# Patient Record
Sex: FEMALE | Race: WHITE | NOT HISPANIC OR LATINO | Employment: OTHER | URBAN - METROPOLITAN AREA
[De-identification: names, ages, dates, MRNs, and addresses within clinical notes are randomized per-mention and may not be internally consistent; named-entity substitution may affect disease eponyms.]

---

## 2017-01-05 ENCOUNTER — TELEPHONE (OUTPATIENT)
Dept: FAMILY MEDICINE | Facility: CLINIC | Age: 57
End: 2017-01-05

## 2017-01-05 NOTE — TELEPHONE ENCOUNTER
Panel Management Review           Composite cancer screening  Chart review shows that this patient is due/due soon for the following Colonoscopy and Fecal Colorectal (FIT)  Summary:    Patient is due/failing the following:   COLONOSCOPY and FIT    Action needed:   Patient needs to schedule colonoscopy or complete FIT card.     Type of outreach:    Will contact patient    Questions for provider review:    None                                                                                                                                    Jacquie Ayala,  Care Management Coordinator            Chart routed to Care Team .

## 2017-01-09 ENCOUNTER — OFFICE VISIT (OUTPATIENT)
Dept: BEHAVIORAL HEALTH | Facility: CLINIC | Age: 57
End: 2017-01-09
Payer: COMMERCIAL

## 2017-01-09 DIAGNOSIS — F43.10 POSTTRAUMATIC STRESS DISORDER: Primary | ICD-10-CM

## 2017-01-09 PROCEDURE — 90834 PSYTX W PT 45 MINUTES: CPT | Performed by: SOCIAL WORKER

## 2017-01-10 NOTE — PROGRESS NOTES
[facility]  January 9, 2017      Behavioral Health Clinician Progress Note    Patient Name: Aspen Mccullough           Service Type: Individual      Service Location:  in clinic      Session Start Time: 10:09am  Session End Time: 11:50am      Session Length: 38 - 52      Attendees: Patient    Visit Activities (Refresh list every visit): [facility] Only    Diagnostic Assessment Date: June 22, 2015  Treatment Plan Review Date: 4-18-16  See Flowsheets for today's PHQ-9 and BENI-7 results  Previous PHQ-9:   PHQ-9 SCORE 11/2/2015 11/10/2015 5/3/2016   Total Score - - -   Total Score - - -   Total Score 6 5 12     Previous BENI-7:   BENI-7 SCORE 10/19/2015 11/2/2015 11/10/2015   Total Score - - -   Total Score 3 3 3   Total Score - - -       FERCHO LEVEL:  FERCHO Score (Last Two) 7/24/2014   FERCHO Raw Score 51   Activation Score 91.6   FERCHO Level 4       DATA  Extended Session (60+ minutes): No  Interactive Complexity: No  Crisis: No    Treatment Objective(s) Addressed in This Session:  Target Behavior(s): disease management/lifestyle changes mental health    Mood Instability: will develop better understanding of triggers and coping strategies to stabilize mood  PTSD Symptom Management  Psychological distress related to Pain    Current Stressors / Issues:    The patient reported that she has difficulty sleeping and that this has been a chronic problem-she reported having hard time falling asleep and that she has been awake for the past 3 days-she is working with a sleep specialist about this concern and plans to follow up with the specialist regarding her sleeping.  She was able to give her  a complement during the visit as she stated that he is a nice man-he paid for her to have a genetics test to identify her genealogy wants to know her genealogy for the self and for her daughters.  The patient was vehement about her not having bipolar disorder as she has been diagnosed this in the past-she  stated that she had the symptoms that she has now since she was a child-she was tearful/emotional during the visit as she stated that she wrote a letter recently to there daughter expressing her feelings.  The patient talked about how her  wants her to let things go-she reported reasons for letting things go and need to let things go and that it does not help her-she has difficulty letting things go.       Progress on Treatment Objective(s) / Homework:  Minimal progress - ACTION (Actively working towards change); Intervened by reinforcing change plan / affirming steps taken    Motivational Interviewing    MI Intervention: Expressed Empathy/Understanding, Supported Autonomy, Collaboration, Evocation, Permission to raise concern or advise, Open-ended questions, Reflections: simple and complex and Change talk (evoked)     Change Talk Expressed by the Patient: Desire to change Ability to change Reasons to change Need to change Committment to change Activation Taking steps    Provider Response to Change Talk: E - Evoked more info from patient about behavior change, A - Affirmed patient's thoughts, decisions, or attempts at behavior change, R - Reflected patient's change talk and S - Summarized patient's change talk statements      Care Plan review completed: No    Medication Review:  No changes to current psychiatric medication(s)    Medication Compliance:  NA    Changes in Health Issues:   Yes: Pain, Associated Psychological Distress    Chemical Use Review:   Substance Use: Chemical use reviewed, no active concerns identified      Tobacco Use: No current tobacco use.      Assessment: Current Emotional / Mental Status (status of significant symptoms):  Risk status (Self / Other harm or suicidal ideation)  Patient denies a history of suicidal ideation, suicide attempts, self-injurious behavior, homicidal ideation, homicidal behavior and and other safety concerns  Patient denies current fears or concerns for  personal safety.  Patient denies current or recent suicidal ideation or behaviors.  Patient denies current or recent homicidal ideation or behaviors.  Patient denies current or recent self injurious behavior or ideation.  Patient denies other safety concerns.  A safety and risk management plan has not been developed at this time, however patient was encouraged to call [place] [phone] should there be a change in any of these risk factors.    Appearance:   Appropriate   Eye Contact:   Good   Psychomotor Behavior: Agitated   Attitude:   Cooperative   Orientation:   All  Speech   Rate / Production: Hyperverbal    Volume:  Normal   Mood:    Normal  Affect:    Appropriate   Thought Content:  Clear   Thought Form:  Coherent  Goal Directed  Logical   Insight:    Good     Diagnoses:  1. Posttraumatic stress disorder        Collateral Reports Completed:  Not Applicable    Plan: (Homework, other):  Patient was given information about behavioral services and encouraged to schedule a follow up appointment with the clinic [facility] as needed.  She was also given information about mental health symptoms and treatment options .  CD Recommendations: No indications of CD issues.  Antwan Boston, [facility], [facility]      ______________________________________________________________________    Integrated Primary Care Behavioral Health Treatment Plan    Patient's Name: Aspen Mccullough  YOB: 1960    Date: 4-18-16    DSM-V Diagnoses: PTSD  Psychosocial / Contextual Factors: medical condition, history of traumatic experiences  WHODAS:  Will complete at next visit    Referral / Collaboration:  Referral to another professional/service is not indicated at this time..    Anticipated number of session or this episode of care: 10      MeasurableTreatment Goal(s) related to diagnosis / functional impairment(s)  Goal 1: Patient will be able to remember the past with 50% less emotional reaction of anger and hurt.     I will know I've met my goal  when I can let go of the past     Objective #A (Patient Action)    Patient will talk to at least two others about losses and coping.  Status: New - Date: 4-18-16     Intervention(s)  [facility] will provide avenue for the past to process her losses and disappointments.    Objective #B  Patient will reduce her triggers from past trauma.  Status: New - Date: 4-18-16     Intervention(s)  [facility] will teach distraction skills. mindfulness.      Patient has reviewed and agreed to the above plan.      Antwan Boston, [facility]  April 18, 2016

## 2017-01-21 ENCOUNTER — OFFICE VISIT (OUTPATIENT)
Dept: URGENT CARE | Facility: URGENT CARE | Age: 57
End: 2017-01-21
Payer: COMMERCIAL

## 2017-01-21 VITALS
HEIGHT: 69 IN | BODY MASS INDEX: 19.26 KG/M2 | SYSTOLIC BLOOD PRESSURE: 134 MMHG | WEIGHT: 130 LBS | TEMPERATURE: 98.4 F | OXYGEN SATURATION: 97 % | DIASTOLIC BLOOD PRESSURE: 80 MMHG | HEART RATE: 112 BPM

## 2017-01-21 DIAGNOSIS — J04.0 LARYNGITIS: ICD-10-CM

## 2017-01-21 DIAGNOSIS — R07.0 THROAT PAIN: Primary | ICD-10-CM

## 2017-01-21 DIAGNOSIS — J40 BRONCHITIS: ICD-10-CM

## 2017-01-21 DIAGNOSIS — R05.9 COUGH: ICD-10-CM

## 2017-01-21 PROCEDURE — 99214 OFFICE O/P EST MOD 30 MIN: CPT | Performed by: FAMILY MEDICINE

## 2017-01-21 RX ORDER — ALBUTEROL SULFATE 0.83 MG/ML
1 SOLUTION RESPIRATORY (INHALATION) EVERY 4 HOURS PRN
Qty: 1 BOX | Refills: 1 | Status: SHIPPED | OUTPATIENT
Start: 2017-01-21 | End: 2017-10-03

## 2017-01-21 RX ORDER — CEPHALEXIN 500 MG/1
500 CAPSULE ORAL 3 TIMES DAILY
Qty: 30 CAPSULE | Refills: 0 | Status: SHIPPED | OUTPATIENT
Start: 2017-01-21 | End: 2017-10-03

## 2017-01-21 RX ORDER — IPRATROPIUM BROMIDE AND ALBUTEROL SULFATE 2.5; .5 MG/3ML; MG/3ML
1 SOLUTION RESPIRATORY (INHALATION) ONCE
Qty: 3 ML | Refills: 0
Start: 2017-01-21 | End: 2018-02-12

## 2017-01-21 NOTE — MR AVS SNAPSHOT
After Visit Summary   1/21/2017    Aspen Mccullough    MRN: [id number]           Patient Information     Date Of Birth          1960        Visit Information        Provider Department      1/21/2017 3:15 PM Allyson Waller MD [facility]        Today's Diagnoses     Throat pain    -  1     Cough         Bronchitis            Follow-ups after your visit        Your next 10 appointments already scheduled     Jan 23, 2017 11:00 AM   Return Visit with Antwan Boston [facility] ([facility])    [address] [place] [address]  [address]  [facility] [address] [phone]              Who to contact     If you have questions or need follow up information about today's clinic visit or your schedule please contact [facility] directly at [phone].  Normal or non-critical lab and imaging results will be communicated to you by MyChart, letter or phone within 4 business days after the clinic has received the results. If you do not hear from us within 7 days, please contact the clinic through MyChart or phone. If you have a critical or abnormal lab result, we will notify you by phone as soon as possible.  Submit refill requests through [employer] or call your pharmacy and they will forward the refill request to us. Please allow 3 business days for your refill to be completed.          Additional Information About Your Visit        MyChart Information     [employer] gives you secure access to your electronic health record. If you see a primary care provider, you can also send messages to your care team and make appointments. If you have questions, please call your primary care clinic.  If you do not have a primary care provider, please call [phone] and they will assist you.        Care EveryWhere ID     This is your Care EveryWhere ID. This could be used by other organizations to access your  [address], [address] [facility], [facility] [address]  [address], [address] [place] [address]   [phone]    [address] [facility], [address] [place] [address], [address] [place] [address]   [phone]       June 24, 2019    [address] [place] [address] "[facility]  [id number]        Your Vitals Were     Pulse Temperature Height BMI (Body Mass Index) Pulse Oximetry       112 98.4  F (36.9  C) (Oral) 5' 9\" (1.753 m) 19.19 kg/m2 97%        Blood Pressure from Last 3 Encounters:   01/21/17 134/80   10/31/16 122/76   07/12/16 130/88    Weight from Last 3 Encounters:   01/21/17 130 lb (58.968 kg)   10/31/16 182 lb (82.555 kg)   04/24/16 182 lb (82.555 kg)              Today, you had the following     No orders found for display         Today's Medication Changes          These changes are accurate as of: 1/21/17  4:42 PM.  If you have any questions, ask your nurse or doctor.               Start taking these medicines.        Dose/Directions    albuterol (2.5 MG/3ML) 0.083% neb solution   Used for:  Bronchitis   Started by:  Allyson Waller MD        Dose:  1 vial   Take 1 vial (2.5 mg) by nebulization every 4 hours as needed for shortness of breath / dyspnea or wheezing   Quantity:  1 Box   Refills:  1       ipratropium - albuterol 0.5 mg/2.5 mg/3 mL 0.5-2.5 (3) MG/3ML neb solution   Commonly known as:  DUONEB   Used for:  Bronchitis   Started by:  Allyson Waller MD        Dose:  1 vial   Take 1 vial (3 mLs) by nebulization once for 1 dose   Quantity:  3 mL   Refills:  0       order for DME   Used for:  Bronchitis   Started by:  Allyson Waller MD        Equipment being ordered: Nebulizer   Quantity:  1 Device   Refills:  0         These medicines have changed or have updated prescriptions.        Dose/Directions    * cephALEXin 500 MG capsule   Commonly known as:  KEFLEX   This may have changed:  Another medication with the same name was added. Make sure you understand how and when to take each.   Changed by:  Lucie Linn NP        4 caps 1 hour before dental appt   Refills:  0       * cephALEXin 500 MG capsule   Commonly known as:  KEFLEX   This may have changed:  You were already taking a medication with the same name, and this " prescription was added. Make sure you understand how and when to take each.   Used for:  Bronchitis   Changed by:  Allyson Waller MD        Dose:  500 mg   Take 1 capsule (500 mg) by mouth 3 times daily   Quantity:  30 capsule   Refills:  0       * Notice:  This list has 2 medication(s) that are the same as other medications prescribed for you. Read the directions carefully, and ask your doctor or other care provider to review them with you.         Where to get your medicines      These medications were sent to [facility] #[id number] - [place], [place] - [address]  [address] [facility] [address]     Phone:  [phone]    - albuterol (2.5 MG/3ML) 0.083% neb solution  - cephALEXin 500 MG capsule      Some of these will need a paper prescription and others can be bought over the counter.  Ask your nurse if you have questions.     You don't need a prescription for these medications    - ipratropium - albuterol 0.5 mg/2.5 mg/3 mL 0.5-2.5 (3) MG/3ML neb solution  - order for DME             Primary Care Provider Office Phone # Fax #    Sarahi Vivar [phone]       [facility] [address] [facility] [address]        Thank you!     Thank you for choosing [facility]  for your care. Our goal is always to provide you with excellent care. Hearing back from our patients is one way we can continue to improve our services. Please take a few minutes to complete the written survey that you may receive in the mail after your visit with us. Thank you!             Your Updated Medication List - Protect others around you: Learn how to safely use, store and throw away your medicines at www.disposemymeds.org.          This list is accurate as of: 1/21/17  4:42 PM.  Always use your most recent med list.                   Brand Name Dispense Instructions for use    albuterol (2.5 MG/3ML) 0.083% neb solution     1 Box    Take 1 vial (2.5 mg) by nebulization every 4  hours as needed for shortness of breath / dyspnea or wheezing       ALLEGRA 180 MG tablet   Generic drug:  fexofenadine      Take 180 mg by mouth daily.       * cephALEXin 500 MG capsule    KEFLEX     4 caps 1 hour before dental appt       * cephALEXin 500 MG capsule    KEFLEX    30 capsule    Take 1 capsule (500 mg) by mouth 3 times daily       cyanocobalamin 1000 MCG/ML injection    VITAMIN B12    4 mL    Inject 1 mL (1,000 mcg) Subcutaneous every 7 days       cyclobenzaprine 10 MG tablet    FLEXERIL    90 tablet    Take 1 tablet (10 mg) by mouth 3 times daily       CYTOMEL 50 MCG Tabs   Generic drug:  Liothyronine Sodium      Take 50 mcg by mouth 3 times daily       diazepam 10 MG tablet    VALIUM    90 tablet    Take 1 tablet (10 mg) by mouth 3 times daily       ergocalciferol 95102 UNITS capsule    ERGOCALCIFEROL    8 capsule    Take 1 capsule (50,000 Units) by mouth once a week       estradiol 2 MG vaginal ring    ESTRING    1 each    Place 1 each vaginally every 3 months one ring.       FISH OIL      daily. w/DHEA.       FLONASE 50 MCG/ACT spray   Generic drug:  fluticasone      2 sprays by Both Nostrils route daily as needed.       guaiFENesin-codeine 100-10 MG/5ML Soln solution    ROBITUSSIN AC    120 mL    Take 10 mLs by mouth every 4 hours as needed for cough       HYDROmorphone 8 MG tablet    DILAUDID    100 tablet    Take 1 tablet (8 mg) by mouth every 3 hours as needed       ipratropium - albuterol 0.5 mg/2.5 mg/3 mL 0.5-2.5 (3) MG/3ML neb solution    DUONEB    3 mL    Take 1 vial (3 mLs) by nebulization once for 1 dose       * levothyroxine 200 MCG Solr injection    SYNTHROID/LEVOTHROID         * levothyroxine 300 MCG tablet    SYNTHROID/LEVOTHROID         morphine 30 MG 12 hr tablet    MS CONTIN    270 tablet    Take 3 tablets (90 mg) by mouth every 8 hours       NASONEX 50 MCG/ACT spray   Generic drug:  mometasone          norethindrone-ethinyl estradiol 1-5 MG-MCG per tablet    FEMHRT 1/5    90  tablet    Take 1 tablet by mouth daily       order for DME     1 Device    Equipment being ordered: Nebulizer       prednisoLONE acetate 1 % ophthalmic susp    PRED FORTE         * predniSONE 1 MG tablet    DELTASONE     alternate 8 mg and 10 mg every other day       * predniSONE 5 MG tablet    DELTASONE     alternate 8 mg and 10 mg every other day       probiotic Caps      Take 1 capsule by mouth daily.       ramelteon 8 MG tablet    ROZEREM    30 tablet    Take 1 tablet (8 mg) by mouth At Bedtime       RESTASIS 0.05 % ophthalmic emulsion   Generic drug:  cycloSPORINE      Place 1 drop into both eyes 2 times daily       zolpidem 12.5 MG CR tablet    AMBIEN CR    30 tablet    Take 1 tablet (12.5 mg) by mouth nightly as needed       * Notice:  This list has 6 medication(s) that are the same as other medications prescribed for you. Read the directions carefully, and ask your doctor or other care provider to review them with you.

## 2017-01-21 NOTE — NURSING NOTE
The following nebulizer treatment was given:     MEDICATION: Duoneb  : eTelemetry  LOT #: [id number]  EXPIRATION DATE:  7/2018  NDC # 4007-0626-88    Josie Vann RN

## 2017-01-21 NOTE — PROGRESS NOTES
SUBJECTIVE:   Aspen Mccullough is a 56 year old female who complains of nasal congestion, post nasal drip, yellow and green nasal discharge, sore throat, productive cough, hoarse voice, chest congestion, wheezing and chills for more than a week, had diarrhea yesterday.  She denies a history of nausea, vomiting, chest pain and fever and denies a history of asthma. Patient denies smoke cigarettes.  Pt has history of SLE and hypothyroidism.       Past Medical History   Diagnosis Date     Fibromyalgia      Lupus (H)      Cushing's syndrome (H)      Hyperlipidemia      ADHD (attention deficit hyperactivity disorder)      Allergic rhinitis      Insomnia      DDD (degenerative disc disease)      Hypothyroid      hx hashimoto's thyroiditis     Pernicious anemia      Endometriosis      Malabsorption      Chronic low back pain      Diarrhea      Sinusitis      Renal disease      chronic renal insufficiency     Renal disease      hx renal failure     Deviated nasal septum      Hashimoto's disease      DDD (degenerative disc disease)         Past Surgical History   Procedure Laterality Date     Cholecystectomy       Arthrodesis ankle       right     Foot surgery       left X 4     Appendectomy       Breast surgery       right- tissue remove nipple area     Laparoscopic ablation endometriosis       Salpingo-oophorectomy bilateral       Tonsillectomy       Arthroplasty knee bilateral       Amputation       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)     Fusion lumbar anterior one level       Laminectomy lumbar one level       L4-5     Arthroplasty revision knee  4/19/2011     Procedure:ARTHROPLASTY REVISION KNEE; With Antibiotic Cement ; Surgeon:CHAO OLIVARES; Location:[facility]     Bunionectomy  12/14/2011     Procedure:BUNIONECTOMY; Right Bunion Correction; Surgeon:GRACE ZARATE; Location:[facility]     Remove hardware foot  12/14/2011     Procedure:REMOVE HARDWARE FOOT; Hardware Removal Right Foot (Mini-C-Arm) ;  Surgeon:GRACE ZARATE; Location:[facility]     Excise mass upper extremity  12/14/2011     Procedure:EXCISE MASS UPPER EXTREMITY; Excision of Left Arm Mass; Surgeon:GRACE ZARATE; Location:[facility]     Rhinoplasty          Current Outpatient Prescriptions   Medication Sig Dispense Refill     norethindrone-ethinyl estradiol (FEMHRT 1/5) 1-5 MG-MCG per tablet Take 1 tablet by mouth daily 90 tablet 3     levothyroxine (SYNTHROID, LEVOTHROID) 300 MCG tablet   10     prednisoLONE acetate (PRED FORTE) 1 % ophthalmic suspension   1     HYDROmorphone (DILAUDID) 8 MG tablet Take 1 tablet (8 mg) by mouth every 3 hours as needed 100 tablet 0     morphine (MS CONTIN) 30 MG 12 hr tablet Take 3 tablets (90 mg) by mouth every 8 hours 270 tablet 0     diazepam (VALIUM) 10 MG tablet Take 1 tablet (10 mg) by mouth 3 times daily 90 tablet 0     Liothyronine Sodium 50 MCG TABS Take 50 mcg by mouth 3 times daily       ergocalciferol (ERGOCALCIFEROL) 65537 UNITS capsule Take 1 capsule (50,000 Units) by mouth once a week 8 capsule 0     cyclobenzaprine (FLEXERIL) 10 MG tablet Take 1 tablet (10 mg) by mouth 3 times daily 90 tablet 3     estradiol (ESTRING) 2 MG vaginal ring Place 1 each vaginally every 3 months one ring. 1 each 4     cycloSPORINE (RESTASIS) 0.05 % ophthalmic emulsion Place 1 drop into both eyes 2 times daily       levothyroxine (SYNTHROID,LEVOTHROID) 200 MCG SOLR   7     cyanocobalamin 1000 MCG/ML injection Inject 1 mL (1,000 mcg) Subcutaneous every 7 days 4 mL 5     FISH OIL daily. w/DHEA.        PREDNISONE 5 MG OR TABS alternate 8 mg and 10 mg every other day       zolpidem (AMBIEN CR) 12.5 MG CR tablet Take 1 tablet (12.5 mg) by mouth nightly as needed 30 tablet 1     guaiFENesin-codeine (ROBITUSSIN AC) 100-10 MG/5ML SOLN Take 10 mLs by mouth every 4 hours as needed for cough 120 mL 0     NASONEX 50 MCG/ACT nasal spray   11     ramelteon (ROZEREM) 8 MG tablet Take 1 tablet (8 mg) by mouth At Bedtime 30  "tablet 6     probiotic CAPS Take 1 capsule by mouth daily.       fexofenadine (ALLEGRA) 180 MG tablet Take 180 mg by mouth daily.       fluticasone (FLONASE) 50 MCG/ACT nasal spray 2 sprays by Both Nostrils route daily as needed.       PREDNISONE 1 MG OR TABS alternate 8 mg and 10 mg every other day                      OBJECTIVE:  /80 mmHg  Pulse 112  Temp(Src) 98.4  F (36.9  C) (Oral)  Ht 5' 9\" (1.753 m)  Wt 130 lb (58.968 kg)  BMI 19.19 kg/m2  SpO2 97%   She appears in NAD, voice is hoarse, no evidence of lethargy or dyspnea.  Ears normal.  Throat and pharynx : erythematous without exudates.  Neck supple. No adenopathy in the neck. Nose is congested. Sinuses non tender. The chest: scattered rhonchi bilaterally without wheezes or rales. The abdomen is soft without tenderness, guarding, mass, rebound or organomegaly. Bowel sounds are normal.       Assessment/Plan:   (J40) Bronchitis  Comment:   Plan: ipratropium - albuterol 0.5 mg/2.5 mg/3 mL         (DUONEB) 0.5-2.5 (3) MG/3ML neb solution,         cephALEXin (KEFLEX) 500 MG capsule, order for         DME, albuterol (2.5 MG/3ML) 0.083% neb solution            (J04.0) Laryngitis  Comment:   Plan: ipratropium - albuterol 0.5 mg/2.5 mg/3 mL         (DUONEB) 0.5-2.5 (3) MG/3ML neb solution, order        for DME, albuterol (2.5 MG/3ML) 0.083% neb         solution          Pt received one neb treatment at clinic, her lung sounds improved. She is advised to increase Prednisone to 20 mg twice a day for 5 days and RTC tomorrow if symptoms are worse.   Symptomatic therapy suggested: push fluids, rest, gargle warm salt water and use vaporizer or mist needed .   "

## 2017-01-21 NOTE — NURSING NOTE
"Chief Complaint   Patient presents with     Urgent Care     URI     Last Saturday started to have copious nasal congestion/discharge and productive cough.  Hx double pneumonia last winter.  Feels short of breath.  Notes has mold exposure in her home.  Hx of lupus and chronic renal failure.  States has negative strep test; throat irritated from coughing.     Initial /80 mmHg  Pulse 112  Temp(Src) 98.4  F (36.9  C) (Oral)  Ht 5' 9\" (1.753 m)  Wt 130 lb (58.968 kg)  BMI 19.19 kg/m2  SpO2 97% Estimated body mass index is 19.19 kg/(m^2) as calculated from the following:    Height as of this encounter: 5' 9\" (1.753 m).    Weight as of this encounter: 130 lb (58.968 kg)..  BP completed using cuff size: regular  CHELLE Espinal  "

## 2017-01-21 NOTE — Clinical Note
January 21, 2017    Aspen Mccullough  [address]  [facility] [address]            To whom it may concern:     This is to inform you that this patient needs to be off work for three days starting tomorrow due to her illness.   Please let me know if you have any questions.       Sincerely,                 Allyson Waller MD

## 2017-01-21 NOTE — NURSING NOTE
"Chief Complaint   Patient presents with     Urgent Care     URI     Last Saturday started to have copious nasal congestion/discharge and productive cough.  Hx double pneumonia last winter.  Feels short of breath.  Notes has mold exposure in her home.  Hx of lupus and chronic renal failure.     Initial /80 mmHg  Pulse 112  Temp(Src) 98.4  F (36.9  C) (Oral)  Ht 5' 9\" (1.753 m)  Wt 130 lb (58.968 kg)  BMI 19.19 kg/m2  SpO2 97% Estimated body mass index is 19.19 kg/(m^2) as calculated from the following:    Height as of this encounter: 5' 9\" (1.753 m).    Weight as of this encounter: 130 lb (58.968 kg)..  BP completed using cuff size: regular  CHELLE Espinal  "

## 2017-01-23 ENCOUNTER — OFFICE VISIT (OUTPATIENT)
Dept: BEHAVIORAL HEALTH | Facility: CLINIC | Age: 57
End: 2017-01-23
Payer: COMMERCIAL

## 2017-01-23 DIAGNOSIS — F43.10 POSTTRAUMATIC STRESS DISORDER: Primary | ICD-10-CM

## 2017-01-23 PROCEDURE — 90834 PSYTX W PT 45 MINUTES: CPT | Performed by: SOCIAL WORKER

## 2017-01-24 NOTE — PROGRESS NOTES
"[facility]  January 23, 2017      Behavioral Health Clinician Progress Note    Patient Name: Aspen Mccullough           Service Type: Individual      Service Location:  in clinic      Session Start Time: 11:05am  Session End Time: 11:50am      Session Length: 38 - 52      Attendees: Patient    Visit Activities (Refresh list every visit): [facility] Only    Diagnostic Assessment Date: June 22, 2015  Treatment Plan Review Date: 4-18-16  See Flowsheets for today's PHQ-9 and BENI-7 results  Previous PHQ-9:   PHQ-9 SCORE 11/2/2015 11/10/2015 5/3/2016   Total Score - - -   Total Score - - -   Total Score 6 5 12     Previous BENI-7:   BENI-7 SCORE 10/19/2015 11/2/2015 11/10/2015   Total Score - - -   Total Score 3 3 3   Total Score - - -       FERCHO LEVEL:  FERCHO Score (Last Two) 7/24/2014   FERCHO Raw Score 51   Activation Score 91.6   FERCHO Level 4       DATA  Extended Session (60+ minutes): No  Interactive Complexity: No  Crisis: No    Treatment Objective(s) Addressed in This Session:  Target Behavior(s): disease management/lifestyle changes mental health    Mood Instability: will develop better understanding of triggers and coping strategies to stabilize mood  PTSD Symptom Management  Psychological distress related to Pain    Current Stressors / Issues:    The patient reported that she has difficulty sleeping and that this has been a chronic problem-she is working with a sleep specialist about this concern and plans to follow up with the specialist regarding her sleeping.  She reported \"I got to pat myself on the back\" and she was referring to management of her anger reactions during interaction with neighbor.  The patient talked about her anger towards her brother and talked about how he has negativity impacted her family.  Regarding her mood the patient reported being down because she has been feeling health issues and due to not being able to exercise regularly.  The patient talked about letting go " and how she did a good job of letting go with the interaction with the neighbor and how she tried her best with raising her youngest daughter even though she had serious medical issues.           Progress on Treatment Objective(s) / Homework:  Minimal progress - ACTION (Actively working towards change); Intervened by reinforcing change plan / affirming steps taken    Motivational Interviewing    MI Intervention: Expressed Empathy/Understanding, Supported Autonomy, Collaboration, Evocation, Permission to raise concern or advise, Open-ended questions, Reflections: simple and complex and Change talk (evoked)     Change Talk Expressed by the Patient: Desire to change Ability to change Reasons to change Need to change Committment to change Activation Taking steps    Provider Response to Change Talk: E - Evoked more info from patient about behavior change, A - Affirmed patient's thoughts, decisions, or attempts at behavior change, R - Reflected patient's change talk and S - Summarized patient's change talk statements      Care Plan review completed: No    Medication Review:  No changes to current psychiatric medication(s)    Medication Compliance:  NA    Changes in Health Issues:   Yes: Pain, Associated Psychological Distress    Chemical Use Review:   Substance Use: Chemical use reviewed, no active concerns identified      Tobacco Use: No current tobacco use.      Assessment: Current Emotional / Mental Status (status of significant symptoms):  Risk status (Self / Other harm or suicidal ideation)  Patient denies a history of suicidal ideation, suicide attempts, self-injurious behavior, homicidal ideation, homicidal behavior and and other safety concerns  Patient denies current fears or concerns for personal safety.  Patient denies current or recent suicidal ideation or behaviors.  Patient denies current or recent homicidal ideation or behaviors.  Patient denies current or recent self injurious behavior or  ideation.  Patient denies other safety concerns.  A safety and risk management plan has not been developed at this time, however patient was encouraged to call [place] [phone] should there be a change in any of these risk factors.    Appearance:   Appropriate   Eye Contact:   Good   Psychomotor Behavior: Agitated   Attitude:   Cooperative   Orientation:   All  Speech   Rate / Production: Hyperverbal    Volume:  Normal   Mood:    Normal  Affect:    Appropriate   Thought Content:  Clear   Thought Form:  Coherent  Goal Directed  Logical   Insight:    Good     Diagnoses:  1. Posttraumatic stress disorder        Collateral Reports Completed:  Not Applicable    Plan: (Homework, other):  Patient was given information about behavioral services and encouraged to schedule a follow up appointment with the clinic [facility] as needed.  She was also given information about mental health symptoms and treatment options .  CD Recommendations: No indications of CD issues.  TAQUERIA Blackwell, [facility]      ______________________________________________________________________    Integrated Primary Care Behavioral Health Treatment Plan    Patient's Name: Aspen Mccullough  YOB: 1960    Date: 4-18-16    DSM-V Diagnoses: PTSD  Psychosocial / Contextual Factors: medical condition, history of traumatic experiences  WHODAS:  Will complete at next visit    Referral / Collaboration:  Referral to another professional/service is not indicated at this time..    Anticipated number of session or this episode of care: 10      MeasurableTreatment Goal(s) related to diagnosis / functional impairment(s)  Goal 1: Patient will be able to remember the past with 50% less emotional reaction of anger and hurt.     I will know I've met my goal when I can let go of the past     Objective #A (Patient Action)    Patient will talk to at least two others about losses and coping.  Status: New - Date: 4-18-16     Intervention(s)  [facility] will provide avenue for  the past to process her losses and disappointments.    Objective #B  Patient will reduce her triggers from past trauma.  Status: New - Date: 4-18-16     Intervention(s)  [facility] will teach distraction skills. mindfulness.      Patient has reviewed and agreed to the above plan.      Antwan Boston, [facility]  April 18, 2016

## 2017-02-02 ENCOUNTER — TELEPHONE (OUTPATIENT)
Dept: NURSING | Facility: CLINIC | Age: 57
End: 2017-02-02

## 2017-02-02 DIAGNOSIS — N95.2 VAGINAL ATROPHY: Primary | ICD-10-CM

## 2017-02-02 NOTE — TELEPHONE ENCOUNTER
Received refill request via for:    Note from Dr. Patricio at annual on 10/31/16: She does use an Estring for replacement therapy. There is no sign of any erosion from the Estring.  Pt also takes norethindrone-ethinyl estradiol (FEMHRT 1/5) 1-5 MG-MCG per tablet daily.    Estring Vaginal Ring 2 MG      Last Written Prescription Date:  1/5/16  Last Fill Quantity: 1 ring,   # refills: 4  Last Office Visit with [facility] primary care provider:  10/31/16-Annual  Future Office visit: None    Routing refill request to provider for review/approval because:  Rx was not filled at pt's annual on 10/31/16. Rx & pharmacy pended. Routing to Dr. Patricio.

## 2017-02-06 ENCOUNTER — OFFICE VISIT (OUTPATIENT)
Dept: BEHAVIORAL HEALTH | Facility: CLINIC | Age: 57
End: 2017-02-06
Payer: COMMERCIAL

## 2017-02-06 DIAGNOSIS — F43.10 POSTTRAUMATIC STRESS DISORDER: Primary | ICD-10-CM

## 2017-02-06 PROCEDURE — 90832 PSYTX W PT 30 MINUTES: CPT | Performed by: SOCIAL WORKER
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
## 2017-02-20 ENCOUNTER — OFFICE VISIT (OUTPATIENT)
Dept: BEHAVIORAL HEALTH | Facility: CLINIC | Age: 57
End: 2017-02-20
Payer: COMMERCIAL

## 2017-02-20 DIAGNOSIS — F43.10 POSTTRAUMATIC STRESS DISORDER: Primary | ICD-10-CM

## 2017-02-20 PROCEDURE — 90834 PSYTX W PT 45 MINUTES: CPT | Performed by: SOCIAL WORKER
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
## 2017-02-20 NOTE — MR AVS SNAPSHOT
After Visit Summary   2/20/2017    Aspen Mccullough    MRN: [id number]           Patient Information     Date Of Birth          1960        Visit Information        Provider Department      2/20/2017 10:30 AM Antwan Boston LICSW [facility]        Today's Diagnoses     Posttraumatic stress disorder    -  1       Follow-ups after your visit        Your next 10 appointments already scheduled     Mar 06, 2017 10:30 AM CST   Return Visit with BARNEY Yan   [facility] ([facility])    [address] [place] [address]  [address]  [facility] [address] [phone]              Who to contact     If you have questions or need follow up information about today's clinic visit or your schedule please contact [facility] directly at [phone].  Normal or non-critical lab and imaging results will be communicated to you by MyChart, letter or phone within 4 business days after the clinic has received the results. If you do not hear from us within 7 days, please contact the clinic through MyChart or phone. If you have a critical or abnormal lab result, we will notify you by phone as soon as possible.  Submit refill requests through [employer] or call your pharmacy and they will forward the refill request to us. Please allow 3 business days for your refill to be completed.          Additional Information About Your Visit        MyChart Information     [employer] gives you secure access to your electronic health record. If you see a primary care provider, you can also send messages to your care team and make appointments. If you have questions, please call your primary care clinic.  If you do not have a primary care provider, please call [phone] and they will assist you.        Care EveryWhere ID     This is your Care EveryWhere ID. This could be used by other organizations to access  your [place] medical records  [id number]         Blood Pressure from Last 3 Encounters:   01/21/17 134/80   10/31/16 122/76   07/12/16 130/88    Weight from Last 3 Encounters:   01/21/17 130 lb (59 kg)   10/31/16 182 lb (82.6 kg)   04/24/16 182 lb (82.6 kg)              Today, you had the following     No orders found for display       Primary Care Provider Office Phone # Fax #    Sarahi Vivar [phone]       [facility] [address] [facility] [address]        Thank you!     Thank you for choosing [facility]  for your care. Our goal is always to provide you with excellent care. Hearing back from our patients is one way we can continue to improve our services. Please take a few minutes to complete the written survey that you may receive in the mail after your visit with us. Thank you!             Your Updated Medication List - Protect others around you: Learn how to safely use, store and throw away your medicines at www.disposemymeds.org.          This list is accurate as of: 2/20/17  1:45 PM.  Always use your most recent med list.                   Brand Name Dispense Instructions for use    albuterol (2.5 MG/3ML) 0.083% neb solution     1 Box    Take 1 vial (2.5 mg) by nebulization every 4 hours as needed for shortness of breath / dyspnea or wheezing       ALLEGRA 180 MG tablet   Generic drug:  fexofenadine      Take 180 mg by mouth daily.       * cephALEXin 500 MG capsule    KEFLEX     4 caps 1 hour before dental appt       * cephALEXin 500 MG capsule    KEFLEX    30 capsule    Take 1 capsule (500 mg) by mouth 3 times daily       cyanocobalamin 1000 MCG/ML injection    VITAMIN B12    4 mL    Inject 1 mL (1,000 mcg) Subcutaneous every 7 days       cyclobenzaprine 10 MG tablet    FLEXERIL    90 tablet    Take 1 tablet (10 mg) by mouth 3 times daily       CYTOMEL 50 MCG Tabs   Generic drug:  Liothyronine Sodium      Take 50 mcg by mouth 3 times daily        diazepam 10 MG tablet    VALIUM    90 tablet    Take 1 tablet (10 mg) by mouth 3 times daily       ergocalciferol 78054 UNITS capsule    ERGOCALCIFEROL    8 capsule    Take 1 capsule (50,000 Units) by mouth once a week       estradiol 2 MG vaginal ring    ESTRING    1 each    Place 1 each vaginally every 3 months one ring.       FISH OIL      daily. w/DHEA.       FLONASE 50 MCG/ACT spray   Generic drug:  fluticasone      2 sprays by Both Nostrils route daily as needed.       guaiFENesin-codeine 100-10 MG/5ML Soln solution    ROBITUSSIN AC    120 mL    Take 10 mLs by mouth every 4 hours as needed for cough       HYDROmorphone 8 MG tablet    DILAUDID    100 tablet    Take 1 tablet (8 mg) by mouth every 3 hours as needed       ipratropium - albuterol 0.5 mg/2.5 mg/3 mL 0.5-2.5 (3) MG/3ML neb solution    DUONEB    3 mL    Take 1 vial (3 mLs) by nebulization once for 1 dose       * levothyroxine 200 MCG Solr injection    SYNTHROID/LEVOTHROID         * levothyroxine 300 MCG tablet    SYNTHROID/LEVOTHROID         morphine 30 MG 12 hr tablet    MS CONTIN    270 tablet    Take 3 tablets (90 mg) by mouth every 8 hours       NASONEX 50 MCG/ACT spray   Generic drug:  mometasone          norethindrone-ethinyl estradiol 1-5 MG-MCG per tablet    FEMHRT 1/5    90 tablet    Take 1 tablet by mouth daily       order for DME     1 Device    Equipment being ordered: Nebulizer       prednisoLONE acetate 1 % ophthalmic susp    PRED FORTE         * predniSONE 1 MG tablet    DELTASONE     alternate 8 mg and 10 mg every other day       * predniSONE 5 MG tablet    DELTASONE     alternate 8 mg and 10 mg every other day       probiotic Caps      Take 1 capsule by mouth daily.       ramelteon 8 MG tablet    ROZEREM    30 tablet    Take 1 tablet (8 mg) by mouth At Bedtime       RESTASIS 0.05 % ophthalmic emulsion   Generic drug:  cycloSPORINE      Place 1 drop into both eyes 2 times daily       zolpidem 12.5 MG CR tablet    AMBIEN CR    30  tablet    Take 1 tablet (12.5 mg) by mouth nightly as needed       * Notice:  This list has 6 medication(s) that are the same as other medications prescribed for you. Read the directions carefully, and ask your doctor or other care provider to review them with you.

## 2017-03-13 ENCOUNTER — OFFICE VISIT (OUTPATIENT)
Dept: BEHAVIORAL HEALTH | Facility: CLINIC | Age: 57
End: 2017-03-13
Payer: COMMERCIAL

## 2017-03-13 DIAGNOSIS — F43.10 POSTTRAUMATIC STRESS DISORDER: Primary | ICD-10-CM

## 2017-03-13 PROCEDURE — 90834 PSYTX W PT 45 MINUTES: CPT | Performed by: SOCIAL WORKER

## 2017-03-13 NOTE — MR AVS SNAPSHOT
After Visit Summary   3/13/2017    Aspen Mccullough    MRN: [id number]           Patient Information     Date Of Birth          1960        Visit Information        Provider Department      3/13/2017 2:30 PM Antwan [place], [facility]        Today's Diagnoses     Posttraumatic stress disorder    -  1       Follow-ups after your visit        Your next 10 appointments already scheduled     Mar 27, 2017 11:30 AM CDT   Return Visit with BARNEY Yan   [facility] ([facility])    [address] [place] [address]  [address]  [facility] [id number] [phone]              Who to contact     If you have questions or need follow up information about today's clinic visit or your schedule please contact [facility] directly at [phone].  Normal or non-critical lab and imaging results will be communicated to you by MyChart, letter or phone within 4 business days after the clinic has received the results. If you do not hear from us within 7 days, please contact the clinic through MyChart or phone. If you have a critical or abnormal lab result, we will notify you by phone as soon as possible.  Submit refill requests through [employer] or call your pharmacy and they will forward the refill request to us. Please allow 3 business days for your refill to be completed.          Additional Information About Your Visit        MyChart Information     [employer] gives you secure access to your electronic health record. If you see a primary care provider, you can also send messages to your care team and make appointments. If you have questions, please call your primary care clinic.  If you do not have a primary care provider, please call [phone] and they will assist you.        Care EveryWhere ID     This is your Care EveryWhere ID. This could be used by other organizations to access your  [place] medical records  [id number]         Blood Pressure from Last 3 Encounters:   01/21/17 134/80   10/31/16 122/76   07/12/16 130/88    Weight from Last 3 Encounters:   01/21/17 130 lb (59 kg)   10/31/16 182 lb (82.6 kg)   04/24/16 182 lb (82.6 kg)              Today, you had the following     No orders found for display       Primary Care Provider Office Phone # Fax #    Sarahi Vivar [phone]       [place] [facility] [address] [facility] [address]        Thank you!     Thank you for choosing [facility]  for your care. Our goal is always to provide you with excellent care. Hearing back from our patients is one way we can continue to improve our services. Please take a few minutes to complete the written survey that you may receive in the mail after your visit with us. Thank you!             Your Updated Medication List - Protect others around you: Learn how to safely use, store and throw away your medicines at www.disposemymeds.org.          This list is accurate as of: 3/13/17 11:59 PM.  Always use your most recent med list.                   Brand Name Dispense Instructions for use    albuterol (2.5 MG/3ML) 0.083% neb solution     1 Box    Take 1 vial (2.5 mg) by nebulization every 4 hours as needed for shortness of breath / dyspnea or wheezing       ALLEGRA 180 MG tablet   Generic drug:  fexofenadine      Take 180 mg by mouth daily.       * cephALEXin 500 MG capsule    KEFLEX     4 caps 1 hour before dental appt       * cephALEXin 500 MG capsule    KEFLEX    30 capsule    Take 1 capsule (500 mg) by mouth 3 times daily       cyanocobalamin 1000 MCG/ML injection    VITAMIN B12    4 mL    Inject 1 mL (1,000 mcg) Subcutaneous every 7 days       cyclobenzaprine 10 MG tablet    FLEXERIL    90 tablet    Take 1 tablet (10 mg) by mouth 3 times daily       CYTOMEL 50 MCG Tabs   Generic drug:  Liothyronine Sodium      Take 50 mcg by mouth 3 times daily        diazepam 10 MG tablet    VALIUM    90 tablet    Take 1 tablet (10 mg) by mouth 3 times daily       ergocalciferol 65909 UNITS capsule    ERGOCALCIFEROL    8 capsule    Take 1 capsule (50,000 Units) by mouth once a week       estradiol 2 MG vaginal ring    ESTRING    1 each    Place 1 each vaginally every 3 months one ring.       FISH OIL      daily. w/DHEA.       FLONASE 50 MCG/ACT spray   Generic drug:  fluticasone      2 sprays by Both Nostrils route daily as needed.       guaiFENesin-codeine 100-10 MG/5ML Soln solution    ROBITUSSIN AC    120 mL    Take 10 mLs by mouth every 4 hours as needed for cough       HYDROmorphone 8 MG tablet    DILAUDID    100 tablet    Take 1 tablet (8 mg) by mouth every 3 hours as needed       * levothyroxine 200 MCG Solr injection    SYNTHROID/LEVOTHROID         * levothyroxine 300 MCG tablet    SYNTHROID/LEVOTHROID         morphine 30 MG 12 hr tablet    MS CONTIN    270 tablet    Take 3 tablets (90 mg) by mouth every 8 hours       NASONEX 50 MCG/ACT spray   Generic drug:  mometasone          norethindrone-ethinyl estradiol 1-5 MG-MCG per tablet    FEMHRT 1/5    90 tablet    Take 1 tablet by mouth daily       order for DME     1 Device    Equipment being ordered: Nebulizer       prednisoLONE acetate 1 % ophthalmic susp    PRED FORTE         * predniSONE 1 MG tablet    DELTASONE     alternate 8 mg and 10 mg every other day       * predniSONE 5 MG tablet    DELTASONE     alternate 8 mg and 10 mg every other day       probiotic Caps      Take 1 capsule by mouth daily.       ramelteon 8 MG tablet    ROZEREM    30 tablet    Take 1 tablet (8 mg) by mouth At Bedtime       RESTASIS 0.05 % ophthalmic emulsion   Generic drug:  cycloSPORINE      Place 1 drop into both eyes 2 times daily       zolpidem 12.5 MG CR tablet    AMBIEN CR    30 tablet    Take 1 tablet (12.5 mg) by mouth nightly as needed       * Notice:  This list has 6 medication(s) that are the same as other medications prescribed  for you. Read the directions carefully, and ask your doctor or other care provider to review them with you.

## 2017-03-14 NOTE — PROGRESS NOTES
"[facility]  March 13, 2017      Behavioral Health Clinician Progress Note    Patient Name: Aspen Mccullough           Service Type: Individual      Service Location:  in clinic      Session Start Time: 2:36pm  Session End Time: 3:18pm      Session Length: 38 - 52      Attendees: Patient    Visit Activities (Refresh list every visit): [facility] Only    Diagnostic Assessment Date: June 22, 2015  Treatment Plan Review Date: 4-18-16  See Flowsheets for today's PHQ-9 and BENI-7 results  Previous PHQ-9:   PHQ-9 SCORE 11/2/2015 11/10/2015 5/3/2016   Total Score - - -   Total Score - - -   Total Score 6 5 12     Previous BENI-7:   BENI-7 SCORE 10/19/2015 11/2/2015 11/10/2015   Total Score - - -   Total Score 3 3 3   Total Score - - -       FERCHO LEVEL:  FERCHO Score (Last Two) 7/24/2014   FERCHO Raw Score 51   Activation Score 91.6   FERCHO Level 4       DATA  Extended Session (60+ minutes): No  Interactive Complexity: No  Crisis: No    Treatment Objective(s) Addressed in This Session:  Target Behavior(s): disease management/lifestyle changes mental health    Mood Instability: will develop better understanding of triggers and coping strategies to stabilize mood  PTSD Symptom Management  Psychological distress related to Pain    Current Stressors / Issues:    The patient reported that she brought a new car.  She talked about her frustration with her history of medical care-she recently had some disagreements with her PCP and as a result she was seen by another PCP and she is planing to follow up with the new PCP to address her pain and other care needs.  The patient made the statement \"I'm not a drug seeker\" numerous times regarding her needs for pain medications and her history of dealing with providers.  She reported to continue to have issues with sleeping-during the visit the patient would drift her focus to alternative topics loosing focus on the original topic she would state \"I have ADHD\" she reported " that this loss of focus bothers her-this writer proposed that she find a way to anchor her thoughts when in interactions with others where she wants to stay focused-she agreed to practice this skill.               Progress on Treatment Objective(s) / Homework:  Minimal progress - ACTION (Actively working towards change); Intervened by reinforcing change plan / affirming steps taken    Motivational Interviewing    MI Intervention: Expressed Empathy/Understanding, Supported Autonomy, Collaboration, Evocation, Permission to raise concern or advise, Open-ended questions, Reflections: simple and complex and Change talk (evoked)     Change Talk Expressed by the Patient: Desire to change Ability to change Reasons to change Need to change Committment to change Activation Taking steps    Provider Response to Change Talk: E - Evoked more info from patient about behavior change, A - Affirmed patient's thoughts, decisions, or attempts at behavior change, R - Reflected patient's change talk and S - Summarized patient's change talk statements      Care Plan review completed: No    Medication Review:  No changes to current psychiatric medication(s)    Medication Compliance:  NA    Changes in Health Issues:   Yes: Pain, Associated Psychological Distress    Chemical Use Review:   Substance Use: Chemical use reviewed, no active concerns identified      Tobacco Use: No current tobacco use.      Assessment: Current Emotional / Mental Status (status of significant symptoms):  Risk status (Self / Other harm or suicidal ideation)  Patient denies a history of suicidal ideation, suicide attempts, self-injurious behavior, homicidal ideation, homicidal behavior and and other safety concerns  Patient denies current fears or concerns for personal safety.  Patient denies current or recent suicidal ideation or behaviors.  Patient denies current or recent homicidal ideation or behaviors.  Patient denies current or recent self injurious behavior or  ideation.  Patient denies other safety concerns.  A safety and risk management plan has not been developed at this time, however patient was encouraged to call [place] [phone] should there be a change in any of these risk factors.    Appearance:   Appropriate   Eye Contact:   Good   Psychomotor Behavior: Agitated   Attitude:   Cooperative   Orientation:   All  Speech   Rate / Production: Hyperverbal    Volume:  Normal   Mood:    Normal  Affect:    Appropriate   Thought Content:  Clear   Thought Form:  Coherent  Goal Directed  Logical   Insight:    Good     Diagnoses:  1. Posttraumatic stress disorder        Collateral Reports Completed:  Not Applicable    Plan: (Homework, other):  Patient was given information about behavioral services and encouraged to schedule a follow up appointment with the clinic [facility] as needed.  She was also given information about mental health symptoms and treatment options .  CD Recommendations: No indications of CD issues.  TAQUERIA Blackwell, [facility]      ______________________________________________________________________    Integrated Primary Care Behavioral Health Treatment Plan    Patient's Name: Aspen Mccullough  YOB: 1960    Date: 4-18-16    DSM-V Diagnoses: PTSD  Psychosocial / Contextual Factors: medical condition, history of traumatic experiences  WHODAS:  Will complete at next visit    Referral / Collaboration:  Referral to another professional/service is not indicated at this time..    Anticipated number of session or this episode of care: 10      MeasurableTreatment Goal(s) related to diagnosis / functional impairment(s)  Goal 1: Patient will be able to remember the past with 50% less emotional reaction of anger and hurt.     I will know I've met my goal when I can let go of the past     Objective #A (Patient Action)    Patient will talk to at least two others about losses and coping.  Status: New - Date: 4-18-16     Intervention(s)  [facility] will provide avenue for  the past to process her losses and disappointments.    Objective #B  Patient will reduce her triggers from past trauma.  Status: New - Date: 4-18-16     Intervention(s)  [facility] will teach distraction skills. mindfulness.      Patient has reviewed and agreed to the above plan.      Antwan Boston, [facility]  April 18, 2016

## 2017-03-27 ENCOUNTER — OFFICE VISIT (OUTPATIENT)
Dept: BEHAVIORAL HEALTH | Facility: CLINIC | Age: 57
End: 2017-03-27
Payer: COMMERCIAL

## 2017-03-27 DIAGNOSIS — F43.10 POSTTRAUMATIC STRESS DISORDER: Primary | ICD-10-CM

## 2017-03-27 PROCEDURE — 90834 PSYTX W PT 45 MINUTES: CPT | Performed by: SOCIAL WORKER

## 2017-03-27 NOTE — MR AVS SNAPSHOT
After Visit Summary   3/27/2017    Aspen Mccullough    MRN: [id number]           Patient Information     Date Of Birth          1960        Visit Information        Provider Department      3/27/2017 11:30 AM Antwan Boston LICSW [facility]        Today's Diagnoses     Posttraumatic stress disorder    -  1       Follow-ups after your visit        Your next 10 appointments already scheduled     Apr 10, 2017 10:00 AM CDT   Return Visit with BARNEY Yan   [facility] ([facility])    [address] [place] [address]  [address]  [facility] [id number] [phone]              Who to contact     If you have questions or need follow up information about today's clinic visit or your schedule please contact [facility] directly at [phone].  Normal or non-critical lab and imaging results will be communicated to you by MyChart, letter or phone within 4 business days after the clinic has received the results. If you do not hear from us within 7 days, please contact the clinic through MyChart or phone. If you have a critical or abnormal lab result, we will notify you by phone as soon as possible.  Submit refill requests through [employer] or call your pharmacy and they will forward the refill request to us. Please allow 3 business days for your refill to be completed.          Additional Information About Your Visit        MyChart Information     [employer] gives you secure access to your electronic health record. If you see a primary care provider, you can also send messages to your care team and make appointments. If you have questions, please call your primary care clinic.  If you do not have a primary care provider, please call [phone] and they will assist you.        Care EveryWhere ID     This is your Care EveryWhere ID. This could be used by other organizations to access  your [place] medical records  [id number]         Blood Pressure from Last 3 Encounters:   01/21/17 134/80   10/31/16 122/76   07/12/16 130/88    Weight from Last 3 Encounters:   01/21/17 130 lb (59 kg)   10/31/16 182 lb (82.6 kg)   04/24/16 182 lb (82.6 kg)              Today, you had the following     No orders found for display       Primary Care Provider Office Phone # Fax #    Sarahi Vivar [phone]       [facility] [address] [facility] [address]        Thank you!     Thank you for choosing [facility]  for your care. Our goal is always to provide you with excellent care. Hearing back from our patients is one way we can continue to improve our services. Please take a few minutes to complete the written survey that you may receive in the mail after your visit with us. Thank you!             Your Updated Medication List - Protect others around you: Learn how to safely use, store and throw away your medicines at www.disposemymeds.org.          This list is accurate as of: 3/27/17 11:59 PM.  Always use your most recent med list.                   Brand Name Dispense Instructions for use    albuterol (2.5 MG/3ML) 0.083% neb solution     1 Box    Take 1 vial (2.5 mg) by nebulization every 4 hours as needed for shortness of breath / dyspnea or wheezing       ALLEGRA 180 MG tablet   Generic drug:  fexofenadine      Take 180 mg by mouth daily.       * cephALEXin 500 MG capsule    KEFLEX     4 caps 1 hour before dental appt       * cephALEXin 500 MG capsule    KEFLEX    30 capsule    Take 1 capsule (500 mg) by mouth 3 times daily       cyanocobalamin 1000 MCG/ML injection    VITAMIN B12    4 mL    Inject 1 mL (1,000 mcg) Subcutaneous every 7 days       cyclobenzaprine 10 MG tablet    FLEXERIL    90 tablet    Take 1 tablet (10 mg) by mouth 3 times daily       CYTOMEL 50 MCG Tabs   Generic drug:  Liothyronine Sodium      Take 50 mcg by mouth 3 times daily        diazepam 10 MG tablet    VALIUM    90 tablet    Take 1 tablet (10 mg) by mouth 3 times daily       ergocalciferol 02655 UNITS capsule    ERGOCALCIFEROL    8 capsule    Take 1 capsule (50,000 Units) by mouth once a week       estradiol 2 MG vaginal ring    ESTRING    1 each    Place 1 each vaginally every 3 months one ring.       FISH OIL      daily. w/DHEA.       FLONASE 50 MCG/ACT spray   Generic drug:  fluticasone      2 sprays by Both Nostrils route daily as needed.       guaiFENesin-codeine 100-10 MG/5ML Soln solution    ROBITUSSIN AC    120 mL    Take 10 mLs by mouth every 4 hours as needed for cough       HYDROmorphone 8 MG tablet    DILAUDID    100 tablet    Take 1 tablet (8 mg) by mouth every 3 hours as needed       * levothyroxine 200 MCG Solr injection    SYNTHROID/LEVOTHROID         * levothyroxine 300 MCG tablet    SYNTHROID/LEVOTHROID         morphine 30 MG 12 hr tablet    MS CONTIN    270 tablet    Take 3 tablets (90 mg) by mouth every 8 hours       NASONEX 50 MCG/ACT spray   Generic drug:  mometasone          norethindrone-ethinyl estradiol 1-5 MG-MCG per tablet    FEMHRT 1/5    90 tablet    Take 1 tablet by mouth daily       order for DME     1 Device    Equipment being ordered: Nebulizer       prednisoLONE acetate 1 % ophthalmic susp    PRED FORTE         * predniSONE 1 MG tablet    DELTASONE     alternate 8 mg and 10 mg every other day       * predniSONE 5 MG tablet    DELTASONE     alternate 8 mg and 10 mg every other day       probiotic Caps      Take 1 capsule by mouth daily.       ramelteon 8 MG tablet    ROZEREM    30 tablet    Take 1 tablet (8 mg) by mouth At Bedtime       RESTASIS 0.05 % ophthalmic emulsion   Generic drug:  cycloSPORINE      Place 1 drop into both eyes 2 times daily       zolpidem 12.5 MG CR tablet    AMBIEN CR    30 tablet    Take 1 tablet (12.5 mg) by mouth nightly as needed       * Notice:  This list has 6 medication(s) that are the same as other medications prescribed  for you. Read the directions carefully, and ask your doctor or other care provider to review them with you.

## 2017-03-28 NOTE — PROGRESS NOTES
"[facility]  March 27, 2017      Behavioral Health Clinician Progress Note    Patient Name: Aspen Mccullough           Service Type: Individual      Service Location:  in clinic      Session Start Time: 11:33am  Session End Time: 12:13pm      Session Length: 38 - 52      Attendees: Patient    Visit Activities (Refresh list every visit): [facility] Only    Diagnostic Assessment Date: June 22, 2015  Treatment Plan Review Date: 4-18-16  See Flowsheets for today's PHQ-9 and BENI-7 results  Previous PHQ-9:   PHQ-9 SCORE 11/2/2015 11/10/2015 5/3/2016   Total Score - - -   Total Score - - -   Total Score 6 5 12     Previous BENI-7:   BENI-7 SCORE 10/19/2015 11/2/2015 11/10/2015   Total Score - - -   Total Score 3 3 3   Total Score - - -       FERCHO LEVEL:  FERCHO Score (Last Two) 7/24/2014   FERCHO Raw Score 51   Activation Score 91.6   FERCHO Level 4       DATA  Extended Session (60+ minutes): No  Interactive Complexity: No  Crisis: No    Treatment Objective(s) Addressed in This Session:  Target Behavior(s): disease management/lifestyle changes mental health    Mood Instability: will develop better understanding of triggers and coping strategies to stabilize mood  PTSD Symptom Management  Psychological distress related to Pain    Current Stressors / Issues:    The patient reported that she has been working on \"finding a way to anchor her focus\" in conversations so that she can be more focused and on task during interactions-she reported that she received feedback from her daughter that she has been less focused during interactions and present.  She started the visit stating that \"everything was good until I got a call from a \" she talked about how she got upset and was verbal towards this worker and had increase in anger and irritability-she participated in a conference at her [place] ([facility]) that talked about being a peace and displaying assertive peaceful behaviors (which she did not in " the situations with )-she talked of her frustration with the process go getting a medical procedure-she has frustration with her current PCP-she talked of her frustration/anger towards her brother-we talked about the patient learning to let go of anger towards her brother so she can be an example to her daughter around letting go of he delusion that she will be able to keep uncle sober-the patient stated that she is pleased by her new pain specialist.  She stated that she sets certain goals to see if she can make it (challenge.)                 Progress on Treatment Objective(s) / Homework:  Minimal progress - ACTION (Actively working towards change); Intervened by reinforcing change plan / affirming steps taken    Motivational Interviewing    MI Intervention: Expressed Empathy/Understanding, Supported Autonomy, Collaboration, Evocation, Permission to raise concern or advise, Open-ended questions, Reflections: simple and complex and Change talk (evoked)     Change Talk Expressed by the Patient: Desire to change Ability to change Reasons to change Need to change Committment to change Activation Taking steps    Provider Response to Change Talk: E - Evoked more info from patient about behavior change, A - Affirmed patient's thoughts, decisions, or attempts at behavior change, R - Reflected patient's change talk and S - Summarized patient's change talk statements      Care Plan review completed: No    Medication Review:  No changes to current psychiatric medication(s)    Medication Compliance:  NA    Changes in Health Issues:   Yes: Pain, Associated Psychological Distress    Chemical Use Review:   Substance Use: Chemical use reviewed, no active concerns identified      Tobacco Use: No current tobacco use.      Assessment: Current Emotional / Mental Status (status of significant symptoms):  Risk status (Self / Other harm or suicidal ideation)  Patient denies a history of suicidal ideation, suicide attempts,  self-injurious behavior, homicidal ideation, homicidal behavior and and other safety concerns  Patient denies current fears or concerns for personal safety.  Patient denies current or recent suicidal ideation or behaviors.  Patient denies current or recent homicidal ideation or behaviors.  Patient denies current or recent self injurious behavior or ideation.  Patient denies other safety concerns.  A safety and risk management plan has not been developed at this time, however patient was encouraged to call [place] [id number] should there be a change in any of these risk factors.    Appearance:   Appropriate   Eye Contact:   Good   Psychomotor Behavior: Agitated   Attitude:   Cooperative   Orientation:   All  Speech   Rate / Production: Hyperverbal    Volume:  Normal   Mood:    Normal  Affect:    Appropriate   Thought Content:  Clear   Thought Form:  Coherent  Goal Directed  Logical   Insight:    Good     Diagnoses:  1. Posttraumatic stress disorder        Collateral Reports Completed:  Not Applicable    Plan: (Homework, other):  Patient was given information about behavioral services and encouraged to schedule a follow up appointment with the clinic [facility] as needed.  She was also given information about mental health symptoms and treatment options .  CD Recommendations: No indications of CD issues.  Antwan Boston, BARNEY, [facility]      ______________________________________________________________________    Integrated Primary Care Behavioral Health Treatment Plan    Patient's Name: Aspen Mccullough  YOB: 1960    Date: 4-18-16    DSM-V Diagnoses: PTSD  Psychosocial / Contextual Factors: medical condition, history of traumatic experiences  WHODAS:  Will complete at next visit    Referral / Collaboration:  Referral to another professional/service is not indicated at this time..    Anticipated number of session or this episode of care: 10      MeasurableTreatment Goal(s) related to diagnosis / functional  impairment(s)  Goal 1: Patient will be able to remember the past with 50% less emotional reaction of anger and hurt.     I will know I've met my goal when I can let go of the past     Objective #A (Patient Action)    Patient will talk to at least two others about losses and coping.  Status: New - Date: 4-18-16     Intervention(s)  [facility] will provide avenue for the past to process her losses and disappointments.    Objective #B  Patient will reduce her triggers from past trauma.  Status: New - Date: 4-18-16     Intervention(s)  [facility] will teach distraction skills. mindfulness.      Patient has reviewed and agreed to the above plan.      Antwan Boston, [facility]  April 18, 2016

## 2017-04-10 ENCOUNTER — OFFICE VISIT (OUTPATIENT)
Dept: BEHAVIORAL HEALTH | Facility: CLINIC | Age: 57
End: 2017-04-10

## 2017-04-10 DIAGNOSIS — F43.10 POSTTRAUMATIC STRESS DISORDER: Primary | ICD-10-CM

## 2017-04-10 PROCEDURE — 90834 PSYTX W PT 45 MINUTES: CPT | Performed by: SOCIAL WORKER

## 2017-04-10 NOTE — MR AVS SNAPSHOT
After Visit Summary   4/10/2017    Aspen Mccullough    MRN: [id number]           Patient Information     Date Of Birth          1960        Visit Information        Provider Department      4/10/2017 10:00 AM Antwan Boston LICSW [facility]        Today's Diagnoses     Posttraumatic stress disorder    -  1       Follow-ups after your visit        Your next 10 appointments already scheduled     Apr 24, 2017 11:30 AM CDT   Return Visit with BARNEY Yan   [facility] ([facility])    [address] [place] [address]  [address]  [facility] [id number] [phone]              Who to contact     If you have questions or need follow up information about today's clinic visit or your schedule please contact [facility] directly at [phone].  Normal or non-critical lab and imaging results will be communicated to you by MyChart, letter or phone within 4 business days after the clinic has received the results. If you do not hear from us within 7 days, please contact the clinic through MyChart or phone. If you have a critical or abnormal lab result, we will notify you by phone as soon as possible.  Submit refill requests through [employer] or call your pharmacy and they will forward the refill request to us. Please allow 3 business days for your refill to be completed.          Additional Information About Your Visit        MyChart Information     [employer] gives you secure access to your electronic health record. If you see a primary care provider, you can also send messages to your care team and make appointments. If you have questions, please call your primary care clinic.  If you do not have a primary care provider, please call [phone] and they will assist you.        Care EveryWhere ID     This is your Care EveryWhere ID. This could be used by other organizations to access  your [place] medical records  [id number]         Blood Pressure from Last 3 Encounters:   01/21/17 134/80   10/31/16 122/76   07/12/16 130/88    Weight from Last 3 Encounters:   01/21/17 130 lb (59 kg)   10/31/16 182 lb (82.6 kg)   04/24/16 182 lb (82.6 kg)              Today, you had the following     No orders found for display       Primary Care Provider Office Phone # Fax #    Sarahi Vivar [phone]       [facility] [address] [facility] [address]        Thank you!     Thank you for choosing [facility]  for your care. Our goal is always to provide you with excellent care. Hearing back from our patients is one way we can continue to improve our services. Please take a few minutes to complete the written survey that you may receive in the mail after your visit with us. Thank you!             Your Updated Medication List - Protect others around you: Learn how to safely use, store and throw away your medicines at www.disposemymeds.org.          This list is accurate as of: 4/10/17 11:55 AM.  Always use your most recent med list.                   Brand Name Dispense Instructions for use    albuterol (2.5 MG/3ML) 0.083% neb solution     1 Box    Take 1 vial (2.5 mg) by nebulization every 4 hours as needed for shortness of breath / dyspnea or wheezing       ALLEGRA 180 MG tablet   Generic drug:  fexofenadine      Take 180 mg by mouth daily.       * cephALEXin 500 MG capsule    KEFLEX     4 caps 1 hour before dental appt       * cephALEXin 500 MG capsule    KEFLEX    30 capsule    Take 1 capsule (500 mg) by mouth 3 times daily       cyanocobalamin 1000 MCG/ML injection    VITAMIN B12    4 mL    Inject 1 mL (1,000 mcg) Subcutaneous every 7 days       cyclobenzaprine 10 MG tablet    FLEXERIL    90 tablet    Take 1 tablet (10 mg) by mouth 3 times daily       CYTOMEL 50 MCG Tabs   Generic drug:  Liothyronine Sodium      Take 50 mcg by mouth 3 times daily        diazepam 10 MG tablet    VALIUM    90 tablet    Take 1 tablet (10 mg) by mouth 3 times daily       ergocalciferol 40553 UNITS capsule    ERGOCALCIFEROL    8 capsule    Take 1 capsule (50,000 Units) by mouth once a week       estradiol 2 MG vaginal ring    ESTRING    1 each    Place 1 each vaginally every 3 months one ring.       FISH OIL      daily. w/DHEA.       FLONASE 50 MCG/ACT spray   Generic drug:  fluticasone      2 sprays by Both Nostrils route daily as needed.       guaiFENesin-codeine 100-10 MG/5ML Soln solution    ROBITUSSIN AC    120 mL    Take 10 mLs by mouth every 4 hours as needed for cough       HYDROmorphone 8 MG tablet    DILAUDID    100 tablet    Take 1 tablet (8 mg) by mouth every 3 hours as needed       ipratropium - albuterol 0.5 mg/2.5 mg/3 mL 0.5-2.5 (3) MG/3ML neb solution    DUONEB    3 mL    Take 1 vial (3 mLs) by nebulization once for 1 dose       * levothyroxine 200 MCG Solr injection    SYNTHROID/LEVOTHROID         * levothyroxine 300 MCG tablet    SYNTHROID/LEVOTHROID         morphine 30 MG 12 hr tablet    MS CONTIN    270 tablet    Take 3 tablets (90 mg) by mouth every 8 hours       NASONEX 50 MCG/ACT spray   Generic drug:  mometasone          norethindrone-ethinyl estradiol 1-5 MG-MCG per tablet    FEMHRT 1/5    90 tablet    Take 1 tablet by mouth daily       order for DME     1 Device    Equipment being ordered: Nebulizer       prednisoLONE acetate 1 % ophthalmic susp    PRED FORTE         * predniSONE 1 MG tablet    DELTASONE     alternate 8 mg and 10 mg every other day       * predniSONE 5 MG tablet    DELTASONE     alternate 8 mg and 10 mg every other day       probiotic Caps      Take 1 capsule by mouth daily.       ramelteon 8 MG tablet    ROZEREM    30 tablet    Take 1 tablet (8 mg) by mouth At Bedtime       RESTASIS 0.05 % ophthalmic emulsion   Generic drug:  cycloSPORINE      Place 1 drop into both eyes 2 times daily       zolpidem 12.5 MG CR tablet    AMBIEN CR    30  tablet    Take 1 tablet (12.5 mg) by mouth nightly as needed       * Notice:  This list has 6 medication(s) that are the same as other medications prescribed for you. Read the directions carefully, and ask your doctor or other care provider to review them with you.
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
## 2017-04-24 ENCOUNTER — OFFICE VISIT (OUTPATIENT)
Dept: BEHAVIORAL HEALTH | Facility: CLINIC | Age: 57
End: 2017-04-24

## 2017-04-24 DIAGNOSIS — F43.10 POSTTRAUMATIC STRESS DISORDER: Primary | ICD-10-CM

## 2017-04-24 PROCEDURE — 90837 PSYTX W PT 60 MINUTES: CPT | Performed by: SOCIAL WORKER

## 2017-04-24 NOTE — MR AVS SNAPSHOT
After Visit Summary   4/24/2017    Aspen Mccullough    MRN: [id number]           Patient Information     Date Of Birth          1960        Visit Information        Provider Department      4/24/2017 11:30 AM Antwan Boston LICSW [facility]        Today's Diagnoses     Posttraumatic stress disorder    -  1       Follow-ups after your visit        Your next 10 appointments already scheduled     May 08, 2017 10:30 AM CDT   Return Visit with BARNEY Yan   [facility] ([facility])    [address] [place] [address]  [address]  [facility] [id number] [phone]              Who to contact     If you have questions or need follow up information about today's clinic visit or your schedule please contact [facility] directly at [phone].  Normal or non-critical lab and imaging results will be communicated to you by MyChart, letter or phone within 4 business days after the clinic has received the results. If you do not hear from us within 7 days, please contact the clinic through MyChart or phone. If you have a critical or abnormal lab result, we will notify you by phone as soon as possible.  Submit refill requests through [employer] or call your pharmacy and they will forward the refill request to us. Please allow 3 business days for your refill to be completed.          Additional Information About Your Visit        MyChart Information     [employer] gives you secure access to your electronic health record. If you see a primary care provider, you can also send messages to your care team and make appointments. If you have questions, please call your primary care clinic.  If you do not have a primary care provider, please call [phone] and they will assist you.        Care EveryWhere ID     This is your Care EveryWhere ID. This could be used by other organizations to access  your [place] medical records  [id number]         Blood Pressure from Last 3 Encounters:   01/21/17 134/80   10/31/16 122/76   07/12/16 130/88    Weight from Last 3 Encounters:   01/21/17 130 lb (59 kg)   10/31/16 182 lb (82.6 kg)   04/24/16 182 lb (82.6 kg)              Today, you had the following     No orders found for display       Primary Care Provider Office Phone # Fax #    Sarahi Vivar [phone]       [facility] [address] [facility] [address]        Thank you!     Thank you for choosing [facility]  for your care. Our goal is always to provide you with excellent care. Hearing back from our patients is one way we can continue to improve our services. Please take a few minutes to complete the written survey that you may receive in the mail after your visit with us. Thank you!             Your Updated Medication List - Protect others around you: Learn how to safely use, store and throw away your medicines at www.disposemymeds.org.          This list is accurate as of: 4/24/17  2:23 PM.  Always use your most recent med list.                   Brand Name Dispense Instructions for use    albuterol (2.5 MG/3ML) 0.083% neb solution     1 Box    Take 1 vial (2.5 mg) by nebulization every 4 hours as needed for shortness of breath / dyspnea or wheezing       ALLEGRA 180 MG tablet   Generic drug:  fexofenadine      Take 180 mg by mouth daily.       * cephALEXin 500 MG capsule    KEFLEX     4 caps 1 hour before dental appt       * cephALEXin 500 MG capsule    KEFLEX    30 capsule    Take 1 capsule (500 mg) by mouth 3 times daily       cyanocobalamin 1000 MCG/ML injection    VITAMIN B12    4 mL    Inject 1 mL (1,000 mcg) Subcutaneous every 7 days       cyclobenzaprine 10 MG tablet    FLEXERIL    90 tablet    Take 1 tablet (10 mg) by mouth 3 times daily       CYTOMEL 50 MCG Tabs   Generic drug:  Liothyronine Sodium      Take 50 mcg by mouth 3 times daily        diazepam 10 MG tablet    VALIUM    90 tablet    Take 1 tablet (10 mg) by mouth 3 times daily       ergocalciferol 10241 UNITS capsule    ERGOCALCIFEROL    8 capsule    Take 1 capsule (50,000 Units) by mouth once a week       estradiol 2 MG vaginal ring    ESTRING    1 each    Place 1 each vaginally every 3 months one ring.       FISH OIL      daily. w/DHEA.       FLONASE 50 MCG/ACT spray   Generic drug:  fluticasone      2 sprays by Both Nostrils route daily as needed.       guaiFENesin-codeine 100-10 MG/5ML Soln solution    ROBITUSSIN AC    120 mL    Take 10 mLs by mouth every 4 hours as needed for cough       HYDROmorphone 8 MG tablet    DILAUDID    100 tablet    Take 1 tablet (8 mg) by mouth every 3 hours as needed       ipratropium - albuterol 0.5 mg/2.5 mg/3 mL 0.5-2.5 (3) MG/3ML neb solution    DUONEB    3 mL    Take 1 vial (3 mLs) by nebulization once for 1 dose       * levothyroxine 200 MCG Solr injection    SYNTHROID/LEVOTHROID         * levothyroxine 300 MCG tablet    SYNTHROID/LEVOTHROID         morphine 30 MG 12 hr tablet    MS CONTIN    270 tablet    Take 3 tablets (90 mg) by mouth every 8 hours       NASONEX 50 MCG/ACT spray   Generic drug:  mometasone          norethindrone-ethinyl estradiol 1-5 MG-MCG per tablet    FEMHRT 1/5    90 tablet    Take 1 tablet by mouth daily       order for DME     1 Device    Equipment being ordered: Nebulizer       prednisoLONE acetate 1 % ophthalmic susp    PRED FORTE         * predniSONE 1 MG tablet    DELTASONE     alternate 8 mg and 10 mg every other day       * predniSONE 5 MG tablet    DELTASONE     alternate 8 mg and 10 mg every other day       probiotic Caps      Take 1 capsule by mouth daily.       ramelteon 8 MG tablet    ROZEREM    30 tablet    Take 1 tablet (8 mg) by mouth At Bedtime       RESTASIS 0.05 % ophthalmic emulsion   Generic drug:  cycloSPORINE      Place 1 drop into both eyes 2 times daily       zolpidem 12.5 MG CR tablet    AMBIEN CR    30  tablet    Take 1 tablet (12.5 mg) by mouth nightly as needed       * Notice:  This list has 6 medication(s) that are the same as other medications prescribed for you. Read the directions carefully, and ask your doctor or other care provider to review them with you.
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
## 2017-05-08 ENCOUNTER — OFFICE VISIT (OUTPATIENT)
Dept: BEHAVIORAL HEALTH | Facility: CLINIC | Age: 57
End: 2017-05-08
Payer: COMMERCIAL

## 2017-05-08 DIAGNOSIS — F43.10 POSTTRAUMATIC STRESS DISORDER: Primary | ICD-10-CM

## 2017-05-08 PROCEDURE — 90834 PSYTX W PT 45 MINUTES: CPT | Performed by: SOCIAL WORKER

## 2017-05-08 NOTE — PROGRESS NOTES
[facility]  May 8, 2017      Behavioral Health Clinician Progress Note    Patient Name: Aspen Mccullough           Service Type: Individual      Service Location:  in clinic      Session Start Time: 10:41am  Session End Time: 11:22am      Session Length: 38 - 52      Attendees: Patient    Visit Activities (Refresh list every visit): [facility] Only    Diagnostic Assessment Date: June 22, 2015  Treatment Plan Review Date: 4-18-16  See Flowsheets for today's PHQ-9 and BENI-7 results  Previous PHQ-9:   PHQ-9 SCORE 11/2/2015 11/10/2015 5/3/2016   Total Score - - -   Total Score - - -   Total Score 6 5 12     Previous BENI-7:   BENI-7 SCORE 10/19/2015 11/2/2015 11/10/2015   Total Score - - -   Total Score 3 3 3   Total Score - - -       FERCHO LEVEL:  FERHCO Score (Last Two) 7/24/2014   FERCHO Raw Score 51   Activation Score 91.6   FERCHO Level 4       DATA  Extended Session (60+ minutes): PROLONGED SERVICE IN THE OUTPATIENT SETTING REQUIRING DIRECT (FACE-TO-FACE) PATIENT CONTACT BEYOND THE USUAL SERVICE:    - Patient's presenting concerns require more intensive intervention than could be completed within the usual service  Interactive Complexity: No  Crisis: No    Treatment Objective(s) Addressed in This Session:  Target Behavior(s): disease management/lifestyle changes mental health    Mood Instability: will develop better understanding of triggers and coping strategies to stabilize mood  PTSD Symptom Management  Psychological distress related to Pain    Current Stressors / Issues:    The patient reported that she continues to have some stress regarding interactions with her -he has been home for the past 3 weeks from work and she reported that she is ready for him to go back on the road to work.  Regarding sleep the patient reported that she has not slept in the last 2 days and she believes this is due to her not taking sleeping medications-she stated that she promised her PCP that she would not  "take sleeping medications so that she would be able to be prescribed opiate pain pills.  The patient stated that she feels safe at [facility] with this writer and that she does not let most people she her vulnerable but she can be vulnerable with this writer.  She got the results from her biological test and now has a clear understanding of her cultural background-she stated that her daughter is thrilled about learning of her cultural roots-she made the statement \"The will to live is stronger for than the will of dying\" for the patient-this lead into a conversation about her sylvester and optimism.             Progress on Treatment Objective(s) / Homework:  Minimal progress - ACTION (Actively working towards change); Intervened by reinforcing change plan / affirming steps taken    Motivational Interviewing    MI Intervention: Expressed Empathy/Understanding, Supported Autonomy, Collaboration, Evocation, Permission to raise concern or advise, Open-ended questions, Reflections: simple and complex and Change talk (evoked)     Change Talk Expressed by the Patient: Desire to change Ability to change Reasons to change Need to change Committment to change Activation Taking steps    Provider Response to Change Talk: E - Evoked more info from patient about behavior change, A - Affirmed patient's thoughts, decisions, or attempts at behavior change, R - Reflected patient's change talk and S - Summarized patient's change talk statements      Care Plan review completed: No    Medication Review:  No changes to current psychiatric medication(s)    Medication Compliance:  NA    Changes in Health Issues:   Yes: Pain, Associated Psychological Distress  Sleep disturbance, Associated Psychological Distress    Chemical Use Review:   Substance Use: Chemical use reviewed, no active concerns identified      Tobacco Use: No current tobacco use.      Assessment: Current Emotional / Mental Status (status of significant symptoms):  Risk status (Self / " Other harm or suicidal ideation)  Patient denies a history of suicidal ideation, suicide attempts, self-injurious behavior, homicidal ideation, homicidal behavior and and other safety concerns  Patient denies current fears or concerns for personal safety.  Patient denies current or recent suicidal ideation or behaviors.  Patient denies current or recent homicidal ideation or behaviors.  Patient denies current or recent self injurious behavior or ideation.  Patient denies other safety concerns.  A safety and risk management plan has not been developed at this time, however patient was encouraged to call [place] [phone] should there be a change in any of these risk factors.    Appearance:   Appropriate   Eye Contact:   Good   Psychomotor Behavior: Agitated  Normal   Attitude:   Cooperative   Orientation:   All  Speech   Rate / Production: Hyperverbal    Volume:  Normal   Mood:    Normal  Affect:    Appropriate   Thought Content:  Clear   Thought Form:  Coherent  Goal Directed  Logical   Insight:    Good     Diagnoses:  1. Posttraumatic stress disorder        Collateral Reports Completed:  Not Applicable    Plan: (Homework, other):  Patient was given information about behavioral services and encouraged to schedule a follow up appointment with the clinic [facility] as needed.  She was also given information about mental health symptoms and treatment options .  CD Recommendations: No indications of CD issues.  Antwan Boston, BARNEY, [facility]      ______________________________________________________________________    Integrated Primary Care Behavioral Health Treatment Plan    Patient's Name: Aspen Mccullough  YOB: 1960    Date: 4-18-16    DSM-V Diagnoses: PTSD  Psychosocial / Contextual Factors: medical condition, history of traumatic experiences  WHODAS:  Will complete at next visit    Referral / Collaboration:  Referral to another professional/service is not indicated at this time..    Anticipated number of session  or this episode of care: 10      MeasurableTreatment Goal(s) related to diagnosis / functional impairment(s)  Goal 1: Patient will be able to remember the past with 50% less emotional reaction of anger and hurt.     I will know I've met my goal when I can let go of the past     Objective #A (Patient Action)    Patient will talk to at least two others about losses and coping.  Status: New - Date: 4-18-16     Intervention(s)  [facility] will provide avenue for the past to process her losses and disappointments.    Objective #B  Patient will reduce her triggers from past trauma.  Status: New - Date: 4-18-16     Intervention(s)  [facility] will teach distraction skills. mindfulness.      Patient has reviewed and agreed to the above plan.      Antwan Boston, [facility]  April 18, 2016

## 2017-05-08 NOTE — MR AVS SNAPSHOT
After Visit Summary   5/8/2017    Aspen Mccullough    MRN: [id number]           Patient Information     Date Of Birth          1960        Visit Information        Provider Department      5/8/2017 10:30 AM Antwan Boston LICSW [facility] [place] [place]        Today's Diagnoses     Posttraumatic stress disorder    -  1       Follow-ups after your visit        Your next 10 appointments already scheduled     May 22, 2017 11:00 AM CDT   Return Visit with BARNEY Yan   [facility] [place] [place] ([facility] [place] [place])    [address] [place] [address]  [address]  [facility] [id number] [phone]              Who to contact     If you have questions or need follow up information about today's clinic visit or your schedule please contact [facility] directly at [phone].  Normal or non-critical lab and imaging results will be communicated to you by MyChart, letter or phone within 4 business days after the clinic has received the results. If you do not hear from us within 7 days, please contact the clinic through MyChart or phone. If you have a critical or abnormal lab result, we will notify you by phone as soon as possible.  Submit refill requests through [employer] or call your pharmacy and they will forward the refill request to us. Please allow 3 business days for your refill to be completed.          Additional Information About Your Visit        MyChart Information     [employer] gives you secure access to your electronic health record. If you see a primary care provider, you can also send messages to your care team and make appointments. If you have questions, please call your primary care clinic.  If you do not have a primary care provider, please call [phone] and they will assist you.        Care EveryWhere ID     This is your Care EveryWhere ID. This could be used by other organizations to access your  [place] medical records  [id number]         Blood Pressure from Last 3 Encounters:   01/21/17 134/80   10/31/16 122/76   07/12/16 130/88    Weight from Last 3 Encounters:   01/21/17 130 lb (59 kg)   10/31/16 182 lb (82.6 kg)   04/24/16 182 lb (82.6 kg)              Today, you had the following     No orders found for display       Primary Care Provider Office Phone # Fax #    Sarahi Vivar [phone]       [place] [facility] [address] [facility] [address]        Thank you!     Thank you for choosing [facility]  for your care. Our goal is always to provide you with excellent care. Hearing back from our patients is one way we can continue to improve our services. Please take a few minutes to complete the written survey that you may receive in the mail after your visit with us. Thank you!             Your Updated Medication List - Protect others around you: Learn how to safely use, store and throw away your medicines at www.disposemymeds.org.          This list is accurate as of: 5/8/17  4:12 PM.  Always use your most recent med list.                   Brand Name Dispense Instructions for use    albuterol (2.5 MG/3ML) 0.083% neb solution     1 Box    Take 1 vial (2.5 mg) by nebulization every 4 hours as needed for shortness of breath / dyspnea or wheezing       ALLEGRA 180 MG tablet   Generic drug:  fexofenadine      Take 180 mg by mouth daily.       * cephALEXin 500 MG capsule    KEFLEX     4 caps 1 hour before dental appt       * cephALEXin 500 MG capsule    KEFLEX    30 capsule    Take 1 capsule (500 mg) by mouth 3 times daily       cyanocobalamin 1000 MCG/ML injection    VITAMIN B12    4 mL    Inject 1 mL (1,000 mcg) Subcutaneous every 7 days       cyclobenzaprine 10 MG tablet    FLEXERIL    90 tablet    Take 1 tablet (10 mg) by mouth 3 times daily       CYTOMEL 50 MCG Tabs   Generic drug:  Liothyronine Sodium      Take 50 mcg by mouth 3 times daily        diazepam 10 MG tablet    VALIUM    90 tablet    Take 1 tablet (10 mg) by mouth 3 times daily       ergocalciferol 99731 UNITS capsule    ERGOCALCIFEROL    8 capsule    Take 1 capsule (50,000 Units) by mouth once a week       estradiol 2 MG vaginal ring    ESTRING    1 each    Place 1 each vaginally every 3 months one ring.       FISH OIL      daily. w/DHEA.       FLONASE 50 MCG/ACT spray   Generic drug:  fluticasone      2 sprays by Both Nostrils route daily as needed.       guaiFENesin-codeine 100-10 MG/5ML Soln solution    ROBITUSSIN AC    120 mL    Take 10 mLs by mouth every 4 hours as needed for cough       HYDROmorphone 8 MG tablet    DILAUDID    100 tablet    Take 1 tablet (8 mg) by mouth every 3 hours as needed       ipratropium - albuterol 0.5 mg/2.5 mg/3 mL 0.5-2.5 (3) MG/3ML neb solution    DUONEB    3 mL    Take 1 vial (3 mLs) by nebulization once for 1 dose       * levothyroxine 200 MCG Solr injection    SYNTHROID/LEVOTHROID         * levothyroxine 300 MCG tablet    SYNTHROID/LEVOTHROID         morphine 30 MG 12 hr tablet    MS CONTIN    270 tablet    Take 3 tablets (90 mg) by mouth every 8 hours       NASONEX 50 MCG/ACT spray   Generic drug:  mometasone          norethindrone-ethinyl estradiol 1-5 MG-MCG per tablet    FEMHRT 1/5    90 tablet    Take 1 tablet by mouth daily       order for DME     1 Device    Equipment being ordered: Nebulizer       prednisoLONE acetate 1 % ophthalmic susp    PRED FORTE         * predniSONE 1 MG tablet    DELTASONE     alternate 8 mg and 10 mg every other day       * predniSONE 5 MG tablet    DELTASONE     alternate 8 mg and 10 mg every other day       probiotic Caps      Take 1 capsule by mouth daily.       ramelteon 8 MG tablet    ROZEREM    30 tablet    Take 1 tablet (8 mg) by mouth At Bedtime       RESTASIS 0.05 % ophthalmic emulsion   Generic drug:  cycloSPORINE      Place 1 drop into both eyes 2 times daily       zolpidem 12.5 MG CR tablet    AMBIEN CR    30 tablet     Take 1 tablet (12.5 mg) by mouth nightly as needed       * Notice:  This list has 6 medication(s) that are the same as other medications prescribed for you. Read the directions carefully, and ask your doctor or other care provider to review them with you.

## 2017-05-30 ENCOUNTER — OFFICE VISIT (OUTPATIENT)
Dept: BEHAVIORAL HEALTH | Facility: CLINIC | Age: 57
End: 2017-05-30
Payer: COMMERCIAL

## 2017-05-30 DIAGNOSIS — F43.10 POSTTRAUMATIC STRESS DISORDER: Primary | ICD-10-CM

## 2017-05-30 PROCEDURE — 90834 PSYTX W PT 45 MINUTES: CPT | Performed by: SOCIAL WORKER

## 2017-05-30 NOTE — MR AVS SNAPSHOT
After Visit Summary   5/30/2017    Aspen Mccullough    MRN: [id number]           Patient Information     Date Of Birth          1960        Visit Information        Provider Department      5/30/2017 10:30 AM Antwan Boston LICSW [facility] [place]        Today's Diagnoses     Posttraumatic stress disorder    -  1       Follow-ups after your visit        Your next 10 appointments already scheduled     Jun 12, 2017 11:00 AM CDT   Return Visit with BARNEY Yan   [facility] ([facility]    [address] [place] [address]  [address]  [facility] [id number] [phone]              Who to contact     If you have questions or need follow up information about today's clinic visit or your schedule please contact [facility] [place] directly at [phone].  Normal or non-critical lab and imaging results will be communicated to you by MyChart, letter or phone within 4 business days after the clinic has received the results. If you do not hear from us within 7 days, please contact the clinic through MyChart or phone. If you have a critical or abnormal lab result, we will notify you by phone as soon as possible.  Submit refill requests through [employer] or call your pharmacy and they will forward the refill request to us. Please allow 3 business days for your refill to be completed.          Additional Information About Your Visit        MyChart Information     [employer] gives you secure access to your electronic health record. If you see a primary care provider, you can also send messages to your care team and make appointments. If you have questions, please call your primary care clinic.  If you do not have a primary care provider, please call [phone] and they will assist you.        Care EveryWhere ID     This is your Care EveryWhere ID. This could be used by other organizations to access  your [place] medical records  [id number]         Blood Pressure from Last 3 Encounters:   01/21/17 134/80   10/31/16 122/76   07/12/16 130/88    Weight from Last 3 Encounters:   01/21/17 130 lb (59 kg)   10/31/16 182 lb (82.6 kg)   04/24/16 182 lb (82.6 kg)              Today, you had the following     No orders found for display       Primary Care Provider Office Phone # Fax #    Sarahi Vivar [phone]       [place] [facility] [address] [facility] [address]        Thank you!     Thank you for choosing [facility]  for your care. Our goal is always to provide you with excellent care. Hearing back from our patients is one way we can continue to improve our services. Please take a few minutes to complete the written survey that you may receive in the mail after your visit with us. Thank you!             Your Updated Medication List - Protect others around you: Learn how to safely use, store and throw away your medicines at www.disposemymeds.org.          This list is accurate as of: 5/30/17 11:59 PM.  Always use your most recent med list.                   Brand Name Dispense Instructions for use    albuterol (2.5 MG/3ML) 0.083% neb solution     1 Box    Take 1 vial (2.5 mg) by nebulization every 4 hours as needed for shortness of breath / dyspnea or wheezing       ALLEGRA 180 MG tablet   Generic drug:  fexofenadine      Take 180 mg by mouth daily.       * cephALEXin 500 MG capsule    KEFLEX     4 caps 1 hour before dental appt       * cephALEXin 500 MG capsule    KEFLEX    30 capsule    Take 1 capsule (500 mg) by mouth 3 times daily       cyanocobalamin 1000 MCG/ML injection    VITAMIN B12    4 mL    Inject 1 mL (1,000 mcg) Subcutaneous every 7 days       cyclobenzaprine 10 MG tablet    FLEXERIL    90 tablet    Take 1 tablet (10 mg) by mouth 3 times daily       CYTOMEL 50 MCG Tabs   Generic drug:  Liothyronine Sodium      Take 50 mcg by mouth 3 times daily        diazepam 10 MG tablet    VALIUM    90 tablet    Take 1 tablet (10 mg) by mouth 3 times daily       ergocalciferol 97052 UNITS capsule    ERGOCALCIFEROL    8 capsule    Take 1 capsule (50,000 Units) by mouth once a week       estradiol 2 MG vaginal ring    ESTRING    1 each    Place 1 each vaginally every 3 months one ring.       FISH OIL      daily. w/DHEA.       FLONASE 50 MCG/ACT spray   Generic drug:  fluticasone      2 sprays by Both Nostrils route daily as needed.       guaiFENesin-codeine 100-10 MG/5ML Soln solution    ROBITUSSIN AC    120 mL    Take 10 mLs by mouth every 4 hours as needed for cough       HYDROmorphone 8 MG tablet    DILAUDID    100 tablet    Take 1 tablet (8 mg) by mouth every 3 hours as needed       * levothyroxine 200 MCG Solr injection    SYNTHROID/LEVOTHROID         * levothyroxine 300 MCG tablet    SYNTHROID/LEVOTHROID         morphine 30 MG 12 hr tablet    MS CONTIN    270 tablet    Take 3 tablets (90 mg) by mouth every 8 hours       NASONEX 50 MCG/ACT spray   Generic drug:  mometasone          norethindrone-ethinyl estradiol 1-5 MG-MCG per tablet    FEMHRT 1/5    90 tablet    Take 1 tablet by mouth daily       order for DME     1 Device    Equipment being ordered: Nebulizer       prednisoLONE acetate 1 % ophthalmic susp    PRED FORTE         * predniSONE 1 MG tablet    DELTASONE     alternate 8 mg and 10 mg every other day       * predniSONE 5 MG tablet    DELTASONE     alternate 8 mg and 10 mg every other day       probiotic Caps      Take 1 capsule by mouth daily.       ramelteon 8 MG tablet    ROZEREM    30 tablet    Take 1 tablet (8 mg) by mouth At Bedtime       RESTASIS 0.05 % ophthalmic emulsion   Generic drug:  cycloSPORINE      Place 1 drop into both eyes 2 times daily       zolpidem 12.5 MG CR tablet    AMBIEN CR    30 tablet    Take 1 tablet (12.5 mg) by mouth nightly as needed       * Notice:  This list has 6 medication(s) that are the same as other medications prescribed  for you. Read the directions carefully, and ask your doctor or other care provider to review them with you.
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
## 2017-06-12 ENCOUNTER — OFFICE VISIT (OUTPATIENT)
Dept: BEHAVIORAL HEALTH | Facility: CLINIC | Age: 57
End: 2017-06-12
Payer: COMMERCIAL

## 2017-06-12 DIAGNOSIS — F43.10 POSTTRAUMATIC STRESS DISORDER: Primary | ICD-10-CM

## 2017-06-12 PROCEDURE — 90832 PSYTX W PT 30 MINUTES: CPT | Performed by: SOCIAL WORKER

## 2017-06-12 NOTE — MR AVS SNAPSHOT
After Visit Summary   6/12/2017    Aspen Mccullough    MRN: [id number]           Patient Information     Date Of Birth          1960        Visit Information        Provider Department      6/12/2017 11:00 AM Antwan Boston, [facility]        Today's Diagnoses     Posttraumatic stress disorder    -  1       Follow-ups after your visit        Your next 10 appointments already scheduled     Jun 27, 2017 11:30 AM CDT   Return Visit with BARNEY Yan   [facility] ([facility]    [address] [place] [address]  [address]  [facility] [id number] [phone]              Who to contact     If you have questions or need follow up information about today's clinic visit or your schedule please contact [facility] directly at [phone].  Normal or non-critical lab and imaging results will be communicated to you by MyChart, letter or phone within 4 business days after the clinic has received the results. If you do not hear from us within 7 days, please contact the clinic through MyChart or phone. If you have a critical or abnormal lab result, we will notify you by phone as soon as possible.  Submit refill requests through [employer] or call your pharmacy and they will forward the refill request to us. Please allow 3 business days for your refill to be completed.          Additional Information About Your Visit        MyChart Information     [employer] gives you secure access to your electronic health record. If you see a primary care provider, you can also send messages to your care team and make appointments. If you have questions, please call your primary care clinic.  If you do not have a primary care provider, please call [phone] and they will assist you.        Care EveryWhere ID     This is your Care EveryWhere ID. This could be used by other organizations to access  your [facility] medical records  [id number]         Blood Pressure from Last 3 Encounters:   01/21/17 134/80   10/31/16 122/76   07/12/16 130/88    Weight from Last 3 Encounters:   01/21/17 130 lb (59 kg)   10/31/16 182 lb (82.6 kg)   04/24/16 182 lb (82.6 kg)              Today, you had the following     No orders found for display       Primary Care Provider Office Phone # Fax #    Sarahi Vivar [phone]       [facility] [address] [facility] [address]        Thank you!     Thank you for choosing [facility]  for your care. Our goal is always to provide you with excellent care. Hearing back from our patients is one way we can continue to improve our services. Please take a few minutes to complete the written survey that you may receive in the mail after your visit with us. Thank you!             Your Updated Medication List - Protect others around you: Learn how to safely use, store and throw away your medicines at www.disposemymeds.org.          This list is accurate as of: 6/12/17 11:59 PM.  Always use your most recent med list.                   Brand Name Dispense Instructions for use    albuterol (2.5 MG/3ML) 0.083% neb solution     1 Box    Take 1 vial (2.5 mg) by nebulization every 4 hours as needed for shortness of breath / dyspnea or wheezing       ALLEGRA 180 MG tablet   Generic drug:  fexofenadine      Take 180 mg by mouth daily.       * cephALEXin 500 MG capsule    KEFLEX     4 caps 1 hour before dental appt       * cephALEXin 500 MG capsule    KEFLEX    30 capsule    Take 1 capsule (500 mg) by mouth 3 times daily       cyanocobalamin 1000 MCG/ML injection    VITAMIN B12    4 mL    Inject 1 mL (1,000 mcg) Subcutaneous every 7 days       cyclobenzaprine 10 MG tablet    FLEXERIL    90 tablet    Take 1 tablet (10 mg) by mouth 3 times daily       CYTOMEL 50 MCG Tabs   Generic drug:  Liothyronine Sodium      Take 50 mcg by mouth 3 times daily        diazepam 10 MG tablet    VALIUM    90 tablet    Take 1 tablet (10 mg) by mouth 3 times daily       ergocalciferol 57755 UNITS capsule    ERGOCALCIFEROL    8 capsule    Take 1 capsule (50,000 Units) by mouth once a week       estradiol 2 MG vaginal ring    ESTRING    1 each    Place 1 each vaginally every 3 months one ring.       FISH OIL      daily. w/DHEA.       FLONASE 50 MCG/ACT spray   Generic drug:  fluticasone      2 sprays by Both Nostrils route daily as needed.       guaiFENesin-codeine 100-10 MG/5ML Soln solution    ROBITUSSIN AC    120 mL    Take 10 mLs by mouth every 4 hours as needed for cough       HYDROmorphone 8 MG tablet    DILAUDID    100 tablet    Take 1 tablet (8 mg) by mouth every 3 hours as needed       * levothyroxine 200 MCG Solr injection    SYNTHROID/LEVOTHROID         * levothyroxine 300 MCG tablet    SYNTHROID/LEVOTHROID         morphine 30 MG 12 hr tablet    MS CONTIN    270 tablet    Take 3 tablets (90 mg) by mouth every 8 hours       NASONEX 50 MCG/ACT spray   Generic drug:  mometasone          norethindrone-ethinyl estradiol 1-5 MG-MCG per tablet    FEMHRT 1/5    90 tablet    Take 1 tablet by mouth daily       order for DME     1 Device    Equipment being ordered: Nebulizer       prednisoLONE acetate 1 % ophthalmic susp    PRED FORTE         * predniSONE 1 MG tablet    DELTASONE     alternate 8 mg and 10 mg every other day       * predniSONE 5 MG tablet    DELTASONE     alternate 8 mg and 10 mg every other day       probiotic Caps      Take 1 capsule by mouth daily.       ramelteon 8 MG tablet    ROZEREM    30 tablet    Take 1 tablet (8 mg) by mouth At Bedtime       RESTASIS 0.05 % ophthalmic emulsion   Generic drug:  cycloSPORINE      Place 1 drop into both eyes 2 times daily       zolpidem 12.5 MG CR tablet    AMBIEN CR    30 tablet    Take 1 tablet (12.5 mg) by mouth nightly as needed       * Notice:  This list has 6 medication(s) that are the same as other medications prescribed  for you. Read the directions carefully, and ask your doctor or other care provider to review them with you.

## 2017-06-13 NOTE — PROGRESS NOTES
[facility]  June 12, 2017      Behavioral Health Clinician Progress Note    Patient Name: Aspen Mccullough           Service Type: Individual      Service Location:  in clinic      Session Start Time: 10:45am  Session End Time: 11:14am      Session Length: 16 - 37      Attendees: Patient    Visit Activities (Refresh list every visit): [facility] Only    Diagnostic Assessment Date: June 22, 2015  Treatment Plan Review Date: 4-18-16  See Flowsheets for today's PHQ-9 and BENI-7 results  Previous PHQ-9:   PHQ-9 SCORE 11/2/2015 11/10/2015 5/3/2016   Total Score - - -   Total Score - - -   Total Score 6 5 12     Previous BENI-7:   BENI-7 SCORE 10/19/2015 11/2/2015 11/10/2015   Total Score - - -   Total Score 3 3 3   Total Score - - -       FERCHO LEVEL:  FERCHO Score (Last Two) 7/24/2014   FERCHO Raw Score 51   Activation Score 91.6   FERCHO Level 4       DATA  Extended Session (60+ minutes): PROLONGED SERVICE IN THE OUTPATIENT SETTING REQUIRING DIRECT (FACE-TO-FACE) PATIENT CONTACT BEYOND THE USUAL SERVICE:    - Patient's presenting concerns require more intensive intervention than could be completed within the usual service  Interactive Complexity: No  Crisis: No    Treatment Objective(s) Addressed in This Session:  Target Behavior(s): disease management/lifestyle changes mental health    Mood Instability: will develop better understanding of triggers and coping strategies to stabilize mood  PTSD Symptom Management  Psychological distress related to Pain    Current Stressors / Issues:    The patient reported that she got pulled over by the police recently-she expressed her frustration in being pulled over and accused of being under the influence-after the police made assessment they let her go without charge-she continues to struggle with sleep as this writer made assessment of the patient plan to address sleep the following was endorsed: that the patient has a fear of being honest and assertive with pain  specialist due to fear of getting pain medications decreased or taken away-we talked about the pain vs sleep argument and this writer encouraged the patient to talk with her specialist about sleep issue and pain management-she struggles from worries regarding her pain management-regarding mood no depression-she talked about feeling  for her pain management-she stated to have more difficulty with pain but that it is better than no medications at all.              Progress on Treatment Objective(s) / Homework:  Minimal progress - ACTION (Actively working towards change); Intervened by reinforcing change plan / affirming steps taken    Motivational Interviewing    MI Intervention: Expressed Empathy/Understanding, Supported Autonomy, Collaboration, Evocation, Permission to raise concern or advise, Open-ended questions, Reflections: simple and complex and Change talk (evoked)     Change Talk Expressed by the Patient: Desire to change Ability to change Reasons to change Need to change Committment to change Activation Taking steps    Provider Response to Change Talk: E - Evoked more info from patient about behavior change, A - Affirmed patient's thoughts, decisions, or attempts at behavior change, R - Reflected patient's change talk and S - Summarized patient's change talk statements      Care Plan review completed: No    Medication Review:  No changes to current psychiatric medication(s)    Medication Compliance:  NA    Changes in Health Issues:   Yes: Pain, Associated Psychological Distress  Sleep disturbance, Associated Psychological Distress    Chemical Use Review:   Substance Use: Chemical use reviewed, no active concerns identified      Tobacco Use: No current tobacco use.      Assessment: Current Emotional / Mental Status (status of significant symptoms):  Risk status (Self / Other harm or suicidal ideation)  Patient denies a history of suicidal ideation, suicide attempts, self-injurious behavior, homicidal  ideation, homicidal behavior and and other safety concerns  Patient denies current fears or concerns for personal safety.  Patient denies current or recent suicidal ideation or behaviors.  Patient denies current or recent homicidal ideation or behaviors.  Patient denies current or recent self injurious behavior or ideation.  Patient denies other safety concerns.  A safety and risk management plan has not been developed at this time, however patient was encouraged to call [place] [phone] should there be a change in any of these risk factors.    Appearance:   Appropriate   Eye Contact:   Good   Psychomotor Behavior: Agitated  Normal   Attitude:   Cooperative   Orientation:   All  Speech   Rate / Production: Hyperverbal    Volume:  Normal   Mood:    Normal  Affect:    Appropriate   Thought Content:  Clear   Thought Form:  Coherent  Goal Directed  Logical   Insight:    Good     Diagnoses:  1. Posttraumatic stress disorder        Collateral Reports Completed:  Not Applicable    Plan: (Homework, other):  Patient was given information about behavioral services and encouraged to schedule a follow up appointment with the clinic [facility] as needed.  She was also given information about mental health symptoms and treatment options .  CD Recommendations: No indications of CD issues.  Antwan Boston, [facility], [facility]      ______________________________________________________________________    Integrated Primary Care Behavioral Health Treatment Plan    Patient's Name: Aspen Mccullough  YOB: 1960    Date: 4-18-16    DSM-V Diagnoses: PTSD  Psychosocial / Contextual Factors: medical condition, history of traumatic experiences  WHODAS:  Will complete at next visit    Referral / Collaboration:  Referral to another professional/service is not indicated at this time..    Anticipated number of session or this episode of care: 10      MeasurableTreatment Goal(s) related to diagnosis / functional impairment(s)  Goal 1: Patient will be  able to remember the past with 50% less emotional reaction of anger and hurt.     I will know I've met my goal when I can let go of the past     Objective #A (Patient Action)    Patient will talk to at least two others about losses and coping.  Status: New - Date: 4-18-16     Intervention(s)  [facility] will provide avenue for the past to process her losses and disappointments.    Objective #B  Patient will reduce her triggers from past trauma.  Status: New - Date: 4-18-16     Intervention(s)  [facility] will teach distraction skills. mindfulness.      Patient has reviewed and agreed to the above plan.      Antwan Boston, [facility]  April 18, 2016

## 2017-06-27 ENCOUNTER — OFFICE VISIT (OUTPATIENT)
Dept: BEHAVIORAL HEALTH | Facility: CLINIC | Age: 57
End: 2017-06-27
Payer: COMMERCIAL

## 2017-06-27 DIAGNOSIS — F43.10 POSTTRAUMATIC STRESS DISORDER: Primary | ICD-10-CM

## 2017-06-27 PROCEDURE — 90834 PSYTX W PT 45 MINUTES: CPT | Performed by: SOCIAL WORKER

## 2017-06-27 NOTE — PROGRESS NOTES
[facility] Primary Care Clinic  June 27, 2017      Behavioral Health Clinician Progress Note    Patient Name: Aspen Mccullough           Service Type: Individual      Service Location:  in clinic      Session Start Time: 11:39am  Session End Time: 12:24pm      Session Length: 38 - 52      Attendees: Patient    Visit Activities (Refresh list every visit): [facility] Only    Diagnostic Assessment Date: June 22, 2015  Treatment Plan Review Date: 4-18-16  See Flowsheets for today's PHQ-9 and BENI-7 results  Previous PHQ-9:   PHQ-9 SCORE 11/2/2015 11/10/2015 5/3/2016   Total Score - - -   Total Score - - -   Total Score 6 5 12     Previous BENI-7:   BENI-7 SCORE 10/19/2015 11/2/2015 11/10/2015   Total Score - - -   Total Score 3 3 3   Total Score - - -       FERCHO LEVEL:  FERCHO Score (Last Two) 7/24/2014   FERCHO Raw Score 51   Activation Score 91.6   FERCHO Level 4       DATA  Extended Session (60+ minutes): PROLONGED SERVICE IN THE OUTPATIENT SETTING REQUIRING DIRECT (FACE-TO-FACE) PATIENT CONTACT BEYOND THE USUAL SERVICE:    - Patient's presenting concerns require more intensive intervention than could be completed within the usual service  Interactive Complexity: No  Crisis: No    Treatment Objective(s) Addressed in This Session:  Target Behavior(s): disease management/lifestyle changes mental health    Mood Instability: will develop better understanding of triggers and coping strategies to stabilize mood  PTSD Symptom Management  Psychological distress related to Pain    Current Stressors / Issues:    The patient reported that she took the self off her anti depressant medications recently as she believed they were not helping much with sleep and were motivating more depression.  She talked about how when her  comes back in town for visits the event is very stressful-she gets reminded of this stress when he comes home-she talked about how she watched a movie with her  that triggered some thoughts about  traumatic events of the past-she was remembered of how she perceives her  as not supportive in a way that she wants (current and in the past)-she was tearful/emotional as she talked about the events of a medical procedure and the lack of support from her -she talked about thoughts if her  really cares for her or if he just cares for what she represents for the family-regarding her marriage issues she agreed that she does not know what to do-this writer gave the patient 3 part practice to help with marriage: 1) give thought to what you want, 2) communicate what you wants and solicit recommendations and 3) I will wait but not forever-BE EXPECTANTLY ACTIVE!              Progress on Treatment Objective(s) / Homework:  Minimal progress - ACTION (Actively working towards change); Intervened by reinforcing change plan / affirming steps taken    Motivational Interviewing    MI Intervention: Expressed Empathy/Understanding, Supported Autonomy, Collaboration, Evocation, Permission to raise concern or advise, Open-ended questions, Reflections: simple and complex and Change talk (evoked)     Change Talk Expressed by the Patient: Desire to change Ability to change Reasons to change Need to change Committment to change Activation Taking steps    Provider Response to Change Talk: E - Evoked more info from patient about behavior change, A - Affirmed patient's thoughts, decisions, or attempts at behavior change, R - Reflected patient's change talk and S - Summarized patient's change talk statements      Care Plan review completed: No    Medication Review:  No changes to current psychiatric medication(s)    Medication Compliance:  NA    Changes in Health Issues:   Yes: Pain, Associated Psychological Distress  Sleep disturbance, Associated Psychological Distress    Chemical Use Review:   Substance Use: Chemical use reviewed, no active concerns identified      Tobacco Use: No current tobacco use.       Assessment: Current Emotional / Mental Status (status of significant symptoms):  Risk status (Self / Other harm or suicidal ideation)  Patient denies a history of suicidal ideation, suicide attempts, self-injurious behavior, homicidal ideation, homicidal behavior and and other safety concerns  Patient denies current fears or concerns for personal safety.  Patient denies current or recent suicidal ideation or behaviors.  Patient denies current or recent homicidal ideation or behaviors.  Patient denies current or recent self injurious behavior or ideation.  Patient denies other safety concerns.  A safety and risk management plan has not been developed at this time, however patient was encouraged to call [place] [phone] should there be a change in any of these risk factors.    Appearance:   Appropriate   Eye Contact:   Good   Psychomotor Behavior: Agitated  Normal   Attitude:   Cooperative   Orientation:   All  Speech   Rate / Production: Hyperverbal    Volume:  Normal   Mood:    Normal Sad   Affect:    Appropriate   Thought Content:  Clear   Thought Form:  Coherent  Goal Directed  Logical   Insight:    Good     Diagnoses:  1. Posttraumatic stress disorder        Collateral Reports Completed:  Not Applicable    Plan: (Homework, other):  Patient was given information about behavioral services and encouraged to schedule a follow up appointment with the clinic [facility] as needed.  She was also given information about mental health symptoms and treatment options .  CD Recommendations: No indications of CD issues.  Antwan Boston, [facility], [facility]      ______________________________________________________________________    Integrated Primary Care Behavioral Health Treatment Plan    Patient's Name: Aspen Mccullough  YOB: 1960    Date: 4-18-16    DSM-V Diagnoses: PTSD  Psychosocial / Contextual Factors: medical condition, history of traumatic experiences  WHODAS:  Will complete at next visit    Referral /  Collaboration:  Referral to another professional/service is not indicated at this time..    Anticipated number of session or this episode of care: 10      MeasurableTreatment Goal(s) related to diagnosis / functional impairment(s)  Goal 1: Patient will be able to remember the past with 50% less emotional reaction of anger and hurt.     I will know I've met my goal when I can let go of the past     Objective #A (Patient Action)    Patient will talk to at least two others about losses and coping.  Status: New - Date: 4-18-16     Intervention(s)  [facility] will provide avenue for the past to process her losses and disappointments.    Objective #B  Patient will reduce her triggers from past trauma.  Status: New - Date: 4-18-16     Intervention(s)  [facility] will teach distraction skills. mindfulness.      Patient has reviewed and agreed to the above plan.      Antwan Boston, [facility]  April 18, 2016

## 2017-06-27 NOTE — MR AVS SNAPSHOT
After Visit Summary   6/27/2017    Aspen Mccullough    MRN: [id number]           Patient Information     Date Of Birth          1960        Visit Information        Provider Department      6/27/2017 11:30 AM Antwan Boston LICSW [facility]        Today's Diagnoses     Posttraumatic stress disorder    -  1       Follow-ups after your visit        Your next 10 appointments already scheduled     Jul 10, 2017 10:30 AM CDT   Return Visit with BARNEY Yan   [facility]    [address] [place] [address]  [address]  [facility] [id number] [phone]              Who to contact     If you have questions or need follow up information about today's clinic visit or your schedule please contact [facility] directly at [phone].  Normal or non-critical lab and imaging results will be communicated to you by MyChart, letter or phone within 4 business days after the clinic has received the results. If you do not hear from us within 7 days, please contact the clinic through MyChart or phone. If you have a critical or abnormal lab result, we will notify you by phone as soon as possible.  Submit refill requests through [employer] or call your pharmacy and they will forward the refill request to us. Please allow 3 business days for your refill to be completed.          Additional Information About Your Visit        MyChart Information     [employer] gives you secure access to your electronic health record. If you see a primary care provider, you can also send messages to your care team and make appointments. If you have questions, please call your primary care clinic.  If you do not have a primary care provider, please call [phone] and they will assist you.        Care EveryWhere ID     This is your Care EveryWhere ID. This could be used by other organizations to access  your [place] medical records  [id number]         Blood Pressure from Last 3 Encounters:   01/21/17 134/80   10/31/16 122/76   07/12/16 130/88    Weight from Last 3 Encounters:   01/21/17 130 lb (59 kg)   10/31/16 182 lb (82.6 kg)   04/24/16 182 lb (82.6 kg)              Today, you had the following     No orders found for display       Primary Care Provider Office Phone # Fax #    Sarahi Vivar [phone]       [facility] [address] [facility] [address]        Equal Access to Services     [facility]: Hadii aad ku hadasho Sobossmanali, waaxda luqadaha, qaybta kaalmada adeegyada, yaneth guillermo . So [facility] [phone].    ATENCIÓN: Si habla español, tiene a lynn disposición servicios gratuitos de asistencia lingüística. Llame al [phone].    We comply with applicable federal civil rights laws and [place] laws. We do not discriminate on the basis of race, color, national origin, age, disability sex, sexual orientation or gender identity.            Thank you!     Thank you for choosing [facility] PRIMARY CARE  for your care. Our goal is always to provide you with excellent care. Hearing back from our patients is one way we can continue to improve our services. Please take a few minutes to complete the written survey that you may receive in the mail after your visit with us. Thank you!             Your Updated Medication List - Protect others around you: Learn how to safely use, store and throw away your medicines at www.disposemymeds.org.          This list is accurate as of: 6/27/17  3:34 PM.  Always use your most recent med list.                   Brand Name Dispense Instructions for use Diagnosis    albuterol (2.5 MG/3ML) 0.083% neb solution     1 Box    Take 1 vial (2.5 mg) by nebulization every 4 hours as needed for shortness of breath / dyspnea or wheezing    Bronchitis, Laryngitis       ALLEGRA 180 MG tablet   Generic drug:   fexofenadine      Take 180 mg by mouth daily.        * cephALEXin 500 MG capsule    KEFLEX     4 caps 1 hour before dental appt        * cephALEXin 500 MG capsule    KEFLEX    30 capsule    Take 1 capsule (500 mg) by mouth 3 times daily    Bronchitis       cyanocobalamin 1000 MCG/ML injection    VITAMIN B12    4 mL    Inject 1 mL (1,000 mcg) Subcutaneous every 7 days    Other vitamin B12 deficiency anemia       cyclobenzaprine 10 MG tablet    FLEXERIL    90 tablet    Take 1 tablet (10 mg) by mouth 3 times daily    Generalized osteoarthrosis, involving multiple sites       CYTOMEL 50 MCG Tabs   Generic drug:  Liothyronine Sodium      Take 50 mcg by mouth 3 times daily    Hypothyroidism, unspecified type       diazepam 10 MG tablet    VALIUM    90 tablet    Take 1 tablet (10 mg) by mouth 3 times daily    Chronic pain syndrome, Encounter for long-term use of opiate analgesic       ergocalciferol 59393 UNITS capsule    ERGOCALCIFEROL    8 capsule    Take 1 capsule (50,000 Units) by mouth once a week    Vitamin D deficiency       estradiol 2 MG vaginal ring    ESTRING    1 each    Place 1 each vaginally every 3 months one ring.    Vaginal atrophy       FISH OIL      daily. w/DHEA.        FLONASE 50 MCG/ACT spray   Generic drug:  fluticasone      2 sprays by Both Nostrils route daily as needed.        guaiFENesin-codeine 100-10 MG/5ML Soln solution    ROBITUSSIN AC    120 mL    Take 10 mLs by mouth every 4 hours as needed for cough    Community acquired pneumonia       HYDROmorphone 8 MG tablet    DILAUDID    100 tablet    Take 1 tablet (8 mg) by mouth every 3 hours as needed    Chronic pain syndrome       ipratropium - albuterol 0.5 mg/2.5 mg/3 mL 0.5-2.5 (3) MG/3ML neb solution    DUONEB    3 mL    Take 1 vial (3 mLs) by nebulization once for 1 dose    Bronchitis, Laryngitis       * levothyroxine 200 MCG Solr injection    SYNTHROID/LEVOTHROID          * levothyroxine 300 MCG tablet    SYNTHROID/LEVOTHROID           morphine 30 MG 12 hr tablet    MS CONTIN    270 tablet    Take 3 tablets (90 mg) by mouth every 8 hours    Chronic pain syndrome       NASONEX 50 MCG/ACT spray   Generic drug:  mometasone           norethindrone-ethinyl estradiol 1-5 MG-MCG per tablet    FEMHRT 1/5    90 tablet    Take 1 tablet by mouth daily    Absence of menstruation       order for DME     1 Device    Equipment being ordered: Nebulizer    Bronchitis, Laryngitis       prednisoLONE acetate 1 % ophthalmic susp    PRED FORTE          * predniSONE 1 MG tablet    DELTASONE     alternate 8 mg and 10 mg every other day        * predniSONE 5 MG tablet    DELTASONE     alternate 8 mg and 10 mg every other day        probiotic Caps      Take 1 capsule by mouth daily.        ramelteon 8 MG tablet    ROZEREM    30 tablet    Take 1 tablet (8 mg) by mouth At Bedtime    Insomnia, unspecified insomnia       RESTASIS 0.05 % ophthalmic emulsion   Generic drug:  cycloSPORINE      Place 1 drop into both eyes 2 times daily        zolpidem 12.5 MG CR tablet    AMBIEN CR    30 tablet    Take 1 tablet (12.5 mg) by mouth nightly as needed    Chronic pain syndrome       * Notice:  This list has 6 medication(s) that are the same as other medications prescribed for you. Read the directions carefully, and ask your doctor or other care provider to review them with you.

## 2017-07-10 ENCOUNTER — OFFICE VISIT (OUTPATIENT)
Dept: BEHAVIORAL HEALTH | Facility: CLINIC | Age: 57
End: 2017-07-10
Payer: COMMERCIAL

## 2017-07-10 DIAGNOSIS — F43.10 POSTTRAUMATIC STRESS DISORDER: Primary | ICD-10-CM

## 2017-07-10 PROCEDURE — 90832 PSYTX W PT 30 MINUTES: CPT | Performed by: SOCIAL WORKER

## 2017-07-10 NOTE — MR AVS SNAPSHOT
After Visit Summary   7/10/2017    Aspen Mccullough    MRN: [id number]           Patient Information     Date Of Birth          1960        Visit Information        Provider Department      7/10/2017 10:30 AM Antwan Boston LICSW [facility]        Today's Diagnoses     Posttraumatic stress disorder    -  1       Follow-ups after your visit        Your next 10 appointments already scheduled     Jul 25, 2017 11:00 AM CDT   Return Visit with BARNEY Yan   [facility] ([facility])    [address] [place] [address]  [address]  [facility] [id number] [phone]              Who to contact     If you have questions or need follow up information about today's clinic visit or your schedule please contact [facility] directly at [phone].  Normal or non-critical lab and imaging results will be communicated to you by MyChart, letter or phone within 4 business days after the clinic has received the results. If you do not hear from us within 7 days, please contact the clinic through MyChart or phone. If you have a critical or abnormal lab result, we will notify you by phone as soon as possible.  Submit refill requests through [employer] or call your pharmacy and they will forward the refill request to us. Please allow 3 business days for your refill to be completed.          Additional Information About Your Visit        MyChart Information     [employer] gives you secure access to your electronic health record. If you see a primary care provider, you can also send messages to your care team and make appointments. If you have questions, please call your primary care clinic.  If you do not have a primary care provider, please call [phone] and they will assist you.        Care EveryWhere ID     This is your Care EveryWhere ID. This could be used by other organizations to access  your [place] medical records  [id number]         Blood Pressure from Last 3 Encounters:   01/21/17 134/80   10/31/16 122/76   07/12/16 130/88    Weight from Last 3 Encounters:   01/21/17 130 lb (59 kg)   10/31/16 182 lb (82.6 kg)   04/24/16 182 lb (82.6 kg)              Today, you had the following     No orders found for display       Primary Care Provider Office Phone # Fax #    Sarahi Vivar [phone]       [facility] [address] [place] [address]        Equal Access to Services     [facility]: Hadii aad ku hadasho Sobossmanali, waaxda luqadaha, qaybta kaalmada adeegyada, yaneth guillermo . So [facility] [phone].    ATENCIÓN: Si habla español, tiene a lynn disposición servicios gratuitos de asistencia lingüística. Llame al [phone].    We comply with applicable federal civil rights laws and [place] laws. We do not discriminate on the basis of race, color, national origin, age, disability sex, sexual orientation or gender identity.            Thank you!     Thank you for choosing [facility] PRIMARY CARE  for your care. Our goal is always to provide you with excellent care. Hearing back from our patients is one way we can continue to improve our services. Please take a few minutes to complete the written survey that you may receive in the mail after your visit with us. Thank you!             Your Updated Medication List - Protect others around you: Learn how to safely use, store and throw away your medicines at www.disposemymeds.org.          This list is accurate as of: 7/10/17  3:04 PM.  Always use your most recent med list.                   Brand Name Dispense Instructions for use Diagnosis    albuterol (2.5 MG/3ML) 0.083% neb solution     1 Box    Take 1 vial (2.5 mg) by nebulization every 4 hours as needed for shortness of breath / dyspnea or wheezing    Bronchitis, Laryngitis       ALLEGRA 180 MG tablet   Generic drug:   fexofenadine      Take 180 mg by mouth daily.        * cephALEXin 500 MG capsule    KEFLEX     4 caps 1 hour before dental appt        * cephALEXin 500 MG capsule    KEFLEX    30 capsule    Take 1 capsule (500 mg) by mouth 3 times daily    Bronchitis       cyanocobalamin 1000 MCG/ML injection    VITAMIN B12    4 mL    Inject 1 mL (1,000 mcg) Subcutaneous every 7 days    Other vitamin B12 deficiency anemia       cyclobenzaprine 10 MG tablet    FLEXERIL    90 tablet    Take 1 tablet (10 mg) by mouth 3 times daily    Generalized osteoarthrosis, involving multiple sites       CYTOMEL 50 MCG Tabs   Generic drug:  Liothyronine Sodium      Take 50 mcg by mouth 3 times daily    Hypothyroidism, unspecified type       diazepam 10 MG tablet    VALIUM    90 tablet    Take 1 tablet (10 mg) by mouth 3 times daily    Chronic pain syndrome, Encounter for long-term use of opiate analgesic       ergocalciferol 39920 UNITS capsule    ERGOCALCIFEROL    8 capsule    Take 1 capsule (50,000 Units) by mouth once a week    Vitamin D deficiency       estradiol 2 MG vaginal ring    ESTRING    1 each    Place 1 each vaginally every 3 months one ring.    Vaginal atrophy       FISH OIL      daily. w/DHEA.        FLONASE 50 MCG/ACT spray   Generic drug:  fluticasone      2 sprays by Both Nostrils route daily as needed.        guaiFENesin-codeine 100-10 MG/5ML Soln solution    ROBITUSSIN AC    120 mL    Take 10 mLs by mouth every 4 hours as needed for cough    Community acquired pneumonia       HYDROmorphone 8 MG tablet    DILAUDID    100 tablet    Take 1 tablet (8 mg) by mouth every 3 hours as needed    Chronic pain syndrome       ipratropium - albuterol 0.5 mg/2.5 mg/3 mL 0.5-2.5 (3) MG/3ML neb solution    DUONEB    3 mL    Take 1 vial (3 mLs) by nebulization once for 1 dose    Bronchitis, Laryngitis       * levothyroxine 200 MCG Solr injection    SYNTHROID/LEVOTHROID          * levothyroxine 300 MCG tablet    SYNTHROID/LEVOTHROID           morphine 30 MG 12 hr tablet    MS CONTIN    270 tablet    Take 3 tablets (90 mg) by mouth every 8 hours    Chronic pain syndrome       NASONEX 50 MCG/ACT spray   Generic drug:  mometasone           norethindrone-ethinyl estradiol 1-5 MG-MCG per tablet    FEMHRT 1/5    90 tablet    Take 1 tablet by mouth daily    Absence of menstruation       order for DME     1 Device    Equipment being ordered: Nebulizer    Bronchitis, Laryngitis       prednisoLONE acetate 1 % ophthalmic susp    PRED FORTE          * predniSONE 1 MG tablet    DELTASONE     alternate 8 mg and 10 mg every other day        * predniSONE 5 MG tablet    DELTASONE     alternate 8 mg and 10 mg every other day        probiotic Caps      Take 1 capsule by mouth daily.        ramelteon 8 MG tablet    ROZEREM    30 tablet    Take 1 tablet (8 mg) by mouth At Bedtime    Insomnia, unspecified insomnia       RESTASIS 0.05 % ophthalmic emulsion   Generic drug:  cycloSPORINE      Place 1 drop into both eyes 2 times daily        zolpidem 12.5 MG CR tablet    AMBIEN CR    30 tablet    Take 1 tablet (12.5 mg) by mouth nightly as needed    Chronic pain syndrome       * Notice:  This list has 6 medication(s) that are the same as other medications prescribed for you. Read the directions carefully, and ask your doctor or other care provider to review them with you.

## 2017-07-25 ENCOUNTER — OFFICE VISIT (OUTPATIENT)
Dept: BEHAVIORAL HEALTH | Facility: CLINIC | Age: 57
End: 2017-07-25
Payer: COMMERCIAL

## 2017-07-25 DIAGNOSIS — F43.10 POSTTRAUMATIC STRESS DISORDER: Primary | ICD-10-CM

## 2017-07-25 PROCEDURE — 90834 PSYTX W PT 45 MINUTES: CPT | Performed by: SOCIAL WORKER
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
## 2017-07-25 NOTE — MR AVS SNAPSHOT
After Visit Summary   7/25/2017    Aspen Mccullough    MRN: [id number]           Patient Information     Date Of Birth          1960        Visit Information        Provider Department      7/25/2017 11:00 AM Antwan Boston LICSW [facility]        Today's Diagnoses     Posttraumatic stress disorder    -  1       Follow-ups after your visit        Your next 10 appointments already scheduled     Aug 08, 2017 10:00 AM CDT   Return Visit with BARNEY Yan   [facility] ([facility])    [address] [place] [address]  [address]  [facility] [id number] [phone]              Who to contact     If you have questions or need follow up information about today's clinic visit or your schedule please contact [facility] directly at [phone].  Normal or non-critical lab and imaging results will be communicated to you by MyChart, letter or phone within 4 business days after the clinic has received the results. If you do not hear from us within 7 days, please contact the clinic through MyChart or phone. If you have a critical or abnormal lab result, we will notify you by phone as soon as possible.  Submit refill requests through [employer] or call your pharmacy and they will forward the refill request to us. Please allow 3 business days for your refill to be completed.          Additional Information About Your Visit        MyChart Information     [employer] gives you secure access to your electronic health record. If you see a primary care provider, you can also send messages to your care team and make appointments. If you have questions, please call your primary care clinic.  If you do not have a primary care provider, please call [phone] and they will assist you.        Care EveryWhere ID     This is your Care EveryWhere ID. This could be used by other organizations to access  your [facility] medical records  [id number]         Blood Pressure from Last 3 Encounters:   01/21/17 134/80   10/31/16 122/76   07/12/16 130/88    Weight from Last 3 Encounters:   01/21/17 130 lb (59 kg)   10/31/16 182 lb (82.6 kg)   04/24/16 182 lb (82.6 kg)              Today, you had the following     No orders found for display       Primary Care Provider Office Phone # Fax #    Sarahi Vivar [phone]       [facility] [address] [facility] [facility] [address]        Equal Access to Services     [facility]: Hadii aad ku hadasho Sobossmanali, waaxda luqadaha, qaybta kaalmada adeegyada, yaneth guillermo . So [facility] [phone].    ATENCIÓN: Si habla español, tiene a lynn disposición servicios gratuitos de asistencia lingüística. Llame al [phone].    We comply with applicable federal civil rights laws and [place] laws. We do not discriminate on the basis of race, color, national origin, age, disability sex, sexual orientation or gender identity.            Thank you!     Thank you for choosing [facility]  for your care. Our goal is always to provide you with excellent care. Hearing back from our patients is one way we can continue to improve our services. Please take a few minutes to complete the written survey that you may receive in the mail after your visit with us. Thank you!             Your Updated Medication List - Protect others around you: Learn how to safely use, store and throw away your medicines at www.disposemymeds.org.          This list is accurate as of: 7/25/17  4:05 PM.  Always use your most recent med list.                   Brand Name Dispense Instructions for use Diagnosis    albuterol (2.5 MG/3ML) 0.083% neb solution     1 Box    Take 1 vial (2.5 mg) by nebulization every 4 hours as needed for shortness of breath / dyspnea or wheezing    Bronchitis, Laryngitis       ALLEGRA 180 MG tablet   Generic drug:   fexofenadine      Take 180 mg by mouth daily.        * cephALEXin 500 MG capsule    KEFLEX     4 caps 1 hour before dental appt        * cephALEXin 500 MG capsule    KEFLEX    30 capsule    Take 1 capsule (500 mg) by mouth 3 times daily    Bronchitis       cyanocobalamin 1000 MCG/ML injection    VITAMIN B12    4 mL    Inject 1 mL (1,000 mcg) Subcutaneous every 7 days    Other vitamin B12 deficiency anemia       cyclobenzaprine 10 MG tablet    FLEXERIL    90 tablet    Take 1 tablet (10 mg) by mouth 3 times daily    Generalized osteoarthrosis, involving multiple sites       CYTOMEL 50 MCG Tabs   Generic drug:  Liothyronine Sodium      Take 50 mcg by mouth 3 times daily    Hypothyroidism, unspecified type       diazepam 10 MG tablet    VALIUM    90 tablet    Take 1 tablet (10 mg) by mouth 3 times daily    Chronic pain syndrome, Encounter for long-term use of opiate analgesic       ergocalciferol 19748 UNITS capsule    ERGOCALCIFEROL    8 capsule    Take 1 capsule (50,000 Units) by mouth once a week    Vitamin D deficiency       estradiol 2 MG vaginal ring    ESTRING    1 each    Place 1 each vaginally every 3 months one ring.    Vaginal atrophy       FISH OIL      daily. w/DHEA.        FLONASE 50 MCG/ACT spray   Generic drug:  fluticasone      2 sprays by Both Nostrils route daily as needed.        guaiFENesin-codeine 100-10 MG/5ML Soln solution    ROBITUSSIN AC    120 mL    Take 10 mLs by mouth every 4 hours as needed for cough    Community acquired pneumonia       HYDROmorphone 8 MG tablet    DILAUDID    100 tablet    Take 1 tablet (8 mg) by mouth every 3 hours as needed    Chronic pain syndrome       ipratropium - albuterol 0.5 mg/2.5 mg/3 mL 0.5-2.5 (3) MG/3ML neb solution    DUONEB    3 mL    Take 1 vial (3 mLs) by nebulization once for 1 dose    Bronchitis, Laryngitis       * levothyroxine 200 MCG Solr injection    SYNTHROID/LEVOTHROID          * levothyroxine 300 MCG tablet    SYNTHROID/LEVOTHROID           morphine 30 MG 12 hr tablet    MS CONTIN    270 tablet    Take 3 tablets (90 mg) by mouth every 8 hours    Chronic pain syndrome       NASONEX 50 MCG/ACT spray   Generic drug:  mometasone           norethindrone-ethinyl estradiol 1-5 MG-MCG per tablet    FEMHRT 1/5    90 tablet    Take 1 tablet by mouth daily    Absence of menstruation       order for DME     1 Device    Equipment being ordered: Nebulizer    Bronchitis, Laryngitis       prednisoLONE acetate 1 % ophthalmic susp    PRED FORTE          * predniSONE 1 MG tablet    DELTASONE     alternate 8 mg and 10 mg every other day        * predniSONE 5 MG tablet    DELTASONE     alternate 8 mg and 10 mg every other day        probiotic Caps      Take 1 capsule by mouth daily.        ramelteon 8 MG tablet    ROZEREM    30 tablet    Take 1 tablet (8 mg) by mouth At Bedtime    Insomnia, unspecified insomnia       RESTASIS 0.05 % ophthalmic emulsion   Generic drug:  cycloSPORINE      Place 1 drop into both eyes 2 times daily        zolpidem 12.5 MG CR tablet    AMBIEN CR    30 tablet    Take 1 tablet (12.5 mg) by mouth nightly as needed    Chronic pain syndrome       * Notice:  This list has 6 medication(s) that are the same as other medications prescribed for you. Read the directions carefully, and ask your doctor or other care provider to review them with you.

## 2017-08-08 ENCOUNTER — OFFICE VISIT (OUTPATIENT)
Dept: BEHAVIORAL HEALTH | Facility: CLINIC | Age: 57
End: 2017-08-08
Payer: COMMERCIAL

## 2017-08-08 DIAGNOSIS — F43.10 POSTTRAUMATIC STRESS DISORDER: Primary | ICD-10-CM

## 2017-08-08 PROCEDURE — 90834 PSYTX W PT 45 MINUTES: CPT | Performed by: SOCIAL WORKER

## 2017-08-08 NOTE — MR AVS SNAPSHOT
After Visit Summary   8/8/2017    Aspen Mccullough    MRN: [id number]           Patient Information     Date Of Birth          1960        Visit Information        Provider Department      8/8/2017 10:00 AM Antwan Boston LICSW [facility]        Today's Diagnoses     Posttraumatic stress disorder    -  1       Follow-ups after your visit        Your next 10 appointments already scheduled     Aug 22, 2017 11:30 AM CDT   Return Visit with BARNEY Yan   [facility] ([facility])    [address] [place] [address]  [address]  [facility] [id number] [phone]              Who to contact     If you have questions or need follow up information about today's clinic visit or your schedule please contact [facility] directly at [phone].  Normal or non-critical lab and imaging results will be communicated to you by MyChart, letter or phone within 4 business days after the clinic has received the results. If you do not hear from us within 7 days, please contact the clinic through MyChart or phone. If you have a critical or abnormal lab result, we will notify you by phone as soon as possible.  Submit refill requests through [employer] or call your pharmacy and they will forward the refill request to us. Please allow 3 business days for your refill to be completed.          Additional Information About Your Visit        MyChart Information     [employer] gives you secure access to your electronic health record. If you see a primary care provider, you can also send messages to your care team and make appointments. If you have questions, please call your primary care clinic.  If you do not have a primary care provider, please call [phone] and they will assist you.        Care EveryWhere ID     This is your Care EveryWhere ID. This could be used by other organizations to access your  [facility] medical records  [id number]         Blood Pressure from Last 3 Encounters:   01/21/17 134/80   10/31/16 122/76   07/12/16 130/88    Weight from Last 3 Encounters:   01/21/17 130 lb (59 kg)   10/31/16 182 lb (82.6 kg)   04/24/16 182 lb (82.6 kg)              Today, you had the following     No orders found for display       Primary Care Provider Office Phone # Fax #    Sarahi Vivar [phone]       [facility] [address]  [facility] [address]        Equal Access to Services     [facility]: Hadii aad ku hadasho Soomaali, waaxda luqadaha, qaybta kaalmada adeegyada, yaneth palumbo hayclementen angela guillermo . So [facility] [phone].    ATENCIÓN: Si habla español, tiene a lynn disposición servicios gratuitos de asistencia lingüística. Llame al [phone].    We comply with applicable federal civil rights laws and [place] laws. We do not discriminate on the basis of race, color, national origin, age, disability sex, sexual orientation or gender identity.            Thank you!     Thank you for choosing [facility]  for your care. Our goal is always to provide you with excellent care. Hearing back from our patients is one way we can continue to improve our services. Please take a few minutes to complete the written survey that you may receive in the mail after your visit with us. Thank you!             Your Updated Medication List - Protect others around you: Learn how to safely use, store and throw away your medicines at www.disposemymeds.org.          This list is accurate as of: 8/8/17  1:41 PM.  Always use your most recent med list.                   Brand Name Dispense Instructions for use Diagnosis    albuterol (2.5 MG/3ML) 0.083% neb solution     1 Box    Take 1 vial (2.5 mg) by nebulization every 4 hours as needed for shortness of breath / dyspnea or wheezing    Bronchitis, Laryngitis       ALLEGRA 180 MG tablet   Generic drug:  fexofenadine       Take 180 mg by mouth daily.        * cephALEXin 500 MG capsule    KEFLEX     4 caps 1 hour before dental appt        * cephALEXin 500 MG capsule    KEFLEX    30 capsule    Take 1 capsule (500 mg) by mouth 3 times daily    Bronchitis       cyanocobalamin 1000 MCG/ML injection    VITAMIN B12    4 mL    Inject 1 mL (1,000 mcg) Subcutaneous every 7 days    Other vitamin B12 deficiency anemia       cyclobenzaprine 10 MG tablet    FLEXERIL    90 tablet    Take 1 tablet (10 mg) by mouth 3 times daily    Generalized osteoarthrosis, involving multiple sites       CYTOMEL 50 MCG Tabs   Generic drug:  Liothyronine Sodium      Take 50 mcg by mouth 3 times daily    Hypothyroidism, unspecified type       diazepam 10 MG tablet    VALIUM    90 tablet    Take 1 tablet (10 mg) by mouth 3 times daily    Chronic pain syndrome, Encounter for long-term use of opiate analgesic       ergocalciferol 98347 UNITS capsule    ERGOCALCIFEROL    8 capsule    Take 1 capsule (50,000 Units) by mouth once a week    Vitamin D deficiency       estradiol 2 MG vaginal ring    ESTRING    1 each    Place 1 each vaginally every 3 months one ring.    Vaginal atrophy       FISH OIL      daily. w/DHEA.        FLONASE 50 MCG/ACT spray   Generic drug:  fluticasone      2 sprays by Both Nostrils route daily as needed.        guaiFENesin-codeine 100-10 MG/5ML Soln solution    ROBITUSSIN AC    120 mL    Take 10 mLs by mouth every 4 hours as needed for cough    Community acquired pneumonia       HYDROmorphone 8 MG tablet    DILAUDID    100 tablet    Take 1 tablet (8 mg) by mouth every 3 hours as needed    Chronic pain syndrome       ipratropium - albuterol 0.5 mg/2.5 mg/3 mL 0.5-2.5 (3) MG/3ML neb solution    DUONEB    3 mL    Take 1 vial (3 mLs) by nebulization once for 1 dose    Bronchitis, Laryngitis       * levothyroxine 200 MCG Solr injection    SYNTHROID/LEVOTHROID          * levothyroxine 300 MCG tablet    SYNTHROID/LEVOTHROID          morphine 30 MG 12  hr tablet    MS CONTIN    270 tablet    Take 3 tablets (90 mg) by mouth every 8 hours    Chronic pain syndrome       NASONEX 50 MCG/ACT spray   Generic drug:  mometasone           norethindrone-ethinyl estradiol 1-5 MG-MCG per tablet    FEMHRT 1/5    90 tablet    Take 1 tablet by mouth daily    Absence of menstruation       order for DME     1 Device    Equipment being ordered: Nebulizer    Bronchitis, Laryngitis       prednisoLONE acetate 1 % ophthalmic susp    PRED FORTE          * predniSONE 1 MG tablet    DELTASONE     alternate 8 mg and 10 mg every other day        * predniSONE 5 MG tablet    DELTASONE     alternate 8 mg and 10 mg every other day        probiotic Caps      Take 1 capsule by mouth daily.        ramelteon 8 MG tablet    ROZEREM    30 tablet    Take 1 tablet (8 mg) by mouth At Bedtime    Insomnia, unspecified insomnia       RESTASIS 0.05 % ophthalmic emulsion   Generic drug:  cycloSPORINE      Place 1 drop into both eyes 2 times daily        zolpidem 12.5 MG CR tablet    AMBIEN CR    30 tablet    Take 1 tablet (12.5 mg) by mouth nightly as needed    Chronic pain syndrome       * Notice:  This list has 6 medication(s) that are the same as other medications prescribed for you. Read the directions carefully, and ask your doctor or other care provider to review them with you.

## 2017-08-08 NOTE — PROGRESS NOTES
[facility]  August 8, 2017      Behavioral Health Clinician Progress Note    Patient Name: Aspen Mccullough           Service Type: Individual      Service Location:  Face to Face in Clinic      Session Start Time: 10:08am  Session End Time: 10:46pm      Session Length: 38 - 52      Attendees: Patient    Visit Activities (Refresh list every visit): [facility] Only    Diagnostic Assessment Date: June 22, 2015  Treatment Plan Review Date: 4-18-16  See Flowsheets for today's PHQ-9 and BENI-7 results  Previous PHQ-9:   PHQ-9 SCORE 11/2/2015 11/10/2015 5/3/2016   Total Score - - -   Total Score - - -   Total Score 6 5 12   Some recent data might be hidden     Previous BENI-7:   BENI-7 SCORE 10/19/2015 11/2/2015 11/10/2015   Total Score - - -   Total Score 3 3 3   Total Score - - -   Some recent data might be hidden       FERCHO LEVEL:  FERCHO Score (Last Two) 7/24/2014   FERCHO Raw Score 51   Activation Score 91.6   FERCHO Level 4   Some recent data might be hidden       DATA  Extended Session (60+ minutes): No  Interactive Complexity: No  Crisis: No    Treatment Objective(s) Addressed in This Session:  Target Behavior(s): disease management/lifestyle changes mental health    Mood Instability: will develop better understanding of triggers and coping strategies to stabilize mood  PTSD Symptom Management  Psychological distress related to Pain    Current Stressors / Issues:    The patient reported that she had some recent stressors-she talked about a male  who she had negative interactions with during driving and the man followed her to her job and they had negative exchange-she talked about the stress of having her  come home from working out of state and he stated that he will be moving back home in October-she talked about the stress of her neighbor-she talked about the stress of interaction with her Mayor-this writer made attempts to focus the patient on positive things going on in he  "life-as she stated that other than these negative experiences things are alright-when she was asked to describe what is alright she would return to taking about these negative experiences-she stated that she has to \"stick up for myself\" we talked about how obsessively thinking about negative things motivate the sustaining of stress and barriers of peace-the patient was encouraged to let go, forgive-How long are you going to hold onto the negative experience-her biological mother hide the patient from others before and after the adoption took place and the patient stated that she had to give her consequences for reaching out to find her when she was an adult-this writer shared communication roles: truth, gossip and necessary.               Progress on Treatment Objective(s) / Homework:  Minimal progress - ACTION (Actively working towards change); Intervened by reinforcing change plan / affirming steps taken    Motivational Interviewing    MI Intervention: Expressed Empathy/Understanding, Supported Autonomy, Collaboration, Evocation, Permission to raise concern or advise, Open-ended questions, Reflections: simple and complex and Change talk (evoked)     Change Talk Expressed by the Patient: Desire to change Ability to change Reasons to change Need to change Committment to change Activation Taking steps    Provider Response to Change Talk: E - Evoked more info from patient about behavior change, A - Affirmed patient's thoughts, decisions, or attempts at behavior change, R - Reflected patient's change talk and S - Summarized patient's change talk statements      Care Plan review completed: No    Medication Review:  No changes to current psychiatric medication(s)    Medication Compliance:  NA    Changes in Health Issues:   Yes: Pain, Associated Psychological Distress  Sleep disturbance, Associated Psychological Distress    Chemical Use Review:   Substance Use: Chemical use reviewed, no active concerns identified "      Tobacco Use: No current tobacco use.      Assessment: Current Emotional / Mental Status (status of significant symptoms):  Risk status (Self / Other harm or suicidal ideation)  Patient denies a history of suicidal ideation, suicide attempts, self-injurious behavior, homicidal ideation, homicidal behavior and and other safety concerns  Patient denies current fears or concerns for personal safety.  Patient denies current or recent suicidal ideation or behaviors.  Patient denies current or recent homicidal ideation or behaviors.  Patient denies current or recent self injurious behavior or ideation.  Patient denies other safety concerns.  A safety and risk management plan has not been developed at this time, however patient was encouraged to call [place] [phone] should there be a change in any of these risk factors.    Appearance:   Appropriate   Eye Contact:   Good   Psychomotor Behavior: Normal   Attitude:   Cooperative   Orientation:   All  Speech   Rate / Production: Hyperverbal    Volume:  Normal   Mood:    Normal  Affect:    Appropriate   Thought Content:  Clear   Thought Form:  Coherent  Goal Directed  Logical   Insight:    Good     Diagnoses:  1. Posttraumatic stress disorder        Collateral Reports Completed:  Not Applicable    Plan: (Homework, other):  Patient was given information about behavioral services and encouraged to schedule a follow up appointment with the clinic [facility] as needed.  She was also given information about mental health symptoms and treatment options .  CD Recommendations: No indications of CD issues.  BARNEY Blackwell, [facility]      ______________________________________________________________________    Integrated Primary Care Behavioral Health Treatment Plan    Patient's Name: Aspen Mccullough  YOB: 1960    Date: 4-18-16    DSM-V Diagnoses: PTSD  Psychosocial / Contextual Factors: medical condition, history of traumatic experiences  WHODAS:  Will complete at next  visit    Referral / Collaboration:  Referral to another professional/service is not indicated at this time..    Anticipated number of session or this episode of care: 10      MeasurableTreatment Goal(s) related to diagnosis / functional impairment(s)  Goal 1: Patient will be able to remember the past with 50% less emotional reaction of anger and hurt.     I will know I've met my goal when I can let go of the past     Objective #A (Patient Action)    Patient will talk to at least two others about losses and coping.  Status: New - Date: 4-18-16     Intervention(s)  [facility] will provide avenue for the past to process her losses and disappointments.    Objective #B  Patient will reduce her triggers from past trauma.  Status: New - Date: 4-18-16     Intervention(s)  [facility] will teach distraction skills. mindfulness.      Patient has reviewed and agreed to the above plan.      Antwan Boston, [facility]  April 18, 2016

## 2017-08-22 ENCOUNTER — OFFICE VISIT (OUTPATIENT)
Dept: BEHAVIORAL HEALTH | Facility: CLINIC | Age: 57
End: 2017-08-22
Payer: COMMERCIAL

## 2017-08-22 DIAGNOSIS — F43.10 POSTTRAUMATIC STRESS DISORDER: Primary | ICD-10-CM

## 2017-08-22 PROCEDURE — 90834 PSYTX W PT 45 MINUTES: CPT | Performed by: SOCIAL WORKER
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
## 2017-08-22 NOTE — MR AVS SNAPSHOT
After Visit Summary   8/22/2017    Aspen Mccullough    MRN: [id number]           Patient Information     Date Of Birth          1960        Visit Information        Provider Department      8/22/2017 11:30 AM Antwan Boston LIC[facility]        Today's Diagnoses     Posttraumatic stress disorder    -  1       Follow-ups after your visit        Your next 10 appointments already scheduled     Sep 05, 2017 11:30 AM CDT   Return Visit with BARNEY Yan   [facility] ([facility])    [address] [place] [address]  [address]  [facility] [id number] [phone]              Who to contact     If you have questions or need follow up information about today's clinic visit or your schedule please contact [facility] directly at [phone].  Normal or non-critical lab and imaging results will be communicated to you by MyChart, letter or phone within 4 business days after the clinic has received the results. If you do not hear from us within 7 days, please contact the clinic through MyChart or phone. If you have a critical or abnormal lab result, we will notify you by phone as soon as possible.  Submit refill requests through [email or web address] or call your pharmacy and they will forward the refill request to us. Please allow 3 business days for your refill to be completed.          Additional Information About Your Visit        MyChart Information     [email or web address] gives you secure access to your electronic health record. If you see a primary care provider, you can also send messages to your care team and make appointments. If you have questions, please call your primary care clinic.  If you do not have a primary care provider, please call [phone] and they will assist you.        Care EveryWhere ID     This is your Care EveryWhere ID. This could be used by other organizations to access  your [facility] medical records  [id number]         Blood Pressure from Last 3 Encounters:   01/21/17 134/80   10/31/16 122/76   07/12/16 130/88    Weight from Last 3 Encounters:   01/21/17 130 lb (59 kg)   10/31/16 182 lb (82.6 kg)   04/24/16 182 lb (82.6 kg)              Today, you had the following     No orders found for display       Primary Care Provider Office Phone # Fax #    Sarahi Vivar [phone]       [place] [facility] [address] [address]        Equal Access to Services     [facility]: Hadii aad ku hadasho Sobossmanali, waaxda luqadaha, qaybta kaalmada adeegyada, yaneth guillermo . So [facility] [phone].    ATENCIÓN: Si habla español, tiene a lynn disposición servicios gratuitos de asistencia lingüística. Llame al [phone].    We comply with applicable federal civil rights laws and [place] laws. We do not discriminate on the basis of race, color, national origin, age, disability sex, sexual orientation or gender identity.            Thank you!     Thank you for choosing [facility]  for your care. Our goal is always to provide you with excellent care. Hearing back from our patients is one way we can continue to improve our services. Please take a few minutes to complete the written survey that you may receive in the mail after your visit with us. Thank you!             Your Updated Medication List - Protect others around you: Learn how to safely use, store and throw away your medicines at www.disposemymeds.org.          This list is accurate as of: 8/22/17  3:05 PM.  Always use your most recent med list.                   Brand Name Dispense Instructions for use Diagnosis    albuterol (2.5 MG/3ML) 0.083% neb solution     1 Box    Take 1 vial (2.5 mg) by nebulization every 4 hours as needed for shortness of breath / dyspnea or wheezing    Bronchitis, Laryngitis       ALLEGRA 180 MG tablet   Generic drug:   fexofenadine      Take 180 mg by mouth daily.        * cephALEXin 500 MG capsule    KEFLEX     4 caps 1 hour before dental appt        * cephALEXin 500 MG capsule    KEFLEX    30 capsule    Take 1 capsule (500 mg) by mouth 3 times daily    Bronchitis       cyanocobalamin 1000 MCG/ML injection    VITAMIN B12    4 mL    Inject 1 mL (1,000 mcg) Subcutaneous every 7 days    Other vitamin B12 deficiency anemia       cyclobenzaprine 10 MG tablet    FLEXERIL    90 tablet    Take 1 tablet (10 mg) by mouth 3 times daily    Generalized osteoarthrosis, involving multiple sites       CYTOMEL 50 MCG Tabs   Generic drug:  Liothyronine Sodium      Take 50 mcg by mouth 3 times daily    Hypothyroidism, unspecified type       diazepam 10 MG tablet    VALIUM    90 tablet    Take 1 tablet (10 mg) by mouth 3 times daily    Chronic pain syndrome, Encounter for long-term use of opiate analgesic       ergocalciferol 29706 UNITS capsule    ERGOCALCIFEROL    8 capsule    Take 1 capsule (50,000 Units) by mouth once a week    Vitamin D deficiency       estradiol 2 MG vaginal ring    ESTRING    1 each    Place 1 each vaginally every 3 months one ring.    Vaginal atrophy       FISH OIL      daily. w/DHEA.        FLONASE 50 MCG/ACT spray   Generic drug:  fluticasone      2 sprays by Both Nostrils route daily as needed.        guaiFENesin-codeine 100-10 MG/5ML Soln solution    ROBITUSSIN AC    120 mL    Take 10 mLs by mouth every 4 hours as needed for cough    Community acquired pneumonia       HYDROmorphone 8 MG tablet    DILAUDID    100 tablet    Take 1 tablet (8 mg) by mouth every 3 hours as needed    Chronic pain syndrome       ipratropium - albuterol 0.5 mg/2.5 mg/3 mL 0.5-2.5 (3) MG/3ML neb solution    DUONEB    3 mL    Take 1 vial (3 mLs) by nebulization once for 1 dose    Bronchitis, Laryngitis       * levothyroxine 200 MCG Solr injection    SYNTHROID/LEVOTHROID          * levothyroxine 300 MCG tablet    SYNTHROID/LEVOTHROID           morphine 30 MG 12 hr tablet    MS CONTIN    270 tablet    Take 3 tablets (90 mg) by mouth every 8 hours    Chronic pain syndrome       NASONEX 50 MCG/ACT spray   Generic drug:  mometasone           norethindrone-ethinyl estradiol 1-5 MG-MCG per tablet    FEMHRT 1/5    90 tablet    Take 1 tablet by mouth daily    Absence of menstruation       order for DME     1 Device    Equipment being ordered: Nebulizer    Bronchitis, Laryngitis       prednisoLONE acetate 1 % ophthalmic susp    PRED FORTE          * predniSONE 1 MG tablet    DELTASONE     alternate 8 mg and 10 mg every other day        * predniSONE 5 MG tablet    DELTASONE     alternate 8 mg and 10 mg every other day        probiotic Caps      Take 1 capsule by mouth daily.        ramelteon 8 MG tablet    ROZEREM    30 tablet    Take 1 tablet (8 mg) by mouth At Bedtime    Insomnia, unspecified insomnia       RESTASIS 0.05 % ophthalmic emulsion   Generic drug:  cycloSPORINE      Place 1 drop into both eyes 2 times daily        zolpidem 12.5 MG CR tablet    AMBIEN CR    30 tablet    Take 1 tablet (12.5 mg) by mouth nightly as needed    Chronic pain syndrome       * Notice:  This list has 6 medication(s) that are the same as other medications prescribed for you. Read the directions carefully, and ask your doctor or other care provider to review them with you.

## 2017-09-05 ENCOUNTER — OFFICE VISIT (OUTPATIENT)
Dept: BEHAVIORAL HEALTH | Facility: CLINIC | Age: 57
End: 2017-09-05

## 2017-09-05 DIAGNOSIS — F43.10 POSTTRAUMATIC STRESS DISORDER: Primary | ICD-10-CM

## 2017-09-05 PROCEDURE — 90834 PSYTX W PT 45 MINUTES: CPT | Performed by: SOCIAL WORKER
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
## 2017-09-05 NOTE — MR AVS SNAPSHOT
After Visit Summary   9/5/2017    Aspen Mccullough    MRN: [id number]           Patient Information     Date Of Birth          1960        Visit Information        Provider Department      9/5/2017 11:30 AM Antwan Boston LICSW [facility]        Today's Diagnoses     Posttraumatic stress disorder    -  1       Follow-ups after your visit        Your next 10 appointments already scheduled     Sep 19, 2017 10:30 AM CDT   Return Visit with BARNEY Yan   [facility]    [address] [place] [address]  [address]  [facility] [id number] [phone]              Who to contact     If you have questions or need follow up information about today's clinic visit or your schedule please contact [facility] directly at [phone].  Normal or non-critical lab and imaging results will be communicated to you by MyChart, letter or phone within 4 business days after the clinic has received the results. If you do not hear from us within 7 days, please contact the clinic through MyChart or phone. If you have a critical or abnormal lab result, we will notify you by phone as soon as possible.  Submit refill requests through CareXtend or call your pharmacy and they will forward the refill request to us. Please allow 3 business days for your refill to be completed.          Additional Information About Your Visit        MyChart Information     CareXtend gives you secure access to your electronic health record. If you see a primary care provider, you can also send messages to your care team and make appointments. If you have questions, please call your primary care clinic.  If you do not have a primary care provider, please call [phone] and they will assist you.        Care EveryWhere ID     This is your Care EveryWhere ID. This could be used by other organizations to access your  [facility] medical records  [id number]         Blood Pressure from Last 3 Encounters:   01/21/17 134/80   10/31/16 122/76   07/12/16 130/88    Weight from Last 3 Encounters:   01/21/17 130 lb (59 kg)   10/31/16 182 lb (82.6 kg)   04/24/16 182 lb (82.6 kg)              Today, you had the following     No orders found for display       Primary Care Provider Office Phone # Fax #    Sarahi Vivar [phone]       [facility] [address]  [facility] [address]        Equal Access to Services     [facility]: Hadii aad ku hadasho Soomaali, waaxda luqadaha, qaybta kaalmada adeegyada, yaneth palumbo hayclementen angela guillermo . So [facility] [phone].    ATENCIÓN: Si habla español, tiene a lynn disposición servicios gratuitos de asistencia lingüística. Llame al [phone].    We comply with applicable federal civil rights laws and [place] laws. We do not discriminate on the basis of race, color, national origin, age, disability sex, sexual orientation or gender identity.            Thank you!     Thank you for choosing [facility]  for your care. Our goal is always to provide you with excellent care. Hearing back from our patients is one way we can continue to improve our services. Please take a few minutes to complete the written survey that you may receive in the mail after your visit with us. Thank you!             Your Updated Medication List - Protect others around you: Learn how to safely use, store and throw away your medicines at www.disposemymeds.org.          This list is accurate as of: 9/5/17  4:09 PM.  Always use your most recent med list.                   Brand Name Dispense Instructions for use Diagnosis    albuterol (2.5 MG/3ML) 0.083% neb solution     1 Box    Take 1 vial (2.5 mg) by nebulization every 4 hours as needed for shortness of breath / dyspnea or wheezing    Bronchitis, Laryngitis       ALLEGRA 180 MG tablet   Generic drug:  fexofenadine       Take 180 mg by mouth daily.        * cephALEXin 500 MG capsule    KEFLEX     4 caps 1 hour before dental appt        * cephALEXin 500 MG capsule    KEFLEX    30 capsule    Take 1 capsule (500 mg) by mouth 3 times daily    Bronchitis       cyanocobalamin 1000 MCG/ML injection    VITAMIN B12    4 mL    Inject 1 mL (1,000 mcg) Subcutaneous every 7 days    Other vitamin B12 deficiency anemia       cyclobenzaprine 10 MG tablet    FLEXERIL    90 tablet    Take 1 tablet (10 mg) by mouth 3 times daily    Generalized osteoarthrosis, involving multiple sites       CYTOMEL 50 MCG Tabs   Generic drug:  Liothyronine Sodium      Take 50 mcg by mouth 3 times daily    Hypothyroidism, unspecified type       diazepam 10 MG tablet    VALIUM    90 tablet    Take 1 tablet (10 mg) by mouth 3 times daily    Chronic pain syndrome, Encounter for long-term use of opiate analgesic       ergocalciferol 25796 UNITS capsule    ERGOCALCIFEROL    8 capsule    Take 1 capsule (50,000 Units) by mouth once a week    Vitamin D deficiency       estradiol 2 MG vaginal ring    ESTRING    1 each    Place 1 each vaginally every 3 months one ring.    Vaginal atrophy       FISH OIL      daily. w/DHEA.        FLONASE 50 MCG/ACT spray   Generic drug:  fluticasone      2 sprays by Both Nostrils route daily as needed.        guaiFENesin-codeine 100-10 MG/5ML Soln solution    ROBITUSSIN AC    120 mL    Take 10 mLs by mouth every 4 hours as needed for cough    Community acquired pneumonia       HYDROmorphone 8 MG tablet    DILAUDID    100 tablet    Take 1 tablet (8 mg) by mouth every 3 hours as needed    Chronic pain syndrome       ipratropium - albuterol 0.5 mg/2.5 mg/3 mL 0.5-2.5 (3) MG/3ML neb solution    DUONEB    3 mL    Take 1 vial (3 mLs) by nebulization once for 1 dose    Bronchitis, Laryngitis       * levothyroxine 200 MCG Solr injection    SYNTHROID/LEVOTHROID          * levothyroxine 300 MCG tablet    SYNTHROID/LEVOTHROID          morphine 30 MG 12  hr tablet    MS CONTIN    270 tablet    Take 3 tablets (90 mg) by mouth every 8 hours    Chronic pain syndrome       NASONEX 50 MCG/ACT spray   Generic drug:  mometasone           norethindrone-ethinyl estradiol 1-5 MG-MCG per tablet    FEMHRT 1/5    90 tablet    Take 1 tablet by mouth daily    Absence of menstruation       order for DME     1 Device    Equipment being ordered: Nebulizer    Bronchitis, Laryngitis       prednisoLONE acetate 1 % ophthalmic susp    PRED FORTE          * predniSONE 1 MG tablet    DELTASONE     alternate 8 mg and 10 mg every other day        * predniSONE 5 MG tablet    DELTASONE     alternate 8 mg and 10 mg every other day        probiotic Caps      Take 1 capsule by mouth daily.        ramelteon 8 MG tablet    ROZEREM    30 tablet    Take 1 tablet (8 mg) by mouth At Bedtime    Insomnia, unspecified insomnia       RESTASIS 0.05 % ophthalmic emulsion   Generic drug:  cycloSPORINE      Place 1 drop into both eyes 2 times daily        zolpidem 12.5 MG CR tablet    AMBIEN CR    30 tablet    Take 1 tablet (12.5 mg) by mouth nightly as needed    Chronic pain syndrome       * Notice:  This list has 6 medication(s) that are the same as other medications prescribed for you. Read the directions carefully, and ask your doctor or other care provider to review them with you.

## 2017-09-19 ENCOUNTER — OFFICE VISIT (OUTPATIENT)
Dept: BEHAVIORAL HEALTH | Facility: CLINIC | Age: 57
End: 2017-09-19
Payer: COMMERCIAL

## 2017-09-19 DIAGNOSIS — F43.10 POSTTRAUMATIC STRESS DISORDER: Primary | ICD-10-CM

## 2017-09-19 PROCEDURE — 90832 PSYTX W PT 30 MINUTES: CPT | Performed by: SOCIAL WORKER

## 2017-09-19 NOTE — MR AVS SNAPSHOT
After Visit Summary   9/19/2017    Aspen Mccullough    MRN: [id number]           Patient Information     Date Of Birth          1960        Visit Information        Provider Department      9/19/2017 10:30 AM Antwan Boston LICSW [facility]        Today's Diagnoses     Posttraumatic stress disorder    -  1       Follow-ups after your visit        Your next 10 appointments already scheduled     Oct 03, 2017 10:30 AM CDT   Return Visit with BARNEY Yan   [facility] ([facility]    [address] [place] [address]  [address]  [facility] [id number] [phone]              Who to contact     If you have questions or need follow up information about today's clinic visit or your schedule please contact [facility] directly at [phone].  Normal or non-critical lab and imaging results will be communicated to you by MyChart, letter or phone within 4 business days after the clinic has received the results. If you do not hear from us within 7 days, please contact the clinic through MyChart or phone. If you have a critical or abnormal lab result, we will notify you by phone as soon as possible.  Submit refill requests through Surfly or call your pharmacy and they will forward the refill request to us. Please allow 3 business days for your refill to be completed.          Additional Information About Your Visit        MyChart Information     Surfly gives you secure access to your electronic health record. If you see a primary care provider, you can also send messages to your care team and make appointments. If you have questions, please call your primary care clinic.  If you do not have a primary care provider, please call [phone] and they will assist you.        Care EveryWhere ID     This is your Care EveryWhere ID. This could be used by other organizations to access  your [facility] medical records  [id number]         Blood Pressure from Last 3 Encounters:   01/21/17 134/80   10/31/16 122/76   07/12/16 130/88    Weight from Last 3 Encounters:   01/21/17 130 lb (59 kg)   10/31/16 182 lb (82.6 kg)   04/24/16 182 lb (82.6 kg)              Today, you had the following     No orders found for display       Primary Care Provider Office Phone # Fax #    Sarahi Vivar [phone]       [facility] [address] [facility] [address]        Equal Access to Services     [facility]: Hadii aad ku hadasho Sobossmanali, waaxda luqadaha, qaybta kaalmada adeegyada, yaneth guillermo . So [facility] [phone].    ATENCIÓN: Si habla español, tiene a lynn disposición servicios gratuitos de asistencia lingüística. Llame al [phone].    We comply with applicable federal civil rights laws and [place] laws. We do not discriminate on the basis of race, color, national origin, age, disability sex, sexual orientation or gender identity.            Thank you!     Thank you for choosing [facility]  for your care. Our goal is always to provide you with excellent care. Hearing back from our patients is one way we can continue to improve our services. Please take a few minutes to complete the written survey that you may receive in the mail after your visit with us. Thank you!             Your Updated Medication List - Protect others around you: Learn how to safely use, store and throw away your medicines at www.disposemymeds.org.          This list is accurate as of: 9/19/17 11:59 PM.  Always use your most recent med list.                   Brand Name Dispense Instructions for use Diagnosis    albuterol (2.5 MG/3ML) 0.083% neb solution     1 Box    Take 1 vial (2.5 mg) by nebulization every 4 hours as needed for shortness of breath / dyspnea or wheezing    Bronchitis, Laryngitis       ALLEGRA 180 MG tablet   Generic drug:   fexofenadine      Take 180 mg by mouth daily.        * cephALEXin 500 MG capsule    KEFLEX     4 caps 1 hour before dental appt        * cephALEXin 500 MG capsule    KEFLEX    30 capsule    Take 1 capsule (500 mg) by mouth 3 times daily    Bronchitis       cyanocobalamin 1000 MCG/ML injection    VITAMIN B12    4 mL    Inject 1 mL (1,000 mcg) Subcutaneous every 7 days    Other vitamin B12 deficiency anemia       cyclobenzaprine 10 MG tablet    FLEXERIL    90 tablet    Take 1 tablet (10 mg) by mouth 3 times daily    Generalized osteoarthrosis, involving multiple sites       CYTOMEL 50 MCG Tabs   Generic drug:  Liothyronine Sodium      Take 50 mcg by mouth 3 times daily    Hypothyroidism, unspecified type       diazepam 10 MG tablet    VALIUM    90 tablet    Take 1 tablet (10 mg) by mouth 3 times daily    Chronic pain syndrome, Encounter for long-term use of opiate analgesic       ergocalciferol 85963 UNITS capsule    ERGOCALCIFEROL    8 capsule    Take 1 capsule (50,000 Units) by mouth once a week    Vitamin D deficiency       estradiol 2 MG vaginal ring    ESTRING    1 each    Place 1 each vaginally every 3 months one ring.    Vaginal atrophy       FISH OIL      daily. w/DHEA.        FLONASE 50 MCG/ACT spray   Generic drug:  fluticasone      2 sprays by Both Nostrils route daily as needed.        guaiFENesin-codeine 100-10 MG/5ML Soln solution    ROBITUSSIN AC    120 mL    Take 10 mLs by mouth every 4 hours as needed for cough    Community acquired pneumonia       HYDROmorphone 8 MG tablet    DILAUDID    100 tablet    Take 1 tablet (8 mg) by mouth every 3 hours as needed    Chronic pain syndrome       * levothyroxine 200 MCG Solr injection    SYNTHROID/LEVOTHROID          * levothyroxine 300 MCG tablet    SYNTHROID/LEVOTHROID          morphine 30 MG 12 hr tablet    MS CONTIN    270 tablet    Take 3 tablets (90 mg) by mouth every 8 hours    Chronic pain syndrome       NASONEX 50 MCG/ACT spray   Generic drug:   mometasone           norethindrone-ethinyl estradiol 1-5 MG-MCG per tablet    FEMHRT 1/5    90 tablet    Take 1 tablet by mouth daily    Absence of menstruation       order for DME     1 Device    Equipment being ordered: Nebulizer    Bronchitis, Laryngitis       prednisoLONE acetate 1 % ophthalmic susp    PRED FORTE          * predniSONE 1 MG tablet    DELTASONE     alternate 8 mg and 10 mg every other day        * predniSONE 5 MG tablet    DELTASONE     alternate 8 mg and 10 mg every other day        probiotic Caps      Take 1 capsule by mouth daily.        ramelteon 8 MG tablet    ROZEREM    30 tablet    Take 1 tablet (8 mg) by mouth At Bedtime    Insomnia, unspecified insomnia       RESTASIS 0.05 % ophthalmic emulsion   Generic drug:  cycloSPORINE      Place 1 drop into both eyes 2 times daily        zolpidem 12.5 MG CR tablet    AMBIEN CR    30 tablet    Take 1 tablet (12.5 mg) by mouth nightly as needed    Chronic pain syndrome       * Notice:  This list has 6 medication(s) that are the same as other medications prescribed for you. Read the directions carefully, and ask your doctor or other care provider to review them with you.

## 2017-09-20 NOTE — PROGRESS NOTES
[facility]  September 19, 2017      Behavioral Health Clinician Progress Note    Patient Name: Aspen Mccullough           Service Type: Individual      Service Location:  Face to Face in Clinic      Session Start Time: 10:46am  Session End Time: 11:12pm      Session Length: 16 - 37      Attendees: Patient    Visit Activities (Refresh list every visit): [facility] Only    Diagnostic Assessment Date: June 22, 2015  Treatment Plan Review Date: 4-18-16  See Flowsheets for today's PHQ-9 and BENI-7 results  Previous PHQ-9:   PHQ-9 SCORE 11/2/2015 11/10/2015 5/3/2016   Total Score - - -   Total Score - - -   Total Score 6 5 12   Some recent data might be hidden     Previous BENI-7:   BENI-7 SCORE 10/19/2015 11/2/2015 11/10/2015   Total Score - - -   Total Score 3 3 3   Total Score - - -   Some recent data might be hidden       FERCHO LEVEL:  FERCHO Score (Last Two) 7/24/2014   FERCHO Raw Score 51   Activation Score 91.6   FERCHO Level 4   Some recent data might be hidden       DATA  Extended Session (60+ minutes): No  Interactive Complexity: No  Crisis: No    Treatment Objective(s) Addressed in This Session:  Target Behavior(s): disease management/lifestyle changes mental health    Mood Instability: will develop better understanding of triggers and coping strategies to stabilize mood  PTSD Symptom Management  Psychological distress related to Pain    Current Stressors / Issues:    The patient reported that she had some recent stressors/frustrations-and she talked about her usual stressors her interactions with her neighbors-this writer challenged the patient to focus on more positive things in her life and the patient talked about her anticipating attending her [employer] reunion-that in the past she avoided going to the reunions because she was not healthy and did not want to have to explain it-this writer reflected that the patient believes the person and health she is now is presentable and she likes the  self enough to go to the reunion-she reported that she feels more comfortable with the self-that she likes the self-this writer reflected the patients resolve and stubbornness in proving others wrong in her benefit (health benefit-overcoming the odds.)                Progress on Treatment Objective(s) / Homework:  Minimal progress - ACTION (Actively working towards change); Intervened by reinforcing change plan / affirming steps taken    Motivational Interviewing    MI Intervention: Expressed Empathy/Understanding, Supported Autonomy, Collaboration, Evocation, Permission to raise concern or advise, Open-ended questions, Reflections: simple and complex and Change talk (evoked)     Change Talk Expressed by the Patient: Desire to change Ability to change Reasons to change Need to change Committment to change Activation Taking steps    Provider Response to Change Talk: E - Evoked more info from patient about behavior change, A - Affirmed patient's thoughts, decisions, or attempts at behavior change, R - Reflected patient's change talk and S - Summarized patient's change talk statements      Care Plan review completed: No    Medication Review:  No changes to current psychiatric medication(s)    Medication Compliance:  NA    Changes in Health Issues:   Yes: Pain, Associated Psychological Distress  Sleep disturbance, Associated Psychological Distress    Chemical Use Review:   Substance Use: Chemical use reviewed, no active concerns identified      Tobacco Use: No current tobacco use.      Assessment: Current Emotional / Mental Status (status of significant symptoms):  Risk status (Self / Other harm or suicidal ideation)  Patient denies a history of suicidal ideation, suicide attempts, self-injurious behavior, homicidal ideation, homicidal behavior and and other safety concerns  Patient denies current fears or concerns for personal safety.  Patient denies current or recent suicidal ideation or behaviors.  Patient denies  current or recent homicidal ideation or behaviors.  Patient denies current or recent self injurious behavior or ideation.  Patient denies other safety concerns.  A safety and risk management plan has not been developed at this time, however patient was encouraged to call [place] [phone] should there be a change in any of these risk factors.    Appearance:   Appropriate   Eye Contact:   Good   Psychomotor Behavior: Normal   Attitude:   Cooperative   Orientation:   All  Speech   Rate / Production: Hyperverbal    Volume:  Normal   Mood:    Normal  Affect:    Appropriate   Thought Content:  Clear   Thought Form:  Coherent  Goal Directed  Logical   Insight:    Good     Diagnoses:  1. Posttraumatic stress disorder        Collateral Reports Completed:  Not Applicable    Plan: (Homework, other):  Patient was given information about behavioral services and encouraged to schedule a follow up appointment with the clinic [facility] as needed.  She was also given information about mental health symptoms and treatment options .  CD Recommendations: No indications of CD issues.  Antwan Boston, [place], [facility]      ______________________________________________________________________    Integrated Primary Care Behavioral Health Treatment Plan    Patient's Name: Aspen Mccullough  YOB: 1960    Date: 4-18-16    DSM-V Diagnoses: PTSD  Psychosocial / Contextual Factors: medical condition, history of traumatic experiences  WHODAS:  Will complete at next visit    Referral / Collaboration:  Referral to another professional/service is not indicated at this time..    Anticipated number of session or this episode of care: 10      MeasurableTreatment Goal(s) related to diagnosis / functional impairment(s)  Goal 1: Patient will be able to remember the past with 50% less emotional reaction of anger and hurt.     I will know I've met my goal when I can let go of the past     Objective #A (Patient Action)    Patient will talk to at least two  others about losses and coping.  Status: New - Date: 4-18-16     Intervention(s)  [facility] will provide avenue for the past to process her losses and disappointments.    Objective #B  Patient will reduce her triggers from past trauma.  Status: New - Date: 4-18-16     Intervention(s)  [facility] will teach distraction skills. mindfulness.      Patient has reviewed and agreed to the above plan.      Antwan Boston, [facility]  April 18, 2016

## 2017-09-22 ENCOUNTER — TELEPHONE (OUTPATIENT)
Dept: FAMILY MEDICINE | Facility: CLINIC | Age: 57
End: 2017-09-22

## 2017-09-22 NOTE — TELEPHONE ENCOUNTER
Patient has not had an re-est care with a PCP since her provider left over one year ago, has been making regular [facility] appts, please schedule her with one of our new providers on 10/3 to re-establish care as this is when she has a [facility] appt    Ann Silverman [facility]  MEDICAL LEAD

## 2017-09-25 NOTE — TELEPHONE ENCOUNTER
Pt is schedule with [facility] on 10/3 @ 10:30 am. (60 mins not avail)        Danilo Sears

## 2017-10-03 ENCOUNTER — OFFICE VISIT (OUTPATIENT)
Dept: FAMILY MEDICINE | Facility: CLINIC | Age: 57
End: 2017-10-03
Payer: COMMERCIAL

## 2017-10-03 ENCOUNTER — OFFICE VISIT (OUTPATIENT)
Dept: BEHAVIORAL HEALTH | Facility: CLINIC | Age: 57
End: 2017-10-03

## 2017-10-03 VITALS
DIASTOLIC BLOOD PRESSURE: 110 MMHG | SYSTOLIC BLOOD PRESSURE: 150 MMHG | HEIGHT: 69 IN | HEART RATE: 87 BPM | OXYGEN SATURATION: 97 % | TEMPERATURE: 98.4 F | RESPIRATION RATE: 16 BRPM

## 2017-10-03 DIAGNOSIS — F45.9 SOMATOFORM DISORDER: ICD-10-CM

## 2017-10-03 DIAGNOSIS — F60.4: ICD-10-CM

## 2017-10-03 DIAGNOSIS — G89.4 CHRONIC PAIN SYNDROME: ICD-10-CM

## 2017-10-03 DIAGNOSIS — M32.9 SYSTEMIC LUPUS ERYTHEMATOSUS, UNSPECIFIED SLE TYPE, UNSPECIFIED ORGAN INVOLVEMENT STATUS (H): ICD-10-CM

## 2017-10-03 DIAGNOSIS — F43.10 POSTTRAUMATIC STRESS DISORDER: ICD-10-CM

## 2017-10-03 DIAGNOSIS — G47.00 PERSISTENT DISORDER OF INITIATING OR MAINTAINING SLEEP: Primary | ICD-10-CM

## 2017-10-03 DIAGNOSIS — E27.49 GLUCOCORTICOID DEFICIENCY (H): ICD-10-CM

## 2017-10-03 DIAGNOSIS — F43.10 POSTTRAUMATIC STRESS DISORDER: Primary | ICD-10-CM

## 2017-10-03 DIAGNOSIS — F98.8 ATTENTION DEFICIT DISORDER, UNSPECIFIED HYPERACTIVITY PRESENCE: ICD-10-CM

## 2017-10-03 PROBLEM — Z98.1 S/P CERVICAL SPINAL FUSION: Status: ACTIVE | Noted: 2017-10-03

## 2017-10-03 PROCEDURE — 99215 OFFICE O/P EST HI 40 MIN: CPT | Performed by: NURSE PRACTITIONER

## 2017-10-03 PROCEDURE — 90832 PSYTX W PT 30 MINUTES: CPT | Performed by: SOCIAL WORKER

## 2017-10-03 PROCEDURE — 90785 PSYTX COMPLEX INTERACTIVE: CPT | Performed by: SOCIAL WORKER

## 2017-10-03 ASSESSMENT — PAIN SCALES - GENERAL: PAINLEVEL: EXTREME PAIN (8)

## 2017-10-03 NOTE — MR AVS SNAPSHOT
After Visit Summary   10/3/2017    Aspen Mccullough    MRN: [id number]           Patient Information     Date Of Birth          1960        Visit Information        Provider Department      10/3/2017 10:30 AM Antwan Boston LICSW [facility]        Today's Diagnoses     Posttraumatic stress disorder    -  1       Follow-ups after your visit        Your next 10 appointments already scheduled     Oct 19, 2017  9:30 AM CDT   Return Visit with BARNEY Yan   [facility] ([facility])    [address] [place] [address]  [address]  [facility] [address]   [phone]              Future tests that were ordered for you today     Open Future Orders        Priority Expected Expires Ordered    SLEEP EVALUATION & MANAGEMENT REFERRAL - ADULT Routine  10/3/2018 10/3/2017            Who to contact     If you have questions or need follow up information about today's clinic visit or your schedule please contact [facility] directly at [phone].  Normal or non-critical lab and imaging results will be communicated to you by MyHeritage[employer], letter or phone within 4 business days after the clinic has received the results. If you do not hear from us within 7 days, please contact the clinic through [employer] or phone. If you have a critical or abnormal lab result, we will notify you by phone as soon as possible.  Submit refill requests through "[employer] or call your pharmacy and they will forward the refill request to us. Please allow 3 business days for your refill to be completed.          Additional Information About Your Visit        MyChart Information     "[employer] gives you secure access to your electronic health record. If you see a primary care provider, you can also send messages to your care team and make appointments. If you have questions, please call your primary care clinic.  If you do not  have a primary care provider, please call [phone] and they will assist you.        Care EveryWhere ID     This is your Care EveryWhere ID. This could be used by other organizations to access your [facility] medical records  [id number]         Blood Pressure from Last 3 Encounters:   10/03/17 (!) 150/110   01/21/17 134/80   10/31/16 122/76    Weight from Last 3 Encounters:   01/21/17 130 lb (59 kg)   10/31/16 182 lb (82.6 kg)   04/24/16 182 lb (82.6 kg)              We Performed the Following     C PSYCHOTHERAPY COMPLEX INTERACTIVE          Today's Medication Changes          These changes are accurate as of: 10/3/17 11:59 PM.  If you have any questions, ask your nurse or doctor.               Stop taking these medicines if you haven't already. Please contact your care team if you have questions.     cephALEXin 500 MG capsule   Commonly known as:  KEFLEX   Stopped by:  Hailey Dubose APRN CNP                    Primary Care Provider Office Phone # Fax #    ANITRA Melvin [phone]       [address] [place] [address]        Equal Access to Services     [facility]: Hadii grisel bourne hadasho Sobossmanali, waaxda luqadaha, qaybta kaalmada adeegyada, yaneth guillermo . So [facility] [phone].    ATENCIÓN: Si habla español, tiene a lynn disposición servicios gratuitos de asistencia lingüística. Llame al [phone].    We comply with applicable federal civil rights laws and [place] laws. We do not discriminate on the basis of race, color, national origin, age, disability, sex, sexual orientation, or gender identity.            Thank you!     Thank you for choosing [facility]  for your care. Our goal is always to provide you with excellent care. Hearing back from our patients is one way we can continue to improve our services. Please take a few minutes to complete the written survey that you may receive in the mail after your  visit with us. Thank you!             Your Updated Medication List - Protect others around you: Learn how to safely use, store and throw away your medicines at www.disposemymeds.org.          This list is accurate as of: 10/3/17 11:59 PM.  Always use your most recent med list.                   Brand Name Dispense Instructions for use Diagnosis    ALLEGRA 180 MG tablet   Generic drug:  fexofenadine      Take 180 mg by mouth daily.        cyanocobalamin 1000 MCG/ML injection    VITAMIN B12    4 mL    Inject 1 mL (1,000 mcg) Subcutaneous every 7 days    Other vitamin B12 deficiency anemia       cyclobenzaprine 10 MG tablet    FLEXERIL    90 tablet    Take 1 tablet (10 mg) by mouth 3 times daily    Generalized osteoarthrosis, involving multiple sites       CYTOMEL 50 MCG Tabs   Generic drug:  Liothyronine Sodium      Take 50 mcg by mouth 3 times daily    Hypothyroidism, unspecified type       diazepam 10 MG tablet    VALIUM    90 tablet    Take 1 tablet (10 mg) by mouth 3 times daily    Chronic pain syndrome, Encounter for long-term use of opiate analgesic       estradiol 2 MG vaginal ring    ESTRING    1 each    Place 1 each vaginally every 3 months one ring.    Vaginal atrophy       FISH OIL      daily. w/DHEA.        FLONASE 50 MCG/ACT spray   Generic drug:  fluticasone      2 sprays by Both Nostrils route daily as needed.        HYDROmorphone 8 MG tablet    DILAUDID    100 tablet    Take 1 tablet (8 mg) by mouth every 3 hours as needed    Chronic pain syndrome       ipratropium - albuterol 0.5 mg/2.5 mg/3 mL 0.5-2.5 (3) MG/3ML neb solution    DUONEB    3 mL    Take 1 vial (3 mLs) by nebulization once for 1 dose    Bronchitis, Laryngitis       levothyroxine 200 MCG Solr injection    SYNTHROID/LEVOTHROID          morphine 30 MG 12 hr tablet    MS CONTIN    270 tablet    Take 3 tablets (90 mg) by mouth every 8 hours    Chronic pain syndrome       NASONEX 50 MCG/ACT spray   Generic drug:  mometasone            norethindrone-ethinyl estradiol 1-5 MG-MCG per tablet    FEMHRT 1/5    90 tablet    Take 1 tablet by mouth daily    Absence of menstruation       order for DME     1 Device    Equipment being ordered: Nebulizer    Bronchitis, Laryngitis       prednisoLONE acetate 1 % ophthalmic susp    PRED FORTE          * predniSONE 1 MG tablet    DELTASONE     alternate 8 mg and 10 mg every other day        * predniSONE 5 MG tablet    DELTASONE     alternate 8 mg and 10 mg every other day        probiotic Caps      Take 1 capsule by mouth daily.        RESTASIS 0.05 % ophthalmic emulsion   Generic drug:  cycloSPORINE      Place 1 drop into both eyes 2 times daily        * Notice:  This list has 2 medication(s) that are the same as other medications prescribed for you. Read the directions carefully, and ask your doctor or other care provider to review them with you.

## 2017-10-03 NOTE — MR AVS SNAPSHOT
After Visit Summary   10/3/2017    Aspen Mccullough    MRN: [id number]           Patient Information     Date Of Birth          1960        Visit Information        Provider Department      10/3/2017 10:30 AM Hailey Dubose APRN CNP [facility]        Today's Diagnoses     Persistent disorder of initiating or maintaining sleep    -  1       Follow-ups after your visit        Additional Services     SLEEP EVALUATION & MANAGEMENT REFERRAL - ADULT       Please be aware that coverage of these services is subject to the terms and limitations of your health insurance plan.  Call member services at your health plan with any benefit or coverage questions.      Please bring the following to your appointment:    >>   List of current medications   >>   This referral request   >>   Any documents/labs given to you for this referral    Other:  [facility]      [address], [address] [address]    View Map      M-F 8AM-4:30PM      Appointments    [phone]                  Future tests that were ordered for you today     Open Future Orders        Priority Expected Expires Ordered    SLEEP EVALUATION & MANAGEMENT REFERRAL - ADULT Routine  10/3/2018 10/3/2017            Who to contact     If you have questions or need follow up information about today's clinic visit or your schedule please contact [facility] directly at [phone].  Normal or non-critical lab and imaging results will be communicated to you by MyChart, letter or phone within 4 business days after the clinic has received the results. If you do not hear from us within 7 days, please contact the clinic through MyChart or phone. If you have a critical or abnormal lab result, we will notify you by phone as soon as possible.  Submit refill requests through [employer] or call your pharmacy and they will forward the  "refill request to us. Please allow 3 business days for your refill to be completed.          Additional Information About Your Visit        MyChart Information     [employer] gives you secure access to your electronic health record. If you see a primary care provider, you can also send messages to your care team and make appointments. If you have questions, please call your primary care clinic.  If you do not have a primary care provider, please call [phone] and they will assist you.        Care EveryWhere ID     This is your Care EveryWhere ID. This could be used by other organizations to access your [place] medical records  [id number]        Your Vitals Were     Pulse Temperature Respirations Height Pulse Oximetry       87 98.4  F (36.9  C) (Oral) 16 5' 9\" (1.753 m) 97%        Blood Pressure from Last 3 Encounters:   10/03/17 (!) 150/110   01/21/17 134/80   10/31/16 122/76    Weight from Last 3 Encounters:   01/21/17 130 lb (59 kg)   10/31/16 182 lb (82.6 kg)   04/24/16 182 lb (82.6 kg)               Primary Care Provider Office Phone # Fax #    Hailey ANITRA Alvarez [facility] [phone]       [address] [place] [address]        Equal Access to Services     [facility] : Hadii grisel ku hadasho Soomaali, waaxda luqadaha, qaybta kaalmada adeegyada, waxay idiin hayclementen angela guillermo . So [facility] [phone].    ATENCIÓN: Si habla español, tiene a lynn disposición servicios gratuitos de asistencia lingüística. Llame al [phone].    We comply with applicable federal civil rights laws and [place] laws. We do not discriminate on the basis of race, color, national origin, age, disability, sex, sexual orientation, or gender identity.            Thank you!     Thank you for choosing [facility]  for your care. Our goal is always to provide you with excellent care. Hearing back from our patients is one way we can continue to improve our services. Please take a " few minutes to complete the written survey that you may receive in the mail after your visit with us. Thank you!             Your Updated Medication List - Protect others around you: Learn how to safely use, store and throw away your medicines at www.disposemymeds.org.          This list is accurate as of: 10/3/17 11:22 AM.  Always use your most recent med list.                   Brand Name Dispense Instructions for use Diagnosis    ALLEGRA 180 MG tablet   Generic drug:  fexofenadine      Take 180 mg by mouth daily.        * cephALEXin 500 MG capsule    KEFLEX     4 caps 1 hour before dental appt        * cephALEXin 500 MG capsule    KEFLEX    30 capsule    Take 1 capsule (500 mg) by mouth 3 times daily    Bronchitis       cyanocobalamin 1000 MCG/ML injection    VITAMIN B12    4 mL    Inject 1 mL (1,000 mcg) Subcutaneous every 7 days    Other vitamin B12 deficiency anemia       cyclobenzaprine 10 MG tablet    FLEXERIL    90 tablet    Take 1 tablet (10 mg) by mouth 3 times daily    Generalized osteoarthrosis, involving multiple sites       CYTOMEL 50 MCG Tabs   Generic drug:  Liothyronine Sodium      Take 50 mcg by mouth 3 times daily    Hypothyroidism, unspecified type       diazepam 10 MG tablet    VALIUM    90 tablet    Take 1 tablet (10 mg) by mouth 3 times daily    Chronic pain syndrome, Encounter for long-term use of opiate analgesic       ergocalciferol 60865 UNITS capsule    ERGOCALCIFEROL    8 capsule    Take 1 capsule (50,000 Units) by mouth once a week    Vitamin D deficiency       estradiol 2 MG vaginal ring    ESTRING    1 each    Place 1 each vaginally every 3 months one ring.    Vaginal atrophy       FISH OIL      daily. w/DHEA.        FLONASE 50 MCG/ACT spray   Generic drug:  fluticasone      2 sprays by Both Nostrils route daily as needed.        guaiFENesin-codeine 100-10 MG/5ML Soln solution    ROBITUSSIN AC    120 mL    Take 10 mLs by mouth every 4 hours as needed for cough    Community acquired  pneumonia       HYDROmorphone 8 MG tablet    DILAUDID    100 tablet    Take 1 tablet (8 mg) by mouth every 3 hours as needed    Chronic pain syndrome       ipratropium - albuterol 0.5 mg/2.5 mg/3 mL 0.5-2.5 (3) MG/3ML neb solution    DUONEB    3 mL    Take 1 vial (3 mLs) by nebulization once for 1 dose    Bronchitis, Laryngitis       * levothyroxine 200 MCG Solr injection    SYNTHROID/LEVOTHROID          * levothyroxine 300 MCG tablet    SYNTHROID/LEVOTHROID          morphine 30 MG 12 hr tablet    MS CONTIN    270 tablet    Take 3 tablets (90 mg) by mouth every 8 hours    Chronic pain syndrome       NASONEX 50 MCG/ACT spray   Generic drug:  mometasone           norethindrone-ethinyl estradiol 1-5 MG-MCG per tablet    FEMHRT 1/5    90 tablet    Take 1 tablet by mouth daily    Absence of menstruation       order for DME     1 Device    Equipment being ordered: Nebulizer    Bronchitis, Laryngitis       prednisoLONE acetate 1 % ophthalmic susp    PRED FORTE          * predniSONE 1 MG tablet    DELTASONE     alternate 8 mg and 10 mg every other day        * predniSONE 5 MG tablet    DELTASONE     alternate 8 mg and 10 mg every other day        probiotic Caps      Take 1 capsule by mouth daily.        ramelteon 8 MG tablet    ROZEREM    30 tablet    Take 1 tablet (8 mg) by mouth At Bedtime    Insomnia, unspecified insomnia       RESTASIS 0.05 % ophthalmic emulsion   Generic drug:  cycloSPORINE      Place 1 drop into both eyes 2 times daily        * Notice:  This list has 6 medication(s) that are the same as other medications prescribed for you. Read the directions carefully, and ask your doctor or other care provider to review them with you.

## 2017-10-03 NOTE — NURSING NOTE
"Chief Complaint   Patient presents with     Establish Care       Initial BP (!) 150/110  Pulse 87  Temp 98.4  F (36.9  C) (Oral)  Resp 16  Ht 5' 9\" (1.753 m)  SpO2 97% Estimated body mass index is 19.2 kg/(m^2) as calculated from the following:    Height as of 1/21/17: 5' 9\" (1.753 m).    Weight as of 1/21/17: 130 lb (59 kg).  Medication Reconciliation: complete   Dereje Parham MA      "
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
## 2017-10-04 ASSESSMENT — ANXIETY QUESTIONNAIRES
6. BECOMING EASILY ANNOYED OR IRRITABLE: NOT AT ALL
IF YOU CHECKED OFF ANY PROBLEMS ON THIS QUESTIONNAIRE, HOW DIFFICULT HAVE THESE PROBLEMS MADE IT FOR YOU TO DO YOUR WORK, TAKE CARE OF THINGS AT HOME, OR GET ALONG WITH OTHER PEOPLE: NOT DIFFICULT AT ALL
GAD7 TOTAL SCORE: 0
7. FEELING AFRAID AS IF SOMETHING AWFUL MIGHT HAPPEN: NOT AT ALL
3. WORRYING TOO MUCH ABOUT DIFFERENT THINGS: NOT AT ALL
2. NOT BEING ABLE TO STOP OR CONTROL WORRYING: NOT AT ALL
1. FEELING NERVOUS, ANXIOUS, OR ON EDGE: NOT AT ALL
5. BEING SO RESTLESS THAT IT IS HARD TO SIT STILL: NOT AT ALL

## 2017-10-04 ASSESSMENT — PATIENT HEALTH QUESTIONNAIRE - PHQ9
SUM OF ALL RESPONSES TO PHQ QUESTIONS 1-9: 3
5. POOR APPETITE OR OVEREATING: NOT AT ALL

## 2017-10-04 NOTE — PROGRESS NOTES
[facility]  October 3, 2017      Behavioral Health Clinician Progress Note    Patient Name: Aspen Mccullough           Service Type: Individual      Service Location:  Face to Face in Clinic      Session Start Time: 10:53am  Session End Time: 11:18pm      Session Length: 16 - 37      Attendees: Patient    Visit Activities (Refresh list every visit): [facility] [employer]    Diagnostic Assessment Date: June 22, 2015  Treatment Plan Review Date: 4-18-16  See Flowsheets for today's PHQ-9 and BENI-7 results  Previous PHQ-9:   PHQ-9 SCORE 11/10/2015 5/3/2016 10/4/2017   Total Score - - -   Total Score - - -   Total Score 5 12 3   Some recent data might be hidden     Previous BENI-7:   BENI-7 SCORE 11/2/2015 11/10/2015 10/4/2017   Total Score - - -   Total Score 3 3 0   Total Score - - -   Some recent data might be hidden       FERCHO LEVEL:  FERCHO Score (Last Two) 7/24/2014   FERCHO Raw Score 51   Activation Score 91.6   FERCHO Level 4   Some recent data might be hidden       DATA  Extended Session (60+ minutes): No  Interactive Complexity: Yes, visit entailed Interactive Complexity evidenced by:  -The need to manage maladaptive communication (related to, e.g., high anxiety, high reactivity, repeated questions, or disagreement) among participants that complicates delivery of care  Crisis: No    Treatment Objective(s) Addressed in This Session:  Target Behavior(s): disease management/lifestyle changes mental health    Mood Instability: will develop better understanding of triggers and coping strategies to stabilize mood  PTSD Symptom Management  Psychological distress related to Pain    Current Stressors / Issues:    The patient was seen by [facility] per the request of the PCP.  She stated the visit very defensive and talked about her frustration with her experiences with medical providers and she was responding during the visit in a way that mirrored her frustration and defensiveness-this writer identified these  feelings and started dialogue about how present moment is not the same as her former experiences this is new.  The patient talked about her numerous medical needs and that she has a number of specialist managing her care-we identified how she and her new PCP can begin working together and the patient appeared to calm/relax defenses.  Regarding sleeping the patient reported having hard time falling asleep-she also talked about having episodes where she falls asleep during the day even during conversations-she had a recent sleep study and she is not satisfied with the results-she wakes during the night-she has long history of issues with her sleeping-regarding appetite the patient reported having to force the self to eat-she participates in some regular exercise.  Regarding mood the patient reported feeling increasing irritability with no symptoms of depression.                 Progress on Treatment Objective(s) / Homework:  Minimal progress - ACTION (Actively working towards change); Intervened by reinforcing change plan / affirming steps taken    Motivational Interviewing    MI Intervention: Expressed Empathy/Understanding, Supported Autonomy, Collaboration, Evocation, Permission to raise concern or advise, Open-ended questions, Reflections: simple and complex and Change talk (evoked)     Change Talk Expressed by the Patient: Desire to change Ability to change Reasons to change Need to change Committment to change Activation Taking steps    Provider Response to Change Talk: E - Evoked more info from patient about behavior change, A - Affirmed patient's thoughts, decisions, or attempts at behavior change, R - Reflected patient's change talk and S - Summarized patient's change talk statements      Care Plan review completed: No    Medication Review:  No changes to current psychiatric medication(s)    Medication Compliance:  NA    Changes in Health Issues:   Yes: Pain, Associated Psychological Distress  Sleep  disturbance, Associated Psychological Distress    Chemical Use Review:   Substance Use: Chemical use reviewed, no active concerns identified      Tobacco Use: No current tobacco use.      Assessment: Current Emotional / Mental Status (status of significant symptoms):  Risk status (Self / Other harm or suicidal ideation)  Patient denies a history of suicidal ideation, suicide attempts, self-injurious behavior, homicidal ideation, homicidal behavior and and other safety concerns  Patient denies current fears or concerns for personal safety.  Patient denies current or recent suicidal ideation or behaviors.  Patient denies current or recent homicidal ideation or behaviors.  Patient denies current or recent self injurious behavior or ideation.  Patient denies other safety concerns.  A safety and risk management plan has not been developed at this time, however patient was encouraged to call [place] [phone] should there be a change in any of these risk factors.    Appearance:   Appropriate   Eye Contact:   Good   Psychomotor Behavior: Normal   Attitude:   Cooperative   Orientation:   All  Speech   Rate / Production: Hyperverbal    Volume:  Normal   Mood:    Normal  Affect:    Appropriate   Thought Content:  Clear   Thought Form:  Coherent  Goal Directed  Logical   Insight:    Good     Diagnoses:  1. Posttraumatic stress disorder        Collateral Reports Completed:  Not Applicable    Plan: (Homework, other):  Patient was given information about behavioral services and encouraged to schedule a follow up appointment with the clinic [facility] as needed.  She was also given information about mental health symptoms and treatment options .  CD Recommendations: No indications of CD issues.  Antwan Boston, BARNEY, [facility]      ______________________________________________________________________    Integrated Primary Care Behavioral Health Treatment Plan    Patient's Name: Aspen Mccullough  YOB: 1960    Date:  4-18-16    DSM-V Diagnoses: PTSD  Psychosocial / Contextual Factors: medical condition, history of traumatic experiences  WHODAS:  Will complete at next visit    Referral / Collaboration:  Referral to another professional/service is not indicated at this time..    Anticipated number of session or this episode of care: 10      MeasurableTreatment Goal(s) related to diagnosis / functional impairment(s)  Goal 1: Patient will be able to remember the past with 50% less emotional reaction of anger and hurt.     I will know I've met my goal when I can let go of the past     Objective #A (Patient Action)    Patient will talk to at least two others about losses and coping.  Status: New - Date: 4-18-16     Intervention(s)  [facility] will provide avenue for the past to process her losses and disappointments.    Objective #B  Patient will reduce her triggers from past trauma.  Status: New - Date: 4-18-16     Intervention(s)  [facility] will teach distraction skills. mindfulness.      Patient has reviewed and agreed to the above plan.      Antwan Boston, [facility]  April 18, 2016

## 2017-10-05 ASSESSMENT — ANXIETY QUESTIONNAIRES: GAD7 TOTAL SCORE: 0

## 2017-10-17 DIAGNOSIS — N91.2 ABSENCE OF MENSTRUATION: ICD-10-CM

## 2017-10-17 RX ORDER — NORETHINDRONE ACETATE AND ETHINYL ESTRADIOL 1; 5 MG/1; UG/1
TABLET ORAL
Qty: 30 TABLET | Refills: 0 | Status: SHIPPED | OUTPATIENT
Start: 2017-10-17 | End: 2018-04-05

## 2017-10-17 NOTE — TELEPHONE ENCOUNTER
norethindrone-ethinyl estradiol (FEMHRT 1/5) 1-5 MG-MCG per tablet     Last Written Prescription Date:  11/8/16  Last Fill Quantity: 90,   # refills: 3  Last Office Visit with [facility] primary care provider:  10/31/16  Future Office visit: none    Routing refill request to provider for review/approval because:  Pt due for annual. One month supply sent.

## 2017-10-19 ENCOUNTER — OFFICE VISIT (OUTPATIENT)
Dept: BEHAVIORAL HEALTH | Facility: CLINIC | Age: 57
End: 2017-10-19
Payer: COMMERCIAL

## 2017-10-19 DIAGNOSIS — F43.10 POSTTRAUMATIC STRESS DISORDER: Primary | ICD-10-CM

## 2017-10-19 PROCEDURE — 90832 PSYTX W PT 30 MINUTES: CPT | Performed by: SOCIAL WORKER

## 2017-10-19 NOTE — MR AVS SNAPSHOT
After Visit Summary   10/19/2017    Aspen Mccullough    MRN: [id number]           Patient Information     Date Of Birth          1960        Visit Information        Provider Department      10/19/2017 9:30 AM Antwan Boston LICSW [facility]        Today's Diagnoses     Posttraumatic stress disorder    -  1       Follow-ups after your visit        Your next 10 appointments already scheduled     Nov 02, 2017  9:30 AM CDT   Return Visit with BARNEY Yan   [facility] ([facility])    [address] [place] [address]  [address]  [facility] [address] [phone]              Who to contact     If you have questions or need follow up information about today's clinic visit or your schedule please contact [facility] directly at [phone].  Normal or non-critical lab and imaging results will be communicated to you by MyChart, letter or phone within 4 business days after the clinic has received the results. If you do not hear from us within 7 days, please contact the clinic through MyChart or phone. If you have a critical or abnormal lab result, we will notify you by phone as soon as possible.  Submit refill requests through Legend3D or call your pharmacy and they will forward the refill request to us. Please allow 3 business days for your refill to be completed.          Additional Information About Your Visit        MyChart Information     Legend3D gives you secure access to your electronic health record. If you see a primary care provider, you can also send messages to your care team and make appointments. If you have questions, please call your primary care clinic.  If you do not have a primary care provider, please call [phone] and they will assist you.        Care EveryWhere ID     This is your Care EveryWhere ID. This could be used by other organizations to access  your [place] medical records  [id number]         Blood Pressure from Last 3 Encounters:   10/03/17 (!) 150/110   01/21/17 134/80   10/31/16 122/76    Weight from Last 3 Encounters:   01/21/17 130 lb (59 kg)   10/31/16 182 lb (82.6 kg)   04/24/16 182 lb (82.6 kg)              Today, you had the following     No orders found for display       Primary Care Provider Office Phone # Fax #    Hailey SIDDIQUI ANITRA Dubose [phone]       [address] [place] [id number]        Equal Access to Services     [facility]: Hadii grisel Dan, waanilada xenia, qaybta kaalmada jas, yaneth guillermo . So [facility] [phone].    ATENCIÓN: Si habla español, tiene a lynn disposición servicios gratuitos de asistencia lingüística. [facility] [phone].    We comply with applicable federal civil rights laws and [place] laws. We do not discriminate on the basis of race, color, national origin, age, disability, sex, sexual orientation, or gender identity.            Thank you!     Thank you for choosing [facility] PRIMARY CARE  for your care. Our goal is always to provide you with excellent care. Hearing back from our patients is one way we can continue to improve our services. Please take a few minutes to complete the written survey that you may receive in the mail after your visit with us. Thank you!             Your Updated Medication List - Protect others around you: Learn how to safely use, store and throw away your medicines at www.disposemymeds.org.          This list is accurate as of: 10/19/17 11:59 PM.  Always use your most recent med list.                   Brand Name Dispense Instructions for use Diagnosis    ALLEGRA 180 MG tablet   Generic drug:  fexofenadine      Take 180 mg by mouth daily.        cyanocobalamin 1000 MCG/ML injection    VITAMIN B12    4 mL    Inject 1 mL (1,000 mcg) Subcutaneous every 7 days    Other vitamin B12 deficiency  anemia       cyclobenzaprine 10 MG tablet    FLEXERIL    90 tablet    Take 1 tablet (10 mg) by mouth 3 times daily    Generalized osteoarthrosis, involving multiple sites       CYTOMEL 50 MCG Tabs   Generic drug:  Liothyronine Sodium      Take 50 mcg by mouth 3 times daily    Hypothyroidism, unspecified type       diazepam 10 MG tablet    VALIUM    90 tablet    Take 1 tablet (10 mg) by mouth 3 times daily    Chronic pain syndrome, Encounter for long-term use of opiate analgesic       estradiol 2 MG vaginal ring    ESTRING    1 each    Place 1 each vaginally every 3 months one ring.    Vaginal atrophy       FISH OIL      daily. w/DHEA.        FLONASE 50 MCG/ACT spray   Generic drug:  fluticasone      2 sprays by Both Nostrils route daily as needed.        HYDROmorphone 8 MG tablet    DILAUDID    100 tablet    Take 1 tablet (8 mg) by mouth every 3 hours as needed    Chronic pain syndrome       JINTELI 1-5 MG-MCG per tablet   Generic drug:  norethindrone-ethinyl estradiol     30 tablet    TAKE ONE TABLET BY MOUTH ONE TIME DAILY    Absence of menstruation       levothyroxine 200 MCG Solr injection    SYNTHROID/LEVOTHROID          morphine 30 MG 12 hr tablet    MS CONTIN    270 tablet    Take 3 tablets (90 mg) by mouth every 8 hours    Chronic pain syndrome       NASONEX 50 MCG/ACT spray   Generic drug:  mometasone           order for DME     1 Device    Equipment being ordered: Nebulizer    Bronchitis, Laryngitis       prednisoLONE acetate 1 % ophthalmic susp    PRED FORTE          * predniSONE 1 MG tablet    DELTASONE     alternate 8 mg and 10 mg every other day        * predniSONE 5 MG tablet    DELTASONE     alternate 8 mg and 10 mg every other day        probiotic Caps      Take 1 capsule by mouth daily.        RESTASIS 0.05 % ophthalmic emulsion   Generic drug:  cycloSPORINE      Place 1 drop into both eyes 2 times daily        * Notice:  This list has 2 medication(s) that are the same as other medications  prescribed for you. Read the directions carefully, and ask your doctor or other care provider to review them with you.

## 2017-10-20 NOTE — PROGRESS NOTES
[facility]  October 19, 2017      Behavioral Health Clinician Progress Note    Patient Name: Aspen Mccullough           Service Type: Individual      Service Location:  Face to Face in Clinic      Session Start Time: 9:34am  Session End Time: 10:12am      Session Length: 38 - 52      Attendees: Patient    Visit Activities (Refresh list every visit): [facility] Only    Diagnostic Assessment Date: June 22, 2015  Treatment Plan Review Date: 4-18-16  See Flowsheets for today's PHQ-9 and BENI-7 results  Previous PHQ-9:   PHQ-9 SCORE 11/10/2015 5/3/2016 10/4/2017   Total Score - - -   Total Score - - -   Total Score 5 12 3   Some recent data might be hidden     Previous BENI-7:   BENI-7 SCORE 11/2/2015 11/10/2015 10/4/2017   Total Score - - -   Total Score 3 3 0   Total Score - - -   Some recent data might be hidden       FERCHO LEVEL:  FERCHO Score (Last Two) 7/24/2014   FERCHO Raw Score 51   Activation Score 91.6   FERCHO Level 4   Some recent data might be hidden       DATA  Extended Session (60+ minutes): No  Interactive Complexity: Yes, visit entailed Interactive Complexity evidenced by:  -The need to manage maladaptive communication (related to, e.g., high anxiety, high reactivity, repeated questions, or disagreement) among participants that complicates delivery of care  Crisis: No    Treatment Objective(s) Addressed in This Session:  Target Behavior(s): disease management/lifestyle changes mental health    Mood Instability: will develop better understanding of triggers and coping strategies to stabilize mood  PTSD Symptom Management  Psychological distress related to Pain    Current Stressors / Issues:    The patient reported that the last time she was at the clinic she was in a lot of pain-she reported her pain specialist decreased her anxiety medications (she was not happy about this)-but she is planing to access pool therapy to help with pain management and to prevent having to do surgery.  She is  "having low appetite and reported having to force the self to eat-she is thinking that she may have health issue with her thyroid and she is scheduled to have this assessed medically.  Regarding her mood the patient reported that she has been feeling really good as she spent time at a recent reunion and reported that she had a good time-in addition she has been worried some about her daughter's father coming to town as she anticipates that he will upset the daughter and she will not be able to stop her hurt-the patient stated this will be hard because \"I can't fix it.\"  She continues to struggle with sleeping but she was able to sleep recently better.                Progress on Treatment Objective(s) / Homework:  Minimal progress - ACTION (Actively working towards change); Intervened by reinforcing change plan / affirming steps taken    Motivational Interviewing    MI Intervention: Expressed Empathy/Understanding, Supported Autonomy, Collaboration, Evocation, Permission to raise concern or advise, Open-ended questions, Reflections: simple and complex and Change talk (evoked)     Change Talk Expressed by the Patient: Desire to change Ability to change Reasons to change Need to change Committment to change Activation Taking steps    Provider Response to Change Talk: E - Evoked more info from patient about behavior change, A - Affirmed patient's thoughts, decisions, or attempts at behavior change, R - Reflected patient's change talk and S - Summarized patient's change talk statements      Care Plan review completed: No    Medication Review:  No changes to current psychiatric medication(s)    Medication Compliance:  NA    Changes in Health Issues:   Yes: Pain, Associated Psychological Distress  Sleep disturbance, Associated Psychological Distress    Chemical Use Review:   Substance Use: Chemical use reviewed, no active concerns identified      Tobacco Use: No current tobacco use.      Assessment: Current Emotional / " Mental Status (status of significant symptoms):  Risk status (Self / Other harm or suicidal ideation)  Patient denies a history of suicidal ideation, suicide attempts, self-injurious behavior, homicidal ideation, homicidal behavior and and other safety concerns  Patient denies current fears or concerns for personal safety.  Patient denies current or recent suicidal ideation or behaviors.  Patient denies current or recent homicidal ideation or behaviors.  Patient denies current or recent self injurious behavior or ideation.  Patient denies other safety concerns.  A safety and risk management plan has not been developed at this time, however patient was encouraged to call [place] [phone] should there be a change in any of these risk factors.    Appearance:   Appropriate   Eye Contact:   Good   Psychomotor Behavior: Normal   Attitude:   Cooperative   Orientation:   All  Speech   Rate / Production: Hyperverbal    Volume:  Normal   Mood:    Normal  Affect:    Appropriate   Thought Content:  Clear   Thought Form:  Coherent  Goal Directed  Logical   Insight:    Good     Diagnoses:  1. Posttraumatic stress disorder        Collateral Reports Completed:  Not Applicable    Plan: (Homework, other):  Patient was given information about behavioral services and encouraged to schedule a follow up appointment with the clinic [facility] as needed.  She was also given information about mental health symptoms and treatment options .  CD Recommendations: No indications of CD issues.  Antwan Boston, [facility], [facility]      ______________________________________________________________________    Integrated Primary Care Behavioral Health Treatment Plan    Patient's Name: Aspen Mccullough  YOB: 1960    Date: 4-18-16    DSM-V Diagnoses: PTSD  Psychosocial / Contextual Factors: medical condition, history of traumatic experiences  WHODAS:  Will complete at next visit    Referral / Collaboration:  Referral to another professional/service is  not indicated at this time..    Anticipated number of session or this episode of care: 10      MeasurableTreatment Goal(s) related to diagnosis / functional impairment(s)  Goal 1: Patient will be able to remember the past with 50% less emotional reaction of anger and hurt.     I will know I've met my goal when I can let go of the past     Objective #A (Patient Action)    Patient will talk to at least two others about losses and coping.  Status: New - Date: 4-18-16     Intervention(s)  [facility] will provide avenue for the past to process her losses and disappointments.    Objective #B  Patient will reduce her triggers from past trauma.  Status: New - Date: 4-18-16     Intervention(s)  [facility] will teach distraction skills. mindfulness.      Patient has reviewed and agreed to the above plan.      Antwan Boston, [facility]  April 18, 2016

## 2017-11-09 ENCOUNTER — OFFICE VISIT (OUTPATIENT)
Dept: BEHAVIORAL HEALTH | Facility: CLINIC | Age: 57
End: 2017-11-09
Payer: COMMERCIAL

## 2017-11-09 DIAGNOSIS — F43.10 POSTTRAUMATIC STRESS DISORDER: Primary | ICD-10-CM

## 2017-11-09 PROCEDURE — 90834 PSYTX W PT 45 MINUTES: CPT | Performed by: SOCIAL WORKER

## 2017-11-09 NOTE — MR AVS SNAPSHOT
After Visit Summary   11/9/2017    Aspen Mccullough    MRN: [id number]           Patient Information     Date Of Birth          1960        Visit Information        Provider Department      11/9/2017 11:00 AM Antwan Boston LICSW [facility]        Today's Diagnoses     Posttraumatic stress disorder    -  1       Follow-ups after your visit        Your next 10 appointments already scheduled     Nov 16, 2017 11:30 AM CST   Return Visit with ANITRA Melvin CNP   [facility] ([facility])    [address]  [address]  [facility] [address] [phone]            Nov 29, 2017  8:30 AM CST   Return Visit with BARNEY Yan   [facility] ([facility])    [address]  [address]  [facility] [id number] [phone]              Who to contact     If you have questions or need follow up information about today's clinic visit or your schedule please contact [facility] directly at [phone].  Normal or non-critical lab and imaging results will be communicated to you by Shangbyhart, letter or phone within 4 business days after the clinic has received the results. If you do not hear from us within 7 days, please contact the clinic through Shangbyhart or phone. If you have a critical or abnormal lab result, we will notify you by phone as soon as possible.  Submit refill requests through CareToSave or call your pharmacy and they will forward the refill request to us. Please allow 3 business days for your refill to be completed.          Additional Information About Your Visit        MyChart Information     CareToSave gives you secure access to your electronic health record. If you see a primary care provider, you can also send messages to your care team and make appointments. If you have questions,  please call your primary care clinic.  If you do not have a primary care provider, please call [phone] and they will assist you.        Care EveryWhere ID     This is your Care EveryWhere ID. This could be used by other organizations to access your [place] medical records  [id number]         Blood Pressure from Last 3 Encounters:   10/03/17 (!) 150/110   01/21/17 134/80   10/31/16 122/76    Weight from Last 3 Encounters:   01/21/17 130 lb (59 kg)   10/31/16 182 lb (82.6 kg)   04/24/16 182 lb (82.6 kg)              Today, you had the following     No orders found for display       Primary Care Provider Office Phone # Fax #    Hailey ANITRA Alvarez [facility] [phone]       [address] [place] [address]        Equal Access to Services     [facility] : Hadii aad ku hadasho Soomaali, waaxda luqadaha, qaybta kaalmada adeegyada, yaneth sotoin hayskyler guillermo . So [facility] [phone].    ATENCIÓN: Si habla español, tiene a lynn disposición servicios gratuitos de asistencia lingüística. Llame al [phone].    We comply with applicable federal civil rights laws and [place] laws. We do not discriminate on the basis of race, color, national origin, age, disability, sex, sexual orientation, or gender identity.            Thank you!     Thank you for choosing [facility] PRIMARY CARE  for your care. Our goal is always to provide you with excellent care. Hearing back from our patients is one way we can continue to improve our services. Please take a few minutes to complete the written survey that you may receive in the mail after your visit with us. Thank you!             Your Updated Medication List - Protect others around you: Learn how to safely use, store and throw away your medicines at www.disposemymeds.org.          This list is accurate as of: 11/9/17 11:59 PM.  Always use your most recent med list.                   Brand Name Dispense Instructions for use  Diagnosis    ALLEGRA 180 MG tablet   Generic drug:  fexofenadine      Take 180 mg by mouth daily.        cyanocobalamin 1000 MCG/ML injection    VITAMIN B12    4 mL    Inject 1 mL (1,000 mcg) Subcutaneous every 7 days    Other vitamin B12 deficiency anemia       cyclobenzaprine 10 MG tablet    FLEXERIL    90 tablet    Take 1 tablet (10 mg) by mouth 3 times daily    Generalized osteoarthrosis, involving multiple sites       CYTOMEL 50 MCG Tabs   Generic drug:  Liothyronine Sodium      Take 50 mcg by mouth 3 times daily    Hypothyroidism, unspecified type       diazepam 10 MG tablet    VALIUM    90 tablet    Take 1 tablet (10 mg) by mouth 3 times daily    Chronic pain syndrome, Encounter for long-term use of opiate analgesic       estradiol 2 MG vaginal ring    ESTRING    1 each    Place 1 each vaginally every 3 months one ring.    Vaginal atrophy       FISH OIL      daily. w/DHEA.        FLONASE 50 MCG/ACT spray   Generic drug:  fluticasone      2 sprays by Both Nostrils route daily as needed.        HYDROmorphone 8 MG tablet    DILAUDID    100 tablet    Take 1 tablet (8 mg) by mouth every 3 hours as needed    Chronic pain syndrome       JINTELI 1-5 MG-MCG per tablet   Generic drug:  norethindrone-ethinyl estradiol     30 tablet    TAKE ONE TABLET BY MOUTH ONE TIME DAILY    Absence of menstruation       levothyroxine 200 MCG Solr injection    SYNTHROID/LEVOTHROID          morphine 30 MG 12 hr tablet    MS CONTIN    270 tablet    Take 3 tablets (90 mg) by mouth every 8 hours    Chronic pain syndrome       NASONEX 50 MCG/ACT spray   Generic drug:  mometasone           order for DME     1 Device    Equipment being ordered: Nebulizer    Bronchitis, Laryngitis       prednisoLONE acetate 1 % ophthalmic susp    PRED FORTE          * predniSONE 1 MG tablet    DELTASONE     alternate 8 mg and 10 mg every other day        * predniSONE 5 MG tablet    DELTASONE     alternate 8 mg and 10 mg every other day        probiotic Caps       Take 1 capsule by mouth daily.        RESTASIS 0.05 % ophthalmic emulsion   Generic drug:  cycloSPORINE      Place 1 drop into both eyes 2 times daily        * Notice:  This list has 2 medication(s) that are the same as other medications prescribed for you. Read the directions carefully, and ask your doctor or other care provider to review them with you.

## 2017-11-10 NOTE — PROGRESS NOTES
[facility]  November 9, 2017      Behavioral Health Clinician Progress Note    Patient Name: Aspen Mccullough           Service Type: Individual      Service Location:  Face to Face in Clinic      Session Start Time: 11:05am  Session End Time: 11:44am      Session Length: 38 - 52      Attendees: Patient    Visit Activities (Refresh list every visit): [facility] Only    Diagnostic Assessment Date: June 22, 2015  Treatment Plan Review Date: 4-18-16  See Flowsheets for today's PHQ-9 and BENI-7 results  Previous PHQ-9:   PHQ-9 SCORE 11/10/2015 5/3/2016 10/4/2017   Total Score - - -   Total Score - - -   Total Score 5 12 3   Some recent data might be hidden     Previous BENI-7:   BENI-7 SCORE 11/2/2015 11/10/2015 10/4/2017   Total Score - - -   Total Score 3 3 0   Total Score - - -   Some recent data might be hidden       FERCHO LEVEL:  FERCHO Score (Last Two) 7/24/2014   FERCHO Raw Score 51   Activation Score 91.6   FERCHO Level 4   Some recent data might be hidden       DATA  Extended Session (60+ minutes): No  Interactive Complexity: No  Crisis: No    Treatment Objective(s) Addressed in This Session:  Target Behavior(s): disease management/lifestyle changes mental health    Mood Instability: will develop better understanding of triggers and coping strategies to stabilize mood  PTSD Symptom Management  Psychological distress related to Pain    Current Stressors / Issues:    The patient reported that she is planing to meet with her brother for lunch-she has not seen him for about one year and she is hoping that their meeting goes well (good time.)  Since her last visit to the clinic she injured her leg and shoulder (which was an agitation of a former injury)-she is working with her specialist to address the treatment for this injury-her specialist recommended she participate with PT at [facility]-she reported that the specialist did not send the referral so she has to wait until she is seen again by  this specialist to get the referral sent so she can start the PT.  Regarding sleeping the patient reported having significant trouble with falling asleep and she stated that she may have slept about 1 hour in the past 24 hours.  The patient reported that she is trying to get the negative people out of her life-she talked about being proud of her daughter and was emotional/tearful as she expressed good feelings for an interaction she had with her daughter as her daughter reflected on how she witnessed the patient working hard as a mother for the family.               Progress on Treatment Objective(s) / Homework:  Minimal progress - ACTION (Actively working towards change); Intervened by reinforcing change plan / affirming steps taken    Motivational Interviewing    MI Intervention: Expressed Empathy/Understanding, Supported Autonomy, Collaboration, Evocation, Permission to raise concern or advise, Open-ended questions, Reflections: simple and complex and Change talk (evoked)     Change Talk Expressed by the Patient: Desire to change Ability to change Reasons to change Need to change Committment to change Activation Taking steps    Provider Response to Change Talk: E - Evoked more info from patient about behavior change, A - Affirmed patient's thoughts, decisions, or attempts at behavior change, R - Reflected patient's change talk and S - Summarized patient's change talk statements      Care Plan review completed: No    Medication Review:  No changes to current psychiatric medication(s)    Medication Compliance:  NA    Changes in Health Issues:   Yes: Pain, Associated Psychological Distress  Sleep disturbance, Associated Psychological Distress    Chemical Use Review:   Substance Use: Chemical use reviewed, no active concerns identified      Tobacco Use: No current tobacco use.      Assessment: Current Emotional / Mental Status (status of significant symptoms):  Risk status (Self / Other harm or suicidal  ideation)  Patient denies a history of suicidal ideation, suicide attempts, self-injurious behavior, homicidal ideation, homicidal behavior and and other safety concerns  Patient denies current fears or concerns for personal safety.  Patient denies current or recent suicidal ideation or behaviors.  Patient denies current or recent homicidal ideation or behaviors.  Patient denies current or recent self injurious behavior or ideation.  Patient denies other safety concerns.  A safety and risk management plan has not been developed at this time, however patient was encouraged to call [place] [id number] should there be a change in any of these risk factors.    Appearance:   Appropriate   Eye Contact:   Good   Psychomotor Behavior: Normal   Attitude:   Cooperative   Orientation:   All  Speech   Rate / Production: Hyperverbal    Volume:  Normal   Mood:    Normal  Affect:    Appropriate   Thought Content:  Clear   Thought Form:  Coherent  Goal Directed  Logical   Insight:    Good     Diagnoses:  1. Posttraumatic stress disorder        Collateral Reports Completed:  Not Applicable    Plan: (Homework, other):  Patient was given information about behavioral services and encouraged to schedule a follow up appointment with the clinic [facility] as needed.  She was also given information about mental health symptoms and treatment options .  CD Recommendations: No indications of CD issues.  Antwan Boston, BARNEY, [facility]      ______________________________________________________________________    Integrated Primary Care Behavioral Health Treatment Plan    Patient's Name: Aspen Mccullough  YOB: 1960    Date: 4-18-16    DSM-V Diagnoses: PTSD  Psychosocial / Contextual Factors: medical condition, history of traumatic experiences  WHODAS:  Will complete at next visit    Referral / Collaboration:  Referral to another professional/service is not indicated at this time..    Anticipated number of session or this episode of care:  10      MeasurableTreatment Goal(s) related to diagnosis / functional impairment(s)  Goal 1: Patient will be able to remember the past with 50% less emotional reaction of anger and hurt.     I will know I've met my goal when I can let go of the past     Objective #A (Patient Action)    Patient will talk to at least two others about losses and coping.  Status: New - Date: 4-18-16     Intervention(s)  [facility] will provide avenue for the past to process her losses and disappointments.    Objective #B  Patient will reduce her triggers from past trauma.  Status: New - Date: 4-18-16     Intervention(s)  [facility] will teach distraction skills. mindfulness.      Patient has reviewed and agreed to the above plan.      Antwan Boston, [facility]  April 18, 2016

## 2017-11-15 DIAGNOSIS — N91.2 ABSENCE OF MENSTRUATION: ICD-10-CM

## 2017-11-16 RX ORDER — NORETHINDRONE ACETATE AND ETHINYL ESTRADIOL 1; 5 MG/1; UG/1
TABLET ORAL
Qty: 28 TABLET | Refills: 0 | OUTPATIENT
Start: 2017-11-16

## 2017-11-16 NOTE — TELEPHONE ENCOUNTER
Angel 1-5      Last Written Prescription Date:  10/17/17  Last Fill Quantity: 30,   # refills: 0  Last Office Visit with [facility] primary care provider:  10/31/16  Future Office visit: none    Pt due for annual, no appt scheduled. Pt already received one month extension. Rx denied.

## 2018-01-03 ENCOUNTER — OFFICE VISIT (OUTPATIENT)
Dept: BEHAVIORAL HEALTH | Facility: CLINIC | Age: 58
End: 2018-01-03
Payer: COMMERCIAL

## 2018-01-03 DIAGNOSIS — F98.8 ATTENTION DEFICIT DISORDER: ICD-10-CM

## 2018-01-03 DIAGNOSIS — F43.10 POSTTRAUMATIC STRESS DISORDER: Primary | ICD-10-CM

## 2018-01-03 PROCEDURE — 90834 PSYTX W PT 45 MINUTES: CPT | Performed by: SOCIAL WORKER

## 2018-01-03 NOTE — PROGRESS NOTES
[facility]  January 3, 2018      Behavioral Health Clinician Progress Note    Patient Name: Aspen Mccullough           Service Type: Individual      Service Location:  Face to Face in Clinic      Session Start Time: 8:30am  Session End Time: 9:12am      Session Length: 38 - 52      Attendees: Patient    Visit Activities (Refresh list every visit): [facility] Only    Diagnostic Assessment Date: June 22, 2015  Treatment Plan Review Date: 4-18-16  See Flowsheets for today's PHQ-9 and BENI-7 results  Previous PHQ-9:   PHQ-9 SCORE 11/10/2015 5/3/2016 10/4/2017   Total Score - - -   Total Score - - -   Total Score 5 12 3   Some recent data might be hidden     Previous BENI-7:   BENI-7 SCORE 11/2/2015 11/10/2015 10/4/2017   Total Score - - -   Total Score 3 3 0   Total Score - - -   Some recent data might be hidden       FERCHO LEVEL:  FERCHO Score (Last Two) 7/24/2014   FERCHO Raw Score 51   Activation Score 91.6   FERCHO Level 4   Some recent data might be hidden       DATA  Extended Session (60+ minutes): No  Interactive Complexity: No  Crisis: No    Treatment Objective(s) Addressed in This Session:  Target Behavior(s): disease management/lifestyle changes mental health    Mood Instability: will develop better understanding of triggers and coping strategies to stabilize mood  PTSD Symptom Management  Psychological distress related to Pain    Current Stressors / Issues:    The patient she reported that she was admitted to the hospital in December for heart issues and blood clots-she reported that she was admitted to [facility] (this is a hospital she does not trust)-she was in ICU for 6 days-after her discharge she has not been seen by PCP and stated that she has follow up care at [facility]-with Motivational interviewing the patient reported that she wants to receive her follow up care at [place]-this writer coordinated with the patient PCP and the PCP came in the visit to talk about  follow up care and to encourage the patient to schedule PCP visit to begin the follow up care-the patient agreed to use this experience as a way to be shown how to use her PCP in the future for medical needs.  Regarding sleeping the patient reported having hard time falling asleep and that her sleep has been off and on-she continues to wake during the night and she has episodes of falling sleep at inappropriate times during the day and waking after minutes of being asleep (daughter has filmed these episodes)-she talked about it being scary her last acute health issues and that she was thinking about the things that she has not completed.  Regarding her mood the patient reported no symptoms of depression but that she has been more irritable-she reported having manageable anxiety arousal.             Progress on Treatment Objective(s) / Homework:  No improvement - PREPARATION (Decided to change - considering how); Intervened by negotiating a change plan and determining options / strategies for behavior change, identifying triggers, exploring social supports, and working towards setting a date to begin behavior change    Motivational Interviewing    MI Intervention: Expressed Empathy/Understanding, Supported Autonomy, Collaboration, Evocation, Permission to raise concern or advise, Open-ended questions, Reflections: simple and complex and Change talk (evoked)     Change Talk Expressed by the Patient: Desire to change Ability to change Reasons to change Need to change Committment to change Activation Taking steps    Provider Response to Change Talk: E - Evoked more info from patient about behavior change, A - Affirmed patient's thoughts, decisions, or attempts at behavior change, R - Reflected patient's change talk and S - Summarized patient's change talk statements      Care Plan review completed: No    Medication Review:  No changes to current psychiatric medication(s)    Medication Compliance:  NA    Changes in  Health Issues:   Yes: Sleep disturbance, Associated Psychological Distress    Chemical Use Review:   Substance Use: Chemical use reviewed, no active concerns identified      Tobacco Use: No current tobacco use.      Assessment: Current Emotional / Mental Status (status of significant symptoms):  Risk status (Self / Other harm or suicidal ideation)  Patient denies a history of suicidal ideation, suicide attempts, self-injurious behavior, homicidal ideation, homicidal behavior and and other safety concerns  Patient denies current fears or concerns for personal safety.  Patient denies current or recent suicidal ideation or behaviors.  Patient denies current or recent homicidal ideation or behaviors.  Patient denies current or recent self injurious behavior or ideation.  Patient denies other safety concerns.  A safety and risk management plan has not been developed at this time, however patient was encouraged to call [place] [phone] should there be a change in any of these risk factors.    Appearance:   Appropriate   Eye Contact:   Good   Psychomotor Behavior: Normal   Attitude:   Cooperative   Orientation:   All  Speech   Rate / Production: Hyperverbal    Volume:  Normal   Mood:    Normal  Affect:    Appropriate   Thought Content:  Clear   Thought Form:  Coherent  Goal Directed  Logical   Insight:    Good     Diagnoses:  1. Posttraumatic stress disorder    2. Attention deficit disorder        Collateral Reports Completed:  Not Applicable    Plan: (Homework, other):  Patient was given information about behavioral services and encouraged to schedule a follow up appointment with the clinic [facility] as needed.  She was also given information about mental health symptoms and treatment options .  CD Recommendations: No indications of CD issues.  BARNEY Blackwell, [facility]      ______________________________________________________________________    Integrated Primary Care Behavioral Health Treatment Plan    Patient's Name: Aspen  Jamel  YOB: 1960    Date: 4-18-16    DSM-V Diagnoses: PTSD  Psychosocial / Contextual Factors: medical condition, history of traumatic experiences  WHODAS:  Will complete at next visit    Referral / Collaboration:  Referral to another professional/service is not indicated at this time..    Anticipated number of session or this episode of care: 10      MeasurableTreatment Goal(s) related to diagnosis / functional impairment(s)  Goal 1: Patient will be able to remember the past with 50% less emotional reaction of anger and hurt.     I will know I've met my goal when I can let go of the past     Objective #A (Patient Action)    Patient will talk to at least two others about losses and coping.  Status: New - Date: 4-18-16     Intervention(s)  [facility] will provide avenue for the past to process her losses and disappointments.    Objective #B  Patient will reduce her triggers from past trauma.  Status: New - Date: 4-18-16     Intervention(s)  [facility] will teach distraction skills. mindfulness.      Patient has reviewed and agreed to the above plan.      Antwan Boston, [facility]  April 18, 2016

## 2018-01-03 NOTE — MR AVS SNAPSHOT
After Visit Summary   1/3/2018    Aspen Mccullough    MRN: [id number]           Patient Information     Date Of Birth          1960        Visit Information        Provider Department      1/3/2018 8:30 AM Antwan Boston LICSW [facility]        Today's Diagnoses     Posttraumatic stress disorder    -  1    Attention deficit disorder           Follow-ups after your visit        Your next 10 appointments already scheduled     Jan 05, 2018  9:30 AM CST   Return Visit with ANITRA Melvin CNP   [facility] ([facility])    [address]  [address]  [facility] [address] [phone]            Jan 16, 2018  9:00 AM CST   Return Visit with BARNEY Yan   [facility] ([facility])    [address]  [address]  [facility] [address] [phone]              Who to contact     If you have questions or need follow up information about today's clinic visit or your schedule please contact [facility] directly at [phone].  Normal or non-critical lab and imaging results will be communicated to you by Correlorhart, letter or phone within 4 business days after the clinic has received the results. If you do not hear from us within 7 days, please contact the clinic through Correlorhart or phone. If you have a critical or abnormal lab result, we will notify you by phone as soon as possible.  Submit refill requests through Bravoavia or call your pharmacy and they will forward the refill request to us. Please allow 3 business days for your refill to be completed.          Additional Information About Your Visit        Correlorhart Information     Bravoavia gives you secure access to your electronic health record. If you see a primary care provider, you can also send messages to your care team and make  appointments. If you have questions, please call your primary care clinic.  If you do not have a primary care provider, please call [phone] and they will assist you.        Care EveryWhere ID     This is your Care EveryWhere ID. This could be used by other organizations to access your [place] medical records  [id number]         Blood Pressure from Last 3 Encounters:   10/03/17 (!) 150/110   01/21/17 134/80   10/31/16 122/76    Weight from Last 3 Encounters:   01/21/17 130 lb (59 kg)   10/31/16 182 lb (82.6 kg)   04/24/16 182 lb (82.6 kg)              Today, you had the following     No orders found for display       Primary Care Provider Office Phone # Fax #    Hailey ANITRA Alvarez [phone]       [address]  [facility] [address]        Equal Access to Services     [facility]: Hadii aad ku hadasho Sobossmanali, waaxda luqadaha, qaybta kaalmada adeegyada, waxay brittanyin hayclementen angela guillermo . So [facility] [phone].    ATENCIÓN: Si habla español, tiene a lynn disposición servicios gratuitos de asistencia lingüística. Kevinluis manuel al [phone].    We comply with applicable federal civil rights laws and [place] laws. We do not discriminate on the basis of race, color, national origin, age, disability, sex, sexual orientation, or gender identity.            Thank you!     Thank you for choosing [facility]  for your care. Our goal is always to provide you with excellent care. Hearing back from our patients is one way we can continue to improve our services. Please take a few minutes to complete the written survey that you may receive in the mail after your visit with us. Thank you!             Your Updated Medication List - Protect others around you: Learn how to safely use, store and throw away your medicines at www.disposemymeds.org.          This list is accurate as of: 1/3/18 12:45 PM.  Always use your most recent med list.                   Brand  Name Dispense Instructions for use Diagnosis    ALLEGRA 180 MG tablet   Generic drug:  fexofenadine      Take 180 mg by mouth daily.        cyanocobalamin 1000 MCG/ML injection    VITAMIN B12    4 mL    Inject 1 mL (1,000 mcg) Subcutaneous every 7 days    Other vitamin B12 deficiency anemia       cyclobenzaprine 10 MG tablet    FLEXERIL    90 tablet    Take 1 tablet (10 mg) by mouth 3 times daily    Generalized osteoarthrosis, involving multiple sites       CYTOMEL 50 MCG Tabs   Generic drug:  Liothyronine Sodium      Take 50 mcg by mouth 3 times daily    Hypothyroidism, unspecified type       diazepam 10 MG tablet    VALIUM    90 tablet    Take 1 tablet (10 mg) by mouth 3 times daily    Chronic pain syndrome, Encounter for long-term use of opiate analgesic       estradiol 2 MG vaginal ring    ESTRING    1 each    Place 1 each vaginally every 3 months one ring.    Vaginal atrophy       FISH OIL      daily. w/DHEA.        FLONASE 50 MCG/ACT spray   Generic drug:  fluticasone      2 sprays by Both Nostrils route daily as needed.        HYDROmorphone 8 MG tablet    DILAUDID    100 tablet    Take 1 tablet (8 mg) by mouth every 3 hours as needed    Chronic pain syndrome       ipratropium - albuterol 0.5 mg/2.5 mg/3 mL 0.5-2.5 (3) MG/3ML neb solution    DUONEB    3 mL    Take 1 vial (3 mLs) by nebulization once for 1 dose    Bronchitis, Laryngitis       JINTELI 1-5 MG-MCG per tablet   Generic drug:  norethindrone-ethinyl estradiol     30 tablet    TAKE ONE TABLET BY MOUTH ONE TIME DAILY    Absence of menstruation       levothyroxine 200 MCG Solr injection    SYNTHROID/LEVOTHROID          morphine 30 MG 12 hr tablet    MS CONTIN    270 tablet    Take 3 tablets (90 mg) by mouth every 8 hours    Chronic pain syndrome       NASONEX 50 MCG/ACT spray   Generic drug:  mometasone           order for DME     1 Device    Equipment being ordered: Nebulizer    Bronchitis, Laryngitis       prednisoLONE acetate 1 % ophthalmic susp     PRED FORTE          * predniSONE 1 MG tablet    DELTASONE     alternate 8 mg and 10 mg every other day        * predniSONE 5 MG tablet    DELTASONE     alternate 8 mg and 10 mg every other day        probiotic Caps      Take 1 capsule by mouth daily.        RESTASIS 0.05 % ophthalmic emulsion   Generic drug:  cycloSPORINE      Place 1 drop into both eyes 2 times daily        * Notice:  This list has 2 medication(s) that are the same as other medications prescribed for you. Read the directions carefully, and ask your doctor or other care provider to review them with you.

## 2018-01-05 ENCOUNTER — OFFICE VISIT (OUTPATIENT)
Dept: BEHAVIORAL HEALTH | Facility: CLINIC | Age: 58
End: 2018-01-05
Payer: COMMERCIAL

## 2018-01-05 ENCOUNTER — OFFICE VISIT (OUTPATIENT)
Dept: FAMILY MEDICINE | Facility: CLINIC | Age: 58
End: 2018-01-05
Payer: COMMERCIAL

## 2018-01-05 VITALS
WEIGHT: 224.2 LBS | OXYGEN SATURATION: 99 % | TEMPERATURE: 98.3 F | SYSTOLIC BLOOD PRESSURE: 110 MMHG | BODY MASS INDEX: 33.11 KG/M2 | DIASTOLIC BLOOD PRESSURE: 68 MMHG | HEART RATE: 58 BPM

## 2018-01-05 DIAGNOSIS — I26.99 OTHER ACUTE PULMONARY EMBOLISM WITHOUT ACUTE COR PULMONALE (H): Primary | ICD-10-CM

## 2018-01-05 DIAGNOSIS — J01.01 ACUTE RECURRENT MAXILLARY SINUSITIS: ICD-10-CM

## 2018-01-05 DIAGNOSIS — M15.0 PRIMARY OSTEOARTHRITIS INVOLVING MULTIPLE JOINTS: ICD-10-CM

## 2018-01-05 DIAGNOSIS — I10 ESSENTIAL HYPERTENSION: ICD-10-CM

## 2018-01-05 DIAGNOSIS — F43.10 POSTTRAUMATIC STRESS DISORDER: Primary | ICD-10-CM

## 2018-01-05 DIAGNOSIS — E24.9 CUSHING'S SYNDROME (H): ICD-10-CM

## 2018-01-05 DIAGNOSIS — E03.9 HYPOTHYROIDISM, UNSPECIFIED TYPE: ICD-10-CM

## 2018-01-05 DIAGNOSIS — M32.9 SYSTEMIC LUPUS ERYTHEMATOSUS, UNSPECIFIED SLE TYPE, UNSPECIFIED ORGAN INVOLVEMENT STATUS (H): ICD-10-CM

## 2018-01-05 DIAGNOSIS — I48.0 PAROXYSMAL ATRIAL FIBRILLATION (H): ICD-10-CM

## 2018-01-05 DIAGNOSIS — F98.8 ATTENTION DEFICIT DISORDER, UNSPECIFIED HYPERACTIVITY PRESENCE: ICD-10-CM

## 2018-01-05 DIAGNOSIS — E27.49 GLUCOCORTICOID DEFICIENCY (H): ICD-10-CM

## 2018-01-05 DIAGNOSIS — F60.4: ICD-10-CM

## 2018-01-05 PROBLEM — I51.81 STRESS-INDUCED CARDIOMYOPATHY: Status: ACTIVE | Noted: 2018-01-05

## 2018-01-05 PROCEDURE — 99215 OFFICE O/P EST HI 40 MIN: CPT | Performed by: NURSE PRACTITIONER

## 2018-01-05 PROCEDURE — 90832 PSYTX W PT 30 MINUTES: CPT | Performed by: SOCIAL WORKER

## 2018-01-05 RX ORDER — LEVOFLOXACIN 750 MG/1
750 TABLET, FILM COATED ORAL DAILY
Qty: 5 TABLET | Refills: 0 | Status: SHIPPED | OUTPATIENT
Start: 2018-01-05 | End: 2018-04-05

## 2018-01-05 NOTE — MR AVS SNAPSHOT
After Visit Summary   1/5/2018    Aspen Mccullough    MRN: [id number]           Patient Information     Date Of Birth          1960        Visit Information        Provider Department      1/5/2018 9:30 AM Hailey Dubose APRN CNP [facility]        Today's Diagnoses     Other acute pulmonary embolism without acute cor pulmonale (H)    -  1    Acute recurrent maxillary sinusitis        Hypothyroidism, unspecified type        Glucocorticoid deficiency (H)        Cushing's syndrome (H)        Primary osteoarthritis involving multiple joints        Systemic lupus erythematosus, unspecified SLE type, unspecified organ involvement status (H)        Histrionic personality        Attention deficit disorder, unspecified hyperactivity presence        Paroxysmal atrial fibrillation (H)        Essential hypertension           Follow-ups after your visit        Additional Services     PULMONARY MEDICINE REFERRAL       Your provider has referred you to: [facility] [phone]   [email or web address]    Please be aware that coverage of these services is subject to the terms and limitations of your health insurance plan.  Call member services at your health plan with any benefit or coverage questions.      Please bring the following with you to your appointment:    (1) Any X-Rays, CTs or MRIs which have been performed.  Contact the facility where they were done to arrange for  prior to your scheduled appointment.    (2) List of current medications   (3) This referral request   (4) Any documents/labs given to you for this referral                  Your next 10 appointments already scheduled     Jan 16, 2018  9:00 AM CST   Return Visit with Antwan Boston, [facility] ([facility])    [address] [place], [place] [address]   [address]  [facility] [address] [phone]              Who to contact     If you have questions or need follow up information about today's clinic visit or your schedule please contact [facility] directly at [phone].  Normal or non-critical lab and imaging results will be communicated to you by MyChart, letter or phone within 4 business days after the clinic has received the results. If you do not hear from us within 7 days, please contact the clinic through [employer] or phone. If you have a critical or abnormal lab result, we will notify you by phone as soon as possible.  Submit refill requests through [employer] or call your pharmacy and they will forward the refill request to us. Please allow 3 business days for your refill to be completed.          Additional Information About Your Visit        T[employer] Information     [employer] gives you secure access to your electronic health record. If you see a primary care provider, you can also send messages to your care team and make appointments. If you have questions, please call your primary care clinic.  If you do not have a primary care provider, please call [phone] and they will assist you.        Care EveryWhere ID     This is your Care EveryWhere ID. This could be used by other organizations to access your [facility] medical records  [id number]        Your Vitals Were     Pulse Temperature Pulse Oximetry BMI (Body Mass Index)          58 98.3  F (36.8  C) (Oral) 99% 33.11 kg/m2         Blood Pressure from Last 3 Encounters:   01/05/18 110/68   10/03/17 (!) 150/110   01/21/17 134/80    Weight from Last 3 Encounters:   01/05/18 224 lb 3.2 oz (101.7 kg)   01/21/17 130 lb (59 kg)   10/31/16 182 lb (82.6 kg)              We Performed the Following     PULMONARY MEDICINE REFERRAL          Today's Medication Changes          These changes are accurate as of: 1/5/18 10:05 AM.  If you have any questions, ask your nurse or doctor.                Start taking these medicines.        Dose/Directions    levofloxacin 750 MG tablet   Commonly known as:  LEVAQUIN   Used for:  Acute recurrent maxillary sinusitis   Started by:  Hailey Dubose APRN CNP        Dose:  750 mg   Take 1 tablet (750 mg) by mouth daily   Quantity:  5 tablet   Refills:  0            Where to get your medicines      These medications were sent to [id number] - [place], [place] - [address]  [address], [facility] [address]     Phone:  [phone]     levofloxacin 750 MG tablet                Primary Care Provider Office Phone # Fax #    ANITRA Melvin [phone] [address]  [facility] [address]        Equal Access to Services     ADRYAN BHAT : Hadii aad ku hadasho Sobossmanali, waaxda luqadaha, qaybta kaalmada adeegyada, waxay deepali sherman. So [facility] [phone].    ATENCIÓN: Si habla español, tiene a lynn disposición servicios gratuitos de asistencia lingüística. [facility] [phone].    We comply with applicable federal civil rights laws and [place] laws. We do not discriminate on the basis of race, color, national origin, age, disability, sex, sexual orientation, or gender identity.            Thank you!     Thank you for choosing [facility]ARE  for your care. Our goal is always to provide you with excellent care. Hearing back from our patients is one way we can continue to improve our services. Please take a few minutes to complete the written survey that you may receive in the mail after your visit with us. Thank you!             Your Updated Medication List - Protect others around you: Learn how to safely use, store and throw away your medicines at www.disposemymeds.org.          This list is accurate as of: 1/5/18 10:05 AM.  Always use your most recent med list.                   Brand Name Dispense Instructions for use Diagnosis    ALLEGRA 180 MG tablet    Generic drug:  fexofenadine      Take 180 mg by mouth daily.        cyanocobalamin 1000 MCG/ML injection    VITAMIN B12    4 mL    Inject 1 mL (1,000 mcg) Subcutaneous every 7 days    Other vitamin B12 deficiency anemia       cyclobenzaprine 10 MG tablet    FLEXERIL    90 tablet    Take 1 tablet (10 mg) by mouth 3 times daily    Generalized osteoarthrosis, involving multiple sites       CYTOMEL 50 MCG Tabs   Generic drug:  Liothyronine Sodium      Take 50 mcg by mouth 3 times daily    Hypothyroidism, unspecified type       diazepam 10 MG tablet    VALIUM    90 tablet    Take 1 tablet (10 mg) by mouth 3 times daily    Chronic pain syndrome, Encounter for long-term use of opiate analgesic       estradiol 2 MG vaginal ring    ESTRING    1 each    Place 1 each vaginally every 3 months one ring.    Vaginal atrophy       FISH OIL      daily. w/DHEA.        FLONASE 50 MCG/ACT spray   Generic drug:  fluticasone      2 sprays by Both Nostrils route daily as needed.        HYDROmorphone 8 MG tablet    DILAUDID    100 tablet    Take 1 tablet (8 mg) by mouth every 3 hours as needed    Chronic pain syndrome       ipratropium - albuterol 0.5 mg/2.5 mg/3 mL 0.5-2.5 (3) MG/3ML neb solution    DUONEB    3 mL    Take 1 vial (3 mLs) by nebulization once for 1 dose    Bronchitis, Laryngitis       JINTELI 1-5 MG-MCG per tablet   Generic drug:  norethindrone-ethinyl estradiol     30 tablet    TAKE ONE TABLET BY MOUTH ONE TIME DAILY    Absence of menstruation       levofloxacin 750 MG tablet    LEVAQUIN    5 tablet    Take 1 tablet (750 mg) by mouth daily    Acute recurrent maxillary sinusitis       levothyroxine 200 MCG Solr injection    SYNTHROID/LEVOTHROID          morphine 30 MG 12 hr tablet    MS CONTIN    270 tablet    Take 3 tablets (90 mg) by mouth every 8 hours    Chronic pain syndrome       NASONEX 50 MCG/ACT spray   Generic drug:  mometasone           order for DME     1 Device    Equipment being ordered: Nebulizer     Bronchitis, Laryngitis       prednisoLONE acetate 1 % ophthalmic susp    PRED FORTE          * predniSONE 1 MG tablet    DELTASONE     alternate 8 mg and 10 mg every other day        * predniSONE 5 MG tablet    DELTASONE     alternate 8 mg and 10 mg every other day        probiotic Caps      Take 1 capsule by mouth daily.        RESTASIS 0.05 % ophthalmic emulsion   Generic drug:  cycloSPORINE      Place 1 drop into both eyes 2 times daily        * Notice:  This list has 2 medication(s) that are the same as other medications prescribed for you. Read the directions carefully, and ask your doctor or other care provider to review them with you.

## 2018-01-05 NOTE — MR AVS SNAPSHOT
"              After Visit Summary   1/5/2018    Aspen Mccullough    MRN: [id number]           Patient Information     Date Of Birth          1960        Visit Information        Provider Department      1/5/2018 9:30 AM Antwan Boston LICSW [facility]        Today's Diagnoses     Posttraumatic stress disorder    -  1       Follow-ups after your visit        Your next 10 appointments already scheduled     Jan 16, 2018  9:00 AM CST   Return Visit with BARNEY Yan   [facility] ([facility])    [address] [place], [place] [address]   [address]  [facility] [id number] [phone]            Feb 12, 2018 10:45 AM CST   (Arrive by 10:30 AM)   MA SCREENING DIGITAL BILATERAL with [email or web address]   [facility]    [address] [place] [address], [address]  [facility] [id number] [phone]           Do not use any powder, lotion or deodorant under your arms or on your breast. If you do, we will ask you to remove it before your exam.  Wear comfortable, two-piece clothing.  If you have any allergies, tell your care team.  Bring any previous mammograms from other facilities or have them mailed to the breast center. Three-dimensional (3D) mammograms are available at [facility] locations in [facility], [facility], [place], [place], and [place]. [facility] locations include [place] and [facility] in [place]. Benefits of 3D mammograms include: - Improved rate of cancer detection - Decreases your chance of having to go back for more tests, which means fewer: - \"False-positive\" results (This means that there is an abnormal area but it isn't cancer.) - Invasive testing procedures, such as a biopsy or surgery - Can provide clearer images of the breast if you have dense breast tissue. 3D mammography is an optional exam that anyone can have with a 2D " mammogram. It doesn't replace or take the place of a 2D mammogram. 2D mammograms remain an effective screening test for all women.  Not all insurance companies cover the cost of a 3D mammogram. Check with your insurance.            Feb 12, 2018 11:00 AM CST   DX HIP/PELVIS/SPINE with [email or web address]   [facility] ([facility])    [address] [facility] [facility] [address]  [facility] [address]   [phone]           Please do not take any of the following 24 hours prior to the day of your exam: vitamins, calcium tablets, antacids.  If possible, please wear clothes without metal (snaps, zippers). A sweatsuit works well.            Feb 12, 2018 11:30 AM CST   PHYSICAL with Naun Patricio MD   [facility] ([facility])    [address]  [facility] [address]   [phone]            Feb 16, 2018 10:00 AM CST   Return Visit with ANITRA Melvin CNP   [facility] ([facility])    [address]  [address]  [facility] [address] [phone]            Feb 16, 2018 10:00 AM CST   Return Visit with Antwan Boston [facility] ([facility])    [address]  [address]  [facility] [address] [phone]              Who to contact     If you have questions or need follow up information about today's clinic visit or your schedule please contact [facility] PR[facility]ARE directly at [phone].  Normal or non-critical lab and imaging results will be communicated to you by MyChart, letter or phone within 4 business days after the clinic has received the results. If you do not hear from us within 7 days, please contact the clinic through MyChart or phone. If you have a critical or abnormal lab result, we will notify you by phone as soon as possible.  Submit refill  requests through [employer] or call your pharmacy and they will forward the refill request to us. Please allow 3 business days for your refill to be completed.          Additional Information About Your Visit        Well[employer] Information     [employer] gives you secure access to your electronic health record. If you see a primary care provider, you can also send messages to your care team and make appointments. If you have questions, please call your primary care clinic.  If you do not have a primary care provider, please call [phone] and they will assist you.        Care EveryWhere ID     This is your Care EveryWhere ID. This could be used by other organizations to access your [place] [facility] medical records  [id number]         Blood Pressure from Last 3 Encounters:   01/05/18 110/68   10/03/17 (!) 150/110   01/21/17 134/80    Weight from Last 3 Encounters:   01/05/18 224 lb 3.2 oz (101.7 kg)   01/21/17 130 lb (59 kg)   10/31/16 182 lb (82.6 kg)              Today, you had the following     No orders found for display         Today's Medication Changes          These changes are accurate as of: 1/5/18 11:59 PM.  If you have any questions, ask your nurse or doctor.               Start taking these medicines.        Dose/Directions    levofloxacin 750 MG tablet   Commonly known as:  LEVAQUIN   Used for:  Acute recurrent maxillary sinusitis   Started by:  Hailey Dubose APRN CNP        Dose:  750 mg   Take 1 tablet (750 mg) by mouth daily   Quantity:  5 tablet   Refills:  0            Where to get your medicines      [id number] - [place], [place] - [address]  [address] [facility] [address]     Phone:  [phone]     levofloxacin 750 MG tablet                Primary Care Provider Office Phone # Fax #    ANITRA Melvin [phone] [address] [facility] [address]        Equal Access to Services     ADRYAN BHAT AH: Laisha recinos  tayla Dan, waanilada luqadaha, qaybta kamally mark, yaneth brittanyin hayaakarley murillojoyce edukallie larosakarley lane. So [facility] [phone].    ATENCIÓN: Si jihanla krystina, tiene a lynn disposición servicios gratuitos de asistencia lingüística. Brad al [phone].    We comply with applicable federal civil rights laws and [place] laws. We do not discriminate on the basis of race, color, national origin, age, disability, sex, sexual orientation, or gender identity.            Thank you!     Thank you for choosing [facility] PRIMARY CARE  for your care. Our goal is always to provide you with excellent care. Hearing back from our patients is one way we can continue to improve our services. Please take a few minutes to complete the written survey that you may receive in the mail after your visit with us. Thank you!             Your Updated Medication List - Protect others around you: Learn how to safely use, store and throw away your medicines at www.disposemymeds.org.          This list is accurate as of: 1/5/18 11:59 PM.  Always use your most recent med list.                   Brand Name Dispense Instructions for use Diagnosis    ALLEGRA 180 MG tablet   Generic drug:  fexofenadine      Take 180 mg by mouth daily.        cyanocobalamin 1000 MCG/ML injection    VITAMIN B12    4 mL    Inject 1 mL (1,000 mcg) Subcutaneous every 7 days    Other vitamin B12 deficiency anemia       cyclobenzaprine 10 MG tablet    FLEXERIL    90 tablet    Take 1 tablet (10 mg) by mouth 3 times daily    Generalized osteoarthrosis, involving multiple sites       CYTOMEL 50 MCG Tabs   Generic drug:  Liothyronine Sodium      Take 50 mcg by mouth 3 times daily    Hypothyroidism, unspecified type       diazepam 10 MG tablet    VALIUM    90 tablet    Take 1 tablet (10 mg) by mouth 3 times daily    Chronic pain syndrome, Encounter for long-term use of opiate analgesic       estradiol 2 MG vaginal ring    ESTRING    1 each    Place 1 each  vaginally every 3 months one ring.    Vaginal atrophy       FISH OIL      daily. w/DHEA.        FLONASE 50 MCG/ACT spray   Generic drug:  fluticasone      2 sprays by Both Nostrils route daily as needed.        HYDROmorphone 8 MG tablet    DILAUDID    100 tablet    Take 1 tablet (8 mg) by mouth every 3 hours as needed    Chronic pain syndrome       ipratropium - albuterol 0.5 mg/2.5 mg/3 mL 0.5-2.5 (3) MG/3ML neb solution    DUONEB    3 mL    Take 1 vial (3 mLs) by nebulization once for 1 dose    Bronchitis, Laryngitis       JINTELI 1-5 MG-MCG per tablet   Generic drug:  norethindrone-ethinyl estradiol     30 tablet    TAKE ONE TABLET BY MOUTH ONE TIME DAILY    Absence of menstruation       levofloxacin 750 MG tablet    LEVAQUIN    5 tablet    Take 1 tablet (750 mg) by mouth daily    Acute recurrent maxillary sinusitis       levothyroxine 200 MCG Solr injection    SYNTHROID/LEVOTHROID          morphine 30 MG 12 hr tablet    MS CONTIN    270 tablet    Take 3 tablets (90 mg) by mouth every 8 hours    Chronic pain syndrome       NASONEX 50 MCG/ACT spray   Generic drug:  mometasone           order for DME     1 Device    Equipment being ordered: Nebulizer    Bronchitis, Laryngitis       prednisoLONE acetate 1 % ophthalmic susp    PRED FORTE          * predniSONE 1 MG tablet    DELTASONE     alternate 8 mg and 10 mg every other day        * predniSONE 5 MG tablet    DELTASONE     alternate 8 mg and 10 mg every other day        probiotic Caps      Take 1 capsule by mouth daily.        RESTASIS 0.05 % ophthalmic emulsion   Generic drug:  cycloSPORINE      Place 1 drop into both eyes 2 times daily        * Notice:  This list has 2 medication(s) that are the same as other medications prescribed for you. Read the directions carefully, and ask your doctor or other care provider to review them with you.

## 2018-01-05 NOTE — PROGRESS NOTES
SUBJECTIVE:                                                    Aspen Mccullough is a 57 year old female who presents to clinic today for the following health issues:  [facility]:           Hospital Follow-up Visit:    Hospital/Nursing Home/IP Rehab Facility: [facility]   Date of Admission: 11/26/2017  Date of Discharge: 12/06/2017  Reason(s) for Admission: Record in care everywhere.  DIAGNOSIS:   1. Right leg pain and swelling with known DVT  2. Subtherapeutic INR  3. Somnolence, intermittent and chronic     Second ED visit 12/18/2017    DIAGNOSIS:   1. Recent submassive PE  2. Anticoagulated state  3. Right leg swelling, likely secondary to recent DVT.  4. Recent A fib  5. Sinus bradycardia    Discharge 12/18/2017                Problems taking medications regularly:  None       Medication changes since discharge: None       Problems adhering to non-medication therapy:  None    Summary of hospitalization:  CareEverywhere information obtained and reviewed  Diagnostic Tests/Treatments reviewed.  Follow up needed: none  Other Healthcare Providers Involved in Patient s Care:         None  Update since discharge: improved.     Post Discharge Medication Reconciliation: discharge medications reconciled, continue medications without change.  Plan of care communicated with patient     Coding guidelines for this visit:  Type of Medical   Decision Making Face-to-Face Visit       within 7 Days of discharge Face-to-Face Visit        within 14 days of discharge   Moderate Complexity [id number]   High Complexity [id number]                  Problems taking medications regularly: No    Medication side effects: none    Diet: regular (no restrictions)    Social History   Substance Use Topics     Smoking status: Former Smoker     Packs/day: 3.00     Years: 25.00     Types: Cigarettes     Quit date: 3/21/1990     Smokeless tobacco: Former User     Alcohol use No        Problem list and histories reviewed & adjusted, as  indicated.  Additional history: as documented    Patient Active Problem List   Diagnosis     Generalized osteoarthrosis, involving multiple sites     CRPS (complex regional pain syndrome), lower limb     Chronic pain syndrome     Somatoform disorder     Histrionic personality     Encounter for long-term use of opiate analgesic     Status post revision of total knee replacement     Hypothyroidism     Insomnia     Hyperlipidemia LDL goal <130     ACP (advance care planning)     Chronic lymphocytic thyroiditis     Glucocorticoid deficiency (H)     Posttraumatic stress disorder     Impingement syndrome of left shoulder     Abdominal cramping, generalized     Diarrhea     Primary osteoarthritis involving multiple joints     Attention deficit disorder     Atopic rhinitis     Cushing's syndrome (H)     Endometriosis     Essential hypertension     Systemic lupus erythematosus (H)     S/P cervical spinal fusion     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)     APPENDECTOMY       ARTHRODESIS ANKLE      right     ARTHROPLASTY KNEE BILATERAL       ARTHROPLASTY REVISION KNEE  4/19/2011    Procedure:ARTHROPLASTY REVISION KNEE; With Antibiotic Cement ; Surgeon:CHAO OLIVARES; Location:[facility]     BREAST SURGERY      right- tissue remove nipple area     BUNIONECTOMY  12/14/2011    Procedure:BUNIONECTOMY; Right Bunion Correction; Surgeon:GRACE ZARATE; Location:[facility]     CHOLECYSTECTOMY       EXCISE MASS UPPER EXTREMITY  12/14/2011    Procedure:EXCISE MASS UPPER EXTREMITY; Excision of Left Arm Mass; Surgeon:GRACE ZARATE; Location:[facility]     FOOT SURGERY      left X 4     FUSION LUMBAR ANTERIOR ONE LEVEL       LAMINECTOMY LUMBAR ONE LEVEL      L4-5     LAPAROSCOPIC ABLATION ENDOMETRIOSIS       REMOVE HARDWARE FOOT  12/14/2011    Procedure:REMOVE HARDWARE FOOT; Hardware Removal Right Foot (Mini-C-Arm) ; Surgeon:GRACE ZARATE;  Location: [facility]     RHINOPLASTY       SALPINGO-OOPHORECTOMY BILATERAL       TONSILLECTOMY         Social History   Substance Use Topics     Smoking status: Former Smoker     Packs/day: 3.00     Years: 25.00     Types: Cigarettes     Quit date: 3/21/1990     Smokeless tobacco: Former User     Alcohol use No     Family History   Problem Relation Age of Onset     Hypertension Mother          Current Outpatient Prescriptions   Medication Sig Dispense Refill     JINTELI 1-5 MG-MCG per tablet TAKE ONE TABLET BY MOUTH ONE TIME DAILY  30 tablet 0     estradiol (ESTRING) 2 MG vaginal ring Place 1 each vaginally every 3 months one ring. 1 each 3     ipratropium - albuterol 0.5 mg/2.5 mg/3 mL (DUONEB) 0.5-2.5 (3) MG/3ML neb solution Take 1 vial (3 mLs) by nebulization once for 1 dose 3 mL 0     order for DME Equipment being ordered: Nebulizer 1 Device 0     prednisoLONE acetate (PRED FORTE) 1 % ophthalmic suspension   1     HYDROmorphone (DILAUDID) 8 MG tablet Take 1 tablet (8 mg) by mouth every 3 hours as needed 100 tablet 0     morphine (MS CONTIN) 30 MG 12 hr tablet Take 3 tablets (90 mg) by mouth every 8 hours 270 tablet 0     diazepam (VALIUM) 10 MG tablet Take 1 tablet (10 mg) by mouth 3 times daily 90 tablet 0     Liothyronine Sodium 50 MCG TABS Take 50 mcg by mouth 3 times daily       NASONEX 50 MCG/ACT nasal spray   11     cyclobenzaprine (FLEXERIL) 10 MG tablet Take 1 tablet (10 mg) by mouth 3 times daily 90 tablet 3     cycloSPORINE (RESTASIS) 0.05 % ophthalmic emulsion Place 1 drop into both eyes 2 times daily       levothyroxine (SYNTHROID,LEVOTHROID) 200 MCG SOLR   7     cyanocobalamin 1000 MCG/ML injection Inject 1 mL (1,000 mcg) Subcutaneous every 7 days 4 mL 5     probiotic CAPS Take 1 capsule by mouth daily.       fexofenadine (ALLEGRA) 180 MG tablet Take 180 mg by mouth daily.       fluticasone (FLONASE) 50 MCG/ACT nasal spray 2 sprays by Both Nostrils route daily as needed.       FISH OIL daily. w/DHEA.         PREDNISONE 1 MG OR TABS alternate 8 mg and 10 mg every other day        PREDNISONE 5 MG OR TABS alternate 8 mg and 10 mg every other day       Allergies   Allergen Reactions     Clindamycin Diarrhea     Codeine Hives     Ibuprofen [Nsaids] Hives     Penicillins Hives     Thor Hives     Pt states she had rxn to skin prep-thor prep     Sulfa Drugs Hives     Lactose Intolerance [Beta-Galactosidase] Diarrhea     Doxycycline GI Disturbance     Emesis & diarrhea  Patient denies allergy to this med     Mold      Mushroom      Red Wine Complex [Calcium Pyruvate-Dihydroxyacetone]      Recent Labs   Lab Test  11/10/15   0926 10/22/15  09/28/15   1114  01/22/15   1638  01/06/14   A1C   --   5.6   --    --    --    --    LDL   --    --    --    --    --   101   HDL   --    --    --    --    --   67   TRIG   --    --    --    --    --   216   ALT  34   --    --   62*   --    --    CR  0.46*  0.96   --   0.59   < >  0.74   GFRESTIMATED  >90  Non  GFR Calc    66   --   >90  Non  GFR Calc     < >   --    GFRESTBLACK  >90   GFR Calc    77   --   >90   GFR Calc     < >   --    POTASSIUM  4.0  4.9   --   3.3*   < >  4.5   TSH   --   40.90*  12.87*   --    < >  <0.00    < > = values in this interval not displayed.      BP Readings from Last 3 Encounters:   10/03/17 (!) 150/110   01/21/17 134/80   10/31/16 122/76    Wt Readings from Last 3 Encounters:   01/21/17 130 lb (59 kg)   10/31/16 182 lb (82.6 kg)   04/24/16 182 lb (82.6 kg)        Labs reviewed in EPIC  Problem list, Medication list, Allergies, and Medical/Social/Surgical histories reviewed in [facility] and updated as appropriate.     ROS: Constitutional, neuro, ENT, endocrine, pulmonary, cardiac, gastrointestinal, genitourinary, musculoskeletal, integument and psychiatric systems are negative, except as otherwise noted above in the HPI.       OBJECTIVE:                                                    /68   Pulse 58  Temp 98.3  F (36.8  C) (Oral)  Wt 224 lb 3.2 oz (101.7 kg)  SpO2 99%  BMI 33.11 kg/m2  There is no height or weight on file to calculate BMI.  GENERAL: healthy, alert, well nourished, well hydrated, no distress  EYES: Eyes grossly normal to inspection, extraocular movements - intact, and PERRL, sinus pressure bilaterally. Nares with erythema and rhinorrea  RESP: lungs clear to auscultation - no rales, no rhonchi, no wheezes  CV: regular rates and rhythm, normal S1 S2, no S3 or S4 and no murmur  Mental Status Assessment:  Appearance:   Appropriate   Eye Contact:   Good   Psychomotor Behavior: Normal   Attitude:   Cooperative   Orientation:   All  Speech   Rate / Production: Normal    Volume:  Normal   Mood:    Normal  Affect:    Appropriate   Thought Content:  Clear   Thought Form:  Coherent  Logical   Insight:    Good   Attention Span and Concentration:  limited  Recent and Remote Memory:  intact  Fund of Knowledge: appropriate  Muscle Strength and Tone: normal   Suicidal Ideation: reports no thoughts, no intention  Hallucination: No  Paranoid-No  Manic-No  Panic-No  Self harm-No      See [facility] notes     Diagnostic Test Results:  Results for orders placed or performed in visit on 11/07/16    Pelvic Complete with Transvaginal    Narrative     Pelvic Complete with Transvaginal    Order #: [id number] Accession #: [id number]         Study Notes        Kathi Wiley on 11/7/2016  3:27 PM     Gynecological Ultrasound Report  Pelvic U/S     Referring Provider: [facility]  Sonographer:  KATHI WILEY  Indication: PMB  LMP: None  History: Bilat ooporectomies  Gynecological Ultrasonography:    Uterus: anteverted  Size: 3.8 x 3.0 x 3.8cm  Findings: Normal    Endometrium: Thickness total 1.6mm  Findings: Normal  Right Ovary: ooporectomy    Left Ovary: oophorectomy  Cul de Sac/Pouch of Roberto: clear     Impression: Normal. No pelvic cyst, mass or free fluid.    SONOGRAPHER NOTES TO  READING PROVIDER:   None                Discussed here        ASSESSMENT/PLAN:                                                    (I26.99) Other acute pulmonary embolism without acute cor pulmonale (H)  (primary encounter diagnosis)  Comment: new, pt treated and now on eliquis. She is in need of a pulmonoligist still.   Plan: referral to pulmonology placed    (J01.01) Acute recurrent maxillary sinusitis  Comment: consistent with exam, febrile, LAD  Plan: levaquin as pt had recent use and multiple antibiotic allergies    (E03.9) Hypothyroidism, unspecified type  Comment: follows with endocrinology  Plan: continue current regimen    (E27.49) Glucocorticoid deficiency (H)  Comment: on replacement, follows with endo  Plan: continue current plan    (E24.9) Cushing's syndrome (H)  Comment: resolved  Plan: as above    (M15.0) Primary osteoarthritis involving multiple joints  Comment: follows with ortho, 2/2 SLE, follows with pain mgt  Plan: continue current regimen    (M32.9) Systemic lupus erythematosus, unspecified SLE type, unspecified organ involvement status (H)  Comment: follows with rheumatology, on steroids chronically, pain management for pain  Plan: continue to follow with rheumatology    (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  Comment: discussed each problem in detail, no changes, pt continues to do therapy here in clinic  Plan: continue therapy    (F98.8) Attention deficit disorder, unspecified hyperactivity presence  Comment: difficult time staying focused, requiring more time for visits  Plan: continue current regimen, continue therapy    (I48.0) Paroxysmal atrial fibrillation (H)  Comment: new, on xarelto and BB  Plan: continue current regimen, defer to cardiology for further recommendations    (I10) Essential hypertension  Comment: controlled, sees cardiology  Plan: continue as above    I spent Greater than 40 minutes was spent face to face with Aspen Mccullough, with greater than 50 % in counselling and consultation  about above diagnoses          Patient Instructions:  Follow up in 2 weeks   Referral to Cardiology  [facility] for sinus infection        ANITRA Gallegos [facility] PRIMARY [facility]ARE

## 2018-01-08 NOTE — PROGRESS NOTES
[facility] Integrated Primary Care Clinic  January 5, 2018      Behavioral Health Clinician Progress Note    Patient Name: Aspen Mccullough           Service Type: Individual      Service Location:  in clinic      Session Start Time: 9:44am  Session End Time: 10:am      Session Length: 16 - 37      Attendees: Patient    Visit Activities (Refresh list every visit): [facility] [employer]    Diagnostic Assessment Date: June 22, 2015  Treatment Plan Review Date: 4-18-16  See Flowsheets for today's PHQ-9 and BENI-7 results  Previous PHQ-9:   PHQ-9 SCORE 11/10/2015 5/3/2016 10/4/2017   Total Score - - -   Total Score - - -   Total Score 5 12 3   Some recent data might be hidden     Previous BENI-7:   BENI-7 SCORE 11/2/2015 11/10/2015 10/4/2017   Total Score - - -   Total Score 3 3 0   Total Score - - -   Some recent data might be hidden       FERCHO LEVEL:  FERCHO Score (Last Two) 7/24/2014   FERCHO Raw Score 51   Activation Score 91.6   FERCHO Level 4   Some recent data might be hidden       DATA  Extended Session (60+ minutes): No  Interactive Complexity: No  Crisis: No    Treatment Objective(s) Addressed in This Session:  Target Behavior(s): disease management/lifestyle changes mental health    Mood Instability: will develop better understanding of triggers and coping strategies to stabilize mood  PTSD Symptom Management  Psychological distress related to Pain    Current Stressors / Issues:    The patient was seen by [facility] per the request of the PCP.  She attended the visit with her  present-regarding her mood the patient reported feeling irritable with no depressive symptoms and no suicidal thoughts-regarding anxiety the patient reported having manageable anxiety arousal-she has been sleeping better with the help of some cold medications-she reported to continue to wake during the night but is able to fall asleep better-she reported that her appetite has been low.  The patient had conversation with PCP about cold symptoms and her  treatment post hospital admission.            Progress on Treatment Objective(s) / Homework:  Minimal progress - ACTION (Actively working towards change); Intervened by reinforcing change plan / affirming steps taken    Motivational Interviewing    MI Intervention: Expressed Empathy/Understanding, Supported Autonomy, Collaboration, Evocation, Permission to raise concern or advise, Open-ended questions, Reflections: simple and complex and Change talk (evoked)     Change Talk Expressed by the Patient: Desire to change Ability to change Reasons to change Need to change Committment to change Activation Taking steps    Provider Response to Change Talk: E - Evoked more info from patient about behavior change, A - Affirmed patient's thoughts, decisions, or attempts at behavior change, R - Reflected patient's change talk and S - Summarized patient's change talk statements      Care Plan review completed: No    Medication Review:  No changes to current psychiatric medication(s)    Medication Compliance:  NA    Changes in Health Issues:   Yes: Pain, Associated Psychological Distress  Sleep disturbance, Associated Psychological Distress    Chemical Use Review:   Substance Use: Chemical use reviewed, no active concerns identified      Tobacco Use: No current tobacco use.      Assessment: Current Emotional / Mental Status (status of significant symptoms):  Risk status (Self / Other harm or suicidal ideation)  Patient denies a history of suicidal ideation, suicide attempts, self-injurious behavior, homicidal ideation, homicidal behavior and and other safety concerns  Patient denies current fears or concerns for personal safety.  Patient denies current or recent suicidal ideation or behaviors.  Patient denies current or recent homicidal ideation or behaviors.  Patient denies current or recent self injurious behavior or ideation.  Patient denies other safety concerns.  A safety and risk management plan has not been developed at  this time, however patient was encouraged to call [place] [phone] should there be a change in any of these risk factors.    Appearance:   Appropriate   Eye Contact:   Good   Psychomotor Behavior: Agitated  Normal   Attitude:   Cooperative   Orientation:   All  Speech   Rate / Production: Hyperverbal    Volume:  Normal   Mood:    Normal  Affect:    Appropriate   Thought Content:  Clear   Thought Form:  Coherent  Goal Directed  Logical   Insight:    Good     Diagnoses:  1. Posttraumatic stress disorder        Collateral Reports Completed:  Not Applicable    Plan: (Homework, other):  Patient was given information about behavioral services and encouraged to schedule a follow up appointment with the clinic [facility] as needed.  She was also given information about mental health symptoms and treatment options .  CD Recommendations: No indications of CD issues.  Antwan Boston, [facility], [facility]      ______________________________________________________________________    Integrated Primary Care Behavioral Health Treatment Plan    Patient's Name: Aspen Mccullough  YOB: 1960    Date: 4-18-16    DSM-V Diagnoses: PTSD  Psychosocial / Contextual Factors: medical condition, history of traumatic experiences  WHODAS:  Will complete at next visit    Referral / Collaboration:  Referral to another professional/service is not indicated at this time..    Anticipated number of session or this episode of care: 10      MeasurableTreatment Goal(s) related to diagnosis / functional impairment(s)  Goal 1: Patient will be able to remember the past with 50% less emotional reaction of anger and hurt.     I will know I've met my goal when I can let go of the past     Objective #A (Patient Action)    Patient will talk to at least two others about losses and coping.  Status: New - Date: 4-18-16     Intervention(s)  [facility] will provide avenue for the past to process her losses and disappointments.    Objective #B  Patient will reduce her triggers  from past trauma.  Status: New - Date: 4-18-16     Intervention(s)  [facility] will teach distraction skills. mindfulness.      Patient has reviewed and agreed to the above plan.      Antwan Boston, [facility]  April 18, 2016

## 2018-01-16 ENCOUNTER — OFFICE VISIT (OUTPATIENT)
Dept: BEHAVIORAL HEALTH | Facility: CLINIC | Age: 58
End: 2018-01-16
Payer: COMMERCIAL

## 2018-01-16 DIAGNOSIS — F43.10 POSTTRAUMATIC STRESS DISORDER: Primary | ICD-10-CM

## 2018-01-16 DIAGNOSIS — F98.8 ATTENTION DEFICIT DISORDER: ICD-10-CM

## 2018-01-16 PROCEDURE — 90834 PSYTX W PT 45 MINUTES: CPT | Performed by: SOCIAL WORKER
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
## 2018-01-16 NOTE — MR AVS SNAPSHOT
"              After Visit Summary   1/16/2018    Aspen cMcullough    MRN: [id number]           Patient Information     Date Of Birth          1960        Visit Information        Provider Department      1/16/2018 9:00 AM Antwan Boston LICSW [facility]        Today's Diagnoses     Posttraumatic stress disorder    -  1    Attention deficit disorder           Follow-ups after your visit        Your next 10 appointments already scheduled     Jan 30, 2018  1:00 PM CST   Return Visit with BARNEY Yan   [facility] [place] [facility] ([facility]    [address]  [address]  [facility] [address]   [phone]            Jan 30, 2018  1:30 PM CST   Return Visit with RJPC FLOAT NURSE   [facility] ([facility]    [address]  [address]  [facility] [address]   [phone]            Feb 12, 2018 10:45 AM CST   (Arrive by 10:30 AM)   MA SCREENING DIGITAL BILATERAL with [email or web address]   [facility] ([facility])    [address] [place] [address], [address]  [facility] [address]   [phone]           Do not use any powder, lotion or deodorant under your arms or on your breast. If you do, we will ask you to remove it before your exam.  Wear comfortable, two-piece clothing.  If you have any allergies, tell your care team.  Bring any previous mammograms from other facilities or have them mailed to the breast center. Three-dimensional (3D) mammograms are available at [place] locations in [facility], [facility], [place], [place], and [place]. [facility] locations include [place] [facility] in [place]. Benefits of 3D mammograms include: - Improved rate of cancer detection - Decreases your chance of having to go back for more tests, which means fewer: - \"False-positive\" results " (This means that there is an abnormal area but it isn't cancer.) - Invasive testing procedures, such as a biopsy or surgery - Can provide clearer images of the breast if you have dense breast tissue. 3D mammography is an optional exam that anyone can have with a 2D mammogram. It doesn't replace or take the place of a 2D mammogram. 2D mammograms remain an effective screening test for all women.  Not all insurance companies cover the cost of a 3D mammogram. Check with your insurance.            Feb 12, 2018 11:00 AM CST   DX HIP/PELVIS/SPINE with [email or web address]   [facility] ([facility]    [address] [place] [address] [address]  [facility] [address]   [phone]           Please do not take any of the following 24 hours prior to the day of your exam: vitamins, calcium tablets, antacids.  If possible, please wear clothes without metal (snaps, zippers). A sweatsuit works well.            Feb 12, 2018 11:30 AM CST   PHYSICAL with Naun Patricio MD   [facility]    [address] [facility] [address]  [facility] [address]   [phone]            Feb 16, 2018 10:00 AM CST   Return Visit with ANITRA Melvin CNP   [facility] ([facility])    [address]  [address]  [facility] [id number] [phone]            Feb 16, 2018 10:00 AM CST   Return Visit with Antwan Boston [facility] ([facility])    [address]  [address]  [facility] [address] [phone]              Who to contact     If you have questions or need follow up information about today's clinic visit or your schedule please contact [facility] directly at [phone].  Normal or non-critical lab and imaging results will be communicated to you by MyChart, letter or phone within 4  business days after the clinic has received the results. If you do not hear from us within 7 days, please contact the clinic through [employer] or phone. If you have a critical or abnormal lab result, we will notify you by phone as soon as possible.  Submit refill requests through [employer] or call your pharmacy and they will forward the refill request to us. Please allow 3 business days for your refill to be completed.          Additional Information About Your Visit        [employer] Information     [employer] gives you secure access to your electronic health record. If you see a primary care provider, you can also send messages to your care team and make appointments. If you have questions, please call your primary care clinic.  If you do not have a primary care provider, please call [phone] and they will assist you.        Care EveryWhere ID     This is your Care EveryWhere ID. This could be used by other organizations to access your [facility] medical records  [id number]         Blood Pressure from Last 3 Encounters:   01/05/18 110/68   10/03/17 (!) 150/110   01/21/17 134/80    Weight from Last 3 Encounters:   01/05/18 224 lb 3.2 oz (101.7 kg)   01/21/17 130 lb (59 kg)   10/31/16 182 lb (82.6 kg)              Today, you had the following     No orders found for display       Primary Care Provider Office Phone # Fax #    Hailey SIDDIQUI Sedrick ANITRA [phone]       [address]  [facility] [address]        Equal Access to Services     ADRYAN BHAT : Hadii grisel ku hadasho Soomaali, waaxda luqadaha, qaybta kaalmada adeegyada, yaneth guillermo . So [facility] [phone].    ATENCIÓN: Si habla español, tiene a lynn disposición servicios gratuitos de asistencia lingüística. Brad al [phone].    We comply with applicable federal civil rights laws and [place] laws. We do not discriminate on the basis of race, color, national origin, age, disability, sex, sexual orientation, or  gender identity.            Thank you!     Thank you for choosing [facility]  for your care. Our goal is always to provide you with excellent care. Hearing back from our patients is one way we can continue to improve our services. Please take a few minutes to complete the written survey that you may receive in the mail after your visit with us. Thank you!             Your Updated Medication List - Protect others around you: Learn how to safely use, store and throw away your medicines at www.disposemymeds.org.          This list is accurate as of: 1/16/18 10:00 AM.  Always use your most recent med list.                   Brand Name Dispense Instructions for use Diagnosis    ALLEGRA 180 MG tablet   Generic drug:  fexofenadine      Take 180 mg by mouth daily.        cyanocobalamin 1000 MCG/ML injection    VITAMIN B12    4 mL    Inject 1 mL (1,000 mcg) Subcutaneous every 7 days    Other vitamin B12 deficiency anemia       cyclobenzaprine 10 MG tablet    FLEXERIL    90 tablet    Take 1 tablet (10 mg) by mouth 3 times daily    Generalized osteoarthrosis, involving multiple sites       CYTOMEL 50 MCG Tabs   Generic drug:  Liothyronine Sodium      Take 50 mcg by mouth 3 times daily    Hypothyroidism, unspecified type       diazepam 10 MG tablet    VALIUM    90 tablet    Take 1 tablet (10 mg) by mouth 3 times daily    Chronic pain syndrome, Encounter for long-term use of opiate analgesic       estradiol 2 MG vaginal ring    ESTRING    1 each    Place 1 each vaginally every 3 months one ring.    Vaginal atrophy       FISH OIL      daily. w/DHEA.        FLONASE 50 MCG/ACT spray   Generic drug:  fluticasone      2 sprays by Both Nostrils route daily as needed.        HYDROmorphone 8 MG tablet    DILAUDID    100 tablet    Take 1 tablet (8 mg) by mouth every 3 hours as needed    Chronic pain syndrome       ipratropium - albuterol 0.5 mg/2.5 mg/3 mL 0.5-2.5 (3) MG/3ML neb solution    DUONEB    3 mL     Take 1 vial (3 mLs) by nebulization once for 1 dose    Bronchitis, Laryngitis       JINTELI 1-5 MG-MCG per tablet   Generic drug:  norethindrone-ethinyl estradiol     30 tablet    TAKE ONE TABLET BY MOUTH ONE TIME DAILY    Absence of menstruation       levofloxacin 750 MG tablet    LEVAQUIN    5 tablet    Take 1 tablet (750 mg) by mouth daily    Acute recurrent maxillary sinusitis       levothyroxine 200 MCG Solr injection    SYNTHROID/LEVOTHROID          morphine 30 MG 12 hr tablet    MS CONTIN    270 tablet    Take 3 tablets (90 mg) by mouth every 8 hours    Chronic pain syndrome       NASONEX 50 MCG/ACT spray   Generic drug:  mometasone           order for DME     1 Device    Equipment being ordered: Nebulizer    Bronchitis, Laryngitis       prednisoLONE acetate 1 % ophthalmic susp    PRED FORTE          * predniSONE 1 MG tablet    DELTASONE     alternate 8 mg and 10 mg every other day        * predniSONE 5 MG tablet    DELTASONE     alternate 8 mg and 10 mg every other day        probiotic Caps      Take 1 capsule by mouth daily.        RESTASIS 0.05 % ophthalmic emulsion   Generic drug:  cycloSPORINE      Place 1 drop into both eyes 2 times daily        * Notice:  This list has 2 medication(s) that are the same as other medications prescribed for you. Read the directions carefully, and ask your doctor or other care provider to review them with you.

## 2018-01-30 ENCOUNTER — APPOINTMENT (OUTPATIENT)
Dept: FAMILY MEDICINE | Facility: CLINIC | Age: 58
End: 2018-01-30
Payer: COMMERCIAL

## 2018-01-30 ENCOUNTER — OFFICE VISIT (OUTPATIENT)
Dept: BEHAVIORAL HEALTH | Facility: CLINIC | Age: 58
End: 2018-01-30
Payer: COMMERCIAL

## 2018-01-30 DIAGNOSIS — F43.10 POSTTRAUMATIC STRESS DISORDER: Primary | ICD-10-CM

## 2018-01-30 PROCEDURE — 90832 PSYTX W PT 30 MINUTES: CPT | Performed by: SOCIAL WORKER

## 2018-01-30 NOTE — MR AVS SNAPSHOT
"              After Visit Summary   1/30/2018    Aspen Mccullough    MRN: [id number]           Patient Information     Date Of Birth          1960        Visit Information        Provider Department      1/30/2018 1:00 PM Antwan Boston, [facility] Integrated Primary Care        Today's Diagnoses     Posttraumatic stress disorder    -  1       Follow-ups after your visit        Your next 10 appointments already scheduled     Feb 12, 2018 10:45 AM CST   (Arrive by 10:30 AM)   MA SCREENING DIGITAL BILATERAL with [email or web address]   [facility] ([facility])    [address] [place] [address], [address]  [place] [address] [phone]           Do not use any powder, lotion or deodorant under your arms or on your breast. If you do, we will ask you to remove it before your exam.  Wear comfortable, two-piece clothing.  If you have any allergies, tell your care team.  Bring any previous mammograms from other facilities or have them mailed to the breast center. Three-dimensional (3D) mammograms are available at [place] locations in [facility], [facility], [facility], and [place]. [facility] locations include [place] [facility] in [place]. Benefits of 3D mammograms include: - Improved rate of cancer detection - Decreases your chance of having to go back for more tests, which means fewer: - \"False-positive\" results (This means that there is an abnormal area but it isn't cancer.) - Invasive testing procedures, such as a biopsy or surgery - Can provide clearer images of the breast if you have dense breast tissue. 3D mammography is an optional exam that anyone can have with a 2D mammogram. It doesn't replace or take the place of a 2D mammogram. 2D mammograms remain an effective screening test for all women.  Not all insurance companies cover the cost of a 3D mammogram. Check with your insurance.            Feb 12, 2018 " 11:00 AM CST   DX HIP/PELVIS/SPINE with [email or web address]   [facility] ([facility] [place])    [address]  [facility] [address]   [phone]           Please do not take any of the following 24 hours prior to the day of your exam: vitamins, calcium tablets, antacids.  If possible, please wear clothes without metal (snaps, zippers). A sweatsuit works well.            Feb 12, 2018 11:30 AM CST   PHYSICAL with Naun Patricio MD   [facility] ([facility]    [address]  [facility] [address]   [phone]            Feb 13, 2018  1:00 PM CST   Return Visit with Antwan Boston, [facility] ([facility])    [address]  [address]  [facility] [address]   [phone]            Feb 16, 2018 10:00 AM CST   Return Visit with ANITRA Melvin [facility] ([facility])    [address]  [address]  [facility] [address]   [phone]            Feb 16, 2018 10:00 AM CST   Return Visit with Antwan Boston [facility] ([facility])    [address]  [address]  [facility] [address]   [phone]              Who to contact     If you have questions or need follow up information about today's clinic visit or your schedule please contact [facility] directly at [phone].  Normal or non-critical lab and imaging results will be communicated to you by MyChart, letter or phone within 4 business days after the clinic has received the results. If you do not hear from us within 7 days, please contact the clinic through MyChart or phone. If you have a critical or abnormal lab result, we will notify you by phone as soon as possible.  Submit refill requests  through [employer] or call your pharmacy and they will forward the refill request to us. Please allow 3 business days for your refill to be completed.          Additional Information About Your Visit        SocialDialhart Information     C[employer] gives you secure access to your electronic health record. If you see a primary care provider, you can also send messages to your care team and make appointments. If you have questions, please call your primary care clinic.  If you do not have a primary care provider, please call [phone] and they will assist you.        Care EveryWhere ID     This is your Care EveryWhere ID. This could be used by other organizations to access your [place] medical records  [id number]         Blood Pressure from Last 3 Encounters:   01/05/18 110/68   10/03/17 (!) 150/110   01/21/17 134/80    Weight from Last 3 Encounters:   01/05/18 224 lb 3.2 oz (101.7 kg)   01/21/17 130 lb (59 kg)   10/31/16 182 lb (82.6 kg)              Today, you had the following     No orders found for display       Primary Care Provider Office Phone # Fax #    Hailey ARTUR Dubose, ANITRA [phone]       [address] [place] [address]        Equal Access to Services     ADRYAN BHAT : Hadii aad ku hadasho Soomaali, waaxda luqadaha, qaybta kaalmada adeegyada, yaneth sotoin hayclementen angela guillermo . So [facility] [phone].    ATENCIÓN: Si habla español, tiene a lynn disposición servicios gratuitos de asistencia lingüística. Llame al [phone].    We comply with applicable federal civil rights laws and [place] laws. We do not discriminate on the basis of race, color, national origin, age, disability, sex, sexual orientation, or gender identity.            Thank you!     Thank you for choosing [facility]  for your care. Our goal is always to provide you with excellent care. Hearing back from our patients is one way we can continue to improve our services. Please take a  few minutes to complete the written survey that you may receive in the mail after your visit with us. Thank you!             Your Updated Medication List - Protect others around you: Learn how to safely use, store and throw away your medicines at www.disposemymeds.org.          This list is accurate as of 1/30/18 11:59 PM.  Always use your most recent med list.                   Brand Name Dispense Instructions for use Diagnosis    ALLEGRA 180 MG tablet   Generic drug:  fexofenadine      Take 180 mg by mouth daily.        cyanocobalamin 1000 MCG/ML injection    VITAMIN B12    4 mL    Inject 1 mL (1,000 mcg) Subcutaneous every 7 days    Other vitamin B12 deficiency anemia       cyclobenzaprine 10 MG tablet    FLEXERIL    90 tablet    Take 1 tablet (10 mg) by mouth 3 times daily    Generalized osteoarthrosis, involving multiple sites       CYTOMEL 50 MCG Tabs   Generic drug:  Liothyronine Sodium      Take 50 mcg by mouth 3 times daily    Hypothyroidism, unspecified type       diazepam 10 MG tablet    VALIUM    90 tablet    Take 1 tablet (10 mg) by mouth 3 times daily    Chronic pain syndrome, Encounter for long-term use of opiate analgesic       estradiol 2 MG vaginal ring    ESTRING    1 each    Place 1 each vaginally every 3 months one ring.    Vaginal atrophy       FISH OIL      daily. w/DHEA.        FLONASE 50 MCG/ACT spray   Generic drug:  fluticasone      2 sprays by Both Nostrils route daily as needed.        HYDROmorphone 8 MG tablet    DILAUDID    100 tablet    Take 1 tablet (8 mg) by mouth every 3 hours as needed    Chronic pain syndrome       JINTELI 1-5 MG-MCG per tablet   Generic drug:  norethindrone-ethinyl estradiol     30 tablet    TAKE ONE TABLET BY MOUTH ONE TIME DAILY    Absence of menstruation       levofloxacin 750 MG tablet    LEVAQUIN    5 tablet    Take 1 tablet (750 mg) by mouth daily    Acute recurrent maxillary sinusitis       levothyroxine 200 MCG Solr injection    SYNTHROID/LEVOTHROID           morphine 30 MG 12 hr tablet    MS CONTIN    270 tablet    Take 3 tablets (90 mg) by mouth every 8 hours    Chronic pain syndrome       NASONEX 50 MCG/ACT spray   Generic drug:  mometasone           order for DME     1 Device    Equipment being ordered: Nebulizer    Bronchitis, Laryngitis       prednisoLONE acetate 1 % ophthalmic susp    PRED FORTE          * predniSONE 1 MG tablet    DELTASONE     alternate 8 mg and 10 mg every other day        * predniSONE 5 MG tablet    DELTASONE     alternate 8 mg and 10 mg every other day        probiotic Caps      Take 1 capsule by mouth daily.        RESTASIS 0.05 % ophthalmic emulsion   Generic drug:  cycloSPORINE      Place 1 drop into both eyes 2 times daily        * Notice:  This list has 2 medication(s) that are the same as other medications prescribed for you. Read the directions carefully, and ask your doctor or other care provider to review them with you.

## 2018-01-30 NOTE — PROGRESS NOTES
[facility]  January 30, 2018      Behavioral Health Clinician Progress Note    Patient Name: Aspen Mccullough           Service Type: Individual      Service Location:  in clinic      Session Start Time: 1:10pm  Session End Time: 1:40pm        Session Length: 16 - 37      Attendees: Patient    Visit Activities (Refresh list every visit): [facility] Only    Diagnostic Assessment Date: June 22, 2015  Treatment Plan Review Date: 4-18-16  See Flowsheets for today's PHQ-9 and BENI-7 results  Previous PHQ-9:   PHQ-9 SCORE 11/10/2015 5/3/2016 10/4/2017   Total Score - - -   Total Score - - -   Total Score 5 12 3     Previous BENI-7:   BENI-7 SCORE 11/2/2015 11/10/2015 10/4/2017   Total Score - - -   Total Score 3 3 0   Total Score - - -       FERCHO LEVEL:  FERCHO Score (Last Two) 7/24/2014   FERCHO Raw Score 51   Activation Score 91.6   FERCHO Level 4       DATA  Extended Session (60+ minutes): No  Interactive Complexity: No  Crisis: No    Treatment Objective(s) Addressed in This Session:  Target Behavior(s): disease management/lifestyle changes mental health    Mood Instability: will develop better understanding of triggers and coping strategies to stabilize mood  PTSD Symptom Management  Psychological distress related to Pain    Current Stressors / Issues:    The patient was seen by [facility] per the request of the PCP. She went to sleep appointment and she was given a device to track her sleep-the patient is not willing to do the overnight sleep study because she would have to be off her pain medications for over night-they want her to be seen by pain psychologist and the patient is in agreement with being seen by pain psychologist-these results of the device tracking sleep will inform there treatment for her sleep-she has an appointment with sleep specialist to meet with psychiatrist and to review the results of the sleep device-regarding mood the patient has been feeling that her  is trying to control  her-he is more present now that he is home full time-has not had a conversation without anger to her  concerning their living situations and marriage issues-When  was working most of the year away from the home and city the patient had the comfort of feeling like it was just he and her daughter-having her  home has been stressful for the patient-feeling irritable no depression-the patient talked about the event when her daughter was promoted and had a celebration and how proud she is with her daughter-this was a good time-she talked about her last hospital experience and how we talked about her judging the self when she was triggered to be that 9 year old child and about forgiveness for the self for not being able to fight.            Progress on Treatment Objective(s) / Homework:  Minimal progress - ACTION (Actively working towards change); Intervened by reinforcing change plan / affirming steps taken    Motivational Interviewing    MI Intervention: Expressed Empathy/Understanding, Supported Autonomy, Collaboration, Evocation, Permission to raise concern or advise, Open-ended questions, Reflections: simple and complex and Change talk (evoked)     Change Talk Expressed by the Patient: Desire to change Ability to change Reasons to change Need to change Committment to change Activation Taking steps    Provider Response to Change Talk: E - Evoked more info from patient about behavior change, A - Affirmed patient's thoughts, decisions, or attempts at behavior change, R - Reflected patient's change talk and S - Summarized patient's change talk statements      Care Plan review completed: No    Medication Review:  No changes to current psychiatric medication(s)    Medication Compliance:  NA    Changes in Health Issues:   Yes: Pain, Associated Psychological Distress  Sleep disturbance, Associated Psychological Distress    Chemical Use Review:   Substance Use: Chemical use reviewed, no active concerns  identified      Tobacco Use: No current tobacco use.      Assessment: Current Emotional / Mental Status (status of significant symptoms):  Risk status (Self / Other harm or suicidal ideation)  Patient denies a history of suicidal ideation, suicide attempts, self-injurious behavior, homicidal ideation, homicidal behavior and and other safety concerns  Patient denies current fears or concerns for personal safety.  Patient denies current or recent suicidal ideation or behaviors.  Patient denies current or recent homicidal ideation or behaviors.  Patient denies current or recent self injurious behavior or ideation.  Patient denies other safety concerns.  A safety and risk management plan has not been developed at this time, however patient was encouraged to call [place] [phone] should there be a change in any of these risk factors.    Appearance:   Appropriate   Eye Contact:   Good   Psychomotor Behavior: Normal   Attitude:   Cooperative   Orientation:   All  Speech   Rate / Production: Hyperverbal    Volume:  Normal   Mood:    Normal  Affect:    Appropriate   Thought Content:  Clear   Thought Form:  Coherent  Goal Directed  Logical   Insight:    Good     Diagnoses:  1. Posttraumatic stress disorder        Collateral Reports Completed:  Not Applicable    Plan: (Homework, other):  Patient was given information about behavioral services and encouraged to schedule a follow up appointment with the clinic [facility] as needed.  She was also given information about mental health symptoms and treatment options .  CD Recommendations: No indications of CD issues.  BARNEY Blackwell, [facility]      ______________________________________________________________________    Integrated Primary Care Behavioral Health Treatment Plan    Patient's Name: Aspen Mccullough  YOB: 1960    Date: 4-18-16    DSM-V Diagnoses: PTSD  Psychosocial / Contextual Factors: medical condition, history of traumatic experiences  WHODAS:  Will complete  at next visit    Referral / Collaboration:  Referral to another professional/service is not indicated at this time..    Anticipated number of session or this episode of care: 10      MeasurableTreatment Goal(s) related to diagnosis / functional impairment(s)  Goal 1: Patient will be able to remember the past with 50% less emotional reaction of anger and hurt.     I will know I've met my goal when I can let go of the past     Objective #A (Patient Action)    Patient will talk to at least two others about losses and coping.  Status: New - Date: 4-18-16     Intervention(s)  [facility] will provide avenue for the past to process her losses and disappointments.    Objective #B  Patient will reduce her triggers from past trauma.  Status: New - Date: 4-18-16     Intervention(s)  [facility] will teach distraction skills. mindfulness.      Patient has reviewed and agreed to the above plan.      Antwan Boston, [facility]  April 18, 2016

## 2018-02-01 RX ORDER — METOPROLOL SUCCINATE 25 MG/1
12.5 TABLET, EXTENDED RELEASE ORAL
COMMUNITY
Start: 2017-12-15 | End: 2018-04-02

## 2018-02-01 RX ORDER — LIOTHYRONINE SODIUM 50 UG/1
50 TABLET ORAL
COMMUNITY
End: 2018-02-12

## 2018-02-12 ENCOUNTER — OFFICE VISIT (OUTPATIENT)
Dept: OBGYN | Facility: CLINIC | Age: 58
End: 2018-02-12
Payer: COMMERCIAL

## 2018-02-12 ENCOUNTER — RADIANT APPOINTMENT (OUTPATIENT)
Dept: MAMMOGRAPHY | Facility: CLINIC | Age: 58
End: 2018-02-12
Payer: COMMERCIAL

## 2018-02-12 ENCOUNTER — RADIANT APPOINTMENT (OUTPATIENT)
Dept: BONE DENSITY | Facility: CLINIC | Age: 58
End: 2018-02-12
Payer: COMMERCIAL

## 2018-02-12 VITALS
SYSTOLIC BLOOD PRESSURE: 122 MMHG | DIASTOLIC BLOOD PRESSURE: 68 MMHG | WEIGHT: 229 LBS | HEART RATE: 68 BPM | HEIGHT: 70 IN | BODY MASS INDEX: 32.78 KG/M2

## 2018-02-12 DIAGNOSIS — Z12.31 VISIT FOR SCREENING MAMMOGRAM: ICD-10-CM

## 2018-02-12 DIAGNOSIS — Z78.0 ASYMPTOMATIC POSTMENOPAUSAL STATE: ICD-10-CM

## 2018-02-12 DIAGNOSIS — Z01.419 ENCOUNTER FOR GYNECOLOGICAL EXAMINATION WITHOUT ABNORMAL FINDING: Primary | ICD-10-CM

## 2018-02-12 PROCEDURE — 87624 HPV HI-RISK TYP POOLED RSLT: CPT | Performed by: OBSTETRICS & GYNECOLOGY

## 2018-02-12 PROCEDURE — 77067 SCR MAMMO BI INCL CAD: CPT | Mod: TC

## 2018-02-12 PROCEDURE — 77080 DXA BONE DENSITY AXIAL: CPT | Performed by: OBSTETRICS & GYNECOLOGY

## 2018-02-12 PROCEDURE — 77081 DXA BONE DENSITY APPENDICULR: CPT | Performed by: OBSTETRICS & GYNECOLOGY

## 2018-02-12 PROCEDURE — G0145 SCR C/V CYTO,THINLAYER,RESCR: HCPCS | Performed by: OBSTETRICS & GYNECOLOGY

## 2018-02-12 PROCEDURE — 99396 PREV VISIT EST AGE 40-64: CPT | Performed by: OBSTETRICS & GYNECOLOGY

## 2018-02-12 RX ORDER — HYDROMORPHONE HYDROCHLORIDE 8 MG/1
8 TABLET ORAL
COMMUNITY
End: 2018-02-12

## 2018-02-12 RX ORDER — MORPHINE SULFATE 30 MG/1
30 TABLET, FILM COATED, EXTENDED RELEASE ORAL
COMMUNITY
End: 2018-02-12

## 2018-02-12 RX ORDER — LEVOTHYROXINE SODIUM 300 UG/1
300 TABLET ORAL 2 TIMES DAILY
COMMUNITY
End: 2018-04-05

## 2018-02-12 RX ORDER — DIAZEPAM 10 MG
10 TABLET ORAL
COMMUNITY
Start: 2016-07-12 | End: 2018-02-12

## 2018-02-12 RX ORDER — PREDNISONE 5 MG/1
5 TABLET ORAL
COMMUNITY
End: 2018-04-05

## 2018-02-12 RX ORDER — CYCLOBENZAPRINE HCL 10 MG
10 TABLET ORAL
COMMUNITY
End: 2018-02-12

## 2018-02-12 RX ORDER — PREDNISONE 1 MG/1
1 TABLET ORAL
COMMUNITY
End: 2018-04-05

## 2018-02-12 RX ORDER — SOTALOL HYDROCHLORIDE 80 MG/1
80 TABLET ORAL
COMMUNITY
Start: 2017-12-05 | End: 2018-04-05

## 2018-02-12 ASSESSMENT — PATIENT HEALTH QUESTIONNAIRE - PHQ9: 5. POOR APPETITE OR OVEREATING: NOT AT ALL

## 2018-02-12 ASSESSMENT — ANXIETY QUESTIONNAIRES
3. WORRYING TOO MUCH ABOUT DIFFERENT THINGS: NOT AT ALL
IF YOU CHECKED OFF ANY PROBLEMS ON THIS QUESTIONNAIRE, HOW DIFFICULT HAVE THESE PROBLEMS MADE IT FOR YOU TO DO YOUR WORK, TAKE CARE OF THINGS AT HOME, OR GET ALONG WITH OTHER PEOPLE: NOT DIFFICULT AT ALL
6. BECOMING EASILY ANNOYED OR IRRITABLE: NOT AT ALL
GAD7 TOTAL SCORE: 0
1. FEELING NERVOUS, ANXIOUS, OR ON EDGE: NOT AT ALL
5. BEING SO RESTLESS THAT IT IS HARD TO SIT STILL: NOT AT ALL
7. FEELING AFRAID AS IF SOMETHING AWFUL MIGHT HAPPEN: NOT AT ALL
2. NOT BEING ABLE TO STOP OR CONTROL WORRYING: NOT AT ALL

## 2018-02-12 NOTE — MR AVS SNAPSHOT
After Visit Summary   2/12/2018    Aspen Mccullough    MRN: [id number]           Patient Information     Date Of Birth          1960        Visit Information        Provider Department      2/12/2018 11:30 AM Naun Patricio MD [facility]        Today's Diagnoses     Encounter for gynecological examination without abnormal finding    -  1       Follow-ups after your visit        Your next 10 appointments already scheduled     Feb 13, 2018  1:00 PM CST   Return Visit with Antwan Boston, [facility] ([facility]    [address]  [address]  [facility] [address]   [phone]            Feb 16, 2018 10:00 AM CST   Return Visit with ANITRA Melvin [facility] ([facility])    [address]  [address]  [facility] [address]   [phone]            Feb 16, 2018 10:00 AM CST   Return Visit with Antwan Boston, [facility] ([facility])    [address]  [address]  [facility] [address]   [phone]              Who to contact     If you have questions or need follow up information about today's clinic visit or your schedule please contact [facility] directly at [phone].  Normal or non-critical lab and imaging results will be communicated to you by MyChart, letter or phone within 4 business days after the clinic has received the results. If you do not hear from us within 7 days, please contact the clinic through MyChart or phone. If you have a critical or abnormal lab result, we will notify you by phone as soon as possible.  Submit refill requests through [facility] [employer] or call your pharmacy and they will forward the refill request to us. Please allow 3 business days for your refill to be completed.          Additional  "Information About Your Visit        [employer] Information     [employer] gives you secure access to your electronic health record. If you see a primary care provider, you can also send messages to your care team and make appointments. If you have questions, please call your primary care clinic.  If you do not have a primary care provider, please call [phone] and they will assist you.        Care EveryWhere ID     This is your Care EveryWhere ID. This could be used by other organizations to access your [facility] medical records  [id number]        Your Vitals Were     Pulse Height BMI (Body Mass Index)             68 5' 9.5\" (1.765 m) 33.33 kg/m2          Blood Pressure from Last 3 Encounters:   02/12/18 122/68   01/05/18 110/68   10/03/17 (!) 150/110    Weight from Last 3 Encounters:   02/12/18 229 lb (103.9 kg)   01/05/18 224 lb 3.2 oz (101.7 kg)   01/21/17 130 lb (59 kg)              We Performed the Following     HPV High Risk Types DNA Cervical     Pap imaged thin layer screen with HPV - recommended age 30 - 65          Today's Medication Changes          These changes are accurate as of 2/12/18 12:25 PM.  If you have any questions, ask your nurse or doctor.               These medicines have changed or have updated prescriptions.        Dose/Directions    levothyroxine 300 MCG tablet   Commonly known as:  SYNTHROID/LEVOTHROID   This may have changed:  Another medication with the same name was removed. Continue taking this medication, and follow the directions you see here.   Changed by:  Naun Patricio MD        Dose:  300 mcg   Take 300 mcg by mouth   Refills:  0       * predniSONE 5 MG tablet   Commonly known as:  DELTASONE   This may have changed:  Another medication with the same name was removed. Continue taking this medication, and follow the directions you see here.   Changed by:  Naun Patricio MD        alternate 8 mg and 10 mg every other day   Refills:  0       * predniSONE 1 MG tablet "   Commonly known as:  DELTASONE   This may have changed:  Another medication with the same name was removed. Continue taking this medication, and follow the directions you see here.   Changed by:  Naun Patricio MD        Dose:  1 mg   Take 1 mg by mouth   Refills:  0       * predniSONE 5 MG tablet   Commonly known as:  DELTASONE   This may have changed:  Another medication with the same name was removed. Continue taking this medication, and follow the directions you see here.   Changed by:  Naun Patricio MD        Dose:  5 mg   Take 5 mg by mouth   Refills:  0       * Notice:  This list has 3 medication(s) that are the same as other medications prescribed for you. Read the directions carefully, and ask your doctor or other care provider to review them with you.             Primary Care Provider Office Phone # Fax #    Sarahi Vivar [phone]       [facility] [address] [place] [id number]        Equal Access to Services     ADRYAN BHAT : Hadii grisel bourne hadasho Soomaali, waaxda luqadaha, qaybta kaalmada adeegyada, waxay brittanyin hayclementen angela guillermo . So [facility] [phone].    ATENCIÓN: Si habla español, tiene a lynn disposición servicios gratuitos de asistencia lingüística. [facility] [phone].    We comply with applicable federal civil rights laws and [place] laws. We do not discriminate on the basis of race, color, national origin, age, disability, sex, sexual orientation, or gender identity.            Thank you!     Thank you for choosing [facility]  for your care. Our goal is always to provide you with excellent care. Hearing back from our patients is one way we can continue to improve our services. Please take a few minutes to complete the written survey that you may receive in the mail after your visit with us. Thank you!             Your Updated Medication List - Protect others around you: Learn how to safely use, store and throw away your  medicines at www.disposemymeds.org.          This list is accurate as of 2/12/18 12:25 PM.  Always use your most recent med list.                   Brand Name Dispense Instructions for use Diagnosis    ALLEGRA 180 MG tablet   Generic drug:  fexofenadine      Take 180 mg by mouth daily.        cyanocobalamin 1000 MCG/ML injection    VITAMIN B12    4 mL    Inject 1 mL (1,000 mcg) Subcutaneous every 7 days    Other vitamin B12 deficiency anemia       cyclobenzaprine 10 MG tablet    FLEXERIL    90 tablet    Take 1 tablet (10 mg) by mouth 3 times daily    Generalized osteoarthrosis, involving multiple sites       CYTOMEL 50 MCG Tabs   Generic drug:  Liothyronine Sodium      Take 50 mcg by mouth 3 times daily    Hypothyroidism, unspecified type       diazepam 10 MG tablet    VALIUM    90 tablet    Take 1 tablet (10 mg) by mouth 3 times daily    Chronic pain syndrome, Encounter for long-term use of opiate analgesic       estradiol 2 MG vaginal ring    ESTRING    1 each    Place 1 each vaginally every 3 months one ring.    Vaginal atrophy       FISH OIL      daily. w/DHEA.        FLONASE 50 MCG/ACT spray   Generic drug:  fluticasone      2 sprays by Both Nostrils route daily as needed.        HYDROmorphone 8 MG tablet    DILAUDID    100 tablet    Take 1 tablet (8 mg) by mouth every 3 hours as needed    Chronic pain syndrome       JINTELI 1-5 MG-MCG per tablet   Generic drug:  norethindrone-ethinyl estradiol     30 tablet    TAKE ONE TABLET BY MOUTH ONE TIME DAILY    Absence of menstruation       levofloxacin 750 MG tablet    LEVAQUIN    5 tablet    Take 1 tablet (750 mg) by mouth daily    Acute recurrent maxillary sinusitis       levothyroxine 300 MCG tablet    SYNTHROID/LEVOTHROID     Take 300 mcg by mouth        LOVENOX 100 MG/ML injection   Generic drug:  enoxaparin      1 ML,100 mg, under skin twice a day. Start 1/26 pm. hold am 1/31. Resume pm 1/31 & hold am 2/5 DC after 1st dose of Xarelto post colonoscopy         metoprolol succinate 25 MG 24 hr tablet    TOPROL-XL     Take 12.5 mg by mouth        morphine 30 MG 12 hr tablet    MS CONTIN    270 tablet    Take 3 tablets (90 mg) by mouth every 8 hours    Chronic pain syndrome       NASONEX 50 MCG/ACT spray   Generic drug:  mometasone           prednisoLONE acetate 1 % ophthalmic susp    PRED FORTE          * predniSONE 5 MG tablet    DELTASONE     alternate 8 mg and 10 mg every other day        * predniSONE 1 MG tablet    DELTASONE     Take 1 mg by mouth        * predniSONE 5 MG tablet    DELTASONE     Take 5 mg by mouth        probiotic Caps      Take 1 capsule by mouth daily.        RESTASIS 0.05 % ophthalmic emulsion   Generic drug:  cycloSPORINE      Place 1 drop into both eyes 2 times daily        rivaroxaban ANTICOAGULANT 20 MG Tabs tablet    XARELTO          sotalol 80 MG tablet    BETAPACE     Take 80 mg by mouth        * Notice:  This list has 3 medication(s) that are the same as other medications prescribed for you. Read the directions carefully, and ask your doctor or other care provider to review them with you.

## 2018-02-12 NOTE — PROGRESS NOTES
Aspen is a 58 year old No obstetric history on file. female who presents for annual exam.     Besides routine health maintenance, she has no other health concerns today .    HPI: The patient is seen at this time for annual exam.  She is postmenopausal and has not used any estrogen for over 8 months.  She did have a blood clot in her right lower leg and was treated with anticoagulants.  The patient's PCP is Sarahi Vivar.        GYNECOLOGIC HISTORY:    No LMP recorded. Patient is postmenopausal.  Her current contraception method is: not sexually active.  She  reports that she quit smoking about 27 years ago. Her smoking use included Cigarettes. She has a 75.00 pack-year smoking history. She has quit using smokeless tobacco.    Patient is not sexually active.  STD testing offered?  Declined  Last PHQ-9 score on record =   PHQ-9 SCORE 2/12/2018   Total Score -   Total Score -   Total Score 5     Last GAD7 score on record =   BENI-7 SCORE 2/12/2018   Total Score -   Total Score 0   Total Score -     Alcohol Score = 0    HEALTH MAINTENANCE:  Cholesterol: 01/06/14   Total= 211, Triglycerides=216, HDL=67, WFP=102, NJX=165 (11/10/15), TSH=40.90 (10/22/15)  Last Mammo: one year ago, Result: normal, Next Mammo: today   Pap:   Lab Results   Component Value Date    PAP NIL 10/31/2016    PAP NIL 07/13/2015      Colonoscopy:  12/30/14, Result: diverticulosis, Next Colonoscopy: 6 years.  Dexa:  10/31/16 normal, rp today    Health maintenance updated:  yes    HISTORY:  Obstetric History     No data available          Patient Active Problem List   Diagnosis     Generalized osteoarthrosis, involving multiple sites     CRPS (complex regional pain syndrome), lower limb     Chronic pain syndrome     Somatoform disorder     Histrionic personality     Encounter for long-term use of opiate analgesic     Status post revision of total knee replacement     Hypothyroidism     Insomnia     Hyperlipidemia LDL goal <130     ACP (advance care  planning)     Chronic lymphocytic thyroiditis     Glucocorticoid deficiency (H)     Posttraumatic stress disorder     Impingement syndrome of left shoulder     Abdominal cramping, generalized     Diarrhea     Primary osteoarthritis involving multiple joints     Attention deficit disorder     Atopic rhinitis     Cushing's syndrome (H)     Endometriosis     Essential hypertension     Systemic lupus erythematosus (H)     S/P cervical spinal fusion     Paroxysmal atrial fibrillation (H)     Stress-induced cardiomyopathy     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)     APPENDECTOMY       ARTHRODESIS ANKLE      right     ARTHROPLASTY KNEE BILATERAL       ARTHROPLASTY REVISION KNEE  4/19/2011    Procedure:ARTHROPLASTY REVISION KNEE; With Antibiotic Cement ; Surgeon:CHAO OLIVARES; Location:[facility]     BREAST SURGERY      right- tissue remove nipple area     BUNIONECTOMY  12/14/2011    Procedure:BUNIONECTOMY; Right Bunion Correction; Surgeon:GRACE ZARATE; Location:[facility]     CHOLECYSTECTOMY       EXCISE MASS UPPER EXTREMITY  12/14/2011    Procedure:EXCISE MASS UPPER EXTREMITY; Excision of Left Arm Mass; Surgeon:GRACE ZARATE; Location:[facility]     FOOT SURGERY      left X 4     FUSION LUMBAR ANTERIOR ONE LEVEL       LAMINECTOMY LUMBAR ONE LEVEL      L4-5     LAPAROSCOPIC ABLATION ENDOMETRIOSIS       REMOVE HARDWARE FOOT  12/14/2011    Procedure:REMOVE HARDWARE FOOT; Hardware Removal Right Foot (Mini-C-Arm) ; Surgeon:GRACE ZARATE; Location:[facility]     RHINOPLASTY       SALPINGO-OOPHORECTOMY BILATERAL       TONSILLECTOMY        Social History   Substance Use Topics     Smoking status: Former Smoker     Packs/day: 3.00     Years: 25.00     Types: Cigarettes     Quit date: 3/21/1990     Smokeless tobacco: Former User     Alcohol use No      Problem (# of Occurrences) Relation (Name,Age of Onset)    Hypertension (1) Mother             Current Outpatient Prescriptions   Medication Sig     levothyroxine (SYNTHROID/LEVOTHROID) 300 MCG tablet Take 300 mcg by mouth     predniSONE (DELTASONE) 1 MG tablet Take 1 mg by mouth     predniSONE (DELTASONE) 5 MG tablet Take 5 mg by mouth     HYDROmorphone (DILAUDID) 8 MG tablet Take 8 mg by mouth     morphine (MS CONTIN) 30 MG 12 hr tablet Take 30 mg by mouth     diazepam (VALIUM) 10 MG tablet Take 10 mg by mouth     cyclobenzaprine (FLEXERIL) 10 MG tablet Take 10 mg by mouth     enoxaparin (LOVENOX) 100 MG/ML injection 1 ML,100 mg, under skin twice a day. Start 1/26 pm. hold am 1/31. Resume pm 1/31 & hold am 2/5 DC after 1st dose of Xarelto post colonoscopy     sotalol (BETAPACE) 80 MG tablet Take 80 mg by mouth     metoprolol succinate (TOPROL-XL) 25 MG 24 hr tablet Take 12.5 mg by mouth     Liothyronine Sodium 50 MCG TABS Take 50 mg by mouth     rivaroxaban ANTICOAGULANT (XARELTO) 20 MG TABS tablet      levofloxacin (LEVAQUIN) 750 MG tablet Take 1 tablet (750 mg) by mouth daily     JINTELI 1-5 MG-MCG per tablet TAKE ONE TABLET BY MOUTH ONE TIME DAILY      estradiol (ESTRING) 2 MG vaginal ring Place 1 each vaginally every 3 months one ring.     order for DME Equipment being ordered: Nebulizer     prednisoLONE acetate (PRED FORTE) 1 % ophthalmic suspension      HYDROmorphone (DILAUDID) 8 MG tablet Take 1 tablet (8 mg) by mouth every 3 hours as needed     morphine (MS CONTIN) 30 MG 12 hr tablet Take 3 tablets (90 mg) by mouth every 8 hours     diazepam (VALIUM) 10 MG tablet Take 1 tablet (10 mg) by mouth 3 times daily     Liothyronine Sodium 50 MCG TABS Take 50 mcg by mouth 3 times daily     NASONEX 50 MCG/ACT nasal spray      cyclobenzaprine (FLEXERIL) 10 MG tablet Take 1 tablet (10 mg) by mouth 3 times daily     cycloSPORINE (RESTASIS) 0.05 % ophthalmic emulsion Place 1 drop into both eyes 2 times daily     levothyroxine (SYNTHROID,LEVOTHROID) 200 MCG SOLR      cyanocobalamin 1000 MCG/ML injection  "Inject 1 mL (1,000 mcg) Subcutaneous every 7 days     probiotic CAPS Take 1 capsule by mouth daily.     fexofenadine (ALLEGRA) 180 MG tablet Take 180 mg by mouth daily.     fluticasone (FLONASE) 50 MCG/ACT nasal spray 2 sprays by Both Nostrils route daily as needed.     FISH OIL daily. w/DHEA.      PREDNISONE 1 MG OR TABS alternate 8 mg and 10 mg every other day      PREDNISONE 5 MG OR TABS alternate 8 mg and 10 mg every other day     No current facility-administered medications for this visit.      Allergies   Allergen Reactions     Clindamycin Diarrhea     Codeine Hives     Ibuprofen [Nsaids] Hives     Penicillins Hives     Other reaction(s): Unknown     Thor Hives     Pt states she had rxn to skin prep-thor prep     Sulfa Drugs Hives     Other reaction(s): Unknown     Lactose Intolerance [Beta-Galactosidase] Diarrhea     Codeine      Other reaction(s): Unknown     Doxycycline GI Disturbance     Emesis & diarrhea  Patient denies allergy to this med     Ibuprofen      Other reaction(s): Unknown     Lactose      Other reaction(s): Unknown     Mold      Mushroom      Red Wine Complex [Calcium Pyruvate-Dihydroxyacetone]        Past medical, surgical, social and family histories were reviewed and updated in EPIC.    ROS:   12 point review of systems negative other than symptoms noted below.  Constitutional: Fatigue  Musculoskeletal: Joint Pain    EXAM:  /68  Pulse 68  Ht 5' 9.5\" (1.765 m)  Wt 229 lb (103.9 kg)  BMI 33.33 kg/m2   BMI: Body mass index is 33.33 kg/(m^2).    PHYSICAL EXAM:  Constitutional:  Appearance: Well nourished, well developed, alert, in no acute distress  Neck:  Lymph Nodes:  No lymphadenopathy present    Thyroid:  Gland size normal, nontender, no nodules or masses present  on palpation  Chest:  Respiratory Effort:  Breathing unlabored  Cardiovascular:    Heart: Auscultation:  Regular rate, normal rhythm, no murmurs present  Breasts: Inspection of Breasts:  No lymphadenopathy present., " Palpation of Breasts and Axillae:  No masses present on palpation, no breast tenderness., Axillary Lymph Nodes:  No lymphadenopathy present. and No nodularity, asymmetry or nipple discharge bilaterally.  Gastrointestinal:   Abdominal Examination:  Abdomen nontender to palpation, tone normal without rigidity or guarding, no masses present, umbilicus without lesions   Liver and Spleen:  No hepatomegaly present, liver nontender to palpation    Hernias:  No hernias present  Lymphatic: Lymph Nodes:  No other lymphadenopathy present  Skin:  General Inspection:  No rashes present, no lesions present, no areas of  discoloration    Genitalia and Groin:  No rashes present, no lesions present, no areas of  discoloration, no masses present  Neurologic/Psychiatric:    Mental Status:  Oriented X3     Pelvic Exam:  External Genitalia:     Normal appearance for age, no discharge present, no tenderness present, no inflammatory lesions present, color normal  Vagina:     Normal vaginal vault without central or paravaginal defects, no discharge present, no inflammatory lesions present, no masses present  Bladder:     Nontender to palpation  Urethra:   Urethral Body:  Urethra palpation normal, urethra structural support normal   Urethral Meatus:  No erythema or lesions present  Cervix:     Appearance healthy, no lesions present, nontender to palpation, no bleeding present  Uterus:     Uterus: firm, normal sized and nontender, midplane in position.   Adnexa:     No adnexal tenderness present, no adnexal masses present  Perineum:     Perineum within normal limits, no evidence of trauma, no rashes or skin lesions present  Anus:     Anus within normal limits, no hemorrhoids present  Inguinal Lymph Nodes:     No lymphadenopathy present  Pubic Hair:     Normal pubic hair distribution for age  Genitalia and Groin:     No rashes present, no lesions present, no areas of discoloration, no masses present      COUNSELING:   Reviewed preventive  health counseling, as reflected in patient instructions       Regular exercise       Healthy diet/nutrition    BMI: Body mass index is 33.33 kg/(m^2).      ASSESSMENT:  58 year old female with satisfactory annual exam.    ICD-10-CM    1. Encounter for gynecological examination without abnormal finding Z01.419 Pap imaged thin layer screen with HPV - recommended age 30 - 65     HPV High Risk Types DNA Cervical       PLAN: We will contact the patient with her mammogram and Pap results.  She has multiple medical issues and is not to be on any estrogen.    Naun Patricio MD

## 2018-02-12 NOTE — LETTER
February 19, 2018    Aspen Mccullough  [address]  [facility] [address]    Dear Aspen,  We are happy to inform you that your PAP smear result from 2/12/18 is normal.  We are now able to do a follow up test on PAP smears. The DNA test is for HPV (Human Papilloma Virus). Cervical cancer is closely linked with certain types of HPV. Your result showed no evidence of high risk HPV.  Therefore we recommend you return in 3 years for your next pap smear.  You will still need to return to the clinic every year for an annual exam and other preventive tests.  Please contact the clinic at [phone] with any questions.  Sincerely,    Naun Patricio MD/larisa

## 2018-02-13 ASSESSMENT — ANXIETY QUESTIONNAIRES: GAD7 TOTAL SCORE: 0

## 2018-02-13 ASSESSMENT — PATIENT HEALTH QUESTIONNAIRE - PHQ9: SUM OF ALL RESPONSES TO PHQ QUESTIONS 1-9: 5

## 2018-02-14 LAB
COPATH REPORT: NORMAL
PAP: NORMAL

## 2018-02-15 ENCOUNTER — OFFICE VISIT (OUTPATIENT)
Dept: BEHAVIORAL HEALTH | Facility: CLINIC | Age: 58
End: 2018-02-15
Payer: COMMERCIAL

## 2018-02-15 ENCOUNTER — OFFICE VISIT (OUTPATIENT)
Dept: FAMILY MEDICINE | Facility: CLINIC | Age: 58
End: 2018-02-15
Payer: COMMERCIAL

## 2018-02-15 VITALS
DIASTOLIC BLOOD PRESSURE: 78 MMHG | HEART RATE: 72 BPM | TEMPERATURE: 98.1 F | SYSTOLIC BLOOD PRESSURE: 132 MMHG | HEIGHT: 70 IN | RESPIRATION RATE: 16 BRPM | OXYGEN SATURATION: 97 %

## 2018-02-15 DIAGNOSIS — M32.9 SYSTEMIC LUPUS ERYTHEMATOSUS, UNSPECIFIED SLE TYPE, UNSPECIFIED ORGAN INVOLVEMENT STATUS (H): Primary | ICD-10-CM

## 2018-02-15 DIAGNOSIS — I10 ESSENTIAL HYPERTENSION: ICD-10-CM

## 2018-02-15 DIAGNOSIS — Z86.711 HISTORY OF PULMONARY EMBOLISM: ICD-10-CM

## 2018-02-15 DIAGNOSIS — F98.8 ATTENTION DEFICIT DISORDER, UNSPECIFIED HYPERACTIVITY PRESENCE: ICD-10-CM

## 2018-02-15 DIAGNOSIS — Z86.718 PERSONAL HISTORY OF DVT (DEEP VEIN THROMBOSIS): ICD-10-CM

## 2018-02-15 DIAGNOSIS — F43.10 POSTTRAUMATIC STRESS DISORDER: Primary | ICD-10-CM

## 2018-02-15 DIAGNOSIS — I48.0 PAROXYSMAL ATRIAL FIBRILLATION (H): ICD-10-CM

## 2018-02-15 DIAGNOSIS — E27.49 GLUCOCORTICOID DEFICIENCY (H): ICD-10-CM

## 2018-02-15 PROCEDURE — 90832 PSYTX W PT 30 MINUTES: CPT | Performed by: SOCIAL WORKER

## 2018-02-15 PROCEDURE — 99215 OFFICE O/P EST HI 40 MIN: CPT | Performed by: NURSE PRACTITIONER

## 2018-02-15 RX ORDER — LEVOTHYROXINE SODIUM 300 UG/1
300 TABLET ORAL 2 TIMES DAILY
Status: ON HOLD | COMMUNITY
End: 2020-11-02

## 2018-02-15 NOTE — PROGRESS NOTES
"SUBJECTIVE:                                                    Aspen Mccullough is a 58 year old female who presents to clinic today for the following health issues:  [facility] [place]: Don    DVT  Reports this is resolved and following with cardiology on treatment plan    A Fib  Reports she stopped her meds, doesn't want to discuss it as she follows with cardiology    Sleep Study  States it went \"awesome\"  Wore a \"sleep watch\"  On sleep medications  Reports that she will be seeing a sleep psychiatrist and declines that she will get medications  She states she is going to talk   [facility] is recommending sleep psychology and potentially psychiatry, however pt doesn't want medications    SLE  Doesn't have a rheumatologist currently  Requesting referral  Follows with ortho currently, among other specialists        Mental Health Follow up : Depression    Status since last visit: following with Antwan [facility] [place], plans to see sleep psychology    See PHQ-9 for current symptoms.  Other associated symptoms: None    Complicating factors: chronic disease  Significant life event:  No   Current substance abuse:  None  Anxiety or Panic symptoms:  No    PHQ-9  English PHQ-9   Any Language               Problems taking medications regularly: No    Medication side effects: none    Diet: regular (no restrictions)    Social History   Substance Use Topics     Smoking status: Former Smoker     Packs/day: 3.00     Years: 25.00     Types: Cigarettes     Quit date: 3/21/1990     Smokeless tobacco: Former User     Alcohol use No        Problem list and histories reviewed & adjusted, as indicated.  Additional history: as documented    Patient Active Problem List   Diagnosis     Generalized osteoarthrosis, involving multiple sites     CRPS (complex regional pain syndrome), lower limb     Chronic pain syndrome     Somatoform disorder     Histrionic personality     Encounter for long-term use of opiate analgesic     Status post revision of total knee " replacement     Hypothyroidism     Insomnia     Hyperlipidemia LDL goal <130     ACP (advance care planning)     Chronic lymphocytic thyroiditis     Glucocorticoid deficiency (H)     Posttraumatic stress disorder     Impingement syndrome of left shoulder     Abdominal cramping, generalized     Diarrhea     Primary osteoarthritis involving multiple joints     Attention deficit disorder     Atopic rhinitis     Cushing's syndrome (H)     Endometriosis     Essential hypertension     Systemic lupus erythematosus (H)     S/P cervical spinal fusion     Paroxysmal atrial fibrillation (H)     Stress-induced cardiomyopathy     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)     APPENDECTOMY       ARTHRODESIS ANKLE      right     ARTHROPLASTY KNEE BILATERAL       ARTHROPLASTY REVISION KNEE  4/19/2011    Procedure:ARTHROPLASTY REVISION KNEE; With Antibiotic Cement ; Surgeon:CHAO OLIVARES; Location:[facility]     BREAST SURGERY      right- tissue remove nipple area     BUNIONECTOMY  12/14/2011    Procedure:BUNIONECTOMY; Right Bunion Correction; Surgeon:GRACE ZARATE; Location:[facility]     CHOLECYSTECTOMY       EXCISE MASS UPPER EXTREMITY  12/14/2011    Procedure:EXCISE MASS UPPER EXTREMITY; Excision of Left Arm Mass; Surgeon:GRACE ZARATE; Location:[facility]     FOOT SURGERY      left X 4     FUSION LUMBAR ANTERIOR ONE LEVEL       LAMINECTOMY LUMBAR ONE LEVEL      L4-5     LAPAROSCOPIC ABLATION ENDOMETRIOSIS       REMOVE HARDWARE FOOT  12/14/2011    Procedure:REMOVE HARDWARE FOOT; Hardware Removal Right Foot (Mini-C-Arm) ; Surgeon:GRACE ZARATE; Location:[facility]     RHINOPLASTY       SALPINGO-OOPHORECTOMY BILATERAL       TONSILLECTOMY         Social History   Substance Use Topics     Smoking status: Former Smoker     Packs/day: 3.00     Years: 25.00     Types: Cigarettes     Quit date: 3/21/1990     Smokeless tobacco: Former User      Alcohol use No     Family History   Problem Relation Age of Onset     Hypertension Mother          Current Outpatient Prescriptions   Medication Sig Dispense Refill     levothyroxine (SYNTHROID/LEVOTHROID) 300 MCG tablet Take 300 mcg by mouth       predniSONE (DELTASONE) 1 MG tablet Take 1 mg by mouth       predniSONE (DELTASONE) 5 MG tablet Take 5 mg by mouth       enoxaparin (LOVENOX) 100 MG/ML injection 1 ML,100 mg, under skin twice a day. Start 1/26 pm. hold am 1/31. Resume pm 1/31 & hold am 2/5 DC after 1st dose of Xarelto post colonoscopy       sotalol (BETAPACE) 80 MG tablet Take 80 mg by mouth       metoprolol succinate (TOPROL-XL) 25 MG 24 hr tablet Take 12.5 mg by mouth       rivaroxaban ANTICOAGULANT (XARELTO) 20 MG TABS tablet        levofloxacin (LEVAQUIN) 750 MG tablet Take 1 tablet (750 mg) by mouth daily 5 tablet 0     JINTELI 1-5 MG-MCG per tablet TAKE ONE TABLET BY MOUTH ONE TIME DAILY  30 tablet 0     estradiol (ESTRING) 2 MG vaginal ring Place 1 each vaginally every 3 months one ring. (Patient not taking: Reported on 2/12/2018) 1 each 3     prednisoLONE acetate (PRED FORTE) 1 % ophthalmic suspension   1     HYDROmorphone (DILAUDID) 8 MG tablet Take 1 tablet (8 mg) by mouth every 3 hours as needed 100 tablet 0     morphine (MS CONTIN) 30 MG 12 hr tablet Take 3 tablets (90 mg) by mouth every 8 hours 270 tablet 0     diazepam (VALIUM) 10 MG tablet Take 1 tablet (10 mg) by mouth 3 times daily 90 tablet 0     Liothyronine Sodium 50 MCG TABS Take 50 mcg by mouth 3 times daily       NASONEX 50 MCG/ACT nasal spray   11     cyclobenzaprine (FLEXERIL) 10 MG tablet Take 1 tablet (10 mg) by mouth 3 times daily 90 tablet 3     cycloSPORINE (RESTASIS) 0.05 % ophthalmic emulsion Place 1 drop into both eyes 2 times daily       cyanocobalamin 1000 MCG/ML injection Inject 1 mL (1,000 mcg) Subcutaneous every 7 days 4 mL 5     probiotic CAPS Take 1 capsule by mouth daily.       fexofenadine (ALLEGRA) 180 MG tablet  Take 180 mg by mouth daily.       fluticasone (FLONASE) 50 MCG/ACT nasal spray 2 sprays by Both Nostrils route daily as needed.       FISH OIL daily. w/DHEA.        PREDNISONE 5 MG OR TABS alternate 8 mg and 10 mg every other day       Allergies   Allergen Reactions     Clindamycin Diarrhea     Codeine Hives     Ibuprofen [Nsaids] Hives     Penicillins Hives     Other reaction(s): Unknown     Thor Hives     Pt states she had rxn to skin prep-thor prep     Sulfa Drugs Hives     Other reaction(s): Unknown     Lactose Intolerance [Beta-Galactosidase] Diarrhea     Codeine      Other reaction(s): Unknown     Doxycycline GI Disturbance     Emesis & diarrhea  Patient denies allergy to this med     Ibuprofen      Other reaction(s): Unknown     Lactose      Other reaction(s): Unknown     Mold      Mushroom      Red Wine Complex [Calcium Pyruvate-Dihydroxyacetone]      Recent Labs   Lab Test  11/10/15   0926 10/22/15  09/28/15   1114  01/22/15   1638  01/06/14   A1C   --   5.6   --    --    --    --    LDL   --    --    --    --    --   101   HDL   --    --    --    --    --   67   TRIG   --    --    --    --    --   216   ALT  34   --    --   62*   --    --    CR  0.46*  0.96   --   0.59   < >  0.74   GFRESTIMATED  >90  Non  GFR Calc    66   --   >90  Non  GFR Calc     < >   --    GFRESTBLACK  >90   GFR Calc    77   --   >90   GFR Calc     < >   --    POTASSIUM  4.0  4.9   --   3.3*   < >  4.5   TSH   --   40.90*  12.87*   --    < >  <0.00    < > = values in this interval not displayed.      BP Readings from Last 3 Encounters:   02/12/18 122/68   01/05/18 110/68   10/03/17 (!) 150/110    Wt Readings from Last 3 Encounters:   02/12/18 229 lb (103.9 kg)   01/05/18 224 lb 3.2 oz (101.7 kg)   01/21/17 130 lb (59 kg)        Labs reviewed in EPIC  Problem list, Medication list, Allergies, and Medical/Social/Surgical histories reviewed in EPIC and updated as  "appropriate.     ROS: Constitutional, neuro, ENT, endocrine, pulmonary, cardiac, gastrointestinal, genitourinary, musculoskeletal, integument and psychiatric systems are negative, except as otherwise noted above in the HPI.       OBJECTIVE:                                                    /78  Pulse 72  Temp 98.1  F (36.7  C) (Oral)  Resp 16  Ht 5' 9.5\" (1.765 m)  SpO2 97%  There is no height or weight on file to calculate BMI.  GENERAL: healthy, alert, well nourished, well hydrated, no distress  RESP: lungs clear to auscultation - no rales, no rhonchi, no wheezes  CV: regular rates and rhythm, normal S1 S2, no S3 or S4 and no murmur  Mental Status Assessment:  Appearance:   Appropriate   Eye Contact:   Good   Psychomotor Behavior: Normal   Attitude:   Cooperative   Orientation:   All  Speech   Rate / Production: Normal    Volume:  Normal   Mood:    Normal  Affect:    Appropriate   Thought Content:  Clear   Thought Form:  Coherent  Logical   Insight:    Good   Attention Span and Concentration:  limited  Recent and Remote Memory:  intact  Fund of Knowledge: appropriate  Muscle Strength and Tone: normal   Suicidal Ideation: reports no thoughts, no intention  Hallucination: No  Paranoid-No  Manic-No  Panic-No  Self harm-No      See [facility] notes     Diagnostic Test Results:  Results for orders placed or performed in visit on 02/12/18   DX Wrist Heel Radius    Narrative    DX Wrist Heel Radius    Order #: [id number] Accession #: [id number]         Study Notes        Marie Chung on 2/12/2018  2:15 PM     Numbers for Forearm Dexa are noted in DX HIP/Pelvis/Spine            Discussed here        ASSESSMENT/PLAN:                                                    (M32.9) Systemic lupus erythematosus, unspecified SLE type, unspecified organ involvement status (H)  (primary encounter diagnosis)  Comment: pt doesn't have a current rheumatology provider, would like to re-establish  Plan: RHEUMATOLOGY REFERRAL       "      (E27.49) Glucocorticoid deficiency (H)  Comment: follows closely with Endo  Plan: no changes, continue with endocrinology     (I48.0) Paroxysmal atrial fibrillation (H)  Comment: reports she doesn't feel taking BB is necessary, following with cardiology. Detailed discussion hover regarding what A Fib is  And why important to take xarelto  Plan: continue with cardiology    (I10) Essential hypertension  Comment: well controlled  Plan: continue current regimen    (F98.8) Attention deficit disorder, unspecified hyperactivity presence  Comment: stable, continue to support her with structured visits  Plan: continue current regimen, therapy    (Z86.838) Personal history of DVT (deep vein thrombosis)  Comment: pt stopped her xarelto stated she didn't think she needed it. Detailed discussion regarding complications of DVT, recurrent DVT and importance of xarelto  Plan: pt agreeable to restart xarelto    (Z86.281) History of pulmonary embolism  Comment: as above  Plan: as above      I spent Greater than 40 minutes was spent face to face with Aspen Mccullough, with greater than 50 % in counselling and consultation about above diagnoses      ANITRA Gallegos [facility] PRIMARY [facility]

## 2018-02-15 NOTE — PROGRESS NOTES
[facility]  February 15, 2018      Behavioral Health Clinician Progress Note    Patient Name: Aspen Mccullough           Service Type: Individual      Service Location:  in clinic      Session Start Time: 10:20pm  Session End Time: 10:50am      Session Length: 16 - 37      Attendees: Patient    Visit Activities (Refresh list every visit): [facility] [employer]    Diagnostic Assessment Date: June 22, 2015  Treatment Plan Review Date: 4-18-16  See Flowsheets for today's PHQ-9 and BENI-7 results  Previous PHQ-9:   PHQ-9 SCORE 5/3/2016 10/4/2017 2/12/2018   Total Score - - -   Total Score - - -   Total Score 12 3 5     Previous BENI-7:   BENI-7 SCORE 11/10/2015 10/4/2017 2/12/2018   Total Score - - -   Total Score 3 0 0   Total Score - - -       FERCHO LEVEL:  FERCHO Score (Last Two) 7/24/2014   FERCHO Raw Score 51   Activation Score 91.6   FERCHO Level 4       DATA  Extended Session (60+ minutes): No  Interactive Complexity: No  Crisis: No    Treatment Objective(s) Addressed in This Session:  Target Behavior(s): disease management/lifestyle changes mental health    Mood Instability: will develop better understanding of triggers and coping strategies to stabilize mood  PTSD Symptom Management  Psychological distress related to Pain    Current Stressors / Issues:    The patient was seen by [facility] per the request of the PCP. She was seen by the sleep specialist-then she talked about a lot of other things-then this writer guided her back to the topic-they recommended that she be seen by sleep psychiatrist for mood issues that may be contributing to her sleep issues-also they are recommending CBT therapy-the patient does not want to take medications-the patient had some confusion between psychiatrist and psychologist-talked about increased pain on upper right leg-she has not been taking her medications as prescribed (If I die I die-I don't care)-with PCP education the patient agreed to start taking one medication  that will help her stay out of the risk of health acute needs-she plans to follow up with her specialist-has not been going to the gym-she plans to start going to the gym with her -she talked about how she wants her  to get healthy-she used the PCP during this visit to help understand and manage her medical care with all her specialist.             Progress on Treatment Objective(s) / Homework:  No improvement - PREPARATION (Decided to change - considering how); Intervened by negotiating a change plan and determining options / strategies for behavior change, identifying triggers, exploring social supports, and working towards setting a date to begin behavior change    Motivational Interviewing    MI Intervention: Expressed Empathy/Understanding, Supported Autonomy, Collaboration, Evocation, Permission to raise concern or advise, Open-ended questions, Reflections: simple and complex and Change talk (evoked)     Change Talk Expressed by the Patient: Desire to change Ability to change Reasons to change Need to change Committment to change Activation Taking steps    Provider Response to Change Talk: E - Evoked more info from patient about behavior change, A - Affirmed patient's thoughts, decisions, or attempts at behavior change, R - Reflected patient's change talk and S - Summarized patient's change talk statements      Care Plan review completed: No    Medication Review:  No changes to current psychiatric medication(s)    Medication Compliance:  NA    Changes in Health Issues:   Yes: Pain, Associated Psychological Distress  Sleep disturbance, Associated Psychological Distress    Chemical Use Review:   Substance Use: Chemical use reviewed, no active concerns identified      Tobacco Use: No current tobacco use.      Assessment: Current Emotional / Mental Status (status of significant symptoms):  Risk status (Self / Other harm or suicidal ideation)  Patient denies a history of suicidal ideation, suicide  attempts, self-injurious behavior, homicidal ideation, homicidal behavior and and other safety concerns  Patient denies current fears or concerns for personal safety.  Patient denies current or recent suicidal ideation or behaviors.  Patient denies current or recent homicidal ideation or behaviors.  Patient denies current or recent self injurious behavior or ideation.  Patient denies other safety concerns.  A safety and risk management plan has not been developed at this time, however patient was encouraged to call [place] [phone] should there be a change in any of these risk factors.    Appearance:   Appropriate   Eye Contact:   Good   Psychomotor Behavior: Normal   Attitude:   Cooperative   Orientation:   All  Speech   Rate / Production: Hyperverbal    Volume:  Normal   Mood:    Normal  Affect:    Appropriate   Thought Content:  Clear   Thought Form:  Coherent  Goal Directed  Logical   Insight:    Good     Diagnoses:  1. Posttraumatic stress disorder        Collateral Reports Completed:  Not Applicable    Plan: (Homework, other):  Patient was given information about behavioral services and encouraged to schedule a follow up appointment with the clinic [facility] [place] as needed.  She was also given information about mental health symptoms and treatment options .  CD Recommendations: No indications of CD issues.  Antwan Boston, BARNEY, [facility] [place]      ______________________________________________________________________    Integrated Primary Care Behavioral Health Treatment Plan    Patient's Name: Aspen Mccullough  YOB: 1960    Date: 4-18-16    DSM-V Diagnoses: PTSD  Psychosocial / Contextual Factors: medical condition, history of traumatic experiences  WHODAS:  Will complete at next visit    Referral / Collaboration:  Referral to another professional/service is not indicated at this time..    Anticipated number of session or this episode of care: 10      MeasurableTreatment Goal(s) related to diagnosis / functional  impairment(s)  Goal 1: Patient will be able to remember the past with 50% less emotional reaction of anger and hurt.     I will know I've met my goal when I can let go of the past     Objective #A (Patient Action)    Patient will talk to at least two others about losses and coping.  Status: New - Date: 4-18-16     Intervention(s)  [facility] will provide avenue for the past to process her losses and disappointments.    Objective #B  Patient will reduce her triggers from past trauma.  Status: New - Date: 4-18-16     Intervention(s)  [facility] will teach distraction skills. mindfulness.      Patient has reviewed and agreed to the above plan.      Antwan [facility], [facility]  April 18, 2016

## 2018-02-15 NOTE — MR AVS SNAPSHOT
After Visit Summary   2/15/2018    Aspen Mccullough    MRN: [id number]           Patient Information     Date Of Birth          1960        Visit Information        Provider Department      2/15/2018 10:00 AM [facility]        Today's Diagnoses     Posttraumatic stress disorder    -  1       Follow-ups after your visit        Your next 10 appointments already scheduled     Feb 26, 2018  1:00 PM CST   Return Visit with BARNEY Yan   [facility] ([facility])    [address]  [address]  [facility] [address]   [phone]            Apr 13, 2018 10:30 AM CDT   Return Visit with BARNEY Yan   [facility] ([facility])    [address]  [address]  [facility] [address]   [phone]            Apr 13, 2018 10:30 AM CDT   Return Visit with ANITRA Melvin [facility] ([facility])    [address]  [address]  [facility] [address]   [phone]              Who to contact     If you have questions or need follow up information about today's clinic visit or your schedule please contact [facility] directly at [phone].  Normal or non-critical lab and imaging results will be communicated to you by MyChart, letter or phone within 4 business days after the clinic has received the results. If you do not hear from us within 7 days, please contact the clinic through MyChart or phone. If you have a critical or abnormal lab result, we will notify you by phone as soon as possible.  Submit refill requests through Gumiyo or call your pharmacy and they will forward the refill request to us. Please allow 3 business days for your refill to be completed.          Additional Information  About Your Visit        Gevohart Information     to-BBB gives you secure access to your electronic health record. If you see a primary care provider, you can also send messages to your care team and make appointments. If you have questions, please call your primary care clinic.  If you do not have a primary care provider, please call [phone] and they will assist you.        Care EveryWhere ID     This is your Care EveryWhere ID. This could be used by other organizations to access your [facility] medical records  [id number]         Blood Pressure from Last 3 Encounters:   02/15/18 132/78   02/12/18 122/68   01/05/18 110/68    Weight from Last 3 Encounters:   02/12/18 229 lb (103.9 kg)   01/05/18 224 lb 3.2 oz (101.7 kg)   01/21/17 130 lb (59 kg)              Today, you had the following     No orders found for display       Primary Care Provider Office Phone # Fax #    Sarahi Vivar [phone]       [facility] [address] [place] [address]        Equal Access to Services     [facility]: Hadii aad ku hadasho Soomaali, waaxda luqadaha, qaybta kaalmada adeegyabarbara, yaneth guillermo . So [facility] [phone].    ATENCIÓN: Si habla español, tiene a lynn disposición servicios gratuitos de asistencia lingüística. [facility] [phone].    We comply with applicable federal civil rights laws and [place] laws. We do not discriminate on the basis of race, color, national origin, age, disability, sex, sexual orientation, or gender identity.            Thank you!     Thank you for choosing [facility]  for your care. Our goal is always to provide you with excellent care. Hearing back from our patients is one way we can continue to improve our services. Please take a few minutes to complete the written survey that you may receive in the mail after your visit with us. Thank you!             Your Updated Medication List - Protect others around  you: Learn how to safely use, store and throw away your medicines at www.disposemymeds.org.          This list is accurate as of 2/15/18  1:49 PM.  Always use your most recent med list.                   Brand Name Dispense Instructions for use Diagnosis    ALLEGRA 180 MG tablet   Generic drug:  fexofenadine      Take 180 mg by mouth daily.        cyanocobalamin 1000 MCG/ML injection    VITAMIN B12    4 mL    Inject 1 mL (1,000 mcg) Subcutaneous every 7 days    Other vitamin B12 deficiency anemia       cyclobenzaprine 10 MG tablet    FLEXERIL    90 tablet    Take 1 tablet (10 mg) by mouth 3 times daily    Generalized osteoarthrosis, involving multiple sites       CYTOMEL 50 MCG Tabs   Generic drug:  Liothyronine Sodium      Take 50 mcg by mouth 3 times daily    Hypothyroidism, unspecified type       diazepam 10 MG tablet    VALIUM    90 tablet    Take 1 tablet (10 mg) by mouth 3 times daily    Chronic pain syndrome, Encounter for long-term use of opiate analgesic       estradiol 2 MG vaginal ring    ESTRING    1 each    Place 1 each vaginally every 3 months one ring.    Vaginal atrophy       FISH OIL      daily. w/DHEA.        FLONASE 50 MCG/ACT spray   Generic drug:  fluticasone      2 sprays by Both Nostrils route daily as needed.        HYDROmorphone 8 MG tablet    DILAUDID    100 tablet    Take 1 tablet (8 mg) by mouth every 3 hours as needed    Chronic pain syndrome       JINTELI 1-5 MG-MCG per tablet   Generic drug:  norethindrone-ethinyl estradiol     30 tablet    TAKE ONE TABLET BY MOUTH ONE TIME DAILY    Absence of menstruation       levofloxacin 750 MG tablet    LEVAQUIN    5 tablet    Take 1 tablet (750 mg) by mouth daily    Acute recurrent maxillary sinusitis       * levothyroxine 300 MCG tablet    SYNTHROID/LEVOTHROID     Take 300 mcg by mouth        * levothyroxine 300 MCG tablet    SYNTHROID/LEVOTHROID     Take 300 mcg by mouth        LOVENOX 100 MG/ML injection   Generic drug:  enoxaparin      1  ML,100 mg, under skin twice a day. Start 1/26 pm. hold am 1/31. Resume pm 1/31 & hold am 2/5 DC after 1st dose of Xarelto post colonoscopy        metoprolol succinate 25 MG 24 hr tablet    TOPROL-XL     Take 12.5 mg by mouth        morphine 30 MG 12 hr tablet    MS CONTIN    270 tablet    Take 3 tablets (90 mg) by mouth every 8 hours    Chronic pain syndrome       NASONEX 50 MCG/ACT spray   Generic drug:  mometasone           prednisoLONE acetate 1 % ophthalmic susp    PRED FORTE          * predniSONE 5 MG tablet    DELTASONE     alternate 8 mg and 10 mg every other day        * predniSONE 1 MG tablet    DELTASONE     Take 1 mg by mouth        * predniSONE 5 MG tablet    DELTASONE     Take 5 mg by mouth        probiotic Caps      Take 1 capsule by mouth daily.        RESTASIS 0.05 % ophthalmic emulsion   Generic drug:  cycloSPORINE      Place 1 drop into both eyes 2 times daily        rivaroxaban ANTICOAGULANT 20 MG Tabs tablet    XARELTO          sotalol 80 MG tablet    BETAPACE     Take 80 mg by mouth        * Notice:  This list has 5 medication(s) that are the same as other medications prescribed for you. Read the directions carefully, and ask your doctor or other care provider to review them with you.

## 2018-02-15 NOTE — MR AVS SNAPSHOT
After Visit Summary   2/15/2018    Aspen Mccullough    MRN: [id number]           Patient Information     Date Of Birth          1960        Visit Information        Provider Department      2/15/2018 10:00 AM Hailey Dubose APRN CNP [facility]        Today's Diagnoses     Systemic lupus erythematosus, unspecified SLE type, unspecified organ involvement status (H)    -  1       Follow-ups after your visit        Additional Services     RHEUMATOLOGY REFERRAL       Your provider has referred you to: DUC:  [facility].  [phone] [email or web address]      Please be aware that coverage of these services is subject to the terms and limitations of your health insurance plan.  Call member services at your health plan with any benefit or coverage questions.      Please bring the following with you to your appointment:    (1) Any X-Rays, CTs or MRIs which have been performed.  Contact the facility where they were done to arrange for  prior to your scheduled appointment.    (2) List of current medications   (3) This referral request   (4) Any documents/labs given to you for this referral                  Who to contact     If you have questions or need follow up information about today's clinic visit or your schedule please contact [facility] directly at [phone].  Normal or non-critical lab and imaging results will be communicated to you by MyChart, letter or phone within 4 business days after the clinic has received the results. If you do not hear from us within 7 days, please contact the clinic through MyChart or phone. If you have a critical or abnormal lab result, we will notify you by phone as soon as possible.  Submit refill requests through Fligoo or call your pharmacy and they will forward the refill request to us. Please allow 3 business days for your refill to be  "completed.          Additional Information About Your Visit        uberallhart Information     Command Information gives you secure access to your electronic health record. If you see a primary care provider, you can also send messages to your care team and make appointments. If you have questions, please call your primary care clinic.  If you do not have a primary care provider, please call [phone] and they will assist you.        Care EveryWhere ID     This is your Care EveryWhere ID. This could be used by other organizations to access your [facility] medical records  [id number]        Your Vitals Were     Pulse Temperature Respirations Height Pulse Oximetry       72 98.1  F (36.7  C) (Oral) 16 5' 9.5\" (1.765 m) 97%        Blood Pressure from Last 3 Encounters:   02/15/18 132/78   02/12/18 122/68   01/05/18 110/68    Weight from Last 3 Encounters:   02/12/18 229 lb (103.9 kg)   01/05/18 224 lb 3.2 oz (101.7 kg)   01/21/17 130 lb (59 kg)              We Performed the Following     RHEUMATOLOGY REFERRAL        Primary Care Provider Office Phone # Fax #    Sarahi Vivar [phone]       [facility] [address] [facility] [address]        Equal Access to Services     ADRYAN BHAT : Hadii aad ku hadasho Soomaali, waaxda luqadaha, qaybta kaalmada adeegyada, waxay idiin hayclementen angela hwang laalessandra sherman. So [facility] [phone].    ATENCIÓN: Si habla español, tiene a lynn disposición servicios gratuitos de asistencia lingüística. Llame al [phone].    We comply with applicable federal civil rights laws and [place] laws. We do not discriminate on the basis of race, color, national origin, age, disability, sex, sexual orientation, or gender identity.            Thank you!     Thank you for choosing [facility]  for your care. Our goal is always to provide you with excellent care. Hearing back from our patients is one way we can continue to improve our services. Please take a few " minutes to complete the written survey that you may receive in the mail after your visit with us. Thank you!             Your Updated Medication List - Protect others around you: Learn how to safely use, store and throw away your medicines at www.disposemymeds.org.          This list is accurate as of 2/15/18 10:39 AM.  Always use your most recent med list.                   Brand Name Dispense Instructions for use Diagnosis    ALLEGRA 180 MG tablet   Generic drug:  fexofenadine      Take 180 mg by mouth daily.        cyanocobalamin 1000 MCG/ML injection    VITAMIN B12    4 mL    Inject 1 mL (1,000 mcg) Subcutaneous every 7 days    Other vitamin B12 deficiency anemia       cyclobenzaprine 10 MG tablet    FLEXERIL    90 tablet    Take 1 tablet (10 mg) by mouth 3 times daily    Generalized osteoarthrosis, involving multiple sites       CYTOMEL 50 MCG Tabs   Generic drug:  Liothyronine Sodium      Take 50 mcg by mouth 3 times daily    Hypothyroidism, unspecified type       diazepam 10 MG tablet    VALIUM    90 tablet    Take 1 tablet (10 mg) by mouth 3 times daily    Chronic pain syndrome, Encounter for long-term use of opiate analgesic       estradiol 2 MG vaginal ring    ESTRING    1 each    Place 1 each vaginally every 3 months one ring.    Vaginal atrophy       FISH OIL      daily. w/DHEA.        FLONASE 50 MCG/ACT spray   Generic drug:  fluticasone      2 sprays by Both Nostrils route daily as needed.        HYDROmorphone 8 MG tablet    DILAUDID    100 tablet    Take 1 tablet (8 mg) by mouth every 3 hours as needed    Chronic pain syndrome       JINTELI 1-5 MG-MCG per tablet   Generic drug:  norethindrone-ethinyl estradiol     30 tablet    TAKE ONE TABLET BY MOUTH ONE TIME DAILY    Absence of menstruation       levofloxacin 750 MG tablet    LEVAQUIN    5 tablet    Take 1 tablet (750 mg) by mouth daily    Acute recurrent maxillary sinusitis       * levothyroxine 300 MCG tablet    SYNTHROID/LEVOTHROID     Take 300  mcg by mouth        * levothyroxine 300 MCG tablet    SYNTHROID/LEVOTHROID     Take 300 mcg by mouth        LOVENOX 100 MG/ML injection   Generic drug:  enoxaparin      1 ML,100 mg, under skin twice a day. Start 1/26 pm. hold am 1/31. Resume pm 1/31 & hold am 2/5 DC after 1st dose of Xarelto post colonoscopy        metoprolol succinate 25 MG 24 hr tablet    TOPROL-XL     Take 12.5 mg by mouth        morphine 30 MG 12 hr tablet    MS CONTIN    270 tablet    Take 3 tablets (90 mg) by mouth every 8 hours    Chronic pain syndrome       NASONEX 50 MCG/ACT spray   Generic drug:  mometasone           prednisoLONE acetate 1 % ophthalmic susp    PRED FORTE          * predniSONE 5 MG tablet    DELTASONE     alternate 8 mg and 10 mg every other day        * predniSONE 1 MG tablet    DELTASONE     Take 1 mg by mouth        * predniSONE 5 MG tablet    DELTASONE     Take 5 mg by mouth        probiotic Caps      Take 1 capsule by mouth daily.        RESTASIS 0.05 % ophthalmic emulsion   Generic drug:  cycloSPORINE      Place 1 drop into both eyes 2 times daily        rivaroxaban ANTICOAGULANT 20 MG Tabs tablet    XARELTO          sotalol 80 MG tablet    BETAPACE     Take 80 mg by mouth        * Notice:  This list has 5 medication(s) that are the same as other medications prescribed for you. Read the directions carefully, and ask your doctor or other care provider to review them with you.

## 2018-02-16 LAB
FINAL DIAGNOSIS: NORMAL
HPV HR 12 DNA CVX QL NAA+PROBE: NEGATIVE
HPV16 DNA SPEC QL NAA+PROBE: NEGATIVE
HPV18 DNA SPEC QL NAA+PROBE: NEGATIVE
SPECIMEN DESCRIPTION: NORMAL
SPECIMEN SOURCE CVX/VAG CYTO: NORMAL

## 2018-02-26 ENCOUNTER — OFFICE VISIT (OUTPATIENT)
Dept: BEHAVIORAL HEALTH | Facility: CLINIC | Age: 58
End: 2018-02-26
Payer: COMMERCIAL

## 2018-02-26 DIAGNOSIS — F43.10 POSTTRAUMATIC STRESS DISORDER: Primary | ICD-10-CM

## 2018-02-26 PROCEDURE — 90834 PSYTX W PT 45 MINUTES: CPT | Performed by: SOCIAL WORKER

## 2018-02-26 NOTE — PROGRESS NOTES
[facility]  February 26, 2018      Behavioral Health Clinician Progress Note    Patient Name: Aspen Mccullough           Service Type: Individual      Service Location:  in clinic      Session Start Time: 1:pm  Session End Time: 1:50pm      Session Length: 38 - 52      Attendees: Patient    Visit Activities (Refresh list every visit): [facility] [employer]    Diagnostic Assessment Date: June 22, 2015  Treatment Plan Review Date: 4-18-16  See Flowsheets for today's PHQ-9 and BENI-7 results  Previous PHQ-9:   PHQ-9 SCORE 5/3/2016 10/4/2017 2/12/2018   Total Score - - -   Total Score - - -   Total Score 12 3 5     Previous BENI-7:   BENI-7 SCORE 11/10/2015 10/4/2017 2/12/2018   Total Score - - -   Total Score 3 0 0   Total Score - - -       FERCHO LEVEL:  FERCHO Score (Last Two) 7/24/2014   FERCHO Raw Score 51   Activation Score 91.6   FERCHO Level 4       DATA  Extended Session (60+ minutes): No  Interactive Complexity: No  Crisis: No    Treatment Objective(s) Addressed in This Session:  Target Behavior(s): disease management/lifestyle changes mental health    Mood Instability: will develop better understanding of triggers and coping strategies to stabilize mood  PTSD Symptom Management  Psychological distress related to Pain    Current Stressors / Issues:    The patient was seen by [facility] per the request of the PCP. She was seen by the sleep psychologist and they made a plan to address only sleep psychology and that she will deal with other emotional issues with this writer-she started going back to the gym to exercise as she has the goal to do this on a regular basis-she also will be working out with her -the patient talked about he need to be conscious of her movements as to no injure her self-she has been starting low and controled with gym exercise-she reported feeling good about starting at the gym-she talked about how she feels her  is trying to control her now that she is gone-regarding  sleeping the patient reported-in order to do sleep study the patient reported she was told that they would have to take her off her medications-so this is not an option-she reported that her brain is still going when she is trying to fall asleep-she and sleep psychologist are going to work on this issue-she is focused on getting back in the habit of eating more healthy six smaller meals a day-waking during the night-has dreams that someone is chasing her-and dreams that she is paralyzed-has PTSD dreams about a time a person was breaking into her home through the window and dreams about grand father and her father-regarding mood she reported that music helps her calm down-she talked about being proud of her grandson-she talked about being able to forgive her uncle and her parents.      How do you become a better person by going through a challenge/trauma.            Progress on Treatment Objective(s) / Homework:  Minimal progress - ACTION (Actively working towards change); Intervened by reinforcing change plan / affirming steps taken    Motivational Interviewing    MI Intervention: Expressed Empathy/Understanding, Supported Autonomy, Collaboration, Evocation, Permission to raise concern or advise, Open-ended questions, Reflections: simple and complex and Change talk (evoked)     Change Talk Expressed by the Patient: Desire to change Ability to change Reasons to change Need to change Committment to change Activation Taking steps    Provider Response to Change Talk: E - Evoked more info from patient about behavior change, A - Affirmed patient's thoughts, decisions, or attempts at behavior change, R - Reflected patient's change talk and S - Summarized patient's change talk statements      Care Plan review completed: No    Medication Review:  No changes to current psychiatric medication(s)    Medication Compliance:  NA    Changes in Health Issues:   Yes: Pain, Associated Psychological Distress  Sleep disturbance,  Associated Psychological Distress    Chemical Use Review:   Substance Use: Chemical use reviewed, no active concerns identified      Tobacco Use: No current tobacco use.      Assessment: Current Emotional / Mental Status (status of significant symptoms):  Risk status (Self / Other harm or suicidal ideation)  Patient denies a history of suicidal ideation, suicide attempts, self-injurious behavior, homicidal ideation, homicidal behavior and and other safety concerns  Patient denies current fears or concerns for personal safety.  Patient denies current or recent suicidal ideation or behaviors.  Patient denies current or recent homicidal ideation or behaviors.  Patient denies current or recent self injurious behavior or ideation.  Patient denies other safety concerns.  A safety and risk management plan has not been developed at this time, however patient was encouraged to call [place] [phone] should there be a change in any of these risk factors.    Appearance:   Appropriate   Eye Contact:   Good   Psychomotor Behavior: Normal   Attitude:   Cooperative   Orientation:   All  Speech   Rate / Production: Hyperverbal    Volume:  Normal   Mood:    Normal  Affect:    Appropriate   Thought Content:  Clear   Thought Form:  Coherent  Goal Directed  Logical   Insight:    Good     Diagnoses:  1. Posttraumatic stress disorder        Collateral Reports Completed:  Not Applicable    Plan: (Homework, other):  Patient was given information about behavioral services and encouraged to schedule a follow up appointment with the clinic [facility] as needed.  She was also given information about mental health symptoms and treatment options .  CD Recommendations: No indications of CD issues.  BARNEY Blackwell, [facility]      ______________________________________________________________________    Integrated Primary Care Behavioral Health Treatment Plan    Patient's Name: Aspen Mccullough  YOB: 1960    Date: 4-18-16    DSM-V Diagnoses:  PTSD  Psychosocial / Contextual Factors: medical condition, history of traumatic experiences  WHODAS:  Will complete at next visit    Referral / Collaboration:  Referral to another professional/service is not indicated at this time..    Anticipated number of session or this episode of care: 10      MeasurableTreatment Goal(s) related to diagnosis / functional impairment(s)  Goal 1: Patient will be able to remember the past with 50% less emotional reaction of anger and hurt.     I will know I've met my goal when I can let go of the past     Objective #A (Patient Action)    Patient will talk to at least two others about losses and coping.  Status: New - Date: 4-18-16     Intervention(s)  [facility] will provide avenue for the past to process her losses and disappointments.    Objective #B  Patient will reduce her triggers from past trauma.  Status: New - Date: 4-18-16     Intervention(s)  [facility] will teach distraction skills. mindfulness.      Patient has reviewed and agreed to the above plan.      Antwan Boston, [facility]  April 18, 2016

## 2018-02-26 NOTE — MR AVS SNAPSHOT
After Visit Summary   2/26/2018    Aspen Mccullough    MRN: [id number]           Patient Information     Date Of Birth          1960        Visit Information        Provider Department      2/26/2018 1:00 PM Antwan Boston LICSW [facility]        Today's Diagnoses     Posttraumatic stress disorder    -  1       Follow-ups after your visit        Your next 10 appointments already scheduled     Mar 12, 2018 10:00 AM CDT   Return Visit with BARNEY Yan   [facility] Primary Care ([facility])    [address]  [address]  [facility] [address]   [phone]            Mar 28, 2018  1:00 PM CDT   [facility]    [address] [address]   [phone]            Apr 13, 2018 10:30 AM CDT   Return Visit with BARNEY Yan   [facility] ([facility])    [address]  [address]  [facility] [address]   [phone]            Apr 13, 2018 10:30 AM CDT   Return Visit with ANITRA Melvin CNP   [facility] ([facility])    [address]  [address]  [facility] [address]   [phone]              Who to contact     If you have questions or need follow up information about today's clinic visit or your schedule please contact [facility] PRIMARY [facility] directly at [phone].  Normal or non-critical lab and imaging results will be communicated to you by MyChart, letter or phone within 4 business days after the clinic has received the results. If you do not hear from us within 7 days, please contact the clinic through MyChart or phone. If you have a critical or abnormal lab result, we will notify you by phone as  soon as possible.  Submit refill requests through [employer] or call your pharmacy and they will forward the refill request to us. Please allow 3 business days for your refill to be completed.          Additional Information About Your Visit        Code Kingdomshart Information     Ge[employer]tt gives you secure access to your electronic health record. If you see a primary care provider, you can also send messages to your care team and make appointments. If you have questions, please call your primary care clinic.  If you do not have a primary care provider, please call [phone] and they will assist you.        Care EveryWhere ID     This is your Care EveryWhere ID. This could be used by other organizations to access your [facility] medical records  [id number]         Blood Pressure from Last 3 Encounters:   02/15/18 132/78   02/12/18 122/68   01/05/18 110/68    Weight from Last 3 Encounters:   02/12/18 229 lb (103.9 kg)   01/05/18 224 lb 3.2 oz (101.7 kg)   01/21/17 130 lb (59 kg)              Today, you had the following     No orders found for display       Primary Care Provider Office Phone # Fax #    Sarahi Vivar [phone]       [facility] [address] [facility] [address]        Equal Access to Services     ADRYAN BHAT : Hadii aad ku hadasho Soomaali, waaxda luqadaha, qaybta kaalmada adeegyada, waxay idiin hayskyler hwang laalessandra sherman. So [facility] [phone].    ATENCIÓN: Si habla español, tiene a lynn disposición servicios gratuitos de asistencia lingüística. [facility] [phone].    We comply with applicable federal civil rights laws and [place] laws. We do not discriminate on the basis of race, color, national origin, age, disability, sex, sexual orientation, or gender identity.            Thank you!     Thank you for choosing [facility] PRIMARY CARE  for your care. Our goal is always to provide you with excellent care. Hearing back from our patients is one way we can  continue to improve our services. Please take a few minutes to complete the written survey that you may receive in the mail after your visit with us. Thank you!             Your Updated Medication List - Protect others around you: Learn how to safely use, store and throw away your medicines at www.disposemymeds.org.          This list is accurate as of 2/26/18  3:14 PM.  Always use your most recent med list.                   Brand Name Dispense Instructions for use Diagnosis    ALLEGRA 180 MG tablet   Generic drug:  fexofenadine      Take 180 mg by mouth daily.        cyanocobalamin 1000 MCG/ML injection    VITAMIN B12    4 mL    Inject 1 mL (1,000 mcg) Subcutaneous every 7 days    Other vitamin B12 deficiency anemia       cyclobenzaprine 10 MG tablet    FLEXERIL    90 tablet    Take 1 tablet (10 mg) by mouth 3 times daily    Generalized osteoarthrosis, involving multiple sites       CYTOMEL 50 MCG Tabs   Generic drug:  Liothyronine Sodium      Take 50 mcg by mouth 3 times daily    Hypothyroidism, unspecified type       diazepam 10 MG tablet    VALIUM    90 tablet    Take 1 tablet (10 mg) by mouth 3 times daily    Chronic pain syndrome, Encounter for long-term use of opiate analgesic       estradiol 2 MG vaginal ring    ESTRING    1 each    Place 1 each vaginally every 3 months one ring.    Vaginal atrophy       FISH OIL      daily. w/DHEA.        FLONASE 50 MCG/ACT spray   Generic drug:  fluticasone      2 sprays by Both Nostrils route daily as needed.        HYDROmorphone 8 MG tablet    DILAUDID    100 tablet    Take 1 tablet (8 mg) by mouth every 3 hours as needed    Chronic pain syndrome       JINTELI 1-5 MG-MCG per tablet   Generic drug:  norethindrone-ethinyl estradiol     30 tablet    TAKE ONE TABLET BY MOUTH ONE TIME DAILY    Absence of menstruation       levofloxacin 750 MG tablet    LEVAQUIN    5 tablet    Take 1 tablet (750 mg) by mouth daily    Acute recurrent maxillary sinusitis       * levothyroxine  300 MCG tablet    SYNTHROID/LEVOTHROID     Take 300 mcg by mouth        * levothyroxine 300 MCG tablet    SYNTHROID/LEVOTHROID     Take 300 mcg by mouth        LOVENOX 100 MG/ML injection   Generic drug:  enoxaparin      1 ML,100 mg, under skin twice a day. Start 1/26 pm. hold am 1/31. Resume pm 1/31 & hold am 2/5 DC after 1st dose of Xarelto post colonoscopy        metoprolol succinate 25 MG 24 hr tablet    TOPROL-XL     Take 12.5 mg by mouth        morphine 30 MG 12 hr tablet    MS CONTIN    270 tablet    Take 3 tablets (90 mg) by mouth every 8 hours    Chronic pain syndrome       NASONEX 50 MCG/ACT spray   Generic drug:  mometasone           prednisoLONE acetate 1 % ophthalmic susp    PRED FORTE          * predniSONE 5 MG tablet    DELTASONE     alternate 8 mg and 10 mg every other day        * predniSONE 1 MG tablet    DELTASONE     Take 1 mg by mouth        * predniSONE 5 MG tablet    DELTASONE     Take 5 mg by mouth        probiotic Caps      Take 1 capsule by mouth daily.        RESTASIS 0.05 % ophthalmic emulsion   Generic drug:  cycloSPORINE      Place 1 drop into both eyes 2 times daily        rivaroxaban ANTICOAGULANT 20 MG Tabs tablet    XARELTO          sotalol 80 MG tablet    BETAPACE     Take 80 mg by mouth        * Notice:  This list has 5 medication(s) that are the same as other medications prescribed for you. Read the directions carefully, and ask your doctor or other care provider to review them with you.

## 2018-03-12 ENCOUNTER — HOSPITAL ENCOUNTER (EMERGENCY)
Facility: CLINIC | Age: 58
Discharge: HOME | End: 2018-03-13
Attending: EMERGENCY MEDICINE | Admitting: EMERGENCY MEDICINE
Payer: MEDICARE

## 2018-03-12 DIAGNOSIS — R41.82 ALTERED MENTAL STATUS, UNSPECIFIED ALTERED MENTAL STATUS TYPE: ICD-10-CM

## 2018-03-12 DIAGNOSIS — T50.905A ADVERSE EFFECT OF DRUG, INITIAL ENCOUNTER: ICD-10-CM

## 2018-03-12 DIAGNOSIS — E06.3 HYPOTHYROIDISM DUE TO HASHIMOTO'S THYROIDITIS: ICD-10-CM

## 2018-03-12 NOTE — ED AVS SNAPSHOT
[facility] Emergency Department    [address] [place] [address]    Phone:  [phone]    Fax:  [phone]                                       Aspen Mccullough   MRN: [id number]    Department:  [facility] Emergency Department   Date of Visit:  3/12/2018           Patient Information     Date Of Birth          1960        Your diagnoses for this visit were:     Altered mental status, unspecified altered mental status type     Hypothyroidism due to Hashimoto's thyroiditis     Adverse effect of drug, initial encounter        You were seen by Gautam Larose MD.      Follow-up Information     Follow up with Sarahi Vivar in 1 day.    Specialty:  Family Practice    Contact information:    [facility]  [address]  [facility] [address]  [phone]          Follow up with [facility] Emergency Department.    Specialty:  EMERGENCY MEDICINE    Why:  If symptoms worsen    Contact information:    [address] [place] [address] [phone]    Additional information:    The [facility] is located in the [facility]. lt is easily accessible from virtually any point in the [facility] area, via [address]        Discharge Instructions         Please make an appointment to follow up with Your Primary Care Provider as soon as possible.    Altered Level of Consciousness  Level of consciousness (LOC) is a measure of a person s ability to interact with other people and to react to the surroundings. A person with an altered level of consciousness may not respond to touch or voices. He or she may look vacant or blank and may not make eye contact with others. He or she may be limp and may not move for a long time or show little interest in moving. He or she may also be confused.  There are many causes of altered LOC. They include low blood sugar, infection, medicines, injuries, or other medical problems.  Altered LOC is a medical  emergency. The healthcare provider will do tests to help find the cause. These may include blood tests and imaging tests. The person is treated so breathing and heart rate are stable. An intravenous (IV) line may be put into a vein in the arm or hand to give medicines. Once the cause of altered LOC is found, the goal is to treat the cause.  In almost all cases, the person will be admitted to the hospital for observation.  Home care  When your loved one is released from the hospital, you will be given guidelines for caring for him or her. In general:    Follow the healthcare provider's instructions for giving any prescribed medicines to your child.    Stay with your loved one or have another responsible adult look after him or her. Watch carefully for any return of symptoms or changes in behavior.    If the person has diabetes, make sure that any approved medicines are given on time and as prescribed.  Follow-up care  Follow up with the healthcare provider or our staff.  When to seek medical advice  Call the healthcare provider right away for any of the following:    Symptoms of altered LOC return    New symptoms appear  Date Last Reviewed: 8/23/2015 2000-2017 [employer]. [address] [place] [address]. All rights reserved. This information is not intended as a substitute for professional medical care. Always follow your healthcare professional's instructions.          24 Hour Appointment Hotline       To make an appointment at any [facility] clinic, call [phone] ([phone]). If you don't have a family doctor or clinic, we will help you find one. [facility] clinics are conveniently located to serve the needs of you and your family.             Review of your medicines      Notice     You have not been prescribed any medications.            Procedures and tests performed during your visit     Ammonia    CBC with platelets differential    Comprehensive metabolic panel    EKG  "12-lead, tracing only    Lactic acid whole blood    T4 free    TSH with free T4 reflex      Orders Needing Specimen Collection     Ordered          03/13/18 0105  UA reflex to Microscopic and Culture - STAT, [facility]o: STAT, Needs to be Collected     Scheduled Task Status   03/13/18 0106 Collect UA reflex to Microscopic and Culture Open   Order Class:  PCU Collect                03/13/18 0108  Drug abuse screen 6 urine (chem dep) - STAT, Prio: STAT, Needs to be Collected     Scheduled Task Status   03/13/18 0109 Collect Drug abuse screen 6 urine (chem dep) Open   Order Class:  PCU Collect                  Pending Results     No orders found for last 3 day(s).            Pending Culture Results     No orders found for last 3 day(s).            Pending Results Instructions     If you had any lab results that were not finalized at the time of your Discharge, you can call the ED Lab Result RN at [phone]. You will be contacted by this team for any positive Lab results or changes in treatment. The nurses are available 7 days a week from 10A to 6:30P.  You can leave a message 24 hours per day and they will return your call.        Thank you for choosing [facility]       Thank you for choosing [facility] for your care. Our goal is always to provide you with excellent care. Hearing back from our patients is one way we can continue to improve our services. Please take a few minutes to complete the written survey that you may receive in the mail after you visit with us. Thank you!        [employer] Information     [employer] lets you send messages to your doctor, view your test results, renew your prescriptions, schedule appointments and more. To sign up, go to [email or web address] . Click on \"Log in\" on the left side of the screen, which will take you to the Welcome page. Then click on \"Sign up Now\" on the right side of the page.     You will be asked to enter the access code listed below, as well as some personal information. " Please follow the directions to create your username and password.     Your access code is: [email or web address]  Expires: 2018  3:15 AM     Your access code will  in 90 days. If you need help or a new code, please call your [facility] clinic or [phone].        Care EveryWhere ID     This is your Care EveryWhere ID. This could be used by other organizations to access your [facility] medical records  [id number]        Equal Access to Services     KADE [facility] HospitalJEET : Hadii aad ku hadasho Soomaali, waaxda luqadaha, qaybta kaalmada adeegyabarbara, yaneth guillermo . So [facility] [phone].    ATENCIÓN: Si habla español, tiene a lynn disposición servicios gratuitos de asistencia lingüística. Llame al [phone].    We comply with applicable federal civil rights laws and [place] laws. We do not discriminate on the basis of race, color, national origin, age, disability, sex, sexual orientation, or gender identity.            After Visit Summary       This is your record. Keep this with you and show to your community pharmacist(s) and doctor(s) at your next visit.

## 2018-03-12 NOTE — ED AVS SNAPSHOT
[facility], [place], Emergency Department    [address]    [facility] [address]    Phone:  [phone]    Fax:  [phone]                                       Aspen Mccullough   MRN: [id number]    Department:  [facility], Emergency Department   Date of Visit:  3/12/2018           After Visit Summary Signature Page     I have received my discharge instructions, and my questions have been answered. I have discussed any challenges I see with this plan with the nurse or doctor.    ..........................................................................................................................................  Patient/Patient Representative Signature      ..........................................................................................................................................  Patient Representative Print Name and Relationship to Patient    ..................................................               ................................................  Date                                            Time    ..........................................................................................................................................  Reviewed by Signature/Title    ...................................................              ..............................................  Date                                                            Time

## 2018-03-13 VITALS
RESPIRATION RATE: 18 BRPM | TEMPERATURE: 96.5 F | SYSTOLIC BLOOD PRESSURE: 113 MMHG | OXYGEN SATURATION: 96 % | DIASTOLIC BLOOD PRESSURE: 70 MMHG | HEART RATE: 78 BPM

## 2018-03-13 LAB
ALBUMIN SERPL-MCNC: 3.6 G/DL (ref 3.4–5)
ALP SERPL-CCNC: 117 U/L (ref 40–150)
ALT SERPL W P-5'-P-CCNC: 99 U/L (ref 0–50)
AMMONIA PLAS-SCNC: 49 UMOL/L (ref 10–50)
ANION GAP SERPL CALCULATED.3IONS-SCNC: 6 MMOL/L (ref 3–14)
AST SERPL W P-5'-P-CCNC: 505 U/L (ref 0–45)
BASOPHILS # BLD AUTO: 0.1 10E9/L (ref 0–0.2)
BASOPHILS NFR BLD AUTO: 0.5 %
BILIRUB SERPL-MCNC: 0.4 MG/DL (ref 0.2–1.3)
BUN SERPL-MCNC: 24 MG/DL (ref 7–30)
CALCIUM SERPL-MCNC: 8.8 MG/DL (ref 8.5–10.1)
CHLORIDE SERPL-SCNC: 104 MMOL/L (ref 94–109)
CO2 SERPL-SCNC: 28 MMOL/L (ref 20–32)
CREAT SERPL-MCNC: 1.01 MG/DL (ref 0.52–1.04)
DIFFERENTIAL METHOD BLD: ABNORMAL
EOSINOPHIL # BLD AUTO: 0.2 10E9/L (ref 0–0.7)
EOSINOPHIL NFR BLD AUTO: 1.3 %
ERYTHROCYTE [DISTWIDTH] IN BLOOD BY AUTOMATED COUNT: 16.8 % (ref 10–15)
GFR SERPL CREATININE-BSD FRML MDRD: 56 ML/MIN/1.7M2
GLUCOSE SERPL-MCNC: 117 MG/DL (ref 70–99)
HCT VFR BLD AUTO: 46.8 % (ref 35–47)
HGB BLD-MCNC: 15.2 G/DL (ref 11.7–15.7)
IMM GRANULOCYTES # BLD: 0.1 10E9/L (ref 0–0.4)
IMM GRANULOCYTES NFR BLD: 0.6 %
INTERPRETATION ECG - MUSE: NORMAL
LACTATE BLD-SCNC: 1.5 MMOL/L (ref 0.7–2)
LYMPHOCYTES # BLD AUTO: 2.7 10E9/L (ref 0.8–5.3)
LYMPHOCYTES NFR BLD AUTO: 20.4 %
MCH RBC QN AUTO: 28.5 PG (ref 26.5–33)
MCHC RBC AUTO-ENTMCNC: 32.5 G/DL (ref 31.5–36.5)
MCV RBC AUTO: 88 FL (ref 78–100)
MONOCYTES # BLD AUTO: 0.9 10E9/L (ref 0–1.3)
MONOCYTES NFR BLD AUTO: 7 %
NEUTROPHILS # BLD AUTO: 9.1 10E9/L (ref 1.6–8.3)
NEUTROPHILS NFR BLD AUTO: 70.2 %
NRBC # BLD AUTO: 0 10*3/UL
NRBC BLD AUTO-RTO: 0 /100
PLATELET # BLD AUTO: 313 10E9/L (ref 150–450)
POTASSIUM SERPL-SCNC: 4 MMOL/L (ref 3.4–5.3)
PROT SERPL-MCNC: 7.9 G/DL (ref 6.8–8.8)
RBC # BLD AUTO: 5.34 10E12/L (ref 3.8–5.2)
SODIUM SERPL-SCNC: 138 MMOL/L (ref 133–144)
T4 FREE SERPL-MCNC: 0.72 NG/DL (ref 0.76–1.46)
TSH SERPL DL<=0.005 MIU/L-ACNC: 44.08 MU/L (ref 0.4–4)
WBC # BLD AUTO: 13 10E9/L (ref 4–11)

## 2018-03-13 PROCEDURE — 85025 COMPLETE CBC W/AUTO DIFF WBC: CPT | Performed by: EMERGENCY MEDICINE

## 2018-03-13 PROCEDURE — 84443 ASSAY THYROID STIM HORMONE: CPT | Performed by: EMERGENCY MEDICINE

## 2018-03-13 PROCEDURE — 84439 ASSAY OF FREE THYROXINE: CPT | Performed by: EMERGENCY MEDICINE

## 2018-03-13 PROCEDURE — 82140 ASSAY OF AMMONIA: CPT | Performed by: EMERGENCY MEDICINE

## 2018-03-13 PROCEDURE — 80053 COMPREHEN METABOLIC PANEL: CPT | Performed by: EMERGENCY MEDICINE

## 2018-03-13 PROCEDURE — 90791 PSYCH DIAGNOSTIC EVALUATION: CPT

## 2018-03-13 PROCEDURE — 25000128 H RX IP 250 OP 636: Performed by: EMERGENCY MEDICINE

## 2018-03-13 PROCEDURE — 83605 ASSAY OF LACTIC ACID: CPT | Performed by: EMERGENCY MEDICINE

## 2018-03-13 RX ADMIN — SODIUM CHLORIDE 1000 ML: 9 INJECTION, SOLUTION INTRAVENOUS at 01:32

## 2018-03-13 ASSESSMENT — ENCOUNTER SYMPTOMS
NERVOUS/ANXIOUS: 0
HALLUCINATIONS: 1
DECREASED CONCENTRATION: 0

## 2018-03-13 NOTE — ED NOTES
Bed: ED14  Expected date: 3/12/18  Expected time: 11:26 PM  Means of arrival: Ambulance  Comments:  [address]  58F  Agitation  Hallucinations  Restrained, sedated
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
## 2018-03-13 NOTE — DISCHARGE INSTRUCTIONS
Please make an appointment to follow up with Your Primary Care Provider as soon as possible.    Altered Level of Consciousness  Level of consciousness (LOC) is a measure of a person s ability to interact with other people and to react to the surroundings. A person with an altered level of consciousness may not respond to touch or voices. He or she may look vacant or blank and may not make eye contact with others. He or she may be limp and may not move for a long time or show little interest in moving. He or she may also be confused.  There are many causes of altered LOC. They include low blood sugar, infection, medicines, injuries, or other medical problems.  Altered LOC is a medical emergency. The healthcare provider will do tests to help find the cause. These may include blood tests and imaging tests. The person is treated so breathing and heart rate are stable. An intravenous (IV) line may be put into a vein in the arm or hand to give medicines. Once the cause of altered LOC is found, the goal is to treat the cause.  In almost all cases, the person will be admitted to the hospital for observation.  Home care  When your loved one is released from the hospital, you will be given guidelines for caring for him or her. In general:    Follow the healthcare provider's instructions for giving any prescribed medicines to your child.    Stay with your loved one or have another responsible adult look after him or her. Watch carefully for any return of symptoms or changes in behavior.    If the person has diabetes, make sure that any approved medicines are given on time and as prescribed.  Follow-up care  Follow up with the healthcare provider or our staff.  When to seek medical advice  Call the healthcare provider right away for any of the following:    Symptoms of altered LOC return    New symptoms appear  Date Last Reviewed: 8/23/2015 2000-2017 [employer] [address] [place] [address], [place] [address]. All  rights reserved. This information is not intended as a substitute for professional medical care. Always follow your healthcare professional's instructions.

## 2018-03-19 ENCOUNTER — OFFICE VISIT (OUTPATIENT)
Dept: BEHAVIORAL HEALTH | Facility: CLINIC | Age: 58
End: 2018-03-19
Payer: COMMERCIAL

## 2018-03-19 DIAGNOSIS — F43.10 POSTTRAUMATIC STRESS DISORDER: Primary | ICD-10-CM

## 2018-03-19 DIAGNOSIS — F98.8 ATTENTION DEFICIT DISORDER: ICD-10-CM

## 2018-03-19 PROCEDURE — 90834 PSYTX W PT 45 MINUTES: CPT | Performed by: SOCIAL WORKER

## 2018-03-19 NOTE — PROGRESS NOTES
"[facility] - Integrated Primary Care Clinic  March 19, 2018      Behavioral Health Clinician Progress Note    Patient Name: Aspen Mccullough           Service Type: Individual      Service Location:  in clinic      Session Start Time: 10:05am  Session End Time: 10:45am      Session Length: 38 - 52      Attendees: Patient    Visit Activities (Refresh list every visit): [facility] Only    Diagnostic Assessment Date: June 22, 2015  Treatment Plan Review Date: 4-18-16  See Flowsheets for today's PHQ-9 and BENI-7 results  Previous PHQ-9:   PHQ-9 SCORE 5/3/2016 10/4/2017 2/12/2018   Total Score - - -   Total Score - - -   Total Score 12 3 5     Previous BENI-7:   BENI-7 SCORE 11/10/2015 10/4/2017 2/12/2018   Total Score - - -   Total Score 3 0 0   Total Score - - -       FERCHO LEVEL:  FERCHO Score (Last Two) 7/24/2014   FERCHO Raw Score 51   Activation Score 91.6   FERCHO Level 4       DATA  Extended Session (60+ minutes): No  Interactive Complexity: No  Crisis: No    Treatment Objective(s) Addressed in This Session:  Target Behavior(s): disease management/lifestyle changes mental health    Mood Instability: will develop better understanding of triggers and coping strategies to stabilize mood  PTSD Symptom Management  Psychological distress related to Pain    Current Stressors / Issues:    The patient was tapered off Valium and she does not remember days in a row-daughter called 911 because the patient was acting strange-the police came and the patient was having delusions and reportedly refused to cooperate with the police-she was hand cuffed-like I was watching a movie-and she was admitted to acute psychiatric care and only stayed for \"several hours\" she believes this is a medication reaction-she did not get admitted she was possibly in the OROPEZA-she plans to follow up with pain specialist to address the spasms with different medication-she talked about her spasms (she got tearful and emotional)-she talked about not having control of her " "pain and spasms that \"it is controlling me\" she is scared-my daughter had to call in-she is frustrated that  did not step up and that her daughter needed to step up-the patient does not like that her daughter has to step in in a place that she believes her  should-we had discussion of frustrated of fitting a square in a [place] shape pocket-the patient is trying to help her  be baltazar to stay in the life of his grandchildren-wants family to get along and stay together.  Daughter should not have to change anything.          Progress on Treatment Objective(s) / Homework:  Minimal progress - ACTION (Actively working towards change); Intervened by reinforcing change plan / affirming steps taken    Motivational Interviewing    MI Intervention: Expressed Empathy/Understanding, Supported Autonomy, Collaboration, Evocation, Permission to raise concern or advise, Open-ended questions, Reflections: simple and complex and Change talk (evoked)     Change Talk Expressed by the Patient: Desire to change Ability to change Reasons to change Need to change Committment to change Activation Taking steps    Provider Response to Change Talk: E - Evoked more info from patient about behavior change, A - Affirmed patient's thoughts, decisions, or attempts at behavior change, R - Reflected patient's change talk and S - Summarized patient's change talk statements      Care Plan review completed: No    Medication Review:  No changes to current psychiatric medication(s)    Medication Compliance:  NA    Changes in Health Issues:   Yes: Pain, Associated Psychological Distress  Sleep disturbance, Associated Psychological Distress    Chemical Use Review:   Substance Use: Chemical use reviewed, no active concerns identified      Tobacco Use: No current tobacco use.      Assessment: Current Emotional / Mental Status (status of significant symptoms):  Risk status (Self / Other harm or suicidal ideation)  Patient denies a history of " suicidal ideation, suicide attempts, self-injurious behavior, homicidal ideation, homicidal behavior and and other safety concerns  Patient denies current fears or concerns for personal safety.  Patient denies current or recent suicidal ideation or behaviors.  Patient denies current or recent homicidal ideation or behaviors.  Patient denies current or recent self injurious behavior or ideation.  Patient denies other safety concerns.  A safety and risk management plan has not been developed at this time, however patient was encouraged to call [place] [phone] should there be a change in any of these risk factors.    Appearance:   Appropriate   Eye Contact:   Good   Psychomotor Behavior: Normal   Attitude:   Cooperative   Orientation:   All  Speech   Rate / Production: Hyperverbal    Volume:  Normal   Mood:    Normal  Affect:    Appropriate   Thought Content:  Clear   Thought Form:  Coherent  Goal Directed  Logical   Insight:    Good     Diagnoses:  1. Posttraumatic stress disorder    2. Attention deficit disorder        Collateral Reports Completed:  Not Applicable    Plan: (Homework, other):  Patient was given information about behavioral services and encouraged to schedule a follow up appointment with the clinic [facility] as needed.  She was also given information about mental health symptoms and treatment options .  CD Recommendations: No indications of CD issues.  Antwan Boston, BARNEY, [facility]      ______________________________________________________________________    Integrated Primary Care Behavioral Health Treatment Plan    Patient's Name: Aspen Mccullough  YOB: 1960    Date: 4-18-16    DSM-V Diagnoses: PTSD  Psychosocial / Contextual Factors: medical condition, history of traumatic experiences  WHODAS:  Will complete at next visit    Referral / Collaboration:  Referral to another professional/service is not indicated at this time..    Anticipated number of session or this episode of care:  10      MeasurableTreatment Goal(s) related to diagnosis / functional impairment(s)  Goal 1: Patient will be able to remember the past with 50% less emotional reaction of anger and hurt.     I will know I've met my goal when I can let go of the past     Objective #A (Patient Action)    Patient will talk to at least two others about losses and coping.  Status: New - Date: 4-18-16     Intervention(s)  [facility] will provide avenue for the past to process her losses and disappointments.    Objective #B  Patient will reduce her triggers from past trauma.  Status: New - Date: 4-18-16     Intervention(s)  [facility] will teach distraction skills. mindfulness.      Patient has reviewed and agreed to the above plan.      Antwan Boston, [facility]  April 18, 2016

## 2018-03-19 NOTE — MR AVS SNAPSHOT
After Visit Summary   3/19/2018    Aspen Mccullough    MRN: [id number]           Patient Information     Date Of Birth          1960        Visit Information        Provider Department      3/19/2018 10:00 AM [facility]        Today's Diagnoses     Posttraumatic stress disorder    -  1    Attention deficit disorder           Follow-ups after your visit        Your next 10 appointments already scheduled     Mar 28, 2018  1:00 PM CDT   New Visit with Rudi Awad MD   [facility] ([facility])    [address] [place] [address]   [phone]            Apr 02, 2018  8:45 AM CDT   (Arrive by 8:30 AM)   NEW KNEE with Naun Mejia MD   [facility] ([facility])    [address] [place] [address] [phone]            Apr 03, 2018 11:00 AM CDT   [facility] Visit with BARNEY Yan   [facility]    [address]  [address]  [facility] [address] [phone]            Apr 13, 2018 10:30 AM CDT   Return Visit with BARNEY Yan   [facility]    [address]  [address]  [facility] [place] [facility] [address] [phone]            Apr 13, 2018 10:30 AM CDT   Return Visit with ANITRA Melvin [facility] ([facility])    [address]  [address]  [place] [place] [facility] [address]   [phone]              Who to contact     If you have questions or need follow up information about today's clinic visit or your schedule please contact [facility] directly at [phone].  Normal or non-critical lab and imaging results will be communicated  to you by Information Assurancehart, letter or phone within 4 business days after the clinic has received the results. If you do not hear from us within 7 days, please contact the clinic through [employer] or phone. If you have a critical or abnormal lab result, we will notify you by phone as soon as possible.  Submit refill requests through [employer] or call your pharmacy and they will forward the refill request to us. Please allow 3 business days for your refill to be completed.          Additional Information About Your Visit        Information Ass[employer] Information     [employer] gives you secure access to your electronic health record. If you see a primary care provider, you can also send messages to your care team and make appointments. If you have questions, please call your primary care clinic.  If you do not have a primary care provider, please call [phone] and they will assist you.        Care EveryWhere ID     This is your Care EveryWhere ID. This could be used by other organizations to access your [facility] medical records  [id number]         Blood Pressure from Last 3 Encounters:   03/13/18 113/70   02/15/18 132/78   02/12/18 122/68    Weight from Last 3 Encounters:   02/12/18 229 lb (103.9 kg)   01/05/18 224 lb 3.2 oz (101.7 kg)   01/21/17 130 lb (59 kg)              Today, you had the following     No orders found for display       Primary Care Provider Office Phone # Fax #    Sarahi Vivar [phone]       [facility] [address] [facility] [address]        Equal Access to Services     ADRYAN BHAT : Hadii aad ku hadasho Soomaali, waaxda luqadaha, qaybta kaalmada adeegyada, waxay idiin hayclementen angela guillermo . So [place] [phone].    ATENCIÓN: Si habla español, tiene a lynn disposición servicios gratuitos de asistencia lingüística. Llame al [phone].    We comply with applicable federal civil rights laws and [place] laws. We do not discriminate on the basis of race, color, national origin,  age, disability, sex, sexual orientation, or gender identity.            Thank you!     Thank you for choosing [facility]  for your care. Our goal is always to provide you with excellent care. Hearing back from our patients is one way we can continue to improve our services. Please take a few minutes to complete the written survey that you may receive in the mail after your visit with us. Thank you!             Your Updated Medication List - Protect others around you: Learn how to safely use, store and throw away your medicines at www.disposemymeds.org.          This list is accurate as of 3/19/18 11:59 PM.  Always use your most recent med list.                   Brand Name Dispense Instructions for use Diagnosis    ALLEGRA 180 MG tablet   Generic drug:  fexofenadine      Take 180 mg by mouth daily.        cyanocobalamin 1000 MCG/ML injection    VITAMIN B12    4 mL    Inject 1 mL (1,000 mcg) Subcutaneous every 7 days    Other vitamin B12 deficiency anemia       cyclobenzaprine 10 MG tablet    FLEXERIL    90 tablet    Take 1 tablet (10 mg) by mouth 3 times daily    Generalized osteoarthrosis, involving multiple sites       CYTOMEL 50 MCG Tabs   Generic drug:  Liothyronine Sodium      Take 50 mcg by mouth 3 times daily    Hypothyroidism, unspecified type       diazepam 10 MG tablet    VALIUM    90 tablet    Take 1 tablet (10 mg) by mouth 3 times daily    Chronic pain syndrome, Encounter for long-term use of opiate analgesic       estradiol 2 MG vaginal ring    ESTRING    1 each    Place 1 each vaginally every 3 months one ring.    Vaginal atrophy       FISH OIL      daily. w/DHEA.        FLONASE 50 MCG/ACT spray   Generic drug:  fluticasone      2 sprays by Both Nostrils route daily as needed.        HYDROmorphone 8 MG tablet    DILAUDID    100 tablet    Take 1 tablet (8 mg) by mouth every 3 hours as needed    Chronic pain syndrome       JINTELI 1-5 MG-MCG per tablet   Generic drug:   norethindrone-ethinyl estradiol     30 tablet    TAKE ONE TABLET BY MOUTH ONE TIME DAILY    Absence of menstruation       levofloxacin 750 MG tablet    LEVAQUIN    5 tablet    Take 1 tablet (750 mg) by mouth daily    Acute recurrent maxillary sinusitis       * levothyroxine 300 MCG tablet    SYNTHROID/LEVOTHROID     Take 300 mcg by mouth        * levothyroxine 300 MCG tablet    SYNTHROID/LEVOTHROID     Take 300 mcg by mouth        LOVENOX 100 MG/ML injection   Generic drug:  enoxaparin      1 ML,100 mg, under skin twice a day. Start 1/26 pm. hold am 1/31. Resume pm 1/31 & hold am 2/5 DC after 1st dose of Xarelto post colonoscopy        metoprolol succinate 25 MG 24 hr tablet    TOPROL-XL     Take 12.5 mg by mouth        morphine 30 MG 12 hr tablet    MS CONTIN    270 tablet    Take 3 tablets (90 mg) by mouth every 8 hours    Chronic pain syndrome       NASONEX 50 MCG/ACT spray   Generic drug:  mometasone           prednisoLONE acetate 1 % ophthalmic susp    PRED FORTE          * predniSONE 5 MG tablet    DELTASONE     alternate 8 mg and 10 mg every other day        * predniSONE 1 MG tablet    DELTASONE     Take 1 mg by mouth        * predniSONE 5 MG tablet    DELTASONE     Take 5 mg by mouth        probiotic Caps      Take 1 capsule by mouth daily.        RESTASIS 0.05 % ophthalmic emulsion   Generic drug:  cycloSPORINE      Place 1 drop into both eyes 2 times daily        rivaroxaban ANTICOAGULANT 20 MG Tabs tablet    XARELTO          sotalol 80 MG tablet    BETAPACE     Take 80 mg by mouth        * Notice:  This list has 5 medication(s) that are the same as other medications prescribed for you. Read the directions carefully, and ask your doctor or other care provider to review them with you.

## 2018-03-26 ASSESSMENT — ENCOUNTER SYMPTOMS
MUSCLE WEAKNESS: 1
INSOMNIA: 1
NIGHT SWEATS: 1
DECREASED CONCENTRATION: 0
VOMITING: 0
FEVER: 0
BACK PAIN: 1
TROUBLE SWALLOWING: 0
WEIGHT GAIN: 1
NECK MASS: 0
CHILLS: 1
BLOOD IN STOOL: 0
SWOLLEN GLANDS: 0
SINUS PAIN: 1
HEARTBURN: 0
NAUSEA: 1
MYALGIAS: 1
SMELL DISTURBANCE: 0
BLOATING: 1
FATIGUE: 1
ALTERED TEMPERATURE REGULATION: 1
SKIN CHANGES: 0
POLYDIPSIA: 1
ABDOMINAL PAIN: 1
MUSCLE CRAMPS: 1
RECTAL PAIN: 0
JAUNDICE: 0
DIARRHEA: 1
BOWEL INCONTINENCE: 0
EYE REDNESS: 1
WEIGHT LOSS: 0
EYE IRRITATION: 0
NERVOUS/ANXIOUS: 0
JOINT SWELLING: 1
NAIL CHANGES: 1
EYE WATERING: 1
HOARSE VOICE: 0
NECK PAIN: 1
TASTE DISTURBANCE: 1
EYE PAIN: 0
ARTHRALGIAS: 1
DEPRESSION: 0
STIFFNESS: 1
PANIC: 0
DECREASED APPETITE: 0
SORE THROAT: 0
HALLUCINATIONS: 0
SINUS CONGESTION: 0
BRUISES/BLEEDS EASILY: 1
POLYPHAGIA: 0
CONSTIPATION: 1
INCREASED ENERGY: 1
POOR WOUND HEALING: 0
DOUBLE VISION: 0

## 2018-03-28 ENCOUNTER — OFFICE VISIT (OUTPATIENT)
Dept: RHEUMATOLOGY | Facility: CLINIC | Age: 58
End: 2018-03-28
Payer: COMMERCIAL

## 2018-03-28 VITALS
HEIGHT: 67 IN | BODY MASS INDEX: 40.76 KG/M2 | WEIGHT: 259.7 LBS | TEMPERATURE: 98.7 F | HEART RATE: 89 BPM | DIASTOLIC BLOOD PRESSURE: 82 MMHG | SYSTOLIC BLOOD PRESSURE: 136 MMHG | OXYGEN SATURATION: 91 %

## 2018-03-28 DIAGNOSIS — Z86.711 HISTORY OF PULMONARY EMBOLISM: Primary | ICD-10-CM

## 2018-03-28 LAB
ALT SERPL W P-5'-P-CCNC: 27 U/L (ref 0–50)
AST SERPL W P-5'-P-CCNC: 31 U/L (ref 0–45)
CRP SERPL-MCNC: 10 MG/L (ref 0–8)
ERYTHROCYTE [SEDIMENTATION RATE] IN BLOOD BY WESTERGREN METHOD: 13 MM/H (ref 0–30)

## 2018-03-28 PROCEDURE — 86038 ANTINUCLEAR ANTIBODIES: CPT | Performed by: INTERNAL MEDICINE

## 2018-03-28 PROCEDURE — 86160 COMPLEMENT ANTIGEN: CPT | Performed by: INTERNAL MEDICINE

## 2018-03-28 PROCEDURE — 83516 IMMUNOASSAY NONANTIBODY: CPT | Mod: 59 | Performed by: INTERNAL MEDICINE

## 2018-03-28 PROCEDURE — 86200 CCP ANTIBODY: CPT | Performed by: INTERNAL MEDICINE

## 2018-03-28 PROCEDURE — 86147 CARDIOLIPIN ANTIBODY EA IG: CPT | Performed by: INTERNAL MEDICINE

## 2018-03-28 PROCEDURE — 84450 TRANSFERASE (AST) (SGOT): CPT | Performed by: INTERNAL MEDICINE

## 2018-03-28 PROCEDURE — 99204 OFFICE O/P NEW MOD 45 MIN: CPT | Performed by: INTERNAL MEDICINE

## 2018-03-28 PROCEDURE — 86140 C-REACTIVE PROTEIN: CPT | Performed by: INTERNAL MEDICINE

## 2018-03-28 PROCEDURE — 00000401 ZZHCL STATISTIC THROMBIN TIME NC: Performed by: INTERNAL MEDICINE

## 2018-03-28 PROCEDURE — 83516 IMMUNOASSAY NONANTIBODY: CPT | Mod: 90 | Performed by: INTERNAL MEDICINE

## 2018-03-28 PROCEDURE — 86146 BETA-2 GLYCOPROTEIN ANTIBODY: CPT | Performed by: INTERNAL MEDICINE

## 2018-03-28 PROCEDURE — 86235 NUCLEAR ANTIGEN ANTIBODY: CPT | Performed by: INTERNAL MEDICINE

## 2018-03-28 PROCEDURE — 85652 RBC SED RATE AUTOMATED: CPT | Performed by: INTERNAL MEDICINE

## 2018-03-28 PROCEDURE — 36415 COLL VENOUS BLD VENIPUNCTURE: CPT | Performed by: INTERNAL MEDICINE

## 2018-03-28 PROCEDURE — 86431 RHEUMATOID FACTOR QUANT: CPT | Performed by: INTERNAL MEDICINE

## 2018-03-28 PROCEDURE — 85730 THROMBOPLASTIN TIME PARTIAL: CPT | Performed by: INTERNAL MEDICINE

## 2018-03-28 PROCEDURE — 84460 ALANINE AMINO (ALT) (SGPT): CPT | Performed by: INTERNAL MEDICINE

## 2018-03-28 PROCEDURE — 00000167 ZZHCL STATISTIC INR NC: Performed by: INTERNAL MEDICINE

## 2018-03-28 PROCEDURE — 99000 SPECIMEN HANDLING OFFICE-LAB: CPT | Performed by: INTERNAL MEDICINE

## 2018-03-28 PROCEDURE — 85732 THROMBOPLASTIN TIME PARTIAL: CPT | Performed by: INTERNAL MEDICINE

## 2018-03-28 PROCEDURE — 85597 PHOSPHOLIPID PLTLT NEUTRALIZ: CPT | Performed by: INTERNAL MEDICINE

## 2018-03-28 PROCEDURE — 85613 RUSSELL VIPER VENOM DILUTED: CPT | Performed by: INTERNAL MEDICINE

## 2018-03-28 PROCEDURE — 86225 DNA ANTIBODY NATIVE: CPT | Performed by: INTERNAL MEDICINE

## 2018-03-28 ASSESSMENT — ROUTINE ASSESSMENT OF PATIENT INDEX DATA (RAPID3)
TOTAL RAPID3 SCORE: 16.5
RAPID3 INTERPRETATION: HIGH > 12.0

## 2018-03-28 NOTE — NURSING NOTE
"Chief Complaint   Patient presents with     Consult     new pt, lupus and RA       Initial /82  Pulse 89  Temp 98.7  F (37.1  C) (Oral)  Ht 1.702 m (5' 7\")  Wt 117.8 kg (259 lb 11.2 oz)  SpO2 91%  BMI 40.67 kg/m2 Estimated body mass index is 40.67 kg/(m^2) as calculated from the following:    Height as of this encounter: 1.702 m (5' 7\").    Weight as of this encounter: 117.8 kg (259 lb 11.2 oz).  Medication Reconciliation: complete    Have you ever seen a rheumatologist yes Who Yehuda When 1993  Joint pain history  Onset: dx  With lupus 1993,   Involved joints: both knees, R ankle, L shoulder, L elbow, both hands and fingers  Pain scale:  9/10     Wakes the patient from sleep : Yes  Morning stiffness:unknown  Meds used: prednisone    Interim history  Since last visit:  1. Infections - No  2. New symptoms/medical problem - No but pain and swelling in hands, numbness and tingling in R foot and toes.   3. Any side effects from Rheum medications -none  3. ER visits/Hospitalizations/surgeries - No  4. Last PCP visit: 11/2017 blood clots in leg, heart and knees   Wt Readings from Last 4 Encounters:   03/28/18 117.8 kg (259 lb 11.2 oz)   02/12/18 103.9 kg (229 lb)   01/05/18 101.7 kg (224 lb 3.2 oz)   01/21/17 59 kg (130 lb)     BP Readings from Last 3 Encounters:   03/28/18 136/82   03/13/18 113/70   02/15/18 132/78     Liseth Forrest CMA    "

## 2018-03-28 NOTE — MR AVS SNAPSHOT
After Visit Summary   3/28/2018    Aspen Mccullough    MRN: [id number]           Patient Information     Date Of Birth          1960        Visit Information        Provider Department      3/28/2018 1:00 PM Rudi Awad MD [facility]        Today's Diagnoses     History of pulmonary embolism    -  1    History of lupus           Follow-ups after your visit        Your next 10 appointments already scheduled     Apr 02, 2018  8:45 AM CDT   (Arrive by 8:30 AM)   NEW KNEE with Naun Mejia MD   [facility] ([facility] [place])    [address]  4th Floor  [place] [facility] [address]   [phone]            Apr 03, 2018 11:00 AM CDT   Return Visit with Antwan Boston [facility] ([facility])    [address]  [address]  [facility] [place] [facility] [address]   [phone]            Apr 13, 2018 10:30 AM CDT   Return Visit with Antwan Boston [facility] ([facility])    [address]  [address]  [facility] [address]   [phone]            Apr 13, 2018 10:30 AM CDT   Return Visit with ANITRA Melvin [facility] ([facility])    [address]  [address]  [facility] [address]   [phone]              Who to contact     If you have questions or need follow up information about today's clinic visit or your schedule please contact [facility] directly at [phone].  Normal or non-critical lab and imaging results will be communicated to you by MyChart, letter or phone within 4 business days after the clinic has received the results. If you do not hear from us within 7 days, please contact the clinic through MyChart or phone. If you have a critical or abnormal lab result, we  "will notify you by phone as soon as possible.  Submit refill requests through [employer] or call your pharmacy and they will forward the refill request to us. Please allow 3 business days for your refill to be completed.          Additional Information About Your Visit        [employer] Information     [employer] gives you secure access to your electronic health record. If you see a primary care provider, you can also send messages to your care team and make appointments. If you have questions, please call your primary care clinic.  If you do not have a primary care provider, please call [phone] and they will assist you.        Care EveryWhere ID     This is your Care EveryWhere ID. This could be used by other organizations to access your [facility] medical records  [id number]        Your Vitals Were     Pulse Temperature Height Pulse Oximetry BMI (Body Mass Index)       89 98.7  F (37.1  C) (Oral) 1.702 m (5' 7\") 91% 40.67 kg/m2        Blood Pressure from Last 3 Encounters:   03/28/18 136/82   03/13/18 113/70   02/15/18 132/78    Weight from Last 3 Encounters:   03/28/18 117.8 kg (259 lb 11.2 oz)   02/12/18 103.9 kg (229 lb)   01/05/18 101.7 kg (224 lb 3.2 oz)              We Performed the Following     Anti Nuclear Rose IgG by IFA with Reflex     Beta 2 Glycoprotein Antibodies IGG IGM     Cardiolipin Rose IgG and IgM     Complement C3     Complement C4     CRP inflammation     Cyclic Citrullinated Peptide Antibody IgG     DNA double stranded antibodies     ELLIOTT antibody panel     Erythrocyte sedimentation rate auto     F Actin EIA with reflex     Lupus Anticoagulant Panel     Mitochondrial M2 Antibody IgG     Rheumatoid factor        Primary Care Provider Office Phone # Fax #    Sarahi Vivar [phone]       [facility] [address] [facility] [address]        Equal Access to Services     ADRYAN BHAT AH: Laisha Dan, olimpia michaels, danny mark, yaneth sotoin " noelskyler lidiajoyce edukallie larosakarley ah. So [facility] [phone].    ATENCIÓN: Si jihanla krystina, tiene a lynn disposición servicios gratuitos de asistencia lingüística. [place] [phone].    We comply with applicable federal civil rights laws and [place] laws. We do not discriminate on the basis of race, color, national origin, age, disability, sex, sexual orientation, or gender identity.            Thank you!     Thank you for choosing [facility]  for your care. Our goal is always to provide you with excellent care. Hearing back from our patients is one way we can continue to improve our services. Please take a few minutes to complete the written survey that you may receive in the mail after your visit with us. Thank you!             Your Updated Medication List - Protect others around you: Learn how to safely use, store and throw away your medicines at www.disposemymeds.org.          This list is accurate as of 3/28/18  1:52 PM.  Always use your most recent med list.                   Brand Name Dispense Instructions for use Diagnosis    ALLEGRA 180 MG tablet   Generic drug:  fexofenadine      Take 180 mg by mouth daily.        cyanocobalamin 1000 MCG/ML injection    VITAMIN B12    4 mL    Inject 1 mL (1,000 mcg) Subcutaneous every 7 days    Other vitamin B12 deficiency anemia       cyclobenzaprine 10 MG tablet    FLEXERIL    90 tablet    Take 1 tablet (10 mg) by mouth 3 times daily    Generalized osteoarthrosis, involving multiple sites       CYTOMEL 50 MCG Tabs   Generic drug:  Liothyronine Sodium      Take 50 mcg by mouth 3 times daily    Hypothyroidism, unspecified type       diazepam 10 MG tablet    VALIUM    90 tablet    Take 1 tablet (10 mg) by mouth 3 times daily    Chronic pain syndrome, Encounter for long-term use of opiate analgesic       estradiol 2 MG vaginal ring    ESTRING    1 each    Place 1 each vaginally every 3 months one ring.    Vaginal atrophy       FISH OIL      daily.  w/DHEA.        FLONASE 50 MCG/ACT spray   Generic drug:  fluticasone      2 sprays by Both Nostrils route daily as needed.        HYDROmorphone 8 MG tablet    DILAUDID    100 tablet    Take 1 tablet (8 mg) by mouth every 3 hours as needed    Chronic pain syndrome       JINTELI 1-5 MG-MCG per tablet   Generic drug:  norethindrone-ethinyl estradiol     30 tablet    TAKE ONE TABLET BY MOUTH ONE TIME DAILY    Absence of menstruation       levofloxacin 750 MG tablet    LEVAQUIN    5 tablet    Take 1 tablet (750 mg) by mouth daily    Acute recurrent maxillary sinusitis       * levothyroxine 300 MCG tablet    SYNTHROID/LEVOTHROID     Take 300 mcg by mouth        * levothyroxine 300 MCG tablet    SYNTHROID/LEVOTHROID     Take 300 mcg by mouth        LOVENOX 100 MG/ML injection   Generic drug:  enoxaparin      1 ML,100 mg, under skin twice a day. Start 1/26 pm. hold am 1/31. Resume pm 1/31 & hold am 2/5 DC after 1st dose of Xarelto post colonoscopy        metoprolol succinate 25 MG 24 hr tablet    TOPROL-XL     Take 12.5 mg by mouth        morphine 30 MG 12 hr tablet    MS CONTIN    270 tablet    Take 3 tablets (90 mg) by mouth every 8 hours    Chronic pain syndrome       NASONEX 50 MCG/ACT spray   Generic drug:  mometasone           prednisoLONE acetate 1 % ophthalmic susp    PRED FORTE          * predniSONE 5 MG tablet    DELTASONE     alternate 8 mg and 10 mg every other day        * predniSONE 1 MG tablet    DELTASONE     Take 1 mg by mouth        * predniSONE 5 MG tablet    DELTASONE     Take 5 mg by mouth        probiotic Caps      Take 1 capsule by mouth daily.        RESTASIS 0.05 % ophthalmic emulsion   Generic drug:  cycloSPORINE      Place 1 drop into both eyes 2 times daily        rivaroxaban ANTICOAGULANT 20 MG Tabs tablet    XARELTO          sotalol 80 MG tablet    BETAPACE     Take 80 mg by mouth        * Notice:  This list has 5 medication(s) that are the same as other medications prescribed for you. Read the  directions carefully, and ask your doctor or other care provider to review them with you.

## 2018-03-29 DIAGNOSIS — Z96.659: Primary | ICD-10-CM

## 2018-03-29 LAB
ANA SER QL IF: NEGATIVE
B2 GLYCOPROT1 IGG SERPL IA-ACNC: 1.4 U/ML
B2 GLYCOPROT1 IGM SERPL IA-ACNC: <0.9 U/ML
C3 SERPL-MCNC: 128 MG/DL (ref 76–169)
C4 SERPL-MCNC: 24 MG/DL (ref 15–50)
CARDIOLIPIN ANTIBODY IGG: <1.6 GPL-U/ML (ref 0–19.9)
CARDIOLIPIN ANTIBODY IGM: <0.2 MPL-U/ML (ref 0–19.9)
CCP AB SER IA-ACNC: 3 U/ML
DSDNA AB SER-ACNC: 3 IU/ML
ENA RNP IGG SER IA-ACNC: <0.2 AI (ref 0–0.9)
ENA SCL70 IGG SER IA-ACNC: <0.2 AI (ref 0–0.9)
ENA SM IGG SER-ACNC: 0.2 AI (ref 0–0.9)
ENA SS-A IGG SER IA-ACNC: 0.2 AI (ref 0–0.9)
ENA SS-B IGG SER IA-ACNC: <0.2 AI (ref 0–0.9)
MITOCHONDRIA M2 IGG SER-ACNC: 2 U/ML
RHEUMATOID FACT SER NEPH-ACNC: <20 IU/ML (ref 0–20)
SMA IGG SER-ACNC: 14 UNITS (ref 0–19)

## 2018-03-30 LAB — LA PPP-IMP: NEGATIVE

## 2018-03-30 NOTE — PROGRESS NOTES
Results released to [facility]:  Dear Ms. Mccullough,  Your liver enzymes are normalized now.   Liver antibodies are normal too.   Inflammatory markers are normal except borderline high CRP.   Three types of antibodies associated with blood clots are all negative. (good).   Your complements are normal.   Rheumatoid antibodies are normal.   Some of the specific antibodies for lupus (including dsDNA antibody), mixed connective tissue disease, scleroderma, sjogrens syndrome are normal (negative).  SEAN antibody for lupus is also negative. Lupus complements are normal.     With all of the above, there is NO evidence of systemic inflammatory rheumatic condition at present. There is NO evidence of systemic lupus. I am not sure on what basis the lupus diagnosis was made in the past. Considering all the side effect risks of chronic steroid use, I recommend that the prednisone is tapered off completely. Please discuss this with your doctor who is prescribing the prednisone. With prednisone taper, if you are noticing increased joint pain or swelling, please follow up with us.     Sincerely    Rudi Awad MD  [facility]

## 2018-04-02 ENCOUNTER — RADIANT APPOINTMENT (OUTPATIENT)
Dept: GENERAL RADIOLOGY | Facility: CLINIC | Age: 58
End: 2018-04-02
Payer: COMMERCIAL

## 2018-04-02 ENCOUNTER — RADIANT APPOINTMENT (OUTPATIENT)
Dept: GENERAL RADIOLOGY | Facility: CLINIC | Age: 58
End: 2018-04-02
Attending: ORTHOPAEDIC SURGERY
Payer: COMMERCIAL

## 2018-04-02 ENCOUNTER — OFFICE VISIT (OUTPATIENT)
Dept: ORTHOPEDICS | Facility: CLINIC | Age: 58
End: 2018-04-02
Payer: COMMERCIAL

## 2018-04-02 VITALS — WEIGHT: 246.5 LBS | BODY MASS INDEX: 36.51 KG/M2 | HEIGHT: 69 IN

## 2018-04-02 DIAGNOSIS — M25.562 CHRONIC PAIN OF LEFT KNEE: ICD-10-CM

## 2018-04-02 DIAGNOSIS — T84.84XA PAIN DUE TO TOTAL KNEE REPLACEMENT, INITIAL ENCOUNTER (H): Primary | ICD-10-CM

## 2018-04-02 DIAGNOSIS — Z96.659 PAIN DUE TO TOTAL KNEE REPLACEMENT, INITIAL ENCOUNTER (H): Primary | ICD-10-CM

## 2018-04-02 DIAGNOSIS — M16.10 ARTHRITIS OF HIP: ICD-10-CM

## 2018-04-02 DIAGNOSIS — Z96.659: ICD-10-CM

## 2018-04-02 DIAGNOSIS — G89.29 CHRONIC PAIN OF LEFT KNEE: ICD-10-CM

## 2018-04-02 ASSESSMENT — KOOS JR
HOW SEVERE IS YOUR KNEE STIFFNESS AFTER FIRST WAKING IN MORNING: EXTREME
HOW SEVERE IS YOUR KNEE STIFFNESS AFTER FIRST WAKING IN MORNING: 1

## 2018-04-02 ASSESSMENT — ACTIVITIES OF DAILY LIVING (ADL): FUNCTION,_DAILY_LIVING_SCORE: 39.7

## 2018-04-02 NOTE — NURSING NOTE
"Chief Complaint   Patient presents with     Consult     Pt. states that she is here today to Discuss having her Left TKA Revised.      RECHECK     ARTHROPLASTY REVISION RIGHT KNEE DOS: 4/19/2011 (Dr. Mejia)     RECHECK     Pt. states that she has had 4 Blood Clots since Nov. 2017.        58 year old  1960    Ht 1.753 m (5' 9\")  Wt 111.8 kg (246 lb 8 oz)  BMI 36.4 kg/m2      [id number] - [place] [address]    Allergies   Allergen Reactions     Codeine Hives     Ibuprofen [Nsaids] Hives     Penicillins Hives     Other reaction(s): Unknown     Thor Hives     Pt states she had rxn to skin prep-thor prep     Sulfa Drugs Hives     Other reaction(s): Unknown     Codeine      Other reaction(s): Unknown     Doxycycline GI Disturbance     Emesis & diarrhea  Patient denies allergy to this med     Ibuprofen      Other reaction(s): Unknown     Lactose      Other reaction(s): Unknown     Mold      Mushroom      Penicillins      Red Wine Complex [Calcium Pyruvate-Dihydroxyacetone]      No Clinical Screening - See Comments Rash     Thor prep     Current Outpatient Prescriptions   Medication     cyanocobalamin 1000 MCG/ML injection     levothyroxine (SYNTHROID/LEVOTHROID) 300 MCG tablet     levothyroxine (SYNTHROID/LEVOTHROID) 300 MCG tablet     predniSONE (DELTASONE) 1 MG tablet     predniSONE (DELTASONE) 5 MG tablet     enoxaparin (LOVENOX) 100 MG/ML injection     sotalol (BETAPACE) 80 MG tablet     metoprolol succinate (TOPROL-XL) 25 MG 24 hr tablet     rivaroxaban ANTICOAGULANT (XARELTO) 20 MG TABS tablet     levofloxacin (LEVAQUIN) 750 MG tablet     JINTELI 1-5 MG-MCG per tablet     estradiol (ESTRING) 2 MG vaginal ring     prednisoLONE acetate (PRED FORTE) 1 % ophthalmic suspension     HYDROmorphone (DILAUDID) 8 MG tablet     morphine (MS CONTIN) 30 MG 12 hr tablet     diazepam (VALIUM) 10 MG tablet     Liothyronine Sodium 50 MCG TABS     NASONEX 50 MCG/ACT nasal spray     cyclobenzaprine " (FLEXERIL) 10 MG tablet     cycloSPORINE (RESTASIS) 0.05 % ophthalmic emulsion     probiotic CAPS     fexofenadine (ALLEGRA) 180 MG tablet     fluticasone (FLONASE) 50 MCG/ACT nasal spray     FISH OIL     PREDNISONE 5 MG OR TABS     No current facility-administered medications for this visit.          Questionnaires:      KOOS Knee Survey Assessment    Knee Outcome Survey ADL Scale (Radha, LÓPEZ; YASSINE Wan; TONY Perez; Shmuel, KATE; Shen, JACQUI; 1998) 4/2/2018   Pain (ADLS1) -   Stiffness (ADLS2) -   Swelling (ADLS3) -   Giving Way, Buckling or Shifting of Knee (ADLS4) -   Weakness (ADLS5) -   Limping (ADLS6) -   Walk? (ADLS7) -   Go up stairs? (ADLS8) -   Go down stairs? (ADLS9) -   Stand? (ADLS10) -   Kneel on the front of your knee? (ADLS11) -   Squat? (ADLS12) -   Sit with your knee bent? (ADLS13) -   Sum -   Count -   Knee Activity of Daily Living Score -   Do you have swelling in your knee? Often   Do you feel grinding, hear clicking or any other type of noise when your knee moves? Always   Does your knee catch or hang up when moving? Sometimes   Can you straighten your knee fully? Always   Can you bend your knee fully? Always   How severe is your knee joint stiffness after first wakening in the morning? Extreme   How severe is your knee stiffness after sitting, lying or resting LATER IN THE DAY? Severe   How often do you experience knee pain? Always   Twisting/pivoting on your knee Severe   Straightening knee fully Severe   Bending knee fully Severe   Walking on flat surface Severe   Going up or down stairs Extreme   At night while in bed Severe   Sitting or lying Severe   Standing upright Severe   Descending stairs Moderate   Ascending stairs Moderate   Rising from sitting Severe   Standing Moderate   Bending to floor/ an object Severe   Walking on flat surface Moderate   Getting in/out of car Moderate   Going shopping Moderate   Putting on socks/stockings Severe   Rising from bed Severe   Taking off  socks/stockings Moderate   Lying in bed (turning over, maintaining knee position) Moderate   Getting in/out of bath Severe   Sitting Moderate   Getting on/off toilet Moderate   Heavy domestic duties (moving heavy boxes, scrubbing floors, etc) Extreme   Light domestic duties (cooking, dusting, etc) Moderate   Squatting Severe   Running Extreme   Jumping Extreme   Twisting/pivoting on your injured knee Severe   Kneeling Extreme   How often are you aware of your knee problem? Constantly   Have you modified your life style to avoid potentially damaging activities to your knee? Severely   How much are you troubled with lack of confidence in your knee? Moderately   In general, how much difficulty do you have with your knee? Severe   Pain Score 19.44   Symptoms Score 14.28   Function, Daily Living Score 39.7   Sports and Rec Score 10   Quality of Life Score 25            Promis 10 Assessment    PROMIS 10 4/2/2018   In general, would you say your health is: Fair   In general, would you say your quality of life is: Fair   In general, how would you rate your physical health? Poor   In general, how would you rate your mental health, including your mood and your ability to think? Very good   In general, how would you rate your satisfaction with your social activities and relationships? Very good   In general, please rate how well you carry out your usual social activities and roles Good   To what extent are you able to carry out your everyday physical activities such as walking, climbing stairs, carrying groceries, or moving a chair? A little   How often have you been bothered by emotional problems such as feeling anxious, depressed or irritable? Rarely   How would you rate your fatigue on average? Severe   How would you rate your pain on average?   0 = No Pain  to  10 = Worst Imaginable Pain 8   In general, would you say your health is: 2   In general, would you say your quality of life is: 2   In general, how would you rate  your physical health? 1   In general, how would you rate your mental health, including your mood and your ability to think? 4   In general, how would you rate your satisfaction with your social activities and relationships? 4   In general, please rate how well you carry out your usual social activities and roles. (This includes activities at home, at work and in your community, and responsibilities as a parent, child, spouse, employee, friend, etc.) 3   To what extent are you able to carry out your everyday physical activities such as walking, climbing stairs, carrying groceries, or moving a chair? 2   In the past 7 days, how often have you been bothered by emotional problems such as feeling anxious, depressed, or irritable? 2   In the past 7 days, how would you rate your fatigue on average? 4   In the past 7 days, how would you rate your pain on average, where 0 means no pain, and 10 means worst imaginable pain? 8   Global Mental Health Score 14   Global Physical Health Score 7   PROMIS TOTAL - SUBSCORES 21   Some recent data might be hidden

## 2018-04-02 NOTE — MR AVS SNAPSHOT
After Visit Summary   4/2/2018    Aspen Mccullough    MRN: [id number]           Patient Information     Date Of Birth          1960        Visit Information        Provider Department      4/2/2018 8:45 AM Naun Mejia MD [facility]        Today's Diagnoses     Pain due to total knee replacement, initial encounter (H)    -  1    Arthritis of hip           Follow-ups after your visit        Your next 10 appointments already scheduled     Apr 11, 2018  9:15 AM CDT   (Arrive by 9:00 AM)   NEW SHOULDER with Charmaine Mendoza MD   [facility] ([facility])    [address]  [address] [facility] [address] [phone]            Apr 12, 2018 10:30 AM CDT   Return Visit with Antwan [facility] ([facility])    [address]  [address]  [facility] [address] [phone]            Apr 13, 2018 10:30 AM CDT   Return Visit with Antwan [facility] ([facility])    [address]  [address]  [facility] [id number] [phone]            Apr 13, 2018 10:30 AM CDT   Return Visit with ANITRA [facility] ([facility])    [address]  [address]  [facility] [id number] [phone]              Who to contact     Please call your clinic at [phone] to:    Ask questions about your health    Make or cancel appointments    Discuss your medicines    Learn about your test results    Speak to your doctor            Additional Information About Your Visit        MyChart Information     AIS is an electronic gateway that provides easy, online access to your medical records. With AIS, you can request a clinic appointment, read your test results, renew a prescription or  "communicate with your care team.     To sign up for Noblivityt visit the website at [email or web address]   You will be asked to enter the access code listed below, as well as some personal information. Please follow the directions to create your username and password.     Your access code is: [email or web address]  Expires: 2018  6:31 AM     Your access code will  in 90 days. If you need help or a new code, please contact your [facility] or call [phone] for assistance.        Care EveryWhere ID     This is your Care EveryWhere ID. This could be used by other organizations to access your [facility] medical records  [id number]        Your Vitals Were     Height BMI (Body Mass Index)                1.753 m (5' 9\") 36.4 kg/m2           Blood Pressure from Last 3 Encounters:   18 136/82   18 113/70   02/15/18 132/78    Weight from Last 3 Encounters:   18 111.8 kg (246 lb 8 oz)   18 117.8 kg (259 lb 11.2 oz)   18 103.9 kg (229 lb)                 Today's Medication Changes          These changes are accurate as of 18 11:59 PM.  If you have any questions, ask your nurse or doctor.               These medicines have changed or have updated prescriptions.        Dose/Directions    levofloxacin 750 MG tablet   Commonly known as:  LEVAQUIN   This may have changed:  how much to take   Used for:  Acute recurrent maxillary sinusitis        Dose:  750 mg   Take 1 tablet (750 mg) by mouth daily   Quantity:  5 tablet   Refills:  0         Stop taking these medicines if you haven't already. Please contact your care team if you have questions.     metoprolol succinate 25 MG 24 hr tablet   Commonly known as:  TOPROL-XL   Stopped by:  Naun Mejia MD                    Primary Care Provider Office Phone # Fax #    Sarahi Vivar [phone]       [facility] [address] [address]  [facility] [address]        Equal Access to Services     [facility] " GAAR : Hadii aad tayla jerry Dan, waaxda luqadaha, qaybta kamally mark, yaneth deepali walterkarley miller augustocheri guillermo . So [facility] [phone].    ATENCIÓN: Si habla español, tiene a lynn disposición servicios gratuitos de asistencia lingüística. Llame al [phone].    We comply with applicable federal civil rights laws and [place] laws. We do not discriminate on the basis of race, color, national origin, age, disability, sex, sexual orientation, or gender identity.            Thank you!     Thank you for choosing [facility]  for your care. Our goal is always to provide you with excellent care. Hearing back from our patients is one way we can continue to improve our services. Please take a few minutes to complete the written survey that you may receive in the mail after your visit with us. Thank you!             Your Updated Medication List - Protect others around you: Learn how to safely use, store and throw away your medicines at www.disposemymeds.org.          This list is accurate as of 4/2/18 11:59 PM.  Always use your most recent med list.                   Brand Name Dispense Instructions for use Diagnosis    ALLEGRA 180 MG tablet   Generic drug:  fexofenadine      Take 180 mg by mouth daily.        cyanocobalamin 1000 MCG/ML injection    VITAMIN B12    4 mL    Inject 1 mL (1,000 mcg) Subcutaneous every 7 days    Other vitamin B12 deficiency anemia       cyclobenzaprine 10 MG tablet    FLEXERIL    90 tablet    Take 1 tablet (10 mg) by mouth 3 times daily    Generalized osteoarthrosis, involving multiple sites       CYTOMEL 50 MCG Tabs   Generic drug:  Liothyronine Sodium      Take 50 mcg by mouth 3 times daily    Hypothyroidism, unspecified type       diazepam 10 MG tablet    VALIUM    90 tablet    Take 1 tablet (10 mg) by mouth 3 times daily    Chronic pain syndrome, Encounter for long-term use of opiate analgesic       estradiol 2 MG vaginal ring    ESTRING    1 each    Place 1 each  vaginally every 3 months one ring.    Vaginal atrophy       FISH OIL      daily. w/DHEA.        FLONASE 50 MCG/ACT spray   Generic drug:  fluticasone      2 sprays by Both Nostrils route daily as needed.        HYDROmorphone 8 MG tablet    DILAUDID    100 tablet    Take 1 tablet (8 mg) by mouth every 3 hours as needed    Chronic pain syndrome       JINTELI 1-5 MG-MCG per tablet   Generic drug:  norethindrone-ethinyl estradiol     30 tablet    TAKE ONE TABLET BY MOUTH ONE TIME DAILY    Absence of menstruation       levofloxacin 750 MG tablet    LEVAQUIN    5 tablet    Take 1 tablet (750 mg) by mouth daily    Acute recurrent maxillary sinusitis       * levothyroxine 300 MCG tablet    SYNTHROID/LEVOTHROID     Take 300 mcg by mouth 2 times daily        * levothyroxine 300 MCG tablet    SYNTHROID/LEVOTHROID     Take 300 mcg by mouth 2 times daily        LOVENOX 100 MG/ML injection   Generic drug:  enoxaparin      1 ML,100 mg, under skin twice a day. Start 1/26 pm. hold am 1/31. Resume pm 1/31 & hold am 2/5 DC after 1st dose of Xarelto post colonoscopy        morphine 30 MG 12 hr tablet    MS CONTIN    270 tablet    Take 3 tablets (90 mg) by mouth every 8 hours    Chronic pain syndrome       NASONEX 50 MCG/ACT spray   Generic drug:  mometasone           prednisoLONE acetate 1 % ophthalmic susp    PRED FORTE          * predniSONE 5 MG tablet    DELTASONE     alternate 8 mg and 10 mg every other day        * predniSONE 1 MG tablet    DELTASONE     Take 1 mg by mouth        * predniSONE 5 MG tablet    DELTASONE     Take 5 mg by mouth        probiotic Caps      Take 1 capsule by mouth daily.        RESTASIS 0.05 % ophthalmic emulsion   Generic drug:  cycloSPORINE      Place 1 drop into both eyes 2 times daily        rivaroxaban ANTICOAGULANT 20 MG Tabs tablet    XARELTO          sodium chloride (PF) 0.9% PF flush           sotalol 80 MG tablet    BETAPACE     Take 80 mg by mouth        * Notice:  This list has 5 medication(s)  that are the same as other medications prescribed for you. Read the directions carefully, and ask your doctor or other care provider to review them with you.

## 2018-04-02 NOTE — LETTER
4/2/2018       RE: Aspen Mccullough  [address]  [facility] [address]     Dear Colleague,    Thank you for referring your patient, Aspen Mccullough, to the [facility] at [facility]. Please see a copy of my visit note below.          [facility], Orthopaedic Surgery Consultation  by Naun Mejia M.D.    Aspen Mccullough MRN# [id number]   Age: 58 year old YOB: 1960     Requesting physician: Referred Self            Assessment and Plan:   Assessment:  58, female multiple psychiatric diagnoses, chronic pain on opiates, history of PE on Riveroxiban, lupus on prednisone with long standing painful left knee, thigh. No evidence of infection, loosening. Pain may be related to hip arthritis which is mild. In addition has left shoulder and elbow pain      Plan:  Discussed left thigh, knee pain may be from hip and could consider left hip injection in future if she desires.   Referral to she shoulder surgeon.            History of Present Illness:   58 year old female  chief complaint: left knee pain    HPI:  58, female multiple psychiatric diagnoses, chronic pain, history of PE on Vicky, lupus on prednisone with painful left total knee arthroplasty.  Pain started insidiously.  Pain is on the outside of the knee.  Is worse with any activity.  She does have start up pain.  She also has pain at rest.  States that the knee swells up from time to time.  She denies any redness or warmth.  She is not taking any medications.  She does state that she has had injections in this total knee with Dr. Simon in the past.  This initially helped her symptoms but has not helped recently.     DIAGNOSES:   1. R knee OA  2. Right total knee infection  3. Aseptic loosening of right TKA  4. CRPS since prior to revision in 2011 receiving sympathetic blocks from Dawson at pain clinic  5. Left knee OA.   Treatment:  1. Right total knee arthroplasty in 2007 by Dr. Smith  "Green.   2. Explantation, right knee, with antibiotic placement in 08/2008 followed by reimplantation in 12/2008, Dr. Reyes.   3. One-stage right revision total knee arthroplasty on 04/19/2011 with quadricepsplasty for aseptic loosening, Dr. Mejia.   4. Left total knee arthroplasty. Dr. Reyes 2001.         Current symptoms:  Problem: left knee painful total knee  Onset and duration: 2 years  Awakens from sleep due to sx's:  Yes  Precipitating Injury:  Yes    Other joints or sites painful:  Yes  Fever: No  Appetite change or weight loss: No           Physical Exam:     EXAMINATION pertinent findings:   VITAL SIGNS: Height 1.753 m (5' 9\"), weight 111.8 kg (246 lb 8 oz), not currently breastfeeding.  Body mass index is 36.4 kg/(m^2).  RESP: non labored breathing   ABD: benign   SKIN: grossly normal   LYMPHATIC: grossly normal   NEURO: grossly normal   VASCULAR: satisfactory perfusion of all extremities   MUSCULOSKELETAL:   Examination of her bilateral knees and hips.  She has had tenderness along the lateral aspect of the knee not specifically over the joint line.  No medial sided tenderness.  No appreciable effusion.  No appreciable warmth.  Range of motion on the left knee is 0-100.  Range of motion on the right is 10-90.  Her left knee is stable to AP forces as well as varus and valgus.  Range of motion of the left hip internal rotation 20 external rotation 35.  This is painful as it hurts her knee.  No hip pain.  She has palpable DP pulses on the left.  Her right ankle fused and has limited motion.  On the left  she fires dorsi and plantar flexors foot.  Her sensation is intact DP, SP, tib nerves.              Data:   All laboratory data reviewed  All imaging studies reviewed by me  X-rays of bilateral knees including AP pelvis and lateral of the left hip were reviewed.  There is mild medial osteophytic spurring of the left hip joint otherwise joint space well-preserved.  No signs of osteonecrosis or femoral head " collapse.  Her left knee is in stable position.  No ostial lysis.  Preserved polyethylene space.    Patient seen and discussed with Dr. Roberto Hannon  PGY4         Naun Mejia MD  [facility] Family Professor  Oncology and Adult Reconstructive Surgery  Dept Orthopaedic Surgery, [facility]  [phone] office, [phone] pager  [email or web address]            DATA for DOCUMENTATION:         Past Medical History:     Patient Active Problem List   Diagnosis     Generalized osteoarthrosis, involving multiple sites     CRPS (complex regional pain syndrome), lower limb     Chronic pain syndrome     Somatoform disorder     Histrionic personality     Encounter for long-term use of opiate analgesic     Status post revision of total knee replacement     Hypothyroidism     Insomnia     Hyperlipidemia LDL goal <130     ACP (advance care planning)     Chronic lymphocytic thyroiditis     Glucocorticoid deficiency (H)     Posttraumatic stress disorder     Impingement syndrome of left shoulder     Abdominal cramping, generalized     Diarrhea     Primary osteoarthritis involving multiple joints     Attention deficit disorder     Atopic rhinitis     Cushing's syndrome (H)     Endometriosis     Essential hypertension     Systemic lupus erythematosus (H)     S/P cervical spinal fusion     Paroxysmal atrial fibrillation (H)     Stress-induced cardiomyopathy     Personal history of DVT (deep vein thrombosis)     History of pulmonary embolism     Past Medical History:   Diagnosis Date     ADHD (attention deficit hyperactivity disorder)      Allergic rhinitis      Chronic low back pain      Cushing's syndrome (H)      DDD (degenerative disc disease)      DDD (degenerative disc disease)      Deviated nasal septum      Diarrhea      Endometriosis      Fibromyalgia      Hashimoto's disease      Hyperlipidemia      Hypothyroid     hx hashimoto's thyroiditis     Insomnia      Lupus      Malabsorption      Pernicious anemia       Renal disease     chronic renal insufficiency     Renal disease     hx renal failure     Sinusitis        Also see scanned health assessment forms.       Past Surgical History:     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)     APPENDECTOMY       ARTHRODESIS ANKLE      right     ARTHROPLASTY KNEE BILATERAL       ARTHROPLASTY REVISION KNEE  4/19/2011    Procedure:ARTHROPLASTY REVISION KNEE; With Antibiotic Cement ; Surgeon:CHAO OLIVARES; Location:[facility]     BREAST SURGERY      right- tissue remove nipple area     BUNIONECTOMY  12/14/2011    Procedure:BUNIONECTOMY; Right Bunion Correction; Surgeon:GRACE ZARATE; Location:[facility]     C STOMACH SURGERY PROCEDURE UNLISTED      see list which we will bring     CHOLECYSTECTOMY       EXCISE MASS UPPER EXTREMITY  12/14/2011    Procedure:EXCISE MASS UPPER EXTREMITY; Excision of Left Arm Mass; Surgeon:GRACE ZARATE; Location:[facility]     FOOT SURGERY      left X 4     FUSION LUMBAR ANTERIOR ONE LEVEL       HC SACROPLASTY      see list which we will bring     KNEE SURGERY      see list which we will bring     LAMINECTOMY LUMBAR ONE LEVEL      L4-5     LAPAROSCOPIC ABLATION ENDOMETRIOSIS       REMOVE HARDWARE FOOT  12/14/2011    Procedure:REMOVE HARDWARE FOOT; Hardware Removal Right Foot (Mini-C-Arm) ; Surgeon:GRACE ZARATE; Location:[facility]     RHINOPLASTY       SALPINGO-OOPHORECTOMY BILATERAL       TONSILLECTOMY              Social History:     Social History     Social History     Marital status:      Spouse name: N/A     Number of children: N/A     Years of education: N/A     Occupational History     Not on file.     Social History Main Topics     Smoking status: Former Smoker     Packs/day: 1.50     Years: 20.00     Types: Cigarettes     Start date: 1/1/1973     Quit date: 1/1/1993     Smokeless tobacco: Never Used     Alcohol use No     Drug use: No     Sexual  activity: Not Currently     Partners: Male     Birth control/ protection: Post-menopausal     Other Topics Concern     Not on file     Social History Narrative    ** Merged History Encounter **                 Family History:       Family History   Problem Relation Age of Onset     Hypertension Mother             Medications:     Current Outpatient Prescriptions   Medication Sig     levothyroxine (SYNTHROID/LEVOTHROID) 300 MCG tablet Take 300 mcg by mouth 2 times daily      levothyroxine (SYNTHROID/LEVOTHROID) 300 MCG tablet Take 300 mcg by mouth 2 times daily      cyanocobalamin 1000 MCG/ML injection Inject 1 mL (1,000 mcg) Subcutaneous every 7 days     predniSONE (DELTASONE) 1 MG tablet Take 1 mg by mouth     predniSONE (DELTASONE) 5 MG tablet Take 5 mg by mouth     enoxaparin (LOVENOX) 100 MG/ML injection 1 ML,100 mg, under skin twice a day. Start 1/26 pm. hold am 1/31. Resume pm 1/31 & hold am 2/5 DC after 1st dose of Xarelto post colonoscopy     sotalol (BETAPACE) 80 MG tablet Take 80 mg by mouth     rivaroxaban ANTICOAGULANT (XARELTO) 20 MG TABS tablet      levofloxacin (LEVAQUIN) 750 MG tablet Take 1 tablet (750 mg) by mouth daily     JINTELI 1-5 MG-MCG per tablet TAKE ONE TABLET BY MOUTH ONE TIME DAILY  (Patient not taking: Reported on 3/28/2018)     estradiol (ESTRING) 2 MG vaginal ring Place 1 each vaginally every 3 months one ring. (Patient not taking: Reported on 3/28/2018)     prednisoLONE acetate (PRED FORTE) 1 % ophthalmic suspension      HYDROmorphone (DILAUDID) 8 MG tablet Take 1 tablet (8 mg) by mouth every 3 hours as needed     morphine (MS CONTIN) 30 MG 12 hr tablet Take 3 tablets (90 mg) by mouth every 8 hours     diazepam (VALIUM) 10 MG tablet Take 1 tablet (10 mg) by mouth 3 times daily     Liothyronine Sodium 50 MCG TABS Take 50 mcg by mouth 3 times daily     NASONEX 50 MCG/ACT nasal spray      cyclobenzaprine (FLEXERIL) 10 MG tablet Take 1 tablet (10 mg) by mouth 3 times daily      cycloSPORINE (RESTASIS) 0.05 % ophthalmic emulsion Place 1 drop into both eyes 2 times daily     probiotic CAPS Take 1 capsule by mouth daily.     fexofenadine (ALLEGRA) 180 MG tablet Take 180 mg by mouth daily.     fluticasone (FLONASE) 50 MCG/ACT nasal spray 2 sprays by Both Nostrils route daily as needed.     FISH OIL daily. w/DHEA.      PREDNISONE 5 MG OR TABS alternate 8 mg and 10 mg every other day     No current facility-administered medications for this visit.               Review of Systems:   A comprehensive 10 point review of systems (constitutional, ENT, cardiac, peripheral vascular, lymphatic, respiratory, GI, , Musculoskeletal, skin, Neurological) was performed and found to be negative except as described in this note.     See intake form completed by patient      Again, thank you for allowing me to participate in the care of your patient.      Sincerely,    Naun Mejia MD

## 2018-04-02 NOTE — PROGRESS NOTES
[facility], Orthopaedic Surgery Consultation  by Naun Mejia M.D.    Aspen Mccullough MRN# [id number]   Age: 58 year old YOB: 1960     Requesting physician: Referred Self            Assessment and Plan:   Assessment:  58, female multiple psychiatric diagnoses, chronic pain on opiates, history of PE on Riveroxiban, lupus on prednisone with long standing painful left knee, thigh. No evidence of infection, loosening. Pain may be related to hip arthritis which is mild. In addition has left shoulder and elbow pain      Plan:  Discussed left thigh, knee pain may be from hip and could consider left hip injection in future if she desires.   Referral to she shoulder surgeon.            History of Present Illness:   58 year old female  chief complaint: left knee pain    HPI:  58, female multiple psychiatric diagnoses, chronic pain, history of PE on Vicky, lupus on prednisone with painful left total knee arthroplasty.  Pain started insidiously.  Pain is on the outside of the knee.  Is worse with any activity.  She does have start up pain.  She also has pain at rest.  States that the knee swells up from time to time.  She denies any redness or warmth.  She is not taking any medications.  She does state that she has had injections in this total knee with Dr. Simon in the past.  This initially helped her symptoms but has not helped recently.     DIAGNOSES:   1. R knee OA  2. Right total knee infection  3. Aseptic loosening of right TKA  4. CRPS since prior to revision in 2011 receiving sympathetic blocks from [facility] at pain clinic  5. Left knee OA.   Treatment:  1. Right total knee arthroplasty in 2007 by Dr. Luis Reyes.   2. Explantation, right knee, with antibiotic placement in 08/2008 followed by reimplantation in 12/2008, Dr. Reyes.   3. One-stage right revision total knee arthroplasty on 04/19/2011 with quadricepsplasty for aseptic loosening, Dr. Mejia.   4. Left total knee arthroplasty.  "Dr. Reyes 2001.         Current symptoms:  Problem: left knee painful total knee  Onset and duration: 2 years  Awakens from sleep due to sx's:  Yes  Precipitating Injury:  Yes    Other joints or sites painful:  Yes  Fever: No  Appetite change or weight loss: No           Physical Exam:     EXAMINATION pertinent findings:   VITAL SIGNS: Height 1.753 m (5' 9\"), weight 111.8 kg (246 lb 8 oz), not currently breastfeeding.  Body mass index is 36.4 kg/(m^2).  RESP: non labored breathing   ABD: benign   SKIN: grossly normal   LYMPHATIC: grossly normal   NEURO: grossly normal   VASCULAR: satisfactory perfusion of all extremities   MUSCULOSKELETAL:   Examination of her bilateral knees and hips.  She has had tenderness along the lateral aspect of the knee not specifically over the joint line.  No medial sided tenderness.  No appreciable effusion.  No appreciable warmth.  Range of motion on the left knee is 0-100.  Range of motion on the right is 10-90.  Her left knee is stable to AP forces as well as varus and valgus.  Range of motion of the left hip internal rotation 20 external rotation 35.  This is painful as it hurts her knee.  No hip pain.  She has palpable DP pulses on the left.  Her right ankle fused and has limited motion.  On the left  she fires dorsi and plantar flexors foot.  Her sensation is intact DP, SP, tib nerves.              Data:   All laboratory data reviewed  All imaging studies reviewed by me  X-rays of bilateral knees including AP pelvis and lateral of the left hip were reviewed.  There is mild medial osteophytic spurring of the left hip joint otherwise joint space well-preserved.  No signs of osteonecrosis or femoral head collapse.  Her left knee is in stable position.  No ostial lysis.  Preserved polyethylene space.    Patient seen and discussed with Dr. Roberto Hannon  PGY4         Naun Mejia MD  [facility] Professor  Oncology and Adult Reconstructive Surgery  Dept Orthopaedic " Surgery, [facility]  [phone] office, [phone] pager  [email or web address]            DATA for DOCUMENTATION:         Past Medical History:     Patient Active Problem List   Diagnosis     Generalized osteoarthrosis, involving multiple sites     CRPS (complex regional pain syndrome), lower limb     Chronic pain syndrome     Somatoform disorder     Histrionic personality     Encounter for long-term use of opiate analgesic     Status post revision of total knee replacement     Hypothyroidism     Insomnia     Hyperlipidemia LDL goal <130     ACP (advance care planning)     Chronic lymphocytic thyroiditis     Glucocorticoid deficiency (H)     Posttraumatic stress disorder     Impingement syndrome of left shoulder     Abdominal cramping, generalized     Diarrhea     Primary osteoarthritis involving multiple joints     Attention deficit disorder     Atopic rhinitis     Cushing's syndrome (H)     Endometriosis     Essential hypertension     Systemic lupus erythematosus (H)     S/P cervical spinal fusion     Paroxysmal atrial fibrillation (H)     Stress-induced cardiomyopathy     Personal history of DVT (deep vein thrombosis)     History of pulmonary embolism     Past Medical History:   Diagnosis Date     ADHD (attention deficit hyperactivity disorder)      Allergic rhinitis      Chronic low back pain      Cushing's syndrome (H)      DDD (degenerative disc disease)      DDD (degenerative disc disease)      Deviated nasal septum      Diarrhea      Endometriosis      Fibromyalgia      Hashimoto's disease      Hyperlipidemia      Hypothyroid     hx hashimoto's thyroiditis     Insomnia      Lupus      Malabsorption      Pernicious anemia      Renal disease     chronic renal insufficiency     Renal disease     hx renal failure     Sinusitis        Also see scanned health assessment forms.       Past Surgical History:     Past Surgical History:   Procedure Laterality Date     AMPUTATION      left foot- fifth toe and  side of foot (gangrene)     APPENDECTOMY       ARTHRODESIS ANKLE      right     ARTHROPLASTY KNEE BILATERAL       ARTHROPLASTY REVISION KNEE  4/19/2011    Procedure:ARTHROPLASTY REVISION KNEE; With Antibiotic Cement ; Surgeon:CHAO OLIVARES; Location:[facility]     BREAST SURGERY      right- tissue remove nipple area     BUNIONECTOMY  12/14/2011    Procedure:BUNIONECTOMY; Right Bunion Correction; Surgeon:GRACE ZARATE; Location:[facility] STOMACH SURGERY PROCEDURE UNLISTED      see list which we will bring     CHOLECYSTECTOMY       EXCISE MASS UPPER EXTREMITY  12/14/2011    Procedure:EXCISE MASS UPPER EXTREMITY; Excision of Left Arm Mass; Surgeon:GRACE ZARATE; Location:[facility]     FOOT SURGERY      left X 4     FUSION LUMBAR ANTERIOR ONE LEVEL       HC SACROPLASTY      see list which we will bring     KNEE SURGERY      see list which we will bring     LAMINECTOMY LUMBAR ONE LEVEL      L4-5     LAPAROSCOPIC ABLATION ENDOMETRIOSIS       REMOVE HARDWARE FOOT  12/14/2011    Procedure:REMOVE HARDWARE FOOT; Hardware Removal Right Foot (Mini-C-Arm) ; Surgeon:GRACE ZARATE; Location:[facility]     RHINOPLASTY       SALPINGO-OOPHORECTOMY BILATERAL       TONSILLECTOMY              Social History:     Social History     Social History     Marital status:      Spouse name: N/A     Number of children: N/A     Years of education: N/A     Occupational History     Not on file.     Social History Main Topics     Smoking status: Former Smoker     Packs/day: 1.50     Years: 20.00     Types: Cigarettes     Start date: 1/1/1973     Quit date: 1/1/1993     Smokeless tobacco: Never Used     Alcohol use No     Drug use: No     Sexual activity: Not Currently     Partners: Male     Birth control/ protection: Post-menopausal     Other Topics Concern     Not on file     Social History Narrative    ** Merged History Encounter **                 Family History:       Family History   Problem Relation Age of Onset      Hypertension Mother             Medications:     Current Outpatient Prescriptions   Medication Sig     levothyroxine (SYNTHROID/LEVOTHROID) 300 MCG tablet Take 300 mcg by mouth 2 times daily      levothyroxine (SYNTHROID/LEVOTHROID) 300 MCG tablet Take 300 mcg by mouth 2 times daily      cyanocobalamin 1000 MCG/ML injection Inject 1 mL (1,000 mcg) Subcutaneous every 7 days     predniSONE (DELTASONE) 1 MG tablet Take 1 mg by mouth     predniSONE (DELTASONE) 5 MG tablet Take 5 mg by mouth     enoxaparin (LOVENOX) 100 MG/ML injection 1 ML,100 mg, under skin twice a day. Start 1/26 pm. hold am 1/31. Resume pm 1/31 & hold am 2/5 DC after 1st dose of Xarelto post colonoscopy     sotalol (BETAPACE) 80 MG tablet Take 80 mg by mouth     rivaroxaban ANTICOAGULANT (XARELTO) 20 MG TABS tablet      levofloxacin (LEVAQUIN) 750 MG tablet Take 1 tablet (750 mg) by mouth daily     JINTELI 1-5 MG-MCG per tablet TAKE ONE TABLET BY MOUTH ONE TIME DAILY  (Patient not taking: Reported on 3/28/2018)     estradiol (ESTRING) 2 MG vaginal ring Place 1 each vaginally every 3 months one ring. (Patient not taking: Reported on 3/28/2018)     prednisoLONE acetate (PRED FORTE) 1 % ophthalmic suspension      HYDROmorphone (DILAUDID) 8 MG tablet Take 1 tablet (8 mg) by mouth every 3 hours as needed     morphine (MS CONTIN) 30 MG 12 hr tablet Take 3 tablets (90 mg) by mouth every 8 hours     diazepam (VALIUM) 10 MG tablet Take 1 tablet (10 mg) by mouth 3 times daily     Liothyronine Sodium 50 MCG TABS Take 50 mcg by mouth 3 times daily     NASONEX 50 MCG/ACT nasal spray      cyclobenzaprine (FLEXERIL) 10 MG tablet Take 1 tablet (10 mg) by mouth 3 times daily     cycloSPORINE (RESTASIS) 0.05 % ophthalmic emulsion Place 1 drop into both eyes 2 times daily     probiotic CAPS Take 1 capsule by mouth daily.     fexofenadine (ALLEGRA) 180 MG tablet Take 180 mg by mouth daily.     fluticasone (FLONASE) 50 MCG/ACT nasal spray 2 sprays by Both Nostrils  route daily as needed.     FISH OIL daily. w/DHEA.      PREDNISONE 5 MG OR TABS alternate 8 mg and 10 mg every other day     No current facility-administered medications for this visit.               Review of Systems:   A comprehensive 10 point review of systems (constitutional, ENT, cardiac, peripheral vascular, lymphatic, respiratory, GI, , Musculoskeletal, skin, Neurological) was performed and found to be negative except as described in this note.     See intake form completed by patient      Answers for HPI/ROS submitted by the patient on 3/26/2018   General Symptoms: Yes  Skin Symptoms: Yes  HENT Symptoms: Yes  EYE SYMPTOMS: Yes  HEART SYMPTOMS: No  LUNG SYMPTOMS: No  INTESTINAL SYMPTOMS: Yes  URINARY SYMPTOMS: No  GYNECOLOGIC SYMPTOMS: No  BREAST SYMPTOMS: No  SKELETAL SYMPTOMS: Yes  BLOOD SYMPTOMS: Yes  NERVOUS SYSTEM SYMPTOMS: No  MENTAL HEALTH SYMPTOMS: Yes  Fever: No  Loss of appetite: No  Weight loss: No  Weight gain: Yes  Fatigue: Yes  Night sweats: Yes  Chills: Yes  Increased stress: No  Excessive hunger: No  Excessive thirst: Yes  Feeling hot or cold when others believe the temperature is normal: Yes  Loss of height: No  Post-operative complications: No  Surgical site pain: No  Hallucinations: No  Change in or Loss of Energy: Yes  Hyperactivity: No  Confusion: No  Changes in hair: Yes  Changes in moles/birth marks: No  Itching: Yes  Rashes: No  Changes in nails: Yes  Acne: No  Hair in places you don't want it: No  Change in facial hair: No  Warts: No  Non-healing sores: No  Scarring: No  Flaking of skin: No  Color changes of hands/feet in cold : Yes  Sun sensitivity: Yes  Skin thickening: Yes  Ear pain: No  Ear discharge: Yes  Hearing loss: No  Tinnitus: No  Nosebleeds: Yes  Congestion: No  Sinus pain: Yes  Trouble swallowing: No   Voice hoarseness: No  Mouth sores: No  Sore throat: No  Tooth pain: Yes  Gum tenderness: Yes  Bleeding gums: Yes  Change in taste: Yes  Change in sense of smell: No  Dry  mouth: Yes  Hearing aid used: No  Neck lump: No  Eye pain: No  Vision loss: No  Dry eyes: Yes  Watery eyes: Yes  Eye bulging: No  Double vision: No  Flashing of lights: No  Spots: No  Floaters: No  Redness: Yes  Crossed eyes: No  Tunnel Vision: No  Yellowing of eyes: No  Eye irritation: No  Heart burn or indigestion: No  Nausea: Yes  Vomiting: No  Abdominal pain: Yes  Bloating: Yes  Constipation: Yes  Diarrhea: Yes  Blood in stool: No  Black stools: No  Rectal or Anal pain: No  Fecal incontinence: No  Yellowing of skin or eyes: No  Vomit with blood: No  Change in stools: No  Back pain: Yes  Muscle aches: Yes  Neck pain: Yes  Swollen joints: Yes  Joint pain: Yes  Bone pain: Yes  Muscle cramps: Yes  Muscle weakness: Yes  Joint stiffness: Yes  Bone fracture: No  Anemia: Yes  Swollen glands: No  Easy bleeding or bruising: Yes  Edema or swelling: Yes  Nervous or Anxious: No  Depression: No  Trouble sleeping: Yes  Trouble thinking or concentrating: No  Mood changes: Yes  Panic attacks: No    Attending MD (Dr. Naun Mejia) Attestation :  This patient was seen and evaluated by me including a history, exam, and interpretation of all imaging and/or lab data.  Either a training physician (resident/fellow), who also saw the patient, or scribe has documented the clinic visit in the attached note.    Naun Mejia MD  [facility] Professor  Oncology and Adult Reconstructive Surgery  Dept Orthopaedic Surgery, [facility]  [phone] office, [phone] pager  [email or web address]

## 2018-04-05 PROBLEM — I51.3 THROMBUS OF RIGHT ATRIAL APPENDAGE WITHOUT ANTECEDENT MYOCARDIAL INFARCTION: Status: ACTIVE | Noted: 2017-11-26

## 2018-04-05 PROBLEM — I26.99 ACUTE PULMONARY EMBOLISM (H): Status: ACTIVE | Noted: 2017-11-27

## 2018-04-05 PROBLEM — I42.8 NONISCHEMIC CARDIOMYOPATHY (H): Status: ACTIVE | Noted: 2018-01-05

## 2018-04-05 PROBLEM — I82.431 ACUTE DEEP VEIN THROMBOSIS (DVT) OF POPLITEAL VEIN OF RIGHT LOWER EXTREMITY (H): Status: ACTIVE | Noted: 2017-11-27

## 2018-04-05 PROBLEM — K90.9 MALABSORPTION: Status: ACTIVE | Noted: 2017-12-07

## 2018-04-05 PROBLEM — E05.80 IATROGENIC HYPERTHYROIDISM: Status: ACTIVE | Noted: 2017-11-26

## 2018-04-05 RX ORDER — LEVOTHYROXINE SODIUM ANHYDROUS 100 UG/5ML
200 INJECTION, POWDER, LYOPHILIZED, FOR SOLUTION INTRAVENOUS
COMMUNITY
End: 2018-10-24

## 2018-04-05 RX ORDER — CEPHALEXIN 500 MG/1
500 CAPSULE ORAL PRN
COMMUNITY
End: 2021-08-03

## 2018-04-10 ENCOUNTER — PRE VISIT (OUTPATIENT)
Dept: ORTHOPEDICS | Facility: CLINIC | Age: 58
End: 2018-04-10

## 2018-04-10 DIAGNOSIS — G89.29 CHRONIC LEFT SHOULDER PAIN: ICD-10-CM

## 2018-04-10 DIAGNOSIS — M25.522 LEFT ELBOW PAIN: Primary | ICD-10-CM

## 2018-04-10 DIAGNOSIS — M25.512 CHRONIC LEFT SHOULDER PAIN: ICD-10-CM

## 2018-04-10 ASSESSMENT — ENCOUNTER SYMPTOMS
ALTERED TEMPERATURE REGULATION: 1
EYE REDNESS: 0
STIFFNESS: 1
RECTAL PAIN: 0
CHILLS: 1
HALLUCINATIONS: 0
POLYPHAGIA: 0
FATIGUE: 1
FEVER: 0
POLYDIPSIA: 0
BACK PAIN: 1
JOINT SWELLING: 1
BOWEL INCONTINENCE: 0
MYALGIAS: 1
HEARTBURN: 0
DECREASED APPETITE: 0
EYE IRRITATION: 0
WEIGHT GAIN: 1
NIGHT SWEATS: 1
MUSCLE WEAKNESS: 1
ARTHRALGIAS: 1
MUSCLE CRAMPS: 1
BLOOD IN STOOL: 0
VOMITING: 0
DIARRHEA: 1
EYE WATERING: 0
NECK PAIN: 1
JAUNDICE: 0
BLOATING: 0
EYE PAIN: 0
INCREASED ENERGY: 1
WEIGHT LOSS: 0
ABDOMINAL PAIN: 0
CONSTIPATION: 1
DOUBLE VISION: 0
NAUSEA: 0

## 2018-04-10 NOTE — TELEPHONE ENCOUNTER
Patient is being referred by Dr. Mejia for left shoulder and elbow pain.    Patient is new to this provider.  Patient has outside records are available in [facility]    Patient is coming to clinic for initial consultation.      Per standing orders, left shoulder Aurora views and left elbow AP, lateral, and oblique radiographs have been ordered and scheduled.     Patient visit type and questionnaires have been reviewed and are correct for this appointment? Yes    Beverly May ATC

## 2018-04-11 ENCOUNTER — RADIANT APPOINTMENT (OUTPATIENT)
Dept: GENERAL RADIOLOGY | Facility: CLINIC | Age: 58
End: 2018-04-11
Attending: ORTHOPAEDIC SURGERY
Payer: COMMERCIAL

## 2018-04-11 ENCOUNTER — OFFICE VISIT (OUTPATIENT)
Dept: ORTHOPEDICS | Facility: CLINIC | Age: 58
End: 2018-04-11
Payer: COMMERCIAL

## 2018-04-11 VITALS — WEIGHT: 246.47 LBS | BODY MASS INDEX: 36.51 KG/M2 | HEIGHT: 69 IN

## 2018-04-11 DIAGNOSIS — M19.029 ELBOW ARTHRITIS: Primary | ICD-10-CM

## 2018-04-11 DIAGNOSIS — G89.29 CHRONIC LEFT SHOULDER PAIN: ICD-10-CM

## 2018-04-11 DIAGNOSIS — M71.9 DISORDER OF BURSAE AND TENDONS IN SHOULDER REGION: ICD-10-CM

## 2018-04-11 DIAGNOSIS — M67.919 DISORDER OF BURSAE AND TENDONS IN SHOULDER REGION: ICD-10-CM

## 2018-04-11 DIAGNOSIS — M25.522 LEFT ELBOW PAIN: ICD-10-CM

## 2018-04-11 DIAGNOSIS — M25.512 CHRONIC LEFT SHOULDER PAIN: ICD-10-CM

## 2018-04-11 NOTE — NURSING NOTE
"Reason For Visit:   Chief Complaint   Patient presents with     Consult     Left shoulder and elbow pain.  From Dr. Mejia       PCP: Sarahi Vivar  Ref: Dr. Mejia    ?  No  Occupation .  Currently working? Yes.  Work status?  Part-time.  Date of injury: ongoing possible from lupus    Date of surgery: NA  Type of surgery: NA.  Smoker: No  Request smoking cessation information: No    right hand dominant    SANE score  Affected shoulder: Left  Right shoulder SANE: 100  Left shoulder SANE: 25    Ht 1.753 m (5' 9.02\")  Wt 111.8 kg (246 lb 7.6 oz)  BMI 36.38 kg/m2      Pain Assessment  Patient Currently in Pain: Yes  0-10 Pain Scale: 7  Primary Pain Location: Shoulder (and elbow)  Pain Orientation: Left  Pain Descriptors: Burning, Dull, Sharp  Alleviating Factors: Pain medication  Aggravating Factors: Lying, Other (comment), Reaching (reaching behind)    Beverly May ATC        "

## 2018-04-11 NOTE — MR AVS SNAPSHOT
After Visit Summary   4/11/2018    Aspen Mccullough    MRN: [id number]           Patient Information     Date Of Birth          1960        Visit Information        Provider Department      4/11/2018 9:15 AM Charmaine Mendoza MD [facility]        Today's Diagnoses     Shoulder pain    -  1       Follow-ups after your visit        Additional Services     [facility]       Please call [phone] to make an appointment. Clinic is located: [facility] [address] [place] [address] [id number] 4th [place] [address]      Please complete the following questions:    Procedure/Referral: Procedure Only -  Procedure or injection only - please call the [facility] at [phone] to schedule:       What is your diagnosis for the patient's pain? Left shoulder pain    What are your specific questions for the pain specialist? Procedure only  - Left Suprascapular Nerve Block    Are there any red flags that may impact the assessment or management of the patient? No                  Your next 10 appointments already scheduled     Apr 12, 2018 10:30 AM CDT   [facility]    [address]  [address]  [facility] [address] [phone]            Apr 13, 2018 10:30 AM CDT   Return Visit with BARNEY Yan   [facility] ([facility])    [address]  [address]  [facility] [id number] [phone]            Apr 13, 2018 10:30 AM CDT   Return Visit with ANITRA Melvin [facility] ([facility])    [address]  [address]  [facility] [address] [phone]              Who to contact     Please call your clinic at [phone] to:    Ask questions about your  "health    Make or cancel appointments    Discuss your medicines    Learn about your test results    Speak to your doctor            Additional Information About Your Visit        [facility] Information     [facility] gives you secure access to your electronic health record. If you see a primary care provider, you can also send messages to your care team and make appointments. If you have questions, please call your primary care clinic.  If you do not have a primary care provider, please call [phone] and they will assist you.      [facility] is an electronic gateway that provides easy, online access to your medical records. With [facility], you can request a clinic appointment, read your test results, renew a prescription or communicate with your care team.     To access your existing account, please contact your [facility] or call [phone] for assistance.        Care EveryWhere ID     This is your Care EveryWhere ID. This could be used by other organizations to access your [place] medical records  [id number]        Your Vitals Were     Height BMI (Body Mass Index)                1.753 m (5' 9.02\") 36.38 kg/m2           Blood Pressure from Last 3 Encounters:   03/28/18 136/82   03/13/18 113/70   02/15/18 132/78    Weight from Last 3 Encounters:   04/11/18 111.8 kg (246 lb 7.6 oz)   04/02/18 111.8 kg (246 lb 8 oz)   03/28/18 117.8 kg (259 lb 11.2 oz)              We Performed the Following     [facility]          Today's Medication Changes          These changes are accurate as of 4/11/18 10:41 AM.  If you have any questions, ask your nurse or doctor.               These medicines have changed or have updated prescriptions.        Dose/Directions    diazepam 10 MG tablet   Commonly known as:  VALIUM   This may have changed:    - how much to take  - when to take this  - reasons to take this   Used for:  Chronic pain syndrome, Encounter for long-term " use of opiate analgesic        Dose:  10 mg   Take 1 tablet (10 mg) by mouth 3 times daily   Quantity:  90 tablet   Refills:  0       HYDROmorphone 8 MG tablet   Commonly known as:  DILAUDID   This may have changed:  when to take this   Used for:  Chronic pain syndrome        Dose:  8 mg   Take 1 tablet (8 mg) by mouth every 3 hours as needed   Quantity:  100 tablet   Refills:  0       morphine 30 MG 12 hr tablet   Commonly known as:  MS CONTIN   This may have changed:  when to take this   Used for:  Chronic pain syndrome        Dose:  90 mg   Take 3 tablets (90 mg) by mouth every 8 hours   Quantity:  270 tablet   Refills:  0                Primary Care Provider Office Phone # Fax #    Sarahi Vivar [phone]       [facility] [address] [facility] [address]        Equal Access to Services     ADRYAN BHAT : Laisha edwards Soclaudia, waaxda luqadaha, qaybta kaalmada adeegyada, yaneth guillermo . So [facility] [phone].    ATENCIÓN: Si habla español, tiene a lynn disposición servicios gratuitos de asistencia lingüística. [facility] [phone].    We comply with applicable federal civil rights laws and [place] laws. We do not discriminate on the basis of race, color, national origin, age, disability, sex, sexual orientation, or gender identity.            Thank you!     Thank you for choosing [facility]  for your care. Our goal is always to provide you with excellent care. Hearing back from our patients is one way we can continue to improve our services. Please take a few minutes to complete the written survey that you may receive in the mail after your visit with us. Thank you!             Your Updated Medication List - Protect others around you: Learn how to safely use, store and throw away your medicines at www.disposemymeds.org.          This list is accurate as of 4/11/18 10:41 AM.  Always use your most recent med list.                    Brand Name Dispense Instructions for use Diagnosis    ALLEGRA 180 MG tablet   Generic drug:  fexofenadine      Take 180 mg by mouth daily.        CEPHALEXIN PO      Take 500 mg by mouth as needed (Dental Procedure) Take 4 caps 1 hour prior to Dental Appointment        cyanocobalamin 1000 MCG/ML injection    VITAMIN B12    4 mL    Inject 1 mL (1,000 mcg) Subcutaneous every 7 days    Other vitamin B12 deficiency anemia       cyclobenzaprine 10 MG tablet    FLEXERIL    90 tablet    Take 1 tablet (10 mg) by mouth 3 times daily    Generalized osteoarthrosis, involving multiple sites       CYTOMEL 50 MCG Tabs   Generic drug:  Liothyronine Sodium      Take 50 mcg by mouth daily    Hypothyroidism, unspecified type       diazepam 10 MG tablet    VALIUM    90 tablet    Take 1 tablet (10 mg) by mouth 3 times daily    Chronic pain syndrome, Encounter for long-term use of opiate analgesic       FISH OIL      daily. w/DHEA.        FLONASE 50 MCG/ACT spray   Generic drug:  fluticasone      2 sprays by Both Nostrils route daily as needed.        HYDROmorphone 8 MG tablet    DILAUDID    100 tablet    Take 1 tablet (8 mg) by mouth every 3 hours as needed    Chronic pain syndrome       * levothyroxine 300 MCG tablet    SYNTHROID/LEVOTHROID     Take 300 mcg by mouth 2 times daily        * levothyroxine 100 MCG Solr injection    SYNTHROID/LEVOTHROID     Inject 200 mcg into the vein twice a week        morphine 30 MG 12 hr tablet    MS CONTIN    270 tablet    Take 3 tablets (90 mg) by mouth every 8 hours    Chronic pain syndrome       NASONEX 50 MCG/ACT spray   Generic drug:  mometasone           predniSONE 5 MG tablet    DELTASONE     alternate 8 mg and 10 mg every other day        probiotic Caps      Take 1 capsule by mouth daily.        RESTASIS 0.05 % ophthalmic emulsion   Generic drug:  cycloSPORINE      Place 1 drop into both eyes 2 times daily        rivaroxaban ANTICOAGULANT 20 MG Tabs tablet    XARELTO     Take 20 mg by mouth  daily (with dinner)        sodium chloride (PF) 0.9% PF flush           VITAMIN D (CHOLECALCIFEROL) PO      Take 50,000 Units by mouth once a week        * Notice:  This list has 2 medication(s) that are the same as other medications prescribed for you. Read the directions carefully, and ask your doctor or other care provider to review them with you.

## 2018-04-11 NOTE — LETTER
4/11/2018       RE: Aspen Mccullough  [address]  [facility] [id number]     Dear Colleague,    Thank you for referring your patient, Aspen Mccullough, to the [facility] ORTHOP[facility] at [facility]. Please see a copy of my visit note below.    [facility] (formerly [facility]) Physicians, Orthopaedic Surgery Consultation    Aspen Mccullough MRN# [id number]   Age: 58 year old YOB: 1960     Reason for consult: Left shoulder pain       Requesting physician: Dr. Naun Mejia         Assessment and Plan:   Assessment:   1. Chronic left shoulder pain  2. Left elbow arthrosis  3. Left rotator cuff disease  4. 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 Mejia in 2011    Plan:  We reviewed the xray findings as well as physical exam findings with the patient and described them using lay language. We discussed the finding of left elbow osteoarthritis and likely left shoulder rotator cuff tear. We discussed the non-operative (physical therapy exercises, cortisone injections) and operative treatment options (arthroscopic debridement vs total shoulder arthroplasty) including the risks and potential benefits of each. We discussed the expectations for pain relief and/or motion improvement with each option. Aspen Mccullough has a complex past medical history, including CPRS, and would like to avoid surgical interventions if possible.  She recently had corticosteroid injections into the left glenohumeral joint (4 weeks ago) and left elbow (6 weeks ago).  We discussed that injections and therapy are going to be the best options currently for symptomatic treatment of her left elbow DJD and left shoulder likely rotator cuff tear.  We did discuss the possibility of a suprascapular nerve block with anesthesia/pain management.  She is very interested in pursuing this  treatment modality and we will help with the referral. Additionally, we encouraged stretching exercises to prevent left shoulder stiffness.  She will monitor her response to nerve block and stretching and follow up with our team as needed.    John Camacho MD  Orthopedic Surgery, PGY-1  9:34 AM  April 11, 2018    Patient was seen and discussed with Dr. 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 Mejia in 2011, now with 5 years of left shoulder and elbow pain.    She describes gradual onset left shoulder pain 5 years ago.  She denies history of trauma.  She has been followed by Dr. Simon and has undergone numerous corticosteroid injections.  She reports these do not particularly help her symptoms.  Pain and ROM issues are her primary issues with left shoulder.  Her most recent injection was done by her pain management doctors 4 weeks ago under US guidance.  She reports no relief from this.    Left elbow pain is roughly 5 year onset as well without trauma.  She reports pain to be primarily posterior.  She received a corticosteroid injection 6 weeks ago with her pain management doctors.  She reports no relief from this.    Of note she is taking significant amounts of opioid pain medications daily (8 mg dilaudid TID and 90 mg MS contin TID) and has history of CPRS following her TKA procedures.     Patient was seen and examined by me. History, PMH, Meds, SH, complete ROS (10 organ systems) and PE reviewed with patient and prior medical records.         Past Medical History:     Past Medical History:   Diagnosis Date     ADHD (attention deficit hyperactivity disorder)      Allergic rhinitis       Chronic low back pain      Cushing's syndrome (H)      DDD (degenerative disc disease)      DDD (degenerative disc disease)      Deviated nasal septum      Diarrhea      Endometriosis      Fibromyalgia      Hashimoto's disease      Hyperlipidemia      Hypothyroid     hx hashimoto's thyroiditis     Insomnia      Lupus      Malabsorption      Pernicious anemia      Renal disease     chronic renal insufficiency     Renal disease     hx renal failure     Sinusitis              Past Surgical History:     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)     APPENDECTOMY       ARTHRODESIS ANKLE      right     ARTHROPLASTY KNEE BILATERAL       ARTHROPLASTY REVISION KNEE  4/19/2011    Procedure:ARTHROPLASTY REVISION KNEE; With Antibiotic Cement ; Surgeon:CHAO OLIVARES; Location:[facility]     BREAST SURGERY      right- tissue remove nipple area     BUNIONECTOMY  12/14/2011    Procedure:BUNIONECTOMY; Right Bunion Correction; Surgeon:GRACE ZARATE; Location:[facility]     C STOMACH SURGERY PROCEDURE UNLISTED      see list which we will bring     CHOLECYSTECTOMY       EXCISE MASS UPPER EXTREMITY  12/14/2011    Procedure:EXCISE MASS UPPER EXTREMITY; Excision of Left Arm Mass; Surgeon:GRACE ZARATE; Location:[facility]     FOOT SURGERY      left X 4     FUSION LUMBAR ANTERIOR ONE LEVEL       HC SACROPLASTY      see list which we will bring     KNEE SURGERY      see list which we will bring     LAMINECTOMY LUMBAR ONE LEVEL      L4-5     LAPAROSCOPIC ABLATION ENDOMETRIOSIS       REMOVE HARDWARE FOOT  12/14/2011    Procedure:REMOVE HARDWARE FOOT; Hardware Removal Right Foot (Mini-C-Arm) ; Surgeon:GRACE ZARATE; Location:[facility]     RHINOPLASTY       SALPINGO-OOPHORECTOMY BILATERAL       TONSILLECTOMY               Social History:     Social History     Social History     Marital status:      Spouse name: N/A     Number of children: N/A      Years of education: N/A     Social History Main Topics     Smoking status: Former Smoker     Packs/day: 1.50     Years: 20.00     Types: Cigarettes     Start date: 1/1/1973     Quit date: 1/1/1993     Smokeless tobacco: Never Used     Alcohol use No     Drug use: No     Sexual activity: Not Currently     Partners: Male     Birth control/ protection: Post-menopausal     Other Topics Concern     None     Social History Narrative    ** Merged History Encounter **                  Family History:     Family History   Problem Relation Age of Onset     Hypertension Mother               Medications:     Current Outpatient Prescriptions   Medication Sig     levothyroxine (SYNTHROID/LEVOTHROID) 100 MCG SOLR injection Inject 200 mcg into the vein twice a week     VITAMIN D, CHOLECALCIFEROL, PO Take 50,000 Units by mouth once a week     CEPHALEXIN PO Take 500 mg by mouth as needed (Dental Procedure) Take 4 caps 1 hour prior to Dental Appointment     sodium chloride, PF, (SODIUM CHLORIDE) 0.9% PF flush      levothyroxine (SYNTHROID/LEVOTHROID) 300 MCG tablet Take 300 mcg by mouth 2 times daily      rivaroxaban ANTICOAGULANT (XARELTO) 20 MG TABS tablet Take 20 mg by mouth daily (with dinner)      HYDROmorphone (DILAUDID) 8 MG tablet Take 1 tablet (8 mg) by mouth every 3 hours as needed (Patient taking differently: Take 8 mg by mouth 3 times daily )     morphine (MS CONTIN) 30 MG 12 hr tablet Take 3 tablets (90 mg) by mouth every 8 hours (Patient taking differently: Take 90 mg by mouth 3 times daily )     diazepam (VALIUM) 10 MG tablet Take 1 tablet (10 mg) by mouth 3 times daily (Patient taking differently: Take 5 mg by mouth 3 times daily as needed )     Liothyronine Sodium 50 MCG TABS Take 50 mcg by mouth daily      NASONEX 50 MCG/ACT nasal spray      cyclobenzaprine (FLEXERIL) 10 MG tablet Take 1 tablet (10 mg) by mouth 3 times daily     cycloSPORINE (RESTASIS) 0.05 % ophthalmic emulsion Place 1 drop into both eyes 2  "times daily     cyanocobalamin 1000 MCG/ML injection Inject 1 mL (1,000 mcg) Subcutaneous every 7 days     probiotic CAPS Take 1 capsule by mouth daily.     fexofenadine (ALLEGRA) 180 MG tablet Take 180 mg by mouth daily.     fluticasone (FLONASE) 50 MCG/ACT nasal spray 2 sprays by Both Nostrils route daily as needed.     FISH OIL daily. w/DHEA.      PREDNISONE 5 MG OR TABS alternate 8 mg and 10 mg every other day     No current facility-administered medications for this visit.              Allergies:      Allergies   Allergen Reactions     Codeine Hives     Ibuprofen [Nsaids] Hives     Penicillins Hives     Other reaction(s): Unknown     Thor Hives     Pt states she had rxn to skin prep-thor prep     Sulfa Drugs Hives     Other reaction(s): Unknown     Codeine      Other reaction(s): Unknown     Doxycycline GI Disturbance     Emesis & diarrhea  Patient denies allergy to this med     Ibuprofen      Other reaction(s): Unknown     Lactose      Other reaction(s): Unknown     Mold      Mushroom      Penicillins      Red Wine Complex [Calcium Pyruvate-Dihydroxyacetone]      No Clinical Screening - See Comments Rash     Thor prep            Review of Systems:   A comprehensive 10 point review of systems (constitutional, ENT, cardiac, peripheral vascular, respiratory, GI, , Musculoskeletal, skin, Neurological) was performed and found to be negative except as described in this note.           Physical Exam:   COMPLETE EXAMINATION:   VITAL SIGNS: Ht 1.753 m (5' 9.02\")  Wt 111.8 kg (246 lb 7.6 oz)  BMI 36.38 kg/m2  RESP: Non labored breathing  MUSCULOSKELETAL:      Neck: Active ROM full with mild pain turning head to left.  No radiating pains.    LUE:  Active elbow motion is 3-140 degrees.  Elbow tender to palpation over medial epicondyle and posteriorly over triceps and olecranon.    Skin intact. No erythema. No shoulder asymmetry compared with contralateral shoulder. No muscle atrophy. No pain on palpation over SC " joint. Tender to palpation over AC joint, posterior glenohumeral space and long head of biceps. SILT in axillary, median, ulnar musculocutaneous, and radial nerve distributions. 5/5 , EPL, FPL, intrinsics, wrist flexion/extension, bicep, tricep, deltoid.     Active shoulder motion is FE to 90 with discomfort, Abduction to 80 with discomfort, ER at side to 60 with discomfort, IR to back pocket with discomfort at end range.      Passive motion is similar.    Rotator Cuff:  -Belly Press: 4/5 with pain  -Bear Hu/5 with pain  -ER at side: 4/5 with pain  -Jovanny's: 4/5 with pain    Special Tests:  -Tejeda's: negative    Biceps:  -Speeds: positive            Data:     X-Rays of the Left Shoulder were independently reviewed and reveal: ac joint arthrosis, osteophytes on greater tuberosity. No fractures or dislocations.    X-rays of the left elbow were independently reviewed and reveal: degenerative changes of the left elbow, but no fractures or dislocations.      Again, thank you for allowing me to participate in the care of your patient.      Sincerely,    Charmaine Mendoza MD

## 2018-04-11 NOTE — PROGRESS NOTES
[facility] Physicians, Orthopaedic Surgery Consultation    Aspen Mccullough MRN# [id number]   Age: 58 year old YOB: 1960     Reason for consult: Left shoulder pain       Requesting physician: Dr. Naun Mejia         Assessment and Plan:   Assessment:   1. Chronic left shoulder pain  2. Left elbow arthrosis  3. Left rotator cuff disease  4. 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 Mejia in 2011    Plan:  We reviewed the xray findings as well as physical exam findings with the patient and described them using lay language. We discussed the finding of left elbow osteoarthritis and likely left shoulder rotator cuff tear. We discussed the non-operative (physical therapy exercises, cortisone injections) and operative treatment options (arthroscopic debridement vs total shoulder arthroplasty) including the risks and potential benefits of each. We discussed the expectations for pain relief and/or motion improvement with each option. Aspen Mccullough has a complex past medical history, including CPRS, and would like to avoid surgical interventions if possible.  She recently had corticosteroid injections into the left glenohumeral joint (4 weeks ago) and left elbow (6 weeks ago).  We discussed that injections and therapy are going to be the best options currently for symptomatic treatment of her left elbow DJD and left shoulder likely rotator cuff tear.  We did discuss the possibility of a suprascapular nerve block with anesthesia/pain management.  She is very interested in pursuing this treatment modality and we will help with the referral. Additionally, we encouraged stretching exercises to prevent left shoulder stiffness.  She will monitor her response to nerve block and stretching and follow up with our team as needed.    John Camacho MD  Orthopedic Surgery,  PGY-1  9:34 AM  April 11, 2018    Patient was seen and discussed with Dr. Mendoza    I have personally examined this patient and have reviewed the clinical presentation and progress note with the resident.  I agree with the treatment plan as outlined.  The plan was formulated with the resident on the day of the resident's note.               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 Mejia in 2011, now with 5 years of left shoulder and elbow pain.    She describes gradual onset left shoulder pain 5 years ago.  She denies history of trauma.  She has been followed by Dr. Simon and has undergone numerous corticosteroid injections.  She reports these do not particularly help her symptoms.  Pain and ROM issues are her primary issues with left shoulder.  Her most recent injection was done by her pain management doctors 4 weeks ago under US guidance.  She reports no relief from this.    Left elbow pain is roughly 5 year onset as well without trauma.  She reports pain to be primarily posterior.  She received a corticosteroid injection 6 weeks ago with her pain management doctors.  She reports no relief from this.    Of note she is taking significant amounts of opioid pain medications daily (8 mg dilaudid TID and 90 mg MS contin TID) and has history of CPRS following her TKA procedures.     Patient was seen and examined by me. History, PMH, Meds, SH, complete ROS (10 organ systems) and PE reviewed with patient and prior medical records.         Past Medical History:     Past Medical History:   Diagnosis Date     ADHD (attention deficit hyperactivity disorder)      Allergic rhinitis      Chronic low back pain       Cushing's syndrome (H)      DDD (degenerative disc disease)      DDD (degenerative disc disease)      Deviated nasal septum      Diarrhea      Endometriosis      Fibromyalgia      Hashimoto's disease      Hyperlipidemia      Hypothyroid     hx hashimoto's thyroiditis     Insomnia      Lupus      Malabsorption      Pernicious anemia      Renal disease     chronic renal insufficiency     Renal disease     hx renal failure     Sinusitis              Past Surgical History:     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)     APPENDECTOMY       ARTHRODESIS ANKLE      right     ARTHROPLASTY KNEE BILATERAL       ARTHROPLASTY REVISION KNEE  4/19/2011    Procedure:ARTHROPLASTY REVISION KNEE; With Antibiotic Cement ; Surgeon:CHAO OLIVARES; Location:[facility]     BREAST SURGERY      right- tissue remove nipple area     BUNIONECTOMY  12/14/2011    Procedure:BUNIONECTOMY; Right Bunion Correction; Surgeon:GRACE ZARATE; Location:[facility]     C STOMACH SURGERY PROCEDURE UNLISTED      see list which we will bring     CHOLECYSTECTOMY       EXCISE MASS UPPER EXTREMITY  12/14/2011    Procedure:EXCISE MASS UPPER EXTREMITY; Excision of Left Arm Mass; Surgeon:GRACE ZARATE; Location:[facility]     FOOT SURGERY      left X 4     FUSION LUMBAR ANTERIOR ONE LEVEL       HC SACROPLASTY      see list which we will bring     KNEE SURGERY      see list which we will bring     LAMINECTOMY LUMBAR ONE LEVEL      L4-5     LAPAROSCOPIC ABLATION ENDOMETRIOSIS       REMOVE HARDWARE FOOT  12/14/2011    Procedure:REMOVE HARDWARE FOOT; Hardware Removal Right Foot (Mini-C-Arm) ; Surgeon:GRACE ZARATE; Location:[facility]     RHINOPLASTY       SALPINGO-OOPHORECTOMY BILATERAL       TONSILLECTOMY               Social History:     Social History     Social History     Marital status:      Spouse name: N/A     Number of children: N/A     Years of education: N/A      Social History Main Topics     Smoking status: Former Smoker     Packs/day: 1.50     Years: 20.00     Types: Cigarettes     Start date: 1/1/1973     Quit date: 1/1/1993     Smokeless tobacco: Never Used     Alcohol use No     Drug use: No     Sexual activity: Not Currently     Partners: Male     Birth control/ protection: Post-menopausal     Other Topics Concern     None     Social History Narrative    ** Merged History Encounter **                  Family History:     Family History   Problem Relation Age of Onset     Hypertension Mother               Medications:     Current Outpatient Prescriptions   Medication Sig     levothyroxine (SYNTHROID/LEVOTHROID) 100 MCG SOLR injection Inject 200 mcg into the vein twice a week     VITAMIN D, CHOLECALCIFEROL, PO Take 50,000 Units by mouth once a week     CEPHALEXIN PO Take 500 mg by mouth as needed (Dental Procedure) Take 4 caps 1 hour prior to Dental Appointment     sodium chloride, PF, (SODIUM CHLORIDE) 0.9% PF flush      levothyroxine (SYNTHROID/LEVOTHROID) 300 MCG tablet Take 300 mcg by mouth 2 times daily      rivaroxaban ANTICOAGULANT (XARELTO) 20 MG TABS tablet Take 20 mg by mouth daily (with dinner)      HYDROmorphone (DILAUDID) 8 MG tablet Take 1 tablet (8 mg) by mouth every 3 hours as needed (Patient taking differently: Take 8 mg by mouth 3 times daily )     morphine (MS CONTIN) 30 MG 12 hr tablet Take 3 tablets (90 mg) by mouth every 8 hours (Patient taking differently: Take 90 mg by mouth 3 times daily )     diazepam (VALIUM) 10 MG tablet Take 1 tablet (10 mg) by mouth 3 times daily (Patient taking differently: Take 5 mg by mouth 3 times daily as needed )     Liothyronine Sodium 50 MCG TABS Take 50 mcg by mouth daily      NASONEX 50 MCG/ACT nasal spray      cyclobenzaprine (FLEXERIL) 10 MG tablet Take 1 tablet (10 mg) by mouth 3 times daily     cycloSPORINE (RESTASIS) 0.05 % ophthalmic emulsion Place 1 drop into both eyes 2 times daily     cyanocobalamin  "1000 MCG/ML injection Inject 1 mL (1,000 mcg) Subcutaneous every 7 days     probiotic CAPS Take 1 capsule by mouth daily.     fexofenadine (ALLEGRA) 180 MG tablet Take 180 mg by mouth daily.     fluticasone (FLONASE) 50 MCG/ACT nasal spray 2 sprays by Both Nostrils route daily as needed.     FISH OIL daily. w/DHEA.      PREDNISONE 5 MG OR TABS alternate 8 mg and 10 mg every other day     No current facility-administered medications for this visit.              Allergies:      Allergies   Allergen Reactions     Codeine Hives     Ibuprofen [Nsaids] Hives     Penicillins Hives     Other reaction(s): Unknown     Thor Hives     Pt states she had rxn to skin prep-thor prep     Sulfa Drugs Hives     Other reaction(s): Unknown     Codeine      Other reaction(s): Unknown     Doxycycline GI Disturbance     Emesis & diarrhea  Patient denies allergy to this med     Ibuprofen      Other reaction(s): Unknown     Lactose      Other reaction(s): Unknown     Mold      Mushroom      Penicillins      Red Wine Complex [Calcium Pyruvate-Dihydroxyacetone]      No Clinical Screening - See Comments Rash     Thor prep            Review of Systems:   A comprehensive 10 point review of systems (constitutional, ENT, cardiac, peripheral vascular, respiratory, GI, , Musculoskeletal, skin, Neurological) was performed and found to be negative except as described in this note.           Physical Exam:   COMPLETE EXAMINATION:   VITAL SIGNS: Ht 1.753 m (5' 9.02\")  Wt 111.8 kg (246 lb 7.6 oz)  BMI 36.38 kg/m2  RESP: Non labored breathing  MUSCULOSKELETAL:      Neck: Active ROM full with mild pain turning head to left.  No radiating pains.    LUE:  Active elbow motion is 3-140 degrees.  Elbow tender to palpation over medial epicondyle and posteriorly over triceps and olecranon.    Skin intact. No erythema. No shoulder asymmetry compared with contralateral shoulder. No muscle atrophy. No pain on palpation over SC joint. Tender to palpation over AC " joint, posterior glenohumeral space and long head of biceps. SILT in axillary, median, ulnar musculocutaneous, and radial nerve distributions. 5/5 , EPL, FPL, intrinsics, wrist flexion/extension, bicep, tricep, deltoid.     Active shoulder motion is FE to 90 with discomfort, Abduction to 80 with discomfort, ER at side to 60 with discomfort, IR to back pocket with discomfort at end range.      Passive motion is similar.    Rotator Cuff:  -Belly Press: 4/5 with pain  -Bear Hu/5 with pain  -ER at side: 4/5 with pain  -Jovanny's: 4/5 with pain    Special Tests:  -Tejeda's: negative    Biceps:  -Speeds: positive            Data:     X-Rays of the Left Shoulder were independently reviewed and reveal: ac joint arthrosis, osteophytes on greater tuberosity. No fractures or dislocations.    X-rays of the left elbow were independently reviewed and reveal: degenerative changes of the left elbow, but no fractures or dislocations.    Answers for HPI/ROS submitted by the patient on 4/10/2018   General Symptoms: Yes  Skin Symptoms: No  HENT Symptoms: No  EYE SYMPTOMS: Yes  HEART SYMPTOMS: No  LUNG SYMPTOMS: No  INTESTINAL SYMPTOMS: Yes  URINARY SYMPTOMS: No  GYNECOLOGIC SYMPTOMS: No  BREAST SYMPTOMS: No  SKELETAL SYMPTOMS: Yes  BLOOD SYMPTOMS: No  NERVOUS SYSTEM SYMPTOMS: No  MENTAL HEALTH SYMPTOMS: No  Fever: No  Loss of appetite: No  Weight loss: No  Weight gain: Yes  Fatigue: Yes  Night sweats: Yes  Chills: Yes  Increased stress: No  Excessive hunger: No  Excessive thirst: No  Feeling hot or cold when others believe the temperature is normal: Yes  Loss of height: No  Post-operative complications: No  Surgical site pain: No  Hallucinations: No  Change in or Loss of Energy: Yes  Hyperactivity: No  Confusion: No  Eye pain: No  Vision loss: No  Dry eyes: Yes  Watery eyes: No  Eye bulging: No  Double vision: No  Flashing of lights: No  Spots: No  Floaters: No  Redness: No  Crossed eyes: No  Tunnel Vision: No  Yellowing of eyes:  No  Eye irritation: No  Heart burn or indigestion: No  Nausea: No  Vomiting: No  Abdominal pain: No  Bloating: No  Constipation: Yes  Diarrhea: Yes  Blood in stool: No  Black stools: No  Rectal or Anal pain: No  Fecal incontinence: No  Yellowing of skin or eyes: No  Vomit with blood: No  Change in stools: No  Back pain: Yes  Muscle aches: Yes  Neck pain: Yes  Swollen joints: Yes  Joint pain: Yes  Bone pain: Yes  Muscle cramps: Yes  Muscle weakness: Yes  Joint stiffness: Yes  Bone fracture: No

## 2018-04-12 ENCOUNTER — OFFICE VISIT (OUTPATIENT)
Dept: BEHAVIORAL HEALTH | Facility: CLINIC | Age: 58
End: 2018-04-12
Payer: COMMERCIAL

## 2018-04-12 DIAGNOSIS — F98.8 ATTENTION DEFICIT DISORDER: ICD-10-CM

## 2018-04-12 DIAGNOSIS — F43.10 POSTTRAUMATIC STRESS DISORDER: Primary | ICD-10-CM

## 2018-04-12 DIAGNOSIS — M25.519 SHOULDER PAIN: Primary | ICD-10-CM

## 2018-04-12 PROCEDURE — 90834 PSYTX W PT 45 MINUTES: CPT | Performed by: SOCIAL WORKER

## 2018-04-12 NOTE — PROGRESS NOTES
[facility] - Integrated Primary [facility] Clinic  April 12, 2018      Behavioral Health Clinician Progress Note    Patient Name: Aspen Mccullough           Service Type: Individual      Service Location:  in clinic      Session Start Time: 10:30am  Session End Time: 11:08am      Session Length: 38 - 52      Attendees: Patient    Visit Activities (Refresh list every visit): [facility] Only    Diagnostic Assessment Date: June 22, 2015  Treatment Plan Review Date: 4-18-16  See Flowsheets for today's PHQ-9 and BENI-7 results  Previous PHQ-9:   PHQ-9 SCORE 5/3/2016 10/4/2017 2/12/2018   Total Score - - -   Total Score - - -   Total Score 12 3 5     Previous BENI-7:   BENI-7 SCORE 11/10/2015 10/4/2017 2/12/2018   Total Score - - -   Total Score 3 0 0   Total Score - - -       FERCHO LEVEL:  FERCHO Score (Last Two) 7/24/2014   FERCHO Raw Score 51   Activation Score 91.6   FERCHO Level 4       DATA  Extended Session (60+ minutes): No  Interactive Complexity: No  Crisis: No    Treatment Objective(s) Addressed in This Session:  Target Behavior(s): disease management/lifestyle changes mental health    Mood Instability: will develop better understanding of triggers and coping strategies to stabilize mood  PTSD Symptom Management  Psychological distress related to Pain    Current Stressors / Issues:    The patient reported that she has been sleeping better-she is working with sleep clinic and meets with sleep psychologist-she was advised to continue seeing this [facility] on regular basis and that she would not need to continue being seen by the pain psychologist-able to fall asleep easy-she has been doing sleep hygiene techniques that has helped with sleep-she is waking at about 1am and she is not able to go back to sleep-she is getting about 4 hours of sleep a night-regarding appetite she reported having low appetite and that she has to force the self to eat-she is exercising mildly due to injury-regarding mood the patient reported having stable mood-no  suicidal thoughts-regarding anxiety the patient reported no panic attacks-worries about the health and safety of her aunt-we had discussion of the patient doing what ever it takes to continue to receive a necessary medication.           Progress on Treatment Objective(s) / Homework:  Minimal progress - ACTION (Actively working towards change); Intervened by reinforcing change plan / affirming steps taken    Motivational Interviewing    MI Intervention: Expressed Empathy/Understanding, Supported Autonomy, Collaboration, Evocation, Permission to raise concern or advise, Open-ended questions, Reflections: simple and complex and Change talk (evoked)     Change Talk Expressed by the Patient: Desire to change Ability to change Reasons to change Need to change Committment to change Activation Taking steps    Provider Response to Change Talk: E - Evoked more info from patient about behavior change, A - Affirmed patient's thoughts, decisions, or attempts at behavior change, R - Reflected patient's change talk and S - Summarized patient's change talk statements      Care Plan review completed: No    Medication Review:  No changes to current psychiatric medication(s)    Medication Compliance:  NA    Changes in Health Issues:   Yes: Pain, Associated Psychological Distress    Chemical Use Review:   Substance Use: Chemical use reviewed, no active concerns identified      Tobacco Use: No current tobacco use.      Assessment: Current Emotional / Mental Status (status of significant symptoms):  Risk status (Self / Other harm or suicidal ideation)  Patient denies a history of suicidal ideation, suicide attempts, self-injurious behavior, homicidal ideation, homicidal behavior and and other safety concerns  Patient denies current fears or concerns for personal safety.  Patient denies current or recent suicidal ideation or behaviors.  Patient denies current or recent homicidal ideation or behaviors.  Patient denies current or recent  self injurious behavior or ideation.  Patient denies other safety concerns.  A safety and risk management plan has not been developed at this time, however patient was encouraged to call [place] [phone] should there be a change in any of these risk factors.    Appearance:   Appropriate   Eye Contact:   Good   Psychomotor Behavior: Normal   Attitude:   Cooperative   Orientation:   All  Speech   Rate / Production: Hyperverbal    Volume:  Normal   Mood:    Normal  Affect:    Appropriate   Thought Content:  Clear   Thought Form:  Coherent  Goal Directed  Logical   Insight:    Good     Diagnoses:  1. Posttraumatic stress disorder    2. Attention deficit disorder        Collateral Reports Completed:  Not Applicable    Plan: (Homework, other):  Patient was given information about behavioral services and encouraged to schedule a follow up appointment with the clinic [facility] as needed.  She was also given information about mental health symptoms and treatment options .  CD Recommendations: No indications of CD issues.  BARNEY Blackwell, [facility]      ______________________________________________________________________    Integrated Primary Care Behavioral Health Treatment Plan    Patient's Name: Aspen Mccullough  YOB: 1960    Date: 4-18-16    DSM-V Diagnoses: PTSD  Psychosocial / Contextual Factors: medical condition, history of traumatic experiences  WHODAS:  Will complete at next visit    Referral / Collaboration:  Referral to another professional/service is not indicated at this time..    Anticipated number of session or this episode of care: 10      MeasurableTreatment Goal(s) related to diagnosis / functional impairment(s)  Goal 1: Patient will be able to remember the past with 50% less emotional reaction of anger and hurt.     I will know I've met my goal when I can let go of the past     Objective #A (Patient Action)    Patient will talk to at least two others about losses and coping.  Status: New - Date:  4-18-16     Intervention(s)  [facility] will provide avenue for the past to process her losses and disappointments.    Objective #B  Patient will reduce her triggers from past trauma.  Status: New - Date: 4-18-16     Intervention(s)  [facility] will teach distraction skills. mindfulness.      Patient has reviewed and agreed to the above plan.      Antwan Boston, [facility]  April 18, 2016

## 2018-04-12 NOTE — MR AVS SNAPSHOT
After Visit Summary   4/12/2018    Aspen Mccullough    MRN: [id number]           Patient Information     Date Of Birth          1960        Visit Information        Provider Department      4/12/2018 10:30 AM Antwan Boston LIC[facility]        Today's Diagnoses     Posttraumatic stress disorder    -  1    Attention deficit disorder           Follow-ups after your visit        Your next 10 appointments already scheduled     Apr 13, 2018 10:30 AM CDT   Return Visit with BARNEY Yan   [facility] ([facility]    [address]  [address]  [facility] [address]   [phone]            Apr 13, 2018 10:30 AM CDT   Return Visit with ANITRA Melvin [facility] ([facility])    [address]  [address]  [facility] [address]   [phone]            Apr 26, 2018 10:30 AM CDT   Return Visit with BARNEY Yan   [facility] ([facility])    [address]  [address]  [facility] [address]   [phone]              Who to contact     If you have questions or need follow up information about today's clinic visit or your schedule please contact [facility] directly at [phone].  Normal or non-critical lab and imaging results will be communicated to you by MyChart, letter or phone within 4 business days after the clinic has received the results. If you do not hear from us within 7 days, please contact the clinic through MyChart or phone. If you have a critical or abnormal lab result, we will notify you by phone as soon as possible.  Submit refill requests through [employer] or call your pharmacy and they will forward the refill request to us. Please allow 3 business days for your refill to be completed.           Additional Information About Your Visit        [employer] Information     [employer] gives you secure access to your electronic health record. If you see a primary care provider, you can also send messages to your care team and make appointments. If you have questions, please call your primary care clinic.  If you do not have a primary care provider, please call [phone] and they will assist you.        Care EveryWhere ID     This is your Care EveryWhere ID. This could be used by other organizations to access your [facility] medical records  [id number]         Blood Pressure from Last 3 Encounters:   03/28/18 136/82   03/13/18 113/70   02/15/18 132/78    Weight from Last 3 Encounters:   04/11/18 246 lb 7.6 oz (111.8 kg)   04/02/18 246 lb 8 oz (111.8 kg)   03/28/18 259 lb 11.2 oz (117.8 kg)              Today, you had the following     No orders found for display         Today's Medication Changes          These changes are accurate as of 4/12/18  2:01 PM.  If you have any questions, ask your nurse or doctor.               These medicines have changed or have updated prescriptions.        Dose/Directions    diazepam 10 MG tablet   Commonly known as:  VALIUM   This may have changed:    - how much to take  - when to take this  - reasons to take this   Used for:  Chronic pain syndrome, Encounter for long-term use of opiate analgesic        Dose:  10 mg   Take 1 tablet (10 mg) by mouth 3 times daily   Quantity:  90 tablet   Refills:  0       HYDROmorphone 8 MG tablet   Commonly known as:  DILAUDID   This may have changed:  when to take this   Used for:  Chronic pain syndrome        Dose:  8 mg   Take 1 tablet (8 mg) by mouth every 3 hours as needed   Quantity:  100 tablet   Refills:  0       morphine 30 MG 12 hr tablet   Commonly known as:  MS CONTIN   This may have changed:  when to take this   Used for:  Chronic pain syndrome        Dose:  90 mg   Take 3 tablets (90 mg) by mouth every 8 hours   Quantity:  270  tablet   Refills:  0                Primary Care Provider Office Phone # Fax #    Sarahi Vivar [phone]       [facility] [address] [facility] [address]        Equal Access to Services     ADRYAN BHAT : Hadjohn grisel ku albaniao Soomaali, waaxda luqadaha, qaybta kaalmada adeegyada, yaneth watsonn angela hwang laJimenaskyler sherman. So [facility] [phone].    ATENCIÓN: Si habla español, tiene a lynn disposición servicios gratuitos de asistencia lingüística. Llame al [phone].    We comply with applicable federal civil rights laws and [place] laws. We do not discriminate on the basis of race, color, national origin, age, disability, sex, sexual orientation, or gender identity.            Thank you!     Thank you for choosing [facility]  for your care. Our goal is always to provide you with excellent care. Hearing back from our patients is one way we can continue to improve our services. Please take a few minutes to complete the written survey that you may receive in the mail after your visit with us. Thank you!             Your Updated Medication List - Protect others around you: Learn how to safely use, store and throw away your medicines at www.disposemymeds.org.          This list is accurate as of 4/12/18  2:01 PM.  Always use your most recent med list.                   Brand Name Dispense Instructions for use Diagnosis    ALLEGRA 180 MG tablet   Generic drug:  fexofenadine      Take 180 mg by mouth daily.        CEPHALEXIN PO      Take 500 mg by mouth as needed (Dental Procedure) Take 4 caps 1 hour prior to Dental Appointment        cyanocobalamin 1000 MCG/ML injection    VITAMIN B12    4 mL    Inject 1 mL (1,000 mcg) Subcutaneous every 7 days    Other vitamin B12 deficiency anemia       cyclobenzaprine 10 MG tablet    FLEXERIL    90 tablet    Take 1 tablet (10 mg) by mouth 3 times daily    Generalized osteoarthrosis, involving multiple sites       CYTOMEL  50 MCG Tabs   Generic drug:  Liothyronine Sodium      Take 50 mcg by mouth daily    Hypothyroidism, unspecified type       diazepam 10 MG tablet    VALIUM    90 tablet    Take 1 tablet (10 mg) by mouth 3 times daily    Chronic pain syndrome, Encounter for long-term use of opiate analgesic       FISH OIL      daily. w/DHEA.        FLONASE 50 MCG/ACT spray   Generic drug:  fluticasone      2 sprays by Both Nostrils route daily as needed.        HYDROmorphone 8 MG tablet    DILAUDID    100 tablet    Take 1 tablet (8 mg) by mouth every 3 hours as needed    Chronic pain syndrome       * levothyroxine 300 MCG tablet    SYNTHROID/LEVOTHROID     Take 300 mcg by mouth 2 times daily        * levothyroxine 100 MCG Solr injection    SYNTHROID/LEVOTHROID     Inject 200 mcg into the vein twice a week        morphine 30 MG 12 hr tablet    MS CONTIN    270 tablet    Take 3 tablets (90 mg) by mouth every 8 hours    Chronic pain syndrome       NASONEX 50 MCG/ACT spray   Generic drug:  mometasone           predniSONE 5 MG tablet    DELTASONE     alternate 8 mg and 10 mg every other day        probiotic Caps      Take 1 capsule by mouth daily.        RESTASIS 0.05 % ophthalmic emulsion   Generic drug:  cycloSPORINE      Place 1 drop into both eyes 2 times daily        rivaroxaban ANTICOAGULANT 20 MG Tabs tablet    XARELTO     Take 20 mg by mouth daily (with dinner)        sodium chloride (PF) 0.9% PF flush           VITAMIN D (CHOLECALCIFEROL) PO      Take 50,000 Units by mouth once a week        * Notice:  This list has 2 medication(s) that are the same as other medications prescribed for you. Read the directions carefully, and ask your doctor or other care provider to review them with you.

## 2018-04-13 ENCOUNTER — OFFICE VISIT (OUTPATIENT)
Dept: FAMILY MEDICINE | Facility: CLINIC | Age: 58
End: 2018-04-13
Payer: COMMERCIAL

## 2018-04-13 ENCOUNTER — TRANSFERRED RECORDS (OUTPATIENT)
Dept: HEALTH INFORMATION MANAGEMENT | Facility: CLINIC | Age: 58
End: 2018-04-13

## 2018-04-13 ENCOUNTER — OFFICE VISIT (OUTPATIENT)
Dept: BEHAVIORAL HEALTH | Facility: CLINIC | Age: 58
End: 2018-04-13
Payer: COMMERCIAL

## 2018-04-13 VITALS
HEIGHT: 69 IN | OXYGEN SATURATION: 97 % | SYSTOLIC BLOOD PRESSURE: 150 MMHG | RESPIRATION RATE: 16 BRPM | TEMPERATURE: 98.5 F | DIASTOLIC BLOOD PRESSURE: 100 MMHG | HEART RATE: 73 BPM

## 2018-04-13 DIAGNOSIS — F43.10 POSTTRAUMATIC STRESS DISORDER: Primary | ICD-10-CM

## 2018-04-13 DIAGNOSIS — G89.4 CHRONIC PAIN SYNDROME: ICD-10-CM

## 2018-04-13 DIAGNOSIS — I10 ESSENTIAL HYPERTENSION: ICD-10-CM

## 2018-04-13 DIAGNOSIS — E03.9 HYPOTHYROIDISM, UNSPECIFIED TYPE: Primary | ICD-10-CM

## 2018-04-13 DIAGNOSIS — F98.8 ATTENTION DEFICIT DISORDER: ICD-10-CM

## 2018-04-13 PROCEDURE — 36415 COLL VENOUS BLD VENIPUNCTURE: CPT | Performed by: NURSE PRACTITIONER

## 2018-04-13 PROCEDURE — 84439 ASSAY OF FREE THYROXINE: CPT | Performed by: NURSE PRACTITIONER

## 2018-04-13 PROCEDURE — 99214 OFFICE O/P EST MOD 30 MIN: CPT | Performed by: NURSE PRACTITIONER

## 2018-04-13 PROCEDURE — 84443 ASSAY THYROID STIM HORMONE: CPT | Performed by: NURSE PRACTITIONER

## 2018-04-13 PROCEDURE — 90832 PSYTX W PT 30 MINUTES: CPT | Performed by: SOCIAL WORKER

## 2018-04-13 RX ORDER — MORPHINE SULFATE 60 MG/1
TABLET, FILM COATED, EXTENDED RELEASE ORAL
Refills: 0 | COMMUNITY
Start: 2018-04-04 | End: 2018-09-19

## 2018-04-13 RX ORDER — LEVOTHYROXINE SODIUM ANHYDROUS 200 UG/5ML
INJECTION, POWDER, LYOPHILIZED, FOR SOLUTION INTRAVENOUS
Refills: 3 | COMMUNITY
Start: 2018-03-27 | End: 2018-10-24

## 2018-04-13 RX ORDER — LIOTHYRONINE SODIUM 25 UG/1
TABLET ORAL
Refills: 5 | COMMUNITY
Start: 2018-04-03 | End: 2018-04-25

## 2018-04-13 RX ORDER — PREDNISONE 1 MG/1
TABLET ORAL
Refills: 2 | COMMUNITY
Start: 2018-04-03 | End: 2018-10-24

## 2018-04-13 NOTE — PROGRESS NOTES
[facility] - Integrated Primary Care Clinic  April 13, 2018      Behavioral Health Clinician Progress Note    Patient Name: Aspen Mccullough           Service Type: Individual      Service Location:  in clinic      Session Start Time: 10:50am  Session End Time: 11:20am      Session Length: 16 - 37      Attendees: Patient    Visit Activities (Refresh list every visit): [facility] [employer]    Diagnostic Assessment Date: June 22, 2015  Treatment Plan Review Date: 4-18-16  See Flowsheets for today's PHQ-9 and BENI-7 results  Previous PHQ-9:   PHQ-9 SCORE 5/3/2016 10/4/2017 2/12/2018   Total Score - - -   Total Score - - -   Total Score 12 3 5     Previous BENI-7:   BENI-7 SCORE 11/10/2015 10/4/2017 2/12/2018   Total Score - - -   Total Score 3 0 0   Total Score - - -       FERCHO LEVEL:  FERCHO Score (Last Two) 7/24/2014   FERCHO Raw Score 51   Activation Score 91.6   FERCHO Level 4       DATA  Extended Session (60+ minutes): No  Interactive Complexity: No  Crisis: No    Treatment Objective(s) Addressed in This Session:  Target Behavior(s): disease management/lifestyle changes mental health    Mood Instability: will develop better understanding of triggers and coping strategies to stabilize mood  PTSD Symptom Management  Psychological distress related to Pain    Current Stressors / Issues:    The patient was seen by [facility] per the request of the PCP-she talked about feeling fear/worry about other specialist beginning to taper her methadone pain medication-they don't understand that she is different and has had success with her current pain pill prescriptions-she talked with PCP about shoulder pain and treatment options and to fill in the PCP with this health issue-this writer helped the patient stay focused and on task during the visit-she has the core thinking that other do not understand her needs and that she is so different and needs to be trusted for what she needs-she has some trust issues with medical providers-she is beginning to use  her PCP in a more medically beneficial way increasing trust in the PCP.         Progress on Treatment Objective(s) / Homework:  Minimal progress - ACTION (Actively working towards change); Intervened by reinforcing change plan / affirming steps taken    Motivational Interviewing    MI Intervention: Expressed Empathy/Understanding, Supported Autonomy, Collaboration, Evocation, Permission to raise concern or advise, Open-ended questions, Reflections: simple and complex and Change talk (evoked)     Change Talk Expressed by the Patient: Desire to change Ability to change Reasons to change Need to change Committment to change Activation Taking steps    Provider Response to Change Talk: E - Evoked more info from patient about behavior change, A - Affirmed patient's thoughts, decisions, or attempts at behavior change, R - Reflected patient's change talk and S - Summarized patient's change talk statements      Care Plan review completed: No    Medication Review:  No changes to current psychiatric medication(s)    Medication Compliance:  NA    Changes in Health Issues:   Yes: Pain, Associated Psychological Distress    Chemical Use Review:   Substance Use: Chemical use reviewed, no active concerns identified      Tobacco Use: No current tobacco use.      Assessment: Current Emotional / Mental Status (status of significant symptoms):  Risk status (Self / Other harm or suicidal ideation)  Patient denies a history of suicidal ideation, suicide attempts, self-injurious behavior, homicidal ideation, homicidal behavior and and other safety concerns  Patient denies current fears or concerns for personal safety.  Patient denies current or recent suicidal ideation or behaviors.  Patient denies current or recent homicidal ideation or behaviors.  Patient denies current or recent self injurious behavior or ideation.  Patient denies other safety concerns.  A safety and risk management plan has not been developed at this time, however  patient was encouraged to call [place] [phone] should there be a change in any of these risk factors.    Appearance:   Appropriate   Eye Contact:   Good   Psychomotor Behavior: Normal   Attitude:   Cooperative   Orientation:   All  Speech   Rate / Production: Hyperverbal    Volume:  Normal   Mood:    Irritable  Normal  Affect:    Appropriate   Thought Content:  Clear   Thought Form:  Coherent  Goal Directed  Logical   Insight:    Good     Diagnoses:  No diagnosis found.    Collateral Reports Completed:  Not Applicable    Plan: (Homework, other):  Patient was given information about behavioral services and encouraged to schedule a follow up appointment with the clinic [facility] as needed.  She was also given information about mental health symptoms and treatment options .  CD Recommendations: No indications of CD issues.  Antwan Boston, [facility], [facility]      ______________________________________________________________________    Integrated Primary Care Behavioral Health Treatment Plan    Patient's Name: Aspen Mccullough  YOB: 1960    Date: 4-18-16    DSM-V Diagnoses: PTSD  Psychosocial / Contextual Factors: medical condition, history of traumatic experiences  WHODAS:  Will complete at next visit    Referral / Collaboration:  Referral to another professional/service is not indicated at this time..    Anticipated number of session or this episode of care: 10      MeasurableTreatment Goal(s) related to diagnosis / functional impairment(s)  Goal 1: Patient will be able to remember the past with 50% less emotional reaction of anger and hurt.     I will know I've met my goal when I can let go of the past     Objective #A (Patient Action)    Patient will talk to at least two others about losses and coping.  Status: New - Date: 4-18-16     Intervention(s)  [facility] will provide avenue for the past to process her losses and disappointments.    Objective #B  Patient will reduce her triggers from past trauma.  Status: New -  Date: 4-18-16     Intervention(s)  [facility] will teach distraction skills. mindfulness.      Patient has reviewed and agreed to the above plan.      Antwan Boston, [facility]  April 18, 2016

## 2018-04-13 NOTE — MR AVS SNAPSHOT
After Visit Summary   4/13/2018    Aspen Mccullough    MRN: [id number]           Patient Information     Date Of Birth          1960        Visit Information        Provider Department      4/13/2018 10:30 AM Antwan Boston LICSW [facility]        Today's Diagnoses     Posttraumatic stress disorder    -  1    Attention deficit disorder           Follow-ups after your visit        Your next 10 appointments already scheduled     Apr 26, 2018 10:30 AM CDT   Return Visit with BARNEY Yan   [facility] ([facility])    [address]  [address]  [facility] [address]   [phone]            May 18, 2018   Procedure with Basim Velazquez MD   [facility] ([facility])    [address] [place] [address]  5th Floor  [facility] [address]   [phone]           Located in the Clinics and Surgery Center at [address] [place] [address].   parking is very convenient and highly recommended.  is a $6 flat rate fee.  Both  and self parkers should enter the main arrival plaza from [facility]; parking attendants will direct you based on your parking preference.            Jul 17, 2018  9:30 AM CDT   Return Visit with ANITRA Melvin CNP   [facility] ([facility])    [address]  [address]  [facility] [address] [phone]            Jul 17, 2018  9:30 AM CDT   Return Visit with BARNEY Yan   [facility] ([facility])    [address]  [address]  [facility] [address] [phone]              Who to contact     If you have questions or need follow up information about today's clinic visit or your schedule please contact [facility]  directly at [phone].  Normal or non-critical lab and imaging results will be communicated to you by MyChart, letter or phone within 4 business days after the clinic has received the results. If you do not hear from us within 7 days, please contact the clinic through [employer] or phone. If you have a critical or abnormal lab result, we will notify you by phone as soon as possible.  Submit refill requests through [employer] or call your pharmacy and they will forward the refill request to us. Please allow 3 business days for your refill to be completed.          Additional Information About Your Visit        Leetchihart Information     [employer] gives you secure access to your electronic health record. If you see a primary care provider, you can also send messages to your care team and make appointments. If you have questions, please call your primary care clinic.  If you do not have a primary care provider, please call [phone] and they will assist you.        Care EveryWhere ID     This is your Care EveryWhere ID. This could be used by other organizations to access your [place] medical records  [id number]         Blood Pressure from Last 3 Encounters:   04/13/18 (!) 150/100   03/28/18 136/82   03/13/18 113/70    Weight from Last 3 Encounters:   04/11/18 246 lb 7.6 oz (111.8 kg)   04/02/18 246 lb 8 oz (111.8 kg)   03/28/18 259 lb 11.2 oz (117.8 kg)              Today, you had the following     No orders found for display         Today's Medication Changes          These changes are accurate as of 4/13/18 11:59 PM.  If you have any questions, ask your nurse or doctor.               These medicines have changed or have updated prescriptions.        Dose/Directions    diazepam 10 MG tablet   Commonly known as:  VALIUM   This may have changed:    - how much to take  - when to take this  - reasons to take this   Used for:  Chronic pain syndrome, Encounter for long-term use of opiate analgesic        Dose:  10 mg   Take 1  tablet (10 mg) by mouth 3 times daily   Quantity:  90 tablet   Refills:  0       HYDROmorphone 8 MG tablet   Commonly known as:  DILAUDID   This may have changed:  when to take this   Used for:  Chronic pain syndrome        Dose:  8 mg   Take 1 tablet (8 mg) by mouth every 3 hours as needed   Quantity:  100 tablet   Refills:  0       * morphine 30 MG 12 hr tablet   Commonly known as:  MS CONTIN   This may have changed:  when to take this   Used for:  Chronic pain syndrome        Dose:  90 mg   Take 3 tablets (90 mg) by mouth every 8 hours   Quantity:  270 tablet   Refills:  0       * morphine 60 MG 12 hr tablet   Commonly known as:  MS CONTIN   This may have changed:  Another medication with the same name was changed. Make sure you understand how and when to take each.        Refills:  0       * Notice:  This list has 2 medication(s) that are the same as other medications prescribed for you. Read the directions carefully, and ask your doctor or other care provider to review them with you.             Primary Care Provider Office Phone # Fax #    Sarahi Vivar [phone]       [facility] [address] [place] [address]        Equal Access to Services     ADRYAN BHAT : Hadii grisel edwards Soclaudia, waanilada xenia, qaybta kaalkyle mark, yaneth guillermo . So [facility] [phone].    ATENCIÓN: Si habla español, tiene a lynn disposición servicios gratuitos de asistencia lingüística. [facility] [phone].    We comply with applicable federal civil rights laws and [place] laws. We do not discriminate on the basis of race, color, national origin, age, disability, sex, sexual orientation, or gender identity.            Thank you!     Thank you for choosing [facility] PRIMARY [facility]ARE  for your care. Our goal is always to provide you with excellent care. Hearing back from our patients is one way we can continue to improve our services. Please take a  few minutes to complete the written survey that you may receive in the mail after your visit with us. Thank you!             Your Updated Medication List - Protect others around you: Learn how to safely use, store and throw away your medicines at www.disposemymeds.org.          This list is accurate as of 4/13/18 11:59 PM.  Always use your most recent med list.                   Brand Name Dispense Instructions for use Diagnosis    ALLEGRA 180 MG tablet   Generic drug:  fexofenadine      Take 180 mg by mouth daily.        CEPHALEXIN PO      Take 500 mg by mouth as needed (Dental Procedure) Take 4 caps 1 hour prior to Dental Appointment        cyanocobalamin 1000 MCG/ML injection    VITAMIN B12    4 mL    Inject 1 mL (1,000 mcg) Subcutaneous every 7 days    Other vitamin B12 deficiency anemia       cyclobenzaprine 10 MG tablet    FLEXERIL    90 tablet    Take 1 tablet (10 mg) by mouth 3 times daily    Generalized osteoarthrosis, involving multiple sites       * CYTOMEL 50 MCG Tabs   Generic drug:  Liothyronine Sodium      Take 50 mcg by mouth daily    Hypothyroidism, unspecified type       * liothyronine 25 MCG tablet    CYTOMEL          diazepam 10 MG tablet    VALIUM    90 tablet    Take 1 tablet (10 mg) by mouth 3 times daily    Chronic pain syndrome, Encounter for long-term use of opiate analgesic       FISH OIL      daily. w/DHEA.        FLONASE 50 MCG/ACT spray   Generic drug:  fluticasone      2 sprays by Both Nostrils route daily as needed.        HYDROmorphone 8 MG tablet    DILAUDID    100 tablet    Take 1 tablet (8 mg) by mouth every 3 hours as needed    Chronic pain syndrome       * levothyroxine 300 MCG tablet    SYNTHROID/LEVOTHROID     Take 300 mcg by mouth 2 times daily        * levothyroxine 100 MCG Solr injection    SYNTHROID/LEVOTHROID     Inject 200 mcg into the vein twice a week        * levothyroxine 200 MCG Solr injection    SYNTHROID/LEVOTHROID          * morphine 30 MG 12 hr tablet    MS  CONTIN    270 tablet    Take 3 tablets (90 mg) by mouth every 8 hours    Chronic pain syndrome       * morphine 60 MG 12 hr tablet    MS CONTIN          NASONEX 50 MCG/ACT spray   Generic drug:  mometasone           * predniSONE 5 MG tablet    DELTASONE     alternate 8 mg and 10 mg every other day        * predniSONE 1 MG tablet    DELTASONE          probiotic Caps      Take 1 capsule by mouth daily.        RESTASIS 0.05 % ophthalmic emulsion   Generic drug:  cycloSPORINE      Place 1 drop into both eyes 2 times daily        rivaroxaban ANTICOAGULANT 20 MG Tabs tablet    XARELTO     Take 20 mg by mouth daily (with dinner)        sodium chloride (PF) 0.9% PF flush           VITAMIN D (CHOLECALCIFEROL) PO      Take 50,000 Units by mouth once a week        * Notice:  This list has 9 medication(s) that are the same as other medications prescribed for you. Read the directions carefully, and ask your doctor or other care provider to review them with you.

## 2018-04-13 NOTE — MR AVS SNAPSHOT
After Visit Summary   4/13/2018    Aspen Mccullough    MRN: [id number]           Patient Information     Date Of Birth          1960        Visit Information        Provider Department      4/13/2018 10:30 AM Hailey Dubose APRN CNP [facility]        Today's Diagnoses     Hypothyroidism, unspecified type    -  1    Chronic pain syndrome           Follow-ups after your visit        Your next 10 appointments already scheduled     Apr 26, 2018 10:30 AM CDT   Return Visit with TAQUERIA YanSW   [facility] ([facility])    [address]  [address]  [facility] [address] [phone]            May 18, 2018   Procedure with Basim Velazquez MD   [facility] ([facility])    [address] [place] [address]  5th Floor  [facility] [address] [phone]           Located in the Clinics and Surgery Center at [address] [place] [address].   parking is very convenient and highly recommended.  is a $6 flat rate fee.  Both  and self parkers should enter the main arrival plaza from [facility]; parking attendants will direct you based on your parking preference.              Who to contact     If you have questions or need follow up information about today's clinic visit or your schedule please contact [facility] directly at [phone].  Normal or non-critical lab and imaging results will be communicated to you by MyChart, letter or phone within 4 business days after the clinic has received the results. If you do not hear from us within 7 days, please contact the clinic through MyChart or phone. If you have a critical or abnormal lab result, we will notify you by phone as soon as possible.  Submit refill requests through [facility] (PRN) or call your pharmacy and they will forward the refill request to  "us. Please allow 3 business days for your refill to be completed.          Additional Information About Your Visit        [employer] Information     [employer] gives you secure access to your electronic health record. If you see a primary care provider, you can also send messages to your care team and make appointments. If you have questions, please call your primary care clinic.  If you do not have a primary care provider, please call [phone] and they will assist you.        Care EveryWhere ID     This is your Care EveryWhere ID. This could be used by other organizations to access your [place] medical records  [id number]        Your Vitals Were     Pulse Temperature Respirations Height Pulse Oximetry       73 98.5  F (36.9  C) (Oral) 16 5' 9\" (1.753 m) 97%        Blood Pressure from Last 3 Encounters:   04/13/18 (!) 150/100   03/28/18 136/82   03/13/18 113/70    Weight from Last 3 Encounters:   04/11/18 246 lb 7.6 oz (111.8 kg)   04/02/18 246 lb 8 oz (111.8 kg)   03/28/18 259 lb 11.2 oz (117.8 kg)              We Performed the Following     TSH with free T4 reflex          Today's Medication Changes          These changes are accurate as of 4/13/18 11:14 AM.  If you have any questions, ask your nurse or doctor.               These medicines have changed or have updated prescriptions.        Dose/Directions    diazepam 10 MG tablet   Commonly known as:  VALIUM   This may have changed:    - how much to take  - when to take this  - reasons to take this   Used for:  Chronic pain syndrome, Encounter for long-term use of opiate analgesic        Dose:  10 mg   Take 1 tablet (10 mg) by mouth 3 times daily   Quantity:  90 tablet   Refills:  0       HYDROmorphone 8 MG tablet   Commonly known as:  DILAUDID   This may have changed:  when to take this   Used for:  Chronic pain syndrome        Dose:  8 mg   Take 1 tablet (8 mg) by mouth every 3 hours as needed   Quantity:  100 tablet   Refills:  0       * morphine 30 MG 12 hr " tablet   Commonly known as:  MS CONTIN   This may have changed:  when to take this   Used for:  Chronic pain syndrome        Dose:  90 mg   Take 3 tablets (90 mg) by mouth every 8 hours   Quantity:  270 tablet   Refills:  0       * morphine 60 MG 12 hr tablet   Commonly known as:  MS CONTIN   This may have changed:  Another medication with the same name was changed. Make sure you understand how and when to take each.        Refills:  0       * Notice:  This list has 2 medication(s) that are the same as other medications prescribed for you. Read the directions carefully, and ask your doctor or other care provider to review them with you.             Primary Care Provider Office Phone # Fax #    Sarahi Vivar [phone]       [facility] [address] [place] [address]        Equal Access to Services     ADRYAN BHAT : Laisha edwards Soclaudia, waaxbarbara luqadaha, qaybta kaalmada adejoyceyabarbara, yaneth guillermo . So [facility] [phone].    ATENCIÓN: Si habla español, tiene a lynn disposición servicios gratuitos de asistencia lingüística. [facility] [phone].    We comply with applicable federal civil rights laws and [place] laws. We do not discriminate on the basis of race, color, national origin, age, disability, sex, sexual orientation, or gender identity.            Thank you!     Thank you for choosing [facility] PRIMARY CARE  for your care. Our goal is always to provide you with excellent care. Hearing back from our patients is one way we can continue to improve our services. Please take a few minutes to complete the written survey that you may receive in the mail after your visit with us. Thank you!             Your Updated Medication List - Protect others around you: Learn how to safely use, store and throw away your medicines at www.disposemymeds.org.          This list is accurate as of 4/13/18 11:14 AM.  Always use your most recent med  list.                   Brand Name Dispense Instructions for use Diagnosis    ALLEGRA 180 MG tablet   Generic drug:  fexofenadine      Take 180 mg by mouth daily.        CEPHALEXIN PO      Take 500 mg by mouth as needed (Dental Procedure) Take 4 caps 1 hour prior to Dental Appointment        cyanocobalamin 1000 MCG/ML injection    VITAMIN B12    4 mL    Inject 1 mL (1,000 mcg) Subcutaneous every 7 days    Other vitamin B12 deficiency anemia       cyclobenzaprine 10 MG tablet    FLEXERIL    90 tablet    Take 1 tablet (10 mg) by mouth 3 times daily    Generalized osteoarthrosis, involving multiple sites       * CYTOMEL 50 MCG Tabs   Generic drug:  Liothyronine Sodium      Take 50 mcg by mouth daily    Hypothyroidism, unspecified type       * liothyronine 25 MCG tablet    CYTOMEL          diazepam 10 MG tablet    VALIUM    90 tablet    Take 1 tablet (10 mg) by mouth 3 times daily    Chronic pain syndrome, Encounter for long-term use of opiate analgesic       FISH OIL      daily. w/DHEA.        FLONASE 50 MCG/ACT spray   Generic drug:  fluticasone      2 sprays by Both Nostrils route daily as needed.        HYDROmorphone 8 MG tablet    DILAUDID    100 tablet    Take 1 tablet (8 mg) by mouth every 3 hours as needed    Chronic pain syndrome       * levothyroxine 300 MCG tablet    SYNTHROID/LEVOTHROID     Take 300 mcg by mouth 2 times daily        * levothyroxine 100 MCG Solr injection    SYNTHROID/LEVOTHROID     Inject 200 mcg into the vein twice a week        * levothyroxine 200 MCG Solr injection    SYNTHROID/LEVOTHROID          * morphine 30 MG 12 hr tablet    MS CONTIN    270 tablet    Take 3 tablets (90 mg) by mouth every 8 hours    Chronic pain syndrome       * morphine 60 MG 12 hr tablet    MS CONTIN          NASONEX 50 MCG/ACT spray   Generic drug:  mometasone           * predniSONE 5 MG tablet    DELTASONE     alternate 8 mg and 10 mg every other day        * predniSONE 1 MG tablet    DELTASONE          probiotic  Caps      Take 1 capsule by mouth daily.        RESTASIS 0.05 % ophthalmic emulsion   Generic drug:  cycloSPORINE      Place 1 drop into both eyes 2 times daily        rivaroxaban ANTICOAGULANT 20 MG Tabs tablet    XARELTO     Take 20 mg by mouth daily (with dinner)        sodium chloride (PF) 0.9% PF flush           VITAMIN D (CHOLECALCIFEROL) PO      Take 50,000 Units by mouth once a week        * Notice:  This list has 9 medication(s) that are the same as other medications prescribed for you. Read the directions carefully, and ask your doctor or other care provider to review them with you.

## 2018-04-13 NOTE — PROGRESS NOTES
SUBJECTIVE:                                                    Aspen Mccullough is a 58 year old female who presents to clinic today for the following health issues:  [facility], [place]: Don    Mental Health Follow up    Status since last visit: states she has depression, doesn't want to be on medications, described friends that have  due to medications for depression. Is upset due to her pain medication being titrated. Is very upset about this, had several things to say about unfair, her tolerance level. She discussed many different hospitalizations. Very difficult to stay on topic today.     See PHQ-9 for current symptoms.  Other associated symptoms: None    Complicating factors:   Significant life event:  No   Current substance abuse:  None  Anxiety or Panic symptoms:  No      PHQ-9  English PHQ-9   Any Language           Labs  Reviewed labs from ED  Pt states she goes to endocrinology and he knows about her TSH  Discussed need to repeat        Problems taking medications regularly: No    Medication side effects: none    Diet: regular (no restrictions)    Social History   Substance Use Topics     Smoking status: Former Smoker     Packs/day: 1.50     Years: 20.00     Types: Cigarettes     Start date: 1973     Quit date: 1993     Smokeless tobacco: Never Used     Alcohol use No        Problem list and histories reviewed & adjusted, as indicated.  Additional history: as documented    Patient Active Problem List   Diagnosis     Generalized osteoarthrosis, involving multiple sites     CRPS (complex regional pain syndrome), lower limb     Fibromyalgia     Somatoform disorder     Histrionic personality     Encounter for long-term use of opiate analgesic     Knee joint replacement by other means     Hypothyroidism     Insomnia, unspecified type     Hyperlipidemia     ACP (advance care planning)     Hashimoto's thyroiditis     Glucocorticoid deficiency (H)     Posttraumatic stress disorder     Impingement syndrome of left  shoulder     Abdominal cramping, generalized     Intermittent diarrhea     Primary osteoarthritis involving multiple joints     Attention deficit disorder     Allergic rhinitis     Cushing's syndrome (H)     Endometriosis     Essential hypertension     Systemic lupus erythematosus (H)     S/P cervical spinal fusion     Paroxysmal atrial fibrillation (H)     Nonischemic cardiomyopathy (H)     Personal history of DVT (deep vein thrombosis)     History of pulmonary embolism     Acute deep vein thrombosis (DVT) of popliteal vein of right lower extremity (H)     Acute pulmonary embolism (H)     Adrenal cortical steroids causing adverse effect in therapeutic use     Alopecia areata     Anemia, blood loss     Ankle pain, left     ARF (acute renal failure) (H)     Arthritis, septic (H)     Chronic ethmoidal sinusitis     Chronic maxillary sinusitis     Deviated nasal septum     Complications due to internal joint prosthesis (H)     Generalized pain     Iatrogenic hyperthyroidism     S/P TKR (total knee replacement)     Left shoulder pain     Lipoma of skin and subcutaneous tissue     Malabsorption     Nasal fracture     Nasal turbinate hypertrophy     Opioid type dependence (H)     Other specified abnormal findings of blood chemistry     Other acute postoperative pain     Pain in joint, lower leg     Postoperative hypotension     S/P total knee replacement     Thrombus of right atrial appendage without antecedent myocardial infarction     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)     APPENDECTOMY       ARTHRODESIS ANKLE      right     ARTHROPLASTY KNEE BILATERAL       ARTHROPLASTY REVISION KNEE  4/19/2011    Procedure:ARTHROPLASTY REVISION KNEE; With Antibiotic Cement ; Surgeon:CHAO OLIVARES; Location:[facility]     BREAST SURGERY      right- tissue remove nipple area     BUNIONECTOMY  12/14/2011    Procedure:BUNIONECTOMY; Right Bunion Correction;  Surgeon:GRACE ZARATE; Location:[facility]     C STOMACH SURGERY PROCEDURE UNLISTED      see list which we will bring     CHOLECYSTECTOMY       EXCISE MASS UPPER EXTREMITY  12/14/2011    Procedure:EXCISE MASS UPPER EXTREMITY; Excision of Left Arm Mass; Surgeon:GRACE ZARATE; Location:[facility]     FOOT SURGERY      left X 4     FUSION LUMBAR ANTERIOR ONE LEVEL       HC SACROPLASTY      see list which we will bring     KNEE SURGERY      see list which we will bring     LAMINECTOMY LUMBAR ONE LEVEL      L4-5     LAPAROSCOPIC ABLATION ENDOMETRIOSIS       REMOVE HARDWARE FOOT  12/14/2011    Procedure:REMOVE HARDWARE FOOT; Hardware Removal Right Foot (Mini-C-Arm) ; Surgeon:GRACE ZARATE; Location:[facility]     RHINOPLASTY       SALPINGO-OOPHORECTOMY BILATERAL       TONSILLECTOMY         Social History   Substance Use Topics     Smoking status: Former Smoker     Packs/day: 1.50     Years: 20.00     Types: Cigarettes     Start date: 1/1/1973     Quit date: 1/1/1993     Smokeless tobacco: Never Used     Alcohol use No     Family History   Problem Relation Age of Onset     Hypertension Mother            Current Outpatient Prescriptions   Medication Sig Dispense Refill     levothyroxine (SYNTHROID/LEVOTHROID) 100 MCG SOLR injection Inject 200 mcg into the vein twice a week       VITAMIN D, CHOLECALCIFEROL, PO Take 50,000 Units by mouth once a week       CEPHALEXIN PO Take 500 mg by mouth as needed (Dental Procedure) Take 4 caps 1 hour prior to Dental Appointment       sodium chloride, PF, (SODIUM CHLORIDE) 0.9% PF flush        levothyroxine (SYNTHROID/LEVOTHROID) 300 MCG tablet Take 300 mcg by mouth 2 times daily        rivaroxaban ANTICOAGULANT (XARELTO) 20 MG TABS tablet Take 20 mg by mouth daily (with dinner)        HYDROmorphone (DILAUDID) 8 MG tablet Take 1 tablet (8 mg) by mouth every 3 hours as needed (Patient taking differently: Take 8 mg by mouth 3 times daily ) 100 tablet 0     morphine (MS  CONTIN) 30 MG 12 hr tablet Take 3 tablets (90 mg) by mouth every 8 hours (Patient taking differently: Take 90 mg by mouth 3 times daily ) 270 tablet 0     diazepam (VALIUM) 10 MG tablet Take 1 tablet (10 mg) by mouth 3 times daily (Patient taking differently: Take 5 mg by mouth 3 times daily as needed ) 90 tablet 0     Liothyronine Sodium 50 MCG TABS Take 50 mcg by mouth daily        NASONEX 50 MCG/ACT nasal spray   11     cyclobenzaprine (FLEXERIL) 10 MG tablet Take 1 tablet (10 mg) by mouth 3 times daily 90 tablet 3     cycloSPORINE (RESTASIS) 0.05 % ophthalmic emulsion Place 1 drop into both eyes 2 times daily       cyanocobalamin 1000 MCG/ML injection Inject 1 mL (1,000 mcg) Subcutaneous every 7 days 4 mL 5     probiotic CAPS Take 1 capsule by mouth daily.       fexofenadine (ALLEGRA) 180 MG tablet Take 180 mg by mouth daily.       fluticasone (FLONASE) 50 MCG/ACT nasal spray 2 sprays by Both Nostrils route daily as needed.       FISH OIL daily. w/DHEA.        PREDNISONE 5 MG OR TABS alternate 8 mg and 10 mg every other day       Allergies   Allergen Reactions     Codeine Hives     Ibuprofen [Nsaids] Hives     Penicillins Hives     Other reaction(s): Unknown     Thor Hives     Pt states she had rxn to skin prep-thor prep     Sulfa Drugs Hives     Other reaction(s): Unknown     Codeine      Other reaction(s): Unknown     Doxycycline GI Disturbance     Emesis & diarrhea  Patient denies allergy to this med     Ibuprofen      Other reaction(s): Unknown     Lactose      Other reaction(s): Unknown     Mold      Mushroom      Penicillins      Red Wine Complex [Calcium Pyruvate-Dihydroxyacetone]      No Clinical Screening - See Comments Apple denis     Recent Labs   Lab Test  03/28/18   1358  03/13/18   0126  11/10/15   0926 10/22/15  01/06/14   A1C   --    --    --   5.6   --    --    LDL   --    --    --    --    --   101   HDL   --    --    --    --    --   67   TRIG   --    --    --    --    --   216   ALT   "27  99*  34   --    < >   --    CR   --   1.01  0.46*  0.96   < >  0.74   GFRESTIMATED   --   56*  >90  Non  GFR Calc    66   < >   --    GFRESTBLACK   --   68  >90   GFR Calc    77   < >   --    POTASSIUM   --   4.0  4.0  4.9   < >  4.5   TSH   --   44.08*   --   40.90*   < >  <0.00    < > = values in this interval not displayed.      BP Readings from Last 3 Encounters:   03/28/18 136/82   03/13/18 113/70   02/15/18 132/78    Wt Readings from Last 3 Encounters:   04/11/18 246 lb 7.6 oz (111.8 kg)   04/02/18 246 lb 8 oz (111.8 kg)   03/28/18 259 lb 11.2 oz (117.8 kg)        Labs reviewed in EPIC  Problem list, Medication list, Allergies, and Medical/Social/Surgical histories reviewed in [facility] and updated as appropriate.     ROS: Constitutional, neuro, ENT, endocrine, pulmonary, cardiac, gastrointestinal, genitourinary, musculoskeletal, integument and psychiatric systems are negative, except as otherwise noted above in the HPI.       OBJECTIVE:                                                    BP (!) 150/100  Pulse 73  Temp 98.5  F (36.9  C) (Oral)  Resp 16  Ht 5' 9\" (1.753 m)  SpO2 97%  There is no height or weight on file to calculate BMI.  GENERAL: healthy, alert, well nourished, well hydrated, no distress  EYES: Eyes grossly normal to inspection, extraocular movements - intact, and PERRL    Mental Status Assessment:  Appearance:   Appropriate   Eye Contact:   Good   Psychomotor Behavior: Normal   Attitude:   Cooperative   Orientation:   All  Speech   Rate / Production: Normal    Volume:  Normal   Mood:    Normal  Affect:    Appropriate   Thought Content:  Clear   Thought Form:  Coherent  Logical   Insight:    Good   Attention Span and Concentration:  limited  Recent and Remote Memory:  intact  Fund of Knowledge: appropriate  Muscle Strength and Tone: normal   Suicidal Ideation: reports no thoughts, no intention  Hallucination: No  Paranoid-No  Manic-No  Panic-No  Self " harm-No      See [facility] notes     Diagnostic Test Results:  Results for orders placed or performed in visit on 04/11/18   XR Elbow LT G/E 3 vw    Narrative    3 views left elbow radiographs 4/11/2018 11:01 AM    History: ; Left elbow pain.     Additional History from EMR: multiple psychiatric diagnoses, chronic  pain on opiates, history of PE on Riveroxiban, lupus on prednisone  with long standing?painful left knee, thigh. She should also complains  of left shoulder and elbow pain    Findings:    AP and lateral views of the left elbow were obtained. Osteophyte  spurring of the lateral epicondyles. There is cortical irregularity  and subcortical lucency of the capitellum. Similar cortical  irregularity is also noted in the trochlea. There is significant joint  space narrowing between the trochlea and olecranon. Multiple  osteophyte formation of the proximal radial head with presence of  joint effusion. No significant soft tissue swelling.       Impression    Impression:  1. Left elbow joint effusion with degenerative disease.   2. Cortical irregularity and subcortical lucency of the capitellum and  trochlea can be seen in degenerative changes versus avascular  necrosis.         ASSESSMENT/PLAN:                                                    (E03.9) Hypothyroidism, unspecified type  (primary encounter diagnosis)  Comment:   TSH   Date Value Ref Range Status   03/13/2018 44.08 (H) 0.40 - 4.00 mU/L Final   ]    Plan: TSH with free T4 reflex  Repeat today then defer to pt endocrinologist    (G89.4) Chronic pain syndrome  Comment: pt being titrated, she is upset as she is worried she will be bed bound.   Plan: defer to pain mangement    (I10) Essential hypertension  Comment: elevated today, follows with cardiology  Plan: continue to monitor        Patient Instructions:  Follow up in 3-4 months        ANITRA Gallegos [facility]

## 2018-04-14 LAB
T4 FREE SERPL-MCNC: 0.57 NG/DL (ref 0.76–1.46)
TSH SERPL DL<=0.005 MIU/L-ACNC: 23.71 MU/L (ref 0.4–4)

## 2018-04-25 ENCOUNTER — OFFICE VISIT (OUTPATIENT)
Dept: OBGYN | Facility: CLINIC | Age: 58
End: 2018-04-25
Payer: COMMERCIAL

## 2018-04-25 VITALS
DIASTOLIC BLOOD PRESSURE: 70 MMHG | HEART RATE: 74 BPM | WEIGHT: 231 LBS | BODY MASS INDEX: 34.21 KG/M2 | SYSTOLIC BLOOD PRESSURE: 138 MMHG | HEIGHT: 69 IN

## 2018-04-25 DIAGNOSIS — B37.2 CANDIDAL SKIN INFECTION: Primary | ICD-10-CM

## 2018-04-25 PROCEDURE — 99213 OFFICE O/P EST LOW 20 MIN: CPT | Performed by: NURSE PRACTITIONER

## 2018-04-25 RX ORDER — ENOXAPARIN SODIUM 100 MG/ML
INJECTION SUBCUTANEOUS EVERY 12 HOURS
COMMUNITY
End: 2018-09-19

## 2018-04-25 RX ORDER — NORETHINDRONE ACETATE AND ETHINYL ESTRADIOL 1; 5 MG/1; UG/1
1 TABLET ORAL DAILY
COMMUNITY
End: 2018-09-19

## 2018-04-25 RX ORDER — PREDNISOLONE ACETATE 10 MG/ML
1 SUSPENSION/ DROPS OPHTHALMIC 2 TIMES DAILY
Status: ON HOLD | COMMUNITY
End: 2023-01-01

## 2018-04-25 RX ORDER — NYSTATIN 100000 [USP'U]/G
POWDER TOPICAL 3 TIMES DAILY PRN
Qty: 60 G | Refills: 1 | Status: SHIPPED | OUTPATIENT
Start: 2018-04-25 | End: 2020-09-28

## 2018-04-25 NOTE — PROGRESS NOTES
SUBJECTIVE:                                                   Aspen Mccullough is a 58 year old female who presents to clinic today for the following health issue(s):  Patient presents with:  Yeast under skin below stomach: c/o yeast under skin that has odor      HPI:  Pt here today with c/o skin redness/itching along her groin/pannus.     She had a blood clot in her right lower leg this past winter. Cardio reassured her it was from her lupus not her HRT per the patient    She is down 15lbs in 2-3 weeks or so.       No LMP recorded. Patient is postmenopausal..   Patient is not sexually active, No obstetric history on file..  Using not sexually active for contraception.    reports that she quit smoking about 25 years ago. Her smoking use included Cigarettes. She started smoking about 45 years ago. She has a 30.00 pack-year smoking history. She has never used smokeless tobacco.    STD testing offered?  Declined    Health maintenance updated:  yes    Today's PHQ-2 Score:   PHQ-2 ( 1999 [employer]) 11/10/2015   Q1: Little interest or pleasure in doing things 0   Q2: Feeling down, depressed or hopeless 0   PHQ-2 Score 0     Today's PHQ-9 Score:   PHQ-9 SCORE 2/12/2018   Total Score -   Total Score -   Total Score 5     Today's BENI-7 Score:   BENI-7 SCORE 2/12/2018   Total Score -   Total Score 0   Total Score -       Problem list and histories reviewed & adjusted, as indicated.  Additional history: as documented.    Patient Active Problem List   Diagnosis     Generalized osteoarthrosis, involving multiple sites     CRPS (complex regional pain syndrome), lower limb     Fibromyalgia     Somatoform disorder     Histrionic personality     Encounter for long-term use of opiate analgesic     Knee joint replacement by other means     Hypothyroidism     Insomnia, unspecified type     Hyperlipidemia     ACP (advance care planning)     Hashimoto's thyroiditis     Glucocorticoid deficiency (H)     Posttraumatic stress disorder      Impingement syndrome of left shoulder     Abdominal cramping, generalized     Intermittent diarrhea     Primary osteoarthritis involving multiple joints     Attention deficit disorder     Allergic rhinitis     Cushing's syndrome (H)     Endometriosis     Essential hypertension     Systemic lupus erythematosus (H)     S/P cervical spinal fusion     Paroxysmal atrial fibrillation (H)     Nonischemic cardiomyopathy (H)     Personal history of DVT (deep vein thrombosis)     History of pulmonary embolism     Acute deep vein thrombosis (DVT) of popliteal vein of right lower extremity (H)     Acute pulmonary embolism (H)     Adrenal cortical steroids causing adverse effect in therapeutic use     Alopecia areata     Anemia, blood loss     Ankle pain, left     ARF (acute renal failure) (H)     Arthritis, septic (H)     Chronic ethmoidal sinusitis     Chronic maxillary sinusitis     Deviated nasal septum     Complications due to internal joint prosthesis (H)     Generalized pain     Iatrogenic hyperthyroidism     S/P TKR (total knee replacement)     Left shoulder pain     Lipoma of skin and subcutaneous tissue     Malabsorption     Nasal fracture     Nasal turbinate hypertrophy     Opioid type dependence (H)     Other specified abnormal findings of blood chemistry     Other acute postoperative pain     Pain in joint, lower leg     Postoperative hypotension     S/P total knee replacement     Thrombus of right atrial appendage without antecedent myocardial infarction     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)     APPENDECTOMY       ARTHRODESIS ANKLE      right     ARTHROPLASTY KNEE BILATERAL       ARTHROPLASTY REVISION KNEE  4/19/2011    Procedure:ARTHROPLASTY REVISION KNEE; With Antibiotic Cement ; Surgeon:CHAO OLIVARES; Location[facility]     BREAST SURGERY      right- tissue remove nipple area     BUNIONECTOMY  12/14/2011     Procedure:BUNIONECTOMY; Right Bunion Correction; Surgeon:GRACE ZARATE; Location:[facility]     C STOMACH SURGERY PROCEDURE UNLISTED      see list which we will bring     CHOLECYSTECTOMY       EXCISE MASS UPPER EXTREMITY  12/14/2011    Procedure:EXCISE MASS UPPER EXTREMITY; Excision of Left Arm Mass; Surgeon:GRACE ZARATE; Location:[facility]     FOOT SURGERY      left X 4     FUSION LUMBAR ANTERIOR ONE LEVEL       HC SACROPLASTY      see list which we will bring     KNEE SURGERY      see list which we will bring     LAMINECTOMY LUMBAR ONE LEVEL      L4-5     LAPAROSCOPIC ABLATION ENDOMETRIOSIS       REMOVE HARDWARE FOOT  12/14/2011    Procedure:REMOVE HARDWARE FOOT; Hardware Removal Right Foot (Mini-C-Arm) ; Surgeon:GRACE ZARATE; Location:[facility]     RHINOPLASTY       SALPINGO-OOPHORECTOMY BILATERAL       TONSILLECTOMY        Social History   Substance Use Topics     Smoking status: Former Smoker     Packs/day: 1.50     Years: 20.00     Types: Cigarettes     Start date: 1/1/1973     Quit date: 1/1/1993     Smokeless tobacco: Never Used     Alcohol use No      Problem (# of Occurrences) Relation (Name,Age of Onset)    Hypertension (1) Mother            Current Outpatient Prescriptions   Medication Sig     cyanocobalamin 1000 MCG/ML injection Inject 1 mL (1,000 mcg) Subcutaneous every 7 days     cyclobenzaprine (FLEXERIL) 10 MG tablet Take 1 tablet (10 mg) by mouth 3 times daily     cycloSPORINE (RESTASIS) 0.05 % ophthalmic emulsion Place 1 drop into both eyes 2 times daily     diazepam (VALIUM) 10 MG tablet Take 1 tablet (10 mg) by mouth 3 times daily (Patient taking differently: Take 5 mg by mouth 3 times daily as needed )     enoxaparin (LOVENOX) 100 MG/ML SOLN Inject Subcutaneous every 12 hours     estradiol (ESTRING) 2 MG vaginal ring Place vaginally every 3 months     fexofenadine (ALLEGRA) 180 MG tablet Take 180 mg by mouth daily.     FISH OIL daily. w/DHEA.      fluticasone (FLONASE) 50  MCG/ACT nasal spray 2 sprays by Both Nostrils route daily as needed.     HYDROmorphone (DILAUDID) 8 MG tablet Take 1 tablet (8 mg) by mouth every 3 hours as needed (Patient taking differently: Take 8 mg by mouth 3 times daily )     LEVOFLOXACIN PO Take 750 mg by mouth     levothyroxine (SYNTHROID/LEVOTHROID) 100 MCG SOLR injection Inject 200 mcg into the vein twice a week     levothyroxine (SYNTHROID/LEVOTHROID) 200 MCG SOLR injection      levothyroxine (SYNTHROID/LEVOTHROID) 300 MCG tablet Take 300 mcg by mouth 2 times daily      Liothyronine Sodium 50 MCG TABS Take 50 mcg by mouth daily      morphine (MS CONTIN) 30 MG 12 hr tablet Take 3 tablets (90 mg) by mouth every 8 hours (Patient taking differently: Take 90 mg by mouth 3 times daily )     morphine (MS CONTIN) 60 MG 12 hr tablet      NASONEX 50 MCG/ACT nasal spray      norethindrone-ethinyl estradiol (FEMHRT 1/5) 1-5 MG-MCG per tablet Take 1 tablet by mouth daily     nystatin (MYCOSTATIN) 285768 UNIT/GM POWD Apply topically 3 times daily as needed     prednisoLONE acetate (PRED FORTE) 1 % ophthalmic susp 1 drop 4 times daily     predniSONE (DELTASONE) 1 MG tablet      PREDNISONE 5 MG OR TABS alternate 8 mg and 10 mg every other day     probiotic CAPS Take 1 capsule by mouth daily.     rivaroxaban ANTICOAGULANT (XARELTO) 20 MG TABS tablet Take 20 mg by mouth daily (with dinner)      sodium chloride, PF, (SODIUM CHLORIDE) 0.9% PF flush      SOTALOL HCL PO Take 80 mg by mouth     VITAMIN D, CHOLECALCIFEROL, PO Take 50,000 Units by mouth once a week     CEPHALEXIN PO Take 500 mg by mouth as needed (Dental Procedure) Take 4 caps 1 hour prior to Dental Appointment     [DISCONTINUED] liothyronine (CYTOMEL) 25 MCG tablet      No current facility-administered medications for this visit.      Allergies   Allergen Reactions     Codeine Hives     Ibuprofen [Nsaids] Hives     Penicillins Hives     Other reaction(s): Unknown     Thor Hives     Pt states she had rxn to skin  "prep-thor prep     Sulfa Drugs Hives     Other reaction(s): Unknown     Codeine      Other reaction(s): Unknown     Doxycycline GI Disturbance     Emesis & diarrhea  Patient denies allergy to this med     Ibuprofen      Other reaction(s): Unknown     Lactose      Other reaction(s): Unknown     Mold      Mushroom      Penicillins      Red Wine Complex [Calcium Pyruvate-Dihydroxyacetone]      No Clinical Screening - See Comments Rash     Thor prep       ROS:  12 point review of systems negative other than symptoms noted below.  Genitourinary: Hot Flashes and Night Sweats  Psychiatric: Difficulty Sleeping    OBJECTIVE:     /70  Pulse 74  Ht 5' 9\" (1.753 m)  Wt 231 lb (104.8 kg)  BMI 34.11 kg/m2  Body mass index is 34.11 kg/(m^2).    Exam:  Constitutional:  Appearance: Well nourished, well developed alert, in no acute distress  Skin:General Inspection:  No rashes present, no lesions present, no areas of discoloration; Genitalia and Groin:  No rashes present, no lesions present, no areas of discoloration, no masses present.-right groin red, inflammed, dermatitis. Some open skin areas that are healing slowly. Large panus, at least 4-5 inches of skin slack  Neurologic/Psychiatric:  Mental Status:  Oriented X3   No Pelvic Exam performed     In-Clinic Test Results:  No results found for this or any previous visit (from the past 24 hour(s)).    ASSESSMENT/PLAN:                                                        ICD-10-CM    1. Candidal skin infection B37.2 nystatin (MYCOSTATIN) 103103 UNIT/GM POWD       There are no Patient Instructions on file for this visit.    Discussed trying to keep area clean and most importantly dry especially with the temps warming up. Will use nystatin powder. Dissuaded her from using any sort of cream/ointment.    ANITRA Armijo [facility]  "

## 2018-04-25 NOTE — MR AVS SNAPSHOT
After Visit Summary   4/25/2018    Aspen Mccullough    MRN: [id number]           Patient Information     Date Of Birth          1960        Visit Information        Provider Department      4/25/2018 9:30 AM Rocio Hernandez APRN CNP [facility]        Today's Diagnoses     Candidal skin infection    -  1       Follow-ups after your visit        Your next 10 appointments already scheduled     Apr 26, 2018 10:30 AM CDT   Return Visit with BARNEY Yan   [facility] ([facility])    [address]  [address]  [facility] [address]   [phone]            May 18, 2018   Procedure with Basim Velazquez MD   [facility] ([facility])    [address] [place] [address]  5th Floor  [facility] [address] [phone]           Located in the Clinics and Surgery Center at [address] [place] [address].   parking is very convenient and highly recommended.  is a $6 flat rate fee.  Both  and self parkers should enter the main arrival plaza from [facility]; parking attendants will direct you based on your parking preference.            Jul 17, 2018  9:30 AM CDT   Return Visit with ANITRA Melvin CNP   [facility] ([facility])    [address]  [address]  [facility] [address] [phone]            Jul 17, 2018  9:30 AM CDT   Return Visit with ABRNEY Yan   [facility] ([facility])    [address]  [address]  [facility] [address] [phone]              Who to contact     If you have questions or need follow up information about today's clinic visit or your schedule please contact [facility] directly at [phone].  Normal or non-critical lab and  "imaging results will be communicated to you by MyChart, letter or phone within 4 business days after the clinic has received the results. If you do not hear from us within 7 days, please contact the clinic through Cloud[employer][email or web address] or phone. If you have a critical or abnormal lab result, we will notify you by phone as soon as possible.  Submit refill requests through App[employer]ially or call your pharmacy and they will forward the refill request to us. Please allow 3 business days for your refill to be completed.          Additional Information About Your Visit        [employer] Information     [employer] gives you secure access to your electronic health record. If you see a primary care provider, you can also send messages to your care team and make appointments. If you have questions, please call your primary care clinic.  If you do not have a primary care provider, please call [phone] and they will assist you.        Care EveryWhere ID     This is your Care EveryWhere ID. This could be used by other organizations to access your [facility] medical records  [id number]        Your Vitals Were     Pulse Height BMI (Body Mass Index)             74 5' 9\" (1.753 m) 34.11 kg/m2          Blood Pressure from Last 3 Encounters:   04/25/18 138/70   04/13/18 (!) 150/100   03/28/18 136/82    Weight from Last 3 Encounters:   04/25/18 231 lb (104.8 kg)   04/11/18 246 lb 7.6 oz (111.8 kg)   04/02/18 246 lb 8 oz (111.8 kg)              Today, you had the following     No orders found for display         Today's Medication Changes          These changes are accurate as of 4/25/18 10:22 AM.  If you have any questions, ask your nurse or doctor.               Start taking these medicines.        Dose/Directions    nystatin [id number] UNIT/GM Powd   Commonly known as:  MYCOSTATIN   Used for:  Candidal skin infection   Started by:  Rocio Hernandez APRN CNP        Apply topically 3 times daily as needed   Quantity:  60 g   Refills:  1         These " medicines have changed or have updated prescriptions.        Dose/Directions    diazepam 10 MG tablet   Commonly known as:  VALIUM   This may have changed:    - how much to take  - when to take this  - reasons to take this   Used for:  Chronic pain syndrome, Encounter for long-term use of opiate analgesic        Dose:  10 mg   Take 1 tablet (10 mg) by mouth 3 times daily   Quantity:  90 tablet   Refills:  0       HYDROmorphone 8 MG tablet   Commonly known as:  DILAUDID   This may have changed:  when to take this   Used for:  Chronic pain syndrome        Dose:  8 mg   Take 1 tablet (8 mg) by mouth every 3 hours as needed   Quantity:  100 tablet   Refills:  0       * morphine 30 MG 12 hr tablet   Commonly known as:  MS CONTIN   This may have changed:  when to take this   Used for:  Chronic pain syndrome        Dose:  90 mg   Take 3 tablets (90 mg) by mouth every 8 hours   Quantity:  270 tablet   Refills:  0       * morphine 60 MG 12 hr tablet   Commonly known as:  MS CONTIN   This may have changed:  Another medication with the same name was changed. Make sure you understand how and when to take each.        Refills:  0       * Notice:  This list has 2 medication(s) that are the same as other medications prescribed for you. Read the directions carefully, and ask your doctor or other care provider to review them with you.         Where to get your medicines      These medications were sent to [id number] [address]  [address] [facility] [address]     Phone:  [phone]     nystatin [id number] UNIT/GM Powd                Primary Care Provider Office Phone # Fax #    Sarahi HASKINS Ghazala [phone]       [facility] [address] [place] [address]        Equal Access to Services     ADRYAN BHAT AH: Laisha Dan, olimpia michaels, yaneth pryor. So [facility] [phone].    ATENCIÓN: Summer kirkland  español, tiene a lynn disposición servicios gratuitos de asistencia lingüística. Brad south [phone].    We comply with applicable federal civil rights laws and [place] laws. We do not discriminate on the basis of race, color, national origin, age, disability, sex, sexual orientation, or gender identity.            Thank you!     Thank you for choosing [facility]  for your care. Our goal is always to provide you with excellent care. Hearing back from our patients is one way we can continue to improve our services. Please take a few minutes to complete the written survey that you may receive in the mail after your visit with us. Thank you!             Your Updated Medication List - Protect others around you: Learn how to safely use, store and throw away your medicines at www.disposemymeds.org.          This list is accurate as of 4/25/18 10:22 AM.  Always use your most recent med list.                   Brand Name Dispense Instructions for use Diagnosis    ALLEGRA 180 MG tablet   Generic drug:  fexofenadine      Take 180 mg by mouth daily.        CEPHALEXIN PO      Take 500 mg by mouth as needed (Dental Procedure) Take 4 caps 1 hour prior to Dental Appointment        cyanocobalamin 1000 MCG/ML injection    VITAMIN B12    4 mL    Inject 1 mL (1,000 mcg) Subcutaneous every 7 days    Other vitamin B12 deficiency anemia       cyclobenzaprine 10 MG tablet    FLEXERIL    90 tablet    Take 1 tablet (10 mg) by mouth 3 times daily    Generalized osteoarthrosis, involving multiple sites       CYTOMEL 50 MCG Tabs   Generic drug:  Liothyronine Sodium      Take 50 mcg by mouth daily    Hypothyroidism, unspecified type       diazepam 10 MG tablet    VALIUM    90 tablet    Take 1 tablet (10 mg) by mouth 3 times daily    Chronic pain syndrome, Encounter for long-term use of opiate analgesic       enoxaparin 100 MG/ML Soln    LOVENOX     Inject Subcutaneous every 12 hours        ESTRING 2 MG vaginal ring    Generic drug:  estradiol      Place vaginally every 3 months        FISH OIL      daily. w/DHEA.        FLONASE 50 MCG/ACT spray   Generic drug:  fluticasone      2 sprays by Both Nostrils route daily as needed.        HYDROmorphone 8 MG tablet    DILAUDID    100 tablet    Take 1 tablet (8 mg) by mouth every 3 hours as needed    Chronic pain syndrome       LEVOFLOXACIN PO      Take 750 mg by mouth        * levothyroxine 300 MCG tablet    SYNTHROID/LEVOTHROID     Take 300 mcg by mouth 2 times daily        * levothyroxine 100 MCG Solr injection    SYNTHROID/LEVOTHROID     Inject 200 mcg into the vein twice a week        * levothyroxine 200 MCG Solr injection    SYNTHROID/LEVOTHROID          * morphine 30 MG 12 hr tablet    MS CONTIN    270 tablet    Take 3 tablets (90 mg) by mouth every 8 hours    Chronic pain syndrome       * morphine 60 MG 12 hr tablet    MS CONTIN          NASONEX 50 MCG/ACT spray   Generic drug:  mometasone           norethindrone-ethinyl estradiol 1-5 MG-MCG per tablet    FEMHRT 1/5     Take 1 tablet by mouth daily        nystatin [id number] UNIT/GM Powd    MYCOSTATIN    60 g    Apply topically 3 times daily as needed    Candidal skin infection       prednisoLONE acetate 1 % ophthalmic susp    PRED FORTE     1 drop 4 times daily        * predniSONE 5 MG tablet    DELTASONE     alternate 8 mg and 10 mg every other day        * predniSONE 1 MG tablet    DELTASONE          probiotic Caps      Take 1 capsule by mouth daily.        RESTASIS 0.05 % ophthalmic emulsion   Generic drug:  cycloSPORINE      Place 1 drop into both eyes 2 times daily        rivaroxaban ANTICOAGULANT 20 MG Tabs tablet    XARELTO     Take 20 mg by mouth daily (with dinner)        sodium chloride (PF) 0.9% PF flush           SOTALOL HCL PO      Take 80 mg by mouth        VITAMIN D (CHOLECALCIFEROL) PO      Take 50,000 Units by mouth once a week        * Notice:  This list has 7 medication(s) that are the same as other medications  prescribed for you. Read the directions carefully, and ask your doctor or other care provider to review them with you.

## 2018-04-26 ENCOUNTER — OFFICE VISIT (OUTPATIENT)
Dept: BEHAVIORAL HEALTH | Facility: CLINIC | Age: 58
End: 2018-04-26
Payer: COMMERCIAL

## 2018-04-26 DIAGNOSIS — F43.10 POSTTRAUMATIC STRESS DISORDER: Primary | ICD-10-CM

## 2018-04-26 PROCEDURE — 90832 PSYTX W PT 30 MINUTES: CPT | Performed by: SOCIAL WORKER

## 2018-04-26 NOTE — PROGRESS NOTES
"[facility]  April 26, 2018      Behavioral Health Clinician Progress Note    Patient Name: Aspen Mccullough           Service Type: Individual      Service Location:  in clinic      Session Start Time: 10:35am  Session End Time: 11:10am      Session Length: 16 - 37      Attendees: Patient    Visit Activities (Refresh list every visit): [facility] [employer]    Diagnostic Assessment Date: June 22, 2015  Treatment Plan Review Date: 4-18-16  See Flowsheets for today's PHQ-9 and BENI-7 results  Previous PHQ-9:   PHQ-9 SCORE 5/3/2016 10/4/2017 2/12/2018   Total Score - - -   Total Score - - -   Total Score 12 3 5     Previous BENI-7:   BENI-7 SCORE 11/10/2015 10/4/2017 2/12/2018   Total Score - - -   Total Score 3 0 0   Total Score - - -       FERCHO LEVEL:  FERCHO Score (Last Two) 7/24/2014   FERCHO Raw Score 51   Activation Score 91.6   FERCHO Level 4       DATA  Extended Session (60+ minutes): No  Interactive Complexity: No  Crisis: No    Treatment Objective(s) Addressed in This Session:  Target Behavior(s): disease management/lifestyle changes mental health    Mood Instability: will develop better understanding of triggers and coping strategies to stabilize mood  PTSD Symptom Management  Psychological distress related to Pain    Current Stressors / Issues:    The patient talked about how strong she is especially against authoritative people and then she talked about her strong feeling regarding parents that can give their children to adoption-she talked about how her mother gave her up for adoption and how this patient could never give her children up for adoption-we looked at how her  equalizes other in interactions just like the patient and they do it differently-she brought some paperwork to this visit about some medical information-the patient continues to have negative feels towards the doctor she stated \"that did this to me\" and this was a procedure in the distant past-she is determined to " "prove the limitations she believes others put on her-ALL BY MYSELF I DID THIS: IT IS A WONDER WHAT YOU CAN DO WHEN YOU ARE PUT IN THE POSITION TO DO THINGS-SHE HAS TO DEAL WITH A SYSTEM THAT SHE DOES NOT TRUST-SHOW ME WHY I SHOULD TRUST YOU-SHE STATED THAT SHE TRUST THIS WRITER-\"I had the learn to live\" she talked about things that she.           Progress on Treatment Objective(s) / Homework:  Minimal progress - ACTION (Actively working towards change); Intervened by reinforcing change plan / affirming steps taken    Motivational Interviewing    MI Intervention: Expressed Empathy/Understanding, Supported Autonomy, Collaboration, Evocation, Permission to raise concern or advise, Open-ended questions, Reflections: simple and complex and Change talk (evoked)     Change Talk Expressed by the Patient: Desire to change Ability to change Reasons to change Need to change Committment to change Activation Taking steps    Provider Response to Change Talk: E - Evoked more info from patient about behavior change, A - Affirmed patient's thoughts, decisions, or attempts at behavior change, R - Reflected patient's change talk and S - Summarized patient's change talk statements      Care Plan review completed: No    Medication Review:  No changes to current psychiatric medication(s)    Medication Compliance:  NA    Changes in Health Issues:   Yes: Pain, Associated Psychological Distress    Chemical Use Review:   Substance Use: Chemical use reviewed, no active concerns identified      Tobacco Use: No current tobacco use.      Assessment: Current Emotional / Mental Status (status of significant symptoms):  Risk status (Self / Other harm or suicidal ideation)  Patient denies a history of suicidal ideation, suicide attempts, self-injurious behavior, homicidal ideation, homicidal behavior and and other safety concerns  Patient denies current fears or concerns for personal safety.  Patient denies current or recent suicidal ideation or " behaviors.  Patient denies current or recent homicidal ideation or behaviors.  Patient denies current or recent self injurious behavior or ideation.  Patient denies other safety concerns.  A safety and risk management plan has not been developed at this time, however patient was encouraged to call [place] [phone] should there be a change in any of these risk factors.    Appearance:   Appropriate   Eye Contact:   Good   Psychomotor Behavior: Normal   Attitude:   Cooperative   Orientation:   All  Speech   Rate / Production: Hyperverbal    Volume:  Normal   Mood:    Normal  Affect:    Appropriate   Thought Content:  Clear   Thought Form:  Coherent  Goal Directed  Logical   Insight:    Good     Diagnoses:  1. Posttraumatic stress disorder        Collateral Reports Completed:  Not Applicable    Plan: (Homework, other):  Patient was given information about behavioral services and encouraged to schedule a follow up appointment with the clinic [facility] as needed.  She was also given information about mental health symptoms and treatment options .  CD Recommendations: No indications of CD issues.  Antwan Boston, [facility], [facility]      ______________________________________________________________________    Integrated Primary Care Behavioral Health Treatment Plan    Patient's Name: Aspen Mccullough  YOB: 1960    Date: 4-18-16    DSM-V Diagnoses: PTSD  Psychosocial / Contextual Factors: medical condition, history of traumatic experiences  WHODAS:  Will complete at next visit    Referral / Collaboration:  Referral to another professional/service is not indicated at this time..    Anticipated number of session or this episode of care: 10      MeasurableTreatment Goal(s) related to diagnosis / functional impairment(s)  Goal 1: Patient will be able to remember the past with 50% less emotional reaction of anger and hurt.     I will know I've met my goal when I can let go of the past     Objective #A (Patient Action)    Patient  will talk to at least two others about losses and coping.  Status: New - Date: 4-18-16     Intervention(s)  [facility] will provide avenue for the past to process her losses and disappointments.    Objective #B  Patient will reduce her triggers from past trauma.  Status: New - Date: 4-18-16     Intervention(s)  [facility] will teach distraction skills. mindfulness.      Patient has reviewed and agreed to the above plan.      Antwan Boston, [facility]  April 18, 2016

## 2018-04-26 NOTE — MR AVS SNAPSHOT
After Visit Summary   4/26/2018    Aspen Mccullough    MRN: [id number]           Patient Information     Date Of Birth          1960        Visit Information        Provider Department      4/26/2018 10:30 AM Antwan Boston LICSW [facility]        Today's Diagnoses     Posttraumatic stress disorder    -  1       Follow-ups after your visit        Your next 10 appointments already scheduled     May 10, 2018 10:00 AM CDT   Return Visit with BARNEY Yan   [facility] ([facility])    [address]  [address]  [facility] [address] [phone]            May 18, 2018   Procedure with Basim Velazquez MD   [facility] ([facility] [place])    [address] [place] [address]  5th Floor  [facility] [address] [phone]           Located in the Clinics and Surgery Center at [address] [place] [address].   parking is very convenient and highly recommended.  is a $6 flat rate fee.  Both  and self parkers should enter the main arrival plaza from [facility]; parking attendants will direct you based on your parking preference.            Jul 17, 2018  9:30 AM CDT   Return Visit with ANITRA Melvin CNP   [facility] ([facility])    [address]  [address]  [facility] [id number] [phone]            Jul 17, 2018  9:30 AM CDT   Return Visit with BARNEY Yan   [facility] ([facility])    [address]  [address]  [facility] [id number] [phone]              Who to contact     If you have questions or need follow up information about today's clinic visit or your schedule please contact [facility] directly at [phone].  Normal or  non-critical lab and imaging results will be communicated to you by Coho Datahart, letter or phone within 4 business days after the clinic has received the results. If you do not hear from us within 7 days, please contact the clinic through ePatientFinder or phone. If you have a critical or abnormal lab result, we will notify you by phone as soon as possible.  Submit refill requests through ePatientFinder or call your pharmacy and they will forward the refill request to us. Please allow 3 business days for your refill to be completed.          Additional Information About Your Visit        ePatientFinder Information     ePatientFinder gives you secure access to your electronic health record. If you see a primary care provider, you can also send messages to your care team and make appointments. If you have questions, please call your primary care clinic.  If you do not have a primary care provider, please call [phone] and they will assist you.        Care EveryWhere ID     This is your Care EveryWhere ID. This could be used by other organizations to access your [facility] medical records  [id number]         Blood Pressure from Last 3 Encounters:   04/25/18 138/70   04/13/18 (!) 150/100   03/28/18 136/82    Weight from Last 3 Encounters:   04/25/18 231 lb (104.8 kg)   04/11/18 246 lb 7.6 oz (111.8 kg)   04/02/18 246 lb 8 oz (111.8 kg)              Today, you had the following     No orders found for display         Today's Medication Changes          These changes are accurate as of 4/26/18  2:11 PM.  If you have any questions, ask your nurse or doctor.               These medicines have changed or have updated prescriptions.        Dose/Directions    diazepam 10 MG tablet   Commonly known as:  VALIUM   This may have changed:    - how much to take  - when to take this  - reasons to take this   Used for:  Chronic pain syndrome, Encounter for long-term use of opiate analgesic        Dose:  10 mg   Take 1 tablet (10 mg) by mouth 3 times daily    Quantity:  90 tablet   Refills:  0       HYDROmorphone 8 MG tablet   Commonly known as:  DILAUDID   This may have changed:  when to take this   Used for:  Chronic pain syndrome        Dose:  8 mg   Take 1 tablet (8 mg) by mouth every 3 hours as needed   Quantity:  100 tablet   Refills:  0       * morphine 30 MG 12 hr tablet   Commonly known as:  MS CONTIN   This may have changed:  when to take this   Used for:  Chronic pain syndrome        Dose:  90 mg   Take 3 tablets (90 mg) by mouth every 8 hours   Quantity:  270 tablet   Refills:  0       * morphine 60 MG 12 hr tablet   Commonly known as:  MS CONTIN   This may have changed:  Another medication with the same name was changed. Make sure you understand how and when to take each.        Refills:  0       * Notice:  This list has 2 medication(s) that are the same as other medications prescribed for you. Read the directions carefully, and ask your doctor or other care provider to review them with you.             Primary Care Provider Office Phone # Fax #    Sarahi Vivar [phone]       [facility] [address] [place] [address]        Equal Access to Services     [facility] Francisco[facility] : Hadii grisel lovelaceo Soclaudia, waaxda luqadaha, qaybta kaalmada adeyara, yaneth guillermo . So [facility] [phone].    ATENCIÓN: Si habla español, tiene a lynn disposición servicios gratuitos de asistencia lingüística. LlKindred Hospital Dayton [phone].    We comply with applicable federal civil rights laws and [place] laws. We do not discriminate on the basis of race, color, national origin, age, disability, sex, sexual orientation, or gender identity.            Thank you!     Thank you for choosing [facility]  for your care. Our goal is always to provide you with excellent care. Hearing back from our patients is one way we can continue to improve our services. Please take a few minutes to complete the written  survey that you may receive in the mail after your visit with us. Thank you!             Your Updated Medication List - Protect others around you: Learn how to safely use, store and throw away your medicines at www.disposemymeds.org.          This list is accurate as of 4/26/18  2:11 PM.  Always use your most recent med list.                   Brand Name Dispense Instructions for use Diagnosis    ALLEGRA 180 MG tablet   Generic drug:  fexofenadine      Take 180 mg by mouth daily.        CEPHALEXIN PO      Take 500 mg by mouth as needed (Dental Procedure) Take 4 caps 1 hour prior to Dental Appointment        cyanocobalamin 1000 MCG/ML injection    VITAMIN B12    4 mL    Inject 1 mL (1,000 mcg) Subcutaneous every 7 days    Other vitamin B12 deficiency anemia       cyclobenzaprine 10 MG tablet    FLEXERIL    90 tablet    Take 1 tablet (10 mg) by mouth 3 times daily    Generalized osteoarthrosis, involving multiple sites       CYTOMEL 50 MCG Tabs   Generic drug:  Liothyronine Sodium      Take 50 mcg by mouth daily    Hypothyroidism, unspecified type       diazepam 10 MG tablet    VALIUM    90 tablet    Take 1 tablet (10 mg) by mouth 3 times daily    Chronic pain syndrome, Encounter for long-term use of opiate analgesic       enoxaparin 100 MG/ML Soln    LOVENOX     Inject Subcutaneous every 12 hours        ESTRING 2 MG vaginal ring   Generic drug:  estradiol      Place vaginally every 3 months        FISH OIL      daily. w/DHEA.        FLONASE 50 MCG/ACT spray   Generic drug:  fluticasone      2 sprays by Both Nostrils route daily as needed.        HYDROmorphone 8 MG tablet    DILAUDID    100 tablet    Take 1 tablet (8 mg) by mouth every 3 hours as needed    Chronic pain syndrome       LEVOFLOXACIN PO      Take 750 mg by mouth        * levothyroxine 300 MCG tablet    SYNTHROID/LEVOTHROID     Take 300 mcg by mouth 2 times daily        * levothyroxine 100 MCG Solr injection    SYNTHROID/LEVOTHROID     Inject 200 mcg into  the vein twice a week        * levothyroxine 200 MCG Solr injection    SYNTHROID/LEVOTHROID          * morphine 30 MG 12 hr tablet    MS CONTIN    270 tablet    Take 3 tablets (90 mg) by mouth every 8 hours    Chronic pain syndrome       * morphine 60 MG 12 hr tablet    MS CONTIN          NASONEX 50 MCG/ACT spray   Generic drug:  mometasone           norethindrone-ethinyl estradiol 1-5 MG-MCG per tablet    FEMHRT 1/5     Take 1 tablet by mouth daily        nystatin 018283 UNIT/GM Powd    MYCOSTATIN    60 g    Apply topically 3 times daily as needed    Candidal skin infection       prednisoLONE acetate 1 % ophthalmic susp    PRED FORTE     1 drop 4 times daily        * predniSONE 5 MG tablet    DELTASONE     alternate 8 mg and 10 mg every other day        * predniSONE 1 MG tablet    DELTASONE          probiotic Caps      Take 1 capsule by mouth daily.        RESTASIS 0.05 % ophthalmic emulsion   Generic drug:  cycloSPORINE      Place 1 drop into both eyes 2 times daily        rivaroxaban ANTICOAGULANT 20 MG Tabs tablet    XARELTO     Take 20 mg by mouth daily (with dinner)        sodium chloride (PF) 0.9% PF flush           SOTALOL HCL PO      Take 80 mg by mouth        VITAMIN D (CHOLECALCIFEROL) PO      Take 50,000 Units by mouth once a week        * Notice:  This list has 7 medication(s) that are the same as other medications prescribed for you. Read the directions carefully, and ask your doctor or other care provider to review them with you.

## 2018-05-01 ENCOUNTER — MEDICAL CORRESPONDENCE (OUTPATIENT)
Dept: HEALTH INFORMATION MANAGEMENT | Facility: CLINIC | Age: 58
End: 2018-05-01

## 2018-05-10 ENCOUNTER — APPOINTMENT (OUTPATIENT)
Dept: FAMILY MEDICINE | Facility: CLINIC | Age: 58
End: 2018-05-10
Payer: COMMERCIAL

## 2018-05-10 ENCOUNTER — OFFICE VISIT (OUTPATIENT)
Dept: BEHAVIORAL HEALTH | Facility: CLINIC | Age: 58
End: 2018-05-10
Payer: COMMERCIAL

## 2018-05-10 DIAGNOSIS — F43.10 POSTTRAUMATIC STRESS DISORDER: Primary | ICD-10-CM

## 2018-05-10 PROCEDURE — 90834 PSYTX W PT 45 MINUTES: CPT | Performed by: SOCIAL WORKER

## 2018-05-10 NOTE — MR AVS SNAPSHOT
After Visit Summary   5/10/2018    Aspen Mccullough    MRN: [id number]           Patient Information     Date Of Birth          1960        Visit Information        Provider Department      5/10/2018 11:30 AM Antwan Boston LICSW [facility]        Today's Diagnoses     Posttraumatic stress disorder    -  1       Follow-ups after your visit        Your next 10 appointments already scheduled     May 18, 2018   Procedure with Basim Velazquez MD   [facility] ([facility])    [address] [place] [address]  5th Floor  [facility] [address] [phone]           Located in the Clinics and Surgery Center at [address] [place] [address].   parking is very convenient and highly recommended.  is a $6 flat rate fee.  Both  and self parkers should enter the main arrival plaza from [facility]; parking attendants will direct you based on your parking preference.              Who to contact     If you have questions or need follow up information about today's clinic visit or your schedule please contact [facility] directly at [phone].  Normal or non-critical lab and imaging results will be communicated to you by TransactionTreehart, letter or phone within 4 business days after the clinic has received the results. If you do not hear from us within 7 days, please contact the clinic through Advebst or phone. If you have a critical or abnormal lab result, we will notify you by phone as soon as possible.  Submit refill requests through RollCall ([email or web address]) or call your pharmacy and they will forward the refill request to us. Please allow 3 business days for your refill to be completed.          Additional Information About Your Visit        TransactionTreeharInflection Information     RollCall ([email or web address]) gives you secure access to your electronic health record. If you see a primary care provider, you can also send messages to  your care team and make appointments. If you have questions, please call your primary care clinic.  If you do not have a primary care provider, please call [phone] and they will assist you.        Care EveryWhere ID     This is your Care EveryWhere ID. This could be used by other organizations to access your [facility] medical records  [id number]         Blood Pressure from Last 3 Encounters:   04/25/18 138/70   04/13/18 (!) 150/100   03/28/18 136/82    Weight from Last 3 Encounters:   04/25/18 231 lb (104.8 kg)   04/11/18 246 lb 7.6 oz (111.8 kg)   04/02/18 246 lb 8 oz (111.8 kg)              Today, you had the following     No orders found for display         Today's Medication Changes          These changes are accurate as of 5/10/18 11:59 PM.  If you have any questions, ask your nurse or doctor.               These medicines have changed or have updated prescriptions.        Dose/Directions    diazepam 10 MG tablet   Commonly known as:  VALIUM   This may have changed:    - how much to take  - when to take this  - reasons to take this   Used for:  Chronic pain syndrome, Encounter for long-term use of opiate analgesic        Dose:  10 mg   Take 1 tablet (10 mg) by mouth 3 times daily   Quantity:  90 tablet   Refills:  0       HYDROmorphone 8 MG tablet   Commonly known as:  DILAUDID   This may have changed:  when to take this   Used for:  Chronic pain syndrome        Dose:  8 mg   Take 1 tablet (8 mg) by mouth every 3 hours as needed   Quantity:  100 tablet   Refills:  0       * morphine 30 MG 12 hr tablet   Commonly known as:  MS CONTIN   This may have changed:  when to take this   Used for:  Chronic pain syndrome        Dose:  90 mg   Take 3 tablets (90 mg) by mouth every 8 hours   Quantity:  270 tablet   Refills:  0       * morphine 60 MG 12 hr tablet   Commonly known as:  MS CONTIN   This may have changed:  Another medication with the same name was changed. Make sure you understand how and when to take  each.        Refills:  0       * Notice:  This list has 2 medication(s) that are the same as other medications prescribed for you. Read the directions carefully, and ask your doctor or other care provider to review them with you.             Primary Care Provider Office Phone # Fax #    Sarahi Vivar [phone]       [facility] [address]  [facility] [address]        Equal Access to Services     ADRYAN BHAT : Hadii aad ku hadasho Soomaali, waaxda luqadaha, qaybta kaalmada adeegyada, waxay idiin hayaan adeeg khmiguelinash laalessandra . So [facility] [phone].    ATENCIÓN: Si isael flaherty, tiene a lynn disposición servicios gratuitos de asistencia lingüística. Llame al [phone].    We comply with applicable federal civil rights laws and [place] laws. We do not discriminate on the basis of race, color, national origin, age, disability, sex, sexual orientation, or gender identity.            Thank you!     Thank you for choosing [facility]  for your care. Our goal is always to provide you with excellent care. Hearing back from our patients is one way we can continue to improve our services. Please take a few minutes to complete the written survey that you may receive in the mail after your visit with us. Thank you!             Your Updated Medication List - Protect others around you: Learn how to safely use, store and throw away your medicines at www.disposemymeds.org.          This list is accurate as of 5/10/18 11:59 PM.  Always use your most recent med list.                   Brand Name Dispense Instructions for use Diagnosis    ALLEGRA 180 MG tablet   Generic drug:  fexofenadine      Take 180 mg by mouth daily.        CEPHALEXIN PO      Take 500 mg by mouth as needed (Dental Procedure) Take 4 caps 1 hour prior to Dental Appointment        cyanocobalamin 1000 MCG/ML injection    VITAMIN B12    4 mL    Inject 1 mL (1,000 mcg) Subcutaneous every 7 days    Other  vitamin B12 deficiency anemia       cyclobenzaprine 10 MG tablet    FLEXERIL    90 tablet    Take 1 tablet (10 mg) by mouth 3 times daily    Generalized osteoarthrosis, involving multiple sites       CYTOMEL 50 MCG Tabs   Generic drug:  Liothyronine Sodium      Take 50 mcg by mouth daily    Hypothyroidism, unspecified type       diazepam 10 MG tablet    VALIUM    90 tablet    Take 1 tablet (10 mg) by mouth 3 times daily    Chronic pain syndrome, Encounter for long-term use of opiate analgesic       enoxaparin 100 MG/ML Soln    LOVENOX     Inject Subcutaneous every 12 hours        ESTRING 2 MG vaginal ring   Generic drug:  estradiol      Place vaginally every 3 months        FISH OIL      daily. w/DHEA.        FLONASE 50 MCG/ACT spray   Generic drug:  fluticasone      2 sprays by Both Nostrils route daily as needed.        HYDROmorphone 8 MG tablet    DILAUDID    100 tablet    Take 1 tablet (8 mg) by mouth every 3 hours as needed    Chronic pain syndrome       LEVOFLOXACIN PO      Take 750 mg by mouth        * levothyroxine 300 MCG tablet    SYNTHROID/LEVOTHROID     Take 300 mcg by mouth 2 times daily        * levothyroxine 100 MCG Solr injection    SYNTHROID/LEVOTHROID     Inject 200 mcg into the vein twice a week        * levothyroxine 200 MCG Solr injection    SYNTHROID/LEVOTHROID          * morphine 30 MG 12 hr tablet    MS CONTIN    270 tablet    Take 3 tablets (90 mg) by mouth every 8 hours    Chronic pain syndrome       * morphine 60 MG 12 hr tablet    MS CONTIN          NASONEX 50 MCG/ACT spray   Generic drug:  mometasone           norethindrone-ethinyl estradiol 1-5 MG-MCG per tablet    FEMHRT 1/5     Take 1 tablet by mouth daily        nystatin 109961 UNIT/GM Powd    MYCOSTATIN    60 g    Apply topically 3 times daily as needed    Candidal skin infection       prednisoLONE acetate 1 % ophthalmic susp    PRED FORTE     1 drop 4 times daily        * predniSONE 5 MG tablet    DELTASONE     alternate 8 mg and  10 mg every other day        * predniSONE 1 MG tablet    DELTASONE          probiotic Caps      Take 1 capsule by mouth daily.        RESTASIS 0.05 % ophthalmic emulsion   Generic drug:  cycloSPORINE      Place 1 drop into both eyes 2 times daily        rivaroxaban ANTICOAGULANT 20 MG Tabs tablet    XARELTO     Take 20 mg by mouth daily (with dinner)        sodium chloride (PF) 0.9% PF flush           SOTALOL HCL PO      Take 80 mg by mouth        VITAMIN D (CHOLECALCIFEROL) PO      Take 50,000 Units by mouth once a week        * Notice:  This list has 7 medication(s) that are the same as other medications prescribed for you. Read the directions carefully, and ask your doctor or other care provider to review them with you.

## 2018-05-10 NOTE — PROGRESS NOTES
[facility]  May 10, 2018      Behavioral Health Clinician Progress Note    Patient Name: Aspen Mccullough           Service Type: Individual      Service Location:  in clinic      Session Start Time: 11:34am  Session End Time: 12:15pm      Session Length: 38 - 52      Attendees: Patient    Visit Activities (Refresh list every visit): [facility] Only    Diagnostic Assessment Date: June 22, 2015  Treatment Plan Review Date: 4-18-16  See Flowsheets for today's PHQ-9 and BENI-7 results  Previous PHQ-9:   PHQ-9 SCORE 5/3/2016 10/4/2017 2/12/2018   Total Score - - -   Total Score - - -   Total Score 12 3 5     Previous BENI-7:   BENI-7 SCORE 11/10/2015 10/4/2017 2/12/2018   Total Score - - -   Total Score 3 0 0   Total Score - - -       FERCHO LEVEL:  FERCHO Score (Last Two) 7/24/2014   FERCHO Raw Score 51   Activation Score 91.6   FERCHO Level 4       DATA  Extended Session (60+ minutes): No  Interactive Complexity: No  Crisis: No    Treatment Objective(s) Addressed in This Session:  Target Behavior(s): disease management/lifestyle changes mental health    Mood Instability: will develop better understanding of triggers and coping strategies to stabilize mood  PTSD Symptom Management  Psychological distress related to Pain    Current Stressors / Issues:    The patient she is curious what this writer wanted to talk to her about during the last visit-I don't care what other people think-trust issues-she stated that she trust this writer with her life-SELF FULFILLING PROPHECY: She has history of things happening that traumatically damaged her ability to trust other people especially people in authority-we had discussion of how her negative/non trusting attitude towards others set up self fulfilling prophecy-She helped at a [employer] and was a help to a lot of parents that were dealing with the stress of having kids with health issues-she believes that this helped her with being able to exercise  her ability to trust others-the patient was a support to another person who had different Evangelical views than she has-she is planing to have a surgery on her right foot and she believes that she will need time to recover.      For the next visit: When are you going to trust your God enough and show up with a sling shot and not need to bring the whole arsenal of weapons.                   Progress on Treatment Objective(s) / Homework:  Minimal progress - ACTION (Actively working towards change); Intervened by reinforcing change plan / affirming steps taken    Motivational Interviewing    MI Intervention: Expressed Empathy/Understanding, Supported Autonomy, Collaboration, Evocation, Permission to raise concern or advise, Open-ended questions, Reflections: simple and complex and Change talk (evoked)     Change Talk Expressed by the Patient: Desire to change Ability to change Reasons to change Need to change Committment to change Activation Taking steps    Provider Response to Change Talk: E - Evoked more info from patient about behavior change, A - Affirmed patient's thoughts, decisions, or attempts at behavior change, R - Reflected patient's change talk and S - Summarized patient's change talk statements      Care Plan review completed: No    Medication Review:  No changes to current psychiatric medication(s)    Medication Compliance:  NA    Changes in Health Issues:   Yes: Pain, Associated Psychological Distress    Chemical Use Review:   Substance Use: Chemical use reviewed, no active concerns identified      Tobacco Use: No current tobacco use.      Assessment: Current Emotional / Mental Status (status of significant symptoms):  Risk status (Self / Other harm or suicidal ideation)  Patient denies a history of suicidal ideation, suicide attempts, self-injurious behavior, homicidal ideation, homicidal behavior and and other safety concerns  Patient denies current fears or concerns for personal safety.  Patient denies  current or recent suicidal ideation or behaviors.  Patient denies current or recent homicidal ideation or behaviors.  Patient denies current or recent self injurious behavior or ideation.  Patient denies other safety concerns.  A safety and risk management plan has not been developed at this time, however patient was encouraged to call [place] [phone] should there be a change in any of these risk factors.    Appearance:   Appropriate   Eye Contact:   Good   Psychomotor Behavior: Normal   Attitude:   Cooperative   Orientation:   All  Speech   Rate / Production: Hyperverbal    Volume:  Normal   Mood:    Normal  Affect:    Appropriate   Thought Content:  Clear   Thought Form:  Coherent  Goal Directed  Logical   Insight:    Good     Diagnoses:  1. Posttraumatic stress disorder        Collateral Reports Completed:  Not Applicable    Plan: (Homework, other):  Patient was given information about behavioral services and encouraged to schedule a follow up appointment with the clinic [facility] as needed.  She was also given information about mental health symptoms and treatment options .  CD Recommendations: No indications of CD issues.  Antwan Boston, [facility], [facility]      ______________________________________________________________________    Integrated Primary Care Behavioral Health Treatment Plan    Patient's Name: Aspen Mccullough  YOB: 1960    Date: 4-18-16    DSM-V Diagnoses: PTSD  Psychosocial / Contextual Factors: medical condition, history of traumatic experiences  WHODAS:  Will complete at next visit    Referral / Collaboration:  Referral to another professional/service is not indicated at this time..    Anticipated number of session or this episode of care: 10      MeasurableTreatment Goal(s) related to diagnosis / functional impairment(s)  Goal 1: Patient will be able to remember the past with 50% less emotional reaction of anger and hurt.     I will know I've met my goal when I can let go of the past      Objective #A (Patient Action)    Patient will talk to at least two others about losses and coping.  Status: New - Date: 4-18-16     Intervention(s)  [facility] [employer] will provide avenue for the past to process her losses and disappointments.    Objective #B  Patient will reduce her triggers from past trauma.  Status: New - Date: 4-18-16     Intervention(s)  [employer] will teach distraction skills. mindfulness.      Patient has reviewed and agreed to the above plan.      Antwan Boston, [facility] Center[facility]  April 18, 2016

## 2018-05-11 ENCOUNTER — TELEPHONE (OUTPATIENT)
Dept: ANESTHESIOLOGY | Facility: CLINIC | Age: 58
End: 2018-05-11

## 2018-05-11 NOTE — TELEPHONE ENCOUNTER
LPN called and spoke to Aspen and Ash regarding Aspen's upcoming procedure with Dr. Velazquez.     Pt was sent Pre-procedure instructions in the mail, and LPN wanted to verify if they had questions on any of the information.   Ash stated that he had a Doctors note that he was planning on bringing with them to the procedure, stating that Aspen was allowed to Hold her XARELTO  For 3 days before the procedure.     Ash and Aspen both verbalized that they had a good understanding for the instructions and denied having any other concerns.     Verna Webb LPN

## 2018-05-17 ENCOUNTER — TRANSFERRED RECORDS (OUTPATIENT)
Dept: HEALTH INFORMATION MANAGEMENT | Facility: CLINIC | Age: 58
End: 2018-05-17

## 2018-05-18 ENCOUNTER — HOSPITAL ENCOUNTER (OUTPATIENT)
Facility: AMBULATORY SURGERY CENTER | Age: 58
End: 2018-05-18
Payer: COMMERCIAL

## 2018-05-18 ENCOUNTER — SURGERY (OUTPATIENT)
Age: 58
End: 2018-05-18

## 2018-05-18 VITALS
HEIGHT: 69 IN | OXYGEN SATURATION: 93 % | BODY MASS INDEX: 34.07 KG/M2 | RESPIRATION RATE: 16 BRPM | WEIGHT: 230 LBS | DIASTOLIC BLOOD PRESSURE: 62 MMHG | SYSTOLIC BLOOD PRESSURE: 128 MMHG | TEMPERATURE: 97.4 F

## 2018-05-18 RX ORDER — LIDOCAINE HYDROCHLORIDE 10 MG/ML
INJECTION, SOLUTION EPIDURAL; INFILTRATION; INTRACAUDAL; PERINEURAL PRN
Status: DISCONTINUED | OUTPATIENT
Start: 2018-05-18 | End: 2018-05-18 | Stop reason: HOSPADM

## 2018-05-18 RX ORDER — BUPIVACAINE HYDROCHLORIDE 2.5 MG/ML
INJECTION, SOLUTION EPIDURAL; INFILTRATION; INTRACAUDAL PRN
Status: DISCONTINUED | OUTPATIENT
Start: 2018-05-18 | End: 2018-05-18 | Stop reason: HOSPADM

## 2018-05-18 RX ADMIN — LIDOCAINE HYDROCHLORIDE 2 ML: 10 INJECTION, SOLUTION EPIDURAL; INFILTRATION; INTRACAUDAL; PERINEURAL at 08:01

## 2018-05-18 RX ADMIN — BUPIVACAINE HYDROCHLORIDE 3 ML: 2.5 INJECTION, SOLUTION EPIDURAL; INFILTRATION; INTRACAUDAL at 08:01

## 2018-05-18 NOTE — OR NURSING
Patient sitting up to side of bed preparing for block, noted low blood pressure and oxygen levels, patient prompted to deep breathe frequetly. Dr Velazquez made aware and he questioned patient who stated that she took dilaudid and morphine this am

## 2018-05-18 NOTE — DISCHARGE INSTRUCTIONS
Home Care Instructions after a Selective Nerve Root Block    A selective nerve root block is an injection of a local anesthetic along a specific nerve root.   In a selective nerve root block, a small needle is placed  alongside the nerve root and the medication is injected.     Activity  -You may resume most normal activity levels with the exception of strenuous activity. It is important for us to know if your pain with normal activity is relieved after this injection.  -DO NOT shower for 24 hours  -DO NOT remove bandaid for 24 hours    Pain  -You may experience soreness at the injection site for one or two days  -You may use an ice pack for 20 minutes every 2 hours for the first 24 hours  -You may use a heating pad after the first 24 hours  -You may use Tylenol (acetaminophen) every 4 hours or other pain medicines as     directed by your physician    You may experience numbness radiating into your legs. This numbness may last several hours. Until sensation returns to normal; please use caution in walking, climbing stairs, and stepping out of your vehicle, etc.    DID YOU RECEIVE SEDATION TODAY?  No    If you received sedation please follow these additional safety measures.  Sedation medicine, if given, may remain active for many hours. It is important for the next 24 hours that you do not:  -Drive a car  -Operate machines or power tools  -Consume alcohol, including beer  -Sign any important papers or legal documents    DID YOU RECEIVE STEROIDS TODAY?  Yes    Common side effects of steroids:  Not everyone will experience corticosteroid side effects. If side effects are experienced, they will gradually subside in the 7-10 day period following an injection. Most common side effects include:  -Flushed face and/or chest  -Feeling of warmth, particularly in the face but could be an overall feeling of warmth  -Increased blood sugar in diabetic patients  -Menstrual irregularities my occur. If taking hormone-based birth  control an alternate method of birth control is recommended  -Sleep disturbances and/or mood swings are possible  -Leg cramps      PLEASE KEEP TRACK OF YOUR SYMPTOMS AND NOTE YOUR IMPROVEMENT FOR YOUR DOCTOR.     Please contact us if you have:  -Severe pain  -Fever more than 101.5 degrees Fahrenheit  -Signs of infection at the injection site (redness, swelling, or drainage)    If you have questions, please contact our office at [phone] between the hours of 7:00 am and 3:00 pm Monday through Friday. After office hours you can contact the on call provider by dialing [phone]. If you need immediate attention, we recommend that you go to a hospital emergency room or dial [phone].

## 2018-05-18 NOTE — IP AVS SNAPSHOT
[facility]    [address] [place] [id number]    Phone:  [phone]    Fax:  [phone]                                       After Visit Summary   5/18/2018    Aspen Mccullough    MRN: [id number]           After Visit Summary Signature Page     I have received my discharge instructions, and my questions have been answered. I have discussed any challenges I see with this plan with the nurse or doctor.    ..........................................................................................................................................  Patient/Patient Representative Signature      ..........................................................................................................................................  Patient Representative Print Name and Relationship to Patient    ..................................................               ................................................  Date                                            Time    ..........................................................................................................................................  Reviewed by Signature/Title    ...................................................              ..............................................  Date                                                            Time

## 2018-05-18 NOTE — PROCEDURES
Patient: Aspen Mccullough Age: 58 year old   MRN: [id number] Attending: Dr. Velazquez     Date of Visit: May 18, 2018    PAIN MEDICINE CLINIC PROCEDURE NOTE    ATTENDING CLINICIAN:    Basim Velazquez MD    ASSISTANT CLINICIAN:  Radha Buitrago MD     PREPROCEDURE DIAGNOSES:  1.  Left shoulder pain    POSTPROCEDURE DIAGNOSES:  1.  Left shoulder pain    PROCEDURE(S) PERFORMED:  1.  Left suprascapular nerve injections  2.  Ultrasound guidance for the above-named procedure(s)    ANESTHESIA:  Local.    BLOOD LOSS:  Minimal.    DRAINS AND SPECIMENS:  None.    COMPLICATIONS:  None.    INDICATIONS:  Aspen Mccullough is a 58 year old female with a history of  chronic shoulder pain. The patient stated that the patient was in their usual state of health and denied recent anticoagulant use or recent infections.  Therefore, the plan is to perform above mentioned procedure.     Procedure Details:    The patient was met in the procedure room, where the patient was identified by name, medical record number and date of birth.  All of the patient s last minute questions were answered. Written informed consent was obtained and saved in the electronic medical record, after the risks, benefits, and alternatives were discussed with the patient.      A formal time-out procedure was performed, as per protocol, including patient name, title of procedure, and site of procedure, and all in the room concurred.  Routine monitors were applied.      The patient was placed in the sitting position on the procedure room table.Routine monitors were placed.  Vital signs were stable.    A chlorhexidine prep was completed followed by sterile draping per standard procedure.  Patient's consent was obtained.  The left side Suprascapular area was prepped using Chloraprep and the area was sterilely draped.  The ultrasound probe was draped and the U/S was used to identify and confirm the depth.  Fascial planes were easily visible as well as subcutaneous tissue and  the supraspinatus fossae with the scapula as the deep border.  A 21G 80 mm  pajunk needle was used in an in-plane fashion and positioned deep to the hyperechoic suprascapular ligament taking care to avoid the visibly pulsatile suprascapular artery.   A combination of Depo-medrom 40 mg with 0.25% Bupivacaine 3 ml was injected into the area, with several aspirations being negative for blood.  A screen shot,was taken of nicely extravasating fluid into the the expected location of the supraspinatus nerve.  The needle was removed, and a band-aid was applied.     Light pressure was held at the puncture site(s) to prevent ecchymosis and oozing.  The patient's skin was cleansed, and hemostasis was confirmed.  Band-aids were applied to the needle injection site(s).      Condition:    The patient remained awake and alert throughout the procedure.  The patient tolerated the procedure well and was monitored for approximately 15 minutes afterward in the post procedure area.  There were no immediate post procedure complications noted.  The patient was then discharged to home as per protocol.      Pre-procedure pain score:8/10  Post-procedure pain score: 8/10        Procedure  I saw and examined the patient with the resident and agree with the findings and the plan of care as documented in the resident's note. I was present for the entire procedure.  Basim Velazquez IV, MD

## 2018-05-18 NOTE — IP AVS SNAPSHOT
MRN:[id number]                      After Visit Summary   5/18/2018    Aspen Mccullough    MRN: [id number]           Thank you!     Thank you for choosing [facility] for your care. Our goal is always to provide you with excellent care. Hearing back from our patients is one way we can continue to improve our services. Please take a few minutes to complete the written survey that you may receive in the mail after you visit with us. Thank you!        Patient Information     Date Of Birth          1960        About your hospital stay     You were admitted on:  May 18, 2018 [facility]    You were discharged on:  May 18, 2018       Who to Call     For medical emergencies, please call 911.  For non-urgent questions about your medical care, please call your primary care provider or clinic, [phone]  For questions related to your surgery, please call your surgery clinic        Attending Provider     Provider Specialty    Basim Velazquez MD Anesthesiology       Primary Care Provider Office Phone # Fax #    Sarahi Vivar [phone]      Further instructions from your care team       Home Care Instructions after a Selective Nerve Root Block    A selective nerve root block is an injection of a local anesthetic along a specific nerve root.   In a selective nerve root block, a small needle is placed  alongside the nerve root and the medication is injected.     Activity  -You may resume most normal activity levels with the exception of strenuous activity. It is important for us to know if your pain with normal activity is relieved after this injection.  -DO NOT shower for 24 hours  -DO NOT remove bandaid for 24 hours    Pain  -You may experience soreness at the injection site for one or two days  -You may use an ice pack for 20 minutes every 2 hours for the first 24 hours  -You may use a heating pad after the first 24 hours  -You  may use Tylenol (acetaminophen) every 4 hours or other pain medicines as     directed by your physician    You may experience numbness radiating into your legs. This numbness may last several hours. Until sensation returns to normal; please use caution in walking, climbing stairs, and stepping out of your vehicle, etc.    DID YOU RECEIVE SEDATION TODAY?  No    If you received sedation please follow these additional safety measures.  Sedation medicine, if given, may remain active for many hours. It is important for the next 24 hours that you do not:  -Drive a car  -Operate machines or power tools  -Consume alcohol, including beer  -Sign any important papers or legal documents    DID YOU RECEIVE STEROIDS TODAY?  Yes    Common side effects of steroids:  Not everyone will experience corticosteroid side effects. If side effects are experienced, they will gradually subside in the 7-10 day period following an injection. Most common side effects include:  -Flushed face and/or chest  -Feeling of warmth, particularly in the face but could be an overall feeling of warmth  -Increased blood sugar in diabetic patients  -Menstrual irregularities my occur. If taking hormone-based birth control an alternate method of birth control is recommended  -Sleep disturbances and/or mood swings are possible  -Leg cramps      PLEASE KEEP TRACK OF YOUR SYMPTOMS AND NOTE YOUR IMPROVEMENT FOR YOUR DOCTOR.     Please contact us if you have:  -Severe pain  -Fever more than 101.5 degrees Fahrenheit  -Signs of infection at the injection site (redness, swelling, or drainage)    If you have questions, please contact our office at [phone] between the hours of 7:00 am and 3:00 pm Monday through Friday. After office hours you can contact the on call provider by dialing [phone]. If you need immediate attention, we recommend that you go to a hospital emergency room or dial [phone].      Pending Results     No orders found from 5/16/2018 to 5/19/2018.  "           Admission Information     Date & Time Provider Department Dept. Phone    5/18/2018 Basim Velazquez MD [facility] [phone]      Your Vitals Were     Blood Pressure Temperature Respirations Height Weight Pulse Oximetry    80/56 97.3  F (36.3  C) (Temporal) 16 1.753 m (5' 9\") 104.3 kg (230 lb) 95%    BMI (Body Mass Index)                   33.97 kg/m2           [facility] [employer] Information     The [employer] gives you secure access to your electronic health record. If you see a primary care provider, you can also send messages to your care team and make appointments. If you have questions, please call your primary care clinic.  If you do not have a primary care provider, please call [phone] and they will assist you.      [employer]street is an electronic gateway that provides easy, online access to your medical records. With [employer], you can request a clinic appointment, read your test results, renew a prescription or communicate with your care team.     To access your existing account, please contact your [facility] or call [phone] for assistance.        Care EveryWhere ID     This is your Care EveryWhere ID. This could be used by other organizations to access your [place] medical records  [id number]        Equal Access to Services     ADRYAN BHAT : Laisha lovelaceo Soclaudia, waaxda luqadaha, qaybta kaalmada adeegyada, yaneth sherman. So [facility] [phone].    ATENCIÓN: Si habla español, tiene a lynn disposición servicios gratuitos de asistencia lingüística. Llame al [phone].    We comply with applicable federal civil rights laws and [place] laws. We do not discriminate on the basis of race, color, national origin, age, disability, sex, sexual orientation, or gender identity.               Review of your medicines      UNREVIEWED medicines. Ask your doctor about these medicines        Dose / Directions    ALLEGRA 180 MG " tablet   Generic drug:  fexofenadine        Dose:  180 mg   Take 180 mg by mouth daily.   Refills:  0       CEPHALEXIN PO        Dose:  500 mg   Take 500 mg by mouth as needed (Dental Procedure) Take 4 caps 1 hour prior to Dental Appointment   Refills:  0       cyanocobalamin 1000 MCG/ML injection   Commonly known as:  VITAMIN B12   Used for:  Other vitamin B12 deficiency anemia        Dose:  1 mL   Inject 1 mL (1,000 mcg) Subcutaneous every 7 days   Quantity:  4 mL   Refills:  5       cyclobenzaprine 10 MG tablet   Commonly known as:  FLEXERIL   Used for:  Generalized osteoarthrosis, involving multiple sites        Dose:  10 mg   Take 1 tablet (10 mg) by mouth 3 times daily   Quantity:  90 tablet   Refills:  3       CYTOMEL 50 MCG Tabs   Used for:  Hypothyroidism, unspecified type   Generic drug:  Liothyronine Sodium        Dose:  50 mcg   Take 50 mcg by mouth daily   Refills:  0       diazepam 10 MG tablet   Commonly known as:  VALIUM   Used for:  Chronic pain syndrome, Encounter for long-term use of opiate analgesic        Dose:  10 mg   Take 1 tablet (10 mg) by mouth 3 times daily   Quantity:  90 tablet   Refills:  0       enoxaparin 100 MG/ML Soln   Commonly known as:  LOVENOX        Inject Subcutaneous every 12 hours   Refills:  0       ESTRING 2 MG vaginal ring   Generic drug:  estradiol        Dose:  1    Place vaginally every 3 months   Refills:  0       FISH OIL        daily. w/DHEA.   Refills:  0       FLONASE 50 MCG/ACT spray   Generic drug:  fluticasone        Dose:  2 spray   2 sprays by Both Nostrils route daily as needed.   Refills:  0       HYDROmorphone 8 MG tablet   Commonly known as:  DILAUDID   Used for:  Chronic pain syndrome        Dose:  8 mg   Take 1 tablet (8 mg) by mouth every 3 hours as needed   Quantity:  100 tablet   Refills:  0       LEVOFLOXACIN PO        Dose:  750 mg   Take 750 mg by mouth   Refills:  0       * levothyroxine 300 MCG tablet   Commonly known as:  SYNTHROID/LEVOTHROID         Dose:  300 mcg   Take 300 mcg by mouth 2 times daily   Refills:  0       * levothyroxine 100 MCG Solr injection   Commonly known as:  SYNTHROID/LEVOTHROID        Dose:  200 mcg   Inject 200 mcg into the vein twice a week   Refills:  0       * levothyroxine 200 MCG Solr injection   Commonly known as:  SYNTHROID/LEVOTHROID        Refills:  3       * morphine 30 MG 12 hr tablet   Commonly known as:  MS CONTIN   Used for:  Chronic pain syndrome        Dose:  90 mg   Take 3 tablets (90 mg) by mouth every 8 hours   Quantity:  270 tablet   Refills:  0       * morphine 60 MG 12 hr tablet   Commonly known as:  MS CONTIN        Refills:  0       NASONEX 50 MCG/ACT spray   Generic drug:  mometasone        Refills:  11       norethindrone-ethinyl estradiol 1-5 MG-MCG per tablet   Commonly known as:  FEMHRT 1/5        Dose:  1 tablet   Take 1 tablet by mouth daily   Refills:  0       nystatin 916240 UNIT/GM Powd   Commonly known as:  MYCOSTATIN   Used for:  Candidal skin infection        Apply topically 3 times daily as needed   Quantity:  60 g   Refills:  1       prednisoLONE acetate 1 % ophthalmic susp   Commonly known as:  PRED FORTE        Dose:  1 drop   1 drop 4 times daily   Refills:  0       * predniSONE 5 MG tablet   Commonly known as:  DELTASONE        alternate 8 mg and 10 mg every other day   Refills:  0       * predniSONE 1 MG tablet   Commonly known as:  DELTASONE        Refills:  2       probiotic Caps        Dose:  1 capsule   Take 1 capsule by mouth daily.   Refills:  0       RESTASIS 0.05 % ophthalmic emulsion   Generic drug:  cycloSPORINE        Dose:  1 drop   Place 1 drop into both eyes 2 times daily   Refills:  0       rivaroxaban ANTICOAGULANT 20 MG Tabs tablet   Commonly known as:  XARELTO        Dose:  20 mg   Take 20 mg by mouth daily (with dinner)   Refills:  0       sodium chloride (PF) 0.9% PF flush        Refills:  0       SOTALOL HCL PO        Dose:  80 mg   Take 80 mg by mouth   Refills:  0        VITAMIN D (CHOLECALCIFEROL) PO        Dose:  05226 Units   Take 50,000 Units by mouth once a week   Refills:  0       * Notice:  This list has 7 medication(s) that are the same as other medications prescribed for you. Read the directions carefully, and ask your doctor or other care provider to review them with you.             Protect others around you: Learn how to safely use, store and throw away your medicines at www.disposemymeds.org.             Medication List: This is a list of all your medications and when to take them. Check marks below indicate your daily home schedule. Keep this list as a reference.      Medications           Morning Afternoon Evening Bedtime As Needed    ALLEGRA 180 MG tablet   Take 180 mg by mouth daily.   Generic drug:  fexofenadine                                CEPHALEXIN PO   Take 500 mg by mouth as needed (Dental Procedure) Take 4 caps 1 hour prior to Dental Appointment                                cyanocobalamin 1000 MCG/ML injection   Commonly known as:  VITAMIN B12   Inject 1 mL (1,000 mcg) Subcutaneous every 7 days                                cyclobenzaprine 10 MG tablet   Commonly known as:  FLEXERIL   Take 1 tablet (10 mg) by mouth 3 times daily                                CYTOMEL 50 MCG Tabs   Take 50 mcg by mouth daily   Generic drug:  Liothyronine Sodium                                diazepam 10 MG tablet   Commonly known as:  VALIUM   Take 1 tablet (10 mg) by mouth 3 times daily                                enoxaparin 100 MG/ML Soln   Commonly known as:  LOVENOX   Inject Subcutaneous every 12 hours                                ESTRING 2 MG vaginal ring   Place vaginally every 3 months   Generic drug:  estradiol                                FISH OIL   daily. w/DHEA.                                FLONASE 50 MCG/ACT spray   2 sprays by Both Nostrils route daily as needed.   Generic drug:  fluticasone                                HYDROmorphone 8 MG  tablet   Commonly known as:  DILAUDID   Take 1 tablet (8 mg) by mouth every 3 hours as needed                                LEVOFLOXACIN PO   Take 750 mg by mouth                                * levothyroxine 300 MCG tablet   Commonly known as:  SYNTHROID/LEVOTHROID   Take 300 mcg by mouth 2 times daily                                * levothyroxine 100 MCG Solr injection   Commonly known as:  SYNTHROID/LEVOTHROID   Inject 200 mcg into the vein twice a week                                * levothyroxine 200 MCG Solr injection   Commonly known as:  SYNTHROID/LEVOTHROID                                * morphine 30 MG 12 hr tablet   Commonly known as:  MS CONTIN   Take 3 tablets (90 mg) by mouth every 8 hours                                * morphine 60 MG 12 hr tablet   Commonly known as:  MS CONTIN                                NASONEX 50 MCG/ACT spray   Generic drug:  mometasone                                norethindrone-ethinyl estradiol 1-5 MG-MCG per tablet   Commonly known as:  FEMHRT 1/5   Take 1 tablet by mouth daily                                nystatin 264334 UNIT/GM Powd   Commonly known as:  MYCOSTATIN   Apply topically 3 times daily as needed                                prednisoLONE acetate 1 % ophthalmic susp   Commonly known as:  PRED FORTE   1 drop 4 times daily                                * predniSONE 5 MG tablet   Commonly known as:  DELTASONE   alternate 8 mg and 10 mg every other day                                * predniSONE 1 MG tablet   Commonly known as:  DELTASONE                                probiotic Caps   Take 1 capsule by mouth daily.                                RESTASIS 0.05 % ophthalmic emulsion   Place 1 drop into both eyes 2 times daily   Generic drug:  cycloSPORINE                                rivaroxaban ANTICOAGULANT 20 MG Tabs tablet   Commonly known as:  XARELTO   Take 20 mg by mouth daily (with dinner)                                sodium chloride (PF) 0.9%  PF flush                                SOTALOL HCL PO   Take 80 mg by mouth                                VITAMIN D (CHOLECALCIFEROL) PO   Take 50,000 Units by mouth once a week                                * Notice:  This list has 7 medication(s) that are the same as other medications prescribed for you. Read the directions carefully, and ask your doctor or other care provider to review them with you.

## 2018-07-10 ENCOUNTER — TELEPHONE (OUTPATIENT)
Dept: ANESTHESIOLOGY | Facility: CLINIC | Age: 58
End: 2018-07-10

## 2018-07-10 NOTE — TELEPHONE ENCOUNTER
FUTURE VISIT INFORMATION      FUTURE VISIT INFORMATION:    Date: 7/13/18    Time:     [place] [facility] [place]FORMATION:    Referring provider:  Charmaine Mendoza    Referring providers clinic:      Reason for visit/diagnosis  Eval for shoulder injection per patient's     RECORDS REQUESTED FROM:       Clinic name Comments Records Status Imaging Status     internal internal                                   RECORDS STATUS

## 2018-07-10 NOTE — TELEPHONE ENCOUNTER
Received call from patient's  requesting to schedule Aspen for her Right shoulder injection. Patient has had one on the Left side already. He also mentioned something about Aspen getting a CT scan. There was a lot of background noise during the call and was difficult to hear/understand what he was saying. Phone call dropped before conversation ended.

## 2018-07-13 ENCOUNTER — PRE VISIT (OUTPATIENT)
Dept: ANESTHESIOLOGY | Facility: CLINIC | Age: 58
End: 2018-07-13

## 2018-07-13 ENCOUNTER — OFFICE VISIT (OUTPATIENT)
Dept: ANESTHESIOLOGY | Facility: CLINIC | Age: 58
End: 2018-07-13
Payer: COMMERCIAL

## 2018-07-13 VITALS — DIASTOLIC BLOOD PRESSURE: 93 MMHG | SYSTOLIC BLOOD PRESSURE: 136 MMHG | HEART RATE: 74 BPM

## 2018-07-13 DIAGNOSIS — G56.81 SUPRASCAPULAR NEUROPATHY, RIGHT: Primary | ICD-10-CM

## 2018-07-13 ASSESSMENT — ENCOUNTER SYMPTOMS
ARTHRALGIAS: 1
DIZZINESS: 0
STIFFNESS: 1
BLOATING: 1
VOMITING: 0
EYE REDNESS: 0
NAIL CHANGES: 1
DOUBLE VISION: 0
BLOOD IN STOOL: 0
NUMBNESS: 1
EYE PAIN: 0
ABDOMINAL PAIN: 0
BACK PAIN: 1
POLYDIPSIA: 1
MUSCLE WEAKNESS: 1
DECREASED APPETITE: 1
MUSCLE CRAMPS: 1
JAUNDICE: 0
SKIN CHANGES: 0
TINGLING: 1
HEADACHES: 0
WEIGHT GAIN: 1
BOWEL INCONTINENCE: 0
JOINT SWELLING: 1
SEIZURES: 0
CHILLS: 1
FEVER: 0
POOR WOUND HEALING: 0
SPEECH CHANGE: 0
EYE WATERING: 1
DIARRHEA: 1
CONSTIPATION: 0
MYALGIAS: 1
PARALYSIS: 0
HALLUCINATIONS: 0
POLYPHAGIA: 0
EYE IRRITATION: 1
WEAKNESS: 1
ALTERED TEMPERATURE REGULATION: 1
RECTAL PAIN: 0
NAUSEA: 0
TREMORS: 0
NIGHT SWEATS: 1
LOSS OF CONSCIOUSNESS: 0
FATIGUE: 1
HEARTBURN: 0
DISTURBANCES IN COORDINATION: 0
WEIGHT LOSS: 0
NECK PAIN: 1
INCREASED ENERGY: 1
MEMORY LOSS: 0

## 2018-07-13 ASSESSMENT — ANXIETY QUESTIONNAIRES
1. FEELING NERVOUS, ANXIOUS, OR ON EDGE: NOT AT ALL
6. BECOMING EASILY ANNOYED OR IRRITABLE: SEVERAL DAYS
GAD7 TOTAL SCORE: 1
4. TROUBLE RELAXING: NOT AT ALL
2. NOT BEING ABLE TO STOP OR CONTROL WORRYING: NOT AT ALL
GAD7 TOTAL SCORE: 1
7. FEELING AFRAID AS IF SOMETHING AWFUL MIGHT HAPPEN: NOT AT ALL
3. WORRYING TOO MUCH ABOUT DIFFERENT THINGS: NOT AT ALL
5. BEING SO RESTLESS THAT IT IS HARD TO SIT STILL: NOT AT ALL
7. FEELING AFRAID AS IF SOMETHING AWFUL MIGHT HAPPEN: NOT AT ALL
GAD7 TOTAL SCORE: 1

## 2018-07-13 ASSESSMENT — PAIN SCALES - GENERAL: PAINLEVEL: EXTREME PAIN (9)

## 2018-07-13 NOTE — MR AVS SNAPSHOT
After Visit Summary   7/13/2018    Aspen Mccullough    MRN: [id number]           Patient Information     Date Of Birth          1960        Visit Information        Provider Department      7/13/2018 10:20 AM Sarahi Chung APRN [facility]        Care Instructions    1. Follow up with provider who manages your xarelto to obtain written authorization for a 3 day hold pre-procedure.  Please have documentation faxed to [phone].    2. Schedule procedure     Follow up: 4 weeks after your procedure       When calling to schedule your procedure appointment, also make your clinic appointment to follow up 4 weeks after the procedure.    Please call [phone] to schedule, reschedule, or cancel your procedure appointment.   Phones are answered Monday - Friday from 7:30 - 4:00pm.  Leave a voicemail with your name, birth date, and phone number if no one is available to take your call.     Your procedure: Right suprascapular nerve block     On the day of the procedure  1. Arrive 1 hour earlier than your scheduled time, to the [facility]  Address: [address] [place] [address]  2. Check in on the 5th floor for your procedure    Our phone number is [phone].  Our fax number is [phone].    If you must reschedule your procedure more than two times, you must follow up in clinic before rescheduling again.      Patient Pre-Procedure Instructions    CAUTION - FAILURE TO FOLLOW THESE PRE-PROCEDURE INSTRUCTIONS WILL RESULT IN YOUR PROCEDURE BEING RESCHEDULED.            You MUST have a  TO TAKE YOU HOME after your procedure. Transportation by Taxi or Para-transit must have a responsible adult accompany you home (other than the ). Travel by bus or light rail is not acceptable transportation. You must provide your 's full name and contact number at time of check in.   Fasting Protocol You may have  NOTHING SOLID TO EAT FOR 8 HOURS prior to arrival at the procedure area.   Broth and candy are considered solid food and require an eight hour fast.   You may have CLEAR LIQUIDS UP TO 2 HOURS prior to arrival at the clinic.   Clear liquids include water, clear fruit juice (no pulp) carbonated beverages, ice, black coffee, black tea (no milk or cream), chewing gum (un-swallowed), and/or clear jello (no fruit or milk). No alcohol containing beverages.   Medications If you take any medications,  DO NOT STOP. Take your medications as usual the morning of your procedure with a sip of water AT LEAST 2 HOURS PRIOR TO ARRIVAL.    Antibiotics If you are currently taking antibiotics, you must complete the entire dose 7 days prior to your scheduled procedure. You must be clear of any signs or symptoms of infection. If you begin antibiotics, please contact our clinic for instructions.   Fever, Chills, or Rash If you experience a fever of higher than 100 degrees, chills, rash, or open wounds during the one week prior to your procedure, please call the clinic.         Medication Hold List  **Patients under Cardiology/Neurology care should consult their provider prior to the pain procedure to verify pre-procedure medication instructions. The information below contains general guidelines.**    Blood Thinners If you are taking daily ASPIRIN, PLAVIX, OR OTHER BLOOD THINNERS SUCH AS COUMADIN/WARFARIN, we will need your prescribing doctor to sign a release permitting you to stop these medications. Once approved by your prescribing doctor - STOP ALL BLOOD THINNERS BASED ON THE TIME TABLE BELOW PRIOR TO YOUR PROCEDURE. If you have been instructed to stop WARFARIN(COUMADIN), you must have an INR lab drawn the day before your procedure. . Your INR must be within normal limits before we can perform your injection. MEDICATIONS CAN BE RESTARTED AFTER YOUR PROCEDURE.    14 DAY HOLD  Ticlid (ticlopidine)    10 DAY HOLD  Effient (Prasugel)    3  DAY HOLD  Xarelto (rivaroxaban) 7 DAY HOLD  Anacin, Bufferin, Ecotrin, Excedrin, Aggrenox (Aspirin)  Brilinta (ticagrelor)  Coumadin (Warfarin)  Pradexa (Dabigatran)  Elmiron (Pentosan)  Plavix (Clopidogrel Bisulfate)  Pletal (Cilostazol)    24 HOUR HOLD  Lovenox (enoxaparin)  Agrylin (Anagrelide)        Non-steroidal Anti-inflammatories (NSAIDs) DO NOT TAKE any non-steroidal anti-inflammatory medications (NSAIDs) listed on the table below. MEDICATIONS CAN BE RESTARTED AFTER YOUR PROCEDURE. Celebrex is OK to take and does not need to be discontinued.     Medications to stop:  3 DAY HOLD  Advil, Motrin (Ibuprofen)  Arthrotec (diciofenac sodium/misoprostol)  Clinoril (Sulindac)  Indocin (Indomethacin)  Lodine (Etodolac)  Toradol (Ketorolac)  Vicoprofen (Hydrocodone and Ibuprofen)  Voltaren (Diclotenac)    14 DAY HOLD  Daypro (Oxaprozin)  Feldene (Piroxicam)   7 DAY HOLD  Aleve (Naproxen sodium)  Darvon compound (contains aspirin)  Naprosyn (Naproxen)  Norgesic Forte (contains aspirin)  Mobic (Meloxicam)  Oruvall (Ketoprofen)  Percodan (contains aspirin)  Relafen (Nabumetone)  Salsalate  Trilisate  Vitamin E (more than 400 mg per day)  Any medication containing aspirin                To speak with a nurse, schedule/reschedule/cancel a clinic appointment, or request a medication refill call: [phone]     You can also reach us by [employer]: [email or web address]                      Follow-ups after your visit        Your next 10 appointments already scheduled     Jul 16, 2018  2:30 PM CDT   Return Visit with Antwan Boston, [facility] ([facility])    [address]   [address]  [facility] [address] [phone]              Who to contact     Please call your clinic at [phone] to:    Ask questions about your health    Make or cancel appointments    Discuss your medicines    Learn about your test results    Speak to  your doctor            Additional Information About Your Visit        [employer] Information     [employer] gives you secure access to your electronic health record. If you see a primary care provider, you can also send messages to your care team and make appointments. If you have questions, please call your primary care clinic.  If you do not have a primary care provider, please call [phone] and they will assist you.      [employer] is an electronic gateway that provides easy, online access to your medical records. With [employer], you can request a clinic appointment, read your test results, renew a prescription or communicate with your care team.     To access your existing account, please contact your [facility] or call [phone] for assistance.        Care EveryWhere ID     This is your Care EveryWhere ID. This could be used by other organizations to access your [facility] medical records  [id number]        Your Vitals Were     Pulse                   74            Blood Pressure from Last 3 Encounters:   07/13/18 (!) 136/93   05/18/18 128/62   04/25/18 138/70    Weight from Last 3 Encounters:   05/18/18 104.3 kg (230 lb)   04/25/18 104.8 kg (231 lb)   04/11/18 111.8 kg (246 lb 7.6 oz)              Today, you had the following     No orders found for display         Today's Medication Changes          These changes are accurate as of 7/13/18 12:33 PM.  If you have any questions, ask your nurse or doctor.               These medicines have changed or have updated prescriptions.        Dose/Directions    diazepam 10 MG tablet   Commonly known as:  VALIUM   This may have changed:  when to take this   Used for:  Chronic pain syndrome, Encounter for long-term use of opiate analgesic        Dose:  10 mg   Take 1 tablet (10 mg) by mouth 3 times daily   Quantity:  90 tablet   Refills:  0       HYDROmorphone 8 MG tablet   Commonly known as:  DILAUDID   This may have changed:  when to take this    Used for:  Chronic pain syndrome        Dose:  8 mg   Take 1 tablet (8 mg) by mouth every 3 hours as needed   Quantity:  100 tablet   Refills:  0       * morphine 30 MG 12 hr tablet   Commonly known as:  MS CONTIN   This may have changed:  when to take this   Used for:  Chronic pain syndrome        Dose:  90 mg   Take 3 tablets (90 mg) by mouth every 8 hours   Quantity:  270 tablet   Refills:  0       * morphine 60 MG 12 hr tablet   Commonly known as:  MS CONTIN   This may have changed:  Another medication with the same name was changed. Make sure you understand how and when to take each.        Refills:  0       * Notice:  This list has 2 medication(s) that are the same as other medications prescribed for you. Read the directions carefully, and ask your doctor or other care provider to review them with you.             Primary Care Provider Office Phone # Fax #    Sarahi Vivar [phone]       [facility] [address] [place] [address]        Equal Access to Services     ADRYAN BHAT : Hadii grisel lovelaceo Soclaudia, waaxda luqadaha, qaybta kaalmada adeegyada, yaneth guillermo . So [facility] [phone].    ATENCIÓN: Si habla español, tiene a lynn disposición servicios gratuitos de asistencia lingüística. Llame al [phone].    We comply with applicable federal civil rights laws and [place] laws. We do not discriminate on the basis of race, color, national origin, age, disability, sex, sexual orientation, or gender identity.            Thank you!     Thank you for choosing [facility]  for your care. Our goal is always to provide you with excellent care. Hearing back from our patients is one way we can continue to improve our services. Please take a few minutes to complete the written survey that you may receive in the mail after your visit with us. Thank you!             Your Updated Medication List - Protect others  around you: Learn how to safely use, store and throw away your medicines at www.disposemymeds.org.          This list is accurate as of 7/13/18 12:33 PM.  Always use your most recent med list.                   Brand Name Dispense Instructions for use Diagnosis    ALLEGRA 180 MG tablet   Generic drug:  fexofenadine      Take 180 mg by mouth daily.        BACLOFEN PO      Take 10 mg by mouth 3 times daily        CEPHALEXIN PO      Take 500 mg by mouth as needed (Dental Procedure) Take 4 caps 1 hour prior to Dental Appointment        cyanocobalamin 1000 MCG/ML injection    VITAMIN B12    4 mL    Inject 1 mL (1,000 mcg) Subcutaneous every 7 days    Other vitamin B12 deficiency anemia       cyclobenzaprine 10 MG tablet    FLEXERIL    90 tablet    Take 1 tablet (10 mg) by mouth 3 times daily    Generalized osteoarthrosis, involving multiple sites       CYTOMEL 50 MCG Tabs   Generic drug:  Liothyronine Sodium      Take 50 mcg by mouth daily    Hypothyroidism, unspecified type       diazepam 10 MG tablet    VALIUM    90 tablet    Take 1 tablet (10 mg) by mouth 3 times daily    Chronic pain syndrome, Encounter for long-term use of opiate analgesic       enoxaparin 100 MG/ML Soln    LOVENOX     Inject Subcutaneous every 12 hours        ESTRING 2 MG vaginal ring   Generic drug:  estradiol      Place vaginally every 3 months        FISH OIL      daily. w/DHEA.        FLONASE 50 MCG/ACT spray   Generic drug:  fluticasone      2 sprays by Both Nostrils route daily as needed.        HYDROmorphone 8 MG tablet    DILAUDID    100 tablet    Take 1 tablet (8 mg) by mouth every 3 hours as needed    Chronic pain syndrome       LEVOFLOXACIN PO      Take 750 mg by mouth        * levothyroxine 300 MCG tablet    SYNTHROID/LEVOTHROID     Take 300 mcg by mouth 2 times daily        * levothyroxine 100 MCG Solr injection    SYNTHROID/LEVOTHROID     Inject 200 mcg into the vein twice a week        * levothyroxine 200 MCG Solr injection     SYNTHROID/LEVOTHROID          * morphine 30 MG 12 hr tablet    MS CONTIN    270 tablet    Take 3 tablets (90 mg) by mouth every 8 hours    Chronic pain syndrome       * morphine 60 MG 12 hr tablet    MS CONTIN          NASONEX 50 MCG/ACT spray   Generic drug:  mometasone           norethindrone-ethinyl estradiol 1-5 MG-MCG per tablet    FEMHRT 1/5     Take 1 tablet by mouth daily        nystatin 834734 UNIT/GM Powd    MYCOSTATIN    60 g    Apply topically 3 times daily as needed    Candidal skin infection       prednisoLONE acetate 1 % ophthalmic susp    PRED FORTE     1 drop 4 times daily        * predniSONE 5 MG tablet    DELTASONE     alternate 8 mg and 10 mg every other day        * predniSONE 1 MG tablet    DELTASONE          probiotic Caps      Take 1 capsule by mouth daily.        RESTASIS 0.05 % ophthalmic emulsion   Generic drug:  cycloSPORINE      Place 1 drop into both eyes 2 times daily        rivaroxaban ANTICOAGULANT 20 MG Tabs tablet    XARELTO     Take 20 mg by mouth daily (with dinner)        sodium chloride (PF) 0.9% PF flush           SOTALOL HCL PO      Take 80 mg by mouth        VITAMIN D (CHOLECALCIFEROL) PO      Take 50,000 Units by mouth once a week        * Notice:  This list has 7 medication(s) that are the same as other medications prescribed for you. Read the directions carefully, and ask your doctor or other care provider to review them with you.

## 2018-07-13 NOTE — PATIENT INSTRUCTIONS
1. Follow up with provider who manages your xarelto to obtain written authorization for a 3 day hold pre-procedure.  Please have documentation faxed to [phone].    2. Schedule procedure     Follow up: 4 weeks after your procedure       When calling to schedule your procedure appointment, also make your clinic appointment to follow up 4 weeks after the procedure.    Please call [phone] to schedule, reschedule, or cancel your procedure appointment.   Phones are answered Monday - Friday from 7:30 - 4:00pm.  Leave a voicemail with your name, birth date, and phone number if no one is available to take your call.     Your procedure: Right suprascapular nerve block     On the day of the procedure  1. Arrive 1 hour earlier than your scheduled time, to the [facility]  Address: [address] [place] [address]  2. Check in on the 5th floor for your procedure    Our phone number is [phone].  Our fax number is [phone].    If you must reschedule your procedure more than two times, you must follow up in clinic before rescheduling again.      Patient Pre-Procedure Instructions    CAUTION - FAILURE TO FOLLOW THESE PRE-PROCEDURE INSTRUCTIONS WILL RESULT IN YOUR PROCEDURE BEING RESCHEDULED.            You MUST have a  TO TAKE YOU HOME after your procedure. Transportation by Taxi or Para-transit must have a responsible adult accompany you home (other than the ). Travel by bus or light rail is not acceptable transportation. You must provide your 's full name and contact number at time of check in.   Fasting Protocol You may have NOTHING SOLID TO EAT FOR 8 HOURS prior to arrival at the procedure area.   Broth and candy are considered solid food and require an eight hour fast.   You may have CLEAR LIQUIDS UP TO 2 HOURS prior to arrival at the clinic.   Clear liquids include water, clear fruit juice (no pulp) carbonated beverages, ice, black coffee, black tea (no  milk or cream), chewing gum (un-swallowed), and/or clear jello (no fruit or milk). No alcohol containing beverages.   Medications If you take any medications,  DO NOT STOP. Take your medications as usual the morning of your procedure with a sip of water AT LEAST 2 HOURS PRIOR TO ARRIVAL.    Antibiotics If you are currently taking antibiotics, you must complete the entire dose 7 days prior to your scheduled procedure. You must be clear of any signs or symptoms of infection. If you begin antibiotics, please contact our clinic for instructions.   Fever, Chills, or Rash If you experience a fever of higher than 100 degrees, chills, rash, or open wounds during the one week prior to your procedure, please call the clinic.         Medication Hold List  **Patients under Cardiology/Neurology care should consult their provider prior to the pain procedure to verify pre-procedure medication instructions. The information below contains general guidelines.**    Blood Thinners If you are taking daily ASPIRIN, PLAVIX, OR OTHER BLOOD THINNERS SUCH AS COUMADIN/WARFARIN, we will need your prescribing doctor to sign a release permitting you to stop these medications. Once approved by your prescribing doctor - STOP ALL BLOOD THINNERS BASED ON THE TIME TABLE BELOW PRIOR TO YOUR PROCEDURE. If you have been instructed to stop WARFARIN(COUMADIN), you must have an INR lab drawn the day before your procedure. . Your INR must be within normal limits before we can perform your injection. MEDICATIONS CAN BE RESTARTED AFTER YOUR PROCEDURE.    14 DAY HOLD  Ticlid (ticlopidine)    10 DAY HOLD  Effient (Prasugel)    3 DAY HOLD  Xarelto (rivaroxaban) 7 DAY HOLD  Anacin, Bufferin, Ecotrin, Excedrin, Aggrenox (Aspirin)  Brilinta (ticagrelor)  Coumadin (Warfarin)  Pradexa (Dabigatran)  Elmiron (Pentosan)  Plavix (Clopidogrel Bisulfate)  Pletal (Cilostazol)    24 HOUR HOLD  Lovenox (enoxaparin)  Agrylin (Anagrelide)        Non-steroidal  Anti-inflammatories (NSAIDs) DO NOT TAKE any non-steroidal anti-inflammatory medications (NSAIDs) listed on the table below. MEDICATIONS CAN BE RESTARTED AFTER YOUR PROCEDURE. Celebrex is OK to take and does not need to be discontinued.     Medications to stop:  3 DAY HOLD  Advil, Motrin (Ibuprofen)  Arthrotec (diciofenac sodium/misoprostol)  Clinoril (Sulindac)  Indocin (Indomethacin)  Lodine (Etodolac)  Toradol (Ketorolac)  Vicoprofen (Hydrocodone and Ibuprofen)  Voltaren (Diclotenac)    14 DAY HOLD  Daypro (Oxaprozin)  Feldene (Piroxicam)   7 DAY HOLD  Aleve (Naproxen sodium)  Darvon compound (contains aspirin)  Naprosyn (Naproxen)  Norgesic Forte (contains aspirin)  Mobic (Meloxicam)  Oruvall (Ketoprofen)  Percodan (contains aspirin)  Relafen (Nabumetone)  Salsalate  Trilisate  Vitamin E (more than 400 mg per day)  Any medication containing aspirin                To speak with a nurse, schedule/reschedule/cancel a clinic appointment, or request a medication refill call: [phone]     You can also reach us by [employer]: [email or web address]

## 2018-07-13 NOTE — LETTER
7/13/2018       RE: Aspen Mccullough  [address]  [facility] [address]     Dear Colleague,    Thank you for referring your patient, Aspen Mccullough, to the [facility] at [facility]. Please see a copy of my visit note below.    Date of visit: 7/13/2018    Chief complaint:   Chief Complaint   Patient presents with     Pain Management       Interval history:  Aspen Mccullough is a 58 year old female last seen by my colleague Dr. Basim Velazquez 5/18/18 for a left suprascapular nerve block. She had 100% relief and restoration of range of motion. She now has similar symptoms on right side, locating pain that extends from right infraspinatus location to lateral aspect of upper arm. Pain reduces her ability to abduct her upper extremity. Denies cervical pain or radicular symptoms that extend to forearm or digits. She would like to repeat the nerve block on right side.   She is on Xarelto for recent blood clots and is aware she will need to hold for three days. Managed by primary care at [facility].     Medications:  Current Outpatient Prescriptions   Medication Sig Dispense Refill     BACLOFEN PO Take 10 mg by mouth 3 times daily       CEPHALEXIN PO Take 500 mg by mouth as needed (Dental Procedure) Take 4 caps 1 hour prior to Dental Appointment       cyanocobalamin 1000 MCG/ML injection Inject 1 mL (1,000 mcg) Subcutaneous every 7 days 4 mL 5     cyclobenzaprine (FLEXERIL) 10 MG tablet Take 1 tablet (10 mg) by mouth 3 times daily 90 tablet 3     cycloSPORINE (RESTASIS) 0.05 % ophthalmic emulsion Place 1 drop into both eyes 2 times daily       diazepam (VALIUM) 10 MG tablet Take 1 tablet (10 mg) by mouth 3 times daily (Patient taking differently: Take 10 mg by mouth daily ) 90 tablet 0     enoxaparin (LOVENOX) 100 MG/ML SOLN Inject Subcutaneous every 12 hours       estradiol (ESTRING) 2 MG vaginal ring Place vaginally every 3 months        fexofenadine (ALLEGRA) 180 MG tablet Take 180 mg by mouth daily.       FISH OIL daily. w/DHEA.        fluticasone (FLONASE) 50 MCG/ACT nasal spray 2 sprays by Both Nostrils route daily as needed.       HYDROmorphone (DILAUDID) 8 MG tablet Take 1 tablet (8 mg) by mouth every 3 hours as needed (Patient taking differently: Take 8 mg by mouth 3 times daily ) 100 tablet 0     LEVOFLOXACIN PO Take 750 mg by mouth       levothyroxine (SYNTHROID/LEVOTHROID) 100 MCG SOLR injection Inject 200 mcg into the vein twice a week       levothyroxine (SYNTHROID/LEVOTHROID) 200 MCG SOLR injection   3     levothyroxine (SYNTHROID/LEVOTHROID) 300 MCG tablet Take 300 mcg by mouth 2 times daily        Liothyronine Sodium 50 MCG TABS Take 50 mcg by mouth daily        morphine (MS CONTIN) 30 MG 12 hr tablet Take 3 tablets (90 mg) by mouth every 8 hours (Patient taking differently: Take 90 mg by mouth 3 times daily ) 270 tablet 0     morphine (MS CONTIN) 60 MG 12 hr tablet   0     NASONEX 50 MCG/ACT nasal spray   11     norethindrone-ethinyl estradiol (FEMHRT 1/5) 1-5 MG-MCG per tablet Take 1 tablet by mouth daily       nystatin (MYCOSTATIN) 002026 UNIT/GM POWD Apply topically 3 times daily as needed 60 g 1     prednisoLONE acetate (PRED FORTE) 1 % ophthalmic susp 1 drop 4 times daily       predniSONE (DELTASONE) 1 MG tablet   2     PREDNISONE 5 MG OR TABS alternate 8 mg and 10 mg every other day       probiotic CAPS Take 1 capsule by mouth daily.       rivaroxaban ANTICOAGULANT (XARELTO) 20 MG TABS tablet Take 20 mg by mouth daily (with dinner)        sodium chloride, PF, (SODIUM CHLORIDE) 0.9% PF flush        SOTALOL HCL PO Take 80 mg by mouth       VITAMIN D, CHOLECALCIFEROL, PO Take 50,000 Units by mouth once a week         Medical History: any changes in medical history since they were last seen? No    Review of Systems:  The 14 system ROS was reviewed from the intake questionnaire; results listed at end of note.     Physical  Exam:  Blood pressure (!) 136/93, pulse 74, not currently breastfeeding.  General: NAD  Psych: Mood and affect appropriate  MSK: Tenderness extends along infraspinatus toward deltoid. Range of motion with abduction is reduced to 100 degrees. No focal tenderness along cervical spinous processes; range of motion full. Strength 5/5 with grasp, biceps, triceps, deltoid.     Assessment:   Aspen Mccullough is a 58 year old female who is seen at the pain clinic for right suprascapular neuropathy.    Plan:  1. Interventions:   -Ordered a right suprascapular nerve block; patient will need to hold Xarelto for three days prior to procedure. I've asked her to provide us with written documentation from her provider with this approval.   2. Follow up:   -4 weeks following injection.     Total time spent was 15 minutes, and more than 50% of face to face time was spent in counseling and/or coordination of care regarding plan of care.    ANITRA Tavarez, NGUYEN-BC  Pain Management  Department of Interventional Pain Management and Anesthesiology   [facility]

## 2018-07-13 NOTE — PROGRESS NOTES
Date of visit: 7/13/2018    Chief complaint:   Chief Complaint   Patient presents with     Pain Management       Interval history:  Aspen Mccullough is a 58 year old female last seen by my colleague Dr. Basim Velazquez 5/18/18 for a left suprascapular nerve block. She had 100% relief and restoration of range of motion. She now has similar symptoms on right side, locating pain that extends from right infraspinatus location to lateral aspect of upper arm. Pain reduces her ability to abduct her upper extremity. Denies cervical pain or radicular symptoms that extend to forearm or digits. She would like to repeat the nerve block on right side.   She is on Xarelto for recent blood clots and is aware she will need to hold for three days. Managed by primary care at [facility].     Medications:  Current Outpatient Prescriptions   Medication Sig Dispense Refill     BACLOFEN PO Take 10 mg by mouth 3 times daily       CEPHALEXIN PO Take 500 mg by mouth as needed (Dental Procedure) Take 4 caps 1 hour prior to Dental Appointment       cyanocobalamin 1000 MCG/ML injection Inject 1 mL (1,000 mcg) Subcutaneous every 7 days 4 mL 5     cyclobenzaprine (FLEXERIL) 10 MG tablet Take 1 tablet (10 mg) by mouth 3 times daily 90 tablet 3     cycloSPORINE (RESTASIS) 0.05 % ophthalmic emulsion Place 1 drop into both eyes 2 times daily       diazepam (VALIUM) 10 MG tablet Take 1 tablet (10 mg) by mouth 3 times daily (Patient taking differently: Take 10 mg by mouth daily ) 90 tablet 0     enoxaparin (LOVENOX) 100 MG/ML SOLN Inject Subcutaneous every 12 hours       estradiol (ESTRING) 2 MG vaginal ring Place vaginally every 3 months       fexofenadine (ALLEGRA) 180 MG tablet Take 180 mg by mouth daily.       FISH OIL daily. w/DHEA.        fluticasone (FLONASE) 50 MCG/ACT nasal spray 2 sprays by Both Nostrils route daily as needed.       HYDROmorphone (DILAUDID) 8 MG tablet Take 1 tablet (8 mg) by mouth every 3 hours as needed (Patient taking  differently: Take 8 mg by mouth 3 times daily ) 100 tablet 0     LEVOFLOXACIN PO Take 750 mg by mouth       levothyroxine (SYNTHROID/LEVOTHROID) 100 MCG SOLR injection Inject 200 mcg into the vein twice a week       levothyroxine (SYNTHROID/LEVOTHROID) 200 MCG SOLR injection   3     levothyroxine (SYNTHROID/LEVOTHROID) 300 MCG tablet Take 300 mcg by mouth 2 times daily        Liothyronine Sodium 50 MCG TABS Take 50 mcg by mouth daily        morphine (MS CONTIN) 30 MG 12 hr tablet Take 3 tablets (90 mg) by mouth every 8 hours (Patient taking differently: Take 90 mg by mouth 3 times daily ) 270 tablet 0     morphine (MS CONTIN) 60 MG 12 hr tablet   0     NASONEX 50 MCG/ACT nasal spray   11     norethindrone-ethinyl estradiol (FEMHRT 1/5) 1-5 MG-MCG per tablet Take 1 tablet by mouth daily       nystatin (MYCOSTATIN) 259726 UNIT/GM POWD Apply topically 3 times daily as needed 60 g 1     prednisoLONE acetate (PRED FORTE) 1 % ophthalmic susp 1 drop 4 times daily       predniSONE (DELTASONE) 1 MG tablet   2     PREDNISONE 5 MG OR TABS alternate 8 mg and 10 mg every other day       probiotic CAPS Take 1 capsule by mouth daily.       rivaroxaban ANTICOAGULANT (XARELTO) 20 MG TABS tablet Take 20 mg by mouth daily (with dinner)        sodium chloride, PF, (SODIUM CHLORIDE) 0.9% PF flush        SOTALOL HCL PO Take 80 mg by mouth       VITAMIN D, CHOLECALCIFEROL, PO Take 50,000 Units by mouth once a week         Medical History: any changes in medical history since they were last seen? No    Review of Systems:  The 14 system ROS was reviewed from the intake questionnaire; results listed at end of note.     Physical Exam:  Blood pressure (!) 136/93, pulse 74, not currently breastfeeding.  General: NAD  Psych: Mood and affect appropriate  MSK: Tenderness extends along infraspinatus toward deltoid. Range of motion with abduction is reduced to 100 degrees. No focal tenderness along cervical spinous processes; range of motion full.  Strength 5/5 with grasp, biceps, triceps, deltoid.     Assessment:   Aspen Mccullough is a 58 year old female who is seen at the pain clinic for right suprascapular neuropathy.    Plan:  1. Interventions:   -Ordered a right suprascapular nerve block; patient will need to hold Xarelto for three days prior to procedure. I've asked her to provide us with written documentation from her provider with this approval.   2. Follow up:   -4 weeks following injection.     Total time spent was 15 minutes, and more than 50% of face to face time was spent in counseling and/or coordination of care regarding plan of care.    ANITRA Tavarez, NGUYEN-BC  Pain Management  Department of Interventional Pain Management and Anesthesiology   [facility]        Answers for HPI/ROS submitted by the patient on 7/13/2018   General Symptoms: Yes  Skin Symptoms: Yes  HENT Symptoms: No  EYE SYMPTOMS: Yes  HEART SYMPTOMS: No  LUNG SYMPTOMS: No  INTESTINAL SYMPTOMS: Yes  URINARY SYMPTOMS: No  GYNECOLOGIC SYMPTOMS: No  BREAST SYMPTOMS: No  SKELETAL SYMPTOMS: Yes  BLOOD SYMPTOMS: No  NERVOUS SYSTEM SYMPTOMS: Yes  MENTAL HEALTH SYMPTOMS: No  Fever: No  Loss of appetite: Yes  Weight loss: No  Weight gain: Yes  Fatigue: Yes  Night sweats: Yes  Chills: Yes  Increased stress: No  Excessive hunger: No  Excessive thirst: Yes  Feeling hot or cold when others believe the temperature is normal: Yes  Loss of height: No  Post-operative complications: Yes  Surgical site pain: Yes  Hallucinations: No  Change in or Loss of Energy: Yes  Hyperactivity: No  Confusion: No  Changes in hair: No  Changes in moles/birth marks: No  Itching: Yes  Rashes: Yes  Changes in nails: Yes  Acne: No  Hair in places you don't want it: No  Change in facial hair: Yes  Warts: No  Non-healing sores: No  Scarring: Yes  Flaking of skin: Yes  Color changes of hands/feet in cold : Yes  Sun sensitivity: Yes  Skin thickening: No  Eye pain: No  Vision loss: Yes  Dry eyes: Yes  Watery eyes:  Yes  Eye bulging: No  Double vision: No  Flashing of lights: No  Spots: No  Floaters: No  Redness: No  Crossed eyes: No  Tunnel Vision: No  Yellowing of eyes: No  Eye irritation: Yes  Heart burn or indigestion: No  Nausea: No  Vomiting: No  Abdominal pain: No  Bloating: Yes  Constipation: No  Diarrhea: Yes  Blood in stool: No  Black stools: No  Rectal or Anal pain: No  Fecal incontinence: No  Yellowing of skin or eyes: No  Vomit with blood: No  Change in stools: No  Back pain: Yes  Muscle aches: Yes  Neck pain: Yes  Swollen joints: Yes  Joint pain: Yes  Bone pain: Yes  Muscle cramps: Yes  Muscle weakness: Yes  Joint stiffness: Yes  Bone fracture: No  Trouble with coordination: No  Dizziness or trouble with balance: No  Fainting or black-out spells: No  Memory loss: No  Headache: No  Seizures: No  Speech problems: No  Tingling: Yes  Tremor: No  Weakness: Yes  Difficulty walking: Yes  Paralysis: No  Numbness: Yes  BENI 7 TOTAL SCORE: 1  PHQ-2 Score: 0

## 2018-07-14 ASSESSMENT — ANXIETY QUESTIONNAIRES: GAD7 TOTAL SCORE: 1

## 2018-07-16 ENCOUNTER — TELEPHONE (OUTPATIENT)
Dept: ANESTHESIOLOGY | Facility: CLINIC | Age: 58
End: 2018-07-16

## 2018-07-16 ENCOUNTER — OFFICE VISIT (OUTPATIENT)
Dept: BEHAVIORAL HEALTH | Facility: CLINIC | Age: 58
End: 2018-07-16
Payer: COMMERCIAL

## 2018-07-16 DIAGNOSIS — F43.10 POSTTRAUMATIC STRESS DISORDER: Primary | ICD-10-CM

## 2018-07-16 PROCEDURE — 90832 PSYTX W PT 30 MINUTES: CPT | Performed by: SOCIAL WORKER

## 2018-07-16 NOTE — TELEPHONE ENCOUNTER
Patient  Ash is calling to schedule his wife Gails procedure, was just seen by Sarahi Chung. Can be reached at [phone].

## 2018-07-16 NOTE — PROGRESS NOTES
[facility]  July 16, 2018      Behavioral Health Clinician Progress Note    Patient Name: Aspen Mccullough           Service Type: Individual      Service Location:  in clinic      Session Start Time: 2:40pm  Session End Time: 3:05pm      Session Length: 16 - 37      Attendees: Patient    Visit Activities (Refresh list every visit): [facility] Only    Diagnostic Assessment Date: June 22, 2015  Treatment Plan Review Date: 4-18-16  See Flowsheets for today's PHQ-9 and BENI-7 results  Previous PHQ-9:   PHQ-9 SCORE 5/3/2016 10/4/2017 2/12/2018   Total Score - - -   Total Score - - -   Total Score 12 3 5     Previous BENI-7:   BENI-7 SCORE 10/4/2017 2/12/2018 7/13/2018   Total Score - - -   Total Score - - 1 (minimal anxiety)   Total Score 0 0 1   Total Score - - -       FERCHO LEVEL:  FERCHO Score (Last Two) 7/24/2014   FERCHO Raw Score 51   Activation Score 91.6   FERCHO Level 4       DATA  Extended Session (60+ minutes): No  Interactive Complexity: No  Crisis: No    Treatment Objective(s) Addressed in This Session:  Target Behavior(s): disease management/lifestyle changes mental health    Mood Instability: will develop better understanding of triggers and coping strategies to stabilize mood  PTSD Symptom Management  Psychological distress related to Pain    Current Stressors / Issues:    The patient reported that she had some medical procedures since her last visit to [facility]-she had contact with her biological mother and this was frustrating to the patient-she does not feel supported by her  with her current medical issues-she talked about different frustrating things-she feels that she and her  have communication issues-she feels judged by her -she is feeling depressed with her current medical issues-             Progress on Treatment Objective(s) / Homework:  Minimal progress - ACTION (Actively working towards change); Intervened by reinforcing change plan / affirming steps  taken    Motivational Interviewing    MI Intervention: Expressed Empathy/Understanding, Supported Autonomy, Collaboration, Evocation, Permission to raise concern or advise, Open-ended questions, Reflections: simple and complex and Change talk (evoked)     Change Talk Expressed by the Patient: Desire to change Ability to change Reasons to change Need to change Committment to change Activation Taking steps    Provider Response to Change Talk: E - Evoked more info from patient about behavior change, A - Affirmed patient's thoughts, decisions, or attempts at behavior change, R - Reflected patient's change talk and S - Summarized patient's change talk statements      Care Plan review completed: No    Medication Review:  No changes to current psychiatric medication(s)    Medication Compliance:  NA    Changes in Health Issues:   Yes: Pain, Associated Psychological Distress    Chemical Use Review:   Substance Use: Chemical use reviewed, no active concerns identified      Tobacco Use: No current tobacco use.      Assessment: Current Emotional / Mental Status (status of significant symptoms):  Risk status (Self / Other harm or suicidal ideation)  Patient denies a history of suicidal ideation, suicide attempts, self-injurious behavior, homicidal ideation, homicidal behavior and and other safety concerns  Patient denies current fears or concerns for personal safety.  Patient denies current or recent suicidal ideation or behaviors.  Patient denies current or recent homicidal ideation or behaviors.  Patient denies current or recent self injurious behavior or ideation.  Patient denies other safety concerns.  A safety and risk management plan has not been developed at this time, however patient was encouraged to call [facility] should there be a change in any of these risk factors.    Appearance:   Appropriate   Eye Contact:   Good   Psychomotor Behavior: Normal   Attitude:   Cooperative   Orientation:   All  Speech   Rate  / Production: Hyperverbal    Volume:  Normal   Mood:    Normal  Affect:    Appropriate   Thought Content:  Clear   Thought Form:  Coherent  Goal Directed  Logical   Insight:    Good     Diagnoses:  1. Posttraumatic stress disorder        Collateral Reports Completed:  Not Applicable    Plan: (Homework, other):  Patient was given information about behavioral services and encouraged to schedule a follow up appointment with the clinic [facility] as needed.  She was also given information about mental health symptoms and treatment options .  CD Recommendations: No indications of CD issues.  BARNEY Blackwell, [facility]      ______________________________________________________________________    Integrated Primary Care Behavioral Health Treatment Plan    Patient's Name: Aspen Mccullough  YOB: 1960    Date: 4-18-16    DSM-V Diagnoses: PTSD  Psychosocial / Contextual Factors: medical condition, history of traumatic experiences  WHODAS:  Will complete at next visit    Referral / Collaboration:  Referral to another professional/service is not indicated at this time..    Anticipated number of session or this episode of care: 10      MeasurableTreatment Goal(s) related to diagnosis / functional impairment(s)  Goal 1: Patient will be able to remember the past with 50% less emotional reaction of anger and hurt.     I will know I've met my goal when I can let go of the past     Objective #A (Patient Action)    Patient will talk to at least two others about losses and coping.  Status: New - Date: 4-18-16     Intervention(s)  [facility] will provide avenue for the past to process her losses and disappointments.    Objective #B  Patient will reduce her triggers from past trauma.  Status: New - Date: 4-18-16     Intervention(s)  [facility] will teach distraction skills. mindfulness.      Patient has reviewed and agreed to the above plan.      BARNEY Yan  April 18, 2016

## 2018-07-16 NOTE — TELEPHONE ENCOUNTER
I spoke with Ash, after Aspen's permission, and have her scheduled for 7/23/2018 with Dr. Velazquez.     He will drive her. Ash will call her PCP to get instructions regarding her Xarelto.

## 2018-07-16 NOTE — MR AVS SNAPSHOT
After Visit Summary   7/16/2018    Aspen Mccullough    MRN: [id number]           Patient Information     Date Of Birth          1960        Visit Information        Provider Department      7/16/2018 2:30 PM Antwan Boston, BARNEY [facility]        Today's Diagnoses     Posttraumatic stress disorder    -  1       Follow-ups after your visit        Your next 10 appointments already scheduled     Jul 23, 2018   Procedure with Basim Velazquez MD   [facility] ([facility])    [address] [place] [address]  5th Floor  [facility] [id number] [phone]           Located in the Clinics and Surgery Center at [address] [place] [address].   parking is very convenient and highly recommended.  is a $6 flat rate fee.  Both  and self parkers should enter the main arrival plaza from [facility]; parking attendants will direct you based on your parking preference.            Jul 31, 2018 10:30 AM CDT   Return Visit with BARNEY Yan   [facility] ([facility])    [address] [place] [address]  [address]  [facility] [id number] [phone]              Who to contact     If you have questions or need follow up information about today's clinic visit or your schedule please contact [facility] directly at [phone].  Normal or non-critical lab and imaging results will be communicated to you by MyChart, letter or phone within 4 business days after the clinic has received the results. If you do not hear from us within 7 days, please contact the clinic through MyChart or phone. If you have a critical or abnormal lab result, we will notify you by phone as soon as possible.  Submit refill requests through [facility] or call your pharmacy and they will forward the refill request to us. Please allow 3 business days for  your refill to be completed.          Additional Information About Your Visit        RE[facility]hart Information     [facility] gives you secure access to your electronic health record. If you see a primary care provider, you can also send messages to your care team and make appointments. If you have questions, please call your primary care clinic.  If you do not have a primary care provider, please call [phone] and they will assist you.        Care EveryWhere ID     This is your Care EveryWhere ID. This could be used by other organizations to access your [facility] medical records  [id number]         Blood Pressure from Last 3 Encounters:   07/13/18 (!) 136/93   05/18/18 128/62   04/25/18 138/70    Weight from Last 3 Encounters:   05/18/18 230 lb (104.3 kg)   04/25/18 231 lb (104.8 kg)   04/11/18 246 lb 7.6 oz (111.8 kg)              Today, you had the following     No orders found for display         Today's Medication Changes          These changes are accurate as of 7/16/18 11:59 PM.  If you have any questions, ask your nurse or doctor.               These medicines have changed or have updated prescriptions.        Dose/Directions    diazepam 10 MG tablet   Commonly known as:  VALIUM   This may have changed:  when to take this   Used for:  Chronic pain syndrome, Encounter for long-term use of opiate analgesic        Dose:  10 mg   Take 1 tablet (10 mg) by mouth 3 times daily   Quantity:  90 tablet   Refills:  0       HYDROmorphone 8 MG tablet   Commonly known as:  DILAUDID   This may have changed:  when to take this   Used for:  Chronic pain syndrome        Dose:  8 mg   Take 1 tablet (8 mg) by mouth every 3 hours as needed   Quantity:  100 tablet   Refills:  0       * morphine 30 MG 12 hr tablet   Commonly known as:  MS CONTIN   This may have changed:  when to take this   Used for:  Chronic pain syndrome        Dose:  90 mg   Take 3 tablets (90 mg) by mouth every 8 hours   Quantity:  270 tablet   Refills:  0       *  morphine 60 MG 12 hr tablet   Commonly known as:  MS CONTIN   This may have changed:  Another medication with the same name was changed. Make sure you understand how and when to take each.        Refills:  0       * Notice:  This list has 2 medication(s) that are the same as other medications prescribed for you. Read the directions carefully, and ask your doctor or other care provider to review them with you.             Primary Care Provider Office Phone # Fax Lisa Vivar [phone]       [facility] [address] [facility] [address]        Equal Access to Services     [facility]: Hadii aad ku hadasho Soomaali, waaxda luqadaha, qaybta kaalmada adeegyada, yaneth guillermo . So [facility] [phone].    ATENCIÓN: Si habla español, tiene a lynn disposición servicios gratuitos de asistencia lingüística. [facility] [phone].    We comply with applicable federal civil rights laws and [place] laws. We do not discriminate on the basis of race, color, national origin, age, disability, sex, sexual orientation, or gender identity.            Thank you!     Thank you for choosing [facility]  for your care. Our goal is always to provide you with excellent care. Hearing back from our patients is one way we can continue to improve our services. Please take a few minutes to complete the written survey that you may receive in the mail after your visit with us. Thank you!             Your Updated Medication List - Protect others around you: Learn how to safely use, store and throw away your medicines at www.disposemymeds.org.          This list is accurate as of 7/16/18 11:59 PM.  Always use your most recent med list.                   Brand Name Dispense Instructions for use Diagnosis    ALLEGRA 180 MG tablet   Generic drug:  fexofenadine      Take 180 mg by mouth daily.        BACLOFEN PO      Take 10 mg by mouth 3 times daily         CEPHALEXIN PO      Take 500 mg by mouth as needed (Dental Procedure) Take 4 caps 1 hour prior to Dental Appointment        cyanocobalamin 1000 MCG/ML injection    VITAMIN B12    4 mL    Inject 1 mL (1,000 mcg) Subcutaneous every 7 days    Other vitamin B12 deficiency anemia       cyclobenzaprine 10 MG tablet    FLEXERIL    90 tablet    Take 1 tablet (10 mg) by mouth 3 times daily    Generalized osteoarthrosis, involving multiple sites       CYTOMEL 50 MCG Tabs   Generic drug:  Liothyronine Sodium      Take 50 mcg by mouth daily    Hypothyroidism, unspecified type       diazepam 10 MG tablet    VALIUM    90 tablet    Take 1 tablet (10 mg) by mouth 3 times daily    Chronic pain syndrome, Encounter for long-term use of opiate analgesic       enoxaparin 100 MG/ML Soln    LOVENOX     Inject Subcutaneous every 12 hours        ESTRING 2 MG vaginal ring   Generic drug:  estradiol      Place vaginally every 3 months        FISH OIL      daily. w/DHEA.        FLONASE 50 MCG/ACT spray   Generic drug:  fluticasone      2 sprays by Both Nostrils route daily as needed.        HYDROmorphone 8 MG tablet    DILAUDID    100 tablet    Take 1 tablet (8 mg) by mouth every 3 hours as needed    Chronic pain syndrome       LEVOFLOXACIN PO      Take 750 mg by mouth        * levothyroxine 300 MCG tablet    SYNTHROID/LEVOTHROID     Take 300 mcg by mouth 2 times daily        * levothyroxine 100 MCG Solr injection    SYNTHROID/LEVOTHROID     Inject 200 mcg into the vein twice a week        * levothyroxine 200 MCG Solr injection    SYNTHROID/LEVOTHROID          * morphine 30 MG 12 hr tablet    MS CONTIN    270 tablet    Take 3 tablets (90 mg) by mouth every 8 hours    Chronic pain syndrome       * morphine 60 MG 12 hr tablet    MS CONTIN          NASONEX 50 MCG/ACT spray   Generic drug:  mometasone           norethindrone-ethinyl estradiol 1-5 MG-MCG per tablet    FEMHRT 1/5     Take 1 tablet by mouth daily        nystatin 797240 UNIT/GM Powd     MYCOSTATIN    60 g    Apply topically 3 times daily as needed    Candidal skin infection       prednisoLONE acetate 1 % ophthalmic susp    PRED FORTE     1 drop 4 times daily        * predniSONE 5 MG tablet    DELTASONE     alternate 8 mg and 10 mg every other day        * predniSONE 1 MG tablet    DELTASONE          probiotic Caps      Take 1 capsule by mouth daily.        RESTASIS 0.05 % ophthalmic emulsion   Generic drug:  cycloSPORINE      Place 1 drop into both eyes 2 times daily        rivaroxaban ANTICOAGULANT 20 MG Tabs tablet    XARELTO     Take 20 mg by mouth daily (with dinner)        sodium chloride (PF) 0.9% PF flush           SOTALOL HCL PO      Take 80 mg by mouth        VITAMIN D (CHOLECALCIFEROL) PO      Take 50,000 Units by mouth once a week        * Notice:  This list has 7 medication(s) that are the same as other medications prescribed for you. Read the directions carefully, and ask your doctor or other care provider to review them with you.

## 2018-07-23 ENCOUNTER — HOSPITAL ENCOUNTER (OUTPATIENT)
Facility: AMBULATORY SURGERY CENTER | Age: 58
End: 2018-07-23
Payer: COMMERCIAL

## 2018-07-23 ENCOUNTER — SURGERY (OUTPATIENT)
Age: 58
End: 2018-07-23

## 2018-07-23 VITALS
SYSTOLIC BLOOD PRESSURE: 172 MMHG | DIASTOLIC BLOOD PRESSURE: 101 MMHG | TEMPERATURE: 100 F | RESPIRATION RATE: 16 BRPM | OXYGEN SATURATION: 96 %

## 2018-07-23 RX ORDER — BUPIVACAINE HYDROCHLORIDE 2.5 MG/ML
INJECTION, SOLUTION EPIDURAL; INFILTRATION; INTRACAUDAL PRN
Status: DISCONTINUED | OUTPATIENT
Start: 2018-07-23 | End: 2018-07-23 | Stop reason: HOSPADM

## 2018-07-23 RX ORDER — LIDOCAINE HYDROCHLORIDE 10 MG/ML
INJECTION, SOLUTION EPIDURAL; INFILTRATION; INTRACAUDAL; PERINEURAL PRN
Status: DISCONTINUED | OUTPATIENT
Start: 2018-07-23 | End: 2018-07-23 | Stop reason: HOSPADM

## 2018-07-23 RX ADMIN — LIDOCAINE HYDROCHLORIDE 3 ML: 10 INJECTION, SOLUTION EPIDURAL; INFILTRATION; INTRACAUDAL; PERINEURAL at 13:04

## 2018-07-23 RX ADMIN — BUPIVACAINE HYDROCHLORIDE 3 ML: 2.5 INJECTION, SOLUTION EPIDURAL; INFILTRATION; INTRACAUDAL at 13:05

## 2018-07-23 NOTE — IP AVS SNAPSHOT
MRN:[id number]                      After Visit Summary   7/23/2018    Aspen Mccullough    MRN: [id number]           Thank you!     Thank you for choosing [facility] for your care. Our goal is always to provide you with excellent care. Hearing back from our patients is one way we can continue to improve our services. Please take a few minutes to complete the written survey that you may receive in the mail after you visit with us. Thank you!        Patient Information     Date Of Birth          1960        About your hospital stay     You were admitted on:  July 23, 2018 You last received care in the:  [facility]    You were discharged on:  July 23, 2018       Who to Call     For medical emergencies, please call 911.  For non-urgent questions about your medical care, please call your primary care provider or clinic, [phone]  For questions related to your surgery, please call your surgery clinic        Attending Provider     Provider Specialty    Basim Velazquez MD Anesthesiology       Primary Care Provider Office Phone # Fax #    Sarahi Vivar [phone]      Your next 10 appointments already scheduled     Jul 23, 2018   Procedure with Basim Velazquez MD   [facility] ([facility])    [address] [place] [address]  [address] [place] [id number] [phone]           Located in the Clinics and Surgery Center at [address] [place] [address].   parking is very convenient and highly recommended.  is a $6 flat rate fee.  Both  and self parkers should enter the main arrival plaza from [facility]; parking attendants will direct you based on your parking preference.            Jul 31, 2018 10:30 AM CDT   Return Visit with [facility] ([facility])    [address]  [address]  [facility] [id number] [phone]              Further instructions from your care team       Home Care Instructions after a Major Joint Injection      In a Major Joint Injection a local anesthetic (numbing medicine) is injected in or near the joint space.  Steroids are often used to help with the anti-inflammatory process.  A joint lubricant is sometimes injected to help with mobility.       Activity  -You may resume most normal activity levels with the exception of strenuous activity. It is important for us to know if your pain with normal activity is relieved after this injection.  -DO NOT shower for 24 hours  -DO NOT remove bandaid for 24 hours    Pain  -You may experience soreness at the injection site for one or two days  -You may use an ice pack for 20 minutes every 2 hours for the first 24 hours  -You may use a heating pad after the first 24 hours  -You may use Tylenol (acetaminophen) every 4 hours or other pain medicines as     directed by your physician    You may experience numbness radiating into your legs or arms (depending on the procedure location). This numbness may last several hours. Until sensation returns to normal; please use caution in walking, climbing stairs, and stepping out of your vehicle, etc.    DID YOU RECEIVE SEDATION TODAY?  No    DID YOU RECEIVE STEROIDS TODAY?  Yes    Common side effects of steroids:  Not everyone will experience corticosteroid side effects. If side effects are experienced, they will gradually subside in the 7-10 day period following an injection. Most common side effects include:  -Flushed face and/or chest  -Feeling of warmth, particularly in the face but could be an overall feeling of warmth  -Increased blood sugar in diabetic patients  -Menstrual irregularities my occur. If taking hormone-based birth control an alternate method of birth control is recommended  -Sleep disturbances and/or mood swings are possible  -Leg cramps      PLEASE KEEP TRACK OF  YOUR SYMPTOMS AND NOTE YOUR IMPROVEMENT FOR YOUR DOCTOR.     Please contact us if you have:  -Severe pain  -Fever more than 101.5 degrees Fahrenheit  -Signs of infection at the injection site (redness, swelling, or drainage)    If you have questions, please contact our office at [phone] between the hours of 7:00 am and 3:00 pm Monday through Friday. After office hours you can contact the on call provider by dialing [phone]. If you need immediate attention, we recommend that you go to a hospital emergency room or dial 911.      Pending Results     No orders found from 7/21/2018 to 7/24/2018.            Admission Information     Date & Time Provider Department Dept. Phone    7/23/2018 Basim Velazquez MD [facility] [phone]      Your Vitals Were     Blood Pressure Temperature Respirations Pulse Oximetry          179/114 100  F (37.8  C) (Temporal) 16 96%        [employer] Information     [employer] gives you secure access to your electronic health record. If you see a primary care provider, you can also send messages to your care team and make appointments. If you have questions, please call your primary care clinic.  If you do not have a primary care provider, please call [phone] and they will assist you.      [employer] is an electronic gateway that provides easy, online access to your medical records. With [employer], you can request a clinic appointment, read your test results, renew a prescription or communicate with your care team.     To access your existing account, please contact your [facility] or call [phone] for assistance.        Care EveryWhere ID     This is your Care EveryWhere ID. This could be used by other organizations to access your [facility] medical records  [id number]        Equal Access to Services     ADRYAN BHAT AH: olimpia Overton qaybta kaalmada adeegyada, waxay idiin hayaan adeeg kharash  laalessandra ahKim So [facility] [phone].    ATENCIÓN: Si jihanla krystina, tiene a lynn disposición servicios gratuitos de asistencia lingüística. Brad al [phone].    We comply with applicable federal civil rights laws and [place] laws. We do not discriminate on the basis of race, color, national origin, age, disability, sex, sexual orientation, or gender identity.               Review of your medicines      UNREVIEWED medicines. Ask your doctor about these medicines        Dose / Directions    ALLEGRA 180 MG tablet   Generic drug:  fexofenadine        Dose:  180 mg   Take 180 mg by mouth daily.   Refills:  0       BACLOFEN PO        Dose:  10 mg   Take 10 mg by mouth 3 times daily   Refills:  0       CEPHALEXIN PO        Dose:  500 mg   Take 500 mg by mouth as needed (Dental Procedure) Take 4 caps 1 hour prior to Dental Appointment   Refills:  0       cyanocobalamin 1000 MCG/ML injection   Commonly known as:  VITAMIN B12   Used for:  Other vitamin B12 deficiency anemia        Dose:  1 mL   Inject 1 mL (1,000 mcg) Subcutaneous every 7 days   Quantity:  4 mL   Refills:  5       cyclobenzaprine 10 MG tablet   Commonly known as:  FLEXERIL   Used for:  Generalized osteoarthrosis, involving multiple sites        Dose:  10 mg   Take 1 tablet (10 mg) by mouth 3 times daily   Quantity:  90 tablet   Refills:  3       CYTOMEL 50 MCG Tabs   Used for:  Hypothyroidism, unspecified type   Generic drug:  Liothyronine Sodium        Dose:  50 mcg   Take 50 mcg by mouth daily   Refills:  0       diazepam 10 MG tablet   Commonly known as:  VALIUM   Used for:  Chronic pain syndrome, Encounter for long-term use of opiate analgesic        Dose:  10 mg   Take 1 tablet (10 mg) by mouth 3 times daily   Quantity:  90 tablet   Refills:  0       enoxaparin 100 MG/ML Soln   Commonly known as:  LOVENOX        Inject Subcutaneous every 12 hours   Refills:  0       ESTRING 2 MG vaginal ring   Generic drug:  estradiol        Dose:  1    Place vaginally  every 3 months   Refills:  0       FISH OIL        daily. w/DHEA.   Refills:  0       FLONASE 50 MCG/ACT spray   Generic drug:  fluticasone        Dose:  2 spray   2 sprays by Both Nostrils route daily as needed.   Refills:  0       HYDROmorphone 8 MG tablet   Commonly known as:  DILAUDID   Used for:  Chronic pain syndrome        Dose:  8 mg   Take 1 tablet (8 mg) by mouth every 3 hours as needed   Quantity:  100 tablet   Refills:  0       LEVOFLOXACIN PO        Dose:  750 mg   Take 750 mg by mouth   Refills:  0       * levothyroxine 300 MCG tablet   Commonly known as:  SYNTHROID/LEVOTHROID        Dose:  300 mcg   Take 300 mcg by mouth 2 times daily   Refills:  0       * levothyroxine 100 MCG Solr injection   Commonly known as:  SYNTHROID/LEVOTHROID        Dose:  200 mcg   Inject 200 mcg into the vein twice a week   Refills:  0       * levothyroxine 200 MCG Solr injection   Commonly known as:  SYNTHROID/LEVOTHROID        Refills:  3       * morphine 30 MG 12 hr tablet   Commonly known as:  MS CONTIN   Used for:  Chronic pain syndrome        Dose:  90 mg   Take 3 tablets (90 mg) by mouth every 8 hours   Quantity:  270 tablet   Refills:  0       * morphine 60 MG 12 hr tablet   Commonly known as:  MS CONTIN        Refills:  0       NASONEX 50 MCG/ACT spray   Generic drug:  mometasone        Refills:  11       norethindrone-ethinyl estradiol 1-5 MG-MCG per tablet   Commonly known as:  FEMHRT 1/5        Dose:  1 tablet   Take 1 tablet by mouth daily   Refills:  0       nystatin 985759 UNIT/GM Powd   Commonly known as:  MYCOSTATIN   Used for:  Candidal skin infection        Apply topically 3 times daily as needed   Quantity:  60 g   Refills:  1       prednisoLONE acetate 1 % ophthalmic susp   Commonly known as:  PRED FORTE        Dose:  1 drop   1 drop 4 times daily   Refills:  0       * predniSONE 5 MG tablet   Commonly known as:  DELTASONE        alternate 8 mg and 10 mg every other day   Refills:  0       * predniSONE  1 MG tablet   Commonly known as:  DELTASONE        Refills:  2       probiotic Caps        Dose:  1 capsule   Take 1 capsule by mouth daily.   Refills:  0       RESTASIS 0.05 % ophthalmic emulsion   Generic drug:  cycloSPORINE        Dose:  1 drop   Place 1 drop into both eyes 2 times daily   Refills:  0       rivaroxaban ANTICOAGULANT 20 MG Tabs tablet   Commonly known as:  XARELTO        Dose:  20 mg   Take 20 mg by mouth daily (with dinner)   Refills:  0       sodium chloride (PF) 0.9% PF flush        Refills:  0       SOTALOL HCL PO        Dose:  80 mg   Take 80 mg by mouth   Refills:  0       VITAMIN D (CHOLECALCIFEROL) PO        Dose:  04092 Units   Take 50,000 Units by mouth once a week   Refills:  0       * Notice:  This list has 7 medication(s) that are the same as other medications prescribed for you. Read the directions carefully, and ask your doctor or other care provider to review them with you.             Protect others around you: Learn how to safely use, store and throw away your medicines at www.disposemymeds.org.             Medication List: This is a list of all your medications and when to take them. Check marks below indicate your daily home schedule. Keep this list as a reference.      Medications           Morning Afternoon Evening Bedtime As Needed    ALLEGRA 180 MG tablet   Take 180 mg by mouth daily.   Generic drug:  fexofenadine                                BACLOFEN PO   Take 10 mg by mouth 3 times daily                                CEPHALEXIN PO   Take 500 mg by mouth as needed (Dental Procedure) Take 4 caps 1 hour prior to Dental Appointment                                cyanocobalamin 1000 MCG/ML injection   Commonly known as:  VITAMIN B12   Inject 1 mL (1,000 mcg) Subcutaneous every 7 days                                cyclobenzaprine 10 MG tablet   Commonly known as:  FLEXERIL   Take 1 tablet (10 mg) by mouth 3 times daily                                CYTOMEL 50 MCG Tabs    Take 50 mcg by mouth daily   Generic drug:  Liothyronine Sodium                                diazepam 10 MG tablet   Commonly known as:  VALIUM   Take 1 tablet (10 mg) by mouth 3 times daily                                enoxaparin 100 MG/ML Soln   Commonly known as:  LOVENOX   Inject Subcutaneous every 12 hours                                ESTRING 2 MG vaginal ring   Place vaginally every 3 months   Generic drug:  estradiol                                FISH OIL   daily. w/DHEA.                                FLONASE 50 MCG/ACT spray   2 sprays by Both Nostrils route daily as needed.   Generic drug:  fluticasone                                HYDROmorphone 8 MG tablet   Commonly known as:  DILAUDID   Take 1 tablet (8 mg) by mouth every 3 hours as needed                                LEVOFLOXACIN PO   Take 750 mg by mouth                                * levothyroxine 300 MCG tablet   Commonly known as:  SYNTHROID/LEVOTHROID   Take 300 mcg by mouth 2 times daily                                * levothyroxine 100 MCG Solr injection   Commonly known as:  SYNTHROID/LEVOTHROID   Inject 200 mcg into the vein twice a week                                * levothyroxine 200 MCG Solr injection   Commonly known as:  SYNTHROID/LEVOTHROID                                * morphine 30 MG 12 hr tablet   Commonly known as:  MS CONTIN   Take 3 tablets (90 mg) by mouth every 8 hours                                * morphine 60 MG 12 hr tablet   Commonly known as:  MS CONTIN                                NASONEX 50 MCG/ACT spray   Generic drug:  mometasone                                norethindrone-ethinyl estradiol 1-5 MG-MCG per tablet   Commonly known as:  FEMHRT 1/5   Take 1 tablet by mouth daily                                nystatin 474633 UNIT/GM Powd   Commonly known as:  MYCOSTATIN   Apply topically 3 times daily as needed                                prednisoLONE acetate 1 % ophthalmic susp   Commonly known  as:  PRED FORTE   1 drop 4 times daily                                * predniSONE 5 MG tablet   Commonly known as:  DELTASONE   alternate 8 mg and 10 mg every other day                                * predniSONE 1 MG tablet   Commonly known as:  DELTASONE                                probiotic Caps   Take 1 capsule by mouth daily.                                RESTASIS 0.05 % ophthalmic emulsion   Place 1 drop into both eyes 2 times daily   Generic drug:  cycloSPORINE                                rivaroxaban ANTICOAGULANT 20 MG Tabs tablet   Commonly known as:  XARELTO   Take 20 mg by mouth daily (with dinner)                                sodium chloride (PF) 0.9% PF flush                                SOTALOL HCL PO   Take 80 mg by mouth                                VITAMIN D (CHOLECALCIFEROL) PO   Take 50,000 Units by mouth once a week                                * Notice:  This list has 7 medication(s) that are the same as other medications prescribed for you. Read the directions carefully, and ask your doctor or other care provider to review them with you.

## 2018-07-23 NOTE — IP AVS SNAPSHOT
[facility]    [address] [place] [address]    Phone:  [phone]    Fax:  [phone]                                       After Visit Summary   7/23/2018    Aspen Mccullough    MRN: [id number]           After Visit Summary Signature Page     I have received my discharge instructions, and my questions have been answered. I have discussed any challenges I see with this plan with the nurse or doctor.    ..........................................................................................................................................  Patient/Patient Representative Signature      ..........................................................................................................................................  Patient Representative Print Name and Relationship to Patient    ..................................................               ................................................  Date                                            Time    ..........................................................................................................................................  Reviewed by Signature/Title    ...................................................              ..............................................  Date                                                            Time

## 2018-07-23 NOTE — DISCHARGE INSTRUCTIONS
Home Care Instructions after a Major Joint Injection      In a Major Joint Injection a local anesthetic (numbing medicine) is injected in or near the joint space.  Steroids are often used to help with the anti-inflammatory process.  A joint lubricant is sometimes injected to help with mobility.       Activity  -You may resume most normal activity levels with the exception of strenuous activity. It is important for us to know if your pain with normal activity is relieved after this injection.  -DO NOT shower for 24 hours  -DO NOT remove bandaid for 24 hours    Pain  -You may experience soreness at the injection site for one or two days  -You may use an ice pack for 20 minutes every 2 hours for the first 24 hours  -You may use a heating pad after the first 24 hours  -You may use Tylenol (acetaminophen) every 4 hours or other pain medicines as     directed by your physician    You may experience numbness radiating into your legs or arms (depending on the procedure location). This numbness may last several hours. Until sensation returns to normal; please use caution in walking, climbing stairs, and stepping out of your vehicle, etc.    DID YOU RECEIVE SEDATION TODAY?  No    DID YOU RECEIVE STEROIDS TODAY?  Yes    Common side effects of steroids:  Not everyone will experience corticosteroid side effects. If side effects are experienced, they will gradually subside in the 7-10 day period following an injection. Most common side effects include:  -Flushed face and/or chest  -Feeling of warmth, particularly in the face but could be an overall feeling of warmth  -Increased blood sugar in diabetic patients  -Menstrual irregularities my occur. If taking hormone-based birth control an alternate method of birth control is recommended  -Sleep disturbances and/or mood swings are possible  -Leg cramps      PLEASE KEEP TRACK OF YOUR SYMPTOMS AND NOTE YOUR IMPROVEMENT FOR YOUR DOCTOR.     Please contact us if you have:  -Severe  pain  -Fever more than 101.5 degrees Fahrenheit  -Signs of infection at the injection site (redness, swelling, or drainage)    If you have questions, please contact our office at [phone] between the hours of 7:00 am and 3:00 pm Monday through Friday. After office hours you can contact the on call provider by dialing [phone]. If you need immediate attention, we recommend that you go to a hospital emergency room or dial [phone].

## 2018-07-31 ENCOUNTER — OFFICE VISIT (OUTPATIENT)
Dept: BEHAVIORAL HEALTH | Facility: CLINIC | Age: 58
End: 2018-07-31
Payer: COMMERCIAL

## 2018-07-31 DIAGNOSIS — F43.10 POSTTRAUMATIC STRESS DISORDER: Primary | ICD-10-CM

## 2018-07-31 PROCEDURE — 90834 PSYTX W PT 45 MINUTES: CPT | Performed by: SOCIAL WORKER

## 2018-07-31 NOTE — PROCEDURES
PAIN MEDICINE CLINIC PROCEDURE NOTE: Suprascapular Nerve Block     ATTENDING CLINICIAN:    Basim Velazquez MD       PREPROCEDURE DIAGNOSES:  1. Right shoulder pain     POSTPROCEDURE DIAGNOSES:  1.  Right shoulder pain     PROCEDURE(S) PERFORMED:  1.  Right suprascapular nerve injections  2.  Ultrasound guidance for the above-named procedure(s)     ANESTHESIA:  Local.     BLOOD LOSS:  Minimal.     DRAINS AND SPECIMENS:  None.     COMPLICATIONS:  None.     INDICATIONS:  Aspen Mccullough is a 58 year old female with a history of  chronic shoulder pain. The patient stated that the patient was in their usual state of health and denied recent anticoagulant use or recent infections.  Therefore, the plan is to perform above mentioned procedure.      Procedure Details:     The patient was met in the procedure room, where the patient was identified by name, medical record number and date of birth.  All of the patient s last minute questions were answered. Written informed consent was obtained and saved in the electronic medical record, after the risks, benefits, and alternatives were discussed with the patient.       A formal time-out procedure was performed, as per protocol, including patient name, title of procedure, and site of procedure, and all in the room concurred.  Routine monitors were applied.       The patient was placed in the sitting position on the procedure room table.Routine monitors were placed.  Vital signs were stable.     A chlorhexidine prep was completed followed by sterile draping per standard procedure.  Patient's consent was obtained.  The RIGHT side Suprascapular area was prepped using Chloraprep and the area was sterilely draped.  The ultrasound probe was draped and the U/S was used to identify and confirm the depth.  Fascial planes were easily visible as well as subcutaneous tissue and the supraspinatus fossae with the scapula as the deep border.  A 21G 80 mm  pajunk needle was used in an in-plane  fashion and positioned deep to the hyperechoic suprascapular ligament taking care to avoid the visibly pulsatile suprascapular artery.   A combination of Depo-medrom 40 mg with 0.25% Bupivacaine 3 ml was injected into the area, with several aspirations being negative for blood.  A screen shot,was taken of nicely extravasating fluid into the the expected location of the supraspinatus nerve.  The needle was removed, and a band-aid was applied.      Light pressure was held at the puncture site(s) to prevent ecchymosis and oozing.  The patient's skin was cleansed, and hemostasis was confirmed.  Band-aids were applied to the needle injection site(s).       Condition:     The patient remained awake and alert throughout the procedure.  The patient tolerated the procedure well and was monitored for approximately 15 minutes afterward in the post procedure area.  There were no immediate post procedure complications noted.  The patient was then discharged to home as per protocol.       Pre-procedure pain score:9/10  Post-procedure pain score: 9/10

## 2018-07-31 NOTE — MR AVS SNAPSHOT
After Visit Summary   7/31/2018    Aspen Mccullough    MRN: [id number]           Patient Information     Date Of Birth          1960        Visit Information        Provider Department      7/31/2018 1:00 PM Antwan Boston [facility]        Today's Diagnoses     Posttraumatic stress disorder    -  1       Follow-ups after your visit        Your next 10 appointments already scheduled     Aug 14, 2018 10:30 AM CDT   [place] Visit with BARNEY Yan   [facility] [place]    [address]  [address]  [facility] [address]   [phone]            Aug 30, 2018  1:00 PM CDT   Return Visit with BARNEY Yan   [facility] [place]    [address]  [address]  [facility] [address]   [phone]            Aug 30, 2018  1:00 PM CDT   New Visit with ANITRA Biggs CNP   [facility] [place]    [address]  [address]  [facility] [address]   [phone]              Who to contact     If you have questions or need follow up information about today's clinic visit or your schedule please contact [facility] directly at [phone].  Normal or non-critical lab and imaging results will be communicated to you by MyChart, letter or phone within 4 business days after the clinic has received the results. If you do not hear from us within 7 days, please contact the clinic through MyChart or phone. If you have a critical or abnormal lab result, we will notify you by phone as soon as possible.  Submit refill requests through Ancanco or call your pharmacy and they will forward the refill request to us. Please allow 3 business days for your refill to be completed.          Additional Information About  Your Visit        [employer] Information     Vitals [email or web address]) gives you secure access to your electronic health record. If you see a primary care provider, you can also send messages to your care team and make appointments. If you have questions, please call your primary care clinic.  If you do not have a primary care provider, please call [phone] and they will assist you.        Care EveryWhere ID     This is your Care EveryWhere ID. This could be used by other organizations to access your [facility] medical records  [id number]         Blood Pressure from Last 3 Encounters:   07/23/18 (!) 172/101   07/13/18 (!) 136/93   05/18/18 128/62    Weight from Last 3 Encounters:   05/18/18 230 lb (104.3 kg)   04/25/18 231 lb (104.8 kg)   04/11/18 246 lb 7.6 oz (111.8 kg)              Today, you had the following     No orders found for display         Today's Medication Changes          These changes are accurate as of 7/31/18 11:59 PM.  If you have any questions, ask your nurse or doctor.               These medicines have changed or have updated prescriptions.        Dose/Directions    diazepam 10 MG tablet   Commonly known as:  VALIUM   This may have changed:  when to take this   Used for:  Chronic pain syndrome, Encounter for long-term use of opiate analgesic        Dose:  10 mg   Take 1 tablet (10 mg) by mouth 3 times daily   Quantity:  90 tablet   Refills:  0       HYDROmorphone 8 MG tablet   Commonly known as:  DILAUDID   This may have changed:  when to take this   Used for:  Chronic pain syndrome        Dose:  8 mg   Take 1 tablet (8 mg) by mouth every 3 hours as needed   Quantity:  100 tablet   Refills:  0       * morphine 30 MG 12 hr tablet   Commonly known as:  MS CONTIN   This may have changed:  when to take this   Used for:  Chronic pain syndrome        Dose:  90 mg   Take 3 tablets (90 mg) by mouth every 8 hours   Quantity:  270 tablet   Refills:  0       * morphine 60 MG 12 hr tablet   Commonly known as:  MS CONTIN    This may have changed:  Another medication with the same name was changed. Make sure you understand how and when to take each.        Refills:  0       * Notice:  This list has 2 medication(s) that are the same as other medications prescribed for you. Read the directions carefully, and ask your doctor or other care provider to review them with you.             Primary Care Provider Office Phone # Fax #    Sarahi Vivar [phone]       [facility] [address] [facility] [address]        Equal Access to Services     ADRYAN BHAT : Hadii aad ku hadasho Soomaali, waaxda luqadaha, qaybta kaalmada adeegyada, waxay idiin hayaan adeeg kharacheri guillermo . So [facility] [phone].    ATENCIÓN: Si habla español, tiene a lynn disposición servicios gratuitos de asistencia lingüística. [facility] [phone].    We comply with applicable federal civil rights laws and [place] laws. We do not discriminate on the basis of race, color, national origin, age, disability, sex, sexual orientation, or gender identity.            Thank you!     Thank you for choosing [facility]  for your care. Our goal is always to provide you with excellent care. Hearing back from our patients is one way we can continue to improve our services. Please take a few minutes to complete the written survey that you may receive in the mail after your visit with us. Thank you!             Your Updated Medication List - Protect others around you: Learn how to safely use, store and throw away your medicines at www.disposemymeds.org.          This list is accurate as of 7/31/18 11:59 PM.  Always use your most recent med list.                   Brand Name Dispense Instructions for use Diagnosis    ALLEGRA 180 MG tablet   Generic drug:  fexofenadine      Take 180 mg by mouth daily.        BACLOFEN PO      Take 10 mg by mouth 3 times daily        CEPHALEXIN PO      Take 500 mg by mouth as needed (Dental  Procedure) Take 4 caps 1 hour prior to Dental Appointment        cyanocobalamin 1000 MCG/ML injection    VITAMIN B12    4 mL    Inject 1 mL (1,000 mcg) Subcutaneous every 7 days    Other vitamin B12 deficiency anemia       cyclobenzaprine 10 MG tablet    FLEXERIL    90 tablet    Take 1 tablet (10 mg) by mouth 3 times daily    Generalized osteoarthrosis, involving multiple sites       CYTOMEL 50 MCG Tabs   Generic drug:  Liothyronine Sodium      Take 50 mcg by mouth daily    Hypothyroidism, unspecified type       diazepam 10 MG tablet    VALIUM    90 tablet    Take 1 tablet (10 mg) by mouth 3 times daily    Chronic pain syndrome, Encounter for long-term use of opiate analgesic       enoxaparin 100 MG/ML Soln    LOVENOX     Inject Subcutaneous every 12 hours        ESTRING 2 MG vaginal ring   Generic drug:  estradiol      Place vaginally every 3 months        FISH OIL      daily. w/DHEA.        FLONASE 50 MCG/ACT spray   Generic drug:  fluticasone      2 sprays by Both Nostrils route daily as needed.        HYDROmorphone 8 MG tablet    DILAUDID    100 tablet    Take 1 tablet (8 mg) by mouth every 3 hours as needed    Chronic pain syndrome       LEVOFLOXACIN PO      Take 750 mg by mouth        * levothyroxine 300 MCG tablet    SYNTHROID/LEVOTHROID     Take 300 mcg by mouth 2 times daily        * levothyroxine 100 MCG Solr injection    SYNTHROID/LEVOTHROID     Inject 200 mcg into the vein twice a week        * levothyroxine 200 MCG Solr injection    SYNTHROID/LEVOTHROID          * morphine 30 MG 12 hr tablet    MS CONTIN    270 tablet    Take 3 tablets (90 mg) by mouth every 8 hours    Chronic pain syndrome       * morphine 60 MG 12 hr tablet    MS CONTIN          NASONEX 50 MCG/ACT spray   Generic drug:  mometasone           norethindrone-ethinyl estradiol 1-5 MG-MCG per tablet    FEMHRT 1/5     Take 1 tablet by mouth daily        nystatin 027287 UNIT/GM Powd    MYCOSTATIN    60 g    Apply topically 3 times daily as  needed    Candidal skin infection       prednisoLONE acetate 1 % ophthalmic susp    PRED FORTE     1 drop 4 times daily        * predniSONE 5 MG tablet    DELTASONE     alternate 8 mg and 10 mg every other day        * predniSONE 1 MG tablet    DELTASONE          probiotic Caps      Take 1 capsule by mouth daily.        RESTASIS 0.05 % ophthalmic emulsion   Generic drug:  cycloSPORINE      Place 1 drop into both eyes 2 times daily        rivaroxaban ANTICOAGULANT 20 MG Tabs tablet    XARELTO     Take 20 mg by mouth daily (with dinner)        sodium chloride (PF) 0.9% PF flush           SOTALOL HCL PO      Take 80 mg by mouth        VITAMIN D (CHOLECALCIFEROL) PO      Take 50,000 Units by mouth once a week        * Notice:  This list has 7 medication(s) that are the same as other medications prescribed for you. Read the directions carefully, and ask your doctor or other care provider to review them with you.

## 2018-07-31 NOTE — PROGRESS NOTES
[facility]  July 31, 2018      Behavioral Health Clinician Progress Note    Patient Name: Aspen Mccullough           Service Type: Individual      Service Location:  in clinic      Session Start Time: 1:02pm  Session End Time: 1:40pm       Session Length: 38 - 52      Attendees: Patient    Visit Activities (Refresh list every visit): [facility] [employer]    Diagnostic Assessment Date: June 22, 2015  Treatment Plan Review Date: 4-18-16  See Flowsheets for today's PHQ-9 and BENI-7 results  Previous PHQ-9:   PHQ-9 SCORE 5/3/2016 10/4/2017 2/12/2018   Total Score - - -   Total Score - - -   Total Score 12 3 5     Previous BENI-7:   BENI-7 SCORE 10/4/2017 2/12/2018 7/13/2018   Total Score - - -   Total Score - - 1 (minimal anxiety)   Total Score 0 0 1   Total Score - - -       FERCHO LEVEL:  FERCHO Score (Last Two) 7/24/2014   FERCHO Raw Score 51   Activation Score 91.6   FERCHO Level 4       DATA  Extended Session (60+ minutes): No  Interactive Complexity: No  Crisis: No    Treatment Objective(s) Addressed in This Session:  Target Behavior(s): disease management/lifestyle changes mental health    Mood Instability: will develop better understanding of triggers and coping strategies to stabilize mood  PTSD Symptom Management  Psychological distress related to Pain    Current Stressors / Issues:    The patient reported that she is upset because her  does not understand regarding her meeting with her biological mother and feeling rejected again once she met her as an adult-this was too much for the patient to experience being rejected again by her mother so she faked her death to her biological mother-she returned to talking about stories she has told this writer in the past-then she talked about how one of her daughter understands her experience because she experienced similar situations-she has a lot of anger towards her biological mother-her bother and sister biological stayed with the patient's  mother-the patient was triggered to have her historic feelings of rejection and we talked about how she is doing the best that she can do-the patient was processing the experience of being triggered to have historic emotions and feelings and thoughts and attempting to return to baseline stabilization.             Progress on Treatment Objective(s) / Homework:  Minimal progress - ACTION (Actively working towards change); Intervened by reinforcing change plan / affirming steps taken    Motivational Interviewing    MI Intervention: Expressed Empathy/Understanding, Supported Autonomy, Collaboration, Evocation, Permission to raise concern or advise, Open-ended questions, Reflections: simple and complex and Change talk (evoked)     Change Talk Expressed by the Patient: Desire to change Ability to change Reasons to change Need to change Committment to change Activation Taking steps    Provider Response to Change Talk: E - Evoked more info from patient about behavior change, A - Affirmed patient's thoughts, decisions, or attempts at behavior change, R - Reflected patient's change talk and S - Summarized patient's change talk statements      Care Plan review completed: No    Medication Review:  No changes to current psychiatric medication(s)    Medication Compliance:  NA    Changes in Health Issues:   Yes: Pain, Associated Psychological Distress    Chemical Use Review:   Substance Use: Chemical use reviewed, no active concerns identified      Tobacco Use: No current tobacco use.      Assessment: Current Emotional / Mental Status (status of significant symptoms):  Risk status (Self / Other harm or suicidal ideation)  Patient denies a history of suicidal ideation, suicide attempts, self-injurious behavior, homicidal ideation, homicidal behavior and and other safety concerns  Patient denies current fears or concerns for personal safety.  Patient denies current or recent suicidal ideation or behaviors.  Patient denies current or  recent homicidal ideation or behaviors.  Patient denies current or recent self injurious behavior or ideation.  Patient denies other safety concerns.  A safety and risk management plan has not been developed at this time, however patient was encouraged to call [place] [phone] should there be a change in any of these risk factors.    Appearance:   Appropriate   Eye Contact:   Good   Psychomotor Behavior: Normal   Attitude:   Cooperative   Orientation:   All  Speech   Rate / Production: Hyperverbal    Volume:  Normal   Mood:    Normal  Affect:    Appropriate   Thought Content:  Clear   Thought Form:  Coherent  Goal Directed  Logical   Insight:    Good     Diagnoses:  1. Posttraumatic stress disorder        Collateral Reports Completed:  Not Applicable    Plan: (Homework, other):  Patient was given information about behavioral services and encouraged to schedule a follow up appointment with the clinic [facility] as needed.  She was also given information about mental health symptoms and treatment options .  CD Recommendations: No indications of CD issues.  Antwan Boston, BARNEY, [facility]      ______________________________________________________________________    Integrated Primary Care Behavioral Health Treatment Plan    Patient's Name: Aspen Mccullough  YOB: 1960    Date: 4-18-16    DSM-V Diagnoses: PTSD  Psychosocial / Contextual Factors: medical condition, history of traumatic experiences  WHODAS:  Will complete at next visit    Referral / Collaboration:  Referral to another professional/service is not indicated at this time..    Anticipated number of session or this episode of care: 10      MeasurableTreatment Goal(s) related to diagnosis / functional impairment(s)  Goal 1: Patient will be able to remember the past with 50% less emotional reaction of anger and hurt.     I will know I've met my goal when I can let go of the past     Objective #A (Patient Action)    Patient will talk to at least two others about  losses and coping.  Status: New - Date: 4-18-16     Intervention(s)  [facility] will provide avenue for the past to process her losses and disappointments.    Objective #B  Patient will reduce her triggers from past trauma.  Status: New - Date: 4-18-16     Intervention(s)  [facility] will teach distraction skills. mindfulness.      Patient has reviewed and agreed to the above plan.      Antwan Boston, [facility]  April 18, 2016

## 2018-08-13 ENCOUNTER — OFFICE VISIT (OUTPATIENT)
Dept: BEHAVIORAL HEALTH | Facility: CLINIC | Age: 58
End: 2018-08-13
Payer: COMMERCIAL

## 2018-08-13 DIAGNOSIS — F43.10 POSTTRAUMATIC STRESS DISORDER: Primary | ICD-10-CM

## 2018-08-13 PROCEDURE — 90832 PSYTX W PT 30 MINUTES: CPT | Performed by: SOCIAL WORKER

## 2018-08-13 NOTE — MR AVS SNAPSHOT
After Visit Summary   8/13/2018    Aspen Mccullough    MRN: [id number]           Patient Information     Date Of Birth          1960        Visit Information        Provider Department      8/13/2018 1:30 PM Antwan Boston LIC[facility]        Today's Diagnoses     Posttraumatic stress disorder    -  1       Follow-ups after your visit        Your next 10 appointments already scheduled     Aug 30, 2018  1:00 PM CDT   Return Visit with BARNEY Yan   [facility] ([facility])    [address]  [address]  [facility] [address] [phone]            Aug 30, 2018  1:00 PM CDT   New Visit with ANITRA Biggs CNP   [facility] ([facility])    [address]  [address]  [facility] [address] [phone]              Who to contact     If you have questions or need follow up information about today's clinic visit or your schedule please contact [facility] directly at [phone].  Normal or non-critical lab and imaging results will be communicated to you by RFMarqhart, letter or phone within 4 business days after the clinic has received the results. If you do not hear from us within 7 days, please contact the clinic through RFMarqhart or phone. If you have a critical or abnormal lab result, we will notify you by phone as soon as possible.  Submit refill requests through Sagge or call your pharmacy and they will forward the refill request to us. Please allow 3 business days for your refill to be completed.          Additional Information About Your Visit        [employer] Information     [employer] gives you secure access to your electronic health record. If you see a primary care provider, you can also send messages to your care team and make appointments. If you have questions, please  call your primary care clinic.  If you do not have a primary care provider, please call [phone] and they will assist you.        Care EveryWhere ID     This is your Care EveryWhere ID. This could be used by other organizations to access your [place] medical records  [id number]         Blood Pressure from Last 3 Encounters:   07/23/18 (!) 172/101   07/13/18 (!) 136/93   05/18/18 128/62    Weight from Last 3 Encounters:   05/18/18 230 lb (104.3 kg)   04/25/18 231 lb (104.8 kg)   04/11/18 246 lb 7.6 oz (111.8 kg)              Today, you had the following     No orders found for display         Today's Medication Changes          These changes are accurate as of 8/13/18  3:02 PM.  If you have any questions, ask your nurse or doctor.               These medicines have changed or have updated prescriptions.        Dose/Directions    diazepam 10 MG tablet   Commonly known as:  VALIUM   This may have changed:  when to take this   Used for:  Chronic pain syndrome, Encounter for long-term use of opiate analgesic        Dose:  10 mg   Take 1 tablet (10 mg) by mouth 3 times daily   Quantity:  90 tablet   Refills:  0       HYDROmorphone 8 MG tablet   Commonly known as:  DILAUDID   This may have changed:  when to take this   Used for:  Chronic pain syndrome        Dose:  8 mg   Take 1 tablet (8 mg) by mouth every 3 hours as needed   Quantity:  100 tablet   Refills:  0       * morphine 30 MG 12 hr tablet   Commonly known as:  MS CONTIN   This may have changed:  when to take this   Used for:  Chronic pain syndrome        Dose:  90 mg   Take 3 tablets (90 mg) by mouth every 8 hours   Quantity:  270 tablet   Refills:  0       * morphine 60 MG 12 hr tablet   Commonly known as:  MS CONTIN   This may have changed:  Another medication with the same name was changed. Make sure you understand how and when to take each.        Refills:  0       * Notice:  This list has 2 medication(s) that are the same as other medications  prescribed for you. Read the directions carefully, and ask your doctor or other care provider to review them with you.             Primary Care Provider Office Phone # Fax #    Sarahi Vivar [phone]       [facility] [address]  [facility] [address]        Equal Access to Services     SHEILAKADE GONZALEZJEET : Hadii aad ku hadasho Soomaali, waaxda luqadaha, qaybta kaalmada adeegyada, waxay idiin hayaan adeeg eri laalessandra sherman. So [facility] [phone].    ATENCIÓN: Si habla español, tiene a lynn disposición servicios gratuitos de asistencia lingüística. [facility] [phone].    We comply with applicable federal civil rights laws and [place] laws. We do not discriminate on the basis of race, color, national origin, age, disability, sex, sexual orientation, or gender identity.            Thank you!     Thank you for choosing [facility]  for your care. Our goal is always to provide you with excellent care. Hearing back from our patients is one way we can continue to improve our services. Please take a few minutes to complete the written survey that you may receive in the mail after your visit with us. Thank you!             Your Updated Medication List - Protect others around you: Learn how to safely use, store and throw away your medicines at www.disposemymeds.org.          This list is accurate as of 8/13/18  3:02 PM.  Always use your most recent med list.                   Brand Name Dispense Instructions for use Diagnosis    ALLEGRA 180 MG tablet   Generic drug:  fexofenadine      Take 180 mg by mouth daily.        BACLOFEN PO      Take 10 mg by mouth 3 times daily        CEPHALEXIN PO      Take 500 mg by mouth as needed (Dental Procedure) Take 4 caps 1 hour prior to Dental Appointment        cyanocobalamin 1000 MCG/ML injection    VITAMIN B12    4 mL    Inject 1 mL (1,000 mcg) Subcutaneous every 7 days    Other vitamin B12 deficiency anemia       cyclobenzaprine 10  MG tablet    FLEXERIL    90 tablet    Take 1 tablet (10 mg) by mouth 3 times daily    Generalized osteoarthrosis, involving multiple sites       CYTOMEL 50 MCG Tabs   Generic drug:  Liothyronine Sodium      Take 50 mcg by mouth daily    Hypothyroidism, unspecified type       diazepam 10 MG tablet    VALIUM    90 tablet    Take 1 tablet (10 mg) by mouth 3 times daily    Chronic pain syndrome, Encounter for long-term use of opiate analgesic       enoxaparin 100 MG/ML Soln    LOVENOX     Inject Subcutaneous every 12 hours        ESTRING 2 MG vaginal ring   Generic drug:  estradiol      Place vaginally every 3 months        FISH OIL      daily. w/DHEA.        FLONASE 50 MCG/ACT spray   Generic drug:  fluticasone      2 sprays by Both Nostrils route daily as needed.        HYDROmorphone 8 MG tablet    DILAUDID    100 tablet    Take 1 tablet (8 mg) by mouth every 3 hours as needed    Chronic pain syndrome       LEVOFLOXACIN PO      Take 750 mg by mouth        * levothyroxine 300 MCG tablet    SYNTHROID/LEVOTHROID     Take 300 mcg by mouth 2 times daily        * levothyroxine 100 MCG Solr injection    SYNTHROID/LEVOTHROID     Inject 200 mcg into the vein twice a week        * levothyroxine 200 MCG Solr injection    SYNTHROID/LEVOTHROID          * morphine 30 MG 12 hr tablet    MS CONTIN    270 tablet    Take 3 tablets (90 mg) by mouth every 8 hours    Chronic pain syndrome       * morphine 60 MG 12 hr tablet    MS CONTIN          NASONEX 50 MCG/ACT spray   Generic drug:  mometasone           norethindrone-ethinyl estradiol 1-5 MG-MCG per tablet    FEMHRT 1/5     Take 1 tablet by mouth daily        nystatin [id number] UNIT/GM Powd    MYCOSTATIN    60 g    Apply topically 3 times daily as needed    Candidal skin infection       prednisoLONE acetate 1 % ophthalmic susp    PRED FORTE     1 drop 4 times daily        * predniSONE 5 MG tablet    DELTASONE     alternate 8 mg and 10 mg every other day        * predniSONE 1 MG tablet     DELTASONE          probiotic Caps      Take 1 capsule by mouth daily.        RESTASIS 0.05 % ophthalmic emulsion   Generic drug:  cycloSPORINE      Place 1 drop into both eyes 2 times daily        rivaroxaban ANTICOAGULANT 20 MG Tabs tablet    XARELTO     Take 20 mg by mouth daily (with dinner)        sodium chloride (PF) 0.9% PF flush           SOTALOL HCL PO      Take 80 mg by mouth        VITAMIN D (CHOLECALCIFEROL) PO      Take 50,000 Units by mouth once a week        * Notice:  This list has 7 medication(s) that are the same as other medications prescribed for you. Read the directions carefully, and ask your doctor or other care provider to review them with you.

## 2018-08-13 NOTE — PROGRESS NOTES
[facility]  August 13, 2018      Behavioral Health Clinician Progress Note    Patient Name: Aspen Mccullough           Service Type: Individual      Service Location:  in clinic      Session Start Time: 1:45pm  Session End Time: 2:07pm    Session Length: 16 - 37      Attendees: Patient    Visit Activities (Refresh list every visit): [facility] [employer]    Diagnostic Assessment Date: June 22, 2015  Treatment Plan Review Date: 4-18-16  See Flowsheets for today's PHQ-9 and BENI-7 results  Previous PHQ-9:   PHQ-9 SCORE 5/3/2016 10/4/2017 2/12/2018   Total Score - - -   Total Score - - -   Total Score 12 3 5     Previous BENI-7:   BENI-7 SCORE 10/4/2017 2/12/2018 7/13/2018   Total Score - - -   Total Score - - 1 (minimal anxiety)   Total Score 0 0 1   Total Score - - -       FERCHO LEVEL:  FERCHO Score (Last Two) 7/24/2014   FERCHO Raw Score 51   Activation Score 91.6   FERCHO Level 4       DATA  Extended Session (60+ minutes): No  Interactive Complexity: No  Crisis: No    Treatment Objective(s) Addressed in This Session:  Target Behavior(s): disease management/lifestyle changes mental health    Mood Instability: will develop better understanding of triggers and coping strategies to stabilize mood  PTSD Symptom Management  Psychological distress related to Pain    Current Stressors / Issues:    The patient reported that she has coming on some feeling of pain regarding her biological mother and interactions they have had in the past-she reported that she wants to let go of obsessive thoughts that are bringing her down regarding her biological mother writing to get her thoughts out-use my experiences to be able to help others that may be struggling with mom issues and adoption-SHE WAS NOT ABLE TO GIVE YOU WHAT YOU NEEDED AFTER ALL THAT TIME-the patient was emotional-giving to others was healing-when you think about getting revenge make sure that you don't let it get I the way of feeling.          Progress on  Treatment Objective(s) / Homework:  Minimal progress - ACTION (Actively working towards change); Intervened by reinforcing change plan / affirming steps taken    Motivational Interviewing    MI Intervention: Expressed Empathy/Understanding, Supported Autonomy, Collaboration, Evocation, Permission to raise concern or advise, Open-ended questions, Reflections: simple and complex and Change talk (evoked)     Change Talk Expressed by the Patient: Desire to change Ability to change Reasons to change Need to change Committment to change Activation Taking steps    Provider Response to Change Talk: E - Evoked more info from patient about behavior change, A - Affirmed patient's thoughts, decisions, or attempts at behavior change, R - Reflected patient's change talk and S - Summarized patient's change talk statements      Care Plan review completed: No    Medication Review:  No changes to current psychiatric medication(s)    Medication Compliance:  NA    Changes in Health Issues:   Yes: Pain, Associated Psychological Distress    Chemical Use Review:   Substance Use: Chemical use reviewed, no active concerns identified      Tobacco Use: No current tobacco use.      Assessment: Current Emotional / Mental Status (status of significant symptoms):  Risk status (Self / Other harm or suicidal ideation)  Patient denies a history of suicidal ideation, suicide attempts, self-injurious behavior, homicidal ideation, homicidal behavior and and other safety concerns  Patient denies current fears or concerns for personal safety.  Patient denies current or recent suicidal ideation or behaviors.  Patient denies current or recent homicidal ideation or behaviors.  Patient denies current or recent self injurious behavior or ideation.  Patient denies other safety concerns.  A safety and risk management plan has not been developed at this time, however patient was encouraged to call [place] [phone] should there be a change in any of these risk  factors.    Appearance:   Appropriate   Eye Contact:   Good   Psychomotor Behavior: Normal   Attitude:   Cooperative   Orientation:   All  Speech   Rate / Production: Hyperverbal    Volume:  Normal   Mood:    Normal  Affect:    Appropriate   Thought Content:  Clear   Thought Form:  Coherent  Goal Directed  Logical   Insight:    Good     Diagnoses:  1. Posttraumatic stress disorder        Collateral Reports Completed:  Not Applicable    Plan: (Homework, other):  Patient was given information about behavioral services and encouraged to schedule a follow up appointment with the clinic [facility] as needed.  She was also given information about mental health symptoms and treatment options .  CD Recommendations: No indications of CD issues.  Antwan Boston, [facility], [facility]      ______________________________________________________________________    Integrated Primary Care Behavioral Health Treatment Plan    Patient's Name: Aspen Mccullough  YOB: 1960    Date: 4-18-16    DSM-V Diagnoses: PTSD  Psychosocial / Contextual Factors: medical condition, history of traumatic experiences  WHODAS:  Will complete at next visit    Referral / Collaboration:  Referral to another professional/service is not indicated at this time..    Anticipated number of session or this episode of care: 10      MeasurableTreatment Goal(s) related to diagnosis / functional impairment(s)  Goal 1: Patient will be able to remember the past with 50% less emotional reaction of anger and hurt.     I will know I've met my goal when I can let go of the past     Objective #A (Patient Action)    Patient will talk to at least two others about losses and coping.  Status: New - Date: 4-18-16     Intervention(s)  [facility] will provide avenue for the past to process her losses and disappointments.    Objective #B  Patient will reduce her triggers from past trauma.  Status: New - Date: 4-18-16     Intervention(s)  [facility] will teach distraction skills.  mindfulness.      Patient has reviewed and agreed to the above plan.      Antwan Boston, [facility]  April 18, 2016

## 2018-08-30 ENCOUNTER — OFFICE VISIT (OUTPATIENT)
Dept: BEHAVIORAL HEALTH | Facility: CLINIC | Age: 58
End: 2018-08-30
Payer: COMMERCIAL

## 2018-08-30 DIAGNOSIS — F43.10 POSTTRAUMATIC STRESS DISORDER: Primary | ICD-10-CM

## 2018-08-30 PROCEDURE — 90834 PSYTX W PT 45 MINUTES: CPT | Performed by: SOCIAL WORKER

## 2018-08-30 NOTE — MR AVS SNAPSHOT
After Visit Summary   8/30/2018    Aspen Mccullough    MRN: [id number]           Patient Information     Date Of Birth          1960        Visit Information        Provider Department      8/30/2018 1:00 PM Antwan Boston LIC[facility]        Today's Diagnoses     Posttraumatic stress disorder    -  1       Follow-ups after your visit        Your next 10 appointments already scheduled     Sep 14, 2018  9:30 AM CDT   Return Visit with BARNEY Yan   [facility] ([facility])    [address]  [address]  [facility] [address]   [phone]            Sep 19, 2018 10:00 AM CDT   New Visit with ANITRA Biggs CNP   [facility] ([facility])    [address]  [address]  [facility] [address]   [phone]            Sep 19, 2018 10:00 AM CDT   Return Visit with BARNEY Yan   [facility] ([facility])    [address]  [address]  [facility] [address]   [phone]              Who to contact     If you have questions or need follow up information about today's clinic visit or your schedule please contact [facility] directly at [phone].  Normal or non-critical lab and imaging results will be communicated to you by MyChart, letter or phone within 4 business days after the clinic has received the results. If you do not hear from us within 7 days, please contact the clinic through MyChart or phone. If you have a critical or abnormal lab result, we will notify you by phone as soon as possible.  Submit refill requests through [employer] or call your pharmacy and they will forward the refill request to us. Please allow 3 business days for your refill to be completed.          Additional Information About  Your Visit        [employer] Information     [employer] gives you secure access to your electronic health record. If you see a primary care provider, you can also send messages to your care team and make appointments. If you have questions, please call your primary care clinic.  If you do not have a primary care provider, please call [phone] and they will assist you.        Care EveryWhere ID     This is your Care EveryWhere ID. This could be used by other organizations to access your [place] medical records  [id number]         Blood Pressure from Last 3 Encounters:   07/23/18 (!) 172/101   07/13/18 (!) 136/93   05/18/18 128/62    Weight from Last 3 Encounters:   05/18/18 230 lb (104.3 kg)   04/25/18 231 lb (104.8 kg)   04/11/18 246 lb 7.6 oz (111.8 kg)              Today, you had the following     No orders found for display         Today's Medication Changes          These changes are accurate as of 8/30/18  4:25 PM.  If you have any questions, ask your nurse or doctor.               These medicines have changed or have updated prescriptions.        Dose/Directions    diazepam 10 MG tablet   Commonly known as:  VALIUM   This may have changed:  when to take this   Used for:  Chronic pain syndrome, Encounter for long-term use of opiate analgesic        Dose:  10 mg   Take 1 tablet (10 mg) by mouth 3 times daily   Quantity:  90 tablet   Refills:  0       HYDROmorphone 8 MG tablet   Commonly known as:  DILAUDID   This may have changed:  when to take this   Used for:  Chronic pain syndrome        Dose:  8 mg   Take 1 tablet (8 mg) by mouth every 3 hours as needed   Quantity:  100 tablet   Refills:  0       * morphine 30 MG 12 hr tablet   Commonly known as:  MS CONTIN   This may have changed:  when to take this   Used for:  Chronic pain syndrome        Dose:  90 mg   Take 3 tablets (90 mg) by mouth every 8 hours   Quantity:  270 tablet   Refills:  0       * morphine 60 MG 12 hr tablet   Commonly known as:  MS CONTIN    This may have changed:  Another medication with the same name was changed. Make sure you understand how and when to take each.        Refills:  0       * Notice:  This list has 2 medication(s) that are the same as other medications prescribed for you. Read the directions carefully, and ask your doctor or other care provider to review them with you.             Primary Care Provider Office Phone # Fax #    Sarahi Vivar [phone]       [place] [facility] [address] [facility] [address]        Equal Access to Services     ADRYAN BHAT : Hadii aad ku hadasho Soomaali, waaxda luqadaha, qaybta kaalmada adeegyada, waxay idiin hayaan adeeg kharacheri guillermo . So [facility] [phone].    ATENCIÓN: Si habla español, tiene a lynn disposición servicios gratuitos de asistencia lingüística. [facility] [phone].    We comply with applicable federal civil rights laws and [place] laws. We do not discriminate on the basis of race, color, national origin, age, disability, sex, sexual orientation, or gender identity.            Thank you!     Thank you for choosing [facility]  for your care. Our goal is always to provide you with excellent care. Hearing back from our patients is one way we can continue to improve our services. Please take a few minutes to complete the written survey that you may receive in the mail after your visit with us. Thank you!             Your Updated Medication List - Protect others around you: Learn how to safely use, store and throw away your medicines at www.disposemymeds.org.          This list is accurate as of 8/30/18  4:25 PM.  Always use your most recent med list.                   Brand Name Dispense Instructions for use Diagnosis    ALLEGRA 180 MG tablet   Generic drug:  fexofenadine      Take 180 mg by mouth daily.        BACLOFEN PO      Take 10 mg by mouth 3 times daily        CEPHALEXIN PO      Take 500 mg by mouth as needed (Dental  Procedure) Take 4 caps 1 hour prior to Dental Appointment        cyanocobalamin 1000 MCG/ML injection    VITAMIN B12    4 mL    Inject 1 mL (1,000 mcg) Subcutaneous every 7 days    Other vitamin B12 deficiency anemia       cyclobenzaprine 10 MG tablet    FLEXERIL    90 tablet    Take 1 tablet (10 mg) by mouth 3 times daily    Generalized osteoarthrosis, involving multiple sites       CYTOMEL 50 MCG Tabs   Generic drug:  Liothyronine Sodium      Take 50 mcg by mouth daily    Hypothyroidism, unspecified type       diazepam 10 MG tablet    VALIUM    90 tablet    Take 1 tablet (10 mg) by mouth 3 times daily    Chronic pain syndrome, Encounter for long-term use of opiate analgesic       enoxaparin 100 MG/ML Soln    LOVENOX     Inject Subcutaneous every 12 hours        ESTRING 2 MG vaginal ring   Generic drug:  estradiol      Place vaginally every 3 months        FISH OIL      daily. w/DHEA.        FLONASE 50 MCG/ACT spray   Generic drug:  fluticasone      2 sprays by Both Nostrils route daily as needed.        HYDROmorphone 8 MG tablet    DILAUDID    100 tablet    Take 1 tablet (8 mg) by mouth every 3 hours as needed    Chronic pain syndrome       LEVOFLOXACIN PO      Take 750 mg by mouth        * levothyroxine 300 MCG tablet    SYNTHROID/LEVOTHROID     Take 300 mcg by mouth 2 times daily        * levothyroxine 100 MCG Solr injection    SYNTHROID/LEVOTHROID     Inject 200 mcg into the vein twice a week        * levothyroxine 200 MCG Solr injection    SYNTHROID/LEVOTHROID          * morphine 30 MG 12 hr tablet    MS CONTIN    270 tablet    Take 3 tablets (90 mg) by mouth every 8 hours    Chronic pain syndrome       * morphine 60 MG 12 hr tablet    MS CONTIN          NASONEX 50 MCG/ACT spray   Generic drug:  mometasone           norethindrone-ethinyl estradiol 1-5 MG-MCG per tablet    FEMHRT 1/5     Take 1 tablet by mouth daily        nystatin [id number] UNIT/GM Powd    MYCOSTATIN    60 g    Apply topically 3 times daily as  needed    Candidal skin infection       prednisoLONE acetate 1 % ophthalmic susp    PRED FORTE     1 drop 4 times daily        * predniSONE 5 MG tablet    DELTASONE     alternate 8 mg and 10 mg every other day        * predniSONE 1 MG tablet    DELTASONE          probiotic Caps      Take 1 capsule by mouth daily.        RESTASIS 0.05 % ophthalmic emulsion   Generic drug:  cycloSPORINE      Place 1 drop into both eyes 2 times daily        rivaroxaban ANTICOAGULANT 20 MG Tabs tablet    XARELTO     Take 20 mg by mouth daily (with dinner)        sodium chloride (PF) 0.9% PF flush           SOTALOL HCL PO      Take 80 mg by mouth        VITAMIN D (CHOLECALCIFEROL) PO      Take 50,000 Units by mouth once a week        * Notice:  This list has 7 medication(s) that are the same as other medications prescribed for you. Read the directions carefully, and ask your doctor or other care provider to review them with you.

## 2018-08-30 NOTE — PROGRESS NOTES
[facility]  August 30, 2018      Behavioral Health Clinician Progress Note    Patient Name: Aspen Mccullough           Service Type: Individual      Service Location:  in clinic      Session Start Time: 1:10pm  Session End Time: 2:pm   Session Length: 38 - 52      Attendees: Patient    Visit Activities (Refresh list every visit): [facility] Only    Diagnostic Assessment Date: June 22, 2015  Treatment Plan Review Date: 4-18-16  See Flowsheets for today's PHQ-9 and BENI-7 results  Previous PHQ-9:   PHQ-9 SCORE 5/3/2016 10/4/2017 2/12/2018   Total Score - - -   Total Score - - -   Total Score 12 3 5     Previous BENI-7:   BENI-7 SCORE 10/4/2017 2/12/2018 7/13/2018   Total Score - - -   Total Score - - 1 (minimal anxiety)   Total Score 0 0 1   Total Score - - -       FERCHO LEVEL:  FERCHO Score (Last Two) 7/24/2014   FERCHO Raw Score 51   Activation Score 91.6   FERCHO Level 4       DATA  Extended Session (60+ minutes): No  Interactive Complexity: No  Crisis: No    Treatment Objective(s) Addressed in This Session:  Target Behavior(s): disease management/lifestyle changes mental health    Mood Instability: will develop better understanding of triggers and coping strategies to stabilize mood  PTSD Symptom Management  Psychological distress related to Pain    Current Stressors / Issues:    The patient talked about some things going on in her neighborhood-she has a I can do it attitude as she puts forth effort to prove others wrong-she is worried/concerned about her weight gain-past focus and future worries she is having hard time being present with her thoughts regarding to to address her weight gain-she is having difficulty with sleeping-she is feeling irritated-with tears and emotions she stated that her  has been diagnosed with cancer-grateful for the surgeon for the procedure for her  to address his diagnosis-he takes out his feelings on me-worried that she will have the same experience  that she had last time her  was in hospital-this writer encouraged the patient to have an open conversation with her  regarding her feelings and experience the last time he was in the hospital            Progress on Treatment Objective(s) / Homework:  Minimal progress - ACTION (Actively working towards change); Intervened by reinforcing change plan / affirming steps taken    Motivational Interviewing    MI Intervention: Expressed Empathy/Understanding, Supported Autonomy, Collaboration, Evocation, Permission to raise concern or advise, Open-ended questions, Reflections: simple and complex and Change talk (evoked)     Change Talk Expressed by the Patient: Desire to change Ability to change Reasons to change Need to change Committment to change Activation Taking steps    Provider Response to Change Talk: E - Evoked more info from patient about behavior change, A - Affirmed patient's thoughts, decisions, or attempts at behavior change, R - Reflected patient's change talk and S - Summarized patient's change talk statements      Care Plan review completed: No    Medication Review:  No changes to current psychiatric medication(s)    Medication Compliance:  NA    Changes in Health Issues:   Yes: Pain, Associated Psychological Distress    Chemical Use Review:   Substance Use: Chemical use reviewed, no active concerns identified      Tobacco Use: No current tobacco use.      Assessment: Current Emotional / Mental Status (status of significant symptoms):  Risk status (Self / Other harm or suicidal ideation)  Patient denies a history of suicidal ideation, suicide attempts, self-injurious behavior, homicidal ideation, homicidal behavior and and other safety concerns  Patient denies current fears or concerns for personal safety.  Patient denies current or recent suicidal ideation or behaviors.  Patient denies current or recent homicidal ideation or behaviors.  Patient denies current or recent self injurious  behavior or ideation.  Patient denies other safety concerns.  A safety and risk management plan has not been developed at this time, however patient was encouraged to call [place] [phone] should there be a change in any of these risk factors.    Appearance:   Appropriate   Eye Contact:   Good   Psychomotor Behavior: Normal   Attitude:   Cooperative   Orientation:   All  Speech   Rate / Production: Hyperverbal    Volume:  Normal   Mood:    Normal  Affect:    Appropriate   Thought Content:  Clear   Thought Form:  Coherent  Goal Directed  Logical   Insight:    Good     Diagnoses:  1. Posttraumatic stress disorder        Collateral Reports Completed:  Not Applicable    Plan: (Homework, other):  Patient was given information about behavioral services and encouraged to schedule a follow up appointment with the clinic [facility] as needed.  She was also given information about mental health symptoms and treatment options .  CD Recommendations: No indications of CD issues.  Antwan Boston [facility], [facility]      ______________________________________________________________________    Integrated Primary Care Behavioral Health Treatment Plan    Patient's Name: Aspen Mccullough  YOB: 1960    Date: 4-18-16    DSM-V Diagnoses: PTSD  Psychosocial / Contextual Factors: medical condition, history of traumatic experiences  WHODAS:  Will complete at next visit    Referral / Collaboration:  Referral to another professional/service is not indicated at this time..    Anticipated number of session or this episode of care: 10      MeasurableTreatment Goal(s) related to diagnosis / functional impairment(s)  Goal 1: Patient will be able to remember the past with 50% less emotional reaction of anger and hurt.     I will know I've met my goal when I can let go of the past     Objective #A (Patient Action)    Patient will talk to at least two others about losses and coping.  Status: New - Date: 4-18-16     Intervention(s)  [facility] will provide  avenue for the past to process her losses and disappointments.    Objective #B  Patient will reduce her triggers from past trauma.  Status: New - Date: 4-18-16     Intervention(s)  [facility] will teach distraction skills. mindfulness.      Patient has reviewed and agreed to the above plan.      Antwan Boston, [facility]  April 18, 2016

## 2018-09-19 ENCOUNTER — OFFICE VISIT (OUTPATIENT)
Dept: BEHAVIORAL HEALTH | Facility: CLINIC | Age: 58
End: 2018-09-19
Payer: COMMERCIAL

## 2018-09-19 ENCOUNTER — OFFICE VISIT (OUTPATIENT)
Dept: FAMILY MEDICINE | Facility: CLINIC | Age: 58
End: 2018-09-19
Payer: COMMERCIAL

## 2018-09-19 VITALS
HEART RATE: 107 BPM | TEMPERATURE: 98.9 F | RESPIRATION RATE: 14 BRPM | SYSTOLIC BLOOD PRESSURE: 142 MMHG | DIASTOLIC BLOOD PRESSURE: 104 MMHG | OXYGEN SATURATION: 98 %

## 2018-09-19 DIAGNOSIS — M32.9 SYSTEMIC LUPUS ERYTHEMATOSUS, UNSPECIFIED SLE TYPE, UNSPECIFIED ORGAN INVOLVEMENT STATUS (H): ICD-10-CM

## 2018-09-19 DIAGNOSIS — F43.10 POSTTRAUMATIC STRESS DISORDER: Primary | ICD-10-CM

## 2018-09-19 DIAGNOSIS — Z11.59 NEED FOR HEPATITIS C SCREENING TEST: ICD-10-CM

## 2018-09-19 DIAGNOSIS — F60.4: ICD-10-CM

## 2018-09-19 DIAGNOSIS — G47.00 INSOMNIA, UNSPECIFIED TYPE: Primary | ICD-10-CM

## 2018-09-19 DIAGNOSIS — I48.0 PAROXYSMAL ATRIAL FIBRILLATION (H): ICD-10-CM

## 2018-09-19 DIAGNOSIS — M79.7 FIBROMYALGIA: ICD-10-CM

## 2018-09-19 DIAGNOSIS — F98.8 ATTENTION DEFICIT DISORDER, UNSPECIFIED HYPERACTIVITY PRESENCE: ICD-10-CM

## 2018-09-19 DIAGNOSIS — Z23 NEED FOR PROPHYLACTIC VACCINATION AND INOCULATION AGAINST INFLUENZA: ICD-10-CM

## 2018-09-19 DIAGNOSIS — Z96.659 STATUS POST TOTAL KNEE REPLACEMENT, UNSPECIFIED LATERALITY: ICD-10-CM

## 2018-09-19 DIAGNOSIS — Z86.711 HISTORY OF PULMONARY EMBOLISM: ICD-10-CM

## 2018-09-19 DIAGNOSIS — F45.9 SOMATOFORM DISORDER: ICD-10-CM

## 2018-09-19 DIAGNOSIS — E06.3 HASHIMOTO'S THYROIDITIS: ICD-10-CM

## 2018-09-19 DIAGNOSIS — G90.529 COMPLEX REGIONAL PAIN SYNDROME TYPE 1 OF LOWER EXTREMITY, UNSPECIFIED LATERALITY: ICD-10-CM

## 2018-09-19 DIAGNOSIS — L93.0 LUPUS ERYTHEMATOSUS, UNSPECIFIED FORM: ICD-10-CM

## 2018-09-19 DIAGNOSIS — G89.4 CHRONIC PAIN SYNDROME: ICD-10-CM

## 2018-09-19 DIAGNOSIS — I42.8 NONISCHEMIC CARDIOMYOPATHY (H): ICD-10-CM

## 2018-09-19 DIAGNOSIS — F43.10 POSTTRAUMATIC STRESS DISORDER: ICD-10-CM

## 2018-09-19 DIAGNOSIS — I82.431 ACUTE DEEP VEIN THROMBOSIS (DVT) OF POPLITEAL VEIN OF RIGHT LOWER EXTREMITY (H): ICD-10-CM

## 2018-09-19 DIAGNOSIS — M75.42 IMPINGEMENT SYNDROME OF LEFT SHOULDER: ICD-10-CM

## 2018-09-19 DIAGNOSIS — Z11.4 SCREENING FOR HIV (HUMAN IMMUNODEFICIENCY VIRUS): ICD-10-CM

## 2018-09-19 DIAGNOSIS — I10 ESSENTIAL HYPERTENSION: ICD-10-CM

## 2018-09-19 DIAGNOSIS — E03.9 HYPOTHYROIDISM, UNSPECIFIED TYPE: ICD-10-CM

## 2018-09-19 DIAGNOSIS — Z02.89 HEALTH EXAMINATION OF DEFINED SUBPOPULATION: ICD-10-CM

## 2018-09-19 DIAGNOSIS — Z86.718 PERSONAL HISTORY OF DVT (DEEP VEIN THROMBOSIS): ICD-10-CM

## 2018-09-19 DIAGNOSIS — M15.0 PRIMARY OSTEOARTHRITIS INVOLVING MULTIPLE JOINTS: ICD-10-CM

## 2018-09-19 PROCEDURE — 99215 OFFICE O/P EST HI 40 MIN: CPT | Performed by: NURSE PRACTITIONER

## 2018-09-19 PROCEDURE — 80061 LIPID PANEL: CPT | Performed by: NURSE PRACTITIONER

## 2018-09-19 PROCEDURE — 90834 PSYTX W PT 45 MINUTES: CPT | Performed by: SOCIAL WORKER

## 2018-09-19 PROCEDURE — 36415 COLL VENOUS BLD VENIPUNCTURE: CPT | Performed by: NURSE PRACTITIONER

## 2018-09-19 PROCEDURE — 80048 BASIC METABOLIC PNL TOTAL CA: CPT | Performed by: NURSE PRACTITIONER

## 2018-09-19 RX ORDER — MORPHINE SULFATE 15 MG/1
15 TABLET, FILM COATED, EXTENDED RELEASE ORAL DAILY
Qty: 60 TABLET | Refills: 0 | COMMUNITY
Start: 2018-09-19 | End: 2018-10-24

## 2018-09-19 RX ORDER — MORPHINE SULFATE 30 MG/1
90 TABLET, FILM COATED, EXTENDED RELEASE ORAL EVERY 8 HOURS
Qty: 270 TABLET | Refills: 0 | COMMUNITY
Start: 2018-09-19 | End: 2018-10-24

## 2018-09-19 RX ORDER — LIOTHYRONINE SODIUM 50 UG/1
50 TABLET ORAL 2 TIMES DAILY
Qty: 30 TABLET | Refills: 0 | COMMUNITY
Start: 2018-09-19 | End: 2018-10-24

## 2018-09-19 RX ORDER — MORPHINE SULFATE 60 MG/1
60 TABLET, FILM COATED, EXTENDED RELEASE ORAL 3 TIMES DAILY
Qty: 30 TABLET | Refills: 0 | COMMUNITY

## 2018-09-19 NOTE — MR AVS SNAPSHOT
After Visit Summary   9/19/2018    Aspen Mccullough    MRN: [id number]           Patient Information     Date Of Birth          1960        Visit Information        Provider Department      9/19/2018 10:00 AM Antwan Boston LICSW [facility]        Today's Diagnoses     Posttraumatic stress disorder    -  1       Follow-ups after your visit        Your next 10 appointments already scheduled     Oct 24, 2018 10:00 AM CDT   Return Visit with BARNEY Yan   [facility] ([facility])    [address] [place] [address]  [address] [place] [id number] [phone]            Oct 24, 2018 10:00 AM CDT   Return Visit with ANITRA Biggs CNP   [facility] ([facility])    [address] [place] [address]  [address] [place] [id number] [phone]            Oct 24, 2018 10:00 AM CDT   Office Visit with Cristina Pollack [facility] ([facility])    [address] [place] [address]  [address] [place] [id number] [phone]           Bring a current list of meds and any records pertaining to this visit. For Physicals, please bring immunization records and any forms needing to be filled out. Please arrive 10 minutes early to complete paperwork.              Future tests that were ordered for you today     Open Future Orders        Priority Expected Expires Ordered    SLEEP EVALUATION & MANAGEMENT REFERRAL - ADULT -[facility]  [phone] (Age 2 and up) Routine  9/19/2019 9/19/2018            Who to contact     If you have questions or need follow up information about today's clinic visit or your schedule please contact [facility] directly at [phone].  Normal or non-critical lab and imaging  results will be communicated to you by [employer], letter or phone within 4 business days after the clinic has received the results. If you do not hear from us within 7 days, please contact the clinic through [employer] or phone. If you have a critical or abnormal lab result, we will notify you by phone as soon as possible.  Submit refill requests through [employer] or call your pharmacy and they will forward the refill request to us. Please allow 3 business days for your refill to be completed.          Additional Information About Your Visit        [employer] Information     [employer] gives you secure access to your electronic health record. If you see a primary care provider, you can also send messages to your care team and make appointments. If you have questions, please call your primary care clinic.  If you do not have a primary care provider, please call [phone] and they will assist you.        Care EveryWhere ID     This is your Care EveryWhere ID. This could be used by other organizations to access your [facility] medical records  [id number]         Blood Pressure from Last 3 Encounters:   09/19/18 (!) 142/104   07/23/18 (!) 172/101   07/13/18 (!) 136/93    Weight from Last 3 Encounters:   05/18/18 230 lb (104.3 kg)   04/25/18 231 lb (104.8 kg)   04/11/18 246 lb 7.6 oz (111.8 kg)              Today, you had the following     No orders found for display         Today's Medication Changes          These changes are accurate as of 9/19/18  3:58 PM.  If you have any questions, ask your nurse or doctor.               These medicines have changed or have updated prescriptions.        Dose/Directions    diazepam 10 MG tablet   Commonly known as:  VALIUM   This may have changed:  when to take this   Used for:  Chronic pain syndrome, Encounter for long-term use of opiate analgesic        Dose:  10 mg   Take 1 tablet (10 mg) by mouth 3 times daily   Quantity:  90 tablet   Refills:  0       HYDROmorphone 8 MG tablet   Commonly  known as:  DILAUDID   This may have changed:  when to take this   Used for:  Chronic pain syndrome        Dose:  8 mg   Take 1 tablet (8 mg) by mouth every 3 hours as needed   Quantity:  100 tablet   Refills:  0       Liothyronine Sodium 50 MCG Tabs   This may have changed:  when to take this   Used for:  Hypothyroidism, unspecified type   Changed by:  Umm Ya APRN CNP        Dose:  50 mcg   Take 50 mcg by mouth 2 times daily   Quantity:  30 tablet   Refills:  0       * morphine 60 MG 12 hr tablet   Commonly known as:  MS CONTIN   This may have changed:    - how much to take  - how to take this  - when to take this   Used for:  Chronic pain syndrome   Changed by:  Umm aY APRN CNP        Dose:  60 mg   Take 1 tablet (60 mg) by mouth every 8 hours   Quantity:  30 tablet   Refills:  0       * morphine 30 MG 12 hr tablet   Commonly known as:  MS CONTIN   This may have changed:  when to take this   Used for:  Chronic pain syndrome   Changed by:  Umm Ya APRN CNP        Dose:  90 mg   Take 3 tablets (90 mg) by mouth every 8 hours   Quantity:  270 tablet   Refills:  0       * morphine 15 MG 12 hr tablet   Commonly known as:  MS CONTIN   This may have changed:  Another medication with the same name was changed. Make sure you understand how and when to take each.   Used for:  Chronic pain syndrome   Changed by:  Umm Ya APRN CNP        Dose:  15 mg   Take 1 tablet (15 mg) by mouth daily maximum 2 tablet(s) per day   Quantity:  60 tablet   Refills:  0       * Notice:  This list has 3 medication(s) that are the same as other medications prescribed for you. Read the directions carefully, and ask your doctor or other care provider to review them with you.      Stop taking these medicines if you haven't already. Please contact your care team if you have questions.     ESTRING 2 MG vaginal ring   Generic drug:  estradiol   Stopped by:  Umm Ya APRN  MALU           LEVOFLOXACIN PO   Stopped by:  Umm Ya APRN CNP           norethindrone-ethinyl estradiol 1-5 MG-MCG per tablet   Commonly known as:  FEMHRT 1/5   Stopped by:  Umm Ya APRN CNP                    Primary Care Provider Office Phone # Fax #    Sarahi Vivar [phone]       [facility] [address]  [facility] [address]        Equal Access to Services     ADRYAN BHAT : Hadii aad ku hadasho Soomaali, waaxda luqadaha, qaybta kaalmada adeegyada, waxay idiin hayaan adeeg kharash laalessandra . So [facility] [phone].    ATENCIÓN: Si habla español, tiene a lynn disposición servicios gratuitos de asistencia lingüística. [facility] [phone].    We comply with applicable federal civil rights laws and [place] laws. We do not discriminate on the basis of race, color, national origin, age, disability, sex, sexual orientation, or gender identity.            Thank you!     Thank you for choosing [facility]  for your care. Our goal is always to provide you with excellent care. Hearing back from our patients is one way we can continue to improve our services. Please take a few minutes to complete the written survey that you may receive in the mail after your visit with us. Thank you!             Your Updated Medication List - Protect others around you: Learn how to safely use, store and throw away your medicines at www.disposemymeds.org.          This list is accurate as of 9/19/18  3:58 PM.  Always use your most recent med list.                   Brand Name Dispense Instructions for use Diagnosis    ALLEGRA 180 MG tablet   Generic drug:  fexofenadine      Take 180 mg by mouth daily.        BACLOFEN PO      Take 10 mg by mouth 3 times daily        CEPHALEXIN PO      Take 500 mg by mouth as needed (Dental Procedure) Take 4 caps 1 hour prior to Dental Appointment        cyanocobalamin 1000 MCG/ML injection    VITAMIN B12    4 mL     Inject 1 mL (1,000 mcg) Subcutaneous every 7 days    Other vitamin B12 deficiency anemia       cyclobenzaprine 10 MG tablet    FLEXERIL    90 tablet    Take 1 tablet (10 mg) by mouth 3 times daily    Generalized osteoarthrosis, involving multiple sites       diazepam 10 MG tablet    VALIUM    90 tablet    Take 1 tablet (10 mg) by mouth 3 times daily    Chronic pain syndrome, Encounter for long-term use of opiate analgesic       FISH OIL      daily. w/DHEA.        FLONASE 50 MCG/ACT spray   Generic drug:  fluticasone      2 sprays by Both Nostrils route daily as needed.        HYDROmorphone 8 MG tablet    DILAUDID    100 tablet    Take 1 tablet (8 mg) by mouth every 3 hours as needed    Chronic pain syndrome       * levothyroxine 300 MCG tablet    SYNTHROID/LEVOTHROID     Take 300 mcg by mouth 2 times daily        * levothyroxine 100 MCG Solr injection    SYNTHROID/LEVOTHROID     Inject 200 mcg into the vein twice a week        * levothyroxine 200 MCG Solr injection    SYNTHROID/LEVOTHROID          Liothyronine Sodium 50 MCG Tabs     30 tablet    Take 50 mcg by mouth 2 times daily    Hypothyroidism, unspecified type       * morphine 60 MG 12 hr tablet    MS CONTIN    30 tablet    Take 1 tablet (60 mg) by mouth every 8 hours    Chronic pain syndrome       * morphine 30 MG 12 hr tablet    MS CONTIN    270 tablet    Take 3 tablets (90 mg) by mouth every 8 hours    Chronic pain syndrome       * morphine 15 MG 12 hr tablet    MS CONTIN    60 tablet    Take 1 tablet (15 mg) by mouth daily maximum 2 tablet(s) per day    Chronic pain syndrome       NASONEX 50 MCG/ACT spray   Generic drug:  mometasone           nystatin [id number] UNIT/GM Powd    MYCOSTATIN    60 g    Apply topically 3 times daily as needed    Candidal skin infection       prednisoLONE acetate 1 % ophthalmic susp    PRED FORTE     1 drop 4 times daily        * predniSONE 5 MG tablet    DELTASONE     alternate 8 mg and 10 mg every other day        * predniSONE 1  MG tablet    DELTASONE          probiotic Caps      Take 1 capsule by mouth daily.        RESTASIS 0.05 % ophthalmic emulsion   Generic drug:  cycloSPORINE      Place 1 drop into both eyes 2 times daily        sodium chloride (PF) 0.9% PF flush           VITAMIN D (CHOLECALCIFEROL) PO      Take 50,000 Units by mouth once a week        * Notice:  This list has 8 medication(s) that are the same as other medications prescribed for you. Read the directions carefully, and ask your doctor or other care provider to review them with you.

## 2018-09-19 NOTE — PATIENT INSTRUCTIONS
Labs today.  We can do a referral to the Sleep [facility] here in our building.  Schedule with Don in the next few weeks.  Please schedule with Cristina for medication review.   Please consider risks of stopping blood thinners.
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
## 2018-09-19 NOTE — MR AVS SNAPSHOT
After Visit Summary   9/19/2018    Aspen Mccullough    MRN: [id number]           Patient Information     Date Of Birth          1960        Visit Information        Provider Department      9/19/2018 10:00 AM Umm Ya APRN [facility]        Today's Diagnoses     Insomnia, unspecified type    -  1    Need for hepatitis C screening test        Screening for HIV (human immunodeficiency virus)        Need for prophylactic vaccination and inoculation against influenza        Hypothyroidism, unspecified type        Chronic pain syndrome          Care Instructions    Labs today.  We can do a referral to the Sleep Program here in our building.  Schedule with Don in the next few weeks.  Please schedule with Cristina for medication review.   Please consider risks of stopping blood thinners.              Follow-ups after your visit        Additional Services     SLEEP EVALUATION & MANAGEMENT REFERRAL - [facility]  [phone] (Age 2 and up)       Please be aware that coverage of these services is subject to the terms and limitations of your health insurance plan.  Call member services at your health plan with any benefit or coverage questions.      Please bring the following to your appointment:    >>   List of current medications   >>   This referral request   >>   Any documents/labs given to you for this referral                      Follow-up notes from your care team     Return in about 4 weeks (around 10/17/2018) for Physical Exam, Routine Visit, BP Recheck.      Future tests that were ordered for you today     Open Future Orders        Priority Expected Expires Ordered    SLEEP EVALUATION & MANAGEMENT REFERRAL - [facility]  [phone] (Age 2 and up) Routine  9/19/2019 9/19/2018            Who to contact     If you have questions or need follow  up information about today's clinic visit or your schedule please contact [facility] directly at [phone].  Normal or non-critical lab and imaging results will be communicated to you by [employer], letter or phone within 4 business days after the clinic has received the results. If you do not hear from us within 7 days, please contact the clinic through [employer] or phone. If you have a critical or abnormal lab result, we will notify you by phone as soon as possible.  Submit refill requests through [employer] or call your pharmacy and they will forward the refill request to us. Please allow 3 business days for your refill to be completed.          Additional Information About Your Visit        [employer] Information     [employer] gives you secure access to your electronic health record. If you see a primary care provider, you can also send messages to your care team and make appointments. If you have questions, please call your primary care clinic.  If you do not have a primary care provider, please call [phone] and they will assist you.        Care EveryWhere ID     This is your Care EveryWhere ID. This could be used by other organizations to access your [facility] medical records  [id number]        Your Vitals Were     Pulse Temperature Respirations Pulse Oximetry          107 98.9  F (37.2  C) (Oral) 14 98%         Blood Pressure from Last 3 Encounters:   09/19/18 (!) 142/104   07/23/18 (!) 172/101   07/13/18 (!) 136/93    Weight from Last 3 Encounters:   05/18/18 230 lb (104.3 kg)   04/25/18 231 lb (104.8 kg)   04/11/18 246 lb 7.6 oz (111.8 kg)              We Performed the Following     BASIC METABOLIC PANEL     Hepatitis C Screen Reflex to HCV RNA Quant and Genotype     HIV Screening     Lipid panel reflex to direct LDL Fasting          Today's Medication Changes          These changes are accurate as of 9/19/18 11:04 AM.  If you have any questions, ask your nurse or doctor.                These medicines have changed or have updated prescriptions.        Dose/Directions    diazepam 10 MG tablet   Commonly known as:  VALIUM   This may have changed:  when to take this   Used for:  Chronic pain syndrome, Encounter for long-term use of opiate analgesic        Dose:  10 mg   Take 1 tablet (10 mg) by mouth 3 times daily   Quantity:  90 tablet   Refills:  0       HYDROmorphone 8 MG tablet   Commonly known as:  DILAUDID   This may have changed:  when to take this   Used for:  Chronic pain syndrome        Dose:  8 mg   Take 1 tablet (8 mg) by mouth every 3 hours as needed   Quantity:  100 tablet   Refills:  0       Liothyronine Sodium 50 MCG Tabs   This may have changed:  when to take this   Used for:  Hypothyroidism, unspecified type   Changed by:  Umm Ya APRN CNP        Dose:  50 mcg   Take 50 mcg by mouth 2 times daily   Quantity:  30 tablet   Refills:  0       * morphine 60 MG 12 hr tablet   Commonly known as:  MS CONTIN   This may have changed:    - how much to take  - how to take this  - when to take this   Used for:  Chronic pain syndrome   Changed by:  Umm Ya APRN CNP        Dose:  60 mg   Take 1 tablet (60 mg) by mouth every 8 hours   Quantity:  30 tablet   Refills:  0       * morphine 30 MG 12 hr tablet   Commonly known as:  MS CONTIN   This may have changed:  when to take this   Used for:  Chronic pain syndrome   Changed by:  Umm Ya APRN CNP        Dose:  90 mg   Take 3 tablets (90 mg) by mouth every 8 hours   Quantity:  270 tablet   Refills:  0       * morphine 15 MG 12 hr tablet   Commonly known as:  MS CONTIN   This may have changed:  Another medication with the same name was changed. Make sure you understand how and when to take each.   Used for:  Chronic pain syndrome   Changed by:  Umm Ya APRN CNP        Dose:  15 mg   Take 1 tablet (15 mg) by mouth daily maximum 2 tablet(s) per day   Quantity:  60 tablet   Refills:  0        * Notice:  This list has 3 medication(s) that are the same as other medications prescribed for you. Read the directions carefully, and ask your doctor or other care provider to review them with you.      Stop taking these medicines if you haven't already. Please contact your care team if you have questions.     ESTRING 2 MG vaginal ring   Generic drug:  estradiol   Stopped by:  Umm Ya APRN CNP           LEVOFLOXACIN PO   Stopped by:  Umm Ya APRN CNP           norethindrone-ethinyl estradiol 1-5 MG-MCG per tablet   Commonly known as:  FEMHRT 1/5   Stopped by:  Umm Ya APRN CNP                   Information about OPIOIDS     PRESCRIPTION OPIOIDS: WHAT YOU NEED TO KNOW   We gave you an opioid (narcotic) pain medicine. It is important to manage your pain, but opioids are not always the best choice. You should first try all the other options your care team gave you. Take this medicine for as short a time (and as few doses) as possible.    Some activities can increase your pain, such as bandage changes or therapy sessions. It may help to take your pain medicine 30 to 60 minutes before these activities. Reduce your stress by getting enough sleep, working on hobbies you enjoy and practicing relaxation or meditation. Talk to your care team about ways to manage your pain beyond prescription opioids.    These medicines have risks:    DO NOT drive when on new or higher doses of pain medicine. These medicines can affect your alertness and reaction times, and you could be arrested for driving under the influence (DUI). If you need to use opioids long-term, talk to your care team about driving.    DO NOT operate heavy machinery    DO NOT do any other dangerous activities while taking these medicines.    DO NOT drink any alcohol while taking these medicines.     If the opioid prescribed includes acetaminophen, DO NOT take with any other medicines that contain acetaminophen.  Read all labels carefully. Look for the word  acetaminophen  or  Tylenol.  Ask your pharmacist if you have questions or are unsure.    You can get addicted to pain medicines, especially if you have a history of addiction (chemical, alcohol or substance dependence). Talk to your care team about ways to reduce this risk.    All opioids tend to cause constipation. Drink plenty of water and eat foods that have a lot of fiber, such as fruits, vegetables, prune juice, apple juice and high-fiber cereal. Take a laxative (Miralax, milk of magnesia, Colace, Senna) if you don t move your bowels at least every other day. Other side effects include upset stomach, sleepiness, dizziness, throwing up, tolerance (needing more of the medicine to have the same effect), physical dependence and slowed breathing.    Store your pills in a secure place, locked if possible. We will not replace any lost or stolen medicine. If you don t finish your medicine, please throw away (dispose) as directed by your pharmacist. The [place] Pollution Control Agency has more information about safe disposal: [email or web address]-medications         Primary Care Provider Office Phone # Fax #    Sarahi Vivar [phone]       [place] [facility] [address] [address]        Equal Access to Services     ADRYAN BHAT : Hadjohn bourne hadasho Soomaali, waaxda luqadaha, qaybta kaalmada adeegyada, yaneth sherman. So [facility] [phone].    ATENCIÓN: Si habla español, tiene a lynn disposición servicios gratuitos de asistencia lingüística. Keviname al [phone].    We comply with applicable federal civil rights laws and [place] laws. We do not discriminate on the basis of race, color, national origin, age, disability, sex, sexual orientation, or gender identity.            Thank you!     Thank you for choosing [facility]  for your care. Our  goal is always to provide you with excellent care. Hearing back from our patients is one way we can continue to improve our services. Please take a few minutes to complete the written survey that you may receive in the mail after your visit with us. Thank you!             Your Updated Medication List - Protect others around you: Learn how to safely use, store and throw away your medicines at www.disposemymeds.org.          This list is accurate as of 9/19/18 11:04 AM.  Always use your most recent med list.                   Brand Name Dispense Instructions for use Diagnosis    ALLEGRA 180 MG tablet   Generic drug:  fexofenadine      Take 180 mg by mouth daily.        BACLOFEN PO      Take 10 mg by mouth 3 times daily        CEPHALEXIN PO      Take 500 mg by mouth as needed (Dental Procedure) Take 4 caps 1 hour prior to Dental Appointment        cyanocobalamin 1000 MCG/ML injection    VITAMIN B12    4 mL    Inject 1 mL (1,000 mcg) Subcutaneous every 7 days    Other vitamin B12 deficiency anemia       cyclobenzaprine 10 MG tablet    FLEXERIL    90 tablet    Take 1 tablet (10 mg) by mouth 3 times daily    Generalized osteoarthrosis, involving multiple sites       diazepam 10 MG tablet    VALIUM    90 tablet    Take 1 tablet (10 mg) by mouth 3 times daily    Chronic pain syndrome, Encounter for long-term use of opiate analgesic       FISH OIL      daily. w/DHEA.        FLONASE 50 MCG/ACT spray   Generic drug:  fluticasone      2 sprays by Both Nostrils route daily as needed.        HYDROmorphone 8 MG tablet    DILAUDID    100 tablet    Take 1 tablet (8 mg) by mouth every 3 hours as needed    Chronic pain syndrome       * levothyroxine 300 MCG tablet    SYNTHROID/LEVOTHROID     Take 300 mcg by mouth 2 times daily        * levothyroxine 100 MCG Solr injection    SYNTHROID/LEVOTHROID     Inject 200 mcg into the vein twice a week        * levothyroxine 200 MCG Solr injection    SYNTHROID/LEVOTHROID          Liothyronine  Sodium 50 MCG Tabs     30 tablet    Take 50 mcg by mouth 2 times daily    Hypothyroidism, unspecified type       * morphine 60 MG 12 hr tablet    MS CONTIN    30 tablet    Take 1 tablet (60 mg) by mouth every 8 hours    Chronic pain syndrome       * morphine 30 MG 12 hr tablet    MS CONTIN    270 tablet    Take 3 tablets (90 mg) by mouth every 8 hours    Chronic pain syndrome       * morphine 15 MG 12 hr tablet    MS CONTIN    60 tablet    Take 1 tablet (15 mg) by mouth daily maximum 2 tablet(s) per day    Chronic pain syndrome       NASONEX 50 MCG/ACT spray   Generic drug:  mometasone           nystatin [id number] UNIT/GM Powd    MYCOSTATIN    60 g    Apply topically 3 times daily as needed    Candidal skin infection       prednisoLONE acetate 1 % ophthalmic susp    PRED FORTE     1 drop 4 times daily        * predniSONE 5 MG tablet    DELTASONE     alternate 8 mg and 10 mg every other day        * predniSONE 1 MG tablet    DELTASONE          probiotic Caps      Take 1 capsule by mouth daily.        RESTASIS 0.05 % ophthalmic emulsion   Generic drug:  cycloSPORINE      Place 1 drop into both eyes 2 times daily        sodium chloride (PF) 0.9% PF flush           VITAMIN D (CHOLECALCIFEROL) PO      Take 50,000 Units by mouth once a week        * Notice:  This list has 8 medication(s) that are the same as other medications prescribed for you. Read the directions carefully, and ask your doctor or other care provider to review them with you.

## 2018-09-19 NOTE — PROGRESS NOTES
[facility] - Integrated [facility]  September 19, 2018      Behavioral Health Clinician Progress Note    Patient Name: Aspen Mccullough           Service Type: Individual      Service Location:  in clinic      Session Start Time: 10:13am  Session End Time: 10:57am      Session Length: 38 - 52      Attendees: Patient and PCP    Visit Activities (Refresh list every visit): [facility] [employer]    Diagnostic Assessment Date: June 22, 2015  Treatment Plan Review Date: 4-18-16  See Flowsheets for today's PHQ-9 and BENI-7 results  Previous PHQ-9:   PHQ-9 SCORE 5/3/2016 10/4/2017 2/12/2018   Total Score - - -   Total Score - - -   Total Score 12 3 5     Previous BENI-7:   BENI-7 SCORE 10/4/2017 2/12/2018 7/13/2018   Total Score - - -   Total Score - - 1 (minimal anxiety)   Total Score 0 0 1   Total Score - - -       FERCHO LEVEL:  FERCHO Score (Last Two) 7/24/2014   FERCHO Raw Score 51   Activation Score 91.6   FERCHO Level 4       DATA  Extended Session (60+ minutes): No  Interactive Complexity: No  Crisis: No    Treatment Objective(s) Addressed in This Session:  Target Behavior(s): disease management/lifestyle changes mental health    Mood Instability: will develop better understanding of triggers and coping strategies to stabilize mood  PTSD Symptom Management  Psychological distress related to Pain    Current Stressors / Issues:    [facility] Intern met with patient at the request of the PCP. Aspen reports her mood has been low as she continues to care for her . Patient states her 's continued medical health needs have resulted in him being overly dependent on her and this has caused her to often feel overwhelmed and irritated. Patient reports she has been using journaling as a way to sort out her thoughts and feelings and reflects on how this has given her the opportunity to put some distance between her and her identified stressors. No suicidal thoughts or plans- No substance use- No reported anxiety- Regarding appetite  patient states she does not feel a need to eat but makes herself eat three meals a day. Patient has increased her work out routine to progress towards her goal of losing weight. Regarding her sleep patient states she is having difficulty with sleeping but states that this is nothing new and nothing we can do anything about but is open to a referral to [facility] sleep clinic.            Progress on Treatment Objective(s) / Homework:  Minimal progress - ACTION (Actively working towards change); Intervened by reinforcing change plan / affirming steps taken    Motivational Interviewing    MI Intervention: Expressed Empathy/Understanding, Supported Autonomy, Collaboration, Evocation, Permission to raise concern or advise, Open-ended questions, Reflections: simple and complex and Change talk (evoked)     Change Talk Expressed by the Patient: Desire to change Ability to change Reasons to change Need to change Committment to change Activation Taking steps    Provider Response to Change Talk: E - Evoked more info from patient about behavior change, A - Affirmed patient's thoughts, decisions, or attempts at behavior change, R - Reflected patient's change talk and S - Summarized patient's change talk statements      Care Plan review completed: No    Medication Review:  No changes to current psychiatric medication(s)    Medication Compliance:  NA    Changes in Health Issues:   Yes: Pain, Associated Psychological Distress  Sleep disturbance, Associated Psychological Distress    Chemical Use Review:   Substance Use: Chemical use reviewed, no active concerns identified      Tobacco Use: No current tobacco use.      Assessment: Current Emotional / Mental Status (status of significant symptoms):  Risk status (Self / Other harm or suicidal ideation)  Patient denies a history of suicidal ideation, suicide attempts, self-injurious behavior, homicidal ideation, homicidal behavior and and other safety concerns  Patient denies current  fears or concerns for personal safety.  Patient denies current or recent suicidal ideation or behaviors.  Patient denies current or recent homicidal ideation or behaviors.  Patient denies current or recent self injurious behavior or ideation.  Patient denies other safety concerns.  A safety and risk management plan has not been developed at this time, however patient was encouraged to call [place] [phone] should there be a change in any of these risk factors.    Appearance:   Appropriate   Eye Contact:   Good   Psychomotor Behavior: Agitated  Normal   Attitude:   Cooperative   Orientation:   All  Speech   Rate / Production: Hyperverbal    Volume:  Normal   Mood:    Normal  Affect:    Appropriate  Expansive   Thought Content:  Clear   Thought Form:  Coherent   Insight:    Good     Diagnoses:  1. Posttraumatic stress disorder        Collateral Reports Completed:  Not Applicable    Plan: (Homework, other):  Patient was given information about behavioral services and encouraged to schedule a follow up appointment with the clinic [facility] as needed.  She was also given information about mental health symptoms and treatment options .  CD Recommendations: No indications of CD issues. Visit and Note Completed by Ramona Contreras, [facility] Clinical Intern,  Antwan Boston [facility], [facility]      ______________________________________________________________________    Integrated Primary Care Behavioral Health Treatment Plan    Patient's Name: Aspen Mccullough  YOB: 1960    Date: 4-18-16    DSM-V Diagnoses: PTSD  Psychosocial / Contextual Factors: medical condition, history of traumatic experiences  WHODAS:  Will complete at next visit    Referral / Collaboration:  Referral to another professional/service is not indicated at this time..    Anticipated number of session or this episode of care: 10      MeasurableTreatment Goal(s) related to diagnosis / functional impairment(s)  Goal 1: Patient will be able to remember the past  with 50% less emotional reaction of anger and hurt.     I will know I've met my goal when I can let go of the past     Objective #A (Patient Action)    Patient will talk to at least two others about losses and coping.  Status: New - Date: 4-18-16     Intervention(s)  [facility] will provide avenue for the past to process her losses and disappointments.    Objective #B  Patient will reduce her triggers from past trauma.  Status: New - Date: 4-18-16     Intervention(s)  [facility] will teach distraction skills. mindfulness.      Patient has reviewed and agreed to the above plan.      Antwan Boston, [facility]  April 18, 2016

## 2018-09-20 LAB
ANION GAP SERPL CALCULATED.3IONS-SCNC: 10 MMOL/L (ref 3–14)
BUN SERPL-MCNC: 13 MG/DL (ref 7–30)
CALCIUM SERPL-MCNC: 9.2 MG/DL (ref 8.5–10.1)
CHLORIDE SERPL-SCNC: 105 MMOL/L (ref 94–109)
CHOLEST SERPL-MCNC: 250 MG/DL
CO2 SERPL-SCNC: 23 MMOL/L (ref 20–32)
CREAT SERPL-MCNC: 0.83 MG/DL (ref 0.52–1.04)
GFR SERPL CREATININE-BSD FRML MDRD: 70 ML/MIN/1.7M2
GLUCOSE SERPL-MCNC: 105 MG/DL (ref 70–99)
HDLC SERPL-MCNC: 93 MG/DL
LDLC SERPL CALC-MCNC: 142 MG/DL
NONHDLC SERPL-MCNC: 157 MG/DL
POTASSIUM SERPL-SCNC: 4.3 MMOL/L (ref 3.4–5.3)
SODIUM SERPL-SCNC: 138 MMOL/L (ref 133–144)
TRIGL SERPL-MCNC: 75 MG/DL

## 2018-09-21 ENCOUNTER — HOSPITAL ENCOUNTER (EMERGENCY)
Facility: CLINIC | Age: 58
Discharge: HOME | End: 2018-09-21
Attending: EMERGENCY MEDICINE | Admitting: EMERGENCY MEDICINE
Payer: MEDICARE

## 2018-09-21 ENCOUNTER — APPOINTMENT (OUTPATIENT)
Dept: GENERAL RADIOLOGY | Facility: CLINIC | Age: 58
End: 2018-09-21
Attending: EMERGENCY MEDICINE
Payer: MEDICARE

## 2018-09-21 ENCOUNTER — APPOINTMENT (OUTPATIENT)
Dept: CT IMAGING | Facility: CLINIC | Age: 58
End: 2018-09-21
Attending: EMERGENCY MEDICINE
Payer: MEDICARE

## 2018-09-21 VITALS
RESPIRATION RATE: 11 BRPM | OXYGEN SATURATION: 94 % | SYSTOLIC BLOOD PRESSURE: 110 MMHG | BODY MASS INDEX: 35.55 KG/M2 | WEIGHT: 240 LBS | HEIGHT: 69 IN | HEART RATE: 72 BPM | DIASTOLIC BLOOD PRESSURE: 66 MMHG

## 2018-09-21 DIAGNOSIS — R40.0 SOMNOLENCE: ICD-10-CM

## 2018-09-21 DIAGNOSIS — G47.00 INSOMNIA, UNSPECIFIED TYPE: ICD-10-CM

## 2018-09-21 DIAGNOSIS — R07.9 RIGHT-SIDED CHEST PAIN: ICD-10-CM

## 2018-09-21 LAB
ALBUMIN SERPL-MCNC: 4.2 G/DL (ref 3.4–5)
ALP SERPL-CCNC: 98 U/L (ref 40–150)
ALT SERPL W P-5'-P-CCNC: 24 U/L (ref 0–50)
AMMONIA PLAS-SCNC: <10 UMOL/L (ref 10–50)
ANION GAP SERPL CALCULATED.3IONS-SCNC: 6 MMOL/L (ref 3–14)
APAP SERPL-MCNC: <2 MG/L (ref 10–20)
AST SERPL W P-5'-P-CCNC: 20 U/L (ref 0–45)
BASOPHILS # BLD AUTO: 0 10E9/L (ref 0–0.2)
BASOPHILS NFR BLD AUTO: 0.4 %
BILIRUB SERPL-MCNC: 0.6 MG/DL (ref 0.2–1.3)
BUN SERPL-MCNC: 20 MG/DL (ref 7–30)
CALCIUM SERPL-MCNC: 9.3 MG/DL (ref 8.5–10.1)
CHLORIDE SERPL-SCNC: 103 MMOL/L (ref 94–109)
CO2 BLDCOV-SCNC: 33 MMOL/L (ref 21–28)
CO2 SERPL-SCNC: 31 MMOL/L (ref 20–32)
CREAT SERPL-MCNC: 0.88 MG/DL (ref 0.52–1.04)
DIFFERENTIAL METHOD BLD: ABNORMAL
EOSINOPHIL # BLD AUTO: 0.1 10E9/L (ref 0–0.7)
EOSINOPHIL NFR BLD AUTO: 0.8 %
ERYTHROCYTE [DISTWIDTH] IN BLOOD BY AUTOMATED COUNT: 15.5 % (ref 10–15)
ETHANOL SERPL-MCNC: <0.01 G/DL
GFR SERPL CREATININE-BSD FRML MDRD: 65 ML/MIN/1.7M2
GLUCOSE SERPL-MCNC: 120 MG/DL (ref 70–99)
HCT VFR BLD AUTO: 39.1 % (ref 35–47)
HGB BLD-MCNC: 12.4 G/DL (ref 11.7–15.7)
IMM GRANULOCYTES # BLD: 0 10E9/L (ref 0–0.4)
IMM GRANULOCYTES NFR BLD: 0.1 %
LACTATE BLD-SCNC: 1 MMOL/L (ref 0.7–2.1)
LYMPHOCYTES # BLD AUTO: 1.8 10E9/L (ref 0.8–5.3)
LYMPHOCYTES NFR BLD AUTO: 23.4 %
MCH RBC QN AUTO: 29.9 PG (ref 26.5–33)
MCHC RBC AUTO-ENTMCNC: 31.7 G/DL (ref 31.5–36.5)
MCV RBC AUTO: 94 FL (ref 78–100)
MONOCYTES # BLD AUTO: 0.3 10E9/L (ref 0–1.3)
MONOCYTES NFR BLD AUTO: 4 %
NEUTROPHILS # BLD AUTO: 5.4 10E9/L (ref 1.6–8.3)
NEUTROPHILS NFR BLD AUTO: 71.3 %
NRBC # BLD AUTO: 0 10*3/UL
NRBC BLD AUTO-RTO: 0 /100
PCO2 BLDV: 66 MM HG (ref 40–50)
PH BLDV: 7.3 PH (ref 7.32–7.43)
PLATELET # BLD AUTO: 233 10E9/L (ref 150–450)
PO2 BLDV: 24 MM HG (ref 25–47)
POTASSIUM SERPL-SCNC: 4.4 MMOL/L (ref 3.4–5.3)
PROT SERPL-MCNC: 8.4 G/DL (ref 6.8–8.8)
RBC # BLD AUTO: 4.15 10E12/L (ref 3.8–5.2)
SALICYLATES SERPL-MCNC: <2 MG/DL
SAO2 % BLDV FROM PO2: 35 %
SODIUM SERPL-SCNC: 140 MMOL/L (ref 133–144)
TROPONIN I SERPL-MCNC: <0.015 UG/L (ref 0–0.04)
TSH SERPL DL<=0.005 MIU/L-ACNC: 0.99 MU/L (ref 0.4–4)
WBC # BLD AUTO: 7.6 10E9/L (ref 4–11)

## 2018-09-21 PROCEDURE — 93005 ELECTROCARDIOGRAM TRACING: CPT | Performed by: EMERGENCY MEDICINE

## 2018-09-21 PROCEDURE — 83605 ASSAY OF LACTIC ACID: CPT

## 2018-09-21 PROCEDURE — 84484 ASSAY OF TROPONIN QUANT: CPT | Performed by: EMERGENCY MEDICINE

## 2018-09-21 PROCEDURE — 82803 BLOOD GASES ANY COMBINATION: CPT

## 2018-09-21 PROCEDURE — 82140 ASSAY OF AMMONIA: CPT | Performed by: EMERGENCY MEDICINE

## 2018-09-21 PROCEDURE — 99284 EMERGENCY DEPT VISIT MOD MDM: CPT | Mod: 25 | Performed by: EMERGENCY MEDICINE

## 2018-09-21 PROCEDURE — 85025 COMPLETE CBC W/AUTO DIFF WBC: CPT | Performed by: EMERGENCY MEDICINE

## 2018-09-21 PROCEDURE — 80329 ANALGESICS NON-OPIOID 1 OR 2: CPT | Performed by: EMERGENCY MEDICINE

## 2018-09-21 PROCEDURE — 99285 EMERGENCY DEPT VISIT HI MDM: CPT | Mod: 25 | Performed by: EMERGENCY MEDICINE

## 2018-09-21 PROCEDURE — 25000128 H RX IP 250 OP 636: Performed by: EMERGENCY MEDICINE

## 2018-09-21 PROCEDURE — 84443 ASSAY THYROID STIM HORMONE: CPT | Performed by: EMERGENCY MEDICINE

## 2018-09-21 PROCEDURE — 80320 DRUG SCREEN QUANTALCOHOLS: CPT | Performed by: EMERGENCY MEDICINE

## 2018-09-21 PROCEDURE — 71046 X-RAY EXAM CHEST 2 VIEWS: CPT

## 2018-09-21 PROCEDURE — 80053 COMPREHEN METABOLIC PANEL: CPT | Performed by: EMERGENCY MEDICINE

## 2018-09-21 PROCEDURE — 71260 CT THORAX DX C+: CPT

## 2018-09-21 PROCEDURE — 93010 ELECTROCARDIOGRAM REPORT: CPT | Mod: Z6 | Performed by: EMERGENCY MEDICINE

## 2018-09-21 RX ORDER — SODIUM CHLORIDE 9 MG/ML
1000 INJECTION, SOLUTION INTRAVENOUS CONTINUOUS
Status: DISCONTINUED | OUTPATIENT
Start: 2018-09-21 | End: 2018-09-21 | Stop reason: HOSPADM

## 2018-09-21 RX ORDER — IOPAMIDOL 755 MG/ML
73 INJECTION, SOLUTION INTRAVASCULAR ONCE
Status: COMPLETED | OUTPATIENT
Start: 2018-09-21 | End: 2018-09-21

## 2018-09-21 RX ORDER — OLANZAPINE 10 MG/2ML
10 INJECTION, POWDER, FOR SOLUTION INTRAMUSCULAR DAILY PRN
Status: DISCONTINUED | OUTPATIENT
Start: 2018-09-21 | End: 2018-09-21 | Stop reason: HOSPADM

## 2018-09-21 RX ADMIN — IOPAMIDOL 73 ML: 755 INJECTION, SOLUTION INTRAVENOUS at 20:07

## 2018-09-21 ASSESSMENT — ENCOUNTER SYMPTOMS
SHORTNESS OF BREATH: 0
DIZZINESS: 1
FATIGUE: 1
COUGH: 0
CONFUSION: 1
FEVER: 0
NECK PAIN: 1

## 2018-09-21 NOTE — ED PROVIDER NOTES
History     Chief Complaint   Patient presents with     Altered Mental Status     The history is provided by the patient, the spouse and a relative (daughter). The history is limited by the condition of the patient.     Aspen Mccullough is a 58 year old female with a history of DVT & PE (diagnosed 11/2017, anticoagulated on Xarelto since), narcolepsy, lupus, chronic pain, fibromyalgia, and renal disease who presents with her daughter and  for evaluation of confusion, fatigue, and right-sided chest pain. The patient is noticeably somnolent and confused, therefore, history is largely provided by her daughter and . 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 At that time, patient was complaining of neck pain. He states he checked back up on the patient at ~11:30 AM, when she was complaining of dizziness. She reportedly slept in a chair for the next 3 hours and when he returned at 2:30 PM, the patient woke  confused, unable to communicate, and complaining of left-sided chest pain. Per her daughter's report, the patient has had similar symptoms in the past when she was diagnosed with a DVT/PE, though notes the patient has been taking her Xarelto as prescribed. Notably, the patient is on a strict pain regiment: 8 mg Dilaudid TID, 60 mg MS Contin TID, 30 mg MS Contin BID, and 1x 15 mg MS Contin per day; a regiment her daughter states she has been on for the past 26 years. Patient denies shortness of breath, cough, leg pain/swelling from baseline, or fevers. She denies recent head traumas or drug/alcohol use. Patient states her neck and chest pain have now resolved.     Past Medical History:   Diagnosis Date     ADHD (attention deficit hyperactivity disorder)      Allergic rhinitis      Chronic low back pain      Cushing's syndrome (H)      DDD (degenerative disc disease)      DDD (degenerative disc disease)      Deviated nasal septum      Diarrhea       Endometriosis      Fibromyalgia      Hashimoto's disease      Hyperlipidemia      Hypothyroid     hx hashimoto's thyroiditis     Insomnia      Lupus      Malabsorption      Pernicious anemia      Renal disease     chronic renal insufficiency     Renal disease     hx renal failure     Sinusitis        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)     APPENDECTOMY       ARTHRODESIS ANKLE      right     ARTHROPLASTY KNEE BILATERAL       ARTHROPLASTY REVISION KNEE  4/19/2011    Procedure:ARTHROPLASTY REVISION KNEE; With Antibiotic Cement ; Surgeon:CHAO OLIVARES; Location:[facility] OR     BREAST SURGERY      right- tissue remove nipple area     BUNIONECTOMY  12/14/2011    Procedure:BUNIONECTOMY; Right Bunion Correction; Surgeon:GRACE ZARATE; Location:[facility] STOMACH SURGERY PROCEDURE UNLISTED      see list which we will bring     CHOLECYSTECTOMY       EXCISE MASS UPPER EXTREMITY  12/14/2011    Procedure:EXCISE MASS UPPER EXTREMITY; Excision of Left Arm Mass; Surgeon:GRACE ZARATE; Location:[facility]     FOOT SURGERY      left X 4     FUSION LUMBAR ANTERIOR ONE LEVEL       HC SACROPLASTY      see list which we will bring     INJECT NERVE BLOCK SUPRASCAPULAR Left 5/18/2018    Procedure: INJECT NERVE BLOCK SUPRASCAPULAR;  Left Suprascapular Nerve Block;  Surgeon: Basim Velazquez MD;  Location: [facility] OR     INJECT NERVE BLOCK SUPRASCAPULAR Right 7/23/2018    Procedure: INJECT NERVE BLOCK SUPRASCAPULAR;  Right suprascapular injection;  Surgeon: Basim Velazquez MD;  Location: [facility] OR     KNEE SURGERY      see list which we will bring     LAMINECTOMY LUMBAR ONE LEVEL      L4-5     LAPAROSCOPIC ABLATION ENDOMETRIOSIS       REMOVE HARDWARE FOOT  12/14/2011    Procedure:REMOVE HARDWARE FOOT; Hardware Removal Right Foot (Mini-C-Arm) ; Surgeon:GRACE ZARATE; Location:[facility]     RHINOPLASTY       SALPINGO-OOPHORECTOMY BILATERAL        TONSILLECTOMY         Family History   Problem Relation Age of Onset     Hypertension Mother        Social History   Substance Use Topics     Smoking status: Former Smoker     Packs/day: 1.50     Years: 20.00     Types: Cigarettes     Start date: 1/1/1973     Quit date: 1/1/1993     Smokeless tobacco: Never Used     Alcohol use No       No current facility-administered medications for this encounter.      Current Outpatient Prescriptions   Medication     BACLOFEN PO     cyclobenzaprine (FLEXERIL) 10 MG tablet     cycloSPORINE (RESTASIS) 0.05 % ophthalmic emulsion     diazepam (VALIUM) 10 MG tablet     fexofenadine (ALLEGRA) 180 MG tablet     FISH OIL     fluticasone (FLONASE) 50 MCG/ACT nasal spray     HYDROmorphone (DILAUDID) 8 MG tablet     morphine (MS CONTIN) 60 MG 12 hr tablet     nystatin (MYCOSTATIN) 948017 UNIT/GM POWD     PREDNISONE 5 MG OR TABS     probiotic CAPS     CEPHALEXIN PO     cyanocobalamin 1000 MCG/ML injection     levothyroxine (SYNTHROID/LEVOTHROID) 100 MCG SOLR injection     levothyroxine (SYNTHROID/LEVOTHROID) 200 MCG SOLR injection     levothyroxine (SYNTHROID/LEVOTHROID) 300 MCG tablet     Liothyronine Sodium 50 MCG TABS     morphine (MS CONTIN) 15 MG 12 hr tablet     morphine (MS CONTIN) 30 MG 12 hr tablet     NASONEX 50 MCG/ACT nasal spray     prednisoLONE acetate (PRED FORTE) 1 % ophthalmic susp     predniSONE (DELTASONE) 1 MG tablet     sodium chloride, PF, (SODIUM CHLORIDE) 0.9% PF flush     VITAMIN D, CHOLECALCIFEROL, PO        Allergies   Allergen Reactions     Codeine Hives     Ibuprofen [Nsaids] Hives     Penicillins Hives     Other reaction(s): Unknown     Thor Hives     Pt states she had rxn to skin prep-thor prep     Sulfa Drugs Hives     Other reaction(s): Unknown     Codeine      Other reaction(s): Unknown     Doxycycline GI Disturbance     Emesis & diarrhea  Patient denies allergy to this med     Ibuprofen      Other reaction(s): Unknown     Lactose      Other  "reaction(s): Unknown     Mold      Mushroom      Penicillins      Red Wine Complex [Red Wine Powder]      No Clinical Screening - See Comments Rash     Thor prep       I have reviewed the Medications, Allergies, Past Medical and Surgical History, and Social History in the Epic system.    Review of Systems   Constitutional: Positive for fatigue. Negative for fever.   Respiratory: Negative for cough and shortness of breath.    Cardiovascular: Positive for chest pain (right-sided, currently resolved). Negative for leg swelling.   Musculoskeletal: Positive for neck pain (resolved).   Neurological: Positive for dizziness.   Psychiatric/Behavioral: Positive for confusion.   All other systems reviewed and are negative.      Physical Exam   BP: 131/82  Pulse: 72  Heart Rate: 67  Temp:  (refused)  Resp: 16  Height: 175.3 cm (5' 9\")  Weight: 108.9 kg (240 lb)  SpO2: 96 %      Physical Exam   General: patient is drowsy and frequently falling asleep during the interview but arouses to verbal stimuli  Head: atraumatic and normocephalic   EENT: Dry mucus membranes without tonsillar erythema or exudates, pupils pinpoint, sclerae anicteric   neck: supple with full range of motion, no meningismus  Cardiovascular: regular rate and rhythm, extremities warm and well perfused, no lower extremity edema  Pulmonary: lungs clear to auscultation bilaterally   Abdomen: soft, non-tender   Musculoskeletal: normal range of motion   Neurological: drowsy, mild slurring of speech, moving all extremities symmetrically, no facial droop, strength 5/5 and symmetric in , elbow flexion/extension, hip flexion/extension, knee flexion/extension and ankle plantar/dorsiflexion, no clonus on ankle dorsiflexion, gait normal  Skin: warm, dry       ED Course     ED Course     Procedures       6:02 PM  The patient was seen and examined by Dr. Connors in Room 19.          EKG Interpretation:      Interpreted by Laurel Connors  Time reviewed: 2045  Symptoms " at time of EKG: AMS   Rhythm: normal sinus   Rate: normal  Axis: normal  Ectopy: none  Conduction: normal  ST Segments/ T Waves: No ST-T wave changes  Q Waves: none  Comparison to prior: Unchanged    Clinical Impression: normal EKG          Critical Care time:  none             Labs Ordered and Resulted from Time of ED Arrival Up to the Time of Departure from the ED - No data to display         Assessments & Plan (with Medical Decision Making)   The patient is a 58-year-old female with a history of DVT and PE on Xarelto as well as a history of insomnia, lupus, chronic pain, and fibromyalgia who presents to the Emergency Department with altered mental status and reports of an episode of right-sided chest pain.  Family brings the patient in due to concerns of possible recurrent PE as she has had similar episodes in the past.  She is intermittently hypoxic, however, is quite drowsy with slowed respiratory rate and pinpoint pupils.  She is on significant amounts of opioids as well as muscle relaxants and suspect that her somnolence is most likely due to medication side effect.  A broad differential is considered and includes, but is not limited to, PE, ACS, infectious etiology, metabolic derangement including electrolyte abnormalities, hepatic dysfunction, thyroid dysfunction.  She denies any traumatic injury or head injury.  Her exam is nonfocal and not consistent with CVA.  I do note that she has a significant psychiatric history with thoughts of self-harm in the past, however, she adamantly denies that today.  Labs are obtained, including CBC, CMP, troponin, ammonia, acetaminophen, salicylate level, and TSH.  Labs are within normal limits, as well as negative acetaminophen, salicylate, and alcohol levels.  Urine drug screen was ordered, however, patient was unable to provide a urine while in the Emergency Department.  She did go for a CT of the chest which is negative for signs of PE.  ECG does not show any ischemic  changes and troponin is negative.  While in the Emergency Department, the patient did become alert and oriented and was extremely verbally abusive towards staff and aggressive.  A code 21 was called due to patient's behavior.  She was able to calm, with verbal de-escalation and complete the remainder of her diagnostic workup.  Upon reevaluation, she is alert and oriented and both the patient and her  are requesting to discharged home.  Again, she denies any thoughts of self-harm at this time. While in the Emergency Department, she has refused a temperature to be taken but does not feel warm on exam or to appear to be febrile.  The patient will be discharged home with close outpatient follow-up and return instructions.    I have reviewed the nursing notes.    I have reviewed the findings, diagnosis, plan and need for follow up with the patient.    Discharge Medication List as of 9/21/2018  8:52 PM          Final diagnoses:   Right-sided chest pain   Insomnia, unspecified type   Somnolence   INeftali, am serving as a trained medical scribe to document services personally performed by Laurel Connors MD, based on the provider's statements to me.   ILaurel MD, was physically present and have reviewed and verified the accuracy of this note documented by Neftali Fournier.      9/21/2018   [facility], [place], EMERGENCY DEPARTMENT     Laurel Connors MD  09/22/18 0321

## 2018-09-21 NOTE — ED AVS SNAPSHOT
[facility], [place], Emergency Department    [address] [place] [address]    Phone:  [phone]                                       Aspen Mccullough   MRN: [id number]    Department:  [facility], Emergency Department   Date of Visit:  9/21/2018           After Visit Summary Signature Page     I have received my discharge instructions, and my questions have been answered. I have discussed any challenges I see with this plan with the nurse or doctor.    ..........................................................................................................................................  Patient/Patient Representative Signature      ..........................................................................................................................................  Patient Representative Print Name and Relationship to Patient    ..................................................               ................................................  Date                                   Time    ..........................................................................................................................................  Reviewed by Signature/Title    ...................................................              ..............................................  Date                                               Time          22EPIC Rev 08/18

## 2018-09-21 NOTE — ED NOTES
Patient reports that she does not have any complaints and that her  had her brought to the hospital against her wishes. , Ash, reported he had called because she was complaining of chest pain. Denies CP (or any other complaints) at this time and would like to leave. Disoriented to time and place initially during triage but able to answer questions appropriately a few minutes later.

## 2018-09-21 NOTE — ED TRIAGE NOTES
BIBA for lightheadedness, dizziness since this AM. Chest pain now. EMS called by . Patient lethargic for EMS, unable to walk d/t weakness. Disoriented.

## 2018-09-21 NOTE — ED NOTES
Bed: ED19  Expected date: 9/21/18  Expected time: 4:40 PM  Means of arrival: Ambulance  Comments:  [place]---lethargy, female, 58 yrs old. Triaged as yellow.

## 2018-09-21 NOTE — ED AVS SNAPSHOT
[facility], Emergency Department    [address] [facility] [address]    Phone:  [phone]                                       Aspen Mccullough   MRN: [id number]    Department:  [facility], Emergency Department   Date of Visit:  9/21/2018           Patient Information     Date Of Birth          1960        Your diagnoses for this visit were:     Right-sided chest pain     Insomnia, unspecified type     Somnolence        You were seen by Laurel Connors MD.        Discharge Instructions       Please make an appointment to follow up with Your Primary Care Provider and pain clinic as soon as possible to review medications.    Continue xarelto as prescribed.               Your next 10 appointments already scheduled     Oct 04, 2018 10:00 AM CDT   New Sleep Patient with ANITRA Courtney CNP   [facility])    [address] [place] [id number] [phone]            Oct 24, 2018 10:00 AM CDT   Return Visit with Antwan [facility] ([facility])    [address] [place] [address]  [address] [place] [id number] [phone]            Oct 24, 2018 10:00 AM CDT   Return Visit with ANITRA Biggs CNP   [facility] ([facility])    [address] [place] [address]  [address] [place] [id number] [phone]            Oct 24, 2018 10:00 AM CDT   Office Visit with Cristina Pollack [facility] [place] ([facility])    [address] [place] [id number] [phone]           Bring a current list of meds and any records pertaining to this visit. For Physicals, please bring immunization records and any forms needing to be filled out. Please arrive 10 minutes early to complete  paperwork.              24 Hour Appointment Hotline       To make an appointment at any [facility], call [phone] ([phone]). If you don't have a family doctor or clinic, we will help you find one. [facility] are conveniently located to serve the needs of you and your family.             Review of your medicines      Our records show that you are taking the medicines listed below. If these are incorrect, please call your family doctor or clinic.        Dose / Directions Last dose taken    ALLEGRA 180 MG tablet   Dose:  180 mg   Generic drug:  fexofenadine        Take 180 mg by mouth daily.   Refills:  0        BACLOFEN PO   Dose:  10 mg        Take 10 mg by mouth 3 times daily   Refills:  0        CEPHALEXIN PO   Dose:  500 mg        Take 500 mg by mouth as needed (Dental Procedure) Take 4 caps 1 hour prior to Dental Appointment   Refills:  0        cyanocobalamin 1000 MCG/ML injection   Commonly known as:  VITAMIN B12   Dose:  1 mL   Quantity:  4 mL        Inject 1 mL (1,000 mcg) Subcutaneous every 7 days   Refills:  5        cyclobenzaprine 10 MG tablet   Commonly known as:  FLEXERIL   Dose:  10 mg   Quantity:  90 tablet        Take 1 tablet (10 mg) by mouth 3 times daily   Refills:  3        diazepam 10 MG tablet   Commonly known as:  VALIUM   Dose:  10 mg   Quantity:  90 tablet        Take 1 tablet (10 mg) by mouth 3 times daily   Refills:  0        FISH OIL        daily. w/DHEA.   Refills:  0        FLONASE 50 MCG/ACT spray   Dose:  2 spray   Generic drug:  fluticasone        2 sprays by Both Nostrils route daily as needed.   Refills:  0        HYDROmorphone 8 MG tablet   Commonly known as:  DILAUDID   Dose:  8 mg   Quantity:  100 tablet        Take 1 tablet (8 mg) by mouth every 3 hours as needed   Refills:  0        * levothyroxine 300 MCG tablet   Commonly known as:  SYNTHROID/LEVOTHROID   Dose:  300 mcg        Take 300 mcg by mouth 2 times daily   Refills:  0        * levothyroxine  100 MCG Solr injection   Commonly known as:  SYNTHROID/LEVOTHROID   Dose:  200 mcg        Inject 200 mcg into the vein twice a week   Refills:  0        * levothyroxine 200 MCG Solr injection   Commonly known as:  SYNTHROID/LEVOTHROID        Refills:  3        Liothyronine Sodium 50 MCG Tabs   Dose:  50 mcg   Quantity:  30 tablet        Take 50 mcg by mouth 2 times daily   Refills:  0        * morphine 60 MG 12 hr tablet   Commonly known as:  MS CONTIN   Dose:  60 mg   Quantity:  30 tablet        Take 1 tablet (60 mg) by mouth every 8 hours   Refills:  0        * morphine 30 MG 12 hr tablet   Commonly known as:  MS CONTIN   Dose:  90 mg   Quantity:  270 tablet        Take 3 tablets (90 mg) by mouth every 8 hours   Refills:  0        * morphine 15 MG 12 hr tablet   Commonly known as:  MS CONTIN   Dose:  15 mg   Quantity:  60 tablet        Take 1 tablet (15 mg) by mouth daily maximum 2 tablet(s) per day   Refills:  0        NASONEX 50 MCG/ACT spray   Generic drug:  mometasone        Refills:  11        nystatin 516892 UNIT/GM Powd   Commonly known as:  MYCOSTATIN   Quantity:  60 g        Apply topically 3 times daily as needed   Refills:  1        prednisoLONE acetate 1 % ophthalmic susp   Commonly known as:  PRED FORTE   Dose:  1 drop        1 drop 4 times daily   Refills:  0        * predniSONE 5 MG tablet   Commonly known as:  DELTASONE        alternate 8 mg and 10 mg every other day   Refills:  0        * predniSONE 1 MG tablet   Commonly known as:  DELTASONE        Refills:  2        probiotic Caps   Dose:  1 capsule        Take 1 capsule by mouth daily.   Refills:  0        RESTASIS 0.05 % ophthalmic emulsion   Dose:  1 drop   Generic drug:  cycloSPORINE        Place 1 drop into both eyes 2 times daily   Refills:  0        sodium chloride (PF) 0.9% PF flush        Refills:  0        VITAMIN D (CHOLECALCIFEROL) PO   Dose:  23955 Units        Take 50,000 Units by mouth once a week   Refills:  0        * Notice:   This list has 8 medication(s) that are the same as other medications prescribed for you. Read the directions carefully, and ask your doctor or other care provider to review them with you.            Procedures and tests performed during your visit     Acetaminophen level    Alcohol ethyl    Ammonia    CBC with platelets differential    CT Chest Pulmonary Embolism w Contrast    Comprehensive metabolic panel    EKG 12-lead, tracing only    ISTAT CG4 gases lactate jennifer nursing POCT    ISTAT gases lactate jennifer POCT    Salicylate level    TSH with free T4 reflex    Troponin I    XR Chest 2 Views      Orders Needing Specimen Collection     Ordered          09/21/18 1818  UA with Microscopic - STAT, Prio: STAT, Needs to be Collected     Scheduled Task Status   09/21/18 1818 Collect UA with Microscopic Open   Order Class:  PCU Collect                09/21/18 1818  Drug abuse screen 6 urine (chem dep) - STAT, Prio: STAT, Needs to be Collected     Scheduled Task Status   09/21/18 1818 Collect Drug abuse screen 6 urine (chem dep) Open   Order Class:  PCU Collect                  Pending Results     Date and Time Order Name Status Description    9/21/2018 1929 CT Chest Pulmonary Embolism w Contrast In process     9/21/2018 1818 XR Chest 2 Views In process     9/21/2018 1818 EKG 12-lead, tracing only Preliminary             Pending Culture Results     No orders found from 9/19/2018 to 9/22/2018.            Pending Results Instructions     If you had any lab results that were not finalized at the time of your Discharge, you can call the ED Lab Result RN at [phone]. You will be contacted by this team for any positive Lab results or changes in treatment. The nurses are available 7 days a week from 10A to 6:30P.  You can leave a message 24 hours per day and they will return your call.        Thank you for choosing [facility]       Thank you for choosing [facility] for your care. Our goal is always to provide you with excellent care.  Hearing back from our patients is one way we can continue to improve our services. Please take a few minutes to complete the written survey that you may receive in the mail after you visit with us. Thank you!        [employer] Information     [employer] gives you secure access to your electronic health record. If you see a primary care provider, you can also send messages to your care team and make appointments. If you have questions, please call your primary care clinic.  If you do not have a primary care provider, please call [phone] and they will assist you.        Care EveryWhere ID     This is your Care EveryWhere ID. This could be used by other organizations to access your [facility] medical records  [id number]        Equal Access to Services     ADRYAN BHAT : Laisha Dan, olimpia michaels, danny mark, yaneth sherman. So [facility] [phone].    ATENCIÓN: Si habla español, tiene a lynn disposición servicios gratuitos de asistencia lingüística. Llame al [phone].    We comply with applicable federal civil rights laws and [place] laws. We do not discriminate on the basis of race, color, national origin, age, disability, sex, sexual orientation, or gender identity.            After Visit Summary       This is your record. Keep this with you and show to your community pharmacist(s) and doctor(s) at your next visit.

## 2018-09-22 NOTE — DISCHARGE INSTRUCTIONS
Please make an appointment to follow up with Your Primary Care Provider and pain clinic as soon as possible to review medications.    Continue xarelto as prescribed.

## 2018-09-22 NOTE — ED NOTES
Patient refusing assessment when new RN came on at shift change. MD at bedside. Code 21 called. ECG completed at a later time. Patient allowing vitals right at discharge. Imaging obtained. Discharged at 2100.

## 2018-09-22 NOTE — ED NOTES
"Patient lethargic. Falling asleep quickly after asking questions or arousing only slightly then falling back asleep.  Daughter and  at bedside and agreeable for IV start and lab draw. Labs drawn with IV start. IV inserted without difficulty with ultrasound on 1 attempt. Family shouting, \"Don't you think we should get a professional?\" during insertion.  "

## 2018-09-23 LAB — INTERPRETATION ECG - MUSE: NORMAL

## 2018-10-01 ENCOUNTER — TELEPHONE (OUTPATIENT)
Dept: PALLIATIVE MEDICINE | Facility: CLINIC | Age: 58
End: 2018-10-01

## 2018-10-01 ENCOUNTER — CARE COORDINATION (OUTPATIENT)
Dept: ANESTHESIOLOGY | Facility: CLINIC | Age: 58
End: 2018-10-01

## 2018-10-01 DIAGNOSIS — M79.2: Primary | ICD-10-CM

## 2018-10-01 NOTE — TELEPHONE ENCOUNTER
[facility] Call Center    Phone Message    May a detailed message be left on voicemail: yes    Reason for Call: PT is interested in having INJECT NERVE BLOCK SUPRASCAPULAR re done. It was last done by Dr. Velazquez on 7/23 on the right shoulder and 5/18 on the left. Please call pt back at [phone].    Action Taken: Message routed to:  [facility] ([facility] PAIN

## 2018-10-01 NOTE — TELEPHONE ENCOUNTER
[facility] Call [facility]    Phone Message    May a detailed message be left on voicemail: yes    Reason for Call: Other: Pt's  Ash calling, states he and pt were speaking to someone from Dr. Velazquez's office on the phone, but the call was disconnected. Ash states he does not remember the name, but that it was regarding the nerve block injections. Please f/u when available.    Action Taken: Message routed to:  [facility] ([facility] Pain

## 2018-10-02 ENCOUNTER — TELEPHONE (OUTPATIENT)
Dept: ANESTHESIOLOGY | Facility: CLINIC | Age: 58
End: 2018-10-02

## 2018-10-02 NOTE — TELEPHONE ENCOUNTER
Spoke with: Aide     Date Scheduled: 10/29/18     Provider: Dr. Velazquez     : Yes, Ash     F/U Appointment: 11/26/18- Sarahi Chung     Patient was educated on pre-op nurse call: Yes, Patient would like Mobile number called for Pre-Op Nurse Call.

## 2018-10-03 ENCOUNTER — PRE VISIT (OUTPATIENT)
Dept: SLEEP MEDICINE | Facility: CLINIC | Age: 58
End: 2018-10-03

## 2018-10-03 NOTE — TELEPHONE ENCOUNTER
"  1.  Reason for the visit:  Consult to discuss insomnia  2.  Referring provider and clinic name:  Umm Ya NP [facility]  3.  Previous Sleep Doctor or Pulmonlogist (clinic name)?  PT states she only has previously been seen by a Sleep Psyc doctor but not sleep study done  4.  Records, Procedures, Imaging, and Labs (see below)  No records to obtain        All NOTES from previous office visits that pertain to why they are being seen in the Sleep Center    Previous Sleep Studies, Chest CT, Echos and reports that pertain to why they are seeing Sleep Center    All Sleep records that have been done in the last 2 years that pertain to why they are seeing Sleep Center            Are they being seen for continuation of care for Cpap/Bipap/Avap/Trilogy/Dental Device? No    If yes to above Who and Where was Device issued/currently getting supplies from? no    Are you currently on \"Supplemental Oxygen\" during the day or night?   no                                                                                                                                                      Please remind pt to bring Cpap machine and ask to arrive 15 minutes early to appointment due traffic and congestion                                                 5. Pt Sleep Center Packet received Pt has completed and will bring to appointment    Yes: \"please make sure that you bring this to your appointment completed, either the doctor will not see you until this completed or you may be asked to reschedule your appointment.\"     No: mail or email to the pt and explain, \"please make sure that you bring this to your appointment completed, either the doctor will not see you until this completed or you may be asked to reschedule your appointment.\"     ~If pt coming early to fill packet out, ask that they come 30 minutes prior to their appointment~     6. Has the pt's medication list been updated and preferred pharmacy added? " "    7. Has the allergy list been reviewed?    \"Thank you for choosing [facility] and we look forward to seeing you at your upcoming appointment\"     "

## 2018-10-04 ENCOUNTER — OFFICE VISIT (OUTPATIENT)
Dept: SLEEP MEDICINE | Facility: CLINIC | Age: 58
End: 2018-10-04
Payer: COMMERCIAL

## 2018-10-04 ENCOUNTER — TRANSFERRED RECORDS (OUTPATIENT)
Dept: HEALTH INFORMATION MANAGEMENT | Facility: CLINIC | Age: 58
End: 2018-10-04

## 2018-10-04 VITALS
DIASTOLIC BLOOD PRESSURE: 86 MMHG | RESPIRATION RATE: 18 BRPM | WEIGHT: 254 LBS | SYSTOLIC BLOOD PRESSURE: 142 MMHG | HEIGHT: 69 IN | HEART RATE: 120 BPM | OXYGEN SATURATION: 97 % | BODY MASS INDEX: 37.62 KG/M2

## 2018-10-04 DIAGNOSIS — G47.00 INSOMNIA, UNSPECIFIED TYPE: ICD-10-CM

## 2018-10-04 PROCEDURE — 99205 OFFICE O/P NEW HI 60 MIN: CPT | Performed by: NURSE PRACTITIONER

## 2018-10-04 NOTE — NURSING NOTE
[facility] declined by patient.    Dasia Jackson [facility] ~[place]

## 2018-10-04 NOTE — MR AVS SNAPSHOT
After Visit Summary   10/4/2018    Aspen Mccullough    MRN: [id number]           Patient Information     Date Of Birth          1960        Visit Information        Provider Department      10/4/2018 10:00 AM Kathleen Shaw APRN CNP [facility]        Today's Diagnoses     Insomnia, unspecified type          Care Instructions    Insomia- contact me if you wish to have a consult placed for behavioral management for  Insomnia.    You have symptoms of insomnia and would likely benefit from non-medication approaches to treatment.  Cognitive behavioral treatment (CBT) for insomnia is a very effective technique that involves changing your behaviors and thoughts associated with sleep.  People that are motivated to make changes in their sleep pattern are likely to benefit from internet based cognitive behavioral treatment for insomnia.  Internet CBT programs include but are not limited to www[employer] BITEX or www.Retrevo.  There is a fee for using these programs that is similar to actually seeing an insomnia expert.  By entering the code  [facility]  it will allow us to know if our patients find these services useful.      Online Programs     [employer] BITEX (pronounced shut eye). There is a fee for this program. Enter the code  [facility]  if you decide to enroll in this program.      www.sleepIO.com (pronounced sleep ee oh). There is a fee for this program. Enter the code  [facility]  if you decide to enroll in this program.         Your BMI is Body mass index is 37.51 kg/(m^2).  Weight management is a personal decision.  If you are interested in exploring weight loss strategies, the following discussion covers the approaches that may be successful. Body mass index (BMI) is one way to tell whether you are at a healthy weight, overweight, or obese. It measures your weight in relation to your height.  A BMI of 18.5 to 24.9 is in the healthy range. A person with a BMI of 25 to 29.9 is  considered overweight, and someone with a BMI of 30 or greater is considered obese. More than two-thirds of American adults are considered overweight or obese.  Being overweight or obese increases the risk for further weight gain. Excess weight may lead to heart disease and diabetes.  Creating and following plans for healthy eating and physical activity may help you improve your health.  Weight control is part of healthy lifestyle and includes exercise, emotional health, and healthy eating habits. Careful eating habits lifelong are the mainstay of weight control. Though there are significant health benefits from weight loss, long-term weight loss with diet alone may be very difficult to achieve- studies show long-term success with dietary management in less than 10% of people. Attaining a healthy weight may be especially difficult to achieve in those with severe obesity. In some cases, medications, devices and surgical management might be considered.  What can you do?  If you are overweight or obese and are interested in methods for weight loss, you should discuss this with your provider.     Consider reducing daily calorie intake by 500 calories.     Keep a food journal.     Avoiding skipping meals, consider cutting portions instead.    Diet combined with exercise helps maintain muscle while optimizing fat loss. Strength training is particularly important for building and maintaining muscle mass. Exercise helps reduce stress, increase energy, and improves fitness. Increasing exercise without diet control, however, may not burn enough calories to loose weight.       Start walking three days a week 10-20 minutes at a time    Work towards walking thirty minutes five days a week     Eventually, increase the speed of your walking for 1-2 minutes at time    In addition, we recommend that you review healthy lifestyles and methods for weight loss available through the National Institutes of Health patient information  sites:  http://win.niddk.nih.gov/publications/index.htm    And look into health and wellness programs that may be available through your health insurance provider, employer, local community center, or darcy club.    Weight management plan: Patient was referred to their PCP to discuss a diet and exercise plan.     Your blood pressure was checked while you were in clinic today.  Please read the guidelines below about what these numbers mean and what you should do about them.  Your systolic blood pressure is the top number.  This is the pressure when the heart is pumping.  Your diastolic blood pressure is the bottom number.  This is the pressure in between beats.  If your systolic blood pressure is less than 120 and your diastolic blood pressure is less than 80, then your blood pressure is normal. There is nothing more that you need to do about it  If your systolic blood pressure is 120-139 or your diastolic blood pressure is 80-89, your blood pressure may be higher than it should be.  You should have your blood pressure re-checked within a year by a primary care provider.  If your systolic blood pressure is 140 or greater or your diastolic blood pressure is 90 or greater, you may have high blood pressure.  High blood pressure is treatable, but if left untreated over time it can put you at risk for heart attack, stroke, or kidney failure.  You should have your blood pressure re-checked by a primary care provider within the next four weeks.                Follow-ups after your visit        Additional Services     SLEEP PSYCHOLOGY REFERRAL       Referral Urgency: Next available- ordering actigraphy, only lefty Ann is at the following clinics on the following days:  [facility] - [address], [place], [place]        Thursday and Friday (PLEASE CALL [phone] to schedule an appointment).  [facility]) - [address] [place]       Wednesdays   (PLEASE CALL [phone]  to schedule an appointment).     Please be aware that coverage of these services is subject to the terms and limitations of your health insurance plan. Call member services at your health plan with any benefit or coverage questions.     Please bring the following to your appointment:  >> List of current medications   >> This referral request   >> Any documents/labs given to you for this referral                  Follow-up notes from your care team     Return for , , BP Recheck, avs, contact us if you, actigraphy and consult with Seth.      Your next 10 appointments already scheduled     Oct 08, 2018 10:00 AM CDT   Return Visit with Antwan Boston, [facility] ([facility])    [address]  [address]  [facility] [address] [phone]            Oct 09, 2018  8:30 AM CDT   New Sleep Patient with Adolfo Ann PsyD   [facility] ([facility])    [address]  [facility] [address]   [phone]            Oct 24, 2018 10:00 AM CDT   Return Visit with Antwan Boston, [facility] ([facility])    [address]  [address]  [facility] [address] [phone]            Oct 24, 2018 10:00 AM CDT   Return Visit with ANITRA Biggs [facility] ([facility])    [address]  [address]  [facility] [address] [phone]            Oct 24, 2018 10:00 AM CDT   Office Visit with Cristina Pollack [facility] ([facility])    [address] [place] [address]  [address]  [facility] [address] [phone]           Bring a current list of meds and any records pertaining to this visit. For Physicals, please bring immunization records and any forms needing to  be filled out. Please arrive 10 minutes early to complete paperwork.            Oct 29, 2018   Procedure with Basim Velazquez MD   [facility] ([facility])    [address] [place] [address]  [address] [facility] [address] [phone]           Located in the [facility] at [address] [place] [address].   parking is very convenient and highly recommended.  is a $6 flat rate fee.  Both  and self parkers should enter the main arrival plaza from [facility]; parking attendants will direct you based on your parking preference.            Nov 26, 2018  9:40 AM CST   (Arrive by 9:25 AM)   Return Visit with ANITRA Tavarez CNP   [facility] ([facility])    [address] [place] [address]  [address] [facility] [id number] [phone]              Future tests that were ordered for you today     Open Future Orders        Priority Expected Expires Ordered    Comprehensive Sleep Study Routine  4/2/2019 10/4/2018            Who to contact     If you have questions or need follow up information about today's clinic visit or your schedule please contact [facility] directly at [phone].  Normal or non-critical lab and imaging results will be communicated to you by MyChart, letter or phone within 4 business days after the clinic has received the results. If you do not hear from us within 7 days, please contact the clinic through [employer] or phone. If you have a critical or abnormal lab result, we will notify you by phone as soon as possible.  Submit refill requests through [employer] or call your pharmacy and they will forward the refill request to us. Please allow 3 business days for your refill to be completed.          Additional Information About Your Visit        [employer]ent[employer] Information     [employer] gives you secure access to your electronic health  "record. If you see a primary care provider, you can also send messages to your care team and make appointments. If you have questions, please call your primary care clinic.  If you do not have a primary care provider, please call [phone] and they will assist you.        Care EveryWhere ID     This is your Care EveryWhere ID. This could be used by other organizations to access your [place] medical records  [id number]        Your Vitals Were     Pulse Respirations Height Pulse Oximetry BMI (Body Mass Index)       120 18 1.753 m (5' 9\") 97% 37.51 kg/m2        Blood Pressure from Last 3 Encounters:   10/04/18 (!) 145/104   09/21/18 110/66   09/19/18 (!) 142/104    Weight from Last 3 Encounters:   10/04/18 115.2 kg (254 lb)   09/21/18 108.9 kg (240 lb)   05/18/18 104.3 kg (230 lb)              We Performed the Following     SLEEP EVALUATION & MANAGEMENT REFERRAL - ADULT -[facility]  [phone] (Age 2 and up)     SLEEP PSYCHOLOGY REFERRAL          Today's Medication Changes          These changes are accurate as of 10/4/18 11:27 AM.  If you have any questions, ask your nurse or doctor.               These medicines have changed or have updated prescriptions.        Dose/Directions    diazepam 10 MG tablet   Commonly known as:  VALIUM   This may have changed:  when to take this   Used for:  Chronic pain syndrome, Encounter for long-term use of opiate analgesic        Dose:  10 mg   Take 1 tablet (10 mg) by mouth 3 times daily   Quantity:  90 tablet   Refills:  0       HYDROmorphone 8 MG tablet   Commonly known as:  DILAUDID   This may have changed:  when to take this   Used for:  Chronic pain syndrome        Dose:  8 mg   Take 1 tablet (8 mg) by mouth every 3 hours as needed   Quantity:  100 tablet   Refills:  0                Primary Care Provider Office Phone # Fax #    Sarahi HASKINS Ghazala [phone]       [place] [address]  [facility] [address]        Equal Access to Services     ADRYAN BHAT : Hadii aad ku hadjorge luisdiallo Soclaudia, waaxda luqadaha, qaybta kaalmabarbara mark, yaneth sherman. So [facility] [phone].    ATENCIÓN: Si habla español, tiene a lynn disposición servicios gratuitos de asistencia lingüística. Keviname al [phone].    We comply with applicable federal civil rights laws and [place] laws. We do not discriminate on the basis of race, color, national origin, age, disability, sex, sexual orientation, or gender identity.            Thank you!     Thank you for choosing [facility]  for your care. Our goal is always to provide you with excellent care. Hearing back from our patients is one way we can continue to improve our services. Please take a few minutes to complete the written survey that you may receive in the mail after your visit with us. Thank you!             Your Updated Medication List - Protect others around you: Learn how to safely use, store and throw away your medicines at www.disposemymeds.org.          This list is accurate as of 10/4/18 11:27 AM.  Always use your most recent med list.                   Brand Name Dispense Instructions for use Diagnosis    ALLEGRA 180 MG tablet   Generic drug:  fexofenadine      Take 180 mg by mouth daily.        BACLOFEN PO      Take 10 mg by mouth 3 times daily        CEPHALEXIN PO      Take 500 mg by mouth as needed (Dental Procedure) Take 4 caps 1 hour prior to Dental Appointment        cyanocobalamin 1000 MCG/ML injection    VITAMIN B12    4 mL    Inject 1 mL (1,000 mcg) Subcutaneous every 7 days    Other vitamin B12 deficiency anemia       cyclobenzaprine 10 MG tablet    FLEXERIL    90 tablet    Take 1 tablet (10 mg) by mouth 3 times daily    Generalized osteoarthrosis, involving multiple sites       diazepam 10 MG tablet    VALIUM    90 tablet    Take 1 tablet (10 mg) by mouth 3 times daily    Chronic pain syndrome,  Encounter for long-term use of opiate analgesic       FISH OIL      daily. w/DHEA.        FLONASE 50 MCG/ACT spray   Generic drug:  fluticasone      2 sprays by Both Nostrils route daily as needed.        HYDROmorphone 8 MG tablet    DILAUDID    100 tablet    Take 1 tablet (8 mg) by mouth every 3 hours as needed    Chronic pain syndrome       * levothyroxine 300 MCG tablet    SYNTHROID/LEVOTHROID     Take 300 mcg by mouth 2 times daily        * levothyroxine 100 MCG Solr injection    SYNTHROID/LEVOTHROID     Inject 200 mcg into the vein twice a week        * levothyroxine 200 MCG Solr injection    SYNTHROID/LEVOTHROID          Liothyronine Sodium 50 MCG Tabs     30 tablet    Take 50 mcg by mouth 2 times daily    Hypothyroidism, unspecified type       * morphine 60 MG 12 hr tablet    MS CONTIN    30 tablet    Take 1 tablet (60 mg) by mouth every 8 hours    Chronic pain syndrome       * morphine 30 MG 12 hr tablet    MS CONTIN    270 tablet    Take 3 tablets (90 mg) by mouth every 8 hours    Chronic pain syndrome       * morphine 15 MG 12 hr tablet    MS CONTIN    60 tablet    Take 1 tablet (15 mg) by mouth daily maximum 2 tablet(s) per day    Chronic pain syndrome       NASONEX 50 MCG/ACT spray   Generic drug:  mometasone           nystatin 420310 UNIT/GM Powd    MYCOSTATIN    60 g    Apply topically 3 times daily as needed    Candidal skin infection       prednisoLONE acetate 1 % ophthalmic susp    PRED FORTE     1 drop 4 times daily        * predniSONE 5 MG tablet    DELTASONE     alternate 8 mg and 10 mg every other day        * predniSONE 1 MG tablet    DELTASONE          probiotic Caps      Take 1 capsule by mouth daily.        RESTASIS 0.05 % ophthalmic emulsion   Generic drug:  cycloSPORINE      Place 1 drop into both eyes 2 times daily        sodium chloride (PF) 0.9% PF flush           VITAMIN D (CHOLECALCIFEROL) PO      Take 50,000 Units by mouth once a week        * Notice:  This list has 8 medication(s)  that are the same as other medications prescribed for you. Read the directions carefully, and ask your doctor or other care provider to review them with you.

## 2018-10-05 NOTE — PROGRESS NOTES
Visit Date:   10/04/2018      DATE OF VISIT: 10/04/2018        LOCATION: [facility]      CHIEF COMPLAINT:  I have been asked by nurse practitioner Felton to see Ms. Mccullough, who prefers to be called Aspen, in consultation for problems with sleeping issues.      HISTORY OF PRESENT ILLNESS:  The patient reports a long history of sleep problems that date back to her childhood.  In her childhood, she was a sleepwalker.  She developed significant sleep problems while she was placed on prednisone to reduce inflammation likely for her lupus or for her multiple musculoskeletal concerns with frequent surgeries and significant front to back spinal fusion that did occur in 1991.  She has had what she calls 2 sleep studies; at 1 location did have a 30-day monitor where she kept a sleep diary, another was seen briefly at [facility] and did not have significant followup.  She reports that her sleep issues got a lot better after she lost about 110 pounds.  She was also helped at a point in time where she took some brief naps during the daytime, with a impact on improvement of her pain. She has gained about 70 of those pounds back with her limitations due to neuromuscular discomfort.  She is on multiple meds and with those meds, the concern was night time oxygenation.  I am uncertain if she ever had an overnight oximetry.  Currently on Flexeril, Valium, Dilaudid, MS Contin, and does take 6 mg of prednisone to this day. Her concerns for this visit are (1) to be evaluated and (2) to come up with something different to improve her sleep.      SLEEP SCHEDULE:  Enters bed somewhere around 9 p.m. throughout the week and exits bed anywhere between 3 a.m. and 8 a.m., most commonly 6 in the morning.  She does take about 2 hours to fall asleep, wakes up 3-4 times a night.  Does feel that she is missing about 2-3 hours per night of sleep.  She does check her clock and does have some set wakeups.   She feels she wakes up due to pain and spasms, and has a very strong opinion on the lowering of her narcotics and how this is making her struggle.  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  No naps.  Does watch TV and leaves it on all night and claims that is her white noise.  Her bed partner who is here today, her , denies that she snores.  She does sleep in the dining room where she has her bed and her commode, the room is cool, quiet and dark; with the exception of course of the TV being on.  She states that no one comes through there when they know that she is in there and she is sleeping. On occasion she might have a morning dry throat, otherwise no common symptoms of sleep-disordered breathing.  Sleeps on her sides.  No cataplexy or hypnagogic hallucinations.  She has had sleep paralysis a couple of times.      SOCIAL, PERSONAL, FAMILY HISTORY AND SLEEP SCALES:  She is , lives with her , Ash.  Her former employment was being a  and a . Sleep environment: As stated, she feels it is good for her. Family history: She is adopted and unaware of influence. Denies alcohol and other recreational drugs.  Drinks caffeine but not every day.  Does exercise.  [place] Sleepiness Score today is somewhere between 6 and 8.  She reports that she might not sleep for 3-5 days, then might have several hours of sleep and then not sleep again for 3-5 days.  No problems with sleepiness and driving.  25/30 days, however, she is tired and fatigued; 10/30 days, states that her mental health is not good, although she does see Antwan Boston.  This is not something that she wishes to talk about with this visit.      REVIEW OF SYSTEMS:  A 14-point comprehensive review of systems completed and negative with the exception of the following:     GENERAL: A weight gain recently after significant weight loss, sometimes some night sweats, has  drug allergies (please see list).   EYES:  Some change in vision, some blurry vision.    NERVOUS SYSTEM:  Headaches at times, weakness in arms and legs, numbness in arms and legs.   SKIN:  Her right foot seems to be having problems with its color and feeling numb all day long.     GASTROINTESTINAL: She can have some digestive problems with fat or grease in stools.     GENITOURINARY: Urinates more than normal.   MUSCLES AND BONES:  Muscle, joint pain and swollen joints.   ENDOCRINE:  Increased thirst.   INSOMNIA SCALE: Severity 19/24.   Allergies:    Allergies   Allergen Reactions     Codeine Hives     Ibuprofen [Nsaids] Hives     Penicillins Hives     Other reaction(s): Unknown     Thor Hives     Pt states she had rxn to skin prep-thor prep     Sulfa Drugs Hives     Other reaction(s): Unknown     Codeine      Other reaction(s): Unknown     Doxycycline GI Disturbance     Emesis & diarrhea  Patient denies allergy to this med     Ibuprofen      Other reaction(s): Unknown     Lactose      Other reaction(s): Unknown     Mold      Mushroom      Penicillins      Red Wine Complex [Red Wine Powder]      No Clinical Screening - See Comments Rash     Thor prep       Medications:    Current Outpatient Prescriptions   Medication Sig Dispense Refill     BACLOFEN PO Take 10 mg by mouth 3 times daily       CEPHALEXIN PO Take 500 mg by mouth as needed (Dental Procedure) Take 4 caps 1 hour prior to Dental Appointment       cyanocobalamin 1000 MCG/ML injection Inject 1 mL (1,000 mcg) Subcutaneous every 7 days 4 mL 5     cyclobenzaprine (FLEXERIL) 10 MG tablet Take 1 tablet (10 mg) by mouth 3 times daily 90 tablet 3     cycloSPORINE (RESTASIS) 0.05 % ophthalmic emulsion Place 1 drop into both eyes 2 times daily       diazepam (VALIUM) 10 MG tablet Take 1 tablet (10 mg) by mouth 3 times daily (Patient taking differently: Take 10 mg by mouth daily ) 90 tablet 0     fexofenadine (ALLEGRA) 180 MG tablet Take 180 mg by mouth daily.        FISH OIL daily. w/DHEA.        fluticasone (FLONASE) 50 MCG/ACT nasal spray 2 sprays by Both Nostrils route daily as needed.       HYDROmorphone (DILAUDID) 8 MG tablet Take 1 tablet (8 mg) by mouth every 3 hours as needed (Patient taking differently: Take 8 mg by mouth 3 times daily ) 100 tablet 0     levothyroxine (SYNTHROID/LEVOTHROID) 100 MCG SOLR injection Inject 200 mcg into the vein twice a week       levothyroxine (SYNTHROID/LEVOTHROID) 200 MCG SOLR injection   3     levothyroxine (SYNTHROID/LEVOTHROID) 300 MCG tablet Take 300 mcg by mouth 2 times daily        Liothyronine Sodium 50 MCG TABS Take 50 mcg by mouth 2 times daily 30 tablet 0     morphine (MS CONTIN) 15 MG 12 hr tablet Take 1 tablet (15 mg) by mouth daily maximum 2 tablet(s) per day 60 tablet 0     morphine (MS CONTIN) 30 MG 12 hr tablet Take 3 tablets (90 mg) by mouth every 8 hours 270 tablet 0     morphine (MS CONTIN) 60 MG 12 hr tablet Take 1 tablet (60 mg) by mouth every 8 hours 30 tablet 0     NASONEX 50 MCG/ACT nasal spray   11     nystatin (MYCOSTATIN) 507574 UNIT/GM POWD Apply topically 3 times daily as needed 60 g 1     prednisoLONE acetate (PRED FORTE) 1 % ophthalmic susp 1 drop 4 times daily       predniSONE (DELTASONE) 1 MG tablet   2     PREDNISONE 5 MG OR TABS alternate 8 mg and 10 mg every other day       probiotic CAPS Take 1 capsule by mouth daily.       sodium chloride, PF, (SODIUM CHLORIDE) 0.9% PF flush        VITAMIN D, CHOLECALCIFEROL, PO Take 50,000 Units by mouth once a week         Problem List:  Patient Active Problem List    Diagnosis Date Noted     Personal history of DVT (deep vein thrombosis) 02/15/2018     Priority: Medium     History of pulmonary embolism 02/15/2018     Priority: Medium     Paroxysmal atrial fibrillation (H) 01/05/2018     Priority: Medium     Overview:   Likely multifactorial related to iatrogenic hyperthyroidism and extensive bilateral PE and DVT.  PAF with RVR. On warfarin, Sotalol 80 mg BID for  antiarrhythmic drug therapy. CHADsVASc 4 (LV dysfunction, DVT/PE, gender). On Sotalol 80 mg BID.   Pt would prefer NOAC. Consider transition to Eliquis        Nonischemic cardiomyopathy (H) 01/05/2018     Priority: Medium     Overview:   EF 40%. Likely tachy-mediated in setting of iatrogenic hyperthyroidism and multiple submassive PE.   Recommended repeat echocardiography in 2-3 months. If unchanged the, needs ischemic work up.        Malabsorption 12/07/2017     Priority: Medium     Overview:   Reportedly has Chron's disease though is not on any current treatments.        Acute deep vein thrombosis (DVT) of popliteal vein of right lower extremity (H) 11/27/2017     Priority: Medium     Acute pulmonary embolism (H) 11/27/2017     Priority: Medium     Iatrogenic hyperthyroidism 11/26/2017     Priority: Medium     Overview:   11/26/2017: On admission, reported home thyroid replacement regimen was 300 mcg BID PO + 200 mcg IM twice weekly, and liothyronine 50 mcg BID. Inital TSH was 0.02. All thyroid replacement was held on discharge with instructions to follow up with her endocrinologist.        Thrombus of right atrial appendage without antecedent myocardial infarction 11/26/2017     Priority: Medium     Overview:   Echocardiogram 11/26/17:  Right Atrium: The right atrium is mildly dilated. There is a large serpiginous and lobulated appearing mass visualized in the right atrium, prolapsing across the tricuspid valve; possible etiologies include thrombus, infectious, vs. Malignancy.  Likely related to newly discovered a fib, estrogen replacement therapy and lupus. Apparently, she also has a h/o DVT and was not on anticoagulation.  Started on llife long warfarin with goal INR 2.5-3.5 per cardiology. She could possibly be switched to a DOAC once the thrombus has resolved.        S/P cervical spinal fusion 10/03/2017     Priority: Medium     Impingement syndrome of left shoulder 01/25/2016     Priority: Medium      Abdominal cramping, generalized 01/25/2016     Priority: Medium     Intermittent diarrhea 01/25/2016     Priority: Medium     Overview:   Suspect related to iatrogenic hyperthyroidism.        Primary osteoarthritis involving multiple joints 01/25/2016     Priority: Medium     Posttraumatic stress disorder 06/23/2015     Priority: Medium     Hashimoto's thyroiditis 08/25/2014     Priority: Medium     Glucocorticoid deficiency (H) 08/25/2014     Priority: Medium     ACP (advance care planning) 08/22/2014     Priority: Medium     Advance Care Planning: Receipt of ACP document:  Received: Health Care Directive which was witnessed or notarized on 10-10-05.  Document previously scanned on 12-20-11.  Validation form completed and scanned.  Code Status reflects choices in most recent ACP document NOTE: Pt is Bahai-please review HCD for details on blood products.  Confirmed/documented designated decision maker(s). See permanent comments section of demographics in clinical tab. View document(s) and details by clicking on code status.   Added by Ora Shaw RN, System ACP Coordinator [facility] on 8/22/2014.             Hyperlipidemia 07/31/2014     Priority: Medium     Hypothyroidism 07/24/2014     Priority: Medium     Problem list name updated by automated process. Provider to review       Insomnia, unspecified type 07/24/2014     Priority: Medium     Knee joint replacement by other means 09/16/2013     Priority: Medium     Encounter for long-term use of opiate analgesic 04/01/2013     Priority: Medium     Histrionic personality (H) 10/04/2012     Priority: Medium     Ankle pain, left 12/09/2011     Priority: Medium     S/P total knee replacement 08/30/2011     Priority: Medium     Alopecia areata 07/29/2011     Priority: Medium     Lipoma of skin and subcutaneous tissue 07/29/2011     Priority: Medium     Somatoform disorder 03/02/2011     Priority: Medium     Chronic ethmoidal sinusitis 10/27/2010      Priority: Medium     Overview:   ANTERIOR & POSTERIOR       Chronic maxillary sinusitis 10/27/2010     Priority: Medium     Nasal fracture 10/27/2010     Priority: Medium     Overview:   WITH DISLOCATION, CHRONIC       Nasal turbinate hypertrophy 10/27/2010     Priority: Medium     Deviated nasal septum 10/25/2010     Priority: Medium     Attention deficit disorder 10/11/2010     Priority: Medium     Fibromyalgia 05/03/2010     Priority: Medium     Overview:   Multifactorial, on enormous doses of medications, pain contract signed Aug 2016.  Has been stable on same doses for years. Not elligible for increases.   --MS contin 90mg TID  --diazepam 10mg TID  --dilaudid 8mg TID  --flexeril  --TOX ASSURE Q3 months for now.        Left shoulder pain 01/18/2010     Priority: Medium     Complications due to internal joint prosthesis (H) 08/24/2009     Priority: Medium     CRPS (complex regional pain syndrome), lower limb 07/06/2009     Priority: Medium     S/P TKR (total knee replacement) 12/04/2008     Priority: Medium     Allergic rhinitis 10/09/2008     Priority: Medium     Adrenal cortical steroids causing adverse effect in therapeutic use 08/20/2008     Priority: Medium     Anemia, blood loss 08/20/2008     Priority: Medium     ARF (acute renal failure) (H) 08/20/2008     Priority: Medium     Arthritis, septic (H) 08/20/2008     Priority: Medium     Other specified abnormal findings of blood chemistry 08/20/2008     Priority: Medium     Postoperative hypotension 08/20/2008     Priority: Medium     Generalized osteoarthrosis, involving multiple sites 01/03/2008     Priority: Medium     Generalized pain 12/17/2007     Priority: Medium     Opioid type dependence (H) 12/17/2007     Priority: Medium     Overview:   Followed by Dr. Lester Osman at [facility] center for opiate maintenance  [phone]       Other acute postoperative pain 12/17/2007     Priority: Medium     Pain in joint, lower leg 12/17/2007     Priority:  Medium     Endometriosis 03/25/2002     Priority: Medium     Cushing's syndrome (H) 01/01/1996     Priority: Medium     Essential hypertension 01/01/1996     Priority: Medium     Systemic lupus erythematosus (H) 01/01/1996     Priority: Medium        Past Medical/Surgical History:  Past Medical History:   Diagnosis Date     ADHD (attention deficit hyperactivity disorder)      Allergic rhinitis      Chronic low back pain      Cushing's syndrome (H)      DDD (degenerative disc disease)      DDD (degenerative disc disease)      Deviated nasal septum      Diarrhea      Endometriosis      Fibromyalgia      Hashimoto's disease      Hyperlipidemia      Hypothyroid     hx hashimoto's thyroiditis     Insomnia      Lupus      Malabsorption      Pernicious anemia      Renal disease     chronic renal insufficiency     Renal disease     hx renal failure     Sinusitis      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)     APPENDECTOMY       ARTHRODESIS ANKLE      right     ARTHROPLASTY KNEE BILATERAL       ARTHROPLASTY REVISION KNEE  4/19/2011    Procedure:ARTHROPLASTY REVISION KNEE; With Antibiotic Cement ; Surgeon:CHAO OLIVARES; Location:[facility]     BREAST SURGERY      right- tissue remove nipple area     BUNIONECTOMY  12/14/2011    Procedure:BUNIONECTOMY; Right Bunion Correction; Surgeon:GRACE ZARATE; Location:[facility]     C STOMACH SURGERY PROCEDURE UNLISTED      see list which we will bring     CHOLECYSTECTOMY       EXCISE MASS UPPER EXTREMITY  12/14/2011    Procedure:EXCISE MASS UPPER EXTREMITY; Excision of Left Arm Mass; Surgeon:GRACE ZARATE; Location:[facility]     FOOT SURGERY      left X 4     FUSION LUMBAR ANTERIOR ONE LEVEL       HC SACROPLASTY      see list which we will bring     INJECT NERVE BLOCK SUPRASCAPULAR Left 5/18/2018    Procedure: INJECT NERVE BLOCK SUPRASCAPULAR;  Left Suprascapular Nerve Block;  Surgeon: Caroline  "MD Basim;  Location: [facility] OR     INJECT NERVE BLOCK SUPRASCAPULAR Right 7/23/2018    Procedure: INJECT NERVE BLOCK SUPRASCAPULAR;  Right suprascapular injection;  Surgeon: Basim Velazquez MD;  Location:  OR     KNEE SURGERY      see list which we will bring     LAMINECTOMY LUMBAR ONE LEVEL      L4-5     LAPAROSCOPIC ABLATION ENDOMETRIOSIS       REMOVE HARDWARE FOOT  12/14/2011    Procedure:REMOVE HARDWARE FOOT; Hardware Removal Right Foot (Mini-C-Arm) ; Surgeon:GRACE ZARATE; Location:[facility]     RHINOPLASTY       SALPINGO-OOPHORECTOMY BILATERAL       TONSILLECTOMY             Physical Examination:  Vitals: /86  Pulse 120  Resp 18  Ht 1.753 m (5' 9\")  Wt 115.2 kg (254 lb)  SpO2 97%  BMI 37.51 kg/m2  BMI= Body mass index is 37.51 kg/(m^2).    Neck Cir (cm): 38 cm    [place] Total Score 10/4/2018   Total score - [place] 8       GENERAL APPEARANCE: healthy, alert, moderate distress and cooperative     ASSESSMENT AND PLAN:  It is my impression that Aspen is not entirely interested in behavioral measures.  With every recommendation, she indicates \"I've done that before,\" but with further questioning, may have done things for maybe a day or two, but not for a prolonged period of time or as a program.  She also had a positive response with a brief timed nap and its effect on her pain.  She has seen a colleague of mine from [facility] for problems with insomnia and reports a 30 day monitor was worn, without any recommendations from that intervention.  She indicates she does not understand the concern with regard to sleep medications and cognitive issues and/or falls or automated behaviors.  She claims that Halcion was the only thing that ever worked for her, but when I described CBTI, she indicated that she would be willing to give it a try. The following plan of care has been developed:   1.  Difficulties both with falling asleep and sleep maintenance with clock watching, anxiety, TV on, and " a focus on pain as being the primary fragmenter of her sleep.  Did talk about the fact that perhaps tolerance to her pain may be improved with brief naps, which she claims she has tried in the past and did seem to help.   I did describe stimulus control, sleep consolidation and sleep restriction and having a window of sleep. Since she has had a previous round at [facility], I have offered a referral for her to see our sleep psychologist, Dr. Ann.  She was fine with seeing him in [place] and a referral has been placed.      Thank you for allowing me to participate in Ms. Alvares's care. I have given her sleep diaries and she has made a follow up appointment with Dr. Ann.      TIME SPENT WITH PATIENT: 60 minutes, of which greater than 50% was spent in counseling, education and coordination of care.         ANITRA COELLO, CNP             D: 10/04/2018   T: 10/05/2018   MT:       Name:     ROXY ALVARES   MRN:      [id number]        Account:      [id number]   :      1960           Visit Date:   10/04/2018      Document: [id number]

## 2018-10-16 ENCOUNTER — TELEPHONE (OUTPATIENT)
Dept: ANESTHESIOLOGY | Facility: CLINIC | Age: 58
End: 2018-10-16

## 2018-10-16 NOTE — TELEPHONE ENCOUNTER
[facility] Call Center    Phone Message    May a detailed message be left on voicemail: no    Reason for Call: Other: Pt's  Ash called to request a copy of the letter they were supposed to receive about the Pt's procedure on 10/29. A copy of the letter is available in the Pt's chart dated 10/2. They have had issues with their mail. Please resend a copy by mail.     Action Taken: Message routed to:  [facility] ([facility]

## 2018-10-17 NOTE — TELEPHONE ENCOUNTER
I called and left a voice mail for the patient stating that I mailed out Aspen's pre-procedure instructions again and they should get those in a few days hopefully. Call [phone] with any questions or concerns.    Genoveva Corley, [facility]

## 2018-10-24 ENCOUNTER — OFFICE VISIT (OUTPATIENT)
Dept: FAMILY MEDICINE | Facility: CLINIC | Age: 58
End: 2018-10-24
Payer: COMMERCIAL

## 2018-10-24 ENCOUNTER — OFFICE VISIT (OUTPATIENT)
Dept: PHARMACY | Facility: CLINIC | Age: 58
End: 2018-10-24
Payer: COMMERCIAL

## 2018-10-24 ENCOUNTER — OFFICE VISIT (OUTPATIENT)
Dept: BEHAVIORAL HEALTH | Facility: CLINIC | Age: 58
End: 2018-10-24
Payer: COMMERCIAL

## 2018-10-24 VITALS
OXYGEN SATURATION: 99 % | DIASTOLIC BLOOD PRESSURE: 102 MMHG | RESPIRATION RATE: 20 BRPM | TEMPERATURE: 98.3 F | SYSTOLIC BLOOD PRESSURE: 144 MMHG | HEART RATE: 90 BPM

## 2018-10-24 DIAGNOSIS — Z79.891 ENCOUNTER FOR LONG-TERM USE OF OPIATE ANALGESIC: ICD-10-CM

## 2018-10-24 DIAGNOSIS — Z86.711 HISTORY OF PULMONARY EMBOLISM: ICD-10-CM

## 2018-10-24 DIAGNOSIS — E03.9 HYPOTHYROIDISM, UNSPECIFIED TYPE: ICD-10-CM

## 2018-10-24 DIAGNOSIS — I48.0 PAROXYSMAL ATRIAL FIBRILLATION (H): ICD-10-CM

## 2018-10-24 DIAGNOSIS — G47.00 INSOMNIA, UNSPECIFIED TYPE: ICD-10-CM

## 2018-10-24 DIAGNOSIS — G90.529 COMPLEX REGIONAL PAIN SYNDROME TYPE 1 OF LOWER EXTREMITY, UNSPECIFIED LATERALITY: Primary | ICD-10-CM

## 2018-10-24 DIAGNOSIS — I10 ESSENTIAL HYPERTENSION: ICD-10-CM

## 2018-10-24 DIAGNOSIS — I82.431 ACUTE DEEP VEIN THROMBOSIS (DVT) OF POPLITEAL VEIN OF RIGHT LOWER EXTREMITY (H): ICD-10-CM

## 2018-10-24 DIAGNOSIS — Z91.199 NON COMPLIANCE WITH MEDICAL TREATMENT: ICD-10-CM

## 2018-10-24 DIAGNOSIS — R19.7 INTERMITTENT DIARRHEA: ICD-10-CM

## 2018-10-24 DIAGNOSIS — F43.10 POSTTRAUMATIC STRESS DISORDER: Primary | ICD-10-CM

## 2018-10-24 DIAGNOSIS — M79.7 FIBROMYALGIA: ICD-10-CM

## 2018-10-24 DIAGNOSIS — G89.4 CHRONIC PAIN SYNDROME: Primary | ICD-10-CM

## 2018-10-24 DIAGNOSIS — M15.9 GENERALIZED OSTEOARTHROSIS, INVOLVING MULTIPLE SITES: ICD-10-CM

## 2018-10-24 DIAGNOSIS — M75.42 IMPINGEMENT SYNDROME OF LEFT SHOULDER: ICD-10-CM

## 2018-10-24 PROCEDURE — 90834 PSYTX W PT 45 MINUTES: CPT

## 2018-10-24 PROCEDURE — 99607 MTMS BY PHARM ADDL 15 MIN: CPT | Performed by: PHARMACIST

## 2018-10-24 PROCEDURE — 99215 OFFICE O/P EST HI 40 MIN: CPT | Performed by: NURSE PRACTITIONER

## 2018-10-24 PROCEDURE — 90785 PSYTX COMPLEX INTERACTIVE: CPT

## 2018-10-24 PROCEDURE — 99605 MTMS BY PHARM NP 15 MIN: CPT | Performed by: PHARMACIST

## 2018-10-24 RX ORDER — CHLORAL HYDRATE 500 MG
1 CAPSULE ORAL 2 TIMES DAILY
Qty: 100 CAPSULE | Refills: 0 | COMMUNITY
Start: 2018-10-24 | End: 2019-12-09

## 2018-10-24 RX ORDER — HYDROMORPHONE HYDROCHLORIDE 8 MG/1
8 TABLET ORAL 3 TIMES DAILY
COMMUNITY
Start: 2018-10-24

## 2018-10-24 RX ORDER — LEVOTHYROXINE SODIUM ANHYDROUS 200 UG/5ML
200 INJECTION, POWDER, LYOPHILIZED, FOR SOLUTION INTRAVENOUS WEEKLY
Status: ON HOLD | COMMUNITY
Start: 2018-10-24 | End: 2021-10-05

## 2018-10-24 RX ORDER — PREDNISONE 1 MG/1
3 TABLET ORAL EVERY OTHER DAY
Refills: 2 | Status: ON HOLD | COMMUNITY
End: 2022-02-17

## 2018-10-24 RX ORDER — MULTIPLE VITAMINS W/ MINERALS TAB 9MG-400MCG
1 TAB ORAL DAILY
Qty: 100 TABLET | Refills: 3 | Status: ON HOLD | COMMUNITY
Start: 2018-10-24 | End: 2023-01-17

## 2018-10-24 RX ORDER — LIOTHYRONINE SODIUM 25 UG/1
25 TABLET ORAL 2 TIMES DAILY
Status: ON HOLD | COMMUNITY
End: 2022-02-17

## 2018-10-24 RX ORDER — BACLOFEN 10 MG/1
10 TABLET ORAL 3 TIMES DAILY
Qty: 90 TABLET | COMMUNITY
Start: 2018-10-24 | End: 2019-07-10

## 2018-10-24 RX ORDER — MORPHINE SULFATE 30 MG/1
30 TABLET, FILM COATED, EXTENDED RELEASE ORAL 2 TIMES DAILY
Qty: 270 TABLET | Refills: 0 | COMMUNITY

## 2018-10-24 RX ORDER — MULTIPLE VITAMINS W/ MINERALS TAB 9MG-400MCG
1 TAB ORAL DAILY
Qty: 100 TABLET | Refills: 3 | COMMUNITY
Start: 2018-10-24 | End: 2018-10-24

## 2018-10-24 RX ORDER — CARBOXYMETHYLCELLULOSE SODIUM 5 MG/ML
1 SOLUTION/ DROPS OPHTHALMIC 2 TIMES DAILY PRN
Qty: 1 BOTTLE | Status: ON HOLD | COMMUNITY
Start: 2018-10-24 | End: 2023-01-01

## 2018-10-24 RX ORDER — IBUPROFEN 600 MG/1
1 TABLET ORAL DAILY PRN
Refills: 11 | Status: ON HOLD | COMMUNITY
Start: 2018-10-24 | End: 2023-01-17

## 2018-10-24 RX ORDER — MORPHINE SULFATE 15 MG/1
15 TABLET, FILM COATED, EXTENDED RELEASE ORAL DAILY
Qty: 60 TABLET | Refills: 0 | COMMUNITY
Start: 2018-10-24 | End: 2018-10-24

## 2018-10-24 RX ORDER — PREDNISONE 5 MG/1
5 TABLET ORAL EVERY OTHER DAY
Status: ON HOLD | COMMUNITY
End: 2022-02-17

## 2018-10-24 RX ORDER — DIAZEPAM 10 MG
10 TABLET ORAL DAILY PRN
Qty: 90 TABLET | Refills: 0 | COMMUNITY
Start: 2018-10-24 | End: 2019-03-15

## 2018-10-24 RX ORDER — MORPHINE SULFATE 15 MG/1
15 TABLET, FILM COATED, EXTENDED RELEASE ORAL DAILY
Qty: 60 TABLET | Refills: 0 | COMMUNITY
Start: 2018-10-24 | End: 2019-03-15

## 2018-10-24 NOTE — MR AVS SNAPSHOT
After Visit Summary   10/24/2018    Aspen Mccullough    MRN: [id number]           Patient Information     Date Of Birth          1960        Visit Information        Provider Department      10/24/2018 10:00 AM Ramona Contreras [facility]        Today's Diagnoses     Posttraumatic stress disorder    -  1       Follow-ups after your visit        Your next 10 appointments already scheduled     Oct 29, 2018   Procedure with Basim Velazquez MD   [facility] ([facility])    [address] [place] [address]  5th Floor  [facility] [address]   [phone]           Located in the Clinics and Surgery Center at [address] [place] [address].   parking is very convenient and highly recommended.  is a $6 flat rate fee.  Both  and self parkers should enter the main arrival plaza from [facility]; parking attendants will direct you based on your parking preference.            Nov 06, 2018 10:30 AM CST   Return Visit with BARNEY Yan   [facility] ([facility])    [address]  [address]  [facility] [address]   [phone]            Nov 19, 2018 10:30 AM CST   Return Visit with BARNEY Yan   [facility] ([facility])    [address]  [address]  [facility] [address]   [phone]            Nov 26, 2018  9:40 AM CST   (Arrive by 9:25 AM)   Return Visit with ANITRA Tavarez CNP   [facility] ([facility])    [address] [place] [address]  4th Floor  [facility] [address]   [phone]            Sotero 15, 2019  9:00 AM CST   Return Visit with BARNEY Yan   [facility] ([facility])    [address]  [address]  [facility] [address]   [phone]            Sotero 15, 2019  9:00 AM CST   Return Visit with ANITRA Biggs CNP   [facility] ([facility])    [address] [place] [address]  [address]  [facility] [id number] [phone]            Sotero 15, 2019  9:00 AM CST   Office Visit with Cristina Pollack RPH   [facility] ([facility])    [address] [place] [address]  [address]  [facility] [id number] [phone]           Bring a current list of meds and any records pertaining to this visit. For Physicals, please bring immunization records and any forms needing to be filled out. Please arrive 10 minutes early to complete paperwork.              Who to contact     If you have questions or need follow up information about today's clinic visit or your schedule please contact [facility] directly at [phone].  Normal or non-critical lab and imaging results will be communicated to you by Chirplyhart, letter or phone within 4 business days after the clinic has received the results. If you do not hear from us within 7 days, please contact the clinic through Chargebackt or phone. If you have a critical or abnormal lab result, we will notify you by phone as soon as possible.  Submit refill requests through PlayerLync or call your pharmacy and they will forward the refill request to us. Please allow 3 business days for your refill to be completed.          Additional Information About Your Visit        PlayerLync Information     PlayerLync gives you secure access to your electronic health record. If you see a primary care provider, you can also send messages to your care team and make appointments. If you have questions, please call your primary care clinic.  If you do not have a primary care provider, please call [phone] and they will assist you.        Care EveryWhere ID      This is your Care EveryWhere ID. This could be used by other organizations to access your [place] medical records  [id number]         Blood Pressure from Last 3 Encounters:   10/24/18 (!) 144/102   10/04/18 142/86   09/21/18 110/66    Weight from Last 3 Encounters:   10/04/18 115.2 kg (254 lb)   09/21/18 108.9 kg (240 lb)   05/18/18 104.3 kg (230 lb)              We Performed the Following     C PSYCHOTHERAPY COMPLEX INTERACTIVE          Today's Medication Changes          These changes are accurate as of 10/24/18  3:39 PM.  If you have any questions, ask your nurse or doctor.               These medicines have changed or have updated prescriptions.        Dose/Directions    diazepam 10 MG tablet   Commonly known as:  VALIUM   This may have changed:    - how much to take  - when to take this  - reasons to take this   Used for:  Chronic pain syndrome, Encounter for long-term use of opiate analgesic   Changed by:  Cristina Pollack RPH        Dose:  5-10 mg   Take 0.5-1 tablets (5-10 mg) by mouth daily as needed for anxiety   Quantity:  90 tablet   Refills:  0       * levothyroxine 300 MCG tablet   Commonly known as:  SYNTHROID/LEVOTHROID   This may have changed:  Another medication with the same name was changed. Make sure you understand how and when to take each.   Changed by:  Cristina Pollack RPH        Dose:  300 mcg   Take 300 mcg by mouth 2 times daily   Refills:  0       * levothyroxine 100 MCG Solr injection   Commonly known as:  SYNTHROID/LEVOTHROID   This may have changed:  when to take this   Changed by:  Cristina Pollack RPH        Dose:  200 mcg   Inject 10 mLs (200 mcg) into the vein once a week   Refills:  0       * morphine 60 MG 12 hr tablet   Commonly known as:  MS CONTIN   This may have changed:    - Another medication with the same name was added. Make sure you understand how and when to take each.  - Another medication with the same name was changed. Make sure you  understand how and when to take each.   Used for:  Chronic pain syndrome   Changed by:  Cristina Pollack RPH        Dose:  60 mg   Take 1 tablet (60 mg) by mouth every 8 hours   Quantity:  30 tablet   Refills:  0       * morphine 30 MG 12 hr tablet   Commonly known as:  MS CONTIN   This may have changed:    - how much to take  - how to take this  - when to take this  - additional instructions   Used for:  Chronic pain syndrome   Changed by:  Cristina Pollack RPH        Take 30mg in the morning and 30mg at night with 60mg tablet for total of 90mg   Quantity:  270 tablet   Refills:  0       * morphine 15 MG 12 hr tablet   Commonly known as:  MS CONTIN   This may have changed:  You were already taking a medication with the same name, and this prescription was added. Make sure you understand how and when to take each.   Used for:  Chronic pain syndrome   Changed by:  Cristina Pollack RPH        Dose:  15 mg   Take 1 tablet (15 mg) by mouth daily At noon with 60mg tablet for total of 75mg   Quantity:  60 tablet   Refills:  0       Multi-vitamin Tabs tablet   This may have changed:  when to take this   Changed by:  Cristina Pollack RPH        Dose:  1 tablet   Take 1 tablet by mouth 2 times daily   Quantity:  100 tablet   Refills:  3       NASONEX 50 MCG/ACT spray   This may have changed:    - how much to take  - how to take this  - when to take this   Generic drug:  mometasone   Changed by:  Cristina Pollack RPH        Dose:  1 spray   Spray 1 spray into both nostrils daily   Refills:  11       * predniSONE 5 MG tablet   Commonly known as:  DELTASONE   This may have changed:  See the new instructions.   Changed by:  Cristina Pollack RPH        Alternate taking 8mg and 10mg every other day   Refills:  0       * predniSONE 1 MG tablet   Commonly known as:  DELTASONE   This may have changed:  additional instructions   Changed by:  Cristina Pollack RPH        Alternate  8mg and 10mg every other day   Refills:  2       probiotic Caps   This may have changed:  when to take this   Changed by:  Cristina Pollack, [facility]        Dose:  1 capsule   Take 1 capsule by mouth 2 times daily   Refills:  0       * Notice:  This list has 7 medication(s) that are the same as other medications prescribed for you. Read the directions carefully, and ask your doctor or other care provider to review them with you.             Primary Care Provider Office Phone # Fax #    Umm Ya, APRN [phone]       [address] [facility] [address]  [facility] [address]        Equal Access to Services     [facility]: Hadii aad ku hadasho Soomaali, waaxda luqadaha, qaybta kaalmada adeegyada, yaneth palumbo hayskyler guillermo . So [facility] [phone].    ATENCIÓN: Si habla español, tiene a lynn disposición servicios gratuitos de asistencia lingüística. Llame al [phone].    We comply with applicable federal civil rights laws and [place] laws. We do not discriminate on the basis of race, color, national origin, age, disability, sex, sexual orientation, or gender identity.            Thank you!     Thank you for choosing [facility]  for your care. Our goal is always to provide you with excellent care. Hearing back from our patients is one way we can continue to improve our services. Please take a few minutes to complete the written survey that you may receive in the mail after your visit with us. Thank you!             Your Updated Medication List - Protect others around you: Learn how to safely use, store and throw away your medicines at www.disposemymeds.org.          This list is accurate as of 10/24/18  3:39 PM.  Always use your most recent med list.                   Brand Name Dispense Instructions for use Diagnosis    ALLEGRA 180 MG tablet   Generic drug:  fexofenadine      Take 180 mg by mouth daily.        baclofen 10 MG tablet    LIORESAL    90  tablet    Take 1 tablet (10 mg) by mouth 3 times daily        BIOTIN FORTE PO      Take by mouth 2 times daily        carboxymethylcellulose sodium 0.5 % Soln ophthalmic solution   Generic drug:  carboxymethylcellulose     1 Bottle    Place 1 drop into both eyes 2 times daily        CEPHALEXIN PO      Take 500 mg by mouth as needed (Dental Procedure) Take 4 caps 1 hour prior to Dental Appointment        cyanocobalamin 1000 MCG/ML injection    VITAMIN B12    4 mL    Inject 1 mL (1,000 mcg) Subcutaneous every 7 days    Other vitamin B12 deficiency anemia       cyclobenzaprine 10 MG tablet    FLEXERIL    90 tablet    Take 1 tablet (10 mg) by mouth 3 times daily    Generalized osteoarthrosis, involving multiple sites       CYTOMEL 25 MCG tablet   Generic drug:  liothyronine      Take 1 tablet (25 mcg) by mouth 2 times daily        diazepam 10 MG tablet    VALIUM    90 tablet    Take 0.5-1 tablets (5-10 mg) by mouth daily as needed for anxiety    Chronic pain syndrome, Encounter for long-term use of opiate analgesic       fish oil-omega-3 fatty acids 1000 MG capsule     100 capsule    Take 1 capsule (1 g) by mouth 2 times daily        HYDROmorphone 8 MG tablet    DILAUDID     Take 1 tablet (8 mg) by mouth 3 times daily    Chronic pain syndrome       * levothyroxine 300 MCG tablet    SYNTHROID/LEVOTHROID     Take 300 mcg by mouth 2 times daily        * levothyroxine 100 MCG Solr injection    SYNTHROID/LEVOTHROID     Inject 10 mLs (200 mcg) into the vein once a week        lifitegrast 5 % opthalmic solution    XIIDRA     Place 1 drop into both eyes 2 times daily        MAGNESIUM-ZINC PO      Take by mouth daily        * morphine 60 MG 12 hr tablet    MS CONTIN    30 tablet    Take 1 tablet (60 mg) by mouth every 8 hours    Chronic pain syndrome       * morphine 30 MG 12 hr tablet    MS CONTIN    270 tablet    Take 30mg in the morning and 30mg at night with 60mg tablet for total of 90mg    Chronic pain syndrome       *  morphine 15 MG 12 hr tablet    MS CONTIN    60 tablet    Take 1 tablet (15 mg) by mouth daily At noon with 60mg tablet for total of 75mg    Chronic pain syndrome       Multi-vitamin Tabs tablet     100 tablet    Take 1 tablet by mouth 2 times daily        NASONEX 50 MCG/ACT spray   Generic drug:  mometasone      Spray 1 spray into both nostrils daily        nystatin 385700 UNIT/GM Powd    MYCOSTATIN    60 g    Apply topically 3 times daily as needed    Candidal skin infection       prednisoLONE acetate 1 % ophthalmic susp    PRED FORTE     1 drop 4 times daily        * predniSONE 5 MG tablet    DELTASONE     Alternate taking 8mg and 10mg every other day        * predniSONE 1 MG tablet    DELTASONE     Alternate 8mg and 10mg every other day        probiotic Caps      Take 1 capsule by mouth 2 times daily        RESTASIS 0.05 % ophthalmic emulsion   Generic drug:  cycloSPORINE      Place 1 drop into both eyes 2 times daily        VITAMIN D (CHOLECALCIFEROL) PO      Take 50,000 Units by mouth once a week        XARELTO 20 MG Tabs tablet   Generic drug:  rivaroxaban ANTICOAGULANT      Take 1 tablet (20 mg) by mouth daily (with dinner)        * Notice:  This list has 7 medication(s) that are the same as other medications prescribed for you. Read the directions carefully, and ask your doctor or other care provider to review them with you.

## 2018-10-24 NOTE — PATIENT INSTRUCTIONS
We have reviewed your medication list and updated to make sure everything is correct.  We are glad your sleep is better.  We want our clinic to be a positive experience for you.   We can see you again in 3 months or sooner if you would like.

## 2018-10-24 NOTE — PROGRESS NOTES
"SUBJECTIVE/OBJECTIVE:                           Aspen Mccullough is a 58 year old female coming in for an initial visit for Medication Therapy Management.  She was referred to me from Umm Ya.  Seen as a covisit with PCP and [facility].     Chief Complaint: no concerns.   Pt is sarcastic and abrasive throughout the visit today. States she doesn't want anything out of this visit, not interested in completing any screenings or preventative care. Not interested in treatment or monitoring of blood pressure, lipids, etc.     Allergies/ADRs: Reviewed in Epic  Tobacco: History of tobacco dependence - quit    Alcohol: none  Caffeine: did not review  Activity: going to gym  PMH: Reviewed in Epic. States she has \"malabsorption\" but unable to further describe. She is not interested in further assistance looking at absorption of her oral medications to help determine efficacy, such as Xarelto.  Followed by outside gastroenterologist.     Medication Adherence/Access:  no issues reported. Brought in med list today for updates.    Insomnia: cancelled follow-up appts for sleep psychologist, \"already did that, they said I didn't need to come back\".  Purchased a new bed and has been extremely helpful and no longer having issues with sleep.     Chronic back pain: currently taking morphine 90mg AM, 75mg noon, 90mg PM, Dilaudid 8mg TID scheduled, cyclobenzaprine 10mg TID, baclofen 10mg TID, diazepam 5-10mg daily, prednisone 8mg every other day rotating with 10mg.  She is followed by an outside pain specialist. Denies constipation or other side effects.  Upset she was cut down on her dose of diazepam.  \"I haven't stopped breathing yet\".  Multiple references to political environment around opioid prescribing.  \"I wish Laurel Davis would have a terrible accident and have chronic pain the rest of her life\".  States with her medication regimen that she is able to exercise and improved QOL.   Per Rheumatology eval 3/2018  Dear Ms. " Hindu,  Your liver enzymes are normalized now.   Liver antibodies are normal too.   Inflammatory markers are normal except borderline high CRP.   Three types of antibodies associated with blood clots are all negative. (good).   Your complements are normal.   Rheumatoid antibodies are normal.   Some of the specific antibodies for lupus (including dsDNA antibody), mixed connective tissue disease, scleroderma, sjogrens syndrome are normal (negative).  SEAN antibody for lupus is also negative. Lupus complements are normal.      With all of the above, there is NO evidence of systemic inflammatory rheumatic condition at present. There is NO evidence of systemic lupus. I am not sure on what basis the lupus diagnosis was made in the past. Considering all the side effect risks of chronic steroid use, I recommend that the prednisone is tapered off completely. Please discuss this with your doctor who is prescribing the prednisone. With prednisone taper, if you are noticing increased joint pain or swelling, please follow up with us.                                                                             hypothyroidism: currently taking levothyroxine 300mcg BID, liothyronine 25mcg BID, levothyroxine 200mcg IM weekly (reconstitutes and self administers).  States she was taken off her thyroid meds while inpatient one year ago and still feeling the effects.  Angry another provider who was not her endocrinologist spring thyroid labs.   Followed by outside endocrinologist.     A fib, hx DVT/PE: currently taking Xarelto 20mg daily and denies SE. States today she will be continuing taking Xarelto.   Per cardiology 2/2018:  ASSESSMENT/PLAN:   This patient is presenting forFollow-up of her DVT and PE    From the anticoagulation standpoint I did mention to her that she would need to consult with pulmonary medicine regards the length of time before her PE. Although it wasn't some symptoms provoked due to the acute illness she had  "prior to hospitalization with the diarrhea and being in bed for quite a time, she does have the additional risk factor of lupus. I did mention that it at least has to be 6-12 months if not longer. She is very hesitant to continue on anticoagulation however she is agreeing to wait until her appointment with pulmonary medicine to the side    We did discuss also the need of beta blockers for her A. fib. She's not had any recurrent symptoms of A. fib. It was mainly during her hospitalization and when she was acutely ill. I believe at this point it is reasonable to stop the metoprolol. She is ready on a very low dose and that can be stopped    Her last echo is very reassuring where her right atrial thrombus had resolved. Also the LV function had recovered quite well as well as the RV function. I believe with the fact that at this point she's not having any acute symptoms then we can hold off any further investigations    She did ask about increasing the level of activity and I believe at this point she can increase her exercise with monitoring of any symptoms of chest pain, palpitations, shortness of breath      Today's Vitals:  BP Readings from Last 1 Encounters:   10/24/18 (!) 144/102     Pulse Readings from Last 1 Encounters:   10/24/18 90     Wt Readings from Last 1 Encounters:   10/04/18 254 lb (115.2 kg)     Ht Readings from Last 1 Encounters:   10/04/18 5' 9\" (1.753 m)     Estimated body mass index is 37.51 kg/(m^2) as calculated from the following:    Height as of 10/4/18: 5' 9\" (1.753 m).    Weight as of 10/4/18: 254 lb (115.2 kg).    Temp Readings from Last 1 Encounters:   10/24/18 98.3  F (36.8  C) (Oral)         ASSESSMENT:                             Current medications were reviewed today.     Medication Adherence: excellent, no issues identified    Checked medications for drug interactions, and the following clinically significant interactions were found:  1. Multiple CNS depressants, including morphine, " Dilaudid, cyclobenzaprine, baclofen, diazepam.  Recommend reduction in polypharmacy to reduce risk of CNS and respiratory depression.     insomnia: improved    Chronic back pain: stable. Concerned about polypharmacy with CNS depressants as above and chronic use of prednisone, however pt is clearly resistant to any changes or recommendations. Pt should continue to be monitored with DEXA every 1-2 years if she remains on prednisone (last 2/2018). She will continue to work with pain specialist on her medication management.     Hypothyroidism: stable, last TSH wnl.  She will continue to follow with endocrinology.     A fib, hx DVT/PE: stable. May be able to discontinue Xarelto with negative lupus; pt will follow-up with pulmonology and cardiology for recommendations.       PLAN:                            Updated med list today    I spent 50 minutes with this patient today. All changes were made via collaborative practice agreement with Umm Ya. A copy of the visit note was provided to the patient's primary care provider.    Will follow up as needed.    The patient was given a summary of these recommendations as an after visit summary.     Cristina Pollack, PharmD, BCACP

## 2018-10-24 NOTE — MR AVS SNAPSHOT
After Visit Summary   10/24/2018    Aspen Mccullough    MRN: [id number]           Patient Information     Date Of Birth          1960        Visit Information        Provider Department      10/24/2018 10:00 AM Umm Ya APRN CNP [facility]        Today's Diagnoses     History of pulmonary embolism    -  1    Complex regional pain syndrome type 1 of lower extremity, unspecified laterality        Generalized osteoarthrosis, involving multiple sites        Fibromyalgia        Somatoform disorder        Impingement syndrome of left shoulder        Intermittent diarrhea        Paroxysmal atrial fibrillation (H)          Care Instructions    We have reviewed your medication list and updated to make sure everything is correct.  We are glad your sleep is better.  We want our clinic to be a positive experience for you.   We can see you again in 3 months or sooner if you would like.                  Follow-ups after your visit        Follow-up notes from your care team     Return in about 3 months (around 1/24/2019) for Physical Exam, Routine Visit.      Your next 10 appointments already scheduled     Oct 29, 2018   Procedure with Basim Velazquez MD   [facility] ([facility])    [address] [place] [id number]           Located in the Clinics and Surgery Center at [address] [place] [address].   parking is very convenient and highly recommended.  is a $6 flat rate fee.  Both  and self parkers should enter the main arrival plaza from [facility]; parking attendants will direct you based on your parking preference.            Nov 26, 2018  9:40 AM CST   (Arrive by 9:25 AM)   Return Visit with ANITRA Tavarez CNP   [facility] ([facility])    [address] [place] [address]  [facility] [address] [phone]              Who to contact     If you have questions or need follow up information about today's clinic visit or your schedule please contact [facility] directly at [phone].  Normal or non-critical lab and imaging results will be communicated to you by Sourcebazaarhart, letter or phone within 4 business days after the clinic has received the results. If you do not hear from us within 7 days, please contact the clinic through Sourcebazaarhart or phone. If you have a critical or abnormal lab result, we will notify you by phone as soon as possible.  Submit refill requests through XATA or call your pharmacy and they will forward the refill request to us. Please allow 3 business days for your refill to be completed.          Additional Information About Your Visit        SourcebazaarharTaggify Information     XATA gives you secure access to your electronic health record. If you see a primary care provider, you can also send messages to your care team and make appointments. If you have questions, please call your primary care clinic.  If you do not have a primary care provider, please call [phone] and they will assist you.        Care EveryWhere ID     This is your Care EveryWhere ID. This could be used by other organizations to access your [facility] medical records  [id number]        Your Vitals Were     Pulse Temperature Respirations Pulse Oximetry          90 98.3  F (36.8  C) (Oral) 20 99%         Blood Pressure from Last 3 Encounters:   10/24/18 (!) 144/102   10/04/18 142/86   09/21/18 110/66    Weight from Last 3 Encounters:   10/04/18 254 lb (115.2 kg)   09/21/18 240 lb (108.9 kg)   05/18/18 230 lb (104.3 kg)              Today, you had the following     No orders found for display         Today's Medication Changes          These changes are accurate as of 10/24/18 10:55 AM.  If you have any questions, ask your nurse or doctor.               These  medicines have changed or have updated prescriptions.        Dose/Directions    diazepam 10 MG tablet   Commonly known as:  VALIUM   This may have changed:    - how much to take  - when to take this  - reasons to take this   Used for:  Chronic pain syndrome, Encounter for long-term use of opiate analgesic   Changed by:  Cristina Pollack RPH        Dose:  5-10 mg   Take 0.5-1 tablets (5-10 mg) by mouth daily as needed for anxiety   Quantity:  90 tablet   Refills:  0       * levothyroxine 300 MCG tablet   Commonly known as:  SYNTHROID/LEVOTHROID   This may have changed:  Another medication with the same name was changed. Make sure you understand how and when to take each.   Changed by:  Cristina Pollack RPH        Dose:  300 mcg   Take 300 mcg by mouth 2 times daily   Refills:  0       * levothyroxine 100 MCG Solr injection   Commonly known as:  SYNTHROID/LEVOTHROID   This may have changed:  when to take this   Changed by:  Cristina Pollack RPH        Dose:  200 mcg   Inject 10 mLs (200 mcg) into the vein once a week   Refills:  0       * morphine 60 MG 12 hr tablet   Commonly known as:  MS CONTIN   This may have changed:    - Another medication with the same name was added. Make sure you understand how and when to take each.  - Another medication with the same name was changed. Make sure you understand how and when to take each.   Used for:  Chronic pain syndrome   Changed by:  Cristina Pollack RPH        Dose:  60 mg   Take 1 tablet (60 mg) by mouth every 8 hours   Quantity:  30 tablet   Refills:  0       * morphine 30 MG 12 hr tablet   Commonly known as:  MS CONTIN   This may have changed:    - how much to take  - how to take this  - when to take this  - additional instructions   Used for:  Chronic pain syndrome   Changed by:  Cristina Pollack RPH        Take 30mg in the morning and 30mg at night with 60mg tablet for total of 90mg   Quantity:  270 tablet   Refills:  0       *  morphine 15 MG 12 hr tablet   Commonly known as:  MS CONTIN   This may have changed:  You were already taking a medication with the same name, and this prescription was added. Make sure you understand how and when to take each.   Used for:  Chronic pain syndrome   Changed by:  Cristina Pollack RPH        Dose:  15 mg   Take 1 tablet (15 mg) by mouth daily At noon with 60mg tablet for total of 75mg   Quantity:  60 tablet   Refills:  0       Multi-vitamin Tabs tablet   This may have changed:  when to take this   Changed by:  Cristina Pollack RPH        Dose:  1 tablet   Take 1 tablet by mouth 2 times daily   Quantity:  100 tablet   Refills:  3       NASONEX 50 MCG/ACT spray   This may have changed:    - how much to take  - how to take this  - when to take this   Generic drug:  mometasone   Changed by:  Cristina Pollack RPH        Dose:  1 spray   Spray 1 spray into both nostrils daily   Refills:  11       * predniSONE 5 MG tablet   Commonly known as:  DELTASONE   This may have changed:  See the new instructions.   Changed by:  Cristina Pollack RPH        Alternate taking 8mg and 10mg every other day   Refills:  0       * predniSONE 1 MG tablet   Commonly known as:  DELTASONE   This may have changed:  additional instructions   Changed by:  Cristina Pollack RPH        Alternate 8mg and 10mg every other day   Refills:  2       probiotic Caps   This may have changed:  when to take this   Changed by:  Cristina Pollack RPH        Dose:  1 capsule   Take 1 capsule by mouth 2 times daily   Refills:  0       * Notice:  This list has 7 medication(s) that are the same as other medications prescribed for you. Read the directions carefully, and ask your doctor or other care provider to review them with you.             Primary Care Provider Office Phone # Fax #    Sarahibright Vivar [phone]       [facility] [address]        Equal  Access to Services     [facility]: Hadii grisel bourne jerry Dan, waanilada luqadaha, qajorge alberto kanicolasayaneth laramiguelinacheri sherman. So [facility] [phone].    ATENCIÓN: Si habla español, tiene a lynn disposición servicios gratuitos de asistencia lingüística. Llame al [phone].    We comply with applicable federal civil rights laws and [place] laws. We do not discriminate on the basis of race, color, national origin, age, disability, sex, sexual orientation, or gender identity.            Thank you!     Thank you for choosing [facility]  for your care. Our goal is always to provide you with excellent care. Hearing back from our patients is one way we can continue to improve our services. Please take a few minutes to complete the written survey that you may receive in the mail after your visit with us. Thank you!             Your Updated Medication List - Protect others around you: Learn how to safely use, store and throw away your medicines at www.disposemymeds.org.          This list is accurate as of 10/24/18 10:55 AM.  Always use your most recent med list.                   Brand Name Dispense Instructions for use Diagnosis    ALLEGRA 180 MG tablet   Generic drug:  fexofenadine      Take 180 mg by mouth daily.        baclofen 10 MG tablet    LIORESAL    90 tablet    Take 1 tablet (10 mg) by mouth 3 times daily        BIOTIN FORTE PO      Take by mouth 2 times daily        carboxymethylcellulose sodium 0.5 % Soln ophthalmic solution   Generic drug:  carboxymethylcellulose     1 Bottle    Place 1 drop into both eyes 2 times daily        CEPHALEXIN PO      Take 500 mg by mouth as needed (Dental Procedure) Take 4 caps 1 hour prior to Dental Appointment        cyanocobalamin 1000 MCG/ML injection    VITAMIN B12    4 mL    Inject 1 mL (1,000 mcg) Subcutaneous every 7 days    Other vitamin B12 deficiency anemia       cyclobenzaprine 10 MG tablet    FLEXERIL    90  tablet    Take 1 tablet (10 mg) by mouth 3 times daily    Generalized osteoarthrosis, involving multiple sites       CYTOMEL 25 MCG tablet   Generic drug:  liothyronine      Take 1 tablet (25 mcg) by mouth 2 times daily        diazepam 10 MG tablet    VALIUM    90 tablet    Take 0.5-1 tablets (5-10 mg) by mouth daily as needed for anxiety    Chronic pain syndrome, Encounter for long-term use of opiate analgesic       fish oil-omega-3 fatty acids 1000 MG capsule     100 capsule    Take 1 capsule (1 g) by mouth 2 times daily        HYDROmorphone 8 MG tablet    DILAUDID     Take 1 tablet (8 mg) by mouth 3 times daily    Chronic pain syndrome       * levothyroxine 300 MCG tablet    SYNTHROID/LEVOTHROID     Take 300 mcg by mouth 2 times daily        * levothyroxine 100 MCG Solr injection    SYNTHROID/LEVOTHROID     Inject 10 mLs (200 mcg) into the vein once a week        lifitegrast 5 % opthalmic solution    XIIDRA     Place 1 drop into both eyes 2 times daily        MAGNESIUM-ZINC PO      Take by mouth daily        * morphine 60 MG 12 hr tablet    MS CONTIN    30 tablet    Take 1 tablet (60 mg) by mouth every 8 hours    Chronic pain syndrome       * morphine 30 MG 12 hr tablet    MS CONTIN    270 tablet    Take 30mg in the morning and 30mg at night with 60mg tablet for total of 90mg    Chronic pain syndrome       * morphine 15 MG 12 hr tablet    MS CONTIN    60 tablet    Take 1 tablet (15 mg) by mouth daily At noon with 60mg tablet for total of 75mg    Chronic pain syndrome       Multi-vitamin Tabs tablet     100 tablet    Take 1 tablet by mouth 2 times daily        NASONEX 50 MCG/ACT spray   Generic drug:  mometasone      Spray 1 spray into both nostrils daily        nystatin 819663 UNIT/GM Powd    MYCOSTATIN    60 g    Apply topically 3 times daily as needed    Candidal skin infection       prednisoLONE acetate 1 % ophthalmic susp    PRED FORTE     1 drop 4 times daily        * predniSONE 5 MG tablet    DELTASONE      Alternate taking 8mg and 10mg every other day        * predniSONE 1 MG tablet    DELTASONE     Alternate 8mg and 10mg every other day        probiotic Caps      Take 1 capsule by mouth 2 times daily        RESTASIS 0.05 % ophthalmic emulsion   Generic drug:  cycloSPORINE      Place 1 drop into both eyes 2 times daily        VITAMIN D (CHOLECALCIFEROL) PO      Take 50,000 Units by mouth once a week        XARELTO 20 MG Tabs tablet   Generic drug:  rivaroxaban ANTICOAGULANT      Take 1 tablet (20 mg) by mouth daily (with dinner)        * Notice:  This list has 7 medication(s) that are the same as other medications prescribed for you. Read the directions carefully, and ask your doctor or other care provider to review them with you.

## 2018-10-24 NOTE — PROGRESS NOTES
[facility]  October 24, 2018      Behavioral Health Clinician Progress Note    Patient Name: Aspen Mccullough           Service Type: Individual      Service Location:  in clinic      Session Start Time: 10:10am  Session End Time: 10:58am      Session Length: 38 - 52      Attendees: Patient, PCP and MTM    Visit Activities (Refresh list every visit): [facility] [employer]    Diagnostic Assessment Date: June 22, 2015  Treatment Plan Review Date: 4-18-16  See Flowsheets for today's PHQ-9 and BENI-7 results  Previous PHQ-9:   PHQ-9 SCORE 5/3/2016 10/4/2017 2/12/2018   Total Score - - -   Total Score - - -   Total Score 12 3 5     Previous BENI-7:   BENI-7 SCORE 10/4/2017 2/12/2018 7/13/2018   Total Score - - -   Total Score - - 1 (minimal anxiety)   Total Score 0 0 1   Total Score - - -       FERCHO LEVEL:  FERCHO Score (Last Two) 7/24/2014   FERCHO Raw Score 51   Activation Score 91.6   FERCHO Level 4       DATA  Extended Session (60+ minutes): No  Interactive Complexity: Yes, visit entailed Interactive Complexity evidenced by:  -The need to manage maladaptive communication (related to, e.g., high anxiety, high reactivity, repeated questions, or disagreement) among participants that complicates delivery of care  Crisis: No    Treatment Objective(s) Addressed in This Session:  Target Behavior(s): disease management/lifestyle changes mental health    Mood Instability: will develop better understanding of triggers and coping strategies to stabilize mood  PTSD Symptom Management  Psychological distress related to Pain    Current Stressors / Issues:  [facility] MSW Intern met with patient at the request of the PCP to assess current behavioral health needs and provide information and support as necessary. Aspen came in today with an updated medication list, to share the good news of her recent increase in sleep and to notify her PCP regarding the surgery she is having next week on both of her shoulders. Patient brought  "in a list of her current medications and reviewed them with our MTM. Patient's mood was irritable and guarded during the session. Patient's anxiety around reviewing her medication and the subsequent questions regarding her medication use was evident. Regarding her sleep patient states she is sleeping really well ever since getting a new part for her sleep number bed a week ago. Patient reports she is able to fall asleep and return to sleep easily after going to the bathroom. Patient states she is hopeful for her upcoming surgery as her surgeon referred her to his surgeon and that has given her confidence in proceeding forward. Patient states her appetite is still low but she forces herself to eat daily. Patient has increased her work out routine and hopes to progress towards her goal of going to the gym 4 times a week. Regarding suicidal thoughts patient said \"don't ask me that, I'm here aren't I\" and \"If I die tomorrow, I am good; I've lived a lot longer than they told me I would.\" Patient continued to report no substance use and no SI/SIB. Patient desires to follow with [facility] in 2 weeks. Patient agreed to follow with PCP in 3 months or sooner if changes occur.    Progress on Treatment Objective(s) / Homework:  No improvement - ACTION (Actively working towards change); Intervened by reinforcing change plan / affirming steps taken    Motivational Interviewing    MI Intervention: Expressed Empathy/Understanding, Supported Autonomy, Collaboration, Evocation, Permission to raise concern or advise, Open-ended questions, Reflections: simple and complex and Change talk (evoked)     Change Talk Expressed by the Patient: Desire to change Ability to change Reasons to change Need to change Committment to change Taking steps    Provider Response to Change Talk: E - Evoked more info from patient about behavior change, A - Affirmed patient's thoughts, decisions, or attempts at behavior change, R - Reflected patient's " change talk and S - Summarized patient's change talk statements      Care Plan review completed: No    Medication Review:  No changes to current psychiatric medication(s)    Medication Compliance:  NA    Changes in Health Issues:   Yes: Chronic disease management, Associated Psychological Distress    Chemical Use Review:   Substance Use: Chemical use reviewed, no active concerns identified      Tobacco Use: No current tobacco use.      Assessment: Current Emotional / Mental Status (status of significant symptoms):  Risk status (Self / Other harm or suicidal ideation)  Patient denies a history of suicidal ideation, suicide attempts, self-injurious behavior, homicidal ideation, homicidal behavior and and other safety concerns  Patient denies current fears or concerns for personal safety.  Patient denies current or recent suicidal ideation or behaviors.  Patient denies current or recent homicidal ideation or behaviors.  Patient denies current or recent self injurious behavior or ideation.  Patient denies other safety concerns.  A safety and risk management plan has not been developed at this time, however patient was encouraged to call [place] [phone] should there be a change in any of these risk factors.    Appearance:   Appropriate   Eye Contact:   Good   Psychomotor Behavior: Agitated  Normal   Attitude:   Cooperative  Guarded   Orientation:   All  Speech   Rate / Production: Hyperverbal    Volume:  Normal   Mood:    Angry  Irritable   Affect:    Appropriate  Expansive   Thought Content:  Clear   Thought Form:  Coherent  Obsessive   Insight:    Fair     Diagnoses:  1. Posttraumatic stress disorder        Collateral Reports Completed:  Not Applicable    Plan: (Homework, other):  Patient was given information about behavioral services and encouraged to schedule a follow up appointment with the clinic [facility] in 2 weeks.  She was also given information about mental health symptoms and treatment options .  JACQUI  Recommendations: No indications of CD issues. Visit and Note Completed by Ramona Contreras [facility] MSW Clinical Intern,  BARNEY Blackwell, [facility]      ______________________________________________________________________    Integrated Primary Care Behavioral Health Treatment Plan    Patient's Name: Aspen Mccullough  YOB: 1960    Date: 4-18-16    DSM-V Diagnoses: PTSD  Psychosocial / Contextual Factors: medical condition, history of traumatic experiences  WHODAS:  Will complete at next visit    Referral / Collaboration:  Referral to another professional/service is not indicated at this time..    Anticipated number of session or this episode of care: 10      MeasurableTreatment Goal(s) related to diagnosis / functional impairment(s)  Goal 1: Patient will be able to remember the past with 50% less emotional reaction of anger and hurt.     I will know I've met my goal when I can let go of the past     Objective #A (Patient Action)    Patient will talk to at least two others about losses and coping.  Status: New - Date: 4-18-16     Intervention(s)  [facility] will provide avenue for the past to process her losses and disappointments.    Objective #B  Patient will reduce her triggers from past trauma.  Status: New - Date: 4-18-16     Intervention(s)  [facility] will teach distraction skills. mindfulness.      Patient has reviewed and agreed to the above plan.

## 2018-10-24 NOTE — MR AVS SNAPSHOT
After Visit Summary   10/24/2018    Aspen Mccullough    MRN: [id number]           Patient Information     Date Of Birth          1960        Visit Information        Provider Department      10/24/2018 10:00 AM Cristina Pollack [facility]        Today's Diagnoses     Chronic pain syndrome    -  1    Hypothyroidism, unspecified type        Encounter for long-term use of opiate analgesic        Insomnia, unspecified type        Paroxysmal atrial fibrillation (H)        Acute deep vein thrombosis (DVT) of popliteal vein of right lower extremity (H)          Care Instructions    See AVS from PCP encounter for details           Follow-ups after your visit        Your next 10 appointments already scheduled     Oct 29, 2018   Procedure with Basim Velazquez MD   [facility] ([facility])    [address] [place] [address]  5th [facility] [address] [phone]           Located in the Clinics and Surgery Center at [address] [place] [address].   parking is very convenient and highly recommended.  is a $6 flat rate fee.  Both  and self parkers should enter the main arrival plaza from [facility]; parking attendants will direct you based on your parking preference.            Nov 06, 2018 10:30 AM CST   Return Visit with Antwan [place] [facility] ([facility])    [address]  [address]  [facility] [place] [address]   [phone]            Nov 19, 2018 10:30 AM CST   Return Visit with Antwan [place] [facility] ([facility])    [address]  [address]  [facility] [place] [address]   [phone]            Nov 26, 2018  9:40 AM CST   (Arrive by 9:25 AM)   Return Visit with ANITRA Tavarez [facility] ([facility])    [address]  4th Floor  [facility] [place] [facility] [address]   [phone]            Sotero 15, 2019  9:00 AM CST   [facility]   [facility] ([facility])    [address]  [address]  [facility] [place] [facility] [address]   [phone]            Sotero 15, 2019  9:00 AM CST   Return Visit with ANITRA Biggs CNP   [facility] ([facility])    [address]  [address]  [facility] [place] [facility] [address]   [phone]            Sotero 15, 2019  9:00 AM CST   Office Visit with Cristina Pollack [facility] ([facility])    [address] [place] [address]  [address]  [facility] [place] [facility] [address] [phone]           Bring a current list of meds and any records pertaining to this visit. For Physicals, please bring immunization records and any forms needing to be filled out. Please arrive 10 minutes early to complete paperwork.              Who to contact     If you have questions or need follow up information about today's clinic visit or your schedule please contact [facility] directly at [phone].  Normal or non-critical lab and imaging results will be communicated to you by [employer], letter or phone within 4 business days after the clinic has received the results. If you do not hear from us within 7 days, please contact the clinic through [employer] or phone. If you have a critical or abnormal lab result, we will notify you by phone as soon as possible.  Submit refill requests through [employer] or call your pharmacy and they will forward the refill request to us. Please allow 3 business days for your refill to be completed.          Additional Information About Your Visit        [employer] Information     [employer] gives you secure  access to your electronic health record. If you see a primary care provider, you can also send messages to your care team and make appointments. If you have questions, please call your primary care clinic.  If you do not have a primary care provider, please call [phone] and they will assist you.        Care EveryWhere ID     This is your Care EveryWhere ID. This could be used by other organizations to access your [place] medical records  [id number]         Blood Pressure from Last 3 Encounters:   10/24/18 (!) 144/102   10/04/18 142/86   09/21/18 110/66    Weight from Last 3 Encounters:   10/04/18 254 lb (115.2 kg)   09/21/18 240 lb (108.9 kg)   05/18/18 230 lb (104.3 kg)              Today, you had the following     No orders found for display         Today's Medication Changes          These changes are accurate as of 10/24/18 11:59 PM.  If you have any questions, ask your nurse or doctor.               These medicines have changed or have updated prescriptions.        Dose/Directions    diazepam 10 MG tablet   Commonly known as:  VALIUM   This may have changed:    - how much to take  - when to take this  - reasons to take this   Used for:  Chronic pain syndrome, Encounter for long-term use of opiate analgesic   Changed by:  Cristina Pollack RPH        Dose:  5-10 mg   Take 0.5-1 tablets (5-10 mg) by mouth daily as needed for anxiety   Quantity:  90 tablet   Refills:  0       * levothyroxine 300 MCG tablet   Commonly known as:  SYNTHROID/LEVOTHROID   This may have changed:  Another medication with the same name was changed. Make sure you understand how and when to take each.   Changed by:  Cristina Pollack RPH        Dose:  300 mcg   Take 300 mcg by mouth 2 times daily   Refills:  0       * levothyroxine 100 MCG Solr injection   Commonly known as:  SYNTHROID/LEVOTHROID   This may have changed:  when to take this   Changed by:  Cristina Pollack RPH        Dose:  200 mcg   Inject 10  mLs (200 mcg) into the vein once a week   Refills:  0       * morphine 60 MG 12 hr tablet   Commonly known as:  MS CONTIN   This may have changed:    - Another medication with the same name was added. Make sure you understand how and when to take each.  - Another medication with the same name was changed. Make sure you understand how and when to take each.   Used for:  Chronic pain syndrome   Changed by:  Cristina Pollack RPH        Dose:  60 mg   Take 1 tablet (60 mg) by mouth every 8 hours   Quantity:  30 tablet   Refills:  0       * morphine 30 MG 12 hr tablet   Commonly known as:  MS CONTIN   This may have changed:    - how much to take  - how to take this  - when to take this  - additional instructions   Used for:  Chronic pain syndrome   Changed by:  Cristina Pollack RPH        Take 30mg in the morning and 30mg at night with 60mg tablet for total of 90mg   Quantity:  270 tablet   Refills:  0       * morphine 15 MG 12 hr tablet   Commonly known as:  MS CONTIN   This may have changed:  You were already taking a medication with the same name, and this prescription was added. Make sure you understand how and when to take each.   Used for:  Chronic pain syndrome   Changed by:  Cristina Pollack RPH        Dose:  15 mg   Take 1 tablet (15 mg) by mouth daily At noon with 60mg tablet for total of 75mg   Quantity:  60 tablet   Refills:  0       Multi-vitamin Tabs tablet   This may have changed:  when to take this   Changed by:  Cristina Pollack RPH        Dose:  1 tablet   Take 1 tablet by mouth 2 times daily   Quantity:  100 tablet   Refills:  3       NASONEX 50 MCG/ACT spray   This may have changed:    - how much to take  - how to take this  - when to take this   Generic drug:  mometasone   Changed by:  Cristina Pollack RPH        Dose:  1 spray   Spray 1 spray into both nostrils daily   Refills:  11       * predniSONE 5 MG tablet   Commonly known as:  DELTASONE   This may have  changed:  See the new instructions.   Changed by:  Cristina Pollack RPH        Alternate taking 8mg and 10mg every other day   Refills:  0       * predniSONE 1 MG tablet   Commonly known as:  DELTASONE   This may have changed:  additional instructions   Changed by:  Cristina Pollack RPH        Alternate 8mg and 10mg every other day   Refills:  2       probiotic Caps   This may have changed:  when to take this   Changed by:  Cristina Pollack RPH        Dose:  1 capsule   Take 1 capsule by mouth 2 times daily   Refills:  0       * Notice:  This list has 7 medication(s) that are the same as other medications prescribed for you. Read the directions carefully, and ask your doctor or other care provider to review them with you.            Information about OPIOIDS     PRESCRIPTION OPIOIDS: WHAT YOU NEED TO KNOW   We gave you an opioid (narcotic) pain medicine. It is important to manage your pain, but opioids are not always the best choice. You should first try all the other options your care team gave you. Take this medicine for as short a time (and as few doses) as possible.    Some activities can increase your pain, such as bandage changes or therapy sessions. It may help to take your pain medicine 30 to 60 minutes before these activities. Reduce your stress by getting enough sleep, working on hobbies you enjoy and practicing relaxation or meditation. Talk to your care team about ways to manage your pain beyond prescription opioids.    These medicines have risks:    DO NOT drive when on new or higher doses of pain medicine. These medicines can affect your alertness and reaction times, and you could be arrested for driving under the influence (DUI). If you need to use opioids long-term, talk to your care team about driving.    DO NOT operate heavy machinery    DO NOT do any other dangerous activities while taking these medicines.    DO NOT drink any alcohol while taking these medicines.     If the  opioid prescribed includes acetaminophen, DO NOT take with any other medicines that contain acetaminophen. Read all labels carefully. Look for the word  acetaminophen  or  Tylenol.  Ask your pharmacist if you have questions or are unsure.    You can get addicted to pain medicines, especially if you have a history of addiction (chemical, alcohol or substance dependence). Talk to your care team about ways to reduce this risk.    All opioids tend to cause constipation. Drink plenty of water and eat foods that have a lot of fiber, such as fruits, vegetables, prune juice, apple juice and high-fiber cereal. Take a laxative (Miralax, milk of magnesia, Colace, Senna) if you don t move your bowels at least every other day. Other side effects include upset stomach, sleepiness, dizziness, throwing up, tolerance (needing more of the medicine to have the same effect), physical dependence and slowed breathing.    Store your pills in a secure place, locked if possible. We will not replace any lost or stolen medicine. If you don t finish your medicine, please throw away (dispose) as directed by your pharmacist. The [place] [employer] has more information about safe disposal: [email or web address]-[email or web address]         Primary Care Provider Office Phone # Fax #    ANITRA Biggs [phone]       [address] [facility] [address]  [facility] [address]        Equal Access to Services     ADRYAN BHAT : Hadjohn lovelaceo Soclaudia, waaxda luqadaha, qaybta kaalmada jas, yaneth guillermo . So [facility] [phone].    ATENCIÓN: Si habla español, tiene a lynn disposición servicios gratuitos de asistencia lingüística. Brad [place] [phone].    We comply with applicable federal civil rights laws and [place] laws. We do not discriminate on the basis of race, color, national origin, age, disability, sex, sexual orientation, or gender  identity.            Thank you!     Thank you for choosing [facility] [place]  for your care. Our goal is always to provide you with excellent care. Hearing back from our patients is one way we can continue to improve our services. Please take a few minutes to complete the written survey that you may receive in the mail after your visit with us. Thank you!             Your Updated Medication List - Protect others around you: Learn how to safely use, store and throw away your medicines at www.disposemymeds.org.          This list is accurate as of 10/24/18 11:59 PM.  Always use your most recent med list.                   Brand Name Dispense Instructions for use Diagnosis    ALLEGRA 180 MG tablet   Generic drug:  fexofenadine      Take 180 mg by mouth daily.        baclofen 10 MG tablet    LIORESAL    90 tablet    Take 1 tablet (10 mg) by mouth 3 times daily        BIOTIN FORTE PO      Take by mouth 2 times daily        carboxymethylcellulose sodium 0.5 % Soln ophthalmic solution   Generic drug:  carboxymethylcellulose     1 Bottle    Place 1 drop into both eyes 2 times daily        CEPHALEXIN PO      Take 500 mg by mouth as needed (Dental Procedure) Take 4 caps 1 hour prior to Dental Appointment        cyanocobalamin 1000 MCG/ML injection    VITAMIN B12    4 mL    Inject 1 mL (1,000 mcg) Subcutaneous every 7 days    Other vitamin B12 deficiency anemia       cyclobenzaprine 10 MG tablet    FLEXERIL    90 tablet    Take 1 tablet (10 mg) by mouth 3 times daily    Generalized osteoarthrosis, involving multiple sites       CYTOMEL 25 MCG tablet   Generic drug:  liothyronine      Take 1 tablet (25 mcg) by mouth 2 times daily        diazepam 10 MG tablet    VALIUM    90 tablet    Take 0.5-1 tablets (5-10 mg) by mouth daily as needed for anxiety    Chronic pain syndrome, Encounter for long-term use of opiate analgesic       fish oil-omega-3 fatty acids 1000 MG capsule     100 capsule    Take 1  capsule (1 g) by mouth 2 times daily        HYDROmorphone 8 MG tablet    DILAUDID     Take 1 tablet (8 mg) by mouth 3 times daily    Chronic pain syndrome       * levothyroxine 300 MCG tablet    SYNTHROID/LEVOTHROID     Take 300 mcg by mouth 2 times daily        * levothyroxine 100 MCG Solr injection    SYNTHROID/LEVOTHROID     Inject 10 mLs (200 mcg) into the vein once a week        lifitegrast 5 % opthalmic solution    XIIDRA     Place 1 drop into both eyes 2 times daily        MAGNESIUM-ZINC PO      Take by mouth daily        * morphine 60 MG 12 hr tablet    MS CONTIN    30 tablet    Take 1 tablet (60 mg) by mouth every 8 hours    Chronic pain syndrome       * morphine 30 MG 12 hr tablet    MS CONTIN    270 tablet    Take 30mg in the morning and 30mg at night with 60mg tablet for total of 90mg    Chronic pain syndrome       * morphine 15 MG 12 hr tablet    MS CONTIN    60 tablet    Take 1 tablet (15 mg) by mouth daily At noon with 60mg tablet for total of 75mg    Chronic pain syndrome       Multi-vitamin Tabs tablet     100 tablet    Take 1 tablet by mouth 2 times daily        NASONEX 50 MCG/ACT spray   Generic drug:  mometasone      Spray 1 spray into both nostrils daily        nystatin 956007 UNIT/GM Powd    MYCOSTATIN    60 g    Apply topically 3 times daily as needed    Candidal skin infection       prednisoLONE acetate 1 % ophthalmic susp    PRED FORTE     1 drop 4 times daily        * predniSONE 5 MG tablet    DELTASONE     Alternate taking 8mg and 10mg every other day        * predniSONE 1 MG tablet    DELTASONE     Alternate 8mg and 10mg every other day        probiotic Caps      Take 1 capsule by mouth 2 times daily        RESTASIS 0.05 % ophthalmic emulsion   Generic drug:  cycloSPORINE      Place 1 drop into both eyes 2 times daily        VITAMIN D (CHOLECALCIFEROL) PO      Take 50,000 Units by mouth once a week        XARELTO 20 MG Tabs tablet   Generic drug:  rivaroxaban ANTICOAGULANT      Take 1  tablet (20 mg) by mouth daily (with dinner)        * Notice:  This list has 7 medication(s) that are the same as other medications prescribed for you. Read the directions carefully, and ask your doctor or other care provider to review them with you.

## 2018-10-24 NOTE — PROGRESS NOTES
"SUBJECTIVE:                                                    Aspen Mccullough is a 58 year old female with a complex medical history including chronic pain, SLE, hx Hashimoto's thyroiditis now hypothyroid, hx of AF, cardiomyopathy, DVT/PE and hypertension who presents to clinic today for the following health issues:  [facility]: [place]  PharmD: Cristina    Patient presents to follow up. She was very rude and sarcastic throughout the entire visit. States she is not interested in completing any screenings and is unable to states her goals for care with this clinic. States she is not interested in treating her elevated blood pressure and is aware of the risks.    Was seen by sleep medicine on 10/4. Was advised to undergo a sleep study. States she does not want to complete it, \"I have already done it in the past.\" States her sleep has improved since she purchased a sleep number bed. Patient also canceled recommended sleep psychology visits.    Noted patient was in the ER 9/21 due to AMS and a reported episode of right chest pain. She was noted to be hypoxic with pinpoint pupils. Blood work and chest CT were non-revealing. EKG and troponins were within normal limits. It was noted that she was verbally abusive towards staff and a code 21 had to be called. Today she reports no further chest pain.       Hypertension Follow-up      Outpatient blood pressures are not being checked.    Low Salt Diet: low salt    Chronic Pain Follow-Up       Type / Location of Pain: knee replacement, right leg, throughout entire body, neuropathy, back  currently taking morphine 90mg AM, 75mg noon, 90mg PM, Dilaudid 8mg TID scheduled, cyclobenzaprine 10mg TID, baclofen 10mg TID, diazepam 5-10mg daily, prednisone 8mg every other day rotating with 10mg.  She is followed by an outside pain specialist. Side effects of diarrhea.   -Going next Monday for suprascapular nerve blockultiple references to political climate regarding opioid epidemic. \"I wish Laurel " "Abhinav would get into a terrible accident and have chronic pain for the rest of her life.\"  -followed by an outside Endocrinologist.   Analgesia/pain control:       Recent changes:  worse      Overall control: Inadequate pain control  Activity level/function:      Daily activities:  Unable to perform most daily activities - chores, hobbies, social activities, driving    Work:  Unable to work  Adverse effects:  No  Adherance    Taking medication as directed?  Yes    Participating in other treatments: yes  Risk Factors:    Sleep:  Poor    Mood/anxiety:  much worse    Recent family or social stressors:  none noted    Patient has hx of DVT and PE in Nov. 2017. Has been on Xarelto since then, states she has resumed taking it. She underwent a chest CT in Jan. 2018 that should resolution of the PEs, mild cardiomegaly and ground glass opacities in the bilateral lower lobes.     Questionable history of lupus. Was seen by Rheumatology (Dr. Awad) on 3/28/18. States she was initially diagnosed with lupus decades ago. On chronic steroids, Prednisone 8 mg/10mg alternating days. Bloodwork 3/2018 showed normal rheumatoid antibodies, normal complements, normal inflammatory markers overall, normal lupus complements, and normal SEAN antibodies for lupus. Per assessment from Rheumatology, there is no evidence of a systemic inflammatory rheumatic condition or systemic lupus based on these findings. She was advised to taper off of systemic steroids for that reason, considering the associated risks. Patient denies willingness to follow those recommendations.     History of AF with RVR in Nov. 2017. Converted to SR while in the hospital and was started on Sotalol 80 mg bid. Was initially on Heparin and Coumadin in the setting of co-occurring DVT/PE, but then transitioned to Xarelto at that time. Also with hx of cardiomyopathy with EF 40%, repeat Echo 9/017 showing normal EF and resolution of right atrial clot. Patient no longer " "taking Sotalol, reports awareness of risks.       PHQ-9 SCORE 5/3/2016 10/4/2017 2/12/2018   Total Score - - -   Total Score - - -   Total Score 12 3 5     BENI-7 SCORE 10/4/2017 2/12/2018 7/13/2018   Total Score - - -   Total Score - - 1 (minimal anxiety)   Total Score 0 0 1   Total Score - - -     Encounter-Level CSA - 05/01/2015:          Controlled Substance Agreement - Scan on 5/5/2015  7:28 AM : Controlled Substance Agreement 05/01/15 (below)                    Mental Health Follow up Anxiety, Histrionic Personality    Status since last visit: variable    See PHQ-9 for current symptoms.  Other associated symptoms: None    Complicating factors:  has bladder cancer per patient  Significant life event:  unclear  Current substance abuse:  None  Anxiety or Panic symptoms:  No    Sleep - better   Appetite - has to force herself to eat  Exercise - up to 4x per week    Smoking - denies  Alcohol - denies  Street drugs - denies  Marijuana - denies  Caffeine - denies    PHQ-9  English PHQ-9   Any Language           Hypertension Follow-up      Outpatient blood pressures are not being checked.    Low Salt Diet: not monitoring salt    \"I do not want to treat my high blood pressure. I am aware of the risks.\"    Hypothyroidism Follow-up      Since last visit, patient describes the following symptoms: Weight stable, no hair loss, no skin changes, no constipation, no loose stools    -currently taking levothyroxine 300mcg BID, liothyronine 25mcg BID, levothyroxine 200mcg IM weekly (reconstitutes and self administers). states she has to inject due to malabsorption issues but is unable to elaborate on that.        Problems taking medications regularly: No    Medication side effects: none    Diet: regular (no restrictions)    Social History   Substance Use Topics     Smoking status: Former Smoker     Packs/day: 1.50     Years: 20.00     Types: Cigarettes     Start date: 1/1/1973     Quit date: 1/1/1993     Smokeless tobacco: " Never Used     Alcohol use No        Problem list and histories reviewed & adjusted, as indicated.  Additional history: as documented    Patient Active Problem List   Diagnosis     Generalized osteoarthrosis, involving multiple sites     CRPS (complex regional pain syndrome), lower limb     Fibromyalgia     Somatoform disorder     Histrionic personality (H)     Encounter for long-term use of opiate analgesic     Knee joint replacement by other means     Hypothyroidism     Insomnia, unspecified type     Hyperlipidemia     ACP (advance care planning)     Hashimoto's thyroiditis     Glucocorticoid deficiency (H)     Posttraumatic stress disorder     Impingement syndrome of left shoulder     Abdominal cramping, generalized     Intermittent diarrhea     Primary osteoarthritis involving multiple joints     Attention deficit disorder     Allergic rhinitis     Cushing's syndrome (H)     Endometriosis     Essential hypertension     Systemic lupus erythematosus (H)     S/P cervical spinal fusion     Paroxysmal atrial fibrillation (H)     Nonischemic cardiomyopathy (H)     Personal history of DVT (deep vein thrombosis)     History of pulmonary embolism     Acute deep vein thrombosis (DVT) of popliteal vein of right lower extremity (H)     Acute pulmonary embolism (H)     Adrenal cortical steroids causing adverse effect in therapeutic use     Alopecia areata     Anemia, blood loss     Ankle pain, left     ARF (acute renal failure) (H)     Arthritis, septic (H)     Chronic ethmoidal sinusitis     Chronic maxillary sinusitis     Deviated nasal septum     Complications due to internal joint prosthesis (H)     Generalized pain     Iatrogenic hyperthyroidism     S/P TKR (total knee replacement)     Left shoulder pain     Lipoma of skin and subcutaneous tissue     Malabsorption     Nasal fracture     Nasal turbinate hypertrophy     Opioid type dependence (H)     Other specified abnormal findings of blood chemistry     Other acute  postoperative pain     Pain in joint, lower leg     Postoperative hypotension     S/P total knee replacement     Thrombus of right atrial appendage without antecedent myocardial infarction     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)     APPENDECTOMY       ARTHRODESIS ANKLE      right     ARTHROPLASTY KNEE BILATERAL       ARTHROPLASTY REVISION KNEE  4/19/2011    Procedure:ARTHROPLASTY REVISION KNEE; With Antibiotic Cement ; Surgeon:CHAO OLIVARES; [facility]     BREAST SURGERY      right- tissue remove nipple area     BUNIONECTOMY  12/14/2011    Procedure:BUNIONECTOMY; Right Bunion Correction; Surgeon:GRACE ZARATE; [facility] STOMACH SURGERY PROCEDURE UNLISTED      see list which we will bring     CHOLECYSTECTOMY       EXCISE MASS UPPER EXTREMITY  12/14/2011    Procedure:EXCISE MASS UPPER EXTREMITY; Excision of Left Arm Mass; Surgeon:GRACE ZARATE; Location:[facility]     FOOT SURGERY      left X 4     FUSION LUMBAR ANTERIOR ONE LEVEL       HC SACROPLASTY      see list which we will bring     INJECT NERVE BLOCK SUPRASCAPULAR Left 5/18/2018    Procedure: INJECT NERVE BLOCK SUPRASCAPULAR;  Left Suprascapular Nerve Block;  Surgeon: Basim Velazquez MD;  Location:  OR     INJECT NERVE BLOCK SUPRASCAPULAR Right 7/23/2018    Procedure: INJECT NERVE BLOCK SUPRASCAPULAR;  Right suprascapular injection;  Surgeon: Basim Velazquez MD;  [facility]     INJECT NERVE BLOCK SUPRASCAPULAR Bilateral 10/29/2018    Procedure: Bilateral Suprascapular Nerve Blocks;  Surgeon: Basim Velazquez MD;  [facility]     KNEE SURGERY      see list which we will bring     LAMINECTOMY LUMBAR ONE LEVEL      L4-5     LAPAROSCOPIC ABLATION ENDOMETRIOSIS       REMOVE HARDWARE FOOT  12/14/2011    Procedure:REMOVE HARDWARE FOOT; Hardware Removal Right Foot (Mini-C-Arm) ; Surgeon:GRACE AZRATE; Location:[facility]      RHINOPLASTY       SALPINGO-OOPHORECTOMY BILATERAL       TONSILLECTOMY         Social History   Substance Use Topics     Smoking status: Former Smoker     Packs/day: 1.50     Years: 20.00     Types: Cigarettes     Start date: 1/1/1973     Quit date: 1/1/1993     Smokeless tobacco: Never Used     Alcohol use No     Family History   Problem Relation Age of Onset     Hypertension Mother            Current Outpatient Prescriptions   Medication Sig Dispense Refill     CEPHALEXIN PO Take 500 mg by mouth as needed (Dental Procedure) Take 4 caps 1 hour prior to Dental Appointment       cyanocobalamin 1000 MCG/ML injection Inject 1 mL (1,000 mcg) Subcutaneous every 7 days 4 mL 5     cyclobenzaprine (FLEXERIL) 10 MG tablet Take 1 tablet (10 mg) by mouth 3 times daily 90 tablet 3     cycloSPORINE (RESTASIS) 0.05 % ophthalmic emulsion Place 1 drop into both eyes 2 times daily       fexofenadine (ALLEGRA) 180 MG tablet Take 180 mg by mouth daily.       levothyroxine (SYNTHROID/LEVOTHROID) 300 MCG tablet Take 300 mcg by mouth 2 times daily        morphine (MS CONTIN) 60 MG 12 hr tablet Take 1 tablet (60 mg) by mouth every 8 hours 30 tablet 0     nystatin (MYCOSTATIN) 197949 UNIT/GM POWD Apply topically 3 times daily as needed 60 g 1     prednisoLONE acetate (PRED FORTE) 1 % ophthalmic susp 1 drop 4 times daily       VITAMIN D, CHOLECALCIFEROL, PO Take 50,000 Units by mouth once a week       baclofen (LIORESAL) 10 MG tablet Take 1 tablet (10 mg) by mouth 3 times daily 90 tablet      BIOTIN FORTE PO Take by mouth 2 times daily       carboxymethylcellulose (CARBOXYMETHYLCELLULOSE SODIUM) 0.5 % SOLN ophthalmic solution Place 1 drop into both eyes 2 times daily 1 Bottle      diazepam (VALIUM) 10 MG tablet Take 0.5-1 tablets (5-10 mg) by mouth daily as needed for anxiety 90 tablet 0     fish oil-omega-3 fatty acids 1000 MG capsule Take 1 capsule (1 g) by mouth 2 times daily 100 capsule 0     HYDROmorphone (DILAUDID) 8 MG tablet Take 1  tablet (8 mg) by mouth 3 times daily       levothyroxine (SYNTHROID/LEVOTHROID) 100 MCG SOLR injection Inject 10 mLs (200 mcg) into the vein once a week       lifitegrast (XIIDRA) 5 % opthalmic solution Place 1 drop into both eyes 2 times daily       liothyronine (CYTOMEL) 25 MCG tablet Take 1 tablet (25 mcg) by mouth 2 times daily       MAGNESIUM-ZINC PO Take by mouth daily       morphine (MS CONTIN) 15 MG 12 hr tablet Take 1 tablet (15 mg) by mouth daily At noon with 60mg tablet for total of 75mg 60 tablet 0     morphine (MS CONTIN) 30 MG 12 hr tablet Take 30mg in the morning and 30mg at night with 60mg tablet for total of 90mg 270 tablet 0     multivitamin, therapeutic with minerals (MULTI-VITAMIN) TABS tablet Take 1 tablet by mouth 2 times daily 100 tablet 3     NASONEX 50 MCG/ACT spray Spray 1 spray into both nostrils daily  11     predniSONE (DELTASONE) 1 MG tablet Alternate 8mg and 10mg every other day  2     predniSONE (DELTASONE) 5 MG tablet Alternate taking 8mg and 10mg every other day       probiotic CAPS Take 1 capsule by mouth 2 times daily       rivaroxaban ANTICOAGULANT (XARELTO) 20 MG TABS tablet Take 1 tablet (20 mg) by mouth daily (with dinner)       Allergies   Allergen Reactions     Codeine Hives     Ibuprofen [Nsaids] Hives     Penicillins Hives     Other reaction(s): Unknown     Thor Hives     Pt states she had rxn to skin prep-thor prep     Sulfa Drugs Hives     Other reaction(s): Unknown     Codeine      Other reaction(s): Unknown     Doxycycline GI Disturbance     Emesis & diarrhea  Patient denies allergy to this med     Ibuprofen      Other reaction(s): Unknown     Lactose      Other reaction(s): Unknown     Mold      Mushroom      Penicillins      Red Wine Complex [Red Wine Powder]      No Clinical Screening - See Comments Rash     Thor prep     Recent Labs   Lab Test  09/21/18   1849  09/19/18   1110  04/13/18   1118  03/28/18   1358  03/13/18   0126  10/22/15  01/06/14   A1C   --    --     --    --    --    --   5.6   --    --    LDL   --   142*   --    --    --    --    --    --   101   HDL   --   93   --    --    --    --    --    --   67   TRIG   --   75   --    --    --    --    --    --   216   ALT  24   --    --   27  99*   < >   --    < >   --    CR  0.88  0.83   --    --   1.01   < >  0.96   < >  0.74   GFRESTIMATED  65  70   --    --   56*   < >  66   < >   --    GFRESTBLACK  79  85   --    --   68   < >  77   < >   --    POTASSIUM  4.4  4.3   --    --   4.0   < >  4.9   < >  4.5   TSH  0.99   --   23.71*   --   44.08*   --   40.90*   < >  <0.00    < > = values in this interval not displayed.      BP Readings from Last 3 Encounters:   10/29/18 121/75   10/24/18 (!) 144/102   10/04/18 142/86    Wt Readings from Last 3 Encounters:   10/29/18 240 lb (108.9 kg)   10/04/18 254 lb (115.2 kg)   09/21/18 240 lb (108.9 kg)        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)     APPENDECTOMY       ARTHRODESIS ANKLE      right     ARTHROPLASTY KNEE BILATERAL       ARTHROPLASTY REVISION KNEE  4/19/2011    Procedure:ARTHROPLASTY REVISION KNEE; With Antibiotic Cement ; Surgeon:CHAO OLIVARES; [facility]     BREAST SURGERY      right- tissue remove nipple area     BUNIONECTOMY  12/14/2011    Procedure:BUNIONECTOMY; Right Bunion Correction; Surgeon:GRACE ZARATE; Location:[facility]     C STOMACH SURGERY PROCEDURE UNLISTED      see list which we will bring     CHOLECYSTECTOMY       EXCISE MASS UPPER EXTREMITY  12/14/2011    Procedure:EXCISE MASS UPPER EXTREMITY; Excision of Left Arm Mass; Surgeon:GRACE ZARATE; Location:[facility]     FOOT SURGERY      left X 4     FUSION LUMBAR ANTERIOR ONE LEVEL       HC SACROPLASTY      see list which we will bring     INJECT NERVE BLOCK SUPRASCAPULAR Left 5/18/2018    Procedure: INJECT NERVE BLOCK SUPRASCAPULAR;  Left Suprascapular Nerve Block;  Surgeon: Basim Velazquez MD;   [facility]     INJECT NERVE BLOCK SUPRASCAPULAR Right 7/23/2018    Procedure: INJECT NERVE BLOCK SUPRASCAPULAR;  Right suprascapular injection;  Surgeon: Basim Velazquez MD;  [facility]     INJECT NERVE BLOCK SUPRASCAPULAR Bilateral 10/29/2018    Procedure: Bilateral Suprascapular Nerve Blocks;  Surgeon: Basim Velazquez MD;  [facility]     KNEE SURGERY      see list which we will bring     LAMINECTOMY LUMBAR ONE LEVEL      L4-5     LAPAROSCOPIC ABLATION ENDOMETRIOSIS       REMOVE HARDWARE FOOT  12/14/2011    Procedure:REMOVE HARDWARE FOOT; Hardware Removal Right Foot (Mini-C-Arm) ; Surgeon:GRACE ZARATE; Location:[facility]     RHINOPLASTY       SALPINGO-OOPHORECTOMY BILATERAL       TONSILLECTOMY       Labs reviewed in EPIC  Problem list, Medication list, Allergies, and Medical/Social/Surgical histories reviewed in [facility] and updated as appropriate.     ROS: Constitutional, neuro, ENT, endocrine, pulmonary, cardiac, gastrointestinal, genitourinary, musculoskeletal, integument and psychiatric systems are negative, except as otherwise noted above in the HPI.       OBJECTIVE:                                                    BP (!) 144/102 (BP Location: Right arm)  Pulse 90  Temp 98.3  F (36.8  C) (Oral)  Resp 20  SpO2 99%  There is no height or weight on file to calculate BMI.  GENERAL: healthy, alert, well nourished, well hydrated, no distress    Mental Status Assessment:  Appearance:   Appropriate   Eye Contact:   Good   Psychomotor Behavior: Restless agitated  Attitude:   Uncooperative   Orientation:   All  Speech   Rate / Production: Hyperverbal  Pressured    Volume:  Normal   Mood:    Angry  Anxious  Irritable   Affect:    Labile   Thought Content:  Perservative   Thought Form:  Obsessive   Insight:    Poor   Attention Span and Concentration:  limited  Recent and Remote Memory:  intact  Fund of Knowledge: appropriate  Muscle Strength and Tone: normal   Suicidal Ideation: reports no  thoughts, no intention  Hallucination: No  Paranoid-No  Manic-No  Panic-No  Self harm-No      See [facility] notes     Diagnostic Test Results:  Results for orders placed or performed during the hospital encounter of 09/21/18   XR Chest 2 Views    Narrative    EXAM: XR CHEST 2 VW  9/21/2018 8:22 PM      HISTORY: right sided chest pain;     COMPARISON: 9/21/2018 CT    FINDINGS: PA and lateral images of the chest. The trachea is midline.  The cardiac silhouette is mildly enlarged. No pleural effusion or  pneumothorax. No acute airspace opacities. Upper abdomen is  unremarkable. No acute bony findings.       Impression    IMPRESSION: No acute airspace opacities.    I have personally reviewed the examination and initial interpretation  and I agree with the findings.    HORACE ARMSTRONG MD   CT Chest Pulmonary Embolism w Contrast    Narrative    Examination:  CT CHEST PULMONARY EMBOLISM W CONTRAST 9/21/2018 8:25 PM      Comparison: Chest x-ray 4/24/2016, CT chest    History: Dyspnea;     TECHNIQUE: Volumetric helical acquisition of CT images of the chest  from the lung apices to the kidneys were acquired in arterial phase  after the administration of IV contrast. Three-dimensional (3D)  post-processed angiographic images were reconstructed, archived to  PACS and used in the interpretation of this study. Contrast dose: 73cc  isovue 370    FINDINGS:  VASCULATURE: There is adequate opacification of the main and lobar  pulmonary arteries. No filling defect in the lobar and main segmental  pulmonary arteries to suggest pulmonary embolism. There is no evidence  of right heart strain. Cardiomegaly. Ectasia of the ascending aorta to  4.1 cm. Dilatation of the pulmonary arteries, for example the main  pulmonary artery to 4.5 cm, and the right and left pulmonary arteries  to 2.9 and 2.3 cm. No pericardial effusion.    LUNGS: Trace dependent atelectasis. No acute consolidation. No pleural  effusion or pneumothorax. The central tracheal  bronchial tree is  patent. Scattered sub-6 mm pulmonary nodules, for example 3 mm  pulmonary nodule along the right major fissure, possibly a fissural  lymph node (series 8, image 113).    MEDIASTINUM: No enlarged mediastinal or axillary lymph nodes.    UPPER ABDOMEN: Status post cholecystectomy.    BONES/SOFT TISSUES: Multilevel degenerative changes of the spine,  including large Schmorl's node in L1.      Impression    IMPRESSION:   1. No evidence of pulmonary embolism. No acute pulmonary process.  2. Scattered sub-6 mm pulmonary nodules. If the patient is considered  low risk for malignancy, no further follow-up is necessary. Otherwise,  LIKELY consider 12 month follow-up chest CT as per Fleischner Society  2017 guidelines.  3. Cardiomegaly. Ectasia of the ascending aorta 4.1 cm.  4. Dilatation of the pulmonary vasculature, including the main  pulmonary artery to 4.5 cm. This can be seen with pulmonary arterial  hypertension.    I have personally reviewed the examination and initial interpretation  and I agree with the findings.    HORACE ARMSTRONG MD   CBC with platelets differential   Result Value Ref Range    WBC 7.6 4.0 - 11.0 10e9/L    RBC Count 4.15 3.8 - 5.2 10e12/L    Hemoglobin 12.4 11.7 - 15.7 g/dL    Hematocrit 39.1 35.0 - 47.0 %    MCV 94 78 - 100 fl    MCH 29.9 26.5 - 33.0 pg    MCHC 31.7 31.5 - 36.5 g/dL    RDW 15.5 (H) 10.0 - 15.0 %    Platelet Count 233 150 - 450 10e9/L    Diff Method Automated Method     % Neutrophils 71.3 %    % Lymphocytes 23.4 %    % Monocytes 4.0 %    % Eosinophils 0.8 %    % Basophils 0.4 %    % Immature Granulocytes 0.1 %    Nucleated RBCs 0 0 /100    Absolute Neutrophil 5.4 1.6 - 8.3 10e9/L    Absolute Lymphocytes 1.8 0.8 - 5.3 10e9/L    Absolute Monocytes 0.3 0.0 - 1.3 10e9/L    Absolute Eosinophils 0.1 0.0 - 0.7 10e9/L    Absolute Basophils 0.0 0.0 - 0.2 10e9/L    Abs Immature Granulocytes 0.0 0 - 0.4 10e9/L    Absolute Nucleated RBC 0.0    Comprehensive metabolic panel    Result Value Ref Range    Sodium 140 133 - 144 mmol/L    Potassium 4.4 3.4 - 5.3 mmol/L    Chloride 103 94 - 109 mmol/L    Carbon Dioxide 31 20 - 32 mmol/L    Anion Gap 6 3 - 14 mmol/L    Glucose 120 (H) 70 - 99 mg/dL    Urea Nitrogen 20 7 - 30 mg/dL    Creatinine 0.88 0.52 - 1.04 mg/dL    GFR Estimate 65 >60 mL/min/1.7m2    GFR Estimate If Black 79 >60 mL/min/1.7m2    Calcium 9.3 8.5 - 10.1 mg/dL    Bilirubin Total 0.6 0.2 - 1.3 mg/dL    Albumin 4.2 3.4 - 5.0 g/dL    Protein Total 8.4 6.8 - 8.8 g/dL    Alkaline Phosphatase 98 40 - 150 U/L    ALT 24 0 - 50 U/L    AST 20 0 - 45 U/L   Troponin I   Result Value Ref Range    Troponin I ES <0.015 0.000 - 0.045 ug/L   Ammonia   Result Value Ref Range    Ammonia <10 (L) 10 - 50 umol/L   TSH with free T4 reflex   Result Value Ref Range    TSH 0.99 0.40 - 4.00 mU/L   Alcohol ethyl   Result Value Ref Range    Ethanol g/dL <0.01 <0.01 g/dL   Acetaminophen level   Result Value Ref Range    Acetaminophen Level <2 mg/L   Salicylate level   Result Value Ref Range    Salicylate Level <2 mg/dL   EKG 12-lead, tracing only   Result Value Ref Range    Interpretation ECG Click View Image link to view waveform and result    ISTAT gases lactate jennifer POCT   Result Value Ref Range    Ph Venous 7.30 (L) 7.32 - 7.43 pH    PCO2 Venous 66 (H) 40 - 50 mm Hg    PO2 Venous 24 (L) 25 - 47 mm Hg    Bicarbonate Venous 33 (H) 21 - 28 mmol/L    O2 Sat Venous 35 %    Lactic Acid 1.0 0.7 - 2.1 mmol/L        ASSESSMENT/PLAN:                                                    (G90.529) Complex regional pain syndrome type 1 of lower extremity, unspecified laterality  (primary encounter diagnosis)  (M15.9) Generalized osteoarthrosis, involving multiple sites  (M79.7) Fibromyalgia  (M75.42) Impingement syndrome of left shoulder  Comment: on multiple opioid medications as above through outside Pain Specialist.   -medications reviewed for accuracy with PharmD.  -patient unreceptive to risks of high doses of  opioids.  Plan: as above, continue to monitor.    (Z91.19) Non compliance with medical treatment  Comment: she is non-compliant with our recommendations regarding her health, hypertension and health screenings/maintenance.   Plan: discussed with medical lead.  Aggressive behavior is not acceptable, behavior contract may be warranted, patient to follow up with [facility] and he will address her behavioral issues.     (M32.9) Systemic lupus erythematosus, unspecified SLE type, unspecified organ involvement status (H)  Comment: Reviewed Rheumatology notes from 3/2018, lab work showed no evidence of SLE or systemic inflammatory rheumatic condition.  Plan: would recommend tapering off steroids, patient unwilling to do so, patient truly believes she has lupus despite evidence to the contrary.      (I10) Essential hypertension  Comment: poorly controlled  Plan: patient resistant to restarting medications. Cautioned on risks.      (I48.0) Paroxysmal atrial fibrillation (H)  Comment: hx AF with RVR Dec. 2017. Most recent EKG 9/2018 showing normal sinus rhythm  Plan: patient refusing beta blockers. 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   Very evident.  Plan: supportive listening, continue to monitor.        Patient Instructions:  Patient Instructions   We have reviewed your medication list and updated to make sure everything is correct.  We are glad your sleep is better.  We want our clinic to be a positive experience for you.   We can see you again in 3 months or sooner if you would like.                        ANITRA Talamantes [facility]  INTEGRATED PRIMARY CARE    I spent Greater than 40 minutes was spent face to face with Aspen Mccullough, with greater than 50 % in counselling and consultation about multiple medical conditions, experiences with healthcare system, medication management.

## 2018-10-29 ENCOUNTER — HOSPITAL ENCOUNTER (OUTPATIENT)
Facility: AMBULATORY SURGERY CENTER | Age: 58
End: 2018-10-29
Payer: COMMERCIAL

## 2018-10-29 ENCOUNTER — SURGERY (OUTPATIENT)
Age: 58
End: 2018-10-29

## 2018-10-29 VITALS
WEIGHT: 240 LBS | HEIGHT: 69 IN | DIASTOLIC BLOOD PRESSURE: 75 MMHG | SYSTOLIC BLOOD PRESSURE: 121 MMHG | OXYGEN SATURATION: 93 % | BODY MASS INDEX: 35.55 KG/M2 | RESPIRATION RATE: 18 BRPM | TEMPERATURE: 98.9 F | HEART RATE: 80 BPM

## 2018-10-29 RX ORDER — METHYLPREDNISOLONE ACETATE 80 MG/ML
INJECTION, SUSPENSION INTRA-ARTICULAR; INTRALESIONAL; INTRAMUSCULAR; SOFT TISSUE PRN
Status: DISCONTINUED | OUTPATIENT
Start: 2018-10-29 | End: 2018-10-29 | Stop reason: HOSPADM

## 2018-10-29 RX ORDER — LIDOCAINE HYDROCHLORIDE 10 MG/ML
INJECTION, SOLUTION EPIDURAL; INFILTRATION; INTRACAUDAL; PERINEURAL PRN
Status: DISCONTINUED | OUTPATIENT
Start: 2018-10-29 | End: 2018-10-29 | Stop reason: HOSPADM

## 2018-10-29 RX ORDER — BUPIVACAINE HYDROCHLORIDE 2.5 MG/ML
INJECTION, SOLUTION EPIDURAL; INFILTRATION; INTRACAUDAL PRN
Status: DISCONTINUED | OUTPATIENT
Start: 2018-10-29 | End: 2018-10-29 | Stop reason: HOSPADM

## 2018-10-29 RX ADMIN — LIDOCAINE HYDROCHLORIDE 3 ML: 10 INJECTION, SOLUTION EPIDURAL; INFILTRATION; INTRACAUDAL; PERINEURAL at 11:05

## 2018-10-29 RX ADMIN — METHYLPREDNISOLONE ACETATE 80 MG: 80 INJECTION, SUSPENSION INTRA-ARTICULAR; INTRALESIONAL; INTRAMUSCULAR; SOFT TISSUE at 11:07

## 2018-10-29 RX ADMIN — BUPIVACAINE HYDROCHLORIDE 6 ML: 2.5 INJECTION, SOLUTION EPIDURAL; INFILTRATION; INTRACAUDAL at 11:07

## 2018-10-29 NOTE — IP AVS SNAPSHOT
MRN:[id number]                      After Visit Summary   10/29/2018    Aspen Mccullough    MRN: [id number]           Thank you!     Thank you for choosing [facility] for your care. Our goal is always to provide you with excellent care. Hearing back from our patients is one way we can continue to improve our services. Please take a few minutes to complete the written survey that you may receive in the mail after you visit with us. Thank you!        Patient Information     Date Of Birth          1960        About your hospital stay     You were admitted on:  October 29, 2018 You last received care in the:  [facility]    You were discharged on:  October 29, 2018       Who to Call     For medical emergencies, please call 911.  For non-urgent questions about your medical care, please call your primary care provider or clinic, [phone]  For questions related to your surgery, please call your surgery clinic        Attending Provider     Provider Specialty    Basim Velazquez MD Anesthesiology       Primary Care Provider Office Phone # Fax #    Umm ANITRA Kee [phone]      Your next 10 appointments already scheduled     Nov 06, 2018 10:30 AM CST   Return Visit with Antwan Boston [facility] ([facility])    [address]  [address]  [facility] [id number] [phone]            Nov 19, 2018 10:30 AM CST   Return Visit with BARNEY Yan   [facility] ([facility])    [address]  [address]  [facility] [id number] [phone]            Nov 26, 2018  9:40 AM CST   (Arrive by 9:25 AM)   Return Visit with ANITRA Tavarez CNP   [facility] ([facility])    [address]  4th [address]  [facility] [address]    [phone]            Sotero 15, 2019  9:00 AM CST   Return Visit with Antwan Boston [facility] ([facility])    [address] [place], [place] [address]   [address]  [facility] [address] [phone]            Sotero 15, 2019  9:00 AM CST   Return Visit with ANITRA [facility] ([facility])    [address] [place] [address]  [address] [place] [id number] [phone]            Sotero 15, 2019  9:00 AM CST   Office Visit with Cristina Pollack, [facility] ([facility])    [address] [place] [address]  [address] [place] [id number] [phone]           Bring a current list of meds and any records pertaining to this visit. For Physicals, please bring immunization records and any forms needing to be filled out. Please arrive 10 minutes early to complete paperwork.              Further instructions from your care team       Home Care Instructions after an Upper Extremity nerve block      In an upper extremity nerve block, local anesthetic or  numbing  medication is injected around the nerves to anesthetize or  numb  the shoulder or arm. When used for chronic pain, it is hoped that this procedure will interrupt the nerve pathways and provide long term pain relief. If your arm is numb, you should be careful since it is possible to injure your arm without being aware of the injury. While your arm is numb, you should:    Avoid striking or bumping your arm    Avoid extreme hot or cold    Activity  -You may resume most normal activity levels with the exception of strenuous activity. It is important for us to know if your pain with normal activity is relieved after this injection.  -DO NOT shower for 24 hours  -DO NOT remove bandaid for 24 hours    Pain  -You may experience soreness at the injection site for one or two  days  -You may use an ice pack for 20 minutes every 2 hours for the first 24 hours  -You may use a heating pad after the first 24 hours  -You may use Tylenol (acetaminophen) every 4 hours or other pain medicines as     directed by your physician      DID YOU RECEIVE SEDATION TODAY?  Yes    If you received sedation please follow these additional safety measures.  Sedation medicine, if given, may remain active for many hours. It is important for the next 24 hours that you do not:  -Drive a car  -Operate machines or power tools  -Consume alcohol, including beer  -Sign any important papers or legal documents    DID YOU RECEIVE STEROIDS TODAY?  No    Common side effects of steroids:  Not everyone will experience corticosteroid side effects. If side effects are experienced, they will gradually subside in the 7-10 day period following an injection. Most common side effects include:  -Flushed face and/or chest  -Feeling of warmth, particularly in the face but could be an overall feeling of warmth  -Increased blood sugar in diabetic patients  -Menstrual irregularities my occur. If taking hormone-based birth control an alternate method of birth control is recommended  -Sleep disturbances and/or mood swings are possible  -Leg cramps      PLEASE KEEP TRACK OF YOUR SYMPTOMS AND NOTE YOUR IMPROVEMENT FOR YOUR DOCTOR.     Please contact us if you have:  -Severe pain  -Fever more than 101.5 degrees Fahrenheit  -Signs of infection at the injection site (redness, swelling, or drainage)    If you have questions, please contact our office at [phone] between the hours of 7:00 am and 3:00 pm Monday through Friday. After office hours you can contact the on call provider by dialing [phone]. If you need immediate attention, we recommend that you go to a hospital emergency room or dial [phone].        Pending Results     No orders found from 10/27/2018 to 10/30/2018.            Admission Information     Date & Time Provider Department  "Dept. Phone    10/29/2018 Basim Velazquez MD [facility] [phone]      Your Vitals Were     Blood Pressure Pulse Temperature Respirations Height Weight    132/92 78 98.1  F (36.7  C) (Temporal) 18 1.753 m (5' 9\") 108.9 kg (240 lb)    Pulse Oximetry BMI (Body Mass Index)                96% 35.44 kg/m2          [employer] Information     [employer] gives you secure access to your electronic health record. If you see a primary care provider, you can also send messages to your care team and make appointments. If you have questions, please call your primary care clinic.  If you do not have a primary care provider, please call [phone] and they will assist you.      [employer] is an electronic gateway that provides easy, online access to your medical records. With "[employer] you can request a clinic appointment, read your test results, renew a prescription or communicate with your care team.     To access your existing account, please contact your [facility] or call [phone] for assistance.        Care EveryWhere ID     This is your Care EveryWhere ID. This could be used by other organizations to access your [facility] medical records  [id number]        Equal Access to Services     ADRYAN BHAT : Laisha Dan, olimpia michaels, qaavnita kaalmada jas, yaneth sherman. So [facility] [phone].    ATENCIÓN: Si habla español, tiene a lynn disposición servicios gratuitos de asistencia lingüística. Llame al [phone].    We comply with applicable federal civil rights laws and [place] laws. We do not discriminate on the basis of race, color, national origin, age, disability, sex, sexual orientation, or gender identity.               Review of your medicines      UNREVIEWED medicines. Ask your doctor about these medicines        Dose / Directions    ALLEGRA 180 MG tablet   Generic drug:  fexofenadine        Dose:  180 mg "   Take 180 mg by mouth daily.   Refills:  0       baclofen 10 MG tablet   Commonly known as:  LIORESAL        Dose:  10 mg   Take 1 tablet (10 mg) by mouth 3 times daily   Quantity:  90 tablet   Refills:  0       BIOTIN FORTE PO        Take by mouth 2 times daily   Refills:  0       carboxymethylcellulose sodium 0.5 % Soln ophthalmic solution   Generic drug:  carboxymethylcellulose        Dose:  1 drop   Place 1 drop into both eyes 2 times daily   Quantity:  1 Bottle   Refills:  0       CEPHALEXIN PO        Dose:  500 mg   Take 500 mg by mouth as needed (Dental Procedure) Take 4 caps 1 hour prior to Dental Appointment   Refills:  0       cyanocobalamin 1000 MCG/ML injection   Commonly known as:  VITAMIN B12   Used for:  Other vitamin B12 deficiency anemia        Dose:  1 mL   Inject 1 mL (1,000 mcg) Subcutaneous every 7 days   Quantity:  4 mL   Refills:  5       cyclobenzaprine 10 MG tablet   Commonly known as:  FLEXERIL   Used for:  Generalized osteoarthrosis, involving multiple sites        Dose:  10 mg   Take 1 tablet (10 mg) by mouth 3 times daily   Quantity:  90 tablet   Refills:  3       CYTOMEL 25 MCG tablet   Generic drug:  liothyronine        Dose:  25 mcg   Take 1 tablet (25 mcg) by mouth 2 times daily   Refills:  0       diazepam 10 MG tablet   Commonly known as:  VALIUM   Used for:  Chronic pain syndrome, Encounter for long-term use of opiate analgesic        Dose:  5-10 mg   Take 0.5-1 tablets (5-10 mg) by mouth daily as needed for anxiety   Quantity:  90 tablet   Refills:  0       fish oil-omega-3 fatty acids 1000 MG capsule        Dose:  1 g   Take 1 capsule (1 g) by mouth 2 times daily   Quantity:  100 capsule   Refills:  0       HYDROmorphone 8 MG tablet   Commonly known as:  DILAUDID   Used for:  Chronic pain syndrome        Dose:  8 mg   Take 1 tablet (8 mg) by mouth 3 times daily   Refills:  0       * levothyroxine 300 MCG tablet   Commonly known as:  SYNTHROID/LEVOTHROID        Dose:  300 mcg    Take 300 mcg by mouth 2 times daily   Refills:  0       * levothyroxine 100 MCG Solr injection   Commonly known as:  SYNTHROID/LEVOTHROID        Dose:  200 mcg   Inject 10 mLs (200 mcg) into the vein once a week   Refills:  0       lifitegrast 5 % opthalmic solution   Commonly known as:  XIIDRA        Dose:  1 drop   Place 1 drop into both eyes 2 times daily   Refills:  0       MAGNESIUM-ZINC PO        Take by mouth daily   Refills:  0       * morphine 60 MG 12 hr tablet   Commonly known as:  MS CONTIN   Used for:  Chronic pain syndrome        Dose:  60 mg   Take 1 tablet (60 mg) by mouth every 8 hours   Quantity:  30 tablet   Refills:  0       * morphine 30 MG 12 hr tablet   Commonly known as:  MS CONTIN   Used for:  Chronic pain syndrome        Take 30mg in the morning and 30mg at night with 60mg tablet for total of 90mg   Quantity:  270 tablet   Refills:  0       * morphine 15 MG 12 hr tablet   Commonly known as:  MS CONTIN   Used for:  Chronic pain syndrome        Dose:  15 mg   Take 1 tablet (15 mg) by mouth daily At noon with 60mg tablet for total of 75mg   Quantity:  60 tablet   Refills:  0       Multi-vitamin Tabs tablet        Dose:  1 tablet   Take 1 tablet by mouth 2 times daily   Quantity:  100 tablet   Refills:  3       NASONEX 50 MCG/ACT spray   Generic drug:  mometasone        Dose:  1 spray   Spray 1 spray into both nostrils daily   Refills:  11       nystatin 150428 UNIT/GM Powd   Commonly known as:  MYCOSTATIN   Used for:  Candidal skin infection        Apply topically 3 times daily as needed   Quantity:  60 g   Refills:  1       prednisoLONE acetate 1 % ophthalmic susp   Commonly known as:  PRED FORTE        Dose:  1 drop   1 drop 4 times daily   Refills:  0       * predniSONE 5 MG tablet   Commonly known as:  DELTASONE        Alternate taking 8mg and 10mg every other day   Refills:  0       * predniSONE 1 MG tablet   Commonly known as:  DELTASONE        Alternate 8mg and 10mg every other day    Refills:  2       probiotic Caps        Dose:  1 capsule   Take 1 capsule by mouth 2 times daily   Refills:  0       RESTASIS 0.05 % ophthalmic emulsion   Generic drug:  cycloSPORINE        Dose:  1 drop   Place 1 drop into both eyes 2 times daily   Refills:  0       VITAMIN D (CHOLECALCIFEROL) PO        Dose:  59738 Units   Take 50,000 Units by mouth once a week   Refills:  0       XARELTO 20 MG Tabs tablet   Generic drug:  rivaroxaban ANTICOAGULANT        Dose:  20 mg   Take 1 tablet (20 mg) by mouth daily (with dinner)   Refills:  0       * Notice:  This list has 7 medication(s) that are the same as other medications prescribed for you. Read the directions carefully, and ask your doctor or other care provider to review them with you.             Protect others around you: Learn how to safely use, store and throw away your medicines at www.disposemymeds.org.             Medication List: This is a list of all your medications and when to take them. Check marks below indicate your daily home schedule. Keep this list as a reference.      Medications           Morning Afternoon Evening Bedtime As Needed    ALLEGRA 180 MG tablet   Take 180 mg by mouth daily.   Generic drug:  fexofenadine                                baclofen 10 MG tablet   Commonly known as:  LIORESAL   Take 1 tablet (10 mg) by mouth 3 times daily                                BIOTIN FORTE PO   Take by mouth 2 times daily                                carboxymethylcellulose sodium 0.5 % Soln ophthalmic solution   Place 1 drop into both eyes 2 times daily   Generic drug:  carboxymethylcellulose                                CEPHALEXIN PO   Take 500 mg by mouth as needed (Dental Procedure) Take 4 caps 1 hour prior to Dental Appointment                                cyanocobalamin 1000 MCG/ML injection   Commonly known as:  VITAMIN B12   Inject 1 mL (1,000 mcg) Subcutaneous every 7 days                                cyclobenzaprine 10 MG tablet    Commonly known as:  FLEXERIL   Take 1 tablet (10 mg) by mouth 3 times daily                                CYTOMEL 25 MCG tablet   Take 1 tablet (25 mcg) by mouth 2 times daily   Generic drug:  liothyronine                                diazepam 10 MG tablet   Commonly known as:  VALIUM   Take 0.5-1 tablets (5-10 mg) by mouth daily as needed for anxiety                                fish oil-omega-3 fatty acids 1000 MG capsule   Take 1 capsule (1 g) by mouth 2 times daily                                HYDROmorphone 8 MG tablet   Commonly known as:  DILAUDID   Take 1 tablet (8 mg) by mouth 3 times daily                                * levothyroxine 300 MCG tablet   Commonly known as:  SYNTHROID/LEVOTHROID   Take 300 mcg by mouth 2 times daily                                * levothyroxine 100 MCG Solr injection   Commonly known as:  SYNTHROID/LEVOTHROID   Inject 10 mLs (200 mcg) into the vein once a week                                lifitegrast 5 % opthalmic solution   Commonly known as:  XIIDRA   Place 1 drop into both eyes 2 times daily                                MAGNESIUM-ZINC PO   Take by mouth daily                                * morphine 60 MG 12 hr tablet   Commonly known as:  MS CONTIN   Take 1 tablet (60 mg) by mouth every 8 hours                                * morphine 30 MG 12 hr tablet   Commonly known as:  MS CONTIN   Take 30mg in the morning and 30mg at night with 60mg tablet for total of 90mg                                * morphine 15 MG 12 hr tablet   Commonly known as:  MS CONTIN   Take 1 tablet (15 mg) by mouth daily At noon with 60mg tablet for total of 75mg                                Multi-vitamin Tabs tablet   Take 1 tablet by mouth 2 times daily                                NASONEX 50 MCG/ACT spray   Spray 1 spray into both nostrils daily   Generic drug:  mometasone                                nystatin 212951 UNIT/GM Powd   Commonly known as:  MYCOSTATIN   Apply  topically 3 times daily as needed                                prednisoLONE acetate 1 % ophthalmic susp   Commonly known as:  PRED FORTE   1 drop 4 times daily                                * predniSONE 5 MG tablet   Commonly known as:  DELTASONE   Alternate taking 8mg and 10mg every other day                                * predniSONE 1 MG tablet   Commonly known as:  DELTASONE   Alternate 8mg and 10mg every other day                                probiotic Caps   Take 1 capsule by mouth 2 times daily                                RESTASIS 0.05 % ophthalmic emulsion   Place 1 drop into both eyes 2 times daily   Generic drug:  cycloSPORINE                                VITAMIN D (CHOLECALCIFEROL) PO   Take 50,000 Units by mouth once a week                                XARELTO 20 MG Tabs tablet   Take 1 tablet (20 mg) by mouth daily (with dinner)   Generic drug:  rivaroxaban ANTICOAGULANT                                * Notice:  This list has 7 medication(s) that are the same as other medications prescribed for you. Read the directions carefully, and ask your doctor or other care provider to review them with you.

## 2018-10-29 NOTE — DISCHARGE INSTRUCTIONS
Home Care Instructions after an Upper Extremity nerve block      In an upper extremity nerve block, local anesthetic or  numbing  medication is injected around the nerves to anesthetize or  numb  the shoulder or arm. When used for chronic pain, it is hoped that this procedure will interrupt the nerve pathways and provide long term pain relief. If your arm is numb, you should be careful since it is possible to injure your arm without being aware of the injury. While your arm is numb, you should:    Avoid striking or bumping your arm    Avoid extreme hot or cold    Activity  -You may resume most normal activity levels with the exception of strenuous activity. It is important for us to know if your pain with normal activity is relieved after this injection.  -DO NOT shower for 24 hours  -DO NOT remove bandaid for 24 hours    Pain  -You may experience soreness at the injection site for one or two days  -You may use an ice pack for 20 minutes every 2 hours for the first 24 hours  -You may use a heating pad after the first 24 hours  -You may use Tylenol (acetaminophen) every 4 hours or other pain medicines as     directed by your physician      DID YOU RECEIVE SEDATION TODAY?  Yes    If you received sedation please follow these additional safety measures.  Sedation medicine, if given, may remain active for many hours. It is important for the next 24 hours that you do not:  -Drive a car  -Operate machines or power tools  -Consume alcohol, including beer  -Sign any important papers or legal documents    DID YOU RECEIVE STEROIDS TODAY?  No    Common side effects of steroids:  Not everyone will experience corticosteroid side effects. If side effects are experienced, they will gradually subside in the 7-10 day period following an injection. Most common side effects include:  -Flushed face and/or chest  -Feeling of warmth, particularly in the face but could be an overall feeling of warmth  -Increased blood sugar in diabetic  patients  -Menstrual irregularities my occur. If taking hormone-based birth control an alternate method of birth control is recommended  -Sleep disturbances and/or mood swings are possible  -Leg cramps      PLEASE KEEP TRACK OF YOUR SYMPTOMS AND NOTE YOUR IMPROVEMENT FOR YOUR DOCTOR.     Please contact us if you have:  -Severe pain  -Fever more than 101.5 degrees Fahrenheit  -Signs of infection at the injection site (redness, swelling, or drainage)    If you have questions, please contact our office at [phone] between the hours of 7:00 am and 3:00 pm Monday through Friday. After office hours you can contact the on call provider by dialing [phone]. If you need immediate attention, we recommend that you go to a hospital emergency room or dial [phone].

## 2018-10-29 NOTE — IP AVS SNAPSHOT
[facility]    [address] [place] [address]    Phone:  [phone]    Fax:  [phone]                                       After Visit Summary   10/29/2018    Aspen Mccullough    MRN: [id number]           After Visit Summary Signature Page     I have received my discharge instructions, and my questions have been answered. I have discussed any challenges I see with this plan with the nurse or doctor.    ..........................................................................................................................................  Patient/Patient Representative Signature      ..........................................................................................................................................  Patient Representative Print Name and Relationship to Patient    ..................................................               ................................................  Date                                   Time    ..........................................................................................................................................  Reviewed by Signature/Title    ...................................................              ..............................................  Date                                               Time          22EPIC Rev 08/18

## 2018-10-30 ENCOUNTER — TRANSFERRED RECORDS (OUTPATIENT)
Dept: HEALTH INFORMATION MANAGEMENT | Facility: CLINIC | Age: 58
End: 2018-10-30

## 2018-10-30 LAB — TSH SERPL-ACNC: 17.13 UIU/ML (ref 0.3–5)

## 2018-10-30 NOTE — OP NOTE
Suprascapular nerve block: Bilateral using ultrasound guidance    Dx: Bilateral shoulder osteoarthritis, adhesive capsulitis  Referring provider: Basim Velazquez    Consent: discussed procedure in detail, benefit and risks explained and all questioned answered.      She was transferred to the procedure room, positioned in a chair and both shoulders and suprascapular fossa were prepped with chlorhexidine and draped.  Ultrasound was used to identify suprascapular notch and the suprascapular nerve lying in the suprascapular fossa.  The skin and soft tissue was anesthetized with 2 ml of 1 % Lidocaine.  A 4 inch stimulplex needle was then introduced and using ulttrasound guidance directed to the to a location just medial to the suprascapular nerve.   There was negative aspiration. 4 ml of a 0.25% bupivacaine (6ml) with 80mg (2ml) of methylprednisolone was injected to each side, for a total volume of 8ml. She tolerated the procedure well and there were no complications.     Pre procedure pain was 8/10. Immediately post procedure she reported 7/10  pain.    Patient will follow-up in clinic.    Louis Cantu MD,[facility],FASA  Fellow  Regional Anesthesia and Pain Medicine  [facility] Department of Anesthesia    I saw and examined the patient with the fellow and agree with the findings and the plan of care as documented in the fellow's note. I was present for the entire procedure.  Basim Velazquez IV, MD

## 2018-11-26 ENCOUNTER — OFFICE VISIT (OUTPATIENT)
Dept: ANESTHESIOLOGY | Facility: CLINIC | Age: 58
End: 2018-11-26
Payer: COMMERCIAL

## 2018-11-26 VITALS
DIASTOLIC BLOOD PRESSURE: 62 MMHG | SYSTOLIC BLOOD PRESSURE: 128 MMHG | HEART RATE: 95 BPM | HEIGHT: 69 IN | BODY MASS INDEX: 35.55 KG/M2 | WEIGHT: 240 LBS | RESPIRATION RATE: 16 BRPM

## 2018-11-26 DIAGNOSIS — M32.9 SYSTEMIC LUPUS ERYTHEMATOSUS, UNSPECIFIED SLE TYPE, UNSPECIFIED ORGAN INVOLVEMENT STATUS (H): Primary | ICD-10-CM

## 2018-11-26 DIAGNOSIS — F11.20 UNCOMPLICATED OPIOID DEPENDENCE (H): ICD-10-CM

## 2018-11-26 DIAGNOSIS — M79.7 FIBROMYALGIA: ICD-10-CM

## 2018-11-26 ASSESSMENT — ANXIETY QUESTIONNAIRES
4. TROUBLE RELAXING: NOT AT ALL
GAD7 TOTAL SCORE: 1
6. BECOMING EASILY ANNOYED OR IRRITABLE: SEVERAL DAYS
7. FEELING AFRAID AS IF SOMETHING AWFUL MIGHT HAPPEN: NOT AT ALL
2. NOT BEING ABLE TO STOP OR CONTROL WORRYING: NOT AT ALL
5. BEING SO RESTLESS THAT IT IS HARD TO SIT STILL: NOT AT ALL
GAD7 TOTAL SCORE: 1
7. FEELING AFRAID AS IF SOMETHING AWFUL MIGHT HAPPEN: NOT AT ALL
GAD7 TOTAL SCORE: 1
1. FEELING NERVOUS, ANXIOUS, OR ON EDGE: NOT AT ALL
3. WORRYING TOO MUCH ABOUT DIFFERENT THINGS: NOT AT ALL

## 2018-11-26 NOTE — PROGRESS NOTES
Date of visit: 11/26/2018     Chief complaint:       Chief Complaint   Patient presents with     Pain Management         Interval history:  Aspen Mccullough is a 58 year old female last seen by me 7/13/18 for upper shoulder and lateral upper extremity pain. She previously had undergone a left suprascapular nerve block with 100% relief and restoration of range of motion. Due to presentation of similar symptoms on right side, locating pain that extends from right infraspinatus location to lateral aspect of upper arm, we recommended a bilateral suprascapular nerve block that was done on 10/29/18. Patient reports excellent relief with restoration of range of motion, however, she fell this past weekend in her bathroom, scraping her right axilla and aggravating pain. She has not been seen for her abrasion but is covering with dry gauze for comfort.        Medications:   Current Outpatient Prescriptions           Current Outpatient Prescriptions   Medication Sig Dispense Refill     BACLOFEN PO Take 10 mg by mouth 3 times daily         CEPHALEXIN PO Take 500 mg by mouth as needed (Dental Procedure) Take 4 caps 1 hour prior to Dental Appointment         cyanocobalamin 1000 MCG/ML injection Inject 1 mL (1,000 mcg) Subcutaneous every 7 days 4 mL 5     cyclobenzaprine (FLEXERIL) 10 MG tablet Take 1 tablet (10 mg) by mouth 3 times daily 90 tablet 3     cycloSPORINE (RESTASIS) 0.05 % ophthalmic emulsion Place 1 drop into both eyes 2 times daily         diazepam (VALIUM) 10 MG tablet Take 1 tablet (10 mg) by mouth 3 times daily (Patient taking differently: Take 10 mg by mouth daily ) 90 tablet 0     enoxaparin (LOVENOX) 100 MG/ML SOLN Inject Subcutaneous every 12 hours         estradiol (ESTRING) 2 MG vaginal ring Place vaginally every 3 months         fexofenadine (ALLEGRA) 180 MG tablet Take 180 mg by mouth daily.         FISH OIL daily. w/DHEA.          fluticasone (FLONASE) 50 MCG/ACT nasal spray 2 sprays by Both Nostrils route  daily as needed.         HYDROmorphone (DILAUDID) 8 MG tablet Take 1 tablet (8 mg) by mouth every 3 hours as needed (Patient taking differently: Take 8 mg by mouth 3 times daily ) 100 tablet 0     LEVOFLOXACIN PO Take 750 mg by mouth         levothyroxine (SYNTHROID/LEVOTHROID) 100 MCG SOLR injection Inject 200 mcg into the vein twice a week         levothyroxine (SYNTHROID/LEVOTHROID) 200 MCG SOLR injection     3     levothyroxine (SYNTHROID/LEVOTHROID) 300 MCG tablet Take 300 mcg by mouth 2 times daily          Liothyronine Sodium 50 MCG TABS Take 50 mcg by mouth daily          morphine (MS CONTIN) 30 MG 12 hr tablet Take 3 tablets (90 mg) by mouth every 8 hours (Patient taking differently: Take 90 mg by mouth 3 times daily ) 270 tablet 0     morphine (MS CONTIN) 60 MG 12 hr tablet     0     NASONEX 50 MCG/ACT nasal spray     11     norethindrone-ethinyl estradiol (FEMHRT 1/5) 1-5 MG-MCG per tablet Take 1 tablet by mouth daily         nystatin (MYCOSTATIN) 115325 UNIT/GM POWD Apply topically 3 times daily as needed 60 g 1     prednisoLONE acetate (PRED FORTE) 1 % ophthalmic susp 1 drop 4 times daily         predniSONE (DELTASONE) 1 MG tablet     2     PREDNISONE 5 MG OR TABS alternate 8 mg and 10 mg every other day         probiotic CAPS Take 1 capsule by mouth daily.         rivaroxaban ANTICOAGULANT (XARELTO) 20 MG TABS tablet Take 20 mg by mouth daily (with dinner)          sodium chloride, PF, (SODIUM CHLORIDE) 0.9% PF flush           SOTALOL HCL PO Take 80 mg by mouth         VITAMIN D, CHOLECALCIFEROL, PO Take 50,000 Units by mouth once a week                Medical History: any changes in medical history since they were last seen? No     Review of Systems:  The 14 system ROS was reviewed from the intake questionnaire; results listed at end of note.      Physical Exam:  Blood pressure 128/62, pulse 74, not currently breastfeeding.  General: NAD  Psych: Mood and affect appropriate  Skin: well healed abrasion  noted at right axilla; dry gauze refilled.      Assessment:   Aspen Mccullough is a 58 year old female who is seen at the pain clinic for right suprascapular neuropathy.     Plan:  1. Interventions:   -bacitracin applied to abrasion; dry gauze reapplied. Discussed signs and symptoms of infection; patient to follow up with PCP with any additional concerns.   2. Follow up:   -as needed.      Total time spent was 15 minutes, and more than 50% of face to face time was spent in counseling and/or coordination of care regarding plan of care.     ANITRA Tavarez, NGUYEN-BC  Pain Management  Department of Interventional Pain Management and Anesthesiology   [facility]       Answers for HPI/ROS submitted by the patient on 11/26/2018   BENI 7 TOTAL SCORE: 1  PHQ-2 Score: 0

## 2018-11-26 NOTE — MR AVS SNAPSHOT
After Visit Summary   11/26/2018    Aspen Mccullough    MRN: [id number]           Patient Information     Date Of Birth          1960        Visit Information        Provider Department      11/26/2018 9:40 AM Sarahi Chung APRN CNP [facility]        Today's Diagnoses     Systemic lupus erythematosus, unspecified SLE type, unspecified organ involvement status (H)    -  1    Uncomplicated opioid dependence (H)        Fibromyalgia           Follow-ups after your visit        Your next 10 appointments already scheduled     Sotero 15, 2019  9:00 AM CST   Return Visit with Antwan Boston [facility] ([facility])    [address] [place] [address]  [address]  [facility] [id number] [phone]            Sotero 15, 2019  9:00 AM CST   Return Visit with ANITRA Biggs CNP   [facility] ([facility])    [address] [place] [address]  [address]  [facility] [id number] [phone]            Sotero 15, 2019  9:00 AM CST   Office Visit with Cristina Pollack [facility] [place] ([facility])    [address] [place] [address]  [address]  [facility] [id number] [phone]           Bring a current list of meds and any records pertaining to this visit. For Physicals, please bring immunization records and any forms needing to be filled out. Please arrive 10 minutes early to complete paperwork.              Who to contact     Please call your clinic at [phone] to:    Ask questions about your health    Make or cancel appointments    Discuss your medicines    Learn about your test results    Speak to your doctor            Additional Information About Your Visit        MyChart Information     [employer] gives you secure access to your electronic health record. If you see a primary care  "provider, you can also send messages to your care team and make appointments. If you have questions, please call your primary care clinic.  If you do not have a primary care provider, please call [phone] and they will assist you.      [employer] is an electronic gateway that provides easy, online access to your medical records. With [employer], you can request a clinic appointment, read your test results, renew a prescription or communicate with your care team.     To access your existing account, please contact your [facility] or call [phone] for assistance.        Care EveryWhere ID     This is your Care EveryWhere ID. This could be used by other organizations to access your [place] medical records  [id number]        Your Vitals Were     Pulse Respirations Height BMI (Body Mass Index)          95 16 1.753 m (5' 9\") 35.44 kg/m2         Blood Pressure from Last 3 Encounters:   11/26/18 128/62   10/29/18 121/75   10/24/18 (!) 144/102    Weight from Last 3 Encounters:   11/26/18 108.9 kg (240 lb)   10/29/18 108.9 kg (240 lb)   10/04/18 115.2 kg (254 lb)              Today, you had the following     No orders found for display       Primary Care Provider Office Phone # Fax #    Umm ANITRA Kee [phone]       [address] [facility] [address]  [facility] [address]        Equal Access to Services     ADRYAN BHAT : Hadii aad ku hadasho Soomaali, waaxda luqadaha, qaybta kaalmada adeegyada, yaneth guillermo . So [facility] [phone].    ATENCIÓN: Si habla español, tiene a lynn disposición servicios gratuitos de asistencia lingüística. Brad al [phone].    We comply with applicable federal civil rights laws and [place] laws. We do not discriminate on the basis of race, color, national origin, age, disability, sex, sexual orientation, or gender identity.            Thank you!     Thank you for choosing [facility]  for your care. Our goal is always to provide you with excellent care. Hearing back from our patients is one way we can continue to improve our services. Please take a few minutes to complete the written survey that you may receive in the mail after your visit with us. Thank you!             Your Updated Medication List - Protect others around you: Learn how to safely use, store and throw away your medicines at www.disposemymeds.org.          This list is accurate as of 11/26/18 11:02 AM.  Always use your most recent med list.                   Brand Name Dispense Instructions for use Diagnosis    ALLEGRA 180 MG tablet   Generic drug:  fexofenadine      Take 180 mg by mouth daily.        baclofen 10 MG tablet    LIORESAL    90 tablet    Take 1 tablet (10 mg) by mouth 3 times daily        BIOTIN FORTE PO      Take by mouth 2 times daily        carboxymethylcellulose sodium 0.5 % Soln ophthalmic solution   Generic drug:  carboxymethylcellulose     1 Bottle    Place 1 drop into both eyes 2 times daily        CEPHALEXIN PO      Take 500 mg by mouth as needed (Dental Procedure) Take 4 caps 1 hour prior to Dental Appointment        cyanocobalamin 1000 MCG/ML injection    VITAMIN B12    4 mL    Inject 1 mL (1,000 mcg) Subcutaneous every 7 days    Other vitamin B12 deficiency anemia       cyclobenzaprine 10 MG tablet    FLEXERIL    90 tablet    Take 1 tablet (10 mg) by mouth 3 times daily    Generalized osteoarthrosis, involving multiple sites       CYTOMEL 25 MCG tablet   Generic drug:  liothyronine      Take 1 tablet (25 mcg) by mouth 2 times daily        diazepam 10 MG tablet    VALIUM    90 tablet    Take 10 mg by mouth daily as needed for anxiety    Chronic pain syndrome, Encounter for long-term use of opiate analgesic       fish oil-omega-3 fatty acids 1000 MG capsule     100 capsule    Take 1 capsule (1 g) by mouth 2 times daily        HYDROmorphone 8 MG tablet    DILAUDID     Take 1 tablet (8 mg) by mouth  3 times daily    Chronic pain syndrome       * levothyroxine 300 MCG tablet    SYNTHROID/LEVOTHROID     Take 300 mcg by mouth 2 times daily        * levothyroxine 100 MCG Solr injection    SYNTHROID/LEVOTHROID     Inject 10 mLs (200 mcg) into the vein once a week        lifitegrast 5 % opthalmic solution    XIIDRA     Place 1 drop into both eyes 2 times daily        MAGNESIUM-ZINC PO      Take by mouth daily        * morphine 60 MG 12 hr tablet    MS CONTIN    30 tablet    Take 1 tablet (60 mg) by mouth every 8 hours    Chronic pain syndrome       * morphine 30 MG CR tablet    MS CONTIN    270 tablet    Take 30mg in the morning and 30mg at night with 60mg tablet for total of 90mg    Chronic pain syndrome       * morphine 15 MG CR tablet    MS CONTIN    60 tablet    Take 1 tablet (15 mg) by mouth daily At noon with 60mg tablet for total of 75mg    Chronic pain syndrome       Multi-vitamin Tabs tablet     100 tablet    Take 1 tablet by mouth 2 times daily        NASONEX 50 MCG/ACT spray   Generic drug:  mometasone      Spray 1 spray into both nostrils daily        nystatin 801719 UNIT/GM Powd    MYCOSTATIN    60 g    Apply topically 3 times daily as needed    Candidal skin infection       prednisoLONE acetate 1 % ophthalmic susp    PRED FORTE     1 drop 4 times daily        * predniSONE 5 MG tablet    DELTASONE     Alternate taking 8mg and 10mg every other day        * predniSONE 1 MG tablet    DELTASONE     Alternate 8mg and 10mg every other day        probiotic Caps      Take 1 capsule by mouth 2 times daily        RESTASIS 0.05 % ophthalmic emulsion   Generic drug:  cycloSPORINE      Place 1 drop into both eyes 2 times daily        VITAMIN D (CHOLECALCIFEROL) PO      Take 50,000 Units by mouth once a week        XARELTO 20 MG Tabs tablet   Generic drug:  rivaroxaban ANTICOAGULANT      Take 1 tablet (20 mg) by mouth daily (with dinner)        * Notice:  This list has 7 medication(s) that are the same as other  medications prescribed for you. Read the directions carefully, and ask your doctor or other care provider to review them with you.

## 2018-11-26 NOTE — LETTER
11/26/2018       RE: Aspen Mccullough  [address]  [facility] [address]     Dear Colleague,    Thank you for referring your patient, Aspen Mccullough, to the [facility] at [facility]. Please see a copy of my visit note below.    Date of visit: 11/26/2018     Chief complaint:       Chief Complaint   Patient presents with     Pain Management         Interval history:  Aspen Mccullough is a 58 year old female last seen by me 7/13/18 for upper shoulder and lateral upper extremity pain. She previously had undergone a left suprascapular nerve block with 100% relief and restoration of range of motion. Due to presentation of similar symptoms on right side, locating pain that extends from right infraspinatus location to lateral aspect of upper arm, we recommended a bilateral suprascapular nerve block that was done on 10/29/18. Patient reports excellent relief with restoration of range of motion, however, she fell this past weekend in her bathroom, scraping her right axilla and aggravating pain. She has not been seen for her abrasion but is covering with dry gauze for comfort.        Medications:   Current Outpatient Prescriptions           Current Outpatient Prescriptions   Medication Sig Dispense Refill     BACLOFEN PO Take 10 mg by mouth 3 times daily         CEPHALEXIN PO Take 500 mg by mouth as needed (Dental Procedure) Take 4 caps 1 hour prior to Dental Appointment         cyanocobalamin 1000 MCG/ML injection Inject 1 mL (1,000 mcg) Subcutaneous every 7 days 4 mL 5     cyclobenzaprine (FLEXERIL) 10 MG tablet Take 1 tablet (10 mg) by mouth 3 times daily 90 tablet 3     cycloSPORINE (RESTASIS) 0.05 % ophthalmic emulsion Place 1 drop into both eyes 2 times daily         diazepam (VALIUM) 10 MG tablet Take 1 tablet (10 mg) by mouth 3 times daily (Patient taking differently: Take 10 mg by mouth daily ) 90 tablet 0     enoxaparin (LOVENOX) 100  MG/ML SOLN Inject Subcutaneous every 12 hours         estradiol (ESTRING) 2 MG vaginal ring Place vaginally every 3 months         fexofenadine (ALLEGRA) 180 MG tablet Take 180 mg by mouth daily.         FISH OIL daily. w/DHEA.          fluticasone (FLONASE) 50 MCG/ACT nasal spray 2 sprays by Both Nostrils route daily as needed.         HYDROmorphone (DILAUDID) 8 MG tablet Take 1 tablet (8 mg) by mouth every 3 hours as needed (Patient taking differently: Take 8 mg by mouth 3 times daily ) 100 tablet 0     LEVOFLOXACIN PO Take 750 mg by mouth         levothyroxine (SYNTHROID/LEVOTHROID) 100 MCG SOLR injection Inject 200 mcg into the vein twice a week         levothyroxine (SYNTHROID/LEVOTHROID) 200 MCG SOLR injection     3     levothyroxine (SYNTHROID/LEVOTHROID) 300 MCG tablet Take 300 mcg by mouth 2 times daily          Liothyronine Sodium 50 MCG TABS Take 50 mcg by mouth daily          morphine (MS CONTIN) 30 MG 12 hr tablet Take 3 tablets (90 mg) by mouth every 8 hours (Patient taking differently: Take 90 mg by mouth 3 times daily ) 270 tablet 0     morphine (MS CONTIN) 60 MG 12 hr tablet     0     NASONEX 50 MCG/ACT nasal spray     11     norethindrone-ethinyl estradiol (FEMHRT 1/5) 1-5 MG-MCG per tablet Take 1 tablet by mouth daily         nystatin (MYCOSTATIN) 155139 UNIT/GM POWD Apply topically 3 times daily as needed 60 g 1     prednisoLONE acetate (PRED FORTE) 1 % ophthalmic susp 1 drop 4 times daily         predniSONE (DELTASONE) 1 MG tablet     2     PREDNISONE 5 MG OR TABS alternate 8 mg and 10 mg every other day         probiotic CAPS Take 1 capsule by mouth daily.         rivaroxaban ANTICOAGULANT (XARELTO) 20 MG TABS tablet Take 20 mg by mouth daily (with dinner)          sodium chloride, PF, (SODIUM CHLORIDE) 0.9% PF flush           SOTALOL HCL PO Take 80 mg by mouth         VITAMIN D, CHOLECALCIFEROL, PO Take 50,000 Units by mouth once a week                Medical History: any changes in medical  history since they were last seen? No     Review of Systems:  The 14 system ROS was reviewed from the intake questionnaire; results listed at end of note.      Physical Exam:  Blood pressure 128/62, pulse 74, not currently breastfeeding.  General: NAD  Psych: Mood and affect appropriate  Skin: well healed abrasion noted at right axilla; dry gauze refilled.      Assessment:   Aspen Mccullough is a 58 year old female who is seen at the pain clinic for right suprascapular neuropathy.     Plan:  1. Interventions:   -bacitracin applied to abrasion; dry gauze reapplied. Discussed signs and symptoms of infection; patient to follow up with PCP with any additional concerns.   2. Follow up:   -as needed.      Total time spent was 15 minutes, and more than 50% of face to face time was spent in counseling and/or coordination of care regarding plan of care.     ANITRA Tavarez, NGUYEN-BC  Pain Management  Department of Interventional Pain Management and Anesthesiology   [facility]

## 2018-11-27 ASSESSMENT — ANXIETY QUESTIONNAIRES: GAD7 TOTAL SCORE: 1

## 2018-12-11 DIAGNOSIS — B37.2 CANDIDAL SKIN INFECTION: ICD-10-CM

## 2018-12-11 RX ORDER — NYSTATIN 100000 [USP'U]/G
POWDER TOPICAL
Qty: 60 G | Refills: 0 | OUTPATIENT
Start: 2018-12-11

## 2018-12-11 NOTE — TELEPHONE ENCOUNTER
"Requested Prescriptions   Pending Prescriptions Disp Refills     NYSTOP 642329 UNIT/GM powder [Pharmacy Med Name: Nystop External Powder 286258 UNIT/GM] 60 g 0     Sig: Apply to affected area three times daily as needed    Antifungal Agents Passed - 12/11/2018 11:26 AM       Passed - Recent (12 mo) or future (30 days) visit within the authorizing provider's specialty    Patient had office visit in the last 12 months or has a visit in the next 30 days with authorizing provider or within the authorizing provider's specialty.  See \"Patient Info\" tab in inbasket, or \"Choose Columns\" in Meds & Orders section of the refill encounter.             Passed - Not Fluconazole or Terconazole     If oral Fluconazole or Terconazole, may refill if indicated in progress notes.           Next 5 appointments (look out 90 days)    Sotero 15, 2019  9:00 AM CST  Return Visit with Antwan Boston [facility] [place] [facility] ([facility]) [address] [place] [address]  [address] [place] [id number]  [phone]   Sotero 15, 2019  9:00 AM CST  Return Visit with ANITRA Biggs [facility] ([facility]) [address] [place] [address]  [address] [place] [id number] [phone]   Sotero 15, 2019  9:00 AM CST  Office Visit with Cristina Pollack [facility] ([facility]) [address] [place] [address]  [address] [place] [id number] [phone]        Pt needs an OV for evaluation before further refills.  Rina Ng RN on 12/11/2018 at 11:39 AM    "

## 2019-01-15 ENCOUNTER — OFFICE VISIT (OUTPATIENT)
Dept: BEHAVIORAL HEALTH | Facility: CLINIC | Age: 59
End: 2019-01-15

## 2019-01-15 ENCOUNTER — OFFICE VISIT (OUTPATIENT)
Dept: FAMILY MEDICINE | Facility: CLINIC | Age: 59
End: 2019-01-15
Payer: COMMERCIAL

## 2019-01-15 VITALS
OXYGEN SATURATION: 96 % | BODY MASS INDEX: 35.44 KG/M2 | HEART RATE: 107 BPM | SYSTOLIC BLOOD PRESSURE: 142 MMHG | TEMPERATURE: 98.1 F | HEIGHT: 69 IN | RESPIRATION RATE: 16 BRPM | DIASTOLIC BLOOD PRESSURE: 80 MMHG

## 2019-01-15 DIAGNOSIS — M32.9 SYSTEMIC LUPUS ERYTHEMATOSUS, UNSPECIFIED SLE TYPE, UNSPECIFIED ORGAN INVOLVEMENT STATUS (H): ICD-10-CM

## 2019-01-15 DIAGNOSIS — G89.4 CHRONIC PAIN SYNDROME: ICD-10-CM

## 2019-01-15 DIAGNOSIS — G90.529 COMPLEX REGIONAL PAIN SYNDROME TYPE 1 OF LOWER EXTREMITY, UNSPECIFIED LATERALITY: ICD-10-CM

## 2019-01-15 DIAGNOSIS — Z96.659 STATUS POST TOTAL KNEE REPLACEMENT, UNSPECIFIED LATERALITY: ICD-10-CM

## 2019-01-15 DIAGNOSIS — F43.10 POSTTRAUMATIC STRESS DISORDER: Primary | ICD-10-CM

## 2019-01-15 DIAGNOSIS — E03.9 HYPOTHYROIDISM, UNSPECIFIED TYPE: ICD-10-CM

## 2019-01-15 DIAGNOSIS — R46.89 AGGRESSIVE BEHAVIOR: Primary | ICD-10-CM

## 2019-01-15 PROCEDURE — 99207 ZZC NON-BILLABLE SERV PER CHARTING: CPT | Performed by: SOCIAL WORKER

## 2019-01-15 PROCEDURE — 99215 OFFICE O/P EST HI 40 MIN: CPT | Performed by: NURSE PRACTITIONER

## 2019-01-15 NOTE — PROGRESS NOTES
[facility]  January 15, 2019    Behavioral Health Clinician Progress Note    Patient Name: Aspen Mccullough           Service Type: Individual      Service Location:  in clinic      Session Start Time: 9:40am  Session End Time: 10:06am      Session Length: 16 - 37      Attendees: Patient and PCP    Visit Activities (Refresh list every visit): [facility] [place] [employer]    Diagnostic Assessment Date: June 22, 2015  Treatment Plan Review Date: 4-18-16  See Flowsheets for today's PHQ-9 and BENI-7 results  Previous PHQ-9:   PHQ-9 SCORE 5/3/2016 10/4/2017 2/12/2018   PHQ-9 Total Score - - -   PHQ-9 Total Score - - -   PHQ-9 Total Score 12 3 5     Previous BENI-7:   BENI-7 SCORE 2/12/2018 7/13/2018 11/26/2018   Total Score - - -   Total Score - 1 (minimal anxiety) 1 (minimal anxiety)   Total Score 0 1 1   Total Score - - -       FERCHO LEVEL:  FERCHO Score (Last Two) 7/24/2014   FERCHO Raw Score 51   Activation Score 91.6   FERCHO Level 4       DATA  Extended Session (60+ minutes): No  Interactive Complexity: No  Crisis: No    Treatment Objective(s) Addressed in This Session:  Target Behavior(s): disease management/lifestyle changes mental health    Mood Instability: will develop better understanding of triggers and coping strategies to stabilize mood  PTSD Symptom Management  Psychological distress related to Pain    Current Stressors / Issues:    [facility] [place] Intern met with patient at the request of the PCP. The patient talked about wanting some things taken out of her chart-she attended the visit with her daughter for support-we reviewed the problem listed of Somatic issues and this writer focused visit to solution and the result is this problem (if possible) will be taken off her list-the patient reported that her last visit was stressful as she stated that she felt ganged up on-regarding sleeping she has good sleep-appetite is good-regarding mood the patient reported being able to manage her mood and that she is  feeling good about how she has been dealing with her anger-she has been meditating          Progress on Treatment Objective(s) / Homework:  Minimal progress - ACTION (Actively working towards change); Intervened by reinforcing change plan / affirming steps taken    Motivational Interviewing    MI Intervention: Expressed Empathy/Understanding, Supported Autonomy, Collaboration, Evocation, Permission to raise concern or advise, Open-ended questions, Reflections: simple and complex and Change talk (evoked)     Change Talk Expressed by the Patient: Desire to change Ability to change Reasons to change Need to change Committment to change Activation Taking steps    Provider Response to Change Talk: E - Evoked more info from patient about behavior change, A - Affirmed patient's thoughts, decisions, or attempts at behavior change, R - Reflected patient's change talk and S - Summarized patient's change talk statements      Care Plan review completed: No    Medication Review:  No changes to current psychiatric medication(s)    Medication Compliance:  NA    Changes in Health Issues:   Yes: Pain, Associated Psychological Distress    Chemical Use Review:   Substance Use: Chemical use reviewed, no active concerns identified      Tobacco Use: No current tobacco use.      Assessment: Current Emotional / Mental Status (status of significant symptoms):  Risk status (Self / Other harm or suicidal ideation)  Patient denies a history of suicidal ideation, suicide attempts, self-injurious behavior, homicidal ideation, homicidal behavior and and other safety concerns  Patient denies current fears or concerns for personal safety.  Patient denies current or recent suicidal ideation or behaviors.  Patient denies current or recent homicidal ideation or behaviors.  Patient denies current or recent self injurious behavior or ideation.  Patient denies other safety concerns.  A safety and risk management plan has not been developed at this time,  however patient was encouraged to call [facility] should there be a change in any of these risk factors.    Appearance:   Appropriate   Eye Contact:   Good   Psychomotor Behavior: Normal   Attitude:   Cooperative   Orientation:   All  Speech   Rate / Production: Normal    Volume:  Normal   Mood:    Anxious  Irritable   Affect:    Blunted   Thought Content:  Clear   Thought Form:  Coherent  Goal Directed  Logical   Insight:    Good     Diagnoses:  1. Posttraumatic stress disorder        Collateral Reports Completed:  Not Applicable    Plan: (Homework, other):  Patient was given information about behavioral services and encouraged to schedule a follow up appointment with the clinic [facility] as needed.  She was also given information about mental health symptoms and treatment options .  CD Recommendations: No indications of CD issues. Visit and Note Completed by Ramona Contreras, [facility] Clinical Intern,  Antwan Boston [facility], [facility]      ______________________________________________________________________    Integrated Primary Care Behavioral Health Treatment Plan    Patient's Name: Aspen Mccullough  YOB: 1960    Date: 4-18-16    DSM-V Diagnoses: PTSD  Psychosocial / Contextual Factors: medical condition, history of traumatic experiences  WHODAS:  Will complete at next visit    Referral / Collaboration:  Referral to another professional/service is not indicated at this time..    Anticipated number of session or this episode of care: 10      MeasurableTreatment Goal(s) related to diagnosis / functional impairment(s)  Goal 1: Patient will be able to remember the past with 50% less emotional reaction of anger and hurt.     I will know I've met my goal when I can let go of the past     Objective #A (Patient Action)    Patient will talk to at least two others about losses and coping.  Status: New - Date: 4-18-16     Intervention(s)  [facility] will provide avenue for the past to process her losses and  disappointments.    Objective #B  Patient will reduce her triggers from past trauma.  Status: New - Date: 4-18-16     Intervention(s)  [facility] will teach distraction skills. mindfulness.      Patient has reviewed and agreed to the above plan.      Antwan Boston, [facility]  April 18, 2016

## 2019-01-15 NOTE — PROGRESS NOTES
SUBJECTIVE:                                                    Aspen Mccullough is a 58 year old female female with a complex medical history including chronic pain, SLE, hx Hashimoto's thyroiditis now hypothyroid, hx of AF, cardiomyopathy, DVT/PE and hypertension  who presents to clinic today for the following health issues:  [facility]: Sonya/Antwan    Patient presents with her daughter. Daughter and patient present very angry and irritable with multiple accusations towards this provider, which were later found to be false. Patient demanded that the behavioral health intern, Ramona, leave the room immediately. Patient and daughter then began verbally berating this provider.    Patient accused this provider of ordering a thyroid panel (which was actually ordered on 9/21/18 by a different provider in a different clinic). Patient was upset because she pointed out that primary care does not manage her thyroid and that she never authorized this test to be done.    Patient and daughter then argued against diagnosis of atrial fibrillation, which was found on the problem list from her last visit. This provider then tried to explain patient had an EKG showing AF with RVR in 2017 but patient and daughter kept interrupting and making verbally abusive comments. They also argued with previous diagnosis of somatoform disorder, which was entered by another provider over ten years ago, but still shows up on the problem list.  At that point, this provider stepped out and brought [facility] Don into the visit.     When this provider and Don returned, patient and daughter were slightly calmer. Provider and Don talked to them again about their concerns and they were minimally receptive. Patient reiterated at the end of the visit that she does not want us to prescribe medications through this clinic, nor does she want to participate in any recommended primary care screenings. She then ended the visit by stating that she would only like to be seen  "in clinic at maximum every six months.         Medical history from previous visit 10/24/18: (pt did not allow discussion on this today):  Chronic Pain Follow-Up       Type / Location of Pain: knee replacement, right leg, throughout entire body, neuropathy, back  currently taking morphine 90mg AM, 75mg noon, 90mg PM, Dilaudid 8mg TID scheduled, cyclobenzaprine 10mg TID, baclofen 10mg TID, diazepam 5-10mg daily, prednisone 8mg every other day rotating with 10mg.  She is followed by an outside pain specialist. Side effects of diarrhea.   -Going next Monday for suprascapular nerve blockultiple references to political climate regarding opioid epidemic. \"I wish Laurel La would get into a terrible accident and have chronic pain for the rest of her life.\"  -followed by an outside Endocrinologist.   Analgesia/pain control:       Recent changes:  worse      Overall control: Inadequate pain control  Activity level/function:      Daily activities:  Unable to perform most daily activities - chores, hobbies, social activities, driving    Work:  Unable to work  Adverse effects:  No  Adherance    Taking medication as directed?  Yes    Participating in other treatments: yes  Risk Factors:    Sleep:  Poor    Mood/anxiety:  much worse    Recent family or social stressors:  none noted     Patient has hx of DVT and PE in Nov. 2017. Has been on Xarelto since then, states she has resumed taking it. She underwent a chest CT in Jan. 2018 that should resolution of the PEs, mild cardiomegaly and ground glass opacities in the bilateral lower lobes.      Questionable history of lupus. Was seen by Rheumatology (Dr. Awad) on 3/28/18. States she was initially diagnosed with lupus decades ago. On chronic steroids, Prednisone 8 mg/10mg alternating days. Bloodwork 3/2018 showed normal rheumatoid antibodies, normal complements, normal inflammatory markers overall, normal lupus complements, and normal SEAN antibodies for lupus. Per assessment " from Rheumatology, there is no evidence of a systemic inflammatory rheumatic condition or systemic lupus based on these findings. She was advised to taper off of systemic steroids for that reason, considering the associated risks. Patient denies willingness to follow those recommendations.      History of AF with RVR in 2017. Converted to SR while in the hospital and was started on Sotalol 80 mg bid. Was initially on Heparin and Coumadin in the setting of co-occurring DVT/PE, but then transitioned to Xarelto at that time. Also with hx of cardiomyopathy with EF 40%, repeat Echo  showing normal EF and resolution of right atrial clot. Patient no longer taking Sotalol, reports awareness of risks.                  Social History     Tobacco Use     Smoking status: Former Smoker     Packs/day: 1.50     Years: 20.00     Pack years: 30.00     Types: Cigarettes     Start date: 1973     Last attempt to quit: 1993     Years since quittin.0     Smokeless tobacco: Never Used   Substance Use Topics     Alcohol use: No     Alcohol/week: 0.0 oz        Problem list and histories reviewed & adjusted, as indicated.  Additional history: as documented    Patient Active Problem List   Diagnosis     Generalized osteoarthrosis, involving multiple sites     CRPS (complex regional pain syndrome), lower limb     Fibromyalgia     Somatoform disorder     Histrionic personality (H)     Encounter for long-term use of opiate analgesic     Knee joint replacement by other means     Hypothyroidism     Insomnia, unspecified type     Hyperlipidemia     ACP (advance care planning)     Hashimoto's thyroiditis     Glucocorticoid deficiency (H)     Posttraumatic stress disorder     Impingement syndrome of left shoulder     Abdominal cramping, generalized     Intermittent diarrhea     Primary osteoarthritis involving multiple joints     Attention deficit disorder     Allergic rhinitis     Cushing's syndrome (H)     Endometriosis      Essential hypertension     Systemic lupus erythematosus (H)     S/P cervical spinal fusion     Paroxysmal atrial fibrillation (H)     Nonischemic cardiomyopathy (H)     Personal history of DVT (deep vein thrombosis)     History of pulmonary embolism     Acute deep vein thrombosis (DVT) of popliteal vein of right lower extremity (H)     Acute pulmonary embolism (H)     Adrenal cortical steroids causing adverse effect in therapeutic use     Alopecia areata     Anemia, blood loss     Ankle pain, left     ARF (acute renal failure) (H)     Arthritis, septic (H)     Chronic ethmoidal sinusitis     Chronic maxillary sinusitis     Deviated nasal septum     Complications due to internal joint prosthesis (H)     Generalized pain     Iatrogenic hyperthyroidism     S/P TKR (total knee replacement)     Left shoulder pain     Lipoma of skin and subcutaneous tissue     Malabsorption     Nasal fracture     Nasal turbinate hypertrophy     Opioid type dependence (H)     Other specified abnormal findings of blood chemistry     Other acute postoperative pain     Pain in joint, lower leg     Postoperative hypotension     S/P total knee replacement     Thrombus of right atrial appendage without antecedent myocardial infarction     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)     APPENDECTOMY       ARTHRODESIS ANKLE      right     ARTHROPLASTY KNEE BILATERAL       ARTHROPLASTY REVISION KNEE  4/19/2011    Procedure:ARTHROPLASTY REVISION KNEE; With Antibiotic Cement ; Surgeon:CHAO OLIVARES; Location:[facility]     BREAST SURGERY      right- tissue remove nipple area     BUNIONECTOMY  12/14/2011    Procedure:BUNIONECTOMY; Right Bunion Correction; Surgeon:GRACE ZARATE; Location:[facility] STOMACH SURGERY PROCEDURE UNLISTED      see list which we will bring     CHOLECYSTECTOMY       EXCISE MASS UPPER EXTREMITY  12/14/2011    Procedure:EXCISE MASS UPPER  EXTREMITY; Excision of Left Arm Mass; Surgeon:GRACE ZARATE; Location:[facility]     FOOT SURGERY      left X 4     FUSION LUMBAR ANTERIOR ONE LEVEL       HC SACROPLASTY      see list which we will bring     INJECT NERVE BLOCK SUPRASCAPULAR Left 2018    Procedure: INJECT NERVE BLOCK SUPRASCAPULAR;  Left Suprascapular Nerve Block;  Surgeon: Basim Velazquez MD;  Location: [facility] OR     INJECT NERVE BLOCK SUPRASCAPULAR Right 2018    Procedure: INJECT NERVE BLOCK SUPRASCAPULAR;  Right suprascapular injection;  Surgeon: Basim Velazquez MD;  Location: [facility] OR     INJECT NERVE BLOCK SUPRASCAPULAR Bilateral 10/29/2018    Procedure: Bilateral Suprascapular Nerve Blocks;  Surgeon: Basim Velazquez MD;  Location:  OR     KNEE SURGERY      see list which we will bring     LAMINECTOMY LUMBAR ONE LEVEL      L4-5     LAPAROSCOPIC ABLATION ENDOMETRIOSIS       REMOVE HARDWARE FOOT  2011    Procedure:REMOVE HARDWARE FOOT; Hardware Removal Right Foot (Mini-C-Arm) ; Surgeon:GRACE ZARATE; Location:[facility]     RHINOPLASTY       SALPINGO-OOPHORECTOMY BILATERAL       TONSILLECTOMY         Social History     Tobacco Use     Smoking status: Former Smoker     Packs/day: 1.50     Years: 20.00     Pack years: 30.00     Types: Cigarettes     Start date: 1973     Last attempt to quit: 1993     Years since quittin.0     Smokeless tobacco: Never Used   Substance Use Topics     Alcohol use: No     Alcohol/week: 0.0 oz     Family History   Problem Relation Age of Onset     Hypertension Mother            Current Outpatient Medications   Medication Sig Dispense Refill     baclofen (LIORESAL) 10 MG tablet Take 1 tablet (10 mg) by mouth 3 times daily 90 tablet      BIOTIN FORTE PO Take by mouth 2 times daily       carboxymethylcellulose (CARBOXYMETHYLCELLULOSE SODIUM) 0.5 % SOLN ophthalmic solution Place 1 drop into both eyes 2 times daily 1 Bottle      CEPHALEXIN PO Take 500 mg by mouth as needed (Dental  Procedure) Take 4 caps 1 hour prior to Dental Appointment       cyanocobalamin 1000 MCG/ML injection Inject 1 mL (1,000 mcg) Subcutaneous every 7 days 4 mL 5     cyclobenzaprine (FLEXERIL) 10 MG tablet Take 1 tablet (10 mg) by mouth 3 times daily 90 tablet 3     cycloSPORINE (RESTASIS) 0.05 % ophthalmic emulsion Place 1 drop into both eyes 2 times daily       diazepam (VALIUM) 10 MG tablet Take 10 mg by mouth daily as needed for anxiety  90 tablet 0     fexofenadine (ALLEGRA) 180 MG tablet Take 180 mg by mouth daily.       fish oil-omega-3 fatty acids 1000 MG capsule Take 1 capsule (1 g) by mouth 2 times daily 100 capsule 0     HYDROmorphone (DILAUDID) 8 MG tablet Take 1 tablet (8 mg) by mouth 3 times daily       levothyroxine (SYNTHROID/LEVOTHROID) 100 MCG SOLR injection Inject 10 mLs (200 mcg) into the vein once a week       levothyroxine (SYNTHROID/LEVOTHROID) 300 MCG tablet Take 300 mcg by mouth 2 times daily        lifitegrast (XIIDRA) 5 % opthalmic solution Place 1 drop into both eyes 2 times daily       liothyronine (CYTOMEL) 25 MCG tablet Take 1 tablet (25 mcg) by mouth 2 times daily       MAGNESIUM-ZINC PO Take by mouth daily       morphine (MS CONTIN) 15 MG 12 hr tablet Take 1 tablet (15 mg) by mouth daily At noon with 60mg tablet for total of 75mg 60 tablet 0     morphine (MS CONTIN) 30 MG 12 hr tablet Take 30mg in the morning and 30mg at night with 60mg tablet for total of 90mg 270 tablet 0     morphine (MS CONTIN) 60 MG 12 hr tablet Take 1 tablet (60 mg) by mouth every 8 hours 30 tablet 0     multivitamin, therapeutic with minerals (MULTI-VITAMIN) TABS tablet Take 1 tablet by mouth 2 times daily 100 tablet 3     NASONEX 50 MCG/ACT spray Spray 1 spray into both nostrils daily  11     nystatin (MYCOSTATIN) 976657 UNIT/GM POWD Apply topically 3 times daily as needed 60 g 1     prednisoLONE acetate (PRED FORTE) 1 % ophthalmic susp 1 drop 4 times daily       predniSONE (DELTASONE) 1 MG tablet Alternate 8mg  and 10mg every other day  2     predniSONE (DELTASONE) 5 MG tablet Alternate taking 8mg and 10mg every other day       probiotic CAPS Take 1 capsule by mouth 2 times daily       rivaroxaban ANTICOAGULANT (XARELTO) 20 MG TABS tablet Take 1 tablet (20 mg) by mouth daily (with dinner)       VITAMIN D, CHOLECALCIFEROL, PO Take 50,000 Units by mouth once a week       Allergies   Allergen Reactions     Codeine Hives     Ibuprofen [Nsaids] Hives     Penicillins Hives     Other reaction(s): Unknown     Thor Hives     Pt states she had rxn to skin prep-thor prep     Sulfa Drugs Hives     Other reaction(s): Unknown     Codeine      Other reaction(s): Unknown     Doxycycline GI Disturbance     Emesis & diarrhea  Patient denies allergy to this med     Ibuprofen      Other reaction(s): Unknown     Lactose      Other reaction(s): Unknown     Mold      Mushroom      Penicillins      Red Wine Complex [Red Wine Powder]      No Clinical Screening - See Comments Rash     Thor prep     Recent Labs   Lab Test 09/21/18  1849 09/19/18  1110 04/13/18  1118 03/28/18  1358 03/13/18  0126  10/22/15  01/06/14   A1C  --   --   --   --   --   --  5.6  --   --    LDL  --  142*  --   --   --   --   --   --  101   HDL  --  93  --   --   --   --   --   --  67   TRIG  --  75  --   --   --   --   --   --  216   ALT 24  --   --  27 99*   < >  --    < >  --    CR 0.88 0.83  --   --  1.01   < > 0.96   < > 0.74   GFRESTIMATED 65 70  --   --  56*   < > 66   < >  --    GFRESTBLACK 79 85  --   --  68   < > 77   < >  --    POTASSIUM 4.4 4.3  --   --  4.0   < > 4.9   < > 4.5   TSH 0.99  --  23.71*  --  44.08*  --  40.90*   < > <0.00    < > = values in this interval not displayed.      BP Readings from Last 3 Encounters:   01/15/19 142/80   11/26/18 128/62   10/29/18 121/75    Wt Readings from Last 3 Encounters:   11/26/18 108.9 kg (240 lb)   10/29/18 108.9 kg (240 lb)   10/04/18 115.2 kg (254 lb)        Labs reviewed in EPIC  Problem list, Medication list,  "Allergies, and Medical/Social/Surgical histories reviewed in [facility] and updated as appropriate.     ROS: Constitutional, neuro, ENT, endocrine, pulmonary, cardiac, gastrointestinal, genitourinary, musculoskeletal, integument and psychiatric systems are negative, except as otherwise noted above in the HPI.      OBJECTIVE:                                                    /80   Pulse 107   Temp 98.1  F (36.7  C) (Oral)   Resp 16   Ht 1.753 m (5' 9\")   SpO2 96%   BMI 35.44 kg/m    Body mass index is 35.44 kg/m .  GENERAL: healthy, alert, well nourished, well hydrated, no distress  .  Mental Status Assessment:  Appearance:   Appropriate   Eye Contact:   Fair   Psychomotor Behavior: Agitated   Attitude:   Belligerent   Orientation:   All  Speech   Rate / Production: Pressured    Volume:  Loud   Mood:    Angry  Anxious  Irritable   Affect:    Labile   Thought Content:  Clear   Thought Form:  Obsessive   Insight:    Poor   Attention Span and Concentration:  limited  Recent and Remote Memory:  intact  Fund of Knowledge: appropriate  Muscle Strength and Tone: normal   Suicidal Ideation: reports no thoughts, no intention  Hallucination: No  Paranoid-No  Manic-No  Panic-No  Self harm-No      See [facility] notes     Diagnostic Test Results:  Results for orders placed or performed during the hospital encounter of 09/21/18   XR Chest 2 Views    Narrative    EXAM: XR CHEST 2 VW  9/21/2018 8:22 PM      HISTORY: right sided chest pain;     COMPARISON: 9/21/2018 CT    FINDINGS: PA and lateral images of the chest. The trachea is midline.  The cardiac silhouette is mildly enlarged. No pleural effusion or  pneumothorax. No acute airspace opacities. Upper abdomen is  unremarkable. No acute bony findings.       Impression    IMPRESSION: No acute airspace opacities.    I have personally reviewed the examination and initial interpretation  and I agree with the findings.    HORACE ARMSTRONG MD   CT Chest Pulmonary Embolism w Contrast    " Narrative    Examination:  CT CHEST PULMONARY EMBOLISM W CONTRAST 9/21/2018 8:25 PM      Comparison: Chest x-ray 4/24/2016, CT chest    History: Dyspnea;     TECHNIQUE: Volumetric helical acquisition of CT images of the chest  from the lung apices to the kidneys were acquired in arterial phase  after the administration of IV contrast. Three-dimensional (3D)  post-processed angiographic images were reconstructed, archived to  PACS and used in the interpretation of this study. Contrast dose: 73cc  isovue 370    FINDINGS:  VASCULATURE: There is adequate opacification of the main and lobar  pulmonary arteries. No filling defect in the lobar and main segmental  pulmonary arteries to suggest pulmonary embolism. There is no evidence  of right heart strain. Cardiomegaly. Ectasia of the ascending aorta to  4.1 cm. Dilatation of the pulmonary arteries, for example the main  pulmonary artery to 4.5 cm, and the right and left pulmonary arteries  to 2.9 and 2.3 cm. No pericardial effusion.    LUNGS: Trace dependent atelectasis. No acute consolidation. No pleural  effusion or pneumothorax. The central tracheal bronchial tree is  patent. Scattered sub-6 mm pulmonary nodules, for example 3 mm  pulmonary nodule along the right major fissure, possibly a fissural  lymph node (series 8, image 113).    MEDIASTINUM: No enlarged mediastinal or axillary lymph nodes.    UPPER ABDOMEN: Status post cholecystectomy.    BONES/SOFT TISSUES: Multilevel degenerative changes of the spine,  including large Schmorl's node in L1.      Impression    IMPRESSION:   1. No evidence of pulmonary embolism. No acute pulmonary process.  2. Scattered sub-6 mm pulmonary nodules. If the patient is considered  low risk for malignancy, no further follow-up is necessary. Otherwise,  LIKELY consider 12 month follow-up chest CT as per Fleischner Society  2017 guidelines.  3. Cardiomegaly. Ectasia of the ascending aorta 4.1 cm.  4. Dilatation of the pulmonary  vasculature, including the main  pulmonary artery to 4.5 cm. This can be seen with pulmonary arterial  hypertension.    I have personally reviewed the examination and initial interpretation  and I agree with the findings.    HORACE ARMSTRONG MD   CBC with platelets differential   Result Value Ref Range    WBC 7.6 4.0 - 11.0 10e9/L    RBC Count 4.15 3.8 - 5.2 10e12/L    Hemoglobin 12.4 11.7 - 15.7 g/dL    Hematocrit 39.1 35.0 - 47.0 %    MCV 94 78 - 100 fl    MCH 29.9 26.5 - 33.0 pg    MCHC 31.7 31.5 - 36.5 g/dL    RDW 15.5 (H) 10.0 - 15.0 %    Platelet Count 233 150 - 450 10e9/L    Diff Method Automated Method     % Neutrophils 71.3 %    % Lymphocytes 23.4 %    % Monocytes 4.0 %    % Eosinophils 0.8 %    % Basophils 0.4 %    % Immature Granulocytes 0.1 %    Nucleated RBCs 0 0 /100    Absolute Neutrophil 5.4 1.6 - 8.3 10e9/L    Absolute Lymphocytes 1.8 0.8 - 5.3 10e9/L    Absolute Monocytes 0.3 0.0 - 1.3 10e9/L    Absolute Eosinophils 0.1 0.0 - 0.7 10e9/L    Absolute Basophils 0.0 0.0 - 0.2 10e9/L    Abs Immature Granulocytes 0.0 0 - 0.4 10e9/L    Absolute Nucleated RBC 0.0    Comprehensive metabolic panel   Result Value Ref Range    Sodium 140 133 - 144 mmol/L    Potassium 4.4 3.4 - 5.3 mmol/L    Chloride 103 94 - 109 mmol/L    Carbon Dioxide 31 20 - 32 mmol/L    Anion Gap 6 3 - 14 mmol/L    Glucose 120 (H) 70 - 99 mg/dL    Urea Nitrogen 20 7 - 30 mg/dL    Creatinine 0.88 0.52 - 1.04 mg/dL    GFR Estimate 65 >60 mL/min/1.7m2    GFR Estimate If Black 79 >60 mL/min/1.7m2    Calcium 9.3 8.5 - 10.1 mg/dL    Bilirubin Total 0.6 0.2 - 1.3 mg/dL    Albumin 4.2 3.4 - 5.0 g/dL    Protein Total 8.4 6.8 - 8.8 g/dL    Alkaline Phosphatase 98 40 - 150 U/L    ALT 24 0 - 50 U/L    AST 20 0 - 45 U/L   Troponin I   Result Value Ref Range    Troponin I ES <0.015 0.000 - 0.045 ug/L   Ammonia   Result Value Ref Range    Ammonia <10 (L) 10 - 50 umol/L   TSH with free T4 reflex   Result Value Ref Range    TSH 0.99 0.40 - 4.00 mU/L    Alcohol ethyl   Result Value Ref Range    Ethanol g/dL <0.01 <0.01 g/dL   Acetaminophen level   Result Value Ref Range    Acetaminophen Level <2 mg/L   Salicylate level   Result Value Ref Range    Salicylate Level <2 mg/dL   EKG 12-lead, tracing only   Result Value Ref Range    Interpretation ECG Click View Image link to view waveform and result    ISTAT gases lactate jennifer POCT   Result Value Ref Range    Ph Venous 7.30 (L) 7.32 - 7.43 pH    PCO2 Venous 66 (H) 40 - 50 mm Hg    PO2 Venous 24 (L) 25 - 47 mm Hg    Bicarbonate Venous 33 (H) 21 - 28 mmol/L    O2 Sat Venous 35 %    Lactic Acid 1.0 0.7 - 2.1 mmol/L        ASSESSMENT/PLAN:                                                    (R46.89) Aggressive behavior  (primary encounter diagnosis)  Comment: this is a patient with a very complex psychiatric and medical history, who is unfortunately refusing to participate in our integrated care program.  Rather, she is using the clinic as a place to berate and belittle a medical provider with false accusations and unfounded scapegoating. This is highly inappropriate and was brought up to the medical lead of the clinic.  Plan: A conference is planned with the medical lead and [facility] to determine the best course of action and next steps for this patient.           I spent Greater than 40 minutes was spent face to face with Aspen Mccullough, with greater than 50 % in counselling and consultation about de-escalating aggressive behavior and explaining her concerns.    Patient Instructions:  Patient Instructions   Please return in about six months to follow up.                ANITRA Talamantes [facility] PRIMARY CARE

## 2019-03-05 ENCOUNTER — TELEPHONE (OUTPATIENT)
Dept: ANESTHESIOLOGY | Facility: CLINIC | Age: 59
End: 2019-03-05

## 2019-03-05 NOTE — TELEPHONE ENCOUNTER
Attempted to reach out to patient to assist with scheduling a injection. Left detailed message with first available of 3/20/19 with Dr. Velazquez in the AM. Left best call back number of [phone]

## 2019-03-06 ENCOUNTER — HOSPITAL ENCOUNTER (OUTPATIENT)
Dept: MAMMOGRAPHY | Facility: CLINIC | Age: 59
Discharge: HOME | End: 2019-03-06
Attending: OBSTETRICS & GYNECOLOGY | Admitting: OBSTETRICS & GYNECOLOGY
Payer: COMMERCIAL

## 2019-03-06 DIAGNOSIS — Z12.31 SCREENING MAMMOGRAM, ENCOUNTER FOR: ICD-10-CM

## 2019-03-06 PROCEDURE — 77063 BREAST TOMOSYNTHESIS BI: CPT

## 2019-03-07 DIAGNOSIS — M25.511 RIGHT SHOULDER PAIN: Primary | ICD-10-CM

## 2019-03-07 NOTE — TELEPHONE ENCOUNTER
Spoke with: Aspen     Date Scheduled: 3/15/19     Provider: Dr. Velazquez     : Yes     F/U Appointment: n/a     Patient was educated on pre-op nurse call: yes      Direct procedure: no

## 2019-03-15 ENCOUNTER — HOSPITAL ENCOUNTER (OUTPATIENT)
Facility: AMBULATORY SURGERY CENTER | Age: 59
End: 2019-03-15
Payer: COMMERCIAL

## 2019-03-15 ENCOUNTER — ANESTHESIA (OUTPATIENT)
Dept: SURGERY | Facility: AMBULATORY SURGERY CENTER | Age: 59
End: 2019-03-15

## 2019-03-15 ENCOUNTER — ANESTHESIA EVENT (OUTPATIENT)
Dept: SURGERY | Facility: AMBULATORY SURGERY CENTER | Age: 59
End: 2019-03-15

## 2019-03-15 VITALS
DIASTOLIC BLOOD PRESSURE: 110 MMHG | TEMPERATURE: 99 F | SYSTOLIC BLOOD PRESSURE: 146 MMHG | OXYGEN SATURATION: 94 % | RESPIRATION RATE: 16 BRPM

## 2019-03-15 RX ORDER — METHYLPREDNISOLONE ACETATE 40 MG/ML
INJECTION, SUSPENSION INTRA-ARTICULAR; INTRALESIONAL; INTRAMUSCULAR; SOFT TISSUE PRN
Status: DISCONTINUED | OUTPATIENT
Start: 2019-03-15 | End: 2019-03-15 | Stop reason: HOSPADM

## 2019-03-15 RX ORDER — BUPIVACAINE HYDROCHLORIDE 2.5 MG/ML
INJECTION, SOLUTION EPIDURAL; INFILTRATION; INTRACAUDAL PRN
Status: DISCONTINUED | OUTPATIENT
Start: 2019-03-15 | End: 2019-03-15 | Stop reason: HOSPADM

## 2019-03-15 RX ORDER — LIDOCAINE HYDROCHLORIDE 10 MG/ML
INJECTION, SOLUTION EPIDURAL; INFILTRATION; INTRACAUDAL; PERINEURAL PRN
Status: DISCONTINUED | OUTPATIENT
Start: 2019-03-15 | End: 2019-03-15 | Stop reason: HOSPADM

## 2019-03-15 NOTE — H&P
Aspen Mccullough is a 58 year old female with a h/o bilateral shoulder pain. She previously had undergone a left suprascapular nerve block with 100% relief and restoration of range of motion. Due to presentation of similar symptoms on right side, locating pain that extends from right infraspinatus location to lateral aspect of upper arm, we recommended a bilateral suprascapular nerve block that was done on 10/29/18. Patient reports excellent relief with restoration of range of motion, however, she fell this past weekend in her bathroom, scraping her right axilla and aggravating pain. She has not been seen for her abrasion but is covering with dry gauze for comfort.        Medications:    Current Outpatient Prescriptions                Current Outpatient Prescriptions   Medication Sig Dispense Refill     BACLOFEN PO Take 10 mg by mouth 3 times daily         CEPHALEXIN PO Take 500 mg by mouth as needed (Dental Procedure) Take 4 caps 1 hour prior to Dental Appointment         cyanocobalamin 1000 MCG/ML injection Inject 1 mL (1,000 mcg) Subcutaneous every 7 days 4 mL 5     cyclobenzaprine (FLEXERIL) 10 MG tablet Take 1 tablet (10 mg) by mouth 3 times daily 90 tablet 3     cycloSPORINE (RESTASIS) 0.05 % ophthalmic emulsion Place 1 drop into both eyes 2 times daily         diazepam (VALIUM) 10 MG tablet Take 1 tablet (10 mg) by mouth 3 times daily (Patient taking differently: Take 10 mg by mouth daily ) 90 tablet 0     enoxaparin (LOVENOX) 100 MG/ML SOLN Inject Subcutaneous every 12 hours         estradiol (ESTRING) 2 MG vaginal ring Place vaginally every 3 months         fexofenadine (ALLEGRA) 180 MG tablet Take 180 mg by mouth daily.         FISH OIL daily. w/DHEA.          fluticasone (FLONASE) 50 MCG/ACT nasal spray 2 sprays by Both Nostrils route daily as needed.         HYDROmorphone (DILAUDID) 8 MG tablet Take 1 tablet (8 mg) by mouth every 3 hours as needed (Patient taking differently: Take 8 mg by mouth 3 times  daily ) 100 tablet 0     LEVOFLOXACIN PO Take 750 mg by mouth         levothyroxine (SYNTHROID/LEVOTHROID) 100 MCG SOLR injection Inject 200 mcg into the vein twice a week         levothyroxine (SYNTHROID/LEVOTHROID) 200 MCG SOLR injection     3     levothyroxine (SYNTHROID/LEVOTHROID) 300 MCG tablet Take 300 mcg by mouth 2 times daily          Liothyronine Sodium 50 MCG TABS Take 50 mcg by mouth daily          morphine (MS CONTIN) 30 MG 12 hr tablet Take 3 tablets (90 mg) by mouth every 8 hours (Patient taking differently: Take 90 mg by mouth 3 times daily ) 270 tablet 0     morphine (MS CONTIN) 60 MG 12 hr tablet     0     NASONEX 50 MCG/ACT nasal spray     11     norethindrone-ethinyl estradiol (FEMHRT 1/5) 1-5 MG-MCG per tablet Take 1 tablet by mouth daily         nystatin (MYCOSTATIN) 892877 UNIT/GM POWD Apply topically 3 times daily as needed 60 g 1     prednisoLONE acetate (PRED FORTE) 1 % ophthalmic susp 1 drop 4 times daily         predniSONE (DELTASONE) 1 MG tablet     2     PREDNISONE 5 MG OR TABS alternate 8 mg and 10 mg every other day         probiotic CAPS Take 1 capsule by mouth daily.         rivaroxaban ANTICOAGULANT (XARELTO) 20 MG TABS tablet Take 20 mg by mouth daily (with dinner)          sodium chloride, PF, (SODIUM CHLORIDE) 0.9% PF flush           SOTALOL HCL PO Take 80 mg by mouth         VITAMIN D, CHOLECALCIFEROL, PO Take 50,000 Units by mouth once a week                Medical History: any changes in medical history since they were last seen? No     Review of Systems:  The 14 system ROS was reviewed from the intake questionnaire; results listed at end of note.      Physical Exam:  Blood pressure 128/62, pulse 74, not currently breastfeeding.  General: NAD  Psych: Mood and affect appropriate  Skin: well healed abrasion noted at right axilla; dry gauze refilled.

## 2019-03-15 NOTE — PROCEDURES
Patient: Aspen Mccullough Age: 59 year old   MRN: [id number] Attending: Dr. Velazquez     Date of Visit: March 15, 2019      PAIN MEDICINE CLINIC PROCEDURE NOTE    ATTENDING CLINICIAN:    Basim Velazquez MD    ASSISTANT CLINICIAN:  Lalo Sanchez MD    PREPROCEDURE DIAGNOSES:  1.  Right shoulder pain     POSTPROCEDURE DIAGNOSES:  1.  Bilateral shoulder pain    PROCEDURE(S) PERFORMED:  1.  Bilateral suprascapular nerve injections  2.  Ultrasound guidance for the above-named procedures      ANESTHESIA:  Local.    BLOOD LOSS:  Minimal.    DRAINS AND SPECIMENS:  None.    COMPLICATIONS:  None.    INDICATIONS:  Aspen Mccullough is a 59 year old female with a history of  chronic shoulder pain secondary to degenerative arthritis .  The patient stated that the patient was in their usual state of health and denied recent anticoagulant use or recent infections.  Therefore, the plan is to perform above mentioned procedure.     Procedure Details:    The patient was met in the procedure room, where the patient was identified by name, medical record number and date of birth.  All of the patient s last minute questions were answered. Written informed consent was obtained and saved in the electronic medical record, after the risks, benefits, and alternatives were discussed with the patient.      A formal time-out procedure was performed, as per protocol, including patient name, title of procedure, and site of procedure, and all in the room concurred.  Routine monitors were applied.      The patient was placed in the sitting position on the procedure room table.Routine monitors were placed.  Vital signs were stable.    A chlorhexidine prep was completed followed by sterile draping per standard procedure.  Patient's consent was obtained.  The RIGHT side Suprascapular area was prepped using Chloraprep and the area was sterilely draped.  The ultrasound probe was draped and the U/S was used to identify and confirm the depth.  Fascial  planes were easily visible as well as subcutaneous tissue and the supraspinatus fossae with the scapula as the deep border.  A 21G 100 mm  pajunk needle was used in an in-plane fashion and positioned deep to the hyperechoic suprascapular ligament taking care to avoid the visibly pulsatile suprascapular artery.   ~1ml of sterile saline was injected to confirm correct placement of the needle tip prior to injection of 4 mls of an injectate consisting of  0.25% bupivacaine 4 ml and 40 mg( 1 ml) of depomedrol  into the area, with several aspirations being negative for blood.  A screen shot,was taken of nicely extravasating fluid into the the expected location of the supraspinatus nerve.  The needle was removed, and a band-aid was applied.     The procedure was then repeated with identical technique on the LEFT side.    Light pressure was held at the puncture sites to prevent ecchymosis and oozing.  The patient's skin was cleansed, and hemostasis was confirmed.  Band-aids were applied to the needle injection sites.      Condition:    The patient remained awake and alert throughout the procedure.  The patient tolerated the procedure well and was monitored for approximately 15 minutes afterward in the post procedure area.  There were no immediate post procedure complications noted.  The patient was then discharged to home as per protocol.      Pre-procedure pain score:9/10  Post-procedure pain score: 9/10    OF NOTE: The patient reports obtaining about 3 months of relief from this procedure previously, however, due to SLE often requires high dose or prolonged courses of systemic steroid medications.  As such, it would be reasonable to offer her pulsed RFA of bilateral suprascapular nerves as an alternative to steroid injection to reduce her total steroid exposure.  She was advised to make a follow up visit in clinic to confirm this and submit this request to her insurance provider for approval.    I saw and examined the  patient with the fellow and agree with the findings and the plan of care as documented in the fellow's note.   Basim Velazquez IV, MD

## 2019-03-15 NOTE — DISCHARGE INSTRUCTIONS
Home Care Instructions after an Upper Extremity nerve block      In an upper extremity nerve block, local anesthetic or  numbing  medication is injected around the nerves to anesthetize or  numb  the shoulder or arm. When used for chronic pain, it is hoped that this procedure will interrupt the nerve pathways and provide long term pain relief. If your arm is numb, you should be careful since it is possible to injure your arm without being aware of the injury. While your arm is numb, you should:    Avoid striking or bumping your arm    Avoid extreme hot or cold    Activity  -You may resume most normal activity levels with the exception of strenuous activity. It is important for us to know if your pain with normal activity is relieved after this injection.  -DO NOT shower for 24 hours  -DO NOT remove bandaid for 24 hours    Pain  -You may experience soreness at the injection site for one or two days  -You may use an ice pack for 20 minutes every 2 hours for the first 24 hours  -You may use a heating pad after the first 24 hours  -You may use Tylenol (acetaminophen) every 4 hours or other pain medicines as     directed by your physician      DID YOU RECEIVE SEDATION TODAY?  No    If you received sedation please follow these additional safety measures.  Sedation medicine, if given, may remain active for many hours. It is important for the next 24 hours that you do not:  -Drive a car  -Operate machines or power tools  -Consume alcohol, including beer  -Sign any important papers or legal documents    DID YOU RECEIVE STEROIDS TODAY?  Yes    Common side effects of steroids:  Not everyone will experience corticosteroid side effects. If side effects are experienced, they will gradually subside in the 7-10 day period following an injection. Most common side effects include:  -Flushed face and/or chest  -Feeling of warmth, particularly in the face but could be an overall feeling of warmth  -Increased blood sugar in diabetic  patients  -Menstrual irregularities my occur. If taking hormone-based birth control an alternate method of birth control is recommended  -Sleep disturbances and/or mood swings are possible  -Leg cramps      PLEASE KEEP TRACK OF YOUR SYMPTOMS AND NOTE YOUR IMPROVEMENT FOR YOUR DOCTOR.     Please contact us if you have:  -Severe pain  -Fever more than 101.5 degrees Fahrenheit  -Signs of infection at the injection site (redness, swelling, or drainage)    If you have questions, please contact our office at [phone] between the hours of 7:00 am and 3:00 pm Monday through Friday. After office hours you can contact the on call provider by dialing [phone]. If you need immediate attention, we recommend that you go to a hospital emergency room or dial [phone].

## 2019-03-27 ENCOUNTER — TELEPHONE (OUTPATIENT)
Dept: FAMILY MEDICINE | Facility: CLINIC | Age: 59
End: 2019-03-27

## 2019-03-27 NOTE — TELEPHONE ENCOUNTER
Consulted with PCP, patient not interested in participating in the [facility] model, letter sent to patient discharging her from [facility], 30 days of acute care and 30 days of meds offered while she establishes care in another clinic     Ann Silverman [facility]  MEDICAL LEAD

## 2019-04-03 ENCOUNTER — MEDICAL CORRESPONDENCE (OUTPATIENT)
Dept: HEALTH INFORMATION MANAGEMENT | Facility: CLINIC | Age: 59
End: 2019-04-03

## 2019-04-03 ENCOUNTER — TRANSFERRED RECORDS (OUTPATIENT)
Dept: HEALTH INFORMATION MANAGEMENT | Facility: CLINIC | Age: 59
End: 2019-04-03

## 2019-05-06 ENCOUNTER — TELEPHONE (OUTPATIENT)
Dept: DERMATOLOGY | Facility: CLINIC | Age: 59
End: 2019-05-06

## 2019-05-06 NOTE — TELEPHONE ENCOUNTER
[facility] Call Center    Phone Message    May a detailed message be left on voicemail: yes    Reason for Call: Other: Pt's  calling to schedule with Dr Tony, pt was referred by [facility], please call to discuss scheduling     Action Taken: Message routed to:  [facility] Derm

## 2019-05-06 NOTE — TELEPHONE ENCOUNTER
Records have been requested from [facility] 5/6/19 @3p.

## 2019-05-07 ENCOUNTER — TELEPHONE (OUTPATIENT)
Dept: ANESTHESIOLOGY | Facility: CLINIC | Age: 59
End: 2019-05-07

## 2019-05-07 ENCOUNTER — OFFICE VISIT (OUTPATIENT)
Dept: ANESTHESIOLOGY | Facility: CLINIC | Age: 59
End: 2019-05-07
Payer: COMMERCIAL

## 2019-05-07 ENCOUNTER — HOSPITAL ENCOUNTER (OUTPATIENT)
Facility: AMBULATORY SURGERY CENTER | Age: 59
End: 2019-05-07
Payer: COMMERCIAL

## 2019-05-07 VITALS — BODY MASS INDEX: 35.44 KG/M2 | HEIGHT: 69 IN | RESPIRATION RATE: 16 BRPM

## 2019-05-07 DIAGNOSIS — G56.81 NEUROPATHY OF RIGHT SUPRASCAPULAR NERVE: ICD-10-CM

## 2019-05-07 DIAGNOSIS — G56.82 NEUROPATHY OF LEFT SUPRASCAPULAR NERVE: Primary | ICD-10-CM

## 2019-05-07 ASSESSMENT — ANXIETY QUESTIONNAIRES
1. FEELING NERVOUS, ANXIOUS, OR ON EDGE: NOT AT ALL
7. FEELING AFRAID AS IF SOMETHING AWFUL MIGHT HAPPEN: NOT AT ALL
4. TROUBLE RELAXING: NOT AT ALL
7. FEELING AFRAID AS IF SOMETHING AWFUL MIGHT HAPPEN: NOT AT ALL
2. NOT BEING ABLE TO STOP OR CONTROL WORRYING: NOT AT ALL
3. WORRYING TOO MUCH ABOUT DIFFERENT THINGS: NOT AT ALL
GAD7 TOTAL SCORE: 1
GAD7 TOTAL SCORE: 1
6. BECOMING EASILY ANNOYED OR IRRITABLE: SEVERAL DAYS
5. BEING SO RESTLESS THAT IT IS HARD TO SIT STILL: NOT AT ALL

## 2019-05-07 ASSESSMENT — PAIN SCALES - GENERAL: PAINLEVEL: EXTREME PAIN (9)

## 2019-05-07 NOTE — TELEPHONE ENCOUNTER
Spoke with: Ash     Date Scheduled: 6/3/19     Provider: Dr. Velazquez     : Yes     F/U Appointment: 7/15/19     Patient was educated on pre-op nurse call: yes      Direct procedure: no

## 2019-05-07 NOTE — PROGRESS NOTES
Date of visit: 5/7/19     Chief complaint:         Chief Complaint   Patient presents with     Pain Management         Interval history:  Aspen Mccullough is a 59 year old female last seen by me 11/26/18 for upper shoulder and lateral upper extremity pain. She previously had undergone a left suprascapular nerve block with 100% relief and restoration of range of motion. Due to presentation of similar symptoms on right side, locating pain that extends from right infraspinatus location to lateral aspect of upper arm, we recommended a bilateral suprascapular nerve block that was done on 10/29/18. Patient had reported excellent relief with restoration of range of motion. Since last seen, patient did call in to clinic, requesting a repeat suprascapular nerve block. This was performed on 3/15/19 by Dr. Velazquez; patient reports she obtained no relief following this procedure. Dr. Velazquez suggested pursuing pulsed radiofrequency ablation of this nerve, in effort to achieve improved relief and mitigate steroid use.   Patient follows with [facility] for persistent neck and low back pain. Updated imaging was ordered, however, due to insurance issues, patient has only now recently been able to schedule this. MRI scheduled next week at [facility]. She states her podiatrist, that has repaired her hammertoe, has expressed concern regarding a possible lumbar radiculopathy. She apparently cannot return to [facility] due to insurance; she requests if I will review lumbar MRI once complete.      Medications:    Current Outpatient Prescriptions                Current Outpatient Prescriptions   Medication Sig Dispense Refill     BACLOFEN PO Take 10 mg by mouth 3 times daily         CEPHALEXIN PO Take 500 mg by mouth as needed (Dental Procedure) Take 4 caps 1 hour prior to Dental Appointment         cyanocobalamin 1000 MCG/ML injection Inject 1 mL (1,000 mcg) Subcutaneous every 7 days 4 mL 5     cyclobenzaprine (FLEXERIL) 10 MG tablet Take 1  tablet (10 mg) by mouth 3 times daily 90 tablet 3     cycloSPORINE (RESTASIS) 0.05 % ophthalmic emulsion Place 1 drop into both eyes 2 times daily         diazepam (VALIUM) 10 MG tablet Take 1 tablet (10 mg) by mouth 3 times daily (Patient taking differently: Take 10 mg by mouth daily ) 90 tablet 0     enoxaparin (LOVENOX) 100 MG/ML SOLN Inject Subcutaneous every 12 hours         estradiol (ESTRING) 2 MG vaginal ring Place vaginally every 3 months         fexofenadine (ALLEGRA) 180 MG tablet Take 180 mg by mouth daily.         FISH OIL daily. w/DHEA.          fluticasone (FLONASE) 50 MCG/ACT nasal spray 2 sprays by Both Nostrils route daily as needed.         HYDROmorphone (DILAUDID) 8 MG tablet Take 1 tablet (8 mg) by mouth every 3 hours as needed (Patient taking differently: Take 8 mg by mouth 3 times daily ) 100 tablet 0     LEVOFLOXACIN PO Take 750 mg by mouth         levothyroxine (SYNTHROID/LEVOTHROID) 100 MCG SOLR injection Inject 200 mcg into the vein twice a week         levothyroxine (SYNTHROID/LEVOTHROID) 200 MCG SOLR injection     3     levothyroxine (SYNTHROID/LEVOTHROID) 300 MCG tablet Take 300 mcg by mouth 2 times daily          Liothyronine Sodium 50 MCG TABS Take 50 mcg by mouth daily          morphine (MS CONTIN) 30 MG 12 hr tablet Take 3 tablets (90 mg) by mouth every 8 hours (Patient taking differently: Take 90 mg by mouth 3 times daily ) 270 tablet 0     morphine (MS CONTIN) 60 MG 12 hr tablet     0     NASONEX 50 MCG/ACT nasal spray     11     norethindrone-ethinyl estradiol (FEMHRT 1/5) 1-5 MG-MCG per tablet Take 1 tablet by mouth daily         nystatin (MYCOSTATIN) 178823 UNIT/GM POWD Apply topically 3 times daily as needed 60 g 1     prednisoLONE acetate (PRED FORTE) 1 % ophthalmic susp 1 drop 4 times daily         predniSONE (DELTASONE) 1 MG tablet     2     PREDNISONE 5 MG OR TABS alternate 8 mg and 10 mg every other day         probiotic CAPS Take 1 capsule by mouth daily.          rivaroxaban ANTICOAGULANT (XARELTO) 20 MG TABS tablet Take 20 mg by mouth daily (with dinner)          sodium chloride, PF, (SODIUM CHLORIDE) 0.9% PF flush           SOTALOL HCL PO Take 80 mg by mouth         VITAMIN D, CHOLECALCIFEROL, PO Take 50,000 Units by mouth once a week                Medical History: any changes in medical history since they were last seen? No     Review of Systems:  The 14 system ROS was reviewed from the intake questionnaire; results listed at end of note.      Physical Exam:  Blood pressure 128/62, pulse 74, not currently breastfeeding.  General: NAD  Neuro: Alert, oriented  Psych: Mood and affect appropriate  Respiratory: Non-labored breathing; no respiratory distress.   CV: normal S1, S2  Skin: No lesions or abrasions.   MSK: Tenderness extends along infraspinatus toward deltoid. Range of motion with abduction is reduced to 100 degrees. No focal tenderness along cervical spinous processes; range of motion full. Strength 5/5 with grasp, biceps, triceps, deltoid.       Assessment:   Aspen Mccullough is a 58 year old female who is seen at the pain clinic for bilateral suprascapular neuropathy.     Plan:  1. Interventions:   -will plan to proceed with pulsed radiofrequency ablation of bilateral suprascapular nerve. Procedure explained in depth to patient; potential risks reviewed. All questions were answered. Patient will need to hold Xarelto for 3 days prior to procedure.    2. Follow up:   -6 weeks following procedure. Will review lumbar imaging at this time.       Total time spent was 15 minutes, and more than 50% of face to face time was spent in counseling and/or coordination of care regarding plan of care.     ANITRA Tavarez, NGUYEN-BC  Pain Management  Department of Interventional Pain Management and Anesthesiology   [facility]                    Answers for HPI/ROS submitted by the patient on 5/7/2019   BENI 7 TOTAL SCORE: 1

## 2019-05-07 NOTE — PATIENT INSTRUCTIONS
1. Call to schedule your procedure. (We Recommend you schedule with Dr. Velazquez)      Please ask your Primary Care Doctor, about Holding Xarelto, 3 days before your procedure.     When calling to schedule your procedure appointment, also make your follow up clinic appointment 6 weeks after the procedure.    Please call [phone] to schedule, reschedule, or cancel your procedure appointment.   Phones are answered Monday - Friday from 7:30 - 4:00pm.  Leave a voicemail with your name, birth date, and phone number if no one is available to take your call.     Your procedure: Bilateral Suprascapular Pulsed Nerve Radiofrequency Ablation.     On the day of the procedure  1. Arrive 1 hour earlier than your scheduled time, to the [facility]  Address: [address] [place] [id number]  2. Check in on the 5th floor for your procedure    If you must reschedule your procedure more than two times, you must follow up in clinic before rescheduling again.    Preparing for your procedure    CAUTION - FAILURE TO FOLLOW THESE PRE-PROCEDURE INSTRUCTIONS WILL RESULT IN YOUR PROCEDURE BEING RESCHEDULED.            You must have a  take you home after your procedure. Transportation by taxi or para-transit is okay as long as you have a responsible adult accompany you. You must provide your 's full name and contact number at time of check in.     Fasting Protocol You may have NOTHING SOLID TO EAT 8 HOURS prior to arrival at the procedure area.     You may have CLEAR LIQUIDS UP TO 2 HOURS prior to arrival.    Broth and candy are considered solid food and require an eight hour fast.     Clear liquids include water, clear fruit juice (no pulp), carbonated beverages, ice, black coffee, black tea, clear jello. No alcohol containing beverages.   Medications If you take any medications, DO NOT STOP. Take your medications as usual the day of your procedure with a sip of water AT LEAST 2 HOURS PRIOR TO  ARRIVAL.    Antibiotics If you are currently taking antibiotics, you must complete the entire dose 7 days prior to your scheduled procedure. You must be clear of any signs or symptoms of infection. If you begin antibiotics, please contact our clinic for instructions.     Fever, Chills, or Rash If you experience a fever of higher than 100 degrees, chills, rash, or open wounds during the one week before your procedure, please call the clinic to see if you may proceed with your procedure.      Medication Hold List  **Patients under Cardiology/Neurology care should consult their provider prior to the pain procedure to verify pre-procedure medication instructions. The information below contains general guidelines.**    Blood Thinners If you are taking daily ASPIRIN, PLAVIX, OR OTHER BLOOD THINNERS SUCH AS COUMADIN/WARFARIN, we will need your prescribing doctor to sign a release permitting you to stop these medications. Once approved by your prescribing doctor - STOP ALL BLOOD THINNERS BASED ON THE TIME TABLE BELOW PRIOR TO YOUR PROCEDURE. If you have been instructed to stop WARFARIN(COUMADIN), you must have an INR lab drawn the day before your procedure. . Your INR must be within normal limits before we can perform your injection. MEDICATIONS CAN BE RESTARTED AFTER YOUR PROCEDURE.    14 DAY HOLD  Ticlid (ticlopidine)    10 DAY HOLD  Effient (Prasugel)    3 DAY HOLD  Xarelto (rivaroxaban) 7 DAY HOLD  Anacin, Bufferin, Ecotrin, Excedrin, Aggrenox (Aspirin)  Brilinta (ticagrelor)  Coumadin (Warfarin)  Pradexa (Dabigatran)  Elmiron (Pentosan)  Plavix (Clopidogrel Bisulfate)  Pletal (Cilostazol)    24 HOUR HOLD  Lovenox (enoxaparin)  Agrylin (Anagrelide)        Non-steroidal Anti-inflammatories (NSAIDs) DO NOT TAKE any non-steroidal anti-inflammatory medications (NSAIDs) listed on the table below. MEDICATIONS CAN BE RESTARTED AFTER YOUR PROCEDURE. Celebrex is OK to take and does not need to be discontinued.     Medications to  stop:  3 DAY HOLD  Advil, Motrin (Ibuprofen)  Arthrotec (diciofenac sodium/misoprostol)  Clinoril (Sulindac)  Indocin (Indomethacin)  Lodine (Etodolac)  Toradol (Ketorolac)  Vicoprofen (Hydrocodone and Ibuprofen)  Voltaren (Diclotenac)    14 DAY HOLD  Daypro (Oxaprozin)  Feldene (Piroxicam)   7 DAY HOLD  Aleve (Naproxen sodium)  Darvon compound (contains aspirin)  Naprosyn (Naproxen)  Norgesic Forte (contains aspirin)  Mobic (Meloxicam)  Oruvall (Ketoprofen)  Percodan (contains aspirin)  Relafen (Nabumetone)  Salsalate  Trilisate  Vitamin E (more than 400 mg per day)  Any medication containing aspirin                To speak with a nurse, schedule/reschedule/cancel a clinic appointment, or request a medication refill call: [phone]     You can also reach us by [employer]: [email or web address]

## 2019-05-07 NOTE — NURSING NOTE
"LPN read through the instructions with pt for the recommended procedure: Bilateral Suprascapular Pulsed Nerve Radiofrequency Ablation.   Pt verbalized understanding to holding appropriate medication per protocol, and was agreeable to NPO policy and needing a .    Pt was informed that they will need to hold their Xarelto for 3 days before the procedure. Pt confirmed it was Sarahi Vivar who was prescribing medication. LPN faxed \"Anti-coagulation therapy\" sheet to [phone], asking for permission. LPN gave permission form to RN[facility], to follow up on.     Pt's  stated that if clinic staff are unable to get a hold of provider, that he would attempt to follow up. RNCC informed of this.     LPN reviewed AVS with Pt.  Pt verbalized an understanding of information, and was asked to contact clinic with questions.    Verna Webb LPN    "

## 2019-05-07 NOTE — LETTER
5/7/2019       RE: Aspen Mccullough  [address]  [facility] [address]     Dear Colleague,    Thank you for referring your patient, Aspen Mccullough, to the [facility] FOR COMPREHENSIVE PAIN MANAGEMENT at [facility]. Please see a copy of my visit note below.    Date of visit: 5/7/19     Chief complaint:         Chief Complaint   Patient presents with     Pain Management         Interval history:  Aspen Mccullough is a 59 year old female last seen by me 11/26/18 for upper shoulder and lateral upper extremity pain. She previously had undergone a left suprascapular nerve block with 100% relief and restoration of range of motion. Due to presentation of similar symptoms on right side, locating pain that extends from right infraspinatus location to lateral aspect of upper arm, we recommended a bilateral suprascapular nerve block that was done on 10/29/18. Patient  had reported excellent relief with restoration of range of motion. Since last seen, patient did call in to clinic, requesting a repeat suprascapular nerve block. This was performed on 3/15/19 by Dr. Velazquez; patient reports she obtained no relief following this procedure. Dr. Velazquez suggested pursuing pulsed radiofrequency ablation of this nerve, in effort to achieve improved relief and mitigate steroid use.   Patient follows with [facility] for persistent neck and low back pain. Updated imaging was ordered, however, due to insurance issues, patient has only now recently been able to schedule this. MRI scheduled next week at [facility]. She states her podiatrist, that has repaired her hammertoe, has expressed concern regarding a possible lumbar radiculopathy. She apparently cannot return to [facility] due to insurance; she requests if I will review lumbar MRI once complete.      Medications:    Current Outpatient Prescriptions                Current Outpatient Prescriptions   Medication Sig Dispense Refill      BACLOFEN PO Take 10 mg by mouth 3 times daily         CEPHALEXIN PO Take 500 mg by mouth as needed (Dental Procedure) Take 4 caps 1 hour prior to Dental Appointment         cyanocobalamin 1000 MCG/ML injection Inject 1 mL (1,000 mcg) Subcutaneous every 7 days 4 mL 5     cyclobenzaprine (FLEXERIL) 10 MG tablet Take 1 tablet (10 mg) by mouth 3 times daily 90 tablet 3     cycloSPORINE (RESTASIS) 0.05 % ophthalmic emulsion Place 1 drop into both eyes 2 times daily         diazepam (VALIUM) 10 MG tablet Take 1 tablet (10 mg) by mouth 3 times daily (Patient taking differently: Take 10 mg by mouth daily ) 90 tablet 0     enoxaparin (LOVENOX) 100 MG/ML SOLN Inject Subcutaneous every 12 hours         estradiol (ESTRING) 2 MG vaginal ring Place vaginally every 3 months         fexofenadine (ALLEGRA) 180 MG tablet Take 180 mg by mouth daily.         FISH OIL daily. w/DHEA.          fluticasone (FLONASE) 50 MCG/ACT nasal spray 2 sprays by Both Nostrils route daily as needed.         HYDROmorphone (DILAUDID) 8 MG tablet Take 1 tablet (8 mg) by mouth every 3 hours as needed (Patient taking differently: Take 8 mg by mouth 3 times daily ) 100 tablet 0     LEVOFLOXACIN PO Take 750 mg by mouth         levothyroxine (SYNTHROID/LEVOTHROID) 100 MCG SOLR injection Inject 200 mcg into the vein twice a week         levothyroxine (SYNTHROID/LEVOTHROID) 200 MCG SOLR injection     3     levothyroxine (SYNTHROID/LEVOTHROID) 300 MCG tablet Take 300 mcg by mouth 2 times daily          Liothyronine Sodium 50 MCG TABS Take 50 mcg by mouth daily          morphine (MS CONTIN) 30 MG 12 hr tablet Take 3 tablets (90 mg) by mouth every 8 hours (Patient taking differently: Take 90 mg by mouth 3 times daily ) 270 tablet 0     morphine (MS CONTIN) 60 MG 12 hr tablet     0     NASONEX 50 MCG/ACT nasal spray     11     norethindrone-ethinyl estradiol (FEMHRT 1/5) 1-5 MG-MCG per tablet Take 1 tablet by mouth daily         nystatin (MYCOSTATIN) 303556 UNIT/GM  POWD Apply topically 3 times daily as needed 60 g 1     prednisoLONE acetate (PRED FORTE) 1 % ophthalmic susp 1 drop 4 times daily         predniSONE (DELTASONE) 1 MG tablet     2     PREDNISONE 5 MG OR TABS alternate 8 mg and 10 mg every other day         probiotic CAPS Take 1 capsule by mouth daily.         rivaroxaban ANTICOAGULANT (XARELTO) 20 MG TABS tablet Take 20 mg by mouth daily (with dinner)          sodium chloride, PF, (SODIUM CHLORIDE) 0.9% PF flush           SOTALOL HCL PO Take 80 mg by mouth         VITAMIN D, CHOLECALCIFEROL, PO Take 50,000 Units by mouth once a week                Medical History: any changes in medical history since they were last seen? No     Review of Systems:  The 14 system ROS was reviewed from the intake questionnaire; results listed at end of note.      Physical Exam:  Blood pressure 128/62, pulse 74, not currently breastfeeding.  General: NAD  Neuro: Alert, oriented  Psych: Mood and affect appropriate  Respiratory: Non-labored breathing; no respiratory distress.   CV: normal S1, S2  Skin: No lesions or abrasions.   MSK: Tenderness extends along infraspinatus toward deltoid. Range of motion with abduction is reduced to 100 degrees. No focal tenderness along cervical spinous processes; range of motion full. Strength 5/5 with grasp, biceps, triceps, deltoid.       Assessment:   Aspen Mccullough is a 58 year old female who is seen at the pain clinic for bilateral suprascapular neuropathy.     Plan:  1. Interventions:   -will plan to proceed with pulsed radiofrequency ablation of bilateral suprascapular nerve. Procedure explained in depth to patient; potential risks reviewed. All questions were answered. Patient will need to hold Xarelto for 3 days prior to procedure.    2. Follow up:   -6 weeks following procedure. Will review lumbar imaging at this time.       Total time spent was 15 minutes, and more than 50% of face to face time was spent in counseling and/or coordination of  care regarding plan of care.     ANITRA Tavarez, NGUYEN-BC  Pain Management  Department of Interventional Pain Management and Anesthesiology   [facility]

## 2019-05-08 ASSESSMENT — ANXIETY QUESTIONNAIRES: GAD7 TOTAL SCORE: 1

## 2019-05-13 ENCOUNTER — TRANSFERRED RECORDS (OUTPATIENT)
Dept: HEALTH INFORMATION MANAGEMENT | Facility: CLINIC | Age: 59
End: 2019-05-13

## 2019-05-21 ENCOUNTER — MEDICAL CORRESPONDENCE (OUTPATIENT)
Dept: HEALTH INFORMATION MANAGEMENT | Facility: CLINIC | Age: 59
End: 2019-05-21

## 2019-05-28 NOTE — TELEPHONE ENCOUNTER
[facility] Call Center    Phone Message    May a detailed message be left on voicemail: yes    Reason for Call: Other: Pt's  called to check on scheduling.  I do not see any PRE -VISIT or confirmation that we have recieved any documents back in reply to our request.  Pt is anxious and is calling provider to request document again.  Can we please FU with referral and update Pt regarding where she is at in the process.  thanks     Action Taken: Message routed to:  [facility] dermatology

## 2019-05-29 RX ORDER — GABAPENTIN 600 MG/1
600 TABLET ORAL 3 TIMES DAILY
COMMUNITY
End: 2019-05-31 | Stop reason: HOSPADM

## 2019-05-31 ENCOUNTER — TELEPHONE (OUTPATIENT)
Dept: DERMATOLOGY | Facility: CLINIC | Age: 59
End: 2019-05-31

## 2019-05-31 NOTE — TELEPHONE ENCOUNTER
Writer left VM indicating that the records from the patient's previous dermatologist have not been received, and the referral process has not been started.  Writer indicated what was needed to begin the process and left the call center number to have any more questions answered.

## 2019-06-10 ENCOUNTER — TELEPHONE (OUTPATIENT)
Dept: ANESTHESIOLOGY | Facility: CLINIC | Age: 59
End: 2019-06-10

## 2019-06-10 NOTE — TELEPHONE ENCOUNTER
Attempt to reach out to patient to discuss rescheduling surgery. Left detailed message informing that per my PA team Eld[employer] has approved the appeal and the patient is ok to schedule between now and 7/21. Left first available of 7/10/19 with Dr. Velazquez and left best call back number of [phone]

## 2019-06-10 NOTE — TELEPHONE ENCOUNTER
Spoke with: Ash     Date Scheduled: 7/10/19     Provider: Dr. Velazquez     : yes     F/U Appointment: 8/21/19     Patient was educated on pre-op nurse call: yes      Direct procedure:   no

## 2019-06-20 NOTE — TELEPHONE ENCOUNTER
According to referral dept this referral to Dr Fuentes has been forwarded by them to the clinic on 4/4 and again on 5/28 for review.  Another copy should be on the way today.

## 2019-06-20 NOTE — TELEPHONE ENCOUNTER
[facility] Call Center    Phone Message    May a detailed message be left on voicemail: yes    Reason for Call: Other: Pt  wanted to make certain everything goes smoothly for application into clinic.  Pt insurance requires a PA to be submitted to see Dr Tony.  Pt  called referring provider today to have them refax referral to our clinic.  Please keep alert for them and submit PA to [employer] when referral has arrived.  Pt  will call back next week to confirm reciept and check on PA with us.     Action Taken: Message routed to:  [facility] dermatology

## 2019-07-01 ENCOUNTER — PRE VISIT (OUTPATIENT)
Dept: DERMATOLOGY | Facility: CLINIC | Age: 59
End: 2019-07-01

## 2019-07-01 NOTE — TELEPHONE ENCOUNTER
FUTURE VISIT INFORMATION      FUTURE VISIT INFORMATION:    Date: N/A    Time:     Location:   REFERRAL INFORMATION:    Referring provider:  SRIDEVI KAHN     Referring providers clinic:  [facility]     Reason for visit/diagnosis  HYPERHIDROSIS SCALP     RECORDS REQUESTED FROM:         DATE RECEIVED: 07/01/19   NOTES STATUS DETAILS   OFFICE NOTE from referring provider N/A    OFFICE NOTE from other specialist N/A    LAB RESULTS N/A    MEDICATION LIST RECEIVED     SCALP BIOPSY RESULTS N/A    HAIR LOSS PHOTOS N/A      Action    Action Taken RECORDS RECEIVED AND WILL BE REVIEWED BY  IN 4-8 WEEKS. PATIENT WILL BE CONTACTED.

## 2019-07-09 NOTE — TELEPHONE ENCOUNTER
Action    Action Taken Pt has been accepted into 's [facility]. Pt may schedule [facility] appt with .

## 2019-07-10 ENCOUNTER — HOSPITAL ENCOUNTER (OUTPATIENT)
Facility: AMBULATORY SURGERY CENTER | Age: 59
End: 2019-07-10
Payer: COMMERCIAL

## 2019-07-10 VITALS
BODY MASS INDEX: 29.62 KG/M2 | HEART RATE: 96 BPM | TEMPERATURE: 99.4 F | OXYGEN SATURATION: 91 % | RESPIRATION RATE: 16 BRPM | SYSTOLIC BLOOD PRESSURE: 163 MMHG | HEIGHT: 69 IN | DIASTOLIC BLOOD PRESSURE: 84 MMHG | WEIGHT: 200 LBS

## 2019-07-10 RX ORDER — LIDOCAINE HYDROCHLORIDE 10 MG/ML
INJECTION, SOLUTION EPIDURAL; INFILTRATION; INTRACAUDAL; PERINEURAL PRN
Status: DISCONTINUED | OUTPATIENT
Start: 2019-07-10 | End: 2019-07-10 | Stop reason: HOSPADM

## 2019-07-10 RX ORDER — BUPIVACAINE HYDROCHLORIDE 2.5 MG/ML
INJECTION, SOLUTION INFILTRATION; PERINEURAL PRN
Status: DISCONTINUED | OUTPATIENT
Start: 2019-07-10 | End: 2019-07-10 | Stop reason: HOSPADM

## 2019-07-10 RX ORDER — METHYLPREDNISOLONE ACETATE 40 MG/ML
INJECTION, SUSPENSION INTRA-ARTICULAR; INTRALESIONAL; INTRAMUSCULAR; SOFT TISSUE PRN
Status: DISCONTINUED | OUTPATIENT
Start: 2019-07-10 | End: 2019-07-10 | Stop reason: HOSPADM

## 2019-07-10 RX ORDER — DIAZEPAM 5 MG
5 TABLET ORAL ONCE
Status: COMPLETED | OUTPATIENT
Start: 2019-07-10 | End: 2019-07-10

## 2019-07-10 RX ADMIN — DIAZEPAM 5 MG: 5 TABLET ORAL at 11:46

## 2019-07-10 ASSESSMENT — MIFFLIN-ST. JEOR: SCORE: 1541.57

## 2019-07-10 NOTE — DISCHARGE INSTRUCTIONS
Home Care Instructions after an Upper Extremity nerve block      In an upper extremity nerve block, local anesthetic or  numbing  medication is injected around the nerves to anesthetize or  numb  the shoulder or arm. When used for chronic pain, it is hoped that this procedure will interrupt the nerve pathways and provide long term pain relief. If your arm is numb, you should be careful since it is possible to injure your arm without being aware of the injury. While your arm is numb, you should:    Avoid striking or bumping your arm    Avoid extreme hot or cold    Activity  -You may resume most normal activity levels with the exception of strenuous activity. It is important for us to know if your pain with normal activity is relieved after this injection.  -DO NOT shower for 24 hours  -DO NOT remove bandaid for 24 hours    Pain  -You may experience soreness at the injection site for one or two days  -You may use an ice pack for 20 minutes every 2 hours for the first 24 hours  -You may use a heating pad after the first 24 hours  -You may use Tylenol (acetaminophen) every 4 hours or other pain medicines as directed by your physician      DID YOU RECEIVE SEDATION TODAY?  No    If you received sedation please follow these additional safety measures.  Sedation medicine, if given, may remain active for many hours. It is important for the next 24 hours that you do not:  -Drive a car  -Operate machines or power tools  -Consume alcohol, including beer  -Sign any important papers or legal documents    DID YOU RECEIVE STEROIDS TODAY?  Yes    Common side effects of steroids:  Not everyone will experience corticosteroid side effects. If side effects are experienced, they will gradually subside in the 7-10 day period following an injection. Most common side effects include:  -Flushed face and/or chest  -Feeling of warmth, particularly in the face but could be an overall feeling of warmth  -Increased blood sugar in diabetic  patients  -Menstrual irregularities my occur. If taking hormone-based birth control an alternate method of birth control is recommended  -Sleep disturbances and/or mood swings are possible  -Leg cramps      PLEASE KEEP TRACK OF YOUR SYMPTOMS AND NOTE YOUR IMPROVEMENT FOR YOUR DOCTOR.     Please contact us if you have:  -Severe pain  -Fever more than 101.5 degrees Fahrenheit  -Signs of infection at the injection site (redness, swelling, or drainage)    If you have questions, please contact our office at [phone] between the hours of 7:00 am and 3:00 pm Monday through Friday. After office hours you can contact the on call provider by dialing [phone]. If you need immediate attention, we recommend that you go to a hospital emergency room or dial [phone].

## 2019-07-10 NOTE — PROGRESS NOTES
Pt pulling off ID band and allergy band on writers arrival to Phase 2 room. Writer offered Pt a scissors to assist with removing bands, Pt denied. Pt had dressed herself at this time. Pt grabbed After Visit Summary paperwork from table and placed in purse. Writer asked Pt if Discharge Teaching could be completed, Pt agreed. Questions encouraged and offered to get Dr. Velazquez to answer any questions, Pt denied. Pt walked quickly out of room. Unable to assess heart and lungs. Writer walked behind Patient to guide Pt out of Procedure Area. Pt walked quickly into the bathroom in lobby area with no conversation to Writer.

## 2019-07-23 NOTE — H&P
"Abbreviated History and Physical    Patient Name: Aspen Mccullough  YOB: 1960    Reason for Procedure: Shoulder Pain    History:    Past Medical History:   Diagnosis Date     ADHD (attention deficit hyperactivity disorder)      Allergic rhinitis      Chronic low back pain      Cushing's syndrome (H)      DDD (degenerative disc disease)      DDD (degenerative disc disease)      Deviated nasal septum      Diarrhea      Endometriosis      Fibromyalgia      Hashimoto's disease      Hyperlipidemia      Hypothyroid     hx hashimoto's thyroiditis     Insomnia      Lupus (H)      Malabsorption      Pernicious anemia      Renal disease     chronic renal insufficiency     Renal disease     hx renal failure     Sinusitis        History of obstructive sleep apnea? no    History of problems with sedation? No    Physical exam:  /84   Pulse 96   Temp 99.4  F (37.4  C) (Temporal)   Resp 16   Ht 1.753 m (5' 9\")   Wt 90.7 kg (200 lb)   SpO2 91%   BMI 29.53 kg/m    General: in no apparent distress   Neuro: At least antigravity strength noted in all 4 extremities  Respiratory: Clear to ausculation bilaterally   Cardiac: Regular rate and rhythm  Skin: No rashes or lesions on exposed areas of skin    Medications:   Current Outpatient Medications   Medication     BIOTIN FORTE PO     carboxymethylcellulose (CARBOXYMETHYLCELLULOSE SODIUM) 0.5 % SOLN ophthalmic solution     cyanocobalamin 1000 MCG/ML injection     cyclobenzaprine (FLEXERIL) 10 MG tablet     cycloSPORINE (RESTASIS) 0.05 % ophthalmic emulsion     fexofenadine (ALLEGRA) 180 MG tablet     fish oil-omega-3 fatty acids 1000 MG capsule     HYDROmorphone (DILAUDID) 8 MG tablet     levothyroxine (SYNTHROID/LEVOTHROID) 100 MCG SOLR injection     levothyroxine (SYNTHROID/LEVOTHROID) 300 MCG tablet     lifitegrast (XIIDRA) 5 % opthalmic solution     liothyronine (CYTOMEL) 25 MCG tablet     MAGNESIUM-ZINC PO     morphine (MS CONTIN) 30 MG 12 hr tablet     " morphine (MS CONTIN) 60 MG 12 hr tablet     multivitamin, therapeutic with minerals (MULTI-VITAMIN) TABS tablet     prednisoLONE acetate (PRED FORTE) 1 % ophthalmic susp     predniSONE (DELTASONE) 1 MG tablet     predniSONE (DELTASONE) 5 MG tablet     probiotic CAPS     VITAMIN D, CHOLECALCIFEROL, PO     CEPHALEXIN PO     NASONEX 50 MCG/ACT spray     nystatin (MYCOSTATIN) 888762 UNIT/GM POWD     rivaroxaban ANTICOAGULANT (XARELTO) 20 MG TABS tablet     No current facility-administered medications for this encounter.        Allergies:     Allergies   Allergen Reactions     Chlorhexidine Hives     Thor surgical cleanser     Codeine Hives     Ibuprofen [Nsaids] Hives     Penicillins Hives     Other reaction(s): Unknown     Sulfa Drugs Hives     Other reaction(s): Unknown     Doxycycline GI Disturbance     Emesis & diarrhea  Patient denies allergy to this med     Lactose      Other reaction(s): Unknown     Mold      Mushroom      Red Wine Complex [Red Wine Powder]        ASA Classification: 2    OK for local anesthetic use.     Basim Velazquez IV, MD  Pain Medicine  [facility]   Department of Anesthesia  7/22/2019

## 2019-07-25 NOTE — PROCEDURES
PAIN MEDICINE CLINIC PROCEDURE NOTE     ATTENDING CLINICIAN:    Basim Velazquez MD       PREPROCEDURE DIAGNOSES:  1.  Right shoulder pain      POSTPROCEDURE DIAGNOSES:  1.  Bilateral shoulder pain     PROCEDURE(S) PERFORMED:  1.  Bilateral suprascapular nerve injections  2.  Ultrasound guidance for the above-named procedures        ANESTHESIA:  Local.     BLOOD LOSS:  Minimal.     DRAINS AND SPECIMENS:  None.     COMPLICATIONS:  None.     INDICATIONS:  Aspen Mccullough is a 59 year old female with a history of  chronic shoulder pain secondary to degenerative arthritis .  The patient stated that the patient was in their usual state of health and denied recent anticoagulant use or recent infections.  Therefore, the plan is to perform above mentioned procedure.      Procedure Details:     The patient was met in the procedure room, where the patient was identified by name, medical record number and date of birth.  All of the patient s last minute questions were answered. Written informed consent was obtained and saved in the electronic medical record, after the risks, benefits, and alternatives were discussed with the patient.       A formal time-out procedure was performed, as per protocol, including patient name, title of procedure, and site of procedure, and all in the room concurred.  Routine monitors were applied.       The patient was placed in the sitting position on the procedure room table.Routine monitors were placed.  Vital signs were stable.     A chlorhexidine prep was completed followed by sterile draping per standard procedure.  Patient's consent was obtained.  The RIGHT side Suprascapular area was prepped using Chloraprep and the area was sterilely draped.  The ultrasound probe was draped and the U/S was used to identify and confirm the depth.  Fascial planes were easily visible as well as subcutaneous tissue and the supraspinatus fossae with the scapula as the deep border.  A 21G 100 mm  pajunk  needle was used in an in-plane fashion and positioned deep to the hyperechoic suprascapular ligament taking care to avoid the visibly pulsatile suprascapular artery.   ~1ml of sterile saline was injected to confirm correct placement of the needle tip prior to injection of 4 mls of an injectate consisting of  0.25% bupivacaine 4 ml and 40 mg( 1 ml) of depomedrol  into the area, with several aspirations being negative for blood.  A screen shot,was taken of nicely extravasating fluid into the the expected location of the supraspinatus nerve.  The needle was removed, and a band-aid was applied.      The procedure was then repeated with identical technique on the LEFT side.     Light pressure was held at the puncture sites to prevent ecchymosis and oozing.  The patient's skin was cleansed, and hemostasis was confirmed.  Band-aids were applied to the needle injection sites.       Condition:     The patient remained awake and alert throughout the procedure.  The patient tolerated the procedure well and was monitored for approximately 15 minutes afterward in the post procedure area.  There were no immediate post procedure complications noted.  The patient was then discharged to home as per protocol.       Pre-procedure pain score:9/10  Post-procedure pain score: 9/10

## 2019-09-30 ENCOUNTER — HEALTH MAINTENANCE LETTER (OUTPATIENT)
Age: 59
End: 2019-09-30

## 2019-12-05 ENCOUNTER — TELEPHONE (OUTPATIENT)
Dept: ANESTHESIOLOGY | Facility: CLINIC | Age: 59
End: 2019-12-05

## 2019-12-05 NOTE — TELEPHONE ENCOUNTER
NATHAN called and spoke with Ash, Pt's .   He was informed that the pt is due to follow up in clinic, last seen in May by Sarahi Chung.     Pt will be evaluated for future procedure planning.   Verna Webb LPN

## 2019-12-09 ENCOUNTER — OFFICE VISIT (OUTPATIENT)
Dept: ANESTHESIOLOGY | Facility: CLINIC | Age: 59
End: 2019-12-09
Payer: COMMERCIAL

## 2019-12-09 ENCOUNTER — OFFICE VISIT (OUTPATIENT)
Dept: DERMATOLOGY | Facility: CLINIC | Age: 59
End: 2019-12-09
Payer: COMMERCIAL

## 2019-12-09 VITALS — DIASTOLIC BLOOD PRESSURE: 93 MMHG | HEART RATE: 93 BPM | SYSTOLIC BLOOD PRESSURE: 163 MMHG

## 2019-12-09 VITALS
BODY MASS INDEX: 29.53 KG/M2 | SYSTOLIC BLOOD PRESSURE: 162 MMHG | HEIGHT: 69 IN | DIASTOLIC BLOOD PRESSURE: 86 MMHG | RESPIRATION RATE: 16 BRPM | HEART RATE: 89 BPM

## 2019-12-09 DIAGNOSIS — R63.30 FEEDING DIFFICULTIES: ICD-10-CM

## 2019-12-09 DIAGNOSIS — Z13.1 SCREENING FOR DIABETES MELLITUS: ICD-10-CM

## 2019-12-09 DIAGNOSIS — L74.519 FOCAL HYPERHIDROSIS: ICD-10-CM

## 2019-12-09 DIAGNOSIS — G89.29 CHRONIC PAIN OF BOTH SHOULDERS: Primary | ICD-10-CM

## 2019-12-09 DIAGNOSIS — E06.3 HYPOTHYROIDISM DUE TO HASHIMOTO'S THYROIDITIS: ICD-10-CM

## 2019-12-09 DIAGNOSIS — L74.519 FOCAL HYPERHIDROSIS: Primary | ICD-10-CM

## 2019-12-09 DIAGNOSIS — E55.9 VITAMIN D DEFICIENCY, UNSPECIFIED: ICD-10-CM

## 2019-12-09 DIAGNOSIS — M25.511 CHRONIC PAIN OF BOTH SHOULDERS: Primary | ICD-10-CM

## 2019-12-09 DIAGNOSIS — R73.09 OTHER ABNORMAL GLUCOSE: ICD-10-CM

## 2019-12-09 DIAGNOSIS — M25.512 CHRONIC PAIN OF BOTH SHOULDERS: Primary | ICD-10-CM

## 2019-12-09 LAB
DEPRECATED CALCIDIOL+CALCIFEROL SERPL-MC: 27 UG/L (ref 20–75)
HBA1C MFR BLD: 6 % (ref 0–5.6)
MAGNESIUM SERPL-MCNC: 2.4 MG/DL (ref 1.6–2.3)
T3FREE SERPL-MCNC: 2.8 PG/ML (ref 2.3–4.2)
T4 FREE SERPL-MCNC: 1.44 NG/DL (ref 0.76–1.46)
TSH SERPL DL<=0.005 MIU/L-ACNC: 0.28 MU/L (ref 0.4–4)

## 2019-12-09 PROCEDURE — 84481 FREE ASSAY (FT-3): CPT | Performed by: DERMATOLOGY

## 2019-12-09 PROCEDURE — 82306 VITAMIN D 25 HYDROXY: CPT | Performed by: DERMATOLOGY

## 2019-12-09 PROCEDURE — 88305 TISSUE EXAM BY PATHOLOGIST: CPT | Mod: TC | Performed by: DERMATOLOGY

## 2019-12-09 RX ORDER — GABAPENTIN 300 MG/1
300 CAPSULE ORAL 2 TIMES DAILY
Status: ON HOLD | COMMUNITY
End: 2021-10-26

## 2019-12-09 RX ORDER — LIDOCAINE HYDROCHLORIDE AND EPINEPHRINE 10; 10 MG/ML; UG/ML
3 INJECTION, SOLUTION INFILTRATION; PERINEURAL ONCE
Status: DISCONTINUED | OUTPATIENT
Start: 2019-12-09 | End: 2022-02-17

## 2019-12-09 RX ORDER — GABAPENTIN 400 MG/1
800 CAPSULE ORAL 3 TIMES DAILY
COMMUNITY
End: 2020-01-22

## 2019-12-09 ASSESSMENT — ANXIETY QUESTIONNAIRES
7. FEELING AFRAID AS IF SOMETHING AWFUL MIGHT HAPPEN: NOT AT ALL
3. WORRYING TOO MUCH ABOUT DIFFERENT THINGS: NOT AT ALL
6. BECOMING EASILY ANNOYED OR IRRITABLE: NOT AT ALL
2. NOT BEING ABLE TO STOP OR CONTROL WORRYING: NOT AT ALL
4. TROUBLE RELAXING: NOT AT ALL
1. FEELING NERVOUS, ANXIOUS, OR ON EDGE: NOT AT ALL
GAD7 TOTAL SCORE: 0
7. FEELING AFRAID AS IF SOMETHING AWFUL MIGHT HAPPEN: NOT AT ALL
GAD7 TOTAL SCORE: 0
5. BEING SO RESTLESS THAT IT IS HARD TO SIT STILL: NOT AT ALL

## 2019-12-09 ASSESSMENT — PAIN SCALES - GENERAL
PAINLEVEL: EXTREME PAIN (8)
PAINLEVEL: NO PAIN (0)
PAINLEVEL: NO PAIN (0)

## 2019-12-09 NOTE — LETTER
12/9/2019       RE: Aspen Mccullough  [address]  [facility] [address]     Dear Colleague,    Thank you for referring your patient, Aspen Mccullough, to the [facility] FOR COMPREHENSIVE PAIN MANAGEMENT at [facility]. Please see a copy of my visit note below.    [facility]  Pain Clinic Evaluation    CC: This is a 59-year-old female who is known to the clinic, new to me, with bilateral shoulder pain now for approximately 10 years she tells me.  She was last seen in the clinic by Sarahi Chung, nurse practitioner on May 7 of 2019.  Since that time she underwent bilateral supra scapular nerve blocks with Dr. Velazquez on 7/10/2019.  She states that unfortunately these nerve blocks did not help her pain at this time.  She did have suprascapular nerve blocks bilateral prior to that which she stated were extremely helpful gave her 40 to 50% relief for at least 2 months or so.  She is interested in having these blocks again because she feels that she has exhausted all other options for relieving her pain.  She states she has tried topical medications, she is currently on high-dose opioid therapy, she has tried steroid injections, she has tried trigger point injections, physical therapy, pool therapy, she has seen a psychiatrist.  Her PMH also includes SLE on Prednisone and history of DVT/PE on Xarelto.    Of note patient goes to an outside pain clinic for chronic low back pain and she is currently taking Dilaudid 8 mg 3 times a day and MS Contin at high doses.  She is also taking gabapentin.  She was previously seen by Dr. Mendoza in orthopedics.  She tells me today that she wants to avoid surgery.    History of present illness:    Red Flags:  -Denies Falls or balance difficulty  -Denies Bowel/Bladder Changes  -Denies Saddle Anesthesia  -Denies Fever/IV Drug Use  -Denies Unexplained Weight Loss  -Denies previous cancer history  -Denies progressive weakness      Previous Treatments have included:  #Medications:  #Therapy: land and pool therapy. She states that she has a current referral for pool therapy and will start this at the first of the year.  #Acupuncture: tried, not helpful  #Chiropractic Manipulation  #Psychology: was seeing psychiatry and has a current referral to re-start  #Surgery: wants to avoid    Social History:  Marital status:  Occupation:  Tobacco: former   ETOH: denies    Past Medical History:   Diagnosis Date     ADHD (attention deficit hyperactivity disorder)      Allergic rhinitis      Chronic low back pain      Cushing's syndrome (H)      DDD (degenerative disc disease)      DDD (degenerative disc disease)      Deviated nasal septum      Diarrhea      Endometriosis      Fibromyalgia      Hashimoto's disease      Hyperlipidemia      Hypothyroid     hx hashimoto's thyroiditis     Insomnia      Lupus (H)      Malabsorption      Pernicious anemia      Renal disease     chronic renal insufficiency     Renal disease     hx renal failure     Sinusitis        Current Outpatient Medications   Medication     BIOTIN FORTE PO     carboxymethylcellulose (CARBOXYMETHYLCELLULOSE SODIUM) 0.5 % SOLN ophthalmic solution     CEPHALEXIN PO     cyanocobalamin 1000 MCG/ML injection     cyclobenzaprine (FLEXERIL) 10 MG tablet     cycloSPORINE (RESTASIS) 0.05 % ophthalmic emulsion     fexofenadine (ALLEGRA) 180 MG tablet     gabapentin (NEURONTIN) 300 MG capsule     gabapentin (NEURONTIN) 400 MG capsule     HYDROmorphone (DILAUDID) 8 MG tablet     levothyroxine (SYNTHROID/LEVOTHROID) 100 MCG SOLR injection     levothyroxine (SYNTHROID/LEVOTHROID) 300 MCG tablet     lifitegrast (XIIDRA) 5 % opthalmic solution     liothyronine (CYTOMEL) 25 MCG tablet     MAGNESIUM-ZINC PO     morphine (MS CONTIN) 30 MG 12 hr tablet     morphine (MS CONTIN) 60 MG 12 hr tablet     multivitamin, therapeutic with minerals (MULTI-VITAMIN) TABS tablet     NASONEX 50 MCG/ACT spray      "nystatin (MYCOSTATIN) 565367 UNIT/GM POWD     prednisoLONE acetate (PRED FORTE) 1 % ophthalmic susp     predniSONE (DELTASONE) 1 MG tablet     predniSONE (DELTASONE) 5 MG tablet     probiotic CAPS     rivaroxaban ANTICOAGULANT (XARELTO) 20 MG TABS tablet     VITAMIN D, CHOLECALCIFEROL, PO     Current Facility-Administered Medications   Medication     lidocaine 1% with EPINEPHrine 1:100,000 injection 3 mL          Allergies   Allergen Reactions     Chlorhexidine Hives     Thor surgical cleanser     Codeine Hives     Ibuprofen [Nsaids] Hives     Penicillins Hives     Other reaction(s): Unknown     Sulfa Drugs Hives     Other reaction(s): Unknown     Doxycycline GI Disturbance     Emesis & diarrhea  Patient denies allergy to this med     Lactose      Other reaction(s): Unknown     Mold      Mushroom      Red Wine Complex [Red Wine Powder]        Exam:  BP (!) 162/86   Pulse 89   Resp 16   Ht 1.753 m (5' 9\")   BMI 29.53 kg/m     General: NAD, obese  Neuro: Alert, oriented  Psych: Mood and affect appropriate  Respiratory: Non-labored breathing; no respiratory distress.   CV: RRR  Skin: No lesions or abrasions.    Grossly abdominal  MSK: Tenderness extends along infraspinatus toward deltoid. Range of motion with abduction is reduced to 100-130 degrees. No focal tenderness along cervical spinous processes; range of motion full. Strength 5/5 with grasp, biceps, triceps, deltoid.     EXAM: XR SHOULDER 2 VIEW LEFT  4/11/2018 10:00 AM      HISTORY:  Chronic left shoulder pain     COMPARISON:  Chest radiograph 4/24/2016     FINDINGS:  AP, Grashey, scapular Y, and axillary views of the left  shoulder are obtained.     Normal alignment of the glenohumeral joint. Subcortical cystic changes  of the greater tuberosity. Subacromial spurring. Mild  acromioclavicular joint degenerative changes. Mild subchondral  sclerosis of the glenohumeral joint.     No substantial joint effusion. The visualized chest is unremarkable.             "                                                   IMPRESSION:   1. Mild degenerative changes of the glenohumeral joint with  subcortical cystic changes of the greater tuberosity and subacromial  spurring.  2. Mild degenerative changes of the acromial clavicular joint.    Assessment: This is a 59-year-old female who is seen in the pain clinic today for chronic bilateral shoulder pain.  She has undergone previous suprascapular nerve blocks bilaterally.  She states that these blocks last done in July, 2019 were not helpful, however they were helpful prior to that in 3/2019.  She is interested in trying these nerve blocks again as she feels she has exhausted all other conservative measures for her shoulder pain.    Pt has history of DVT/PE for which she is on Xarelto.  She will need to hold Xarelto for 3 days prior to SSNB.     Plan:    Additional Work-up: none  Therapy/Exercise: none at this time.  Has current referral for pool therapy.  Plans to start after New Year  Medications:   Interventions: set up for bilateral suprascapular nerve blocks  Referrals: none    Follow-up: 6.8 weeks after injections or sooner if needed.    Rocio Fernandez PA-C

## 2019-12-09 NOTE — NURSING NOTE
AVS reviewed with pt and given copy.  Pre-procedure instructions reviewed, including NPO orders,  needed, and medications to hold.  Pt verbalized understanding and declined having questions at this time.     Pt instructed to hold xarelto for 3 days prior to procedure, then resume 24 hours after the injection. Pt reports PCP manages anticoagulants.  Clinic will contact PCP for authorization to hold medication.      Keri Young RN, BSN

## 2019-12-09 NOTE — PATIENT INSTRUCTIONS

## 2019-12-09 NOTE — PROGRESS NOTES
[facility]  Pain Clinic Evaluation    CC: This is a 59-year-old female who is known to the clinic, new to me, with bilateral shoulder pain now for approximately 10 years she tells me.  She was last seen in the clinic by Sarahi Chung, nurse practitioner on May 7 of 2019.  Since that time she underwent bilateral supra scapular nerve blocks with Dr. Velazquez on 7/10/2019.  She states that unfortunately these nerve blocks did not help her pain at this time.  She did have suprascapular nerve blocks bilateral prior to that which she stated were extremely helpful gave her 40 to 50% relief for at least 2 months or so.  She is interested in having these blocks again because she feels that she has exhausted all other options for relieving her pain.  She states she has tried topical medications, she is currently on high-dose opioid therapy, she has tried steroid injections, she has tried trigger point injections, physical therapy, pool therapy, she has seen a psychiatrist.  Her PMH also includes SLE on Prednisone and history of DVT/PE on Xarelto.    Of note patient goes to an outside pain clinic for chronic low back pain and she is currently taking Dilaudid 8 mg 3 times a day and MS Contin at high doses.  She is also taking gabapentin.  She was previously seen by Dr. Mendoza in orthopedics.  She tells me today that she wants to avoid surgery.    History of present illness:    Red Flags:  -Denies Falls or balance difficulty  -Denies Bowel/Bladder Changes  -Denies Saddle Anesthesia  -Denies Fever/IV Drug Use  -Denies Unexplained Weight Loss  -Denies previous cancer history  -Denies progressive weakness     Previous Treatments have included:  #Medications:  #Therapy: land and pool therapy. She states that she has a current referral for pool therapy and will start this at the first of the year.  #Acupuncture: tried, not helpful  #Chiropractic Manipulation  #Psychology: was seeing psychiatry and has a current  referral to re-start  #Surgery: wants to avoid    Social History:  Marital status:  Occupation:  Tobacco: former   ETOH: denies    Past Medical History:   Diagnosis Date     ADHD (attention deficit hyperactivity disorder)      Allergic rhinitis      Chronic low back pain      Cushing's syndrome (H)      DDD (degenerative disc disease)      DDD (degenerative disc disease)      Deviated nasal septum      Diarrhea      Endometriosis      Fibromyalgia      Hashimoto's disease      Hyperlipidemia      Hypothyroid     hx hashimoto's thyroiditis     Insomnia      Lupus (H)      Malabsorption      Pernicious anemia      Renal disease     chronic renal insufficiency     Renal disease     hx renal failure     Sinusitis        Current Outpatient Medications   Medication     BIOTIN FORTE PO     carboxymethylcellulose (CARBOXYMETHYLCELLULOSE SODIUM) 0.5 % SOLN ophthalmic solution     CEPHALEXIN PO     cyanocobalamin 1000 MCG/ML injection     cyclobenzaprine (FLEXERIL) 10 MG tablet     cycloSPORINE (RESTASIS) 0.05 % ophthalmic emulsion     fexofenadine (ALLEGRA) 180 MG tablet     gabapentin (NEURONTIN) 300 MG capsule     gabapentin (NEURONTIN) 400 MG capsule     HYDROmorphone (DILAUDID) 8 MG tablet     levothyroxine (SYNTHROID/LEVOTHROID) 100 MCG SOLR injection     levothyroxine (SYNTHROID/LEVOTHROID) 300 MCG tablet     lifitegrast (XIIDRA) 5 % opthalmic solution     liothyronine (CYTOMEL) 25 MCG tablet     MAGNESIUM-ZINC PO     morphine (MS CONTIN) 30 MG 12 hr tablet     morphine (MS CONTIN) 60 MG 12 hr tablet     multivitamin, therapeutic with minerals (MULTI-VITAMIN) TABS tablet     NASONEX 50 MCG/ACT spray     nystatin (MYCOSTATIN) 520265 UNIT/GM POWD     prednisoLONE acetate (PRED FORTE) 1 % ophthalmic susp     predniSONE (DELTASONE) 1 MG tablet     predniSONE (DELTASONE) 5 MG tablet     probiotic CAPS     rivaroxaban ANTICOAGULANT (XARELTO) 20 MG TABS tablet     VITAMIN D, CHOLECALCIFEROL, PO     Current  "Facility-Administered Medications   Medication     lidocaine 1% with EPINEPHrine 1:100,000 injection 3 mL          Allergies   Allergen Reactions     Chlorhexidine Hives     Thor surgical cleanser     Codeine Hives     Ibuprofen [Nsaids] Hives     Penicillins Hives     Other reaction(s): Unknown     Sulfa Drugs Hives     Other reaction(s): Unknown     Doxycycline GI Disturbance     Emesis & diarrhea  Patient denies allergy to this med     Lactose      Other reaction(s): Unknown     Mold      Mushroom      Red Wine Complex [Red Wine Powder]        Exam:  BP (!) 162/86   Pulse 89   Resp 16   Ht 1.753 m (5' 9\")   BMI 29.53 kg/m    General: NAD, obese  Neuro: Alert, oriented  Psych: Mood and affect appropriate  Respiratory: Non-labored breathing; no respiratory distress.   CV: RRR  Skin: No lesions or abrasions.   Grossly abdominal  MSK: Tenderness extends along infraspinatus toward deltoid. Range of motion with abduction is reduced to 100-130 degrees. No focal tenderness along cervical spinous processes; range of motion full. Strength 5/5 with grasp, biceps, triceps, deltoid.     EXAM: XR SHOULDER 2 VIEW LEFT  4/11/2018 10:00 AM      HISTORY:  Chronic left shoulder pain     COMPARISON:  Chest radiograph 4/24/2016     FINDINGS:  AP, Grashey, scapular Y, and axillary views of the left  shoulder are obtained.     Normal alignment of the glenohumeral joint. Subcortical cystic changes  of the greater tuberosity. Subacromial spurring. Mild  acromioclavicular joint degenerative changes. Mild subchondral  sclerosis of the glenohumeral joint.     No substantial joint effusion. The visualized chest is unremarkable.                                                                      IMPRESSION:   1. Mild degenerative changes of the glenohumeral joint with  subcortical cystic changes of the greater tuberosity and subacromial  spurring.  2. Mild degenerative changes of the acromial clavicular joint.          Assessment: This " is a 59-year-old female who is seen in the pain clinic today for chronic bilateral shoulder pain.  She has undergone previous suprascapular nerve blocks bilaterally.  She states that these blocks last done in July, 2019 were not helpful, however they were helpful prior to that in 3/2019.  She is interested in trying these nerve blocks again as she feels she has exhausted all other conservative measures for her shoulder pain.    Pt has history of DVT/PE for which she is on Xarelto.  She will need to hold Xarelto for 3 days prior to SSNB.     Plan:    Additional Work-up: none  Therapy/Exercise: none at this time.  Has current referral for pool therapy.  Plans to start after New Year  Medications:   Interventions: set up for bilateral suprascapular nerve blocks  Referrals: none    Follow-up: 6.8 weeks after injections or sooner if needed.    Rocio Fernandez PA-C      Answers for HPI/ROS submitted by the patient on 12/9/2019   BENI 7 TOTAL SCORE: 0

## 2019-12-09 NOTE — NURSING NOTE
Dermatology Rooming Note    Aspen Mccullough's goals for this visit include:   Chief Complaint   Patient presents with     Derm Problem     Aspen is here for hyperhidrosis on her head - states that she takes meds that do not help     Christianne Flood, EMT

## 2019-12-09 NOTE — PROGRESS NOTES
[facility] Dermatology Note      Dermatology Problem List:  1. Hyperidrosis, scalp  - previous tx: oral glycopyrolate  - s/p biopsy 12/09/19, pending  - Labs 12/09/19: HbA1C,T3 free, TSH with free T4, magnesium, zinc, vitamin D - pending    Encounter Date: Dec 9, 2019    CC:  Chief Complaint   Patient presents with     Derm Problem     Aspen is here for hyperhidrosis on her head - states that she takes meds that do not help       History of Present Illness:  Ms. Aspen Mccullough is a 59 year old female who presents as a referral from Rosalie LAUREANO  for hyperhidrosis of the scalp. She has not been seen at our clinic before. The patient has a medical history including renal disease, Hashimoto's thyroiditis, and Cushing's syndrome.    Today, the patient reports her scalp has been extremely sweaty for about 10 years now, and has been getting worse for the last several years. She is not having issues with sweating  anywhere else on her body. She also has dry eyes and dry mouth. Her 37 year old daughter also has these symptoms. She reports the sweating begins at the base of her scalp then travels forward. There does not seem to be any triggers for the increased sweating reactions, and it does not seem to occur more often any time of day. She will have these episodes several times a day. She reports it will get so bad that sweat will run down her face and drench her shirt. She denies scalp pain, itching, burning, or tingling. She had her last menstrual period about 25 years ago, well before these problems started.    She has tried glycopyrolate, but reports it did not help. Her prior authorization for botox injections to the scalp was denied at a previous dermatology visit. She is currently taking 800 mg TID of gabapentin, nystatin, fexofenadine, dilaudid, morphine, levothyroxine, liothyronine, morphine, and prednisone. She takes supplementation of vitamin D, zinc, magnesium, vitamin B12The patient  has degenerative disc disease and has a suprascapular nerve block for shoulder pain. She follows [facility] for her thyroid issues.    Patient presents with her  at today's visit. No other specific concerns addressed.    Past Medical History:   Patient Active Problem List   Diagnosis     Generalized osteoarthrosis, involving multiple sites     CRPS (complex regional pain syndrome), lower limb     Fibromyalgia     Somatoform disorder     Histrionic personality (H)     Encounter for long-term use of opiate analgesic     Knee joint replacement by other means     Hypothyroidism     Insomnia, unspecified type     Hyperlipidemia     ACP (advance care planning)     Hashimoto's thyroiditis     Glucocorticoid deficiency (H)     Posttraumatic stress disorder     Impingement syndrome of left shoulder     Abdominal cramping, generalized     Intermittent diarrhea     Primary osteoarthritis involving multiple joints     Attention deficit disorder     Allergic rhinitis     Cushing's syndrome (H)     Endometriosis     Essential hypertension     Systemic lupus erythematosus (H)     S/P cervical spinal fusion     Paroxysmal atrial fibrillation (H)     Nonischemic cardiomyopathy (H)     Personal history of DVT (deep vein thrombosis)     History of pulmonary embolism     Acute deep vein thrombosis (DVT) of popliteal vein of right lower extremity (H)     Acute pulmonary embolism (H)     Adrenal cortical steroids causing adverse effect in therapeutic use     Alopecia areata     Anemia, blood loss     Ankle pain, left     ARF (acute renal failure) (H)     Arthritis, septic (H)     Chronic ethmoidal sinusitis     Chronic maxillary sinusitis     Deviated nasal septum     Complications due to internal joint prosthesis (H)     Generalized pain     Iatrogenic hyperthyroidism     S/P TKR (total knee replacement)     Left shoulder pain     Lipoma of skin and subcutaneous tissue     Malabsorption     Nasal fracture      Nasal turbinate hypertrophy     Opioid type dependence (H)     Other specified abnormal findings of blood chemistry     Other acute postoperative pain     Pain in joint, lower leg     Postoperative hypotension     S/P total knee replacement     Thrombus of right atrial appendage without antecedent myocardial infarction     Past Medical History:   Diagnosis Date     ADHD (attention deficit hyperactivity disorder)      Allergic rhinitis      Chronic low back pain      Cushing's syndrome (H)      DDD (degenerative disc disease)      DDD (degenerative disc disease)      Deviated nasal septum      Diarrhea      Endometriosis      Fibromyalgia      Hashimoto's disease      Hyperlipidemia      Hypothyroid     hx hashimoto's thyroiditis     Insomnia      Lupus (H)      Malabsorption      Pernicious anemia      Renal disease     chronic renal insufficiency     Renal disease     hx renal failure     Sinusitis      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)     APPENDECTOMY       ARTHRODESIS ANKLE      right     ARTHROPLASTY KNEE BILATERAL       ARTHROPLASTY REVISION KNEE  4/19/2011    Procedure:ARTHROPLASTY REVISION KNEE; With Antibiotic Cement ; Surgeon:CHAO OLIVARES; Location:[facility]     BREAST SURGERY      right- tissue remove nipple area     BUNIONECTOMY  12/14/2011    Procedure:BUNIONECTOMY; Right Bunion Correction; Surgeon:GRACE ZARATE; Location:[facility]     C STOMACH SURGERY PROCEDURE UNLISTED      see list which we will bring     CHOLECYSTECTOMY       EXCISE MASS UPPER EXTREMITY  12/14/2011    Procedure:EXCISE MASS UPPER EXTREMITY; Excision of Left Arm Mass; Surgeon:GRACE ZARATE; Location:[facility]     FOOT SURGERY      left X 4     FUSION LUMBAR ANTERIOR ONE LEVEL       HC SACROPLASTY      see list which we will bring     INJECT NERVE BLOCK SUPRASCAPULAR Left 5/18/2018    Procedure: INJECT NERVE BLOCK SUPRASCAPULAR;   Left Suprascapular Nerve Block;  Surgeon: Basim Velazquez MD;  Location: [facility] OR     INJECT NERVE BLOCK SUPRASCAPULAR Right 7/23/2018    Procedure: INJECT NERVE BLOCK SUPRASCAPULAR;  Right suprascapular injection;  Surgeon: Basim Velazquez MD;  Location: [facility]     INJECT NERVE BLOCK SUPRASCAPULAR Bilateral 10/29/2018    Procedure: Bilateral Suprascapular Nerve Blocks;  Surgeon: Basim Velazquez MD;  Location: [facility] OR     INJECT NERVE BLOCK SUPRASCAPULAR Bilateral 3/15/2019    Procedure: Bilateral Suprascapular Nerve Block;  Surgeon: Basim Velazquez MD;  Location: [facility] OR     KNEE SURGERY      see list which we will bring     LAMINECTOMY LUMBAR ONE LEVEL      L4-5     LAPAROSCOPIC ABLATION ENDOMETRIOSIS       RADIO FREQUENCY ABLATION PULSED CERVICAL Bilateral 7/10/2019    Procedure: Bilateral Suprascapular Nerve Pulsed Radiofrequency Ablation;  Surgeon: Basim Velazquez MD;  Location:  OR     REMOVE HARDWARE FOOT  12/14/2011    Procedure:REMOVE HARDWARE FOOT; Hardware Removal Right Foot (Mini-C-Arm) ; Surgeon:GRACE ZARATE; Location:[facility]     RHINOPLASTY       SALPINGO-OOPHORECTOMY BILATERAL       TONSILLECTOMY         Social History:  Patient reports that she quit smoking about 26 years ago. Her smoking use included cigarettes. She started smoking about 46 years ago. She has a 30.00 pack-year smoking history. She has never used smokeless tobacco. She reports that she does not drink alcohol or use drugs. Patient has two children and two grandchildren.    Family History:  Family History   Problem Relation Age of Onset     Hypertension Mother        Medications:  Current Outpatient Medications   Medication Sig Dispense Refill     BIOTIN FORTE PO Take by mouth 2 times daily       carboxymethylcellulose (CARBOXYMETHYLCELLULOSE SODIUM) 0.5 % SOLN ophthalmic solution Place 1 drop into both eyes 2 times daily 1 Bottle      CEPHALEXIN PO Take 500 mg by mouth as needed (Dental Procedure) Take 4 caps 1 hour  prior to Dental Appointment       cyanocobalamin 1000 MCG/ML injection Inject 1 mL (1,000 mcg) Subcutaneous every 7 days 4 mL 5     cyclobenzaprine (FLEXERIL) 10 MG tablet Take 1 tablet (10 mg) by mouth 3 times daily 90 tablet 3     cycloSPORINE (RESTASIS) 0.05 % ophthalmic emulsion Place 1 drop into both eyes 2 times daily       fexofenadine (ALLEGRA) 180 MG tablet Take 180 mg by mouth daily.       fish oil-omega-3 fatty acids 1000 MG capsule Take 1 capsule (1 g) by mouth 2 times daily 100 capsule 0     HYDROmorphone (DILAUDID) 8 MG tablet Take 1 tablet (8 mg) by mouth 3 times daily       levothyroxine (SYNTHROID/LEVOTHROID) 100 MCG SOLR injection Inject 10 mLs (200 mcg) into the vein once a week       levothyroxine (SYNTHROID/LEVOTHROID) 300 MCG tablet Take 300 mcg by mouth 2 times daily        lifitegrast (XIIDRA) 5 % opthalmic solution Place 1 drop into both eyes 2 times daily       liothyronine (CYTOMEL) 25 MCG tablet Take 1 tablet (25 mcg) by mouth 2 times daily       MAGNESIUM-ZINC PO Take by mouth daily       morphine (MS CONTIN) 30 MG 12 hr tablet Take 30mg in the morning and 30mg at night with 60mg tablet for total of 90mg 270 tablet 0     morphine (MS CONTIN) 60 MG 12 hr tablet Take 1 tablet (60 mg) by mouth every 8 hours 30 tablet 0     multivitamin, therapeutic with minerals (MULTI-VITAMIN) TABS tablet Take 1 tablet by mouth 2 times daily 100 tablet 3     NASONEX 50 MCG/ACT spray Spray 1 spray into both nostrils daily  11     nystatin (MYCOSTATIN) 411495 UNIT/GM POWD Apply topically 3 times daily as needed 60 g 1     prednisoLONE acetate (PRED FORTE) 1 % ophthalmic susp 1 drop 4 times daily       predniSONE (DELTASONE) 1 MG tablet Alternate 8mg and 10mg every other day  2     predniSONE (DELTASONE) 5 MG tablet Alternate taking 8mg and 10mg every other day       probiotic CAPS Take 1 capsule by mouth 2 times daily       rivaroxaban ANTICOAGULANT (XARELTO) 20 MG TABS tablet Take 1 tablet (20 mg) by mouth  daily (with dinner)       VITAMIN D, CHOLECALCIFEROL, PO Take 50,000 Units by mouth once a week         Allergies   Allergen Reactions     Chlorhexidine Hives     Thor surgical cleanser     Codeine Hives     Ibuprofen [Nsaids] Hives     Penicillins Hives     Other reaction(s): Unknown     Sulfa Drugs Hives     Other reaction(s): Unknown     Doxycycline GI Disturbance     Emesis & diarrhea  Patient denies allergy to this med     Lactose      Other reaction(s): Unknown     Mold      Mushroom      Red Wine Complex [Red Wine Powder]        Review of Systems:  -Constitutional: The patient is feeling generally well.  -Skin: As per HPI.     Physical exam:  Vitals: BP (!) 163/93 (BP Location: Right arm, Patient Position: Sitting, Cuff Size: Adult Regular)   Pulse 93   GEN: This is a well developed, well-nourished female in no acute distress, in a pleasant mood.    SKIN: Focused examination of the scalp and face and hands was performed.  - Scalp appears healthy  - Warm to touch on occipital scalp  - no apparent sweat droplets  - No other lesions of concern on areas examined.     Impression/Plan:  1. Hyperhidrosis, scalp - biopsy today with sample to dermatopathology and sample to neurology for epidermal nerve fiber density studies  Punch biopsy:  After discussion of benefits and risks including but not limited to bleeding/bruising, pain/swelling, infection, scar, incomplete removal, nerve damage/numbness, recurrence, and non-diagnostic biopsy, written consent, verbal consent and photographs were obtained. Time-out was performed. The area was cleaned with isopropyl alcohol. 0.5mL of 1% lidocaine with epinephrine was injected to obtain adequate anesthesia of the lesion on the right posterior scalp scalp. Two 4 mm punch biopsy was performed.  4-0 prolene sutures were utilized to approximate the epidermal edges.  White petroleum jelly/VaselineTM and a bandage was applied to the wound.  Explicit verbal and written wound care  instructions were provided.  The patient left the Dermatology Clinic in good condition. The patient was counseled to follow up for suture removal in approximately 10-14 days.  Labs: Check HbA1C,T3 free, TSH with free T4, magnesium, zinc, vitamin D today.  Prior Rx: oral glycopyrrolate, prior authorization denied for Botox in the past (but may need to re-try in future)     2. Elevated BP reading in clinic (163/93)    Informed patient and advised follow up with PCP as necessary.      Follow-up in 4 months, or earlier for new or changing lesions.       Staff Involved:  Resident (Nella Carias)/Scribe/Staff    Scribe Disclosure  I, Elizabeth Pedrazamason, am serving as a scribe to document services personally performed by Dr. Laura Tony MD, based on data collection and the provider's statements to me.    Nella Carias MD/MPH  Internal Medicine/Dermatology  PGY-3  [phone]    Provider Disclosure:   I agree with above History, Review of Systems, Physical exam and Plan. I have reviewed the content of the documentation and have edited it as needed. I have personally performed the services documented here and the documentation accurately represents those services and the decisions I have made.  Ms. Mccullough was also seen with the med/derm resident Dr. Carias.I was present for the key portions of the biopsy procedures.    Laura Tony MD  Professor and Chair  Department of Dermatology  [facility]

## 2019-12-09 NOTE — NURSING NOTE
Lidocaine-epinephrine 1-1:169564 % injection   4mL once for one use, starting 12/9/2019 ending 12/9/2019,  2mL disp, R-0, injection  Injected by Dr. Carias

## 2019-12-09 NOTE — LETTER
12/9/2019       RE: Aspen Mccullough  [address]  [place] [address]     Dear Colleague,    Thank you for referring your patient, Aspen Mccullough, to the [facility] DERMATOLOGY at [facility]. Please see a copy of my visit note below.    [facility] Dermatology Note      Dermatology Problem List:  1. Hyperidrosis, scalp  - previous tx: oral glycopyrolate  - s/p biopsy 12/09/19, pending  - Labs 12/09/19: HbA1C,T3 free, TSH with free T4, magnesium, zinc, vitamin D - pending    Encounter Date: Dec 9, 2019    CC:  Chief Complaint   Patient presents with     Derm Problem     Aspen is here for hyperhidrosis on her head - states that she takes meds that do not help       History of Present Illness:  Ms. Aspen Mccullough is a 59 year old female who presents as a referral from Rosalie LAUREANO  for hyperhidrosis of the scalp. She has not been seen at our clinic before. The patient has a medical history including renal disease, Hashimoto's thyroiditis, and Cushing's syndrome.    Today, the patient reports her scalp has been extremely sweaty for about 10 years now, and has been getting worse for the last several years. She is not having issues with sweating  anywhere else on her body. She also has dry eyes and dry mouth. Her 37 year old daughter also has these symptoms. She reports the sweating begins at the base of her scalp then travels forward. There does not seem to be any triggers for the increased sweating reactions, and it does not seem to occur more often any time of day. She will have these episodes several times a day. She reports it will get so bad that sweat will run down her face and drench her shirt. She denies scalp pain, itching, burning, or tingling. She had her last menstrual period about 25 years ago, well before these problems started.    She has tried glycopyrolate, but reports it did not help. Her prior authorization for botox injections  to the scalp was denied at a previous dermatology visit. She is currently taking 800 mg TID of gabapentin, nystatin, fexofenadine, dilaudid, morphine, levothyroxine, liothyronine, morphine, and prednisone. She takes supplementation of vitamin D, zinc, magnesium, vitamin B12The patient has degenerative disc disease and has a suprascapular nerve block for shoulder pain. She follows [facility] for her thyroid issues.    Patient presents with her  at today's visit. No other specific concerns addressed.    Past Medical History:   Patient Active Problem List   Diagnosis     Generalized osteoarthrosis, involving multiple sites     CRPS (complex regional pain syndrome), lower limb     Fibromyalgia     Somatoform disorder     Histrionic personality (H)     Encounter for long-term use of opiate analgesic     Knee joint replacement by other means     Hypothyroidism     Insomnia, unspecified type     Hyperlipidemia     ACP (advance care planning)     Hashimoto's thyroiditis     Glucocorticoid deficiency (H)     Posttraumatic stress disorder     Impingement syndrome of left shoulder     Abdominal cramping, generalized     Intermittent diarrhea     Primary osteoarthritis involving multiple joints     Attention deficit disorder     Allergic rhinitis     Cushing's syndrome (H)     Endometriosis     Essential hypertension     Systemic lupus erythematosus (H)     S/P cervical spinal fusion     Paroxysmal atrial fibrillation (H)     Nonischemic cardiomyopathy (H)     Personal history of DVT (deep vein thrombosis)     History of pulmonary embolism     Acute deep vein thrombosis (DVT) of popliteal vein of right lower extremity (H)     Acute pulmonary embolism (H)     Adrenal cortical steroids causing adverse effect in therapeutic use     Alopecia areata     Anemia, blood loss     Ankle pain, left     ARF (acute renal failure) (H)     Arthritis, septic (H)     Chronic ethmoidal sinusitis     Chronic  maxillary sinusitis     Deviated nasal septum     Complications due to internal joint prosthesis (H)     Generalized pain     Iatrogenic hyperthyroidism     S/P TKR (total knee replacement)     Left shoulder pain     Lipoma of skin and subcutaneous tissue     Malabsorption     Nasal fracture     Nasal turbinate hypertrophy     Opioid type dependence (H)     Other specified abnormal findings of blood chemistry     Other acute postoperative pain     Pain in joint, lower leg     Postoperative hypotension     S/P total knee replacement     Thrombus of right atrial appendage without antecedent myocardial infarction     Past Medical History:   Diagnosis Date     ADHD (attention deficit hyperactivity disorder)      Allergic rhinitis      Chronic low back pain      Cushing's syndrome (H)      DDD (degenerative disc disease)      DDD (degenerative disc disease)      Deviated nasal septum      Diarrhea      Endometriosis      Fibromyalgia      Hashimoto's disease      Hyperlipidemia      Hypothyroid     hx hashimoto's thyroiditis     Insomnia      Lupus (H)      Malabsorption      Pernicious anemia      Renal disease     chronic renal insufficiency     Renal disease     hx renal failure     Sinusitis      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)     APPENDECTOMY       ARTHRODESIS ANKLE      right     ARTHROPLASTY KNEE BILATERAL       ARTHROPLASTY REVISION KNEE  4/19/2011    Procedure:ARTHROPLASTY REVISION KNEE; With Antibiotic Cement ; Surgeon:CHAO LOIVARES; Location:[facility] OR     BREAST SURGERY      right- tissue remove nipple area     BUNIONECTOMY  12/14/2011    Procedure:BUNIONECTOMY; Right Bunion Correction; Surgeon:GRACE ZARATE; Location:[facility] STOMACH SURGERY PROCEDURE UNLISTED      see list which we will bring     CHOLECYSTECTOMY       EXCISE MASS UPPER EXTREMITY  12/14/2011    Procedure:EXCISE MASS UPPER EXTREMITY; Excision  of Left Arm Mass; Surgeon:GRACE ZARATE; Location:[facility]     FOOT SURGERY      left X 4     FUSION LUMBAR ANTERIOR ONE LEVEL       HC SACROPLASTY      see list which we will bring     INJECT NERVE BLOCK SUPRASCAPULAR Left 5/18/2018    Procedure: INJECT NERVE BLOCK SUPRASCAPULAR;  Left Suprascapular Nerve Block;  Surgeon: Basim Velazquez MD;  Location: [facility] OR     INJECT NERVE BLOCK SUPRASCAPULAR Right 7/23/2018    Procedure: INJECT NERVE BLOCK SUPRASCAPULAR;  Right suprascapular injection;  Surgeon: Basim Velazquez MD;  Location: [facility] OR     INJECT NERVE BLOCK SUPRASCAPULAR Bilateral 10/29/2018    Procedure: Bilateral Suprascapular Nerve Blocks;  Surgeon: Basim Velazquez MD;  Location: [facility] OR     INJECT NERVE BLOCK SUPRASCAPULAR Bilateral 3/15/2019    Procedure: Bilateral Suprascapular Nerve Block;  Surgeon: Basim Velazquez MD;  Location:  OR     KNEE SURGERY      see list which we will bring     LAMINECTOMY LUMBAR ONE LEVEL      L4-5     LAPAROSCOPIC ABLATION ENDOMETRIOSIS       RADIO FREQUENCY ABLATION PULSED CERVICAL Bilateral 7/10/2019    Procedure: Bilateral Suprascapular Nerve Pulsed Radiofrequency Ablation;  Surgeon: Basim Velazquez MD;  Location:  OR     REMOVE HARDWARE FOOT  12/14/2011    Procedure:REMOVE HARDWARE FOOT; Hardware Removal Right Foot (Mini-C-Arm) ; Surgeon:GRACE ZARATE; Location:[facility]     RHINOPLASTY       SALPINGO-OOPHORECTOMY BILATERAL       TONSILLECTOMY         Social History:  Patient reports that she quit smoking about 26 years ago. Her smoking use included cigarettes. She started smoking about 46 years ago. She has a 30.00 pack-year smoking history. She has never used smokeless tobacco. She reports that she does not drink alcohol or use drugs. Patient has two children and two grandchildren.    Family History:  Family History   Problem Relation Age of Onset     Hypertension Mother        Medications:  Current Outpatient Medications   Medication Sig  Dispense Refill     BIOTIN FORTE PO Take by mouth 2 times daily       carboxymethylcellulose (CARBOXYMETHYLCELLULOSE SODIUM) 0.5 % SOLN ophthalmic solution Place 1 drop into both eyes 2 times daily 1 Bottle      CEPHALEXIN PO Take 500 mg by mouth as needed (Dental Procedure) Take 4 caps 1 hour prior to Dental Appointment       cyanocobalamin 1000 MCG/ML injection Inject 1 mL (1,000 mcg) Subcutaneous every 7 days 4 mL 5     cyclobenzaprine (FLEXERIL) 10 MG tablet Take 1 tablet (10 mg) by mouth 3 times daily 90 tablet 3     cycloSPORINE (RESTASIS) 0.05 % ophthalmic emulsion Place 1 drop into both eyes 2 times daily       fexofenadine (ALLEGRA) 180 MG tablet Take 180 mg by mouth daily.       fish oil-omega-3 fatty acids 1000 MG capsule Take 1 capsule (1 g) by mouth 2 times daily 100 capsule 0     HYDROmorphone (DILAUDID) 8 MG tablet Take 1 tablet (8 mg) by mouth 3 times daily       levothyroxine (SYNTHROID/LEVOTHROID) 100 MCG SOLR injection Inject 10 mLs (200 mcg) into the vein once a week       levothyroxine (SYNTHROID/LEVOTHROID) 300 MCG tablet Take 300 mcg by mouth 2 times daily        lifitegrast (XIIDRA) 5 % opthalmic solution Place 1 drop into both eyes 2 times daily       liothyronine (CYTOMEL) 25 MCG tablet Take 1 tablet (25 mcg) by mouth 2 times daily       MAGNESIUM-ZINC PO Take by mouth daily       morphine (MS CONTIN) 30 MG 12 hr tablet Take 30mg in the morning and 30mg at night with 60mg tablet for total of 90mg 270 tablet 0     morphine (MS CONTIN) 60 MG 12 hr tablet Take 1 tablet (60 mg) by mouth every 8 hours 30 tablet 0     multivitamin, therapeutic with minerals (MULTI-VITAMIN) TABS tablet Take 1 tablet by mouth 2 times daily 100 tablet 3     NASONEX 50 MCG/ACT spray Spray 1 spray into both nostrils daily  11     nystatin (MYCOSTATIN) 002103 UNIT/GM POWD Apply topically 3 times daily as needed 60 g 1     prednisoLONE acetate (PRED FORTE) 1 % ophthalmic susp 1 drop 4 times daily       predniSONE  (DELTASONE) 1 MG tablet Alternate 8mg and 10mg every other day  2     predniSONE (DELTASONE) 5 MG tablet Alternate taking 8mg and 10mg every other day       probiotic CAPS Take 1 capsule by mouth 2 times daily       rivaroxaban ANTICOAGULANT (XARELTO) 20 MG TABS tablet Take 1 tablet (20 mg) by mouth daily (with dinner)       VITAMIN D, CHOLECALCIFEROL, PO Take 50,000 Units by mouth once a week         Allergies   Allergen Reactions     Chlorhexidine Hives     Thor surgical cleanser     Codeine Hives     Ibuprofen [Nsaids] Hives     Penicillins Hives     Other reaction(s): Unknown     Sulfa Drugs Hives     Other reaction(s): Unknown     Doxycycline GI Disturbance     Emesis & diarrhea  Patient denies allergy to this med     Lactose      Other reaction(s): Unknown     Mold      Mushroom      Red Wine Complex [Red Wine Powder]        Review of Systems:  -Constitutional: The patient is feeling generally well.  -Skin: As per HPI.     Physical exam:  Vitals: BP (!) 163/93 (BP Location: Right arm, Patient Position: Sitting, Cuff Size: Adult Regular)   Pulse 93   GEN: This is a well developed, well-nourished female in no acute distress, in a pleasant mood.    SKIN: Focused examination of the scalp and face and hands was performed.  - Scalp appears healthy  - Warm to touch on occipital scalp  - no apparent sweat droplets  - No other lesions of concern on areas examined.     Impression/Plan:  1. Hyperhidrosis, scalp - biopsy today with sample to dermatopathology and sample to neurology for epidermal nerve fiber density studies  Punch biopsy:  After discussion of benefits and risks including but not limited to bleeding/bruising, pain/swelling, infection, scar, incomplete removal, nerve damage/numbness, recurrence, and non-diagnostic biopsy, written consent, verbal consent and photographs were obtained. Time-out was performed. The area was cleaned with isopropyl alcohol. 0.5mL of 1% lidocaine with epinephrine was injected to  obtain adequate anesthesia of the lesion on the right posterior scalp scalp. Two 4 mm punch biopsy was performed.  4-0 prolene sutures were utilized to approximate the epidermal edges.  White petroleum jelly/VaselineTM and a bandage was applied to the wound.  Explicit verbal and written wound care instructions were provided.  The patient left the Dermatology Clinic in good condition. The patient was counseled to follow up for suture removal in approximately 10-14 days.  Labs: Check HbA1C,T3 free, TSH with free T4, magnesium, zinc, vitamin D today.  Prior Rx: oral glycopyrrolate, prior authorization denied for Botox in the past (but may need to re-try in future)     2. Elevated BP reading in clinic (163/93)    Informed patient and advised follow up with PCP as necessary.      Follow-up in 4 months, or earlier for new or changing lesions.       Staff Involved:  Resident (Nella Carias)/Scribe/Staff    Scribe Disclosure  I, Elizabeth Allen, am serving as a scribe to document services personally performed by Dr. Laura Tony MD, based on data collection and the provider's statements to me.    Nella Carias MD/MPH  Internal Medicine/Dermatology  PGY-3  [phone]    Provider Disclosure:   I agree with above History, Review of Systems, Physical exam and Plan. I have reviewed the content of the documentation and have edited it as needed. I have personally performed the services documented here and the documentation accurately represents those services and the decisions I have made.  Ms. Mccullough was also seen with the med/derm resident Dr. Carias.I was present for the key portions of the biopsy procedures.    Laura Tony MD  Professor and Chair  Department of Dermatology  [facility]

## 2019-12-09 NOTE — PATIENT INSTRUCTIONS
1. Schedule procedure.        Follow up: 6 months or sooner if needed          To speak with a nurse, schedule/reschedule/cancel a clinic appointment, or request a medication refill call: [phone]     You can also reach us by [employer]: [email or web address]    For refills, please call on Monday, 1 week before your medication runs out. The doctors are not always in clinic, so this gives us time to get your prescriptions ready.  Please let us know the name of the medication you are requesting a refill of.                      Hold xarelto for 3 days before your procedure, then resume 24 hours after your procedure.          Please call [phone] to schedule, reschedule, or cancel your procedure appointment.   Phones are answered Monday - Friday from 7:30 - 4:00pm.  Leave a voicemail with your name, birth date, and phone number if no one is available to take your call.     Your procedure: Bilateral suprascapular nerve blocks     On the day of the procedure  1. Arrive 1 hour earlier than your scheduled time, to the [facility]  Address: [address] [place] [id number]  2. Check in on the 5th floor for your procedure      If you must reschedule your procedure more than two times, you must follow up in clinic before rescheduling again.        Preparing for your procedure    CAUTION - FAILURE TO FOLLOW THESE PRE-PROCEDURE INSTRUCTIONS WILL RESULT IN YOUR PROCEDURE BEING RESCHEDULED.            You must have a  take you home after your procedure. Transportation by taxi or para-transit is okay as long as you have a responsible adult accompany you. You must provide your 's full name and contact number at time of check in.     Fasting Protocol You may have NOTHING SOLID TO EAT 8 HOURS prior to arrival at the procedure area.     You may have CLEAR LIQUIDS UP TO 2 HOURS prior to arrival.    Broth and candy are considered solid food and require an eight hour fast.      Clear liquids include water, clear fruit juice (no pulp), carbonated beverages, ice, black coffee, black tea, clear jello. No alcohol containing beverages.   Medications If you take any medications, DO NOT STOP. Take your medications as usual the day of your procedure with a sip of water AT LEAST 2 HOURS PRIOR TO ARRIVAL.    Antibiotics If you are currently taking antibiotics, you must complete the entire dose 7 days prior to your scheduled procedure. You must be clear of any signs or symptoms of infection. If you begin antibiotics, please contact our clinic for instructions.     Fever, Chills, or Rash If you experience a fever of higher than 100 degrees, chills, rash, or open wounds during the one week before your procedure, please call the clinic to see if you may proceed with your procedure.      Medication Hold List  **Patients under Cardiology/Neurology care should consult their provider prior to the pain procedure to verify pre-procedure medication instructions. The information below contains general guidelines.**    Blood Thinners If you are taking daily ASPIRIN, PLAVIX, OR OTHER BLOOD THINNERS SUCH AS COUMADIN/WARFARIN, we will need your prescribing doctor to sign a release permitting you to stop these medications. Once approved by your prescribing doctor - STOP ALL BLOOD THINNERS BASED ON THE TIME TABLE BELOW PRIOR TO YOUR PROCEDURE. If you have been instructed to stop WARFARIN(COUMADIN), you must have an INR lab drawn the day before your procedure. . Your INR must be within normal limits before we can perform your injection. MEDICATIONS CAN BE RESTARTED AFTER YOUR PROCEDURE.    14 DAY HOLD  Ticlid (ticlopidine)    10 DAY HOLD  Effient (Prasugel)    3 DAY HOLD  Xarelto (rivaroxaban) 7 DAY HOLD  Anacin, Bufferin, Ecotrin, Excedrin, Aggrenox (Aspirin)  Brilinta (ticagrelor)  Coumadin (Warfarin)  Pradexa (Dabigatran)  Elmiron (Pentosan)  Plavix (Clopidogrel Bisulfate)  Pletal (Cilostazol)    24 HOUR  HOLD  Lovenox (enoxaparin)  Agrylin (Anagrelide)                To speak with a nurse, schedule/reschedule/cancel a clinic appointment, or request a medication refill call: [phone]     You can also reach us by [employer]: [email or web address]

## 2019-12-10 ASSESSMENT — ANXIETY QUESTIONNAIRES: GAD7 TOTAL SCORE: 0

## 2019-12-11 LAB — ZINC SERPL-MCNC: 91.7 UG/DL (ref 60–120)

## 2019-12-15 ENCOUNTER — HEALTH MAINTENANCE LETTER (OUTPATIENT)
Age: 59
End: 2019-12-15

## 2019-12-23 ENCOUNTER — ALLIED HEALTH/NURSE VISIT (OUTPATIENT)
Dept: DERMATOLOGY | Facility: CLINIC | Age: 59
End: 2019-12-23
Payer: COMMERCIAL

## 2019-12-23 DIAGNOSIS — Z48.02 VISIT FOR SUTURE REMOVAL: Primary | ICD-10-CM

## 2019-12-23 NOTE — NURSING NOTE
Aspen Mccullough comes into clinic today at the request of Dr Tony Ordering Provider for suture removal of scalp. No s/s of infection noted or reported. Denies pain. 2 sutures removed without complication. Vaseline applied to site.  This service provided today was under the supervising provider of the day Dr Paulino, who was available if needed.    Anamaria Hall RN

## 2019-12-26 ENCOUNTER — TELEPHONE (OUTPATIENT)
Dept: ANESTHESIOLOGY | Facility: CLINIC | Age: 59
End: 2019-12-26

## 2019-12-26 NOTE — TELEPHONE ENCOUNTER
LPN placed phone call to pt's PCP's office, to ask permission for pt to hold their Xarelto for 3 days in preporation for their upcoming procedure on 12/31/19.     LPN had sent a fax to the clinic but has not heard back.     Call center stated that Dr. Vivar has left [facility] and moved to [facility]. They will route the call to the covering MD's office- Phone number provided for the clinic to call the Pain Clinic back.   LPN will follow up with the clinic, if no answer in 24 hours.     Verna Webb LPN

## 2019-12-27 NOTE — TELEPHONE ENCOUNTER
Mitesh, RN with Pt's PCP's office- returned LPN's phone call regarding pt holding their Xarelto for 3 days before their injection.     Pt is now seeing Dr. Ryan Jesus, but because they were unavailable Dr. Shira Reynolds gave the recommendation for the pt to hold their Xarelto for 24-48 hours maximum. Dr. Reynolds stated they also spoke with the Pharmacist and they were agreeable to this recommendation too.     NATHAN updated Dr. Velazquez who reviewed the LYN guidelines and stated that 24 hours should be okay for this injection.     NATHAN called Aspen and spoke to her- and informed her of the providers recommendation to hold the Xarelto for 24 hours.     Pt verbalized understanding.     Mitesh's contact information is- [phone] Ask to be connected to the (Mgiuel CORBETT)    Verna Webb LPN

## 2019-12-31 ENCOUNTER — HOSPITAL ENCOUNTER (OUTPATIENT)
Facility: AMBULATORY SURGERY CENTER | Age: 59
End: 2019-12-31
Payer: COMMERCIAL

## 2019-12-31 VITALS
RESPIRATION RATE: 16 BRPM | SYSTOLIC BLOOD PRESSURE: 143 MMHG | TEMPERATURE: 98.1 F | HEART RATE: 108 BPM | OXYGEN SATURATION: 96 % | HEIGHT: 69 IN | WEIGHT: 256 LBS | DIASTOLIC BLOOD PRESSURE: 109 MMHG | BODY MASS INDEX: 37.92 KG/M2

## 2019-12-31 DIAGNOSIS — M25.512 CHRONIC PAIN OF BOTH SHOULDERS: ICD-10-CM

## 2019-12-31 DIAGNOSIS — G89.29 CHRONIC PAIN OF BOTH SHOULDERS: ICD-10-CM

## 2019-12-31 DIAGNOSIS — M25.511 CHRONIC PAIN OF BOTH SHOULDERS: ICD-10-CM

## 2019-12-31 RX ORDER — METHYLPREDNISOLONE ACETATE 40 MG/ML
INJECTION, SUSPENSION INTRA-ARTICULAR; INTRALESIONAL; INTRAMUSCULAR; SOFT TISSUE PRN
Status: DISCONTINUED | OUTPATIENT
Start: 2019-12-31 | End: 2019-12-31 | Stop reason: HOSPADM

## 2019-12-31 RX ORDER — LIDOCAINE HYDROCHLORIDE 10 MG/ML
INJECTION, SOLUTION EPIDURAL; INFILTRATION; INTRACAUDAL; PERINEURAL PRN
Status: DISCONTINUED | OUTPATIENT
Start: 2019-12-31 | End: 2019-12-31 | Stop reason: HOSPADM

## 2019-12-31 RX ORDER — BUPIVACAINE HYDROCHLORIDE 2.5 MG/ML
INJECTION, SOLUTION EPIDURAL; INFILTRATION; INTRACAUDAL PRN
Status: DISCONTINUED | OUTPATIENT
Start: 2019-12-31 | End: 2019-12-31 | Stop reason: HOSPADM

## 2019-12-31 ASSESSMENT — MIFFLIN-ST. JEOR: SCORE: 1795.59

## 2019-12-31 NOTE — DISCHARGE INSTRUCTIONS
Home Care Instructions after a Major Joint Injection      In a Major Joint Injection a local anesthetic (numbing medicine) is injected in or near the joint space.  Steroids are often used to help with the anti-inflammatory process.  A joint lubricant is sometimes injected to help with mobility.       Activity  -You may resume most normal activity levels with the exception of strenuous activity. It is important for us to know if your pain with normal activity is relieved after this injection.  -DO NOT shower for 24 hours  -DO NOT remove bandaid for 24 hours    Pain  -You may experience soreness at the injection site for one or two days  -You may use an ice pack for 20 minutes every 2 hours for the first 24 hours  -You may use a heating pad after the first 24 hours  -You may use Tylenol (acetaminophen) every 4 hours or other pain medicines as     directed by your physician    You may experience numbness radiating into your legs or arms (depending on the procedure location). This numbness may last several hours. Until sensation returns to normal; please use caution in walking, climbing stairs, and stepping out of your vehicle, etc.    DID YOU RECEIVE SEDATION TODAY?  No    If you received sedation please follow these additional safety measures.  Sedation medicine, if given, may remain active for many hours. It is important for the next 24 hours that you do not:  -Drive a car  -Operate machines or power tools  -Consume alcohol, including beer  -Sign any important papers or legal documents    DID YOU RECEIVE STEROIDS TODAY?  Yes    Common side effects of steroids:  Not everyone will experience corticosteroid side effects. If side effects are experienced, they will gradually subside in the 7-10 day period following an injection. Most common side effects include:  -Flushed face and/or chest  -Feeling of warmth, particularly in the face but could be an overall feeling of warmth  -Increased blood sugar in diabetic  patients  -Menstrual irregularities my occur. If taking hormone-based birth control an alternate method of birth control is recommended  -Sleep disturbances and/or mood swings are possible  -Leg cramps      PLEASE KEEP TRACK OF YOUR SYMPTOMS AND NOTE YOUR IMPROVEMENT FOR YOUR DOCTOR.     Please contact us if you have:  -Severe pain  -Fever more than 101.5 degrees Fahrenheit  -Signs of infection at the injection site (redness, swelling, or drainage)    If you have questions, please contact our office at [phone] between the hours of 7:00 am and 3:00 pm Monday through Friday. After office hours you can contact the on call provider by dialing [phone]. If you need immediate attention, we recommend that you go to a hospital emergency room or dial [phone].

## 2020-01-03 LAB — COPATH REPORT: NORMAL

## 2020-01-06 LAB — LAB SCANNED RESULT: NORMAL

## 2020-01-08 NOTE — PROCEDURES
Suprascapular Nerve Block - Ultrasound Guided    The patient's identity, the procedure to be performed and the specific site of the procedure was verified in accordance with [facility] Protocol.  Diagnosis: Shoulder Pain  Pre-Procedure Pain Score:8/10  Procedure Note:   Informed consent was obtained and the patient was positioned comfortably in the sitting/prone position. The patient was prepped and draped in sterile fashion.  Ultrasonography was used to visualize the anatomy of the scapula on the affected side. There was no evidence of infection at the site of needle insertion, which was anesthetized with 1% Lidocaine.  A 22 gauge needle was then advanced under live ultrasound guidance to the suprascapular notch, and medication was injected beneath the superior transverse ligament after negative aspiration.     SIDE: bilateral  Medication: 40mg of depomedrol  2ml of 0.25% Bupivacaine    Post-Procedure Pain Score:7/10  The patient was given discharge instructions and verbalizes understanding, including understanding of those signs and symptoms that would require emergency care.     Counseling: Greater than 50% of this patient visit was spent in counseling the patient regarding the treatment of their pain, coordinating their overall treatment plan and assessing their progress.

## 2020-01-22 ENCOUNTER — HOSPITAL ENCOUNTER (OUTPATIENT)
Facility: CLINIC | Age: 60
Setting detail: OBSERVATION
Discharge: HOME | End: 2020-01-23
Attending: EMERGENCY MEDICINE | Admitting: FAMILY MEDICINE
Payer: COMMERCIAL

## 2020-01-22 ENCOUNTER — APPOINTMENT (OUTPATIENT)
Dept: CT IMAGING | Facility: CLINIC | Age: 60
End: 2020-01-22
Attending: EMERGENCY MEDICINE
Payer: COMMERCIAL

## 2020-01-22 ENCOUNTER — APPOINTMENT (OUTPATIENT)
Dept: GENERAL RADIOLOGY | Facility: CLINIC | Age: 60
End: 2020-01-22
Attending: EMERGENCY MEDICINE
Payer: COMMERCIAL

## 2020-01-22 DIAGNOSIS — K92.2 GASTROINTESTINAL HEMORRHAGE, UNSPECIFIED GASTROINTESTINAL HEMORRHAGE TYPE: ICD-10-CM
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
## 2020-01-22 DIAGNOSIS — Z79.01 LONG TERM (CURRENT) USE OF ANTICOAGULANTS: ICD-10-CM

## 2020-01-22 DIAGNOSIS — S80.211A ABRASION OF RIGHT KNEE, INITIAL ENCOUNTER: ICD-10-CM

## 2020-01-22 DIAGNOSIS — S39.012A BACK STRAIN, INITIAL ENCOUNTER: ICD-10-CM

## 2020-01-22 LAB
ALBUMIN SERPL-MCNC: 3.7 G/DL (ref 3.4–5)
ALBUMIN UR-MCNC: NEGATIVE MG/DL
ALP SERPL-CCNC: 123 U/L (ref 40–150)
ALT SERPL W P-5'-P-CCNC: 39 U/L (ref 0–50)
ANION GAP SERPL CALCULATED.3IONS-SCNC: 5 MMOL/L (ref 3–14)
APPEARANCE UR: CLEAR
APTT PPP: 48 SEC (ref 22–37)
AST SERPL W P-5'-P-CCNC: 29 U/L (ref 0–45)
BASOPHILS # BLD AUTO: 0 10E9/L (ref 0–0.2)
BASOPHILS NFR BLD AUTO: 0.1 %
BILIRUB SERPL-MCNC: 0.4 MG/DL (ref 0.2–1.3)
BILIRUB UR QL STRIP: NEGATIVE
BUN SERPL-MCNC: 38 MG/DL (ref 7–30)
CALCIUM SERPL-MCNC: 8.6 MG/DL (ref 8.5–10.1)
CHLORIDE SERPL-SCNC: 102 MMOL/L (ref 94–109)
CO2 SERPL-SCNC: 31 MMOL/L (ref 20–32)
COLOR UR AUTO: NORMAL
CREAT SERPL-MCNC: 0.89 MG/DL (ref 0.52–1.04)
DIFFERENTIAL METHOD BLD: ABNORMAL
EOSINOPHIL # BLD AUTO: 0.3 10E9/L (ref 0–0.7)
EOSINOPHIL NFR BLD AUTO: 3.3 %
ERYTHROCYTE [DISTWIDTH] IN BLOOD BY AUTOMATED COUNT: 14.8 % (ref 10–15)
GFR SERPL CREATININE-BSD FRML MDRD: 70 ML/MIN/{1.73_M2}
GLUCOSE SERPL-MCNC: 96 MG/DL (ref 70–99)
GLUCOSE UR STRIP-MCNC: NEGATIVE MG/DL
HCT VFR BLD AUTO: 35.5 % (ref 35–47)
HGB BLD-MCNC: 11.5 G/DL (ref 11.7–15.7)
HGB UR QL STRIP: NEGATIVE
IMM GRANULOCYTES # BLD: 0 10E9/L (ref 0–0.4)
IMM GRANULOCYTES NFR BLD: 0.4 %
INR PPP: 1.63 (ref 0.86–1.14)
KETONES UR STRIP-MCNC: NEGATIVE MG/DL
LEUKOCYTE ESTERASE UR QL STRIP: NEGATIVE
LYMPHOCYTES # BLD AUTO: 2.1 10E9/L (ref 0.8–5.3)
LYMPHOCYTES NFR BLD AUTO: 26.3 %
MCH RBC QN AUTO: 28.8 PG (ref 26.5–33)
MCHC RBC AUTO-ENTMCNC: 32.4 G/DL (ref 31.5–36.5)
MCV RBC AUTO: 89 FL (ref 78–100)
MONOCYTES # BLD AUTO: 0.6 10E9/L (ref 0–1.3)
MONOCYTES NFR BLD AUTO: 7.2 %
NEUTROPHILS # BLD AUTO: 5.1 10E9/L (ref 1.6–8.3)
NEUTROPHILS NFR BLD AUTO: 62.7 %
NITRATE UR QL: NEGATIVE
NRBC # BLD AUTO: 0 10*3/UL
NRBC BLD AUTO-RTO: 0 /100
PH UR STRIP: 5 PH (ref 5–7)
PLATELET # BLD AUTO: 236 10E9/L (ref 150–450)
POTASSIUM SERPL-SCNC: 4.2 MMOL/L (ref 3.4–5.3)
PROT SERPL-MCNC: 7.7 G/DL (ref 6.8–8.8)
RBC # BLD AUTO: 4 10E12/L (ref 3.8–5.2)
SODIUM SERPL-SCNC: 138 MMOL/L (ref 133–144)
SOURCE: NORMAL
SP GR UR STRIP: 1.01 (ref 1–1.03)
UROBILINOGEN UR STRIP-MCNC: NORMAL MG/DL (ref 0–2)
WBC # BLD AUTO: 8.2 10E9/L (ref 4–11)

## 2020-01-22 PROCEDURE — C9113 INJ PANTOPRAZOLE SODIUM, VIA: HCPCS | Performed by: EMERGENCY MEDICINE

## 2020-01-22 PROCEDURE — 85610 PROTHROMBIN TIME: CPT | Performed by: EMERGENCY MEDICINE

## 2020-01-22 PROCEDURE — 25000128 H RX IP 250 OP 636: Performed by: EMERGENCY MEDICINE

## 2020-01-22 PROCEDURE — 85025 COMPLETE CBC W/AUTO DIFF WBC: CPT | Performed by: EMERGENCY MEDICINE

## 2020-01-22 PROCEDURE — 25000132 ZZH RX MED GY IP 250 OP 250 PS 637: Performed by: EMERGENCY MEDICINE

## 2020-01-22 PROCEDURE — 85730 THROMBOPLASTIN TIME PARTIAL: CPT | Performed by: EMERGENCY MEDICINE

## 2020-01-22 PROCEDURE — 96374 THER/PROPH/DIAG INJ IV PUSH: CPT | Performed by: EMERGENCY MEDICINE

## 2020-01-22 PROCEDURE — 99285 EMERGENCY DEPT VISIT HI MDM: CPT | Mod: Z6 | Performed by: EMERGENCY MEDICINE

## 2020-01-22 PROCEDURE — 81003 URINALYSIS AUTO W/O SCOPE: CPT | Performed by: EMERGENCY MEDICINE

## 2020-01-22 PROCEDURE — 80053 COMPREHEN METABOLIC PANEL: CPT | Performed by: EMERGENCY MEDICINE

## 2020-01-22 PROCEDURE — 73562 X-RAY EXAM OF KNEE 3: CPT | Mod: RT

## 2020-01-22 PROCEDURE — 72131 CT LUMBAR SPINE W/O DYE: CPT

## 2020-01-22 PROCEDURE — 99285 EMERGENCY DEPT VISIT HI MDM: CPT | Mod: 25 | Performed by: EMERGENCY MEDICINE

## 2020-01-22 RX ORDER — NALOXONE HYDROCHLORIDE 0.4 MG/ML
.1-.4 INJECTION, SOLUTION INTRAMUSCULAR; INTRAVENOUS; SUBCUTANEOUS
Status: DISCONTINUED | OUTPATIENT
Start: 2020-01-22 | End: 2020-01-23 | Stop reason: HOSPADM

## 2020-01-22 RX ORDER — CYCLOBENZAPRINE HCL 10 MG
10 TABLET ORAL ONCE
Status: COMPLETED | OUTPATIENT
Start: 2020-01-22 | End: 2020-01-22

## 2020-01-22 RX ORDER — ONDANSETRON 2 MG/ML
4 INJECTION INTRAMUSCULAR; INTRAVENOUS EVERY 6 HOURS PRN
Status: DISCONTINUED | OUTPATIENT
Start: 2020-01-22 | End: 2020-01-23 | Stop reason: HOSPADM

## 2020-01-22 RX ORDER — ZOLPIDEM TARTRATE 5 MG/1
5 TABLET ORAL
COMMUNITY

## 2020-01-22 RX ORDER — ERGOCALCIFEROL 1.25 MG/1
50000 CAPSULE, LIQUID FILLED ORAL WEEKLY
Status: ON HOLD | COMMUNITY
End: 2021-10-05

## 2020-01-22 RX ORDER — GABAPENTIN 800 MG/1
800 TABLET ORAL 2 TIMES DAILY
COMMUNITY

## 2020-01-22 RX ORDER — HYDROMORPHONE HYDROCHLORIDE 2 MG/1
8 TABLET ORAL ONCE
Status: COMPLETED | OUTPATIENT
Start: 2020-01-22 | End: 2020-01-22

## 2020-01-22 RX ORDER — ACETAMINOPHEN 325 MG/1
650 TABLET ORAL EVERY 4 HOURS PRN
Status: DISCONTINUED | OUTPATIENT
Start: 2020-01-22 | End: 2020-01-23 | Stop reason: HOSPADM

## 2020-01-22 RX ORDER — ONDANSETRON 4 MG/1
4 TABLET, ORALLY DISINTEGRATING ORAL EVERY 6 HOURS PRN
Status: DISCONTINUED | OUTPATIENT
Start: 2020-01-22 | End: 2020-01-23 | Stop reason: HOSPADM

## 2020-01-22 RX ORDER — METOPROLOL SUCCINATE 50 MG/1
50 TABLET, EXTENDED RELEASE ORAL DAILY
Status: ON HOLD | COMMUNITY
End: 2020-11-02

## 2020-01-22 RX ADMIN — HYDROMORPHONE HYDROCHLORIDE 8 MG: 2 TABLET ORAL at 20:32

## 2020-01-22 RX ADMIN — PANTOPRAZOLE SODIUM 40 MG: 40 INJECTION, POWDER, FOR SOLUTION INTRAVENOUS at 15:34

## 2020-01-22 RX ADMIN — GABAPENTIN 800 MG: 300 CAPSULE ORAL at 20:33

## 2020-01-22 RX ADMIN — GABAPENTIN 800 MG: 300 CAPSULE ORAL at 22:10

## 2020-01-22 RX ADMIN — CYCLOBENZAPRINE 10 MG: 10 TABLET, FILM COATED ORAL at 20:33

## 2020-01-22 NOTE — ED PROVIDER NOTES
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 Patient reports the blood is bright red in her stool, and notes she will sometimes see dark clots of blood. She notes the rectal bleeding reminds her of menstrual bleeding with the consistency and color.  She denies rectal pain. She denies abdominal pain, but notes she will occassionally experience cramps. Patient reports she has never had rectal bleeding in the past. She notes she collected a bag of stool with blood in it to bring it as a sample, but she misplaced it while on her way to the emergency department. Patient also complains of back pain and right knee pain after slipping on ice and losing her balance while walking into the emergency department. She reports she has an abrasion over her right knee from the fall. She states she twisted her back when she fell.     I have reviewed the Medications, Allergies, Past Medical and Surgical History, and Social History in the Epic system.    Review of Systems  General: No fevers or chills  Skin: No rash or diaphoresis, positive for abrasion of her right knee  Eyes: No eye redness or discharge  Ears/Nose/Throat: No rhinorrhea or nasal congestion  Respiratory: No cough or SOB  Cardiovascular: No chest pain or palpitations  Gastrointestinal: No nausea, vomiting. Positive for bright red blood per  rectum. No rectal pain.   Genitourinary: No urinary frequency, hematuria, or dysuria  Musculoskeletal: Positive for back pain and right knee pain.  Neurologic: No numbness or weakness  Hematologic/Lymphatic/Immunologic: No leg swelling, no easy bruising/bleeding  Endocrine: No polyuria/polydypsia      Physical Exam   BP: (!) 143/91  Pulse: 102  Temp: 98.6  F (37  C)  Resp: 16  Weight: 125 kg (275 lb 8 oz)(stated)  SpO2: 95 %      Physical Exam  General: Well nourished, well developed, NAD  HEENT: EOMI, anicteric. NCAT, MMM  Neck: no jugular venous distension, supple, nl ROM  Cardiac: Regular rate and rhythm. No murmurs, rubs, or gallops. Normal S1, S2.  Intact peripheral pulses  Pulm: CTAB, no stridor, wheezes, rales, rhonchi  Abd: Soft, nontender, nondistended.  No masses palpated.    Skin: Warm and dry to the touch.  No rash  Extremities: No LE edema, no cyanosis, w/w/p  Neuro: A&Ox3, no gross focal deficits        ED Course        Procedures             Critical Care time:  none             Labs Ordered and Resulted from Time of ED Arrival Up to the Time of Departure from the ED   CBC WITH PLATELETS DIFFERENTIAL - Abnormal; Notable for the following components:       Result Value    Hemoglobin 11.5 (*)     All other components within normal limits   PARTIAL THROMBOPLASTIN TIME - Abnormal; Notable for the following components:    PTT 48 (*)     All other components within normal limits   INR - Abnormal; Notable for the following components:    INR 1.63 (*)     All other components within normal limits   COMPREHENSIVE METABOLIC PANEL - Abnormal; Notable for the following components:    Urea Nitrogen 38 (*)     All other components within normal limits   UA MACROSCOPIC WITH REFLEX TO MICRO AND CULTURE          Results for orders placed or performed during the hospital encounter of 01/22/20 (from the past 24 hour(s))   CBC with platelets differential   Result Value Ref Range    WBC 8.2 4.0 - 11.0 10e9/L    RBC Count  4.00 3.8 - 5.2 10e12/L    Hemoglobin 11.5 (L) 11.7 - 15.7 g/dL    Hematocrit 35.5 35.0 - 47.0 %    MCV 89 78 - 100 fl    MCH 28.8 26.5 - 33.0 pg    MCHC 32.4 31.5 - 36.5 g/dL    RDW 14.8 10.0 - 15.0 %    Platelet Count 236 150 - 450 10e9/L    Diff Method Automated Method     % Neutrophils 62.7 %    % Lymphocytes 26.3 %    % Monocytes 7.2 %    % Eosinophils 3.3 %    % Basophils 0.1 %    % Immature Granulocytes 0.4 %    Nucleated RBCs 0 0 /100    Absolute Neutrophil 5.1 1.6 - 8.3 10e9/L    Absolute Lymphocytes 2.1 0.8 - 5.3 10e9/L    Absolute Monocytes 0.6 0.0 - 1.3 10e9/L    Absolute Eosinophils 0.3 0.0 - 0.7 10e9/L    Absolute Basophils 0.0 0.0 - 0.2 10e9/L    Abs Immature Granulocytes 0.0 0 - 0.4 10e9/L    Absolute Nucleated RBC 0.0    Partial thromboplastin time   Result Value Ref Range    PTT 48 (H) 22 - 37 sec   INR   Result Value Ref Range    INR 1.63 (H) 0.86 - 1.14   Comprehensive metabolic panel   Result Value Ref Range    Sodium 138 133 - 144 mmol/L    Potassium 4.2 3.4 - 5.3 mmol/L    Chloride 102 94 - 109 mmol/L    Carbon Dioxide 31 20 - 32 mmol/L    Anion Gap 5 3 - 14 mmol/L    Glucose 96 70 - 99 mg/dL    Urea Nitrogen 38 (H) 7 - 30 mg/dL    Creatinine 0.89 0.52 - 1.04 mg/dL    GFR Estimate 70 >60 mL/min/[1.73_m2]    GFR Estimate If Black 81 >60 mL/min/[1.73_m2]    Calcium 8.6 8.5 - 10.1 mg/dL    Bilirubin Total 0.4 0.2 - 1.3 mg/dL    Albumin 3.7 3.4 - 5.0 g/dL    Protein Total 7.7 6.8 - 8.8 g/dL    Alkaline Phosphatase 123 40 - 150 U/L    ALT 39 0 - 50 U/L    AST 29 0 - 45 U/L   XR Knee Right 3 Views    Narrative    RIGHT KNEE THREE VIEWS   1/22/2020 3:02 PM    HISTORY: Pain after falling.    COMPARISON: 4/2/2018      Impression    IMPRESSION: Again seen are surgical changes of a total knee  arthroplasty. The hardware is intact and unremarkable. No fracture or  acute abnormality is seen. I see no definite change since the previous  examination.     MATTHEW TURPIN MD   Lumbar spine CT w/o contrast     Narrative    CT LUMBAR SPINE WITHOUT CONTRAST  1/22/2020 4:41 PM     HISTORY: Back pain after fall, prior surgery.     TECHNIQUE: Axial images of the lumbar spine were obtained without  intravenous contrast. Multiplanar reformations were performed.  Radiation dose for this scan was reduced using automated exposure  control, adjustment of the mA and/or kV according to patient size, or  iterative reconstruction technique.    COMPARISON: Radiographs 12/13/2014    FINDINGS: There is no fracture or subluxation. There is straightening  of lumbar lordosis. There is scoliosis convex to the right from  T12-L2. The distal spinal cord and conus medullaris appear normal. The  paraspinous soft tissues appear normal.    T12-L1: Moderate degenerative disc disease with vacuum phenomenon  anteriorly. Minimal impression on the thecal sac, otherwise normal.    L1-L2: Moderate degenerative disc disease, prominent vacuum phenomenon  anteriorly, with sclerosis in the vertebral endplates. Mild spinal  canal stenosis, mild degeneration left facet joint, mild left  foraminal stenosis, otherwise normal.    L2-L3: Severe degenerative disc disease with marked loss of disc  height. Circumferential disc bulge and vertebral osteophytes combined  with prominent osteophytes from the moderately degenerated facet  joints cause moderate spinal canal stenosis. Mild left foraminal  stenosis.    L3-L4: Mild degenerative disc disease with prominent vacuum phenomenon  anteriorly. Moderate degenerative facet arthropathy bilaterally. Mild  spinal canal stenosis, normal neural foramina.    L4-L5: Solid anterior and posterior spinal fusion with posterior  instrumentation. Spinal canal is obscured in large part by artifacts  but appears normal at the disc level.    L5-S1: Solid anterior and posterior spinal fusion. Spinal canal is  completely obscured by artifacts.      Impression    IMPRESSION:    1. No evidence of acute trauma.  2. Solid anterior and  posterior spinal fusion L5-S1.  3. Multilevel degenerative disc disease and degenerative facet  arthropathy from T12-L1 through L3-L4. This is slightly worse than in  2014.  4. Moderate spinal canal stenosis at L2-L3 and mild spinal canal  stenosis at L1-L2 and L3-L4.       OLIVIA CASTILLO MD       Labs, vital signs, and imaging studies were reviewed by me.    Assessments & Plan (with Medical Decision Making)   The patient is a 59-year-old female who presents the emergency department with GI bleeding.  Patient also suffered a fall injuring her right knee and back.  X-ray ordered to rule out acute traumatic injury.  CT ordered to further assess patient's back pain.  Labs ordered to rule out low hemoglobin secondary to bleeding.  Patient is also on Xarelto for history of DVT/PE.  INR was ordered. Protonix given.     CT and x-ray are negative for acute traumatic injury.    Hemoglobin is 11.5.  Baseline hemoglobin is 12.4 per chart review.  However, she has not had a hemoglobin checked in our system since 2018.    Pt is not an ER observation unit candidate d/t HR > 100, >2 episodes of bright red bleeding.     I have reviewed the nursing notes.    I have reviewed the findings, diagnosis, plan and need for follow up with the patient.  Patient to be admitted for observation and serial hemoglobins given the volume of rectal bleeding described by the patient as well as her current anticoagulation on Xarelto  Patient discussed with [place] triage physician and [facility] hospitalist to determine appropriate disposition. Patient to be admitted to their internal medicine on [place] for further management. Plan was discussed with patient who understands and agrees with plan.     New Prescriptions    No medications on file       Final diagnoses:   Gastrointestinal hemorrhage, unspecified gastrointestinal hemorrhage type   Fall due to slipping on ice or snow, initial encounter   Abrasion of right knee, initial encounter   Back  strain, initial encounter     I, Hailey Diaz, am serving as a trained medical scribe to document services personally performed by Nhea Witt MD, based on the provider's statements to me.      I, Neha Witt MD, was physically present and have reviewed and verified the accuracy of this note documented by Hailey Diaz.       1/22/2020   [facility], [place], EMERGENCY DEPARTMENT     Neha Witt MD  01/22/20 9667

## 2020-01-22 NOTE — PROGRESS NOTES
Patient with hx of DVT/PE on Xarelto being transferred from  ED for 3 day hx of GI bleed. Hgb 11.5 (Baseline around 12).

## 2020-01-22 NOTE — ED NOTES
[facility], [place]   ED Nurse to Floor Handoff     Aspen Mccullough is a 59 year old female who speaks English and lives with a spouse,  in a home  They arrived in the ED by car from home    ED Chief Complaint: Rectal Bleeding (bright red blood per rectum for past 3 days; +rectal pain with BMs) and Fall (lost balance while walking into ED, abrasion at R knee)    ED Dx;   Final diagnoses:   Gastrointestinal hemorrhage, unspecified gastrointestinal hemorrhage type   Fall due to slipping on ice or snow, initial encounter         Needed?: No    Allergies:   Allergies   Allergen Reactions     Chlorhexidine Hives     Thor surgical cleanser     Codeine Hives     Ibuprofen [Nsaids] Hives     Penicillins Hives     Other reaction(s): Unknown     Sulfa Drugs Hives     Other reaction(s): Unknown     Doxycycline GI Disturbance     Emesis & diarrhea  Patient denies allergy to this med     Lactose      Other reaction(s): Unknown     Mold      Mushroom      Red Wine Complex [Red Wine Powder]    .  Past Medical Hx:   Past Medical History:   Diagnosis Date     ADHD (attention deficit hyperactivity disorder)      Allergic rhinitis      Chronic low back pain      Cushing's syndrome (H)      DDD (degenerative disc disease)      DDD (degenerative disc disease)      Deviated nasal septum      Diarrhea      Endometriosis      Fibromyalgia      Hashimoto's disease      Hyperlipidemia      Hypothyroid     hx hashimoto's thyroiditis     Insomnia      Lupus (H)      Malabsorption      Pernicious anemia      Renal disease     chronic renal insufficiency     Renal disease     hx renal failure     Sinusitis       Baseline Mental status: WDL  Current Mental Status changes: at basesline    Infection present or suspected this encounter: no  Sepsis suspected: No  Isolation type: No active isolations     Activity level - Baseline/Home:  Independent  Activity Level - Current:   Independent    Bariatric equipment  needed?: No    In the ED these meds were given:   Medications   pantoprazole (PROTONIX) 40 mg IV push injection (40 mg Intravenous Given 1/22/20 1534)       Drips running?  No    Home pump  No    Current LDAs  Peripheral IV 01/22/20 Right (Active)   Site Assessment WDL 1/22/2020  2:35 PM   Line Status Saline locked 1/22/2020  2:35 PM   Phlebitis Scale 0-->no symptoms 1/22/2020  2:35 PM   Infiltration Scale 0 1/22/2020  2:35 PM   Infiltration Site Treatment Method  None 1/22/2020  2:35 PM   Extravasation? No 1/22/2020  2:35 PM   Dressing Intervention New dressing  1/22/2020  2:35 PM   Number of days: 0       Labs results:   Labs Ordered and Resulted from Time of ED Arrival Up to the Time of Departure from the ED   CBC WITH PLATELETS DIFFERENTIAL - Abnormal; Notable for the following components:       Result Value    Hemoglobin 11.5 (*)     All other components within normal limits   PARTIAL THROMBOPLASTIN TIME - Abnormal; Notable for the following components:    PTT 48 (*)     All other components within normal limits   INR - Abnormal; Notable for the following components:    INR 1.63 (*)     All other components within normal limits   COMPREHENSIVE METABOLIC PANEL - Abnormal; Notable for the following components:    Urea Nitrogen 38 (*)     All other components within normal limits   UA MACROSCOPIC WITH REFLEX TO MICRO AND CULTURE       Imaging Studies:   Recent Results (from the past 24 hour(s))   XR Knee Right 3 Views    Narrative    RIGHT KNEE THREE VIEWS   1/22/2020 3:02 PM    HISTORY: Pain after falling.    COMPARISON: 4/2/2018      Impression    IMPRESSION: Again seen are surgical changes of a total knee  arthroplasty. The hardware is intact and unremarkable. No fracture or  acute abnormality is seen. I see no definite change since the previous  examination.     MATTHEW TURPIN MD   Lumbar spine CT w/o contrast    Narrative    CT LUMBAR SPINE WITHOUT CONTRAST  1/22/2020 4:41 PM     HISTORY: Back pain after  fall, prior surgery.     TECHNIQUE: Axial images of the lumbar spine were obtained without  intravenous contrast. Multiplanar reformations were performed.  Radiation dose for this scan was reduced using automated exposure  control, adjustment of the mA and/or kV according to patient size, or  iterative reconstruction technique.    COMPARISON: Radiographs 12/13/2014    FINDINGS: There is no fracture or subluxation. There is straightening  of lumbar lordosis. There is scoliosis convex to the right from  T12-L2. The distal spinal cord and conus medullaris appear normal. The  paraspinous soft tissues appear normal.    T12-L1: Moderate degenerative disc disease with vacuum phenomenon  anteriorly. Minimal impression on the thecal sac, otherwise normal.    L1-L2: Moderate degenerative disc disease, prominent vacuum phenomenon  anteriorly, with sclerosis in the vertebral endplates. Mild spinal  canal stenosis, mild degeneration left facet joint, mild left  foraminal stenosis, otherwise normal.    L2-L3: Severe degenerative disc disease with marked loss of disc  height. Circumferential disc bulge and vertebral osteophytes combined  with prominent osteophytes from the moderately degenerated facet  joints cause moderate spinal canal stenosis. Mild left foraminal  stenosis.    L3-L4: Mild degenerative disc disease with prominent vacuum phenomenon  anteriorly. Moderate degenerative facet arthropathy bilaterally. Mild  spinal canal stenosis, normal neural foramina.    L4-L5: Solid anterior and posterior spinal fusion with posterior  instrumentation. Spinal canal is obscured in large part by artifacts  but appears normal at the disc level.    L5-S1: Solid anterior and posterior spinal fusion. Spinal canal is  completely obscured by artifacts.      Impression    IMPRESSION:    1. No evidence of acute trauma.  2. Solid anterior and posterior spinal fusion L5-S1.  3. Multilevel degenerative disc disease and degenerative  facet  arthropathy from T12-L1 through L3-L4. This is slightly worse than in  2014.  4. Moderate spinal canal stenosis at L2-L3 and mild spinal canal  stenosis at L1-L2 and L3-L4.       OLIVIA CASTILLO MD       Recent vital signs:   /85   Pulse 90   Temp 97.8  F (36.6  C) (Oral)   Resp 16   Wt 125 kg (275 lb 8 oz)   SpO2 99%   Breastfeeding No   BMI 40.68 kg/m              Cardiac Rhythm: Other  Pt needs tele? No  Skin/wound Issues: abrasion to right knee    Code Status: Full Code    Pain control: good    Nausea control: pt had none    Abnormal labs/tests/findings requiring intervention: see emr    Family present during ED course? Yes   Family Comments/Social Situation comments: n/a    Tasks needing completion: none    , Juan, RN  [phone] [place] ED  [phone] [facility]

## 2020-01-23 ENCOUNTER — PATIENT OUTREACH (OUTPATIENT)
Dept: CARE COORDINATION | Facility: CLINIC | Age: 60
End: 2020-01-23

## 2020-01-23 ENCOUNTER — APPOINTMENT (OUTPATIENT)
Dept: CT IMAGING | Facility: CLINIC | Age: 60
End: 2020-01-23
Payer: COMMERCIAL

## 2020-01-23 VITALS
SYSTOLIC BLOOD PRESSURE: 118 MMHG | BODY MASS INDEX: 40.68 KG/M2 | HEART RATE: 88 BPM | WEIGHT: 275.5 LBS | RESPIRATION RATE: 16 BRPM | TEMPERATURE: 98 F | DIASTOLIC BLOOD PRESSURE: 54 MMHG | OXYGEN SATURATION: 94 %

## 2020-01-23 LAB
ALBUMIN SERPL-MCNC: 3.1 G/DL (ref 3.4–5)
ALP SERPL-CCNC: 99 U/L (ref 40–150)
ALT SERPL W P-5'-P-CCNC: 35 U/L (ref 0–50)
ANION GAP SERPL CALCULATED.3IONS-SCNC: 4 MMOL/L (ref 3–14)
AST SERPL W P-5'-P-CCNC: 26 U/L (ref 0–45)
BASOPHILS # BLD AUTO: 0 10E9/L (ref 0–0.2)
BASOPHILS NFR BLD AUTO: 0.4 %
BILIRUB SERPL-MCNC: 0.8 MG/DL (ref 0.2–1.3)
BUN SERPL-MCNC: 20 MG/DL (ref 7–30)
C COLI+JEJUNI+LARI FUSA STL QL NAA+PROBE: NOT DETECTED
CALCIUM SERPL-MCNC: 8.4 MG/DL (ref 8.5–10.1)
CHLORIDE SERPL-SCNC: 110 MMOL/L (ref 94–109)
CO2 SERPL-SCNC: 28 MMOL/L (ref 20–32)
CREAT SERPL-MCNC: 0.64 MG/DL (ref 0.52–1.04)
DIFFERENTIAL METHOD BLD: ABNORMAL
EC STX1 GENE STL QL NAA+PROBE: NOT DETECTED
EC STX2 GENE STL QL NAA+PROBE: NOT DETECTED
ENTERIC PATHOGEN COMMENT: NORMAL
EOSINOPHIL # BLD AUTO: 0.2 10E9/L (ref 0–0.7)
EOSINOPHIL NFR BLD AUTO: 3.2 %
ERYTHROCYTE [DISTWIDTH] IN BLOOD BY AUTOMATED COUNT: 14.7 % (ref 10–15)
ERYTHROCYTE [DISTWIDTH] IN BLOOD BY AUTOMATED COUNT: 14.8 % (ref 10–15)
GFR SERPL CREATININE-BSD FRML MDRD: >90 ML/MIN/{1.73_M2}
GLUCOSE SERPL-MCNC: 100 MG/DL (ref 70–99)
HCT VFR BLD AUTO: 32.6 % (ref 35–47)
HCT VFR BLD AUTO: 33.8 % (ref 35–47)
HGB BLD-MCNC: 10.3 G/DL (ref 11.7–15.7)
HGB BLD-MCNC: 10.6 G/DL (ref 11.7–15.7)
IMM GRANULOCYTES # BLD: 0.1 10E9/L (ref 0–0.4)
IMM GRANULOCYTES NFR BLD: 0.7 %
LACTATE BLD-SCNC: 0.7 MMOL/L (ref 0.7–2)
LYMPHOCYTES # BLD AUTO: 2.3 10E9/L (ref 0.8–5.3)
LYMPHOCYTES NFR BLD AUTO: 34.2 %
MCH RBC QN AUTO: 27.7 PG (ref 26.5–33)
MCH RBC QN AUTO: 28 PG (ref 26.5–33)
MCHC RBC AUTO-ENTMCNC: 31.4 G/DL (ref 31.5–36.5)
MCHC RBC AUTO-ENTMCNC: 31.6 G/DL (ref 31.5–36.5)
MCV RBC AUTO: 89 FL (ref 78–100)
MCV RBC AUTO: 89 FL (ref 78–100)
MONOCYTES # BLD AUTO: 0.9 10E9/L (ref 0–1.3)
MONOCYTES NFR BLD AUTO: 12.7 %
NEUTROPHILS # BLD AUTO: 3.3 10E9/L (ref 1.6–8.3)
NEUTROPHILS NFR BLD AUTO: 48.8 %
NOROV GI+II ORF1-ORF2 JNC STL QL NAA+PR: NOT DETECTED
NRBC # BLD AUTO: 0 10*3/UL
NRBC BLD AUTO-RTO: 0 /100
PLATELET # BLD AUTO: 204 10E9/L (ref 150–450)
PLATELET # BLD AUTO: 233 10E9/L (ref 150–450)
POTASSIUM SERPL-SCNC: 3.5 MMOL/L (ref 3.4–5.3)
PROT SERPL-MCNC: 6.2 G/DL (ref 6.8–8.8)
RBC # BLD AUTO: 3.68 10E12/L (ref 3.8–5.2)
RBC # BLD AUTO: 3.82 10E12/L (ref 3.8–5.2)
RVA NSP5 STL QL NAA+PROBE: NOT DETECTED
SALMONELLA SP RPOD STL QL NAA+PROBE: NOT DETECTED
SHIGELLA SP+EIEC IPAH STL QL NAA+PROBE: NOT DETECTED
SODIUM SERPL-SCNC: 141 MMOL/L (ref 133–144)
V CHOL+PARA RFBL+TRKH+TNAA STL QL NAA+PR: NOT DETECTED
WBC # BLD AUTO: 5.3 10E9/L (ref 4–11)
WBC # BLD AUTO: 6.8 10E9/L (ref 4–11)
Y ENTERO RECN STL QL NAA+PROBE: NOT DETECTED

## 2020-01-23 PROCEDURE — 85025 COMPLETE CBC W/AUTO DIFF WBC: CPT | Performed by: STUDENT IN AN ORGANIZED HEALTH CARE EDUCATION/TRAINING PROGRAM

## 2020-01-23 PROCEDURE — C9113 INJ PANTOPRAZOLE SODIUM, VIA: HCPCS | Performed by: EMERGENCY MEDICINE

## 2020-01-23 PROCEDURE — 83605 ASSAY OF LACTIC ACID: CPT | Performed by: STUDENT IN AN ORGANIZED HEALTH CARE EDUCATION/TRAINING PROGRAM

## 2020-01-23 PROCEDURE — 25000128 H RX IP 250 OP 636: Performed by: FAMILY MEDICINE

## 2020-01-23 PROCEDURE — 40000893 ZZH STATISTIC PT IP EVAL DEFER

## 2020-01-23 PROCEDURE — G0378 HOSPITAL OBSERVATION PER HR: HCPCS

## 2020-01-23 PROCEDURE — 80053 COMPREHEN METABOLIC PANEL: CPT | Performed by: STUDENT IN AN ORGANIZED HEALTH CARE EDUCATION/TRAINING PROGRAM

## 2020-01-23 PROCEDURE — 96376 TX/PRO/DX INJ SAME DRUG ADON: CPT | Mod: 59

## 2020-01-23 PROCEDURE — 25000125 ZZHC RX 250: Performed by: FAMILY MEDICINE

## 2020-01-23 PROCEDURE — 96361 HYDRATE IV INFUSION ADD-ON: CPT | Mod: 59

## 2020-01-23 PROCEDURE — 25000131 ZZH RX MED GY IP 250 OP 636 PS 637: Performed by: STUDENT IN AN ORGANIZED HEALTH CARE EDUCATION/TRAINING PROGRAM

## 2020-01-23 PROCEDURE — 93010 ELECTROCARDIOGRAM REPORT: CPT | Performed by: INTERNAL MEDICINE

## 2020-01-23 PROCEDURE — 25800030 ZZH RX IP 258 OP 636: Performed by: STUDENT IN AN ORGANIZED HEALTH CARE EDUCATION/TRAINING PROGRAM

## 2020-01-23 PROCEDURE — 36415 COLL VENOUS BLD VENIPUNCTURE: CPT | Performed by: STUDENT IN AN ORGANIZED HEALTH CARE EDUCATION/TRAINING PROGRAM

## 2020-01-23 PROCEDURE — 25000132 ZZH RX MED GY IP 250 OP 250 PS 637: Performed by: STUDENT IN AN ORGANIZED HEALTH CARE EDUCATION/TRAINING PROGRAM

## 2020-01-23 PROCEDURE — 25000125 ZZHC RX 250: Performed by: STUDENT IN AN ORGANIZED HEALTH CARE EDUCATION/TRAINING PROGRAM

## 2020-01-23 PROCEDURE — 25000128 H RX IP 250 OP 636: Performed by: EMERGENCY MEDICINE

## 2020-01-23 PROCEDURE — 85027 COMPLETE CBC AUTOMATED: CPT | Performed by: STUDENT IN AN ORGANIZED HEALTH CARE EDUCATION/TRAINING PROGRAM

## 2020-01-23 PROCEDURE — 87506 IADNA-DNA/RNA PROBE TQ 6-11: CPT | Performed by: FAMILY MEDICINE

## 2020-01-23 PROCEDURE — 46600 DIAGNOSTIC ANOSCOPY SPX: CPT

## 2020-01-23 PROCEDURE — 74177 CT ABD & PELVIS W/CONTRAST: CPT

## 2020-01-23 RX ORDER — LIOTHYRONINE SODIUM 25 UG/1
50 TABLET ORAL 2 TIMES DAILY
Status: DISCONTINUED | OUTPATIENT
Start: 2020-01-23 | End: 2020-01-23 | Stop reason: HOSPADM

## 2020-01-23 RX ORDER — MORPHINE SULFATE 15 MG/1
30 TABLET, FILM COATED, EXTENDED RELEASE ORAL 2 TIMES DAILY
Status: DISCONTINUED | OUTPATIENT
Start: 2020-01-23 | End: 2020-01-23 | Stop reason: HOSPADM

## 2020-01-23 RX ORDER — GABAPENTIN 800 MG/1
800 TABLET ORAL 3 TIMES DAILY
Status: DISCONTINUED | OUTPATIENT
Start: 2020-01-23 | End: 2020-01-23 | Stop reason: HOSPADM

## 2020-01-23 RX ORDER — HYDROMORPHONE HYDROCHLORIDE 2 MG/1
8 TABLET ORAL EVERY 6 HOURS PRN
Status: DISCONTINUED | OUTPATIENT
Start: 2020-01-23 | End: 2020-01-23 | Stop reason: HOSPADM

## 2020-01-23 RX ORDER — IOPAMIDOL 755 MG/ML
75 INJECTION, SOLUTION INTRAVASCULAR ONCE
Status: COMPLETED | OUTPATIENT
Start: 2020-01-23 | End: 2020-01-23

## 2020-01-23 RX ORDER — MORPHINE SULFATE 15 MG/1
60 TABLET, FILM COATED, EXTENDED RELEASE ORAL 3 TIMES DAILY
Status: DISCONTINUED | OUTPATIENT
Start: 2020-01-23 | End: 2020-01-23 | Stop reason: HOSPADM

## 2020-01-23 RX ORDER — CYCLOBENZAPRINE HCL 10 MG
10 TABLET ORAL 3 TIMES DAILY
Status: DISCONTINUED | OUTPATIENT
Start: 2020-01-23 | End: 2020-01-23 | Stop reason: HOSPADM

## 2020-01-23 RX ORDER — PREDNISONE 10 MG/1
10 TABLET ORAL EVERY OTHER DAY
Status: DISCONTINUED | OUTPATIENT
Start: 2020-01-23 | End: 2020-01-23 | Stop reason: HOSPADM

## 2020-01-23 RX ORDER — PREDNISOLONE ACETATE 10 MG/ML
1 SUSPENSION/ DROPS OPHTHALMIC 4 TIMES DAILY
Status: DISCONTINUED | OUTPATIENT
Start: 2020-01-23 | End: 2020-01-23 | Stop reason: HOSPADM

## 2020-01-23 RX ORDER — LEVOTHYROXINE SODIUM ANHYDROUS 100 UG/5ML
200 INJECTION, POWDER, LYOPHILIZED, FOR SOLUTION INTRAVENOUS
Status: DISCONTINUED | OUTPATIENT
Start: 2020-01-24 | End: 2020-01-23 | Stop reason: HOSPADM

## 2020-01-23 RX ORDER — GABAPENTIN 300 MG/1
300 CAPSULE ORAL 2 TIMES DAILY
Status: DISCONTINUED | OUTPATIENT
Start: 2020-01-23 | End: 2020-01-23 | Stop reason: HOSPADM

## 2020-01-23 RX ORDER — METOPROLOL SUCCINATE 50 MG/1
50 TABLET, EXTENDED RELEASE ORAL DAILY
Status: DISCONTINUED | OUTPATIENT
Start: 2020-01-23 | End: 2020-01-23 | Stop reason: HOSPADM

## 2020-01-23 RX ADMIN — METOPROLOL SUCCINATE 50 MG: 50 TABLET, EXTENDED RELEASE ORAL at 09:28

## 2020-01-23 RX ADMIN — GABAPENTIN 800 MG: 800 TABLET, FILM COATED ORAL at 09:26

## 2020-01-23 RX ADMIN — PREDNISONE 10 MG: 10 TABLET ORAL at 09:48

## 2020-01-23 RX ADMIN — LEVOTHYROXINE SODIUM 300 MCG: 112 TABLET ORAL at 09:28

## 2020-01-23 RX ADMIN — PREDNISOLONE ACETATE 1 DROP: 10 SUSPENSION/ DROPS OPHTHALMIC at 09:29

## 2020-01-23 RX ADMIN — SODIUM CHLORIDE, POTASSIUM CHLORIDE, SODIUM LACTATE AND CALCIUM CHLORIDE 1000 ML: 600; 310; 30; 20 INJECTION, SOLUTION INTRAVENOUS at 00:56

## 2020-01-23 RX ADMIN — MORPHINE SULFATE 60 MG: 15 TABLET, EXTENDED RELEASE ORAL at 09:27

## 2020-01-23 RX ADMIN — SODIUM CHLORIDE, POTASSIUM CHLORIDE, SODIUM LACTATE AND CALCIUM CHLORIDE 1000 ML: 600; 310; 30; 20 INJECTION, SOLUTION INTRAVENOUS at 05:02

## 2020-01-23 RX ADMIN — IOPAMIDOL 75 ML: 755 INJECTION, SOLUTION INTRAVENOUS at 06:58

## 2020-01-23 RX ADMIN — GABAPENTIN 800 MG: 800 TABLET, FILM COATED ORAL at 14:04

## 2020-01-23 RX ADMIN — GABAPENTIN 300 MG: 300 CAPSULE ORAL at 09:26

## 2020-01-23 RX ADMIN — LIOTHYRONINE SODIUM 50 MCG: 25 TABLET ORAL at 09:46

## 2020-01-23 RX ADMIN — CYCLOBENZAPRINE HYDROCHLORIDE 10 MG: 10 TABLET, FILM COATED ORAL at 09:28

## 2020-01-23 RX ADMIN — SODIUM CHLORIDE, PRESERVATIVE FREE 70 ML: 5 INJECTION INTRAVENOUS at 06:58

## 2020-01-23 RX ADMIN — MORPHINE SULFATE 60 MG: 15 TABLET, EXTENDED RELEASE ORAL at 14:03

## 2020-01-23 RX ADMIN — MORPHINE SULFATE 30 MG: 15 TABLET, EXTENDED RELEASE ORAL at 09:27

## 2020-01-23 RX ADMIN — PANTOPRAZOLE SODIUM 40 MG: 40 INJECTION, POWDER, FOR SOLUTION INTRAVENOUS at 09:29

## 2020-01-23 RX ADMIN — CYCLOBENZAPRINE HYDROCHLORIDE 10 MG: 10 TABLET, FILM COATED ORAL at 14:03

## 2020-01-23 RX ADMIN — PREDNISOLONE ACETATE 1 DROP: 10 SUSPENSION/ DROPS OPHTHALMIC at 12:03

## 2020-01-23 RX ADMIN — RIVAROXABAN 20 MG: 20 TABLET, FILM COATED ORAL at 14:05

## 2020-01-23 NOTE — PHARMACY-ADMISSION MEDICATION HISTORY
Admission medication history for the January 22, 2020 admission is complete.     Interview sources:  Patient, [facility]x, [facility]    Medication adherence: Good- patient reports missing a dose of medication approximately 1-2 times per month.     Current outpatient pharmacy:  [facility]-- [place], [place]    Pertinent Medication Changes:  Added: The patient is currently taking the following medications not previously listed:    Metoprolol succinate    Zolpidem    Changed: Per [facility], the following medication have been updated to include change in medication strength and/or dosing instructions:  Formulation:    Gabapentin 400 mg tablet ----> Gabapentin 800 mg tablet    Vitamin D PO ---> Vitamin D2 80974 unit capsule    Dosing instructions:    Hydromorphone 8 mg tablet:  Take 1 tablet by mouth three times daily -----> Take 1 tablet by mouth every 6 hours as needed. Max of 3 doses per day.     Liothyronine 25 mg tablet:  Take 1 tablet by mouth two times daily ---> Take 2 tablets by mouth two times daily.     Additional medication history information:   This medication history was completed at the patients bedside in the Adult Emergency Department. Patient reports no recent changes in medication allergies.  Patient last had a dose of any medication yesterday, 1/21/2020.      Cyanocobalamin:  Patient last administered this medication yesterday, 1/21/2020.      Gabapentin:  Patient takes gabapentin 300 mg TID with gabapentin 800 mg, not BID as directed.       Hydromorphone:  Patient takes this medication every 8 hours around the clock.       Morphine: Patient takes morphine 30 mg 12 hr tablets twice daily in the morning and at bedtime along with one 60 mg tablet for a total dose of 90 mg twice daily.  Patient takes an additional morphine 60 mg 12 hr tablet daily in the afternoon.       Prednisone: Patient alternates taking 8 mg and 10 mg every other day.  Patient last took this medication  yesterday. 1/21/2020,  but is unsure of the strength of her last dose.       Vitamin D2: Patient last took this medication on 1/20/2020.     Thyroid:  the patient currently receives twice weekly levothyroxine injections (Monday and Friday), twice daily oral levothyroxine and twice daily liothyronine.     Eyedrops: the patient reports use of prednisolone and carboxymethylcellulose eye drops BID. Unable to verify prescription for these medications.  Additionally, the patient reports twice daily use of cyclosporine and lifitegrast eye drops.  Per [facility], these prescriptions were written in 2018 and are currently profiled with [facility].      Additionally, patient reports difficulty sleeping and shared the zolpidem 5 mg tablets have not helped with sleep.  Per patient, she has previously been on zolpidem ER 12.5 mg tablets for sleep.  Unable to verify the higher dose of zolpidem.     The patient denies currently taking any additional prescription, OTC or herbal medications at home.  Prior to Admission Medications   Prescriptions Last Dose Informant Taking?   BIOTIN FORTE PO More than a month Self No   Sig: Take 1 tablet by mouth daily    CEPHALEXIN PO DENTAL PROCEDURES Self No   Sig: Take 500 mg by mouth as needed (Dental Procedure) Take 4 caps 1 hour prior to Dental Appointment   HYDROmorphone (DILAUDID) 8 MG tablet 1/21/2020 at AM Pharmacy Yes   Sig: Take 8 mg by mouth every 6 hours as needed for severe pain . Max of 3 doses per day.   NASONEX 50 MCG/ACT spray More than a month at PRN Self No   Sig: Spray 1 spray into both nostrils daily as needed    carboxymethylcellulose (CARBOXYMETHYLCELLULOSE SODIUM) 0.5 % SOLN ophthalmic solution 1/21/2020 at PM Self Yes   Sig: Place 1 drop into both eyes 2 times daily   cyanocobalamin 1000 MCG/ML injection 1/21/2020 at AM Pharmacy Yes   Sig: Inject 1 mL (1,000 mcg) Subcutaneous every 7 days   cycloSPORINE (RESTASIS) 0.05 % ophthalmic emulsion 1/21/2020 at PM  Pharmacy Yes   Sig: Place 1 drop into both eyes 2 times daily   cyclobenzaprine (FLEXERIL) 10 MG tablet 1/21/2020 at AM Pharmacy Yes   Sig: Take 1 tablet (10 mg) by mouth 3 times daily   fexofenadine (ALLEGRA) 180 MG tablet 1/21/2020 at AM Self Yes   Sig: Take 180 mg by mouth daily.   gabapentin (NEURONTIN) 300 MG capsule 1/21/2020 at AM Pharmacy Yes   Sig: Take 300 mg by mouth 2 times daily    gabapentin (NEURONTIN) 800 MG tablet 1/21/2020 at AM Pharmacy Yes   Sig: Take 800 mg by mouth 3 times daily   levothyroxine (SYNTHROID/LEVOTHROID) 100 MCG SOLR injection 1/20/2020 at AM Pharmacy Yes   Sig: Inject 200 mcg into the vein twice a week    levothyroxine (SYNTHROID/LEVOTHROID) 300 MCG tablet 1/21/2020 at PM Pharmacy Yes   Sig: Take 300 mcg by mouth 2 times daily    lifitegrast (XIIDRA) 5 % opthalmic solution 1/21/2020 at PM Pharmacy Yes   Sig: Place 1 drop into both eyes 2 times daily   liothyronine (CYTOMEL) 25 MCG tablet 1/21/2020 at PM Pharmacy Yes   Sig: Take 50 mcg by mouth 2 times daily    metoprolol succinate ER (TOPROL-XL) 50 MG 24 hr tablet 1/21/2020 at AM Pharmacy Yes   Sig: Take 50 mg by mouth daily   morphine (MS CONTIN) 30 MG 12 hr tablet 1/21/2020 at PM Pharmacy Yes   Sig: Take 30 mg by mouth 2 times daily along with one morphine 60 mg 12 hr tablet for a total dose of 90 mg.   morphine (MS CONTIN) 60 MG 12 hr tablet 1/21/2020 at PM Pharmacy Yes   Sig: Take 60 mg by mouth 3 times daily    multivitamin, therapeutic with minerals (MULTI-VITAMIN) TABS tablet More than a month at AM Self No   Sig: Take 1 tablet by mouth 2 times daily   nystatin (MYCOSTATIN) 820954 UNIT/GM POWD More than a month at PRN Self No   Sig: Apply topically 3 times daily as needed   predniSONE (DELTASONE) 1 MG tablet 1/21/2020 at AM Pharmacy Yes   Sig: Use along with one prednisone 5 mg tablets to alternate taking 8mg and 10mg every other day.   predniSONE (DELTASONE) 5 MG tablet 1/21/2020 at AM Pharmacy Yes   Sig: Alternate taking  8mg and 10mg every other day   prednisoLONE acetate (PRED FORTE) 1 % ophthalmic susp 2020 at PM Pharmacy Yes   Si drop 4 times daily   probiotic CAPS Past Month at AM Self Yes   Sig: Take 1 capsule by mouth 2 times daily   rivaroxaban ANTICOAGULANT (XARELTO) 20 MG TABS tablet 2020 at AM Pharmacy Yes   Sig: Take 20 mg by mouth every morning    vitamin D2 (ERGOCALCIFEROL) 35574 units (1250 mcg) capsule 2020 at AM Pharmacy Yes   Sig: Take 50,000 Units by mouth once a week   zolpidem (AMBIEN) 5 MG tablet Past Week at PRN Pharmacy Yes   Sig: Take 5 mg by mouth nightly as needed for sleep      Facility-Administered Medications Last Administration Doses Remaining   lidocaine 1% with EPINEPHrine 1:100,000 injection 3 mL None recorded 1          Time spent: 40 minutes    Medication history completed by:  Shruthi Evans, Pharmacy Intern

## 2020-01-23 NOTE — PLAN OF CARE
[id number]    Pt reports pain all over, Scheduled PO morphine given. Pt denies nausea. One BM his shift was dark brown and hard with small streaks of blood.  Stool sample collected this shift. Good urine output measured 600 mL of straw colored urine. Pt diet switched to regular this shift and ate 1 meal with good appetite. Ambulating independently in room. Plans to discharge home today with  providing support and transportation. VSS on RA     /54 (BP Location: Left arm)   Pulse 88   Temp 98  F (36.7  C) (Oral)   Resp 16   Wt 125 kg (275 lb 8 oz)   SpO2 94%   Breastfeeding No   BMI 40.68 kg/m  '

## 2020-01-23 NOTE — PLAN OF CARE
Observation goals:    Diagnostic tests and consults completed and resulted. Not met. Need stool sample for enteric rule out. AM labs and Abd CT planned for this morning 1/23.     Vital signs normal or at patient baseline. Not met. Elevated HR upper 90s.     Safe disposition plan has been identified. Not met. Pt states planning on discharging back home and  will assist with cares.

## 2020-01-23 NOTE — PLAN OF CARE
Observation Goals:  -diagnostic tests and consults completed and resulted- Not met, Stool sample still needs to be collected.   -vital signs normal or at patient baseline Not met  -safe disposition plan has been identified Not met

## 2020-01-23 NOTE — ED NOTES
Called to 5A.  Unable to give report at this time.  Admission pending room change and cleaning on admit unit.

## 2020-01-23 NOTE — DISCHARGE SUMMARY
[facility] Discharge Summary- [facility]    Date of Admission:  1/22/2020  Date of Service: 1/23/2020  Date of Discharge:  1/23/2020  Discharging Attending Provider: Dr. Borges  Discharge Team: [facility]    Discharge Diagnoses   Internal hemorrhoidal bleed  DVT/PE on xarelto     Follow-ups Needed After Discharge     PCP follow-up within 7 days of discharge - repeat CBC.    Hospital Course   Aspen Mccullough was admitted on 1/22/2020 for hematochezia.  The following problems were addressed during her hospitalization:    # Hematochezia  Internal hemorrhoids on anoscopy 1/23 - right posterior hemorrhoid, bleeding.  CTAP on 1/23 without concern, though diverticulosis seen which is consistent with colonoscopy findings in 2018.  Remained HDS. Hgb stable 11.5 > 10.6 (s/p 2L LR) > 10.3.  Prior to discharge re-started xarelto, tolerated diet.  Anticipatory guidance given. Patient may decide on procedural interventions if unremitting.    # AFib with RVR  PTA metoprolol, xarelto    # Hx bilateral DVT, submassive PE in Nov 2017  # Hx atrial clot Nov 2017, resolved  Reported 2/2 SLE  Xarelto indefinitely    # SLE  PTA prednisone. Not on any other meds. Due for rheum f/u in April    # Chronic pain  PTA pain regimen without changes.     # Hashimoto thyroiditis  PTA synthroid PO and IV, cytomel      # Discharge Pain Plan:   - During her hospitalization, Aspen experienced pain due to chronic pain, fibromyalgia.  The pain plan for discharge was discussed with Aspen and the plan was created in a collaborative fashion.    - PTA regimen without change    Consultations This Hospital Stay   MEDICATION HISTORY IP PHARMACY CONSULT  GI LUMINAL ADULT IP CONSULT  PHYSICAL THERAPY ADULT IP CONSULT    Code Status   Full Code       The patient was discussed with Dr. Jony [facility] Inpatient [facility]  [facility]    Pager:[phone]_  ___________________________________________________________________  Review of Systems:  CONSTITUTIONAL: NEGATIVE for fever, chills, change in weight  INTEGUMENTARY/SKIN: NEGATIVE for worrisome rashes, moles or lesions  EYES: NEGATIVE for vision changes or irritation  ENT/MOUTH: NEGATIVE for ear, mouth and throat problems  RESP: NEGATIVE for significant cough or SOB  BREAST: NEGATIVE for masses, tenderness or discharge  CV: NEGATIVE for chest pain, palpitations or peripheral edema  GI: NEGATIVE for nausea, abdominal pain, heartburn, or change in bowel habits  : NEGATIVE for frequency, dysuria, or hematuria  MUSCULOSKELETAL: NEGATIVE for significant arthralgias or myalgia  NEURO: NEGATIVE for weakness, dizziness or paresthesias  ENDOCRINE: NEGATIVE for temperature intolerance, skin/hair changes  HEME/ALLERGY: NEGATIVE for bleeding problems  PSYCHIATRIC: NEGATIVE for changes in mood or affect  Physical Exam   Vital Signs: Temp: 98  F (36.7  C) Temp src: Oral BP: 118/54 Pulse: 88   Resp: 16 SpO2: 94 % O2 Device: None (Room air)    Weight: 275 lbs 8 oz    General Appearance: Alert, oriented. No distress  Respiratory: Lungs clear.  Cardiovascular: RRR, no murmurs.  GI: soft, non-tender throughout.   Rectal: anoscopy with 3 posterior internal hemorrhoids - bleeding posterior right hemorrhoid   Skin: no pallor    Significant Results and Procedures   Results for orders placed or performed during the hospital encounter of 01/22/20   XR Knee Right 3 Views    Narrative    RIGHT KNEE THREE VIEWS   1/22/2020 3:02 PM    HISTORY: Pain after falling.    COMPARISON: 4/2/2018      Impression    IMPRESSION: Again seen are surgical changes of a total knee  arthroplasty. The hardware is intact and unremarkable. No fracture or  acute abnormality is seen. I see no definite change since the previous  examination.     MATTHEW TURPIN MD   Lumbar spine CT w/o contrast    Narrative    CT LUMBAR SPINE WITHOUT CONTRAST   1/22/2020 4:41 PM     HISTORY: Back pain after fall, prior surgery.     TECHNIQUE: Axial images of the lumbar spine were obtained without  intravenous contrast. Multiplanar reformations were performed.  Radiation dose for this scan was reduced using automated exposure  control, adjustment of the mA and/or kV according to patient size, or  iterative reconstruction technique.    COMPARISON: Radiographs 12/13/2014    FINDINGS: There is no fracture or subluxation. There is straightening  of lumbar lordosis. There is scoliosis convex to the right from  T12-L2. The distal spinal cord and conus medullaris appear normal. The  paraspinous soft tissues appear normal.    T12-L1: Moderate degenerative disc disease with vacuum phenomenon  anteriorly. Minimal impression on the thecal sac, otherwise normal.    L1-L2: Moderate degenerative disc disease, prominent vacuum phenomenon  anteriorly, with sclerosis in the vertebral endplates. Mild spinal  canal stenosis, mild degeneration left facet joint, mild left  foraminal stenosis, otherwise normal.    L2-L3: Severe degenerative disc disease with marked loss of disc  height. Circumferential disc bulge and vertebral osteophytes combined  with prominent osteophytes from the moderately degenerated facet  joints cause moderate spinal canal stenosis. Mild left foraminal  stenosis.    L3-L4: Mild degenerative disc disease with prominent vacuum phenomenon  anteriorly. Moderate degenerative facet arthropathy bilaterally. Mild  spinal canal stenosis, normal neural foramina.    L4-L5: Solid anterior and posterior spinal fusion with posterior  instrumentation. Spinal canal is obscured in large part by artifacts  but appears normal at the disc level.    L5-S1: Solid anterior and posterior spinal fusion. Spinal canal is  completely obscured by artifacts.      Impression    IMPRESSION:    1. No evidence of acute trauma.  2. Solid anterior and posterior spinal fusion L5-S1.  3. Multilevel  degenerative disc disease and degenerative facet  arthropathy from T12-L1 through L3-L4. This is slightly worse than in  2014.  4. Moderate spinal canal stenosis at L2-L3 and mild spinal canal  stenosis at L1-L2 and L3-L4.       OLIVIA CASTILLO MD   CT Abdomen Pelvis w Contrast    Narrative    Exam: CT abdomen and pelvis with contrast    Comparison: None    History: Abdominal pain, diverticulitis suspected    Technique: CT of the abdomen and pelvis was obtained with intravenous  contrast. Coronal and sagittal reconstructions were obtained and  reviewed.    Contrast dose: iopamidol (ISOVUE-370) solution 75 mL    Findings:    Abdomen/pelvis: No suspicious hepatic lesions. Mild intrahepatic  biliary ductal dilatation. Cholecystectomy. Normal spleen, pancreas,  adrenal glands, and kidneys. No obstructing urinary tract calculi. The  bladder is distended and unremarkable. Normal uterus. Normal caliber  of the small and large bowel. Moderate colonic stool burden. Scattered  colonic diverticula. No CT evidence of acute diverticulitis. Normal  caliber of the infrarenal aorta. 1.3 cm right external iliac chain  lymph node. No pathologically enlarged abdominal lymph nodes. No free  air or free fluid in the abdomen or pelvis.    Lower chest: Partially visualized 7 x 12 mm groundglass opacity in the  left lower lobe. Dependent atelectasis. No pleural effusion or  pneumothorax. Heart size is normal. No pericardial effusion.    Bones and soft tissues: Degenerative changes of the spine. Posterior  instrumented fusion at L4-S1 with interbody spacers. Small  fat-containing umbilical hernia.      Impression    Impression:   1. Scattered colonic diverticulosis without CT evidence of acute  diverticulitis.  2. 1.3 cm right external iliac chain lymph node, presumed reactive.  3. Partially visualized 7 x 12 mm groundglass opacity in the left  lower lobe. Recommend correlation with prior outside imaging noted in  Care Everywhere from  1/9/2018.    I have personally reviewed the examination and initial interpretation  and I agree with the findings.    ISRAEL GASPAR MD       Pending Results   These results will be followed up by PCP  Unresulted Labs Ordered in the Past 30 Days of this Admission     Date and Time Order Name Status Description    1/23/2020 0647 Enteric Bacteria and Virus Panel by [facility] Stool In process              Primary Care Physician   Sarahi Vivar    Discharge Disposition   Discharged to home  Condition at discharge: Stable    Discharge Orders      Reason for your hospital stay    Internal hemorrhoids     Adult [facility] Follow-up and recommended labs and tests    Follow up with primary care provider, Sarahi Vivar, within 7 days for hospital follow- up. Can discuss with them or gastroenterology management of internal hemorrhoids      Appointments on [facility] and/or [facility] (with [facility] or [facility] provider or service). Call [phone] if you haven't heard regarding these appointments within 7 days of discharge.     Activity    Your activity upon discharge: activity as tolerated     DNR/DNI     Diet    Follow this diet upon discharge: Orders Placed This Encounter      Regular Diet Adult     Discharge Medications   Discharge Medication List as of 1/23/2020  3:26 PM      CONTINUE these medications which have NOT CHANGED    Details   carboxymethylcellulose (CARBOXYMETHYLCELLULOSE SODIUM) 0.5 % SOLN ophthalmic solution Place 1 drop into both eyes 2 times daily, Disp-1 Bottle, Historical      cyanocobalamin 1000 MCG/ML injection Inject 1 mL (1,000 mcg) Subcutaneous every 7 days, Disp-4 mL, R-5, E-Prescribe      cyclobenzaprine (FLEXERIL) 10 MG tablet Take 1 tablet (10 mg) by mouth 3 times daily, Disp-90 tablet, R-3, E-Prescribe      cycloSPORINE (RESTASIS) 0.05 % ophthalmic emulsion Place 1 drop into both eyes 2 times daily, Historical      fexofenadine (ALLEGRA) 180 MG tablet Take 180 mg by mouth daily., Historical       gabapentin (NEURONTIN) 300 MG capsule Take 300 mg by mouth 2 times daily , Historical      gabapentin (NEURONTIN) 800 MG tablet Take 800 mg by mouth 3 times daily, Historical      HYDROmorphone (DILAUDID) 8 MG tablet Take 8 mg by mouth every 6 hours as needed for severe pain . Max of 3 doses per day., Historical      levothyroxine (SYNTHROID/LEVOTHROID) 100 MCG SOLR injection Inject 200 mcg into the vein twice a week , Historical      levothyroxine (SYNTHROID/LEVOTHROID) 300 MCG tablet Take 300 mcg by mouth 2 times daily , Historical      lifitegrast (XIIDRA) 5 % opthalmic solution Place 1 drop into both eyes 2 times daily, Historical      liothyronine (CYTOMEL) 25 MCG tablet Take 50 mcg by mouth 2 times daily , Historical      metoprolol succinate ER (TOPROL-XL) 50 MG 24 hr tablet Take 50 mg by mouth daily, Historical      !! morphine (MS CONTIN) 30 MG 12 hr tablet Take 30 mg by mouth 2 times daily along with one morphine 60 mg 12 hr tablet for a total dose of 90 mg., Disp-270 tablet, R-0, Historical      !! morphine (MS CONTIN) 60 MG 12 hr tablet Take 60 mg by mouth 3 times daily , Disp-30 tablet, R-0, Historical      prednisoLONE acetate (PRED FORTE) 1 % ophthalmic susp 1 drop 4 times daily, Historical      !! predniSONE (DELTASONE) 1 MG tablet Use along with one prednisone 5 mg tablets to alternate taking 8mg and 10mg every other day., R-2, Historical      !! predniSONE (DELTASONE) 5 MG tablet Alternate taking 8mg and 10mg every other day, Historical      probiotic CAPS Take 1 capsule by mouth 2 times daily, Historical      rivaroxaban ANTICOAGULANT (XARELTO) 20 MG TABS tablet Take 20 mg by mouth every morning , Historical      vitamin D2 (ERGOCALCIFEROL) 82397 units (1250 mcg) capsule Take 50,000 Units by mouth once a week, Historical      zolpidem (AMBIEN) 5 MG tablet Take 5 mg by mouth nightly as needed for sleep, Historical      BIOTIN FORTE PO Take 1 tablet by mouth daily , Historical      CEPHALEXIN PO  Take 500 mg by mouth as needed (Dental Procedure) Take 4 caps 1 hour prior to Dental Appointment, Historical      multivitamin, therapeutic with minerals (MULTI-VITAMIN) TABS tablet Take 1 tablet by mouth 2 times daily, Disp-100 tablet, R-3, Historical      NASONEX 50 MCG/ACT spray Spray 1 spray into both nostrils daily as needed , R-11, ANT, Historical      nystatin (MYCOSTATIN) 080176 UNIT/GM POWD Apply topically 3 times daily as neededDisp-60 g, [id number]       !! - Potential duplicate medications found. Please discuss with provider.        Allergies   Allergies   Allergen Reactions     Chlorhexidine Hives     Thor surgical cleanser     Codeine Hives     Ibuprofen [Nsaids] Hives     Penicillins Hives     Other reaction(s): Unknown     Sulfa Drugs Hives     Other reaction(s): Unknown     Doxycycline GI Disturbance     Emesis & diarrhea  Patient denies allergy to this med     Mold      Mushroom      Red Wine Complex [Red Wine Powder]

## 2020-01-23 NOTE — PLAN OF CARE
5A    Discharge Planner PT   Patient plan for discharge: Home  Current status: Pt admitted on OBS for rectal bleeding. Pt did have fall in parking lot slipping on ice. Pt very upset care team making an ordeal about the fall and did imaging. She has no complaints, just small abrasion, pre-existing R knee impairments from past procedure. Reports has been IND in room, declines gait/balance issues and PT assessment.   Barriers to return to prior living situation: No PT barriers  Recommendations for discharge: Home  Rationale for recommendations: Pt declining PT needs and IND in room. Does not want any further workup regarding fall/knee. Will complete orders.       Entered by: Jace Fuller 01/23/2020 9:15 AM

## 2020-01-23 NOTE — PLAN OF CARE
Time: [id number]    Reason for admission: GI bleed  Vitals: VSS on RA  Activity:  SBA, gait steady  Pain: C/o generalized pain, declines medication.   Neuro:  A&Ox4, speech clear, calls appropriately  Cardiac: Tachycardia resolved, HR 88  Respiratory: WDL  GI/:  Loose stools with bright red blood. Voids without difficulty. Abd soft/nontender. BS normoactive.   Diet:  NPO except ice chips since midnight  Lines:  R PIV SL, 1 L LR bolus given twice.   Skin/Wounds: Pt declined 4 eyes assessment. Visible skin WDL.  Labs/imaging:  Knee XR unremarkable. Hgb trending down, 10.3.      New changes this shift: Pt resting between cares. Admission completed. [facility] team assessed. Enteric panel ordered and enteric isolation initiated. Pt sent down for abd CT. Pt Worship, declining blood transfusion.     Plan: Need stool sample, tried to obtain overnight but pt forgetful to save sample. Continue to monitor bloody stools     Observation goals:     Diagnostic tests and consults completed and resulted. Not met. Need stool sample for enteric rule out. AM labs and Abd CT planned for this morning 1/23.     Vital signs normal or at patient baseline. Not met. Elevated HR upper 90s.     Safe disposition plan has been identified. Not met. Pt states planning on discharging back home and  will assist with cares.

## 2020-01-23 NOTE — H&P
[facility] History and Physical - [facility]       Date of Admission:  1/22/2020    Chief Complaint   Bright Red Blood Per Rectum    History is obtained from the patient    History of Present Illness   Aspen Mccullough is a 59 year old female  with a history of bilateral knee arthroplasty, s/p L4-5 laminectomy, s/p cervical spine fusion, fibromyalgia, systemic lupus erythematosus, Hashimoto's thyroiditis, hypothyroidism, DVT/PE 2017 (anticoagulated on Xarelto) due PAF with RVR, nonischemic cardiomyopathy EF 40% who is admitted for bright red blood per rectum for 1 week.    Pt states that she noticed blood per rectum last Thursday. Pt uses a commode lined with a bad and said it was darker and clotty. Reminded her of period blood. Pt denies and rectal pain. Pt states she does have some mild abdominal cramping. No constipation. Long hx of diarrhea. Fevers last week, but nothing recently. No nausea or vomiting. No SOB or CP. No palpitations. No new fatigue. No leg pain or swelling. Pt states she's had a few abdominal surgeries: cholecystectomy and laps for endometriosis. No FMHx of colon cancer. No hx of abnormal bleeding. Pt states her colonoscopy in 2/2018 was unremarkable.    Pt fell on her way to the ED she did not have LOC. She hurt her knee and back. X ray of knee and CT of back showed nothing acutely.     ED:  CBC showed hgb 11.5    Medications   pantoprazole (PROTONIX) 40 mg IV push injection (40 mg Intravenous Given 1/22/20 1534)   acetaminophen (TYLENOL) tablet 650 mg (has no administration in time range)   naloxone (NARCAN) injection 0.1-0.4 mg (has no administration in time range)   melatonin tablet 1 mg (has no administration in time range)   ondansetron (ZOFRAN-ODT) ODT tab 4 mg (has no administration in time range)     Or   ondansetron (ZOFRAN) injection 4 mg (has no administration in time range)   lactated ringers BOLUS 1,000 mL (has no  administration in time range)   cyclobenzaprine (FLEXERIL) tablet 10 mg (has no administration in time range)   gabapentin (NEURONTIN) tablet 800 mg (has no administration in time range)   gabapentin (NEURONTIN) capsule 300 mg (has no administration in time range)   HYDROmorphone (DILAUDID) tablet 8 mg (has no administration in time range)   levothyroxine (SYNTHROID/LEVOTHROID) injection 200 mcg (has no administration in time range)   levothyroxine (SYNTHROID/LEVOTHROID) tablet 300 mcg (has no administration in time range)   liothyronine (CYTOMEL) tablet 50 mcg (has no administration in time range)   metoprolol succinate ER (TOPROL-XL) 24 hr tablet 50 mg (has no administration in time range)   morphine (MS CONTIN) 12 hr tablet 30 mg (has no administration in time range)   morphine (MS CONTIN) 12 hr tablet 60 mg (has no administration in time range)   prednisoLONE acetate (PRED FORTE) 1 % ophthalmic susp 1 drop (has no administration in time range)   predniSONE (DELTASONE) tablet 8 mg (has no administration in time range)   predniSONE (DELTASONE) tablet 10 mg (has no administration in time range)   cyclobenzaprine (FLEXERIL) tablet 10 mg (10 mg Oral Given 1/22/20 2033)   HYDROmorphone (DILAUDID) tablet 8 mg (8 mg Oral Given 1/22/20 2032)   gabapentin (NEURONTIN) capsule 800 mg (800 mg Oral Given 1/22/20 2033)   gabapentin (NEURONTIN) capsule 800 mg (800 mg Oral Given 1/22/20 2210)   lactated ringers BOLUS 1,000 mL (1,000 mLs Intravenous New Bag 1/23/20 0056)       Review of Systems   The 10 point Review of Systems is negative other than noted in the HPI or here.     Past Medical History    I have reviewed this patient's medical history and updated it with pertinent information if needed.   Past Medical History:   Diagnosis Date     ADHD (attention deficit hyperactivity disorder)      Allergic rhinitis      Chronic low back pain      Cushing's syndrome (H)      DDD (degenerative disc disease)      DDD (degenerative  disc disease)      Deviated nasal septum      Diarrhea      Endometriosis      Fibromyalgia      Hashimoto's disease      Hyperlipidemia      Hypothyroid     hx hashimoto's thyroiditis     Insomnia      Lupus (H)      Malabsorption      Pernicious anemia      Renal disease     chronic renal insufficiency     Renal disease     hx renal failure     Sinusitis    Hx of Paroxymal A. Fib with RVR     Past Surgical History   I have reviewed this patient's surgical history and updated it with pertinent information if needed.  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)     APPENDECTOMY       ARTHRODESIS ANKLE      right     ARTHROPLASTY KNEE BILATERAL       ARTHROPLASTY REVISION KNEE  4/19/2011    Procedure:ARTHROPLASTY REVISION KNEE; With Antibiotic Cement ; Surgeon:CHAO OLIVARES; Location:[facility]     BREAST SURGERY      right- tissue remove nipple area     BUNIONECTOMY  12/14/2011    Procedure:BUNIONECTOMY; Right Bunion Correction; Surgeon:GRACE ZARATE; Location:[facility]     C STOMACH SURGERY PROCEDURE UNLISTED      see list which we will bring     CHOLECYSTECTOMY       EXCISE MASS UPPER EXTREMITY  12/14/2011    Procedure:EXCISE MASS UPPER EXTREMITY; Excision of Left Arm Mass; Surgeon:GRACE ZARATE; Location:[facility]     FOOT SURGERY      left X 4     FUSION LUMBAR ANTERIOR ONE LEVEL       HC SACROPLASTY      see list which we will bring     INJECT NERVE BLOCK SUPRASCAPULAR Left 5/18/2018    Procedure: INJECT NERVE BLOCK SUPRASCAPULAR;  Left Suprascapular Nerve Block;  Surgeon: Basim Velazquez MD;  Location: [facility]     INJECT NERVE BLOCK SUPRASCAPULAR Right 7/23/2018    Procedure: INJECT NERVE BLOCK SUPRASCAPULAR;  Right suprascapular injection;  Surgeon: Basim Velazquez MD;  Location: [facility]     INJECT NERVE BLOCK SUPRASCAPULAR Bilateral 10/29/2018    Procedure: Bilateral Suprascapular Nerve Blocks;  Surgeon: Caroline  MD Basim;  Location: [facility]     INJECT NERVE BLOCK SUPRASCAPULAR Bilateral 3/15/2019    Procedure: Bilateral Suprascapular Nerve Block;  Surgeon: Basim Velazquez MD;  Location: [facility]     INJECT NERVE BLOCK SUPRASCAPULAR Bilateral 2019    Procedure: bilateral suprascapular nerve block;  Surgeon: Basim Velazquez MD;  Location: [facility]     KNEE SURGERY      see list which we will bring     LAMINECTOMY LUMBAR ONE LEVEL      L4-5     LAPAROSCOPIC ABLATION ENDOMETRIOSIS       RADIO FREQUENCY ABLATION PULSED CERVICAL Bilateral 7/10/2019    Procedure: Bilateral Suprascapular Nerve Pulsed Radiofrequency Ablation;  Surgeon: Basim Velazquez MD;  Location:  OR     REMOVE HARDWARE FOOT  2011    Procedure:REMOVE HARDWARE FOOT; Hardware Removal Right Foot (Mini-C-Arm) ; Surgeon:GRACE ZARATE; Location:[facility]     RHINOPLASTY       SALPINGO-OOPHORECTOMY BILATERAL       TONSILLECTOMY         Social History   Social History     Tobacco Use     Smoking status: Former Smoker     Packs/day: 1.50     Years: 20.00     Pack years: 30.00     Types: Cigarettes     Start date: 1973     Last attempt to quit: 1993     Years since quittin.0     Smokeless tobacco: Never Used   Substance Use Topics     Alcohol use: No     Alcohol/week: 0.0 standard drinks     Drug use: No       Family History   I have reviewed this patient's family history and updated it with pertinent information if needed.   Family History   Problem Relation Age of Onset     Hypertension Mother        Prior to Admission Medications   Prior to Admission Medications   Prescriptions Last Dose Informant Patient Reported? Taking?   BIOTIN FORTE PO More than a month Self Yes No   Sig: Take 1 tablet by mouth daily    CEPHALEXIN PO DENTAL PROCEDURES Self Yes No   Sig: Take 500 mg by mouth as needed (Dental Procedure) Take 4 caps 1 hour prior to Dental Appointment   HYDROmorphone (DILAUDID) 8 MG tablet 2020 at AM Pharmacy Yes Yes   Sig:  Take 8 mg by mouth every 6 hours as needed for severe pain . Max of 3 doses per day.   NASONEX 50 MCG/ACT spray More than a month at PRN Self Yes No   Sig: Spray 1 spray into both nostrils daily as needed    carboxymethylcellulose (CARBOXYMETHYLCELLULOSE SODIUM) 0.5 % SOLN ophthalmic solution 1/21/2020 at PM Self Yes Yes   Sig: Place 1 drop into both eyes 2 times daily   cyanocobalamin 1000 MCG/ML injection 1/21/2020 at AM Pharmacy No Yes   Sig: Inject 1 mL (1,000 mcg) Subcutaneous every 7 days   cycloSPORINE (RESTASIS) 0.05 % ophthalmic emulsion 1/21/2020 at PM Pharmacy Yes Yes   Sig: Place 1 drop into both eyes 2 times daily   cyclobenzaprine (FLEXERIL) 10 MG tablet 1/21/2020 at AM Pharmacy No Yes   Sig: Take 1 tablet (10 mg) by mouth 3 times daily   fexofenadine (ALLEGRA) 180 MG tablet 1/21/2020 at AM Self Yes Yes   Sig: Take 180 mg by mouth daily.   gabapentin (NEURONTIN) 300 MG capsule 1/21/2020 at AM Pharmacy Yes Yes   Sig: Take 300 mg by mouth 2 times daily    gabapentin (NEURONTIN) 800 MG tablet 1/21/2020 at AM Pharmacy Yes Yes   Sig: Take 800 mg by mouth 3 times daily   levothyroxine (SYNTHROID/LEVOTHROID) 100 MCG SOLR injection 1/20/2020 at AM Pharmacy Yes Yes   Sig: Inject 200 mcg into the vein twice a week    levothyroxine (SYNTHROID/LEVOTHROID) 300 MCG tablet 1/21/2020 at PM Pharmacy Yes Yes   Sig: Take 300 mcg by mouth 2 times daily    lifitegrast (XIIDRA) 5 % opthalmic solution 1/21/2020 at PM Pharmacy Yes Yes   Sig: Place 1 drop into both eyes 2 times daily   liothyronine (CYTOMEL) 25 MCG tablet 1/21/2020 at PM Pharmacy Yes Yes   Sig: Take 50 mcg by mouth 2 times daily    metoprolol succinate ER (TOPROL-XL) 50 MG 24 hr tablet 1/21/2020 at AM Pharmacy Yes Yes   Sig: Take 50 mg by mouth daily   morphine (MS CONTIN) 30 MG 12 hr tablet 1/21/2020 at PM Pharmacy Yes Yes   Sig: Take 30 mg by mouth 2 times daily along with one morphine 60 mg 12 hr tablet for a total dose of 90 mg.   morphine (MS CONTIN) 60  MG 12 hr tablet 2020 at PM Pharmacy Yes Yes   Sig: Take 60 mg by mouth 3 times daily    multivitamin, therapeutic with minerals (MULTI-VITAMIN) TABS tablet More than a month at AM Self Yes No   Sig: Take 1 tablet by mouth 2 times daily   nystatin (MYCOSTATIN) 348904 UNIT/GM POWD More than a month at PRN Self No No   Sig: Apply topically 3 times daily as needed   predniSONE (DELTASONE) 1 MG tablet 2020 at AM Pharmacy Yes Yes   Sig: Use along with one prednisone 5 mg tablets to alternate taking 8mg and 10mg every other day.   predniSONE (DELTASONE) 5 MG tablet 2020 at AM Pharmacy Yes Yes   Sig: Alternate taking 8mg and 10mg every other day   prednisoLONE acetate (PRED FORTE) 1 % ophthalmic susp 2020 at PM Pharmacy Yes Yes   Si drop 4 times daily   probiotic CAPS Past Month at AM Self Yes Yes   Sig: Take 1 capsule by mouth 2 times daily   rivaroxaban ANTICOAGULANT (XARELTO) 20 MG TABS tablet 2020 at AM Pharmacy Yes Yes   Sig: Take 20 mg by mouth every morning    vitamin D2 (ERGOCALCIFEROL) 00072 units (1250 mcg) capsule 2020 at AM Pharmacy Yes Yes   Sig: Take 50,000 Units by mouth once a week   zolpidem (AMBIEN) 5 MG tablet Past Week at PRN Pharmacy Yes Yes   Sig: Take 5 mg by mouth nightly as needed for sleep      Facility-Administered Medications Last Administration Doses Remaining   lidocaine 1% with EPINEPHrine 1:100,000 injection 3 mL None recorded 1        Allergies   Allergies   Allergen Reactions     Chlorhexidine Hives     Thor surgical cleanser     Codeine Hives     Ibuprofen [Nsaids] Hives     Penicillins Hives     Other reaction(s): Unknown     Sulfa Drugs Hives     Other reaction(s): Unknown     Doxycycline GI Disturbance     Emesis & diarrhea  Patient denies allergy to this med     Lactose      Other reaction(s): Unknown     Mold      Mushroom      Red Wine Complex [Red Wine Powder]        Physical Exam   Vital Signs: Temp: 98.8  F (37.1  C) Temp src: Oral BP: 136/73  Pulse: 99   Resp: 18 SpO2: 96 % O2 Device: None (Room air)    Weight: 275 lbs 8 oz    General Appearance: Alert and oriented, no distress  Eyes: EOM intact, ARTURO, no scleral icterus  HEENT: Normocephalic, atraumatic    Respiratory: No acute distress, regular effort, breath sounds clear throughout  Cardiovascular: Tachycardia, regular rhythm, HS dual, nil murmur, pulses intact  GI: Soft, non distended, LLQ tenderness to palpation, no guarding, no rebound, no hepatosplenomegaly  Genitourinary: no blood on finger, no hemorrhoids, normal tone   Skin: warm and dry  Musculoskeletal: no calf tenderness, no peripheral edema     Assessment & Plan   Aspen Mccullough is a 59 year old female  with a history of bilateral knee arthroplasty, s/p L4-5 laminectomy, s/p cervical spine fusion, fibromyalgia, systemic lupus erythematosus, Hashimoto's thyroiditis, hypothyroidism, DVT/PE 2017 (anticoagulated on Xarelto) PAF with RVR, nonischemic cardiomyopathy EF 40%, malabsorption history who is admitted for bright red blood per rectum for 1 week.    # GI bleed  Pt GI Bleed is likely from angiodysplasia with her Xarelto exacerbating her bleeding.  Another likely cause is a diverticular bleed given her age, LLQ tenderness, and cramping, but no imaging done to support this. It is possible to be due to an infection, but less likely given no fever or increase in WBC. Less likely to be due to mesenteric ischemia given pain is not out of preporation to exam, on anticoagulants and no hypotension.   - GI Consult placed  - NPO at midnight  - CBC now and AM  - CMP ordered AM  - Lactic acid ordered (Ischemia)  - Enteric panel ordered (infectious)  - CT abd/pelv ordered (diverticulosis)  - Avoid NSAIDs    # HTN  # A. Fib with RVR  # Hx of DVT/PE  - Continue PTA Metoprolol   - Hold PTA Xarelto given bleeding  - EKG ordered given A. Fib history    # Lupus  - Continue PTA prednisone     # Chronic Low Back Pain  # Fibromyalgia  # Degenerative Disc  Disease  # Hx of Bilateral Knee Arthroplasty   - Continue PTA flexeril  - Continue PTA Gabapentin  - Continue PTA Dilaudid PO PRN  - Continue PTA morphine PO  - PT consult ordered     #Hypothyroid  - Continue PTA levothyroxine  - Continue PTA Cytomel    # Allergic rhinitis   # Sinusitis   - Hold PTA allegra  - Hold PTA Nasonex    # Insomnia   - Hold PTA Ambien    # Malabsorption   - Hold home vitamins (pt on obs, out of pocket)    # Pain Assessment:  Current Pain Score 12/31/2019   Patient currently in pain? yes   Pain score (0-10) 7   Pain location -   Pain descriptors -   - Aspen is experiencing pain due to chronic low back pain and fibromyalgia. Pain management was discussed and the plan was created in a collaborative fashion.  Aspen's response to the current recommendations: compliant  - Please see the plan for pain management as documented above        Diet: NPO for Medical/Clinical Reasons Except for: Ice Chips, Meds  Fluids: 1 L bolus of LR  DVT Prophylaxis: Pneumatic Compression Devices and holding anticoagulants due to GI Bleed  Code Status: DNR / DNI    Disposition Plan   Expected discharge: Tomorrow; recommended to prior living arrangement once hemoglobin stable and safe disposition plan/ TCU bed available.Dispo:     1/24/2020     Entered: Elsy Pardo 01/23/2020, 12:31 AM   Information in the above section will display in the discharge planner report.    The patient was discussed with Dr. [facility]  PGY 1  [facility]   Pager: [phone]  Please see sticky note for cross cover information      Data   Recent Labs   Lab 01/22/20  1431   WBC 8.2   HGB 11.5*   MCV 89      INR 1.63*      POTASSIUM 4.2   CHLORIDE 102   CO2 31   BUN 38*   CR 0.89   ANIONGAP 5   KYLIE 8.6   GLC 96   ALBUMIN 3.7   PROTTOTAL 7.7   BILITOTAL 0.4   ALKPHOS 123   ALT 39   AST 29     Most Recent 3 INR's:  Recent Labs   Lab Test 01/22/20  1431 01/22/15  1638  05/06/14  2040   INR 1.63* 1.08 1.05     Most Recent TSH and T4:  Recent Labs   Lab Test 12/09/19  0906   TSH 0.28*   T4 1.44     Most Recent ESR & CRP:  Recent Labs   Lab Test 03/28/18  1358   SED 13   CRP 10.0*     Recent Results (from the past 24 hour(s))   XR Knee Right 3 Views    Narrative    RIGHT KNEE THREE VIEWS   1/22/2020 3:02 PM    HISTORY: Pain after falling.    COMPARISON: 4/2/2018      Impression    IMPRESSION: Again seen are surgical changes of a total knee  arthroplasty. The hardware is intact and unremarkable. No fracture or  acute abnormality is seen. I see no definite change since the previous  examination.     MATTHEW TURPIN MD   Lumbar spine CT w/o contrast    Narrative    CT LUMBAR SPINE WITHOUT CONTRAST  1/22/2020 4:41 PM     HISTORY: Back pain after fall, prior surgery.     TECHNIQUE: Axial images of the lumbar spine were obtained without  intravenous contrast. Multiplanar reformations were performed.  Radiation dose for this scan was reduced using automated exposure  control, adjustment of the mA and/or kV according to patient size, or  iterative reconstruction technique.    COMPARISON: Radiographs 12/13/2014    FINDINGS: There is no fracture or subluxation. There is straightening  of lumbar lordosis. There is scoliosis convex to the right from  T12-L2. The distal spinal cord and conus medullaris appear normal. The  paraspinous soft tissues appear normal.    T12-L1: Moderate degenerative disc disease with vacuum phenomenon  anteriorly. Minimal impression on the thecal sac, otherwise normal.    L1-L2: Moderate degenerative disc disease, prominent vacuum phenomenon  anteriorly, with sclerosis in the vertebral endplates. Mild spinal  canal stenosis, mild degeneration left facet joint, mild left  foraminal stenosis, otherwise normal.    L2-L3: Severe degenerative disc disease with marked loss of disc  height. Circumferential disc bulge and vertebral osteophytes combined  with prominent  osteophytes from the moderately degenerated facet  joints cause moderate spinal canal stenosis. Mild left foraminal  stenosis.    L3-L4: Mild degenerative disc disease with prominent vacuum phenomenon  anteriorly. Moderate degenerative facet arthropathy bilaterally. Mild  spinal canal stenosis, normal neural foramina.    L4-L5: Solid anterior and posterior spinal fusion with posterior  instrumentation. Spinal canal is obscured in large part by artifacts  but appears normal at the disc level.    L5-S1: Solid anterior and posterior spinal fusion. Spinal canal is  completely obscured by artifacts.      Impression    IMPRESSION:    1. No evidence of acute trauma.  2. Solid anterior and posterior spinal fusion L5-S1.  3. Multilevel degenerative disc disease and degenerative facet  arthropathy from T12-L1 through L3-L4. This is slightly worse than in  2014.  4. Moderate spinal canal stenosis at L2-L3 and mild spinal canal  stenosis at L1-L2 and L3-L4.       OLIVIA CASTILLO MD

## 2020-01-23 NOTE — CONSULTS
GASTROENTEROLOGY CONSULTATION    Date: 01/23/2020  Date of Admission: 1/22/2020  Reason for Consultation:hematochezia  Requested by:   Elsy Pardo MD  Brief Summary:   We were asked by Elsy Pardo MD to evaluate this patient with hematochezia    ASSESSMENT AND RECOMMENDATIONS:   Assessment:  59 year old female with a history of fibromyalgia, SLE, paroxysmal A. 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    Her last colonoscopy was in Feb 2018 showing diverticulosis in entire colon without anyother obvious pathology, CT abdomen done upon admission negative for diverticulitis. She has normocytic anemia with 2 point drop in Hgb from baseline, hemodynamically stable. Pt is Rastafarian. She reported hematochezia but no nursing records over night available, not able to collect bloody BM to r/o infectious etiology. We need to have nursing record of quantification of BRBPR. If indeed it is present, the differentials will include diverticular bleed, hemorrhoidal bleed, colonic AVM, less likely to be ischemia given normal CT abd.    Bedside anoscopy was performed by primary team which showed internal hemorrhoids.     Recommendations  - Recommended increasing fiber in diet and taking fiber supplements  - Recommended laxatives to avoid constipation  - If intermittent bleeding continues or she has worsening in her symptoms, recommend outpatient colonoscopy for evaluation.    Gastroenterology outpatient follow up recommendations: pending clinical course    Thank you for involving us in this patient's care. Please do not hesitate to contact the GI service with any questions or concerns.     Pt care plan discussed with Dr. Anderson, GI staff physician.    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    Pt stated that she had hematochezia for 10 days, dark red color blood mixed with stool, prior to this episode she had constipation but all her life she had diarrhea, unable to quantify the bleeding and how many episodes each day. Not able to tell her normal bowel habits as stated she was not recording it for us.    She was undergoing a hard time due to her being NPO and only taking ice chips.    Declined any further questions.            Past Medical History:   Reviewed and edited as appropriate  Past Medical History:   Diagnosis Date     ADHD (attention deficit hyperactivity disorder)      Allergic rhinitis      Chronic low back pain      Cushing's syndrome (H)      DDD (degenerative disc disease)      DDD (degenerative disc disease)      Deviated nasal septum      Diarrhea      Endometriosis      Fibromyalgia      Hashimoto's disease      Hyperlipidemia      Hypothyroid     hx hashimoto's thyroiditis     Insomnia      Lupus (H)      Malabsorption      Pernicious anemia      Renal disease     chronic renal insufficiency     Renal disease     hx renal failure     Sinusitis             Past Surgical History:   Reviewed and edited as appropriate   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)     APPENDECTOMY       ARTHRODESIS ANKLE      right     ARTHROPLASTY KNEE BILATERAL       ARTHROPLASTY REVISION KNEE  4/19/2011    Procedure:ARTHROPLASTY REVISION KNEE; With Antibiotic Cement ; Surgeon:CHAO OLIVARES; Location:[facility]     BREAST SURGERY      right- tissue remove nipple area     BUNIONECTOMY  12/14/2011    Procedure:BUNIONECTOMY; Right Bunion Correction; Surgeon:GRACE ZARATE; Location:[facility] STOMACH SURGERY  PROCEDURE UNLISTED      see list which we will bring     CHOLECYSTECTOMY       EXCISE MASS UPPER EXTREMITY  12/14/2011    Procedure:EXCISE MASS UPPER EXTREMITY; Excision of Left Arm Mass; Surgeon:GRACE ZARATE; Location: [facility]     FOOT SURGERY      left X 4     FUSION LUMBAR ANTERIOR ONE LEVEL       HC SACROPLASTY      see list which we will bring     INJECT NERVE BLOCK SUPRASCAPULAR Left 5/18/2018    Procedure: INJECT NERVE BLOCK SUPRASCAPULAR;  Left Suprascapular Nerve Block;  Surgeon: Basim Velazquez MD;  Location: [facility]     INJECT NERVE BLOCK SUPRASCAPULAR Right 7/23/2018    Procedure: INJECT NERVE BLOCK SUPRASCAPULAR;  Right suprascapular injection;  Surgeon: Basim Velazquez MD;  Location: [facility]     INJECT NERVE BLOCK SUPRASCAPULAR Bilateral 10/29/2018    Procedure: Bilateral Suprascapular Nerve Blocks;  Surgeon: Basim Velazquez MD;  Location: [facility]     INJECT NERVE BLOCK SUPRASCAPULAR Bilateral 3/15/2019    Procedure: Bilateral Suprascapular Nerve Block;  Surgeon: Basim Velazquez MD;  Location: [facility]     INJECT NERVE BLOCK SUPRASCAPULAR Bilateral 12/31/2019    Procedure: bilateral suprascapular nerve block;  Surgeon: Basim Velazquez MD;  Location: [facility]     KNEE SURGERY      see list which we will bring     LAMINECTOMY LUMBAR ONE LEVEL      L4-5     LAPAROSCOPIC ABLATION ENDOMETRIOSIS       RADIO FREQUENCY ABLATION PULSED CERVICAL Bilateral 7/10/2019    Procedure: Bilateral Suprascapular Nerve Pulsed Radiofrequency Ablation;  Surgeon: Basim Velazquez MD;  Location: [facility]     REMOVE HARDWARE FOOT  12/14/2011    Procedure:REMOVE HARDWARE FOOT; Hardware Removal Right Foot (Mini-C-Arm) ; Surgeon:GRACE ZARATE; Location:[facility]     RHINOPLASTY       SALPINGO-OOPHORECTOMY BILATERAL       TONSILLECTOMY              Previous Endoscopy:   No results found for this or any previous visit.         Social History:   Reviewed and edited as appropriate  Social History     Socioeconomic  History     Marital status:      Spouse name: Not on file     Number of children: Not on file     Years of education: Not on file     Highest education level: Not on file   Occupational History     Not on file   Social Needs     Financial resource strain: Not on file     Food insecurity:     Worry: Not on file     Inability: Not on file     Transportation needs:     Medical: Not on file     Non-medical: Not on file   Tobacco Use     Smoking status: Former Smoker     Packs/day: 1.50     Years: 20.00     Pack years: 30.00     Types: Cigarettes     Start date: 1973     Last attempt to quit: 1993     Years since quittin.0     Smokeless tobacco: Never Used   Substance and Sexual Activity     Alcohol use: No     Alcohol/week: 0.0 standard drinks     Drug use: No     Sexual activity: Not Currently     Partners: Male     Birth control/protection: Post-menopausal   Lifestyle     Physical activity:     Days per week: Not on file     Minutes per session: Not on file     Stress: Not on file   Relationships     Social connections:     Talks on phone: Not on file     Gets together: Not on file     Attends [employer] service: Not on file     Active member of club or organization: Not on file     Attends meetings of clubs or organizations: Not on file     Relationship status: Not on file     Intimate partner violence:     Fear of current or ex partner: Not on file     Emotionally abused: Not on file     Physically abused: Not on file     Forced sexual activity: Not on file   Other Topics Concern     Parent/sibling w/ CABG, MI or angioplasty before 65F 55M? Not Asked   Social History Narrative    ** Merged History Encounter **                 Family History:   Reviewed and edited as appropriate  Family History   Problem Relation Age of Onset     Hypertension Mother             Allergies:   Reviewed and edited as appropriate     Allergies   Allergen Reactions     Chlorhexidine Hives     Thor surgical cleanser      Codeine Hives     Ibuprofen [Nsaids] Hives     Penicillins Hives     Other reaction(s): Unknown     Sulfa Drugs Hives     Other reaction(s): Unknown     Doxycycline GI Disturbance     Emesis & diarrhea  Patient denies allergy to this med     Lactose      Other reaction(s): Unknown     Mold      Mushroom      Red Wine Complex [Red Wine Powder]             Medications:     Current Facility-Administered Medications   Medication     acetaminophen (TYLENOL) tablet 650 mg     cyclobenzaprine (FLEXERIL) tablet 10 mg     gabapentin (NEURONTIN) capsule 300 mg     gabapentin (NEURONTIN) tablet 800 mg     HYDROmorphone (DILAUDID) tablet 8 mg     [START ON 1/24/2020] levothyroxine (SYNTHROID/LEVOTHROID) injection 200 mcg     levothyroxine (SYNTHROID/LEVOTHROID) tablet 300 mcg     liothyronine (CYTOMEL) tablet 50 mcg     melatonin tablet 1 mg     metoprolol succinate ER (TOPROL-XL) 24 hr tablet 50 mg     morphine (MS CONTIN) 12 hr tablet 30 mg     morphine (MS CONTIN) 12 hr tablet 60 mg     naloxone (NARCAN) injection 0.1-0.4 mg     ondansetron (ZOFRAN-ODT) ODT tab 4 mg    Or     ondansetron (ZOFRAN) injection 4 mg     pantoprazole (PROTONIX) 40 mg IV push injection     prednisoLONE acetate (PRED FORTE) 1 % ophthalmic susp 1 drop     predniSONE (DELTASONE) tablet 10 mg     predniSONE (DELTASONE) tablet 8 mg             Review of Systems:     A complete 10 point review of systems was performed and is negative except as noted in the HPI           Physical Exam:   /62 (BP Location: Left arm)   Pulse 88   Temp 97.3  F (36.3  C) (Axillary)   Resp 16   Wt 125 kg (275 lb 8 oz)   SpO2 96%   Breastfeeding No   BMI 40.68 kg/m    Wt:   Wt Readings from Last 2 Encounters:   01/22/20 125 kg (275 lb 8 oz)   12/31/19 116.1 kg (256 lb)      Constitutional: cooperative, pleasant  Eyes: Sclera anicteric  Ears/nose/mouth/throat: mucus membranes moist  Neck: supple  CV: No edema  Respiratory: Unlabored breathing  Abd: Nondistended,  +bs, nontender  Skin: warm, perfused  Neuro: AAO x 3  Psych: Normal affect  MSK: No gross deformities         Data:   Labs and imaging below were independently reviewed and interpreted    BMP  Recent Labs   Lab 01/23/20  0620 01/22/20  1431    138   POTASSIUM 3.5 4.2   CHLORIDE 110* 102   KYLIE 8.4* 8.6   CO2 28 31   BUN 20 38*   CR 0.64 0.89   * 96     CBC  Recent Labs   Lab 01/23/20  0620 01/23/20  0114 01/22/20  1431   WBC 5.3 6.8 8.2   RBC 3.68* 3.82 4.00   HGB 10.3* 10.6* 11.5*   HCT 32.6* 33.8* 35.5   MCV 89 89 89   MCH 28.0 27.7 28.8   MCHC 31.6 31.4* 32.4   RDW 14.8 14.7 14.8    233 236     INR  Recent Labs   Lab 01/22/20  1431   INR 1.63*     LFTs  Recent Labs   Lab 01/23/20  0620 01/22/20  1431   ALKPHOS 99 123   AST 26 29   ALT 35 39   BILITOTAL 0.8 0.4   PROTTOTAL 6.2* 7.7   ALBUMIN 3.1* 3.7      PANCNo lab results found in last 7 days.    Imaging:    CT ABD :  Impression:   1. Scattered colonic diverticulosis without CT evidence of acute  diverticulitis.  2. 1.3 cm right external iliac chain lymph node, presumed reactive.  3. Partially visualized 7 x 12 mm groundglass opacity in the left  lower lobe. Recommend correlation with prior outside imaging noted in  Care Everywhere from 1/9/2018.    Colonoscopy 2/5/18:  Impression:       - Diverticulosis in the entire examined colon.       - Redundant colon.       - The examined portion of the ileum was normal.       - The examination was otherwise normal.       - No specimens collected.  Recommendation:       - Repeat colonoscopy in 5 years for surveillance.    EGD 2/15/10:   DESCRIPTION OF PROCEDURE: Endoscopy is done with the GIFPQ20 panendoscope after risks, benefits and alternatives explained including bleeding, perforation and medication reaction. Medications included 0.16 mg of fentanyl and 3 mg of Versed in 1 mg increments. The scope was passed into the esophagus. Mild spasm was noted distally. With her complaints of dysphagia, the  esophagus was dilated with an 18 mm balloon dilator which was run up and down the esophagus. We then entered the stomach. Mild gastritis was seen involving the body and antrum. Antral biopsies were taken for H. pylori. Duodenal bulb and second part of the duodenum normal. The scope was then withdrawn. The patient tolerated the procedure well.     IMPRESSION: Spasm distal esophagus. Symptoms out of proportion to findings. The patient will be encouraged to chew her food well, cut her food into small pieces and will follow up as needed.

## 2020-01-23 NOTE — PROCEDURES
Consent obtained and signed and placed on chart.     Proceduralists: Benjamin Borges    Patient was placed in the right lateral decubitus position and a lubricated anoscope was inserted into the rectum without difficulty. Inspection upon slow removal demonstrated an engorged right posterior internal hemorrhoid without current bleeding but evidence of recent bleed. Anoscope was removed without difficulty. No complications. Patient tolerated the procedure well. Instructions given to patient and her spouse who was present.

## 2020-01-24 NOTE — PROGRESS NOTES
"\"They were more concerned about me falling in the parking lot than me bleeding.  They are worried about me suing the [facility] because I slipped on the ice for a scrape on my knee and had an MRI or regular xray.  I better not be charged for any of it.    I don't care if this is being recorded. \"    \"No one cared about what I was being really seen for. \"    \"I am really frustrated.\"  (Patient was in tears).     Overall notes:  Patient was seen and dx with internal hemorrhoids.   Patient went home with no plan other than to eat better and follow up with GI of whom she has none.   was dx with colon cancer years prior.     Patient is bleeding through sheets on bed.  She feels there is no use going back to hospital, she fell in parking lot on way in and the hospital did MRI and xray for knee- for a scrape she claims and yet  did nothing for my bleeding and pain .    Patient does not want to call customer service but is very angry and in tears.  She doesn t want to pay for the MRI and xray that ED did on her knee for the fall (never wanted them) and wants to make sure she is not charged for these.      I urged her to return to ED or PCP or urgent care but she refused.     Notes sent to manager for further escalation and forward to GI clinic coord to schedule patient  "

## 2020-01-25 LAB — INTERPRETATION ECG - MUSE: NORMAL

## 2020-01-29 NOTE — TELEPHONE ENCOUNTER
RECORDS RECEIVED FROM: N/A   DATE RECEIVED: 2/28/2020   NOTES STATUS DETAILS   OFFICE NOTE from referring provider  N/A    OFFICE NOTE from other specialist   N/A    DISCHARGE SUMMARY from hospital  Internal 1/22/2020 ([facility])   DISCHARGE REPORT from the ER N/A    OPERATIVE REPORT  N/A    MEDICATION LIST Internal    LABS     PFC TESTING N/A    ANAL PAP N/A    BIOPSIES/PATHOLOGY RELATED TO DIAGNOSIS Care Everywhere 5/24/18   DIAGNOSTIC PROCEDURES     COLONOSCOPY Received/ Care Everywhere 12/30/14, 2/5/18 ([facility])    UPPER ENDOSCOPY (EGD) N/A    FLEX SIGMOIDOSCOPY  N/A    ERCP N/A    IMAGING (DISC & REPORT)      CT  N/A    MRI N/A    XRAY Internal    ULTRASOUND (ENDOANAL/ENDORECTAL) Care Everywhere [facility]:  - US Abdomen Upper: 11/27/17 (archived into PACS on 1/29/2020)    *1/29/2020 Fax request sent to [facility] ([phone]) for image. -Elyssa

## 2020-02-28 ENCOUNTER — PRE VISIT (OUTPATIENT)
Dept: SURGERY | Facility: CLINIC | Age: 60
End: 2020-02-28

## 2020-02-28 ENCOUNTER — OFFICE VISIT (OUTPATIENT)
Dept: SURGERY | Facility: CLINIC | Age: 60
End: 2020-02-28
Payer: COMMERCIAL

## 2020-02-28 ENCOUNTER — TELEPHONE (OUTPATIENT)
Dept: DERMATOLOGY | Facility: CLINIC | Age: 60
End: 2020-02-28

## 2020-02-28 VITALS
DIASTOLIC BLOOD PRESSURE: 108 MMHG | BODY MASS INDEX: 40.14 KG/M2 | WEIGHT: 271 LBS | HEART RATE: 100 BPM | SYSTOLIC BLOOD PRESSURE: 156 MMHG | HEIGHT: 69 IN | OXYGEN SATURATION: 97 %

## 2020-02-28 DIAGNOSIS — R19.7 DIARRHEA, UNSPECIFIED TYPE: Primary | ICD-10-CM

## 2020-02-28 DIAGNOSIS — K64.8 INTERNAL HEMORRHOIDS: ICD-10-CM

## 2020-02-28 DIAGNOSIS — K62.5 RECTAL BLEEDING: ICD-10-CM

## 2020-02-28 RX ORDER — CHOLESTYRAMINE 4 G/9G
4 POWDER, FOR SUSPENSION ORAL 2 TIMES DAILY WITH MEALS
Qty: 378 G | Refills: 4 | Status: ON HOLD | OUTPATIENT
Start: 2020-02-28 | End: 2020-11-01

## 2020-02-28 ASSESSMENT — MIFFLIN-ST. JEOR: SCORE: 1858.63

## 2020-02-28 NOTE — NURSING NOTE
"Chief Complaint   Patient presents with     Rectal Problem     Hemorrhoids.       Vitals:    02/28/20 0908   BP: (!) 156/108   BP Location: Left arm   Patient Position: Sitting   Cuff Size: Adult Large   Pulse: 100   SpO2: 97%   Weight: 271 lb   Height: 5' 9\"       Body mass index is 40.02 kg/m .      Mari Thomas, EMT                      "

## 2020-02-28 NOTE — PATIENT INSTRUCTIONS
1. Start a daily fiber supplement such as Citrucel or Metamucil. Start with once a day and slowly increase up to three times a day, if needed, over the next 4-6 weeks  2. Check with your endocrinologist if Questran is okay to take with you thyroid medications  3. Discuss ability to be off of Xarelto for hemorrhoid surgery with your primary care provider  4. Return to clinic to meet with surgeon regarding possible surgical hemorrhoidectomy (may do a flexible sigmoidoscopy at that time or want a colonoscopy before surgery)

## 2020-02-28 NOTE — TELEPHONE ENCOUNTER
Reached out to pt per Dr Tony to confirm if she will wait to discuss nerve biopsy results at follow up visit in April or if she would like a phone call. Pt would like to wait until appointment. Writer will update provider.

## 2020-02-28 NOTE — PROGRESS NOTES
"Colon and Rectal Surgery Consult Clinic Note    Date: 2020     Referring provider:  Referred Self, MD  No address on file     RE: Aspne Mccullough  : 1960  MAGNUS: 2020    Aspen Mccullough is a very pleasant 60 year old female with complicated PMH of lupus, renal insufficiency, fibromyalgia, atrial fibrillation and blood clots on xarelto, and hypothyroidism with a recent diagnosis of rectal bleeding.  Given these findings they were subsequently sent to the [facility] for an opinion on this and a new patient consultation.     Aspen presents today with her , Ash.  She has had intermittent rectal bleeding on and off for many years.  This typically occurs a few times a month.  However, in January this got much worse and filled the toilet bowl.  No associated pain.  She was seen in the emergency room and was noted to have bleeding hemorrhoids at that time.  Hemoglobin has trended down some and is currently 10.3.  She has had chronic diarrhea ever since her cholecystectomy in .  She is not on any bowel medications.  She is on Xarelto for atrial fibrillation and history of blood clots but states that she has been able to come off of this for a week to 10 days in the past and this is currently managed by her primary care provider. She last noticed bleeding over a week ago.  Last colonoscopy was in 2018 with diverticulosis only.  She is adopted and does not know her family history.    Physical Examination:  BP (!) 156/108 (BP Location: Left arm, Patient Position: Sitting, Cuff Size: Adult Large)   Pulse 100   Ht 5' 9\"   Wt 271 lb   SpO2 97%   BMI 40.02 kg/m    General: Alert, oriented, in no acute distress, sitting comfortably  HEENT: Mucous membranes moist    Perianal external examination: Exam was chaperoned by CHARLOTTE Soni  Perianal skin: Intact with no excoriation or lichenification.  Lesions: No evidence of an external lesion, nodularity, or " induration in the perianal region.  Eversion of buttocks: There was not evidence of an anal fissure. Details: N/A.  Skin tags or external hemorrhoids: Yes: external non bleeding hemorrhoids.  Digital rectal examination: Was performed.   Sphincter tone: Good.  Palpable lesions: No.  Other: None.  Bimanual examination: was not performed    Anoscopy: Was performed.   Hemorrhoids: Yes. Grade 3 internal hemorrhoids without active bleeding  Lesions: No    Assessment/Plan: 60 year old female on Xarelto with rectal bleeding and internal hemorrhoids.  She has moderate sized hemorrhoids on exam and these were noted to be bleeding when she was in the emergency room in January.  No active bleeding today but she has not noticed any bleeding in the past week.  Given her Xarelto, she is not a candidate for hemorrhoid banding.  We discussed injection sclerotherapy versus surgical hemorrhoidectomy.  Due to the size of her hemorrhoids I think surgical hemorrhoidectomy is likely a better choice for her and this is what she would prefer.  Would get at least a flexible sigmoidoscopy given her anemia.  I asked her to meet with her primary care provider to ensure that she is okay coming off of her Xarelto for surgery and to discuss her hypertension.  She was quite upset when I mentioned her high blood pressure and states that this is something she has been managing for many years.  Given her chronic diarrhea, recommended starting a fiber supplement.  She believes that this started after her cholecystectomy so could consider adding Questran as well but would like her to check with her endocrinologist before starting this given her hypothyroidism and Synthroid and Cytomel.  We will have her return to clinic to meet with the surgeon to discuss possible surgical hemorrhoidectomy and for flexible sigmoidoscopy at that time. Patient's questions were answered to her stated satisfaction and she is in agreement with this plan.    Medical  history:  Past Medical History:   Diagnosis Date     ADHD (attention deficit hyperactivity disorder)      Allergic rhinitis      Chronic low back pain      Cushing's syndrome (H)      DDD (degenerative disc disease)      DDD (degenerative disc disease)      Deviated nasal septum      Diarrhea      Endometriosis      Fibromyalgia      Hashimoto's disease      Hyperlipidemia      Hypothyroid     hx hashimoto's thyroiditis     Insomnia      Lupus (H)      Malabsorption      Pernicious anemia      Renal disease     chronic renal insufficiency     Renal disease     hx renal failure     Sinusitis        Surgical history:  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)     APPENDECTOMY       ARTHRODESIS ANKLE      right     ARTHROPLASTY KNEE BILATERAL       ARTHROPLASTY REVISION KNEE  4/19/2011    Procedure:ARTHROPLASTY REVISION KNEE; With Antibiotic Cement ; Surgeon:CHAO OLIVARES; Location:[facility]     BREAST SURGERY      right- tissue remove nipple area     BUNIONECTOMY  12/14/2011    Procedure:BUNIONECTOMY; Right Bunion Correction; Surgeon:GRACE ZARATE; Location:[facility]     C STOMACH SURGERY PROCEDURE UNLISTED      see list which we will bring     CHOLECYSTECTOMY       EXCISE MASS UPPER EXTREMITY  12/14/2011    Procedure:EXCISE MASS UPPER EXTREMITY; Excision of Left Arm Mass; Surgeon:GRACE ZARATE; Location:[facility]     FOOT SURGERY      left X 4     FUSION LUMBAR ANTERIOR ONE LEVEL       HC SACROPLASTY      see list which we will bring     INJECT NERVE BLOCK SUPRASCAPULAR Left 5/18/2018    Procedure: INJECT NERVE BLOCK SUPRASCAPULAR;  Left Suprascapular Nerve Block;  Surgeon: Basim Velazquez MD;  Location: [facility]     INJECT NERVE BLOCK SUPRASCAPULAR Right 7/23/2018    Procedure: INJECT NERVE BLOCK SUPRASCAPULAR;  Right suprascapular injection;  Surgeon: Basim Velazquez MD;  Location: [facility]     INJECT NERVE BLOCK SUPRASCAPULAR  Bilateral 10/29/2018    Procedure: Bilateral Suprascapular Nerve Blocks;  Surgeon: Basim Velazquez MD;  Location: [facility]     INJECT NERVE BLOCK SUPRASCAPULAR Bilateral 3/15/2019    Procedure: Bilateral Suprascapular Nerve Block;  Surgeon: Basim Velazquez MD;  Location: [facility]     INJECT NERVE BLOCK SUPRASCAPULAR Bilateral 12/31/2019    Procedure: bilateral suprascapular nerve block;  Surgeon: Basim Velazquez MD;  Location:  OR     KNEE SURGERY      see list which we will bring     LAMINECTOMY LUMBAR ONE LEVEL      L4-5     LAPAROSCOPIC ABLATION ENDOMETRIOSIS       RADIO FREQUENCY ABLATION PULSED CERVICAL Bilateral 7/10/2019    Procedure: Bilateral Suprascapular Nerve Pulsed Radiofrequency Ablation;  Surgeon: Basim Velazquez MD;  Location:  OR     REMOVE HARDWARE FOOT  12/14/2011    Procedure:REMOVE HARDWARE FOOT; Hardware Removal Right Foot (Mini-C-Arm) ; Surgeon:GRACE ZARATE; Location:[facility]     RHINOPLASTY       SALPINGO-OOPHORECTOMY BILATERAL       TONSILLECTOMY         Problem list:  Patient Active Problem List    Diagnosis Date Noted     GI bleed 01/22/2020     Priority: Medium     Chronic pain of both shoulders 12/09/2019     Priority: Medium     Added automatically from request for surgery [id number]       Personal history of DVT (deep vein thrombosis) 02/15/2018     Priority: Medium     History of pulmonary embolism 02/15/2018     Priority: Medium     Paroxysmal atrial fibrillation (H) 01/05/2018     Priority: Medium     Overview:   Likely multifactorial related to iatrogenic hyperthyroidism and extensive bilateral PE and DVT.  PAF with RVR. On warfarin, Sotalol 80 mg BID for antiarrhythmic drug therapy. CHADsVASc 4 (LV dysfunction, DVT/PE, gender). On Sotalol 80 mg BID.   Pt would prefer NOAC. Consider transition to Eliquis        Nonischemic cardiomyopathy (H) 01/05/2018     Priority: Medium     Overview:   EF 40%. Likely tachy-mediated in setting of iatrogenic hyperthyroidism and  multiple submassive PE.   Recommended repeat echocardiography in 2-3 months. If unchanged the, needs ischemic work up.        Malabsorption 12/07/2017     Priority: Medium     Overview:   Reportedly has Chron's disease though is not on any current treatments.        Acute deep vein thrombosis (DVT) of popliteal vein of right lower extremity (H) 11/27/2017     Priority: Medium     Acute pulmonary embolism (H) 11/27/2017     Priority: Medium     Iatrogenic hyperthyroidism 11/26/2017     Priority: Medium     Overview:   11/26/2017: On admission, reported home thyroid replacement regimen was 300 mcg BID PO + 200 mcg IM twice weekly, and liothyronine 50 mcg BID. Inital TSH was 0.02. All thyroid replacement was held on discharge with instructions to follow up with her endocrinologist.        Thrombus of right atrial appendage without antecedent myocardial infarction 11/26/2017     Priority: Medium     Overview:   Echocardiogram 11/26/17:  Right Atrium: The right atrium is mildly dilated. There is a large serpiginous and lobulated appearing mass visualized in the right atrium, prolapsing across the tricuspid valve; possible etiologies include thrombus, infectious, vs. Malignancy.  Likely related to newly discovered a fib, estrogen replacement therapy and lupus. Apparently, she also has a h/o DVT and was not on anticoagulation.  Started on llife long warfarin with goal INR 2.5-3.5 per cardiology. She could possibly be switched to a DOAC once the thrombus has resolved.        S/P cervical spinal fusion 10/03/2017     Priority: Medium     Impingement syndrome of left shoulder 01/25/2016     Priority: Medium     Abdominal cramping, generalized 01/25/2016     Priority: Medium     Intermittent diarrhea 01/25/2016     Priority: Medium     Overview:   Suspect related to iatrogenic hyperthyroidism.        Primary osteoarthritis involving multiple joints 01/25/2016     Priority: Medium     Posttraumatic stress disorder 06/23/2015      Priority: Medium     Hashimoto's thyroiditis 08/25/2014     Priority: Medium     Glucocorticoid deficiency (H) 08/25/2014     Priority: Medium     ACP (advance care planning) 08/22/2014     Priority: Medium     Advance Care Planning: Receipt of ACP document:  Received: Health Care Directive which was witnessed or notarized on 10-10-05.  Document previously scanned on 12-20-11.  Validation form completed and scanned.  Code Status reflects choices in most recent ACP document NOTE: Pt is [employer]-please review HCD for details on blood products.  Confirmed/documented designated decision maker(s). See permanent comments section of demographics in clinical tab. View document(s) and details by clicking on code status.   Added by Ora Shaw RN, System ACP Coordinator [employer] on 8/22/2014.             Hyperlipidemia 07/31/2014     Priority: Medium     Hypothyroidism 07/24/2014     Priority: Medium     Problem list name updated by automated process. Provider to review       Insomnia, unspecified type 07/24/2014     Priority: Medium     Knee joint replacement by other means 09/16/2013     Priority: Medium     Encounter for long-term use of opiate analgesic 04/01/2013     Priority: Medium     Histrionic personality (H) 10/04/2012     Priority: Medium     Ankle pain, left 12/09/2011     Priority: Medium     S/P total knee replacement 08/30/2011     Priority: Medium     Alopecia areata 07/29/2011     Priority: Medium     Lipoma of skin and subcutaneous tissue 07/29/2011     Priority: Medium     Somatoform disorder 03/02/2011     Priority: Medium     Chronic ethmoidal sinusitis 10/27/2010     Priority: Medium     Overview:   ANTERIOR & POSTERIOR       Chronic maxillary sinusitis 10/27/2010     Priority: Medium     Nasal fracture 10/27/2010     Priority: Medium     Overview:   WITH DISLOCATION, CHRONIC       Nasal turbinate hypertrophy 10/27/2010     Priority: Medium     Deviated nasal septum 10/25/2010      Priority: Medium     Attention deficit disorder 10/11/2010     Priority: Medium     Fibromyalgia 05/03/2010     Priority: Medium     Overview:   Multifactorial, on enormous doses of medications, pain contract signed Aug 2016.  Has been stable on same doses for years. Not elligible for increases.   --MS contin 90mg TID  --diazepam 10mg TID  --dilaudid 8mg TID  --flexeril  --TOX ASSURE Q3 months for now.        Left shoulder pain 01/18/2010     Priority: Medium     Complications due to internal joint prosthesis (H) 08/24/2009     Priority: Medium     CRPS (complex regional pain syndrome), lower limb 07/06/2009     Priority: Medium     S/P TKR (total knee replacement) 12/04/2008     Priority: Medium     Allergic rhinitis 10/09/2008     Priority: Medium     Adrenal cortical steroids causing adverse effect in therapeutic use 08/20/2008     Priority: Medium     Anemia, blood loss 08/20/2008     Priority: Medium     ARF (acute renal failure) (H) 08/20/2008     Priority: Medium     Arthritis, septic (H) 08/20/2008     Priority: Medium     Other specified abnormal findings of blood chemistry 08/20/2008     Priority: Medium     Postoperative hypotension 08/20/2008     Priority: Medium     Generalized osteoarthrosis, involving multiple sites 01/03/2008     Priority: Medium     Generalized pain 12/17/2007     Priority: Medium     Opioid type dependence (H) 12/17/2007     Priority: Medium     Overview:   Followed by Dr. Lester Osman at [facility] for opiate maintenance  [phone]       Other acute postoperative pain 12/17/2007     Priority: Medium     Pain in joint, lower leg 12/17/2007     Priority: Medium     Endometriosis 03/25/2002     Priority: Medium     Cushing's syndrome (H) 01/01/1996     Priority: Medium     Essential hypertension 01/01/1996     Priority: Medium     Systemic lupus erythematosus (H) 01/01/1996     Priority: Medium       Medications:  Current Outpatient Medications   Medication Sig  Dispense Refill     cholestyramine (QUESTRAN) 4 GM/DOSE powder Take 4 g by mouth 2 times daily (with meals) 378 g 4     methylcellulose (CITRUCEL) powder Start with 1 heaping tablespoon. Increase as needed, 1 heaping tablespoon at a time, up to 3 times per day. 479 g 3     BIOTIN FORTE PO Take 1 tablet by mouth daily        carboxymethylcellulose (CARBOXYMETHYLCELLULOSE SODIUM) 0.5 % SOLN ophthalmic solution Place 1 drop into both eyes 2 times daily 1 Bottle      CEPHALEXIN PO Take 500 mg by mouth as needed (Dental Procedure) Take 4 caps 1 hour prior to Dental Appointment       cyanocobalamin 1000 MCG/ML injection Inject 1 mL (1,000 mcg) Subcutaneous every 7 days 4 mL 5     cyclobenzaprine (FLEXERIL) 10 MG tablet Take 1 tablet (10 mg) by mouth 3 times daily 90 tablet 3     cycloSPORINE (RESTASIS) 0.05 % ophthalmic emulsion Place 1 drop into both eyes 2 times daily       fexofenadine (ALLEGRA) 180 MG tablet Take 180 mg by mouth daily.       gabapentin (NEURONTIN) 300 MG capsule Take 300 mg by mouth 2 times daily        gabapentin (NEURONTIN) 800 MG tablet Take 800 mg by mouth 3 times daily       HYDROmorphone (DILAUDID) 8 MG tablet Take 8 mg by mouth every 6 hours as needed for severe pain . Max of 3 doses per day.       levothyroxine (SYNTHROID/LEVOTHROID) 100 MCG SOLR injection Inject 200 mcg into the vein twice a week        levothyroxine (SYNTHROID/LEVOTHROID) 300 MCG tablet Take 300 mcg by mouth 2 times daily        lifitegrast (XIIDRA) 5 % opthalmic solution Place 1 drop into both eyes 2 times daily       liothyronine (CYTOMEL) 25 MCG tablet Take 50 mcg by mouth 2 times daily        metoprolol succinate ER (TOPROL-XL) 50 MG 24 hr tablet Take 50 mg by mouth daily       morphine (MS CONTIN) 30 MG 12 hr tablet Take 30 mg by mouth 2 times daily along with one morphine 60 mg 12 hr tablet for a total dose of 90 mg. 270 tablet 0     morphine (MS CONTIN) 60 MG 12 hr tablet Take 60 mg by mouth 3 times daily  30 tablet 0      multivitamin, therapeutic with minerals (MULTI-VITAMIN) TABS tablet Take 1 tablet by mouth 2 times daily 100 tablet 3     NASONEX 50 MCG/ACT spray Spray 1 spray into both nostrils daily as needed   11     nystatin (MYCOSTATIN) 933871 UNIT/GM POWD Apply topically 3 times daily as needed 60 g 1     prednisoLONE acetate (PRED FORTE) 1 % ophthalmic susp 1 drop 4 times daily       predniSONE (DELTASONE) 1 MG tablet Use along with one prednisone 5 mg tablets to alternate taking 8mg and 10mg every other day.  2     predniSONE (DELTASONE) 5 MG tablet Alternate taking 8mg and 10mg every other day       probiotic CAPS Take 1 capsule by mouth 2 times daily       rivaroxaban ANTICOAGULANT (XARELTO) 20 MG TABS tablet Take 20 mg by mouth every morning        vitamin D2 (ERGOCALCIFEROL) 27460 units (1250 mcg) capsule Take 50,000 Units by mouth once a week       zolpidem (AMBIEN) 5 MG tablet Take 5 mg by mouth nightly as needed for sleep         Allergies:  Allergies   Allergen Reactions     Chlorhexidine Hives     Thor surgical cleanser     Codeine Hives     Ibuprofen [Nsaids] Hives     Penicillins Hives     Other reaction(s): Unknown     Sulfa Drugs Hives     Other reaction(s): Unknown     Doxycycline GI Disturbance     Emesis & diarrhea  Patient denies allergy to this med     Mold      Mushroom      Red Wine Complex [Red Wine Powder]        Family history:  Family History   Problem Relation Age of Onset     Hypertension Mother        Social history:  Social History     Tobacco Use     Smoking status: Former Smoker     Packs/day: 1.50     Years: 20.00     Pack years: 30.00     Types: Cigarettes     Start date: 1973     Last attempt to quit: 1993     Years since quittin.1     Smokeless tobacco: Never Used   Substance Use Topics     Alcohol use: No     Alcohol/week: 0.0 standard drinks    Marital status: .    Nursing Notes:   Mari Thomas, EMT  2020  9:12 AM  Signed  Chief Complaint   Patient  "presents with     Rectal Problem     Hemorrhoids.       Vitals:    02/28/20 0908   BP: (!) 156/108   BP Location: Left arm   Patient Position: Sitting   Cuff Size: Adult Large   Pulse: 100   SpO2: 97%   Weight: 271 lb   Height: 5' 9\"       Body mass index is 40.02 kg/m .      CHARLOTTE Soni                         Total face to face time was 45 minutes, >50% counseling.    ANITRA Isaacs, NP-C  Colon and Rectal Surgery   [facility]    This note was created using speech recognition software and may contain unintended word substitutions.    "

## 2020-02-28 NOTE — LETTER
"2020       RE: Aspen Mccullough  [address]  [facility] [address]     Dear Colleague,    Thank you for referring your patient, Aspen Mccullough, to the [facility] COLON AND RECTAL SURGERY at [facility]. Please see a copy of my visit note below.    Colon and Rectal Surgery Consult Clinic Note    Date: 2020     Referring provider:  Referred Self, MD  No address on file     RE: Aspen Mccullough  : 1960  MAGNUS: 2020    Aspen Mccullough is a very pleasant 60 year old female with complicated PMH of lupus, renal insufficiency, fibromyalgia, atrial fibrillation and blood clots on xarelto, and hypothyroidism with a recent diagnosis of rectal bleeding.  Given these findings they were subsequently sent to the [facility] and Rectal Surgery Clinic for an opinion on this and a new patient consultation.     Aspen presents today with her , Ash.  She has had intermittent rectal bleeding on and off for many years.  This typically occurs a few times a month.  However, in January this got much worse and filled the toilet bowl.  No associated pain.  She was seen in the emergency room and was noted to have bleeding hemorrhoids at that time.  Hemoglobin has trended down some and is currently 10.3.  She has had chronic diarrhea ever since her cholecystectomy in .  She is not on any bowel medications.  She is on Xarelto for atrial fibrillation and history of blood clots but states that she has been able to come off of this for a week to 10 days in the past and this is currently managed by her primary care provider. She last noticed bleeding over a week ago.  Last colonoscopy was in 2018 with diverticulosis only.  She is adopted and does not know her family history.    Physical Examination:  BP (!) 156/108 (BP Location: Left arm, Patient Position: Sitting, Cuff Size: Adult Large)   Pulse 100   Ht 5' 9\"   Wt 271 lb   SpO2 97%   BMI 40.02 kg/m     General: " Alert, oriented, in no acute distress, sitting comfortably  HEENT: Mucous membranes moist    Perianal external examination: Exam was chaperoned by CHARLOTTE Soni  Perianal skin: Intact with no excoriation or lichenification.  Lesions: No evidence of an external lesion, nodularity, or induration in the perianal region.  Eversion of buttocks: There was not evidence of an anal fissure. Details: N/A.  Skin tags or external hemorrhoids: Yes: external non bleeding hemorrhoids.  Digital rectal examination: Was performed.   Sphincter tone: Good.  Palpable lesions: No.  Other: None.  Bimanual examination: was not performed    Anoscopy: Was performed.   Hemorrhoids: Yes. Grade 3 internal hemorrhoids without active bleeding  Lesions: No    Assessment/Plan: 60 year old female on Xarelto with rectal bleeding and internal hemorrhoids.  She has moderate sized hemorrhoids on exam and these were noted to be bleeding when she was in the emergency room in January.  No active bleeding today but she has not noticed any bleeding in the past week.  Given her Xarelto, she is not a candidate for hemorrhoid banding.  We discussed injection sclerotherapy versus surgical hemorrhoidectomy.  Due to the size of her hemorrhoids I think surgical hemorrhoidectomy is likely a better choice for her and this is what she would prefer.  Would get at least a flexible sigmoidoscopy given her anemia.  I asked her to meet with her primary care provider to ensure that she is okay coming off of her Xarelto for surgery and to discuss her hypertension.  She was quite upset when I mentioned her high blood pressure and states that this is something she has been managing for many years.  Given her chronic diarrhea, recommended starting a fiber supplement.  She believes that this started after her cholecystectomy so could consider adding Questran as well but would like her to check with her endocrinologist before starting this given her hypothyroidism and  Synthroid and Cytomel.  We will have her return to clinic to meet with the surgeon to discuss possible surgical hemorrhoidectomy and for flexible sigmoidoscopy at that time. Patient's questions were answered to her stated satisfaction and she is in agreement with this plan.    Medical history:  Past Medical History:   Diagnosis Date     ADHD (attention deficit hyperactivity disorder)      Allergic rhinitis      Chronic low back pain      Cushing's syndrome (H)      DDD (degenerative disc disease)      DDD (degenerative disc disease)      Deviated nasal septum      Diarrhea      Endometriosis      Fibromyalgia      Hashimoto's disease      Hyperlipidemia      Hypothyroid     hx hashimoto's thyroiditis     Insomnia      Lupus (H)      Malabsorption      Pernicious anemia      Renal disease     chronic renal insufficiency     Renal disease     hx renal failure     Sinusitis        Surgical history:  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)     APPENDECTOMY       ARTHRODESIS ANKLE      right     ARTHROPLASTY KNEE BILATERAL       ARTHROPLASTY REVISION KNEE  4/19/2011    Procedure:ARTHROPLASTY REVISION KNEE; With Antibiotic Cement ; Surgeon:CHAO OLIVARES; Location:[facility]     BREAST SURGERY      right- tissue remove nipple area     BUNIONECTOMY  12/14/2011    Procedure:BUNIONECTOMY; Right Bunion Correction; Surgeon:GRACE ZARATE; [facility]     C STOMACH SURGERY PROCEDURE UNLISTED      see list which we will bring     CHOLECYSTECTOMY       EXCISE MASS UPPER EXTREMITY  12/14/2011    Procedure:EXCISE MASS UPPER EXTREMITY; Excision of Left Arm Mass; Surgeon:GRACE ZARATE; Location:[facility]     FOOT SURGERY      left X 4     FUSION LUMBAR ANTERIOR ONE LEVEL       HC SACROPLASTY      see list which we will bring     INJECT NERVE BLOCK SUPRASCAPULAR Left 5/18/2018    Procedure: INJECT NERVE BLOCK SUPRASCAPULAR;  Left  Suprascapular Nerve Block;  Surgeon: Basim Velazquez MD;  Location: [facility]     INJECT NERVE BLOCK SUPRASCAPULAR Right 7/23/2018    Procedure: INJECT NERVE BLOCK SUPRASCAPULAR;  Right suprascapular injection;  Surgeon: Basim Velazquez MD;  [facility]     INJECT NERVE BLOCK SUPRASCAPULAR Bilateral 10/29/2018    Procedure: Bilateral Suprascapular Nerve Blocks;  Surgeon: Basim Velazquez MD;  [facility]     INJECT NERVE BLOCK SUPRASCAPULAR Bilateral 3/15/2019    Procedure: Bilateral Suprascapular Nerve Block;  Surgeon: Basim Velazquez MD;  [facility]     INJECT NERVE BLOCK SUPRASCAPULAR Bilateral 12/31/2019    Procedure: bilateral suprascapular nerve block;  Surgeon: Basim Velazquez MD;  Location: [facility]     KNEE SURGERY      see list which we will bring     LAMINECTOMY LUMBAR ONE LEVEL      L4-5     LAPAROSCOPIC ABLATION ENDOMETRIOSIS       RADIO FREQUENCY ABLATION PULSED CERVICAL Bilateral 7/10/2019    Procedure: Bilateral Suprascapular Nerve Pulsed Radiofrequency Ablation;  Surgeon: Basim Velazquez MD;  Location:  OR     REMOVE HARDWARE FOOT  12/14/2011    Procedure:REMOVE HARDWARE FOOT; Hardware Removal Right Foot (Mini-C-Arm) ; Surgeon:GRACE ZARATE; [facility]     RHINOPLASTY       SALPINGO-OOPHORECTOMY BILATERAL       TONSILLECTOMY         Problem list:  Patient Active Problem List    Diagnosis Date Noted     GI bleed 01/22/2020     Priority: Medium     Chronic pain of both shoulders 12/09/2019     Priority: Medium     Added automatically from request for surgery [id number]       Personal history of DVT (deep vein thrombosis) 02/15/2018     Priority: Medium     History of pulmonary embolism 02/15/2018     Priority: Medium     Paroxysmal atrial fibrillation (H) 01/05/2018     Priority: Medium     Overview:   Likely multifactorial related to iatrogenic hyperthyroidism and extensive bilateral PE and DVT.  PAF with RVR. On warfarin, Sotalol 80 mg BID for antiarrhythmic drug  therapy. CHADsVASc 4 (LV dysfunction, DVT/PE, gender). On Sotalol 80 mg BID.   Pt would prefer NOAC. Consider transition to Eliquis        Nonischemic cardiomyopathy (H) 01/05/2018     Priority: Medium     Overview:   EF 40%. Likely tachy-mediated in setting of iatrogenic hyperthyroidism and multiple submassive PE.   Recommended repeat echocardiography in 2-3 months. If unchanged the, needs ischemic work up.        Malabsorption 12/07/2017     Priority: Medium     Overview:   Reportedly has Chron's disease though is not on any current treatments.        Acute deep vein thrombosis (DVT) of popliteal vein of right lower extremity (H) 11/27/2017     Priority: Medium     Acute pulmonary embolism (H) 11/27/2017     Priority: Medium     Iatrogenic hyperthyroidism 11/26/2017     Priority: Medium     Overview:   11/26/2017: On admission, reported home thyroid replacement regimen was 300 mcg BID PO + 200 mcg IM twice weekly, and liothyronine 50 mcg BID. Inital TSH was 0.02. All thyroid replacement was held on discharge with instructions to follow up with her endocrinologist.        Thrombus of right atrial appendage without antecedent myocardial infarction 11/26/2017     Priority: Medium     Overview:   Echocardiogram 11/26/17:  Right Atrium: The right atrium is mildly dilated. There is a large serpiginous and lobulated appearing mass visualized in the right atrium, prolapsing across the tricuspid valve; possible etiologies include thrombus, infectious, vs. Malignancy.  Likely related to newly discovered a fib, estrogen replacement therapy and lupus. Apparently, she also has a h/o DVT and was not on anticoagulation.  Started on llife long warfarin with goal INR 2.5-3.5 per cardiology. She could possibly be switched to a DOAC once the thrombus has resolved.        S/P cervical spinal fusion 10/03/2017     Priority: Medium     Impingement syndrome of left shoulder 01/25/2016     Priority: Medium     Abdominal cramping,  generalized 01/25/2016     Priority: Medium     Intermittent diarrhea 01/25/2016     Priority: Medium     Overview:   Suspect related to iatrogenic hyperthyroidism.        Primary osteoarthritis involving multiple joints 01/25/2016     Priority: Medium     Posttraumatic stress disorder 06/23/2015     Priority: Medium     Hashimoto's thyroiditis 08/25/2014     Priority: Medium     Glucocorticoid deficiency (H) 08/25/2014     Priority: Medium     ACP (advance care planning) 08/22/2014     Priority: Medium     Advance Care Planning: Receipt of ACP document:  Received: Health Care Directive which was witnessed or notarized on 10-10-05.  Document previously scanned on 12-20-11.  Validation form completed and scanned.  Code Status reflects choices in most recent ACP document NOTE: Pt is [employer]-please review HCD for details on blood products.  Confirmed/documented designated decision maker(s). See permanent comments section of demographics in clinical tab. View document(s) and details by clicking on code status.   Added by Ora Shaw RN, System ACP Coordinator [employer] on 8/22/2014.             Hyperlipidemia 07/31/2014     Priority: Medium     Hypothyroidism 07/24/2014     Priority: Medium     Problem list name updated by automated process. Provider to review       Insomnia, unspecified type 07/24/2014     Priority: Medium     Knee joint replacement by other means 09/16/2013     Priority: Medium     Encounter for long-term use of opiate analgesic 04/01/2013     Priority: Medium     Histrionic personality (H) 10/04/2012     Priority: Medium     Ankle pain, left 12/09/2011     Priority: Medium     S/P total knee replacement 08/30/2011     Priority: Medium     Alopecia areata 07/29/2011     Priority: Medium     Lipoma of skin and subcutaneous tissue 07/29/2011     Priority: Medium     Somatoform disorder 03/02/2011     Priority: Medium     Chronic ethmoidal sinusitis 10/27/2010     Priority: Medium      Overview:   ANTERIOR & POSTERIOR       Chronic maxillary sinusitis 10/27/2010     Priority: Medium     Nasal fracture 10/27/2010     Priority: Medium     Overview:   WITH DISLOCATION, CHRONIC       Nasal turbinate hypertrophy 10/27/2010     Priority: Medium     Deviated nasal septum 10/25/2010     Priority: Medium     Attention deficit disorder 10/11/2010     Priority: Medium     Fibromyalgia 05/03/2010     Priority: Medium     Overview:   Multifactorial, on enormous doses of medications, pain contract signed Aug 2016.  Has been stable on same doses for years. Not elligible for increases.   --MS contin 90mg TID  --diazepam 10mg TID  --dilaudid 8mg TID  --flexeril  --TOX ASSURE Q3 months for now.        Left shoulder pain 01/18/2010     Priority: Medium     Complications due to internal joint prosthesis (H) 08/24/2009     Priority: Medium     CRPS (complex regional pain syndrome), lower limb 07/06/2009     Priority: Medium     S/P TKR (total knee replacement) 12/04/2008     Priority: Medium     Allergic rhinitis 10/09/2008     Priority: Medium     Adrenal cortical steroids causing adverse effect in therapeutic use 08/20/2008     Priority: Medium     Anemia, blood loss 08/20/2008     Priority: Medium     ARF (acute renal failure) (H) 08/20/2008     Priority: Medium     Arthritis, septic (H) 08/20/2008     Priority: Medium     Other specified abnormal findings of blood chemistry 08/20/2008     Priority: Medium     Postoperative hypotension 08/20/2008     Priority: Medium     Generalized osteoarthrosis, involving multiple sites 01/03/2008     Priority: Medium     Generalized pain 12/17/2007     Priority: Medium     Opioid type dependence (H) 12/17/2007     Priority: Medium     Overview:   Followed by Dr. Lester Osman at [facility] center for opiate maintenance  [phone]       Other acute postoperative pain 12/17/2007     Priority: Medium     Pain in joint, lower leg 12/17/2007     Priority: Medium      Endometriosis 03/25/2002     Priority: Medium     Cushing's syndrome (H) 01/01/1996     Priority: Medium     Essential hypertension 01/01/1996     Priority: Medium     Systemic lupus erythematosus (H) 01/01/1996     Priority: Medium       Medications:  Current Outpatient Medications   Medication Sig Dispense Refill     cholestyramine (QUESTRAN) 4 GM/DOSE powder Take 4 g by mouth 2 times daily (with meals) 378 g 4     methylcellulose (CITRUCEL) powder Start with 1 heaping tablespoon. Increase as needed, 1 heaping tablespoon at a time, up to 3 times per day. 479 g 3     BIOTIN FORTE PO Take 1 tablet by mouth daily        carboxymethylcellulose (CARBOXYMETHYLCELLULOSE SODIUM) 0.5 % SOLN ophthalmic solution Place 1 drop into both eyes 2 times daily 1 Bottle      CEPHALEXIN PO Take 500 mg by mouth as needed (Dental Procedure) Take 4 caps 1 hour prior to Dental Appointment       cyanocobalamin 1000 MCG/ML injection Inject 1 mL (1,000 mcg) Subcutaneous every 7 days 4 mL 5     cyclobenzaprine (FLEXERIL) 10 MG tablet Take 1 tablet (10 mg) by mouth 3 times daily 90 tablet 3     cycloSPORINE (RESTASIS) 0.05 % ophthalmic emulsion Place 1 drop into both eyes 2 times daily       fexofenadine (ALLEGRA) 180 MG tablet Take 180 mg by mouth daily.       gabapentin (NEURONTIN) 300 MG capsule Take 300 mg by mouth 2 times daily        gabapentin (NEURONTIN) 800 MG tablet Take 800 mg by mouth 3 times daily       HYDROmorphone (DILAUDID) 8 MG tablet Take 8 mg by mouth every 6 hours as needed for severe pain . Max of 3 doses per day.       levothyroxine (SYNTHROID/LEVOTHROID) 100 MCG SOLR injection Inject 200 mcg into the vein twice a week        levothyroxine (SYNTHROID/LEVOTHROID) 300 MCG tablet Take 300 mcg by mouth 2 times daily        lifitegrast (XIIDRA) 5 % opthalmic solution Place 1 drop into both eyes 2 times daily       liothyronine (CYTOMEL) 25 MCG tablet Take 50 mcg by mouth 2 times daily        metoprolol succinate ER  (TOPROL-XL) 50 MG 24 hr tablet Take 50 mg by mouth daily       morphine (MS CONTIN) 30 MG 12 hr tablet Take 30 mg by mouth 2 times daily along with one morphine 60 mg 12 hr tablet for a total dose of 90 mg. 270 tablet 0     morphine (MS CONTIN) 60 MG 12 hr tablet Take 60 mg by mouth 3 times daily  30 tablet 0     multivitamin, therapeutic with minerals (MULTI-VITAMIN) TABS tablet Take 1 tablet by mouth 2 times daily 100 tablet 3     NASONEX 50 MCG/ACT spray Spray 1 spray into both nostrils daily as needed   11     nystatin (MYCOSTATIN) 518174 UNIT/GM POWD Apply topically 3 times daily as needed 60 g 1     prednisoLONE acetate (PRED FORTE) 1 % ophthalmic susp 1 drop 4 times daily       predniSONE (DELTASONE) 1 MG tablet Use along with one prednisone 5 mg tablets to alternate taking 8mg and 10mg every other day.  2     predniSONE (DELTASONE) 5 MG tablet Alternate taking 8mg and 10mg every other day       probiotic CAPS Take 1 capsule by mouth 2 times daily       rivaroxaban ANTICOAGULANT (XARELTO) 20 MG TABS tablet Take 20 mg by mouth every morning        vitamin D2 (ERGOCALCIFEROL) 80293 units (1250 mcg) capsule Take 50,000 Units by mouth once a week       zolpidem (AMBIEN) 5 MG tablet Take 5 mg by mouth nightly as needed for sleep         Allergies:  Allergies   Allergen Reactions     Chlorhexidine Hives     Thor surgical cleanser     Codeine Hives     Ibuprofen [Nsaids] Hives     Penicillins Hives     Other reaction(s): Unknown     Sulfa Drugs Hives     Other reaction(s): Unknown     Doxycycline GI Disturbance     Emesis & diarrhea  Patient denies allergy to this med     Mold      Mushroom      Red Wine Complex [Red Wine Powder]        Family history:  Family History   Problem Relation Age of Onset     Hypertension Mother        Social history:  Social History     Tobacco Use     Smoking status: Former Smoker     Packs/day: 1.50     Years: 20.00     Pack years: 30.00     Types: Cigarettes     Start date: 1/1/1973  "    Last attempt to quit: 1993     Years since quittin.1     Smokeless tobacco: Never Used   Substance Use Topics     Alcohol use: No     Alcohol/week: 0.0 standard drinks    Marital status: .    Nursing Notes:   Mari Thomas EMT  2020  9:12 AM  Signed  Chief Complaint   Patient presents with     Rectal Problem     Hemorrhoids.       Vitals:    20 0908   BP: (!) 156/108   BP Location: Left arm   Patient Position: Sitting   Cuff Size: Adult Large   Pulse: 100   SpO2: 97%   Weight: 271 lb   Height: 5' 9\"       Body mass index is 40.02 kg/m .      CHARLOTTE Soni      Total face to face time was 45 minutes, >50% counseling.    ANITRA Isaacs, NP-C  Colon and Rectal Surgery   [facility]    This note was created using speech recognition software and may contain unintended word substitutions.    "

## 2020-03-16 ENCOUNTER — TELEPHONE (OUTPATIENT)
Dept: SURGERY | Facility: CLINIC | Age: 60
End: 2020-03-16

## 2020-03-16 ENCOUNTER — HOSPITAL ENCOUNTER (OUTPATIENT)
Dept: MAMMOGRAPHY | Facility: CLINIC | Age: 60
Discharge: HOME | End: 2020-03-16
Attending: FAMILY MEDICINE | Admitting: FAMILY MEDICINE
Payer: COMMERCIAL

## 2020-03-16 DIAGNOSIS — Z12.31 VISIT FOR SCREENING MAMMOGRAM: ICD-10-CM

## 2020-03-16 PROCEDURE — 77067 SCR MAMMO BI INCL CAD: CPT

## 2020-03-16 NOTE — TELEPHONE ENCOUNTER
Called to discuss rescheduling appointment today due to COVID-19. She and her  feel that her symptoms are severe and she would like to be seen on Friday. Will keep her appointment for Friday at 1230.    ANITRA Isaacs, NP-C  Colon and Rectal Surgery  [facility]

## 2020-04-02 ENCOUNTER — TELEPHONE (OUTPATIENT)
Dept: DERMATOLOGY | Facility: CLINIC | Age: 60
End: 2020-04-02

## 2020-04-02 NOTE — TELEPHONE ENCOUNTER
[facility] Call Center    Phone Message    May a detailed message be left on voicemail: yes     Reason for Call: Other: The patient would like to make changes to their appointment scheduled for 4/6/20 due to Covid-19, please call to re-coordinate appointment thank you.     Action Taken: Message routed to:  [facility] ([facility] Derm    Travel Screening: Not Applicable

## 2020-05-28 ENCOUNTER — TELEPHONE (OUTPATIENT)
Dept: SURGERY | Facility: CLINIC | Age: 60
End: 2020-05-28
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
## 2020-06-12 ENCOUNTER — OFFICE VISIT (OUTPATIENT)
Dept: SURGERY | Facility: CLINIC | Age: 60
End: 2020-06-12
Payer: COMMERCIAL

## 2020-06-12 VITALS
HEART RATE: 100 BPM | WEIGHT: 227 LBS | HEIGHT: 68 IN | SYSTOLIC BLOOD PRESSURE: 155 MMHG | BODY MASS INDEX: 34.4 KG/M2 | DIASTOLIC BLOOD PRESSURE: 107 MMHG

## 2020-06-12 DIAGNOSIS — K64.2 GRADE III INTERNAL HEMORRHOIDS: Primary | ICD-10-CM

## 2020-06-12 ASSESSMENT — PAIN SCALES - GENERAL: PAINLEVEL: SEVERE PAIN (7)

## 2020-06-12 ASSESSMENT — MIFFLIN-ST. JEOR: SCORE: 1643.17

## 2020-06-12 NOTE — NURSING NOTE
"Chief Complaint   Patient presents with     Consult     New patient consult for hemorrhoidectomy.        Vitals:    06/12/20 1203   BP: (!) 155/107   BP Location: Left arm   Patient Position: Sitting   Cuff Size: Adult Large   Pulse: 100   Weight: 103 kg (227 lb)   Height: 1.727 m (5' 8\")       Body mass index is 34.52 kg/m .    Elle Swann LPN                     "

## 2020-06-12 NOTE — LETTER
2020       RE: Aspen Mccullough  [address]  [facility] [address]     Dear Colleague,    Thank you for referring your patient, Aspen Mccullough, to the [facility] COLON AND RECTAL SURGERY at [facility]. Please see a copy of my visit note below.    Colon and Rectal Surgery Clinic Note    RE: Aspen Mccullough.  : 1960.  MAGNUS: 2020.    Reason for visit: Hemorrhoids.    HPI: 60F with history of afib and blood clots on Xarelto with intermittent rectal bleeding for several years. She was seen by Pearl Garduno NP in February where grade 3 internal hemorrhoids were noted.     She reports chronic diarrhea since cholecystectomy in . She has tried fiber and other bowel agents without improvement. Continues to have bleeding, most recently last week.    Last colonoscopy was in 2018 with diverticulosis only.  She is adopted and does not know her family history.    Medical history:  Past Medical History:   Diagnosis Date     ADHD (attention deficit hyperactivity disorder)      Allergic rhinitis      Chronic low back pain      Cushing's syndrome (H)      DDD (degenerative disc disease)      DDD (degenerative disc disease)      Deviated nasal septum      Diarrhea      Endometriosis      Fibromyalgia      Hashimoto's disease      Hyperlipidemia      Hypothyroid     hx hashimoto's thyroiditis     Insomnia      Lupus (H)      Malabsorption      Pernicious anemia      Renal disease     chronic renal insufficiency     Renal disease     hx renal failure     Sinusitis        Surgical history:  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)     APPENDECTOMY       ARTHRODESIS ANKLE      right     ARTHROPLASTY KNEE BILATERAL       ARTHROPLASTY REVISION KNEE  2011    Procedure:ARTHROPLASTY REVISION KNEE; With Antibiotic Cement ; Surgeon:CHAO OLIVARES; [facility]     BREAST  SURGERY      right- tissue remove nipple area     BUNIONECTOMY  12/14/2011    Procedure:BUNIONECTOMY; Right Bunion Correction; Surgeon:GRACE ZARATE; Location:[facility]     C STOMACH SURGERY PROCEDURE UNLISTED      see list which we will bring     CHOLECYSTECTOMY       EXCISE MASS UPPER EXTREMITY  12/14/2011    Procedure:EXCISE MASS UPPER EXTREMITY; Excision of Left Arm Mass; Surgeon:GRACE ZARATE; Location:[facility]     FOOT SURGERY      left X 4     FUSION LUMBAR ANTERIOR ONE LEVEL       HC SACROPLASTY      see list which we will bring     INJECT NERVE BLOCK SUPRASCAPULAR Left 5/18/2018    Procedure: INJECT NERVE BLOCK SUPRASCAPULAR;  Left Suprascapular Nerve Block;  Surgeon: Basim Velazquez MD;  [facility]     INJECT NERVE BLOCK SUPRASCAPULAR Right 7/23/2018    Procedure: INJECT NERVE BLOCK SUPRASCAPULAR;  Right suprascapular injection;  Surgeon: Basim Velazquez MD;  [facility]     INJECT NERVE BLOCK SUPRASCAPULAR Bilateral 10/29/2018    Procedure: Bilateral Suprascapular Nerve Blocks;  Surgeon: Basim Velazquez MD;  [facility]     INJECT NERVE BLOCK SUPRASCAPULAR Bilateral 3/15/2019    Procedure: Bilateral Suprascapular Nerve Block;  Surgeon: Basim Velazquez MD;  [facility]     INJECT NERVE BLOCK SUPRASCAPULAR Bilateral 12/31/2019    Procedure: bilateral suprascapular nerve block;  Surgeon: Basim Velazquez MD;  [facility]     KNEE SURGERY      see list which we will bring     LAMINECTOMY LUMBAR ONE LEVEL      L4-5     LAPAROSCOPIC ABLATION ENDOMETRIOSIS       RADIO FREQUENCY ABLATION PULSED CERVICAL Bilateral 7/10/2019    Procedure: Bilateral Suprascapular Nerve Pulsed Radiofrequency Ablation;  Surgeon: Basim Velazquez MD;  [facility]     REMOVE HARDWARE FOOT  12/14/2011    Procedure:REMOVE HARDWARE FOOT; Hardware Removal Right Foot (Mini-C-Arm) ; Surgeon:GRACE ZARATE; Location:[facility]     RHINOPLASTY       SALPINGO-OOPHORECTOMY BILATERAL        TONSILLECTOMY         Family history:  Family History   Problem Relation Age of Onset     Hypertension Mother        Medications:  Current Outpatient Medications   Medication Sig Dispense Refill     BIOTIN FORTE PO Take 1 tablet by mouth daily        carboxymethylcellulose (CARBOXYMETHYLCELLULOSE SODIUM) 0.5 % SOLN ophthalmic solution Place 1 drop into both eyes 2 times daily 1 Bottle      cholestyramine (QUESTRAN) 4 GM/DOSE powder Take 4 g by mouth 2 times daily (with meals) 378 g 4     cyanocobalamin 1000 MCG/ML injection Inject 1 mL (1,000 mcg) Subcutaneous every 7 days 4 mL 5     cyclobenzaprine (FLEXERIL) 10 MG tablet Take 1 tablet (10 mg) by mouth 3 times daily 90 tablet 3     cycloSPORINE (RESTASIS) 0.05 % ophthalmic emulsion Place 1 drop into both eyes 2 times daily       fexofenadine (ALLEGRA) 180 MG tablet Take 180 mg by mouth daily.       gabapentin (NEURONTIN) 300 MG capsule Take 300 mg by mouth 2 times daily        gabapentin (NEURONTIN) 800 MG tablet Take 800 mg by mouth 3 times daily       HYDROmorphone (DILAUDID) 8 MG tablet Take 8 mg by mouth every 6 hours as needed for severe pain . Max of 3 doses per day.       levothyroxine (SYNTHROID/LEVOTHROID) 100 MCG SOLR injection Inject 200 mcg into the vein twice a week        levothyroxine (SYNTHROID/LEVOTHROID) 300 MCG tablet Take 300 mcg by mouth 2 times daily        lifitegrast (XIIDRA) 5 % opthalmic solution Place 1 drop into both eyes 2 times daily       liothyronine (CYTOMEL) 25 MCG tablet Take 50 mcg by mouth 2 times daily        methylcellulose (CITRUCEL) powder Start with 1 heaping tablespoon. Increase as needed, 1 heaping tablespoon at a time, up to 3 times per day. 479 g 3     metoprolol succinate ER (TOPROL-XL) 50 MG 24 hr tablet Take 50 mg by mouth daily       morphine (MS CONTIN) 30 MG 12 hr tablet Take 30 mg by mouth 2 times daily along with one morphine 60 mg 12 hr tablet for a total dose of 90 mg. 270 tablet 0     morphine (MS CONTIN)  60 MG 12 hr tablet Take 60 mg by mouth 3 times daily  30 tablet 0     multivitamin, therapeutic with minerals (MULTI-VITAMIN) TABS tablet Take 1 tablet by mouth 2 times daily 100 tablet 3     NASONEX 50 MCG/ACT spray Spray 1 spray into both nostrils daily as needed   11     nystatin (MYCOSTATIN) 047365 UNIT/GM POWD Apply topically 3 times daily as needed 60 g 1     prednisoLONE acetate (PRED FORTE) 1 % ophthalmic susp 1 drop 4 times daily       predniSONE (DELTASONE) 1 MG tablet Use along with one prednisone 5 mg tablets to alternate taking 8mg and 10mg every other day.  2     predniSONE (DELTASONE) 5 MG tablet Alternate taking 8mg and 10mg every other day       probiotic CAPS Take 1 capsule by mouth 2 times daily       rivaroxaban ANTICOAGULANT (XARELTO) 20 MG TABS tablet Take 20 mg by mouth every morning        vitamin D2 (ERGOCALCIFEROL) 23656 units (1250 mcg) capsule Take 50,000 Units by mouth once a week       zolpidem (AMBIEN) 5 MG tablet Take 5 mg by mouth nightly as needed for sleep       CEPHALEXIN PO Take 500 mg by mouth as needed (Dental Procedure) Take 4 caps 1 hour prior to Dental Appointment         Allergies:  Allergies   Allergen Reactions     Chlorhexidine Hives     Thor surgical cleanser     Codeine Hives     Ibuprofen [Nsaids] Hives     Penicillins Hives     Other reaction(s): Unknown     Sulfa Drugs Hives     Other reaction(s): Unknown     Doxycycline GI Disturbance     Emesis & diarrhea  Patient denies allergy to this med     Mold      Mushroom      Red Wine Complex [Red Wine Powder]        Social history:   Social History     Tobacco Use     Smoking status: Former Smoker     Packs/day: 1.50     Years: 20.00     Pack years: 30.00     Types: Cigarettes     Start date: 1973     Last attempt to quit: 1993     Years since quittin.4     Smokeless tobacco: Never Used   Substance Use Topics     Alcohol use: No     Alcohol/week: 0.0 standard drinks     Marital status: .    ROS:  A  "complete review of systems was performed with the patient and all systems negative except as per HPI.    Physical Examination:  Exam was chaperoned by Elle Negro LPN  BP (!) 155/107 (BP Location: Left arm, Patient Position: Sitting, Cuff Size: Adult Large)   Pulse 100   Ht 1.727 m (5' 8\")   Wt 103 kg (227 lb)   BMI 34.52 kg/m    General: Well hydrated. No acute distress.  Abdomen: Soft, NT.  Perianal external examination:  Perianal skin: Intact with no excoriation or lichenification.  Lesions: No evidence of an external lesion, nodularity, or induration in the perianal region.  Eversion of buttocks: There was not evidence of an anal fissure. Details: N/A.  Skin tags or external hemorrhoids: right anterior tag.  Digital rectal examination: Was performed.   Sphincter tone: Good.  Palpable lesions: No.    Anoscopy: Was performed.   Hemorrhoids: Yes. Right posterior mildly enlarged; right anterior enlarged, injected; left lateral enlarged, injected  Lesions: No    Laboratory values reviewed:  Recent Labs   Lab Test 01/23/20  0620  01/22/20  1431   WBC 5.3   < > 8.2   HGB 10.3*   < > 11.5*      < > 236   CR 0.64  --  0.89   ALBUMIN 3.1*  --  3.7   BILITOTAL 0.8  --  0.4   ALKPHOS 99  --  123   ALT 35  --  39   AST 26  --  29   INR  --   --  1.63*    < > = values in this interval not displayed.       ASSESSMENT  60F with history of a fib and blood clots on Xarelto with intermittently bleeding enlarged internal hemorrhoids (RA and LL >> RP). These have not improved with conservative management. Given anticoagulation, she is not a candidate for banding. Discussed hemorrhoidectomy with patient. She will need to be off Xarelto for 24 hours prior, and 4-5 days after surgery. Risks, benefits, and alternatives of operative treatment were thoroughly discussed with the patient, she understands these well and agrees to proceed.    PLAN  1. EUA with hemorrhoidectomy  2. PAC, no labs. Enema prep  3. Hold Xarelto at least " 24 hours prior or as per PAC. Will be able to restart 4-5 days after surgery    Time spent: 20 minutes.  >50% spent in discussing, counseling and coordinating care.    Luis Canales M.D    Division of Colon and Rectal Surgery  [facility]    Referring Provider:  ANITRA Chavez CNP  [address] [facility]  [place] [address]     Primary Care Provider:  Sarahi Vivar, thank you for allowing me to participate in the care of your patient.      Sincerely,    Luis Canales MD

## 2020-06-12 NOTE — PROGRESS NOTES
Colon and Rectal Surgery Clinic Note    RE: Aspen EPPS [employer].  : 1960.  MAGNUS: 2020.    Reason for visit: Hemorrhoids.    HPI: 60F with history of afib and blood clots on Xarelto with intermittent rectal bleeding for several years. She was seen by Pearl Garduno NP in February where grade 3 internal hemorrhoids were noted.     She reports chronic diarrhea since cholecystectomy in . She has tried fiber and other bowel agents without improvement. Continues to have bleeding, most recently last week.    Last colonoscopy was in 2018 with diverticulosis only.  She is adopted and does not know her family history.    Medical history:  Past Medical History:   Diagnosis Date     ADHD (attention deficit hyperactivity disorder)      Allergic rhinitis      Chronic low back pain      Cushing's syndrome (H)      DDD (degenerative disc disease)      DDD (degenerative disc disease)      Deviated nasal septum      Diarrhea      Endometriosis      Fibromyalgia      Hashimoto's disease      Hyperlipidemia      Hypothyroid     hx hashimoto's thyroiditis     Insomnia      Lupus (H)      Malabsorption      Pernicious anemia      Renal disease     chronic renal insufficiency     Renal disease     hx renal failure     Sinusitis        Surgical history:  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)     APPENDECTOMY       ARTHRODESIS ANKLE      right     ARTHROPLASTY KNEE BILATERAL       ARTHROPLASTY REVISION KNEE  2011    Procedure:ARTHROPLASTY REVISION KNEE; With Antibiotic Cement ; Surgeon:CHAO OLIVARES; [facility]     BREAST SURGERY      right- tissue remove nipple area     BUNIONECTOMY  2011    Procedure:BUNIONECTOMY; Right Bunion Correction; Surgeon:GRACE ZARATE; Location:[facility] STOMACH SURGERY PROCEDURE UNLISTED      see list which we will bring     CHOLECYSTECTOMY       EXCISE MASS UPPER  EXTREMITY  12/14/2011    Procedure:EXCISE MASS UPPER EXTREMITY; Excision of Left Arm Mass; Surgeon:GRACE ZARATE; Location:[facility]     FOOT SURGERY      left X 4     FUSION LUMBAR ANTERIOR ONE LEVEL       HC SACROPLASTY      see list which we will bring     INJECT NERVE BLOCK SUPRASCAPULAR Left 5/18/2018    Procedure: INJECT NERVE BLOCK SUPRASCAPULAR;  Left Suprascapular Nerve Block;  Surgeon: Basim Velazquez MD;  [facility]     INJECT NERVE BLOCK SUPRASCAPULAR Right 7/23/2018    Procedure: INJECT NERVE BLOCK SUPRASCAPULAR;  Right suprascapular injection;  Surgeon: Basim Velazquez MD;  [facility]     INJECT NERVE BLOCK SUPRASCAPULAR Bilateral 10/29/2018    Procedure: Bilateral Suprascapular Nerve Blocks;  Surgeon: Basim Velazquez MD;  [facility]     INJECT NERVE BLOCK SUPRASCAPULAR Bilateral 3/15/2019    Procedure: Bilateral Suprascapular Nerve Block;  Surgeon: Basim Velazquez MD;  [facility]     INJECT NERVE BLOCK SUPRASCAPULAR Bilateral 12/31/2019    Procedure: bilateral suprascapular nerve block;  Surgeon: Basim Velazquez MD;  [facility]     KNEE SURGERY      see list which we will bring     LAMINECTOMY LUMBAR ONE LEVEL      L4-5     LAPAROSCOPIC ABLATION ENDOMETRIOSIS       RADIO FREQUENCY ABLATION PULSED CERVICAL Bilateral 7/10/2019    Procedure: Bilateral Suprascapular Nerve Pulsed Radiofrequency Ablation;  Surgeon: Basim Velazquez MD;  Location:  OR     REMOVE HARDWARE FOOT  12/14/2011    Procedure:REMOVE HARDWARE FOOT; Hardware Removal Right Foot (Mini-C-Arm) ; Surgeon:GRACE ZARATE; Location:[facility]     RHINOPLASTY       SALPINGO-OOPHORECTOMY BILATERAL       TONSILLECTOMY         Family history:  Family History   Problem Relation Age of Onset     Hypertension Mother        Medications:  Current Outpatient Medications   Medication Sig Dispense Refill     BIOTIN FORTE PO Take 1 tablet by mouth daily        carboxymethylcellulose  (CARBOXYMETHYLCELLULOSE SODIUM) 0.5 % SOLN ophthalmic solution Place 1 drop into both eyes 2 times daily 1 Bottle      cholestyramine (QUESTRAN) 4 GM/DOSE powder Take 4 g by mouth 2 times daily (with meals) 378 g 4     cyanocobalamin 1000 MCG/ML injection Inject 1 mL (1,000 mcg) Subcutaneous every 7 days 4 mL 5     cyclobenzaprine (FLEXERIL) 10 MG tablet Take 1 tablet (10 mg) by mouth 3 times daily 90 tablet 3     cycloSPORINE (RESTASIS) 0.05 % ophthalmic emulsion Place 1 drop into both eyes 2 times daily       fexofenadine (ALLEGRA) 180 MG tablet Take 180 mg by mouth daily.       gabapentin (NEURONTIN) 300 MG capsule Take 300 mg by mouth 2 times daily        gabapentin (NEURONTIN) 800 MG tablet Take 800 mg by mouth 3 times daily       HYDROmorphone (DILAUDID) 8 MG tablet Take 8 mg by mouth every 6 hours as needed for severe pain . Max of 3 doses per day.       levothyroxine (SYNTHROID/LEVOTHROID) 100 MCG SOLR injection Inject 200 mcg into the vein twice a week        levothyroxine (SYNTHROID/LEVOTHROID) 300 MCG tablet Take 300 mcg by mouth 2 times daily        lifitegrast (XIIDRA) 5 % opthalmic solution Place 1 drop into both eyes 2 times daily       liothyronine (CYTOMEL) 25 MCG tablet Take 50 mcg by mouth 2 times daily        methylcellulose (CITRUCEL) powder Start with 1 heaping tablespoon. Increase as needed, 1 heaping tablespoon at a time, up to 3 times per day. 479 g 3     metoprolol succinate ER (TOPROL-XL) 50 MG 24 hr tablet Take 50 mg by mouth daily       morphine (MS CONTIN) 30 MG 12 hr tablet Take 30 mg by mouth 2 times daily along with one morphine 60 mg 12 hr tablet for a total dose of 90 mg. 270 tablet 0     morphine (MS CONTIN) 60 MG 12 hr tablet Take 60 mg by mouth 3 times daily  30 tablet 0     multivitamin, therapeutic with minerals (MULTI-VITAMIN) TABS tablet Take 1 tablet by mouth 2 times daily 100 tablet 3     NASONEX 50 MCG/ACT spray Spray 1 spray into both nostrils daily as needed   11      nystatin (MYCOSTATIN) 638533 UNIT/GM POWD Apply topically 3 times daily as needed 60 g 1     prednisoLONE acetate (PRED FORTE) 1 % ophthalmic susp 1 drop 4 times daily       predniSONE (DELTASONE) 1 MG tablet Use along with one prednisone 5 mg tablets to alternate taking 8mg and 10mg every other day.  2     predniSONE (DELTASONE) 5 MG tablet Alternate taking 8mg and 10mg every other day       probiotic CAPS Take 1 capsule by mouth 2 times daily       rivaroxaban ANTICOAGULANT (XARELTO) 20 MG TABS tablet Take 20 mg by mouth every morning        vitamin D2 (ERGOCALCIFEROL) 00668 units (1250 mcg) capsule Take 50,000 Units by mouth once a week       zolpidem (AMBIEN) 5 MG tablet Take 5 mg by mouth nightly as needed for sleep       CEPHALEXIN PO Take 500 mg by mouth as needed (Dental Procedure) Take 4 caps 1 hour prior to Dental Appointment         Allergies:  Allergies   Allergen Reactions     Chlorhexidine Hives     Thor surgical cleanser     Codeine Hives     Ibuprofen [Nsaids] Hives     Penicillins Hives     Other reaction(s): Unknown     Sulfa Drugs Hives     Other reaction(s): Unknown     Doxycycline GI Disturbance     Emesis & diarrhea  Patient denies allergy to this med     Mold      Mushroom      Red Wine Complex [Red Wine Powder]        Social history:   Social History     Tobacco Use     Smoking status: Former Smoker     Packs/day: 1.50     Years: 20.00     Pack years: 30.00     Types: Cigarettes     Start date: 1973     Last attempt to quit: 1993     Years since quittin.4     Smokeless tobacco: Never Used   Substance Use Topics     Alcohol use: No     Alcohol/week: 0.0 standard drinks     Marital status: .    ROS:  A complete review of systems was performed with the patient and all systems negative except as per HPI.    Physical Examination:  Exam was chaperoned by Elle Negro LPN  BP (!) 155/107 (BP Location: Left arm, Patient Position: Sitting, Cuff Size: Adult Large)   Pulse 100   Ht  "1.727 m (5' 8\")   Wt 103 kg (227 lb)   BMI 34.52 kg/m    General: Well hydrated. No acute distress.  Abdomen: Soft, NT.  Perianal external examination:  Perianal skin: Intact with no excoriation or lichenification.  Lesions: No evidence of an external lesion, nodularity, or induration in the perianal region.  Eversion of buttocks: There was not evidence of an anal fissure. Details: N/A.  Skin tags or external hemorrhoids: right anterior tag.  Digital rectal examination: Was performed.   Sphincter tone: Good.  Palpable lesions: No.    Anoscopy: Was performed.   Hemorrhoids: Yes. Right posterior mildly enlarged; right anterior enlarged, injected; left lateral enlarged, injected  Lesions: No    Laboratory values reviewed:  Recent Labs   Lab Test 01/23/20  0620  01/22/20  1431   WBC 5.3   < > 8.2   HGB 10.3*   < > 11.5*      < > 236   CR 0.64  --  0.89   ALBUMIN 3.1*  --  3.7   BILITOTAL 0.8  --  0.4   ALKPHOS 99  --  123   ALT 35  --  39   AST 26  --  29   INR  --   --  1.63*    < > = values in this interval not displayed.       ASSESSMENT  60F with history of a fib and blood clots on Xarelto with intermittently bleeding enlarged internal hemorrhoids (RA and LL >> RP). These have not improved with conservative management. Given anticoagulation, she is not a candidate for banding. Discussed hemorrhoidectomy with patient. She will need to be off Xarelto for 24 hours prior, and 4-5 days after surgery. Risks, benefits, and alternatives of operative treatment were thoroughly discussed with the patient, she understands these well and agrees to proceed.    PLAN  1. EUA with hemorrhoidectomy  2. PAC, no labs. Enema prep  3. Hold Xarelto at least 24 hours prior or as per PAC. Will be able to restart 4-5 days after surgery    Time spent: 20 minutes.  >50% spent in discussing, counseling and coordinating care.    Luis Canales M.D    Division of Colon and Rectal Surgery  [facility]    Referring Provider:  ANITRA Chavez CNP  [address] [facility] [address]  [place] [address]     Primary Care Provider:  Sarahi Vivar

## 2020-06-17 ENCOUNTER — TELEPHONE (OUTPATIENT)
Dept: SURGERY | Facility: CLINIC | Age: 60
End: 2020-06-17

## 2020-06-17 NOTE — TELEPHONE ENCOUNTER
As per the instructions in the chart, phone call was 1st made to the cell # on file, which is patient's  Ash. No answer, left voicemail message for return call.    Also called the home number on-file, left voicemail message for return call.

## 2020-06-18 NOTE — TELEPHONE ENCOUNTER
6/18/2020: Left voicemail messages for return call on Ash's cell (as per instructions in chart), and also on the home number.

## 2020-06-19 DIAGNOSIS — Z11.59 ENCOUNTER FOR SCREENING FOR OTHER VIRAL DISEASES: Primary | ICD-10-CM

## 2020-06-19 PROBLEM — K64.2 GRADE III INTERNAL HEMORRHOIDS: Status: ACTIVE | Noted: 2020-06-19

## 2020-06-19 NOTE — TELEPHONE ENCOUNTER
Patient is scheduled for surgery with Dr. Canales    Spoke with patient's  Ash, as per patient's request    Date of Surgery: July 15, 2020    [facility]: [facility]    Informed patient they will need an adult  Yes    H&P: Scheduled with [facility], Dr. Arcadio Luis    Additional imaging/appointments:     Post-op appointment with Dr. Canales on 7/29 at 12:30 pm    Surgery packet: will mail (patient does not use MyChart)     Additional comments: hold Xarelto (see Dr. Canales's Office Visit note

## 2020-07-12 DIAGNOSIS — Z11.59 ENCOUNTER FOR SCREENING FOR OTHER VIRAL DISEASES: ICD-10-CM

## 2020-07-12 PROCEDURE — U0003 INFECTIOUS AGENT DETECTION BY NUCLEIC ACID (DNA OR RNA); SEVERE ACUTE RESPIRATORY SYNDROME CORONAVIRUS 2 (SARS-COV-2) (CORONAVIRUS DISEASE [COVID-19]), AMPLIFIED PROBE TECHNIQUE, MAKING USE OF HIGH THROUGHPUT TECHNOLOGIES AS DESCRIBED BY CMS-2020-01-R: HCPCS | Performed by: COLON & RECTAL SURGERY

## 2020-07-13 LAB
SARS-COV-2 RNA SPEC QL NAA+PROBE: NOT DETECTED
SPECIMEN SOURCE: NORMAL

## 2020-07-14 ENCOUNTER — ANESTHESIA EVENT (OUTPATIENT)
Dept: SURGERY | Facility: AMBULATORY SURGERY CENTER | Age: 60
End: 2020-07-14

## 2020-07-14 ASSESSMENT — ENCOUNTER SYMPTOMS: DYSRHYTHMIAS: 1

## 2020-07-14 NOTE — ANESTHESIA PREPROCEDURE EVALUATION
Anesthesia Pre-Procedure Evaluation    Patient: Aspen Mccullough   MRN:     [id number] Gender:   female   Age:    60 year old :      1960        Preoperative Diagnosis: Grade III internal hemorrhoids [K64.2]   Procedure(s):  Exam under anesthesia, hemorrhoidectomy  EXAM UNDER ANESTHESIA, ANUS     LABS:  CBC:   Lab Results   Component Value Date    WBC 5.3 2020    WBC 6.8 2020    HGB 10.3 (L) 2020    HGB 10.6 (L) 2020    HCT 32.6 (L) 2020    HCT 33.8 (L) 2020     2020     2020     BMP:   Lab Results   Component Value Date     2020     2020    POTASSIUM 3.5 2020    POTASSIUM 4.2 2020    CHLORIDE 110 (H) 2020    CHLORIDE 102 2020    CO2 28 2020    CO2 31 2020    BUN 20 2020    BUN 38 (H) 2020    CR 0.64 2020    CR 0.89 2020     (H) 2020    GLC 96 2020     COAGS:   Lab Results   Component Value Date    PTT 48 (H) 2020    INR 1.63 (H) 2020     POC:   Lab Results   Component Value Date     (H) 2011     OTHER:   Lab Results   Component Value Date    LACT 0.7 2020    A1C 6.0 (H) 2019    KYLIE 8.4 (L) 2020    MAG 2.4 (H) 2019    ALBUMIN 3.1 (L) 2020    PROTTOTAL 6.2 (L) 2020    ALT 35 2020    AST 26 2020    ALKPHOS 99 2020    BILITOTAL 0.8 2020    SOL <10 (L) 2018    TSH 0.28 (L) 2019    T4 1.44 2019    CRP 10.0 (H) 2018    SED 13 2018        Preop Vitals    BP Readings from Last 3 Encounters:   20 (!) 155/107   20 (!) 156/108   20 118/54    Pulse Readings from Last 3 Encounters:   20 100   20 100   20 88      Resp Readings from Last 3 Encounters:   20 16   19 16   19 16    SpO2 Readings from Last 3 Encounters:   20 97%   20 94%   19 96%      Temp Readings from Last 1  "Encounters:   01/23/20 36.7  C (98  F) (Oral)    Ht Readings from Last 1 Encounters:   06/12/20 1.727 m (5' 8\")      Wt Readings from Last 1 Encounters:   06/12/20 103 kg (227 lb)    Estimated body mass index is 34.52 kg/m  as calculated from the following:    Height as of 6/12/20: 1.727 m (5' 8\").    Weight as of 6/12/20: 103 kg (227 lb).     LDA:  Peripheral IV 01/22/20 Right (Active)   Number of days: 174        Past Medical History:   Diagnosis Date     ADHD (attention deficit hyperactivity disorder)      Allergic rhinitis      Chronic low back pain      Cushing's syndrome (H)      DDD (degenerative disc disease)      DDD (degenerative disc disease)      Deviated nasal septum      Diarrhea      Endometriosis      Fibromyalgia      Hashimoto's disease      Hyperlipidemia      Hypothyroid     hx hashimoto's thyroiditis     Insomnia      Lupus (H)      Malabsorption      Pernicious anemia      Renal disease     chronic renal insufficiency     Renal disease     hx renal failure     Sinusitis       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)     APPENDECTOMY       ARTHRODESIS ANKLE      right     ARTHROPLASTY KNEE BILATERAL       ARTHROPLASTY REVISION KNEE  4/19/2011    Procedure:ARTHROPLASTY REVISION KNEE; With Antibiotic Cement ; Surgeon:CHAO OLIVARES; Location:[facility]     BREAST SURGERY      right- tissue remove nipple area     BUNIONECTOMY  12/14/2011    Procedure:BUNIONECTOMY; Right Bunion Correction; Surgeon:GRACE ZARATE; Location:[facility]     C STOMACH SURGERY PROCEDURE UNLISTED      see list which we will bring     CHOLECYSTECTOMY       EXCISE MASS UPPER EXTREMITY  12/14/2011    Procedure:EXCISE MASS UPPER EXTREMITY; Excision of Left Arm Mass; Surgeon:GRACE ZARATE; Location:[facility]     FOOT SURGERY      left X 4     FUSION LUMBAR ANTERIOR ONE LEVEL       HC SACROPLASTY      see list which we will bring     INJECT " NERVE BLOCK SUPRASCAPULAR Left 5/18/2018    Procedure: INJECT NERVE BLOCK SUPRASCAPULAR;  Left Suprascapular Nerve Block;  Surgeon: Basim Velazquez MD;  Location: [facility]     INJECT NERVE BLOCK SUPRASCAPULAR Right 7/23/2018    Procedure: INJECT NERVE BLOCK SUPRASCAPULAR;  Right suprascapular injection;  Surgeon: Basim Velazquez MD;  Location: [facility]     INJECT NERVE BLOCK SUPRASCAPULAR Bilateral 10/29/2018    Procedure: Bilateral Suprascapular Nerve Blocks;  Surgeon: Basim Velazquez MD;  Location: [facility]     INJECT NERVE BLOCK SUPRASCAPULAR Bilateral 3/15/2019    Procedure: Bilateral Suprascapular Nerve Block;  Surgeon: Basim Velazquez MD;  Location: [facility]     INJECT NERVE BLOCK SUPRASCAPULAR Bilateral 12/31/2019    Procedure: bilateral suprascapular nerve block;  Surgeon: Basim Velazquez MD;  Location: [facility]     KNEE SURGERY      see list which we will bring     LAMINECTOMY LUMBAR ONE LEVEL      L4-5     LAPAROSCOPIC ABLATION ENDOMETRIOSIS       RADIO FREQUENCY ABLATION PULSED CERVICAL Bilateral 7/10/2019    Procedure: Bilateral Suprascapular Nerve Pulsed Radiofrequency Ablation;  Surgeon: Basim Velazquez MD;  Location:  OR     REMOVE HARDWARE FOOT  12/14/2011    Procedure:REMOVE HARDWARE FOOT; Hardware Removal Right Foot (Mini-C-Arm) ; Surgeon:GRACE ZARATE; Location[facility]     RHINOPLASTY       SALPINGO-OOPHORECTOMY BILATERAL       TONSILLECTOMY        Allergies   Allergen Reactions     Chlorhexidine Hives     Thor surgical cleanser     Codeine Hives     Ibuprofen [Nsaids] Hives     Penicillins Hives     Other reaction(s): Unknown     Sulfa Drugs Hives     Other reaction(s): Unknown     Doxycycline GI Disturbance     Emesis & diarrhea  Patient denies allergy to this med     Mold      Mushroom      Red Wine Complex [Red Wine Powder]         Anesthesia Evaluation     . Pt has had prior anesthetic. Type: General    History of anesthetic complications    Awareness during TKA and  "tonsillectomy      ROS/MED HX    ENT/Pulmonary:  - neg pulmonary ROS     Neurologic: Comment: History states R facial droop, but patient denies this history and I don't appreciate it    (+)neuropathy - Right leg with swelling, decreased sensation in last 3 toes,    (-) seizures and CVA   Cardiovascular:     (+) hypertension-range: BP 140s/80-90s on PCP and H&P visits, ---. : . . . :. dysrhythmias a-fib, . Previous cardiac testing Echodate:2017results:CareEverywhere:  \"Interpretation Summary    * Limited echocardiogram performed due to poor patient cooperation with  testing.    * The left ventricle is normal size.    * The left ventricular systolic function is normal, estimated LVEF 55-60%.    * Left ventricular wall motion is grossly normal however, endocardial  definition is limited.    * Compared to prior study dated 11/27/2017 (direct [id number] comparison  of images) the large mass previously noted in the RA is not seen and the LVEF  has improved.\"date: results: date: results: date: results:         (-) orthopnea/PND   METS/Exercise Tolerance: Comment: Weights training, stationary bike at gym up until several months ago >4 METS   Hematologic:     (+) History of blood clots pt is anticoagulated, -      Musculoskeletal:         GI/Hepatic:  - neg GI/hepatic ROS      (-) GERD   Renal/Genitourinary:         Endo:     (+) thyroid problem (12/2019: Free T4 wnl, TSH low) hypothyroidism, .      Psychiatric:         Infectious Disease:         Malignancy:         Other: Comment: 240 mg MS Contin/day + hydromorphone 24mg/day.   (+) H/O Chronic Pain,H/O chronic opiod use ,                        PHYSICAL EXAM:   Mental Status/Neuro: A/A/O   Airway: Facies: Feasible  Mallampati: I  Mouth/Opening: Full  TM distance: > 6 cm  Neck ROM: Full   Respiratory: Auscultation: CTAB     Resp. Rate: Normal     Resp. Effort: Normal      CV: Rhythm: Regular  Rate: Age appropriate  Heart: Normal Sounds  Edema: None   Comments:  "     Dental: Normal Dentition                Assessment:   ASA SCORE: 3    H&P: History and physical reviewed and following examination; no interval change.   Smoking Status:  Non-Smoker/Unknown   NPO Status: NPO Appropriate     Plan:   Anes. Type:  MAC   Pre-Medication: None   Induction:  N/a   Airway: Native Airway   Access/Monitoring: PIV   Maintenance: N/a     Postop Plan:   Postop Pain: None  Postop Sedation/Airway: Not planned  Disposition: Outpatient     PONV Management:   Adult Risk Factors: Female, Non-Smoker   Prevention: Ondansetron     CONSENT: Direct conversation   Plan and risks discussed with: Patient          Comments for Plan/Consent:  Discussed plan for MAC, including risk of aspiration pneumonia, backup plan of general anesthesia and risks of general anesthesia, including sore throat/hoarse voice, abrasions/damage to lips/tongue/teeth, nausea, rare complications (including medication reactions, cardiac, pulmonary).                     Harmony Bai MD

## 2020-07-15 ENCOUNTER — TELEPHONE (OUTPATIENT)
Dept: SURGERY | Facility: CLINIC | Age: 60
End: 2020-07-15

## 2020-07-15 ENCOUNTER — ANESTHESIA (OUTPATIENT)
Dept: SURGERY | Facility: AMBULATORY SURGERY CENTER | Age: 60
End: 2020-07-15

## 2020-07-15 ENCOUNTER — HOSPITAL ENCOUNTER (OUTPATIENT)
Facility: AMBULATORY SURGERY CENTER | Age: 60
End: 2020-07-15
Attending: COLON & RECTAL SURGERY
Payer: COMMERCIAL

## 2020-07-15 ENCOUNTER — DOCUMENTATION ONLY (OUTPATIENT)
Dept: SURGERY | Facility: CLINIC | Age: 60
End: 2020-07-15

## 2020-07-15 VITALS
TEMPERATURE: 98.1 F | SYSTOLIC BLOOD PRESSURE: 113 MMHG | OXYGEN SATURATION: 96 % | RESPIRATION RATE: 16 BRPM | HEART RATE: 80 BPM | DIASTOLIC BLOOD PRESSURE: 62 MMHG

## 2020-07-15 DIAGNOSIS — G89.18 ACUTE POST-OPERATIVE PAIN: ICD-10-CM

## 2020-07-15 DIAGNOSIS — K64.2 GRADE III INTERNAL HEMORRHOIDS: Primary | ICD-10-CM

## 2020-07-15 DIAGNOSIS — K64.2 GRADE III INTERNAL HEMORRHOIDS: ICD-10-CM

## 2020-07-15 PROCEDURE — 88304 TISSUE EXAM BY PATHOLOGIST: CPT | Performed by: COLON & RECTAL SURGERY

## 2020-07-15 RX ORDER — DEXAMETHASONE SODIUM PHOSPHATE 4 MG/ML
4 INJECTION, SOLUTION INTRA-ARTICULAR; INTRALESIONAL; INTRAMUSCULAR; INTRAVENOUS; SOFT TISSUE EVERY 10 MIN PRN
Status: DISCONTINUED | OUTPATIENT
Start: 2020-07-15 | End: 2020-07-16 | Stop reason: HOSPADM

## 2020-07-15 RX ORDER — OXYCODONE HYDROCHLORIDE 5 MG/1
5-10 TABLET ORAL EVERY 4 HOURS PRN
Status: DISCONTINUED | OUTPATIENT
Start: 2020-07-15 | End: 2020-07-16 | Stop reason: HOSPADM

## 2020-07-15 RX ORDER — FENTANYL CITRATE 50 UG/ML
25-50 INJECTION, SOLUTION INTRAMUSCULAR; INTRAVENOUS
Status: DISCONTINUED | OUTPATIENT
Start: 2020-07-15 | End: 2020-07-15 | Stop reason: HOSPADM

## 2020-07-15 RX ORDER — KETAMINE HYDROCHLORIDE 10 MG/ML
INJECTION, SOLUTION INTRAMUSCULAR; INTRAVENOUS PRN
Status: DISCONTINUED | OUTPATIENT
Start: 2020-07-15 | End: 2020-07-15

## 2020-07-15 RX ORDER — ONDANSETRON 4 MG/1
4 TABLET, ORALLY DISINTEGRATING ORAL EVERY 30 MIN PRN
Status: DISCONTINUED | OUTPATIENT
Start: 2020-07-15 | End: 2020-07-16 | Stop reason: HOSPADM

## 2020-07-15 RX ORDER — SODIUM CHLORIDE, SODIUM LACTATE, POTASSIUM CHLORIDE, CALCIUM CHLORIDE 600; 310; 30; 20 MG/100ML; MG/100ML; MG/100ML; MG/100ML
INJECTION, SOLUTION INTRAVENOUS CONTINUOUS
Status: DISCONTINUED | OUTPATIENT
Start: 2020-07-15 | End: 2020-07-16 | Stop reason: HOSPADM

## 2020-07-15 RX ORDER — HYDROMORPHONE HYDROCHLORIDE 1 MG/ML
.3-.5 INJECTION, SOLUTION INTRAMUSCULAR; INTRAVENOUS; SUBCUTANEOUS EVERY 10 MIN PRN
Status: DISCONTINUED | OUTPATIENT
Start: 2020-07-15 | End: 2020-07-16 | Stop reason: HOSPADM

## 2020-07-15 RX ORDER — BUPIVACAINE HYDROCHLORIDE AND EPINEPHRINE 2.5; 5 MG/ML; UG/ML
INJECTION, SOLUTION INFILTRATION; PERINEURAL PRN
Status: DISCONTINUED | OUTPATIENT
Start: 2020-07-15 | End: 2020-07-15 | Stop reason: HOSPADM

## 2020-07-15 RX ORDER — LIDOCAINE 40 MG/G
CREAM TOPICAL
Status: DISCONTINUED | OUTPATIENT
Start: 2020-07-15 | End: 2020-07-15 | Stop reason: HOSPADM

## 2020-07-15 RX ORDER — SODIUM CHLORIDE, SODIUM LACTATE, POTASSIUM CHLORIDE, CALCIUM CHLORIDE 600; 310; 30; 20 MG/100ML; MG/100ML; MG/100ML; MG/100ML
INJECTION, SOLUTION INTRAVENOUS CONTINUOUS
Status: DISCONTINUED | OUTPATIENT
Start: 2020-07-15 | End: 2020-07-15 | Stop reason: HOSPADM

## 2020-07-15 RX ORDER — MEPERIDINE HYDROCHLORIDE 25 MG/ML
12.5 INJECTION INTRAMUSCULAR; INTRAVENOUS; SUBCUTANEOUS
Status: DISCONTINUED | OUTPATIENT
Start: 2020-07-15 | End: 2020-07-16 | Stop reason: HOSPADM

## 2020-07-15 RX ORDER — ACETAMINOPHEN 325 MG/1
975 TABLET ORAL ONCE
Status: DISCONTINUED | OUTPATIENT
Start: 2020-07-15 | End: 2020-07-15 | Stop reason: HOSPADM

## 2020-07-15 RX ORDER — OXYCODONE HYDROCHLORIDE 5 MG/1
5 TABLET ORAL
Status: DISCONTINUED | OUTPATIENT
Start: 2020-07-15 | End: 2020-07-16 | Stop reason: HOSPADM

## 2020-07-15 RX ORDER — NALOXONE HYDROCHLORIDE 0.4 MG/ML
.1-.4 INJECTION, SOLUTION INTRAMUSCULAR; INTRAVENOUS; SUBCUTANEOUS
Status: DISCONTINUED | OUTPATIENT
Start: 2020-07-15 | End: 2020-07-16 | Stop reason: HOSPADM

## 2020-07-15 RX ORDER — ACETAMINOPHEN 325 MG/1
975 TABLET ORAL ONCE
Status: COMPLETED | OUTPATIENT
Start: 2020-07-15 | End: 2020-07-15

## 2020-07-15 RX ORDER — GABAPENTIN 300 MG/1
300 CAPSULE ORAL ONCE
Status: DISCONTINUED | OUTPATIENT
Start: 2020-07-15 | End: 2020-07-15 | Stop reason: HOSPADM

## 2020-07-15 RX ORDER — FENTANYL CITRATE 50 UG/ML
25-50 INJECTION, SOLUTION INTRAMUSCULAR; INTRAVENOUS
Status: DISCONTINUED | OUTPATIENT
Start: 2020-07-15 | End: 2020-07-16 | Stop reason: HOSPADM

## 2020-07-15 RX ORDER — ONDANSETRON 2 MG/ML
4 INJECTION INTRAMUSCULAR; INTRAVENOUS EVERY 30 MIN PRN
Status: DISCONTINUED | OUTPATIENT
Start: 2020-07-15 | End: 2020-07-16 | Stop reason: HOSPADM

## 2020-07-15 RX ORDER — LIDOCAINE HYDROCHLORIDE 20 MG/ML
INJECTION, SOLUTION INFILTRATION; PERINEURAL PRN
Status: DISCONTINUED | OUTPATIENT
Start: 2020-07-15 | End: 2020-07-15

## 2020-07-15 RX ORDER — PROPOFOL 10 MG/ML
INJECTION, EMULSION INTRAVENOUS CONTINUOUS PRN
Status: DISCONTINUED | OUTPATIENT
Start: 2020-07-15 | End: 2020-07-15

## 2020-07-15 RX ORDER — ONDANSETRON 2 MG/ML
INJECTION INTRAMUSCULAR; INTRAVENOUS PRN
Status: DISCONTINUED | OUTPATIENT
Start: 2020-07-15 | End: 2020-07-15

## 2020-07-15 RX ORDER — ONDANSETRON 4 MG/1
4 TABLET, ORALLY DISINTEGRATING ORAL
Status: DISCONTINUED | OUTPATIENT
Start: 2020-07-15 | End: 2020-07-16 | Stop reason: HOSPADM

## 2020-07-15 RX ORDER — ALBUTEROL SULFATE 0.83 MG/ML
2.5 SOLUTION RESPIRATORY (INHALATION) EVERY 4 HOURS PRN
Status: DISCONTINUED | OUTPATIENT
Start: 2020-07-15 | End: 2020-07-15 | Stop reason: HOSPADM

## 2020-07-15 RX ORDER — OXYCODONE HYDROCHLORIDE 5 MG/1
5 TABLET ORAL EVERY 6 HOURS PRN
Qty: 15 TABLET | Refills: 0 | Status: SHIPPED | OUTPATIENT
Start: 2020-07-15 | End: 2020-07-19

## 2020-07-15 RX ORDER — SODIUM CHLORIDE, SODIUM LACTATE, POTASSIUM CHLORIDE, CALCIUM CHLORIDE 600; 310; 30; 20 MG/100ML; MG/100ML; MG/100ML; MG/100ML
INJECTION, SOLUTION INTRAVENOUS CONTINUOUS PRN
Status: DISCONTINUED | OUTPATIENT
Start: 2020-07-15 | End: 2020-07-15

## 2020-07-15 RX ORDER — GLYCOPYRROLATE 0.2 MG/ML
INJECTION, SOLUTION INTRAMUSCULAR; INTRAVENOUS PRN
Status: DISCONTINUED | OUTPATIENT
Start: 2020-07-15 | End: 2020-07-15

## 2020-07-15 RX ADMIN — ACETAMINOPHEN 975 MG: 325 TABLET ORAL at 10:38

## 2020-07-15 RX ADMIN — SODIUM CHLORIDE, SODIUM LACTATE, POTASSIUM CHLORIDE, CALCIUM CHLORIDE: 600; 310; 30; 20 INJECTION, SOLUTION INTRAVENOUS at 10:40

## 2020-07-15 RX ADMIN — SODIUM CHLORIDE, SODIUM LACTATE, POTASSIUM CHLORIDE, CALCIUM CHLORIDE: 600; 310; 30; 20 INJECTION, SOLUTION INTRAVENOUS at 11:21

## 2020-07-15 RX ADMIN — PROPOFOL 100 MCG/KG/MIN: 10 INJECTION, EMULSION INTRAVENOUS at 11:30

## 2020-07-15 RX ADMIN — ONDANSETRON 4 MG: 2 INJECTION INTRAMUSCULAR; INTRAVENOUS at 11:52

## 2020-07-15 RX ADMIN — LIDOCAINE HYDROCHLORIDE 80 MG: 20 INJECTION, SOLUTION INFILTRATION; PERINEURAL at 11:30

## 2020-07-15 RX ADMIN — GLYCOPYRROLATE 0.2 MG: 0.2 INJECTION, SOLUTION INTRAMUSCULAR; INTRAVENOUS at 11:24

## 2020-07-15 RX ADMIN — KETAMINE HYDROCHLORIDE 20 MG: 10 INJECTION, SOLUTION INTRAMUSCULAR; INTRAVENOUS at 11:30

## 2020-07-15 ASSESSMENT — ENCOUNTER SYMPTOMS
ORTHOPNEA: 0
SEIZURES: 0

## 2020-07-15 NOTE — ANESTHESIA POSTPROCEDURE EVALUATION
Anesthesia POST Procedure Evaluation    Patient: Aspen Mccullough   MRN:     [id number] Gender:   female   Age:    60 year old :      1960        Preoperative Diagnosis: Grade III internal hemorrhoids [K64.2]   Procedure(s):  Exam under anesthesia, hemorrhoidectomy  EXAM UNDER ANESTHESIA, ANUS   Postop Comments: No value filed.     Anesthesia Type: MAC       Disposition: Outpatient   Postop Pain Control: Uneventful            Sign Out: Well controlled pain   PONV: No   Neuro/Psych: Uneventful            Sign Out: Acceptable/Baseline neuro status   Airway/Respiratory: Uneventful            Sign Out: Acceptable/Baseline resp. status   CV/Hemodynamics: Uneventful            Sign Out: Acceptable CV status   Other NRE: NONE   DID A NON-ROUTINE EVENT OCCUR? No    Event details/Postop Comments:  Doing well. Alert, oriented. No nausea, or problems with pain control.  Patient denies any concerns.            Last Anesthesia Record Vitals:  CRNA VITALS  7/15/2020 1133 - 7/15/2020 1233      7/15/2020             Resp Rate (set):  10          Last PACU Vitals:  Vitals Value Taken Time   BP     Temp     Pulse     Resp     SpO2     Temp src     NIBP 121/76 7/15/2020 11:56 AM   Pulse 85 7/15/2020 12:01 PM   SpO2 95 % 7/15/2020 12:01 PM   Resp     Temp     Ht Rate 85 7/15/2020 11:59 AM   Temp 2           Electronically Signed By: Harmony Bai MD, July 15, 2020, 4:25 PM

## 2020-07-15 NOTE — TELEPHONE ENCOUNTER
Spoke with patient's  Ash with patient in the background, rescheduled patient's post-op appointment as per instructions from Pearl Garduno NP, on 8/5/2020 at 11:30 am.    Sent in basket message to Pearl Garduno NP, updating her.

## 2020-07-15 NOTE — PROGRESS NOTES
Received in basket message from CHELLE Bennett to change patient's provider on post-op 7/29/2020 from him to Pearl Garduno NP. I made the change, keeping the same date and time. [facility] message sent to patient updating her.

## 2020-07-15 NOTE — DISCHARGE INSTRUCTIONS
Anorectal Surgery Instructions    What can I expect after anorectal surgery?  Most anorectal procedures are done as outpatient surgery, and you go home the same day as the procedure. A few surgical procedures will require that you stay in the hospital for about one to three days. No matter where the procedure is done or how long or short it takes, these recommendations will help you heal and feel more comfortable.    Medicines:    You may restart your Xarelto Monday Morning unless otherwise instructed by your anticoagulation team.    The anal area is very sensitive; you can expect to have some pain for up to 2-4 weeks after the procedure. Your doctor will give you a prescription for one or more pain medications.    Take naprosyn 500 mg twice a day OR ibuprofen 600 mg four times a day     Take this on a regular basis (not as needed) following your surgery.     The drugs are best taken with food.  Do not take if it causes stomach upset or if you have a history of ulcers or gastritis. You can stop the naprosyn (or ibuprofen) or reduce the dose when you are feeling better.    DO NOT use naprosyn, ibuprofen, or other similar agents (eg. Advil or Aleve) if you have inflammatory bowel disease (Ulcerative Colitis or Crohn's disease) or if your doctor as advised you against using these medications    Take acetominaphen (Tylenol) 650-1000 mg four times a day.     Take this on a regular basis (not as needed) following surgery for pain control.     Take the lower dose if you are >65 years old or have liver disease. The maximum dose of acetominaphen is 4000 mg a day. You can stop the acetaminophen or reduce the dose when you are feeling better.    It is important to realize that many narcotic pain relievers (including vicodin, percocet, tylenol #3) also have acetaminophen, and excessive doses of acetaminophen can be dangerous, so do not take these in addition to acetominaphen.  You may take narcotics that don't contain  acetominaphen such as oxycodone.      Take oxycodone AS NEEDED in addition to the acetominaphen and naprosyn.      Because narcotics have side effects (including constipation), you should reduce your use of these medications as tolerated as your pain improves.    *In general, the best strategy is to take (if you are able to tolerate it) the tylenol and naprosen on a regular basis until your pain has largely gone away. You can take the narcotic pain medicine as needed in addition to the tylenol and ibuprofen. As your pain begins to lessen, you should cut back on your narcotic use while continuing to take your regular tylenol and naprosyn doses.      Refilling prescriptions. If you need additional pain medication, please call the triage nurse at [phone] during normal business hours (8 a.m. to 4 p.m., Monday though Friday) or have your pharmacy fax a refill request to [phone]. If you call after hours or on the weekends, the doctor on call may not know you personally and may not renew narcotic pain medication by phone. Call your primary care provider for all other medication refills.    Perineal care:    There is a foam plug in your anus to help prevent bleeding. This will fall out on its own or with a bowel movement. You do not need to replace this or pack anything into the anus after.    External gauze dressing can be removed the morning after surgery. If you have an adhesive dressing stuck to the incision, DO NOT remove this.   Tub baths:    If possible, take a tub bath immediately after each bowel movement.     Baths should be take at least 3 times daily for the first week to 10 days following your procedure. You should soak in the tub for 10 to 15 minutes each time with water as warm as you can tolerate.     Even after you go back to work, it is a good idea to sit in the tub in the morning, after returning from work, and again in the evening before bedtime.    Bleeding/Infection:    You can expect to have  some bleeding after bowel movements, but it should stop soon after you wipe.     Use a wet cloth or perianal pad (Tucks or Preparation H pads) to gently wipe the area after each bowel movement.    Do not rub the anal area or use a lot of pressure.    Using a spray bottle filled with warm water helps loosen any remaining stool. Blot gently with a soft dry cloth or tissue paper.    Infection around the anal opening is not very common. The anal area has excellent blood supply, which helps the area to heal. Bloody discharge after bowel movements is normal and may last 2 to 4 weeks after your surgery. However, if you bleed between bowel movements and cannot get it to stop, call the triage nurse immediately [phone].    Bowel function:  Take a fiber supplement such as Metamucil, which is over the counter. It is important to drink six to eight glasses of water or juice everyday when using fiber products.    If you do not have a bowel movement after 1-2 days:    Take Milk of Magnesia-2 tablespoons.       If there are no results, repeat this or add over the counter Miralax.      If you still do not have results, contact the clinic.     If there are no results, repeat this. Stop taking Milk of Magnesia or other laxatives if you begin to have diarrhea.    * Constipation will cause you to strain when you have a bowel movement. The hard stool will be difficult to pass, will increase pain and bleeding, and will slow down healing.  Try to avoid constipation and/or diarrhea as this can make the pain and bleeding worse.    * It is important to have regular bowel movements at least every other day and to keep your stool soft.  A high fiber diet, including at least four servings of fruits or vegetables daily, will help to keep your bowel movements regular and soft.    Activity:  After your procedure, there are no restrictions on your activity     except restrictions surrounding being on narcotics and in pain, such as no heavy  machine operating or driving.     You may walk, climb stairs, ride in a car, and sit as tolerated.     It is helpful to avoid sitting in one position for long periods (2 or more hours).    After some surgeries, you may be told not to perform any lifting (more than 10 pounds) for several weeks after surgery.    When to call:  When do I need to call the doctor or triage nurse?    If you experience any of the problems listed here, call our triage nurse during business hours ([phone]).     The nurse will help you with your problem or have the doctor call you.     After hours and on weekends, please call the main hospital number ([phone]) and ask for the colon and rectal surgery person on call.     Some is available to help you 24 hours a day, seven days a week.    Call for:   ? Fever greater than 101 degrees   ? Chills   ? Foul-smelling drainage   ? Nausea and vomiting   ? Diarrhea - greater than 3 water stools in 24 hours   ? Constipation - no bowel movement after 3 days   ? Severe bleeding that does not stop soon after a bowel movement   ? Problems with the incision, including increased pain, swelling, or redness    [facility]  Home Care Following Anesthesia  For 24 hours after surgery:  1. Get plenty of rest.  A responsible adult must stay with you for at least 24 hours after you leave the surgery center.  2. Do not drive or use heavy equipment.  If you have weakness or tingling, don't drive or use heavy equipment until this feeling goes away.   3. Do not drink alcohol.   4. Avoid strenuous or risky activities.  Ask for help when climbing stairs.  5. You may feel lightheaded.  IF so, sit for a few minutes before standing.  Have someone help you get up.   6. If you have nausea (feel sick to your stomach): Drink only clear liquids such as apple juice, ginger ale, broth or 7-Up.  Rest may also help.  Be sure to drink enough fluids.  Move to a regular diet as you feel able.    7. You may have a slight fever.  Call the doctor if your fever is over 100 F (37.7 C) (taken under the tongue) or lasts longer than 24 hours.  8. You may have a dry mouth, a sore throat, muscle aches or trouble sleeping. These should go away after 24 hours.  9. Do not make important or legal decisions.               Tips for taking pain medications  To get the best pain relief possible, remember these points:    Take pain medications as directed, before pain becomes severe.    Pain medication can upset your stomach: taking it with food may help.    Constipation is a common side effect of pain medication. Drink plenty of  fluids.    Eat foods high in fiber. Take a stool softener if recommended by your doctor or pharmacist.    Do not drink alcohol, drive or operate machinery while taking pain medications.    Ask about other ways to control pain, such as with heat, ice or relaxation.    Tylenol/Acetaminophen Consumption  To help encourage the safe use of acetaminophen, the makers of TYLENOL  have lowered the maximum daily dose for single-ingredient Extra Strength TYLENOL  (acetaminophen) products sold in the [place] from 8 pills per day (4,000 mg) to 6 pills per day (3,000 mg). The dosing interval has also changed from 2 pills every 4-6 hours to 2 pills every 6 hours.    If you feel your pain relief is insufficient, you may take Tylenol/Acetaminophen in addition to your narcotic pain medication.     Be careful not to exceed 3,000 mg of Tylenol/Acetaminophen in a 24 hour period from all sources.    If you are taking extra strength Tylenol/acetaminophen (500 mg), the maximum dose is 6 tablets in 24 hours.    If you are taking regular strength acetaminophen (325 mg), the maximum dose is 9 tablets in 24 hours.    Call a doctor for any of the followin. Signs of infection (fever, growing tenderness at the surgery site, a large amount of drainage or bleeding, severe pain, foul-smelling drainage, redness, swelling).  2. It  has been over 8 to 10 hours since surgery and you are still not able to urinate (pass water).  3. Headache for over 24 hours.  4. Numbness, tingling or weakness the day after surgery (if you had spinal anesthesia).  5. Signs of Covid-19 infection (temperature over 100 degrees, shortness of breath, cough, loss of taste/smell, generalized body aches, persistent headache, chills, sore throat, nausea/vomiting/diarrhea)  Your doctor is:  Dr. Luis Canales, Colon Rectal: [phone]                    Or dial [phone] and ask for the resident on call for:  Colon Rectal  For emergency care, call the:  [facility]:  [phone] (TTY for hearing impaired: [phone])

## 2020-07-15 NOTE — ANESTHESIA CARE TRANSFER NOTE
Patient: Aspen Mccullough    Procedure(s):  Exam under anesthesia, hemorrhoidectomy  EXAM UNDER ANESTHESIA, ANUS    Diagnosis: Grade III internal hemorrhoids [K64.2]  Diagnosis Additional Information: No value filed.    Anesthesia Type:   MAC     Note:  Airway :Face Mask  Patient transferred to:Phase II  Comments: VSS and WNL, comfortable, no PONV, report to Aliza RNHandoff Report: Identifed the Patient, Identified the Reponsible Provider, Reviewed the pertinent medical history, Discussed the surgical course, Reviewed Intra-OP anesthesia mangement and issues during anesthesia, Set expectations for post-procedure period and Allowed opportunity for questions and acknowledgement of understanding      Vitals: (Last set prior to Anesthesia Care Transfer)    CRNA VITALS  7/15/2020 1133 - 7/15/2020 1207      7/15/2020             Resp Rate (set):  10                Electronically Signed By: ANITRA Ochoa CRNA  July 15, 2020  12:07 PM

## 2020-07-15 NOTE — OP NOTE
"DATE:  07/15/20    PREOPERATIVE DIAGNOSIS:  1. Bleeding prolapsing internal hemorrhoids  2. History of blood clots and a fib on Xarelto    POSTOPERATIVE DIAGNOSIS:   1. Bleeding prolapsing internal hemorrhoids  2. History of blood clots and a fib on Xarelto    PROCEDURE:  1. Exam under anesthesia.  2. Excisional hemorrhoidectomy x2    ANESTHESIA: MAC plus local anesthesia.    SURGEON:  Luis Canales MD    ASSISTANT(S): None    COMPLICATIONS: none, immediately.    ESTIMATED BLOOD LOSS: 5 mL.    SPECIMEN(S): Left lateral and right anterior hemorrhoids    DRAINS/TUBES: None.    OPERATIVE FINDINGS: Left lateral and right anterior moderately enlarged hemorrhoids, mildly enlarged right posterior.    DISPOSITION: PACU.    INDICATIONS FOR PROCEDURE  Aspen Mccullough is a 60 year old female who presented with history of DVT and  A fib on Xarelto. She has had issues with bleeding hemorrhoids that has persisted despite medical management. Hemorrhoidectomy was recommended. I thoroughly discussed the risks, benefits, and alternatives of operative treatment with the patient and he/she agreed to proceed.    General risks related to anorectal surgery were reviewed with the patient. These include, but are not limited to urinary retention, abscess, infection, bleeding, chronic pain, anal stenosis, fistula, fissure, and fecal incontinence.     OPERATIVE PROCEDURE: After obtaining informed consent, the patient was brought to the operating room and placed in the prone jackknife position. MAC anesthesia was gently induced. Bilateral lower extremity pneumatic compression devices were applied and all pressure points were cushioned. The perianal area was then prepped and draped in the standard sterile fashion. After a \"time-out\" was performed, a total of 30 mL of Bupivacaine 0.25% with epinephrine was injected as a pudendal block. Digital rectal exam and anoscopy were performed. The left lateral and right anterior hemorrhoids were " moderately enlarged. The right posterior was mildly enlarged. Excisional hemorrhoidectomy was performed on the left lateral hemorrhoid using electrocautery. The base of the hemorrhoid was clamped and suture ligated with 3-0 vicryl. The mucosal defect was closed with running 3-0 vicryl. The right anterior hemorrhoid was removed in a similar fashion. The right posterior was not removed as it was smaller and to avoid a 3 quadrant procedure. The distal rectum and anal canal were irrigated. Hemostasis was achieved. Instrument, sponge, and needle counts were all correct as reported to me. A gelfoam plug was placed in the anus to aid in hemostasis. Dry gauze and mesh underwear were placed. The patient tolerated the procedure well.    Luis Canales M.D.    Division of Colon & Rectal Surgery  [facility]

## 2020-07-17 LAB — COPATH REPORT: NORMAL

## 2020-08-05 ENCOUNTER — OFFICE VISIT (OUTPATIENT)
Dept: SURGERY | Facility: CLINIC | Age: 60
End: 2020-08-05
Payer: COMMERCIAL

## 2020-08-05 VITALS
BODY MASS INDEX: 33.52 KG/M2 | SYSTOLIC BLOOD PRESSURE: 135 MMHG | HEIGHT: 69 IN | TEMPERATURE: 98.7 F | DIASTOLIC BLOOD PRESSURE: 100 MMHG | HEART RATE: 89 BPM | OXYGEN SATURATION: 96 %

## 2020-08-05 DIAGNOSIS — K64.8 INTERNAL HEMORRHOIDS: ICD-10-CM

## 2020-08-05 DIAGNOSIS — Z09 FOLLOW-UP EXAMINATION FOLLOWING SURGERY: Primary | ICD-10-CM

## 2020-08-05 DIAGNOSIS — K62.5 RECTAL BLEEDING: ICD-10-CM

## 2020-08-05 ASSESSMENT — PAIN SCALES - GENERAL: PAINLEVEL: WORST PAIN (10)

## 2020-08-05 NOTE — LETTER
"2020       RE: Aspen Mccullough  [address]  [facility] [address]     Dear Colleague,    Thank you for referring your patient, Aspen Mccullough, to the [facility] COLON AND RECTAL SURGERY at [facility]. Please see a copy of my visit note below.    Colon and Rectal Surgery Postoperative Clinic Note    RE: Aspen Mccullough  : 1960  MAGNUS: 2020    Aspen Mccullough is a very pleasant 60 year old female with bleeding prolapsed internal hemorrhoids on Xarelto who is now status post EUA with excisional hemorrhoidectomy x2 on 7/15/2020 with Dr. Canales.    Interval history: Aspen reports that her pain is not well controlled after surgery.  She has been on chronic morphine and Dilaudid and does not feel that the additional pain medication we gave her was enough.  She has followed up with her pain specialist since then.  She continues to have pain with bowel movements but this is getting much more tolerable.  Bowel movements are very soft with the use of daily MiraLAX.  She is using a showerhead to clean the site several times a day.  She denies any fecal incontinence.  She did have quite a bit of bleeding the other day which required her to wear a pad and she was very concerned about this.    Physical Examination: Exam was chaperoned by Roberto Negro LPN  BP (!) 135/100 (BP Location: Left arm, Patient Position: Sitting, Cuff Size: Adult Large)   Pulse 89   Temp 98.7  F (37.1  C) (Oral)   Ht 5' 9\"   SpO2 96%   BMI 33.52 kg/m    General: Alert, oriented, in no acute distress, sitting comfortably  HEENT: Mucous membranes moist  Perianal external examination:  Surgical sites are open but appear to be healing well.  No erythema or drainage.  One long suture present and this was trimmed.  No active bleeding.  Digital rectal examination: Was deferred.    Anoscopy: Was deferred.    Assessment/Plan:  60 year old female status post EUA with excisional hemorrhoidectomy x2 " on 7/15/2020 with Dr. Canales.  She is recovering well from surgery.  Pain control has improved and I did discuss with her that this is typically very painful recovery that her symptoms do not sound abnormal.  Reassured her that pain should continue to improve over the next few weeks.  Some bleeding, which I also reassured her is very normal.  No concerning signs on exam today.  No signs of infection.  I trimmed the suture.  No fecal incontinence.  She can follow-up with our clinic as needed but encouraged her to contact us with any worsening symptoms or with any questions or concerns. Patient's questions were answered to her stated satisfaction and she is in agreement with this plan.      Medical history:  Past Medical History:   Diagnosis Date     ADHD (attention deficit hyperactivity disorder)      Allergic rhinitis      Chronic low back pain      Cushing's syndrome (H)      DDD (degenerative disc disease)      DDD (degenerative disc disease)      Deviated nasal septum      Diarrhea      Endometriosis      Fibromyalgia      Hashimoto's disease      Hyperlipidemia      Hypothyroid     hx hashimoto's thyroiditis     Insomnia      Lupus (H)      Malabsorption      Pernicious anemia      Renal disease     chronic renal insufficiency     Renal disease     hx renal failure     Sinusitis        Surgical history:  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)     APPENDECTOMY       ARTHRODESIS ANKLE      right     ARTHROPLASTY KNEE BILATERAL       ARTHROPLASTY REVISION KNEE  4/19/2011    Procedure:ARTHROPLASTY REVISION KNEE; With Antibiotic Cement ; Surgeon:CHAO OLIVARES; Location:[facility] OR     BREAST SURGERY      right- tissue remove nipple area     BUNIONECTOMY  12/14/2011    Procedure:BUNIONECTOMY; Right Bunion Correction; Surgeon:GRACE ZARATE; Location:[facility] STOMACH SURGERY PROCEDURE UNLISTED      see list which we will bring      CHOLECYSTECTOMY       EXAM UNDER ANESTHESIA ANUS N/A 7/15/2020    Procedure: EXAM UNDER ANESTHESIA, ANUS;  Surgeon: Luis Canales MD;  [facility]: [facility]     EXCISE MASS UPPER EXTREMITY  12/14/2011    Procedure:EXCISE MASS UPPER EXTREMITY; Excision of Left Arm Mass; Surgeon:GRACE ZARATE; Location:[facility]     FOOT SURGERY      left X 4     FUSION LUMBAR ANTERIOR ONE LEVEL       HC SACROPLASTY      see list which we will bring     HEMORRHOIDECTOMY INTERNAL N/A 7/15/2020    Procedure: Exam under anesthesia, hemorrhoidectomy;  Surgeon: Luis Canales MD;  [facility]     INJECT NERVE BLOCK SUPRASCAPULAR Left 5/18/2018    Procedure: INJECT NERVE BLOCK SUPRASCAPULAR;  Left Suprascapular Nerve Block;  Surgeon: Basim Velazquez MD;  [facility]     INJECT NERVE BLOCK SUPRASCAPULAR Right 7/23/2018    Procedure: INJECT NERVE BLOCK SUPRASCAPULAR;  Right suprascapular injection;  Surgeon: Basim Velazquez MD;  [facility]     INJECT NERVE BLOCK SUPRASCAPULAR Bilateral 10/29/2018    Procedure: Bilateral Suprascapular Nerve Blocks;  Surgeon: Basim Velazquez MD;  [facility]     INJECT NERVE BLOCK SUPRASCAPULAR Bilateral 3/15/2019    Procedure: Bilateral Suprascapular Nerve Block;  Surgeon: Basim Velazquez MD;  [facility]     INJECT NERVE BLOCK SUPRASCAPULAR Bilateral 12/31/2019    Procedure: bilateral suprascapular nerve block;  Surgeon: Basim Velazquez MD;  [facility]     KNEE SURGERY      see list which we will bring     LAMINECTOMY LUMBAR ONE LEVEL      L4-5     LAPAROSCOPIC ABLATION ENDOMETRIOSIS       RADIO FREQUENCY ABLATION PULSED CERVICAL Bilateral 7/10/2019    Procedure: Bilateral Suprascapular Nerve Pulsed Radiofrequency Ablation;  Surgeon: Basim Velazquez MD;  [facility]     REMOVE HARDWARE FOOT  12/14/2011    Procedure:REMOVE HARDWARE FOOT; Hardware Removal Right Foot (Mini-C-Arm) ; Surgeon:GRACE ZARATE; Location:[facility]      RHINOPLASTY       SALPINGO-OOPHORECTOMY BILATERAL       TONSILLECTOMY         Problem list:  Patient Active Problem List    Diagnosis Date Noted     Grade III internal hemorrhoids 06/19/2020     Priority: Medium     Added automatically from request for surgery [id number]       GI bleed 01/22/2020     Priority: Medium     Chronic pain of both shoulders 12/09/2019     Priority: Medium     Added automatically from request for surgery [id number]       Personal history of DVT (deep vein thrombosis) 02/15/2018     Priority: Medium     History of pulmonary embolism 02/15/2018     Priority: Medium     Paroxysmal atrial fibrillation (H) 01/05/2018     Priority: Medium     Overview:   Likely multifactorial related to iatrogenic hyperthyroidism and extensive bilateral PE and DVT.  PAF with RVR. On warfarin, Sotalol 80 mg BID for antiarrhythmic drug therapy. CHADsVASc 4 (LV dysfunction, DVT/PE, gender). On Sotalol 80 mg BID.   Pt would prefer NOAC. Consider transition to Eliquis        Nonischemic cardiomyopathy (H) 01/05/2018     Priority: Medium     Overview:   EF 40%. Likely tachy-mediated in setting of iatrogenic hyperthyroidism and multiple submassive PE.   Recommended repeat echocardiography in 2-3 months. If unchanged the, needs ischemic work up.        Malabsorption 12/07/2017     Priority: Medium     Overview:   Reportedly has Chron's disease though is not on any current treatments.        Acute deep vein thrombosis (DVT) of popliteal vein of right lower extremity (H) 11/27/2017     Priority: Medium     Acute pulmonary embolism (H) 11/27/2017     Priority: Medium     Iatrogenic hyperthyroidism 11/26/2017     Priority: Medium     Overview:   11/26/2017: On admission, reported home thyroid replacement regimen was 300 mcg BID PO + 200 mcg IM twice weekly, and liothyronine 50 mcg BID. Inital TSH was 0.02. All thyroid replacement was held on discharge with instructions to follow up with her endocrinologist.        Thrombus of  right atrial appendage without antecedent myocardial infarction 11/26/2017     Priority: Medium     Overview:   Echocardiogram 11/26/17:  Right Atrium: The right atrium is mildly dilated. There is a large serpiginous and lobulated appearing mass visualized in the right atrium, prolapsing across the tricuspid valve; possible etiologies include thrombus, infectious, vs. Malignancy.  Likely related to newly discovered a fib, estrogen replacement therapy and lupus. Apparently, she also has a h/o DVT and was not on anticoagulation.  Started on llife long warfarin with goal INR 2.5-3.5 per cardiology. She could possibly be switched to a DOAC once the thrombus has resolved.        S/P cervical spinal fusion 10/03/2017     Priority: Medium     Impingement syndrome of left shoulder 01/25/2016     Priority: Medium     Abdominal cramping, generalized 01/25/2016     Priority: Medium     Intermittent diarrhea 01/25/2016     Priority: Medium     Overview:   Suspect related to iatrogenic hyperthyroidism.        Primary osteoarthritis involving multiple joints 01/25/2016     Priority: Medium     Posttraumatic stress disorder 06/23/2015     Priority: Medium     Hashimoto's thyroiditis 08/25/2014     Priority: Medium     Glucocorticoid deficiency (H) 08/25/2014     Priority: Medium     ACP (advance care planning) 08/22/2014     Priority: Medium     Advance Care Planning: Receipt of ACP document:  Received: Health Care Directive which was witnessed or notarized on 10-10-05.  Document previously scanned on 12-20-11.  Validation form completed and scanned.  Code Status reflects choices in most recent ACP document NOTE: Pt is Taoist-please review HCD for details on blood products.  Confirmed/documented designated decision maker(s). See permanent comments section of demographics in clinical tab. View document(s) and details by clicking on code status.   Added by Ora Shaw RN, System ACP Coordinator [employer] Choices on  8/22/2014.             Hyperlipidemia 07/31/2014     Priority: Medium     Hypothyroidism 07/24/2014     Priority: Medium     Problem list name updated by automated process. Provider to review       Insomnia, unspecified type 07/24/2014     Priority: Medium     Knee joint replacement by other means 09/16/2013     Priority: Medium     Encounter for long-term use of opiate analgesic 04/01/2013     Priority: Medium     Histrionic personality (H) 10/04/2012     Priority: Medium     Ankle pain, left 12/09/2011     Priority: Medium     S/P total knee replacement 08/30/2011     Priority: Medium     Alopecia areata 07/29/2011     Priority: Medium     Lipoma of skin and subcutaneous tissue 07/29/2011     Priority: Medium     Somatoform disorder 03/02/2011     Priority: Medium     Chronic ethmoidal sinusitis 10/27/2010     Priority: Medium     Overview:   ANTERIOR & POSTERIOR       Chronic maxillary sinusitis 10/27/2010     Priority: Medium     Nasal fracture 10/27/2010     Priority: Medium     Overview:   WITH DISLOCATION, CHRONIC       Nasal turbinate hypertrophy 10/27/2010     Priority: Medium     Deviated nasal septum 10/25/2010     Priority: Medium     Attention deficit disorder 10/11/2010     Priority: Medium     Fibromyalgia 05/03/2010     Priority: Medium     Overview:   Multifactorial, on enormous doses of medications, pain contract signed Aug 2016.  Has been stable on same doses for years. Not elligible for increases.   --MS contin 90mg TID  --diazepam 10mg TID  --dilaudid 8mg TID  --flexeril  --TOX ASSURE Q3 months for now.        Left shoulder pain 01/18/2010     Priority: Medium     Complications due to internal joint prosthesis (H) 08/24/2009     Priority: Medium     CRPS (complex regional pain syndrome), lower limb 07/06/2009     Priority: Medium     S/P TKR (total knee replacement) 12/04/2008     Priority: Medium     Allergic rhinitis 10/09/2008     Priority: Medium     Adrenal cortical steroids causing adverse  effect in therapeutic use 08/20/2008     Priority: Medium     Anemia, blood loss 08/20/2008     Priority: Medium     ARF (acute renal failure) (H) 08/20/2008     Priority: Medium     Arthritis, septic (H) 08/20/2008     Priority: Medium     Other specified abnormal findings of blood chemistry 08/20/2008     Priority: Medium     Postoperative hypotension 08/20/2008     Priority: Medium     Generalized osteoarthrosis, involving multiple sites 01/03/2008     Priority: Medium     Generalized pain 12/17/2007     Priority: Medium     Opioid type dependence (H) 12/17/2007     Priority: Medium     Overview:   Followed by Dr. Lester Osman at [facility] for opiate maintenance  [phone]       Other acute postoperative pain 12/17/2007     Priority: Medium     Pain in joint, lower leg 12/17/2007     Priority: Medium     Endometriosis 03/25/2002     Priority: Medium     Cushing's syndrome (H) 01/01/1996     Priority: Medium     Essential hypertension 01/01/1996     Priority: Medium     Systemic lupus erythematosus (H) 01/01/1996     Priority: Medium       Medications:  Current Outpatient Medications   Medication Sig Dispense Refill     BIOTIN FORTE PO Take 1 tablet by mouth daily        carboxymethylcellulose (CARBOXYMETHYLCELLULOSE SODIUM) 0.5 % SOLN ophthalmic solution Place 1 drop into both eyes 2 times daily 1 Bottle      CEPHALEXIN PO Take 500 mg by mouth as needed (Dental Procedure) Take 4 caps 1 hour prior to Dental Appointment       cholestyramine (QUESTRAN) 4 GM/DOSE powder Take 4 g by mouth 2 times daily (with meals) 378 g 4     cyanocobalamin 1000 MCG/ML injection Inject 1 mL (1,000 mcg) Subcutaneous every 7 days 4 mL 5     cyclobenzaprine (FLEXERIL) 10 MG tablet Take 1 tablet (10 mg) by mouth 3 times daily 90 tablet 3     cycloSPORINE (RESTASIS) 0.05 % ophthalmic emulsion Place 1 drop into both eyes 2 times daily       fexofenadine (ALLEGRA) 180 MG tablet Take 180 mg by mouth daily.       gabapentin  (NEURONTIN) 300 MG capsule Take 300 mg by mouth 2 times daily        gabapentin (NEURONTIN) 800 MG tablet Take 800 mg by mouth 3 times daily       HYDROmorphone (DILAUDID) 8 MG tablet Take 8 mg by mouth every 6 hours as needed for severe pain . Max of 3 doses per day.       levothyroxine (SYNTHROID/LEVOTHROID) 100 MCG SOLR injection Inject 200 mcg into the vein twice a week        levothyroxine (SYNTHROID/LEVOTHROID) 300 MCG tablet Take 300 mcg by mouth 2 times daily        lifitegrast (XIIDRA) 5 % opthalmic solution Place 1 drop into both eyes 2 times daily       liothyronine (CYTOMEL) 25 MCG tablet Take 50 mcg by mouth 2 times daily        methylcellulose (CITRUCEL) powder Start with 1 heaping tablespoon. Increase as needed, 1 heaping tablespoon at a time, up to 3 times per day. 479 g 3     metoprolol succinate ER (TOPROL-XL) 50 MG 24 hr tablet Take 50 mg by mouth daily       morphine (MS CONTIN) 30 MG 12 hr tablet Take 30 mg by mouth 2 times daily along with one morphine 60 mg 12 hr tablet for a total dose of 90 mg. 270 tablet 0     morphine (MS CONTIN) 60 MG 12 hr tablet Take 60 mg by mouth 3 times daily  30 tablet 0     multivitamin, therapeutic with minerals (MULTI-VITAMIN) TABS tablet Take 1 tablet by mouth 2 times daily 100 tablet 3     NASONEX 50 MCG/ACT spray Spray 1 spray into both nostrils daily as needed   11     nystatin (MYCOSTATIN) 941252 UNIT/GM POWD Apply topically 3 times daily as needed 60 g 1     prednisoLONE acetate (PRED FORTE) 1 % ophthalmic susp 1 drop 4 times daily       predniSONE (DELTASONE) 1 MG tablet Use along with one prednisone 5 mg tablets to alternate taking 8mg and 10mg every other day.  2     predniSONE (DELTASONE) 5 MG tablet Alternate taking 8mg and 10mg every other day       probiotic CAPS Take 1 capsule by mouth 2 times daily       rivaroxaban ANTICOAGULANT (XARELTO) 20 MG TABS tablet Take 20 mg by mouth every morning        vitamin D2 (ERGOCALCIFEROL) 31636 units (1250 mcg)  "capsule Take 50,000 Units by mouth once a week       zolpidem (AMBIEN) 5 MG tablet Take 5 mg by mouth nightly as needed for sleep         Allergies:  Allergies   Allergen Reactions     Chlorhexidine Hives     Thor surgical cleanser     Codeine Hives     Has tolerated Oxycodone in past      Ibuprofen [Nsaids] Hives     Penicillins Hives     Other reaction(s): Unknown     Sulfa Drugs Hives     Other reaction(s): Unknown     Doxycycline GI Disturbance     Emesis & diarrhea  Patient denies allergy to this med     Mold      Mushroom      Red Wine Complex [Red Wine Powder]        Family history:  Family History   Problem Relation Age of Onset     Hypertension Mother        Social history:  Social History     Tobacco Use     Smoking status: Former Smoker     Packs/day: 1.50     Years: 20.00     Pack years: 30.00     Types: Cigarettes     Start date: 1973     Last attempt to quit: 1993     Years since quittin.6     Smokeless tobacco: Never Used   Substance Use Topics     Alcohol use: No     Alcohol/week: 0.0 standard drinks     Marital status: .    Nursing Notes:   Roberto Negro LPN  2020 11:17 AM  Signed  Chief Complaint   Patient presents with     Surgical Followup     Post-op DOS: 7/15/2020       Vitals:    20 1113   BP: (!) 135/100   BP Location: Left arm   Patient Position: Sitting   Cuff Size: Adult Large   Pulse: 89   Temp: 98.7  F (37.1  C)   TempSrc: Oral   SpO2: 96%   Height: 5' 9\"       Body mass index is 33.52 kg/m .      Roberto Negro LPN                         Total face to face time was 20 minutes, >50% counseling.   This is a postop visit    ANITRA Isaacs, NP-C  Colon and Rectal Surgery  [facility]    This note was created using speech recognition software and may contain unintended word substitutions.        "

## 2020-08-05 NOTE — NURSING NOTE
"Chief Complaint   Patient presents with     Surgical Followup     Post-op DOS: 7/15/2020       Vitals:    08/05/20 1113   BP: (!) 135/100   BP Location: Left arm   Patient Position: Sitting   Cuff Size: Adult Large   Pulse: 89   Temp: 98.7  F (37.1  C)   TempSrc: Oral   SpO2: 96%   Height: 5' 9\"       Body mass index is 33.52 kg/m .      Roberto Negro LPN                        "

## 2020-08-05 NOTE — PATIENT INSTRUCTIONS
Follow up as needed.    Call the clinic if you have any uncontrollable pain or bleeding.      Please call with any questions or concerns regarding your clinic visit today.    It is a pleasure being involved in your health care.    Contacts post-consultation depending on your need:    Radiology Appointments [phone]    Schedule Clinic Appointments [phone] # 1   M-F 7:30 - 5 pm    NATHAN Mejia [phone]    Clinic Fax Number [phone]    Surgery Scheduling [phone]    My Chart is available 24 hours a day and is a secure way to access your records and communicate with your care team.  I strongly recommend signing up if you haven't already done so, if you are comfortable with computers.  If you would like to inquire about this or are having problems with My Chart access, you may call [phone] or go online at [email or web address].  Please allow at least 24 hours for a response and extra time on weekends and Holidays.

## 2020-08-31 ENCOUNTER — OFFICE VISIT (OUTPATIENT)
Dept: DERMATOLOGY | Facility: CLINIC | Age: 60
End: 2020-08-31
Payer: COMMERCIAL

## 2020-08-31 DIAGNOSIS — L74.519 FOCAL HYPERHIDROSIS: Primary | ICD-10-CM

## 2020-08-31 DIAGNOSIS — G64 PERIPHERAL NERVOUS SYSTEM DISORDER: ICD-10-CM

## 2020-08-31 ASSESSMENT — PAIN SCALES - GENERAL: PAINLEVEL: NO PAIN (0)

## 2020-08-31 NOTE — NURSING NOTE
Dermatology Rooming Note    Aspen Mccullough's goals for this visit include:   Chief Complaint   Patient presents with     Derm Problem     Aspen is here for a follow up for hyperhidrosis, states it's getting worse.         Sabrina Marina LPN

## 2020-08-31 NOTE — PROGRESS NOTES
"Formerly [facility] Dermatology Note      Dermatology Problem List:  1.. Hyperidrosis, scalp  - previous tx: oral glycopyrolate,  Has a history of degenerative disc disease and has had a suprascapular nerve block for shoulder pain.  - s/p biopsy 12/09/19  - Labs 12/09/19: HbA1C was slightly elevated,T3 free, TSH was slightly decreased,  T3 was wnl, magnesium, zinc, vitamin D  - unremarkable  - Biopsy for Epidermal nerve fiber density 12/2019 - no significant abnormalities noted in either epidermal nerves or SP or CGRP expression  - Scalp biopsy 12/2019  was interpreted as \"largely normal\"    CC:   Chief Complaint   Patient presents with     Derm Problem     Aspen is here for a follow up for hyperhidrosis, states it's getting worse.         Encounter Date: Aug 31, 2020    History of Present Illness:  Ms. Aspen Mccullough is a 60 year old female who presented as a referral from Rosalie LAUREANO  for hyperhidrosis of the scalp. She has a medical history including renal disease, Hashimoto's thyroiditis, and Cushing's syndrome.     At her initial visit, she reported her scalp has been extremely sweaty for about 10 years now, and has been getting worse for the last several years. It is only her scalp that is hyperhidrotic.  Ms. Mccullough also reported dry eyes and dry mouth. Her 37 year old daughter also has these symptoms. She has not been able to identify any triggers for the increased sweating reactions, and the episodes can occur at any time of day,  several times a day.  She continues to deny scalp pain, itching, burning, or tingling.     She has tried glycopyrolate, but this was unsuccessful . Her prior authorization for botox injections to the scalp was denied at a previous dermatology visit. She is currently taking 800 mg TID and now an additional 300 mg tid of gabapentin, nystatin, fexofenadine, dilaudid, morphine, levothyroxine, liothyronine, morphine, and prednisone. She takes supplementation of " vitamin D, zinc, magnesium, vitamin B12. Of note, Ms Mccullough also has degenerative disc disease and has had a suprascapular nerve block for shoulder pain. She follows with [facility] for her thyroid issues.            Past Medical History:   Patient Active Problem List   Diagnosis     Generalized osteoarthrosis, involving multiple sites     CRPS (complex regional pain syndrome), lower limb     Fibromyalgia     Somatoform disorder     Histrionic personality (H)     Encounter for long-term use of opiate analgesic     Knee joint replacement by other means     Hypothyroidism     Insomnia, unspecified type     Hyperlipidemia     ACP (advance care planning)     Hashimoto's thyroiditis     Glucocorticoid deficiency (H)     Posttraumatic stress disorder     Impingement syndrome of left shoulder     Abdominal cramping, generalized     Intermittent diarrhea     Primary osteoarthritis involving multiple joints     Attention deficit disorder     Allergic rhinitis     Cushing's syndrome (H)     Endometriosis     Essential hypertension     Systemic lupus erythematosus (H)     S/P cervical spinal fusion     Paroxysmal atrial fibrillation (H)     Nonischemic cardiomyopathy (H)     Personal history of DVT (deep vein thrombosis)     History of pulmonary embolism     Acute deep vein thrombosis (DVT) of popliteal vein of right lower extremity (H)     Acute pulmonary embolism (H)     Adrenal cortical steroids causing adverse effect in therapeutic use     Alopecia areata     Anemia, blood loss     Ankle pain, left     ARF (acute renal failure) (H)     Arthritis, septic (H)     Chronic ethmoidal sinusitis     Chronic maxillary sinusitis     Deviated nasal septum     Complications due to internal joint prosthesis (H)     Generalized pain     Iatrogenic hyperthyroidism     S/P TKR (total knee replacement)     Left shoulder pain     Lipoma of skin and subcutaneous tissue     Malabsorption     Nasal fracture      Nasal turbinate hypertrophy     Opioid type dependence (H)     Other specified abnormal findings of blood chemistry     Other acute postoperative pain     Pain in joint, lower leg     Postoperative hypotension     S/P total knee replacement     Thrombus of right atrial appendage without antecedent myocardial infarction     Chronic pain of both shoulders     GI bleed     Grade III internal hemorrhoids     Past Medical History:   Diagnosis Date     ADHD (attention deficit hyperactivity disorder)      Allergic rhinitis      Chronic low back pain      Cushing's syndrome (H)      DDD (degenerative disc disease)      DDD (degenerative disc disease)      Deviated nasal septum      Diarrhea      Endometriosis      Fibromyalgia      Hashimoto's disease      Hyperlipidemia      Hypothyroid     hx hashimoto's thyroiditis     Insomnia      Lupus (H)      Malabsorption      Pernicious anemia      Renal disease     chronic renal insufficiency     Renal disease     hx renal failure     Sinusitis      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)     APPENDECTOMY       ARTHRODESIS ANKLE      right     ARTHROPLASTY KNEE BILATERAL       ARTHROPLASTY REVISION KNEE  4/19/2011    Procedure:ARTHROPLASTY REVISION KNEE; With Antibiotic Cement ; Surgeon:CHAO OLIVARES; Location:[facility] OR     BREAST SURGERY      right- tissue remove nipple area     BUNIONECTOMY  12/14/2011    Procedure:BUNIONECTOMY; Right Bunion Correction; Surgeon:GRACE ZARATE; Location:[facility] STOMACH SURGERY PROCEDURE UNLISTED      see list which we will bring     CHOLECYSTECTOMY       EXAM UNDER ANESTHESIA ANUS N/A 7/15/2020    Procedure: EXAM UNDER ANESTHESIA, ANUS;  Surgeon: Luis Canales MD;  Location:  OR     EXCISE MASS UPPER EXTREMITY  12/14/2011    Procedure:EXCISE MASS UPPER EXTREMITY; Excision of Left Arm Mass; Surgeon:GRACE ZARATE; Location:[facility]      FOOT SURGERY      left X 4     FUSION LUMBAR ANTERIOR ONE LEVEL       HC SACROPLASTY      see list which we will bring     HEMORRHOIDECTOMY INTERNAL N/A 7/15/2020    Procedure: Exam under anesthesia, hemorrhoidectomy;  Surgeon: Luis Canales MD;  Location: [facility] OR     INJECT NERVE BLOCK SUPRASCAPULAR Left 5/18/2018    Procedure: INJECT NERVE BLOCK SUPRASCAPULAR;  Left Suprascapular Nerve Block;  Surgeon: Basim Velazquez MD;  Location: [facility] OR     INJECT NERVE BLOCK SUPRASCAPULAR Right 7/23/2018    Procedure: INJECT NERVE BLOCK SUPRASCAPULAR;  Right suprascapular injection;  Surgeon: Basim Velazquez MD;  Location: [facility] OR     INJECT NERVE BLOCK SUPRASCAPULAR Bilateral 10/29/2018    Procedure: Bilateral Suprascapular Nerve Blocks;  Surgeon: Basim Velazquez MD;  Location: [facility] OR     INJECT NERVE BLOCK SUPRASCAPULAR Bilateral 3/15/2019    Procedure: Bilateral Suprascapular Nerve Block;  Surgeon: Basim Velazquez MD;  Location: [facility] OR     INJECT NERVE BLOCK SUPRASCAPULAR Bilateral 12/31/2019    Procedure: bilateral suprascapular nerve block;  Surgeon: Basim Velazquez MD;  Location: [facility] OR     KNEE SURGERY      see list which we will bring     LAMINECTOMY LUMBAR ONE LEVEL      L4-5     LAPAROSCOPIC ABLATION ENDOMETRIOSIS       RADIO FREQUENCY ABLATION PULSED CERVICAL Bilateral 7/10/2019    Procedure: Bilateral Suprascapular Nerve Pulsed Radiofrequency Ablation;  Surgeon: Basim Velazquez MD;  Location: [facility] OR     REMOVE HARDWARE FOOT  12/14/2011    Procedure:REMOVE HARDWARE FOOT; Hardware Removal Right Foot (Mini-C-Arm) ; Surgeon:GRACE ZARATE; Location:[facility]     RHINOPLASTY       SALPINGO-OOPHORECTOMY BILATERAL       TONSILLECTOMY         Social History:  Patient reports that she quit smoking about 27 years ago. Her smoking use included cigarettes. She started smoking about 47 years ago. She has a 30.00 pack-year smoking history. She has never used smokeless tobacco. She reports that she does  not drink alcohol or use drugs.    Family History:  Family History   Problem Relation Age of Onset     Hypertension Mother        Medications:  Current Outpatient Medications   Medication Sig Dispense Refill     BIOTIN FORTE PO Take 1 tablet by mouth daily        carboxymethylcellulose (CARBOXYMETHYLCELLULOSE SODIUM) 0.5 % SOLN ophthalmic solution Place 1 drop into both eyes 2 times daily 1 Bottle      CEPHALEXIN PO Take 500 mg by mouth as needed (Dental Procedure) Take 4 caps 1 hour prior to Dental Appointment       cholestyramine (QUESTRAN) 4 GM/DOSE powder Take 4 g by mouth 2 times daily (with meals) 378 g 4     cyanocobalamin 1000 MCG/ML injection Inject 1 mL (1,000 mcg) Subcutaneous every 7 days 4 mL 5     cyclobenzaprine (FLEXERIL) 10 MG tablet Take 1 tablet (10 mg) by mouth 3 times daily 90 tablet 3     cycloSPORINE (RESTASIS) 0.05 % ophthalmic emulsion Place 1 drop into both eyes 2 times daily       fexofenadine (ALLEGRA) 180 MG tablet Take 180 mg by mouth daily.       gabapentin (NEURONTIN) 300 MG capsule Take 300 mg by mouth 2 times daily        gabapentin (NEURONTIN) 800 MG tablet Take 800 mg by mouth 3 times daily       HYDROmorphone (DILAUDID) 8 MG tablet Take 8 mg by mouth every 6 hours as needed for severe pain . Max of 3 doses per day.       levothyroxine (SYNTHROID/LEVOTHROID) 100 MCG SOLR injection Inject 200 mcg into the vein twice a week        levothyroxine (SYNTHROID/LEVOTHROID) 300 MCG tablet Take 300 mcg by mouth 2 times daily        lifitegrast (XIIDRA) 5 % opthalmic solution Place 1 drop into both eyes 2 times daily       liothyronine (CYTOMEL) 25 MCG tablet Take 50 mcg by mouth 2 times daily        methylcellulose (CITRUCEL) powder Start with 1 heaping tablespoon. Increase as needed, 1 heaping tablespoon at a time, up to 3 times per day. 479 g 3     metoprolol succinate ER (TOPROL-XL) 50 MG 24 hr tablet Take 50 mg by mouth daily       morphine (MS CONTIN) 30 MG 12 hr tablet Take 30 mg by  mouth 2 times daily along with one morphine 60 mg 12 hr tablet for a total dose of 90 mg. 270 tablet 0     morphine (MS CONTIN) 60 MG 12 hr tablet Take 60 mg by mouth 3 times daily  30 tablet 0     multivitamin, therapeutic with minerals (MULTI-VITAMIN) TABS tablet Take 1 tablet by mouth 2 times daily 100 tablet 3     NASONEX 50 MCG/ACT spray Spray 1 spray into both nostrils daily as needed   11     nystatin (MYCOSTATIN) 185745 UNIT/GM POWD Apply topically 3 times daily as needed 60 g 1     prednisoLONE acetate (PRED FORTE) 1 % ophthalmic susp 1 drop 4 times daily       predniSONE (DELTASONE) 1 MG tablet Use along with one prednisone 5 mg tablets to alternate taking 8mg and 10mg every other day.  2     predniSONE (DELTASONE) 5 MG tablet Alternate taking 8mg and 10mg every other day       probiotic CAPS Take 1 capsule by mouth 2 times daily       rivaroxaban ANTICOAGULANT (XARELTO) 20 MG TABS tablet Take 20 mg by mouth every morning        vitamin D2 (ERGOCALCIFEROL) 95107 units (1250 mcg) capsule Take 50,000 Units by mouth once a week       zolpidem (AMBIEN) 5 MG tablet Take 5 mg by mouth nightly as needed for sleep       Allergies   Allergen Reactions     Chlorhexidine Hives     Thor surgical cleanser     Codeine Hives     Has tolerated Oxycodone in past      Ibuprofen [Nsaids] Hives     Penicillins Hives     Other reaction(s): Unknown     Sulfa Drugs Hives     Other reaction(s): Unknown     Doxycycline GI Disturbance     Emesis & diarrhea  Patient denies allergy to this med     Mold      Mushroom      Red Wine Complex [Red Wine Powder]          Review of Systems:  -Constitutional: See HPI, otherwise feeling well today, in usual state of medical health  -HEENT: Patient denies nonhealing oral sores.  -Skin: As above in HPI. No additional skin concerns.    Physical exam:  Vitals: There were no vitals taken for this visit.  GEN: This is a well developed, well-nourished female in no acute distress, in a pleasant mood.     SKIN: Sun-exposed skin, which includes the head/face, neck was examined.  -Scalp appears healthy  - Slightly warm to touch on occipital scalp  - No  sweat droplets appreciated on scalp or face during the examination  -No other lesions of concern on areas examined.     Impression/Plan:  1. Scalp hyperhidrosis, persistent, no response to oral glycopyrolate, on high doses of gabapentin to manage neuropathic pain. Current problem may be aggravated by her known disc disease. Recommend referral to Dr. Arcadio Jose, specialist in the peripheral nervous system and based at [facility].  Will  try again to obtain botox which could be administered by dermatology to the scalp.  A coordinated treatment plan with Neurology would be ideal. This could include defining markers for assessing efficacy of botox injections for scalp hyperhidrosis.     Laura Tony MD  Professor and Chair  Department of Dermatology    cc  Arcadio Jose MD

## 2020-08-31 NOTE — LETTER
"8/31/2020       RE: Aspen Mccullough  [address]  [facility] [address]     Dear Colleague,    Thank you for referring your patient, Aspen Mccullough, to the [facility] DERMATOLOGY at [facility]. Please see a copy of my visit note below.    [facility] Dermatology Note      Dermatology Problem List:  1.. Hyperidrosis, scalp  - previous tx: oral glycopyrolate,  Has a history of degenerative disc disease and has had a suprascapular nerve block for shoulder pain.  - s/p biopsy 12/09/19  - Labs 12/09/19: HbA1C was slightly elevated,T3 free, TSH was slightly decreased,  T3 was wnl, magnesium, zinc, vitamin D  - unremarkable  - Biopsy for Epidermal nerve fiber density 12/2019 - no significant abnormalities noted in either epidermal nerves or SP or CGRP expression  - Scalp biopsy 12/2019  was interpreted as \"largely normal\"    CC:   Chief Complaint   Patient presents with     Derm Problem     Aspen is here for a follow up for hyperhidrosis, states it's getting worse.         Encounter Date: Aug 31, 2020    History of Present Illness:  Ms. Aspen Mccullough is a 60 year old female who presented as a referral from Rosalie LAUREANO  for hyperhidrosis of the scalp. She has a medical history including renal disease, Hashimoto's thyroiditis, and Cushing's syndrome.     At her initial visit, she reported her scalp has been extremely sweaty for about 10 years now, and has been getting worse for the last several years. It is only her scalp that is hyperhidrotic.  Ms. Mccullough also reported dry eyes and dry mouth. Her 37 year old daughter also has these symptoms. She has not been able to identify any triggers for the increased sweating reactions, and the episodes can occur at any time of day,  several times a day.  She continues to deny scalp pain, itching, burning, or tingling.     She has tried glycopyrolate, but this was unsuccessful . Her prior authorization for " botox injections to the scalp was denied at a previous dermatology visit. She is currently taking 800 mg TID and now an additional 300 mg tid of gabapentin, nystatin, fexofenadine, dilaudid, morphine, levothyroxine, liothyronine, morphine, and prednisone. She takes supplementation of vitamin D, zinc, magnesium, vitamin B12. Of note, Ms Mccullough also has degenerative disc disease and has had a suprascapular nerve block for shoulder pain. She follows with [facility] for her thyroid issues.            Past Medical History:   Patient Active Problem List   Diagnosis     Generalized osteoarthrosis, involving multiple sites     CRPS (complex regional pain syndrome), lower limb     Fibromyalgia     Somatoform disorder     Histrionic personality (H)     Encounter for long-term use of opiate analgesic     Knee joint replacement by other means     Hypothyroidism     Insomnia, unspecified type     Hyperlipidemia     ACP (advance care planning)     Hashimoto's thyroiditis     Glucocorticoid deficiency (H)     Posttraumatic stress disorder     Impingement syndrome of left shoulder     Abdominal cramping, generalized     Intermittent diarrhea     Primary osteoarthritis involving multiple joints     Attention deficit disorder     Allergic rhinitis     Cushing's syndrome (H)     Endometriosis     Essential hypertension     Systemic lupus erythematosus (H)     S/P cervical spinal fusion     Paroxysmal atrial fibrillation (H)     Nonischemic cardiomyopathy (H)     Personal history of DVT (deep vein thrombosis)     History of pulmonary embolism     Acute deep vein thrombosis (DVT) of popliteal vein of right lower extremity (H)     Acute pulmonary embolism (H)     Adrenal cortical steroids causing adverse effect in therapeutic use     Alopecia areata     Anemia, blood loss     Ankle pain, left     ARF (acute renal failure) (H)     Arthritis, septic (H)     Chronic ethmoidal sinusitis     Chronic maxillary  sinusitis     Deviated nasal septum     Complications due to internal joint prosthesis (H)     Generalized pain     Iatrogenic hyperthyroidism     S/P TKR (total knee replacement)     Left shoulder pain     Lipoma of skin and subcutaneous tissue     Malabsorption     Nasal fracture     Nasal turbinate hypertrophy     Opioid type dependence (H)     Other specified abnormal findings of blood chemistry     Other acute postoperative pain     Pain in joint, lower leg     Postoperative hypotension     S/P total knee replacement     Thrombus of right atrial appendage without antecedent myocardial infarction     Chronic pain of both shoulders     GI bleed     Grade III internal hemorrhoids     Past Medical History:   Diagnosis Date     ADHD (attention deficit hyperactivity disorder)      Allergic rhinitis      Chronic low back pain      Cushing's syndrome (H)      DDD (degenerative disc disease)      DDD (degenerative disc disease)      Deviated nasal septum      Diarrhea      Endometriosis      Fibromyalgia      Hashimoto's disease      Hyperlipidemia      Hypothyroid     hx hashimoto's thyroiditis     Insomnia      Lupus (H)      Malabsorption      Pernicious anemia      Renal disease     chronic renal insufficiency     Renal disease     hx renal failure     Sinusitis      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)     APPENDECTOMY       ARTHRODESIS ANKLE      right     ARTHROPLASTY KNEE BILATERAL       ARTHROPLASTY REVISION KNEE  4/19/2011    Procedure:ARTHROPLASTY REVISION KNEE; With Antibiotic Cement ; Surgeon:CHAO OLIVARES; Location:[facility] OR     BREAST SURGERY      right- tissue remove nipple area     BUNIONECTOMY  12/14/2011    Procedure:BUNIONECTOMY; Right Bunion Correction; Surgeon:GRACE ZARATE; Location:[facility] STOMACH SURGERY PROCEDURE UNLISTED      see list which we will bring     CHOLECYSTECTOMY       EXAM UNDER  ANESTHESIA ANUS N/A 7/15/2020    Procedure: EXAM UNDER ANESTHESIA, ANUS;  Surgeon: Luis Canales MD;  Location: [facility] OR     EXCISE MASS UPPER EXTREMITY  12/14/2011    Procedure:EXCISE MASS UPPER EXTREMITY; Excision of Left Arm Mass; Surgeon:GRACE ZARATE; Location:[facility]     FOOT SURGERY      left X 4     FUSION LUMBAR ANTERIOR ONE LEVEL       HC SACROPLASTY      see list which we will bring     HEMORRHOIDECTOMY INTERNAL N/A 7/15/2020    Procedure: Exam under anesthesia, hemorrhoidectomy;  Surgeon: Luis Canales MD;  Location: [facility] OR     INJECT NERVE BLOCK SUPRASCAPULAR Left 5/18/2018    Procedure: INJECT NERVE BLOCK SUPRASCAPULAR;  Left Suprascapular Nerve Block;  Surgeon: Basim Velazquez MD;  Location: [facility]     INJECT NERVE BLOCK SUPRASCAPULAR Right 7/23/2018    Procedure: INJECT NERVE BLOCK SUPRASCAPULAR;  Right suprascapular injection;  Surgeon: Basim Velazquez MD;  Location: [facility]     INJECT NERVE BLOCK SUPRASCAPULAR Bilateral 10/29/2018    Procedure: Bilateral Suprascapular Nerve Blocks;  Surgeon: Basim Velazquez MD;  Location: [facility]     INJECT NERVE BLOCK SUPRASCAPULAR Bilateral 3/15/2019    Procedure: Bilateral Suprascapular Nerve Block;  Surgeon: Basim Velazquez MD;  Location: [facility]     INJECT NERVE BLOCK SUPRASCAPULAR Bilateral 12/31/2019    Procedure: bilateral suprascapular nerve block;  Surgeon: Basim Velazquez MD;  Location: [facility] OR     KNEE SURGERY      see list which we will bring     LAMINECTOMY LUMBAR ONE LEVEL      L4-5     LAPAROSCOPIC ABLATION ENDOMETRIOSIS       RADIO FREQUENCY ABLATION PULSED CERVICAL Bilateral 7/10/2019    Procedure: Bilateral Suprascapular Nerve Pulsed Radiofrequency Ablation;  Surgeon: Basim Velazquez MD;  Location: [facility]     REMOVE HARDWARE FOOT  12/14/2011    Procedure:REMOVE HARDWARE FOOT; Hardware Removal Right Foot (Mini-C-Arm) ; Surgeon:GRACE ZARATE; Location:[facility]     RHINOPLASTY       SALPINGO-OOPHORECTOMY  BILATERAL       TONSILLECTOMY         Social History:  Patient reports that she quit smoking about 27 years ago. Her smoking use included cigarettes. She started smoking about 47 years ago. She has a 30.00 pack-year smoking history. She has never used smokeless tobacco. She reports that she does not drink alcohol or use drugs.    Family History:  Family History   Problem Relation Age of Onset     Hypertension Mother        Medications:  Current Outpatient Medications   Medication Sig Dispense Refill     BIOTIN FORTE PO Take 1 tablet by mouth daily        carboxymethylcellulose (CARBOXYMETHYLCELLULOSE SODIUM) 0.5 % SOLN ophthalmic solution Place 1 drop into both eyes 2 times daily 1 Bottle      CEPHALEXIN PO Take 500 mg by mouth as needed (Dental Procedure) Take 4 caps 1 hour prior to Dental Appointment       cholestyramine (QUESTRAN) 4 GM/DOSE powder Take 4 g by mouth 2 times daily (with meals) 378 g 4     cyanocobalamin 1000 MCG/ML injection Inject 1 mL (1,000 mcg) Subcutaneous every 7 days 4 mL 5     cyclobenzaprine (FLEXERIL) 10 MG tablet Take 1 tablet (10 mg) by mouth 3 times daily 90 tablet 3     cycloSPORINE (RESTASIS) 0.05 % ophthalmic emulsion Place 1 drop into both eyes 2 times daily       fexofenadine (ALLEGRA) 180 MG tablet Take 180 mg by mouth daily.       gabapentin (NEURONTIN) 300 MG capsule Take 300 mg by mouth 2 times daily        gabapentin (NEURONTIN) 800 MG tablet Take 800 mg by mouth 3 times daily       HYDROmorphone (DILAUDID) 8 MG tablet Take 8 mg by mouth every 6 hours as needed for severe pain . Max of 3 doses per day.       levothyroxine (SYNTHROID/LEVOTHROID) 100 MCG SOLR injection Inject 200 mcg into the vein twice a week        levothyroxine (SYNTHROID/LEVOTHROID) 300 MCG tablet Take 300 mcg by mouth 2 times daily        lifitegrast (XIIDRA) 5 % opthalmic solution Place 1 drop into both eyes 2 times daily       liothyronine (CYTOMEL) 25 MCG tablet Take 50 mcg by mouth 2 times daily         methylcellulose (CITRUCEL) powder Start with 1 heaping tablespoon. Increase as needed, 1 heaping tablespoon at a time, up to 3 times per day. 479 g 3     metoprolol succinate ER (TOPROL-XL) 50 MG 24 hr tablet Take 50 mg by mouth daily       morphine (MS CONTIN) 30 MG 12 hr tablet Take 30 mg by mouth 2 times daily along with one morphine 60 mg 12 hr tablet for a total dose of 90 mg. 270 tablet 0     morphine (MS CONTIN) 60 MG 12 hr tablet Take 60 mg by mouth 3 times daily  30 tablet 0     multivitamin, therapeutic with minerals (MULTI-VITAMIN) TABS tablet Take 1 tablet by mouth 2 times daily 100 tablet 3     NASONEX 50 MCG/ACT spray Spray 1 spray into both nostrils daily as needed   11     nystatin (MYCOSTATIN) 308938 UNIT/GM POWD Apply topically 3 times daily as needed 60 g 1     prednisoLONE acetate (PRED FORTE) 1 % ophthalmic susp 1 drop 4 times daily       predniSONE (DELTASONE) 1 MG tablet Use along with one prednisone 5 mg tablets to alternate taking 8mg and 10mg every other day.  2     predniSONE (DELTASONE) 5 MG tablet Alternate taking 8mg and 10mg every other day       probiotic CAPS Take 1 capsule by mouth 2 times daily       rivaroxaban ANTICOAGULANT (XARELTO) 20 MG TABS tablet Take 20 mg by mouth every morning        vitamin D2 (ERGOCALCIFEROL) 83009 units (1250 mcg) capsule Take 50,000 Units by mouth once a week       zolpidem (AMBIEN) 5 MG tablet Take 5 mg by mouth nightly as needed for sleep       Allergies   Allergen Reactions     Chlorhexidine Hives     Thor surgical cleanser     Codeine Hives     Has tolerated Oxycodone in past      Ibuprofen [Nsaids] Hives     Penicillins Hives     Other reaction(s): Unknown     Sulfa Drugs Hives     Other reaction(s): Unknown     Doxycycline GI Disturbance     Emesis & diarrhea  Patient denies allergy to this med     Mold      Mushroom      Red Wine Complex [Red Wine Powder]          Review of Systems:  -Constitutional: See HPI, otherwise feeling well today, in  usual state of medical health  -HEENT: Patient denies nonhealing oral sores.  -Skin: As above in HPI. No additional skin concerns.    Physical exam:  Vitals: There were no vitals taken for this visit.  GEN: This is a well developed, well-nourished female in no acute distress, in a pleasant mood.    SKIN: Sun-exposed skin, which includes the head/face, neck was examined.  -Scalp appears healthy  - Slightly warm to touch on occipital scalp  - No  sweat droplets appreciated on scalp or face during the examination  -No other lesions of concern on areas examined.     Impression/Plan:  1. Scalp hyperhidrosis, persistent, no response to oral glycopyrolate, on high doses of gabapentin to manage neuropathic pain. Current problem may be aggravated by her known disc disease. Recommend referral to Dr. Arcadio Jose, specialist in the peripheral nervous system and based at [facility].  Will  try again to obtain botox which could be administered by dermatology to the scalp.  A coordinated treatment plan with Neurology would be ideal. This could include defining markers for assessing efficacy of botox injections for scalp hyperhidrosis.     Laura Tony MD  Professor and Chair  Department of Dermatology    cc  Arcadio Jose MD

## 2020-09-14 ENCOUNTER — RECORDS - HEALTHEAST (OUTPATIENT)
Dept: ADMINISTRATIVE | Facility: OTHER | Age: 60
End: 2020-09-14

## 2020-09-17 ENCOUNTER — AMBULATORY - HEALTHEAST (OUTPATIENT)
Dept: VASCULAR SURGERY | Facility: CLINIC | Age: 60
End: 2020-09-17

## 2020-09-17 DIAGNOSIS — G57.71 COMPLEX REGIONAL PAIN SYNDROME TYPE II OF RIGHT LOWER LIMB: ICD-10-CM

## 2020-09-25 NOTE — PROGRESS NOTES
Aspen is a 60 year old No obstetric history on file. female who presents for annual exam.     Besides routine health maintenance, she has no other health concerns today .    HPI: The patient is seen at this time for annual exam.  She is postmenopausal.  She has numerous orthopedic issues with knee and hand problems.  She has no current GYN problems but her weight has gone up appreciably since her last visit.  The patient's PCP is Sarahi Vivar.          GYNECOLOGIC HISTORY:    No LMP recorded. Patient is postmenopausal.    Her current contraception method is: menopause.  She  reports that she quit smoking about 27 years ago. Her smoking use included cigarettes. She started smoking about 47 years ago. She has a 30.00 pack-year smoking history. She has never used smokeless tobacco.    Patient is not sexually active.  STD testing offered?  Declined     Last PHQ-9 score on record =   PHQ-9 SCORE 2018   PHQ-9 Total Score -   PHQ-9 Total Score -   PHQ-9 Total Score 5     Last GAD7 score on record =   BENI-7 SCORE 2019   Total Score -   Total Score 0 (minimal anxiety)   Total Score 0   Total Score -     Alcohol Score = 0    HEALTH MAINTENANCE:  Cholesterol:   Recent Labs   Lab Test 18  1110 14   CHOL 250* 211*   HDL 93 67   * 101   TRIG 75 216   CHOLHDLRATIO  --  3.1     Last Mammo: 3/16/2020, Result: Normal, Next Mammo: next year   Pap:   Lab Results   Component Value Date    PAP NIL 2018    PAP NIL 10/31/2016    PAP NIL 2015 WNL HPV (-)neg  Colonoscopy:  14, Result: Diverticulosis, Next Colonoscopy: unsure.  Dexa:  2018    Health maintenance updated:  yes    HISTORY:  OB History    Para Term  AB Living   2 2 2 0 0 2   SAB TAB Ectopic Multiple Live Births   0 0 0 0 2      # Outcome Date GA Lbr Hugh/2nd Weight Sex Delivery Anes PTL Lv   2 Term     F    TAVARES   1 Term     F    TAVARES       Patient Active Problem List   Diagnosis      Generalized osteoarthrosis, involving multiple sites     CRPS (complex regional pain syndrome), lower limb     Fibromyalgia     Somatoform disorder     Histrionic personality (H)     Encounter for long-term use of opiate analgesic     Knee joint replacement by other means     Hypothyroidism     Insomnia, unspecified type     Hyperlipidemia     ACP (advance care planning)     Hashimoto's thyroiditis     Glucocorticoid deficiency (H)     Posttraumatic stress disorder     Impingement syndrome of left shoulder     Abdominal cramping, generalized     Intermittent diarrhea     Primary osteoarthritis involving multiple joints     Attention deficit disorder     Allergic rhinitis     Cushing's syndrome (H)     Endometriosis     Essential hypertension     Systemic lupus erythematosus (H)     S/P cervical spinal fusion     Paroxysmal atrial fibrillation (H)     Nonischemic cardiomyopathy (H)     Personal history of DVT (deep vein thrombosis)     History of pulmonary embolism     Acute deep vein thrombosis (DVT) of popliteal vein of right lower extremity (H)     Acute pulmonary embolism (H)     Adrenal cortical steroids causing adverse effect in therapeutic use     Alopecia areata     Anemia, blood loss     Ankle pain, left     ARF (acute renal failure) (H)     Arthritis, septic (H)     Chronic ethmoidal sinusitis     Chronic maxillary sinusitis     Deviated nasal septum     Complications due to internal joint prosthesis (H)     Generalized pain     Iatrogenic hyperthyroidism     S/P TKR (total knee replacement)     Left shoulder pain     Lipoma of skin and subcutaneous tissue     Malabsorption     Nasal fracture     Nasal turbinate hypertrophy     Opioid type dependence (H)     Other specified abnormal findings of blood chemistry     Other acute postoperative pain     Pain in joint, lower leg     Postoperative hypotension     S/P total knee replacement     Thrombus of right atrial appendage without antecedent myocardial  infarction     Chronic pain of both shoulders     GI bleed     Grade III internal hemorrhoids     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)     APPENDECTOMY       ARTHRODESIS ANKLE      right     ARTHROPLASTY KNEE BILATERAL       ARTHROPLASTY REVISION KNEE  4/19/2011    Procedure:ARTHROPLASTY REVISION KNEE; With Antibiotic Cement ; Surgeon:CHAO OLIVARES; Location:[facility]     BREAST SURGERY      right- tissue remove nipple area     BUNIONECTOMY  12/14/2011    Procedure:BUNIONECTOMY; Right Bunion Correction; Surgeon:GRACE ZARATE; Location:[facility]     C STOMACH SURGERY PROCEDURE UNLISTED      see list which we will bring     CHOLECYSTECTOMY       EXAM UNDER ANESTHESIA ANUS N/A 7/15/2020    Procedure: EXAM UNDER ANESTHESIA, ANUS;  Surgeon: Luis Canales MD;  Location: [facility]     EXCISE MASS UPPER EXTREMITY  12/14/2011    Procedure:EXCISE MASS UPPER EXTREMITY; Excision of Left Arm Mass; Surgeon:GRACE ZARATE; Location:[facility]     FOOT SURGERY      left X 4     FUSION LUMBAR ANTERIOR ONE LEVEL       HC SACROPLASTY      see list which we will bring     HEMORRHOIDECTOMY INTERNAL N/A 7/15/2020    Procedure: Exam under anesthesia, hemorrhoidectomy;  Surgeon: Luis Canales MD;  Location: [facility]     INJECT NERVE BLOCK SUPRASCAPULAR Left 5/18/2018    Procedure: INJECT NERVE BLOCK SUPRASCAPULAR;  Left Suprascapular Nerve Block;  Surgeon: Basim Velazquez MD;  Location: [facility]     INJECT NERVE BLOCK SUPRASCAPULAR Right 7/23/2018    Procedure: INJECT NERVE BLOCK SUPRASCAPULAR;  Right suprascapular injection;  Surgeon: Basim Velazquez MD;  Location: [facility]     INJECT NERVE BLOCK SUPRASCAPULAR Bilateral 10/29/2018    Procedure: Bilateral Suprascapular Nerve Blocks;  Surgeon: Basim Velazquez MD;  Location: [facility]     INJECT NERVE BLOCK SUPRASCAPULAR Bilateral 3/15/2019    Procedure: Bilateral Suprascapular Nerve  Block;  Surgeon: Basim Velazquez MD;  Location:  OR     INJECT NERVE BLOCK SUPRASCAPULAR Bilateral 2019    Procedure: bilateral suprascapular nerve block;  Surgeon: Basim Velazquez MD;  Location:  OR     KNEE SURGERY      see list which we will bring     LAMINECTOMY LUMBAR ONE LEVEL      L4-5     LAPAROSCOPIC ABLATION ENDOMETRIOSIS       RADIO FREQUENCY ABLATION PULSED CERVICAL Bilateral 7/10/2019    Procedure: Bilateral Suprascapular Nerve Pulsed Radiofrequency Ablation;  Surgeon: Basim Velazquez MD;  Location:  OR     REMOVE HARDWARE FOOT  2011    Procedure:REMOVE HARDWARE FOOT; Hardware Removal Right Foot (Mini-C-Arm) ; Surgeon:GRACE ZARATE; [facility]     RHINOPLASTY       SALPINGO-OOPHORECTOMY BILATERAL       TONSILLECTOMY        Social History     Tobacco Use     Smoking status: Former Smoker     Packs/day: 1.50     Years: 20.00     Pack years: 30.00     Types: Cigarettes     Start date: 1973     Last attempt to quit: 1993     Years since quittin.7     Smokeless tobacco: Never Used   Substance Use Topics     Alcohol use: No     Alcohol/week: 0.0 standard drinks      Problem (# of Occurrences) Relation (Name,Age of Onset)    Hypertension (1) Mother    No Known Problems (7) Father, Sister, Brother, Maternal Grandmother, Maternal Grandfather, Paternal Grandmother, Other            Current Outpatient Medications   Medication Sig     BIOTIN FORTE PO Take 1 tablet by mouth daily      carboxymethylcellulose (CARBOXYMETHYLCELLULOSE SODIUM) 0.5 % SOLN ophthalmic solution Place 1 drop into both eyes 2 times daily     CEPHALEXIN PO Take 500 mg by mouth as needed (Dental Procedure) Take 4 caps 1 hour prior to Dental Appointment     cholestyramine (QUESTRAN) 4 GM/DOSE powder Take 4 g by mouth 2 times daily (with meals)     cyanocobalamin 1000 MCG/ML injection Inject 1 mL (1,000 mcg) Subcutaneous every 7 days     cyclobenzaprine (FLEXERIL) 10 MG tablet Take 1 tablet  (10 mg) by mouth 3 times daily     cycloSPORINE (RESTASIS) 0.05 % ophthalmic emulsion Place 1 drop into both eyes 2 times daily     fexofenadine (ALLEGRA) 180 MG tablet Take 180 mg by mouth daily.     gabapentin (NEURONTIN) 300 MG capsule Take 300 mg by mouth 2 times daily      gabapentin (NEURONTIN) 800 MG tablet Take 800 mg by mouth 3 times daily     HYDROmorphone (DILAUDID) 8 MG tablet Take 8 mg by mouth every 6 hours as needed for severe pain . Max of 3 doses per day.     levothyroxine (SYNTHROID/LEVOTHROID) 100 MCG SOLR injection Inject 200 mcg into the vein twice a week      levothyroxine (SYNTHROID/LEVOTHROID) 300 MCG tablet Take 300 mcg by mouth 2 times daily      lifitegrast (XIIDRA) 5 % opthalmic solution Place 1 drop into both eyes 2 times daily     liothyronine (CYTOMEL) 25 MCG tablet Take 50 mcg by mouth 2 times daily      methylcellulose (CITRUCEL) powder Start with 1 heaping tablespoon. Increase as needed, 1 heaping tablespoon at a time, up to 3 times per day.     metoprolol succinate ER (TOPROL-XL) 50 MG 24 hr tablet Take 50 mg by mouth daily     morphine (MS CONTIN) 30 MG 12 hr tablet Take 30 mg by mouth 2 times daily along with one morphine 60 mg 12 hr tablet for a total dose of 90 mg.     morphine (MS CONTIN) 60 MG 12 hr tablet Take 60 mg by mouth 3 times daily      multivitamin, therapeutic with minerals (MULTI-VITAMIN) TABS tablet Take 1 tablet by mouth 2 times daily     NASONEX 50 MCG/ACT spray Spray 1 spray into both nostrils daily as needed      nystatin (MYCOSTATIN) 528879 UNIT/GM POWD Apply topically 3 times daily as needed     prednisoLONE acetate (PRED FORTE) 1 % ophthalmic susp 1 drop 4 times daily     predniSONE (DELTASONE) 1 MG tablet Use along with one prednisone 5 mg tablets to alternate taking 8mg and 10mg every other day.     predniSONE (DELTASONE) 5 MG tablet Alternate taking 8mg and 10mg every other day     probiotic CAPS Take 1 capsule by mouth 2 times daily     rivaroxaban  "ANTICOAGULANT (XARELTO) 20 MG TABS tablet Take 20 mg by mouth every morning      vitamin D2 (ERGOCALCIFEROL) 13137 units (1250 mcg) capsule Take 50,000 Units by mouth once a week     zolpidem (AMBIEN) 5 MG tablet Take 5 mg by mouth nightly as needed for sleep     Current Facility-Administered Medications   Medication     botulinum toxin type A (BOTOX) 100 units injection 100 Units     lidocaine 1% with EPINEPHrine 1:100,000 injection 3 mL     Allergies   Allergen Reactions     Chlorhexidine Hives     Thor surgical cleanser     Codeine Hives     Has tolerated Oxycodone in past      Ibuprofen [Nsaids] Hives     Penicillins Hives     Other reaction(s): Unknown     Sulfa Drugs Hives     Other reaction(s): Unknown     Doxycycline GI Disturbance     Emesis & diarrhea  Patient denies allergy to this med     Mold      Mushroom      Red Wine Complex [Red Wine Powder]        Past medical, surgical, social and family histories were reviewed and updated in EPIC.    ROS:   12 point review of systems negative other than symptoms noted below or in the HPI.  No urinary frequency or dysuria, bladder or kidney problems    EXAM:  /78   Ht 1.753 m (5' 9\")   Wt 102.1 kg (225 lb)   BMI 33.23 kg/m     BMI: Body mass index is 33.23 kg/m .    PHYSICAL EXAM:  Constitutional:   Appearance: Well nourished, well developed, alert, in no acute distress  Neck:  Lymph Nodes:  No lymphadenopathy present    Thyroid:  Gland size normal, nontender, no nodules or masses present  on palpation  Chest:  Respiratory Effort:  Breathing unlabored  Cardiovascular:    Heart: Auscultation:  Regular rate, normal rhythm, no murmurs present  Breasts: Inspection of Breasts:  No lymphadenopathy present., Palpation of Breasts and Axillae:  No masses present on palpation, no breast tenderness., Axillary Lymph Nodes:  No lymphadenopathy present. and No nodularity, asymmetry or nipple discharge bilaterally.  Gastrointestinal:   Abdominal Examination:  Abdomen " nontender to palpation, tone normal without rigidity or guarding, no masses present, umbilicus without lesions   Liver and Spleen:  No hepatomegaly present, liver nontender to palpation    Hernias:  No hernias present  Lymphatic: Lymph Nodes:  No other lymphadenopathy present  Skin:  General Inspection:  No rashes present, no lesions present, no areas of  discoloration  Neurologic:    Mental Status:  Oriented X3.  Normal strength and tone, sensory exam                grossly normal, mentation intact and speech normal.    Psychiatric:   Mentation appears normal and affect normal/bright.         Pelvic Exam:  External Genitalia:     Normal appearance for age, no discharge present, no tenderness present, no inflammatory lesions present, color normal  Vagina:     Normal vaginal vault without central or paravaginal defects, ATROPHIC  Bladder:     Nontender to palpation  Urethra:   Urethral Body:  Urethra palpation normal, urethra structural support normal   Urethral Meatus:  No erythema or lesions present  Cervix:     Appearance healthy, no lesions present, nontender to palpation, no bleeding present  Uterus:     Nontender to palpation, no masses present, position anteflexed, mobility: normal  Adnexa:     No adnexal tenderness present, no adnexal masses present  Perineum:     Perineum within normal limits, no evidence of trauma, no rashes or skin lesions present  Inguinal Lymph Nodes:     No lymphadenopathy present      COUNSELING:   Reviewed preventive health counseling, as reflected in patient instructions       Regular exercise       Healthy diet/nutrition    BMI: Body mass index is 33.23 kg/m .  Weight management plan: Discussed healthy diet and exercise guidelines    ASSESSMENT:  60 year old female with satisfactory annual exam.    ICD-10-CM    1. Encounter for gynecological examination without abnormal finding  Z01.419 Pap imaged thin layer screen with HPV - recommended age 30 - 65     HPV High Risk Types DNA  Cervical       PLAN: 60-year-old patient with no current GYN problems.  She has numerous orthopedic issues and is an ongoing care at this time.  She needs to find a new hand surgeon for second opinion consultation.  Her weight has ballooned up again and she needs to work her way back down to where she was at 170.      Naun Patricio MD

## 2020-09-28 ENCOUNTER — OFFICE VISIT (OUTPATIENT)
Dept: OBGYN | Facility: CLINIC | Age: 60
End: 2020-09-28
Payer: COMMERCIAL

## 2020-09-28 VITALS
DIASTOLIC BLOOD PRESSURE: 78 MMHG | BODY MASS INDEX: 33.33 KG/M2 | SYSTOLIC BLOOD PRESSURE: 126 MMHG | HEIGHT: 69 IN | WEIGHT: 225 LBS

## 2020-09-28 DIAGNOSIS — Z01.419 ENCOUNTER FOR GYNECOLOGICAL EXAMINATION WITHOUT ABNORMAL FINDING: Primary | ICD-10-CM

## 2020-09-28 DIAGNOSIS — B37.2 CANDIDAL SKIN INFECTION: ICD-10-CM

## 2020-09-28 PROCEDURE — G0145 SCR C/V CYTO,THINLAYER,RESCR: HCPCS | Performed by: OBSTETRICS & GYNECOLOGY

## 2020-09-28 PROCEDURE — 87624 HPV HI-RISK TYP POOLED RSLT: CPT | Performed by: OBSTETRICS & GYNECOLOGY

## 2020-09-28 PROCEDURE — 99396 PREV VISIT EST AGE 40-64: CPT | Performed by: OBSTETRICS & GYNECOLOGY

## 2020-09-28 PROCEDURE — G0476 HPV COMBO ASSAY CA SCREEN: HCPCS | Performed by: OBSTETRICS & GYNECOLOGY

## 2020-09-28 RX ORDER — NYSTATIN 100000 [USP'U]/G
POWDER TOPICAL 3 TIMES DAILY PRN
Qty: 60 G | Refills: 1 | Status: SHIPPED | OUTPATIENT
Start: 2020-09-28 | End: 2022-09-01

## 2020-09-28 ASSESSMENT — ANXIETY QUESTIONNAIRES
5. BEING SO RESTLESS THAT IT IS HARD TO SIT STILL: NOT AT ALL
3. WORRYING TOO MUCH ABOUT DIFFERENT THINGS: NOT AT ALL
IF YOU CHECKED OFF ANY PROBLEMS ON THIS QUESTIONNAIRE, HOW DIFFICULT HAVE THESE PROBLEMS MADE IT FOR YOU TO DO YOUR WORK, TAKE CARE OF THINGS AT HOME, OR GET ALONG WITH OTHER PEOPLE: NOT DIFFICULT AT ALL
1. FEELING NERVOUS, ANXIOUS, OR ON EDGE: NOT AT ALL
2. NOT BEING ABLE TO STOP OR CONTROL WORRYING: NOT AT ALL
GAD7 TOTAL SCORE: 3
6. BECOMING EASILY ANNOYED OR IRRITABLE: NEARLY EVERY DAY
7. FEELING AFRAID AS IF SOMETHING AWFUL MIGHT HAPPEN: NOT AT ALL

## 2020-09-28 ASSESSMENT — MIFFLIN-ST. JEOR: SCORE: 1649.97

## 2020-09-28 ASSESSMENT — PATIENT HEALTH QUESTIONNAIRE - PHQ9
SUM OF ALL RESPONSES TO PHQ QUESTIONS 1-9: 6
5. POOR APPETITE OR OVEREATING: NOT AT ALL

## 2020-09-28 NOTE — LETTER
October 7, 2020      Aspen Mccullough  [address] [place] [address]        Dear ,    We are happy to inform you that your recent Pap smear and Human Papillomavirus (HPV) test results are normal and negative.    It is recommended that you have your next Pap smear and Human Papillomavirus (HPV) test in 3 years. You will also need to return to the clinic every year for an annual wellness visit.    If you have additional questions regarding this result, please contact our office and we will be happy to assist you.      Sincerely,    Your [facility] Care Team

## 2020-09-29 ASSESSMENT — ANXIETY QUESTIONNAIRES: GAD7 TOTAL SCORE: 3

## 2020-09-30 LAB
COPATH REPORT: NORMAL
PAP: NORMAL

## 2020-10-09 ENCOUNTER — RECORDS - HEALTHEAST (OUTPATIENT)
Dept: ADMINISTRATIVE | Facility: OTHER | Age: 60
End: 2020-10-09

## 2020-10-13 ENCOUNTER — COMMUNICATION - HEALTHEAST (OUTPATIENT)
Dept: VASCULAR SURGERY | Facility: CLINIC | Age: 60
End: 2020-10-13

## 2020-10-26 ENCOUNTER — TRANSFERRED RECORDS (OUTPATIENT)
Dept: HEALTH INFORMATION MANAGEMENT | Facility: CLINIC | Age: 60
End: 2020-10-26

## 2020-10-31 ENCOUNTER — TRANSFERRED RECORDS (OUTPATIENT)
Dept: HEALTH INFORMATION MANAGEMENT | Facility: CLINIC | Age: 60
End: 2020-10-31

## 2020-10-31 ENCOUNTER — HOSPITAL ENCOUNTER (INPATIENT)
Facility: CLINIC | Age: 60
LOS: 1 days | Discharge: HOME | DRG: 309 | End: 2020-11-02
Attending: EMERGENCY MEDICINE | Admitting: INTERNAL MEDICINE
Payer: COMMERCIAL

## 2020-10-31 DIAGNOSIS — I82.4Y1 ACUTE DEEP VEIN THROMBOSIS (DVT) OF PROXIMAL VEIN OF RIGHT LOWER EXTREMITY (H): ICD-10-CM

## 2020-10-31 DIAGNOSIS — I48.91 ATRIAL FIBRILLATION WITH RVR (H): ICD-10-CM

## 2020-10-31 DIAGNOSIS — J18.9 PNEUMONIA OF LEFT UPPER LOBE DUE TO INFECTIOUS ORGANISM: ICD-10-CM

## 2020-10-31 DIAGNOSIS — E03.9 HYPOTHYROIDISM, UNSPECIFIED TYPE: Primary | ICD-10-CM

## 2020-10-31 LAB
CREAT SERPL-MCNC: 0.5 MG/DL (ref 0.52–1.04)
GFR SERPL CREATININE-BSD FRML MDRD: >60 ML/MIN/1.73M2
GLUCOSE SERPL-MCNC: 124 MG/DL (ref 74–106)
POTASSIUM SERPL-SCNC: 3.8 MMOL/L (ref 3.5–5.1)

## 2020-10-31 PROCEDURE — 99285 EMERGENCY DEPT VISIT HI MDM: CPT

## 2020-10-31 PROCEDURE — 93005 ELECTROCARDIOGRAM TRACING: CPT

## 2020-10-31 RX ORDER — DILTIAZEM HYDROCHLORIDE 5 MG/ML
20 INJECTION INTRAVENOUS ONCE
Status: COMPLETED | OUTPATIENT
Start: 2020-10-31 | End: 2020-11-01

## 2020-10-31 ASSESSMENT — MIFFLIN-ST. JEOR: SCORE: 1876.77

## 2020-10-31 ASSESSMENT — ENCOUNTER SYMPTOMS
NAUSEA: 0
COUGH: 0
CHILLS: 0
ABDOMINAL PAIN: 0
DIAPHORESIS: 0
SHORTNESS OF BREATH: 0
COLOR CHANGE: 1
CHEST TIGHTNESS: 0
FEVER: 0

## 2020-11-01 PROBLEM — J18.9 PNEUMONIA OF LEFT UPPER LOBE DUE TO INFECTIOUS ORGANISM: Status: ACTIVE | Noted: 2020-11-01

## 2020-11-01 PROBLEM — I48.91 ATRIAL FIBRILLATION WITH RVR (H): Status: ACTIVE | Noted: 2020-11-01

## 2020-11-01 PROBLEM — I82.4Y1 ACUTE DEEP VEIN THROMBOSIS (DVT) OF PROXIMAL VEIN OF RIGHT LOWER EXTREMITY (H): Status: ACTIVE | Noted: 2020-11-01

## 2020-11-01 LAB
ANION GAP SERPL CALCULATED.3IONS-SCNC: 2 MMOL/L (ref 3–14)
BUN SERPL-MCNC: 10 MG/DL (ref 7–30)
CALCIUM SERPL-MCNC: 8.9 MG/DL (ref 8.5–10.1)
CHLORIDE SERPL-SCNC: 106 MMOL/L (ref 94–109)
CO2 SERPL-SCNC: 29 MMOL/L (ref 20–32)
CREAT SERPL-MCNC: 0.51 MG/DL (ref 0.52–1.04)
ERYTHROCYTE [DISTWIDTH] IN BLOOD BY AUTOMATED COUNT: 14.6 % (ref 10–15)
GFR SERPL CREATININE-BSD FRML MDRD: >90 ML/MIN/{1.73_M2}
GLUCOSE SERPL-MCNC: 138 MG/DL (ref 70–99)
HCT VFR BLD AUTO: 34.2 % (ref 35–47)
HGB BLD-MCNC: 10.8 G/DL (ref 11.7–15.7)
INTERPRETATION ECG - MUSE: NORMAL
MCH RBC QN AUTO: 26.9 PG (ref 26.5–33)
MCHC RBC AUTO-ENTMCNC: 31.6 G/DL (ref 31.5–36.5)
MCV RBC AUTO: 85 FL (ref 78–100)
PLATELET # BLD AUTO: 293 10E9/L (ref 150–450)
POTASSIUM SERPL-SCNC: 3.7 MMOL/L (ref 3.4–5.3)
POTASSIUM SERPL-SCNC: 3.8 MMOL/L (ref 3.4–5.3)
PROCALCITONIN SERPL-MCNC: <0.05 NG/ML
RBC # BLD AUTO: 4.02 10E12/L (ref 3.8–5.2)
SODIUM SERPL-SCNC: 137 MMOL/L (ref 133–144)
T3FREE SERPL-MCNC: 4.9 PG/ML (ref 2.3–4.2)
T4 FREE SERPL-MCNC: 1.58 NG/DL (ref 0.76–1.46)
TSH SERPL DL<=0.005 MIU/L-ACNC: <0.01 MU/L (ref 0.4–4)
WBC # BLD AUTO: 5.6 10E9/L (ref 4–11)

## 2020-11-01 PROCEDURE — 96374 THER/PROPH/DIAG INJ IV PUSH: CPT

## 2020-11-01 PROCEDURE — 96376 TX/PRO/DX INJ SAME DRUG ADON: CPT

## 2020-11-01 PROCEDURE — 99223 1ST HOSP IP/OBS HIGH 75: CPT | Mod: AI | Performed by: INTERNAL MEDICINE

## 2020-11-01 PROCEDURE — 84481 FREE ASSAY (FT-3): CPT | Performed by: INTERNAL MEDICINE

## 2020-11-01 PROCEDURE — 250N000009 HC RX 250: Performed by: INTERNAL MEDICINE

## 2020-11-01 PROCEDURE — 84439 ASSAY OF FREE THYROXINE: CPT | Performed by: INTERNAL MEDICINE

## 2020-11-01 PROCEDURE — 258N000003 HC RX IP 258 OP 636: Performed by: INTERNAL MEDICINE

## 2020-11-01 PROCEDURE — 250N000012 HC RX MED GY IP 250 OP 636 PS 637: Performed by: INTERNAL MEDICINE

## 2020-11-01 PROCEDURE — 36415 COLL VENOUS BLD VENIPUNCTURE: CPT | Performed by: INTERNAL MEDICINE

## 2020-11-01 PROCEDURE — 80048 BASIC METABOLIC PNL TOTAL CA: CPT | Performed by: INTERNAL MEDICINE

## 2020-11-01 PROCEDURE — 250N000013 HC RX MED GY IP 250 OP 250 PS 637: Performed by: INTERNAL MEDICINE

## 2020-11-01 PROCEDURE — 96361 HYDRATE IV INFUSION ADD-ON: CPT

## 2020-11-01 PROCEDURE — 258N000003 HC RX IP 258 OP 636: Performed by: EMERGENCY MEDICINE

## 2020-11-01 PROCEDURE — G0378 HOSPITAL OBSERVATION PER HR: HCPCS

## 2020-11-01 PROCEDURE — 84145 PROCALCITONIN (PCT): CPT | Performed by: INTERNAL MEDICINE

## 2020-11-01 PROCEDURE — 85027 COMPLETE CBC AUTOMATED: CPT | Performed by: INTERNAL MEDICINE

## 2020-11-01 PROCEDURE — 250N000009 HC RX 250: Performed by: EMERGENCY MEDICINE

## 2020-11-01 PROCEDURE — 210N000001 HC R&B IMCU HEART CARE

## 2020-11-01 PROCEDURE — 84443 ASSAY THYROID STIM HORMONE: CPT | Performed by: INTERNAL MEDICINE

## 2020-11-01 PROCEDURE — 84132 ASSAY OF SERUM POTASSIUM: CPT | Performed by: INTERNAL MEDICINE

## 2020-11-01 RX ORDER — DILTIAZEM HYDROCHLORIDE 5 MG/ML
20 INJECTION INTRAVENOUS ONCE
Status: COMPLETED | OUTPATIENT
Start: 2020-11-01 | End: 2020-11-01

## 2020-11-01 RX ORDER — GABAPENTIN 300 MG/1
300 CAPSULE ORAL 3 TIMES DAILY
Status: DISCONTINUED | OUTPATIENT
Start: 2020-11-01 | End: 2020-11-02 | Stop reason: HOSPADM

## 2020-11-01 RX ORDER — GABAPENTIN 800 MG/1
800 TABLET ORAL 3 TIMES DAILY
Status: DISCONTINUED | OUTPATIENT
Start: 2020-11-01 | End: 2020-11-02 | Stop reason: HOSPADM

## 2020-11-01 RX ORDER — SODIUM CHLORIDE 9 MG/ML
INJECTION, SOLUTION INTRAVENOUS CONTINUOUS
Status: DISCONTINUED | OUTPATIENT
Start: 2020-11-01 | End: 2020-11-01

## 2020-11-01 RX ORDER — PREDNISONE 10 MG/1
10 TABLET ORAL EVERY OTHER DAY
Status: DISCONTINUED | OUTPATIENT
Start: 2020-11-02 | End: 2020-11-02 | Stop reason: HOSPADM

## 2020-11-01 RX ORDER — LIOTHYRONINE SODIUM 25 UG/1
50 TABLET ORAL 2 TIMES DAILY
Status: DISCONTINUED | OUTPATIENT
Start: 2020-11-01 | End: 2020-11-02 | Stop reason: HOSPADM

## 2020-11-01 RX ORDER — HYDROMORPHONE HYDROCHLORIDE 2 MG/1
8 TABLET ORAL EVERY 6 HOURS PRN
Status: DISCONTINUED | OUTPATIENT
Start: 2020-11-01 | End: 2020-11-02 | Stop reason: HOSPADM

## 2020-11-01 RX ORDER — ONDANSETRON 4 MG/1
4 TABLET, ORALLY DISINTEGRATING ORAL EVERY 6 HOURS PRN
Status: DISCONTINUED | OUTPATIENT
Start: 2020-11-01 | End: 2020-11-02 | Stop reason: HOSPADM

## 2020-11-01 RX ORDER — AMOXICILLIN 250 MG
2 CAPSULE ORAL 2 TIMES DAILY
Status: DISCONTINUED | OUTPATIENT
Start: 2020-11-01 | End: 2020-11-02 | Stop reason: HOSPADM

## 2020-11-01 RX ORDER — MORPHINE SULFATE 15 MG/1
30 TABLET, FILM COATED, EXTENDED RELEASE ORAL EVERY 24 HOURS
Status: DISCONTINUED | OUTPATIENT
Start: 2020-11-01 | End: 2020-11-02 | Stop reason: HOSPADM

## 2020-11-01 RX ORDER — ZOLPIDEM TARTRATE 5 MG/1
5 TABLET ORAL
Status: DISCONTINUED | OUTPATIENT
Start: 2020-11-01 | End: 2020-11-02 | Stop reason: HOSPADM

## 2020-11-01 RX ORDER — LEVOTHYROXINE SODIUM 100 UG/1
300 TABLET ORAL 2 TIMES DAILY
Status: DISCONTINUED | OUTPATIENT
Start: 2020-11-01 | End: 2020-11-02 | Stop reason: HOSPADM

## 2020-11-01 RX ORDER — POLYETHYLENE GLYCOL 3350 17 G/17G
17 POWDER, FOR SOLUTION ORAL DAILY
Status: DISCONTINUED | OUTPATIENT
Start: 2020-11-01 | End: 2020-11-02 | Stop reason: HOSPADM

## 2020-11-01 RX ORDER — MORPHINE SULFATE 15 MG/1
60 TABLET, FILM COATED, EXTENDED RELEASE ORAL EVERY 8 HOURS
Status: DISCONTINUED | OUTPATIENT
Start: 2020-11-01 | End: 2020-11-02 | Stop reason: HOSPADM

## 2020-11-01 RX ORDER — CYCLOBENZAPRINE HCL 5 MG
10 TABLET ORAL 3 TIMES DAILY
Status: DISCONTINUED | OUTPATIENT
Start: 2020-11-01 | End: 2020-11-02 | Stop reason: HOSPADM

## 2020-11-01 RX ORDER — AMOXICILLIN 250 MG
1 CAPSULE ORAL 2 TIMES DAILY
Status: DISCONTINUED | OUTPATIENT
Start: 2020-11-01 | End: 2020-11-02 | Stop reason: HOSPADM

## 2020-11-01 RX ORDER — ACETAMINOPHEN 325 MG/1
650 TABLET ORAL EVERY 4 HOURS PRN
Status: DISCONTINUED | OUTPATIENT
Start: 2020-11-01 | End: 2020-11-02 | Stop reason: HOSPADM

## 2020-11-01 RX ORDER — ONDANSETRON 2 MG/ML
4 INJECTION INTRAMUSCULAR; INTRAVENOUS EVERY 6 HOURS PRN
Status: DISCONTINUED | OUTPATIENT
Start: 2020-11-01 | End: 2020-11-02 | Stop reason: HOSPADM

## 2020-11-01 RX ORDER — METOPROLOL SUCCINATE 50 MG/1
50 TABLET, EXTENDED RELEASE ORAL DAILY
Status: DISCONTINUED | OUTPATIENT
Start: 2020-11-01 | End: 2020-11-01

## 2020-11-01 RX ORDER — ACETAMINOPHEN 650 MG/1
650 SUPPOSITORY RECTAL EVERY 4 HOURS PRN
Status: DISCONTINUED | OUTPATIENT
Start: 2020-11-01 | End: 2020-11-02 | Stop reason: HOSPADM

## 2020-11-01 RX ORDER — HYDROMORPHONE HYDROCHLORIDE 2 MG/1
8 TABLET ORAL 3 TIMES DAILY PRN
Status: DISCONTINUED | OUTPATIENT
Start: 2020-11-01 | End: 2020-11-01

## 2020-11-01 RX ORDER — NALOXONE HYDROCHLORIDE 0.4 MG/ML
.1-.4 INJECTION, SOLUTION INTRAMUSCULAR; INTRAVENOUS; SUBCUTANEOUS
Status: DISCONTINUED | OUTPATIENT
Start: 2020-11-01 | End: 2020-11-02 | Stop reason: HOSPADM

## 2020-11-01 RX ORDER — MORPHINE SULFATE 15 MG/1
30 TABLET, FILM COATED, EXTENDED RELEASE ORAL EVERY MORNING
Status: DISCONTINUED | OUTPATIENT
Start: 2020-11-02 | End: 2020-11-02 | Stop reason: HOSPADM

## 2020-11-01 RX ORDER — DILTIAZEM HYDROCHLORIDE 30 MG/1
30 TABLET, FILM COATED ORAL EVERY 6 HOURS SCHEDULED
Status: DISCONTINUED | OUTPATIENT
Start: 2020-11-01 | End: 2020-11-02

## 2020-11-01 RX ORDER — METOPROLOL SUCCINATE 50 MG/1
50 TABLET, EXTENDED RELEASE ORAL 2 TIMES DAILY
Status: DISCONTINUED | OUTPATIENT
Start: 2020-11-01 | End: 2020-11-02 | Stop reason: HOSPADM

## 2020-11-01 RX ADMIN — SODIUM CHLORIDE 1000 ML: 9 INJECTION, SOLUTION INTRAVENOUS at 00:08

## 2020-11-01 RX ADMIN — GABAPENTIN 800 MG: 800 TABLET, FILM COATED ORAL at 08:59

## 2020-11-01 RX ADMIN — LIOTHYRONINE SODIUM 50 MCG: 25 TABLET ORAL at 08:59

## 2020-11-01 RX ADMIN — GABAPENTIN 800 MG: 800 TABLET, FILM COATED ORAL at 21:29

## 2020-11-01 RX ADMIN — GABAPENTIN 800 MG: 800 TABLET, FILM COATED ORAL at 15:58

## 2020-11-01 RX ADMIN — DOCUSATE SODIUM 50 MG AND SENNOSIDES 8.6 MG 1 TABLET: 8.6; 5 TABLET, FILM COATED ORAL at 21:28

## 2020-11-01 RX ADMIN — POLYETHYLENE GLYCOL 3350 17 G: 17 POWDER, FOR SOLUTION ORAL at 09:00

## 2020-11-01 RX ADMIN — GABAPENTIN 300 MG: 300 CAPSULE ORAL at 21:29

## 2020-11-01 RX ADMIN — DILTIAZEM HYDROCHLORIDE 30 MG: 30 TABLET, FILM COATED ORAL at 17:39

## 2020-11-01 RX ADMIN — MORPHINE SULFATE 60 MG: 15 TABLET, EXTENDED RELEASE ORAL at 11:31

## 2020-11-01 RX ADMIN — DILTIAZEM HYDROCHLORIDE 20 MG: 5 INJECTION INTRAVENOUS at 01:04

## 2020-11-01 RX ADMIN — CYCLOBENZAPRINE HYDROCHLORIDE 10 MG: 5 TABLET, FILM COATED ORAL at 15:58

## 2020-11-01 RX ADMIN — MORPHINE SULFATE 30 MG: 15 TABLET, EXTENDED RELEASE ORAL at 16:04

## 2020-11-01 RX ADMIN — LEVOTHYROXINE SODIUM 300 MCG: 100 TABLET ORAL at 08:59

## 2020-11-01 RX ADMIN — RIVAROXABAN 20 MG: 10 TABLET, FILM COATED ORAL at 09:00

## 2020-11-01 RX ADMIN — DOCUSATE SODIUM 50 MG AND SENNOSIDES 8.6 MG 1 TABLET: 8.6; 5 TABLET, FILM COATED ORAL at 09:00

## 2020-11-01 RX ADMIN — LIOTHYRONINE SODIUM 50 MCG: 25 TABLET ORAL at 21:28

## 2020-11-01 RX ADMIN — MORPHINE SULFATE 60 MG: 15 TABLET, EXTENDED RELEASE ORAL at 03:16

## 2020-11-01 RX ADMIN — CYCLOBENZAPRINE HYDROCHLORIDE 10 MG: 5 TABLET, FILM COATED ORAL at 11:31

## 2020-11-01 RX ADMIN — PREDNISONE 8 MG: 1 TABLET ORAL at 14:21

## 2020-11-01 RX ADMIN — METOPROLOL SUCCINATE 50 MG: 50 TABLET, EXTENDED RELEASE ORAL at 02:46

## 2020-11-01 RX ADMIN — DILTIAZEM HYDROCHLORIDE 30 MG: 30 TABLET, FILM COATED ORAL at 11:31

## 2020-11-01 RX ADMIN — LEVOTHYROXINE SODIUM 300 MCG: 100 TABLET ORAL at 21:28

## 2020-11-01 RX ADMIN — DILTIAZEM HYDROCHLORIDE 5 MG/HR: 5 INJECTION INTRAVENOUS at 13:14

## 2020-11-01 RX ADMIN — HYDROMORPHONE HYDROCHLORIDE 8 MG: 2 TABLET ORAL at 02:46

## 2020-11-01 RX ADMIN — METOPROLOL SUCCINATE 50 MG: 50 TABLET, EXTENDED RELEASE ORAL at 08:59

## 2020-11-01 RX ADMIN — GABAPENTIN 300 MG: 300 CAPSULE ORAL at 15:58

## 2020-11-01 RX ADMIN — MORPHINE SULFATE 60 MG: 15 TABLET, EXTENDED RELEASE ORAL at 21:29

## 2020-11-01 RX ADMIN — DILTIAZEM HYDROCHLORIDE 20 MG: 5 INJECTION INTRAVENOUS at 00:01

## 2020-11-01 RX ADMIN — CYCLOBENZAPRINE HYDROCHLORIDE 10 MG: 5 TABLET, FILM COATED ORAL at 21:28

## 2020-11-01 RX ADMIN — METOPROLOL SUCCINATE 50 MG: 50 TABLET, EXTENDED RELEASE ORAL at 21:29

## 2020-11-01 RX ADMIN — MORPHINE SULFATE 30 MG: 15 TABLET, EXTENDED RELEASE ORAL at 13:11

## 2020-11-01 RX ADMIN — GABAPENTIN 300 MG: 300 CAPSULE ORAL at 13:12

## 2020-11-01 ASSESSMENT — ACTIVITIES OF DAILY LIVING (ADL)
ADLS_ACUITY_SCORE: 17

## 2020-11-01 ASSESSMENT — MIFFLIN-ST. JEOR: SCORE: 1940.28

## 2020-11-01 NOTE — PROVIDER NOTIFICATION
MD Notification    Notified Person: MD    Notified Person Name: Brent    Notification Date/Time: 11/1/20 0243    Notification Interaction: amcon text page    Purpose of Notification: Pt request to be DNR/DNI and to restart home ms contin, thank you    Orders Received: Ms contin ordered.     Comments:

## 2020-11-01 NOTE — H&P
Admitted:     10/31/2020      PRIMARY CARE PHYSICIAN:  Arcadio Farfan MD, at [facility]      PRIMARY ENDOCRINOLOGIST:  Gage Luna MD      CHIEF COMPLAINT:  Asymptomatic atrial fibrillation with RVR, recent right hand surgery, and acute DVT of the right lower extremity.      HISTORY OF PRESENT ILLNESS:  Aspen Mccullough is a 60-year-old  female with a very complicated medical history, including history of lupus, paroxysmal atrial fibrillation for which she is on Xarelto, hypertension, hyperlipidemia, prior history of DVT, PE, who recently underwent right hand surgery.  The patient is chronically on Xarelto and was taken off Xarelto last Thursday and underwent surgery on Wednesday of this week.  The patient presented at urgent care because she was having some pain behind her right knee.  When she went to urgent care, they noted her to be in atrial fibrillation with RVR.  She had a right lower extremity ultrasound which showed acute DVT of the right posterior tibial vein and a popliteal cyst.  The patient had a CT of the chest which showed no peripheral or central pulmonary artery embolism.  There was no dissection.  The CT scan, however, did show left upper lobe consolidation, patchy airspace opacity, likely reflecting pneumonia.  Follow up assessment of resolution was recommended.  Findings are suspicious for pulmonary air trapping and a large left pectoral intramuscular lipoma measuring 9.3 cm.  The patient's hemoglobin was 11.2, white count was 7.6, platelets 265.  Glucose 124, BUN 18, creatinine 0.5.  Troponin was below reference range.  Lactic acid 1.5.   COVID test was negative on 10/31, and it was also negative on 10/19.      The patient came into [facility] via EMS and was seen by Dr. Getachew Sequeira.  The patient was given a liter of normal saline because her blood pressure was soft.  She received 20 mg of IV Cardizem, which brought her heart rate down for a brief  period of time.  The patient is completely asymptomatic from the atrial fibrillation.  The patient is being admitted, at least under observation status, to get her heart rate under control.  The patient has been restarted on Xarelto.      PAST MEDICAL HISTORY:   1.  ADHD.   2.  Chronic low back pain.   3.  Cushing syndrome.   4.  Degenerative disc disease.     5.  Endometriosis.   6.  Fibromyalgia.   7.  Hashimoto's disease.   8.  Hyperlipidemia.   9.  Hypothyroidism.   10.  Insomnia.   11.  Lupus.   12.  Malabsorption of pernicious anemia.   13.  Kidney disease.   14.  Osteoarthritis.   15.  Nonischemic cardiomyopathy.   16.  Hypertension.      PAST SURGICAL HISTORY:   1.  Left fifth toe amputation.   2.  Appendectomy.   3.  Arthrodesis of right ankle.   4.  Bilateral knee arthroplasty.   5.  Right arthroplasty revision.   6.  Breast tissue removal.   7.  Cholecystectomy.   8.  Left foot surgery x 4.   9.  Hemorrhoidectomy.   10.  Sacroplasty.   11.  Lumbar fusion.   12.  Tonsillectomy.   13.  Bilateral salpingo-oophorectomy.   14.  Rhinoplasty.      FAMILY HISTORY:  Positive for hypertension.      SOCIAL HISTORY:  Former smoker.  No alcohol.  .  FULL CODE.      ALLERGIES:  CHLORHEXIDINE, CODEINE, IBUPROFEN, PENICILLIN, SULFA DRUGS.      UNVERIFIED MEDICATIONS:  Include:   1.  lifitegrast eye gtts.  2.  Cyanocobalamin.   3.  Cyclobenzaprine.   4.  Methylcellulose powder.   5.  Nystatin.   6.  Biotin.   7.  Carboxymethylcellulose ophthalmic solution.   8.  Cephalexin prior to dental procedures.   9.  Cyclosporine eyedrops.   10.  Fexofenadine.   11.  Gabapentin.   12.  Hydromorphone.   13.  Levothyroxine.     14.  Liothyronine.   15.  Metoprolol.   16.  Morphine in the form of MS Contin.   17.  Multivitamin.   18.  Nasonex.   19.  Pred-Forte eyedrops.   20.  Prednisone.   21.  Probiotic.   22.  Xarelto.   23.  Vitamin D2.   24.  Ambien.      REVIEW OF SYSTEMS:  Ten points reviewed.  Denies any fevers, chills,  cough, sputum production, wheezing, chest pain, shortness of breath, abdominal pain or nausea or vomiting.  Denies any black or bloody stools.  Denies any orthopnea, PND or presyncope.  Denies tachycardia.  Some right lower leg swelling or fullness.  Otherwise, as dictated in history of present illness.      PHYSICAL EXAMINATION:   VITAL SIGNS:  Temperature 98.8, heart rate 149, respirations 20, blood pressure 119/105, sats are 94% on room air.   GENERAL:  The patient is an obese 60-year-old  female.  She is in no distress, oriented x 3.   HEENT:  Pupils equal.  Sclerae are anicteric.  Mucous membranes are moist.   NECK:  JVP is not elevated.   PULMONARY:  Lungs clear to auscultation.   CARDIOVASCULAR:  S1, S2, irregularly irregular, and tachycardic.   GASTROINTESTINAL:  Abdomen soft, nontender.  Normoactive bowel sounds.   MUSCULOSKELETAL:  She has some nonpitting edema of her right lower extremity greater than the left.   SKIN:  Warm, dry, well perfused.   PSYCHIATRIC:  She is anxious and hostile.     NEUROLOGIC:  She is grossly nonfocal.  Cranial nerves grossly intact.      LABORATORY AND IMAGING STUDIES:  As dictated in history of present illness.      ASSESSMENT:  Aspen Mccullough is 60, who has a prior history of atrial fibrillation, a single episode, who is on chronic anticoagulation due to prior deep venous thrombosis, pulmonary embolism, who was off anticoagulation for about 6 days for a right hand surgery, restarting again on Thursday, was having right lower extremity swelling and finding of right posterior tibial vein deep venous thrombosis, as well as asymptomatic atrial fibrillation with rapid ventricular response, being admitted for further treatment.      PLAN:   1.  Atrial fibrillation with rapid ventricular response:  The patient is only on 50 mg of Toprol a day.  She is hostile about other blood pressure medications and claims she was at one time on 13 different blood pressure medications.   The patient did receive IV diltiazem in the ED.  For now, we will attempt to get her heart rate under control by titrating up her metoprolol as her blood pressure has improved.  We will start by increasing it from 50 a day to 50 twice a day of Toprol-XL.  To that, we can add diltiazem as can tolerate.  The patient is on a significantly high dose of thyroid replacement, including Synthroid and liothyronine.  We will check a TSH and free T4 as this certainly could make it difficult if her thyroid levels are over or under replaced.   2.  Hypothyroidism:  The patient is followed by Endocrinology as an outpatient.  She is on 300 mcg of Synthroid and 50 mcg of liothyronine.  We will check a TSH, free T4 and T3 levels and continue replacement.   3.  Right lower extremity deep venous thrombosis:  The patient was off anticoagulation for 6 days for her right hand surgery.  The patient has been restarted on her Xarelto.  Chest x-ray showed no PE.   4.  Left upper lobe consolidation, patchy airspace opacities:  This is called as pneumonia at the outside facility.  The patient herself, however, has no fever, no cough and asymptomatic from a pulmonary standpoint of view.  We will check a procalcitonin level and could consider a trial of doxycycline as treatment.  We will discuss this with the patient and we will follow clinically.   5.  History of lupus:  The patient will be continued on her prednisone.   6.  Chronic pain:  The patient is on high dose of MS Contin and oral Dilaudid as a maintenance.  We will continue the patient on all of those medications.   7.  History of nonischemic cardiomyopathy:  This was noted on her prior history.  If unable to get her heart rate under control, we may need to get Cardiology involved and get an echocardiogram.  We will follow clinical course.   8.  Deep venous thrombosis prophylaxis:  The patient is already anticoagulated with Xarelto.      CODE STATUS:  FULL.         ANNIE KEARNEY MD              D: 2020   T: 2020   MT: SILAS      Name:     ROXY ALVARES   MRN:      [id number]        Account:      [id number]   :      1960        Admitted:     10/31/2020                   Document: [id number]       cc: Arcadio Luna MD

## 2020-11-01 NOTE — ED PROVIDER NOTES
History   Chief Complaint:  DVT, Atrial Fibrillation with RVR, and Pneumonia    HPI   Aspen Mccullough is a 60 year old female, anticoagulated on Xarelto s/p right thumb surgery (of Xarelto from 10/22-10/26) with a history of nonischemic cardiomyopathy, Lupus, paroxysmal atrial fibrillation, hypertension, hyperlipidemia, DVT, and pulmonary embolism, who presents to the ED for evaluation of DVT, atrial fibrillation with RVR and pneumonia. EMS reports the patient presented to urgent care as she had some pain behind her right knee. She then was found to be in atrial fibrillation with RVR, had a right leg DVT which then prompted a chest CT and demonstrated slight pneumonia, but no pulmonary embolism. The patient was then told to be transferred to the emergency department for further evaluation, treatment, and admission. En route, the patient had a systolic pressure of 94 and had a pulse of up to 150 which was sustained for a while. She was administered 2 g of rocephin and given doxycycline as well at urgent care. She was provided metoprolol as well, but did not receive anymore due to her low blood pressure at the time. Here in the emergency department, the patient confirms she presented to urgent care as her leg at felt heavy and that she had a hard spot behind her right knee. She does not have any shortness of breath, chest pain, chest tightness, nor does she feel any palpitations. She did notice she had some color change in her right toes as well, which was knew and concerned her. She did not know she was in atrial fibrillation. She has been compliant with her Xarelto since the procedure. The patient denies any cough, chills, diaphoresis, abdominal pain, nausea, vomiting, or any other acute symptoms.     CTA CHEST-10/31/20  1.  Limited exam due to respiratory motion. No central or definite peripheral  pulmonary artery embolism. No thoracic aortic dissection.  2.  Mild left upper lobar consolidation and patchy  airspace opacities likely  reflecting pneumonia. Follow-up to assess for resolution is recommended.  3.  Findings suggesting pulmonary air trapping which can be seen with small  airways disease.  4.  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 Recommend  follow-up with ultrasound or CT in 6 months.    US VENOUS LOWER EXTREMITY RIGHT-10/31/20  1.  Acute DVT right posterior tibial vein.  2.  Popliteal cyst.  3.  Results discussed with Beena Devine, nurse practitioner, on 10/31/2020 at  9:40 PM.    Laboratory-10/31/20  CBC: HGB 11.2 (L), o/w WNL (WBC 7.6, )  BMP:  (H), BUN 18 (H), Creatinine 0.50 (L), o/w WNL  Troponin (Collected 2039): <0.019  Lactic Acid (2156): 1.5  COVID-19 Virus PCR: Negative    Allergies:  Chlorhexidine  Codeine  Ibuprofen  Penicillins  Sulfa Drugs    Medications:    Flexeril  Gabapentin  Synthroid  Liothyronine  Toprol  Morphine  Dilaudid  Xarelto  Ambien  Xarelto    Past Medical History:    ADHD  Chronic low back pain  Cushing's syndrome  DDD  Endometriosis  Fibromyalgia  Hashimoto's disease  Hyperlipidemia  Hypothyroid  Insomnia  Lupus  Malabsorption  Pernicious anemia  Renal disease  Osteoarthrosis  Nonischemic cardiomyopathy  Hypertension    Past Surgical History:    Left fifth toe amputation  Appendectomy  Arthrodesis of right ankle  Bilateral knee arthroplasty  Right arthroplasty revision  Breast tissue removal  Cholecystectomy  Left foot surgery x4  Hemorrhoidectomy  Sacroplasty  Lumbar fusion  Tonsillectomy  Bilateral salpingo-oophorectomy  Rhinoplasty    Family History:    Hypertension    Social History:  Smoking status: Former-1/1/1993  Alcohol use: No  Drug use: No  PCP: Sarahi Vivar  Marital Status:   [2]    Review of Systems   Constitutional: Negative for chills, diaphoresis and fever.   Respiratory: Negative for cough, chest tightness and shortness of breath.    Cardiovascular: Negative for chest pain.   Gastrointestinal: Negative for  "abdominal pain and nausea.   Musculoskeletal:        Right leg pain   Skin: Positive for color change.   All other systems reviewed and are negative.    Physical Exam     Patient Vitals for the past 24 hrs:   BP Temp Temp src Pulse Resp SpO2 Height Weight   11/01/20 0145 (!) 134/111 -- -- 130 17 -- -- --   11/01/20 0130 (!) 146/84 -- -- 117 16 -- -- --   11/01/20 0115 (!) 125/98 -- -- 122 24 -- -- --   11/01/20 0100 105/61 -- -- 115 16 -- -- --   11/01/20 0045 108/72 -- -- 112 15 -- -- --   11/01/20 0040 -- -- -- 100 12 -- -- --   11/01/20 0030 (!) 82/71 -- -- 116 21 -- -- --   11/01/20 0020 93/81 -- -- 126 22 -- -- --   11/01/20 0010 100/89 -- -- 113 21 -- -- --   11/01/20 0000 128/75 -- -- 154 25 -- -- --   10/31/20 2341 (!) 119/105 98.8  F (37.1  C) Oral 149 20 95 % -- --   10/31/20 2336 -- -- -- -- -- -- 1.753 m (5' 9\") 124.7 kg (275 lb)       Physical Exam  Constitutional:  Appears well-developed and well-nourished. Cooperative.   HENT:   Head:    Atraumatic.   Mouth/Throat:   Oropharynx is without erythema or exudate and mucous     membranes are moist.   Eyes:    Conjunctivae normal and EOM are normal.      Pupils are equal, round, and reactive to light.   Neck:    Normal range of motion. Neck supple.   Cardiovascular:  Tachycardic. Irregularly irregular rhythm/ Normal heart sounds and radial and dorsalis pedis pulses are 2+ and symmetric.    Pulmonary/Chest:  Effort normal and breath sounds normal.   Abdominal:   Soft. Bowel sounds are normal.      No splenomegaly or hepatomegaly. No tenderness. No rebound.   Musculoskeletal:  Normal range of motion. No tenderness. Trace edema in bilateral lower extremities with right greater than left.  Neurological:  Alert. Normal strength. No cranial nerve deficit. GCS 15.  Skin:    Skin is warm and dry.   Psychiatric:   Normal mood and affect.     Emergency Department Course   ECG (23:38:18):  Rate 149 bpm. TX interval *. QRS duration 82. QT/QTc 308/485. P-R-T axes * 41 " 17. Atrial fibrillation with rapid ventricular response. Abnormal ECG. Atrial fibrillation with RVR replaced sinus rhythm. Interpreted at 2338 by Getachew Sequeira MD.    Interventions:  0001: Cardizem 20 mg IV  0008: NS 1L IV Bolus   0104: Cardizem 20 mg IV    Emergency Department Course:  Past medical records, nursing notes, and vitals reviewed.    2351 I performed an exam of the patient as documented above.     EKG obtained in the ED, see results above.     0130 I rechecked the patient and discussed the results of her workup thus far.     Findings and plan explained to the Patient who consents to admission. Discussed the patient with Dr. Kennedy, who will admit the patient to an observation bed for further monitoring, evaluation, and treatment.    Impression & Plan   Covid-19  Aspen Mccullough was evaluated during a global COVID-19 pandemic, which necessitated consideration that the patient might be at risk for infection with the SARS-CoV-2 virus that causes COVID-19.   Applicable protocols for evaluation were followed during the patient's care.   COVID-19 was considered as part of the patient's evaluation. The plan for testing is:  a test was obtained at a previous visit and reviewed & considered today.    Medical Decision Making:  Aspen Mccullough is a 60 year old female who presents with new onset atrial fibrillation with RVR. This was discovered in the setting of her being diagnosed with a right posterior tibial DVT and left upper lobe pneumonia at outside urgency room.  She is asymptomatic with the atrial fibrillation.  She reports her right calf pain has improved over the last several hours.    She was sent to the emergency department because she had persistent atrial fibrillation with RVR despite a dose of metoprolol in the urgency room. There, she also received Rocephin and doxycycline for her pneumonia. She has already restarted her Xarelto, which had been discontinued for four days for an orthopedic  surgery last week.     I reviewed chest CT, ultrasound, and laboratory results from the urgent care. She had a normal lactate, negative rapid COVID PCR, and unremarkable CBC with differential as well as BMP. Troponin was also normal.     EKG here shows atrial fibrillation with RVR without any ischemic looking changes. The patient is not having any chest pain and is actually asymptomatic with her atrial fibrillation. She received a liter of fluids and bolus of IV Cardizem. This did slow her rate into the 's. Overtime her rate slowly increased again and she received a second dose of IV Cardizem.    Because of her persistent rapid rate and other complicating conditions, she will be brought into the hospital.    The patient has already been made aware of her test results from the urgent care. We discussed these again, and the plan for treatment and hospitalization. She voices understanding.         Diagnosis:    ICD-10-CM    1. Atrial fibrillation with RVR (H)  I48.91    2. Pneumonia of left upper lobe due to infectious organism  J18.9    3. Acute deep vein thrombosis (DVT) of proximal vein of right lower extremity (H)  I82.4Y1        Disposition:  Admitted to an observation bed.    Scribe Disclosure:  Devang SCOTT, am serving as a scribe at 11:51 PM on 10/31/2020 to document services personally performed by Getachew Sequeira MD based on my observations and the provider's statements to me.      Getachew Sequeira MD  11/01/20 0703

## 2020-11-01 NOTE — PROGRESS NOTES
RECEIVING UNIT ED HANDOFF REVIEW    ED Nurse Handoff Report was reviewed by: Keri Sunshine RN on November 1, 2020 at 1:36 AM

## 2020-11-01 NOTE — ED NOTES
[facility]  ED Nurse Handoff Report    ED Chief complaint: Tachycardia (Afib)      ED Diagnosis:   Final diagnoses:   Atrial fibrillation with RVR (H)   Pneumonia of left upper lobe due to infectious organism   Acute deep vein thrombosis (DVT) of proximal vein of right lower extremity (H)       Code Status: Admitting provider to address.     Allergies:   Allergies   Allergen Reactions     Chlorhexidine Hives     Thor surgical cleanser     Codeine Hives     Has tolerated Oxycodone in past      Ibuprofen [Nsaids] Hives     Penicillins Hives     Other reaction(s): Unknown     Sulfa Drugs Hives     Other reaction(s): Unknown     Doxycycline GI Disturbance     Emesis & diarrhea  Patient denies allergy to this med     Mold      Mushroom      Red Wine Complex [Red Wine Powder]        Patient Story: Pt is a 60 year old female sent over from the urgent care for pain in back of her right knee  Focused Assessment:  Pt is awake, alert and orientated X 4     Treatments and/or interventions provided: IV fluids, medications     Patient's response to treatments and/or interventions: Tolerating well.     To be done/followed up on inpatient unit:  None    Does this patient have any cognitive concerns?: AOX4    Activity level - Baseline/Home:  Independent  Activity Level - Current:   Independent    Patient's Preferred language: English   Needed?: No    Isolation: COVID r/o and special precautions  Infection: COVID r/o and special precautions  Patient tested for COVID 19 prior to admission: YES  Bariatric?: No    Vital Signs:   Vitals:    11/01/20 0030 11/01/20 0040 11/01/20 0045 11/01/20 0100   BP: (!) 82/71  108/72    Pulse: 116 100 112 115   Resp: 21 12 15 16   Temp:       TempSrc:       SpO2:       Weight:       Height:           Cardiac Rhythm:     Was the PSS-3 completed:   Yes  What interventions are required if any?               Family Comments: No family at bedside  OBS brochure/video  discussed/provided to patient/family: No              Name of person given brochure if not patient:               Relationship to patient:     For the majority of the shift this patient's behavior was Green.   Behavioral interventions performed were .    ED NURSE PHONE NUMBER: [phone]

## 2020-11-01 NOTE — PLAN OF CARE
[id number]: A&Ox4. VSS on RA. Tele afib -140s with activity. Denies chest pain, palpitations, dizziness, or lightheadedness. LS diminished. AVALOS. RLE tender, swollen. Baseline BLE neuropathy. R wrist/hand brace in place. Chronic pain in most joints, using ms contin and oral dilaudid for pain control.     PATIENT WISHES TO BE DNR/DNI AND REFUSES ANY BLOOD TRANSFUSIONS.

## 2020-11-01 NOTE — ED TRIAGE NOTES
Pt was brought in by EMS from the urgent care due to tachycardia, diagnosed with pneumonia, DVT in her right leg.

## 2020-11-01 NOTE — PHARMACY-ADMISSION MEDICATION HISTORY
Pharmacy Medication History  Admission medication history interview status for the 10/31/2020  admission is complete. See EPIC admission navigator for prior to admission medications       Medication history sources: Patient, [facility] and [facility]  Location of interview:  Phone  Medication history source reliability: Good  Adherence assessment: Good    Significant changes made to the medication list:  Removed Cholestyramine, updated Gabapentin dose to 1100 mg (800 + 300 mg) TID      Additional medication history information:   Verified high dose levothyroxine PO, IV, and liothyronine with [facility]    Medication reconciliation completed by provider prior to medication history? Yes    Time spent in this activity: > 30 minutes      Prior to Admission medications    Medication Sig Last Dose Taking? Auth Provider   BIOTIN FORTE PO Take 1 tablet by mouth daily  10/31/2020 at Unknown time Yes Reported, Patient   carboxymethylcellulose (CARBOXYMETHYLCELLULOSE SODIUM) 0.5 % SOLN ophthalmic solution Place 1 drop into both eyes 2 times daily as needed  prn Yes    CEPHALEXIN PO Take 500 mg by mouth as needed (Dental Procedure) Take 4 caps 1 hour prior to Dental Appointment prn Yes Reported, Patient   cyanocobalamin 1000 MCG/ML injection Inject 1 mL (1,000 mcg) Subcutaneous every 7 days 10/26/2020 Yes Yeimy Cabrera MD   cyclobenzaprine (FLEXERIL) 10 MG tablet Take 1 tablet (10 mg) by mouth 3 times daily 10/31/2020 at Unknown time Yes Yeimy Cabrera MD   cycloSPORINE (RESTASIS) 0.05 % ophthalmic emulsion Place 1 drop into both eyes 2 times daily prn Yes Reported, Patient   fexofenadine (ALLEGRA) 180 MG tablet Take 180 mg by mouth daily as needed  prn Yes Reported, Patient   gabapentin (NEURONTIN) 300 MG capsule Take 300 mg by mouth 3 times daily In addition to 800 mg for total dose of 1100 mg 10/31/2020 at Unknown time Yes Reported, Patient   gabapentin (NEURONTIN) 800 MG tablet Take  800 mg by mouth 3 times daily In addition to 300 mg for total dose of 1100 mg 10/31/2020 at Unknown time Yes Unknown, Entered By History   HYDROmorphone (DILAUDID) 8 MG tablet Take 8 mg by mouth every 8 hours . Max of 3 doses per day. 10/31/2020 at Unknown time Yes    levothyroxine (SYNTHROID/LEVOTHROID) 300 MCG tablet Take 300 mcg by mouth 2 times daily  10/31/2020 at Unknown time Yes Reported, Patient   lifitegrast (XIIDRA) 5 % opthalmic solution Place 1 drop into both eyes 2 times daily prn Yes    liothyronine (CYTOMEL) 25 MCG tablet Take 50 mcg by mouth 2 times daily  10/31/2020 at Unknown time Yes    methylcellulose (CITRUCEL) powder Start with 1 heaping tablespoon. Increase as needed, 1 heaping tablespoon at a time, up to 3 times per day. prn Yes Pearl Cifuentes, ANITRA CNP   metoprolol succinate ER (TOPROL-XL) 50 MG 24 hr tablet Take 50 mg by mouth daily 10/31/2020 at Unknown time Yes Unknown, Entered By History   morphine (MS CONTIN) 30 MG 12 hr tablet Take 30 mg by mouth 2 times daily along with one morphine 60 mg 12 hr tablet for a total dose of 90 mg.  AM and afternoon 10/31/2020 at Unknown time Yes    morphine (MS CONTIN) 60 MG 12 hr tablet Take 60 mg by mouth 3 times daily  10/31/2020 at Unknown time Yes Umm Ya APRN CNP   multivitamin, therapeutic with minerals (MULTI-VITAMIN) TABS tablet Take 1 tablet by mouth 2 times daily 10/31/2020 at Unknown time Yes    NASONEX 50 MCG/ACT spray Spray 1 spray into both nostrils daily as needed  prn Yes    nystatin (MYCOSTATIN) 625954 UNIT/GM external powder Apply topically 3 times daily as needed prn Yes Naun Patricio MD   prednisoLONE acetate (PRED FORTE) 1 % ophthalmic susp 1 drop 4 times daily prn Yes Reported, Patient   predniSONE (DELTASONE) 1 MG tablet Use along with one prednisone 5 mg tablets to alternate taking 8mg and 10mg every other day. 10/31/2020 at 10 mg Yes    predniSONE (DELTASONE) 5 MG tablet Alternate taking 8mg  and 10mg every other day 10/31/2020 at 10mg Yes    probiotic CAPS Take 1 capsule by mouth 2 times daily 10/31/2020 at Unknown time Yes    rivaroxaban ANTICOAGULANT (XARELTO) 20 MG TABS tablet Take 20 mg by mouth daily (with dinner)  10/31/2020 at Unknown time Yes    vitamin D2 (ERGOCALCIFEROL) 33089 units (1250 mcg) capsule Take 50,000 Units by mouth once a week 10/26/2020 Yes Unknown, Entered By History   zolpidem (AMBIEN) 5 MG tablet Take 5 mg by mouth nightly as needed for sleep prn Yes Unknown, Entered By History   levothyroxine (SYNTHROID/LEVOTHROID) 100 MCG SOLR injection Inject 200 mcg into the vein twice a week  10/30/2020

## 2020-11-02 ENCOUNTER — APPOINTMENT (OUTPATIENT)
Dept: CARDIOLOGY | Facility: CLINIC | Age: 60
DRG: 309 | End: 2020-11-02
Attending: INTERNAL MEDICINE
Payer: COMMERCIAL

## 2020-11-02 VITALS
BODY MASS INDEX: 42.8 KG/M2 | OXYGEN SATURATION: 94 % | HEART RATE: 92 BPM | WEIGHT: 289 LBS | DIASTOLIC BLOOD PRESSURE: 74 MMHG | TEMPERATURE: 98.3 F | HEIGHT: 69 IN | RESPIRATION RATE: 18 BRPM | SYSTOLIC BLOOD PRESSURE: 112 MMHG

## 2020-11-02 LAB — POTASSIUM SERPL-SCNC: 4.2 MMOL/L (ref 3.4–5.3)

## 2020-11-02 PROCEDURE — 250N000013 HC RX MED GY IP 250 OP 250 PS 637: Performed by: INTERNAL MEDICINE

## 2020-11-02 PROCEDURE — 93306 TTE W/DOPPLER COMPLETE: CPT | Mod: 26 | Performed by: INTERNAL MEDICINE

## 2020-11-02 PROCEDURE — 84132 ASSAY OF SERUM POTASSIUM: CPT | Performed by: INTERNAL MEDICINE

## 2020-11-02 PROCEDURE — 36415 COLL VENOUS BLD VENIPUNCTURE: CPT | Performed by: INTERNAL MEDICINE

## 2020-11-02 PROCEDURE — 99238 HOSP IP/OBS DSCHRG MGMT 30/<: CPT | Performed by: INTERNAL MEDICINE

## 2020-11-02 PROCEDURE — 93306 TTE W/DOPPLER COMPLETE: CPT

## 2020-11-02 PROCEDURE — 250N000012 HC RX MED GY IP 250 OP 636 PS 637: Performed by: INTERNAL MEDICINE

## 2020-11-02 RX ORDER — DILTIAZEM HYDROCHLORIDE 180 MG/1
180 CAPSULE, COATED, EXTENDED RELEASE ORAL DAILY
Qty: 30 CAPSULE | Refills: 0 | Status: SHIPPED | OUTPATIENT
Start: 2020-11-03 | End: 2021-03-03

## 2020-11-02 RX ORDER — METOPROLOL SUCCINATE 50 MG/1
50 TABLET, EXTENDED RELEASE ORAL 2 TIMES DAILY
Qty: 60 TABLET | Refills: 0 | Status: SHIPPED | OUTPATIENT
Start: 2020-11-02 | End: 2021-02-08

## 2020-11-02 RX ORDER — DILTIAZEM HYDROCHLORIDE 180 MG/1
180 CAPSULE, COATED, EXTENDED RELEASE ORAL DAILY
Status: DISCONTINUED | OUTPATIENT
Start: 2020-11-02 | End: 2020-11-02 | Stop reason: HOSPADM

## 2020-11-02 RX ORDER — LEVOTHYROXINE SODIUM 200 UG/1
200 TABLET ORAL 2 TIMES DAILY
Qty: 60 TABLET | Refills: 0 | Status: SHIPPED | OUTPATIENT
Start: 2020-11-02 | End: 2021-06-06

## 2020-11-02 RX ORDER — LEVOTHYROXINE SODIUM 50 UG/1
50 TABLET ORAL 2 TIMES DAILY
Qty: 60 TABLET | Refills: 0 | Status: SHIPPED | OUTPATIENT
Start: 2020-11-02 | End: 2021-06-06

## 2020-11-02 RX ADMIN — PREDNISONE 10 MG: 10 TABLET ORAL at 08:28

## 2020-11-02 RX ADMIN — RIVAROXABAN 20 MG: 10 TABLET, FILM COATED ORAL at 08:28

## 2020-11-02 RX ADMIN — MORPHINE SULFATE 60 MG: 15 TABLET, EXTENDED RELEASE ORAL at 14:02

## 2020-11-02 RX ADMIN — GABAPENTIN 800 MG: 800 TABLET, FILM COATED ORAL at 08:28

## 2020-11-02 RX ADMIN — LEVOTHYROXINE SODIUM 300 MCG: 100 TABLET ORAL at 08:28

## 2020-11-02 RX ADMIN — DILTIAZEM HYDROCHLORIDE 30 MG: 30 TABLET, FILM COATED ORAL at 05:57

## 2020-11-02 RX ADMIN — DOCUSATE SODIUM 50 MG AND SENNOSIDES 8.6 MG 1 TABLET: 8.6; 5 TABLET, FILM COATED ORAL at 08:28

## 2020-11-02 RX ADMIN — CYCLOBENZAPRINE HYDROCHLORIDE 10 MG: 5 TABLET, FILM COATED ORAL at 08:28

## 2020-11-02 RX ADMIN — GABAPENTIN 300 MG: 300 CAPSULE ORAL at 08:28

## 2020-11-02 RX ADMIN — MORPHINE SULFATE 30 MG: 15 TABLET, EXTENDED RELEASE ORAL at 14:02

## 2020-11-02 RX ADMIN — DILTIAZEM HYDROCHLORIDE 30 MG: 30 TABLET, FILM COATED ORAL at 00:05

## 2020-11-02 RX ADMIN — DILTIAZEM HYDROCHLORIDE 180 MG: 180 CAPSULE, COATED, EXTENDED RELEASE ORAL at 11:00

## 2020-11-02 RX ADMIN — POLYETHYLENE GLYCOL 3350 17 G: 17 POWDER, FOR SOLUTION ORAL at 08:28

## 2020-11-02 RX ADMIN — METOPROLOL SUCCINATE 50 MG: 50 TABLET, EXTENDED RELEASE ORAL at 08:27

## 2020-11-02 RX ADMIN — LIOTHYRONINE SODIUM 50 MCG: 25 TABLET ORAL at 08:28

## 2020-11-02 RX ADMIN — MORPHINE SULFATE 30 MG: 15 TABLET, EXTENDED RELEASE ORAL at 05:57

## 2020-11-02 RX ADMIN — MORPHINE SULFATE 60 MG: 15 TABLET, EXTENDED RELEASE ORAL at 05:56

## 2020-11-02 ASSESSMENT — ACTIVITIES OF DAILY LIVING (ADL)
ADLS_ACUITY_SCORE: 16
ADLS_ACUITY_SCORE: 17
ADLS_ACUITY_SCORE: 17

## 2020-11-02 NOTE — PLAN OF CARE
Neuro: Alert and oriented x 4, lethargic this night, awake at 0600  Vital signs: VSS, SBP soft at time,  Respiratory: RA, LS clear  Pain:complains of generalized pain, back pain- MS Contin given  Tele:AFib CVR  Mobility:Assist of 1, unsteady on feet. Declined walker and belt.  Drips/Drains/IVF:Diltiazem gtt infusing at 5 ml/hr  Skin:wnl  Diet:regular  GI/:Voiding adequately  Aggression color:green  Plan/Test/Consult:on po and gtt. Diltiazem, rate controlled overnight.  Labs:  Misc:  Agitated overnight due to bed alarm. Pt unsteady. HR in the 100s when upset, mostly controlled at night. Labile mood

## 2020-11-02 NOTE — PROGRESS NOTES
"Pt lethargic this evening/night and unsteady on feet. Pt had been given ms contin pain medication during the day. Pt unable to fully participate in assessment as pt is lethagic.  Writer explained  that bed alarm will be on this night for pt safety  to prevent falls. Pt states she had a fall at home. Pt tells writer, I am not going to fall here.  Pt educated on how to call for help overnight. Call light placed on pt chest for easy access. During hourly rounds,  Pt noted to be sleeping at the edge of the bed, with head and arms hanging off the bed. Bed alarm placed on zone one. Bed alarmed multiple times at night. Staff requested pt to shift to the middle of the bed,  and  the  bed will keep alarming as long as pt is not centered. Pt educated again on importance of bed alarm. Pt states, \" I feel like I have been singled out and bed alarm was not placed on the first night pt was admitted.\" Pt agitated, anxious and yelling at staff. Writer asked if pt would like to speak to charge nurse or supervisor. Charge CHELLE Antunez at bedside to explain the reason behind the bed alarm. Pt very upset and request to be left alone. Writer provided yellow socks with yellow wrist band. Be alarm placed on zone two    0600 Pt alert, educated on bed alarm. Pt verbalized understanding and refused bed alarm. Bed alarm turned off. Walker and belt offered, pt states, \" I do not need them and if I do, I will use my own walker.\" Pt requested the walker and bed to be removed from the room.  "

## 2020-11-02 NOTE — PROGRESS NOTES
Care Coordination:    -A follow-up appointment has been made for this patient.     Nov 10, 2020  8:15 AM   New Visit with Sil Muñoz MD   [facility] ([facility]) [address]   [place] [id number] [phone] OPT 2         Alanis Muse RN, BAN  Inpatient Care Coordination  [address]  [address]  [place] [address]  [email or web address]   Office: [phone]  Fax: [phone]

## 2020-11-02 NOTE — PLAN OF CARE
VSS on RA.  Tele: Afib CVR/RVR.  Denies pain or shortness of breath.  Up ind.  Dilt drip transitioned to PO.  Echo completed.  AVS discussed with pt and all questions answered.

## 2020-11-02 NOTE — PLAN OF CARE
VSS, BP improved. Tele: A fib RVR. PO and IV diltiazem started, currently at 10 mg/hr. Will continue to monitor. Possible discharge tomorrow if HR is better.

## 2020-11-02 NOTE — DISCHARGE SUMMARY
[facility]  Hospitalist Discharge Summary      Date of Admission:  10/31/2020  Date of Discharge:  11/2/2020  4:04 PM  Discharging Provider: León Montes DO      Discharge Diagnoses   1. A fib with RVR  2. Hypothyroidism with likely supratherapeutic replacement   3. Right lower extremity DVT  4. H/o Lupus   5. H/o Non-ischemic cardiomyopathy, not decompensated  6. Chronic pain     Follow-ups Needed After Discharge   Follow-up Appointments     Follow-up and recommended labs and tests       Follow up with primary care provider, Sarahi Vivar, within 7 days for   hospital follow- up.  No follow up labs or test are needed.  Follow up with cardiology as soon as able  If not already seeing vascular medicine may want to consider establishing   with them           Unresulted Labs Ordered in the Past 30 Days of this Admission     No orders found from 10/1/2020 to 11/1/2020.        Discharge Disposition   Discharged to home  Condition at discharge: Stable    Hospital Course   After arrival patient's Toprol XL increased to 50 mg BID.  Also started on Diltiazem drip and Diltiazem 30 mg q6h.  Able to stop diltiazem drip and monitored.  Heart rates started climbing up to the 110s 2-3 hours after diltiazem drip stopped and started on Diltiazem  mg.  Watched another 2-3 hours and heart rates remained in the 90s-100s.  During this time though patient was hesitant for any medication changes and was wanting to leave.  Discharged with close PCP and cardiology follow up.     Echocardiogram:    Left ventricular systolic function is normal.  The visual ejection fraction is estimated at 55-60%.  The study was technically difficult. There is no comparison study available.    Of note TSH <0.01.  Recommended decreased Synthroid to 250 mcg BID from 300 mcg BID but patient refused.      Consultations This Hospital Stay   None    Code Status   No CPR- Do NOT Intubate    Time Spent on this Encounter   I,  León Montes DO, personally saw the patient today and spent less than or equal to 30 minutes discharging this patient.       León Montes DO  [facility]  [address], [address]  [place] [address]  Phone: [phone]  ______________________________________________________________________    Physical Exam   Vital Signs: Temp: 98.3  F (36.8  C) Temp src: Oral BP: 112/74 Pulse: 92   Resp: 18 SpO2: 94 % O2 Device: None (Room air)    Weight: 289 lbs 0 oz  General Appearance: Resting comfortably. NAD   Respiratory: Clear to auscultation.  No respiratory distress  Cardiovascular: Irregularly irregular.  Heart rates in the 100s.  Still in A fib on telemetry  GI: Bowel sounds noted.  Non-tender  Skin: No rashes.  No cyanosis  Other: No edema.  No calf tenderness         Primary Care Physician   Sarahi Vivar    Discharge Orders      Reason for your hospital stay    A fib w/RVR and DVT     Follow-up and recommended labs and tests     Follow up with primary care provider, Sarahi Vivar, within 7 days for hospital follow- up.  No follow up labs or test are needed.  Follow up with cardiology as soon as able  If not already seeing vascular medicine may want to consider establishing with them     Activity    Your activity upon discharge: activity as tolerated     Diet    Follow this diet upon discharge: Orders Placed This Encounter      Regular Diet Adult       Significant Results and Procedures   Most Recent 3 CBC's:  Recent Labs   Lab Test 11/01/20  0753 01/23/20  0620 01/23/20  0114   WBC 5.6 5.3 6.8   HGB 10.8* 10.3* 10.6*   MCV 85 89 89    204 233     Most Recent 3 BMP's:  Recent Labs   Lab Test 11/02/20  0817 11/01/20  0858 11/01/20  0753 01/23/20  0620 01/22/20  1431   NA  --   --  137 141 138   POTASSIUM 4.2 3.8 3.7 3.5 4.2   CHLORIDE  --   --  106 110* 102   CO2  --   --  29 28 31   BUN  --   --  10 20 38*   CR  --   --  0.51* 0.64 0.89   ANIONGAP  --    --  2* 4 5   KYLIE  --   --  8.9 8.4* 8.6   GLC  --   --  138* 100* 96     Most Recent 3 Troponin's:  Recent Labs   Lab Test 18  1849   TROPI <0.015   ,   Results for orders placed or performed during the hospital encounter of 10/31/20   Echocardiogram Complete    Narrative    [id number]  [id number]  [id number]  [id number]^CAIO^ANNIE^JOSÉ MIGUEL           [facility]  Echocardiography Laboratory  [address] [place] [address]        Name: ROXY ALVARES  MRN: [id number]  : 1960  Study Date: 2020 09:05 AM  Age: 60 yrs  Gender: Female  Patient [facility]: [facility]  Reason For Study: Atrial Fibrillation  Ordering Physician: ANNIE KEARNEY  Referring Physician: Sarahi Vivar  Performed By: Shannon Marquis     BSA: 2.4 m2  Height: 69 in  Weight: 289 lb  HR: 103  BP: 146/114 mmHg  _____________________________________________________________________________  __        Procedure  Complete Portable Echo Adult.  _____________________________________________________________________________  __        Interpretation Summary     Left ventricular systolic function is normal.  The visual ejection fraction is estimated at 55-60%.  The study was technically difficult. There is no comparison study available.  _____________________________________________________________________________  __        Left Ventricle  The left ventricle is normal in size. There is concentric remodeling present.  Left ventricular systolic function is normal. The visual ejection fraction is  estimated at 55-60%. Diastolic function not assessed due to atrial  fibrillation. No regional wall motion abnormalities noted.     Right Ventricle  The right ventricle is normal size. The right ventricular systolic function is  normal.     Atria  The left atrium is mildly dilated. Right atrial size is normal.     Mitral Valve  There is mild (1+) mitral regurgitation.        Tricuspid Valve  There is trace to mild tricuspid regurgitation.  The right ventricular systolic  pressure is approximated at 22.2 mmHg plus the right atrial pressure. IVC  diameter >2.1 cm collapsing <50% with sniff suggests a high RA pressure  estimated at 15 mmHg or greater.     Aortic Valve  There is mild trileaflet aortic sclerosis. No aortic stenosis is present.     Pulmonic Valve  The pulmonic valve is not well visualized.     Vessels  The aortic root is normal size.     Pericardium  There is no pericardial effusion.        Rhythm  The rhythm was atrial fibrillation.  _____________________________________________________________________________  __  MMode/2D Measurements & Calculations  IVSd: 1.2 cm     LVIDd: 4.5 cm  LVIDs: 2.7 cm  LVPWd: 1.3 cm  FS: 39.2 %  LV mass(C)d: 212.9 grams  LV mass(C)dI: 88.1 grams/m2  Ao root diam: 3.5 cm  LA dimension: 3.8 cm  asc Aorta Diam: 3.7 cm  LA/Ao: 1.1  LA Volume (BP): 88.1 ml  LA Volume Index (BP): 36.4 ml/m2  RWT: 0.58           Doppler Measurements & Calculations  MV E max mariaelena: 101.5 cm/sec  MV dec time: 0.26 sec  PA acc time: 0.06 sec  TR max mariaelena: 235.6 cm/sec  TR max P.2 mmHg  E/E' av.2  Lateral E/e': 7.9  Medial E/e': 10.6           _____________________________________________________________________________  __           Report approved by: Lopez Rene 2020 10:37 AM            Discharge Medications   Current Discharge Medication List      START taking these medications    Details   diltiazem ER COATED BEADS (CARDIZEM CD/CARTIA XT) 180 MG 24 hr capsule Take 1 capsule (180 mg) by mouth daily  Qty: 30 capsule, Refills: 0    Comments: Future refills by PCP Dr. Sarahi Vivar with phone number [phone].  Associated Diagnoses: Atrial fibrillation with RVR (H)         CONTINUE these medications which have CHANGED    Details   !! levothyroxine (SYNTHROID/LEVOTHROID) 200 MCG tablet Take 1 tablet (200 mcg) by mouth 2 times daily  Qty: 60 tablet, Refills: 0    Comments: Future refills by PCP Dr. Sarahi Vivar  with phone number [phone].  Associated Diagnoses: Hypothyroidism, unspecified type      !! levothyroxine (SYNTHROID/LEVOTHROID) 50 MCG tablet Take 1 tablet (50 mcg) by mouth 2 times daily  Qty: 60 tablet, Refills: 0    Comments: Future refills by PCP Dr. Sarahi Vivar with phone number [phone].  Associated Diagnoses: Hypothyroidism, unspecified type      metoprolol succinate ER (TOPROL-XL) 50 MG 24 hr tablet Take 1 tablet (50 mg) by mouth 2 times daily  Qty: 60 tablet, Refills: 0    Comments: Future refills by PCP Dr. Sarahi Vivar with phone number [phone].  Associated Diagnoses: Atrial fibrillation with RVR (H)       !! - Potential duplicate medications found. Please discuss with provider.      CONTINUE these medications which have NOT CHANGED    Details   BIOTIN FORTE PO Take 1 tablet by mouth daily       carboxymethylcellulose (CARBOXYMETHYLCELLULOSE SODIUM) 0.5 % SOLN ophthalmic solution Place 1 drop into both eyes 2 times daily as needed   Qty: 1 Bottle      CEPHALEXIN PO Take 500 mg by mouth as needed (Dental Procedure) Take 4 caps 1 hour prior to Dental Appointment      cyanocobalamin 1000 MCG/ML injection Inject 1 mL (1,000 mcg) Subcutaneous every 7 days  Qty: 4 mL, Refills: 5    Associated Diagnoses: Other vitamin B12 deficiency anemia      cyclobenzaprine (FLEXERIL) 10 MG tablet Take 1 tablet (10 mg) by mouth 3 times daily  Qty: 90 tablet, Refills: 3    Associated Diagnoses: Generalized osteoarthrosis, involving multiple sites      cycloSPORINE (RESTASIS) 0.05 % ophthalmic emulsion Place 1 drop into both eyes 2 times daily      fexofenadine (ALLEGRA) 180 MG tablet Take 180 mg by mouth daily as needed       gabapentin (NEURONTIN) 300 MG capsule Take 300 mg by mouth 3 times daily In addition to 800 mg for total dose of 1100 mg      gabapentin (NEURONTIN) 800 MG tablet Take 800 mg by mouth 3 times daily In addition to 300 mg for total dose of 1100 mg      HYDROmorphone (DILAUDID) 8 MG  tablet Take 8 mg by mouth every 8 hours . Max of 3 doses per day.    Associated Diagnoses: Chronic pain syndrome      lifitegrast (XIIDRA) 5 % opthalmic solution Place 1 drop into both eyes 2 times daily      liothyronine (CYTOMEL) 25 MCG tablet Take 50 mcg by mouth 2 times daily       methylcellulose (CITRUCEL) powder Start with 1 heaping tablespoon. Increase as needed, 1 heaping tablespoon at a time, up to 3 times per day.  Qty: 479 g, Refills: 3    Associated Diagnoses: Diarrhea, unspecified type      !! morphine (MS CONTIN) 30 MG 12 hr tablet Take 30 mg by mouth 2 times daily along with one morphine 60 mg 12 hr tablet for a total dose of 90 mg.  AM and afternoon  Qty: 270 tablet, Refills: 0    Associated Diagnoses: Chronic pain syndrome      !! morphine (MS CONTIN) 60 MG 12 hr tablet Take 60 mg by mouth 3 times daily   Qty: 30 tablet, Refills: 0    Associated Diagnoses: Chronic pain syndrome      multivitamin, therapeutic with minerals (MULTI-VITAMIN) TABS tablet Take 1 tablet by mouth 2 times daily  Qty: 100 tablet, Refills: 3      NASONEX 50 MCG/ACT spray Spray 1 spray into both nostrils daily as needed   Refills: 11      nystatin (MYCOSTATIN) 772909 UNIT/GM external powder Apply topically 3 times daily as needed  Qty: 60 g, Refills: 1    Associated Diagnoses: Candidal skin infection      prednisoLONE acetate (PRED FORTE) 1 % ophthalmic susp 1 drop 4 times daily      !! predniSONE (DELTASONE) 1 MG tablet Use along with one prednisone 5 mg tablets to alternate taking 8mg and 10mg every other day.  Refills: 2      !! predniSONE (DELTASONE) 5 MG tablet Alternate taking 8mg and 10mg every other day      probiotic CAPS Take 1 capsule by mouth 2 times daily      rivaroxaban ANTICOAGULANT (XARELTO) 20 MG TABS tablet Take 20 mg by mouth daily (with dinner)       vitamin D2 (ERGOCALCIFEROL) 34298 units (1250 mcg) capsule Take 50,000 Units by mouth once a week      zolpidem (AMBIEN) 5 MG tablet Take 5 mg by mouth  nightly as needed for sleep      levothyroxine (SYNTHROID/LEVOTHROID) 100 MCG SOLR injection Inject 200 mcg into the vein twice a week        !! - Potential duplicate medications found. Please discuss with provider.        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      Chlorhexidine Hives     Thor surgical cleanser     Codeine Hives     Has tolerated Oxycodone in past      Ibuprofen [Nsaids] Hives     Penicillins Hives     Other reaction(s): Unknown     Sulfa Drugs Hives     Other reaction(s): Unknown     Doxycycline GI Disturbance     Emesis & diarrhea  Patient denies allergy to this med     Mold      Red Wine Complex [Red Wine Powder]

## 2020-11-02 NOTE — PROGRESS NOTES
Patient has been refusing bed alarm with bedside RN despite education and the need for safety. It has been determined the patient is unable to make this decision at this time as she is too lethargic. Patient is sleeping with head and arms hanging off bed. Writer has turned on the bed alarm at this time. Patient will continue to be educated on the need for safety while in the hospital.

## 2020-11-05 ENCOUNTER — OFFICE VISIT (OUTPATIENT)
Dept: DERMATOLOGY | Facility: CLINIC | Age: 60
End: 2020-11-05
Payer: COMMERCIAL

## 2020-11-05 DIAGNOSIS — L74.519 FOCAL HYPERHIDROSIS: Primary | ICD-10-CM

## 2020-11-05 PROCEDURE — 64653 CHEMODENERV ECCRINE GLANDS: CPT | Mod: GC | Performed by: DERMATOLOGY

## 2020-11-05 ASSESSMENT — PAIN SCALES - GENERAL: PAINLEVEL: NO PAIN (0)

## 2020-11-05 NOTE — PATIENT INSTRUCTIONS
Botulinum Toxin(Botox) for Hyperhidrosis:    With treatment side effects may include bruising or discomfort at the site(s). Asymmetry may occur and a touch-up may be required. Risks of this procedure include numbness, twitching, headache, not enough effect or too much effect, loss of muscle tone, or infection.      Showering is not restricted.     Do not rub the treated area.     Avoid exercise for the 24 hours following the procedure.     Avoid deodorant for 24 hours following the procedure    Some people will experience bruising.  This is temporary and usually mild.    Call your doctor if you have any questions or concerns after your treatment.     Who should I call with questions?    [facility]: [phone]     [facility]: [phone]    For urgent needs outside of business hours call the [facility] at [phone] and ask for the dermatology resident on call

## 2020-11-05 NOTE — PROGRESS NOTES
"Botulinum Injection Procedure Note:  Medical      Dermatology Problem List:    1.. Hyperhidrosis, scalp  - Status post Botox 100 units diluted with 2 ml sterile saline on 11/5/2020 to the occipital and parietal scalp  - previous tx: oral glycopyrolate,  Has a history of degenerative disc disease and has had a suprascapular nerve block for shoulder pain.  - s/p biopsy 12/09/19  - Labs 12/09/19: HbA1C was slightly elevated,T3 free, TSH was slightly decreased,  T3 was wnl, magnesium, zinc, vitamin D  - unremarkable  - Biopsy for Epidermal nerve fiber density 12/2019 - no significant abnormalities noted in either epidermal nerves or SP or CGRP expression  - Scalp biopsy 12/2019  was interpreted as \"largely normal\"    ATTENDING STAFF SURGEON: Dr. Laura Tony    RESIDENT SURGEON: Dr. Justice Murguia     NURSE: Allison Lambert CMA     ANESTHESIA:   None    PREOPERATIVE DIAGNOSIS:   Hyperhidrosis    LOCATION: Scalp    LOT NO: [id number]    EXP DATE:     OPERATION/PROCEDURE:   Intralesional botulinum toxin injection     Dilution with 2.0 ml preserved sterile normal saline in a 100 Unit Botox vial.    Total units of botulinum toxin: 100 units     POSTOPERATIVE DIAGNOSIS:   SAME     DESCRIPTION OF OPERATION/PROCEDURE:   The nature and purpose of the procedure, associated risks including but not limited to bruising, headache or discomfort at the site(s), numbness, muscle twitching, brow or eyelid droop, headache, double vision, not enough effect or too much effect, difficulty whistling or drinking from a straw, loss of muscle tone, or infection. Possible consequences and complications, and alternative methods of treatment were explained in detail. The patient declined a personal or family history of neuromuscular disease prior to the procedure. The patient is not pregnant or breast feeding. The patient is not taking aminoglycosides, polymixin antibiotics, linocosamides, cholinesterase inhibitors, quinidine, magnesium sulfate or " succinylcholine.  A signed informed operative consent was obtained.    The patient was taken to the operative suite and properly positioned. The area to be treated was defined and confirmed by the patient and physician. The area for Botox injection was marked.    Therapeutic procedure: The patient was positiioned to optimally expose the area to be treated. Mulitple injections were made to infiltrate the toxin superficially.The area was carefully cleansed. The patient was instructed to keep this area dry. The patient tolerated the procedure well and no comlications were noted. Patient was given post care instructions and will be followed up in 3 months.      Dr. Laura Tony was immediately available for the entire surgery and was physicially present for the key portions of the procedure.    Clinical Follow-Up: 3 months    Staff Involved:  Resident (Dr. Justice Murguia)/Staff (Dr. Laura Tony)    Patient was seen and examined with the dermatology resident. I agree with the history, review of systems, physical examination, assessments and plan.    Laura Tony MD  Professor and  Chair  Department of Dermatology  [facility]

## 2020-11-05 NOTE — NURSING NOTE
Dermatology Rooming Note    Aspen Mccullough's goals for this visit include:   Chief Complaint   Patient presents with     Botox     Aspen is here today for botox.      IVONNE Hutton

## 2020-11-05 NOTE — LETTER
"11/5/2020       RE: Aspen Mccullough  [address]  [facility] [address]     Dear Colleague,    Thank you for referring your patient, Aspen Mccullough, to the [facility] [place] [facility]. Please see a copy of my visit note below.    Botulinum Injection Procedure Note:  Medical      Dermatology Problem List:    1.. Hyperhidrosis, scalp  - Status post Botox 100 units diluted with 2 ml sterile saline on 11/5/2020 to the occipital and parietal scalp  - previous tx: oral glycopyrolate,  Has a history of degenerative disc disease and has had a suprascapular nerve block for shoulder pain.  - s/p biopsy 12/09/19  - Labs 12/09/19: HbA1C was slightly elevated,T3 free, TSH was slightly decreased,  T3 was wnl, magnesium, zinc, vitamin D  - unremarkable  - Biopsy for Epidermal nerve fiber density 12/2019 - no significant abnormalities noted in either epidermal nerves or SP or CGRP expression  - Scalp biopsy 12/2019  was interpreted as \"largely normal\"    ATTENDING STAFF SURGEON: Dr. Laura Tony    RESIDENT SURGEON: Dr. Justice Murguia     NURSE: Allison Lambert CMA     ANESTHESIA:   None    PREOPERATIVE DIAGNOSIS:   Hyperhidrosis    LOCATION: Scalp    LOT NO: [id number]    EXP DATE:     OPERATION/PROCEDURE:   Intralesional botulinum toxin injection     Dilution with 2.0 ml preserved sterile normal saline in a 100 Unit Botox vial.    Total units of botulinum toxin: 100 units     POSTOPERATIVE DIAGNOSIS:   SAME     DESCRIPTION OF OPERATION/PROCEDURE:   The nature and purpose of the procedure, associated risks including but not limited to bruising, headache or discomfort at the site(s), numbness, muscle twitching, brow or eyelid droop, headache, double vision, not enough effect or too much effect, difficulty whistling or drinking from a straw, loss of muscle tone, or infection. Possible consequences and complications, and alternative methods of " treatment were explained in detail. The patient declined a personal or family history of neuromuscular disease prior to the procedure. The patient is not pregnant or breast feeding. The patient is not taking aminoglycosides, polymixin antibiotics, linocosamides, cholinesterase inhibitors, quinidine, magnesium sulfate or succinylcholine.  A signed informed operative consent was obtained.    The patient was taken to the operative suite and properly positioned. The area to be treated was defined and confirmed by the patient and physician. The area for Botox injection was marked.    Therapeutic procedure: The patient was positiioned to optimally expose the area to be treated. Mulitple injections were made to infiltrate the toxin superficially.The area was carefully cleansed. The patient was instructed to keep this area dry. The patient tolerated the procedure well and no comlications were noted. Patient was given post care instructions and will be followed up in 3 months.      Dr. Laura Tony was immediately available for the entire surgery and was physicially present for the key portions of the procedure.    Clinical Follow-Up: 3 months    Staff Involved:  Resident (Dr. Justice Murguia)/Staff (Dr. Laura Tony)    Patient was seen and examined with the dermatology resident. I agree with the history, review of systems, physical examination, assessments and plan.    Laura Tony MD  Professor and  Chair  Department of Dermatology  [facility]

## 2020-11-10 ENCOUNTER — HOSPITAL ENCOUNTER (OUTPATIENT)
Dept: ULTRASOUND IMAGING | Facility: CLINIC | Age: 60
End: 2020-11-10
Attending: INTERNAL MEDICINE
Payer: COMMERCIAL

## 2020-11-10 ENCOUNTER — HOSPITAL ENCOUNTER (OUTPATIENT)
Dept: CARDIOLOGY | Facility: CLINIC | Age: 60
End: 2020-11-10
Attending: INTERNAL MEDICINE
Payer: COMMERCIAL

## 2020-11-10 ENCOUNTER — OFFICE VISIT (OUTPATIENT)
Dept: CARDIOLOGY | Facility: CLINIC | Age: 60
End: 2020-11-10
Payer: COMMERCIAL

## 2020-11-10 VITALS
WEIGHT: 277 LBS | DIASTOLIC BLOOD PRESSURE: 56 MMHG | HEART RATE: 111 BPM | HEIGHT: 69 IN | SYSTOLIC BLOOD PRESSURE: 145 MMHG | BODY MASS INDEX: 41.03 KG/M2

## 2020-11-10 DIAGNOSIS — I48.91 ATRIAL FIBRILLATION WITH RVR (H): ICD-10-CM

## 2020-11-10 DIAGNOSIS — I77.1 STRICTURE OF ARTERY (H): ICD-10-CM

## 2020-11-10 DIAGNOSIS — I73.89 OTHER SPECIFIED PERIPHERAL VASCULAR DISEASES (H): ICD-10-CM

## 2020-11-10 DIAGNOSIS — E66.01 MORBID OBESITY (H): ICD-10-CM

## 2020-11-10 PROCEDURE — 93925 LOWER EXTREMITY STUDY: CPT

## 2020-11-10 PROCEDURE — 0296T LEADLESS EKG MONITOR 3 TO 14 DAYS: CPT

## 2020-11-10 PROCEDURE — 0298T LEADLESS EKG MONITOR 3 TO 14 DAYS: CPT | Performed by: INTERNAL MEDICINE

## 2020-11-10 PROCEDURE — 99204 OFFICE O/P NEW MOD 45 MIN: CPT | Performed by: INTERNAL MEDICINE

## 2020-11-10 PROCEDURE — 93922 UPR/L XTREMITY ART 2 LEVELS: CPT | Mod: XS

## 2020-11-10 PROCEDURE — 93925 LOWER EXTREMITY STUDY: CPT | Mod: 26 | Performed by: INTERNAL MEDICINE

## 2020-11-10 PROCEDURE — 93922 UPR/L XTREMITY ART 2 LEVELS: CPT

## 2020-11-10 PROCEDURE — 93922 UPR/L XTREMITY ART 2 LEVELS: CPT | Mod: 26 | Performed by: INTERNAL MEDICINE

## 2020-11-10 ASSESSMENT — MIFFLIN-ST. JEOR: SCORE: 1885.84

## 2020-11-10 NOTE — PATIENT INSTRUCTIONS
1. Ziopatch for 4-5 days for AFIB  2. Vascular leg studies  3. Follow up in 3 months with Dr. Muñoz (virtual)

## 2020-11-10 NOTE — PROGRESS NOTES
Vascular Cardiology Consultation    HPI:     This is a pleasant 60 year old with the following PMH: atrial fibrillation, right LE DVT and PE in the past on xarelto, HLD, HTN, lupus on steroids here to establish care for atrial fibrillation.     Hospitalized in October for DVT and was found to be in rapid atrial fibrillation. She was started on dilitazem and metoprolol. She reports unclear duration of afib as she is not symptomatic from it. Today her heart rates are in the 100s but regular. No history of palpitations, dizziness, syncope, chest pain or shortness of breath from the AFIB. Has a popliteal DVT of the right leg, no calf pain or severe swelling. She had been temporarily stopped on xarelto for a thumb surgery.    ROS is positive for right toe discoloration (sometimes blue) with some coldness and numbness sensation. No open ulcerations. No claudication. No PND, orthopnea. No COVID exposures, denies fevers, chills, ns.    Social history: works occupation in medicare fraud. Here with . Kids overall healthy.     In review of recent studies:     Echo 11-2-2020 - A Fib,   Left ventricular systolic function normal. EF 55-60%.   The study was technically difficult.     US Venous Lower Extremity Right: 10-  - Care everywhere   1.  Acute DVT right posterior tibial vein.   2.  Popliteal cyst.       Chest CT a 10- Care Everywhere.   1.  Limited exam due to respiratory motion. No central or definite peripheral   pulmonary artery embolism. No thoracic aortic dissection.   2.  Mild left upper lobar consolidation and patchy airspace opacities likely   reflecting pneumonia. Follow-up to assess for resolution is recommended.   3.  Findings suggesting pulmonary air trapping which can be seen with small   airways disease.   4.  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 Recommend   follow-up with ultrasound or CT in 6 months.     CT Pulmonary 9-21-18   1. No evidence of  pulmonary embolism. No acute pulmonary process.   2. Scattered sub-6 mm pulmonary nodules. If the patient is considered   low risk for malignancy, no further follow-up is necessary. Otherwise,   LIKELY consider 12 month follow-up chest CT as per Fleischner Society   2017 guidelines.   3. Cardiomegaly. Ectasia of the ascending aorta 4.1 cm.   4. Dilatation of the pulmonary vasculature, including the main   pulmonary artery to 4.5 cm. This can be seen with pulmonary arterial   Hypertension.     ASSESSMENT/PLAN:    1. Atrial Fibrillation: in normal sinus rhythm today, normal LV function by recent echocardiogram  -would like to assess burden of AFIB, rate control to allow for further titration of medication and also to monitor for bradycardia, pauses   -[place] rhythm monitor for 5-7 days   -would prefer rate control strategy rather than antiarrhythmic drug or ablation consideration especially if low burden of AFIB detected on monitor and LV function remains normal   -continue rixaroxaban also given history of VTE    2. History of PE and recent DVT of right popliteal vein:  -continue rivaroxaban  -may need repeat ultrasound imaging in 3-6 months to ensure adequate treatment and not treatment failure, DVT probable from brief cessation in DOAC therapy: will assess at future visit   -monitor for post thrombotic syndrome    3. Right toe discoloration:  -will assess for arterial disease by toe PPGs, LE arterial duplex and ABIs   -ddx includes atherosclerotic peripheral vascular disease versus fixed small vessel occlusive disease from autoimmune disease  -could have element of distal superficial venous reflux as well given h/o DVT     4. HTN: likely labile due to steroids and lupus   -continue diltiazem and metoprolol for now, can reassess medication regimen after malu     Will follow up with patient in 3 months.     Sil Muñoz MD MSc  [facility]     *The below clinic note data has been obtained during  prior evaluations and is an autopopulated list*      PAST MEDICAL HISTORY  Past Medical History:   Diagnosis Date     ADHD (attention deficit hyperactivity disorder)      Allergic rhinitis      Chronic low back pain      Cushing's syndrome (H)      DDD (degenerative disc disease)      DDD (degenerative disc disease)      Deviated nasal septum      Diarrhea      Endometriosis      Fibromyalgia      Hashimoto's disease      Hyperlipidemia      Hypothyroid     hx hashimoto's thyroiditis     Insomnia      Lupus (H)      Malabsorption      Pernicious anemia      Renal disease     chronic renal insufficiency     Sinusitis        CURRENT MEDICATIONS  Current Outpatient Medications   Medication Sig Dispense Refill     CEPHALEXIN PO Take 500 mg by mouth as needed (Dental Procedure) Take 4 caps 1 hour prior to Dental Appointment       cyanocobalamin 1000 MCG/ML injection Inject 1 mL (1,000 mcg) Subcutaneous every 7 days 4 mL 5     cyclobenzaprine (FLEXERIL) 10 MG tablet Take 1 tablet (10 mg) by mouth 3 times daily 90 tablet 3     diltiazem ER COATED BEADS (CARDIZEM CD/CARTIA XT) 180 MG 24 hr capsule Take 1 capsule (180 mg) by mouth daily 30 capsule 0     gabapentin (NEURONTIN) 300 MG capsule Take 300 mg by mouth 3 times daily In addition to 800 mg for total dose of 1100 mg       gabapentin (NEURONTIN) 800 MG tablet Take 800 mg by mouth 3 times daily In addition to 300 mg for total dose of 1100 mg       HYDROmorphone (DILAUDID) 8 MG tablet Take 8 mg by mouth every 8 hours . Max of 3 doses per day.       levothyroxine (SYNTHROID/LEVOTHROID) 100 MCG SOLR injection Inject 200 mcg into the vein twice a week        levothyroxine (SYNTHROID/LEVOTHROID) 200 MCG tablet Take 1 tablet (200 mcg) by mouth 2 times daily 60 tablet 0     liothyronine (CYTOMEL) 25 MCG tablet Take 50 mcg by mouth 2 times daily        metoprolol succinate ER (TOPROL-XL) 50 MG 24 hr tablet Take 1 tablet (50 mg) by mouth 2 times daily 60 tablet 0     morphine (MS  CONTIN) 30 MG 12 hr tablet Take 30 mg by mouth 2 times daily along with one morphine 60 mg 12 hr tablet for a total dose of 90 mg.  AM and afternoon 270 tablet 0     morphine (MS CONTIN) 60 MG 12 hr tablet Take 60 mg by mouth 3 times daily  30 tablet 0     predniSONE (DELTASONE) 1 MG tablet Use along with one prednisone 5 mg tablets to alternate taking 8mg and 10mg every other day.  2     predniSONE (DELTASONE) 5 MG tablet Alternate taking 8mg and 10mg every other day       rivaroxaban ANTICOAGULANT (XARELTO) 20 MG TABS tablet Take 20 mg by mouth daily (with dinner)        vitamin D2 (ERGOCALCIFEROL) 05102 units (1250 mcg) capsule Take 50,000 Units by mouth once a week       zolpidem (AMBIEN) 5 MG tablet Take 5 mg by mouth nightly as needed for sleep       BIOTIN FORTE PO Take 1 tablet by mouth daily        carboxymethylcellulose (CARBOXYMETHYLCELLULOSE SODIUM) 0.5 % SOLN ophthalmic solution Place 1 drop into both eyes 2 times daily as needed  1 Bottle      cycloSPORINE (RESTASIS) 0.05 % ophthalmic emulsion Place 1 drop into both eyes 2 times daily       fexofenadine (ALLEGRA) 180 MG tablet Take 180 mg by mouth daily as needed        levothyroxine (SYNTHROID/LEVOTHROID) 50 MCG tablet Take 1 tablet (50 mcg) by mouth 2 times daily (Patient not taking: Reported on 11/10/2020) 60 tablet 0     lifitegrast (XIIDRA) 5 % opthalmic solution Place 1 drop into both eyes 2 times daily       methylcellulose (CITRUCEL) powder Start with 1 heaping tablespoon. Increase as needed, 1 heaping tablespoon at a time, up to 3 times per day. 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)     APPENDECTOMY       ARTHRODESIS ANKLE      right     ARTHROPLASTY KNEE BILATERAL       ARTHROPLASTY REVISION KNEE  4/19/2011    Procedure:ARTHROPLASTY REVISION KNEE; With Antibiotic Cement ; Surgeon:CHAO OLIVARES; Location:[facility]     BREAST SURGERY      right- tissue remove nipple area     BUNIONECTOMY  12/14/2011    Procedure:BUNIONECTOMY; Right Bunion Correction; Surgeon:GRACE ZARATE; Location:[facility]     C STOMACH SURGERY PROCEDURE UNLISTED      see list which we will bring     CHOLECYSTECTOMY       EXAM UNDER ANESTHESIA ANUS N/A 7/15/2020    Procedure: EXAM UNDER ANESTHESIA, ANUS;  Surgeon: Luis Canales MD;  Location: [facility] OR     EXCISE MASS UPPER EXTREMITY  12/14/2011    Procedure:EXCISE MASS UPPER EXTREMITY; Excision of Left Arm Mass; Surgeon:GRACE ZARATE; Location:[facility]     FOOT SURGERY      left X 4     FUSION LUMBAR ANTERIOR ONE LEVEL       HC SACROPLASTY      see list which we will bring     HEMORRHOIDECTOMY INTERNAL N/A 7/15/2020    Procedure: Exam under anesthesia, hemorrhoidectomy;  Surgeon: Luis Canales MD;  Location: [facility]     INJECT NERVE BLOCK SUPRASCAPULAR Left 5/18/2018    Procedure: INJECT NERVE BLOCK SUPRASCAPULAR;  Left Suprascapular Nerve Block;  Surgeon: Basim Velazquez MD;  Location: [facility]     INJECT NERVE BLOCK SUPRASCAPULAR Right 7/23/2018    Procedure: INJECT NERVE BLOCK SUPRASCAPULAR;  Right suprascapular injection;  Surgeon: Basim Velazquez MD;  Location: [facility]     INJECT NERVE BLOCK SUPRASCAPULAR Bilateral 10/29/2018    Procedure: Bilateral Suprascapular Nerve Blocks;  Surgeon: Basim Velazquez MD;  Location: [facility]     INJECT NERVE BLOCK SUPRASCAPULAR Bilateral 3/15/2019    Procedure: Bilateral Suprascapular Nerve Block;   Surgeon: Basim Velazquez MD;  Location:  OR     INJECT NERVE BLOCK SUPRASCAPULAR Bilateral 12/31/2019    Procedure: bilateral suprascapular nerve block;  Surgeon: Basim Velazquez MD;  Location:  OR     KNEE SURGERY      see list which we will bring     LAMINECTOMY LUMBAR ONE LEVEL      L4-5     LAPAROSCOPIC ABLATION ENDOMETRIOSIS       RADIO FREQUENCY ABLATION PULSED CERVICAL Bilateral 7/10/2019    Procedure: Bilateral Suprascapular Nerve Pulsed Radiofrequency Ablation;  Surgeon: Basim Velazquez MD;  Location:  OR     REMOVE HARDWARE FOOT  12/14/2011    Procedure:REMOVE HARDWARE FOOT; Hardware Removal Right Foot (Mini-C-Arm) ; Surgeon:GRACE ZARATE; Location:[facility]     RHINOPLASTY       SALPINGO-OOPHORECTOMY BILATERAL       TONSILLECTOMY         ALLERGIES     Allergies   Allergen Reactions     Blood Transfusion Related (Informational Only) Other (See Comments)     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      Chlorhexidine Hives     Thor surgical cleanser     Codeine Hives     Has tolerated Oxycodone in past      Ibuprofen [Nsaids] Hives     Penicillins Hives     Other reaction(s): Unknown     Sulfa Drugs Hives     Other reaction(s): Unknown     Doxycycline GI Disturbance     Emesis & diarrhea  Patient denies allergy to this med     Hydroxyzine      rash     Mold      Red Wine Complex [Red Wine Powder]        FAMILY HISTORY  Family History   Problem Relation Age of Onset     Hypertension Mother      No Known Problems Father      No Known Problems Sister      No Known Problems Brother      No Known Problems Maternal Grandmother      No Known Problems Maternal Grandfather      No Known Problems Paternal Grandmother      No Known Problems Other          SOCIAL HISTORY  Social History     Socioeconomic History     Marital status:      Spouse name: Not on file     Number of children: Not on file     Years of education: Not on file     Highest education level: Not on file  "  Occupational History     Not on file   Social Needs     Financial resource strain: Not on file     Food insecurity     Worry: Not on file     Inability: Not on file     Transportation needs     Medical: Not on file     Non-medical: Not on file   Tobacco Use     Smoking status: Former Smoker     Packs/day: 1.50     Years: 20.00     Pack years: 30.00     Types: Cigarettes     Start date: 1973     Quit date: 1993     Years since quittin.8     Smokeless tobacco: Never Used   Substance and Sexual Activity     Alcohol use: No     Alcohol/week: 0.0 standard drinks     Drug use: No     Sexual activity: Not Currently     Partners: Male     Birth control/protection: Post-menopausal   Lifestyle     Physical activity     Days per week: Not on file     Minutes per session: Not on file     Stress: Not on file   Relationships     Social connections     Talks on phone: Not on file     Gets together: Not on file     Attends Nondenominational service: Not on file     Active member of club or organization: Not on file     Attends meetings of clubs or organizations: Not on file     Relationship status: Not on file     Intimate partner violence     Fear of current or ex partner: Not on file     Emotionally abused: Not on file     Physically abused: Not on file     Forced sexual activity: Not on file   Other Topics Concern     Parent/sibling w/ CABG, MI or angioplasty before 65F 55M? Not Asked   Social History Narrative    ** Merged History Encounter **            ROS:     Negative except what is reviewed in HPI     EXAM:  BP (!) 145/56   Pulse 111   Ht 1.753 m (5' 9\")   Wt 125.6 kg (277 lb)   BMI 40.91 kg/m    In general, the patient is a pleasant female in no apparent distress.    HEENT: PERRLA.  EOMI.    Neck: No jugular venous distension.   Heart: Regular rate. Tachycardic. Soft holosystolic murmur RUSB. No gallops or rubs.   Lungs: CTA.  No ronchi, wheezes, rales.    Abdomen: nondistended   Extremities: No clubbing, " cyanosis. Mild acrocyanosis right digits. 1+ edema.  No wounds.   Vascular: No bruits are noted. 2+ DP and PT, radial.     Labs:  LIPID RESULTS:  Lab Results   Component Value Date    CHOL 250 (H) 09/19/2018    HDL 93 09/19/2018     (H) 09/19/2018    TRIG 75 09/19/2018    CHOLHDLRATIO 3.1 01/06/2014    NHDL 157 (H) 09/19/2018       LIVER ENZYME RESULTS:  Lab Results   Component Value Date    AST 26 01/23/2020    ALT 35 01/23/2020       CBC RESULTS:  Lab Results   Component Value Date    WBC 5.6 11/01/2020    RBC 4.02 11/01/2020    HGB 10.8 (L) 11/01/2020    HCT 34.2 (L) 11/01/2020    MCV 85 11/01/2020    MCH 26.9 11/01/2020    MCHC 31.6 11/01/2020    RDW 14.6 11/01/2020     11/01/2020       BMP RESULTS:  Lab Results   Component Value Date     11/01/2020    POTASSIUM 4.2 11/02/2020    CHLORIDE 106 11/01/2020    CO2 29 11/01/2020    ANIONGAP 2 (L) 11/01/2020     (H) 11/01/2020    BUN 10 11/01/2020    CR 0.51 (L) 11/01/2020    GFRESTIMATED >90 11/01/2020    GFRESTBLACK >90 11/01/2020    KYLIE 8.9 11/01/2020        A1C RESULTS:  Lab Results   Component Value Date    A1C 6.0 (H) 12/09/2019

## 2020-11-10 NOTE — LETTER
11/10/2020    Arcadio Farfan MD  [facility] [address]  [facility] [address]    RE: Aspen Mccullough       Dear Colleague,    I had the pleasure of seeing Aspen Mccullough in the [facility].      HPI:     This is a pleasant 60 year old with the following PMH: atrial fibrillation, right LE DVT and PE in the past on xarelto, HLD, HTN, lupus on steroids here to establish care for atrial fibrillation.     Hospitalized in October for DVT and was found to be in rapid atrial fibrillation. She was started on dilitazem and metoprolol. She reports unclear duration of afib as she is not symptomatic from it. Today her heart rates are in the 100s but regular. No history of palpitations, dizziness, syncope, chest pain or shortness of breath from the AFIB. Has a popliteal DVT of the right leg, no calf pain or severe swelling. She had been temporarily stopped on xarelto for a thumb surgery.    ROS is positive for right toe discoloration (sometimes blue) with some coldness and numbness sensation. No open ulcerations. No claudication. No PND, orthopnea. No COVID exposures, denies fevers, chills, ns.    Social history: works occupation in medicare fraud. Here with . Kids overall healthy.     In review of recent studies:     Echo 11-2-2020 - A Fib,   Left ventricular systolic function normal. EF 55-60%.   The study was technically difficult.     US Venous Lower Extremity Right: 10-  - Care everywhere   1.  Acute DVT right posterior tibial vein.   2.  Popliteal cyst.       Chest CT a 10- Care Everywhere.   1.  Limited exam due to respiratory motion. No central or definite peripheral   pulmonary artery embolism. No thoracic aortic dissection.   2.  Mild left upper lobar consolidation and patchy airspace opacities likely   reflecting pneumonia. Follow-up to assess for resolution is recommended.   3.  Findings suggesting pulmonary air trapping which can be seen with small    airways disease.   4.  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 Recommend   follow-up with ultrasound or CT in 6 months.     CT Pulmonary 9-21-18   1. No evidence of pulmonary embolism. No acute pulmonary process.   2. Scattered sub-6 mm pulmonary nodules. If the patient is considered   low risk for malignancy, no further follow-up is necessary. Otherwise,   LIKELY consider 12 month follow-up chest CT as per Fleischner Society   2017 guidelines.   3. Cardiomegaly. Ectasia of the ascending aorta 4.1 cm.   4. Dilatation of the pulmonary vasculature, including the main   pulmonary artery to 4.5 cm. This can be seen with pulmonary arterial   Hypertension.     ASSESSMENT/PLAN:    1. Atrial Fibrillation: in normal sinus rhythm today, normal LV function by recent echocardiogram  -would like to assess burden of AFIB, rate control to allow for further titration of medication and also to monitor for bradycardia, pauses   -Ziopatch rhythm monitor for 5-7 days   -would prefer rate control strategy rather than antiarrhythmic drug or ablation consideration especially if low burden of AFIB detected on monitor and LV function remains normal   -continue rixaroxaban also given history of VTE    2. History of PE and recent DVT of right popliteal vein:  -continue rivaroxaban  -may need repeat ultrasound imaging in 3-6 months to ensure adequate treatment and not treatment failure, DVT probable from brief cessation in DOAC therapy: will assess at future visit   -monitor for post thrombotic syndrome    3. Right toe discoloration:  -will assess for arterial disease by toe PPGs, LE arterial duplex and ABIs   -ddx includes atherosclerotic peripheral vascular disease versus fixed small vessel occlusive disease from autoimmune disease  -could have element of distal superficial venous reflux as well given h/o DVT     4. HTN: likely labile due to steroids and lupus   -continue diltiazem and metoprolol for now, can  reassess medication regimen after ziopatch     Will follow up with patient in 3 months.     Sil Muñoz MD MSc  [facility]     *The below clinic note data has been obtained during prior evaluations and is an autopopulated list*      PAST MEDICAL HISTORY  Past Medical History:   Diagnosis Date     ADHD (attention deficit hyperactivity disorder)      Allergic rhinitis      Chronic low back pain      Cushing's syndrome (H)      DDD (degenerative disc disease)      DDD (degenerative disc disease)      Deviated nasal septum      Diarrhea      Endometriosis      Fibromyalgia      Hashimoto's disease      Hyperlipidemia      Hypothyroid     hx hashimoto's thyroiditis     Insomnia      Lupus (H)      Malabsorption      Pernicious anemia      Renal disease     chronic renal insufficiency     Sinusitis        CURRENT MEDICATIONS  Current Outpatient Medications   Medication Sig Dispense Refill     CEPHALEXIN PO Take 500 mg by mouth as needed (Dental Procedure) Take 4 caps 1 hour prior to Dental Appointment       cyanocobalamin 1000 MCG/ML injection Inject 1 mL (1,000 mcg) Subcutaneous every 7 days 4 mL 5     cyclobenzaprine (FLEXERIL) 10 MG tablet Take 1 tablet (10 mg) by mouth 3 times daily 90 tablet 3     diltiazem ER COATED BEADS (CARDIZEM CD/CARTIA XT) 180 MG 24 hr capsule Take 1 capsule (180 mg) by mouth daily 30 capsule 0     gabapentin (NEURONTIN) 300 MG capsule Take 300 mg by mouth 3 times daily In addition to 800 mg for total dose of 1100 mg       gabapentin (NEURONTIN) 800 MG tablet Take 800 mg by mouth 3 times daily In addition to 300 mg for total dose of 1100 mg       HYDROmorphone (DILAUDID) 8 MG tablet Take 8 mg by mouth every 8 hours . Max of 3 doses per day.       levothyroxine (SYNTHROID/LEVOTHROID) 100 MCG SOLR injection Inject 200 mcg into the vein twice a week        levothyroxine (SYNTHROID/LEVOTHROID) 200 MCG tablet Take 1 tablet (200 mcg) by mouth 2 times daily 60 tablet 0     liothyronine  (CYTOMEL) 25 MCG tablet Take 50 mcg by mouth 2 times daily        metoprolol succinate ER (TOPROL-XL) 50 MG 24 hr tablet Take 1 tablet (50 mg) by mouth 2 times daily 60 tablet 0     morphine (MS CONTIN) 30 MG 12 hr tablet Take 30 mg by mouth 2 times daily along with one morphine 60 mg 12 hr tablet for a total dose of 90 mg.  AM and afternoon 270 tablet 0     morphine (MS CONTIN) 60 MG 12 hr tablet Take 60 mg by mouth 3 times daily  30 tablet 0     predniSONE (DELTASONE) 1 MG tablet Use along with one prednisone 5 mg tablets to alternate taking 8mg and 10mg every other day.  2     predniSONE (DELTASONE) 5 MG tablet Alternate taking 8mg and 10mg every other day       rivaroxaban ANTICOAGULANT (XARELTO) 20 MG TABS tablet Take 20 mg by mouth daily (with dinner)        vitamin D2 (ERGOCALCIFEROL) 30528 units (1250 mcg) capsule Take 50,000 Units by mouth once a week       zolpidem (AMBIEN) 5 MG tablet Take 5 mg by mouth nightly as needed for sleep       BIOTIN FORTE PO Take 1 tablet by mouth daily        carboxymethylcellulose (CARBOXYMETHYLCELLULOSE SODIUM) 0.5 % SOLN ophthalmic solution Place 1 drop into both eyes 2 times daily as needed  1 Bottle      cycloSPORINE (RESTASIS) 0.05 % ophthalmic emulsion Place 1 drop into both eyes 2 times daily       fexofenadine (ALLEGRA) 180 MG tablet Take 180 mg by mouth daily as needed        levothyroxine (SYNTHROID/LEVOTHROID) 50 MCG tablet Take 1 tablet (50 mcg) by mouth 2 times daily (Patient not taking: Reported on 11/10/2020) 60 tablet 0     lifitegrast (XIIDRA) 5 % opthalmic solution Place 1 drop into both eyes 2 times daily       methylcellulose (CITRUCEL) powder Start with 1 heaping tablespoon. Increase as needed, 1 heaping tablespoon at a time, up to 3 times per day. 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)     APPENDECTOMY       ARTHRODESIS ANKLE      right     ARTHROPLASTY KNEE BILATERAL       ARTHROPLASTY REVISION KNEE  4/19/2011    Procedure:ARTHROPLASTY REVISION KNEE; With Antibiotic Cement ; Surgeon:CHAO OLIVARES; Location:[facility]     BREAST SURGERY      right- tissue remove nipple area     BUNIONECTOMY  12/14/2011    Procedure:BUNIONECTOMY; Right Bunion Correction; Surgeon:GRACE ZARATE; Location:[facility]     C STOMACH SURGERY PROCEDURE UNLISTED      see list which we will bring     CHOLECYSTECTOMY       EXAM UNDER ANESTHESIA ANUS N/A 7/15/2020    Procedure: EXAM UNDER ANESTHESIA, ANUS;  Surgeon: Luis Canales MD;  Location: [facility]     EXCISE MASS UPPER EXTREMITY  12/14/2011    Procedure:EXCISE MASS UPPER EXTREMITY; Excision of Left Arm Mass; Surgeon:GRACE ZARATE; Location:[facility]     FOOT SURGERY      left X 4     FUSION LUMBAR ANTERIOR ONE LEVEL       HC SACROPLASTY      see list which we will bring     HEMORRHOIDECTOMY INTERNAL N/A 7/15/2020    Procedure: Exam under anesthesia, hemorrhoidectomy;  Surgeon: Luis Canales MD;  [facility]: [facility]     INJECT NERVE BLOCK SUPRASCAPULAR Left 5/18/2018    Procedure: INJECT NERVE BLOCK SUPRASCAPULAR;  Left Suprascapular Nerve Block;  Surgeon: Basim Velazquez MD;  [facility]: [facility]     INJECT NERVE BLOCK SUPRASCAPULAR Right 7/23/2018    Procedure: INJECT NERVE BLOCK SUPRASCAPULAR;  Right suprascapular injection;  Surgeon: Basim Velazquez MD;  [facility]: [facility]     INJECT NERVE BLOCK SUPRASCAPULAR Bilateral 10/29/2018    Procedure: Bilateral  Suprascapular Nerve Blocks;  Surgeon: Basim Velazquez MD;  [facility]     INJECT NERVE BLOCK SUPRASCAPULAR Bilateral 3/15/2019    Procedure: Bilateral Suprascapular Nerve Block;  Surgeon: Basim Velazquez MD;  [facility]     INJECT NERVE BLOCK SUPRASCAPULAR Bilateral 12/31/2019    Procedure: bilateral suprascapular nerve block;  Surgeon: Basim Velazquez MD;  [facility]: [facility]     KNEE SURGERY      see list which we will bring     LAMINECTOMY LUMBAR ONE LEVEL      L4-5     LAPAROSCOPIC ABLATION ENDOMETRIOSIS       RADIO FREQUENCY ABLATION PULSED CERVICAL Bilateral 7/10/2019    Procedure: Bilateral Suprascapular Nerve Pulsed Radiofrequency Ablation;  Surgeon: Basim Velazquez MD;  [facility]: [facility]     REMOVE HARDWARE FOOT  12/14/2011    Procedure:REMOVE HARDWARE FOOT; Hardware Removal Right Foot (Mini-C-Arm) ; Surgeon:GRACE ZARATE; Location:[facility]     RHINOPLASTY       SALPINGO-OOPHORECTOMY BILATERAL       TONSILLECTOMY         ALLERGIES     Allergies   Allergen Reactions     Blood Transfusion Related (Informational Only) Other (See Comments)     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      Chlorhexidine Hives     Thor surgical cleanser     Codeine Hives     Has tolerated Oxycodone in past      Ibuprofen [Nsaids] Hives     Penicillins Hives     Other reaction(s): Unknown     Sulfa Drugs Hives     Other reaction(s): Unknown     Doxycycline GI Disturbance     Emesis & diarrhea  Patient denies allergy to this med     Hydroxyzine      rash     Mold      Red Wine Complex [Red Wine Powder]        FAMILY HISTORY  Family History   Problem Relation Age of Onset     Hypertension Mother      No Known Problems Father      No Known Problems Sister      No Known Problems Brother      No Known Problems Maternal Grandmother      No Known Problems Maternal Grandfather      No Known Problems Paternal Grandmother      No Known Problems Other          SOCIAL HISTORY  Social History  "    Socioeconomic History     Marital status:      Spouse name: Not on file     Number of children: Not on file     Years of education: Not on file     Highest education level: Not on file   Occupational History     Not on file   Social Needs     Financial resource strain: Not on file     Food insecurity     Worry: Not on file     Inability: Not on file     Transportation needs     Medical: Not on file     Non-medical: Not on file   Tobacco Use     Smoking status: Former Smoker     Packs/day: 1.50     Years: 20.00     Pack years: 30.00     Types: Cigarettes     Start date: 1973     Quit date: 1993     Years since quittin.8     Smokeless tobacco: Never Used   Substance and Sexual Activity     Alcohol use: No     Alcohol/week: 0.0 standard drinks     Drug use: No     Sexual activity: Not Currently     Partners: Male     Birth control/protection: Post-menopausal   Lifestyle     Physical activity     Days per week: Not on file     Minutes per session: Not on file     Stress: Not on file   Relationships     Social connections     Talks on phone: Not on file     Gets together: Not on file     Attends [employer] service: Not on file     Active member of club or organization: Not on file     Attends meetings of clubs or organizations: Not on file     Relationship status: Not on file     Intimate partner violence     Fear of current or ex partner: Not on file     Emotionally abused: Not on file     Physically abused: Not on file     Forced sexual activity: Not on file   Other Topics Concern     Parent/sibling w/ CABG, MI or angioplasty before 65F 55M? Not Asked   Social History Narrative    ** Merged History Encounter **            ROS:     Negative except what is reviewed in HPI     EXAM:  BP (!) 145/56   Pulse 111   Ht 1.753 m (5' 9\")   Wt 125.6 kg (277 lb)   BMI 40.91 kg/m    In general, the patient is a pleasant female in no apparent distress.    HEENT: PERRLA.  EOMI.    Neck: No jugular venous " distension.   Heart: Regular rate. Tachycardic. Soft holosystolic murmur RUSB. No gallops or rubs.   Lungs: CTA.  No ronchi, wheezes, rales.    Abdomen: nondistended   Extremities: No clubbing, cyanosis. Mild acrocyanosis right digits. 1+ edema.  No wounds.   Vascular: No bruits are noted. 2+ DP and PT, radial.     Labs:  LIPID RESULTS:  Lab Results   Component Value Date    CHOL 250 (H) 09/19/2018    HDL 93 09/19/2018     (H) 09/19/2018    TRIG 75 09/19/2018    CHOLHDLRATIO 3.1 01/06/2014    NHDL 157 (H) 09/19/2018       LIVER ENZYME RESULTS:  Lab Results   Component Value Date    AST 26 01/23/2020    ALT 35 01/23/2020       CBC RESULTS:  Lab Results   Component Value Date    WBC 5.6 11/01/2020    RBC 4.02 11/01/2020    HGB 10.8 (L) 11/01/2020    HCT 34.2 (L) 11/01/2020    MCV 85 11/01/2020    MCH 26.9 11/01/2020    MCHC 31.6 11/01/2020    RDW 14.6 11/01/2020     11/01/2020       BMP RESULTS:  Lab Results   Component Value Date     11/01/2020    POTASSIUM 4.2 11/02/2020    CHLORIDE 106 11/01/2020    CO2 29 11/01/2020    ANIONGAP 2 (L) 11/01/2020     (H) 11/01/2020    BUN 10 11/01/2020    CR 0.51 (L) 11/01/2020    GFRESTIMATED >90 11/01/2020    GFRESTBLACK >90 11/01/2020    KYLIE 8.9 11/01/2020        A1C RESULTS:  Lab Results   Component Value Date    A1C 6.0 (H) 12/09/2019       Thank you for allowing me to participate in the care of your patient.    Sincerely,     Sil Muñoz MD     [facility]

## 2020-11-24 ENCOUNTER — CARE COORDINATION (OUTPATIENT)
Dept: CARDIOLOGY | Facility: CLINIC | Age: 60
End: 2020-11-24

## 2020-11-24 NOTE — PROGRESS NOTES
Call from [facility] about urgent findings on patient. Patient was in A fib/flutter 181 BPM for 1 min and 45% a fib/flutter burden. Message sent to EP to see if we can get the exact time and date this occurred. Patient currently on Xarelto. She is also rate controlled on 180mg Cardizem ER and 50mg BID Metoprolol Succinate.      Call from patient stating that she was not symptomatic with the A.fib episode. She states that she has been taking her medications appropriately. Patient has turned in monitor. Will send to Dr. Muñoz for review.       Last OV 11/10/20 w/ Dr. Muñoz:    ASSESSMENT/PLAN:     1. Atrial Fibrillation: in normal sinus rhythm today, normal LV function by recent echocardiogram  -would like to assess burden of AFIB, rate control to allow for further titration of medication and also to monitor for bradycardia, pauses   -Clayt[place]ch rhythm monitor for 5-7 days   -would prefer rate control strategy rather than antiarrhythmic drug or ablation consideration especially if low burden of AFIB detected on monitor and LV function remains normal   -continue rixaroxaban also given history of VTE    2. History of PE and recent DVT of right popliteal vein:  -continue rivaroxaban  -may need repeat ultrasound imaging in 3-6 months to ensure adequate treatment and not treatment failure, DVT probable from brief cessation in DOAC therapy: will assess at future visit   -monitor for post thrombotic syndrome     3. Right toe discoloration:  -will assess for arterial disease by toe PPGs, LE arterial duplex and ABIs   -ddx includes atherosclerotic peripheral vascular disease versus fixed small vessel occlusive disease from autoimmune disease  -could have element of distal superficial venous reflux as well given h/o DVT      4. HTN: likely labile due to steroids and lupus   -continue diltiazem and metoprolol for now, can reassess medication regimen after malu Almanza RN November 25, 2020 11:47 AM

## 2020-11-30 NOTE — PROGRESS NOTES
If single episode only lasting one minute I think we can monitor and continue current medications. That could have been during exertion, etc. I will review the report in detail during our follow up visit.     Dr. Muñoz        Patient due for follow up in 3 months with Dr. Muñoz.         CHELLE Almanza November 30, 2020 9:43 AM

## 2021-01-15 ENCOUNTER — HEALTH MAINTENANCE LETTER (OUTPATIENT)
Age: 61
End: 2021-01-15

## 2021-01-18 ENCOUNTER — TELEPHONE (OUTPATIENT)
Dept: DERMATOLOGY | Facility: CLINIC | Age: 61
End: 2021-01-18

## 2021-01-18 NOTE — TELEPHONE ENCOUNTER
[facility] Call Center    Phone Message    May a detailed message be left on voicemail: yes     Reason for Call: Other: Please cancel procedure, per . Will call back to reschedule     Action Taken: Message routed to:  [facility] Derm    Travel Screening: Not Applicable

## 2021-01-29 ENCOUNTER — CARE COORDINATION (OUTPATIENT)
Dept: CARDIOLOGY | Facility: CLINIC | Age: 61
End: 2021-01-29

## 2021-01-29 NOTE — PROGRESS NOTES
Armandoopatch report showed A.fib 45% burden with frequent RVR. Longest duration was 2 days and 4 hours. Patient had canceled follow up appointment. Will route to Dr. Muñoz for review.       Last OV 11/10/20 w/ Dr. Muñoz:  ASSESSMENT/PLAN:     1. Atrial Fibrillation: in normal sinus rhythm today, normal LV function by recent echocardiogram  -would like to assess burden of AFIB, rate control to allow for further titration of medication and also to monitor for bradycardia, pauses   -[place] rhythm monitor for 5-7 days   -would prefer rate control strategy rather than antiarrhythmic drug or ablation consideration especially if low burden of AFIB detected on monitor and LV function remains normal   -continue rixaroxaban also given history of VTE    2. History of PE and recent DVT of right popliteal vein:  -continue rivaroxaban  -may need repeat ultrasound imaging in 3-6 months to ensure adequate treatment and not treatment failure, DVT probable from brief cessation in DOAC therapy: will assess at future visit   -monitor for post thrombotic syndrome     3. Right toe discoloration:  -will assess for arterial disease by toe PPGs, LE arterial duplex and ABIs   -ddx includes atherosclerotic peripheral vascular disease versus fixed small vessel occlusive disease from autoimmune disease  -could have element of distal superficial venous reflux as well given h/o DVT      4. HTN: likely labile due to steroids and lupus   -continue diltiazem and metoprolol for now, can reassess medication regimen after malu      Will follow up with patient in 3 months.       CHELLE Almanza January 29, 2021 3:21 PM

## 2021-02-03 NOTE — PROGRESS NOTES
Patient's  and patient returned call. RN reviewed with them the test results and Dr. Muñoz's recommendations. Patient and patient's  verbalized understanding. Patient is insured through [employer] and therefore is unable to schedule f/u with us at this time. Patient and patient's  are going to review options with [employer] and will either schedule with us if approved or will establish care with other cardiology group. RN did advise patient and patient's  to call this RN back should they have any medical questions prior to scheduling apt or establishing care at clinic within network.

## 2021-02-03 NOTE — PROGRESS NOTES
RN attempted to call patient x2.  answered and advised that patient is waiting out in car and he is currently shopping. Informed patient's  to have patient contact us to review heart monitor results and recommendations. Patient has [employer] and unfortunately will need to find new cardiologist to schedule apt with as we are currently out of network for them and unable to schedule. RN will await patient callback.       Dr. Muñoz's recommendations.    Okay. She should see someone for this, is there an EP ELA she would be willing to see just for the AFIB part of things in the short term (even if phone visit)? Concerned about 2 days worth of RVR and high burden.     Thanks,  Dr. Muñoz

## 2021-02-08 ENCOUNTER — VIRTUAL VISIT (OUTPATIENT)
Dept: CARDIOLOGY | Facility: CLINIC | Age: 61
End: 2021-02-08
Attending: INTERNAL MEDICINE
Payer: COMMERCIAL

## 2021-02-08 DIAGNOSIS — I83.90 ASYMPTOMATIC VARICOSE VEINS: ICD-10-CM

## 2021-02-08 DIAGNOSIS — I48.91 ATRIAL FIBRILLATION WITH RVR (H): ICD-10-CM

## 2021-02-08 DIAGNOSIS — I82.431 ACUTE DEEP VEIN THROMBOSIS (DVT) OF POPLITEAL VEIN OF RIGHT LOWER EXTREMITY (H): ICD-10-CM

## 2021-02-08 DIAGNOSIS — E66.01 MORBID OBESITY (H): ICD-10-CM

## 2021-02-08 DIAGNOSIS — Z86.718 HISTORY OF DEEP VENOUS THROMBOSIS: Primary | ICD-10-CM

## 2021-02-08 DIAGNOSIS — I77.1 STRICTURE OF ARTERY (H): ICD-10-CM

## 2021-02-08 PROCEDURE — 99214 OFFICE O/P EST MOD 30 MIN: CPT | Mod: 95 | Performed by: INTERNAL MEDICINE

## 2021-02-08 RX ORDER — METOPROLOL SUCCINATE 100 MG/1
50 TABLET, EXTENDED RELEASE ORAL 2 TIMES DAILY
Qty: 30 TABLET | Refills: 3 | Status: SHIPPED | OUTPATIENT
Start: 2021-02-08 | End: 2021-02-12

## 2021-02-08 NOTE — PROGRESS NOTES
Aspen is a 60 year old who is being evaluated via telephone visit. #[phone]       PROVIDER NOTE:     This is a pleasant 60 year old with the following PMH: atrial fibrillation, right LE DVT and PE in the past on long term xarelto, HLD, HTN, lupus on chronic steroids who was referred to me three months ago to establish care for atrial fibrillation. She has seen several providers in the past at other health care systems and after we had a pleasant first visit and she wishes to continue care with me in cardiology.      Reviewing her records, back in October she was admitted for DVT and was found to be in rapid atrial fibrillation. She was started on dilitazem and metoprolol. She reports unclear duration of afib as she is not symptomatic from it. During our first in office visit her rates were over 100 bpm. No history of palpitations, dizziness, syncope, chest pain or shortness of breath from the AFIB however she feels constantly fatigued and tired. Social history: works occupation in medicare fraud. Here with . Has a daughter in nutrition school who is asking a lot of great questions about dietary changes for atrial fibrillation and inflammation.     Has a popliteal DVT of the right leg for which she has been on adequate blood thinners but her leg has been feeling tight and painful. She had been temporarily stopped on xarelto for a thumb surgery. Her last ultrasound of the leg was in October. She had some right toe discoloration and occasional blue toe syndrome. We performed arterial studies and PPGs in November which were normal and suggests good arterial flow to the foot. When I reviewed her CT scan from prior admission she also notably has a dilated PA of 4.5 cm as well as aortic root aneurysm of 4.1 cm. No known family history of this.     In review of recent studies:     [place]: 45% burden AFIB, average rate ~120 bpm, as high as 204 bpm. No significant pauses.    Echo 11-2-2020 - A Fib,   Left  ventricular systolic function normal. EF 55-60%.   The study was technically difficult.     US Venous Lower Extremity Right: 10-  - Care everywhere   1.  Acute DVT right posterior tibial vein.   2.  Popliteal cyst.       Chest CT a 10- Care Everywhere.   1.  Limited exam due to respiratory motion. No central or definite peripheral   pulmonary artery embolism. No thoracic aortic dissection.   2.  Mild left upper lobar consolidation and patchy airspace opacities likely   reflecting pneumonia. Follow-up to assess for resolution is recommended.   3.  Findings suggesting pulmonary air trapping which can be seen with small   airways disease.   4.  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 Recommend   follow-up with ultrasound or CT in 6 months.     CT Pulmonary 9-21-18   1. No evidence of pulmonary embolism. No acute pulmonary process.   2. Scattered sub-6 mm pulmonary nodules. If the patient is considered   low risk for malignancy, no further follow-up is necessary. Otherwise,   LIKELY consider 12 month follow-up chest CT as per Fleischner Society   2017 guidelines.   3. Cardiomegaly. Ectasia of the ascending aorta 4.1 cm.   4. Dilatation of the pulmonary vasculature, including the main   pulmonary artery to 4.5 cm. This can be seen with pulmonary arterial   Hypertension.      ASSESSMENT/PLAN:     1. Atrial Fibrillation: normal LV function by recent echocardiogram however at risk for tachy-mediated cardiomyopathy given frequent RVR  -continue rivaroxaban for anticoagulation in SPAF and elevated CHADSVASC  -on diltiazem 180 mg daily, will increase metoprolol to 100 XL daily  -would consider NESTOR/DCCV for rhythm control strategy with initiation of either sotolol or amiodarone however patient would prefer curative strategy (if possible) with ablation. Discussed some risks and benefits and I think reasonable strategy, will discuss with Dr. Uribe in EP and place referral for next 1-2 weeks      2. History of PE and recent DVT of right popliteal vein:  -continue rivaroxaban  -will repeat ultrasound and perform vein comp study given new symptoms of swelling  -monitor for post thrombotic syndrome  -compression stockings   -would consider PE as trigger for rapid AFIB, will monitor for progressive SOB     3. Right toe discoloration:  -no arterial disease by toe PPGs, LE arterial duplex and ABIs   -could have element of distal superficial venous reflux as well given h/o DVT - vein comp as above      4. HTN: likely labile due to steroids and lupus   -continue diltiazem and metoprolol for now, BP check next visit and will assess medications based on AFIB discussion with Dr. Uribe     5. Nutrition: discussed functional nutritionist evaluation for food sensitivity testing     Will follow up with patient in 3 months.      Sil Muñoz MD MSc  [facility]     HPI and Plan:   See dictation    Orders this Visit:  Orders Placed This Encounter   Procedures     US Venous Competency Bilateral     Follow-Up with Cardiologist     Orders Placed This Encounter   Medications     metoprolol succinate ER (TOPROL-XL) 100 MG 24 hr tablet     Sig: Take 0.5 tablets (50 mg) by mouth 2 times daily     Dispense:  30 tablet     Refill:  3     Medications Discontinued During This Encounter   Medication Reason     metoprolol succinate ER (TOPROL-XL) 50 MG 24 hr tablet          Encounter Diagnoses   Name Primary?     Atrial fibrillation with RVR (H)      Morbid obesity (H)      Stricture of artery (H)       Acute deep vein thrombosis (DVT) of popliteal vein of right lower extremity (H) Yes       CURRENT MEDICATIONS:  Current Outpatient Medications   Medication Sig Dispense Refill     BIOTIN FORTE PO Take 1 tablet by mouth daily        carboxymethylcellulose (CARBOXYMETHYLCELLULOSE SODIUM) 0.5 % SOLN ophthalmic solution Place 1 drop into both eyes 2 times daily as needed  1 Bottle      CEPHALEXIN PO Take 500 mg by mouth as  needed (Dental Procedure) Take 4 caps 1 hour prior to Dental Appointment       cyanocobalamin 1000 MCG/ML injection Inject 1 mL (1,000 mcg) Subcutaneous every 7 days 4 mL 5     cyclobenzaprine (FLEXERIL) 10 MG tablet Take 1 tablet (10 mg) by mouth 3 times daily 90 tablet 3     cycloSPORINE (RESTASIS) 0.05 % ophthalmic emulsion Place 1 drop into both eyes 2 times daily       diltiazem ER COATED BEADS (CARDIZEM CD/CARTIA XT) 180 MG 24 hr capsule Take 1 capsule (180 mg) by mouth daily 30 capsule 0     gabapentin (NEURONTIN) 300 MG capsule Take 300 mg by mouth 3 times daily In addition to 800 mg for total dose of 1100 mg       gabapentin (NEURONTIN) 800 MG tablet Take 800 mg by mouth 3 times daily In addition to 300 mg for total dose of 1100 mg       HYDROmorphone (DILAUDID) 8 MG tablet Take 8 mg by mouth every 8 hours . Max of 3 doses per day.       levothyroxine (SYNTHROID/LEVOTHROID) 100 MCG SOLR injection Inject 200 mcg into the vein once a week        levothyroxine (SYNTHROID/LEVOTHROID) 200 MCG tablet Take 1 tablet (200 mcg) by mouth 2 times daily 60 tablet 0     lifitegrast (XIIDRA) 5 % opthalmic solution Place 1 drop into both eyes 2 times daily       liothyronine (CYTOMEL) 25 MCG tablet Take 50 mcg by mouth 2 times daily        methylcellulose (CITRUCEL) powder Start with 1 heaping tablespoon. Increase as needed, 1 heaping tablespoon at a time, up to 3 times per day. 479 g 3     metoprolol succinate ER (TOPROL-XL) 100 MG 24 hr tablet Take 0.5 tablets (50 mg) by mouth 2 times daily 30 tablet 3     morphine (MS CONTIN) 30 MG 12 hr tablet Take 30 mg by mouth 2 times daily along with one morphine 60 mg 12 hr tablet for a total dose of 90 mg.  AM and afternoon 270 tablet 0     morphine (MS CONTIN) 60 MG 12 hr tablet Take 60 mg by mouth 3 times daily  30 tablet 0     NASONEX 50 MCG/ACT spray Spray 1 spray into both nostrils daily as needed   11     nystatin (MYCOSTATIN) 124275 UNIT/GM external powder Apply topically  3 times daily as needed 60 g 1     prednisoLONE acetate (PRED FORTE) 1 % ophthalmic susp 1 drop 4 times daily       predniSONE (DELTASONE) 1 MG tablet Use along with one prednisone 5 mg tablets to alternate taking 8mg and 10mg every other day.  2     predniSONE (DELTASONE) 5 MG tablet Alternate taking 8mg and 10mg every other day       probiotic CAPS Take 1 capsule by mouth 2 times daily       rivaroxaban ANTICOAGULANT (XARELTO) 20 MG TABS tablet Take 20 mg by mouth daily (with dinner)        vitamin D2 (ERGOCALCIFEROL) 89934 units (1250 mcg) capsule Take 50,000 Units by mouth once a week       zolpidem (AMBIEN) 5 MG tablet Take 5 mg by mouth nightly as needed for sleep       fexofenadine (ALLEGRA) 180 MG tablet Take 180 mg by mouth daily as needed        levothyroxine (SYNTHROID/LEVOTHROID) 50 MCG tablet Take 1 tablet (50 mcg) by mouth 2 times daily (Patient not taking: Reported on 11/10/2020) 60 tablet 0     multivitamin, therapeutic with minerals (MULTI-VITAMIN) TABS tablet Take 1 tablet by mouth 2 times daily 100 tablet 3       ALLERGIES     Allergies   Allergen Reactions     Blood Transfusion Related (Informational Only) Other (See Comments)     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      Chlorhexidine Hives     Thor surgical cleanser     Codeine Hives     Has tolerated Oxycodone in past      Ibuprofen [Nsaids] Hives     Penicillins Hives     Other reaction(s): Unknown     Sulfa Drugs Hives     Other reaction(s): Unknown     Doxycycline GI Disturbance     Emesis & diarrhea  Patient denies allergy to this med     Hydroxyzine      rash     Mold      Red Wine Complex [Red Wine Powder]        PAST MEDICAL, SURGICAL, FAMILY, SOCIAL HISTORY:  History was reviewed and updated as needed, see medical record.      Recent Lab Results:  LIPID RESULTS:  Lab Results   Component Value Date    CHOL 250 (H) 09/19/2018    HDL 93 09/19/2018     (H) 09/19/2018    TRIG 75 09/19/2018    CHOLHDLRATIO 3.1  01/06/2014       LIVER ENZYME RESULTS:  Lab Results   Component Value Date    AST 26 01/23/2020    ALT 35 01/23/2020       CBC RESULTS:  Lab Results   Component Value Date    WBC 5.6 11/01/2020    RBC 4.02 11/01/2020    HGB 10.8 (L) 11/01/2020    HCT 34.2 (L) 11/01/2020    MCV 85 11/01/2020    MCH 26.9 11/01/2020    MCHC 31.6 11/01/2020    RDW 14.6 11/01/2020     11/01/2020       BMP RESULTS:  Lab Results   Component Value Date     11/01/2020    POTASSIUM 4.2 11/02/2020    CHLORIDE 106 11/01/2020    CO2 29 11/01/2020    ANIONGAP 2 (L) 11/01/2020     (H) 11/01/2020    BUN 10 11/01/2020    CR 0.51 (L) 11/01/2020    GFRESTIMATED >90 11/01/2020    GFRESTBLACK >90 11/01/2020    KYLIE 8.9 11/01/2020        A1C RESULTS:  Lab Results   Component Value Date    A1C 6.0 (H) 12/09/2019       INR RESULTS:  Lab Results   Component Value Date    INR 1.63 (H) 01/22/2020    INR 1.08 01/22/2015           CC  Arcadio Farfan MD  [address] [address]    Review Of Systems  Skin: NEGATIVE  Eyes:Ears/Nose/Throat: vision problems  Respiratory: SOB  Cardiovascular:Chest pain, rt gfoot swells and gets cold- going to vascular   Gastrointestinal: NEGATIVE  Genitourinary:NEGATIVE  Musculoskeletal: NEGATIVE  Neurologic: NEGATIVE  Psychiatric: NEGATIVE  Hematologic/Lymphatic/Immunologic: allergies  Endocrine:  Thyroid disorder     Video-Visit Details    Type of service: PHONE    Video End Time: 20 minutes

## 2021-02-08 NOTE — PATIENT INSTRUCTIONS
1. Follow up with electrophysiology in 2 weeks for ablation. Recommend you see Dr. Uribe.  2. Vein ultrasound study in 2 weeks   3. Follow up in 3 months with Dr. Muñoz   4. Functional nutritionist recommended for food reactivity testing  5. Increase metoprolol to 100 mg XL once a day

## 2021-02-08 NOTE — LETTER
2/8/2021    Arcadio Farfan MD  [facility] [address]  [facility] [address]    RE: Aspen A Jamel       Dear Colleague,    I had the pleasure of seeing Aspen MICH Mccullough in the [facility].      Aspen is a 60 year old who is being evaluated via telephone visit. #[phone]       PROVIDER NOTE:     This is a pleasant 60 year old with the following PMH: atrial fibrillation, right LE DVT and PE in the past on long term xarelto, HLD, HTN, lupus on chronic steroids who was referred to me three months ago to establish care for atrial fibrillation. She has seen several providers in the past at other health care systems and after we had a pleasant first visit and she wishes to continue care with me in cardiology.      Reviewing her records, back in October she was admitted for DVT and was found to be in rapid atrial fibrillation. She was started on dilitazem and metoprolol. She reports unclear duration of afib as she is not symptomatic from it. During our first in office visit her rates were over 100 bpm. No history of palpitations, dizziness, syncope, chest pain or shortness of breath from the AFIB however she feels constantly fatigued and tired. Social history: works occupation in medicare fraud. Here with . Has a daughter in nutrition school who is asking a lot of great questions about dietary changes for atrial fibrillation and inflammation.     Has a popliteal DVT of the right leg for which she has been on adequate blood thinners but her leg has been feeling tight and painful. She had been temporarily stopped on xarelto for a thumb surgery. Her last ultrasound of the leg was in October. She had some right toe discoloration and occasional blue toe syndrome. We performed arterial studies and PPGs in November which were normal and suggests good arterial flow to the foot. When I reviewed her CT scan from prior admission she also notably has a  dilated PA of 4.5 cm as well as aortic root aneurysm of 4.1 cm. No known family history of this.     In review of recent studies:     [place]: 45% burden AFIB, average rate ~120 bpm, as high as 204 bpm. No significant pauses.    Echo 11-2-2020 - A Fib,   Left ventricular systolic function normal. EF 55-60%.   The study was technically difficult.     US Venous Lower Extremity Right: 10-  - Care everywhere   1.  Acute DVT right posterior tibial vein.   2.  Popliteal cyst.       Chest CT a 10- Care Everywhere.   1.  Limited exam due to respiratory motion. No central or definite peripheral   pulmonary artery embolism. No thoracic aortic dissection.   2.  Mild left upper lobar consolidation and patchy airspace opacities likely   reflecting pneumonia. Follow-up to assess for resolution is recommended.   3.  Findings suggesting pulmonary air trapping which can be seen with small   airways disease.   4.  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 Recommend   follow-up with ultrasound or CT in 6 months.     CT Pulmonary 9-21-18   1. No evidence of pulmonary embolism. No acute pulmonary process.   2. Scattered sub-6 mm pulmonary nodules. If the patient is considered   low risk for malignancy, no further follow-up is necessary. Otherwise,   LIKELY consider 12 month follow-up chest CT as per Fleischner Society   2017 guidelines.   3. Cardiomegaly. Ectasia of the ascending aorta 4.1 cm.   4. Dilatation of the pulmonary vasculature, including the main   pulmonary artery to 4.5 cm. This can be seen with pulmonary arterial   Hypertension.      ASSESSMENT/PLAN:     1. Atrial Fibrillation: normal LV function by recent echocardiogram however at risk for tachy-mediated cardiomyopathy given frequent RVR  -continue rivaroxaban for anticoagulation in SPAF and elevated CHADSVASC  -on diltiazem 180 mg daily, will increase metoprolol to 100 XL daily  -would consider NESTOR/DCCV for rhythm control strategy  with initiation of either sotolol or amiodarone however patient would prefer curative strategy (if possible) with ablation. Discussed some risks and benefits and I think reasonable strategy, will discuss with Dr. Uribe in EP and place referral for next 1-2 weeks     2. History of PE and recent DVT of right popliteal vein:  -continue rivaroxaban  -will repeat ultrasound and perform vein comp study given new symptoms of swelling  -monitor for post thrombotic syndrome  -compression stockings   -would consider PE as trigger for rapid AFIB, will monitor for progressive SOB     3. Right toe discoloration:  -no arterial disease by toe PPGs, LE arterial duplex and ABIs   -could have element of distal superficial venous reflux as well given h/o DVT - vein comp as above      4. HTN: likely labile due to steroids and lupus   -continue diltiazem and metoprolol for now, BP check next visit and will assess medications based on AFIB discussion with Dr. Uribe     5. Nutrition: discussed functional nutritionist evaluation for food sensitivity testing     Will follow up with patient in 3 months.      Sil Muñoz MD MSc  [facility] [place]     HPI and Plan:   See dictation    Orders this Visit:  Orders Placed This Encounter   Procedures     US Venous Competency Bilateral     Follow-Up with Cardiologist     Orders Placed This Encounter   Medications     metoprolol succinate ER (TOPROL-XL) 100 MG 24 hr tablet     Sig: Take 0.5 tablets (50 mg) by mouth 2 times daily     Dispense:  30 tablet     Refill:  3     Medications Discontinued During This Encounter   Medication Reason     metoprolol succinate ER (TOPROL-XL) 50 MG 24 hr tablet        Encounter Diagnoses   Name Primary?     Atrial fibrillation with RVR (H)      Morbid obesity (H)      Stricture of artery (H)       Acute deep vein thrombosis (DVT) of popliteal vein of right lower extremity (H) Yes       CURRENT MEDICATIONS:  Current Outpatient Medications   Medication Sig  Dispense Refill     BIOTIN FORTE PO Take 1 tablet by mouth daily        carboxymethylcellulose (CARBOXYMETHYLCELLULOSE SODIUM) 0.5 % SOLN ophthalmic solution Place 1 drop into both eyes 2 times daily as needed  1 Bottle      CEPHALEXIN PO Take 500 mg by mouth as needed (Dental Procedure) Take 4 caps 1 hour prior to Dental Appointment       cyanocobalamin 1000 MCG/ML injection Inject 1 mL (1,000 mcg) Subcutaneous every 7 days 4 mL 5     cyclobenzaprine (FLEXERIL) 10 MG tablet Take 1 tablet (10 mg) by mouth 3 times daily 90 tablet 3     cycloSPORINE (RESTASIS) 0.05 % ophthalmic emulsion Place 1 drop into both eyes 2 times daily       diltiazem ER COATED BEADS (CARDIZEM CD/CARTIA XT) 180 MG 24 hr capsule Take 1 capsule (180 mg) by mouth daily 30 capsule 0     gabapentin (NEURONTIN) 300 MG capsule Take 300 mg by mouth 3 times daily In addition to 800 mg for total dose of 1100 mg       gabapentin (NEURONTIN) 800 MG tablet Take 800 mg by mouth 3 times daily In addition to 300 mg for total dose of 1100 mg       HYDROmorphone (DILAUDID) 8 MG tablet Take 8 mg by mouth every 8 hours . Max of 3 doses per day.       levothyroxine (SYNTHROID/LEVOTHROID) 100 MCG SOLR injection Inject 200 mcg into the vein once a week        levothyroxine (SYNTHROID/LEVOTHROID) 200 MCG tablet Take 1 tablet (200 mcg) by mouth 2 times daily 60 tablet 0     lifitegrast (XIIDRA) 5 % opthalmic solution Place 1 drop into both eyes 2 times daily       liothyronine (CYTOMEL) 25 MCG tablet Take 50 mcg by mouth 2 times daily        methylcellulose (CITRUCEL) powder Start with 1 heaping tablespoon. Increase as needed, 1 heaping tablespoon at a time, up to 3 times per day. 479 g 3     metoprolol succinate ER (TOPROL-XL) 100 MG 24 hr tablet Take 0.5 tablets (50 mg) by mouth 2 times daily 30 tablet 3     morphine (MS CONTIN) 30 MG 12 hr tablet Take 30 mg by mouth 2 times daily along with one morphine 60 mg 12 hr tablet for a total dose of 90 mg.  AM and  afternoon 270 tablet 0     morphine (MS CONTIN) 60 MG 12 hr tablet Take 60 mg by mouth 3 times daily  30 tablet 0     NASONEX 50 MCG/ACT spray Spray 1 spray into both nostrils daily as needed   11     nystatin (MYCOSTATIN) 244204 UNIT/GM external powder Apply topically 3 times daily as needed 60 g 1     prednisoLONE acetate (PRED FORTE) 1 % ophthalmic susp 1 drop 4 times daily       predniSONE (DELTASONE) 1 MG tablet Use along with one prednisone 5 mg tablets to alternate taking 8mg and 10mg every other day.  2     predniSONE (DELTASONE) 5 MG tablet Alternate taking 8mg and 10mg every other day       probiotic CAPS Take 1 capsule by mouth 2 times daily       rivaroxaban ANTICOAGULANT (XARELTO) 20 MG TABS tablet Take 20 mg by mouth daily (with dinner)        vitamin D2 (ERGOCALCIFEROL) 07091 units (1250 mcg) capsule Take 50,000 Units by mouth once a week       zolpidem (AMBIEN) 5 MG tablet Take 5 mg by mouth nightly as needed for sleep       fexofenadine (ALLEGRA) 180 MG tablet Take 180 mg by mouth daily as needed        levothyroxine (SYNTHROID/LEVOTHROID) 50 MCG tablet Take 1 tablet (50 mcg) by mouth 2 times daily (Patient not taking: Reported on 11/10/2020) 60 tablet 0     multivitamin, therapeutic with minerals (MULTI-VITAMIN) TABS tablet Take 1 tablet by mouth 2 times daily 100 tablet 3       ALLERGIES     Allergies   Allergen Reactions     Blood Transfusion Related (Informational Only) Other (See Comments)     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      Chlorhexidine Hives     Thor surgical cleanser     Codeine Hives     Has tolerated Oxycodone in past      Ibuprofen [Nsaids] Hives     Penicillins Hives     Other reaction(s): Unknown     Sulfa Drugs Hives     Other reaction(s): Unknown     Doxycycline GI Disturbance     Emesis & diarrhea  Patient denies allergy to this med     Hydroxyzine      rash     Mold      Red Wine Complex [Red Wine Powder]        PAST MEDICAL, SURGICAL, FAMILY, SOCIAL  HISTORY:  History was reviewed and updated as needed, see medical record.      Recent Lab Results:  LIPID RESULTS:  Lab Results   Component Value Date    CHOL 250 (H) 09/19/2018    HDL 93 09/19/2018     (H) 09/19/2018    TRIG 75 09/19/2018    CHOLHDLRATIO 3.1 01/06/2014       LIVER ENZYME RESULTS:  Lab Results   Component Value Date    AST 26 01/23/2020    ALT 35 01/23/2020       CBC RESULTS:  Lab Results   Component Value Date    WBC 5.6 11/01/2020    RBC 4.02 11/01/2020    HGB 10.8 (L) 11/01/2020    HCT 34.2 (L) 11/01/2020    MCV 85 11/01/2020    MCH 26.9 11/01/2020    MCHC 31.6 11/01/2020    RDW 14.6 11/01/2020     11/01/2020       BMP RESULTS:  Lab Results   Component Value Date     11/01/2020    POTASSIUM 4.2 11/02/2020    CHLORIDE 106 11/01/2020    CO2 29 11/01/2020    ANIONGAP 2 (L) 11/01/2020     (H) 11/01/2020    BUN 10 11/01/2020    CR 0.51 (L) 11/01/2020    GFRESTIMATED >90 11/01/2020    GFRESTBLACK >90 11/01/2020    KYLIE 8.9 11/01/2020        A1C RESULTS:  Lab Results   Component Value Date    A1C 6.0 (H) 12/09/2019       INR RESULTS:  Lab Results   Component Value Date    INR 1.63 (H) 01/22/2020    INR 1.08 01/22/2015       CC  Arcadio Farfan MD  [address] [address]    Review Of Systems  Skin: NEGATIVE  Eyes:Ears/Nose/Throat: vision problems  Respiratory: SOB  Cardiovascular:Chest pain, rt gfoot swells and gets cold- going to vascular   Gastrointestinal: NEGATIVE  Genitourinary:NEGATIVE  Musculoskeletal: NEGATIVE  Neurologic: NEGATIVE  Psychiatric: NEGATIVE  Hematologic/Lymphatic/Immunologic: allergies  Endocrine:  Thyroid disorder     Video-Visit Details    Type of service: PHONE    Video End Time: 20 minutes      Thank you for allowing me to participate in the care of your patient.    Sincerely,     Sil Muñoz MD     [facility]

## 2021-02-12 ENCOUNTER — TELEPHONE (OUTPATIENT)
Dept: CARDIOLOGY | Facility: CLINIC | Age: 61
End: 2021-02-12

## 2021-02-12 ENCOUNTER — OFFICE VISIT (OUTPATIENT)
Dept: CARDIOLOGY | Facility: CLINIC | Age: 61
End: 2021-02-12
Attending: INTERNAL MEDICINE
Payer: COMMERCIAL

## 2021-02-12 VITALS
DIASTOLIC BLOOD PRESSURE: 74 MMHG | HEART RATE: 130 BPM | BODY MASS INDEX: 40.91 KG/M2 | HEIGHT: 69 IN | SYSTOLIC BLOOD PRESSURE: 131 MMHG

## 2021-02-12 DIAGNOSIS — I48.91 ATRIAL FIBRILLATION WITH RVR (H): ICD-10-CM

## 2021-02-12 DIAGNOSIS — I48.91 ATRIAL FIBRILLATION WITH RVR (H): Primary | ICD-10-CM

## 2021-02-12 PROCEDURE — 99214 OFFICE O/P EST MOD 30 MIN: CPT | Performed by: INTERNAL MEDICINE

## 2021-02-12 PROCEDURE — 93000 ELECTROCARDIOGRAM COMPLETE: CPT | Performed by: INTERNAL MEDICINE

## 2021-02-12 RX ORDER — METOPROLOL SUCCINATE 100 MG/1
100 TABLET, EXTENDED RELEASE ORAL 2 TIMES DAILY
Qty: 30 TABLET | Refills: 3 | COMMUNITY
Start: 2021-02-12 | End: 2021-06-24

## 2021-02-12 NOTE — TELEPHONE ENCOUNTER
Per Dr Uribe would like Metoprolol Xl increased to 100 mg bid from 50 mg. Holter will be placed on 2/24 at 1300.  Pt  will give Endocrinologist the TSH from 11/2020 when pt is seen on 3/1.   Med list has been updated. Explained that Dr Uribe will review Holter and then will determine when follow up is needed.  No questions at this time. [email or web address]

## 2021-02-12 NOTE — LETTER
2/12/2021    Arcdaio Farfan MD  [facility] [address]  [facility] [address]    RE: Aspen Mccullough       Dear Colleague,    I had the pleasure of seeing Aspen Mccullough in the [facility].    HPI and Plan:   See dictation [id number]    Orders Placed This Encounter   Procedures     TSH     EKG 12-lead complete w/read - Clinics (future- to be scheduled)     Holter Monitor 24 hour Adult Pediatric       No orders of the defined types were placed in this encounter.      There are no discontinued medications.      Encounter Diagnosis   Name Primary?     Atrial fibrillation with RVR (H) Yes       CURRENT MEDICATIONS:  Current Outpatient Medications   Medication Sig Dispense Refill     BIOTIN FORTE PO Take 1 tablet by mouth daily        carboxymethylcellulose (CARBOXYMETHYLCELLULOSE SODIUM) 0.5 % SOLN ophthalmic solution Place 1 drop into both eyes 2 times daily as needed  1 Bottle      CEPHALEXIN PO Take 500 mg by mouth as needed (Dental Procedure) Take 4 caps 1 hour prior to Dental Appointment       cyanocobalamin 1000 MCG/ML injection Inject 1 mL (1,000 mcg) Subcutaneous every 7 days 4 mL 5     cyclobenzaprine (FLEXERIL) 10 MG tablet Take 1 tablet (10 mg) by mouth 3 times daily 90 tablet 3     cycloSPORINE (RESTASIS) 0.05 % ophthalmic emulsion Place 1 drop into both eyes 2 times daily       diltiazem ER COATED BEADS (CARDIZEM CD/CARTIA XT) 180 MG 24 hr capsule Take 1 capsule (180 mg) by mouth daily 30 capsule 0     fexofenadine (ALLEGRA) 180 MG tablet Take 180 mg by mouth daily as needed        gabapentin (NEURONTIN) 300 MG capsule Take 300 mg by mouth 3 times daily In addition to 800 mg for total dose of 1100 mg       gabapentin (NEURONTIN) 800 MG tablet Take 800 mg by mouth 3 times daily In addition to 300 mg for total dose of 1100 mg       HYDROmorphone (DILAUDID) 8 MG tablet Take 8 mg by mouth every 8 hours . Max of 3 doses per day.        levothyroxine (SYNTHROID/LEVOTHROID) 100 MCG SOLR injection Inject 200 mcg into the vein once a week        levothyroxine (SYNTHROID/LEVOTHROID) 200 MCG tablet Take 1 tablet (200 mcg) by mouth 2 times daily 60 tablet 0     levothyroxine (SYNTHROID/LEVOTHROID) 50 MCG tablet Take 1 tablet (50 mcg) by mouth 2 times daily 60 tablet 0     lifitegrast (XIIDRA) 5 % opthalmic solution Place 1 drop into both eyes 2 times daily       liothyronine (CYTOMEL) 25 MCG tablet Take 50 mcg by mouth 2 times daily        methylcellulose (CITRUCEL) powder Start with 1 heaping tablespoon. Increase as needed, 1 heaping tablespoon at a time, up to 3 times per day. 479 g 3     metoprolol succinate ER (TOPROL-XL) 100 MG 24 hr tablet Take 0.5 tablets (50 mg) by mouth 2 times daily 30 tablet 3     morphine (MS CONTIN) 30 MG 12 hr tablet Take 30 mg by mouth 2 times daily along with one morphine 60 mg 12 hr tablet for a total dose of 90 mg.  AM and afternoon 270 tablet 0     morphine (MS CONTIN) 60 MG 12 hr tablet Take 60 mg by mouth 3 times daily  30 tablet 0     multivitamin, therapeutic with minerals (MULTI-VITAMIN) TABS tablet Take 1 tablet by mouth 2 times daily 100 tablet 3     NASONEX 50 MCG/ACT spray Spray 1 spray into both nostrils daily as needed   11     nystatin (MYCOSTATIN) 376922 UNIT/GM external powder Apply topically 3 times daily as needed 60 g 1     prednisoLONE acetate (PRED FORTE) 1 % ophthalmic susp 1 drop 4 times daily       predniSONE (DELTASONE) 1 MG tablet Use along with one prednisone 5 mg tablets to alternate taking 8mg and 10mg every other day.  2     predniSONE (DELTASONE) 5 MG tablet Alternate taking 8mg and 10mg every other day       probiotic CAPS Take 1 capsule by mouth 2 times daily       rivaroxaban ANTICOAGULANT (XARELTO) 20 MG TABS tablet Take 20 mg by mouth daily (with dinner)        vitamin D2 (ERGOCALCIFEROL) 08397 units (1250 mcg) capsule Take 50,000 Units by mouth once a week       zolpidem (AMBIEN) 5 MG  tablet Take 5 mg by mouth nightly as needed for sleep         ALLERGIES     Allergies   Allergen Reactions     Blood Transfusion Related (Informational Only) Other (See Comments)     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      Chlorhexidine Hives     Thor surgical cleanser     Codeine Hives     Has tolerated Oxycodone in past      Ibuprofen [Nsaids] Hives     Penicillins Hives     Other reaction(s): Unknown     Sulfa Drugs Hives     Other reaction(s): Unknown     Doxycycline GI Disturbance     Emesis & diarrhea  Patient denies allergy to this med     Hydroxyzine      rash     Mold      Red Wine Complex [Red Wine Powder]        PAST MEDICAL HISTORY:  Past Medical History:   Diagnosis Date     ADHD (attention deficit hyperactivity disorder)      Allergic rhinitis      Chronic low back pain      Cushing's syndrome (H)      DDD (degenerative disc disease)      DDD (degenerative disc disease)      Deviated nasal septum      Diarrhea      Endometriosis      Fibromyalgia      Hashimoto's disease      Hyperlipidemia      Hypothyroid     hx hashimoto's thyroiditis     Insomnia      Lupus (H)      Malabsorption      Pernicious anemia      Renal disease     chronic renal insufficiency     Sinusitis        PAST SURGICAL HISTORY:  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)     APPENDECTOMY       ARTHRODESIS ANKLE      right     ARTHROPLASTY KNEE BILATERAL       ARTHROPLASTY REVISION KNEE  4/19/2011    Procedure:ARTHROPLASTY REVISION KNEE; With Antibiotic Cement ; Surgeon:CHAO OLIVARES; Location:[facility]     BREAST SURGERY      right- tissue remove nipple area     BUNIONECTOMY  12/14/2011    Procedure:BUNIONECTOMY; Right Bunion Correction; Surgeon:GRACE ZARATE; Location:[facility] STOMACH SURGERY PROCEDURE UNLISTED      see list which we will bring     CHOLECYSTECTOMY       EXAM UNDER ANESTHESIA ANUS N/A 7/15/2020    Procedure:  EXAM UNDER ANESTHESIA, ANUS;  Surgeon: Luis Canales MD;  Location: [facility] OR     EXCISE MASS UPPER EXTREMITY  12/14/2011    Procedure:EXCISE MASS UPPER EXTREMITY; Excision of Left Arm Mass; Surgeon:GRACE ZARATE; Location:[facility]     FOOT SURGERY      left X 4     FUSION LUMBAR ANTERIOR ONE LEVEL       HC SACROPLASTY      see list which we will bring     HEMORRHOIDECTOMY INTERNAL N/A 7/15/2020    Procedure: Exam under anesthesia, hemorrhoidectomy;  Surgeon: Luis Canales MD;  [facility]     INJECT NERVE BLOCK SUPRASCAPULAR Left 5/18/2018    Procedure: INJECT NERVE BLOCK SUPRASCAPULAR;  Left Suprascapular Nerve Block;  Surgeon: Basim Velazquez MD;  [facility]: [facility]     INJECT NERVE BLOCK SUPRASCAPULAR Right 7/23/2018    Procedure: INJECT NERVE BLOCK SUPRASCAPULAR;  Right suprascapular injection;  Surgeon: Basim Velazquez MD;  [facility]: [facility]     INJECT NERVE BLOCK SUPRASCAPULAR Bilateral 10/29/2018    Procedure: Bilateral Suprascapular Nerve Blocks;  Surgeon: Basim Velazquez MD;  [facility]     INJECT NERVE BLOCK SUPRASCAPULAR Bilateral 3/15/2019    Procedure: Bilateral Suprascapular Nerve Block;  Surgeon: Basim Velazquez MD;  [facility]     INJECT NERVE BLOCK SUPRASCAPULAR Bilateral 12/31/2019    Procedure: bilateral suprascapular nerve block;  Surgeon: Basim Velazquez MD;  Location: [facility]     KNEE SURGERY      see list which we will bring     LAMINECTOMY LUMBAR ONE LEVEL      L4-5     LAPAROSCOPIC ABLATION ENDOMETRIOSIS       RADIO FREQUENCY ABLATION PULSED CERVICAL Bilateral 7/10/2019    Procedure: Bilateral Suprascapular Nerve Pulsed Radiofrequency Ablation;  Surgeon: Basim Velazquez MD;  Location:  OR     REMOVE HARDWARE FOOT  12/14/2011    Procedure:REMOVE HARDWARE FOOT; Hardware Removal Right Foot (Mini-C-Arm) ; Surgeon:GRACE ZARATE; Location:[facility]     RHINOPLASTY       SALPINGO-OOPHORECTOMY BILATERAL       TONSILLECTOMY         FAMILY  HISTORY:  Family History   Problem Relation Age of Onset     Hypertension Mother      No Known Problems Father      No Known Problems Sister      No Known Problems Brother      No Known Problems Maternal Grandmother      No Known Problems Maternal Grandfather      No Known Problems Paternal Grandmother      No Known Problems Other        SOCIAL HISTORY:  Social History     Socioeconomic History     Marital status:      Spouse name: None     Number of children: None     Years of education: None     Highest education level: None   Occupational History     None   Social Needs     Financial resource strain: None     Food insecurity     Worry: None     Inability: None     Transportation needs     Medical: None     Non-medical: None   Tobacco Use     Smoking status: Former Smoker     Packs/day: 1.50     Years: 20.00     Pack years: 30.00     Types: Cigarettes     Start date: 1973     Quit date: 1993     Years since quittin.1     Smokeless tobacco: Never Used   Substance and Sexual Activity     Alcohol use: No     Alcohol/week: 0.0 standard drinks     Drug use: No     Sexual activity: Not Currently     Partners: Male     Birth control/protection: Post-menopausal   Lifestyle     Physical activity     Days per week: None     Minutes per session: None     Stress: None   Relationships     Social connections     Talks on phone: None     Gets together: None     Attends [employer] service: None     Active member of club or organization: None     Attends meetings of clubs or organizations: None     Relationship status: None     Intimate partner violence     Fear of current or ex partner: None     Emotionally abused: None     Physically abused: None     Forced sexual activity: None   Other Topics Concern     Parent/sibling w/ CABG, MI or angioplasty before 65F 55M? Not Asked   Social History Narrative    ** Merged History Encounter **            Review of Systems:  Skin:  Negative       Eyes:  Positive for    see's specialist  ENT:  Negative      Respiratory:  Positive for shortness of breath     Cardiovascular:    Positive for;chest pain rt foot  Gastroenterology: Negative      Genitourinary:  Negative      Musculoskeletal:  not assessed      Neurologic:  Negative      Psychiatric:  not assessed      Heme/Lymph/Imm:  Positive for allergies    Endocrine:  Positive for thyroid disorder                    Thank you for allowing me to participate in the care of your patient.      Sincerely,     Rosa Uribe MD     [facility]  cc:   Sil Muñoz MD  [address] [place] [id number]

## 2021-02-12 NOTE — PROGRESS NOTES
"Service Date: 02/12/2021      HISTORY OF PRESENT ILLNESS:    It was my pleasure seeing Ms. Aspen Mccullough for evaluation of atrial fibrillation.  She is a 61-year-old female who has been referred to EP by my partner, Dr. Sil Muñoz.      Ms. Mccullough has past medical history of recurrent DVT/PE (on chronic anticoagulation, currently rivaroxaban), lupus, on chronic steroids, hypertension, dyslipidemia, hypothyroidism, obesity, chronic back pain and recently diagnosed atrial fibrillation.      The patient recalls having her first episode of atrial fibrillation in 11/2017, though the first time I could find established recordings of this arrhythmia, at least in our records, was in 10/2020.  She was then admitted for DVT and was found to be in rapid atrial fibrillation.  She was started on diltiazem and metoprolol.  The patient has no specific awareness of atrial fibrillation.  However, she has felt quite fatigued in the past several months.  Mrs. Mccullough states she was previously tested for sleep apnea and does not have significant MARTHA.      Current medications for atrial fibrillation include diltiazem  mg daily, rivaroxaban and metoprolol XL.  She is uncertain how much metoprolol she takes.       She recently underwent surgery on her left foot and is recovering from this.  She takes multiple medications, including gabapentin and MS Contin.  The patient appeared tired during our appointment today and kept dozing off during our conversation.  She said she slept poorly for the past 3 or 4 nights.  Her  accompanied her to today's appointment.      The patient is minimally active at the moment.  She has no reproducible chest discomfort.  She recalls of having a \"chemical stress test in the past,\" though I could not find such record in [facility].  No bleeding on rivaroxaban.      The patient is a nonsmoker, nondrinker.  She is adopted.  Her mother is alive at age 78 and has no history of heart " disease.  There is no early onset heart disease in her siblings.        PHYSICAL EXAMINATION:   VITAL SIGNS:  Blood pressure 131/74, heart rate 130 and irregular, height 175 cm, weight (11/2020 was 277 pounds), the patient did not wish to be weighed today.   GENERAL:  She is a pleasant woman in no apparent distress, accompanied by her .   HEENT:  Normocephalic and atraumatic.   NECK:  Supple without carotid bruits.  Normal JVP.   LUNGS:  Clear.  No crackles or wheezing.   CARDIOVASCULAR:  Irregularly irregular rhythm without gallop, murmur or rub.   ABDOMEN:  Obese, soft, nontender.   EXTREMITIES:  There is at least trace lower extremity edema.  She wears a boot on her left foot following recent surgery.   NEUROLOGIC:  Alert and oriented x3.        DIAGNOSTIC STUDIES:    - Recent laboratory tests:  Sodium 137, potassium 4.2, creatinine 0.51.  TSH less than 0.01.   - 12-lead ECG today showed atrial fibrillation with RVR, ventricular rate in the 130s.  No significant ST abnormality.   - I reviewed a 7-day leadless ECG monitor from 11/2020.  At the time, the patient was in paroxysmal AF (burden 45%, longest event 2 days and 4 hours).  The patient activated the monitor in association with RVR.   - Echocardiogram in 11/2020 was a TDS.  LVEF 55-60%, no significant valvular abnormality.          IMPRESSION:   1.  Paroxysmal atrial fibrillation with RVR.  The patient was significantly tachycardic at rest today (130s) but had no awareness of her atrial fibrillation.  Rapid rates place her at risk for cardiomyopathy.  So far, her LVEF has been normal.  Mrs. Mccullough's fatigue may be partly related to atrial fibrillation, though it is difficult to be sure, especially given her less than ideal sleep pattern and multiple chronic medications which include gabapentin and MS Contin.  The patient looked tired today and kept dozing off during the appointment.      Her [id number]-LFDa score is 2 (gender, HTN).  On chronic  "anticoagulation for h/o DVT/PE.       Ms. Mccullough made it clear that she is not keen in having invasive procedures, unless \"absolutely necessary\".  She is not interested in chronic antiarrhythmic drug therapy, as these medications have potentially serious side-effects.      Given her preferences, the best remaining option is to optimize rate control.  She takes diltiazem  mg, but it is unclear how much metoprolol she actually takes (*).        If rate control fails, we may later consider AF ablation.  I provided a pamphlet with information regarding AF ablation but, given her wishes, we will not pursue unless we are left with no other choice.  I explained that AF ablation does not always work (success rate in her case would be around 60%-70% with a single procedure) and there is risk of serious complication (about 3%).      RECOMMENDATIONS:   A.  Focus on rate control for the time being.  Her  will call back today and update us regarding her dose of metoprolol (*).  We plan to increase it and obtain a 24-hour Holter monitor 1-2 weeks later.   B.  She requires chronic anticoagulation for previous DVT/PEs.  Current dose of rivaroxaban is also sufficient for her atrial fibrillation.   C.  I reassured Mrs. Mccullough that as long as she remains on anticoagulation and her heart rate is adequately controlled, AF is not expected to decrease her life expectancy.   D.  After she left the clinic, I noted that her most recent TSH was <0.01 (11/01/2020).  We will find out whether this abnormal lab was seen by her primary care provider (*).  Thyroid over supplementation significantly increases ventricular rate during AF.       I appreciate the opportunity to be part of her care.        RACHEL MENDOZA MD, [facility]      (*) ADDENDUM:  - Her current dose of Toprol XL is 50 mg bid.  She was asked to increase to 100 mg bid.   - She informed us that an endocrinologist is following her thyroid function.  She will be " seeing him in a few days.            cc:   Arcadio Farfan MD   [facility] [place], [facility]   [address] [place]  [address]      Sil Muñoz MD   [facility]   [address] [place] [address]  [id number]          D: 2021   T: 2021   MT: tho      Name:     ROXY ALVARES   MRN:      [id number]        Account:      [id number]   :      1960           Service Date: 2021      Document: [id number]

## 2021-02-12 NOTE — LETTER
2/12/2021      Arcadio Farfan MD  [facility], operated by [facility] [address]  [facility] [address]      RE: Aspen Mccullough       Dear Colleague,    I had the pleasure of seeing Aspen Mccullough in the [facility].    Service Date: 02/12/2021      HISTORY OF PRESENT ILLNESS:    It was my pleasure seeing Ms. Aspen Mccullough for evaluation of atrial fibrillation.  She is a 61-year-old female who has been referred to EP by my partner, Dr. Sil Muñoz.      Ms. Mccullough has past medical history of recurrent DVT/PE (on chronic anticoagulation, currently rivaroxaban), lupus, on chronic steroids, hypertension, dyslipidemia, hypothyroidism, obesity, chronic back pain and recently diagnosed atrial fibrillation.      The patient recalls having her first episode of atrial fibrillation in 11/2017, though the first time I could find established recordings of this arrhythmia, at least in our records, was in 10/2020.  She was then admitted for DVT and was found to be in rapid atrial fibrillation.  She was started on diltiazem and metoprolol.  The patient has no specific awareness of atrial fibrillation.  However, she has felt quite fatigued in the past several months.  Mrs. Mccullough states she was previously tested for sleep apnea and does not have significant MARTHA.      Current medications for atrial fibrillation include diltiazem  mg daily, rivaroxaban and metoprolol XL.  She is uncertain how much metoprolol she takes.       She recently underwent surgery on her left foot and is recovering from this.  She takes multiple medications, including gabapentin and MS Contin.  The patient appeared tired during our appointment today and kept dozing off during our conversation.  She said she slept poorly for the past 3 or 4 nights.  Her  accompanied her to today's appointment.      The patient is minimally active at the moment.  She has no reproducible chest  "discomfort.  She recalls of having a \"chemical stress test in the past,\" though I could not find such record in [facility].  No bleeding on rivaroxaban.      The patient is a nonsmoker, nondrinker.  She is adopted.  Her mother is alive at age 78 and has no history of heart disease.  There is no early onset heart disease in her siblings.        PHYSICAL EXAMINATION:   VITAL SIGNS:  Blood pressure 131/74, heart rate 130 and irregular, height 175 cm, weight (11/2020 was 277 pounds), the patient did not wish to be weighed today.   GENERAL:  She is a pleasant woman in no apparent distress, accompanied by her .   HEENT:  Normocephalic and atraumatic.   NECK:  Supple without carotid bruits.  Normal JVP.   LUNGS:  Clear.  No crackles or wheezing.   CARDIOVASCULAR:  Irregularly irregular rhythm without gallop, murmur or rub.   ABDOMEN:  Obese, soft, nontender.   EXTREMITIES:  There is at least trace lower extremity edema.  She wears a boot on her left foot following recent surgery.   NEUROLOGIC:  Alert and oriented x3.        DIAGNOSTIC STUDIES:    - Recent laboratory tests:  Sodium 137, potassium 4.2, creatinine 0.51.  TSH less than 0.01.   - 12-lead ECG today showed atrial fibrillation with RVR, ventricular rate in the 130s.  No significant ST abnormality.   - I reviewed a 7-day leadless ECG monitor from 11/2020.  At the time, the patient was in paroxysmal AF (burden 45%, longest event 2 days and 4 hours).  The patient activated the monitor in association with RVR.   - Echocardiogram in 11/2020 was a TDS.  LVEF 55-60%, no significant valvular abnormality.          IMPRESSION:   1.  Paroxysmal atrial fibrillation with RVR.  The patient was significantly tachycardic at rest today (130s) but had no awareness of her atrial fibrillation.  Rapid rates place her at risk for cardiomyopathy.  So far, her LVEF has been normal.  Mrs. Mccullough's fatigue may be partly related to atrial fibrillation, though it is difficult " "to be sure, especially given her less than ideal sleep pattern and multiple chronic medications which include gabapentin and MS Contin.  The patient looked tired today and kept dozing off during the appointment.      Her HIF2EG5-VSLu score is 2 (gender, HTN).  On chronic anticoagulation for h/o DVT/PE.       Ms. Mccullough made it clear that she is not keen in having invasive procedures, unless \"absolutely necessary\".  She is not interested in chronic antiarrhythmic drug therapy, as these medications have potentially serious side-effects.      Given her preferences, the best remaining option is to optimize rate control.  She takes diltiazem  mg, but it is unclear how much metoprolol she actually takes (*).        If rate control fails, we may later consider AF ablation.  I provided a pamphlet with information regarding AF ablation but, given her wishes, we will not pursue unless we are left with no other choice.  I explained that AF ablation does not always work (success rate in her case would be around 60%-70% with a single procedure) and there is risk of serious complication (about 3%).      RECOMMENDATIONS:   A.  Focus on rate control for the time being.  Her  will call back today and update us regarding her dose of metoprolol (*).  We plan to increase it and obtain a 24-hour Holter monitor 1-2 weeks later.   B.  She requires chronic anticoagulation for previous DVT/PEs.  Current dose of rivaroxaban is also sufficient for her atrial fibrillation.   C.  I reassured Mrs. Mccullough that as long as she remains on anticoagulation and her heart rate is adequately controlled, AF is not expected to decrease her life expectancy.   D.  After she left the clinic, I noted that her most recent TSH was <0.01 (11/01/2020).  We will find out whether this abnormal lab was seen by her primary care provider (*).  Thyroid over supplementation significantly increases ventricular rate during AF.       I appreciate the " opportunity to be part of her care.        RACHEL MENDOZA MD, [facility]      (*) ADDENDUM:  - Her current dose of Toprol XL is 50 mg bid.  She was asked to increase to 100 mg bid.   - She informed us that an endocrinologist is following her thyroid function.  She will be seeing him in a few days.            cc:   Arcadio Farfan MD   [facility]   [address] [place] [address]  [id number]      Sil Muñoz MD   [facility]   [address] [place] [address]  [id number]          D: 2021   T: 2021   MT: tho      Name:     ROXY ALVARES   MRN:      [id number]        Account:      [id number]   :      1960           Service Date: 2021      Document: [id number]            Outpatient Encounter Medications as of 2021   Medication Sig Dispense Refill     BIOTIN FORTE PO Take 1 tablet by mouth daily        carboxymethylcellulose (CARBOXYMETHYLCELLULOSE SODIUM) 0.5 % SOLN ophthalmic solution Place 1 drop into both eyes 2 times daily as needed  1 Bottle      CEPHALEXIN PO Take 500 mg by mouth as needed (Dental Procedure) Take 4 caps 1 hour prior to Dental Appointment       cyanocobalamin 1000 MCG/ML injection Inject 1 mL (1,000 mcg) Subcutaneous every 7 days 4 mL 5     cyclobenzaprine (FLEXERIL) 10 MG tablet Take 1 tablet (10 mg) by mouth 3 times daily 90 tablet 3     cycloSPORINE (RESTASIS) 0.05 % ophthalmic emulsion Place 1 drop into both eyes 2 times daily       diltiazem ER COATED BEADS (CARDIZEM CD/CARTIA XT) 180 MG 24 hr capsule Take 1 capsule (180 mg) by mouth daily 30 capsule 0     fexofenadine (ALLEGRA) 180 MG tablet Take 180 mg by mouth daily as needed        gabapentin (NEURONTIN) 300 MG capsule Take 300 mg by mouth 3 times daily In addition to 800 mg for total dose of 1100 mg       gabapentin (NEURONTIN) 800 MG tablet Take 800 mg by mouth 3 times daily In addition to 300 mg for total dose of 1100 mg       HYDROmorphone (DILAUDID) 8 MG tablet  Take 8 mg by mouth every 8 hours . Max of 3 doses per day.       levothyroxine (SYNTHROID/LEVOTHROID) 100 MCG SOLR injection Inject 200 mcg into the vein once a week        levothyroxine (SYNTHROID/LEVOTHROID) 200 MCG tablet Take 1 tablet (200 mcg) by mouth 2 times daily 60 tablet 0     levothyroxine (SYNTHROID/LEVOTHROID) 50 MCG tablet Take 1 tablet (50 mcg) by mouth 2 times daily 60 tablet 0     lifitegrast (XIIDRA) 5 % opthalmic solution Place 1 drop into both eyes 2 times daily       liothyronine (CYTOMEL) 25 MCG tablet Take 50 mcg by mouth 2 times daily        methylcellulose (CITRUCEL) powder Start with 1 heaping tablespoon. Increase as needed, 1 heaping tablespoon at a time, up to 3 times per day. 479 g 3     morphine (MS CONTIN) 30 MG 12 hr tablet Take 30 mg by mouth 2 times daily along with one morphine 60 mg 12 hr tablet for a total dose of 90 mg.  AM and afternoon 270 tablet 0     morphine (MS CONTIN) 60 MG 12 hr tablet Take 60 mg by mouth 3 times daily  30 tablet 0     multivitamin, therapeutic with minerals (MULTI-VITAMIN) TABS tablet Take 1 tablet by mouth 2 times daily 100 tablet 3     NASONEX 50 MCG/ACT spray Spray 1 spray into both nostrils daily as needed   11     nystatin (MYCOSTATIN) 390701 UNIT/GM external powder Apply topically 3 times daily as needed 60 g 1     prednisoLONE acetate (PRED FORTE) 1 % ophthalmic susp 1 drop 4 times daily       predniSONE (DELTASONE) 1 MG tablet Use along with one prednisone 5 mg tablets to alternate taking 8mg and 10mg every other day.  2     predniSONE (DELTASONE) 5 MG tablet Alternate taking 8mg and 10mg every other day       probiotic CAPS Take 1 capsule by mouth 2 times daily       rivaroxaban ANTICOAGULANT (XARELTO) 20 MG TABS tablet Take 20 mg by mouth daily (with dinner)        vitamin D2 (ERGOCALCIFEROL) 79033 units (1250 mcg) capsule Take 50,000 Units by mouth once a week       zolpidem (AMBIEN) 5 MG tablet Take 5 mg by mouth nightly as needed for sleep        [DISCONTINUED] metoprolol succinate ER (TOPROL-XL) 100 MG 24 hr tablet Take 0.5 tablets (50 mg) by mouth 2 times daily 30 tablet 3     Facility-Administered Encounter Medications as of 2/12/2021   Medication Dose Route Frequency Provider Last Rate Last Admin     botulinum toxin type A (BOTOX) 100 units injection 100 Units  100 Units Intramuscular Once Laura Tony MD         botulinum toxin type A (BOTOX) 100 units injection 100 Units  100 Units Intramuscular Once Laura Tony MD         botulinum toxin type A (BOTOX) 100 units injection 100 Units  100 Units Other Once Laura Tony MD         lidocaine 1% with EPINEPHrine 1:100,000 injection 3 mL  3 mL Intradermal Once Laura Tony MD           Again, thank you for allowing me to participate in the care of your patient.      Sincerely,    Rosa Uribe MD     [facility]

## 2021-02-12 NOTE — PROGRESS NOTES
HPI and Plan:   See dictation [id number]    Orders Placed This Encounter   Procedures     TSH     EKG 12-lead complete w/read - Clinics (future- to be scheduled)     Holter Monitor 24 hour Adult Pediatric       No orders of the defined types were placed in this encounter.      There are no discontinued medications.      Encounter Diagnosis   Name Primary?     Atrial fibrillation with RVR (H) Yes       CURRENT MEDICATIONS:  Current Outpatient Medications   Medication Sig Dispense Refill     BIOTIN FORTE PO Take 1 tablet by mouth daily        carboxymethylcellulose (CARBOXYMETHYLCELLULOSE SODIUM) 0.5 % SOLN ophthalmic solution Place 1 drop into both eyes 2 times daily as needed  1 Bottle      CEPHALEXIN PO Take 500 mg by mouth as needed (Dental Procedure) Take 4 caps 1 hour prior to Dental Appointment       cyanocobalamin 1000 MCG/ML injection Inject 1 mL (1,000 mcg) Subcutaneous every 7 days 4 mL 5     cyclobenzaprine (FLEXERIL) 10 MG tablet Take 1 tablet (10 mg) by mouth 3 times daily 90 tablet 3     cycloSPORINE (RESTASIS) 0.05 % ophthalmic emulsion Place 1 drop into both eyes 2 times daily       diltiazem ER COATED BEADS (CARDIZEM CD/CARTIA XT) 180 MG 24 hr capsule Take 1 capsule (180 mg) by mouth daily 30 capsule 0     fexofenadine (ALLEGRA) 180 MG tablet Take 180 mg by mouth daily as needed        gabapentin (NEURONTIN) 300 MG capsule Take 300 mg by mouth 3 times daily In addition to 800 mg for total dose of 1100 mg       gabapentin (NEURONTIN) 800 MG tablet Take 800 mg by mouth 3 times daily In addition to 300 mg for total dose of 1100 mg       HYDROmorphone (DILAUDID) 8 MG tablet Take 8 mg by mouth every 8 hours . Max of 3 doses per day.       levothyroxine (SYNTHROID/LEVOTHROID) 100 MCG SOLR injection Inject 200 mcg into the vein once a week        levothyroxine (SYNTHROID/LEVOTHROID) 200 MCG tablet Take 1 tablet (200 mcg) by mouth 2 times daily 60 tablet 0     levothyroxine (SYNTHROID/LEVOTHROID) 50 MCG tablet  Take 1 tablet (50 mcg) by mouth 2 times daily 60 tablet 0     lifitegrast (XIIDRA) 5 % opthalmic solution Place 1 drop into both eyes 2 times daily       liothyronine (CYTOMEL) 25 MCG tablet Take 50 mcg by mouth 2 times daily        methylcellulose (CITRUCEL) powder Start with 1 heaping tablespoon. Increase as needed, 1 heaping tablespoon at a time, up to 3 times per day. 479 g 3     metoprolol succinate ER (TOPROL-XL) 100 MG 24 hr tablet Take 0.5 tablets (50 mg) by mouth 2 times daily 30 tablet 3     morphine (MS CONTIN) 30 MG 12 hr tablet Take 30 mg by mouth 2 times daily along with one morphine 60 mg 12 hr tablet for a total dose of 90 mg.  AM and afternoon 270 tablet 0     morphine (MS CONTIN) 60 MG 12 hr tablet Take 60 mg by mouth 3 times daily  30 tablet 0     multivitamin, therapeutic with minerals (MULTI-VITAMIN) TABS tablet Take 1 tablet by mouth 2 times daily 100 tablet 3     NASONEX 50 MCG/ACT spray Spray 1 spray into both nostrils daily as needed   11     nystatin (MYCOSTATIN) 306066 UNIT/GM external powder Apply topically 3 times daily as needed 60 g 1     prednisoLONE acetate (PRED FORTE) 1 % ophthalmic susp 1 drop 4 times daily       predniSONE (DELTASONE) 1 MG tablet Use along with one prednisone 5 mg tablets to alternate taking 8mg and 10mg every other day.  2     predniSONE (DELTASONE) 5 MG tablet Alternate taking 8mg and 10mg every other day       probiotic CAPS Take 1 capsule by mouth 2 times daily       rivaroxaban ANTICOAGULANT (XARELTO) 20 MG TABS tablet Take 20 mg by mouth daily (with dinner)        vitamin D2 (ERGOCALCIFEROL) 38039 units (1250 mcg) capsule Take 50,000 Units by mouth once a week       zolpidem (AMBIEN) 5 MG tablet Take 5 mg by mouth nightly as needed for sleep         ALLERGIES     Allergies   Allergen Reactions     Blood Transfusion Related (Informational Only) Other (See Comments)     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      Chlorhexidine Hives     Thor  surgical cleanser     Codeine Hives     Has tolerated Oxycodone in past      Ibuprofen [Nsaids] Hives     Penicillins Hives     Other reaction(s): Unknown     Sulfa Drugs Hives     Other reaction(s): Unknown     Doxycycline GI Disturbance     Emesis & diarrhea  Patient denies allergy to this med     Hydroxyzine      rash     Mold      Red Wine Complex [Red Wine Powder]        PAST MEDICAL HISTORY:  Past Medical History:   Diagnosis Date     ADHD (attention deficit hyperactivity disorder)      Allergic rhinitis      Chronic low back pain      Cushing's syndrome (H)      DDD (degenerative disc disease)      DDD (degenerative disc disease)      Deviated nasal septum      Diarrhea      Endometriosis      Fibromyalgia      Hashimoto's disease      Hyperlipidemia      Hypothyroid     hx hashimoto's thyroiditis     Insomnia      Lupus (H)      Malabsorption      Pernicious anemia      Renal disease     chronic renal insufficiency     Sinusitis        PAST SURGICAL HISTORY:  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 STOMACH SURGERY PROCEDURE UNLISTED      see list which we will bring     CHOLECYSTECTOMY       EXAM UNDER ANESTHESIA ANUS N/A 7/15/2020    Procedure: EXAM UNDER ANESTHESIA, ANUS;  Surgeon: Luis Canales MD;  Location: [facility] OR     EXCISE MASS UPPER EXTREMITY  12/14/2011    Procedure:EXCISE MASS UPPER EXTREMITY; Excision of Left Arm Mass; Surgeon:GRACE ZARATE; Location:[facility] [place]     FOOT SURGERY       left X 4     FUSION LUMBAR ANTERIOR ONE LEVEL       HC SACROPLASTY      see list which we will bring     HEMORRHOIDECTOMY INTERNAL N/A 7/15/2020    Procedure: Exam under anesthesia, hemorrhoidectomy;  Surgeon: Luis Canales MD;  Location: [facility]     INJECT NERVE BLOCK SUPRASCAPULAR Left 5/18/2018    Procedure: INJECT NERVE BLOCK SUPRASCAPULAR;  Left Suprascapular Nerve Block;  Surgeon: Basim Velazquez MD;  Location: [facility]     INJECT NERVE BLOCK SUPRASCAPULAR Right 7/23/2018    Procedure: INJECT NERVE BLOCK SUPRASCAPULAR;  Right suprascapular injection;  Surgeon: Basim Velazquez MD;  [facility]: [facility]     INJECT NERVE BLOCK SUPRASCAPULAR Bilateral 10/29/2018    Procedure: Bilateral Suprascapular Nerve Blocks;  Surgeon: Basim Velazquez MD;  [facility]     INJECT NERVE BLOCK SUPRASCAPULAR Bilateral 3/15/2019    Procedure: Bilateral Suprascapular Nerve Block;  Surgeon: Basim Velazquez MD;  [facility]: [facility]     INJECT NERVE BLOCK SUPRASCAPULAR Bilateral 12/31/2019    Procedure: bilateral suprascapular nerve block;  Surgeon: Basim Velazquez MD;  Location: [facility]     KNEE SURGERY      see list which we will bring     LAMINECTOMY LUMBAR ONE LEVEL      L4-5     LAPAROSCOPIC ABLATION ENDOMETRIOSIS       RADIO FREQUENCY ABLATION PULSED CERVICAL Bilateral 7/10/2019    Procedure: Bilateral Suprascapular Nerve Pulsed Radiofrequency Ablation;  Surgeon: Basim Velazquez MD;  Location: [facility]     REMOVE HARDWARE FOOT  12/14/2011    Procedure:REMOVE HARDWARE FOOT; Hardware Removal Right Foot (Mini-C-Arm) ; Surgeon:GRACE ZARATE; Location:[facility]     RHINOPLASTY       SALPINGO-OOPHORECTOMY BILATERAL       TONSILLECTOMY         FAMILY HISTORY:  Family History   Problem Relation Age of Onset     Hypertension Mother      No Known Problems Father      No Known Problems Sister      No Known Problems Brother      No Known Problems Maternal Grandmother      No Known Problems Maternal Grandfather      No  Known Problems Paternal Grandmother      No Known Problems Other        SOCIAL HISTORY:  Social History     Socioeconomic History     Marital status:      Spouse name: None     Number of children: None     Years of education: None     Highest education level: None   Occupational History     None   Social Needs     Financial resource strain: None     Food insecurity     Worry: None     Inability: None     Transportation needs     Medical: None     Non-medical: None   Tobacco Use     Smoking status: Former Smoker     Packs/day: 1.50     Years: 20.00     Pack years: 30.00     Types: Cigarettes     Start date: 1973     Quit date: 1993     Years since quittin.1     Smokeless tobacco: Never Used   Substance and Sexual Activity     Alcohol use: No     Alcohol/week: 0.0 standard drinks     Drug use: No     Sexual activity: Not Currently     Partners: Male     Birth control/protection: Post-menopausal   Lifestyle     Physical activity     Days per week: None     Minutes per session: None     Stress: None   Relationships     Social connections     Talks on phone: None     Gets together: None     Attends Caodaism service: None     Active member of club or organization: None     Attends meetings of clubs or organizations: None     Relationship status: None     Intimate partner violence     Fear of current or ex partner: None     Emotionally abused: None     Physically abused: None     Forced sexual activity: None   Other Topics Concern     Parent/sibling w/ CABG, MI or angioplasty before 65F 55M? Not Asked   Social History Narrative    ** Merged History Encounter **            Review of Systems:  Skin:  Negative       Eyes:  Positive for   see's specialist  ENT:  Negative      Respiratory:  Positive for shortness of breath     Cardiovascular:    Positive for;chest pain rt foot  Gastroenterology: Negative      Genitourinary:  Negative      Musculoskeletal:  not assessed      Neurologic:  Negative       Psychiatric:  not assessed      Heme/Lymph/Imm:  Positive for allergies    Endocrine:  Positive for thyroid disorder

## 2021-02-18 ENCOUNTER — ANCILLARY PROCEDURE (OUTPATIENT)
Dept: VASCULAR ULTRASOUND | Facility: CLINIC | Age: 61
End: 2021-02-18
Attending: INTERNAL MEDICINE
Payer: COMMERCIAL

## 2021-02-18 DIAGNOSIS — Z86.718 HISTORY OF DEEP VENOUS THROMBOSIS: ICD-10-CM

## 2021-02-18 DIAGNOSIS — I83.90 ASYMPTOMATIC VARICOSE VEINS: ICD-10-CM

## 2021-02-18 DIAGNOSIS — I82.431 ACUTE DEEP VEIN THROMBOSIS (DVT) OF POPLITEAL VEIN OF RIGHT LOWER EXTREMITY (H): ICD-10-CM

## 2021-02-18 PROCEDURE — 93970 EXTREMITY STUDY: CPT | Performed by: INTERNAL MEDICINE

## 2021-02-22 ENCOUNTER — TELEPHONE (OUTPATIENT)
Dept: CARDIOLOGY | Facility: CLINIC | Age: 61
End: 2021-02-22

## 2021-02-22 NOTE — TELEPHONE ENCOUNTER
Bilateral venous comp results noted 2/18/21:    Impression:  1. Right leg: No DVT.  Reflux of 4.8-5.1 seconds in thigh vessels  ranging from 4.6-9.1 mm.  Mid calf and ankle not seen due to edema and  depth. Thigh Ext. and SSV with 1.9 and 2.4 seconds of reflux in  vessels of 2.1 and 4.6 mm.  Tributary without reflux.  Reflux also  seen in the deep venous system.      2. Left leg: No DVT.  No reflux in the deep venous system.  Reflux in  the greater saphenous vein at SFJ and mid thigh ranging from 0.7 - 2.2  seconds with vessels ranging from 5.5-6.5 mm.    Reflux of 0.7 - 2.0  seconds from the knee to the ankle in vessels 3.9-5.3 mm.  Tributary  without reflux.     The anatomy appears to be amenable to treatment if clinically  indicated    Will route to Dr. Muñoz for review.

## 2021-02-24 ENCOUNTER — HOSPITAL ENCOUNTER (OUTPATIENT)
Dept: CARDIOLOGY | Facility: CLINIC | Age: 61
Discharge: HOME | End: 2021-02-24
Attending: FAMILY MEDICINE | Admitting: FAMILY MEDICINE
Payer: COMMERCIAL

## 2021-02-24 DIAGNOSIS — I48.91 ATRIAL FIBRILLATION WITH RVR (H): ICD-10-CM

## 2021-02-24 PROCEDURE — 93227 XTRNL ECG REC<48 HR R&I: CPT | Performed by: INTERNAL MEDICINE

## 2021-02-24 PROCEDURE — 93225 XTRNL ECG REC<48 HRS REC: CPT

## 2021-03-01 ENCOUNTER — TRANSFERRED RECORDS (OUTPATIENT)
Dept: HEALTH INFORMATION MANAGEMENT | Facility: CLINIC | Age: 61
End: 2021-03-01

## 2021-03-02 ENCOUNTER — TELEPHONE (OUTPATIENT)
Dept: CARDIOLOGY | Facility: CLINIC | Age: 61
End: 2021-03-02

## 2021-03-02 DIAGNOSIS — I48.91 ATRIAL FIBRILLATION WITH RVR (H): ICD-10-CM

## 2021-03-02 NOTE — TELEPHONE ENCOUNTER
Spoke to patient regarding results of 24 hour holter results.   Per Dr Uribe:  ----In AF throughout the 24 hrs, average HR was very fast (140s)  ----Is she taking BOTH her rate control meds (Toprol  mg bid and dilt  mg daily)?  Asked patient if she was taking correct dose of medications and reviewed doses listed above and she indicated yes.  Reports does not feel unwell and does not feel her heart beating that fast.  ----Has she seen her endocrinologist yet?  Last TSH was <0.01.  Hyperthyroidism (due to over-supplementation) may be driving her rates...  Patient had appointment with Dr Gage Luna yesterday 3/1.  A message left for medical records at his office requesting copy of OV notes and labs to be sent for Dr Uribe to reivew.  I also left a message on husbands cell phone to share the results of the holter.  Awaiting return call from him..  Will update Dr Uribe regarding above.  CHELLE Johnston

## 2021-03-03 RX ORDER — DILTIAZEM HYDROCHLORIDE 180 MG/1
180 CAPSULE, COATED, EXTENDED RELEASE ORAL 2 TIMES DAILY
Qty: 180 CAPSULE | Refills: 3 | Status: ON HOLD | OUTPATIENT
Start: 2021-03-03 | End: 2022-02-17

## 2021-03-03 NOTE — TELEPHONE ENCOUNTER
Spoke to  regarding recommendations per Dr Uribe:  -----In the meantime, let's double diltiazem dose from 180 mg daily to 180 mg bid.  It sounds like she is taking her Toprol XL as prescribed.    Medication list updated in epic.  Changes sent to pharmacy to update profile.  Patient will call when she needs medication.    -----She will need a clinic f/up ([facility] or me) in about 2 weeks (with ECG).  Appointment set up for 3/23 at 12:45pm.    Still awaiting OV notes and labs from endocrinology.  CHELLE Johnston

## 2021-03-03 NOTE — TELEPHONE ENCOUNTER
Awaiting for info- from endocrine...    In the meantime, let's double diltiazem dose from 180 mg daily to 180 mg bid.  It sounds like she is taking her Toprol XL as prescribed.    She will need a clinic f/up ([facility] or me) in about 2 weeks (with ECG).      MIAH Haney

## 2021-03-04 ENCOUNTER — DOCUMENTATION ONLY (OUTPATIENT)
Dept: CARDIOLOGY | Facility: CLINIC | Age: 61
End: 2021-03-04

## 2021-03-04 NOTE — PROGRESS NOTES
Endocrinology note date 3/1 given to Dr Uribe to review. Then will send to be scan. JNelsonRBERNARD

## 2021-03-23 ENCOUNTER — OFFICE VISIT (OUTPATIENT)
Dept: CARDIOLOGY | Facility: CLINIC | Age: 61
End: 2021-03-23
Payer: COMMERCIAL

## 2021-03-23 ENCOUNTER — TELEPHONE (OUTPATIENT)
Dept: CARDIOLOGY | Facility: CLINIC | Age: 61
End: 2021-03-23

## 2021-03-23 VITALS
SYSTOLIC BLOOD PRESSURE: 174 MMHG | DIASTOLIC BLOOD PRESSURE: 103 MMHG | HEIGHT: 69 IN | BODY MASS INDEX: 40.91 KG/M2 | HEART RATE: 108 BPM

## 2021-03-23 DIAGNOSIS — I83.90 ASYMPTOMATIC VARICOSE VEINS: Primary | ICD-10-CM

## 2021-03-23 DIAGNOSIS — I48.91 ATRIAL FIBRILLATION WITH RVR (H): Primary | ICD-10-CM

## 2021-03-23 PROCEDURE — 99214 OFFICE O/P EST MOD 30 MIN: CPT | Performed by: INTERNAL MEDICINE

## 2021-03-23 PROCEDURE — 93000 ELECTROCARDIOGRAM COMPLETE: CPT | Performed by: INTERNAL MEDICINE

## 2021-03-23 NOTE — TELEPHONE ENCOUNTER
RN called patient's cell per instructions on her chart. Patient and patient's  answered. Rn reviewed with them patient's vein comp results and Dr. Schuster's recommendations to either wear compression stockings and follow up in a few months or schedule apt with Dr. Joyner in our vein clinic to review treatment options. Patient declined compression stockings, but was in agreement to see Dr. Joyner. RN placed orders for apt and provided patient with direct phone number for scheduling. They will call and arrange apt.

## 2021-03-23 NOTE — PROGRESS NOTES
HPI and Plan:   See dictation [id number]     Orders Placed This Encounter   Procedures     EKG 12-lead complete w/read - Clinics (future- to be scheduled)     Leadless EKG Monitor 8 to 14 Days       No orders of the defined types were placed in this encounter.      There are no discontinued medications.      Encounter Diagnosis   Name Primary?     Atrial fibrillation with RVR (H) Yes       CURRENT MEDICATIONS:  Current Outpatient Medications   Medication Sig Dispense Refill     BIOTIN FORTE PO Take 1 tablet by mouth daily        carboxymethylcellulose (CARBOXYMETHYLCELLULOSE SODIUM) 0.5 % SOLN ophthalmic solution Place 1 drop into both eyes 2 times daily as needed  1 Bottle      CEPHALEXIN PO Take 500 mg by mouth as needed (Dental Procedure) Take 4 caps 1 hour prior to Dental Appointment       cyanocobalamin 1000 MCG/ML injection Inject 1 mL (1,000 mcg) Subcutaneous every 7 days 4 mL 5     cyclobenzaprine (FLEXERIL) 10 MG tablet Take 1 tablet (10 mg) by mouth 3 times daily 90 tablet 3     cycloSPORINE (RESTASIS) 0.05 % ophthalmic emulsion Place 1 drop into both eyes 2 times daily       diltiazem ER COATED BEADS (CARDIZEM CD/CARTIA XT) 180 MG 24 hr capsule Take 1 capsule (180 mg) by mouth 2 times daily 180 capsule 3     fexofenadine (ALLEGRA) 180 MG tablet Take 180 mg by mouth daily as needed        gabapentin (NEURONTIN) 300 MG capsule Take 300 mg by mouth 3 times daily In addition to 800 mg for total dose of 1100 mg       gabapentin (NEURONTIN) 800 MG tablet Take 800 mg by mouth 3 times daily In addition to 300 mg for total dose of 1100 mg       HYDROmorphone (DILAUDID) 8 MG tablet Take 8 mg by mouth every 8 hours . Max of 3 doses per day.       levothyroxine (SYNTHROID/LEVOTHROID) 100 MCG SOLR injection Inject 200 mcg into the vein once a week        levothyroxine (SYNTHROID/LEVOTHROID) 200 MCG tablet Take 1 tablet (200 mcg) by mouth 2 times daily 60 tablet 0     levothyroxine (SYNTHROID/LEVOTHROID) 50 MCG tablet Take  1 tablet (50 mcg) by mouth 2 times daily 60 tablet 0     lifitegrast (XIIDRA) 5 % opthalmic solution Place 1 drop into both eyes 2 times daily       liothyronine (CYTOMEL) 25 MCG tablet Take 50 mcg by mouth 2 times daily        methylcellulose (CITRUCEL) powder Start with 1 heaping tablespoon. Increase as needed, 1 heaping tablespoon at a time, up to 3 times per day. 479 g 3     metoprolol succinate ER (TOPROL-XL) 100 MG 24 hr tablet Take 1 tablet (100 mg) by mouth 2 times daily 30 tablet 3     morphine (MS CONTIN) 30 MG 12 hr tablet Take 30 mg by mouth 2 times daily along with one morphine 60 mg 12 hr tablet for a total dose of 90 mg.  AM and afternoon 270 tablet 0     morphine (MS CONTIN) 60 MG 12 hr tablet Take 60 mg by mouth 3 times daily  30 tablet 0     multivitamin, therapeutic with minerals (MULTI-VITAMIN) TABS tablet Take 1 tablet by mouth 2 times daily 100 tablet 3     NASONEX 50 MCG/ACT spray Spray 1 spray into both nostrils daily as needed   11     nystatin (MYCOSTATIN) 181087 UNIT/GM external powder Apply topically 3 times daily as needed 60 g 1     prednisoLONE acetate (PRED FORTE) 1 % ophthalmic susp 1 drop 4 times daily       predniSONE (DELTASONE) 1 MG tablet Use along with one prednisone 5 mg tablets to alternate taking 8mg and 10mg every other day.  2     predniSONE (DELTASONE) 5 MG tablet Alternate taking 8mg and 10mg every other day       probiotic CAPS Take 1 capsule by mouth 2 times daily       rivaroxaban ANTICOAGULANT (XARELTO) 20 MG TABS tablet Take 20 mg by mouth daily (with dinner)        vitamin D2 (ERGOCALCIFEROL) 80749 units (1250 mcg) capsule Take 50,000 Units by mouth once a week       zolpidem (AMBIEN) 5 MG tablet Take 5 mg by mouth nightly as needed for sleep         ALLERGIES     Allergies   Allergen Reactions     Blood Transfusion Related (Informational Only) Other (See Comments)     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      Chlorhexidine Hives     Thor surgical  cleanser     Codeine Hives     Has tolerated Oxycodone in past      Ibuprofen [Nsaids] Hives     Penicillins Hives     Other reaction(s): Unknown     Sulfa Drugs Hives     Other reaction(s): Unknown     Doxycycline GI Disturbance     Emesis & diarrhea  Patient denies allergy to this med     Hydroxyzine      rash     Mold      Red Wine Complex [Red Wine Powder]        PAST MEDICAL HISTORY:  Past Medical History:   Diagnosis Date     ADHD (attention deficit hyperactivity disorder)      Allergic rhinitis      Chronic low back pain      Cushing's syndrome (H)      DDD (degenerative disc disease)      DDD (degenerative disc disease)      Deviated nasal septum      Diarrhea      Endometriosis      Fibromyalgia      Hashimoto's disease      Hyperlipidemia      Hypothyroid     hx hashimoto's thyroiditis     Insomnia      Lupus (H)      Malabsorption      Pernicious anemia      Renal disease     chronic renal insufficiency     Sinusitis        PAST SURGICAL HISTORY:  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)     APPENDECTOMY       ARTHRODESIS ANKLE      right     ARTHROPLASTY KNEE BILATERAL       ARTHROPLASTY REVISION KNEE  4/19/2011    Procedure:ARTHROPLASTY REVISION KNEE; With Antibiotic Cement ; Surgeon:CHAO OLIVARES; [facility]     BREAST SURGERY      right- tissue remove nipple area     BUNIONECTOMY  12/14/2011    Procedure:BUNIONECTOMY; Right Bunion Correction; Surgeon:GRACE ZARATE; Location:[facility] STOMACH SURGERY PROCEDURE UNLISTED      see list which we will bring     CHOLECYSTECTOMY       EXAM UNDER ANESTHESIA ANUS N/A 7/15/2020    Procedure: EXAM UNDER ANESTHESIA, ANUS;  Surgeon: Luis Canales MD;  [facility]     EXCISE MASS UPPER EXTREMITY  12/14/2011    Procedure:EXCISE MASS UPPER EXTREMITY; Excision of Left Arm Mass; Surgeon:GRACE ZARATE; [facility]     FOOT SURGERY      left X 4      FUSION LUMBAR ANTERIOR ONE LEVEL       HC SACROPLASTY      see list which we will bring     HEMORRHOIDECTOMY INTERNAL N/A 7/15/2020    Procedure: Exam under anesthesia, hemorrhoidectomy;  Surgeon: Luis Canales MD;  [facility]     INJECT NERVE BLOCK SUPRASCAPULAR Left 5/18/2018    Procedure: INJECT NERVE BLOCK SUPRASCAPULAR;  Left Suprascapular Nerve Block;  Surgeon: Basim Velazquez MD;  [facility]     INJECT NERVE BLOCK SUPRASCAPULAR Right 7/23/2018    Procedure: INJECT NERVE BLOCK SUPRASCAPULAR;  Right suprascapular injection;  Surgeon: Basim Velazquez MD;  [facility]     INJECT NERVE BLOCK SUPRASCAPULAR Bilateral 10/29/2018    Procedure: Bilateral Suprascapular Nerve Blocks;  Surgeon: Basim Velazquez MD;  [facility]     INJECT NERVE BLOCK SUPRASCAPULAR Bilateral 3/15/2019    Procedure: Bilateral Suprascapular Nerve Block;  Surgeon: Basim Velazquez MD;  [facility]     INJECT NERVE BLOCK SUPRASCAPULAR Bilateral 12/31/2019    Procedure: bilateral suprascapular nerve block;  Surgeon: Basim Velazquez MD;  [facility]     KNEE SURGERY      see list which we will bring     LAMINECTOMY LUMBAR ONE LEVEL      L4-5     LAPAROSCOPIC ABLATION ENDOMETRIOSIS       RADIO FREQUENCY ABLATION PULSED CERVICAL Bilateral 7/10/2019    Procedure: Bilateral Suprascapular Nerve Pulsed Radiofrequency Ablation;  Surgeon: Basim Velazquez MD;  Location:  OR     REMOVE HARDWARE FOOT  12/14/2011    Procedure:REMOVE HARDWARE FOOT; Hardware Removal Right Foot (Mini-C-Arm) ; Surgeon:GRACE ZARATE; Location: [facility]     RHINOPLASTY       SALPINGO-OOPHORECTOMY BILATERAL       TONSILLECTOMY         FAMILY HISTORY:  Family History   Problem Relation Age of Onset     Hypertension Mother      No Known Problems Father      No Known Problems Sister      No Known Problems Brother      No Known Problems Maternal Grandmother      No Known Problems Maternal Grandfather      No Known  Problems Paternal Grandmother      No Known Problems Other        SOCIAL HISTORY:  Social History     Socioeconomic History     Marital status:      Spouse name: None     Number of children: None     Years of education: None     Highest education level: None   Occupational History     None   Social Needs     Financial resource strain: None     Food insecurity     Worry: None     Inability: None     Transportation needs     Medical: None     Non-medical: None   Tobacco Use     Smoking status: Former Smoker     Packs/day: 1.50     Years: 20.00     Pack years: 30.00     Types: Cigarettes     Start date: 1973     Quit date: 1993     Years since quittin.2     Smokeless tobacco: Never Used   Substance and Sexual Activity     Alcohol use: No     Alcohol/week: 0.0 standard drinks     Drug use: No     Sexual activity: Not Currently     Partners: Male     Birth control/protection: Post-menopausal   Lifestyle     Physical activity     Days per week: None     Minutes per session: None     Stress: None   Relationships     Social connections     Talks on phone: None     Gets together: None     Attends Rastafarian service: None     Active member of club or organization: None     Attends meetings of clubs or organizations: None     Relationship status: None     Intimate partner violence     Fear of current or ex partner: None     Emotionally abused: None     Physically abused: None     Forced sexual activity: None   Other Topics Concern     Parent/sibling w/ CABG, MI or angioplasty before 65F 55M? Not Asked   Social History Narrative    ** Merged History Encounter **            Review of Systems:  Skin:  Negative       Eyes:  Positive for   see's specialist  ENT:  Negative      Respiratory:  Positive for shortness of breath     Cardiovascular:    Positive for;chest pain rt foot  Gastroenterology: Negative      Genitourinary:  Negative      Musculoskeletal:  not assessed      Neurologic:  Negative      Psychiatric:   not assessed      Heme/Lymph/Imm:  Positive for allergies    Endocrine:  Positive for thyroid disorder

## 2021-03-23 NOTE — LETTER
3/23/2021    Arcadio Farfan MD  [facility] [address]  [place] [address]    RE: Aspen Mccullough       Dear Colleague,    I had the pleasure of seeing Aspen Mccullough in the [facility].    HPI and Plan:   See dictation [id number]     Orders Placed This Encounter   Procedures     EKG 12-lead complete w/read - Clinics (future- to be scheduled)     Leadless EKG Monitor 8 to 14 Days       No orders of the defined types were placed in this encounter.      There are no discontinued medications.      Encounter Diagnosis   Name Primary?     Atrial fibrillation with RVR (H) Yes       CURRENT MEDICATIONS:  Current Outpatient Medications   Medication Sig Dispense Refill     BIOTIN FORTE PO Take 1 tablet by mouth daily        carboxymethylcellulose (CARBOXYMETHYLCELLULOSE SODIUM) 0.5 % SOLN ophthalmic solution Place 1 drop into both eyes 2 times daily as needed  1 Bottle      CEPHALEXIN PO Take 500 mg by mouth as needed (Dental Procedure) Take 4 caps 1 hour prior to Dental Appointment       cyanocobalamin 1000 MCG/ML injection Inject 1 mL (1,000 mcg) Subcutaneous every 7 days 4 mL 5     cyclobenzaprine (FLEXERIL) 10 MG tablet Take 1 tablet (10 mg) by mouth 3 times daily 90 tablet 3     cycloSPORINE (RESTASIS) 0.05 % ophthalmic emulsion Place 1 drop into both eyes 2 times daily       diltiazem ER COATED BEADS (CARDIZEM CD/CARTIA XT) 180 MG 24 hr capsule Take 1 capsule (180 mg) by mouth 2 times daily 180 capsule 3     fexofenadine (ALLEGRA) 180 MG tablet Take 180 mg by mouth daily as needed        gabapentin (NEURONTIN) 300 MG capsule Take 300 mg by mouth 3 times daily In addition to 800 mg for total dose of 1100 mg       gabapentin (NEURONTIN) 800 MG tablet Take 800 mg by mouth 3 times daily In addition to 300 mg for total dose of 1100 mg       HYDROmorphone (DILAUDID) 8 MG tablet Take 8 mg by mouth every 8 hours . Max of 3 doses per day.        levothyroxine (SYNTHROID/LEVOTHROID) 100 MCG SOLR injection Inject 200 mcg into the vein once a week        levothyroxine (SYNTHROID/LEVOTHROID) 200 MCG tablet Take 1 tablet (200 mcg) by mouth 2 times daily 60 tablet 0     levothyroxine (SYNTHROID/LEVOTHROID) 50 MCG tablet Take 1 tablet (50 mcg) by mouth 2 times daily 60 tablet 0     lifitegrast (XIIDRA) 5 % opthalmic solution Place 1 drop into both eyes 2 times daily       liothyronine (CYTOMEL) 25 MCG tablet Take 50 mcg by mouth 2 times daily        methylcellulose (CITRUCEL) powder Start with 1 heaping tablespoon. Increase as needed, 1 heaping tablespoon at a time, up to 3 times per day. 479 g 3     metoprolol succinate ER (TOPROL-XL) 100 MG 24 hr tablet Take 1 tablet (100 mg) by mouth 2 times daily 30 tablet 3     morphine (MS CONTIN) 30 MG 12 hr tablet Take 30 mg by mouth 2 times daily along with one morphine 60 mg 12 hr tablet for a total dose of 90 mg.  AM and afternoon 270 tablet 0     morphine (MS CONTIN) 60 MG 12 hr tablet Take 60 mg by mouth 3 times daily  30 tablet 0     multivitamin, therapeutic with minerals (MULTI-VITAMIN) TABS tablet Take 1 tablet by mouth 2 times daily 100 tablet 3     NASONEX 50 MCG/ACT spray Spray 1 spray into both nostrils daily as needed   11     nystatin (MYCOSTATIN) 011297 UNIT/GM external powder Apply topically 3 times daily as needed 60 g 1     prednisoLONE acetate (PRED FORTE) 1 % ophthalmic susp 1 drop 4 times daily       predniSONE (DELTASONE) 1 MG tablet Use along with one prednisone 5 mg tablets to alternate taking 8mg and 10mg every other day.  2     predniSONE (DELTASONE) 5 MG tablet Alternate taking 8mg and 10mg every other day       probiotic CAPS Take 1 capsule by mouth 2 times daily       rivaroxaban ANTICOAGULANT (XARELTO) 20 MG TABS tablet Take 20 mg by mouth daily (with dinner)        vitamin D2 (ERGOCALCIFEROL) 91488 units (1250 mcg) capsule Take 50,000 Units by mouth once a week       zolpidem (AMBIEN) 5 MG  tablet Take 5 mg by mouth nightly as needed for sleep         ALLERGIES     Allergies   Allergen Reactions     Blood Transfusion Related (Informational Only) Other (See Comments)     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      Chlorhexidine Hives     Thor surgical cleanser     Codeine Hives     Has tolerated Oxycodone in past      Ibuprofen [Nsaids] Hives     Penicillins Hives     Other reaction(s): Unknown     Sulfa Drugs Hives     Other reaction(s): Unknown     Doxycycline GI Disturbance     Emesis & diarrhea  Patient denies allergy to this med     Hydroxyzine      rash     Mold      Red Wine Complex [Red Wine Powder]        PAST MEDICAL HISTORY:  Past Medical History:   Diagnosis Date     ADHD (attention deficit hyperactivity disorder)      Allergic rhinitis      Chronic low back pain      Cushing's syndrome (H)      DDD (degenerative disc disease)      DDD (degenerative disc disease)      Deviated nasal septum      Diarrhea      Endometriosis      Fibromyalgia      Hashimoto's disease      Hyperlipidemia      Hypothyroid     hx hashimoto's thyroiditis     Insomnia      Lupus (H)      Malabsorption      Pernicious anemia      Renal disease     chronic renal insufficiency     Sinusitis        PAST SURGICAL HISTORY:  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)     APPENDECTOMY       ARTHRODESIS ANKLE      right     ARTHROPLASTY KNEE BILATERAL       ARTHROPLASTY REVISION KNEE  4/19/2011    Procedure:ARTHROPLASTY REVISION KNEE; With Antibiotic Cement ; Surgeon:CHAO OLIVARES; [facility]     BREAST SURGERY      right- tissue remove nipple area     BUNIONECTOMY  12/14/2011    Procedure:BUNIONECTOMY; Right Bunion Correction; Surgeon:GRACE ZARATE; Location:[facility] STOMACH SURGERY PROCEDURE UNLISTED      see list which we will bring     CHOLECYSTECTOMY       EXAM UNDER ANESTHESIA ANUS N/A 7/15/2020    Procedure:  EXAM UNDER ANESTHESIA, ANUS;  Surgeon: Luis Canales MD;  [facility]     EXCISE MASS UPPER EXTREMITY  12/14/2011    Procedure:EXCISE MASS UPPER EXTREMITY; Excision of Left Arm Mass; Surgeon:GRACE ZARATE; Location:[facility]     FOOT SURGERY      left X 4     FUSION LUMBAR ANTERIOR ONE LEVEL       HC SACROPLASTY      see list which we will bring     HEMORRHOIDECTOMY INTERNAL N/A 7/15/2020    Procedure: Exam under anesthesia, hemorrhoidectomy;  Surgeon: Luis Canales MD;  [facility]     INJECT NERVE BLOCK SUPRASCAPULAR Left 5/18/2018    Procedure: INJECT NERVE BLOCK SUPRASCAPULAR;  Left Suprascapular Nerve Block;  Surgeon: Basim Velazquez MD;  [facility]     INJECT NERVE BLOCK SUPRASCAPULAR Right 7/23/2018    Procedure: INJECT NERVE BLOCK SUPRASCAPULAR;  Right suprascapular injection;  Surgeon: Basim Velazquez MD;  [facility]     INJECT NERVE BLOCK SUPRASCAPULAR Bilateral 10/29/2018    Procedure: Bilateral Suprascapular Nerve Blocks;  Surgeon: Basim Velazquez MD;  [facility]     INJECT NERVE BLOCK SUPRASCAPULAR Bilateral 3/15/2019    Procedure: Bilateral Suprascapular Nerve Block;  Surgeon: Basim Velazquez MD;  [facility]     INJECT NERVE BLOCK SUPRASCAPULAR Bilateral 12/31/2019    Procedure: bilateral suprascapular nerve block;  Surgeon: Basim Velazquez MD;  [facility]     KNEE SURGERY      see list which we will bring     LAMINECTOMY LUMBAR ONE LEVEL      L4-5     LAPAROSCOPIC ABLATION ENDOMETRIOSIS       RADIO FREQUENCY ABLATION PULSED CERVICAL Bilateral 7/10/2019    Procedure: Bilateral Suprascapular Nerve Pulsed Radiofrequency Ablation;  Surgeon: Basim Velazquez MD;  Location:  OR     REMOVE HARDWARE FOOT  12/14/2011    Procedure:REMOVE HARDWARE FOOT; Hardware Removal Right Foot (Mini-C-Arm) ; Surgeon:GRACE ZARATE; Location:[facility]     RHINOPLASTY       SALPINGO-OOPHORECTOMY BILATERAL       TONSILLECTOMY         FAMILY  HISTORY:  Family History   Problem Relation Age of Onset     Hypertension Mother      No Known Problems Father      No Known Problems Sister      No Known Problems Brother      No Known Problems Maternal Grandmother      No Known Problems Maternal Grandfather      No Known Problems Paternal Grandmother      No Known Problems Other        SOCIAL HISTORY:  Social History     Socioeconomic History     Marital status:      Spouse name: None     Number of children: None     Years of education: None     Highest education level: None   Occupational History     None   Social Needs     Financial resource strain: None     Food insecurity     Worry: None     Inability: None     Transportation needs     Medical: None     Non-medical: None   Tobacco Use     Smoking status: Former Smoker     Packs/day: 1.50     Years: 20.00     Pack years: 30.00     Types: Cigarettes     Start date: 1973     Quit date: 1993     Years since quittin.2     Smokeless tobacco: Never Used   Substance and Sexual Activity     Alcohol use: No     Alcohol/week: 0.0 standard drinks     Drug use: No     Sexual activity: Not Currently     Partners: Male     Birth control/protection: Post-menopausal   Lifestyle     Physical activity     Days per week: None     Minutes per session: None     Stress: None   Relationships     Social connections     Talks on phone: None     Gets together: None     Attends Tenriism service: None     Active member of club or organization: None     Attends meetings of clubs or organizations: None     Relationship status: None     Intimate partner violence     Fear of current or ex partner: None     Emotionally abused: None     Physically abused: None     Forced sexual activity: None   Other Topics Concern     Parent/sibling w/ CABG, MI or angioplasty before 65F 55M? Not Asked   Social History Narrative    ** Merged History Encounter **            Review of Systems:  Skin:  Negative       Eyes:  Positive for    see's specialist  ENT:  Negative      Respiratory:  Positive for shortness of breath     Cardiovascular:    Positive for;chest pain rt foot  Gastroenterology: Negative      Genitourinary:  Negative      Musculoskeletal:  not assessed      Neurologic:  Negative      Psychiatric:  not assessed      Heme/Lymph/Imm:  Positive for allergies    Endocrine:  Positive for thyroid disorder      Thank you for allowing me to participate in the care of your patient.      Sincerely,     Rosa Uribe MD     [facility]    cc:   No referring provider defined for this encounter.

## 2021-03-23 NOTE — TELEPHONE ENCOUNTER
For this patient in particular, please call and let her know if she is interested in treatment of her vein reflux that I can set her up with the proceduralist who does these (Vangie Turner). Alternatively I recommend compression stockings and discussing at our appointment.     See what she would like and give apologies for me, I thought this was visible to her on her chart.    Dr. Muñoz

## 2021-03-23 NOTE — PROGRESS NOTES
Service Date: 03/23/2021      FOLLOWUP       HISTORY OF PRESENT ILLNESS:    Ms. Aspen Mccullough was seen in follow-up in the EP Clinic today.      She is a 61-year-old woman with the following chronic medical issues:   a.  Paroxysmal atrial fibrillation with RVR.  Currently treated with metoprolol, diltiazem and rivaroxaban.  [id number]-OBNr score of 2.    b.  Hypertension.   c.  Lupus, on chronic steroids.   d.  Recurrent DVT/PE, on chronic anticoagulation (rivaroxaban).   e.  Hypothyroidism, on supplementation.  f.   Dyslipidemia.   g.  Obesity.   h.  Chronic back pain.   i.   LE venous insufficiency.   k.  Normal LV function (10/2020).     I first met Ms. Mccullough at Dr. Muñoz's request ilast February.  At that time, the patient was having paroxysmal AF with RVR despite diltiazem  mg daily and metoprolol XL 50 mg b.i.d.  Her dose of metoprolol XL was doubled.      A follow-up 24 hour Holter monitor showed AF throughout with average heart rate in the 140s.      The patient was noted to have a recent TSH<0.01.  We asked her see her endocrinologist.  She saw Dr. Zaheer Luna on 03/01/2021.  Although Dr. 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  A repeat thyroid panel is pending for April.      The patient states she feels okay with regard to her heart and continues to have no awareness of any heart racing.  She has not had chest pain, syncope or near syncope.        PHYSICAL EXAMINATION:   VITAL SIGNS:  Blood pressure 174/103, heart rate 100 and regular, weight (the patient refused to be weighed, previous weight 277 pounds), height 175 cm.   CARDIOVASCULAR:  Regular rhythm.  No gallop, murmur, or rub.      DIAGNOSTIC STUDIES:    - I have reviewed recent laboratory tests, including thyroid function with TSH as  "indicated above.   - Her 12 lead ECG today showed sinus rhythm at 96, atrial conduction abnormality.  Otherwise normal.   - For results of her recent Holter monitor, see HPI.        IMPRESSION:    1.  Paroxysmal atrial fibrillation.  Normal LV function.  AF remains surprisingly asymptomatic despite impressive RVR.  There is strong clinical suspicion that Mrs. Sinclairs uncontrolled atrial fibrillation with rapid ventricular rates (despite potent doses of diltiazem XT and Toprol XL) is driven by iatrogenic hyperthyroidism.  The dose of both thyroid meds was decreased by 50% recently.  Followup thyroid studies are pending.  She was in SR today.      I recommended \"watchful waiting\" and repeating a cardiac monitor in mid April.  By that time, she will have been on the reduced thyroid medications for 6 weeks.  If she continues to have frequent AF with RVR despite achieving euthyroid status, we would discuss antiarrhythmic drug therapy and/or catheter ablation.  However, the patient told me in February, and repeated today, that she is not keen about either having an invasive EP procedure or taking \"strong heart medications\" long-term.      RECOMMENDATIONS:   a.  Continue current cardiac medications, including diltiazem, metoprolol XL and rivaroxaban.   b.  A 14-day leadless ECG monitor in mid April.  We will call the patient with the results and make arrangements for followup as needed.   c. Monitor BP at home.     Total time spent in this appointment, including chart prep/review, was 30 minutes.        RACHEL MENDOZA MD, [facility]           cc:   Sil Muñoz MD   [facility]   [address] [place] [address]      [place] [address]             D: 2021   T: 2021   MT: raina      Name:     ROXY ALVARES   MRN:      [id number]        Account:      [id number]   :      1960           Service Date: 2021    "   Document: [id number]

## 2021-04-01 ENCOUNTER — HOSPITAL ENCOUNTER (OUTPATIENT)
Dept: MAMMOGRAPHY | Facility: CLINIC | Age: 61
Discharge: HOME | End: 2021-04-01
Attending: OBSTETRICS & GYNECOLOGY | Admitting: OBSTETRICS & GYNECOLOGY
Payer: COMMERCIAL

## 2021-04-01 DIAGNOSIS — Z12.31 VISIT FOR SCREENING MAMMOGRAM: ICD-10-CM

## 2021-04-01 PROCEDURE — 77063 BREAST TOMOSYNTHESIS BI: CPT

## 2021-04-13 ENCOUNTER — HOSPITAL ENCOUNTER (OUTPATIENT)
Dept: CARDIOLOGY | Facility: CLINIC | Age: 61
Discharge: HOME | End: 2021-04-13
Attending: INTERNAL MEDICINE | Admitting: INTERNAL MEDICINE
Payer: COMMERCIAL

## 2021-04-13 DIAGNOSIS — I48.91 ATRIAL FIBRILLATION WITH RVR (H): ICD-10-CM

## 2021-04-13 PROCEDURE — 93248 EXT ECG>7D<15D REV&INTERPJ: CPT | Performed by: INTERNAL MEDICINE

## 2021-04-13 PROCEDURE — 93246 EXT ECG>7D<15D RECORDING: CPT

## 2021-05-10 ENCOUNTER — HOSPITAL ENCOUNTER (OUTPATIENT)
Dept: ULTRASOUND IMAGING | Facility: CLINIC | Age: 61
Discharge: HOME | End: 2021-05-10
Attending: ORTHOPAEDIC SURGERY | Admitting: ORTHOPAEDIC SURGERY
Payer: COMMERCIAL

## 2021-05-10 DIAGNOSIS — I82.401 DEEP VEIN THROMBOSIS OF RIGHT LOWER LIMB (H): ICD-10-CM

## 2021-05-10 PROCEDURE — 93971 EXTREMITY STUDY: CPT | Mod: RT

## 2021-05-12 ENCOUNTER — TELEPHONE (OUTPATIENT)
Dept: ANESTHESIOLOGY | Facility: CLINIC | Age: 61
End: 2021-05-12

## 2021-05-12 NOTE — TELEPHONE ENCOUNTER
Writer called patient and spoke with spouse, Ash. Discussed rescheduling clinic visit on 5/14 to a later date, date and time verified with Ash and verbalized understanding.    Narcisa Trujillo RN

## 2021-05-14 ENCOUNTER — OFFICE VISIT (OUTPATIENT)
Dept: CARDIOLOGY | Facility: CLINIC | Age: 61
End: 2021-05-14
Attending: INTERNAL MEDICINE
Payer: COMMERCIAL

## 2021-05-14 VITALS
BODY MASS INDEX: 40.88 KG/M2 | WEIGHT: 276 LBS | SYSTOLIC BLOOD PRESSURE: 157 MMHG | HEIGHT: 69 IN | HEART RATE: 80 BPM | DIASTOLIC BLOOD PRESSURE: 97 MMHG

## 2021-05-14 DIAGNOSIS — I48.91 ATRIAL FIBRILLATION WITH RVR (H): ICD-10-CM

## 2021-05-14 DIAGNOSIS — I82.431 ACUTE DEEP VEIN THROMBOSIS (DVT) OF POPLITEAL VEIN OF RIGHT LOWER EXTREMITY (H): ICD-10-CM

## 2021-05-14 PROCEDURE — 99214 OFFICE O/P EST MOD 30 MIN: CPT | Performed by: INTERNAL MEDICINE

## 2021-05-14 ASSESSMENT — MIFFLIN-ST. JEOR: SCORE: 1876.31

## 2021-05-14 NOTE — LETTER
5/14/2021    Arcadio Farfan MD  [facility] [address]  [facility] [address]    RE: Aspen Mccullough       Dear Colleague,    I had the pleasure of seeing Aspen Mccullough in the [facility].         Vascular Cardiology Consultation Follow Up      HPI:      This is a pleasant 61 year old with the following PMH: atrial fibrillation, right LE DVT and PE in the past on long term xarelto, HLD, HTN, lupus on chronic steroids who I see for chronic vein insufficiency as well as atrial fibrillation.     She has seen several providers in the past at other health care systems and after we had a pleasant first visit and she wishes to continue care with me in cardiology.      From prior visit: October she was admitted for DVT and was found to be in rapid atrial fibrillation. She was started on dilitazem and metoprolol. She reports unclear duration of afib as she is not symptomatic from it. During our first in office visit her rates were over 100 bpm however she has long standing thyroid dysfunction. No history of palpitations, dizziness, syncope, chest pain or shortness of breath from the AFIB however she feels constantly fatigued and tired.     Social history: works occupation in medicare fraud. Here with . Has a daughter in nutrition school.      Has had a popliteal DVT of the right leg for which she has been on adequate blood thinners but her leg has been feeling tight and painful. She had been temporarily stopped on xarelto for a thumb surgery. She had some right toe discoloration and occasional blue toe syndrome. We performed arterial studies and PPGs in November which were normal and suggests good arterial flow to the foot. When I reviewed her CT scan from prior admission she also notably has a dilated PA of 4.5 cm as well as aortic root aneurysm of 4.1 cm. No known family history of this.     Given rapid AFIB I referred her to EP for  evaluation and consideration of rhythm control strategy but overall rate control was decided on with adequate control of thyroid function. She had repeat ziopatch on diltiazem increase and it showed no AFIB recurrence.     I repeated her US of her right leg which is negative for DVT. She had vein comp study with right deep, superficial reflux (severe) and also left sided reflux. Her legs she reports get very swollen and it extends to her toes. She has discoloration and discomfort from the swelling. She has tried compression stockings for at least three months and elevates her legs consistently with no change in symptoms.      In review of recent studies:      Ziopatch: 45% burden AFIB, average rate ~120 bpm, as high as 204 bpm. No significant pauses.  Repeat Ziopatch: NO ATRIAL FIBRILLATION 4/13/2021 (on medical suppressive therapy and thyroid dysfunction better controlled)    Echo 11-2-2020 - A Fib  Left ventricular systolic function normal. EF 55-60%.   The study was technically difficult.     US Venous Lower Extremity Right: 10-  - Care everywhere   1.  Acute DVT right posterior tibial vein.   2.  Popliteal cyst.       Chest CT a 10- Care Everywhere.   1.  Limited exam due to respiratory motion. No central or definite peripheral   pulmonary artery embolism. No thoracic aortic dissection.   2.  Mild left upper lobar consolidation and patchy airspace opacities likely   reflecting pneumonia. Follow-up to assess for resolution is recommended.   3.  Findings suggesting pulmonary air trapping which can be seen with small   airways disease.   4.  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 Recommend   follow-up with ultrasound or CT in 6 months.     CT Pulmonary 9-21-18   1. No evidence of pulmonary embolism. No acute pulmonary process.   2. Scattered sub-6 mm pulmonary nodules. If the patient is considered   low risk for malignancy, no further follow-up is necessary. Otherwise,    LIKELY consider 12 month follow-up chest CT as per Fleischner Society   2017 guidelines.   3. Cardiomegaly. Ectasia of the ascending aorta 4.1 cm.   4. Dilatation of the pulmonary vasculature, including the main   pulmonary artery to 4.5 cm. This can be seen with pulmonary arterial   Hypertension.     US LE 5/2021    DVT resolved of the right leg    VEIN COMP STUDY 2/2021    1. Right leg: No DVT.  Reflux of 4.8-5.1 seconds in thigh vessels  ranging from 4.6-9.1 mm.  Mid calf and ankle not seen due to edema and  depth. Thigh Ext. and SSV with 1.9 and 2.4 seconds of reflux in  vessels of 2.1 and 4.6 mm.  Tributary without reflux.  Reflux also  seen in the deep venous system.      2. Left leg: No DVT.  No reflux in the deep venous system.  Reflux in  the greater saphenous vein at SFJ and mid thigh ranging from 0.7 - 2.2  seconds with vessels ranging from 5.5-6.5 mm.    Reflux of 0.7 - 2.0  seconds from the knee to the ankle in vessels 3.9-5.3 mm.  Tributary  without reflux.     ASSESSMENT/PLAN:     1. Atrial Fibrillation: normal LV function by recent echocardiogram   -continue rivaroxaban for anticoagulation in SPAF and elevated CHADSVASC as well as VTE history   -continue diltiazem and metoprolol  -intermittent ziopatch monitoring for recurrence: AF currently suppressed which I believe has to do with her thyroid function, can repeat every 6 months to one year along with echocardiogram     2. History of PE and recent DVT of right popliteal vein: DVT resolved by recent ultrasound but CVI of the superficial and deep system with profound toe swelling and stasis dermatitis consistent with combination venous and lymphedema  -continue rivaroxaban long term  -compression stockings to continue, elevation of leg, adequate leg hydration and will Rx mechanical compression pumps for patient as I think she is a good candidate for this given post thrombotic syndrome  -refer to Dr. Thomas at the [facility] for evaluation of  complex vein reflux in setting of post thrombotic syndrome, evaluation of saphenous vein intervention (not to replace medical therapy recommendations)     3. Right toe discoloration:  -no arterial disease by toe PPGs, LE arterial duplex and ABIs   -believe its' all stasis dermatitis   -toe swelling c/w lymphedema      4. HTN: likely labile due to steroids and lupus   -continue diltiazem and metoprolol     5. Nutrition: discussed functional nutritionist evaluation for food sensitivity testing       Will follow up with patient in 6 months with mlau Muñoz MD MSc  [facility]     PAST MEDICAL HISTORY  Past Medical History:   Diagnosis Date     ADHD (attention deficit hyperactivity disorder)      Allergic rhinitis      Chronic low back pain      Cushing's syndrome (H)      DDD (degenerative disc disease)      DDD (degenerative disc disease)      Deviated nasal septum      Diarrhea      Endometriosis      Fibromyalgia      Hashimoto's disease      Hyperlipidemia      Hypothyroid     hx hashimoto's thyroiditis     Insomnia      Lupus (H)      Malabsorption      Pernicious anemia      Renal disease     chronic renal insufficiency     Sinusitis        CURRENT MEDICATIONS  Current Outpatient Medications   Medication Sig Dispense Refill     BIOTIN FORTE PO Take 1 tablet by mouth daily        carboxymethylcellulose (CARBOXYMETHYLCELLULOSE SODIUM) 0.5 % SOLN ophthalmic solution Place 1 drop into both eyes 2 times daily as needed  1 Bottle      CEPHALEXIN PO Take 500 mg by mouth as needed (Dental Procedure) Take 4 caps 1 hour prior to Dental Appointment       cyanocobalamin 1000 MCG/ML injection Inject 1 mL (1,000 mcg) Subcutaneous every 7 days 4 mL 5     cyclobenzaprine (FLEXERIL) 10 MG tablet Take 1 tablet (10 mg) by mouth 3 times daily 90 tablet 3     cycloSPORINE (RESTASIS) 0.05 % ophthalmic emulsion Place 1 drop into both eyes 2 times daily       diltiazem ER COATED BEADS (CARDIZEM CD/CARTIA XT)  180 MG 24 hr capsule Take 1 capsule (180 mg) by mouth 2 times daily 180 capsule 3     fexofenadine (ALLEGRA) 180 MG tablet Take 180 mg by mouth daily as needed        gabapentin (NEURONTIN) 300 MG capsule Take 300 mg by mouth 3 times daily In addition to 800 mg for total dose of 1100 mg       gabapentin (NEURONTIN) 800 MG tablet Take 800 mg by mouth 3 times daily In addition to 300 mg for total dose of 1100 mg       HYDROmorphone (DILAUDID) 8 MG tablet Take 8 mg by mouth every 8 hours . Max of 3 doses per day.       levothyroxine (SYNTHROID/LEVOTHROID) 100 MCG SOLR injection Inject 200 mcg into the vein once a week        levothyroxine (SYNTHROID/LEVOTHROID) 200 MCG tablet Take 1 tablet (200 mcg) by mouth 2 times daily (Patient taking differently: Take 300 mcg by mouth 2 times daily ) 60 tablet 0     lifitegrast (XIIDRA) 5 % opthalmic solution Place 1 drop into both eyes 2 times daily       liothyronine (CYTOMEL) 25 MCG tablet Take 50 mcg by mouth 2 times daily        methylcellulose (CITRUCEL) powder Start with 1 heaping tablespoon. Increase as needed, 1 heaping tablespoon at a time, up to 3 times per day. 479 g 3     metoprolol succinate ER (TOPROL-XL) 100 MG 24 hr tablet Take 1 tablet (100 mg) by mouth 2 times daily 30 tablet 3     morphine (MS CONTIN) 30 MG 12 hr tablet Take 30 mg by mouth 2 times daily along with one morphine 60 mg 12 hr tablet for a total dose of 90 mg.  AM and afternoon 270 tablet 0     morphine (MS CONTIN) 60 MG 12 hr tablet Take 60 mg by mouth 3 times daily  30 tablet 0     multivitamin, therapeutic with minerals (MULTI-VITAMIN) TABS tablet Take 1 tablet by mouth 2 times daily 100 tablet 3     NASONEX 50 MCG/ACT spray Spray 1 spray into both nostrils daily as needed   11     nystatin (MYCOSTATIN) 562976 UNIT/GM external powder Apply topically 3 times daily as needed 60 g 1     prednisoLONE acetate (PRED FORTE) 1 % ophthalmic susp 1 drop 4 times daily       predniSONE (DELTASONE) 1 MG tablet  Use along with one prednisone 5 mg tablets to alternate taking 8mg and 10mg every other day.  2     predniSONE (DELTASONE) 5 MG tablet Alternate taking 8mg and 10mg every other day       probiotic CAPS Take 1 capsule by mouth 2 times daily       rivaroxaban ANTICOAGULANT (XARELTO) 20 MG TABS tablet Take 20 mg by mouth daily (with dinner)        vitamin D2 (ERGOCALCIFEROL) 54354 units (1250 mcg) capsule Take 50,000 Units by mouth once a week       zolpidem (AMBIEN) 5 MG tablet Take 5 mg by mouth nightly as needed for sleep       levothyroxine (SYNTHROID/LEVOTHROID) 50 MCG tablet Take 1 tablet (50 mcg) by mouth 2 times daily (Patient not taking: Reported on 5/14/2021) 60 tablet 0       PAST SURGICAL HISTORY:  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)     APPENDECTOMY       ARTHRODESIS ANKLE      right     ARTHROPLASTY KNEE BILATERAL       ARTHROPLASTY REVISION KNEE  4/19/2011    Procedure:ARTHROPLASTY REVISION KNEE; With Antibiotic Cement ; Surgeon:CHAO OLIVARES; Location:[facility]     BREAST SURGERY      right- tissue remove nipple area     BUNIONECTOMY  12/14/2011    Procedure:BUNIONECTOMY; Right Bunion Correction; Surgeon:GRACE ZARATE; Location:[facility]     C STOMACH SURGERY PROCEDURE UNLISTED      see list which we will bring     CHOLECYSTECTOMY       EXAM UNDER ANESTHESIA ANUS N/A 7/15/2020    Procedure: EXAM UNDER ANESTHESIA, ANUS;  Surgeon: Luis Canales MD;  Location:  OR     EXCISE MASS UPPER EXTREMITY  12/14/2011    Procedure:EXCISE MASS UPPER EXTREMITY; Excision of Left Arm Mass; Surgeon:GRACE ZARATE; Location:[facility]     FOOT SURGERY      left X 4     FUSION LUMBAR ANTERIOR ONE LEVEL       HC SACROPLASTY      see list which we will bring     HEMORRHOIDECTOMY INTERNAL N/A 7/15/2020    Procedure: Exam under anesthesia, hemorrhoidectomy;  Surgeon: Luis Canales MD;  Location: [facility] OR     INJECT  NERVE BLOCK SUPRASCAPULAR Left 5/18/2018    Procedure: INJECT NERVE BLOCK SUPRASCAPULAR;  Left Suprascapular Nerve Block;  Surgeon: Basim Velazquez MD;  Location: [facility]     INJECT NERVE BLOCK SUPRASCAPULAR Right 7/23/2018    Procedure: INJECT NERVE BLOCK SUPRASCAPULAR;  Right suprascapular injection;  Surgeon: Basim Velazquez MD;  Location: [facility]     INJECT NERVE BLOCK SUPRASCAPULAR Bilateral 10/29/2018    Procedure: Bilateral Suprascapular Nerve Blocks;  Surgeon: Basim Velazquez MD;  Location: [facility]     INJECT NERVE BLOCK SUPRASCAPULAR Bilateral 3/15/2019    Procedure: Bilateral Suprascapular Nerve Block;  Surgeon: Basim Velazquez MD;  Location: [facility]     INJECT NERVE BLOCK SUPRASCAPULAR Bilateral 12/31/2019    Procedure: bilateral suprascapular nerve block;  Surgeon: Basim Velazquez MD;  Location: [facility]     KNEE SURGERY      see list which we will bring     LAMINECTOMY LUMBAR ONE LEVEL      L4-5     LAPAROSCOPIC ABLATION ENDOMETRIOSIS       RADIO FREQUENCY ABLATION PULSED CERVICAL Bilateral 7/10/2019    Procedure: Bilateral Suprascapular Nerve Pulsed Radiofrequency Ablation;  Surgeon: Basim Velazquez MD;  Location:  OR     REMOVE HARDWARE FOOT  12/14/2011    Procedure:REMOVE HARDWARE FOOT; Hardware Removal Right Foot (Mini-C-Arm) ; Surgeon:GRACE ZARATE; Location:[facility]     RHINOPLASTY       SALPINGO-OOPHORECTOMY BILATERAL       TONSILLECTOMY         ALLERGIES     Allergies   Allergen Reactions     Blood Transfusion Related (Informational Only) Other (See Comments)     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      Chlorhexidine Hives     Thor surgical cleanser     Codeine Hives     Has tolerated Oxycodone in past      Ibuprofen [Nsaids] Hives     Penicillins Hives     Other reaction(s): Unknown     Sulfa Drugs Hives     Other reaction(s): Unknown     Doxycycline GI Disturbance     Emesis & diarrhea  Patient denies allergy to this med     Hydroxyzine      rash     Mold       Red Wine Complex [Red Wine Powder]        FAMILY HISTORY  Family History   Problem Relation Age of Onset     Hypertension Mother      No Known Problems Father      No Known Problems Sister      No Known Problems Brother      No Known Problems Maternal Grandmother      No Known Problems Maternal Grandfather      No Known Problems Paternal Grandmother      No Known Problems Other          SOCIAL HISTORY  Social History     Socioeconomic History     Marital status:      Spouse name: Not on file     Number of children: Not on file     Years of education: Not on file     Highest education level: Not on file   Occupational History     Not on file   Social Needs     Financial resource strain: Not on file     Food insecurity     Worry: Not on file     Inability: Not on file     Transportation needs     Medical: Not on file     Non-medical: Not on file   Tobacco Use     Smoking status: Former Smoker     Packs/day: 1.50     Years: 20.00     Pack years: 30.00     Types: Cigarettes     Start date: 1973     Quit date: 1993     Years since quittin.3     Smokeless tobacco: Never Used   Substance and Sexual Activity     Alcohol use: No     Alcohol/week: 0.0 standard drinks     Drug use: No     Sexual activity: Not Currently     Partners: Male     Birth control/protection: Post-menopausal   Lifestyle     Physical activity     Days per week: Not on file     Minutes per session: Not on file     Stress: Not on file   Relationships     Social connections     Talks on phone: Not on file     Gets together: Not on file     Attends Sikhism service: Not on file     Active member of club or organization: Not on file     Attends meetings of clubs or organizations: Not on file     Relationship status: Not on file     Intimate partner violence     Fear of current or ex partner: Not on file     Emotionally abused: Not on file     Physically abused: Not on file     Forced sexual activity: Not on file   Other Topics  "Concern     Parent/sibling w/ CABG, MI or angioplasty before 65F 55M? Not Asked   Social History Narrative    ** Merged History Encounter **            ROS:   Constitutional: No fever, chills, or sweats. No weight gain/loss   ENT: No visual disturbance, ear ache, epistaxis, sore throat  Allergies/Immunologic: Negative  Respiratory: No cough, hemoptysia  Cardiovascular: As per HPI  GI: No nausea, vomiting, hematemesis, melena, or hematochezia  : No urinary frequency, dysuria, or hematuria  Integument: Negative  Psychiatric: Negative  Neuro: Negative  Endocrinology: Negative   Musculoskeletal: Negative  Vascular: No walking impairment, claudication, ischemic rest pain or nonhealing wounds    EXAM:  BP (!) 157/97   Pulse 80   Ht 1.753 m (5' 9\")   Wt 125.2 kg (276 lb)   BMI 40.76 kg/m    In general, the patient is a pleasant female in no apparent distress.    HEENT: NC/AT.  PERRLA.  EOMI.  Sclerae white, not injected.    Neck:  Carotids +2/2 bilaterally without bruits.  No jugular venous distension.   Heart: RRR. Normal S1, S2 splits physiologically. No murmur, rub, click, or gallop.   Lungs: CTA.  No ronchi, wheezes, rales.    Extremities: No clubbing, cyanosis. +stasis dermatitis. Lipodermatosclerosis present. Pulses intact. There is +stemmar sign bilaterally.    Vascular: No bruits are noted.    Labs:  LIPID RESULTS:  Lab Results   Component Value Date    CHOL 250 (H) 09/19/2018    HDL 93 09/19/2018     (H) 09/19/2018    TRIG 75 09/19/2018    CHOLHDLRATIO 3.1 01/06/2014    NHDL 157 (H) 09/19/2018       LIVER ENZYME RESULTS:  Lab Results   Component Value Date    AST 26 01/23/2020    ALT 35 01/23/2020       CBC RESULTS:  Lab Results   Component Value Date    WBC 5.6 11/01/2020    RBC 4.02 11/01/2020    HGB 10.8 (L) 11/01/2020    HCT 34.2 (L) 11/01/2020    MCV 85 11/01/2020    MCH 26.9 11/01/2020    MCHC 31.6 11/01/2020    RDW 14.6 11/01/2020     11/01/2020       BMP RESULTS:  Lab Results "   Component Value Date     11/01/2020    POTASSIUM 4.2 11/02/2020    CHLORIDE 106 11/01/2020    CO2 29 11/01/2020    ANIONGAP 2 (L) 11/01/2020     (H) 11/01/2020    BUN 10 11/01/2020    CR 0.51 (L) 11/01/2020    GFRESTIMATED >90 11/01/2020    GFRESTBLACK >90 11/01/2020    KYLIE 8.9 11/01/2020        A1C RESULTS:  Lab Results   Component Value Date    A1C 6.0 (H) 12/09/2019       Thank you for allowing me to participate in the care of your patient.      Sincerely,     Sil Muñoz MD     [facility]    cc:   Sil Muñoz MD  [address] [place] [id number]

## 2021-05-14 NOTE — PROGRESS NOTES
Vascular Cardiology Consultation Follow Up      HPI:      This is a pleasant 61 year old with the following PMH: atrial fibrillation, right LE DVT and PE in the past on long term xarelto, HLD, HTN, lupus on chronic steroids who I see for chronic vein insufficiency as well as atrial fibrillation.     She has seen several providers in the past at other health care systems and after we had a pleasant first visit and she wishes to continue care with me in cardiology.      From prior visit: October she was admitted for DVT and was found to be in rapid atrial fibrillation. She was started on dilitazem and metoprolol. She reports unclear duration of afib as she is not symptomatic from it. During our first in office visit her rates were over 100 bpm however she has long standing thyroid dysfunction. No history of palpitations, dizziness, syncope, chest pain or shortness of breath from the AFIB however she feels constantly fatigued and tired.     Social history: works occupation in medicare fraud. Here with . Has a daughter in nutrition school.      Has had a popliteal DVT of the right leg for which she has been on adequate blood thinners but her leg has been feeling tight and painful. She had been temporarily stopped on xarelto for a thumb surgery. She had some right toe discoloration and occasional blue toe syndrome. We performed arterial studies and PPGs in November which were normal and suggests good arterial flow to the foot. When I reviewed her CT scan from prior admission she also notably has a dilated PA of 4.5 cm as well as aortic root aneurysm of 4.1 cm. No known family history of this.     Given rapid AFIB I referred her to EP for evaluation and consideration of rhythm control strategy but overall rate control was decided on with adequate control of thyroid function. She had repeat ziopatch on diltiazem increase and it showed no AFIB recurrence.     I repeated her US of her right leg which is  negative for DVT. She had vein comp study with right deep, superficial reflux (severe) and also left sided reflux. Her legs she reports get very swollen and it extends to her toes. She has discoloration and discomfort from the swelling. She has tried compression stockings for at least three months and elevates her legs consistently with no change in symptoms.      In review of recent studies:      Ziopatch: 45% burden AFIB, average rate ~120 bpm, as high as 204 bpm. No significant pauses.  Repeat Ziopatch: NO ATRIAL FIBRILLATION 4/13/2021 (on medical suppressive therapy and thyroid dysfunction better controlled)    Echo 11-2-2020 - A Fib  Left ventricular systolic function normal. EF 55-60%.   The study was technically difficult.     US Venous Lower Extremity Right: 10-  - Care everywhere   1.  Acute DVT right posterior tibial vein.   2.  Popliteal cyst.       Chest CT a 10- Care Everywhere.   1.  Limited exam due to respiratory motion. No central or definite peripheral   pulmonary artery embolism. No thoracic aortic dissection.   2.  Mild left upper lobar consolidation and patchy airspace opacities likely   reflecting pneumonia. Follow-up to assess for resolution is recommended.   3.  Findings suggesting pulmonary air trapping which can be seen with small   airways disease.   4.  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 Recommend   follow-up with ultrasound or CT in 6 months.     CT Pulmonary 9-21-18   1. No evidence of pulmonary embolism. No acute pulmonary process.   2. Scattered sub-6 mm pulmonary nodules. If the patient is considered   low risk for malignancy, no further follow-up is necessary. Otherwise,   LIKELY consider 12 month follow-up chest CT as per Fleischner Society   2017 guidelines.   3. Cardiomegaly. Ectasia of the ascending aorta 4.1 cm.   4. Dilatation of the pulmonary vasculature, including the main   pulmonary artery to 4.5 cm. This can be seen with  pulmonary arterial   Hypertension.     US LE 5/2021    DVT resolved of the right leg    VEIN COMP STUDY 2/2021    1. Right leg: No DVT.  Reflux of 4.8-5.1 seconds in thigh vessels  ranging from 4.6-9.1 mm.  Mid calf and ankle not seen due to edema and  depth. Thigh Ext. and SSV with 1.9 and 2.4 seconds of reflux in  vessels of 2.1 and 4.6 mm.  Tributary without reflux.  Reflux also  seen in the deep venous system.      2. Left leg: No DVT.  No reflux in the deep venous system.  Reflux in  the greater saphenous vein at SFJ and mid thigh ranging from 0.7 - 2.2  seconds with vessels ranging from 5.5-6.5 mm.    Reflux of 0.7 - 2.0  seconds from the knee to the ankle in vessels 3.9-5.3 mm.  Tributary  without reflux.     ASSESSMENT/PLAN:     1. Atrial Fibrillation: normal LV function by recent echocardiogram   -continue rivaroxaban for anticoagulation in SPAF and elevated CHADSVASC as well as VTE history   -continue diltiazem and metoprolol  -intermittent ziopatch monitoring for recurrence: AF currently suppressed which I believe has to do with her thyroid function, can repeat every 6 months to one year along with echocardiogram     2. History of PE and recent DVT of right popliteal vein: DVT resolved by recent ultrasound but CVI of the superficial and deep system with profound toe swelling and stasis dermatitis consistent with combination venous and lymphedema  -continue rivaroxaban long term  -compression stockings to continue, elevation of leg, adequate leg hydration and will Rx mechanical compression pumps for patient as I think she is a good candidate for this given post thrombotic syndrome  -refer to Dr. Thomas at the [facility] for evaluation of complex vein reflux in setting of post thrombotic syndrome, evaluation of saphenous vein intervention (not to replace medical therapy recommendations)     3. Right toe discoloration:  -no arterial disease by toe PPGs, LE arterial duplex and ABIs   -believe its' all stasis  dermatitis   -toe swelling c/w lymphedema      4. HTN: likely labile due to steroids and lupus   -continue diltiazem and metoprolol     5. Nutrition: discussed functional nutritionist evaluation for food sensitivity testing       Will follow up with patient in 6 months with malu Muñoz MD MSc  [facility]     PAST MEDICAL HISTORY  Past Medical History:   Diagnosis Date     ADHD (attention deficit hyperactivity disorder)      Allergic rhinitis      Chronic low back pain      Cushing's syndrome (H)      DDD (degenerative disc disease)      DDD (degenerative disc disease)      Deviated nasal septum      Diarrhea      Endometriosis      Fibromyalgia      Hashimoto's disease      Hyperlipidemia      Hypothyroid     hx hashimoto's thyroiditis     Insomnia      Lupus (H)      Malabsorption      Pernicious anemia      Renal disease     chronic renal insufficiency     Sinusitis        CURRENT MEDICATIONS  Current Outpatient Medications   Medication Sig Dispense Refill     BIOTIN FORTE PO Take 1 tablet by mouth daily        carboxymethylcellulose (CARBOXYMETHYLCELLULOSE SODIUM) 0.5 % SOLN ophthalmic solution Place 1 drop into both eyes 2 times daily as needed  1 Bottle      CEPHALEXIN PO Take 500 mg by mouth as needed (Dental Procedure) Take 4 caps 1 hour prior to Dental Appointment       cyanocobalamin 1000 MCG/ML injection Inject 1 mL (1,000 mcg) Subcutaneous every 7 days 4 mL 5     cyclobenzaprine (FLEXERIL) 10 MG tablet Take 1 tablet (10 mg) by mouth 3 times daily 90 tablet 3     cycloSPORINE (RESTASIS) 0.05 % ophthalmic emulsion Place 1 drop into both eyes 2 times daily       diltiazem ER COATED BEADS (CARDIZEM CD/CARTIA XT) 180 MG 24 hr capsule Take 1 capsule (180 mg) by mouth 2 times daily 180 capsule 3     fexofenadine (ALLEGRA) 180 MG tablet Take 180 mg by mouth daily as needed        gabapentin (NEURONTIN) 300 MG capsule Take 300 mg by mouth 3 times daily In addition to 800 mg for total  dose of 1100 mg       gabapentin (NEURONTIN) 800 MG tablet Take 800 mg by mouth 3 times daily In addition to 300 mg for total dose of 1100 mg       HYDROmorphone (DILAUDID) 8 MG tablet Take 8 mg by mouth every 8 hours . Max of 3 doses per day.       levothyroxine (SYNTHROID/LEVOTHROID) 100 MCG SOLR injection Inject 200 mcg into the vein once a week        levothyroxine (SYNTHROID/LEVOTHROID) 200 MCG tablet Take 1 tablet (200 mcg) by mouth 2 times daily (Patient taking differently: Take 300 mcg by mouth 2 times daily ) 60 tablet 0     lifitegrast (XIIDRA) 5 % opthalmic solution Place 1 drop into both eyes 2 times daily       liothyronine (CYTOMEL) 25 MCG tablet Take 50 mcg by mouth 2 times daily        methylcellulose (CITRUCEL) powder Start with 1 heaping tablespoon. Increase as needed, 1 heaping tablespoon at a time, up to 3 times per day. 479 g 3     metoprolol succinate ER (TOPROL-XL) 100 MG 24 hr tablet Take 1 tablet (100 mg) by mouth 2 times daily 30 tablet 3     morphine (MS CONTIN) 30 MG 12 hr tablet Take 30 mg by mouth 2 times daily along with one morphine 60 mg 12 hr tablet for a total dose of 90 mg.  AM and afternoon 270 tablet 0     morphine (MS CONTIN) 60 MG 12 hr tablet Take 60 mg by mouth 3 times daily  30 tablet 0     multivitamin, therapeutic with minerals (MULTI-VITAMIN) TABS tablet Take 1 tablet by mouth 2 times daily 100 tablet 3     NASONEX 50 MCG/ACT spray Spray 1 spray into both nostrils daily as needed   11     nystatin (MYCOSTATIN) 524349 UNIT/GM external powder Apply topically 3 times daily as needed 60 g 1     prednisoLONE acetate (PRED FORTE) 1 % ophthalmic susp 1 drop 4 times daily       predniSONE (DELTASONE) 1 MG tablet Use along with one prednisone 5 mg tablets to alternate taking 8mg and 10mg every other day.  2     predniSONE (DELTASONE) 5 MG tablet Alternate taking 8mg and 10mg every other day       probiotic CAPS Take 1 capsule by mouth 2 times daily       rivaroxaban ANTICOAGULANT  (XARELTO) 20 MG TABS tablet Take 20 mg by mouth daily (with dinner)        vitamin D2 (ERGOCALCIFEROL) 20101 units (1250 mcg) capsule Take 50,000 Units by mouth once a week       zolpidem (AMBIEN) 5 MG tablet Take 5 mg by mouth nightly as needed for sleep       levothyroxine (SYNTHROID/LEVOTHROID) 50 MCG tablet Take 1 tablet (50 mcg) by mouth 2 times daily (Patient not taking: Reported on 5/14/2021) 60 tablet 0       PAST SURGICAL HISTORY:  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)     APPENDECTOMY       ARTHRODESIS ANKLE      right     ARTHROPLASTY KNEE BILATERAL       ARTHROPLASTY REVISION KNEE  4/19/2011    Procedure:ARTHROPLASTY REVISION KNEE; With Antibiotic Cement ; Surgeon:CHAO OLIVARES; Location:[facility]     BREAST SURGERY      right- tissue remove nipple area     BUNIONECTOMY  12/14/2011    Procedure:BUNIONECTOMY; Right Bunion Correction; Surgeon:GRACE ZARATE; Location:[facility]     C STOMACH SURGERY PROCEDURE UNLISTED      see list which we will bring     CHOLECYSTECTOMY       EXAM UNDER ANESTHESIA ANUS N/A 7/15/2020    Procedure: EXAM UNDER ANESTHESIA, ANUS;  Surgeon: Luis Canales MD;  [facility]: [facility]     EXCISE MASS UPPER EXTREMITY  12/14/2011    Procedure:EXCISE MASS UPPER EXTREMITY; Excision of Left Arm Mass; Surgeon:GRACE ZARATE; Location:[facility]     FOOT SURGERY      left X 4     FUSION LUMBAR ANTERIOR ONE LEVEL       HC SACROPLASTY      see list which we will bring     HEMORRHOIDECTOMY INTERNAL N/A 7/15/2020    Procedure: Exam under anesthesia, hemorrhoidectomy;  Surgeon: Luis Canales MD;  [facility]     INJECT NERVE BLOCK SUPRASCAPULAR Left 5/18/2018    Procedure: INJECT NERVE BLOCK SUPRASCAPULAR;  Left Suprascapular Nerve Block;  Surgeon: Basim Velazquez MD;  [facility]     INJECT NERVE BLOCK SUPRASCAPULAR Right 7/23/2018    Procedure: INJECT NERVE BLOCK  SUPRASCAPULAR;  Right suprascapular injection;  Surgeon: Basim Velazquez MD;  [facility]     INJECT NERVE BLOCK SUPRASCAPULAR Bilateral 10/29/2018    Procedure: Bilateral Suprascapular Nerve Blocks;  Surgeon: Basim Velazquez MD;  [facility]     INJECT NERVE BLOCK SUPRASCAPULAR Bilateral 3/15/2019    Procedure: Bilateral Suprascapular Nerve Block;  Surgeon: Basim Velazquez MD;  [facility]     INJECT NERVE BLOCK SUPRASCAPULAR Bilateral 12/31/2019    Procedure: bilateral suprascapular nerve block;  Surgeon: Basim Velazquez MD;  [facility]     KNEE SURGERY      see list which we will bring     LAMINECTOMY LUMBAR ONE LEVEL      L4-5     LAPAROSCOPIC ABLATION ENDOMETRIOSIS       RADIO FREQUENCY ABLATION PULSED CERVICAL Bilateral 7/10/2019    Procedure: Bilateral Suprascapular Nerve Pulsed Radiofrequency Ablation;  Surgeon: Basim Velazquez MD;  Location:  OR     REMOVE HARDWARE FOOT  12/14/2011    Procedure:REMOVE HARDWARE FOOT; Hardware Removal Right Foot (Mini-C-Arm) ; Surgeon:GRACE ZARATE; Location:[facility]     RHINOPLASTY       SALPINGO-OOPHORECTOMY BILATERAL       TONSILLECTOMY         ALLERGIES     Allergies   Allergen Reactions     Blood Transfusion Related (Informational Only) Other (See Comments)     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      Chlorhexidine Hives     Thor surgical cleanser     Codeine Hives     Has tolerated Oxycodone in past      Ibuprofen [Nsaids] Hives     Penicillins Hives     Other reaction(s): Unknown     Sulfa Drugs Hives     Other reaction(s): Unknown     Doxycycline GI Disturbance     Emesis & diarrhea  Patient denies allergy to this med     Hydroxyzine      rash     Mold      Red Wine Complex [Red Wine Powder]        FAMILY HISTORY  Family History   Problem Relation Age of Onset     Hypertension Mother      No Known Problems Father      No Known Problems Sister      No Known Problems Brother      No Known Problems Maternal  Grandmother      No Known Problems Maternal Grandfather      No Known Problems Paternal Grandmother      No Known Problems Other          SOCIAL HISTORY  Social History     Socioeconomic History     Marital status:      Spouse name: Not on file     Number of children: Not on file     Years of education: Not on file     Highest education level: Not on file   Occupational History     Not on file   Social Needs     Financial resource strain: Not on file     Food insecurity     Worry: Not on file     Inability: Not on file     Transportation needs     Medical: Not on file     Non-medical: Not on file   Tobacco Use     Smoking status: Former Smoker     Packs/day: 1.50     Years: 20.00     Pack years: 30.00     Types: Cigarettes     Start date: 1973     Quit date: 1993     Years since quittin.3     Smokeless tobacco: Never Used   Substance and Sexual Activity     Alcohol use: No     Alcohol/week: 0.0 standard drinks     Drug use: No     Sexual activity: Not Currently     Partners: Male     Birth control/protection: Post-menopausal   Lifestyle     Physical activity     Days per week: Not on file     Minutes per session: Not on file     Stress: Not on file   Relationships     Social connections     Talks on phone: Not on file     Gets together: Not on file     Attends Muslim service: Not on file     Active member of club or organization: Not on file     Attends meetings of clubs or organizations: Not on file     Relationship status: Not on file     Intimate partner violence     Fear of current or ex partner: Not on file     Emotionally abused: Not on file     Physically abused: Not on file     Forced sexual activity: Not on file   Other Topics Concern     Parent/sibling w/ CABG, MI or angioplasty before 65F 55M? Not Asked   Social History Narrative    ** Merged History Encounter **            ROS:   Constitutional: No fever, chills, or sweats. No weight gain/loss   ENT: No visual disturbance, ear  "ache, epistaxis, sore throat  Allergies/Immunologic: Negative  Respiratory: No cough, hemoptysia  Cardiovascular: As per HPI  GI: No nausea, vomiting, hematemesis, melena, or hematochezia  : No urinary frequency, dysuria, or hematuria  Integument: Negative  Psychiatric: Negative  Neuro: Negative  Endocrinology: Negative   Musculoskeletal: Negative  Vascular: No walking impairment, claudication, ischemic rest pain or nonhealing wounds    EXAM:  BP (!) 157/97   Pulse 80   Ht 1.753 m (5' 9\")   Wt 125.2 kg (276 lb)   BMI 40.76 kg/m    In general, the patient is a pleasant female in no apparent distress.    HEENT: NC/AT.  PERRLA.  EOMI.  Sclerae white, not injected.    Neck:  Carotids +2/2 bilaterally without bruits.  No jugular venous distension.   Heart: RRR. Normal S1, S2 splits physiologically. No murmur, rub, click, or gallop.   Lungs: CTA.  No ronchi, wheezes, rales.    Extremities: No clubbing, cyanosis. +stasis dermatitis. Lipodermatosclerosis present. Pulses intact. There is +stemmar sign bilaterally.    Vascular: No bruits are noted.    Labs:  LIPID RESULTS:  Lab Results   Component Value Date    CHOL 250 (H) 09/19/2018    HDL 93 09/19/2018     (H) 09/19/2018    TRIG 75 09/19/2018    CHOLHDLRATIO 3.1 01/06/2014    NHDL 157 (H) 09/19/2018       LIVER ENZYME RESULTS:  Lab Results   Component Value Date    AST 26 01/23/2020    ALT 35 01/23/2020       CBC RESULTS:  Lab Results   Component Value Date    WBC 5.6 11/01/2020    RBC 4.02 11/01/2020    HGB 10.8 (L) 11/01/2020    HCT 34.2 (L) 11/01/2020    MCV 85 11/01/2020    MCH 26.9 11/01/2020    MCHC 31.6 11/01/2020    RDW 14.6 11/01/2020     11/01/2020       BMP RESULTS:  Lab Results   Component Value Date     11/01/2020    POTASSIUM 4.2 11/02/2020    CHLORIDE 106 11/01/2020    CO2 29 11/01/2020    ANIONGAP 2 (L) 11/01/2020     (H) 11/01/2020    BUN 10 11/01/2020    CR 0.51 (L) 11/01/2020    GFRESTIMATED >90 11/01/2020    GFRESTBLACK " >90 11/01/2020    KYLIE 8.9 11/01/2020        A1C RESULTS:  Lab Results   Component Value Date    A1C 6.0 (H) 12/09/2019

## 2021-05-14 NOTE — PATIENT INSTRUCTIONS
1. Will have Pravin my nurse (or nursing team) set you up for [employer] compression pumps for post thrombotic syndrome and set you up with Dr. Thomas at [facility].  2. Continue anticoagulation   3. Atrial fibrillation is currently suppressed by medication: will continue current regimen, and do an echocardiogram and ziopatch every 6 months to a year  4. Follow up in 6 months with Dr. Muñoz

## 2021-05-20 ENCOUNTER — DOCUMENTATION ONLY (OUTPATIENT)
Dept: CARDIOLOGY | Facility: CLINIC | Age: 61
End: 2021-05-20

## 2021-05-20 NOTE — PROGRESS NOTES
Completed [employer] form along with Dr. Muñoz's office visit note submitted to Jeovany Breaux (biotab pump rep) to begin process for bio tab pump approval per Dr. Muñoz's request.

## 2021-06-01 ENCOUNTER — TELEPHONE (OUTPATIENT)
Dept: RADIOLOGY | Facility: CLINIC | Age: 61
End: 2021-06-01

## 2021-06-01 ENCOUNTER — TELEPHONE (OUTPATIENT)
Dept: VASCULAR SURGERY | Facility: CLINIC | Age: 61
End: 2021-06-01
Payer: COMMERCIAL

## 2021-06-01 NOTE — TELEPHONE ENCOUNTER
DIAGNOSIS: New pvd   DATE RECEIVED: 6.2.21   NOTES STATUS DETAILS   OFFICE NOTE from referring provider Internal 5.14.21  Dr. Sil Muñoz  [facility]   OFFICE NOTE from other specialist CE 5.18.21  Dr. Kody Waddell [employer]   OPERATIVE REPORT na    MEDICATION LIST Internal    PERTINENT LABS Internal    CTA (CT ANGIOGRAPHY) na    CT na    MRI na    ULTRASOUND Internal 5.10.21  US Low Ext Venous Duplex Right    2.18.21  US Venous Comp Rosales    11.10.20  US PEPE Doppler/no exer/Rosales    11.10.20  US Low Ext PPG    11.10.20  US Lower Ext Arterial Duplex Rosales

## 2021-06-01 NOTE — TELEPHONE ENCOUNTER
Called patient back in regards to rescheduling appointment with  6/2 at 1:40.    Patient stated to call  Ash due to scheduling appointments.    LM for Ash patient's  to reschedule appointment, call back number was provided.

## 2021-06-01 NOTE — TELEPHONE ENCOUNTER
Referring providers name, location, phone number and fax:   Sil Muñoz MD  [facility]  phone [phone]  fax not listed    Reason for visit:   Acute deep vein thrombosis (DVT) of popliteal vein of right lower extremity  refer her to Dr. Thomas in vascular IR at [facility] for consideration of vein ablation of the right leg    Does patient have a referral:  Yes    Referral to specific provider?   Dr Salcedo    Medical records or notes if possible: In Epic    Pt's  called to cancel 6/28/21, 1:40p appt with Dr Salcedo. He wants to reschedule. Please return call to him to reschedule.    Thanks-

## 2021-06-02 ENCOUNTER — PRE VISIT (OUTPATIENT)
Dept: VASCULAR SURGERY | Facility: CLINIC | Age: 61
End: 2021-06-02

## 2021-06-03 ENCOUNTER — TELEPHONE (OUTPATIENT)
Dept: CARDIOLOGY | Facility: CLINIC | Age: 61
End: 2021-06-03

## 2021-06-03 NOTE — TELEPHONE ENCOUNTER
JAYDEN received from Dawn, [employer] representative, inquiring if we received forms to be signed by Dr. Muñoz on Friday 5/28/21. Forms are on Dr. Muñoz's desk to be signed and completed. Will route to Dr. Muñoz.

## 2021-06-06 ENCOUNTER — APPOINTMENT (OUTPATIENT)
Dept: MRI IMAGING | Facility: CLINIC | Age: 61
DRG: 074 | End: 2021-06-06
Attending: EMERGENCY MEDICINE
Payer: COMMERCIAL

## 2021-06-06 ENCOUNTER — HOSPITAL ENCOUNTER (INPATIENT)
Facility: CLINIC | Age: 61
LOS: 3 days | Discharge: HOME | DRG: 074 | End: 2021-06-09
Attending: EMERGENCY MEDICINE | Admitting: INTERNAL MEDICINE
Payer: COMMERCIAL

## 2021-06-06 ENCOUNTER — APPOINTMENT (OUTPATIENT)
Dept: CT IMAGING | Facility: CLINIC | Age: 61
DRG: 074 | End: 2021-06-06
Attending: EMERGENCY MEDICINE
Payer: COMMERCIAL

## 2021-06-06 DIAGNOSIS — S62.515A CLOSED NONDISPLACED FRACTURE OF PROXIMAL PHALANX OF LEFT THUMB, INITIAL ENCOUNTER: ICD-10-CM

## 2021-06-06 DIAGNOSIS — I63.9 CEREBROVASCULAR ACCIDENT (CVA), UNSPECIFIED MECHANISM (H): ICD-10-CM

## 2021-06-06 DIAGNOSIS — S14.3XXA INJURY OF BRACHIAL PLEXUS, INITIAL ENCOUNTER: ICD-10-CM

## 2021-06-06 DIAGNOSIS — E78.5 HYPERLIPIDEMIA LDL GOAL <100: Primary | ICD-10-CM

## 2021-06-06 LAB
CHOLEST SERPL-MCNC: 303 MG/DL
HDLC SERPL-MCNC: 70 MG/DL
LABORATORY COMMENT REPORT: NORMAL
LDLC SERPL CALC-MCNC: 181 MG/DL
NONHDLC SERPL-MCNC: 233 MG/DL
POTASSIUM SERPL-SCNC: 3.6 MMOL/L (ref 3.4–5.3)
SARS-COV-2 RNA RESP QL NAA+PROBE: NEGATIVE
SPECIMEN SOURCE: NORMAL
T4 FREE SERPL-MCNC: 0.35 NG/DL (ref 0.76–1.46)
TRIGL SERPL-MCNC: 259 MG/DL
TROPONIN I SERPL-MCNC: <0.015 UG/L (ref 0–0.04)
TSH SERPL DL<=0.005 MIU/L-ACNC: 120.79 MU/L (ref 0.4–4)

## 2021-06-06 PROCEDURE — 87635 SARS-COV-2 COVID-19 AMP PRB: CPT | Performed by: EMERGENCY MEDICINE

## 2021-06-06 PROCEDURE — 84484 ASSAY OF TROPONIN QUANT: CPT | Performed by: INTERNAL MEDICINE

## 2021-06-06 PROCEDURE — 99223 1ST HOSP IP/OBS HIGH 75: CPT | Mod: AI | Performed by: INTERNAL MEDICINE

## 2021-06-06 PROCEDURE — A9585 GADOBUTROL INJECTION: HCPCS | Performed by: EMERGENCY MEDICINE

## 2021-06-06 PROCEDURE — 80061 LIPID PANEL: CPT | Performed by: INTERNAL MEDICINE

## 2021-06-06 PROCEDURE — 96374 THER/PROPH/DIAG INJ IV PUSH: CPT

## 2021-06-06 PROCEDURE — 250N000011 HC RX IP 250 OP 636: Performed by: EMERGENCY MEDICINE

## 2021-06-06 PROCEDURE — 70496 CT ANGIOGRAPHY HEAD: CPT

## 2021-06-06 PROCEDURE — 99285 EMERGENCY DEPT VISIT HI MDM: CPT | Mod: 25

## 2021-06-06 PROCEDURE — C9803 HOPD COVID-19 SPEC COLLECT: HCPCS

## 2021-06-06 PROCEDURE — 84132 ASSAY OF SERUM POTASSIUM: CPT | Performed by: INTERNAL MEDICINE

## 2021-06-06 PROCEDURE — 84443 ASSAY THYROID STIM HORMONE: CPT | Performed by: INTERNAL MEDICINE

## 2021-06-06 PROCEDURE — 84439 ASSAY OF FREE THYROXINE: CPT | Performed by: INTERNAL MEDICINE

## 2021-06-06 PROCEDURE — 250N000009 HC RX 250: Performed by: EMERGENCY MEDICINE

## 2021-06-06 PROCEDURE — 70553 MRI BRAIN STEM W/O & W/DYE: CPT

## 2021-06-06 PROCEDURE — 255N000002 HC RX 255 OP 636: Performed by: EMERGENCY MEDICINE

## 2021-06-06 PROCEDURE — 120N000001 HC R&B MED SURG/OB

## 2021-06-06 PROCEDURE — 250N000013 HC RX MED GY IP 250 OP 250 PS 637: Performed by: INTERNAL MEDICINE

## 2021-06-06 PROCEDURE — 999N001017 HC STATISTIC GLUCOSE BY METER IP

## 2021-06-06 RX ORDER — LABETALOL HYDROCHLORIDE 5 MG/ML
10-20 INJECTION, SOLUTION INTRAVENOUS EVERY 10 MIN PRN
Status: DISCONTINUED | OUTPATIENT
Start: 2021-06-06 | End: 2021-06-09 | Stop reason: HOSPADM

## 2021-06-06 RX ORDER — GADOBUTROL 604.72 MG/ML
10 INJECTION INTRAVENOUS ONCE
Status: DISCONTINUED | OUTPATIENT
Start: 2021-06-06 | End: 2021-06-06

## 2021-06-06 RX ORDER — ONDANSETRON 2 MG/ML
4 INJECTION INTRAMUSCULAR; INTRAVENOUS EVERY 6 HOURS PRN
Status: DISCONTINUED | OUTPATIENT
Start: 2021-06-06 | End: 2021-06-09 | Stop reason: HOSPADM

## 2021-06-06 RX ORDER — ACETAMINOPHEN 325 MG/1
650 TABLET ORAL EVERY 4 HOURS PRN
Status: DISCONTINUED | OUTPATIENT
Start: 2021-06-06 | End: 2021-06-09 | Stop reason: HOSPADM

## 2021-06-06 RX ORDER — NALOXONE HYDROCHLORIDE 0.4 MG/ML
0.2 INJECTION, SOLUTION INTRAMUSCULAR; INTRAVENOUS; SUBCUTANEOUS
Status: DISCONTINUED | OUTPATIENT
Start: 2021-06-06 | End: 2021-06-09 | Stop reason: HOSPADM

## 2021-06-06 RX ORDER — MORPHINE SULFATE 4 MG/ML
4 INJECTION, SOLUTION INTRAMUSCULAR; INTRAVENOUS ONCE
Status: COMPLETED | OUTPATIENT
Start: 2021-06-06 | End: 2021-06-06

## 2021-06-06 RX ORDER — GABAPENTIN 800 MG/1
800 TABLET ORAL AT BEDTIME
Status: ON HOLD | COMMUNITY
End: 2021-10-26

## 2021-06-06 RX ORDER — HYDRALAZINE HYDROCHLORIDE 20 MG/ML
10-20 INJECTION INTRAMUSCULAR; INTRAVENOUS
Status: DISCONTINUED | OUTPATIENT
Start: 2021-06-06 | End: 2021-06-09 | Stop reason: HOSPADM

## 2021-06-06 RX ORDER — PREDNISONE 5 MG/1
10 TABLET ORAL EVERY OTHER DAY
Status: ON HOLD | COMMUNITY
End: 2022-02-17

## 2021-06-06 RX ORDER — LEVOTHYROXINE SODIUM 300 UG/1
300 TABLET ORAL 2 TIMES DAILY
Status: ON HOLD | COMMUNITY
End: 2021-06-09

## 2021-06-06 RX ORDER — NALOXONE HYDROCHLORIDE 0.4 MG/ML
0.4 INJECTION, SOLUTION INTRAMUSCULAR; INTRAVENOUS; SUBCUTANEOUS
Status: DISCONTINUED | OUTPATIENT
Start: 2021-06-06 | End: 2021-06-09 | Stop reason: HOSPADM

## 2021-06-06 RX ORDER — LIDOCAINE 40 MG/G
CREAM TOPICAL
Status: DISCONTINUED | OUTPATIENT
Start: 2021-06-06 | End: 2021-06-09 | Stop reason: HOSPADM

## 2021-06-06 RX ORDER — ONDANSETRON 4 MG/1
4 TABLET, ORALLY DISINTEGRATING ORAL EVERY 6 HOURS PRN
Status: DISCONTINUED | OUTPATIENT
Start: 2021-06-06 | End: 2021-06-09 | Stop reason: HOSPADM

## 2021-06-06 RX ORDER — MORPHINE SULFATE 30 MG/1
30 TABLET, FILM COATED, EXTENDED RELEASE ORAL 2 TIMES DAILY
Status: DISCONTINUED | OUTPATIENT
Start: 2021-06-06 | End: 2021-06-09 | Stop reason: HOSPADM

## 2021-06-06 RX ORDER — IOPAMIDOL 755 MG/ML
70 INJECTION, SOLUTION INTRAVASCULAR ONCE
Status: COMPLETED | OUTPATIENT
Start: 2021-06-06 | End: 2021-06-06

## 2021-06-06 RX ORDER — PREDNISONE 5 MG/1
10 TABLET ORAL EVERY OTHER DAY
Status: DISCONTINUED | OUTPATIENT
Start: 2021-06-08 | End: 2021-06-09 | Stop reason: HOSPADM

## 2021-06-06 RX ORDER — ATORVASTATIN CALCIUM 20 MG/1
20 TABLET, FILM COATED ORAL EVERY EVENING
Status: DISCONTINUED | OUTPATIENT
Start: 2021-06-06 | End: 2021-06-07

## 2021-06-06 RX ORDER — SODIUM CHLORIDE 9 MG/ML
INJECTION, SOLUTION INTRAVENOUS
COMMUNITY
End: 2021-10-30

## 2021-06-06 RX ORDER — MORPHINE SULFATE 60 MG/1
60 TABLET, FILM COATED, EXTENDED RELEASE ORAL 3 TIMES DAILY
COMMUNITY
End: 2022-11-15

## 2021-06-06 RX ORDER — GADOBUTROL 604.72 MG/ML
10 INJECTION INTRAVENOUS ONCE
Status: COMPLETED | OUTPATIENT
Start: 2021-06-06 | End: 2021-06-06

## 2021-06-06 RX ORDER — MORPHINE SULFATE 30 MG/1
60 TABLET, FILM COATED, EXTENDED RELEASE ORAL 3 TIMES DAILY
Status: DISCONTINUED | OUTPATIENT
Start: 2021-06-06 | End: 2021-06-09 | Stop reason: HOSPADM

## 2021-06-06 RX ADMIN — GADOBUTROL 10 ML: 604.72 INJECTION INTRAVENOUS at 17:04

## 2021-06-06 RX ADMIN — MORPHINE SULFATE 30 MG: 30 TABLET, EXTENDED RELEASE ORAL at 23:56

## 2021-06-06 RX ADMIN — ATORVASTATIN CALCIUM 20 MG: 20 TABLET, FILM COATED ORAL at 23:56

## 2021-06-06 RX ADMIN — SODIUM CHLORIDE 90 ML: 9 INJECTION, SOLUTION INTRAVENOUS at 16:40

## 2021-06-06 RX ADMIN — MORPHINE SULFATE 4 MG: 4 INJECTION, SOLUTION INTRAMUSCULAR; INTRAVENOUS at 18:05

## 2021-06-06 RX ADMIN — RIVAROXABAN 20 MG: 20 TABLET, FILM COATED ORAL at 23:56

## 2021-06-06 RX ADMIN — MORPHINE SULFATE 60 MG: 30 TABLET, EXTENDED RELEASE ORAL at 23:56

## 2021-06-06 RX ADMIN — IOPAMIDOL 70 ML: 755 INJECTION, SOLUTION INTRAVENOUS at 16:39

## 2021-06-06 ASSESSMENT — ACTIVITIES OF DAILY LIVING (ADL)
WALKING_OR_CLIMBING_STAIRS_DIFFICULTY: NO
PATIENT_/_FAMILY_COMMUNICATION_STYLE: SPOKEN LANGUAGE (ENGLISH OR BILINGUAL)
FALL_HISTORY_WITHIN_LAST_SIX_MONTHS: YES
DRESSING/BATHING_DIFFICULTY: YES
DOING_ERRANDS_INDEPENDENTLY_DIFFICULTY: NO
WEAR_GLASSES_OR_BLIND: YES
EQUIPMENT_CURRENTLY_USED_AT_HOME: WALKER, STANDARD;OTHER (SEE COMMENTS)
HEARING_DIFFICULTY_OR_DEAF: NO
WHICH_OF_THE_ABOVE_FUNCTIONAL_RISKS_HAD_A_RECENT_ONSET_OR_CHANGE?: AMBULATION;BATHING;DRESSING
DIFFICULTY_EATING/SWALLOWING: NO
CONCENTRATING,_REMEMBERING_OR_MAKING_DECISIONS_DIFFICULTY: YES
DRESSING/BATHING: DRESSING DIFFICULTY, ASSISTANCE 1 PERSON;BATHING DIFFICULTY, ASSISTANCE 1 PERSON
NUMBER_OF_TIMES_PATIENT_HAS_FALLEN_WITHIN_LAST_SIX_MONTHS: 2
TOILETING_ISSUES: NO
DIFFICULTY_COMMUNICATING: NO

## 2021-06-06 ASSESSMENT — ENCOUNTER SYMPTOMS
SLEEP DISTURBANCE: 1
WEAKNESS: 1
HEADACHES: 1
NUMBNESS: 1

## 2021-06-06 ASSESSMENT — MIFFLIN-ST. JEOR: SCORE: 1871.77

## 2021-06-06 NOTE — ED TRIAGE NOTES
Fell at 0400, outside planting flowers,  fell down 3 cement steps to sidewalk, hit head, no LOC, crawled to get help, couldn't get up. Right arm this morning was not able to move, but could move fingers, went to [facility] before arrival, has had head , neck CT and mri of head, on thinner,

## 2021-06-06 NOTE — ED NOTES
Bed: ED15  Expected date:   Expected time:   Means of arrival:   Comments:  [facility] 61 fall/paralysis

## 2021-06-06 NOTE — ED PROVIDER NOTES
History   Chief Complaint:  Fall     HPI   Aspen Mccullough is a 61 year old female with a history of hypertension, hyperlipidemia, fibromyalgia, Lupus, Hashimoto's, and Cushing's disease on Xarelto who presents via EMS for evaluation after an unwitnessed fall. The patient states that she got up to go outside to garden around 4 AM because she was having difficulty sleeping. She fell down 3 cement stairs outside. She denies tripping. She hit her head but denies loss of consciousness. She does have a headache. She crawled up the railing and banged on the door to get help from her  who eventually found her when he woke up. She has been unable to move her left arm and states that it is numb. Her  brought her to [facility] where they obtained x-rays showing a thumb fracture at the base of the proximal phalanx. She was placed in a splint at this time. A negative shoulder x-ray was also obtained. She was then referred to the urgency room for CT's. See below. She was evaluated by Dr. Skinny Sarkar who wanted her transferred to the ED for stroke rule out.     UR Course  INR 0.9  CBC: WBC 10.1, HGB 12.5,   BMP:sodium 135 (L), BUN 29 (H), B/C Ratio 29 (H), creatinine 1.00, otherwise within normal limits     EKG:  Result time: 1240  Indication: Rule out cardiac  Rate: 78 bpm  R Axis: 72  Interpretation: Sinus rhythm, premature ventricular complexes, intraatrial conduction delay, slight intraventricular conduction delay, borderline ECG.     CT Spine Cervical WO  CT scan of the cervical spine reveals no acute fracture.    CT Head Brain WO  No acute intracranial process.    Review of Systems   Neurological: Positive for weakness, numbness and headaches. Negative for syncope.   Psychiatric/Behavioral: Positive for sleep disturbance.   All other systems reviewed and are negative.    Allergies:  Chlorhexidine  Codeine  Ibuprofen  Penicillins  Sulfa Drugs     Medications:   "  Flexeril  Gabapentin  Synthroid  Liothyronine  Toprol  Morphine  Dilaudid  Xarelto  Ambien  Xarelto     Past Medical History:    ADHD  Chronic low back pain  Cushing's syndrome  DDD  Endometriosis  Fibromyalgia  Hashimoto's disease  Hyperlipidemia  Hypothyroid  Insomnia  Lupus  Malabsorption  Pernicious anemia  Renal disease  Osteoarthrosis  Nonischemic cardiomyopathy  Hypertension     Past Surgical History:    Left fifth toe amputation  Appendectomy  Arthrodesis of right ankle  Bilateral knee arthroplasty  Right arthroplasty revision  Breast tissue removal  Cholecystectomy  Left foot surgery x4  Hemorrhoidectomy  Sacroplasty  Lumbar fusion  Tonsillectomy  Bilateral salpingo-oophorectomy  Rhinoplasty     Family History:    Hypertension     Social History:  Smoking status: Former-1/1/1993  Alcohol use: No  Drug use: No  Marital Status:      Physical Exam     Patient Vitals for the past 24 hrs:   BP Temp Temp src Pulse Resp SpO2 Height Weight   06/06/21 1455 (!) 143/86 -- -- 83 -- -- -- --   06/06/21 1424 (!) 150/87 99.2  F (37.3  C) Oral 85 16 95 % 1.753 m (5' 9\") 124.7 kg (275 lb)   06/06/21 1420 (!) 150/87 -- -- 85 16 95 % -- --       Physical Exam  Constitutional: Heavy set, middle aged, white female, sitting. No respiratory distress. Surgical dressing and boot on left foot. Orthopedic splint on left hand including thumb.   HENT: No signs of trauma.   Eyes: EOM are normal. Pupils are equal, round, and reactive to light.   Neck: No posterior midline tenderness. Normal range of motion. No JVD present. No cervical adenopathy.  Cardiovascular: Regular rhythm.  Exam reveals no gallop and no friction rub.    No murmur heard.  Pulmonary/Chest: Bilateral breath sounds normal. No wheezes, rhonchi or rales. Chest wall non-tender.  Abdominal: Soft. No tenderness. No rebound or guarding.   Musculoskeletal: No edema. Right arm and right leg atraumatic. Left arm and leg with splints as noted. Low back incision with " pain on palpation.  Lymphadenopathy: No lymphadenopathy.   Neurological: Alert and oriented to person, place, and time. Normal strength pf right arm and leg. Left leg able to lift off of the bed. Left arm able to move finger and hand. Unable to lift left arm, she uses her right arm to lift it. GCS 15.   Skin: Skin is warm and dry. No rash noted. No erythema.     Emergency Department Course     Imaging:  CTA Head Neck with Contrast  1. No high-grade stenosis or large vessel occlusion of the major   intracranial arteries.   2. No high-grade stenosis or occlusion of the cervical carotid or   vertebral arteries.   3. Dilated main pulmonary artery, nonspecific, but can be seen in   setting of pulmonary hypertension. Clinical correlation recommended.    Reading per radiology.    MR Brain without and with Contrast  Pending    Emergency Department Course:    Reviewed:  I reviewed the patient's nursing notes, vitals and past medical history.     Assessments:  1440 I performed an exam of the patient in room ED15 as documented above.  I updated the patient on results and discussed plan of care.    Consults:    1518 I consulted with stroke/neuro.   1525 I spoke with Dr. Masterson of the hospitalist service regarding patient's presentation, findings, and plan of care.    Disposition:  The patient was admitted to the hospital under the care of Dr. Masterson.     Impression & Plan     Medical Decision Making:  Aspen Mccullough is a 61 year old female who at 4AM she could not sleep so she went outside to start gardening. Unfortunately, she fell on the stairs. She did not trip to her knowledge or pass out but she noticed that she could not move her left arm or leg. She had to use her right side to crawl and pull herself back into the house. She yelled for her  but he did not respond so she ended up laying there until he woke up this morning. She did complain of some hand and shoulder pain so she was taken to the [facility] in [place]. X-ray of the hand showed a fracture at the base of the thumb proximal phalanx. This was splinted. She also had a shoulder x-ray that was negative. It was probably discovered that she was not using her arm well so then she was sent to the [place] [facility] where labs, EKG, CT of the head and CT of the cervical spine were obtained without acute findings. She was then transferred to us for work up of stroke. On the patient's arrival here, it had now been 11 hours since her symptoms began. She is on a blood thinner for atrial fibrillation. Patient had to use her right arm to lift her left arm up although there left hand moves. I spoke with stroke neurology. Patient went to have CTA done of the head and neck and also MRI of the brain. No additional medication will be added. She will be admitted to the neurology floor under the care of Dr. Masterson.     Diagnosis:    ICD-10-CM    1. Cerebrovascular accident (CVA), unspecified mechanism (H)  I63.9    2. Closed nondisplaced fracture of proximal phalanx of left thumb, initial encounter  S62.515A      Scribe Disclosure:  I, Kelsey Mcclure, am serving as a scribe at 2:27 PM on 6/6/2021 to document services personally performed by Willy Duran MD based on my observations and the provider's statements to me.       Willy Duran MD  06/06/21 1746

## 2021-06-06 NOTE — LETTER
Transition Communication Hand-off for Care Transitions to Next Level of Care Provider    Name: Aspen Mccullough  : 1960  MRN #: [id number]  Primary Care Provider: Arcadio Farfan     Primary Clinic: [facility] [address]  [facility] [address]     Reason for Hospitalization:  Closed nondisplaced fracture of proximal phalanx of left thumb, initial encounter [S62.515A]  Cerebrovascular accident (CVA), unspecified mechanism (H) [I63.9]  CVA (cerebral vascular accident) (H) [I63.9]  Admit Date/Time: 2021  2:20 PM  Discharge Date: 21  Payor Source: Payor: [employer] / Plan: [employer] EMPLOYER PLAN MED ADV / Product Type: Medicare /            Reason for Communication Hand-off Referral: Multiple providers/specialties    Discharge Plan:  Discharge Plan:      Most Recent Value   Disposition Comments  The pt's froylan is retired and their dtg works from home and lives with them.  They have another dtg that lives next to her.           Concern for non-adherence with plan of care:   Y/N no  Discharge Needs Assessment:  Needs      Most Recent Value   Equipment Currently Used at Home  commode chair, hospital bed, walker, rolling            Follow-up specialty is recommended: Yes    Follow-up plan:    Future Appointments   Date Time Provider Department Center   2021  3:00 PM Nataliia Escalante OTR [facility]   2021 12:50 PM Basim Velazquez MD [facility]   2021  1:00 PM Taye Salcedo MD [facility]       Any outstanding tests or procedures:        Referrals     Future Labs/Procedures    NEUROLOGY ADULT REFERRAL     Scheduling Instructions:    Seen by Dr. Oumar Montero at [facility]. Recommend follow-up in 1 month with EMG.    Comments:    Your provider has referred you for the following:   Consult at [facility]: [facility] [place] [phone]  [email or web address] and EMG at [facility]: [facility] [phone]   [email or web address]    Please be aware that coverage of these services is subject to the terms and limitations of your health insurance plan.  Call member services at your health plan with any benefit or coverage questions.      Please bring the following with you to your appointment:    (1) Any X-Rays, CTs or MRIs which have been performed.  Contact the facility where they were done to arrange for  prior to your scheduled appointment.    (2) List of current medications  (3) This referral request   (4) Any documents/labs given to you for this referral            Key Recommendations:  The pt and her  plan on travailing.  Therefore care coordination did not make their specialty appointments.  I do not know if they will change there mind and want assistance.  The pt said Ash takes care of everything.      Thank you,    Sylvia Bueno RN Care Coordination    AVS/Discharge Summary is the source of truth; this is a helpful guide for improved communication of patient story

## 2021-06-07 ENCOUNTER — APPOINTMENT (OUTPATIENT)
Dept: MRI IMAGING | Facility: CLINIC | Age: 61
DRG: 074 | End: 2021-06-07
Attending: PHYSICIAN ASSISTANT
Payer: COMMERCIAL

## 2021-06-07 ENCOUNTER — APPOINTMENT (OUTPATIENT)
Dept: MRI IMAGING | Facility: CLINIC | Age: 61
DRG: 074 | End: 2021-06-07
Attending: NURSE PRACTITIONER
Payer: COMMERCIAL

## 2021-06-07 ENCOUNTER — APPOINTMENT (OUTPATIENT)
Dept: GENERAL RADIOLOGY | Facility: CLINIC | Age: 61
DRG: 074 | End: 2021-06-07
Attending: PHYSICIAN ASSISTANT
Payer: COMMERCIAL

## 2021-06-07 ENCOUNTER — APPOINTMENT (OUTPATIENT)
Dept: CARDIOLOGY | Facility: CLINIC | Age: 61
DRG: 074 | End: 2021-06-07
Attending: INTERNAL MEDICINE
Payer: COMMERCIAL

## 2021-06-07 ENCOUNTER — APPOINTMENT (OUTPATIENT)
Dept: OCCUPATIONAL THERAPY | Facility: CLINIC | Age: 61
DRG: 074 | End: 2021-06-07
Attending: INTERNAL MEDICINE
Payer: COMMERCIAL

## 2021-06-07 LAB
ALBUMIN SERPL-MCNC: 3.9 G/DL (ref 3.4–5)
ALP SERPL-CCNC: 114 U/L (ref 40–150)
ALT SERPL W P-5'-P-CCNC: 22 U/L (ref 0–50)
ANION GAP SERPL CALCULATED.3IONS-SCNC: 3 MMOL/L (ref 3–14)
AST SERPL W P-5'-P-CCNC: 52 U/L (ref 0–45)
BILIRUB DIRECT SERPL-MCNC: 0.1 MG/DL (ref 0–0.2)
BILIRUB SERPL-MCNC: 0.7 MG/DL (ref 0.2–1.3)
BUN SERPL-MCNC: 15 MG/DL (ref 7–30)
CALCIUM SERPL-MCNC: 8.6 MG/DL (ref 8.5–10.1)
CHLORIDE SERPL-SCNC: 107 MMOL/L (ref 94–109)
CO2 SERPL-SCNC: 30 MMOL/L (ref 20–32)
CREAT SERPL-MCNC: 0.78 MG/DL (ref 0.52–1.04)
ERYTHROCYTE [DISTWIDTH] IN BLOOD BY AUTOMATED COUNT: 18.2 % (ref 10–15)
GFR SERPL CREATININE-BSD FRML MDRD: 81 ML/MIN/{1.73_M2}
GLUCOSE BLDC GLUCOMTR-MCNC: 103 MG/DL (ref 70–99)
GLUCOSE BLDC GLUCOMTR-MCNC: 104 MG/DL (ref 70–99)
GLUCOSE BLDC GLUCOMTR-MCNC: 111 MG/DL (ref 70–99)
GLUCOSE BLDC GLUCOMTR-MCNC: 113 MG/DL (ref 70–99)
GLUCOSE BLDC GLUCOMTR-MCNC: 91 MG/DL (ref 70–99)
GLUCOSE BLDC GLUCOMTR-MCNC: 95 MG/DL (ref 70–99)
GLUCOSE SERPL-MCNC: 92 MG/DL (ref 70–99)
HBA1C MFR BLD: 5.7 % (ref 0–5.6)
HCT VFR BLD AUTO: 37.8 % (ref 35–47)
HGB BLD-MCNC: 12 G/DL (ref 11.7–15.7)
MCH RBC QN AUTO: 28.1 PG (ref 26.5–33)
MCHC RBC AUTO-ENTMCNC: 31.7 G/DL (ref 31.5–36.5)
MCV RBC AUTO: 89 FL (ref 78–100)
PLATELET # BLD AUTO: 218 10E9/L (ref 150–450)
POTASSIUM SERPL-SCNC: 3.8 MMOL/L (ref 3.4–5.3)
PROT SERPL-MCNC: 7.7 G/DL (ref 6.8–8.8)
RBC # BLD AUTO: 4.27 10E12/L (ref 3.8–5.2)
SODIUM SERPL-SCNC: 140 MMOL/L (ref 133–144)
T3FREE SERPL-MCNC: 1.3 PG/ML (ref 2.3–4.2)
WBC # BLD AUTO: 5.4 10E9/L (ref 4–11)

## 2021-06-07 PROCEDURE — 80048 BASIC METABOLIC PNL TOTAL CA: CPT | Performed by: INTERNAL MEDICINE

## 2021-06-07 PROCEDURE — 250N000013 HC RX MED GY IP 250 OP 250 PS 637: Performed by: PHYSICIAN ASSISTANT

## 2021-06-07 PROCEDURE — 80076 HEPATIC FUNCTION PANEL: CPT | Performed by: INTERNAL MEDICINE

## 2021-06-07 PROCEDURE — 84481 FREE ASSAY (FT-3): CPT | Performed by: INTERNAL MEDICINE

## 2021-06-07 PROCEDURE — 97166 OT EVAL MOD COMPLEX 45 MIN: CPT | Mod: GO | Performed by: OCCUPATIONAL THERAPIST

## 2021-06-07 PROCEDURE — 999N001017 HC STATISTIC GLUCOSE BY METER IP

## 2021-06-07 PROCEDURE — 72156 MRI NECK SPINE W/O & W/DYE: CPT

## 2021-06-07 PROCEDURE — 250N000012 HC RX MED GY IP 250 OP 636 PS 637: Performed by: INTERNAL MEDICINE

## 2021-06-07 PROCEDURE — 93005 ELECTROCARDIOGRAM TRACING: CPT

## 2021-06-07 PROCEDURE — 99233 SBSQ HOSP IP/OBS HIGH 50: CPT | Performed by: HOSPITALIST

## 2021-06-07 PROCEDURE — 36415 COLL VENOUS BLD VENIPUNCTURE: CPT | Performed by: INTERNAL MEDICINE

## 2021-06-07 PROCEDURE — 73140 X-RAY EXAM OF FINGER(S): CPT | Mod: LT

## 2021-06-07 PROCEDURE — 99221 1ST HOSP IP/OBS SF/LOW 40: CPT | Performed by: PSYCHIATRY & NEUROLOGY

## 2021-06-07 PROCEDURE — 70551 MRI BRAIN STEM W/O DYE: CPT

## 2021-06-07 PROCEDURE — 97535 SELF CARE MNGMENT TRAINING: CPT | Mod: GO | Performed by: OCCUPATIONAL THERAPIST

## 2021-06-07 PROCEDURE — 255N000002 HC RX 255 OP 636: Performed by: INTERNAL MEDICINE

## 2021-06-07 PROCEDURE — 83036 HEMOGLOBIN GLYCOSYLATED A1C: CPT | Performed by: INTERNAL MEDICINE

## 2021-06-07 PROCEDURE — 97112 NEUROMUSCULAR REEDUCATION: CPT | Mod: GO | Performed by: OCCUPATIONAL THERAPIST

## 2021-06-07 PROCEDURE — 250N000013 HC RX MED GY IP 250 OP 250 PS 637: Performed by: INTERNAL MEDICINE

## 2021-06-07 PROCEDURE — 999N000208 ECHOCARDIOGRAM COMPLETE

## 2021-06-07 PROCEDURE — 93306 TTE W/DOPPLER COMPLETE: CPT | Mod: 26 | Performed by: INTERNAL MEDICINE

## 2021-06-07 PROCEDURE — 85027 COMPLETE CBC AUTOMATED: CPT | Performed by: INTERNAL MEDICINE

## 2021-06-07 PROCEDURE — 250N000009 HC RX 250: Performed by: PHYSICIAN ASSISTANT

## 2021-06-07 PROCEDURE — 120N000001 HC R&B MED SURG/OB

## 2021-06-07 PROCEDURE — 97530 THERAPEUTIC ACTIVITIES: CPT | Mod: GO | Performed by: OCCUPATIONAL THERAPIST

## 2021-06-07 PROCEDURE — A9585 GADOBUTROL INJECTION: HCPCS | Performed by: INTERNAL MEDICINE

## 2021-06-07 RX ORDER — FLUTICASONE PROPIONATE 50 MCG
2 SPRAY, SUSPENSION (ML) NASAL DAILY
Refills: 11 | Status: DISCONTINUED | OUTPATIENT
Start: 2021-06-07 | End: 2021-06-09 | Stop reason: HOSPADM

## 2021-06-07 RX ORDER — LIOTHYRONINE SODIUM 25 UG/1
25 TABLET ORAL 2 TIMES DAILY
Status: DISCONTINUED | OUTPATIENT
Start: 2021-06-07 | End: 2021-06-09 | Stop reason: HOSPADM

## 2021-06-07 RX ORDER — CARBOXYMETHYLCELLULOSE SODIUM 5 MG/ML
1 SOLUTION/ DROPS OPHTHALMIC 2 TIMES DAILY
Status: DISCONTINUED | OUTPATIENT
Start: 2021-06-07 | End: 2021-06-09 | Stop reason: HOSPADM

## 2021-06-07 RX ORDER — CYCLOSPORINE 0.5 MG/ML
1 EMULSION OPHTHALMIC 2 TIMES DAILY
Status: DISCONTINUED | OUTPATIENT
Start: 2021-06-07 | End: 2021-06-07

## 2021-06-07 RX ORDER — CYCLOBENZAPRINE HCL 10 MG
10 TABLET ORAL 3 TIMES DAILY
Status: DISCONTINUED | OUTPATIENT
Start: 2021-06-07 | End: 2021-06-09 | Stop reason: HOSPADM

## 2021-06-07 RX ORDER — ATORVASTATIN CALCIUM 40 MG/1
40 TABLET, FILM COATED ORAL EVERY EVENING
Status: DISCONTINUED | OUTPATIENT
Start: 2021-06-07 | End: 2021-06-09 | Stop reason: HOSPADM

## 2021-06-07 RX ORDER — LEVOTHYROXINE SODIUM 150 UG/1
300 TABLET ORAL
Status: DISCONTINUED | OUTPATIENT
Start: 2021-06-07 | End: 2021-06-07

## 2021-06-07 RX ORDER — LEVOTHYROXINE SODIUM 150 UG/1
300 TABLET ORAL DAILY
Status: DISCONTINUED | OUTPATIENT
Start: 2021-06-08 | End: 2021-06-09 | Stop reason: HOSPADM

## 2021-06-07 RX ORDER — FEXOFENADINE HCL 180 MG/1
180 TABLET ORAL DAILY PRN
Status: DISCONTINUED | OUTPATIENT
Start: 2021-06-07 | End: 2021-06-09 | Stop reason: HOSPADM

## 2021-06-07 RX ORDER — GABAPENTIN 800 MG/1
800 TABLET ORAL AT BEDTIME
Status: DISCONTINUED | OUTPATIENT
Start: 2021-06-07 | End: 2021-06-09 | Stop reason: HOSPADM

## 2021-06-07 RX ORDER — METOPROLOL SUCCINATE 100 MG/1
100 TABLET, EXTENDED RELEASE ORAL 2 TIMES DAILY
Status: DISCONTINUED | OUTPATIENT
Start: 2021-06-07 | End: 2021-06-09 | Stop reason: HOSPADM

## 2021-06-07 RX ORDER — PREDNISOLONE ACETATE 10 MG/ML
1 SUSPENSION/ DROPS OPHTHALMIC 4 TIMES DAILY
Status: DISCONTINUED | OUTPATIENT
Start: 2021-06-07 | End: 2021-06-09 | Stop reason: HOSPADM

## 2021-06-07 RX ORDER — DILTIAZEM HYDROCHLORIDE 180 MG/1
180 CAPSULE, COATED, EXTENDED RELEASE ORAL 2 TIMES DAILY
Status: DISCONTINUED | OUTPATIENT
Start: 2021-06-07 | End: 2021-06-09 | Stop reason: HOSPADM

## 2021-06-07 RX ORDER — GADOBUTROL 604.72 MG/ML
12 INJECTION INTRAVENOUS ONCE
Status: COMPLETED | OUTPATIENT
Start: 2021-06-07 | End: 2021-06-07

## 2021-06-07 RX ORDER — LACTOBACILLUS RHAMNOSUS GG 10B CELL
1 CAPSULE ORAL 2 TIMES DAILY
Status: DISCONTINUED | OUTPATIENT
Start: 2021-06-07 | End: 2021-06-09 | Stop reason: HOSPADM

## 2021-06-07 RX ADMIN — MORPHINE SULFATE 60 MG: 30 TABLET, EXTENDED RELEASE ORAL at 22:37

## 2021-06-07 RX ADMIN — LIOTHYRONINE SODIUM 25 MCG: 25 TABLET ORAL at 20:41

## 2021-06-07 RX ADMIN — PREDNISOLONE ACETATE 1 DROP: 10 SUSPENSION/ DROPS OPHTHALMIC at 22:34

## 2021-06-07 RX ADMIN — Medication 1 DROP: at 20:41

## 2021-06-07 RX ADMIN — CYCLOBENZAPRINE 10 MG: 10 TABLET, FILM COATED ORAL at 08:49

## 2021-06-07 RX ADMIN — CYCLOBENZAPRINE 10 MG: 10 TABLET, FILM COATED ORAL at 22:36

## 2021-06-07 RX ADMIN — Medication 1 CAPSULE: at 08:47

## 2021-06-07 RX ADMIN — DILTIAZEM HYDROCHLORIDE 180 MG: 180 CAPSULE, COATED, EXTENDED RELEASE ORAL at 20:41

## 2021-06-07 RX ADMIN — Medication 1 DROP: at 08:48

## 2021-06-07 RX ADMIN — LEVOTHYROXINE SODIUM 300 MCG: 150 TABLET ORAL at 08:48

## 2021-06-07 RX ADMIN — LIOTHYRONINE SODIUM 25 MCG: 25 TABLET ORAL at 09:58

## 2021-06-07 RX ADMIN — MORPHINE SULFATE 60 MG: 30 TABLET, EXTENDED RELEASE ORAL at 08:49

## 2021-06-07 RX ADMIN — METOPROLOL SUCCINATE 100 MG: 100 TABLET, EXTENDED RELEASE ORAL at 08:49

## 2021-06-07 RX ADMIN — MORPHINE SULFATE 30 MG: 30 TABLET, EXTENDED RELEASE ORAL at 09:59

## 2021-06-07 RX ADMIN — ATORVASTATIN CALCIUM 40 MG: 40 TABLET, FILM COATED ORAL at 20:42

## 2021-06-07 RX ADMIN — Medication 1 CAPSULE: at 20:42

## 2021-06-07 RX ADMIN — GADOBUTROL 12 ML: 604.72 INJECTION INTRAVENOUS at 18:52

## 2021-06-07 RX ADMIN — MORPHINE SULFATE 60 MG: 30 TABLET, EXTENDED RELEASE ORAL at 16:28

## 2021-06-07 RX ADMIN — PREDNISONE 8 MG: 2.5 TABLET ORAL at 08:48

## 2021-06-07 RX ADMIN — MORPHINE SULFATE 30 MG: 30 TABLET, EXTENDED RELEASE ORAL at 22:37

## 2021-06-07 RX ADMIN — METOPROLOL SUCCINATE 100 MG: 100 TABLET, EXTENDED RELEASE ORAL at 20:41

## 2021-06-07 RX ADMIN — PREDNISOLONE ACETATE 1 DROP: 10 SUSPENSION/ DROPS OPHTHALMIC at 09:59

## 2021-06-07 RX ADMIN — GABAPENTIN 1100 MG: 300 CAPSULE ORAL at 08:48

## 2021-06-07 RX ADMIN — CYCLOBENZAPRINE 10 MG: 10 TABLET, FILM COATED ORAL at 16:29

## 2021-06-07 RX ADMIN — GABAPENTIN 800 MG: 800 TABLET, FILM COATED ORAL at 22:36

## 2021-06-07 RX ADMIN — DILTIAZEM HYDROCHLORIDE 180 MG: 180 CAPSULE, COATED, EXTENDED RELEASE ORAL at 09:58

## 2021-06-07 RX ADMIN — HUMAN ALBUMIN MICROSPHERES AND PERFLUTREN 9 ML: 10; .22 INJECTION, SOLUTION INTRAVENOUS at 11:59

## 2021-06-07 RX ADMIN — FLUTICASONE PROPIONATE 2 SPRAY: 50 SPRAY, METERED NASAL at 09:59

## 2021-06-07 ASSESSMENT — ACTIVITIES OF DAILY LIVING (ADL)
ADLS_ACUITY_SCORE: 17
ADLS_ACUITY_SCORE: 18
ADLS_ACUITY_SCORE: 19
ADLS_ACUITY_SCORE: 18
ADLS_ACUITY_SCORE: 17
ADLS_ACUITY_SCORE: 16

## 2021-06-07 NOTE — CONSULTS
Orthopedic Surgery  Date of admission: 06/06/2021    Assessment: Left thumb fracture   S/p Left foot surgery by Dr Manzo 3 weeks ago    61 year old female that reports going out to move plants ~4AM in her backyard when she fell going down her cement steps. She fell onto her left side. She was unable to get up due to left-sided weakness subsequent to her fall so she crawled to her door and eventually family found her. She presented to [facility] due to left thumb pain where she was diagnosed with a fracture and her thumb was splinted. Negative shoulder XR reported.  Patient and her  also state she had surgery by Dr Manzo 3 weeks ago and was to have her sutures removed today.  Ortho was consulted for suture removal.     Exam:   Left thumb:   Thumb spica splint is in place.  Sensation intact distal tip of thumb.  No pain with elbow ROM.  Able to flex and extend fingers 2-5.  Brisk cap refill    Left foot:  Dressings removed.  No sutures in place.  Incisions healing well without drainage or erythema.      Plan:  Keep splint in place left thumb.  Follow-up at [facility] or [facility] for repeat xrays in 2 weeks.    Instructed patient to contact Dr Manzo's office regarding requirement of WBAT in the boot  No sutures were in the foot, dressings no longer needed.  Follow-up with Dr Manzo.      Janice Whitt PA-C on 6/7/2021 at 2:05 PM

## 2021-06-07 NOTE — PLAN OF CARE
"PT: Attempted to see patient for PT session. Spoke with OTS who just left the room who stated pt is agitated and \"flailing her arms\" when they tried to get up. Not appropriate for PT eval at this time.  "

## 2021-06-07 NOTE — PROGRESS NOTES
06/07/21 1346   Quick Adds   Type of Visit Initial Occupational Therapy Evaluation   Living Environment   People in home spouse;child(patel), adult  (30 year old daugher and 6 year old grand son)   Current Living Arrangements house   Home Accessibility stairs to enter home;stairs within home   Number of Stairs, Main Entrance 4   Transportation Anticipated family or friend will provide   Living Environment Comments Pt resides on main floor, hospital bed in living room for pt, shower chair and grab bars in shower   Self-Care   Usual Activity Tolerance fair   Current Activity Tolerance poor   Regular Exercise   (Occasionally)   Equipment Currently Used at Home hospital bed;commode chair;walker, standard;other (see comments)  (sling, walking foot brace)   Activity/Exercise/Self-Care Comment Pt (I) with ADLs and  does IADLs   Disability/Function   Hearing Difficulty or Deaf no   Wear Glasses or Blind yes   Vision Management for reading small print, glasses not with pt   Number of times patient has fallen within last six months 2   Change in Functional Status Since Onset of Current Illness/Injury yes   General Information   Onset of Illness/Injury or Date of Surgery 06/06/21   Referring Physician Deborah Masterson MD   Patient/Family Therapy Goal Statement (OT) Pt's goal is to return home at PLOF   Additional Occupational Profile Info/Pertinent History of Current Problem 61-year-old female with complex past medical history including paroxysmal atrial fibrillation on Xarelto, prior history of DVT and pulmonary embolus, hypertension, lupus on chronic steroids, history of chronic pain syndrome on narcotics who presents with acute onset of left-sided weakness.  MRI of the brain negative for acute stroke the patient also had some recent slurred speech as well as a recent fall due to patient anticoagulation acute persistent weakness and recent fall and recurrent dizziness patient will require extensive evaluation  "and management of patient's ongoing deficits prior to safe discharge appropriate for inpatient care.   Existing Precautions/Restrictions fall  (LLE WBAT with brace on, )   Left Lower Extremity (Weight-bearing Status) weight-bearing as tolerated (WBAT);other (see comments)  (With post op shoe on)   Cognitive Status Examination   Orientation Status orientation to person, place and time   Affect/Mental Status (Cognitive) agitated   Follows Commands follows one-step commands;75-90% accuracy   Safety Deficit awareness of need for assistance;at risk behavior observed;impulsivity;safety precautions awareness;moderate deficit   Attention Deficit requires cues/redirection to task   Executive Function Deficit impulse control;insight/awareness of deficits;self-monitoring/self-correction;planning/decision-making   Visual Perception   Visual Impairment/Limitations WFL   Sensory   Sensory Quick Adds   (LUE/LLE numb)   Pain Assessment   Patient Currently in Pain   (Pt c/o discomfort in L shoulder and L hand)   Posture   Posture protracted shoulders   Range of Motion Comprehensive   General Range of Motion upper extremity range of motion deficits identified   General Upper Extremity Assessment (Range of Motion)   Upper Extremity: Range of Motion RUE ROM WFL   Comment: Upper Extremity ROM LUE limited, pt states \"It doesn't move at all\", OT noted some movement at the fingers and elbow. Pt very agitated so difficult to assess   Strength Comprehensive (MMT)   General Manual Muscle Testing (MMT) Assessment upper extremity strength deficits identified   Upper Extremity (Manual Muscle Testing)   Upper Extremity: Manual Muscle Testing (MMT) right UE strength is WFL   Comment, MMT: Upper Extremity LUE less than 3/5, pt appears unable to lift against gravity   Muscle Tone Assessment   Muscle Tone Quick Adds Left side extremities   Muscle Tone Assessment - Left-side Extremities mildly decreased tone;unable/inappropriate to test   Muscle Tone " Comments LUE/LLE presents as decresaed tone, however it is difficult to properly assess d/t pt's behavioral difficulties this date   Coordination   Upper Extremity Coordination Left UE impaired   Bed Mobility   Bed Mobility supine-sit;sit-supine   Supine-Sit Carl[place] (Bed Mobility) verbal cues;nonverbal cues (demo/gesture);minimum assist (75% patient effort)   Sit-Supine [place] (Bed Mobility) verbal cues;nonverbal cues (demo/gesture);minimum assist (75% patient effort)   Assistive Device (Bed Mobility) bed rails   Transfers   Transfers sit-stand transfer;toilet transfer   Transfer Comments Pt very impulsive, requiring 2 person assist at times   Sit-Stand Transfer   Sit-Stand [place] (Transfers) verbal cues;nonverbal cues (demo/gesture);minimum assist (75% patient effort);2 person assist   Assistive Device (Sit-Stand Transfers) walker, standard   Sit/Stand Transfer Comments Will be beneficial to use yarely walker as pt unable to grasp  w/ L hand   Toilet Transfer   Type (Toilet Transfer) sit-stand;stand-sit   [place] Level (Toilet Transfer) supervision;verbal cues;nonverbal cues (demo/gesture)   Assistive Device (Toilet Transfer) grab bars/safety frame   Toilet Transfer Comments Pt heavily relying on grab bar, impulsive with transfer   Balance   Balance Assessment sitting balance: static;sit to stand balance;standing balance: dynamic;system impairment contributing to balance disturbance;identified impairments contributing to balance disturbance   Sitting Balance: Static moderate impairment;sitting, edge of bed;asymmetrical weight shifting   Sit-to-Stand Balance fair balance   Standing Balance: Dynamic poor balance   Impairments Contributing to Balance Dysfunction impaired motor control;pain;impaired postural control;decreased range of motion (ROM);decreased sensation;decreased strength;abnormal muscle tone   System Impairments Contributing to Balance Disturbance  cognitive;neuromuscular;musculoskeletal   Activities of Daily Living   BADL Assessment/Intervention toileting;lower body dressing   Lower Body Dressing Assessment/Training   [place] Level (Lower Body Dressing) maximum assist (25% patient effort)   Position (Lower Body Dressing) edge of bed sitting   Toileting   [place] Level (Toileting) supervision;verbal cues   Assistive Devices (Toileting) grab bar, toilet   Instrumental Activities of Daily Living (IADL)   IADL Comments  provided aid with IADLs   Clinical Impression   Criteria for Skilled Therapeutic Interventions Met (OT) yes   OT Diagnosis ADL dysfunction   OT Problem List-Impairments impacting ADL activity tolerance impaired;balance;cognition;mobility;motor control;muscle tone;range of motion (ROM);sensation;strength;pain   Assessment of Occupational Performance 5 or more Performance Deficits   Identified Performance Deficits Dressing, grooming/hygiene, toileting, functional mobility/transfers, functional cognition/safety awareness   Planned Therapy Interventions (OT) ADL retraining;balance training;bed mobility training;cognition;neuromuscular re-education;ROM;strengthening;transfer training;home program guidelines;progressive activity/exercise;risk factor education   Clinical Decision Making Complexity (OT) moderate complexity   Therapy Frequency (OT) Daily   Predicted Duration of Therapy 1 week   Anticipated Equipment Needs Upon Discharge (OT) walker, yarely;reacher;dressing equipment;cane, straight;cane, quad  (Pt's  in process of installing grab bars)   Risk & Benefits of therapy have been explained evaluation/treatment results reviewed;risks/benefits reviewed;care plan/treatment goals reviewed;current/potential barriers reviewed;patient   OT Discharge Planning    OT Discharge Recommendation (DC Rec) Transitional Care Facility   OT Rationale for DC Rec Pt is below baseline functioning and given current impulsivity will require 24/7  supervision and assist with BADLs. Pt adamantly against going to TCU at this time. Pt perseverates on going to [place] ASAP.   OT Brief overview of current status  minAx2/ mod Ax1 for transfers. Pt is very impulsive and unaware of safety precautions. Denies functional ability in LUE/LLE - however some movement noted throughout session.    Total Evaluation Time (Minutes)   Total Evaluation Time (Minutes) 20

## 2021-06-07 NOTE — CONSULTS
"[facility]    Stroke Consult Note    Reason for Consult:  \"stroke\"    Chief Complaint: Fall       HPI  Aspen Mccullough is a 61 year old female with past medical history significant for HTN, HLD, Lupus, Hashimoto's, Cushing's, fibromyalgia, and atrial fibrillation (on Xarelto) who presents for evaluation after a fall. She reports that she could not sleep yesterday morning and went outside around 0400. She had an unwitnessed fall down 3 cement stairs, hitting her forehead, and denies loss of consciousness. She reports that she had difficulty moving the left side of her body and had to pull herself up the stairs with her right arm. Imaging revealed a left thumb fracture and a negative left shoulder xray. She recently had surgery on her left foot and a dressing remains in place. CTH and Cspine were negative. MRI brain was also negative for acute stroke.     Today she continues to report left arm weakness and numbness. Called by RN this afternoon to evaluate for possible new right facial droop. The patient reports that her face looks swollen and different than usual on the right side.     Impression  1. Left hemibody weakness and parasthesias, etiology unclear - MRI brain negative for acute stroke.   2. Right NLF flattening of unclear chronicity - Repeat DWI sequence pending    Recommendations  - MRI Cspine ordered by primary team, will add on MRI brain DWI sequence given concern for right facial droop. If repeat DWI negative for acute stroke, no further stroke evaluation indicated.  - General neurology consult for further evaluation of left hemibody symptoms    Thank you for this consult. We will follow results of MRI brain.    ANITRA Thurston, CNP  Neurology  To page me or covering stroke neurology team member, click here: AMCOM   Choose \"On Call\" tab at top, then search dropdown box for \"Neurology Adult\", select location, press Enter, then look for stroke/neuro " ICU/telestroke.    _____________________________________________________    Past Medical History   Past Medical History:   Diagnosis Date     ADHD (attention deficit hyperactivity disorder)      Allergic rhinitis      Chronic low back pain      Cushing's syndrome (H)      DDD (degenerative disc disease)      DDD (degenerative disc disease)      Deviated nasal septum      Diarrhea      Endometriosis      Fibromyalgia      Hashimoto's disease      Hyperlipidemia      Hypothyroid     hx hashimoto's thyroiditis     Insomnia      Lupus (H)      Malabsorption      Pernicious anemia      Renal disease     chronic renal insufficiency     Sinusitis      Past Surgical History   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)     APPENDECTOMY       ARTHRODESIS ANKLE      right     ARTHROPLASTY KNEE BILATERAL       ARTHROPLASTY REVISION KNEE  4/19/2011    Procedure:ARTHROPLASTY REVISION KNEE; With Antibiotic Cement ; Surgeon:CHAO OLIVARES; Location:[facility] OR     BREAST SURGERY      right- tissue remove nipple area     BUNIONECTOMY  12/14/2011    Procedure:BUNIONECTOMY; Right Bunion Correction; Surgeon:GRACE ZARATE; Location:[facility]     C STOMACH SURGERY PROCEDURE UNLISTED      see list which we will bring     CHOLECYSTECTOMY       EXAM UNDER ANESTHESIA ANUS N/A 7/15/2020    Procedure: EXAM UNDER ANESTHESIA, ANUS;  Surgeon: Luis Canales MD;  Location:  OR     EXCISE MASS UPPER EXTREMITY  12/14/2011    Procedure:EXCISE MASS UPPER EXTREMITY; Excision of Left Arm Mass; Surgeon:GRACE ZARATE; Location:[facility]     FOOT SURGERY      left X 4     FUSION LUMBAR ANTERIOR ONE LEVEL       HC SACROPLASTY      see list which we will bring     HEMORRHOIDECTOMY INTERNAL N/A 7/15/2020    Procedure: Exam under anesthesia, hemorrhoidectomy;  Surgeon: Luis Canales MD;  Location:  OR     INJECT NERVE BLOCK SUPRASCAPULAR Left  5/18/2018    Procedure: INJECT NERVE BLOCK SUPRASCAPULAR;  Left Suprascapular Nerve Block;  Surgeon: Basim Velazquez MD;  Location: [facility] OR     INJECT NERVE BLOCK SUPRASCAPULAR Right 7/23/2018    Procedure: INJECT NERVE BLOCK SUPRASCAPULAR;  Right suprascapular injection;  Surgeon: Basim Velazquez MD;  Location: [facility] OR     INJECT NERVE BLOCK SUPRASCAPULAR Bilateral 10/29/2018    Procedure: Bilateral Suprascapular Nerve Blocks;  Surgeon: Basim Velazquez MD;  Location: [facility]     INJECT NERVE BLOCK SUPRASCAPULAR Bilateral 3/15/2019    Procedure: Bilateral Suprascapular Nerve Block;  Surgeon: Basim Velazquez MD;  Location: [facility]     INJECT NERVE BLOCK SUPRASCAPULAR Bilateral 12/31/2019    Procedure: bilateral suprascapular nerve block;  Surgeon: Basim Velazquez MD;  Location: [facility] OR     KNEE SURGERY      see list which we will bring     LAMINECTOMY LUMBAR ONE LEVEL      L4-5     LAPAROSCOPIC ABLATION ENDOMETRIOSIS       RADIO FREQUENCY ABLATION PULSED CERVICAL Bilateral 7/10/2019    Procedure: Bilateral Suprascapular Nerve Pulsed Radiofrequency Ablation;  Surgeon: Basim Velazquez MD;  Location:  OR     REMOVE HARDWARE FOOT  12/14/2011    Procedure:REMOVE HARDWARE FOOT; Hardware Removal Right Foot (Mini-C-Arm) ; Surgeon:GRACE ZARATE; Location:[facility]     RHINOPLASTY       SALPINGO-OOPHORECTOMY BILATERAL       TONSILLECTOMY       Medications   Home Meds  Prior to Admission medications    Medication Sig Start Date End Date Taking? Authorizing Provider   BIOTIN FORTE PO Take 1 tablet by mouth daily    Yes Reported, Patient   carboxymethylcellulose (CARBOXYMETHYLCELLULOSE SODIUM) 0.5 % SOLN ophthalmic solution Place 1 drop into both eyes 2 times daily as needed  10/24/18  Yes    cephALEXin (KEFLEX) 500 MG capsule Take 500 mg by mouth as needed (Dental Procedure) Take 4 caps 1 hour prior to Dental Appointment    Yes Reported, Patient   cyanocobalamin 1000 MCG/ML injection Inject 1 mL (1,000 mcg)  Subcutaneous every 7 days 9/19/14  Yes Yeimy Cabrera MD   cyclobenzaprine (FLEXERIL) 10 MG tablet Take 1 tablet (10 mg) by mouth 3 times daily 4/19/16  Yes Yeimy Cabrera MD   cycloSPORINE (RESTASIS) 0.05 % ophthalmic emulsion Place 1 drop into both eyes 2 times daily   Yes Reported, Patient   diltiazem ER COATED BEADS (CARDIZEM CD/CARTIA XT) 180 MG 24 hr capsule Take 1 capsule (180 mg) by mouth 2 times daily 3/3/21  Yes Rosa Uribe MD   fexofenadine (ALLEGRA) 180 MG tablet Take 180 mg by mouth daily as needed    Yes Reported, Patient   gabapentin (NEURONTIN) 300 MG capsule Take 300 mg by mouth 2 times daily (morning and afternoon) in addition to 800 mg tablet (total dose 1100mg)   Yes Reported, Patient   gabapentin (NEURONTIN) 800 MG tablet Take 800 mg by mouth At Bedtime (in addition to the 2 - 1100mg doses)   Yes Unknown, Entered By History   gabapentin (NEURONTIN) 800 MG tablet Take 800 mg by mouth 2 times daily (morning and afternoon) in addition to 300mg capsule (total dose 1100mg)   Yes Unknown, Entered By History   HYDROmorphone (DILAUDID) 8 MG tablet Take 8 mg by mouth every 8 hours . Max of 3 doses per day. 10/24/18  Yes    levothyroxine (SYNTHROID) 200 MCG SOLR injection Inject 200 mcg into the vein once a week on Fridays 10/24/18  Yes    levothyroxine (SYNTHROID/LEVOTHROID) 300 MCG tablet Take 300 mcg by mouth 2 times daily   Yes Unknown, Entered By History   lifitegrast (XIIDRA) 5 % opthalmic solution Place 1 drop into both eyes 2 times daily 10/24/18  Yes    liothyronine (CYTOMEL) 25 MCG tablet Take 25 mcg by mouth 2 times daily    Yes    metoprolol succinate ER (TOPROL-XL) 100 MG 24 hr tablet Take 1 tablet (100 mg) by mouth 2 times daily 2/12/21  Yes Rosa Uribe MD   morphine (MS CONTIN) 30 MG 12 hr tablet Take 30 mg by mouth 2 times daily (morning and night) in addition to 60 mg tablet for a total dose of 90mg.   Yes    morphine (MS  CONTIN) 60 MG 12 hr tablet Take 60 mg by mouth daily in the afternoon (in addition to the 2 - 90mg doses)   Yes Unknown, Entered By History   morphine (MS CONTIN) 60 MG 12 hr tablet Take 60 mg by mouth 2 times daily (morning and night) in addition to 30mg tablet for a total dose of 90mg   Yes Umm Ya APRN CNP   multivitamin, therapeutic with minerals (MULTI-VITAMIN) TABS tablet Take 1 tablet by mouth 2 times daily 10/24/18  Yes    NASONEX 50 MCG/ACT spray Spray 1 spray into both nostrils daily as needed  10/24/18  Yes    nystatin (MYCOSTATIN) 490543 UNIT/GM external powder Apply topically 3 times daily as needed 9/28/20  Yes Naun Patricio MD   prednisoLONE acetate (PRED FORTE) 1 % ophthalmic susp Place 1 drop into both eyes 4 times daily    Yes Reported, Patient   predniSONE (DELTASONE) 1 MG tablet Take 3 mg by mouth every other day in addition to 5mg tablet for a total dose of 8mg.   Yes    predniSONE (DELTASONE) 5 MG tablet Take 10 mg by mouth every other day   Yes Unknown, Entered By History   predniSONE (DELTASONE) 5 MG tablet Take 5 mg by mouth every other day in addition to 1mg tablets for a total dose of 8mg   Yes    probiotic CAPS Take 1 capsule by mouth 2 times daily 10/24/18  Yes    rivaroxaban ANTICOAGULANT (XARELTO) 20 MG TABS tablet Take 20 mg by mouth daily (with dinner)  10/24/18  Yes    sodium chloride 0.9% infusion Use 6mL once weekly to reconstitute levothyroxine powder before injection   Yes Unknown, Entered By History   vitamin D2 (ERGOCALCIFEROL) 93712 units (1250 mcg) capsule Take 50,000 Units by mouth once a week on Monday   Yes Unknown, Entered By History   zolpidem (AMBIEN) 5 MG tablet Take 5 mg by mouth nightly as needed for sleep   Yes Unknown, Entered By History       Scheduled Meds    atorvastatin  40 mg Oral QPM     artificial tears ophthalmic solution  1 drop Both Eyes BID     cyclobenzaprine  10 mg Oral TID     diltiazem ER COATED BEADS  180 mg Oral BID      fluticasone  2 spray Both Nostrils Daily     gabapentin  1,100 mg Oral QAM     gabapentin  800 mg Oral At Bedtime     lactobacillus rhamnosus (GG)  1 capsule Oral BID     levothyroxine  300 mcg Oral BID AC     lifitegrast  1 drop Both Eyes BID     liothyronine  25 mcg Oral BID     metoprolol succinate ER  100 mg Oral BID     morphine  30 mg Oral BID     morphine  60 mg Oral TID     prednisoLONE acetate  1 drop Both Eyes 4x Daily     predniSONE  8 mg Oral Every Other Day     [START ON 6/8/2021] predniSONE  10 mg Oral Every Other Day     rivaroxaban ANTICOAGULANT  20 mg Oral Daily with supper     sodium chloride (PF)  3 mL Intracatheter Q8H       Infusion Meds    - MEDICATION INSTRUCTIONS -       - MEDICATION INSTRUCTIONS -         PRN Meds  acetaminophen, fexofenadine, labetalol **OR** hydrALAZINE, lidocaine 4%, lidocaine (buffered or not buffered), - MEDICATION INSTRUCTIONS -, - MEDICATION INSTRUCTIONS -, naloxone **OR** naloxone **OR** naloxone **OR** naloxone, ondansetron **OR** ondansetron, sodium chloride (PF), zolpidem    Allergies   Allergies   Allergen Reactions     Blood Transfusion Related (Informational Only) Other (See Comments)     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      Chlorhexidine Hives     Thor surgical cleanser     Codeine Hives     Has tolerated Oxycodone in past      Ibuprofen [Nsaids] Hives     Penicillins Hives     Other reaction(s): Unknown     Sulfa Drugs Hives     Other reaction(s): Unknown     Doxycycline GI Disturbance     Emesis & diarrhea  Patient denies allergy to this med     Hydroxyzine      rash     Mold      Red Wine Complex [Red Wine Powder]      Family History   Family History   Problem Relation Age of Onset     Hypertension Mother      No Known Problems Father      No Known Problems Sister      No Known Problems Brother      No Known Problems Maternal Grandmother      No Known Problems Maternal Grandfather      No Known Problems Paternal Grandmother      No  Known Problems Other      Social History   Social History     Tobacco Use     Smoking status: Former Smoker     Packs/day: 1.50     Years: 20.00     Pack years: 30.00     Types: Cigarettes     Start date: 1973     Quit date: 1993     Years since quittin.4     Smokeless tobacco: Never Used   Substance Use Topics     Alcohol use: No     Alcohol/week: 0.0 standard drinks     Drug use: No       Review of Systems   The 10 point Review of Systems is negative other than noted in the HPI or here.        PHYSICAL EXAMINATION   Temp:  [97.8  F (36.6  C)-99.2  F (37.3  C)] 98.9  F (37.2  C)  Pulse:  [69-85] 76  Resp:  [16-18] 18  BP: (111-150)/(67-87) 124/81  SpO2:  [92 %-95 %] 94 %    Neurologic  Mental Status:  alert, oriented x 3, follows commands, speech clear and fluent, naming and repetition normal  Cranial Nerves:  visual fields intact, PERRL, EOMI with normal smooth pursuit, hearing not formally tested but intact to conversation, palate elevation symmetric and uvula midline, no dysarthria, tongue protrusion midline, R NLF flattening  Motor:  normal muscle tone and bulk, no abnormal movements, RU/RLE strength intact, LUE: weak shoulder shrug, weak elbow extension with gravity removed, no elbow flexion, weak finger flexion/extension, LLE: not antigravity, weak toe movement  Reflexes:  deferred   Sensory:  reports absent light touch sensation in LUE, inconsistent response to noxious stimuli in LUE, nearly absent light touch sensation in LLE  Coordination:  no ataxia out of proportion to weakness  Station/Gait:  deferred    Dysphagia Screen  Per Nursing    Stroke Scales    NIHSS  Interval baseline (21)   Interval Comments stn 73 (21)   1a. Level of Consciousness 0-->Alert, keenly responsive   1b. LOC Questions 0-->Answers both questions correctly   1c. LOC Commands 0-->Performs both tasks correctly   2.   Best Gaze 0-->Normal   3.   Visual 0-->No visual loss   4.   Facial Palsy 1-->Minor  paralysis (flattened nasolabial fold, asymmetry on smiling)   5a. Motor Arm, Left 3-->No effort against gravity, limb falls   5b. Motor Arm, Right 0-->No drift, limb holds 90 (or 45) degrees for full 10 secs   6a. Motor Leg, Left 3-->No effort against gravity, leg falls to bed immediately   6b. Motor Leg, right 0-->No drift, leg holds 30 degree position for full 5 secs   7.   Limb Ataxia 0-->Absent   8.   Sensory 2-->Severe to total sensory loss, patient is not aware of being touched in the face, arm, and leg   9.   Best Language 0-->No aphasia, normal   10. Dysarthria 0-->Normal   11. Extinction and Inattention  0-->No abnormality   Total 9 (06/07/21 1329)       Imaging  I personally reviewed all imaging; relevant findings per HPI.    Labs Data   CBC  Recent Labs   Lab 06/07/21  0821   WBC 5.4   RBC 4.27   HGB 12.0   HCT 37.8        Basic Metabolic Panel   Recent Labs   Lab 06/07/21  0821 06/06/21  1459     --    POTASSIUM 3.8 3.6   CHLORIDE 107  --    CO2 PENDING  --    BUN PENDING  --    CR PENDING  --    GLC PENDING  --    KYLIE PENDING  --      Liver Panel  No results for input(s): PROTTOTAL, ALBUMIN, BILITOTAL, ALKPHOS, AST, ALT, BILIDIRECT in the last 168 hours.  INR    Recent Labs   Lab Test 01/22/20  1431 01/22/15  1638 05/06/14  2040   INR 1.63* 1.08 1.05      Lipid Profile    Recent Labs   Lab Test 06/06/21  1459 09/19/18  1110 01/06/14   CHOL 303* 250* 211*   HDL 70 93 67   * 142* 101   TRIG 259* 75 216   CHOLHDLRATIO  --   --  3.1     A1C    Recent Labs   Lab Test 06/07/21  0003 12/09/19  0906 10/22/15   A1C 5.7* 6.0* 5.6     Troponin I    Recent Labs   Lab 06/06/21  1459   TROPI <0.015          Stroke Code / Stroke Consult Data Data This was a non-emergent, non-tele stroke consult.

## 2021-06-07 NOTE — PLAN OF CARE
SLP: Patient passed the swallow screen and tolerating diet at this time. Per nurse language is intact and initial MRI was negative. Unable to see patient due to multiple other providers present. Will check on status 6/8/21 and complete evaluation as indicated.

## 2021-06-07 NOTE — PROGRESS NOTES
Formal progress note to follow.     Reviewed work-up and imaging to date. Reports going out to move plants ~4AM in her backyard when she fell going down her cement steps. No prodromal dizziness or lightheadedness. No precipitating left upper or lower extremity weakness. She fell onto her left side. She was unable to get up due to left-sided weakness subsequent to her fall so she crawled to her door and eventually family found her. She presented to [facility] due to left thumb pain where she was diagnosed with a fracture and her thumb was splinted. Negative shoulder EX reported at [facility]. CT head negative for acute pathology. CT cervical spine negative for acute pathology. Transferred to [facility] for Stroke Neurology evaluation. MRI brainwas negative for acute pathology. CTA head and neck negative for acute pathology. XR finger with oblique fracture at the radial-dorsal aspect of the thumb proximal phalanx base with overlying splint. CMP and CBC unremarkable. Lipid profile 303/181/70/259. .79, T4 0.35.   -- STAT MRI cervical spine   -- Stroke Neurology signed off given negative MRI, recommend General Neurology consult which has been placed   -- Ortho consult as pt had recent foot surgery with Dr. Manzo, was to have follow-up today. Appreciate input. Also appreciate input regarding above presentation and need for additional imaging aside from above   -- Regarding abnormal thyroid levels, pt is maintained on Synthroid 300 mcg daily, she takes Synthroid 200 mcg injections on Fridays and her Cytomel was recently reduced from 50 mcg BID to 25 mcg BID by her Endocrinologist. Dr. Luna. I will reach out to Endocrinology to get their input. Pt is otherwise asymptomatic without constipation, dry skin, overt fatigue, weight gain, altered mental status. Reports some radiculopathy on her right side from bring back surgery and explains possible peripheral neuropathy in her right foot.     Formal  progress note to follow.

## 2021-06-07 NOTE — PROGRESS NOTES
[facility]    Medicine Progress Note - Hospitalist Service       Date of Admission:  6/6/2021    Assessment & Plan       Aspen Mccullough is a 61 year old female with complex PMHx as outlined below who was transferred from [facility] to [facility] on 6/6/21 for further evaluation of left-sided weakness LUE > LLE subsequent to a fall.     Unwitnessed fall with left-sided hemiparesis with flaccidity of LUE  Oblique fracture of the radial dorsal aspect of thumb: Reports going out to move plants ~4AM in her backyard when she fell going down her cement steps. No prodromal dizziness or lightheadedness. No precipitating left upper or lower extremity weakness. She fell onto her left side. She was unable to get up due to left-sided weakness subsequent to her fall so she crawled to her door and eventually family found her. She presented to [facility] due to left thumb pain where she was diagnosed with a fracture and her thumb was splinted. Negative shoulder XR reported at [facility]. CT head negative for acute pathology. CT cervical spine negative for acute pathology. Transferred to [facility] for Stroke Neurology evaluation.   * MRI brain was negative for acute pathology.   * CTA head and neck negative for acute pathology.   * XR finger with oblique fracture at the radial-dorsal aspect of the thumb proximal phalanx base with overlying splint.   * CMP and CBC unremarkable. Lipid profile 303/181/70/259.   - STAT MRI cervical spine ordered at 1145AM to rule out spinal cord hematoma or cord impingement. Pt is anticoagulated. Hold evening Xarelto until MRI resulted.   - Stroke Neurology initially signed off given negative MRI, though pt was noted to have right NLF flattening on nursing re-evaluation in the afternoon, thus MRI DQI sequence ordered. If repeat DWI negative, no further stroke evaluation indicated. Recommend General Neurology consult which has been placed.   - General  Neurology consulted, discussed with Dr. Tony; she will formally see pt tomorrow after above imaging completed. She also recommended adding on an EEG which has been ordered   - Ortho consulted for recent foot surgery as well as thumb fracture, appreciate input regarding above presentation and need for additional imaging aside from above from Ortho Trauma standpoint. Recommend keep splint in place left thump. Follow up at [facility] or [facility] for repeat XR in 2 weeks.   - Start Atorvastatin 40 mg po every day   - Echocardiogram ordered and pending   - Telemetry   - PT/OT/SW   - Regular diet     Hx of Hashimoto's thyroiditis:    * .79, T4 0.35.                                  * Maintained on Synthroid 300 mcg daily, she takes Synthroid 200 mcg injections on Fridays and her Cytomel was recently reduced from 50 mcg BID to 25 mcg BID by her Endocrinologist, Dr. Luna.   * Pt is otherwise asymptomatic without constipation, dry skin, overt fatigue, weight gain, altered mental status. Reports some radiculopathy on her right side from bring back surgery and explains possible peripheral neuropathy in her right foot.   - Call out to Dr. Olvera of Endocrinology who is now pt's Endocrinologist as Dr. Luna has since retired. Awaiting call back.   - Continue PTA regimen as above until further instruction per Endocrinology   - T3 pending     Recent left foot surgery:    - Ortho consulted, appreciate input. Recommend contacting Dr. Manzo's office regarding WBAT option. Dressings no longer needed.     SLE on chronic steroids:   - Continue with PTA prednisone 10 mg alternating with 8 mg every other day.     Hx of VTE: PE (unclear when this occurred ) and right posterior tibial vein DVT (10/2020)   - Anticoagulation game plan as above     Paroxysmal atrial fibrillation, currently in NSR   Chronic anticoagulation use: Follows with Dr. Uribe and Dr. Muñoz of Cardiology. Previous Ziopatch indicated 45% a fib burden,  average rate ~120 BPM, as high as 204 BPM. Pt has remained asymptomatic.   - Continue PTA Diltiazem and Toprol XL   - Anticoagulation game plan as above     Hypertension   Dyslipidemia: Antihypertensives and statin initiation as above.     Chronic pain syndrome  Hx of chronic low back pain with prior surgery:   [MS contin 90 mg qAM, 60 mg in the afternoon and 90 mg q PM; Gabapentin 1100 mg qAM and 800 mg at bedtime, Flexeril 10 mg TID]  - Continue with PTA regimen        Chronic venous insufficiency: Continue outpatient care with Cardiology      Diet: Combination Diet    DVT Prophylaxis: Xarelto  Mina Catheter: not present  Code Status: Full Code         Disposition Plan   Expected discharge: 2 - 3 days, pending above evaluation.  Entered: Fannie Cummins PA-C 06/07/2021, 7:24 AM     The patient's care was discussed with the Attending Physician, Dr. Ordoñez, Bedside Nurse, Patient and Patient's Family, daughter, [place], via telephone.     Fannie Cummins PA-C  Hospitalist Service    Contact information available via [facility] Paging/Directory  _____________________________________________________________________    Interval History   Ongoing LUE flaccidity with numbness and tingling. Also with LLE weakness and paresthesias. Right-sided baseline radiculopathy. Does report some right foot neuropathy. Hx as above. No acute headache, photophobia, tinnitus. No chest pain, SOB, nausea, vomiting or recent illness. No hx of seizure, bowel or bladder incontinence. Did not lose consciousness with fall.     Data reviewed today: I reviewed all medications, new labs and imaging results over the last 24 hours. I personally reviewed all labs and imaging to date.     Physical Exam   Vital Signs: Temp: 97.8  F (36.6  C) Temp src: Oral BP: 121/67 Pulse: 70   Resp: 18 SpO2: 92 % O2 Device: None (Room air)    Weight: 275 lbs 0 oz    CONSTITUTIONAL: Pt laying in  bed, dressed in hospital garb. Appears comfortable, but rightfully concerned about LUE flaccidity. Cooperative with interview.  HEENT: Normocephalic, atraumatic. Pupils equal, round, and reactive to light. EOMI, bilat. Negative for conjunctival redness or scleral icterus.  Oral mucosa pink and moist; negative for ulcerations, erythema, or exudates.  No evidence of bleeding.   CARDIOVASCULAR: RRR, no murmurs, rubs, or extra heart sounds appreciated. Pulses +2/4 and regular in upper and lower extremities, bilaterally.   RESPIRATORY: No increased work of breathing. CTA, bilat; no wheezes, rales, or rhonchi appreciated.  GASTROINTESTINAL:  Abdomen soft, non-distended. BS auscultated in all four quadrants. Negative for tenderness to palpation.  No masses or organomegaly noted.  MUSCULOSKELETAL: Strength 5/5 in right upper and lower extremities. Flaccid LUE, can lift LLE against gravity. Able to wiggle left fingers and shrug shoulders. No gross deformities noted. Normal muscle tone.   HEMATOLOGIC/LYMPHATIC/IMMUNOLOGIC: Negative for lower extremity edema, bilaterally.  NEUROLOGIC: Alert and oriented to person, place, and time.  strength intact. CN's II-XII grossly intact aside from hemiparesis as above. Numbness and tingling in LUE and LLE. Some noted RLE foot numbness and tingling. Reports baseline right-sided radiculopathy from prior back surgery, but no peripheral neuropathy.   SKIN: Warm, dry, intact. No jaundice noted. Negative for suspicious lesions, rashes, bruising, open sores or abrasions.     Data   Recent Labs   Lab 06/07/21  0821 06/06/21  1459   WBC 5.4  --    HGB 12.0  --    MCV 89  --      --      --    POTASSIUM 3.8 3.6   CHLORIDE 107  --    CO2 30  --    BUN 15  --    CR 0.78  --    ANIONGAP 3  --    KYLIE 8.6  --    GLC 92  --    ALBUMIN 3.9  --    PROTTOTAL 7.7  --    BILITOTAL 0.7  --    ALKPHOS 114  --    ALT 22  --    AST 52*  --    TROPI  --  <0.015     Recent Results (from the past 24  hour(s))   MR Brain w/o & w Contrast    Narrative    MRI BRAIN WITHOUT AND WITH CONTRAST  6/6/2021 5:10 PM     HISTORY:  Left-sided weakness, stroke symptoms.     TECHNIQUE:  Multiplanar, multisequence MRI of the brain without and  with 10 mL Gadavist.     COMPARISON: CT angiogram head and neck dated 6/6/2021 . MRI of the  head 5/6/2014.    FINDINGS: The examination is mildly limited due to artifact. No acute  infarct identified. The ventricles are normal in size and  configuration. There is minimal nonspecific patchy T2 FLAIR  hyperintense signal changes in the cerebral white matter, presumably  due to chronic small vessel ischemic disease. Otherwise, the brain  parenchyma appears within normal limits. No intracranial hemorrhage,  mass lesion, or mass effect/herniation. No definite abnormal  intracranial postcontrast enhancement.    Complete opacification of the left sphenoid sinus, probably due to  inflammatory mucosal thickening/trapped fluid. Mild to moderate  scattered paranasal sinus mucosal thickening elsewhere. The major  arterial flow voids of the skull base appear grossly maintained.  Unchanged mild symmetric prominence of the superior ophthalmic veins.  The calvarium, skull base, and midface otherwise appear grossly  unremarkable.      Impression    IMPRESSION:  1. No definite acute intracranial abnormality. Specifically, no acute  infarct. No evidence for mass effect/herniation.  2. Mild presumed chronic small vessel ischemic changes.    EFRAIN CHEW MD   XR Finger Left G/E 2 Views    Narrative    LEFT FINGER TWO OR MORE VIEWS  6/7/2021 9:10 AM     HISTORY:  Reported left thumb fracture.    FINDINGS: Second PIP arthrodesis with screw in place. Third DIP  degenerative arthrosis. Changes of trapezium resection with screw in  the thumb metacarpal base.      Impression    IMPRESSION: Oblique fracture at the radial-dorsal aspect of the thumb  proximal phalanx base with overlying splint.    CIARRA SELF MD    Echocardiogram Complete - For age > 60 yrs    Narrative    [id number]  [id number]  [id number]  [id number]^OLIVER^TINO^SEVERIANO     [facility]  Echocardiography Laboratory  [address] [place] [address]     Name: ROXY ALVARES  MRN: [id number]  : 1960  Study Date: 2021 11:31 AM  Age: 61 yrs  Gender: Female  Patient Location: [facility]  Reason For Study: Cerebrovascular Incident  Ordering Physician: TINO BOYD  Referring Physician: Arcadio Farfan  Performed By: Erick Massey RDCS     BSA: 2.4 m2  Height: 69 in  Weight: 275 lb  HR: 63  BP: 118/72 mmHg  ______________________________________________________________________________  Procedure  Complete Portable Echo Adult. Optison (NDC #[id number]) given intravenously.  ______________________________________________________________________________  Interpretation Summary     Left ventricular systolic function is normal.The visual ejection fraction is  estimated at 55-60%.  The right ventricle is not well visualized.The right ventricular systolic  function is normal.  Technically difficult bubble study due to challenging images- overall negative  bubble study.  No significant valvular stenosis or regurgitation on doppler interrogation.     ______________________________________________________________________________  Left Ventricle  The left ventricle is normal in size. There is normal left ventricular wall  thickness. Diastolic Doppler findings (E/E' ratio and/or other parameters)  suggest left ventricular filling pressures are normal. Left ventricular  systolic function is normal. The visual ejection fraction is estimated at 55-  60%. No regional wall motion abnormalities noted.     Right Ventricle  The right ventricular systolic function is normal. The right ventricle is not  well visualized.     Atria  The left atrium is mildly dilated. Right atrial size is normal. There is no  color Doppler evidence of an atrial shunt. A  contrast injection (Bubble Study)  was performed that was negative for flow across the interatrial septum.     Mitral Valve  There is trace mitral regurgitation.     Tricuspid Valve  There is trace tricuspid regurgitation. The right ventricular systolic  pressure is approximated at 17.4 mmHg plus the right atrial pressure.     Aortic Valve  The aortic valve is not well visualized. No aortic regurgitation is present.  No aortic stenosis is present.     Pulmonic Valve  The pulmonic valve is not well visualized. There is no pulmonic valvular  stenosis.     Vessels  Borderline aortic root dilatation. 3.7 cm. Normal size ascending aorta. The  inferior vena cava was normal in size with preserved respiratory variability.     Pericardium  There is no pericardial effusion.     ______________________________________________________________________________  MMode/2D Measurements & Calculations  IVSd: 1.0 cm  LVIDd: 5.3 cm  LVIDs: 3.4 cm  LVPWd: 0.82 cm  FS: 36.0 %  LV mass(C)d: 184.9 grams  LV mass(C)dI: 78.2 grams/m2     Ao root diam: 3.7 cm  LA dimension: 3.9 cm  asc Aorta Diam: 3.6 cm  LA/Ao: 1.1  LA Volume (BP): 65.9 ml  LA Volume Index (BP): 27.8 ml/m2  RWT: 0.31     Doppler Measurements & Calculations  MV E max mariaelena: 49.0 cm/sec  MV A max mariaelena: 41.6 cm/sec  MV E/A: 1.2  MV dec time: 0.29 sec     PA acc time: 0.15 sec  TR max mariaelena: 208.7 cm/sec  TR max P.4 mmHg  E/E' av.9  Lateral E/e': 4.5  Medial E/e': 5.2     ______________________________________________________________________________  Report approved by: Lopez Mendoza 2021 03:08 PM           Medications     - MEDICATION INSTRUCTIONS -       - MEDICATION INSTRUCTIONS -         atorvastatin  40 mg Oral QPM     artificial tears ophthalmic solution  1 drop Both Eyes BID     cyclobenzaprine  10 mg Oral TID     diltiazem ER COATED BEADS  180 mg Oral BID     fluticasone  2 spray Both Nostrils Daily     gabapentin  1,100 mg Oral QAM     gabapentin  800 mg  Oral At Bedtime     lactobacillus rhamnosus (GG)  1 capsule Oral BID     [START ON 6/8/2021] levothyroxine  300 mcg Oral Daily     lifitegrast  1 drop Both Eyes BID     liothyronine  25 mcg Oral BID     metoprolol succinate ER  100 mg Oral BID     morphine  30 mg Oral BID     morphine  60 mg Oral TID     prednisoLONE acetate  1 drop Both Eyes 4x Daily     predniSONE  8 mg Oral Every Other Day     [START ON 6/8/2021] predniSONE  10 mg Oral Every Other Day     [Held by provider] rivaroxaban ANTICOAGULANT  20 mg Oral Daily with supper     sodium chloride (PF)  3 mL Intracatheter Q8H

## 2021-06-07 NOTE — PROVIDER NOTIFICATION
"Text page to Dr. Cook: \"patient c/o new sensation of tingling in RLE, no other neuro deficits noted. VSS. Call back if concerned, thanks.\"  "

## 2021-06-07 NOTE — PROVIDER NOTIFICATION
"Text page to Alexia: \"FYI: patient does appear to have a new right sided droop with 12:15 assessment. Other neuros remain unchanged. Please call back if you have any concerns, thanks.\"    Callback: Will come see patient  "

## 2021-06-07 NOTE — PROGRESS NOTES
Up to floor at 2230. Alert and oriented x4. Vital signs stable on RA. Assist of 1 for cares, GB. Tolerating combination diet. Lung sounds clear, equal bilaterally. Passing flatus, no BM this shift. Adequate urine output. L hand in splint, +2 edema in hand. L foot wrapped due to previous surgery. Numbness and tingling in left hand. Increased ability to wiggle fingers and toes throughout shift. Pronator drift present on left side. Left sided weakness. Pain managed with ice and scheduled morphine. Denies nausea. Tele NSR.

## 2021-06-07 NOTE — PROGRESS NOTES
SPIRITUAL HEALTH SERVICES  SPIRITUAL ASSESSMENT Progress Note  [facility]     REFERRAL SOURCE: Pt request    I made a few attempts to visit Aspen today but she was unavailable during each of my attempts.     PLAN: I will attempt to visit Aspen again tomorrow morning.     Roseanne Alvarado  Associate    Phone: [phone]  Pager: [phone]

## 2021-06-07 NOTE — ED NOTES
[facility]  ED Nurse Handoff Report    ED Chief complaint: Fall      ED Diagnosis:   Final diagnoses:   Cerebrovascular accident (CVA), unspecified mechanism (H)   Closed nondisplaced fracture of proximal phalanx of left thumb, initial encounter       Code Status: Full Code    Allergies:   Allergies   Allergen Reactions     Blood Transfusion Related (Informational Only) Other (See Comments)     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      Chlorhexidine Hives     Thor surgical cleanser     Codeine Hives     Has tolerated Oxycodone in past      Ibuprofen [Nsaids] Hives     Penicillins Hives     Other reaction(s): Unknown     Sulfa Drugs Hives     Other reaction(s): Unknown     Doxycycline GI Disturbance     Emesis & diarrhea  Patient denies allergy to this med     Hydroxyzine      rash     Mold      Red Wine Complex [Red Wine Powder]        Patient Story: Patient presents to the ED complaining of left arm weakness.  States she fell outside around 0400.  Complaining of left arm weakness.  Hx of left foot surgery and pain. Was seen at ortho urgent care, then sent to the urgency room, and now being seen here.  ED work up was primarily for stroke. Patient states she has a malabsorption condition and takes high doses of morphine to control her pain. Also take injectable meds for he thyroid.    Focused Assessment:  Weak on the left side, able to lift left foot.  Alert and oriented,  at bedside     Treatments and/or interventions provided: Primarily imaging in the ED as  Most of work up was done elsewhere.  Did run labs ordered by the hospitalist.   Patient's response to treatments and/or interventions: IV pain meds     To be done/followed up on inpatient unit:      Does this patient have any cognitive concerns?:     Activity level - Baseline/Home:  Independent  Activity Level - Current:   Unknown    Patient's Preferred language: English   Needed?: No    Isolation:  "None  Infection: Not Applicable  C-Diff (Clostridium Difficile) diagnosis  Patient tested for COVID 19 prior to admission: YES  Bariatric?: No    Vital Signs:   Vitals:    06/06/21 1420 06/06/21 1424 06/06/21 1455 06/06/21 1842   BP: (!) 150/87 (!) 150/87 (!) 143/86 121/67   Pulse: 85 85 83 69   Resp: 16 16     Temp:  99.2  F (37.3  C)     TempSrc:  Oral     SpO2: 95% 95%  94%   Weight:  124.7 kg (275 lb)     Height:  1.753 m (5' 9\")         Cardiac Rhythm:     Was the PSS-3 completed:   Yes  What interventions are required if any?               Family Comments:   OBS brochure/video discussed/provided to patient/family: N/A              Name of person given brochure if not patient:               Relationship to patient:     For the majority of the shift this patient's behavior was Green.   Behavioral interventions performed were .    ED NURSE PHONE NUMBER: *[phone]  "

## 2021-06-07 NOTE — UTILIZATION REVIEW
Admission Status; Secondary Review Determination    Under the authority of the Utilization Management Committee, the utilization review process indicated a secondary review on the above patient. The review outcome is based on review of the medical records, discussions with staff, and applying clinical experience noted on the date of the review.    (x) Inpatient Status Appropriate - This patient's medical care is consistent with medical management for inpatient care and reasonable inpatient medical practice.    RATIONALE FOR DETERMINATION:  61-year-old female with complex past medical history including paroxysmal atrial fibrillation on Xarelto, prior history of DVT and pulmonary embolus, hypertension, lupus on chronic steroids, history of chronic pain syndrome on narcotics who presents with acute onset of left-sided weakness.  MRI of the brain negative for acute stroke the patient also had some recent slurred speech as well as a recent fall due to patient anticoagulation acute persistent weakness and recent fall and recurrent dizziness patient will require extensive evaluation and management of patient's ongoing deficits prior to safe discharge appropriate for inpatient care.    At the time of admission with the information available to the attending physician more than 2 nights Hospital complex care was anticipated, based on patient risk of adverse outcome if treated as outpatient and complex care required. Inpatient admission is appropriate based on the Medicare guidelines.    This document was produced using voice recognition software    The information on this document is developed by the utilization review team in order for the business office to ensure compliance. This only denotes the appropriateness of proper admission status and does not reflect the quality of care rendered.    The definitions of Inpatient Status and Observation Status used in making the determination above are those provided in the CMS  Coverage Manual, Chapter 1 and Chapter 6, section 70.4.    Sincerely,    Junior Brandon MD  Utilization Review  Physician Advisor  [facility].

## 2021-06-07 NOTE — PROGRESS NOTES
RECEIVING UNIT ED HANDOFF REVIEW    ED Nurse Handoff Report was reviewed by: Alondra Redding RN on June 6, 2021 at 8:31 PM

## 2021-06-07 NOTE — H&P
Admitted: 06/06/2021    CHIEF COMPLAINT:  Left-sided weakness.    HISTORY OF PRESENT ILLNESS:  Has been obtained from the patient, who is a relatively good historian.  Her daughter was present at the time of my examination.  I also discussed with the ER attending,  and they reviewed her chart as well.    Mrs. Aspen Mccullough is a very pleasant 61-year-old female with a complex past medical history of paroxysmal atrial fibrillation with history of AFib with RVR on Xarelto, history of a right lower extremity DVT and PE, hypertension, dyslipidemia, lupus, on chronic steroids, hypothyroidism, being followed by endocrinology, history of chronic pain syndrome, on chronic narcotics, attention deficit hyperactivity disorder, degenerative disk disease and insomnia, who initially presented to urgent care for evaluation of left sided weakness.    As mentioned above, she does have a complex past medical history.  She is on Xarelto for her history of paroxysmal atrial fibrillation and right lower extremity DVT and PE.  She states that she is compliant with her medications.  She states that yesterday she felt slightly nauseated and she had one episode of emesis.  Apparently her  also noticed that she was having some slurred speech yesterday, but she did not seek medical attention.  She did go to sleep.  She woke up early morning at 4:00 a.m. and she went outside as she decided to plant some plants.  While she was walking down the stairs she had a fall and she fell down three stairs.  She cannot remember why she fell.  She said that she did hit her head against cement.  She did not lose her consciousness.  After the fall she felt dizzy and she could not get up by herself.  She noted having a left sided weakness including left upper and left lower extremity.  She tried to crawl back in her house.  She tried to call her , who did not hear her.  Initially, she was trying to bang on the door, but he could not  hear her.  Eventually, he heard her after a couple of hours.  Apparently, the patient reported having weakness on the left side, but also pain in her left arm and left leg, so he took her initially to [facility] where she had x-ray of the left hand, which showed a fracture of the left thumb.  A splint was placed.  As per the note, she also had an x-ray of the left shoulder and left foot, which were negative.  I do not have records of those x-rays.  She was advised to go to urgent care for further evaluation.  In urgent care, she had some basic blood work done.  BMP showed a sodium of 129, potassium was 3.8, BUN 29, creatinine of 1, anion gap of 10, calcium 9.2.  INR was 0.9, blood sugar is 104.  CBC showed white blood cells of 10.1, hemoglobin 12.5, hematocrit 37.2 and platelet count 212.  She did have a CT of the head without contrast, which did not show any acute intracranial process.  She also had a CT of the C-spine, which did not show any acute fractures.  The patient continued to have a left sided weakness.  Because of concerns of stroke she was sent to ER for further evaluation.    Upon further questioning, the patient did report that last evening after dinner she had some mild headache, which was unusual for her.  As mentioned above apparently, she had some slurred speech yesterday also noted by her , which eventually resolved.  She does not have history of a stroke.  She denies having any chest pain, no shortness of breath.  The nausea resolved.  She reports feeling slightly dizzy.  At the time of my examination, she denies any abdominal pain.  She reported that Friday and Saturday, she had some diarrhea.  She does report having some pain in her left foot.  Of note, she did have a left foot surgery by Dr. Manzo a few weeks ago and she was supposed to have some stitches removed tomorrow.    In ER, she was seen by Dr. Duran.  Her initial vitals showed a blood pressure of 150/87, heart  rate 86, respiratory rate 16, oxygen saturation 95% on room air and temperature 99.2.  ER attending discussed with neurology on call who recommended CT angiogram of the head and neck and MRI of the brain, which have been ordered in the ER and pending at this time.    While she was in ER apparently her left lower extremity weakness is slightly improved, but she remained with significant left upper extremity weakness associated with some numbness.    PAST MEDICAL HISTORY:    1.  Hypertension.  2.  Dyslipidemia.  3.  History of paroxysmal atrial fibrillation with a history of AFib with RVR.  She is being followed by cardiology and Electrophysiology.  It seems that she had a Holter monitor in the past, which showed AFib with average heart rate in the 140s.  She had maintained on diltiazem, metoprolol and Xarelto.  4.  Lupus on chronic steroids.  5.  History of right lower extremity DVT and PE, on Xarelto.  6.  Chronic pain syndrome, on chronic narcotics.  7.  Attention deficit hyperactivity disorder.   8.  Chronic vein insufficiency.  9.  History of Hashimoto thyroiditis with hypothyroidism.  She follows with endocrinology as outpatient.  Apparently, she is on levothyroxine tablets, levothyroxine IV and Cytomel.  10.  Degenerative disk disease.  11.  Insomnia.  12.  Morbid obesity, BMI is 40.6.    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)     APPENDECTOMY       ARTHRODESIS ANKLE      right     ARTHROPLASTY KNEE BILATERAL       ARTHROPLASTY REVISION KNEE  4/19/2011    Procedure:ARTHROPLASTY REVISION KNEE; With Antibiotic Cement ; Surgeon:CHAO OLIVARES; Location:[facility]     BREAST SURGERY      right- tissue remove nipple area     BUNIONECTOMY  12/14/2011    Procedure:BUNIONECTOMY; Right Bunion Correction; Surgeon:GRACE ZARATE; Location:[facility] STOMACH SURGERY PROCEDURE UNLISTED      see list which we  will bring     CHOLECYSTECTOMY       EXAM UNDER ANESTHESIA ANUS N/A 7/15/2020    Procedure: EXAM UNDER ANESTHESIA, ANUS;  Surgeon: Luis Canales MD;  Location: [facility] OR     EXCISE MASS UPPER EXTREMITY  12/14/2011    Procedure:EXCISE MASS UPPER EXTREMITY; Excision of Left Arm Mass; Surgeon:GRACE ZARATE; Location: [facility]     FOOT SURGERY      left X 4     FUSION LUMBAR ANTERIOR ONE LEVEL       HC SACROPLASTY      see list which we will bring     HEMORRHOIDECTOMY INTERNAL N/A 7/15/2020    Procedure: Exam under anesthesia, hemorrhoidectomy;  Surgeon: Luis Canales MD;  Location: [facility]     INJECT NERVE BLOCK SUPRASCAPULAR Left 5/18/2018    Procedure: INJECT NERVE BLOCK SUPRASCAPULAR;  Left Suprascapular Nerve Block;  Surgeon: Basim Velazquez MD;  Location: [facility]     INJECT NERVE BLOCK SUPRASCAPULAR Right 7/23/2018    Procedure: INJECT NERVE BLOCK SUPRASCAPULAR;  Right suprascapular injection;  Surgeon: Basim Velazquez MD;  [facility]     INJECT NERVE BLOCK SUPRASCAPULAR Bilateral 10/29/2018    Procedure: Bilateral Suprascapular Nerve Blocks;  Surgeon: Basim Velazquez MD;  [facility]     INJECT NERVE BLOCK SUPRASCAPULAR Bilateral 3/15/2019    Procedure: Bilateral Suprascapular Nerve Block;  Surgeon: Basim Velazquez MD;  [facility]     INJECT NERVE BLOCK SUPRASCAPULAR Bilateral 12/31/2019    Procedure: bilateral suprascapular nerve block;  Surgeon: Basim Velazquez MD;  Location: [facility]     KNEE SURGERY      see list which we will bring     LAMINECTOMY LUMBAR ONE LEVEL      L4-5     LAPAROSCOPIC ABLATION ENDOMETRIOSIS       RADIO FREQUENCY ABLATION PULSED CERVICAL Bilateral 7/10/2019    Procedure: Bilateral Suprascapular Nerve Pulsed Radiofrequency Ablation;  Surgeon: Basim Velazquez MD;  Location:  OR     REMOVE HARDWARE FOOT  12/14/2011    Procedure:REMOVE HARDWARE FOOT; Hardware Removal Right Foot (Mini-C-Arm) ; Surgeon:GRACE ZARATE; Location:  [facility]     RHINOPLASTY       SALPINGO-OOPHORECTOMY BILATERAL       TONSILLECTOMY         SOCIAL HISTORY:  She used to smoke.  She quit smoking in 1993.  She denies alcohol drinking.  Denies illicit drug abuse.    FAMILY HISTORY:    Family History     Problem (# of Occurrences) Relation (Name,Age of Onset)    Hypertension (1) Mother    No Known Problems (7) Father, Sister, Brother, Maternal Grandmother, Maternal Grandfather, Paternal Grandmother, Other          PRIOR TO ADMISSION MEDICATIONS:    BIOTIN FORTE PO Take 1 tablet by mouth daily  6/6/2021 at Unknown time Yes Reported, Patient   carboxymethylcellulose (CARBOXYMETHYLCELLULOSE SODIUM) 0.5 % SOLN ophthalmic solution Place 1 drop into both eyes 2 times daily as needed   at PRN Yes     cephALEXin (KEFLEX) 500 MG capsule Take 500 mg by mouth as needed (Dental Procedure) Take 4 caps 1 hour prior to Dental Appointment   at PRN Yes Reported, Patient   cyanocobalamin 1000 MCG/ML injection Inject 1 mL (1,000 mcg) Subcutaneous every 7 days 6/4/2021 at Unknown Yes Yeimy Cabrera MD   cyclobenzaprine (FLEXERIL) 10 MG tablet Take 1 tablet (10 mg) by mouth 3 times daily 6/6/2021 at AM Yes Yeimy Cabrera MD   cycloSPORINE (RESTASIS) 0.05 % ophthalmic emulsion Place 1 drop into both eyes 2 times daily 6/6/2021 at AM Yes Reported, Patient   diltiazem ER COATED BEADS (CARDIZEM CD/CARTIA XT) 180 MG 24 hr capsule Take 1 capsule (180 mg) by mouth 2 times daily 6/6/2021 at AM Yes Rosa Uribe MD   fexofenadine (ALLEGRA) 180 MG tablet Take 180 mg by mouth daily as needed   at PRN Yes Reported, Patient   gabapentin (NEURONTIN) 300 MG capsule Take 300 mg by mouth 2 times daily (morning and afternoon) in addition to 800 mg tablet (total dose 1100mg) 6/6/2021 at AM Yes Reported, Patient   gabapentin (NEURONTIN) 800 MG tablet Take 800 mg by mouth At Bedtime (in addition to the 2 - 1100mg doses) 6/5/2021 at HS Yes Unknown, Entered By History    gabapentin (NEURONTIN) 800 MG tablet Take 800 mg by mouth 2 times daily (morning and afternoon) in addition to 300mg capsule (total dose 1100mg) 6/6/2021 at AM Yes Unknown, Entered By History   HYDROmorphone (DILAUDID) 8 MG tablet Take 8 mg by mouth every 8 hours . Max of 3 doses per day. 6/6/2021 at AM Yes     levothyroxine (SYNTHROID) 200 MCG SOLR injection Inject 200 mcg into the vein once a week on Fridays 6/4/2021 at Unknown Yes     levothyroxine (SYNTHROID/LEVOTHROID) 300 MCG tablet Take 300 mcg by mouth 2 times daily 6/6/2021 at AM Yes Unknown, Entered By History   lifitegrast (XIIDRA) 5 % opthalmic solution Place 1 drop into both eyes 2 times daily 6/6/2021 at AM Yes     liothyronine (CYTOMEL) 25 MCG tablet Take 25 mcg by mouth 2 times daily  6/6/2021 at AM Yes     metoprolol succinate ER (TOPROL-XL) 100 MG 24 hr tablet Take 1 tablet (100 mg) by mouth 2 times daily 6/6/2021 at AM Yes Rosa Uribe MD   morphine (MS CONTIN) 30 MG 12 hr tablet Take 30 mg by mouth 2 times daily (morning and night) in addition to 60 mg tablet for a total dose of 90mg. 6/6/2021 at AM Yes     morphine (MS CONTIN) 60 MG 12 hr tablet Take 60 mg by mouth daily in the afternoon (in addition to the 2 - 90mg doses) 6/5/2021 at PM Yes Unknown, Entered By History   morphine (MS CONTIN) 60 MG 12 hr tablet Take 60 mg by mouth 2 times daily (morning and night) in addition to 30mg tablet for a total dose of 90mg 6/6/2021 at AM Yes Umm Ya APRN CNP   multivitamin, therapeutic with minerals (MULTI-VITAMIN) TABS tablet Take 1 tablet by mouth 2 times daily 6/6/2021 at AM Yes     NASONEX 50 MCG/ACT spray Spray 1 spray into both nostrils daily as needed   at PRN Yes     nystatin (MYCOSTATIN) 298320 UNIT/GM external powder Apply topically 3 times daily as needed  at PRN Yes Naun Patricio MD   prednisoLONE acetate (PRED FORTE) 1 % ophthalmic susp Place 1 drop into both eyes 4 times daily  6/6/2021 at Unknown time  Yes Reported, Patient   predniSONE (DELTASONE) 1 MG tablet Take 3 mg by mouth every other day in addition to 5mg tablet for a total dose of 8mg. 6/5/2021 at AM Yes     predniSONE (DELTASONE) 5 MG tablet Take 10 mg by mouth every other day 6/6/2021 at AM Yes Unknown, Entered By History   predniSONE (DELTASONE) 5 MG tablet Take 5 mg by mouth every other day in addition to 1mg tablets for a total dose of 8mg 6/5/2021 at AM Yes     probiotic CAPS Take 1 capsule by mouth 2 times daily 6/6/2021 at AM Yes     rivaroxaban ANTICOAGULANT (XARELTO) 20 MG TABS tablet Take 20 mg by mouth daily (with dinner)  6/5/2021 at PM Yes     sodium chloride 0.9% infusion Use 6mL once weekly to reconstitute levothyroxine powder before injection 6/4/2021 at Unknown Yes Unknown, Entered By History   vitamin D2 (ERGOCALCIFEROL) 63824 units (1250 mcg) capsule Take 50,000 Units by mouth once a week on Monday 5/31/2021 at Unknown Yes Unknown, Entered By History   zolpidem (AMBIEN) 5 MG tablet Take 5 mg by mouth nightly as needed for sleep  at PRN Yes Unknown, Entered By History          ALLERGIES:    Allergies   Allergen Reactions     Blood Transfusion Related (Informational Only) Other (See Comments)     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      Chlorhexidine Hives     Thor surgical cleanser     Codeine Hives     Has tolerated Oxycodone in past      Ibuprofen [Nsaids] Hives     Penicillins Hives     Other reaction(s): Unknown     Sulfa Drugs Hives     Other reaction(s): Unknown     Doxycycline GI Disturbance     Emesis & diarrhea  Patient denies allergy to this med     Hydroxyzine      rash     Mold      Red Wine Complex [Red Wine Powder]        REVIEW OF SYSTEMS:  A 10-point review of systems was conducted and it was negative except for pertinent positives mentioned in history of present illness.    PHYSICAL EXAMINATION:    VITAL SIGNS:  Blood pressure is 150/87, heart rate 86, respiratory rate 16, oxygen saturation 95% on room  air and temperature 99.2.  GENERAL:  The patient is awake, alert, no acute distress at the time of my examination, she is impulsive at times.  HEENT:  Normocephalic, atraumatic.  Pupils are equal, round and reactive to light.  Oral mucosa is moist.  She seems to have a very mild right facial droop, but I am not sure if it is new.  NECK:  Supple, no cervical lymphadenopathy, no thyromegaly.  CHEST:  There is bilateral air entry. No wheezing, no rales, no crackles.  CARDIOVASCULAR:  There is normal S1 and S2, regular rate and rhythm.  No murmurs, no rubs.  ABDOMEN:  Soft, nontender, nondistended.  Bowel sounds are present.  EXTREMITIES:  She has a splint over her left upper extremity.  She also has a dressing over her left lower extremity.  The digits of her left lower extremity seems to be a little bit swollen.  There is no calf tenderness.  SKIN:  Intact.  No rash, no cyanosis.  NEUROLOGIC:  The patient is awake, alert, oriented to self, place and time.  Cranial nerves II through XII are grossly intact.  She does not have a slurred speech.  At the time of my examination she has left sided weakness with motor strength 2/5 intensity in the left upper extremity and 3/5 in the left lower extremity.  She also describes a decreased sensation of left upper and left lower extremity.  I did not assess her gait.  PSYCHIATRIC EVALUATION:  Normal mood, normal affect.  MUSCULOSKELETAL:  As mentioned above, she has a splint over her left forearm and a dressing over her left foot.    LABORATORY DATA:  Reviewed and dictated above.    IMPRESSION AND PLAN:  Mrs. Aspen Mccullough is a very pleasant 61-year-old female with past medical history of hypertension, dyslipidemia, history of paroxysmal atrial fibrillation with history of AFib with RVR on Xarelto, history of right lower extremity DVT and PE, on Xarelto, history of lupus, on chronic steroids, history of osteoarthritis with chronic pain syndrome, on chronic narcotics, degenerative  disk disease, attention deficit hyperactivity disorder, hypothyroidism and insomnia, who was referred to come to ER from urgent care for further evaluation of left sided weakness.    PLAN:    1.  Suspected acute CVA.    Apparently, she had some slurred speech yesterday, but she did not seek medical attention.  Earlier today around 4:00 a.m. when she went outside to plant some plants she fell down three steps of stairs and she hit her head.  After that, she noticed having left upper and left lower extremity weakness.  She could not get up by herself.  She had to crawl to her house, but she remained on the ground for a couple of hours until her  heard her.  Initially, she was taken to [facility] because she was also complaining of a left foot and left arm pain.  Apparently, she has a fracture in her left thumb.  From [facility] she was referred to urgent care and from there to the ER.  She continued to have significant left upper and left lower extremity weakness throughout the day.  A CT of the head done in urgent care was negative for any acute pathology.  CT of the C-spine also negative for any acute fractures.  She is on Xarelto for her history of AFib and DVT.  She states that she is compliant with her medications.  She still has weakness in her left upper and left lower extremity.  She has to use her right arm to move her left upper extremity.  Neuro on call was contacted.  They did recommend a CT angiogram of the head and neck and MRI of the brain, which are pending at this time.  We will continue her prior to admission Xarelto.  She is admitted to neuro floor.  She will be monitored on telemetry.  We will have frequent neuro checks every 4 hours.  Formal neurology consult requested.  We will check her TSH and lipid profile, hemoglobin A1c.  PT, OT and speech and language pathology ordered.    2.  Hypertension.  Her medications needs to be verified by the pharmacy.  Apparently, she is on  Toprol- mg p.o. twice daily and Cardizem 180 mg p.o. twice daily.  We plan to hold her prior to admission antihypertensive medications to allow for permissive hypertension.  Hydralazine IV and labetalol IV p.r.n. had been ordered as per stroke protocol.    3.  Dyslipidemia.  We will check fasting lipid profile and we will start her on Lipitor 20 mg p.o. daily.    4.  History of paroxysmal atrial fibrillation.  She follows with Dr. Uribe and Dr Muñoz..  It seems that she had a Holter monitor placed in the past, which showed an average heart rate in the 140s. As per the notes, she seems to be asymptomatic with a rapid heart rate.  Apparently, her Toprol dose was increased, still waiting for medication reconciliation.  Apparently, she is on diltiazem 180 mg p.o. twice daily and Toprol- mg p.o. daily.  Currently, she seems to be in sinus rhythm with a heart rate in 90s.  We will plan to resume her prior to admission medications which will be verified by the pharmacy.  We will also resume her prior to admission Xarelto.     5.  History of right lower extremity DVT/PE.  Continue her prior to admission Xarelto.    6.  History of lupus.  She is on chronic prednisone.  Apparently 8 mg alternating with 10 mg, which will be continued.    7.  Hypothyroidism.  She is being followed by Dr. Luna from endocrinology.  Apparently, she is on Synthroid tablet, Synthroid injection weekly and Cytomel.  We will resume all these medications once verified by the pharmacy.  Of note, it seems that in the past her liver function tests had been off and her medications are being adjusted by her endocrinologist.    8.  Chronic pain syndrome, on chronic narcotics.  Her medications needs to be verified by the pharmacy, but apparently she is on MS Contin 60 mg p.o. 3 times daily and 30 mg p.o. twice daily in the morning and afternoon, which will be resumed.  We will also resume her prior to admission Flexeril  and gabapentin.    9.   Left thumb fracture.  Apparently, she reported pain in her left hand after the fall. She went to [facility] where she had an x-ray, which showed a fracture.  I do not have a record of that x-ray.  A splint was placed.  Also, consult requested.  She also had a recent left foot surgery performed by Dr. Manzo and she states that she was supposed to see ortho tomorrow for stitches removal.    10.  DVT prophylaxis.  Continue prior to admission Xarelto.    CODE STATUS:  Discussed with the patient.  The patient is full code.    DISPOSITION:  Admit the patient.  I anticipate a couple of days of hospitalization.    Deborah Masterson MD        D: 2021   T: 2021   MT: [id number]    Name:     ROXY ALVARES  MRN:      [id number]        Account:     [id number]   :      1960           Admitted:    2021       Document: [id number]

## 2021-06-07 NOTE — PHARMACY-ADMISSION MEDICATION HISTORY
Pharmacy Medication History  Admission medication history interview status for the 6/6/2021  admission is complete. See EPIC admission navigator for prior to admission medications     Location of Interview: Patient room  Medication history sources: Patient, Patient's family/friend (), Surescripts, Patient's home med list and Care Everywhere    Significant changes made to the medication list:  Added: NS   Changed: Gabapentin 300mg (TID to BID), Levothyroxine dosage (500mcg daily to 600mcg daily), liothyronine (50mcg BID to 25mcg BID),   Removed: Citrucel,     In the past week, patient estimated taking medication this percent of the time: greater than 90%    Additional medication history information:   Patient took 10mg Prednisone this morning, meaning she will be due for 8mg tomorrow (6/7) if following PTA regimen    Confirmed patient takes 1100mg Gabapentin AM and afternoon with 800mg at night, but 90mg morphine AM and PM with 60mg in the afternoon.     Patient states she took first doses this morning but did not get afternoon or evening doses of her meds before coming in to the hospital    Medication reconciliation completed by provider prior to medication history? No    Time spent in this activity: 75 minutes    Prior to Admission medications    Medication Sig Last Dose Taking? Auth Provider   BIOTIN FORTE PO Take 1 tablet by mouth daily  6/6/2021 at Unknown time Yes Reported, Patient   carboxymethylcellulose (CARBOXYMETHYLCELLULOSE SODIUM) 0.5 % SOLN ophthalmic solution Place 1 drop into both eyes 2 times daily as needed   at PRN Yes    cephALEXin (KEFLEX) 500 MG capsule Take 500 mg by mouth as needed (Dental Procedure) Take 4 caps 1 hour prior to Dental Appointment   at PRN Yes Reported, Patient   cyanocobalamin 1000 MCG/ML injection Inject 1 mL (1,000 mcg) Subcutaneous every 7 days 6/4/2021 at Unknown Yes Yeimy Cabrera MD   cyclobenzaprine (FLEXERIL) 10 MG tablet Take 1 tablet (10 mg)  by mouth 3 times daily 6/6/2021 at AM Yes Yeimy Cabrera MD   cycloSPORINE (RESTASIS) 0.05 % ophthalmic emulsion Place 1 drop into both eyes 2 times daily 6/6/2021 at AM Yes Reported, Patient   diltiazem ER COATED BEADS (CARDIZEM CD/CARTIA XT) 180 MG 24 hr capsule Take 1 capsule (180 mg) by mouth 2 times daily 6/6/2021 at AM Yes Rosa Uribe MD   fexofenadine (ALLEGRA) 180 MG tablet Take 180 mg by mouth daily as needed   at PRN Yes Reported, Patient   gabapentin (NEURONTIN) 300 MG capsule Take 300 mg by mouth 2 times daily (morning and afternoon) in addition to 800 mg tablet (total dose 1100mg) 6/6/2021 at AM Yes Reported, Patient   gabapentin (NEURONTIN) 800 MG tablet Take 800 mg by mouth At Bedtime (in addition to the 2 - 1100mg doses) 6/5/2021 at HS Yes Unknown, Entered By History   gabapentin (NEURONTIN) 800 MG tablet Take 800 mg by mouth 2 times daily (morning and afternoon) in addition to 300mg capsule (total dose 1100mg) 6/6/2021 at AM Yes Unknown, Entered By History   HYDROmorphone (DILAUDID) 8 MG tablet Take 8 mg by mouth every 8 hours . Max of 3 doses per day. 6/6/2021 at AM Yes    levothyroxine (SYNTHROID) 200 MCG SOLR injection Inject 200 mcg into the vein once a week on Fridays 6/4/2021 at Unknown Yes    levothyroxine (SYNTHROID/LEVOTHROID) 300 MCG tablet Take 300 mcg by mouth 2 times daily 6/6/2021 at AM Yes Unknown, Entered By History   lifitegrast (XIIDRA) 5 % opthalmic solution Place 1 drop into both eyes 2 times daily 6/6/2021 at AM Yes    liothyronine (CYTOMEL) 25 MCG tablet Take 25 mcg by mouth 2 times daily  6/6/2021 at AM Yes    metoprolol succinate ER (TOPROL-XL) 100 MG 24 hr tablet Take 1 tablet (100 mg) by mouth 2 times daily 6/6/2021 at AM Yes Rosa Uribe MD   morphine (MS CONTIN) 30 MG 12 hr tablet Take 30 mg by mouth 2 times daily (morning and night) in addition to 60 mg tablet for a total dose of 90mg. 6/6/2021 at AM Yes    morphine (MS  CONTIN) 60 MG 12 hr tablet Take 60 mg by mouth daily in the afternoon (in addition to the 2 - 90mg doses) 6/5/2021 at PM Yes Unknown, Entered By History   morphine (MS CONTIN) 60 MG 12 hr tablet Take 60 mg by mouth 2 times daily (morning and night) in addition to 30mg tablet for a total dose of 90mg 6/6/2021 at AM Yes Umm Ya APRN CNP   multivitamin, therapeutic with minerals (MULTI-VITAMIN) TABS tablet Take 1 tablet by mouth 2 times daily 6/6/2021 at AM Yes    NASONEX 50 MCG/ACT spray Spray 1 spray into both nostrils daily as needed   at PRN Yes    nystatin (MYCOSTATIN) 289880 UNIT/GM external powder Apply topically 3 times daily as needed  at PRN Yes Naun Patricio MD   prednisoLONE acetate (PRED FORTE) 1 % ophthalmic susp Place 1 drop into both eyes 4 times daily  6/6/2021 at Unknown time Yes Reported, Patient   predniSONE (DELTASONE) 1 MG tablet Take 3 mg by mouth every other day in addition to 5mg tablet for a total dose of 8mg. 6/5/2021 at AM Yes    predniSONE (DELTASONE) 5 MG tablet Take 10 mg by mouth every other day 6/6/2021 at AM Yes Unknown, Entered By History   predniSONE (DELTASONE) 5 MG tablet Take 5 mg by mouth every other day in addition to 1mg tablets for a total dose of 8mg 6/5/2021 at AM Yes    probiotic CAPS Take 1 capsule by mouth 2 times daily 6/6/2021 at AM Yes    rivaroxaban ANTICOAGULANT (XARELTO) 20 MG TABS tablet Take 20 mg by mouth daily (with dinner)  6/5/2021 at PM Yes    sodium chloride 0.9% infusion Use 6mL once weekly to reconstitute levothyroxine powder before injection 6/4/2021 at Unknown Yes Unknown, Entered By History   vitamin D2 (ERGOCALCIFEROL) 85482 units (1250 mcg) capsule Take 50,000 Units by mouth once a week on Monday 5/31/2021 at Unknown Yes Unknown, Entered By History   zolpidem (AMBIEN) 5 MG tablet Take 5 mg by mouth nightly as needed for sleep  at PRN Yes Unknown, Entered By History       The information provided in this note is only as accurate as  the sources available at the time of update(s)

## 2021-06-08 ENCOUNTER — APPOINTMENT (OUTPATIENT)
Dept: GENERAL RADIOLOGY | Facility: CLINIC | Age: 61
DRG: 074 | End: 2021-06-08
Attending: PHYSICIAN ASSISTANT
Payer: COMMERCIAL

## 2021-06-08 ENCOUNTER — APPOINTMENT (OUTPATIENT)
Dept: OCCUPATIONAL THERAPY | Facility: CLINIC | Age: 61
DRG: 074 | End: 2021-06-08
Attending: PSYCHIATRY & NEUROLOGY
Payer: COMMERCIAL

## 2021-06-08 ENCOUNTER — APPOINTMENT (OUTPATIENT)
Dept: PHYSICAL THERAPY | Facility: CLINIC | Age: 61
DRG: 074 | End: 2021-06-08
Attending: PSYCHIATRY & NEUROLOGY
Payer: COMMERCIAL

## 2021-06-08 ENCOUNTER — HOSPITAL ENCOUNTER (OUTPATIENT)
Dept: NEUROLOGY | Facility: CLINIC | Age: 61
DRG: 074 | End: 2021-06-08
Attending: PSYCHIATRY & NEUROLOGY
Payer: COMMERCIAL

## 2021-06-08 LAB
ANION GAP SERPL CALCULATED.3IONS-SCNC: 2 MMOL/L (ref 3–14)
BASOPHILS # BLD AUTO: 0 10E9/L (ref 0–0.2)
BASOPHILS NFR BLD AUTO: 0.6 %
BUN SERPL-MCNC: 14 MG/DL (ref 7–30)
CALCIUM SERPL-MCNC: 8.9 MG/DL (ref 8.5–10.1)
CHLORIDE SERPL-SCNC: 103 MMOL/L (ref 94–109)
CK SERPL-CCNC: 943 U/L (ref 30–225)
CO2 SERPL-SCNC: 33 MMOL/L (ref 20–32)
CREAT SERPL-MCNC: 0.81 MG/DL (ref 0.52–1.04)
DIFFERENTIAL METHOD BLD: ABNORMAL
EOSINOPHIL # BLD AUTO: 0.3 10E9/L (ref 0–0.7)
EOSINOPHIL NFR BLD AUTO: 3.8 %
ERYTHROCYTE [DISTWIDTH] IN BLOOD BY AUTOMATED COUNT: 18.5 % (ref 10–15)
GFR SERPL CREATININE-BSD FRML MDRD: 78 ML/MIN/{1.73_M2}
GLUCOSE SERPL-MCNC: 125 MG/DL (ref 70–99)
HCT VFR BLD AUTO: 38.6 % (ref 35–47)
HGB BLD-MCNC: 12.1 G/DL (ref 11.7–15.7)
IMM GRANULOCYTES # BLD: 0.1 10E9/L (ref 0–0.4)
IMM GRANULOCYTES NFR BLD: 0.7 %
INTERPRETATION ECG - MUSE: NORMAL
LYMPHOCYTES # BLD AUTO: 3.2 10E9/L (ref 0.8–5.3)
LYMPHOCYTES NFR BLD AUTO: 46.3 %
MCH RBC QN AUTO: 27.9 PG (ref 26.5–33)
MCHC RBC AUTO-ENTMCNC: 31.3 G/DL (ref 31.5–36.5)
MCV RBC AUTO: 89 FL (ref 78–100)
MONOCYTES # BLD AUTO: 0.5 10E9/L (ref 0–1.3)
MONOCYTES NFR BLD AUTO: 6.7 %
NEUTROPHILS # BLD AUTO: 2.9 10E9/L (ref 1.6–8.3)
NEUTROPHILS NFR BLD AUTO: 41.9 %
NRBC # BLD AUTO: 0 10*3/UL
NRBC BLD AUTO-RTO: 0 /100
PLATELET # BLD AUTO: 228 10E9/L (ref 150–450)
POTASSIUM SERPL-SCNC: 3.8 MMOL/L (ref 3.4–5.3)
RBC # BLD AUTO: 4.33 10E12/L (ref 3.8–5.2)
SODIUM SERPL-SCNC: 138 MMOL/L (ref 133–144)
WBC # BLD AUTO: 6.9 10E9/L (ref 4–11)

## 2021-06-08 PROCEDURE — 73030 X-RAY EXAM OF SHOULDER: CPT | Mod: LT

## 2021-06-08 PROCEDURE — 85025 COMPLETE CBC W/AUTO DIFF WBC: CPT | Performed by: PHYSICIAN ASSISTANT

## 2021-06-08 PROCEDURE — 250N000013 HC RX MED GY IP 250 OP 250 PS 637: Performed by: PHYSICIAN ASSISTANT

## 2021-06-08 PROCEDURE — 36415 COLL VENOUS BLD VENIPUNCTURE: CPT | Performed by: PHYSICIAN ASSISTANT

## 2021-06-08 PROCEDURE — 250N000013 HC RX MED GY IP 250 OP 250 PS 637: Performed by: INTERNAL MEDICINE

## 2021-06-08 PROCEDURE — 97116 GAIT TRAINING THERAPY: CPT | Mod: GP | Performed by: PHYSICAL THERAPIST

## 2021-06-08 PROCEDURE — 120N000001 HC R&B MED SURG/OB

## 2021-06-08 PROCEDURE — 97112 NEUROMUSCULAR REEDUCATION: CPT | Mod: GO | Performed by: OCCUPATIONAL THERAPIST

## 2021-06-08 PROCEDURE — 97161 PT EVAL LOW COMPLEX 20 MIN: CPT | Mod: GP | Performed by: PHYSICAL THERAPIST

## 2021-06-08 PROCEDURE — 999N000226 HC STATISTIC SLP IP EVAL DEFER

## 2021-06-08 PROCEDURE — 99232 SBSQ HOSP IP/OBS MODERATE 35: CPT | Performed by: PHYSICIAN ASSISTANT

## 2021-06-08 PROCEDURE — 250N000012 HC RX MED GY IP 250 OP 636 PS 637: Performed by: INTERNAL MEDICINE

## 2021-06-08 PROCEDURE — 80048 BASIC METABOLIC PNL TOTAL CA: CPT | Performed by: PHYSICIAN ASSISTANT

## 2021-06-08 PROCEDURE — 97535 SELF CARE MNGMENT TRAINING: CPT | Mod: GO | Performed by: OCCUPATIONAL THERAPIST

## 2021-06-08 PROCEDURE — 95816 EEG AWAKE AND DROWSY: CPT

## 2021-06-08 PROCEDURE — 82550 ASSAY OF CK (CPK): CPT | Performed by: PHYSICIAN ASSISTANT

## 2021-06-08 PROCEDURE — 97530 THERAPEUTIC ACTIVITIES: CPT | Mod: GP | Performed by: PHYSICAL THERAPIST

## 2021-06-08 RX ADMIN — MORPHINE SULFATE 60 MG: 30 TABLET, EXTENDED RELEASE ORAL at 22:00

## 2021-06-08 RX ADMIN — Medication 1 CAPSULE: at 20:18

## 2021-06-08 RX ADMIN — CYCLOBENZAPRINE 10 MG: 10 TABLET, FILM COATED ORAL at 21:59

## 2021-06-08 RX ADMIN — ATORVASTATIN CALCIUM 40 MG: 40 TABLET, FILM COATED ORAL at 20:18

## 2021-06-08 RX ADMIN — LIOTHYRONINE SODIUM 25 MCG: 25 TABLET ORAL at 20:19

## 2021-06-08 RX ADMIN — CYCLOBENZAPRINE 10 MG: 10 TABLET, FILM COATED ORAL at 09:15

## 2021-06-08 RX ADMIN — LIOTHYRONINE SODIUM 25 MCG: 25 TABLET ORAL at 09:15

## 2021-06-08 RX ADMIN — PREDNISOLONE ACETATE 1 DROP: 10 SUSPENSION/ DROPS OPHTHALMIC at 22:04

## 2021-06-08 RX ADMIN — PREDNISOLONE ACETATE 1 DROP: 10 SUSPENSION/ DROPS OPHTHALMIC at 18:19

## 2021-06-08 RX ADMIN — DILTIAZEM HYDROCHLORIDE 180 MG: 180 CAPSULE, COATED, EXTENDED RELEASE ORAL at 09:15

## 2021-06-08 RX ADMIN — MORPHINE SULFATE 30 MG: 30 TABLET, EXTENDED RELEASE ORAL at 09:14

## 2021-06-08 RX ADMIN — MORPHINE SULFATE 60 MG: 30 TABLET, EXTENDED RELEASE ORAL at 09:13

## 2021-06-08 RX ADMIN — LEVOTHYROXINE SODIUM 300 MCG: 150 TABLET ORAL at 09:15

## 2021-06-08 RX ADMIN — MORPHINE SULFATE 60 MG: 30 TABLET, EXTENDED RELEASE ORAL at 16:15

## 2021-06-08 RX ADMIN — METOPROLOL SUCCINATE 100 MG: 100 TABLET, EXTENDED RELEASE ORAL at 09:15

## 2021-06-08 RX ADMIN — Medication 1 DROP: at 09:13

## 2021-06-08 RX ADMIN — GABAPENTIN 800 MG: 800 TABLET, FILM COATED ORAL at 21:59

## 2021-06-08 RX ADMIN — GABAPENTIN 1100 MG: 300 CAPSULE ORAL at 09:14

## 2021-06-08 RX ADMIN — Medication 1 CAPSULE: at 09:15

## 2021-06-08 RX ADMIN — Medication 1 DROP: at 20:19

## 2021-06-08 RX ADMIN — PREDNISONE 10 MG: 5 TABLET ORAL at 09:14

## 2021-06-08 RX ADMIN — PREDNISOLONE ACETATE 1 DROP: 10 SUSPENSION/ DROPS OPHTHALMIC at 09:16

## 2021-06-08 RX ADMIN — RIVAROXABAN 20 MG: 20 TABLET, FILM COATED ORAL at 16:15

## 2021-06-08 RX ADMIN — CYCLOBENZAPRINE 10 MG: 10 TABLET, FILM COATED ORAL at 16:15

## 2021-06-08 ASSESSMENT — ACTIVITIES OF DAILY LIVING (ADL)
ADLS_ACUITY_SCORE: 19

## 2021-06-08 NOTE — PLAN OF CARE
Pt here with unwitnessed fall and left sided weakness. A&O. Right droop. Numbness and tingling in feet, baseline per patient. LUE has decreased sensation at times? LUE fracture from fall, in splint--sling ordered from ortho. VSS. Tele NSR. Regular diet, thin liquids. Takes pills whole. Up with A1/GB/W. Denies pain. Pt scoring green on the Aggression Stop Light Tool. Plan for MR of brachial site. Discharge pending final evaluation, possibly tomorrow.  Patient belongings remain with patient  No home meds

## 2021-06-08 NOTE — INTERIM SUMMARY
"Brief Stroke Note:     Chart reviewed, patient not seen today. Repeat MRI ordered due to concern for new right facial droop. No evidence of acute infarct on imaging. No further stroke evaluation indicated at this time. Further work-up per general neurology.     Thank you for this consult. No further stroke evaluation is indicated, we will sign off. Please contact us with additional questions.     ANITRA Chapin, [facility]  Neurology  06/08/2021 7:22 AM  To page stroke neurology after hours or on a subsequent day, click here: AMCOM  Choose \"On Call\" tab at top, then search dropdown box for \"Neurology Adult\" & press Enter, look for Neuro ICU/Stroke       "

## 2021-06-08 NOTE — CONSULTS
[facility]    Neurology Consultation     Date of Admission:  6/6/2021    Assessment & Plan   Aspen Mccullough is a 61 year old female who was admitted on 6/6/2021. I was asked to see the patient for see  for left side weakness.  The patient continues to have weakness in the left shoulder with a lot of pain to passive abduction as well palpation to the left shoulder.  I suspect the patient has had an injury of the left shoulder and/or possible left elbow.  I do not find a clear-cut symptoms or findings of radiculopathy or myelopathy.  Of note to the MRI of the head and cervical spine were negative for a cause for her symptoms.  Axillary nerve injury or brachial plexus is another possibility.  There is no evidence for epileptiform activity or seizures.  Hyponatremia.    At this time I recommend orthopedic evaluation of the left shoulder and the elbow.    No obvious need for an antiepileptic drug or further evaluation from this point of view.    Will follow orthopedic recommendation, in the near future if symptoms do not improve an EMG as an outpatient might be useful.  However EMG done too early might not be useful, at least 3 to 4 weeks need to pass since the initial injury.      Elena Tony MD    Code Status    Full Code    Reason for Consult   Reason for consult: I was asked by Placido to evaluate this patient for  left side weakness.      Primary Care Physician   Arcadio Farfan    Chief Complaint    left side weakness.      History is obtained from the patient    History of Present Illness   Aspen Mccullough is a 61 year old female who presents with  left side weakness.  The patient has a history of chronic pain syndrome atrial fibrillation on Xarelto and multiple other comorbidities.  The patient had a fall.  The patient states that she woke up at 4:00 in the morning to go plant her garden and fell down steps and hit her head on the cement.  The patient states that she hit  "her frontal head on the cement.  She thinks she lost consciousness.  The patient feels like she broke her nose.  The patient states that she stayed on the ground for 2 hours trying to stand up but was not able to feel any of her limbs.  Eventually she managed to reach the door and door slamming it for about 1 hour so somebody will hear her.  Her  was sleeping and did not hear.  Eventually she pulled herself up and was able to enter the porch and sat down.  She continued to yell for her .  Eventually her  heard her and the patient was brought to [facility] where she has had x-ray of the left hand which showed a fracture.  The left thumb but was fractured and was placed in splint.  There is a history that the patient might have had an x-ray of the left shoulder which was negative.  The patient was advised to go to urgent care there she has had some basic work-up which showed a sodium of 129 potassium 3.8 INR was 0.9.  White blood cells show were 10.1 hemoglobin 12.5.  The CT scan of the head showed no acute changes.  A CT scan of the spine did not show any acute fracture.     Continues to feel a lot of weakness in the left upper extremity and left lower extremity.  The patient was referred to emergency room for further evaluation.  During emergency room visit the patient has had evaluation for possible stroke and initial MRI of the head did not show any changes consistent with stroke.  Subsequently an MRI of the cervical spine was done which was negative for significant changes to explain her weakness.    Overall the patient states that today her weakness in the left lower extremity improved.  She continues to feel weak in the left upper extremity however she started to move it.  She feels like a \"bag of sand\" on the left upper extremity.    The patient has a lot of other pain syndromes in her low back pain both lower extremity.  She feels numb in both feet.  The patient states that she " has been on chronic morphine treatment for the last 20 years that she has pain doctor.  She has had multiple knee replacement on the right and left and ankle fused.  She has had low back surgery.    Past Medical History   I have reviewed this patient's medical history and updated it with pertinent information if needed.   Past Medical History:   Diagnosis Date     ADHD (attention deficit hyperactivity disorder)      Allergic rhinitis      Chronic low back pain      Cushing's syndrome (H)      DDD (degenerative disc disease)      DDD (degenerative disc disease)      Deviated nasal septum      Diarrhea      Endometriosis      Fibromyalgia      Hashimoto's disease      Hyperlipidemia      Hypothyroid     hx hashimoto's thyroiditis     Insomnia      Lupus (H)      Malabsorption      Pernicious anemia      Renal disease     chronic renal insufficiency     Sinusitis        Past Surgical History   I have reviewed this patient's surgical history and updated it with pertinent information if needed.  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)     APPENDECTOMY       ARTHRODESIS ANKLE      right     ARTHROPLASTY KNEE BILATERAL       ARTHROPLASTY REVISION KNEE  4/19/2011    Procedure:ARTHROPLASTY REVISION KNEE; With Antibiotic Cement ; Surgeon:CHAO OLIVARES; Location:[facility]     BREAST SURGERY      right- tissue remove nipple area     BUNIONECTOMY  12/14/2011    Procedure:BUNIONECTOMY; Right Bunion Correction; Surgeon:GRACE ZARATE; Location:[place] [facility] STOMACH SURGERY PROCEDURE UNLISTED      see list which we will bring     CHOLECYSTECTOMY       EXAM UNDER ANESTHESIA ANUS N/A 7/15/2020    Procedure: EXAM UNDER ANESTHESIA, ANUS;  Surgeon: Luis Canales MD;  Location: [facility] OR     EXCISE MASS UPPER EXTREMITY  12/14/2011    Procedure:EXCISE MASS UPPER EXTREMITY; Excision of Left Arm Mass; Surgeon:GRACE ZARATE; Location:[facility]      FOOT SURGERY      left X 4     FUSION LUMBAR ANTERIOR ONE LEVEL       HC SACROPLASTY      see list which we will bring     HEMORRHOIDECTOMY INTERNAL N/A 7/15/2020    Procedure: Exam under anesthesia, hemorrhoidectomy;  Surgeon: Luis Canales MD;  [facility]     INJECT NERVE BLOCK SUPRASCAPULAR Left 5/18/2018    Procedure: INJECT NERVE BLOCK SUPRASCAPULAR;  Left Suprascapular Nerve Block;  Surgeon: Basim Velazquez MD;  [facility]     INJECT NERVE BLOCK SUPRASCAPULAR Right 7/23/2018    Procedure: INJECT NERVE BLOCK SUPRASCAPULAR;  Right suprascapular injection;  Surgeon: Basim Velazquez MD;  [facility]     INJECT NERVE BLOCK SUPRASCAPULAR Bilateral 10/29/2018    Procedure: Bilateral Suprascapular Nerve Blocks;  Surgeon: Basim Velazquez MD;  [facility]     INJECT NERVE BLOCK SUPRASCAPULAR Bilateral 3/15/2019    Procedure: Bilateral Suprascapular Nerve Block;  Surgeon: Basim Velazquez MD;  [facility]     INJECT NERVE BLOCK SUPRASCAPULAR Bilateral 12/31/2019    Procedure: bilateral suprascapular nerve block;  Surgeon: Basim Velazquez MD;  Location: [facility]     KNEE SURGERY      see list which we will bring     LAMINECTOMY LUMBAR ONE LEVEL      L4-5     LAPAROSCOPIC ABLATION ENDOMETRIOSIS       RADIO FREQUENCY ABLATION PULSED CERVICAL Bilateral 7/10/2019    Procedure: Bilateral Suprascapular Nerve Pulsed Radiofrequency Ablation;  Surgeon: Basim Velazquez MD;  Location:  OR     REMOVE HARDWARE FOOT  12/14/2011    Procedure:REMOVE HARDWARE FOOT; Hardware Removal Right Foot (Mini-C-Arm) ; Surgeon:GRACE ZARATE; Location[facility]     RHINOPLASTY       SALPINGO-OOPHORECTOMY BILATERAL       TONSILLECTOMY         Prior to Admission Medications   Prior to Admission Medications   Prescriptions Last Dose Informant Patient Reported? Taking?   BIOTIN FORTE PO 6/6/2021 at Unknown time Self Yes Yes   Sig: Take 1 tablet by mouth daily    HYDROmorphone (DILAUDID) 8 MG  tablet 6/6/2021 at AM Self Yes Yes   Sig: Take 8 mg by mouth every 8 hours . Max of 3 doses per day.   NASONEX 50 MCG/ACT spray  at PRN Self Yes Yes   Sig: Spray 1 spray into both nostrils daily as needed    carboxymethylcellulose (CARBOXYMETHYLCELLULOSE SODIUM) 0.5 % SOLN ophthalmic solution  at PRN Self Yes Yes   Sig: Place 1 drop into both eyes 2 times daily as needed    cephALEXin (KEFLEX) 500 MG capsule  at PRN Self Yes Yes   Sig: Take 500 mg by mouth as needed (Dental Procedure) Take 4 caps 1 hour prior to Dental Appointment    cyanocobalamin 1000 MCG/ML injection 6/4/2021 at Unknown Self No Yes   Sig: Inject 1 mL (1,000 mcg) Subcutaneous every 7 days   cycloSPORINE (RESTASIS) 0.05 % ophthalmic emulsion 6/6/2021 at AM Self Yes Yes   Sig: Place 1 drop into both eyes 2 times daily   cyclobenzaprine (FLEXERIL) 10 MG tablet 6/6/2021 at AM Self No Yes   Sig: Take 1 tablet (10 mg) by mouth 3 times daily   diltiazem ER COATED BEADS (CARDIZEM CD/CARTIA XT) 180 MG 24 hr capsule 6/6/2021 at AM Self No Yes   Sig: Take 1 capsule (180 mg) by mouth 2 times daily   fexofenadine (ALLEGRA) 180 MG tablet  at PRN Self Yes Yes   Sig: Take 180 mg by mouth daily as needed    gabapentin (NEURONTIN) 300 MG capsule 6/6/2021 at AM Self Yes Yes   Sig: Take 300 mg by mouth 2 times daily (morning and afternoon) in addition to 800 mg tablet (total dose 1100mg)   gabapentin (NEURONTIN) 800 MG tablet 6/6/2021 at AM Self Yes Yes   Sig: Take 800 mg by mouth 2 times daily (morning and afternoon) in addition to 300mg capsule (total dose 1100mg)   gabapentin (NEURONTIN) 800 MG tablet 6/5/2021 at HS Self Yes Yes   Sig: Take 800 mg by mouth At Bedtime (in addition to the 2 - 1100mg doses)   levothyroxine (SYNTHROID) 200 MCG SOLR injection 6/4/2021 at Unknown Self Yes Yes   Sig: Inject 200 mcg into the vein once a week on Fridays   levothyroxine (SYNTHROID/LEVOTHROID) 300 MCG tablet 6/6/2021 at AM Self Yes Yes   Sig: Take 300 mcg by mouth 2 times  daily   lifitegrast (XIIDRA) 5 % opthalmic solution 6/6/2021 at AM Self Yes Yes   Sig: Place 1 drop into both eyes 2 times daily   liothyronine (CYTOMEL) 25 MCG tablet 6/6/2021 at AM Self Yes Yes   Sig: Take 25 mcg by mouth 2 times daily    metoprolol succinate ER (TOPROL-XL) 100 MG 24 hr tablet 6/6/2021 at AM Self Yes Yes   Sig: Take 1 tablet (100 mg) by mouth 2 times daily   morphine (MS CONTIN) 30 MG 12 hr tablet 6/6/2021 at AM Self Yes Yes   Sig: Take 30 mg by mouth 2 times daily (morning and night) in addition to 60 mg tablet for a total dose of 90mg.   morphine (MS CONTIN) 60 MG 12 hr tablet 6/6/2021 at AM Self Yes Yes   Sig: Take 60 mg by mouth 2 times daily (morning and night) in addition to 30mg tablet for a total dose of 90mg   morphine (MS CONTIN) 60 MG 12 hr tablet 6/5/2021 at PM Self Yes Yes   Sig: Take 60 mg by mouth daily in the afternoon (in addition to the 2 - 90mg doses)   multivitamin, therapeutic with minerals (MULTI-VITAMIN) TABS tablet 6/6/2021 at AM Self Yes Yes   Sig: Take 1 tablet by mouth 2 times daily   nystatin (MYCOSTATIN) 940166 UNIT/GM external powder  at PRN Self No Yes   Sig: Apply topically 3 times daily as needed   predniSONE (DELTASONE) 1 MG tablet 6/5/2021 at AM Self Yes Yes   Sig: Take 3 mg by mouth every other day in addition to 5mg tablet for a total dose of 8mg.   predniSONE (DELTASONE) 5 MG tablet 6/5/2021 at AM Self Yes Yes   Sig: Take 5 mg by mouth every other day in addition to 1mg tablets for a total dose of 8mg   predniSONE (DELTASONE) 5 MG tablet 6/6/2021 at AM Self Yes Yes   Sig: Take 10 mg by mouth every other day   prednisoLONE acetate (PRED FORTE) 1 % ophthalmic susp 6/6/2021 at Unknown time Self Yes Yes   Sig: Place 1 drop into both eyes 4 times daily    probiotic CAPS 6/6/2021 at AM Self Yes Yes   Sig: Take 1 capsule by mouth 2 times daily   rivaroxaban ANTICOAGULANT (XARELTO) 20 MG TABS tablet 6/5/2021 at PM Self Yes Yes   Sig: Take 20 mg by mouth daily (with  dinner)    sodium chloride 0.9% infusion 6/4/2021 at Unknown Self Yes Yes   Sig: Use 6mL once weekly to reconstitute levothyroxine powder before injection   vitamin D2 (ERGOCALCIFEROL) 25030 units (1250 mcg) capsule 5/31/2021 at Unknown Self Yes Yes   Sig: Take 50,000 Units by mouth once a week on Monday   zolpidem (AMBIEN) 5 MG tablet  at PRN Self Yes Yes   Sig: Take 5 mg by mouth nightly as needed for sleep      Facility-Administered Medications Last Administration Doses Remaining   botulinum toxin type A (BOTOX) 100 units injection 100 Units None recorded 1   botulinum toxin type A (BOTOX) 100 units injection 100 Units None recorded 1   botulinum toxin type A (BOTOX) 100 units injection 100 Units None recorded 1   lidocaine 1% with EPINEPHrine 1:100,000 injection 3 mL None recorded 1        Allergies   Allergies   Allergen Reactions     Blood Transfusion Related (Informational Only) Other (See Comments)     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      Chlorhexidine Hives     Thor surgical cleanser     Codeine Hives     Has tolerated Oxycodone in past      Ibuprofen [Nsaids] Hives     Penicillins Hives     Other reaction(s): Unknown     Sulfa Drugs Hives     Other reaction(s): Unknown     Doxycycline GI Disturbance     Emesis & diarrhea  Patient denies allergy to this med     Hydroxyzine      rash     Mold      Red Wine Complex [Red Wine Powder]        Social History   I have reviewed this patient's social history and updated it with pertinent information if needed. Aspen EPPS Jehovah's witness  reports that she quit smoking about 28 years ago. Her smoking use included cigarettes. She started smoking about 48 years ago. She has a 30.00 pack-year smoking history. She has never used smokeless tobacco. She reports that she does not drink alcohol or use drugs.    Family History   I have reviewed this patient's family history and updated it with pertinent information if needed.   Family History   Problem Relation Age  of Onset     Hypertension Mother      No Known Problems Father      No Known Problems Sister      No Known Problems Brother      No Known Problems Maternal Grandmother      No Known Problems Maternal Grandfather      No Known Problems Paternal Grandmother      No Known Problems Other        Review of Systems   The 10 point Review of Systems is negative other than noted in the HPI or here.     Physical Exam   Temp: 98.7  F (37.1  C) Temp src: Axillary BP: 114/53 Pulse: 53   Resp: 20 SpO2: 92 % O2 Device: None (Room air)    Vital Signs with Ranges  Temp:  [97.8  F (36.6  C)-98.7  F (37.1  C)] 98.7  F (37.1  C)  Pulse:  [51-62] 53  Resp:  [16-20] 20  BP: (101-151)/(52-75) 114/53  SpO2:  [92 %-94 %] 92 %  275 lbs 0 oz    Constitutional: Obese lady in no acute distress  Eyes: No conjunctival erythema  ENT: Neck is supple  Respiratory: CTA  Cardiovascular: RRR  Skin no obvious bruising  Musculoskeletal: Mild lower extremity pedal edema  Neurologic: The patient is alert oriented x3 no acute distress.  She is quite anxious and angry answering my questions at times tangential.  There is no aphasia apraxia or agnosia.  She got upset when I asked her many questions about numbness weakness pain in one side or the other.  She refused to try to spell world  backwards.  She was able to spell it forward.  Memory was 4 out of 4 at 1 minute and 4 out of 4 at 5 minutes.  She knows the name of the president.  Pupils are equal round and reactive to light extraocular movements are intact.  There is no nystagmus.  Visual fields are normal.  Facial muscles are equal bilaterally.  Tongue protrudes midline.  Muscular mass and strength are normal in the right upper extremity and both lower extremities.  The patient has pain to palpation on the left shoulder and does not move the left shoulder at all.  She has minimal movement of the left elbow.  Reflexes are decreased in upper and lower extremity midis with 0 knee jerks and ankle jerks.   Vibratory sense was decreased at the ankle on the right and at the knee on the left.  Light touch decreased in both feet.  Fine movements are normal on the right unable to do it on the left.  On the left upper extremity the patient had can move a little bit of the elbow and shoulder but not fully AB duction.  Gait was deferred.  The patient walks with the help of a walker.  Neuropsychiatric: Nervous, upset and angry    Data   Results for orders placed or performed during the hospital encounter of 06/06/21 (from the past 24 hour(s))   Glucose by meter   Result Value Ref Range    Glucose 103 (H) 70 - 99 mg/dL   MR Brain w/o Contrast    Narrative    MRI OF THE BRAIN WITHOUT CONTRAST 6/7/2021 7:28 PM     COMPARISON: Brain MR one day prior    HISTORY:  Neurologic deficit, acute, stroke suspected. Right facial  droop, DWI sequence only.    TECHNIQUE: Sagittal T1-weighted, axial T2-weighted, axial FLAIR, and  axial diffusion-weighted MR images of the brain were acquired without  intravenous contrast.    FINDINGS: The ventricles and basal cisterns are within normal limits  in configuration. There is no midline shift. There are no extra-axial  fluid collections. There is no evidence for acute stroke or for acute  intracranial hemorrhage. Gray-white differentiation is well  maintained.    There is no sinusitis or mastoiditis.      Impression    IMPRESSION: Normal brain MRI.    MEE JANE MD   MR Cervical Spine w/o & w Contrast    Narrative    MRI OF THE CERVICAL SPINE WITHOUT AND WITH CONTRAST 6/7/2021 7:58 PM     COMPARISON: Cervical spine MRI 5/6/2014    HISTORY: Trauma. Left arm weakness.    TECHNIQUE: Multiplanar, multisequence MRI images of the cervical spine  were acquired without and with 12 mL Gadavist gadolinium IV contrast.     FINDINGS: The study is limited by patient motion.    There is normal alignment of the cervical vertebrae; however, there is  straightening of normal cervical lordosis. Vertebral body  heights of  the cervical spine are normal. Marrow signal throughout the cervical  vertebrae is within normal limits. There is no evidence for fracture  or pathologic bony lesion of the cervical spine.     There is loss of disc height, disc desiccation and posterior disc  bulging to varying degrees at all levels of the cervical spine.      The cervical spinal cord is normal in contour. There is no abnormal  signal in the cervical spinal cord. There is no evidence for  intrathecal abnormality.    Level by level:    C2-C3: Minimal posterior broad-based disc osteophyte complex. Minimal  facet arthropathy bilaterally. No spinal canal stenosis. No foraminal  stenosis on either side. No change from the comparison study.    C3-C4: Minimal posterior broad-based disc osteophyte complex. Normal  facets. No spinal canal stenosis. No foraminal stenosis on either  side. No change from the comparison study.    C4-C5: Minimal posterior broad-based disc osteophyte complex. Mild  facet arthropathy bilaterally. No spinal canal stenosis. No foraminal  stenosis on either side. No change from the comparison study.    C5-C6: There is facet arthropathy bilaterally, uncinate hypertrophy  bilaterally and a posterior broad-based disc-osteophyte complex. No  spinal canal stenosis. No foraminal stenosis on either side. No change  from the comparison study.    C6-C7: Minimal posterior broad-based disc osteophyte complex. Normal  facets. No spinal canal stenosis. No foraminal stenosis on either  side. No change from the comparison study.    C7-T1: Minimal posterior broad-based disc osteophyte complex. Mild  facet arthropathy bilaterally. No spinal canal stenosis. No foraminal  stenosis on either side. No change from the comparison study.      Impression    IMPRESSION:  1. Mild, diffuse degenerative change throughout the cervical spine as  detailed above without appreciable change from the comparison study.  2. No significant spinal canal or  neural foraminal narrowing at any  level of the cervical spine.  3. No evidence for abnormality of the cervical spinal cord.    MEE JANE MD   Glucose by meter   Result Value Ref Range    Glucose 104 (H) 70 - 99 mg/dL   CBC with platelets differential   Result Value Ref Range    WBC 6.9 4.0 - 11.0 10e9/L    RBC Count 4.33 3.8 - 5.2 10e12/L    Hemoglobin 12.1 11.7 - 15.7 g/dL    Hematocrit 38.6 35.0 - 47.0 %    MCV 89 78 - 100 fl    MCH 27.9 26.5 - 33.0 pg    MCHC 31.3 (L) 31.5 - 36.5 g/dL    RDW 18.5 (H) 10.0 - 15.0 %    Platelet Count 228 150 - 450 10e9/L    Diff Method Automated Method     % Neutrophils 41.9 %    % Lymphocytes 46.3 %    % Monocytes 6.7 %    % Eosinophils 3.8 %    % Basophils 0.6 %    % Immature Granulocytes 0.7 %    Nucleated RBCs 0 0 /100    Absolute Neutrophil 2.9 1.6 - 8.3 10e9/L    Absolute Lymphocytes 3.2 0.8 - 5.3 10e9/L    Absolute Monocytes 0.5 0.0 - 1.3 10e9/L    Absolute Eosinophils 0.3 0.0 - 0.7 10e9/L    Absolute Basophils 0.0 0.0 - 0.2 10e9/L    Abs Immature Granulocytes 0.1 0 - 0.4 10e9/L    Absolute Nucleated RBC 0.0    Basic metabolic panel   Result Value Ref Range    Sodium 138 133 - 144 mmol/L    Potassium 3.8 3.4 - 5.3 mmol/L    Chloride 103 94 - 109 mmol/L    Carbon Dioxide 33 (H) 20 - 32 mmol/L    Anion Gap 2 (L) 3 - 14 mmol/L    Glucose 125 (H) 70 - 99 mg/dL    Urea Nitrogen 14 7 - 30 mg/dL    Creatinine 0.81 0.52 - 1.04 mg/dL    GFR Estimate 78 >60 mL/min/[1.73_m2]    GFR Estimate If Black 90 >60 mL/min/[1.73_m2]    Calcium 8.9 8.5 - 10.1 mg/dL   CK total   Result Value Ref Range    CK Total 943 (H) 30 - 225 U/L   EEG Routine    Narrative    EEG Routine Result    EEG DATE: 21  EEG [id number]  EEG DAY#: 1  EEG SOURCE FILE DURATION: 28 min.    PATIENT INFORMATION: Aspen Mccullough is a 61 year old year old female who   presents with left hemiparesis. EEG is being done to evaluate for   epileptiform activity.    MEDICATIONS: see chart  These medications and doses were  derived from the medical record at the   time of this procedure.    TECHNICAL SUMMARY: This routine EEG was recorded from 10-20 scalp   electrodes placed according to the 10-20 international system. Additional   electrodes were used for referencing, EKG, and to record from other   cerebral regions as appropriate.    BACKGROUND ACTIVITY:  During wakefulness, the background activity consists   of synchronous and symmetric, well-modulated, 9Hz posterior dominant   rhythm. The posterior dominant rhythm attenuated with eye opening. During   drowsiness, the background activity waxed and waned and there were periods   of slowing and attenuation of the posterior alpha rhythm.  During the awake record brief episodes of diffuse slowing in the theta   ranges.       ACTIVATION PROCEDURE: Hyperventilation Photic.  There is procedures to not   produce any changes in the record.      IMPRESSION: This electroencephalogram is abnormal due to the presences of   brief episodes of generalized theta slowing.  This is consistent with a   mild diffuse brain dysfunction such as seen in encephalopathies of   multiple causes toxic metabolic or others..  No electrographic seizures or epileptiform discharges were recorded.     Elena Tony MD  [facility]        The MRI of the head and cervical spine were reviewed by me and agree with the report.  The EEG did not show any epileptic form activity or seizures.

## 2021-06-08 NOTE — PROGRESS NOTES
Follow-up. Reviewed case with both Neurology and Ortho. Plan for XR shoulder to rule out dislocation as per Ortho and MRI brachial plexus to rule out injury per Neurology. Will follow-up on results. Pt to remain in house tonight. Probable discharge tomorrow.

## 2021-06-08 NOTE — PROGRESS NOTES
[facility]    Medicine Progress Note - Hospitalist Service       Date of Admission:  6/6/2021    Assessment & Plan       Aspen Mccullough is a 61 year old female with complex PMHx as outlined below who was transferred from [facility] to [facility] on 6/6/21 for further evaluation of left-sided weakness LUE > LLE subsequent to a fall.     Unwitnessed fall with left-sided hemiparesis with flaccidity of LUE  Elevated CK  Oblique fracture of the radial dorsal aspect of thumb: Reports going out to move plants ~4AM in her backyard when she fell going down her cement steps. No prodromal dizziness or lightheadedness. No precipitating left upper or lower extremity weakness. She fell onto her left side. She was unable to get up due to left-sided weakness subsequent to her fall so she crawled to her door and eventually family found her. She presented to [facility] due to left thumb pain where she was diagnosed with a fracture and her thumb was splinted. Negative shoulder XR reported at [facility]. CT head negative for acute pathology. CT cervical spine negative for acute pathology. Transferred to [facility] for Stroke Neurology evaluation.   * MRI brain 6/6/21 was negative for acute pathology.   * CTA head and neck 6/6/21 negative for acute pathology.   * XR finger with oblique fracture at the radial-dorsal aspect of the thumb proximal phalanx base with overlying splint.   * CMP and CBC unremarkable. Lipid profile 303/181/70/259. .   * Cervical spine MRI 6/7/21 negative for acute pathology   * Repeat MRI brain 6/7/21 negative   * EEG negative for seizure   * Echocardiogram with preserved EF 55-60%, negative for valvular disease   - Today, pt reports LLE weakness has resolved, LUE weakness persists, but seems to be improving slightly--able to lift forearm slightly, wiggle fingers. Sensory deficits in LUE completely resolved, but notes some recurrent paresthesias in left  fingers.   - General Neurology consulted, appreciate input   - Ortho consulted, appreciate input regarding above presentation and need for additional imaging aside from above from Ortho trauma standpoint. ? Brachial plexus injury from trauma vs alternative pathology?  - Ortho recommends keep splint in place left thump. Follow up at [facility] or [facility] for repeat XR in 2 weeks.   - Start Atorvastatin 40 mg po every day   - Telemetry   - PT/OT/SW. Pt feels comfortable in her current state returning home with family assistance. She attends outpatient PT in [place].   - Regular diet     Hx of Hashimoto's thyroiditis:    * .79, T4 0.35, T3 1.3.                             * Maintained on Synthroid 300 mcg daily, she takes Synthroid 200 mcg injections on Fridays and her Cytomel was recently reduced from 50 mcg BID to 25 mcg BID by her Endocrinologist, Dr. Luna.   * Pt is otherwise asymptomatic without constipation, dry skin, overt fatigue, weight gain, altered mental status. Reports some radiculopathy on her right side from bring back surgery and explains possible peripheral neuropathy in her right foot.   - Call out to Dr. Olvera of Endocrinology 6/7 and 6/8 who is now pt's Endocrinologist as Dr. Luna has since retired. Awaiting call back.   - Continue PTA regimen as above until further instruction per Endocrinology   - Pt has follow-up with Endocrinology in ~1 month    Recent left foot surgery:    - Ortho consulted, appreciate input. Recommend contacting Dr. Manzo's office regarding WBAT option. Dressings no longer needed.     SLE on chronic steroids:   - Continue with PTA prednisone 10 mg alternating with 8 mg every other day.     Hx of VTE: PE (unclear when this occurred ) and right posterior tibial vein DVT (10/2020)   - Continue Xarelto      Paroxysmal atrial fibrillation, currently in NSR   Chronic anticoagulation use: Follows with Dr. Uribe and Dr. Muñoz of Cardiology. Previous Ziopatch indicated  45% a fib burden, average rate ~120 BPM, as high as 204 BPM. Pt has remained asymptomatic.   - Continue PTA Diltiazem and Toprol XL   - Continue Xarelto     Hypertension   Dyslipidemia: Antihypertensives and statin initiation as above.     Chronic pain syndrome  Hx of chronic low back pain with prior surgery:   [MS contin 90 mg qAM, 60 mg in the afternoon and 90 mg q PM; Gabapentin 1100 mg qAM and 800 mg at bedtime, Flexeril 10 mg TID]  - Continue with PTA regimen        Chronic venous insufficiency: Continue outpatient care with Cardiology      Diet: Regular Diet Adult    DVT Prophylaxis: Xarelto  Mina Catheter: not present  Code Status: Full Code         Disposition Plan   Expected discharge: today vs tomorrow.   Entered: Fannie Cummins PA-C 06/08/2021, 1:58 PM     The patient's care was discussed with the Attending Physician, Dr. Ordoñez, Bedside Nurse, Patient and Patient's Family, daughter, [place], via telephone.     Fannie Cummins PA-C  Hospitalist Service  [facility]  Contact information available via [facility] Paging/Directory  _____________________________________________________________________    Interval History   pt reports LLE weakness has resolved, LUE weakness persists, but seems to be improving slightly--able to lift forearm slightly, wiggle fingers. Sensory deficits in LUE completely resolved, but notes some recurrent paresthesias in left fingers.     Data reviewed today: I reviewed all medications, new labs and imaging results over the last 24 hours. I personally reviewed all labs and imaging to date.     Physical Exam   Vital Signs: Temp: 98.7  F (37.1  C) Temp src: Axillary BP: 114/53 Pulse: 53   Resp: 20 SpO2: 92 % O2 Device: None (Room air)    Weight: 275 lbs 0 oz    CONSTITUTIONAL: Pt laying in bed, dressed in hospital garb. Appears comfortable. Cooperative with interview.  HEENT: Normocephalic, atraumatic. Pupils equal, round,  and reactive to light. EOMI, bilat. Negative for conjunctival redness or scleral icterus.  Oral mucosa pink and moist; negative for ulcerations, erythema, or exudates.  No evidence of bleeding.   CARDIOVASCULAR: RRR, no murmurs, rubs, or extra heart sounds appreciated. Pulses +2/4 and regular in upper and lower extremities, bilaterally.   RESPIRATORY: No increased work of breathing. CTA, bilat; no wheezes, rales, or rhonchi appreciated.  GASTROINTESTINAL:  Abdomen soft, non-distended. BS auscultated in all four quadrants. Negative for tenderness to palpation.  No masses or organomegaly noted.  MUSCULOSKELETAL: Strength 5/5 in right upper and lower extremities, 5/5 in LLE. Flaccid LUE, able to wiggle left fingers, lift forearm slightly and shrug shoulders. No gross deformities noted. Normal muscle tone.   HEMATOLOGIC/LYMPHATIC/IMMUNOLOGIC: Negative for lower extremity edema, bilaterally.  NEUROLOGIC: Alert and oriented to person, place, and time.  strength intact. CN's II-XII grossly intact aside from hemiparesis as above. Numbness and tingling in left fingers; LLE paresthesias resolved. Some noted RLE foot numbness and tingling (baseline). Reports baseline right-sided radiculopathy from prior back surgery, but no peripheral neuropathy.   SKIN: Bruising along LLE/hamstring area      Data   Recent Labs   Lab 06/08/21  0819 06/07/21  0821 06/06/21  1459   WBC 6.9 5.4  --    HGB 12.1 12.0  --    MCV 89 89  --     218  --     140  --    POTASSIUM 3.8 3.8 3.6   CHLORIDE 103 107  --    CO2 33* 30  --    BUN 14 15  --    CR 0.81 0.78  --    ANIONGAP 2* 3  --    KYLIE 8.9 8.6  --    * 92  --    ALBUMIN  --  3.9  --    PROTTOTAL  --  7.7  --    BILITOTAL  --  0.7  --    ALKPHOS  --  114  --    ALT  --  22  --    AST  --  52*  --    TROPI  --   --  <0.015     Recent Results (from the past 24 hour(s))   MR Brain w/o Contrast    Narrative    MRI OF THE BRAIN WITHOUT CONTRAST 6/7/2021 7:28 PM     COMPARISON:  Brain MR one day prior    HISTORY:  Neurologic deficit, acute, stroke suspected. Right facial  droop, DWI sequence only.    TECHNIQUE: Sagittal T1-weighted, axial T2-weighted, axial FLAIR, and  axial diffusion-weighted MR images of the brain were acquired without  intravenous contrast.    FINDINGS: The ventricles and basal cisterns are within normal limits  in configuration. There is no midline shift. There are no extra-axial  fluid collections. There is no evidence for acute stroke or for acute  intracranial hemorrhage. Gray-white differentiation is well  maintained.    There is no sinusitis or mastoiditis.      Impression    IMPRESSION: Normal brain MRI.    MEE JANE MD   MR Cervical Spine w/o & w Contrast    Narrative    MRI OF THE CERVICAL SPINE WITHOUT AND WITH CONTRAST 6/7/2021 7:58 PM     COMPARISON: Cervical spine MRI 5/6/2014    HISTORY: Trauma. Left arm weakness.    TECHNIQUE: Multiplanar, multisequence MRI images of the cervical spine  were acquired without and with 12 mL Gadavist gadolinium IV contrast.     FINDINGS: The study is limited by patient motion.    There is normal alignment of the cervical vertebrae; however, there is  straightening of normal cervical lordosis. Vertebral body heights of  the cervical spine are normal. Marrow signal throughout the cervical  vertebrae is within normal limits. There is no evidence for fracture  or pathologic bony lesion of the cervical spine.     There is loss of disc height, disc desiccation and posterior disc  bulging to varying degrees at all levels of the cervical spine.      The cervical spinal cord is normal in contour. There is no abnormal  signal in the cervical spinal cord. There is no evidence for  intrathecal abnormality.    Level by level:    C2-C3: Minimal posterior broad-based disc osteophyte complex. Minimal  facet arthropathy bilaterally. No spinal canal stenosis. No foraminal  stenosis on either side. No change from the comparison  study.    C3-C4: Minimal posterior broad-based disc osteophyte complex. Normal  facets. No spinal canal stenosis. No foraminal stenosis on either  side. No change from the comparison study.    C4-C5: Minimal posterior broad-based disc osteophyte complex. Mild  facet arthropathy bilaterally. No spinal canal stenosis. No foraminal  stenosis on either side. No change from the comparison study.    C5-C6: There is facet arthropathy bilaterally, uncinate hypertrophy  bilaterally and a posterior broad-based disc-osteophyte complex. No  spinal canal stenosis. No foraminal stenosis on either side. No change  from the comparison study.    C6-C7: Minimal posterior broad-based disc osteophyte complex. Normal  facets. No spinal canal stenosis. No foraminal stenosis on either  side. No change from the comparison study.    C7-T1: Minimal posterior broad-based disc osteophyte complex. Mild  facet arthropathy bilaterally. No spinal canal stenosis. No foraminal  stenosis on either side. No change from the comparison study.      Impression    IMPRESSION:  1. Mild, diffuse degenerative change throughout the cervical spine as  detailed above without appreciable change from the comparison study.  2. No significant spinal canal or neural foraminal narrowing at any  level of the cervical spine.  3. No evidence for abnormality of the cervical spinal cord.    MEE JANE MD     Medications     - MEDICATION INSTRUCTIONS -       - MEDICATION INSTRUCTIONS -         atorvastatin  40 mg Oral QPM     artificial tears ophthalmic solution  1 drop Both Eyes BID     cyclobenzaprine  10 mg Oral TID     diltiazem ER COATED BEADS  180 mg Oral BID     fluticasone  2 spray Both Nostrils Daily     gabapentin  1,100 mg Oral QAM     gabapentin  800 mg Oral At Bedtime     lactobacillus rhamnosus (GG)  1 capsule Oral BID     levothyroxine  300 mcg Oral Daily     lifitegrast  1 drop Both Eyes BID     liothyronine  25 mcg Oral BID     metoprolol succinate ER   100 mg Oral BID     morphine  30 mg Oral BID     morphine  60 mg Oral TID     prednisoLONE acetate  1 drop Both Eyes 4x Daily     predniSONE  8 mg Oral Every Other Day     predniSONE  10 mg Oral Every Other Day     rivaroxaban ANTICOAGULANT  20 mg Oral Daily with supper     sodium chloride (PF)  3 mL Intracatheter Q8H

## 2021-06-08 NOTE — PROGRESS NOTES
Hospitalist cross cover note ;  called by the RN to review MRI brain and MRI cervical spine done this evening;  MRI brain without contrast; normal  MRI  cervical spine without and with contrast showed  Mild diffuse degenerative change throughout the cervical spine as detailed above without appreciable change from the comparison study.  No significant spinal canal or neural foraminal narrowing at any level of the cervical spine.  No evidence for abnormality of the cervical spinal cord.    No new acute findings seen on both of these MRIs done this evening  Continue to follow patient closely clinically    Brittney Chavez MD

## 2021-06-08 NOTE — PROVIDER NOTIFICATION
"Paged hospitalist \"FYI pt brain and spine MRI results are in. Pt care orders to page hospitalist with results. Thanks!\"  "

## 2021-06-08 NOTE — PLAN OF CARE
SLP: checked in with pt regarding swallowing and speech/language. Pt denies concerns with safety and function of swallow. She reports that her speech appears to be at baseline. No IP SLP needs. Will complete the orders, please re-consult should further concerns arise.

## 2021-06-08 NOTE — PROGRESS NOTES
Follow-up 1430: MRI cervical and MRI brain not completed yet. One of the MRI machines down per floor [facility].     ADDENDUM 1730: MRIs still not completed. Called and discussed with [facility] again, who called MRI. Tech estimates MRI to be completed between 7-8PM. Appreciate [facility] assistance greatly. Case signed out to both Dr. Montes and Dr. Masterson of the Hospitalist team who are working this evening. Please page Hospitalist on call with MRI results for further plan of care. Remainder of plan as outlined in my progress note from earlier today.

## 2021-06-08 NOTE — PROGRESS NOTES
[facility] EEG #  portable, ordered by Dr. Trevino.    Pt is awake, drowsy.   3 min. HV, photic stimulation.      Able to follow commands and speak.

## 2021-06-08 NOTE — PLAN OF CARE
Pt here with fall and LS hemiparesis. A&Ox4 forgetful. Neuros with blurred vision, slight R facial droop, LUE hemiparesis, R foot neuropathy at baseline, n/t to LUE and LLE resolved, LS 2/5, LUE moderate . LUE in splint for broken thumb, CMS intact. VSS. Tele NSR with BBB. Regular diet, thin liquids. Takes pills whole with water. Up with A1 gbw. Has chronic pain, reduced with scheduled morphine. Plan for EEG today.

## 2021-06-08 NOTE — PROGRESS NOTES
"   06/08/21 0918   Quick Adds   Type of Visit Initial PT Evaluation   Living Environment   People in home spouse;child(patel), adult  (daughter and grandson live with her and her )   Current Living Arrangements house   Home Accessibility stairs to enter home;stairs within home   Number of Stairs, Main Entrance 4  (Flat stairs, can fit a walker on them.)   Transportation Anticipated family or friend will provide   Living Environment Comments Subjective info taken from OT evaluation as pt extremely irritable with PT. Pt resides on main floor, hospital bed in living room for pt, shower chair and grab bars in shower.   Self-Care   Usual Activity Tolerance fair   Current Activity Tolerance poor   Equipment Currently Used at Home hospital bed;commode chair;walker, standard;other (see comments)   Activity/Exercise/Self-Care Comment Pt reports she does not need her sling or her L shoe for walking anymore. States she self enrolled in PT in [place] and was working on endurance-used a bike and some \"other machines,\" felt she was getting a little stronger. Reports use of a single end cane at times at home.    Disability/Function   Hearing Difficulty or Deaf no   Wear Glasses or Blind yes   Vision Management reading   Fall history within last six months yes   Number of times patient has fallen within last six months 2   Change in Functional Status Since Onset of Current Illness/Injury yes   General Information   Onset of Illness/Injury or Date of Surgery 06/06/21   Referring Physician Deborah Masterson MD   Patient/Family Therapy Goals Statement (PT) Home today   Pertinent History of Current Problem (include personal factors and/or comorbidities that impact the POC) 60 yo femal adm after a fall down her stairs, outside. Reports he L side was too weak to get up.  helped her up. They went to an ortho urgent care where he L thumb was splinted, x rays taken of the L shoulder without pathology. Referred to urgent care for the L " "sided weakness, urgent care referred to ED. Pt under work up for possible stroke. Of note PMH is extensive with both diagnosis as well as surgical interventions, pt also with chronic pain. Recent foot surgery ~ 3 weeks ago, L foot.   Existing Precautions/Restrictions fall  (Pt is very impulsive)   General Observations Per Ortho note from this admit \"Instructed patient to contact Dr Manzo's office regarding requirement of WBAT in the boot\" Pt states she doesn't need it anymore, had been heel WB prior.    Cognition   Orientation Status (Cognition) oriented x 4   Affect/Mental Status (Cognition) agitated;anxious   Follows Commands (Cognition) follows one-step commands;75-90% accuracy;other (see comments)   Behavioral Issues difficulty managing stress;overwhelmed easily;verbal outbursts  (irritable, condescending to therapist)   Cognitive Status Comments Very impulsive, moves with significant lateral movements as well as fowrad/back. Despite safety cues proceeds to move as she feels warrented. Significant discussion wiht pt about safety and attitude. Reviewed with the pt that therapist is here to help, if she doesn't want help that therapy can leave. Pt then with slightly improved following for safety and cues.  Pt irritable, just finished EEG. Therapist did acknowledge and provided pt withopportunity to have therapy return at a later time. Pt declined.   Pain Assessment   Patient Currently in Pain Yes, see Vital Sign flowsheet  (\"Have you ever had someone scratch your head over an over?\")   Integumentary/Edema   Integumentary/Edema Comments L UE puffy in appearance at the fore arm and fingers   Posture    Posture Forward head position;Protracted shoulders;Kyphosis  (R sided lean)   Range of Motion (ROM)   ROM Comment PROM of L UE WFL. AROM pt offers sup/pronation, wrist and finger movements and reports she can not flex or extend the elbow, can not move the shouler; however, therapist notes pt with movements of the " "L upper arm-elbow and shoulder with general mobility tasks including adjusting self  in the chair.  Pt admits she has more mvement in the L UE than when she came in. Reports the LLE feels \"normal\" again. Demonstrates antigravity strength at B LEs.   Strength   Strength Comments  Pt admits she has more mvement in the L UE than when she came in. Reports the LLE feels \"normal\" again. Demonstrates antigravity strength at B LEs. (P) Impaired functional activity tolerance from baseline noted.   Bed Mobility   Comment (Bed Mobility) Sup>sit abrupt, close SBA.   Transfers   Transfer Safety Comments Pt reaches outside her CANDE pulls walker over and stands abrupt. CGA.   Gait/Stairs (Locomotion)   Comment (Gait/Stairs) Bedside gait with waker turned backward, significant lateral weight shift, only using the walker ont he R, pulls the walker along bed>chair. Signficant safety awareness deficit. Walker flailing, reaching outside CANDE, signifcant fall risk.   Balance   Balance Comments Sitting balance fair +, reaches outside CANDE with CGA   Sensory Examination   Sensory Perception Comments Reports numbness in the L UE from the mid fore arm up. Denies any sensation to light touch in this area.   Coordination   Coordination Comments Big movements with all mobility, somewhat ataxic in appearance. Pt too irritated for full neuro screen. Difficult to discern irritability from coordination deficits-appears likley more irritable.   Muscle Tone   Muscle Tone Comments Reports the L UE \"just drops\" Pt holds arm up and lets it fall to the pillow. Doesn't allow therapist to try, \"I'll do it.\"    Clinical Impression   Criteria for Skilled Therapeutic Intervention yes, treatment indicated   PT Diagnosis (PT) Impaired gait   Influenced by the following impairments L UE weakness, impaired balance, impaired safety, decreased activity tolerance, decreaesd balance   Functional limitations due to impairments Decreased funcitonal independence " "  Clinical Presentation Stable/Uncomplicated   Clinical Presentation Rationale see MR    Clinical Decision Making (Complexity) low complexity   Therapy Frequency (PT) Daily   Predicted Duration of Therapy Intervention (days/wks) 3 weeks   Planned Therapy Interventions (PT) balance training;bed mobility training;gait training;home exercise program;neuromuscular re-education;patient/family education;ROM (range of motion);stair training;strengthening;stretching;transfer training   Risk & Benefits of therapy have been explained evaluation/treatment results reviewed;care plan/treatment goals reviewed;risks/benefits reviewed;current/potential barriers reviewed;participants voiced agreement with care plan;participants included;patient   PT Discharge Planning    PT Discharge Recommendation (DC Rec) home with assist   PT Rationale for DC Rec Pt is impulsive, moves abrupt, says she is intersted in help from therapy, but is short and at times condescending with therapist. Not interested in a rehab stay. Recommend 24/7 supervision as well as close SBA with stairs and gait.    PT Brief overview of current status  SBA sup>sit, SBA sit<>Stand. CGA with frequent safety cues for gait and transfers. L arm dangles with ambultion, declines use of sling. Pt states she can not move the L arm, only the hand. Demonstrates sup/pronation and uses traps and shoulder abduction when adjusting posture in the chair. Sits with a significant R lean at times. Reports numbness from the fore arm up on the L arm. Reports the LLE is feeling \"normal\" denies need for surgical shoe. STates she had been heel WB before. Today walks 100' with quad cane, no post op shoe or boot, toe out, wide lateral motion with swing phase B, use of quad cane on the R.    Total Evaluation Time   Total Evaluation Time (Minutes) 10     "

## 2021-06-08 NOTE — PLAN OF CARE
Pt here with left sided weakness after a fall, slurred speech and headache. A&O. Left thumb fracture and old left foot surgery. LUE unable to move, decreased sensation and tingling. Tingling and numbness in BLE and LLE is baseline per patient. Hazy vision. Right sided droop. VSS. Tele NSR. Regular diet, thin liquids. Takes pills whole. Up with A1/GB/W. Pain in LUE and LLE, managed with scheduled morphine. Voiding adequately. Pt scoring green on the Aggression Stop Light Tool. Plan for MRI and EEG. Discharge pending.  Patient belongings remain with patient  No home medications

## 2021-06-09 ENCOUNTER — APPOINTMENT (OUTPATIENT)
Dept: MRI IMAGING | Facility: CLINIC | Age: 61
DRG: 074 | End: 2021-06-09
Attending: PHYSICIAN ASSISTANT
Payer: COMMERCIAL

## 2021-06-09 VITALS
HEIGHT: 69 IN | WEIGHT: 275 LBS | DIASTOLIC BLOOD PRESSURE: 46 MMHG | HEART RATE: 60 BPM | SYSTOLIC BLOOD PRESSURE: 123 MMHG | BODY MASS INDEX: 40.73 KG/M2 | RESPIRATION RATE: 18 BRPM | OXYGEN SATURATION: 95 % | TEMPERATURE: 98.8 F

## 2021-06-09 LAB — CK SERPL-CCNC: 585 U/L (ref 30–225)

## 2021-06-09 PROCEDURE — 82550 ASSAY OF CK (CPK): CPT | Performed by: PHYSICIAN ASSISTANT

## 2021-06-09 PROCEDURE — 72156 MRI NECK SPINE W/O & W/DYE: CPT

## 2021-06-09 PROCEDURE — 250N000013 HC RX MED GY IP 250 OP 250 PS 637: Performed by: INTERNAL MEDICINE

## 2021-06-09 PROCEDURE — 255N000002 HC RX 255 OP 636: Performed by: INTERNAL MEDICINE

## 2021-06-09 PROCEDURE — 36415 COLL VENOUS BLD VENIPUNCTURE: CPT | Performed by: PHYSICIAN ASSISTANT

## 2021-06-09 PROCEDURE — 99239 HOSP IP/OBS DSCHRG MGMT >30: CPT | Performed by: PHYSICIAN ASSISTANT

## 2021-06-09 PROCEDURE — 250N000012 HC RX MED GY IP 250 OP 636 PS 637: Performed by: INTERNAL MEDICINE

## 2021-06-09 PROCEDURE — A9585 GADOBUTROL INJECTION: HCPCS | Performed by: INTERNAL MEDICINE

## 2021-06-09 PROCEDURE — 250N000013 HC RX MED GY IP 250 OP 250 PS 637: Performed by: PHYSICIAN ASSISTANT

## 2021-06-09 RX ORDER — LEVOTHYROXINE SODIUM 300 UG/1
300 TABLET ORAL DAILY
Status: ON HOLD | COMMUNITY
Start: 2021-06-09 | End: 2022-02-17

## 2021-06-09 RX ORDER — ATORVASTATIN CALCIUM 40 MG/1
40 TABLET, FILM COATED ORAL EVERY EVENING
Qty: 30 TABLET | Refills: 0 | Status: SHIPPED | OUTPATIENT
Start: 2021-06-09 | End: 2022-09-01

## 2021-06-09 RX ORDER — GADOBUTROL 604.72 MG/ML
12 INJECTION INTRAVENOUS ONCE
Status: COMPLETED | OUTPATIENT
Start: 2021-06-09 | End: 2021-06-09

## 2021-06-09 RX ADMIN — MORPHINE SULFATE 30 MG: 30 TABLET, EXTENDED RELEASE ORAL at 08:38

## 2021-06-09 RX ADMIN — PREDNISOLONE ACETATE 1 DROP: 10 SUSPENSION/ DROPS OPHTHALMIC at 08:35

## 2021-06-09 RX ADMIN — GADOBUTROL 12 ML: 604.72 INJECTION INTRAVENOUS at 01:35

## 2021-06-09 RX ADMIN — Medication 1 CAPSULE: at 08:34

## 2021-06-09 RX ADMIN — CYCLOBENZAPRINE 10 MG: 10 TABLET, FILM COATED ORAL at 08:32

## 2021-06-09 RX ADMIN — METOPROLOL SUCCINATE 100 MG: 100 TABLET, EXTENDED RELEASE ORAL at 08:34

## 2021-06-09 RX ADMIN — Medication 1 DROP: at 08:34

## 2021-06-09 RX ADMIN — PREDNISONE 8 MG: 2.5 TABLET ORAL at 08:32

## 2021-06-09 RX ADMIN — MORPHINE SULFATE 60 MG: 30 TABLET, EXTENDED RELEASE ORAL at 08:33

## 2021-06-09 RX ADMIN — LEVOTHYROXINE SODIUM 300 MCG: 150 TABLET ORAL at 08:34

## 2021-06-09 RX ADMIN — GABAPENTIN 1100 MG: 300 CAPSULE ORAL at 08:32

## 2021-06-09 RX ADMIN — LIOTHYRONINE SODIUM 25 MCG: 25 TABLET ORAL at 08:34

## 2021-06-09 RX ADMIN — DILTIAZEM HYDROCHLORIDE 180 MG: 180 CAPSULE, COATED, EXTENDED RELEASE ORAL at 08:32

## 2021-06-09 ASSESSMENT — ACTIVITIES OF DAILY LIVING (ADL)
ADLS_ACUITY_SCORE: 17
ADLS_ACUITY_SCORE: 19
ADLS_ACUITY_SCORE: 17
ADLS_ACUITY_SCORE: 17

## 2021-06-09 NOTE — PLAN OF CARE
Physical Therapy Discharge Summary    Reason for therapy discharge:    Discharged to home.    Progress towards therapy goal(s). See goals on Care Plan in [facility] electronic health record for goal details.  Goals not met.  Barriers to achieving goals:   limited tolerance for therapy and discharge from facility.    Therapy recommendation(s):    Continued therapy is recommended.  Rationale/Recommendations:  Pt plan is to continue with OP PT in [place], her  drives her weekly.

## 2021-06-09 NOTE — DISCHARGE SUMMARY
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  The following labs/tests are recommended: lipid   profile in 3-6 months.    Follow up with Endocrinology, Dr. Olvera, as scheduled, but please contact   him as soon as possible to discuss abnormal thyroid studies during   hospital stay.     Follow-up with General Neurology at [facility] in 1   month, EMG at that time.     Follow-up with Ortho for hospital follow-up in 2 weeks ([facility]   or [facility])    Please call to make your own appointments to fit in with your travel   plans.           Unresulted Labs Ordered in the Past 30 Days of this Admission     No orders found from 5/7/2021 to 6/7/2021.      These results will be followed up by PCP    Discharge Disposition   Discharged to home  Condition at discharge: Stable    Hospital Course   Aspen Mccullough is a 61 year old female with complex PMHx as outlined below who was transferred from [facility] to [facility] on 6/6/21 for further evaluation of left-sided weakness LUE > LLE subsequent to a fall.      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 No prodromal dizziness or lightheadedness. No precipitating left upper or lower extremity weakness. She fell onto her left side. She was unable to get up due to left-sided weakness subsequent to her fall so she crawled to her door and eventually family found her. She presented to [facility] due to left thumb pain where she was diagnosed with a fracture and her thumb was splinted. Negative shoulder XR reported at [facility]. CT head negative for acute pathology. CT cervical spine negative for acute pathology. Transferred to [facility] for Stroke Neurology evaluation.   * MRI brain 6/6/21 was negative for acute pathology.   * CTA head and neck 6/6/21 negative for acute pathology.   * XR finger with oblique fracture at the radial-dorsal aspect of the thumb proximal phalanx base with overlying splint.   * CMP and CBC unremarkable. Lipid profile 303/181/70/259. .   * Cervical spine MRI 6/7/21 negative for acute pathology   * Repeat MRI brain 6/7/21 negative   * EEG negative for seizure   * Echocardiogram with preserved EF 55-60%, negative for valvular disease   * XR shoulder with moderate AC degenerative changes, no acute fracture or dislocation   * MR brachial plexus with edema within the left supraclavicular fossa, increased T2 signal associated with divisions just beneath the clavicle; conceivably this could relate to postganglionic brachial plexus injury. Study limited by motion.   - Today, pt reports LLE weakness continues to be resolved, LUE weakness persists, but seems to be improving--increased elbow flexion, unable to flex at shoulder, but is able to shrug shoulders, wiggle fingers. Mild paresthesias in forearm region.   - General Neurology consulted during stay; plan for follow-up in 1 month with EMG at that time. Referral placed   - Ortho consulted during stay, appreciate input. Ortho recommends keep splint in place left thump. Follow up  at [facility] or [facility] for repeat XR in 2 weeks.   - Continue Atorvastatin 40 mg po every day, pt agreeable (prior notes indicate she wasn't agreeable, but she is during our conversation)  - Close follow-up with PCP to discuss hospitalizatoin and follow-up lipid profile   - PT/OT/SW consulted during stay. Pt feels comfortable in her current state returning home with family assistance. She attends outpatient PT in [place].   - Regular diet     Abnormal thyroid studies   Hx of Hashimoto's thyroiditis:    * .79, T4 0.35, T3 1.3.                             * Maintained on Synthroid 300 mcg daily, she takes Synthroid 200 mcg injections on Fridays and her Cytomel was recently reduced from 50 mcg BID to 25 mcg BID by her Endocrinologist, Dr. Luna.   * Pt is otherwise asymptomatic without constipation, dry skin, overt fatigue, weight gain, altered mental status. Reports some radiculopathy on her right side from bring back surgery and explains possible peripheral neuropathy in her right foot.   - Call out to Dr. Olvera of Endocrinology 6/7 and 6/8 who is now pt's Endocrinologist as Dr. Luna has since retired. Did not receive call back and pt was not comfortable changing thyroid medications without Endocrinology input. She will call the clinic herself. She will keep her follow-up appointment with Endocrinology as scheduled within the next month.     ADDENDUM 6/11/21: I received a call back from Dr. Olvera on 6/10 and was able to touch base with him regarding the above, abnormal thyroid studies, on 6/11. I discussed that Aspen has since been discharged from the hospital, but has Endocrinology follow-up scheduled within the next month. I discussed no medication changes were made per patient preference. He will review her case and follow-up with her in clinic as scheduled.      Recent left foot surgery:    - Ortho consulted, appreciate input. Recommend contacting Dr. Manzo's office regarding WBAT option. Dressings  no longer needed.      SLE on chronic steroids:   - Continue with PTA prednisone 10 mg alternating with 8 mg every other day.      Hx of VTE: PE (unclear when this occurred ) and right posterior tibial vein DVT (10/2020)   - Continue Xarelto       Paroxysmal atrial fibrillation, currently in NSR   Chronic anticoagulation use: Follows with Dr. Uribe and Dr. Muñoz of Cardiology. Previous Ziopatch indicated 45% a fib burden, average rate ~120 BPM, as high as 204 BPM. Pt has remained asymptomatic.   - Continue PTA Diltiazem and Toprol XL   - Continue Xarelto      Hypertension   Dyslipidemia: Antihypertensives and statin initiation as above.      Chronic pain syndrome  Hx of chronic low back pain with prior surgery:   [MS contin 90 mg qAM, 60 mg in the afternoon and 90 mg q PM; Gabapentin 1100 mg qAM and 800 mg at bedtime, Flexeril 10 mg TID]  - Continue with PTA regimen        Chronic venous insufficiency: Continue outpatient care with Cardiology     Consultations This Hospital Stay   PATIENT LEARNING CENTER IP CONSULT  SWALLOW EVAL SPEECH PATH AT BEDSIDE IP CONSULT  SPEECH LANGUAGE PATH ADULT IP CONSULT  PHARMACY IP CONSULT  PHARMACY IP CONSULT  PHYSICAL THERAPY ADULT IP CONSULT  OCCUPATIONAL THERAPY ADULT IP CONSULT  CARE MANAGEMENT / SOCIAL WORK IP CONSULT  NEUROLOGY IP CONSULT  ORTHOPEDIC SURGERY IP CONSULT  NEUROLOGY IP CONSULT  PHARMACY IP CONSULT  SMOKING CESSATION PROGRAM IP CONSULT    Code Status   Full Code    Time Spent on this Encounter   I, Fannie Cummins PA-C, personally saw the patient today and spent greater than 30 minutes discharging this patient.       Fannie Cummins PA-C  [place] [address] [facility] [address] [address]  [facility] [place] [address]  Phone: [phone]  ______________________________________________________________________    Physical Exam   Vital Signs: Temp: 98.8  F (37.1  C) Temp src: Oral BP: 123/46 Pulse: 60   Resp: 18 SpO2: 95  % O2 Device: None (Room air)    Weight: 275 lbs 0 oz    CONSTITUTIONAL: Pt sitting upright in chair, dressed in hospital garb. Appears comfortable. Cooperative with interview.  HEENT: Normocephalic, atraumatic. Pupils equal, round, and reactive to light. EOMI, bilat. Negative for conjunctival redness or scleral icterus.  Oral mucosa pink and moist; negative for ulcerations, erythema, or exudates.  No evidence of bleeding.   CARDIOVASCULAR: RRR, no murmurs, rubs, or extra heart sounds appreciated. Pulses +2/4 and regular in upper and lower extremities, bilaterally.   RESPIRATORY: No increased work of breathing. CTA, bilat; no wheezes, rales, or rhonchi appreciated.  GASTROINTESTINAL:  Abdomen soft, non-distended. BS auscultated in all four quadrants. Negative for tenderness to palpation.  No masses or organomegaly noted.  MUSCULOSKELETAL: Strength 5/5 in right upper and lower extremities, 5/5 in LLE. Able to wiggle fingers, improved flexion at left elbow, improved strength in shoulder shrug; stilll with limited flexion at shoulder.   HEMATOLOGIC/LYMPHATIC/IMMUNOLOGIC: Negative for lower extremity edema, bilaterally.  NEUROLOGIC: Alert and oriented to person, place, and time.  strength intact. CN's II-XII grossly intact aside from strength deficits as above. Intermittent paresthesias of the LUE spanning from wrist to elbow. Some noted RLE foot numbness and tingling (baseline). Reports baseline right-sided radiculopathy from prior back surgery, but no peripheral neuropathy.   SKIN: Bruising along LLE/hamstring area         Primary Care Physician   Arcadio Farfan    Discharge Orders      NEUROLOGY ADULT REFERRAL      Reason for your hospital stay    You were hospitalized for further evaluation of acute left sided weakness and sensory deficits subsequent to a fall worse in the left upper extremity (complete flaccidity). You were ruled out for stroke, fracture, spinal injury or hematoma. Further work-up most consistent  with traumatic brachial plexus injury. You were seen by the Hospitalist team, Ortho and Neurology during hospital stay.     Follow-up and recommended labs and tests     Follow up with primary care provider, Arcadio Farfan, within 7 days for hospital follow- up.  The following labs/tests are recommended: lipid profile in 3-6 months.    Follow up with Endocrinology, Dr. Olvera, as scheduled, but please contact him as soon as possible to discuss abnormal thyroid studies during hospital stay.     Follow-up with General Neurology at [facility] in 1 month, EMG at that time.     Follow-up with Ortho for hospital follow-up in 2 weeks ([facility] or [facility])    Please call to make your own appointments to fit in with your travel plans.     Activity    Your activity upon discharge: activity as tolerated     Discharge Instructions    1) Start atorvastatin 40 mg daily for high cholesterol levels. Recheck lipid profile with PCP  2) Defer adjustment to thyroid medication to Endocrinology per your preference. Please contact them for recommendations, keep follow-up as scheduled   3) Remainder of prior to admission medications without change   4) Continue with outpatient physical therapy, recommend range of motion exercises of left upper extremity as per physical therapy   5) Educational print out provided for brachial plexus injury.     Diet    Follow this diet upon discharge: Orders Placed This Encounter      Regular Diet Adult       Significant Results and Procedures   Most Recent 3 CBC's:  Recent Labs   Lab Test 06/08/21  0819 06/07/21  0821 11/01/20  0753   WBC 6.9 5.4 5.6   HGB 12.1 12.0 10.8*   MCV 89 89 85    218 293     Most Recent 3 BMP's:  Recent Labs   Lab Test 06/08/21  0819 06/07/21  0821 06/06/21  1459 11/01/20  0753 11/01/20  0753    140  --   --  137   POTASSIUM 3.8 3.8 3.6   < > 3.7   CHLORIDE 103 107  --   --  106   CO2 33* 30  --   --  29   BUN 14 15  --   --   10   CR 0.81 0.78  --   --  0.51*   ANIONGAP 2* 3  --   --  2*   KYLIE 8.9 8.6  --   --  8.9   * 92  --   --  138*    < > = values in this interval not displayed.     Most Recent 2 LFT's:  Recent Labs   Lab Test 06/07/21  0821 01/23/20  0620   AST 52* 26   ALT 22 35   ALKPHOS 114 99   BILITOTAL 0.7 0.8     Most Recent 3 Troponin's:  Recent Labs   Lab Test 06/06/21  1459 09/21/18  1849   TROPI <0.015 <0.015     Most Recent Cholesterol Panel:  Recent Labs   Lab Test 06/06/21  1459   CHOL 303*   *   HDL 70   TRIG 259*     Most Recent TSH and T4:  Recent Labs   Lab Test 06/06/21  1459   .79*   T4 0.35*     Most Recent CPK:  Recent Labs   Lab Test 06/09/21  0905 06/08/21  0819   * 943*   ,   Results for orders placed or performed during the hospital encounter of 06/06/21   MR Brain w/o & w Contrast    Narrative    MRI BRAIN WITHOUT AND WITH CONTRAST  6/6/2021 5:10 PM     HISTORY:  Left-sided weakness, stroke symptoms.     TECHNIQUE:  Multiplanar, multisequence MRI of the brain without and  with 10 mL Gadavist.     COMPARISON: CT angiogram head and neck dated 6/6/2021 . MRI of the  head 5/6/2014.    FINDINGS: The examination is mildly limited due to artifact. No acute  infarct identified. The ventricles are normal in size and  configuration. There is minimal nonspecific patchy T2 FLAIR  hyperintense signal changes in the cerebral white matter, presumably  due to chronic small vessel ischemic disease. Otherwise, the brain  parenchyma appears within normal limits. No intracranial hemorrhage,  mass lesion, or mass effect/herniation. No definite abnormal  intracranial postcontrast enhancement.    Complete opacification of the left sphenoid sinus, probably due to  inflammatory mucosal thickening/trapped fluid. Mild to moderate  scattered paranasal sinus mucosal thickening elsewhere. The major  arterial flow voids of the skull base appear grossly maintained.  Unchanged mild symmetric prominence of the  superior ophthalmic veins.  The calvarium, skull base, and midface otherwise appear grossly  unremarkable.      Impression    IMPRESSION:  1. No definite acute intracranial abnormality. Specifically, no acute  infarct. No evidence for mass effect/herniation.  2. Mild presumed chronic small vessel ischemic changes.    EFRAIN CHEW MD   CTA Head Neck with Contrast    Narrative    CT ANGIOGRAM OF THE HEAD AND NECK WITH CONTRAST  6/6/2021 4:40 PM     HISTORY: Unwitnessed fall, head trauma, headache, left arm weakness  and numbness.    TECHNIQUE:  CT angiography with an injection of 70 mL [id number]-370 IV  with scans through the head and neck. Images were transferred to a  separate 3-D workstation where multiplanar reformations and 3-D images  were created. Estimates of carotid stenoses are made relative to the  distal internal carotid artery diameters except as noted. Radiation  dose for this scan was reduced using automated exposure control,  adjustment of the mA and/or kV according to patient size, or iterative  reconstruction technique.    COMPARISON: CT head 12/13/2014.     CT HEAD FINDINGS: No contrast enhancing lesions. Cerebral blood flow  is grossly normal.     CT ANGIOGRAM HEAD FINDINGS:  The major intracranial arteries including  the proximal branches of the anterior cerebral, middle cerebral, and  posterior cerebral arteries appear patent without vascular cutoff. No  aneurysm identified. The right vertebral artery is hypoplastic and  appears to predominantly terminate in the right posterior-inferior  cerebellar artery. No significant stenosis. Venous circulation is  unremarkable.     CT ANGIOGRAM NECK FINDINGS:   Common origin of the brachiocephalic and left common carotid arteries  from the aortic arch, a normal variant.     Right carotid artery: The right common and internal carotid arteries  are patent. No significant stenosis or atherosclerotic disease in the  carotid artery. Tortuous right cervical  internal carotid artery.    Left carotid artery: The left common and internal carotid arteries are  patent. No significant stenosis or atherosclerotic disease in the  carotid artery.     Vertebral arteries: Vertebral arteries are patent without evidence of  dissection. No significant stenosis. The right vertebral artery is  developmentally hypoplastic. The left vertebral artery is dominant.    Other findings: The main pulmonary artery and the visualized proximal  portions of the right and left pulmonary arteries appear dilated. The  main pulmonary artery measures up to approximately 4.5 cm in diameter  on the axial images. This is nonspecific, but can be seen in the  setting of pulmonary hypertension. There is complete opacification of  the left sphenoid sinus, presumably due to severe mucosal thickening  or a large retention cyst or polyp. Mild scattered paranasal sinus  mucosal thickening elsewhere. Stigmata of previous functional  endoscopic sinus surgery noted. There is a calcified ovoid lesion of  the frontal sinus intersinus septum, which may be an osteoma.      Impression    IMPRESSION:  1. No high-grade stenosis or large vessel occlusion of the major  intracranial arteries.  2. No high-grade stenosis or occlusion of the cervical carotid or  vertebral arteries.  3. Dilated main pulmonary artery, nonspecific, but can be seen in  setting of pulmonary hypertension. Clinical correlation recommended.    EFRAIN CHEW MD   XR Finger Left G/E 2 Views    Narrative    LEFT FINGER TWO OR MORE VIEWS  6/7/2021 9:10 AM     HISTORY:  Reported left thumb fracture.    FINDINGS: Second PIP arthrodesis with screw in place. Third DIP  degenerative arthrosis. Changes of trapezium resection with screw in  the thumb metacarpal base.      Impression    IMPRESSION: Oblique fracture at the radial-dorsal aspect of the thumb  proximal phalanx base with overlying splint.    CIARRA SELF MD   MR Cervical Spine w/o & w Contrast     Narrative    MRI OF THE CERVICAL SPINE WITHOUT AND WITH CONTRAST 6/7/2021 7:58 PM     COMPARISON: Cervical spine MRI 5/6/2014    HISTORY: Trauma. Left arm weakness.    TECHNIQUE: Multiplanar, multisequence MRI images of the cervical spine  were acquired without and with 12 mL Gadavist gadolinium IV contrast.     FINDINGS: The study is limited by patient motion.    There is normal alignment of the cervical vertebrae; however, there is  straightening of normal cervical lordosis. Vertebral body heights of  the cervical spine are normal. Marrow signal throughout the cervical  vertebrae is within normal limits. There is no evidence for fracture  or pathologic bony lesion of the cervical spine.     There is loss of disc height, disc desiccation and posterior disc  bulging to varying degrees at all levels of the cervical spine.      The cervical spinal cord is normal in contour. There is no abnormal  signal in the cervical spinal cord. There is no evidence for  intrathecal abnormality.    Level by level:    C2-C3: Minimal posterior broad-based disc osteophyte complex. Minimal  facet arthropathy bilaterally. No spinal canal stenosis. No foraminal  stenosis on either side. No change from the comparison study.    C3-C4: Minimal posterior broad-based disc osteophyte complex. Normal  facets. No spinal canal stenosis. No foraminal stenosis on either  side. No change from the comparison study.    C4-C5: Minimal posterior broad-based disc osteophyte complex. Mild  facet arthropathy bilaterally. No spinal canal stenosis. No foraminal  stenosis on either side. No change from the comparison study.    C5-C6: There is facet arthropathy bilaterally, uncinate hypertrophy  bilaterally and a posterior broad-based disc-osteophyte complex. No  spinal canal stenosis. No foraminal stenosis on either side. No change  from the comparison study.    C6-C7: Minimal posterior broad-based disc osteophyte complex. Normal  facets. No spinal canal  stenosis. No foraminal stenosis on either  side. No change from the comparison study.    C7-T1: Minimal posterior broad-based disc osteophyte complex. Mild  facet arthropathy bilaterally. No spinal canal stenosis. No foraminal  stenosis on either side. No change from the comparison study.      Impression    IMPRESSION:  1. Mild, diffuse degenerative change throughout the cervical spine as  detailed above without appreciable change from the comparison study.  2. No significant spinal canal or neural foraminal narrowing at any  level of the cervical spine.  3. No evidence for abnormality of the cervical spinal cord.    MEE JANE MD   MR Brain w/o Contrast    Narrative    MRI OF THE BRAIN WITHOUT CONTRAST 6/7/2021 7:28 PM     COMPARISON: Brain MR one day prior    HISTORY:  Neurologic deficit, acute, stroke suspected. Right facial  droop, DWI sequence only.    TECHNIQUE: Sagittal T1-weighted, axial T2-weighted, axial FLAIR, and  axial diffusion-weighted MR images of the brain were acquired without  intravenous contrast.    FINDINGS: The ventricles and basal cisterns are within normal limits  in configuration. There is no midline shift. There are no extra-axial  fluid collections. There is no evidence for acute stroke or for acute  intracranial hemorrhage. Gray-white differentiation is well  maintained.    There is no sinusitis or mastoiditis.      Impression    IMPRESSION: Normal brain MRI.    MEE JANE MD   XR Shoulder Left G/E 3 Views    Narrative    SHOULDER LEFT THREE OR MORE VIEWS   6/8/2021 4:53 PM     HISTORY: Left shoulder pain.    COMPARISON: 4/11/2018 x-ray.      Impression    IMPRESSION: Moderate acromioclavicular degenerative changes.  Glenohumeral joint is not seen in profile but no significant  degenerative change identified. No acute fracture or dislocation. No  significant change.    CARSON MADDEN MD   MR Brachial Plexus Left wo & w Contrast    Narrative    EXAM: MR BRACHIAL PLEXUS LEFT WO and  W CONTRAST  LOCATION: [facility]  DATE/TIME: 2021 1:15 AM    INDICATION: Brachial plexopathy, traumatic  COMPARISON: None.  TECHNIQUE: Routine multiplanar multisequence left brachial plexus MRI without intravenous contrast.    FINDINGS:  LEFT BRACHIAL PLEXUS: Examination is limited by motion despite repeat attempts. No evidence of nerve root avulsion or pseudomeningocele. There is edema within the left supraclavicular fossa and extending along the course of the brachial plexus towards   the shoulder. Individual components of the brachial plexus are somewhat poorly delineated though there does appear to be T2 hyperintensity and edema involving the divisions just beneath the clavicle in the sagittal fat-sat T2 images. Edema about the   right sternoclavicular joint and left glenohumeral joint and both acromioclavicular joints.    VISUALIZED SPINE: Please refer to cervical spine MRI 2021.      Impression    IMPRESSION:  1.  Significantly limited by motion despite repeat attempts. No gross evidence of nerve root avulsion or pseudomeningocele.    2.  Edema within the left supraclavicular fossa. The individual components of the brachial plexus are somewhat poorly delineated though there does appear to be increased T2 signal associated with divisions just beneath the clavicle. Conceivably, this   could relate to postganglionic brachial plexus injury. Correlation suggested.       Echocardiogram Complete - For age > 60 yrs    Narrative    [id number]  [id number]  [id number]  [id number]^OLIVER^TINO^SEVERIANO     [facility]  Echocardiography Laboratory  [address] [place] [address]     Name: ROXY ALVARES  MRN: [id number]  : 1960  Study Date: 2021 11:31 AM  Age: 61 yrs  Gender: Female  Patient Location: [facility]  Reason For Study: Cerebrovascular Incident  Ordering Physician: TINO BOYD  Referring Physician: Arcadio Farfan  Performed By: Erick Massey RDCS     BSA:  2.4 m2  Height: 69 in  Weight: 275 lb  HR: 63  BP: 118/72 mmHg  ______________________________________________________________________________  Procedure  Complete Portable Echo Adult. Optison (NDC #[id number]) given intravenously.  ______________________________________________________________________________  Interpretation Summary     Left ventricular systolic function is normal.The visual ejection fraction is  estimated at 55-60%.  The right ventricle is not well visualized.The right ventricular systolic  function is normal.  Technically difficult bubble study due to challenging images- overall negative  bubble study.  No significant valvular stenosis or regurgitation on doppler interrogation.     ______________________________________________________________________________  Left Ventricle  The left ventricle is normal in size. There is normal left ventricular wall  thickness. Diastolic Doppler findings (E/E' ratio and/or other parameters)  suggest left ventricular filling pressures are normal. Left ventricular  systolic function is normal. The visual ejection fraction is estimated at 55-  60%. No regional wall motion abnormalities noted.     Right Ventricle  The right ventricular systolic function is normal. The right ventricle is not  well visualized.     Atria  The left atrium is mildly dilated. Right atrial size is normal. There is no  color Doppler evidence of an atrial shunt. A contrast injection (Bubble Study)  was performed that was negative for flow across the interatrial septum.     Mitral Valve  There is trace mitral regurgitation.     Tricuspid Valve  There is trace tricuspid regurgitation. The right ventricular systolic  pressure is approximated at 17.4 mmHg plus the right atrial pressure.     Aortic Valve  The aortic valve is not well visualized. No aortic regurgitation is present.  No aortic stenosis is present.     Pulmonic Valve  The pulmonic valve is not well visualized. There is no pulmonic  valvular  stenosis.     Vessels  Borderline aortic root dilatation. 3.7 cm. Normal size ascending aorta. The  inferior vena cava was normal in size with preserved respiratory variability.     Pericardium  There is no pericardial effusion.     ______________________________________________________________________________  MMode/2D Measurements & Calculations  IVSd: 1.0 cm  LVIDd: 5.3 cm  LVIDs: 3.4 cm  LVPWd: 0.82 cm  FS: 36.0 %  LV mass(C)d: 184.9 grams  LV mass(C)dI: 78.2 grams/m2     Ao root diam: 3.7 cm  LA dimension: 3.9 cm  asc Aorta Diam: 3.6 cm  LA/Ao: 1.1  LA Volume (BP): 65.9 ml  LA Volume Index (BP): 27.8 ml/m2  RWT: 0.31     Doppler Measurements & Calculations  MV E max mariaelena: 49.0 cm/sec  MV A max mariaelena: 41.6 cm/sec  MV E/A: 1.2  MV dec time: 0.29 sec     PA acc time: 0.15 sec  TR max mariaelena: 208.7 cm/sec  TR max P.4 mmHg  E/E' av.9  Lateral E/e': 4.5  Medial E/e': 5.2     ______________________________________________________________________________  Report approved by: Lopez Mendoza 2021 03:08 PM               Discharge Medications   Current Discharge Medication List      START taking these medications    Details   atorvastatin (LIPITOR) 40 MG tablet Take 1 tablet (40 mg) by mouth every evening  Qty: 30 tablet, Refills: 0    Comments: Future refills by PCP Dr. Arcadio Farfan with phone number [phone].  Associated Diagnoses: Hyperlipidemia LDL goal <100         CONTINUE these medications which have CHANGED    Details   levothyroxine (SYNTHROID/LEVOTHROID) 300 MCG tablet Take 1 tablet (300 mcg) by mouth daily         CONTINUE these medications which have NOT CHANGED    Details   BIOTIN FORTE PO Take 1 tablet by mouth daily       carboxymethylcellulose (CARBOXYMETHYLCELLULOSE SODIUM) 0.5 % SOLN ophthalmic solution Place 1 drop into both eyes 2 times daily as needed   Qty: 1 Bottle      cephALEXin (KEFLEX) 500 MG capsule Take 500 mg by mouth as needed (Dental Procedure) Take 4 caps 1 hour  prior to Dental Appointment       cyanocobalamin 1000 MCG/ML injection Inject 1 mL (1,000 mcg) Subcutaneous every 7 days  Qty: 4 mL, Refills: 5    Associated Diagnoses: Other vitamin B12 deficiency anemia      cyclobenzaprine (FLEXERIL) 10 MG tablet Take 1 tablet (10 mg) by mouth 3 times daily  Qty: 90 tablet, Refills: 3    Associated Diagnoses: Generalized osteoarthrosis, involving multiple sites      cycloSPORINE (RESTASIS) 0.05 % ophthalmic emulsion Place 1 drop into both eyes 2 times daily      diltiazem ER COATED BEADS (CARDIZEM CD/CARTIA XT) 180 MG 24 hr capsule Take 1 capsule (180 mg) by mouth 2 times daily  Qty: 180 capsule, Refills: 3    Comments: Please update profile.  Patient to call when she needs refill  Associated Diagnoses: Atrial fibrillation with RVR (H)      fexofenadine (ALLEGRA) 180 MG tablet Take 180 mg by mouth daily as needed       gabapentin (NEURONTIN) 300 MG capsule Take 300 mg by mouth 2 times daily (morning and afternoon) in addition to 800 mg tablet (total dose 1100mg)      !! gabapentin (NEURONTIN) 800 MG tablet Take 800 mg by mouth At Bedtime (in addition to the 2 - 1100mg doses)      !! gabapentin (NEURONTIN) 800 MG tablet Take 800 mg by mouth 2 times daily (morning and afternoon) in addition to 300mg capsule (total dose 1100mg)      HYDROmorphone (DILAUDID) 8 MG tablet Take 8 mg by mouth every 8 hours . Max of 3 doses per day.    Associated Diagnoses: Chronic pain syndrome      levothyroxine (SYNTHROID) 200 MCG SOLR injection Inject 200 mcg into the vein once a week on Fridays      lifitegrast (XIIDRA) 5 % opthalmic solution Place 1 drop into both eyes 2 times daily      liothyronine (CYTOMEL) 25 MCG tablet Take 25 mcg by mouth 2 times daily       metoprolol succinate ER (TOPROL-XL) 100 MG 24 hr tablet Take 1 tablet (100 mg) by mouth 2 times daily  Qty: 30 tablet, Refills: 3    Comments: Dose increased  Associated Diagnoses: Atrial fibrillation with RVR (H)      !! morphine (MS  CONTIN) 30 MG 12 hr tablet Take 30 mg by mouth 2 times daily (morning and night) in addition to 60 mg tablet for a total dose of 90mg.  Qty: 270 tablet, Refills: 0    Associated Diagnoses: Chronic pain syndrome      !! morphine (MS CONTIN) 60 MG 12 hr tablet Take 60 mg by mouth daily in the afternoon (in addition to the 2 - 90mg doses)      !! morphine (MS CONTIN) 60 MG 12 hr tablet Take 60 mg by mouth 2 times daily (morning and night) in addition to 30mg tablet for a total dose of 90mg  Qty: 30 tablet, Refills: 0    Associated Diagnoses: Chronic pain syndrome      multivitamin, therapeutic with minerals (MULTI-VITAMIN) TABS tablet Take 1 tablet by mouth 2 times daily  Qty: 100 tablet, Refills: 3      NASONEX 50 MCG/ACT spray Spray 1 spray into both nostrils daily as needed   Refills: 11      nystatin (MYCOSTATIN) 915944 UNIT/GM external powder Apply topically 3 times daily as needed  Qty: 60 g, Refills: 1    Associated Diagnoses: Candidal skin infection      prednisoLONE acetate (PRED FORTE) 1 % ophthalmic susp Place 1 drop into both eyes 4 times daily       !! predniSONE (DELTASONE) 1 MG tablet Take 3 mg by mouth every other day in addition to 5mg tablet for a total dose of 8mg.  Refills: 2      !! predniSONE (DELTASONE) 5 MG tablet Take 10 mg by mouth every other day      !! predniSONE (DELTASONE) 5 MG tablet Take 5 mg by mouth every other day in addition to 1mg tablets for a total dose of 8mg      probiotic CAPS Take 1 capsule by mouth 2 times daily      rivaroxaban ANTICOAGULANT (XARELTO) 20 MG TABS tablet Take 20 mg by mouth daily (with dinner)       sodium chloride 0.9% infusion Use 6mL once weekly to reconstitute levothyroxine powder before injection      vitamin D2 (ERGOCALCIFEROL) 33102 units (1250 mcg) capsule Take 50,000 Units by mouth once a week on Monday      zolpidem (AMBIEN) 5 MG tablet Take 5 mg by mouth nightly as needed for sleep       !! - Potential duplicate medications found. Please discuss  with provider.        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      Chlorhexidine Hives     Thor surgical cleanser     Codeine Hives     Has tolerated Oxycodone in past      Ibuprofen [Nsaids] Hives     Penicillins Hives     Other reaction(s): Unknown     Sulfa Drugs Hives     Other reaction(s): Unknown     Doxycycline GI Disturbance     Emesis & diarrhea  Patient denies allergy to this med     Hydroxyzine      rash     Mold      Red Wine Complex [Red Wine Powder]

## 2021-06-09 NOTE — PLAN OF CARE
Pt here with L. Sided weakness, fall. Alert, oriented x4. Neuros intact ex: weakness in the L. Side with the upper extremity scoring a 2/5 and the lower extremity scoring a 3/5, R. Sided facial droop, baseline N/T in the BLE's. VSS on RA. Tele SB with 1st degree AVB and BBB. Regular diet, thin liquids. Takes pills whole. Up with A1 using GB/walker. Denies pain. Pt scoring green on the Aggression Stop Light Tool. MRI of L. shoulder completed overnight, suggestive of brachial plexus injury. Discharge pending.

## 2021-06-09 NOTE — CONSULTS
Care Management Initial Consult    General Information  Assessment completed with: Patient, Spouse or significant other, Aspen and Ash  Type of CM/SW Visit: Initial Assessment    Primary Care Provider verified and updated as needed: Yes   Readmission within the last 30 days: no previous admission in last 30 days      Reason for Consult: discharge planning  Advance Care Planning: Advance Care Planning Reviewed: no concerns identified          Communication Assessment  Patient's communication style: spoken language (English or Bilingual)    Hearing Difficulty or Deaf: no   Wear Glasses or Blind: yes    Cognitive  Cognitive/Neuro/Behavioral: WDL  Level of Consciousness: alert  Arousal Level: opens eyes spontaneously  Orientation: oriented x 4  Mood/Behavior: calm, cooperative  Best Language: 0 - No aphasia  Speech: logical, spontaneous, clear    Living Environment:   People in home: spouse, child(patel), adult  Ash  Current living Arrangements: house      Able to return to prior arrangements: yes       Family/Social Support:  Care provided by: self, spouse/significant other  Provides care for: no one, unable/limited ability to care for self  Marital Status:   , Children  Ash       Description of Support System: Supportive, Involved    Support Assessment: Adequate family and caregiver support, Adequate social supports    Current Resources:   Patient receiving home care services: No     Community Resources: None  Equipment currently used at home: commode chair, hospital bed, walker, rolling  Supplies currently used at home: None    Employment/Financial:  Employment Status: retired        Financial Concerns: No concerns identified           Lifestyle & Psychosocial Needs:        Socioeconomic History     Marital status:      Spouse name: Not on file     Number of children: Not on file     Years of education: Not on file     Highest education level: Not on file     Tobacco Use     Smoking status:  Former Smoker     Packs/day: 1.50     Years: 20.00     Pack years: 30.00     Types: Cigarettes     Start date: 1973     Quit date: 1993     Years since quittin.4     Smokeless tobacco: Never Used   Substance and Sexual Activity     Alcohol use: No     Alcohol/week: 0.0 standard drinks     Drug use: No     Sexual activity: Not Currently     Partners: Male     Birth control/protection: Post-menopausal       Functional Status:  Prior to admission patient needed assistance:          Mental Health Status:  Mental Health Status: No Current Concerns       Chemical Dependency Status:  Chemical Dependency Status: No Current Concerns             Values/Beliefs:  Spiritual, Cultural Beliefs, Nondenominational Practices, Values that affect care: yes  Description of Beliefs that Will Affect Care: Jehovah Witness            Care Management Discharge Note    Discharge Date: 21       Discharge Disposition: Outpatient Rehab (PT, OT, SLP, Cardiac or Pulmonary)(The pt already has OP therapy she perfers to continue to work with in [facility])    Discharge Services: None    Discharge DME: None    Discharge Transportation: family or friend will provide    Private pay costs discussed: Not applicable    PAS Confirmation Code:    Patient/family educated on Medicare website which has current facility and service quality ratings: no    Education Provided on the Discharge Plan:    Persons Notified of Discharge Plans: Aspen and her  Ash  Patient/Family in Agreement with the Plan: yes    Handoff Referral Completed: Yes   Due to multiple specialty appointments.    Additional Information:  I introduced myself and the role of Care Coordination to the pt.  I reviewed PT recommendation for her to go home and OT recommendation to go to TCU and her need for 24/7 supervision.  The pt stated she would not go to TCU.  I explained to her that we did not feel she needed to if she had someone at home with her.  She stated her  is  retired and one of the dtgs lives with them and works from home.  I spoke with the pt's  and he verified this and stated they have a dtg that lives next to them.    The pt would like to continue any OP therapy in [facility] where she has been going.  Ash drives her there twice a week and is willing to continue to do this.    Ash stated they are going to be driving to [place] and will be gone for 8 days.  Ash will make all the recommended appointments so they can fit into their schedule.      I sent a handoff the the pt's PCP due to multiple specialty appointments.          Sylvia Bueno RN, BSN Care Coordinator  [facility]  Mobile: [phone]

## 2021-06-09 NOTE — PLAN OF CARE
Pt here with fall and LS hemiparesis. A&Ox4. Neuros with fuzzy vision improving per patient, slight R facial droop, LUE 3/5 moderate  in splint and edematous cms intact, LLE 3/5, R foot neuropathy at baseline, bottom of foot red and warm. VSS bradycardic. Tele sb with 1st degree avb and bbb. Regular diet, thin liquids. Takes pills whole with water. Up with A1 gbw. Has chronic pain, reduced with scheduled morphine. Plan for brachial plexus MRI in L shoulder.

## 2021-06-09 NOTE — PLAN OF CARE
Pt here with fall and left sided weakness. Left brachial plexus injury? Left thumb fracture. A&O. Right droop.  N/T in BLE at baseline. Sensation improving in LUE. VSS. Tele SB with BBB. Regular diet, thin liquids. Takes pills whole. Up with A1/GB/W. Denies pain. Pt scoring green on the Aggression Stop Light Tool. Discharge to home today.  Patient Belongings: cellphone/electronics, clothing, medical devices  No home medications

## 2021-06-09 NOTE — PLAN OF CARE
Occupational Therapy Discharge Summary    Reason for therapy discharge:    Discharged to home with outpatient therapy.    Progress towards therapy goal(s). See goals on Care Plan in [facility] electronic health record for goal details.  Goals partially met.  Barriers to achieving goals:   discharge from facility.    Therapy recommendation(s):    Continued therapy is recommended.  Rationale/Recommendations:  Recommend continued OT to maximize independence and safety with I/ADLs.

## 2021-06-12 NOTE — TELEPHONE ENCOUNTER
Referral from patient's pain doctor (Dr. Boucher) states patient needs to be seen for complex regional pain syndrome. Left message with patient's pain clinic to send more information.  Patient's  states patient needs blood flow workup. She is experiencing leg pain both with rest and walking, some discoloration, cold feet (R>L), and she has had no recent imaging on her LEs.  did note she has had back and foot surgery.   Please advise which provider is appropriate to schedule with & what imaging needed.

## 2021-06-22 ENCOUNTER — TELEPHONE (OUTPATIENT)
Dept: RADIOLOGY | Facility: CLINIC | Age: 61
End: 2021-06-22

## 2021-06-22 NOTE — TELEPHONE ENCOUNTER
LM for patient with  Ash per request of patient and .    Patient is scheduled with  7/1 at 1:00 in person visit for a consult.    This appointment was made in error as  only has clinic on Wednesdays.    I offered that the patient be seen on either 6/30 or 7/7 or when possible for them.    Call back number was provided.

## 2021-06-24 DIAGNOSIS — I48.91 ATRIAL FIBRILLATION WITH RVR (H): ICD-10-CM

## 2021-06-24 RX ORDER — METOPROLOL SUCCINATE 100 MG/1
100 TABLET, EXTENDED RELEASE ORAL 2 TIMES DAILY
Qty: 60 TABLET | Refills: 3 | Status: SHIPPED | OUTPATIENT
Start: 2021-06-24 | End: 2021-10-12

## 2021-06-25 ENCOUNTER — TELEPHONE (OUTPATIENT)
Dept: RADIOLOGY | Facility: CLINIC | Age: 61
End: 2021-06-25

## 2021-06-25 NOTE — TELEPHONE ENCOUNTER
Spoke with patient  Ash    7/7 at 2:45 in person with Taty preston, they will be here.    Thanks,    Alisha

## 2021-07-07 ENCOUNTER — OFFICE VISIT (OUTPATIENT)
Dept: VASCULAR SURGERY | Facility: CLINIC | Age: 61
End: 2021-07-07
Payer: COMMERCIAL

## 2021-07-07 VITALS — DIASTOLIC BLOOD PRESSURE: 92 MMHG | SYSTOLIC BLOOD PRESSURE: 146 MMHG | OXYGEN SATURATION: 96 % | HEART RATE: 75 BPM

## 2021-07-07 DIAGNOSIS — I83.893 SYMPTOMATIC VARICOSE VEINS OF BOTH LOWER EXTREMITIES: Primary | ICD-10-CM

## 2021-07-07 PROCEDURE — 99204 OFFICE O/P NEW MOD 45 MIN: CPT | Mod: 95 | Performed by: RADIOLOGY

## 2021-07-07 NOTE — LETTER
7/7/2021       RE: Aspen Mccullough  [address]  [facility] [address]     Dear Colleague,    Thank you for referring your patient, Aspen Mccullough, to the [facility] [place] at [facility]. Please see a copy of my visit note below.        INTERVENTIONAL RADIOLOGY CONSULTATION    Name: Aspen Mccullough  Age: 61 year old   Referring Physician: Dr. Muñoz   REASON FOR REFERRAL: Venous incompetency    HPI: Patient with past medical history of atrial fibrillation, lupus on steroids, right lower extremity DVT 2017 and last year as well as pulmonary embolism currently on Xarelto presents for symptoms of bilateral chronic venous insufficiency.  DVT was noted to be in the popliteal vein of the right leg in the past.    Patient has had multiple foot surgeries including bunionectomy first digit on the right and hammer hammertoe repair bilaterally.  Back in the 1980s, patient had bunionectomy of the left fifth digit complicated by gangrene and subsequent resection.  Patient has noticed purple redish discoloration of her feet, predominantly the right for several years.  Right foot feels like ice. Thermometer at home does not read over the foot because it is cold.   says that her foot is normal temperature today but patient still feels the cold sensation.     Has swelling in both legs. Elevating feet makes better. Has a new compression device for both legs. Some relieve but the rash color always comes back in the feet. Wears compression stockings if she has to go out and wears pants. Has had them for a few years and has worn them greater than 3 months total.  These are knee-high 20-30mg and were measured.  Has DARLINE hose thigh-high stockings of which she does not wear.  The stockings help with the swelling. No bulging veins.  She also complains of a heaviness sensation lower extremities that worsens as the day goes by.  The symptoms combined limit  her daily functioning capabilities.  History of bilateral knee replacements.      PAST MEDICAL HISTORY:   Past Medical History:   Diagnosis Date     ADHD (attention deficit hyperactivity disorder)      Allergic rhinitis      Chronic low back pain      Cushing's syndrome (H)      DDD (degenerative disc disease)      DDD (degenerative disc disease)      Deviated nasal septum      Diarrhea      Endometriosis      Fibromyalgia      Hashimoto's disease      Hyperlipidemia      Hypothyroid     hx hashimoto's thyroiditis     Insomnia      Lupus (H)      Malabsorption      Pernicious anemia      Renal disease     chronic renal insufficiency     Sinusitis        PAST SURGICAL HISTORY:   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)     APPENDECTOMY       ARTHRODESIS ANKLE      right     ARTHROPLASTY KNEE BILATERAL       ARTHROPLASTY REVISION KNEE  4/19/2011    Procedure:ARTHROPLASTY REVISION KNEE; With Antibiotic Cement ; Surgeon:CHAO OLIVARES; Location:[facility] OR     BREAST SURGERY      right- tissue remove nipple area     BUNIONECTOMY  12/14/2011    Procedure:BUNIONECTOMY; Right Bunion Correction; Surgeon:GRACE ZARATE; Location:[facility] STOMACH SURGERY PROCEDURE UNLISTED      see list which we will bring     CHOLECYSTECTOMY       EXAM UNDER ANESTHESIA ANUS N/A 7/15/2020    Procedure: EXAM UNDER ANESTHESIA, ANUS;  Surgeon: Luis Canales MD;  [facility]     EXCISE MASS UPPER EXTREMITY  12/14/2011    Procedure:EXCISE MASS UPPER EXTREMITY; Excision of Left Arm Mass; Surgeon:GRACE ZARATE; Location:[facility]     FOOT SURGERY      left X 4     FUSION LUMBAR ANTERIOR ONE LEVEL       HC SACROPLASTY      see list which we will bring     HEMORRHOIDECTOMY INTERNAL N/A 7/15/2020    Procedure: Exam under anesthesia, hemorrhoidectomy;  Surgeon: Luis Canales MD;  [facility]     INJECT NERVE BLOCK SUPRASCAPULAR  Left 5/18/2018    Procedure: INJECT NERVE BLOCK SUPRASCAPULAR;  Left Suprascapular Nerve Block;  Surgeon: Basim Velazquez MD;  [facility]     INJECT NERVE BLOCK SUPRASCAPULAR Right 7/23/2018    Procedure: INJECT NERVE BLOCK SUPRASCAPULAR;  Right suprascapular injection;  Surgeon: Basim Velazquez MD;  [facility]     INJECT NERVE BLOCK SUPRASCAPULAR Bilateral 10/29/2018    Procedure: Bilateral Suprascapular Nerve Blocks;  Surgeon: Basim Velazquez MD;  [facility]     INJECT NERVE BLOCK SUPRASCAPULAR Bilateral 3/15/2019    Procedure: Bilateral Suprascapular Nerve Block;  Surgeon: Basim Velazquez MD;  [facility]     INJECT NERVE BLOCK SUPRASCAPULAR Bilateral 12/31/2019    Procedure: bilateral suprascapular nerve block;  Surgeon: Basim Velazquez MD;  [facility]     KNEE SURGERY      see list which we will bring     LAMINECTOMY LUMBAR ONE LEVEL      L4-5     LAPAROSCOPIC ABLATION ENDOMETRIOSIS       RADIO FREQUENCY ABLATION PULSED CERVICAL Bilateral 7/10/2019    Procedure: Bilateral Suprascapular Nerve Pulsed Radiofrequency Ablation;  Surgeon: Basim Velazquez MD;  [facility]     REMOVE HARDWARE FOOT  12/14/2011    Procedure:REMOVE HARDWARE FOOT; Hardware Removal Right Foot (Mini-C-Arm) ; Surgeon:GRACE ZARATE; Location:[facility]     RHINOPLASTY       SALPINGO-OOPHORECTOMY BILATERAL       TONSILLECTOMY         FAMILY HISTORY:   Family History   Problem Relation Age of Onset     Hypertension Mother      No Known Problems Father      No Known Problems Sister      No Known Problems Brother      No Known Problems Maternal Grandmother      No Known Problems Maternal Grandfather      No Known Problems Paternal Grandmother      No Known Problems Other        SOCIAL HISTORY:   Social History     Tobacco Use     Smoking status: Former Smoker     Packs/day: 1.50     Years: 20.00     Pack years: 30.00     Types: Cigarettes     Start date: 1/1/1973     Quit date: 1/1/1993     Years  since quittin.5     Smokeless tobacco: Never Used   Substance Use Topics     Alcohol use: No     Alcohol/week: 0.0 standard drinks       PROBLEM LIST:   Patient Active Problem List    Diagnosis Date Noted     Cerebrovascular accident (CVA), unspecified mechanism (H) 2021     Priority: Medium     CVA (cerebral vascular accident) (H) 2021     Priority: Medium     Morbid obesity (H) 11/10/2020     Priority: Medium     Atrial fibrillation with RVR (H) 2020     Priority: Medium     Acute deep vein thrombosis (DVT) of proximal vein of right lower extremity (H) 2020     Priority: Medium     Pneumonia of left upper lobe due to infectious organism 2020     Priority: Medium     Grade III internal hemorrhoids 2020     Priority: Medium     Added automatically from request for surgery [id number]       GI bleed 2020     Priority: Medium     Chronic pain of both shoulders 2019     Priority: Medium     Added automatically from request for surgery [id number]       Personal history of DVT (deep vein thrombosis) 02/15/2018     Priority: Medium     History of pulmonary embolism 02/15/2018     Priority: Medium     Paroxysmal atrial fibrillation (H) 2018     Priority: Medium     Overview:   Likely multifactorial related to iatrogenic hyperthyroidism and extensive bilateral PE and DVT.  PAF with RVR. On warfarin, Sotalol 80 mg BID for antiarrhythmic drug therapy. CHADsVASc 4 (LV dysfunction, DVT/PE, gender). On Sotalol 80 mg BID.   Pt would prefer NOAC. Consider transition to Eliquis        Nonischemic cardiomyopathy (H) 2018     Priority: Medium     Overview:   EF 40%. Likely tachy-mediated in setting of iatrogenic hyperthyroidism and multiple submassive PE.   Recommended repeat echocardiography in 2-3 months. If unchanged the, needs ischemic work up.        Malabsorption 2017     Priority: Medium     Overview:   Reportedly has Chron's disease though is not on any current  treatments.        Acute deep vein thrombosis (DVT) of popliteal vein of right lower extremity (H) 11/27/2017     Priority: Medium     Acute pulmonary embolism (H) 11/27/2017     Priority: Medium     Iatrogenic hyperthyroidism 11/26/2017     Priority: Medium     Overview:   11/26/2017: On admission, reported home thyroid replacement regimen was 300 mcg BID PO + 200 mcg IM twice weekly, and liothyronine 50 mcg BID. Inital TSH was 0.02. All thyroid replacement was held on discharge with instructions to follow up with her endocrinologist.        Thrombus of right atrial appendage without antecedent myocardial infarction 11/26/2017     Priority: Medium     Overview:   Echocardiogram 11/26/17:  Right Atrium: The right atrium is mildly dilated. There is a large serpiginous and lobulated appearing mass visualized in the right atrium, prolapsing across the tricuspid valve; possible etiologies include thrombus, infectious, vs. Malignancy.  Likely related to newly discovered a fib, estrogen replacement therapy and lupus. Apparently, she also has a h/o DVT and was not on anticoagulation.  Started on llife long warfarin with goal INR 2.5-3.5 per cardiology. She could possibly be switched to a DOAC once the thrombus has resolved.        S/P cervical spinal fusion 10/03/2017     Priority: Medium     Impingement syndrome of left shoulder 01/25/2016     Priority: Medium     Abdominal cramping, generalized 01/25/2016     Priority: Medium     Intermittent diarrhea 01/25/2016     Priority: Medium     Overview:   Suspect related to iatrogenic hyperthyroidism.        Primary osteoarthritis involving multiple joints 01/25/2016     Priority: Medium     Posttraumatic stress disorder 06/23/2015     Priority: Medium     Hashimoto's thyroiditis 08/25/2014     Priority: Medium     Glucocorticoid deficiency (H) 08/25/2014     Priority: Medium     ACP (advance care planning) 08/22/2014     Priority: Medium     Advance Care Planning: Receipt of  ACP document:  Received: Health Care Directive which was witnessed or notarized on 10-10-05.  Document previously scanned on 12-20-11.  Validation form completed and scanned.  Code Status reflects choices in most recent ACP document NOTE: Pt is [employer]-please review HCD for details on blood products.  Confirmed/documented designated decision maker(s). See permanent comments section of demographics in clinical tab. View document(s) and details by clicking on code status.   Added by Ora Shaw RN, System ACP Coordinator [employer] on 8/22/2014.             Hyperlipidemia 07/31/2014     Priority: Medium     Hypothyroidism 07/24/2014     Priority: Medium     Problem list name updated by automated process. Provider to review       Insomnia, unspecified type 07/24/2014     Priority: Medium     Knee joint replacement by other means 09/16/2013     Priority: Medium     Encounter for long-term use of opiate analgesic 04/01/2013     Priority: Medium     Histrionic personality (H) 10/04/2012     Priority: Medium     Ankle pain, left 12/09/2011     Priority: Medium     S/P total knee replacement 08/30/2011     Priority: Medium     Alopecia areata 07/29/2011     Priority: Medium     Lipoma of skin and subcutaneous tissue 07/29/2011     Priority: Medium     Somatoform disorder 03/02/2011     Priority: Medium     Chronic ethmoidal sinusitis 10/27/2010     Priority: Medium     Overview:   ANTERIOR & POSTERIOR       Chronic maxillary sinusitis 10/27/2010     Priority: Medium     Nasal fracture 10/27/2010     Priority: Medium     Overview:   WITH DISLOCATION, CHRONIC       Nasal turbinate hypertrophy 10/27/2010     Priority: Medium     Deviated nasal septum 10/25/2010     Priority: Medium     Attention deficit disorder 10/11/2010     Priority: Medium     Fibromyalgia 05/03/2010     Priority: Medium     Overview:   Multifactorial, on enormous doses of medications, pain contract signed Aug 2016.  Has been stable on  same doses for years. Not elligible for increases.   --MS contin 90mg TID  --diazepam 10mg TID  --dilaudid 8mg TID  --flexeril  --TOX ASSURE Q3 months for now.        Left shoulder pain 01/18/2010     Priority: Medium     Complications due to internal joint prosthesis (H) 08/24/2009     Priority: Medium     CRPS (complex regional pain syndrome), lower limb 07/06/2009     Priority: Medium     S/P TKR (total knee replacement) 12/04/2008     Priority: Medium     Allergic rhinitis 10/09/2008     Priority: Medium     Adrenal cortical steroids causing adverse effect in therapeutic use 08/20/2008     Priority: Medium     Anemia, blood loss 08/20/2008     Priority: Medium     ARF (acute renal failure) (H) 08/20/2008     Priority: Medium     Arthritis, septic (H) 08/20/2008     Priority: Medium     Other specified abnormal findings of blood chemistry 08/20/2008     Priority: Medium     Postoperative hypotension 08/20/2008     Priority: Medium     Generalized osteoarthrosis, involving multiple sites 01/03/2008     Priority: Medium     Generalized pain 12/17/2007     Priority: Medium     Opioid type dependence (H) 12/17/2007     Priority: Medium     Overview:   Followed by Dr. Lester Osman at [facility] for opiate maintenance  [phone]       Other acute postoperative pain 12/17/2007     Priority: Medium     Pain in joint, lower leg 12/17/2007     Priority: Medium     Endometriosis 03/25/2002     Priority: Medium     Cushing's syndrome (H) 01/01/1996     Priority: Medium     Essential hypertension 01/01/1996     Priority: Medium     Systemic lupus erythematosus (H) 01/01/1996     Priority: Medium       MEDICATIONS:   Prescription Medications as of 7/7/2021       Rx Number Disp Refills Start End Last Dispensed Date Next Fill Date Owning Pharmacy    atorvastatin (LIPITOR) 40 MG tablet  30 tablet 0 6/9/2021    [facility] [place], [place] - [address] [address]    Sig: Take 1 tablet (40 mg) by mouth  every evening    Class: E-Prescribe    Notes to Pharmacy: Future refills by PCP Dr. Arcadio Farfan with phone number [phone].    Route: Oral    BIOTIN FORTE PO            Sig: Take 1 tablet by mouth daily     Class: Historical    Route: Oral    carboxymethylcellulose (CARBOXYMETHYLCELLULOSE SODIUM) 0.5 % SOLN ophthalmic solution  1 Bottle  10/24/2018        Sig: Place 1 drop into both eyes 2 times daily as needed     Class: Historical    Route: Both Eyes    cephALEXin (KEFLEX) 500 MG capsule            Sig: Take 500 mg by mouth as needed (Dental Procedure) Take 4 caps 1 hour prior to Dental Appointment     Class: Historical    Route: Oral    cyanocobalamin 1000 MCG/ML injection  4 mL 5 9/19/2014    [facility] [address] [place] [address] [address]    Sig: Inject 1 mL (1,000 mcg) Subcutaneous every 7 days    Class: E-Prescribe    Route: Subcutaneous    cyclobenzaprine (FLEXERIL) 10 MG tablet  90 tablet 3 4/19/2016    [facility] [address] [place] [address] [address]    Sig: Take 1 tablet (10 mg) by mouth 3 times daily    Class: E-Prescribe    Route: Oral    cycloSPORINE (RESTASIS) 0.05 % ophthalmic emulsion            Sig: Place 1 drop into both eyes 2 times daily    Class: Historical    Route: Both Eyes    diltiazem ER COATED BEADS (CARDIZEM CD/CARTIA XT) 180 MG 24 hr capsule  180 capsule 3 3/3/2021    [facility] [address] [facility]    Sig: Take 1 capsule (180 mg) by mouth 2 times daily    Class: E-Prescribe    Notes to Pharmacy: Please update profile.  Patient to call when she needs refill    Route: Oral    fexofenadine (ALLEGRA) 180 MG tablet            Sig: Take 180 mg by mouth daily as needed     Class: Historical    Route: Oral    gabapentin (NEURONTIN) 300 MG capsule        [facility] [place] [facility] [place], [place] - [address]    Sig: Take 300 mg by mouth 2 times daily (morning and afternoon) in addition to 800 mg tablet (total dose  1100mg)    Class: Historical    Route: Oral    gabapentin (NEURONTIN) 800 MG tablet            Sig: Take 800 mg by mouth At Bedtime (in addition to the 2 - 1100mg doses)    Class: Historical    Route: Oral    gabapentin (NEURONTIN) 800 MG tablet            Sig: Take 800 mg by mouth 2 times daily (morning and afternoon) in addition to 300mg capsule (total dose 1100mg)    Class: Historical    Route: Oral    HYDROmorphone (DILAUDID) 8 MG tablet    10/24/2018        Sig: Take 8 mg by mouth every 8 hours . Max of 3 doses per day.    Class: Historical    Earliest Fill Date: 10/24/2018    Route: Oral    levothyroxine (SYNTHROID) 200 MCG SOLR injection    10/24/2018        Sig: Inject 200 mcg into the vein once a week on Fridays    Class: Historical    Route: Intravenous    levothyroxine (SYNTHROID/LEVOTHROID) 300 MCG tablet    6/9/2021        Sig: Take 1 tablet (300 mcg) by mouth daily    Class: Historical    Route: Oral    lifitegrast (XIIDRA) 5 % opthalmic solution    10/24/2018        Sig: Place 1 drop into both eyes 2 times daily    Class: Historical    Route: Both Eyes    liothyronine (CYTOMEL) 25 MCG tablet            Sig: Take 25 mcg by mouth 2 times daily     Class: Historical    Route: Oral    metoprolol succinate ER (TOPROL-XL) 100 MG 24 hr tablet  60 tablet 3 6/24/2021    [facility] [place] - [place], [place] - [address]    Sig: Take 1 tablet (100 mg) by mouth 2 times daily    Class: E-Prescribe    Route: Oral    morphine (MS CONTIN) 30 MG 12 hr tablet  270 tablet 0         Sig: Take 30 mg by mouth 2 times daily (morning and night) in addition to 60 mg tablet for a total dose of 90mg.    Class: Historical    Earliest Fill Date: 10/24/2018    Route: Oral    morphine (MS CONTIN) 60 MG 12 hr tablet            Sig: Take 60 mg by mouth daily in the afternoon (in addition to the 2 - 90mg doses)    Class: Historical    Route: Oral    morphine (MS CONTIN) 60 MG 12 hr tablet  30 tablet 0     [id number] [place], [place] - [address]    Sig: Take 60 mg by mouth 2 times daily (morning and night) in addition to 30mg tablet for a total dose of 90mg    Class: Historical    Earliest Fill Date: 9/19/2018    Route: Oral    multivitamin, therapeutic with minerals (MULTI-VITAMIN) TABS tablet  100 tablet 3 10/24/2018        Sig: Take 1 tablet by mouth 2 times daily    Class: Historical    Route: Oral    NASONEX 50 MCG/ACT spray   11 10/24/2018        Sig: Spray 1 spray into both nostrils daily as needed     Class: Historical    Route: Both Nostrils    nystatin (MYCOSTATIN) 057587 UNIT/GM external powder  60 g 1 9/28/2020    [facility] [place] - [place], [place] - [address]    Sig: Apply topically 3 times daily as needed    Class: E-Prescribe    Route: Topical    prednisoLONE acetate (PRED FORTE) 1 % ophthalmic susp            Sig: Place 1 drop into both eyes 4 times daily     Class: Historical    Route: Both Eyes    predniSONE (DELTASONE) 1 MG tablet   2         Sig: Take 3 mg by mouth every other day in addition to 5mg tablet for a total dose of 8mg.    Class: Historical    Route: Oral    predniSONE (DELTASONE) 5 MG tablet            Sig: Take 10 mg by mouth every other day    Class: Historical    Route: Oral    predniSONE (DELTASONE) 5 MG tablet            Sig: Take 5 mg by mouth every other day in addition to 1mg tablets for a total dose of 8mg    Class: Historical    Route: Oral    probiotic CAPS    10/24/2018        Sig: Take 1 capsule by mouth 2 times daily    Class: Historical    Route: Oral    rivaroxaban ANTICOAGULANT (XARELTO) 20 MG TABS tablet    10/24/2018        Sig: Take 20 mg by mouth daily (with dinner)     Class: Historical    Route: Oral    sodium chloride 0.9% infusion            Sig: Use 6mL once weekly to reconstitute levothyroxine powder before injection    Class: Historical    vitamin D2 (ERGOCALCIFEROL) 46217 units (1250 mcg) capsule            Sig: Take 50,000  Units by mouth once a week on Monday    Class: Historical    Route: Oral    zolpidem (AMBIEN) 5 MG tablet            Sig: Take 5 mg by mouth nightly as needed for sleep    Class: Historical    Route: Oral      Clinic-Administered Medications as of 7/7/2021       Dose Frequency Start End    botulinum toxin type A (BOTOX) 100 units injection 100 Units 100 Units ONCE 11/5/2020     Route: Intramuscular    botulinum toxin type A (BOTOX) 100 units injection 100 Units 100 Units ONCE 10/23/2020     Admin Instructions: Ordering for insurance approval    Route: Intramuscular    botulinum toxin type A (BOTOX) 100 units injection 100 Units 100 Units ONCE 9/9/2020     Admin Instructions: Order to check for coverage    Route: Other    lidocaine 1% with EPINEPHrine 1:100,000 injection 3 mL 3 mL ONCE 12/9/2019     Route: Intradermal          ALLERGIES:   Blood transfusion related (informational only), Chlorhexidine, Codeine, Ibuprofen [nsaids], Penicillins, Sulfa drugs, Doxycycline, Hydroxyzine, Mold, and Red wine complex [red wine powder]    ROS:  Negative other than above    Physical Examination:   VITALS:   BP (!) 146/92 (BP Location: Right arm, Patient Position: Chair, Cuff Size: Adult Large)   Pulse 75   SpO2 96%       Constitutional: healthy, alert and no distress  Head: Normocephalic.  Cardiovascular: Normal rate  Respiratory: Nonlabored breathing  Gastrointestinal: Protuberant abdomen  Musculoskeletal: Patient has multiple scars on bilateral feet consistent with left first digit bunionectomy and bilateral hammertoe deformity correction.  Fifth toe left foot has been amputated.  1+ bilateral pitting edema to the level of the knees.    Skin: Healing wound measuring approximately 3 x 1 cm on the left Achilles.  Healing ulcer on the lateral aspect of the left fourth digit measuring approximately 2 x 1 cm.  Reddish-blue discoloration of bilateral feet, right greater than left.  No active ulcers.  Neurologic: Cranial nerves are  grossly intact  Psychiatric: affect normal/bright and mentation appears normal.  Vascular: Bilateral dorsalis pedis and posterior tibial arteries are dopplerable.  No varicose veins.    Labs:    BMP RESULTS:  Lab Results   Component Value Date     06/08/2021    POTASSIUM 3.8 06/08/2021    CHLORIDE 103 06/08/2021    CO2 33 (H) 06/08/2021    ANIONGAP 2 (L) 06/08/2021     (H) 06/08/2021    BUN 14 06/08/2021    CR 0.81 06/08/2021    GFRESTIMATED 78 06/08/2021    GFRESTBLACK 90 06/08/2021    KYLIE 8.9 06/08/2021        CBC RESULTS:  Lab Results   Component Value Date    WBC 6.9 06/08/2021    RBC 4.33 06/08/2021    HGB 12.1 06/08/2021    HCT 38.6 06/08/2021    MCV 89 06/08/2021    MCH 27.9 06/08/2021    MCHC 31.3 (L) 06/08/2021    RDW 18.5 (H) 06/08/2021     06/08/2021       INR/PTT:  Lab Results   Component Value Date    INR 1.63 (H) 01/22/2020    PTT 48 (H) 01/22/2020       Diagnostic studies:   Outside hospital venous competency study February 18, 2021 was personally reviewed and interpreted     Patient has no DVT in either leg.    Right: Incompetent greater saphenous vein from the saphenofemoral junction through the distal thigh.  Incompetence of the common femoral, femoral, popliteal vein.  Incompetence of the small saphenous vein.  No varicosities.    Left: Incompetence of the greater saphenous vein from the mid thigh through the mid calf.  Deep venous system was competent.  Small saphenous vein was not visualized.  No varicosities.    Assessment: 61-year-old female with past medical history of lupus, atrial fibrillation, DVT and PE presents with bilateral lower extremity venous incompetency symptoms.  Patient's clinical scenario is complex given the numerous bilateral lower extremity surgeries including but not limited to knee replacements, hammertoe, and bunion deformity correction.  One of the patient's main complaints is the fact that she has an area on the top of her foot and lateral right  calf that feels ice cold.  This is most certainly nerve related and would not be treated with her venous treatment.  This was explained in detail to the patient.  However, the patient does have lifestyle limiting bilateral lower extremity venous incompetency symptomatology and would benefit from treatment.    Endovenous laser ablation was discussed in detail with the patient as well as the postprocedural care.  Risks and benefits were discussed including but not limited to bleeding, DVT, nontarget thermal ablation, and infection.  Patient and  acknowledged their understanding of the situation and requested the procedure be completed.      Plan  Pending insurance approval:  -Right greater saphenous vein EVLA followed by right small saphenous vein EVLA on a separate day.  -Eventually left greater saphenous vein EVLA  -Prescription for 20 to 30mmHg thigh-high compression stockings.  -Patient does not need to stop her Xarelto for the procedure.    Conor Truong,  on 7/7/2021 at 4:11 PM      I interviewed and examined the patient with the resident and agree with the assessment and plan.    Taye Salcedo MD                   Patient Care Team:  Arcadio Farfan MD as PCP - Naun Jacobo MD as MD (Orthopedics)  Umm Ya APRN CNP as Assigned PCP  Laura Tony MD as MD (Dermatology)  Naun Patricio MD as Assigned OBGYN Provider  Jean-Paul Shafer MD as Assigned Heart and Vascular Provider  Pearl Cifuentes APRN CNP as Assigned Surgical Provider  JEAN-PAUL SHAFER      Chief Complaint   Patient presents with     Consult     New patient PVD. Pt reports pain in her left arm and toes, swelling in both feet     Vitals were taken and medications reconciled.     Steven Rudolph CMA  3:07 PM        Again, thank you for allowing me to participate in the care of your patient.      Sincerely,    Taye Salcedo MD

## 2021-07-07 NOTE — PROGRESS NOTES
INTERVENTIONAL RADIOLOGY CONSULTATION    Name: Aspen Mccullough  Age: 61 year old   Referring Physician: Dr. Muñoz   REASON FOR REFERRAL: Venous incompetency    HPI: Patient with past medical history of atrial fibrillation, lupus on steroids, right lower extremity DVT 2017 and last year as well as pulmonary embolism currently on Xarelto presents for symptoms of bilateral chronic venous insufficiency.  DVT was noted to be in the popliteal vein of the right leg in the past.    Patient has had multiple foot surgeries including bunionectomy first digit on the right and hammer hammertoe repair bilaterally.  Back in the 1980s, patient had bunionectomy of the left fifth digit complicated by gangrene and subsequent resection.  Patient has noticed purple redish discoloration of her feet, predominantly the right for several years.  Right foot feels like ice. Thermometer at home does not read over the foot because it is cold.   says that her foot is normal temperature today but patient still feels the cold sensation.     Has swelling in both legs. Elevating feet makes better. Has a new compression device for both legs. Some relieve but the rash color always comes back in the feet. Wears compression stockings if she has to go out and wears pants. Has had them for a few years and has worn them greater than 3 months total.  These are knee-high 20-30mg and were measured.  Has DARLINE hose thigh-high stockings of which she does not wear.  The stockings help with the swelling. No bulging veins.  She also complains of a heaviness sensation lower extremities that worsens as the day goes by.  The symptoms combined limit her daily functioning capabilities.  History of bilateral knee replacements.      PAST MEDICAL HISTORY:   Past Medical History:   Diagnosis Date     ADHD (attention deficit hyperactivity disorder)      Allergic rhinitis      Chronic low back pain      Cushing's syndrome (H)      DDD (degenerative disc disease)       DDD (degenerative disc disease)      Deviated nasal septum      Diarrhea      Endometriosis      Fibromyalgia      Hashimoto's disease      Hyperlipidemia      Hypothyroid     hx hashimoto's thyroiditis     Insomnia      Lupus (H)      Malabsorption      Pernicious anemia      Renal disease     chronic renal insufficiency     Sinusitis        PAST SURGICAL HISTORY:   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)     APPENDECTOMY       ARTHRODESIS ANKLE      right     ARTHROPLASTY KNEE BILATERAL       ARTHROPLASTY REVISION KNEE  4/19/2011    Procedure:ARTHROPLASTY REVISION KNEE; With Antibiotic Cement ; Surgeon:CHAO OLIVARES; Location:[facility]     BREAST SURGERY      right- tissue remove nipple area     BUNIONECTOMY  12/14/2011    Procedure:BUNIONECTOMY; Right Bunion Correction; Surgeon:GRACE ZARATE; Location:[facility]     C STOMACH SURGERY PROCEDURE UNLISTED      see list which we will bring     CHOLECYSTECTOMY       EXAM UNDER ANESTHESIA ANUS N/A 7/15/2020    Procedure: EXAM UNDER ANESTHESIA, ANUS;  Surgeon: Luis Canales MD;  [facility]     EXCISE MASS UPPER EXTREMITY  12/14/2011    Procedure:EXCISE MASS UPPER EXTREMITY; Excision of Left Arm Mass; Surgeon:GRACE ZARATE; Location:[facility]     FOOT SURGERY      left X 4     FUSION LUMBAR ANTERIOR ONE LEVEL       HC SACROPLASTY      see list which we will bring     HEMORRHOIDECTOMY INTERNAL N/A 7/15/2020    Procedure: Exam under anesthesia, hemorrhoidectomy;  Surgeon: Luis Canales MD;  [facility]     INJECT NERVE BLOCK SUPRASCAPULAR Left 5/18/2018    Procedure: INJECT NERVE BLOCK SUPRASCAPULAR;  Left Suprascapular Nerve Block;  Surgeon: Basim Velazquez MD;  [facility]     INJECT NERVE BLOCK SUPRASCAPULAR Right 7/23/2018    Procedure: INJECT NERVE BLOCK SUPRASCAPULAR;  Right suprascapular injection;  Surgeon: Basim Velazquez MD;   [facility]     INJECT NERVE BLOCK SUPRASCAPULAR Bilateral 10/29/2018    Procedure: Bilateral Suprascapular Nerve Blocks;  Surgeon: Basim Velazquez MD;  [facility]     INJECT NERVE BLOCK SUPRASCAPULAR Bilateral 3/15/2019    Procedure: Bilateral Suprascapular Nerve Block;  Surgeon: Basim Velazquez MD;  [facility]     INJECT NERVE BLOCK SUPRASCAPULAR Bilateral 2019    Procedure: bilateral suprascapular nerve block;  Surgeon: Basim Velazquez MD;  [facility]     KNEE SURGERY      see list which we will bring     LAMINECTOMY LUMBAR ONE LEVEL      L4-5     LAPAROSCOPIC ABLATION ENDOMETRIOSIS       RADIO FREQUENCY ABLATION PULSED CERVICAL Bilateral 7/10/2019    Procedure: Bilateral Suprascapular Nerve Pulsed Radiofrequency Ablation;  Surgeon: Basim Velazquez MD;  [facility]     REMOVE HARDWARE FOOT  2011    Procedure:REMOVE HARDWARE FOOT; Hardware Removal Right Foot (Mini-C-Arm) ; Surgeon:GRACE ZARATE; Location:[facility]     RHINOPLASTY       SALPINGO-OOPHORECTOMY BILATERAL       TONSILLECTOMY         FAMILY HISTORY:   Family History   Problem Relation Age of Onset     Hypertension Mother      No Known Problems Father      No Known Problems Sister      No Known Problems Brother      No Known Problems Maternal Grandmother      No Known Problems Maternal Grandfather      No Known Problems Paternal Grandmother      No Known Problems Other        SOCIAL HISTORY:   Social History     Tobacco Use     Smoking status: Former Smoker     Packs/day: 1.50     Years: 20.00     Pack years: 30.00     Types: Cigarettes     Start date: 1973     Quit date: 1993     Years since quittin.5     Smokeless tobacco: Never Used   Substance Use Topics     Alcohol use: No     Alcohol/week: 0.0 standard drinks       PROBLEM LIST:   Patient Active Problem List    Diagnosis Date Noted     Cerebrovascular accident (CVA), unspecified mechanism (H) 2021     Priority: Medium     CVA  (cerebral vascular accident) (H) 06/06/2021     Priority: Medium     Morbid obesity (H) 11/10/2020     Priority: Medium     Atrial fibrillation with RVR (H) 11/01/2020     Priority: Medium     Acute deep vein thrombosis (DVT) of proximal vein of right lower extremity (H) 11/01/2020     Priority: Medium     Pneumonia of left upper lobe due to infectious organism 11/01/2020     Priority: Medium     Grade III internal hemorrhoids 06/19/2020     Priority: Medium     Added automatically from request for surgery [id number]       GI bleed 01/22/2020     Priority: Medium     Chronic pain of both shoulders 12/09/2019     Priority: Medium     Added automatically from request for surgery [id number]       Personal history of DVT (deep vein thrombosis) 02/15/2018     Priority: Medium     History of pulmonary embolism 02/15/2018     Priority: Medium     Paroxysmal atrial fibrillation (H) 01/05/2018     Priority: Medium     Overview:   Likely multifactorial related to iatrogenic hyperthyroidism and extensive bilateral PE and DVT.  PAF with RVR. On warfarin, Sotalol 80 mg BID for antiarrhythmic drug therapy. CHADsVASc 4 (LV dysfunction, DVT/PE, gender). On Sotalol 80 mg BID.   Pt would prefer NOAC. Consider transition to Eliquis        Nonischemic cardiomyopathy (H) 01/05/2018     Priority: Medium     Overview:   EF 40%. Likely tachy-mediated in setting of iatrogenic hyperthyroidism and multiple submassive PE.   Recommended repeat echocardiography in 2-3 months. If unchanged the, needs ischemic work up.        Malabsorption 12/07/2017     Priority: Medium     Overview:   Reportedly has Chron's disease though is not on any current treatments.        Acute deep vein thrombosis (DVT) of popliteal vein of right lower extremity (H) 11/27/2017     Priority: Medium     Acute pulmonary embolism (H) 11/27/2017     Priority: Medium     Iatrogenic hyperthyroidism 11/26/2017     Priority: Medium     Overview:   11/26/2017: On admission, reported  home thyroid replacement regimen was 300 mcg BID PO + 200 mcg IM twice weekly, and liothyronine 50 mcg BID. Inital TSH was 0.02. All thyroid replacement was held on discharge with instructions to follow up with her endocrinologist.        Thrombus of right atrial appendage without antecedent myocardial infarction 11/26/2017     Priority: Medium     Overview:   Echocardiogram 11/26/17:  Right Atrium: The right atrium is mildly dilated. There is a large serpiginous and lobulated appearing mass visualized in the right atrium, prolapsing across the tricuspid valve; possible etiologies include thrombus, infectious, vs. Malignancy.  Likely related to newly discovered a fib, estrogen replacement therapy and lupus. Apparently, she also has a h/o DVT and was not on anticoagulation.  Started on llife long warfarin with goal INR 2.5-3.5 per cardiology. She could possibly be switched to a DOAC once the thrombus has resolved.        S/P cervical spinal fusion 10/03/2017     Priority: Medium     Impingement syndrome of left shoulder 01/25/2016     Priority: Medium     Abdominal cramping, generalized 01/25/2016     Priority: Medium     Intermittent diarrhea 01/25/2016     Priority: Medium     Overview:   Suspect related to iatrogenic hyperthyroidism.        Primary osteoarthritis involving multiple joints 01/25/2016     Priority: Medium     Posttraumatic stress disorder 06/23/2015     Priority: Medium     Hashimoto's thyroiditis 08/25/2014     Priority: Medium     Glucocorticoid deficiency (H) 08/25/2014     Priority: Medium     ACP (advance care planning) 08/22/2014     Priority: Medium     Advance Care Planning: Receipt of ACP document:  Received: Health Care Directive which was witnessed or notarized on 10-10-05.  Document previously scanned on 12-20-11.  Validation form completed and scanned.  Code Status reflects choices in most recent ACP document NOTE: Pt is Jainism-please review HCD for details on blood products.   Confirmed/documented designated decision maker(s). See permanent comments section of demographics in clinical tab. View document(s) and details by clicking on code status.   Added by Ora Shaw RN, System ACP Coordinator [employer] on 8/22/2014.             Hyperlipidemia 07/31/2014     Priority: Medium     Hypothyroidism 07/24/2014     Priority: Medium     Problem list name updated by automated process. Provider to review       Insomnia, unspecified type 07/24/2014     Priority: Medium     Knee joint replacement by other means 09/16/2013     Priority: Medium     Encounter for long-term use of opiate analgesic 04/01/2013     Priority: Medium     Histrionic personality (H) 10/04/2012     Priority: Medium     Ankle pain, left 12/09/2011     Priority: Medium     S/P total knee replacement 08/30/2011     Priority: Medium     Alopecia areata 07/29/2011     Priority: Medium     Lipoma of skin and subcutaneous tissue 07/29/2011     Priority: Medium     Somatoform disorder 03/02/2011     Priority: Medium     Chronic ethmoidal sinusitis 10/27/2010     Priority: Medium     Overview:   ANTERIOR & POSTERIOR       Chronic maxillary sinusitis 10/27/2010     Priority: Medium     Nasal fracture 10/27/2010     Priority: Medium     Overview:   WITH DISLOCATION, CHRONIC       Nasal turbinate hypertrophy 10/27/2010     Priority: Medium     Deviated nasal septum 10/25/2010     Priority: Medium     Attention deficit disorder 10/11/2010     Priority: Medium     Fibromyalgia 05/03/2010     Priority: Medium     Overview:   Multifactorial, on enormous doses of medications, pain contract signed Aug 2016.  Has been stable on same doses for years. Not elligible for increases.   --MS contin 90mg TID  --diazepam 10mg TID  --dilaudid 8mg TID  --flexeril  --TOX ASSURE Q3 months for now.        Left shoulder pain 01/18/2010     Priority: Medium     Complications due to internal joint prosthesis (H) 08/24/2009     Priority: Medium     CRPS  (complex regional pain syndrome), lower limb 07/06/2009     Priority: Medium     S/P TKR (total knee replacement) 12/04/2008     Priority: Medium     Allergic rhinitis 10/09/2008     Priority: Medium     Adrenal cortical steroids causing adverse effect in therapeutic use 08/20/2008     Priority: Medium     Anemia, blood loss 08/20/2008     Priority: Medium     ARF (acute renal failure) (H) 08/20/2008     Priority: Medium     Arthritis, septic (H) 08/20/2008     Priority: Medium     Other specified abnormal findings of blood chemistry 08/20/2008     Priority: Medium     Postoperative hypotension 08/20/2008     Priority: Medium     Generalized osteoarthrosis, involving multiple sites 01/03/2008     Priority: Medium     Generalized pain 12/17/2007     Priority: Medium     Opioid type dependence (H) 12/17/2007     Priority: Medium     Overview:   Followed by Dr. Lester Osman at [facility] for opiate maintenance  [phone]       Other acute postoperative pain 12/17/2007     Priority: Medium     Pain in joint, lower leg 12/17/2007     Priority: Medium     Endometriosis 03/25/2002     Priority: Medium     Cushing's syndrome (H) 01/01/1996     Priority: Medium     Essential hypertension 01/01/1996     Priority: Medium     Systemic lupus erythematosus (H) 01/01/1996     Priority: Medium       MEDICATIONS:   Prescription Medications as of 7/7/2021       Rx Number Disp Refills Start End Last Dispensed Date Next Fill Date Owning Pharmacy    atorvastatin (LIPITOR) 40 MG tablet  30 tablet 0 6/9/2021    [facility] [place] [address]    Sig: Take 1 tablet (40 mg) by mouth every evening    Class: E-Prescribe    Notes to Pharmacy: Future refills by PCP Dr. Arcadio Farfan with phone number [phone].    Route: Oral    BIOTIN FORTE PO            Sig: Take 1 tablet by mouth daily     Class: Historical    Route: Oral    carboxymethylcellulose (CARBOXYMETHYLCELLULOSE SODIUM) 0.5 % SOLN  ophthalmic solution  1 Bottle  10/24/2018        Sig: Place 1 drop into both eyes 2 times daily as needed     Class: Historical    Route: Both Eyes    cephALEXin (KEFLEX) 500 MG capsule            Sig: Take 500 mg by mouth as needed (Dental Procedure) Take 4 caps 1 hour prior to Dental Appointment     Class: Historical    Route: Oral    cyanocobalamin 1000 MCG/ML injection  4 mL 5 9/19/2014    [facility] #[id number] [place] [address] [address]    Sig: Inject 1 mL (1,000 mcg) Subcutaneous every 7 days    Class: E-Prescribe    Route: Subcutaneous    cyclobenzaprine (FLEXERIL) 10 MG tablet  90 tablet 3 4/19/2016    [facility] #[id number] [place] [address] [address]    Sig: Take 1 tablet (10 mg) by mouth 3 times daily    Class: E-Prescribe    Route: Oral    cycloSPORINE (RESTASIS) 0.05 % ophthalmic emulsion            Sig: Place 1 drop into both eyes 2 times daily    Class: Historical    Route: Both Eyes    diltiazem ER COATED BEADS (CARDIZEM CD/CARTIA XT) 180 MG 24 hr capsule  180 capsule 3 3/3/2021    [facility] [address] [place] [address]    Sig: Take 1 capsule (180 mg) by mouth 2 times daily    Class: E-Prescribe    Notes to Pharmacy: Please update profile.  Patient to call when she needs refill    Route: Oral    fexofenadine (ALLEGRA) 180 MG tablet            Sig: Take 180 mg by mouth daily as needed     Class: Historical    Route: Oral    gabapentin (NEURONTIN) 300 MG capsule        [facility] [place], [place] - [address]    Sig: Take 300 mg by mouth 2 times daily (morning and afternoon) in addition to 800 mg tablet (total dose 1100mg)    Class: Historical    Route: Oral    gabapentin (NEURONTIN) 800 MG tablet            Sig: Take 800 mg by mouth At Bedtime (in addition to the 2 - 1100mg doses)    Class: Historical    Route: Oral    gabapentin (NEURONTIN) 800 MG tablet            Sig: Take 800 mg by mouth 2 times daily (morning and  afternoon) in addition to 300mg capsule (total dose 1100mg)    Class: Historical    Route: Oral    HYDROmorphone (DILAUDID) 8 MG tablet    10/24/2018        Sig: Take 8 mg by mouth every 8 hours . Max of 3 doses per day.    Class: Historical    Earliest Fill Date: 10/24/2018    Route: Oral    levothyroxine (SYNTHROID) 200 MCG SOLR injection    10/24/2018        Sig: Inject 200 mcg into the vein once a week on Fridays    Class: Historical    Route: Intravenous    levothyroxine (SYNTHROID/LEVOTHROID) 300 MCG tablet    6/9/2021        Sig: Take 1 tablet (300 mcg) by mouth daily    Class: Historical    Route: Oral    lifitegrast (XIIDRA) 5 % opthalmic solution    10/24/2018        Sig: Place 1 drop into both eyes 2 times daily    Class: Historical    Route: Both Eyes    liothyronine (CYTOMEL) 25 MCG tablet            Sig: Take 25 mcg by mouth 2 times daily     Class: Historical    Route: Oral    metoprolol succinate ER (TOPROL-XL) 100 MG 24 hr tablet  60 tablet 3 6/24/2021    [facility] [place] [facility] [place], [place] - [address]    Sig: Take 1 tablet (100 mg) by mouth 2 times daily    Class: E-Prescribe    Route: Oral    morphine (MS CONTIN) 30 MG 12 hr tablet  270 tablet 0         Sig: Take 30 mg by mouth 2 times daily (morning and night) in addition to 60 mg tablet for a total dose of 90mg.    Class: Historical    Earliest Fill Date: 10/24/2018    Route: Oral    morphine (MS CONTIN) 60 MG 12 hr tablet            Sig: Take 60 mg by mouth daily in the afternoon (in addition to the 2 - 90mg doses)    Class: Historical    Route: Oral    morphine (MS CONTIN) 60 MG 12 hr tablet  30 tablet 0     [facility] #[id number] [facility] [address]    Sig: Take 60 mg by mouth 2 times daily (morning and night) in addition to 30mg tablet for a total dose of 90mg    Class: Historical    Earliest Fill Date: 9/19/2018    Route: Oral    multivitamin, therapeutic with minerals (MULTI-VITAMIN) TABS tablet  100  tablet 3 10/24/2018        Sig: Take 1 tablet by mouth 2 times daily    Class: Historical    Route: Oral    NASONEX 50 MCG/ACT spray   11 10/24/2018        Sig: Spray 1 spray into both nostrils daily as needed     Class: Historical    Route: Both Nostrils    nystatin (MYCOSTATIN) 557885 UNIT/GM external powder  60 g 1 9/28/2020    [facility] [place], [place] [address]    Sig: Apply topically 3 times daily as needed    Class: E-Prescribe    Route: Topical    prednisoLONE acetate (PRED FORTE) 1 % ophthalmic susp            Sig: Place 1 drop into both eyes 4 times daily     Class: Historical    Route: Both Eyes    predniSONE (DELTASONE) 1 MG tablet   2         Sig: Take 3 mg by mouth every other day in addition to 5mg tablet for a total dose of 8mg.    Class: Historical    Route: Oral    predniSONE (DELTASONE) 5 MG tablet            Sig: Take 10 mg by mouth every other day    Class: Historical    Route: Oral    predniSONE (DELTASONE) 5 MG tablet            Sig: Take 5 mg by mouth every other day in addition to 1mg tablets for a total dose of 8mg    Class: Historical    Route: Oral    probiotic CAPS    10/24/2018        Sig: Take 1 capsule by mouth 2 times daily    Class: Historical    Route: Oral    rivaroxaban ANTICOAGULANT (XARELTO) 20 MG TABS tablet    10/24/2018        Sig: Take 20 mg by mouth daily (with dinner)     Class: Historical    Route: Oral    sodium chloride 0.9% infusion            Sig: Use 6mL once weekly to reconstitute levothyroxine powder before injection    Class: Historical    vitamin D2 (ERGOCALCIFEROL) 65101 units (1250 mcg) capsule            Sig: Take 50,000 Units by mouth once a week on Monday    Class: Historical    Route: Oral    zolpidem (AMBIEN) 5 MG tablet            Sig: Take 5 mg by mouth nightly as needed for sleep    Class: Historical    Route: Oral      Clinic-Administered Medications as of 7/7/2021       Dose Frequency Start End    botulinum toxin type A  (BOTOX) 100 units injection 100 Units 100 Units ONCE 11/5/2020     Route: Intramuscular    botulinum toxin type A (BOTOX) 100 units injection 100 Units 100 Units ONCE 10/23/2020     Admin Instructions: Ordering for insurance approval    Route: Intramuscular    botulinum toxin type A (BOTOX) 100 units injection 100 Units 100 Units ONCE 9/9/2020     Admin Instructions: Order to check for coverage    Route: Other    lidocaine 1% with EPINEPHrine 1:100,000 injection 3 mL 3 mL ONCE 12/9/2019     Route: Intradermal          ALLERGIES:   Blood transfusion related (informational only), Chlorhexidine, Codeine, Ibuprofen [nsaids], Penicillins, Sulfa drugs, Doxycycline, Hydroxyzine, Mold, and Red wine complex [red wine powder]    ROS:  Negative other than above    Physical Examination:   VITALS:   BP (!) 146/92 (BP Location: Right arm, Patient Position: Chair, Cuff Size: Adult Large)   Pulse 75   SpO2 96%       Constitutional: healthy, alert and no distress  Head: Normocephalic.  Cardiovascular: Normal rate  Respiratory: Nonlabored breathing  Gastrointestinal: Protuberant abdomen  Musculoskeletal: Patient has multiple scars on bilateral feet consistent with left first digit bunionectomy and bilateral hammertoe deformity correction.  Fifth toe left foot has been amputated.  1+ bilateral pitting edema to the level of the knees.    Skin: Healing wound measuring approximately 3 x 1 cm on the left Achilles.  Healing ulcer on the lateral aspect of the left fourth digit measuring approximately 2 x 1 cm.  Reddish-blue discoloration of bilateral feet, right greater than left.  No active ulcers.  Neurologic: Cranial nerves are grossly intact  Psychiatric: affect normal/bright and mentation appears normal.  Vascular: Bilateral dorsalis pedis and posterior tibial arteries are dopplerable.  No varicose veins.    Labs:    BMP RESULTS:  Lab Results   Component Value Date     06/08/2021    POTASSIUM 3.8 06/08/2021    CHLORIDE 103  06/08/2021    CO2 33 (H) 06/08/2021    ANIONGAP 2 (L) 06/08/2021     (H) 06/08/2021    BUN 14 06/08/2021    CR 0.81 06/08/2021    GFRESTIMATED 78 06/08/2021    GFRESTBLACK 90 06/08/2021    KYLIE 8.9 06/08/2021        CBC RESULTS:  Lab Results   Component Value Date    WBC 6.9 06/08/2021    RBC 4.33 06/08/2021    HGB 12.1 06/08/2021    HCT 38.6 06/08/2021    MCV 89 06/08/2021    MCH 27.9 06/08/2021    MCHC 31.3 (L) 06/08/2021    RDW 18.5 (H) 06/08/2021     06/08/2021       INR/PTT:  Lab Results   Component Value Date    INR 1.63 (H) 01/22/2020    PTT 48 (H) 01/22/2020       Diagnostic studies:   Outside hospital venous competency study February 18, 2021 was personally reviewed and interpreted     Patient has no DVT in either leg.    Right: Incompetent greater saphenous vein from the saphenofemoral junction through the distal thigh.  Incompetence of the common femoral, femoral, popliteal vein.  Incompetence of the small saphenous vein.  No varicosities.    Left: Incompetence of the greater saphenous vein from the mid thigh through the mid calf.  Deep venous system was competent.  Small saphenous vein was not visualized.  No varicosities.    Assessment: 61-year-old female with past medical history of lupus, atrial fibrillation, DVT and PE presents with bilateral lower extremity venous incompetency symptoms.  Patient's clinical scenario is complex given the numerous bilateral lower extremity surgeries including but not limited to knee replacements, hammertoe, and bunion deformity correction.  One of the patient's main complaints is the fact that she has an area on the top of her foot and lateral right calf that feels ice cold.  This is most certainly nerve related and would not be treated with her venous treatment.  This was explained in detail to the patient.  However, the patient does have lifestyle limiting bilateral lower extremity venous incompetency symptomatology and would benefit from  treatment.    Endovenous laser ablation was discussed in detail with the patient as well as the postprocedural care.  Risks and benefits were discussed including but not limited to bleeding, DVT, nontarget thermal ablation, and infection.  Patient and  acknowledged their understanding of the situation and requested the procedure be completed.      Plan  Pending insurance approval:  -Right greater saphenous vein EVLA followed by right small saphenous vein EVLA on a separate day.  -Eventually left greater saphenous vein EVLA  -Prescription for 20 to 30mmHg thigh-high compression stockings.  -Patient does not need to stop her Xarelto for the procedure.    Conor Truong,  on 7/7/2021 at 4:11 PM      I interviewed and examined the patient with the resident and agree with the assessment and plan.    Taye Salcedo MD                   Patient Care Team:  Arcadio Farfan MD as PCP - Naun Jacobo MD as MD (Orthopedics)  Umm Ya APRN CNP as Assigned PCP  Laura Tony MD as MD (Dermatology)  Naun Patricio MD as Assigned OBGYN Provider  Jean-Paul Shafer MD as Assigned Heart and Vascular Provider  Pearl Cifuentes APRN CNP as Assigned Surgical Provider  JEAN-PAUL SHAFER

## 2021-07-07 NOTE — PROGRESS NOTES
Chief Complaint   Patient presents with     Consult     New patient PVD. Pt reports pain in her left arm and toes, swelling in both feet     Vitals were taken and medications reconciled.     Steven Rudolph CMA  3:07 PM

## 2021-07-08 ENCOUNTER — TEAM CONFERENCE (OUTPATIENT)
Dept: VASCULAR SURGERY | Facility: CLINIC | Age: 61
End: 2021-07-08

## 2021-07-08 NOTE — TELEPHONE ENCOUNTER
Patients Name: ROXY ALVARES    Patients MRN: [id number]    Doctor s Name: Taye Salcedo    Procedure CPT codes:     CPT 99240: IR Endovenous ablation varicose veins ([id number])    Notes: RIGHT LEG - 2 UNITS (gsv and ssv)    LEFT LEG- 2 UNITS (gsv and ssv)      Diagnosis Codes: I83.893  Symptomatic varicose veins of bilateral extremities     Bilat/R/L ? : BILATERAL    Have Conservative treatments been attempted?                Compression Stockings-3 month trial and documented failure:                 Ultrasound : 5/20/21, 11/10/20          Past treatment:    none      Scheduled: pending        Response: 7/27/2021    Approval from 7/16/21-12/31/21, auth #[id number], cpt [id number]

## 2021-07-16 NOTE — PROGRESS NOTES
[facility]     Date of visit: 7/19/2021      Assessment:   This is a 61-year-old female who is seen in the pain clinic today for chronic bilateral shoulder pain. She has undergone previous suprascapular nerve blocks bilaterally.  She states that these blocks last done in July of 2019 were not helpful, however they were very helpful after injections in December of 2019.  She is interested in trying these nerve blocks again.      Visit Diagnoses:  1. Neuropathy of left suprascapular nerve    2. Neuropathy of right suprascapular nerve    3. Chronic pain syndrome        Plan:    1. Repeat bilateral suprascapular nerve blocks ordered today.    2. Advised she monitor degree of benefit with these injections. She will follow up with Fannie AYOUB CNP 2-4 weeks after procedure.     Fannie AYOUB CNP  [facility]     -------------------------------------------------    Subjective:    Chief complaint:   Chief Complaint   Patient presents with     Pain     Patient comes in to discuss left arm pain. Pain starts on left side of neck and radiates down into the fingers.        Interval history:  Aspen Mccullough is a 61 year old female last seen on 12/9/2019.  she is a patient of Dr. Velazquez seen in follow up.     Recommendations/plan at the last visit included:  Additional Work-up: none  Therapy/Exercise: none at this time.  Has current referral for pool therapy.  Plans to start after New Year  Medications:   Interventions: set up for bilateral suprascapular nerve blocks  Referrals: none     Follow-up: 6.8 weeks after injections or sooner if needed.    Since her last visit, Aspen Mccullough reports:  -Her pain is worse than it was at last visit.  -Her current most bothersome pain is in bilateral shoulders (left > right), radiates down bilateral lateral arms. She also has pain that radiates in her left 1st and 2nd fingers, this symptom has progressed in the last year or two.  She states that this is the same pain she had prior to injections in 2019.   -She had bilateral suprascapular nerve blocks with Dr. Velazquez on 7/10/2019 and on 12/31/2019. She reports some benefit with the injections in July but not significant and short term. She reports 50%+ benefit in December of 2019 last lasted 6-7 months.  She is very interested in repeating these injections.  -Of note, she has a private practice pain provider who prescribes high dose opioids- Dilaudid and MS Contin.    HPI per Elizabeth AYOUB CNP on 12/9/2019  This is a 59-year-old female who is known to the clinic, new to me, with bilateral shoulder pain now for approximately 10 years she tells me.  She was last seen in the clinic by Sarahi Chung, nurse practitioner on May 7 of 2019.  Since that time she underwent bilateral supra scapular nerve blocks with Dr. Velazquez on 7/10/2019.  She states that unfortunately these nerve blocks did not help her pain at this time.  She did have suprascapular nerve blocks bilateral prior to that which she stated were extremely helpful gave her 40 to 50% relief for at least 2 months or so.  She is interested in having these blocks again because she feels that she has exhausted all other options for relieving her pain.  She states she has tried topical medications, she is currently on high-dose opioid therapy, she has tried steroid injections, she has tried trigger point injections, physical therapy, pool therapy, she has seen a psychiatrist.  Her PMH also includes SLE on Prednisone and history of DVT/PE on Xarelto.     Of note patient goes to an outside pain clinic for chronic low back pain and she is currently taking Dilaudid 8 mg 3 times a day and MS Contin at high doses.  She is also taking gabapentin.  She was previously seen by Dr. Mendoza in orthopedics.  She tells me today that she wants to avoid surgery.       Pain Information:   Pain rating: averages 9/10 on a 0-10 scale.    Current pain  medications:    MS Contin 60mg TID- prescribed by her private pain provider Dr. Aldana    Dilaudid 8mg TID-  prescribed by her private pain provider Dr. Aldana     Review of [place] Prescription Monitoring Program (): No concern for abuse or misuse of controlled medications based on this report.     Annual Controlled Substance Agreement/UDS due date: NA    Past pain treatments:  #Medications:  #Therapy: land and pool therapy. She states that she has a current referral for pool therapy and will start this at the first of the year.  #Acupuncture: tried, not helpful  #Chiropractic Manipulation  #Psychology: was seeing psychiatry and has a current referral to re-start  #Surgery: wants to avoid    Medications:  Current Outpatient Medications   Medication Sig Dispense Refill     atorvastatin (LIPITOR) 40 MG tablet Take 1 tablet (40 mg) by mouth every evening 30 tablet 0     BIOTIN FORTE PO Take 1 tablet by mouth daily        carboxymethylcellulose (CARBOXYMETHYLCELLULOSE SODIUM) 0.5 % SOLN ophthalmic solution Place 1 drop into both eyes 2 times daily as needed  1 Bottle      cephALEXin (KEFLEX) 500 MG capsule Take 500 mg by mouth as needed (Dental Procedure) Take 4 caps 1 hour prior to Dental Appointment        cyanocobalamin 1000 MCG/ML injection Inject 1 mL (1,000 mcg) Subcutaneous every 7 days 4 mL 5     cyclobenzaprine (FLEXERIL) 10 MG tablet Take 1 tablet (10 mg) by mouth 3 times daily 90 tablet 3     cycloSPORINE (RESTASIS) 0.05 % ophthalmic emulsion Place 1 drop into both eyes 2 times daily       diltiazem ER COATED BEADS (CARDIZEM CD/CARTIA XT) 180 MG 24 hr capsule Take 1 capsule (180 mg) by mouth 2 times daily 180 capsule 3     Elastic Bandages & Supports (MEDICAL COMPRESSION SOCKS) MISC 1 Package daily Please measure and distribute 2 pair of 20mmHg - 30mmHg knee high open or closed toe compression stockings with extra refills as indicated. Jobst ultrasheer or equivalent. 2 each 3      fexofenadine (ALLEGRA) 180 MG tablet Take 180 mg by mouth daily as needed        gabapentin (NEURONTIN) 300 MG capsule Take 300 mg by mouth 2 times daily (morning and afternoon) in addition to 800 mg tablet (total dose 1100mg)       gabapentin (NEURONTIN) 800 MG tablet Take 800 mg by mouth At Bedtime (in addition to the 2 - 1100mg doses)       gabapentin (NEURONTIN) 800 MG tablet Take 800 mg by mouth 2 times daily (morning and afternoon) in addition to 300mg capsule (total dose 1100mg)       HYDROmorphone (DILAUDID) 8 MG tablet Take 8 mg by mouth every 8 hours . Max of 3 doses per day.       levothyroxine (SYNTHROID) 200 MCG SOLR injection Inject 200 mcg into the vein once a week on Fridays       levothyroxine (SYNTHROID/LEVOTHROID) 300 MCG tablet Take 1 tablet (300 mcg) by mouth daily       lifitegrast (XIIDRA) 5 % opthalmic solution Place 1 drop into both eyes 2 times daily       liothyronine (CYTOMEL) 25 MCG tablet Take 25 mcg by mouth 2 times daily        metoprolol succinate ER (TOPROL-XL) 100 MG 24 hr tablet Take 1 tablet (100 mg) by mouth 2 times daily 60 tablet 3     morphine (MS CONTIN) 30 MG 12 hr tablet Take 30 mg by mouth 2 times daily (morning and night) in addition to 60 mg tablet for a total dose of 90mg. 270 tablet 0     morphine (MS CONTIN) 60 MG 12 hr tablet Take 60 mg by mouth daily in the afternoon (in addition to the 2 - 90mg doses)       morphine (MS CONTIN) 60 MG 12 hr tablet Take 60 mg by mouth 2 times daily (morning and night) in addition to 30mg tablet for a total dose of 90mg 30 tablet 0     multivitamin, therapeutic with minerals (MULTI-VITAMIN) TABS tablet Take 1 tablet by mouth 2 times daily 100 tablet 3     NASONEX 50 MCG/ACT spray Spray 1 spray into both nostrils daily as needed   11     nystatin (MYCOSTATIN) 009078 UNIT/GM external powder Apply topically 3 times daily as needed 60 g 1     prednisoLONE acetate (PRED FORTE) 1 % ophthalmic susp Place 1 drop into both eyes 4 times daily   "      predniSONE (DELTASONE) 1 MG tablet Take 3 mg by mouth every other day in addition to 5mg tablet for a total dose of 8mg.  2     predniSONE (DELTASONE) 5 MG tablet Take 10 mg by mouth every other day       predniSONE (DELTASONE) 5 MG tablet Take 5 mg by mouth every other day in addition to 1mg tablets for a total dose of 8mg       probiotic CAPS Take 1 capsule by mouth 2 times daily       rivaroxaban ANTICOAGULANT (XARELTO) 20 MG TABS tablet Take 20 mg by mouth daily (with dinner)        sodium chloride 0.9% infusion Use 6mL once weekly to reconstitute levothyroxine powder before injection       vitamin D2 (ERGOCALCIFEROL) 20530 units (1250 mcg) capsule Take 50,000 Units by mouth once a week on Monday       zolpidem (AMBIEN) 5 MG tablet Take 5 mg by mouth nightly as needed for sleep         Medical History: any changes in medical history since they were last seen? No    Review of Systems: A 10-point review of systems was negative, with the exception of chronic pain issues.      Objective:    Physical Exam:  Blood pressure (!) 148/95, pulse 88, resp. rate 16, height 1.753 m (5' 9\"), weight 124.7 kg (275 lb), not currently breastfeeding.  Constitutional: Well developed, well nourished, appears stated age. Obese.   HEENT: Head atraumatic, normocephalic. Eyes without conjunctival injection or jaundice. Oropharynx clear. Neck supple. No obvious neck masses.  Skin: No rash, lesions, or petechiae of exposed skin.   Psychiatric/mental status: Alert, without lethargy or stupor. Speech fluent. Appropriate affect. Mood normal. Able to follow commands without difficulty.     Imaging:  EXAM: XR SHOULDER 2 VIEW LEFT  4/11/2018 10:00 AM      HISTORY:  Chronic left shoulder pain     COMPARISON:  Chest radiograph 4/24/2016     FINDINGS:  AP, Grashey, scapular Y, and axillary views of the left  shoulder are obtained.     Normal alignment of the glenohumeral joint. Subcortical cystic changes  of the greater tuberosity. " Subacromial spurring. Mild  acromioclavicular joint degenerative changes. Mild subchondral  sclerosis of the glenohumeral joint.     No substantial joint effusion. The visualized chest is unremarkable.                                                                      IMPRESSION:   1. Mild degenerative changes of the glenohumeral joint with  subcortical cystic changes of the greater tuberosity and subacromial  spurring.  2. Mild degenerative changes of the acromial clavicular joint.       BILLING TIME DOCUMENTATION:   The total TIME spent on this patient on the date of the encounter/appointment was 20 minutes.      TOTAL TIME includes:   Time spent preparing to see the patient (reviewing records and tests)   Time spent face to face (or over the phone) with the patient   Time spent ordering tests, medications, procedures and referrals   Time spent Referring and communicating with other healthcare professionals   Time spent documenting clinical information in Epic

## 2021-07-19 ENCOUNTER — OFFICE VISIT (OUTPATIENT)
Dept: ANESTHESIOLOGY | Facility: CLINIC | Age: 61
End: 2021-07-19
Payer: COMMERCIAL

## 2021-07-19 VITALS
HEART RATE: 88 BPM | HEIGHT: 69 IN | BODY MASS INDEX: 40.73 KG/M2 | WEIGHT: 275 LBS | RESPIRATION RATE: 16 BRPM | SYSTOLIC BLOOD PRESSURE: 148 MMHG | DIASTOLIC BLOOD PRESSURE: 95 MMHG

## 2021-07-19 DIAGNOSIS — G89.4 CHRONIC PAIN SYNDROME: ICD-10-CM

## 2021-07-19 DIAGNOSIS — G56.82 NEUROPATHY OF LEFT SUPRASCAPULAR NERVE: Primary | ICD-10-CM

## 2021-07-19 DIAGNOSIS — G56.81 NEUROPATHY OF RIGHT SUPRASCAPULAR NERVE: ICD-10-CM

## 2021-07-19 PROCEDURE — 99213 OFFICE O/P EST LOW 20 MIN: CPT | Performed by: NURSE PRACTITIONER

## 2021-07-19 ASSESSMENT — MIFFLIN-ST. JEOR: SCORE: 1871.77

## 2021-07-19 ASSESSMENT — PAIN SCALES - GENERAL: PAINLEVEL: EXTREME PAIN (9)

## 2021-07-19 NOTE — NURSING NOTE
Chief Complaint   Patient presents with     Pain     Patient comes in to discuss left arm pain. Pain starts on left side of neck and radiates down into the fingers.          Hussein Duran MA on 7/19/2021 at 3:06 PM

## 2021-07-19 NOTE — LETTER
7/19/2021       RE: Aspen Mccullough  [address]  [facility] [address]     Dear Colleague,    Thank you for referring your patient, Aspen Mccullough, to the [facility] [place] at [facility]. Please see a copy of my visit note below.    [facility]     Date of visit: 7/19/2021    Assessment:   This is a 61-year-old female who is seen in the pain clinic today for chronic bilateral shoulder pain. She has undergone previous suprascapular nerve blocks bilaterally.  She states that these blocks last done in July of 2019 were not helpful, however they were very helpful after injections in December of 2019.  She is interested in trying these nerve blocks again.      Visit Diagnoses:  1. Neuropathy of left suprascapular nerve    2. Neuropathy of right suprascapular nerve    3. Chronic pain syndrome      Plan:  1. Repeat bilateral suprascapular nerve blocks ordered today.    2. Advised she monitor degree of benefit with these injections. She will follow up with Fannie AYOUB CNP 2-4 weeks after procedure.     Fannie AYOUB CNP  [facility]     -------------------------------------------------    Subjective:    Chief complaint:   Chief Complaint   Patient presents with     Pain     Patient comes in to discuss left arm pain. Pain starts on left side of neck and radiates down into the fingers.        Interval history:  Aspen Mccullough is a 61 year old female last seen on 12/9/2019.  she is a patient of Dr. Velazquez seen in follow up.     Recommendations/plan at the last visit included:  Additional Work-up: none  Therapy/Exercise: none at this time.  Has current referral for pool therapy.  Plans to start after New Year  Medications:   Interventions: set up for bilateral suprascapular nerve blocks  Referrals: none     Follow-up: 6.8 weeks after injections or sooner if  needed.    Since her last visit, Aspen EPPS Rastafarian reports:  -Her pain is worse than it was at last visit.  -Her current most bothersome pain is in bilateral shoulders (left > right), radiates down bilateral lateral arms. She also has pain that radiates in her left 1st and 2nd fingers, this symptom has progressed in the last year or two. She states that this is the same pain she had prior to injections in 2019.   -She had bilateral suprascapular nerve blocks with Dr. Velazquez on 7/10/2019 and on 12/31/2019. She reports some benefit with the injections in July but not significant and short term. She reports 50%+ benefit in December of 2019 last lasted 6-7 months.  She is very interested in repeating these injections.  -Of note, she has a private practice pain provider who prescribes high dose opioids- Dilaudid and MS Contin.    HPI per Elizabeth Fernandez APRN CNP on 12/9/2019  This is a 59-year-old female who is known to the clinic, new to me, with bilateral shoulder pain now for approximately 10 years she tells me.  She was last seen in the clinic by Sarahi Chung, nurse practitioner on May 7 of 2019.  Since that time she underwent bilateral supra scapular nerve blocks with Dr. Velazquez on 7/10/2019.  She states that unfortunately these nerve blocks did not help her pain at this time.  She did have suprascapular nerve blocks bilateral prior to that which she stated were extremely helpful gave her 40 to 50% relief for at least 2 months or so.  She is interested in having these blocks again because she feels that she has exhausted all other options for relieving her pain.  She states she has tried topical medications, she is currently on high-dose opioid therapy, she has tried steroid injections, she has tried trigger point injections, physical therapy, pool therapy, she has seen a psychiatrist.  Her PMH also includes SLE on Prednisone and history of DVT/PE on Xarelto.     Of note patient goes to an outside pain clinic for  chronic low back pain and she is currently taking Dilaudid 8 mg 3 times a day and MS Contin at high doses.  She is also taking gabapentin.  She was previously seen by Dr. Mendoza in orthopedics.  She tells me today that she wants to avoid surgery.       Pain Information:   Pain rating: averages 9/10 on a 0-10 scale.    Current pain medications:    MS Contin 60mg TID- prescribed by her private pain provider Dr. Aldana    Dilaudid 8mg TID-  prescribed by her private pain provider Dr. Aldana     Review of [place] Prescription Monitoring Program (): No concern for abuse or misuse of controlled medications based on this report.     Annual Controlled Substance Agreement/UDS due date: NA    Past pain treatments:  #Medications:  #Therapy: land and pool therapy. She states that she has a current referral for pool therapy and will start this at the first of the year.  #Acupuncture: tried, not helpful  #Chiropractic Manipulation  #Psychology: was seeing psychiatry and has a current referral to re-start  #Surgery: wants to avoid    Medications:  Current Outpatient Medications   Medication Sig Dispense Refill     atorvastatin (LIPITOR) 40 MG tablet Take 1 tablet (40 mg) by mouth every evening 30 tablet 0     BIOTIN FORTE PO Take 1 tablet by mouth daily        carboxymethylcellulose (CARBOXYMETHYLCELLULOSE SODIUM) 0.5 % SOLN ophthalmic solution Place 1 drop into both eyes 2 times daily as needed  1 Bottle      cephALEXin (KEFLEX) 500 MG capsule Take 500 mg by mouth as needed (Dental Procedure) Take 4 caps 1 hour prior to Dental Appointment        cyanocobalamin 1000 MCG/ML injection Inject 1 mL (1,000 mcg) Subcutaneous every 7 days 4 mL 5     cyclobenzaprine (FLEXERIL) 10 MG tablet Take 1 tablet (10 mg) by mouth 3 times daily 90 tablet 3     cycloSPORINE (RESTASIS) 0.05 % ophthalmic emulsion Place 1 drop into both eyes 2 times daily       diltiazem ER COATED BEADS (CARDIZEM CD/CARTIA XT) 180 MG 24 hr  capsule Take 1 capsule (180 mg) by mouth 2 times daily 180 capsule 3     Elastic Bandages & Supports (MEDICAL COMPRESSION SOCKS) MISC 1 Package daily Please measure and distribute 2 pair of 20mmHg - 30mmHg knee high open or closed toe compression stockings with extra refills as indicated. Jobst ultrasheer or equivalent. 2 each 3     fexofenadine (ALLEGRA) 180 MG tablet Take 180 mg by mouth daily as needed        gabapentin (NEURONTIN) 300 MG capsule Take 300 mg by mouth 2 times daily (morning and afternoon) in addition to 800 mg tablet (total dose 1100mg)       gabapentin (NEURONTIN) 800 MG tablet Take 800 mg by mouth At Bedtime (in addition to the 2 - 1100mg doses)       gabapentin (NEURONTIN) 800 MG tablet Take 800 mg by mouth 2 times daily (morning and afternoon) in addition to 300mg capsule (total dose 1100mg)       HYDROmorphone (DILAUDID) 8 MG tablet Take 8 mg by mouth every 8 hours . Max of 3 doses per day.       levothyroxine (SYNTHROID) 200 MCG SOLR injection Inject 200 mcg into the vein once a week on Fridays       levothyroxine (SYNTHROID/LEVOTHROID) 300 MCG tablet Take 1 tablet (300 mcg) by mouth daily       lifitegrast (XIIDRA) 5 % opthalmic solution Place 1 drop into both eyes 2 times daily       liothyronine (CYTOMEL) 25 MCG tablet Take 25 mcg by mouth 2 times daily        metoprolol succinate ER (TOPROL-XL) 100 MG 24 hr tablet Take 1 tablet (100 mg) by mouth 2 times daily 60 tablet 3     morphine (MS CONTIN) 30 MG 12 hr tablet Take 30 mg by mouth 2 times daily (morning and night) in addition to 60 mg tablet for a total dose of 90mg. 270 tablet 0     morphine (MS CONTIN) 60 MG 12 hr tablet Take 60 mg by mouth daily in the afternoon (in addition to the 2 - 90mg doses)       morphine (MS CONTIN) 60 MG 12 hr tablet Take 60 mg by mouth 2 times daily (morning and night) in addition to 30mg tablet for a total dose of 90mg 30 tablet 0     multivitamin, therapeutic with minerals (MULTI-VITAMIN) TABS tablet  "Take 1 tablet by mouth 2 times daily 100 tablet 3     NASONEX 50 MCG/ACT spray Spray 1 spray into both nostrils daily as needed   11     nystatin (MYCOSTATIN) 749308 UNIT/GM external powder Apply topically 3 times daily as needed 60 g 1     prednisoLONE acetate (PRED FORTE) 1 % ophthalmic susp Place 1 drop into both eyes 4 times daily        predniSONE (DELTASONE) 1 MG tablet Take 3 mg by mouth every other day in addition to 5mg tablet for a total dose of 8mg.  2     predniSONE (DELTASONE) 5 MG tablet Take 10 mg by mouth every other day       predniSONE (DELTASONE) 5 MG tablet Take 5 mg by mouth every other day in addition to 1mg tablets for a total dose of 8mg       probiotic CAPS Take 1 capsule by mouth 2 times daily       rivaroxaban ANTICOAGULANT (XARELTO) 20 MG TABS tablet Take 20 mg by mouth daily (with dinner)        sodium chloride 0.9% infusion Use 6mL once weekly to reconstitute levothyroxine powder before injection       vitamin D2 (ERGOCALCIFEROL) 05774 units (1250 mcg) capsule Take 50,000 Units by mouth once a week on Monday       zolpidem (AMBIEN) 5 MG tablet Take 5 mg by mouth nightly as needed for sleep         Medical History: any changes in medical history since they were last seen? No    Review of Systems: A 10-point review of systems was negative, with the exception of chronic pain issues.      Objective:    Physical Exam:  Blood pressure (!) 148/95, pulse 88, resp. rate 16, height 1.753 m (5' 9\"), weight 124.7 kg (275 lb), not currently breastfeeding.  Constitutional: Well developed, well nourished, appears stated age. Obese.   HEENT: Head atraumatic, normocephalic. Eyes without conjunctival injection or jaundice. Oropharynx clear. Neck supple. No obvious neck masses.  Skin: No rash, lesions, or petechiae of exposed skin.   Psychiatric/mental status: Alert, without lethargy or stupor. Speech fluent. Appropriate affect. Mood normal. Able to follow commands without difficulty.     Imaging:  EXAM: " XR SHOULDER 2 VIEW LEFT  4/11/2018 10:00 AM      HISTORY:  Chronic left shoulder pain     COMPARISON:  Chest radiograph 4/24/2016     FINDINGS:  AP, Grashey, scapular Y, and axillary views of the left  shoulder are obtained.     Normal alignment of the glenohumeral joint. Subcortical cystic changes  of the greater tuberosity. Subacromial spurring. Mild  acromioclavicular joint degenerative changes. Mild subchondral  sclerosis of the glenohumeral joint.     No substantial joint effusion. The visualized chest is unremarkable.                                                                      IMPRESSION:   1. Mild degenerative changes of the glenohumeral joint with  subcortical cystic changes of the greater tuberosity and subacromial  spurring.  2. Mild degenerative changes of the acromial clavicular joint.       BILLING TIME DOCUMENTATION:   The total TIME spent on this patient on the date of the encounter/appointment was 20 minutes.      TOTAL TIME includes:   Time spent preparing to see the patient (reviewing records and tests)   Time spent face to face (or over the phone) with the patient   Time spent ordering tests, medications, procedures and referrals   Time spent Referring and communicating with other healthcare professionals   Time spent documenting clinical information in Epic       Again, thank you for allowing me to participate in the care of your patient.      Sincerely,    ANITRA Carroll CNP

## 2021-07-19 NOTE — PATIENT INSTRUCTIONS
1. Bilateral suprascapular nerve blocks ordered today. We will help you schedule.   2. Monitor degree of benefit with these injections. I want you to pay attention to remaining pain. Follow up with me 2-4 weeks after procedure.

## 2021-07-20 DIAGNOSIS — Z11.59 ENCOUNTER FOR SCREENING FOR OTHER VIRAL DISEASES: ICD-10-CM

## 2021-07-20 PROBLEM — G56.81 NEUROPATHY OF RIGHT SUPRASCAPULAR NERVE: Status: ACTIVE | Noted: 2021-07-20

## 2021-07-20 PROBLEM — G56.82 NEUROPATHY OF LEFT SUPRASCAPULAR NERVE: Status: ACTIVE | Noted: 2021-07-20

## 2021-07-27 ENCOUNTER — TELEPHONE (OUTPATIENT)
Dept: VASCULAR SURGERY | Facility: CLINIC | Age: 61
End: 2021-07-27

## 2021-07-27 DIAGNOSIS — I83.893 SYMPTOMATIC VARICOSE VEINS OF BOTH LOWER EXTREMITIES: Primary | ICD-10-CM

## 2021-07-28 ENCOUNTER — TELEPHONE (OUTPATIENT)
Dept: ANESTHESIOLOGY | Facility: CLINIC | Age: 61
End: 2021-07-28

## 2021-07-28 NOTE — TELEPHONE ENCOUNTER
LPN called and left message for the Nursing staff at [facility]- with Patient's PCP's office.     LPN updated the staff that the pt is going to be having a Bilateral Suprascapular Nerve Block on 8/3/21, and that they will be needing to hold their Xarelto for 3 days prior to the injection.     Nursing staff will review the request with Dr. Farfan and will get back to the clinic with the providers response.     Veran Webb LPN      Note: NATHAN also spoke to the pt, who said that they have done this multiple times in the past without concern.   Pt is planning on holding medication.

## 2021-07-29 NOTE — TELEPHONE ENCOUNTER
Brandon RN with [facility] Returned called to [facility]ESH[facility]. Dr. Tipton was agreeable to the pt holing their Xarelto for 3 days prior to the pt's upcoming procedure.  Pt is able to restart medication per Pain clinic protocol.     NATHAN updated pt.     Verna Webb LPN

## 2021-07-30 ENCOUNTER — LAB (OUTPATIENT)
Dept: LAB | Facility: CLINIC | Age: 61
End: 2021-07-30
Payer: COMMERCIAL

## 2021-07-30 DIAGNOSIS — Z11.59 ENCOUNTER FOR SCREENING FOR OTHER VIRAL DISEASES: ICD-10-CM

## 2021-07-30 PROCEDURE — U0005 INFEC AGEN DETEC AMPLI PROBE: HCPCS

## 2021-07-30 PROCEDURE — U0003 INFECTIOUS AGENT DETECTION BY NUCLEIC ACID (DNA OR RNA); SEVERE ACUTE RESPIRATORY SYNDROME CORONAVIRUS 2 (SARS-COV-2) (CORONAVIRUS DISEASE [COVID-19]), AMPLIFIED PROBE TECHNIQUE, MAKING USE OF HIGH THROUGHPUT TECHNOLOGIES AS DESCRIBED BY CMS-2020-01-R: HCPCS

## 2021-07-31 LAB — SARS-COV-2 RNA RESP QL NAA+PROBE: NEGATIVE

## 2021-08-02 DIAGNOSIS — E78.5 HYPERLIPIDEMIA LDL GOAL <100: ICD-10-CM

## 2021-08-02 RX ORDER — ATORVASTATIN CALCIUM 40 MG/1
40 TABLET, FILM COATED ORAL EVERY EVENING
Qty: 30 TABLET | Refills: 0 | OUTPATIENT
Start: 2021-08-02

## 2021-08-03 ENCOUNTER — HOSPITAL ENCOUNTER (OUTPATIENT)
Facility: AMBULATORY SURGERY CENTER | Age: 61
Discharge: HOME | End: 2021-08-03
Payer: COMMERCIAL

## 2021-08-03 ENCOUNTER — ANCILLARY PROCEDURE (OUTPATIENT)
Dept: RADIOLOGY | Facility: AMBULATORY SURGERY CENTER | Age: 61
End: 2021-08-03
Payer: COMMERCIAL

## 2021-08-03 VITALS
OXYGEN SATURATION: 95 % | RESPIRATION RATE: 16 BRPM | HEART RATE: 56 BPM | WEIGHT: 275 LBS | TEMPERATURE: 97 F | HEIGHT: 69 IN | BODY MASS INDEX: 40.73 KG/M2 | DIASTOLIC BLOOD PRESSURE: 63 MMHG | SYSTOLIC BLOOD PRESSURE: 121 MMHG

## 2021-08-03 DIAGNOSIS — G56.81 NEUROPATHY OF RIGHT SUPRASCAPULAR NERVE: ICD-10-CM

## 2021-08-03 DIAGNOSIS — G56.82 NEUROPATHY OF LEFT SUPRASCAPULAR NERVE: ICD-10-CM

## 2021-08-03 DIAGNOSIS — R52 PAIN: ICD-10-CM

## 2021-08-03 PROCEDURE — 64418 NJX AA&/STRD SPRSCAP NRV: CPT | Mod: 50

## 2021-08-03 RX ORDER — LIDOCAINE HYDROCHLORIDE 10 MG/ML
INJECTION, SOLUTION EPIDURAL; INFILTRATION; INTRACAUDAL; PERINEURAL PRN
Status: DISCONTINUED | OUTPATIENT
Start: 2021-08-03 | End: 2021-08-03 | Stop reason: HOSPADM

## 2021-08-03 RX ORDER — BUPIVACAINE HYDROCHLORIDE 2.5 MG/ML
INJECTION, SOLUTION EPIDURAL; INFILTRATION; INTRACAUDAL PRN
Status: DISCONTINUED | OUTPATIENT
Start: 2021-08-03 | End: 2021-08-03 | Stop reason: HOSPADM

## 2021-08-03 RX ORDER — METHYLPREDNISOLONE ACETATE 40 MG/ML
INJECTION, SUSPENSION INTRA-ARTICULAR; INTRALESIONAL; INTRAMUSCULAR; SOFT TISSUE PRN
Status: DISCONTINUED | OUTPATIENT
Start: 2021-08-03 | End: 2021-08-03 | Stop reason: HOSPADM

## 2021-08-03 ASSESSMENT — MIFFLIN-ST. JEOR: SCORE: 1871.77

## 2021-08-03 NOTE — DISCHARGE INSTRUCTIONS
PAIN INJECTION HOME CARE INSTRUCTIONS  Activity  -You may resume most normal activity levels with the exception of strenuous activity. It may help to move in ways that hurt before the injection, to see if the pain is still there, but do not overdo it. Take it easy for the rest of the day.    -DO NOT remove bandaid for 24 hours  -DO NOT shower for 24 hours      Pain  -You may feel immediate pain relief and numbness for a period of time after the injection. This may indicate the medication has reached the right spot.  -Your pain may return after this short pain-free period, or may even be a little worse for a day or two. It may be caused by needle irritation or by the medication itself. The medications usually take two or three days to start working, but can take as long as a week.    -You may use an ice pack for 20 minutes every 2 hours for the first 24 hours  -You may use a heating pad after the first 24 hours  -You may use Tylenol (acetaminophen) every 4 hours or other pain medicines as directed by your physician    DID YOU RECEIVE STEROIDS TODAY?  Yes    Common side effects of steroids:  Not everyone will experience corticosteroid side effects. If side effects are experienced, they will gradually subside in the 7-10 day period following an injection. Most common side effects include:  -Flushed face and/or chest  -Feeling of warmth, particularly in the face but could be an overall feeling of warmth  -Increased blood sugar in diabetic patients  -Menstrual irregularities my occur. If taking hormone-based birth control an alternate method of birth control is recommended  -Sleep disturbances and/or mood swings are possible  -Leg cramps    PLEASE KEEP TRACK OF YOUR SYMPTOMS AND NOTE ANY CHANGES FOR YOUR DOCTOR.       Please contact us if you have:  -Severe pain  -Fever more than 101.5 degrees Fahrenheit  -Signs of infection at the injection site (redness, swelling, or drainage)    If you have questions, please contact  the Pain Clinic at [phone] Option #1 between the hours of 7:00 am and 3:00 pm Monday through Friday. After office hours you can contact the on call provider by dialing [phone]. If you need immediate attention, we recommend that you go to a hospital emergency room or dial [phone].    For patients seen by the PM and R service, please call [phone].    If you have procedure scheduling questions please call [phone] Option #2

## 2021-08-04 ENCOUNTER — TELEPHONE (OUTPATIENT)
Dept: ANESTHESIOLOGY | Facility: CLINIC | Age: 61
End: 2021-08-04

## 2021-08-04 NOTE — CONFIDENTIAL NOTE
[facility] Call Center    Phone Message    May a detailed message be left on voicemail: yes     Reason for Call: Other: .  Patients  is asking if the patient should still schedule a follow up with you since they are planning to see Madiha on 8/23.     Action Taken: Message routed to:  [facility] pain clinic    Travel Screening: Not Applicable

## 2021-08-04 NOTE — TELEPHONE ENCOUNTER
RN returned call to patient's spouse, Ash. Writer explained that patient can keep her follow up appointment on 8/23 with Fannie Barrientos, as this was recommended 2-3 weeks post procedure. Ash verbalized understanding and will keep appointment. No further questions at this time.    Narcisa Trujillo RN

## 2021-08-05 ENCOUNTER — TELEPHONE (OUTPATIENT)
Dept: VASCULAR SURGERY | Facility: CLINIC | Age: 61
End: 2021-08-05

## 2021-08-05 DIAGNOSIS — I83.893 SYMPTOMATIC VARICOSE VEINS OF BOTH LOWER EXTREMITIES: Primary | ICD-10-CM

## 2021-08-05 NOTE — TELEPHONE ENCOUNTER
Called  Ash per msg    Informed him of pt's upcoming treatment procedure with Dr. Salcedo.     Informed him that we will also send out a completed letter with appt details.     Informed him to make sure that pt gets the thigh high stockings.     covid testing prior to each procedure   She will also need a .     He verbalized understanding.     Josie TORRES RN, BSN  Interventional Radiology/Vascular  Nurse Coordinator   Phone: [phone]  Fax: [phone]

## 2021-08-16 NOTE — PROCEDURES
Suprascapular Nerve Block - Ultrasound Guided       The patient's identity, the procedure to be performed and the specific site of the procedure was verified in accordance with [facility] Protocol.  Diagnosis: Shoulder Pain  Pre-Procedure Pain Score:8/10  Procedure Note:   Informed consent was obtained and the patient was positioned comfortably in the sitting/prone position. The patient was prepped and draped in sterile fashion.  Ultrasonography was used to visualize the anatomy of the scapula on the affected side. There was no evidence of infection at the site of needle insertion, which was anesthetized with 1% Lidocaine.  A 22 gauge needle was then advanced under live ultrasound guidance to the suprascapular notch, and medication was injected beneath the superior transverse ligament after negative aspiration.      SIDE: bilateral  Medication:    40mg of depomedrol  2ml of 0.25% Bupivacaine     Post-Procedure Pain Score:0/10  The patient was given discharge instructions and verbalizes understanding, including understanding of those signs and symptoms that would require emergency care.      Counseling: Greater than 50% of this patient visit was spent in counseling the patient regarding the treatment of their pain, coordinating their overall treatment plan and assessing their progress.

## 2021-08-20 NOTE — PROGRESS NOTES
Video Start Time: 10:29 AM    [facility]     Date of visit: 8/23/2021      Assessment:   This is a 61-year-old female who is seen in follow up for chronic bilateral shoulder pain. She has undergone previous suprascapular nerve blocks bilaterally with mixed benefit. Unfortunately she did not have any benefit after most recent injections on 8/3/2021. Her pain has been most problematic since June of this year when she had a fall, was diagnosed with brachial plexus neuralgia. Referral placed for orthopedics to further investigate origin of persistent left shoulder pain.     Visit Diagnoses:  1. Chronic pain of both shoulders    2. Chronic left shoulder pain        Plan:    1. Referrals: ortho referral placed today to further investigate persistent left shoulder pain. They will call to schedule.   2. Follow up with Dr. Velazquez as needed.       Fannie AYOUB CNP  [facility]     -------------------------------------------------    Subjective:    Chief complaint:   Chief Complaint   Patient presents with     RECHECK       Interval history:  Aspen Mccullough is a 61 year old female last seen on 7/19/2021.  She is a patient of Dr. Velazquez seen in follow up.     Recommendations/plan at the last visit included:  1. Repeat bilateral suprascapular nerve blocks ordered today.    2. Advised she monitor degree of benefit with these injections. She will follow up with Fannie AYOUB CNP 2-4 weeks after procedure.      Since her last visit, Aspen Mccullough reports:  -Her pain is about the same as it was at last visit.  -She had bilateral suprascapular nerve blocks with steroid with Dr. Velazquez on 8/3/2021. Unfortunately she denies any pain benefit with these injections. She had 50% pain benefit with previous injection in July of 2019.   -She wonders if shoulder surgery would be recommended, would like a consult to further investigate   -Of note, she had a fall in June of this  year which seemed to have aggravated her shoulder pain. She was diagnosed with left brachial plexus neuropraxia but this was thought to be improving. She continues to struggle with numbness and pain in her shoulder and arm since this injury.   -Upon discussion she clarifies that her pain is 90% left sided and the rest is right sided.   -Her current most bothersome pain is in left shoulder, radiates down lateral arms. She also has pain that radiates in her left 1st and 2nd fingers, this symptom has progressed in the last year or two. She states that this is the same pain she had prior to injections in 2019.       HPI per Elizabeth AYOUB CNP on 12/9/2019  This is a 59-year-old female who is known to the clinic, new to me, with bilateral shoulder pain now for approximately 10 years she tells me.  She was last seen in the clinic by Sarahi Chung, nurse practitioner on May 7 of 2019.  Since that time she underwent bilateral supra scapular nerve blocks with Dr. Velazquez on 7/10/2019.  She states that unfortunately these nerve blocks did not help her pain at this time.  She did have suprascapular nerve blocks bilateral prior to that which she stated were extremely helpful gave her 40 to 50% relief for at least 2 months or so.  She is interested in having these blocks again because she feels that she has exhausted all other options for relieving her pain.  She states she has tried topical medications, she is currently on high-dose opioid therapy, she has tried steroid injections, she has tried trigger point injections, physical therapy, pool therapy, she has seen a psychiatrist.  Her PMH also includes SLE on Prednisone and history of DVT/PE on Xarelto.     Of note patient goes to an outside pain clinic for chronic low back pain and she is currently taking Dilaudid 8 mg 3 times a day and MS Contin at high doses.  She is also taking gabapentin.  She was previously seen by Dr. Mendoza in orthopedics.  She tells me today  that she wants to avoid surgery.       Pain Information:   Pain rating: averages 8/10 on a 0-10 scale.    Current pain medications:    MS Contin 60mg TID- prescribed by her private pain provider Dr. Aldana    Dilaudid 8mg TID-  prescribed by her private pain provider Dr. Aldana     Review of [place] Prescription Monitoring Program (): No concern for abuse or misuse of controlled medications based on this report.     Annual Controlled Substance Agreement/UDS due date: NA    Past pain treatments:  Injections:  bilateral suprascapular nerve block with Dr. Velazquez on 8/3/2021- [facility]  bilateral suprascapular nerve block with Dr. Velazquez in December of 2019- 50% beneficial for 6-7 months  bilateral suprascapular nerve block with Dr. Velazquez in July of 2019- [facility]  #Medications:  #Therapy: land and pool therapy. She states that she has a current referral for pool therapy and will start this at the first of the year.  #Acupuncture: tried, not helpful  #Chiropractic Manipulation  #Psychology: was seeing psychiatry and has a current referral to re-start  #Surgery: wants to avoid if possible    Medications:  Current Outpatient Medications   Medication Sig Dispense Refill     atorvastatin (LIPITOR) 40 MG tablet Take 1 tablet (40 mg) by mouth every evening 30 tablet 0     BIOTIN FORTE PO Take 1 tablet by mouth daily        carboxymethylcellulose (CARBOXYMETHYLCELLULOSE SODIUM) 0.5 % SOLN ophthalmic solution Place 1 drop into both eyes 2 times daily as needed  1 Bottle      cyanocobalamin 1000 MCG/ML injection Inject 1 mL (1,000 mcg) Subcutaneous every 7 days 4 mL 5     cyclobenzaprine (FLEXERIL) 10 MG tablet Take 1 tablet (10 mg) by mouth 3 times daily 90 tablet 3     cycloSPORINE (RESTASIS) 0.05 % ophthalmic emulsion Place 1 drop into both eyes 2 times daily       diltiazem ER COATED BEADS (CARDIZEM CD/CARTIA XT) 180 MG 24 hr capsule Take 1 capsule (180 mg) by mouth 2 times daily 180 capsule 3     Elastic  Bandages & Supports (MEDICAL COMPRESSION SOCKS) MISC 1 Package daily Please measure and distribute 2 pair of 20mmHg - 30mmHg THIGH high open or closed toe compression stockings with extra refills as indicated. Jobst ultrasheer or equivalent. 2 each 3     Elastic Bandages & Supports (MEDICAL COMPRESSION SOCKS) MISC 1 Package daily Please measure and distribute 2 pair of 20mmHg - 30mmHg knee high open or closed toe compression stockings with extra refills as indicated. Jobst ultrasheer or equivalent. 2 each 3     fexofenadine (ALLEGRA) 180 MG tablet Take 180 mg by mouth daily as needed        gabapentin (NEURONTIN) 300 MG capsule Take 300 mg by mouth 2 times daily (morning and afternoon) in addition to 800 mg tablet (total dose 1100mg)       gabapentin (NEURONTIN) 800 MG tablet Take 800 mg by mouth At Bedtime (in addition to the 2 - 1100mg doses)       gabapentin (NEURONTIN) 800 MG tablet Take 800 mg by mouth 2 times daily (morning and afternoon) in addition to 300mg capsule (total dose 1100mg)       HYDROmorphone (DILAUDID) 8 MG tablet Take 8 mg by mouth every 8 hours . Max of 3 doses per day.       levothyroxine (SYNTHROID) 200 MCG SOLR injection Inject 200 mcg into the vein once a week on Fridays       levothyroxine (SYNTHROID/LEVOTHROID) 300 MCG tablet Take 1 tablet (300 mcg) by mouth daily       lifitegrast (XIIDRA) 5 % opthalmic solution Place 1 drop into both eyes 2 times daily       liothyronine (CYTOMEL) 25 MCG tablet Take 25 mcg by mouth 2 times daily        metoprolol succinate ER (TOPROL-XL) 100 MG 24 hr tablet Take 1 tablet (100 mg) by mouth 2 times daily 60 tablet 3     morphine (MS CONTIN) 30 MG 12 hr tablet Take 30 mg by mouth 2 times daily (morning and night) in addition to 60 mg tablet for a total dose of 90mg. 270 tablet 0     morphine (MS CONTIN) 60 MG 12 hr tablet Take 60 mg by mouth daily in the afternoon (in addition to the 2 - 90mg doses)       morphine (MS CONTIN) 60 MG 12 hr tablet Take 60 mg  by mouth 2 times daily (morning and night) in addition to 30mg tablet for a total dose of 90mg 30 tablet 0     multivitamin, therapeutic with minerals (MULTI-VITAMIN) TABS tablet Take 1 tablet by mouth 2 times daily 100 tablet 3     NASONEX 50 MCG/ACT spray Spray 1 spray into both nostrils daily as needed   11     nystatin (MYCOSTATIN) 473584 UNIT/GM external powder Apply topically 3 times daily as needed 60 g 1     prednisoLONE acetate (PRED FORTE) 1 % ophthalmic susp Place 1 drop into both eyes 4 times daily        predniSONE (DELTASONE) 1 MG tablet Take 3 mg by mouth every other day in addition to 5mg tablet for a total dose of 8mg.  2     predniSONE (DELTASONE) 5 MG tablet Take 10 mg by mouth every other day       predniSONE (DELTASONE) 5 MG tablet Take 5 mg by mouth every other day in addition to 1mg tablets for a total dose of 8mg       probiotic CAPS Take 1 capsule by mouth 2 times daily       rivaroxaban ANTICOAGULANT (XARELTO) 20 MG TABS tablet Take 20 mg by mouth daily (with dinner)        sodium chloride 0.9% infusion Use 6mL once weekly to reconstitute levothyroxine powder before injection       vitamin D2 (ERGOCALCIFEROL) 23053 units (1250 mcg) capsule Take 50,000 Units by mouth once a week on Monday       zolpidem (AMBIEN) 5 MG tablet Take 5 mg by mouth nightly as needed for sleep         Medical History: any changes in medical history since they were last seen? No    Review of Systems: A 10-point review of systems was negative, with the exception of chronic pain issues.      Objective:    Physical Exam:  not currently breastfeeding.  Constitutional: Well developed, well nourished, appears stated age. Obese.   HEENT: Head atraumatic, normocephalic. Eyes without conjunctival injection or jaundice. Oropharynx clear. Neck supple. No obvious neck masses.  Skin: No rash, lesions, or petechiae of exposed skin.   Psychiatric/mental status: Alert, without lethargy or stupor. Speech fluent. Appropriate affect.  Mood normal. Able to follow commands without difficulty.     Imaging:  MRI of cervical spine was completed on 6/7/2021 and shows:  C2-C3: Minimal posterior broad-based disc osteophyte complex. Minimal  facet arthropathy bilaterally. No spinal canal stenosis. No foraminal  stenosis on either side. No change from the comparison study.     C3-C4: Minimal posterior broad-based disc osteophyte complex. Normal  facets. No spinal canal stenosis. No foraminal stenosis on either  side. No change from the comparison study.     C4-C5: Minimal posterior broad-based disc osteophyte complex. Mild  facet arthropathy bilaterally. No spinal canal stenosis. No foraminal  stenosis on either side. No change from the comparison study.     C5-C6: There is facet arthropathy bilaterally, uncinate hypertrophy  bilaterally and a posterior broad-based disc-osteophyte complex. No  spinal canal stenosis. No foraminal stenosis on either side. No change  from the comparison study.     C6-C7: Minimal posterior broad-based disc osteophyte complex. Normal  facets. No spinal canal stenosis. No foraminal stenosis on either  side. No change from the comparison study.     C7-T1: Minimal posterior broad-based disc osteophyte complex. Mild  facet arthropathy bilaterally. No spinal canal stenosis. No foraminal  stenosis on either side. No change from the comparison study.                                                                      IMPRESSION:  1. Mild, diffuse degenerative change throughout the cervical spine as  detailed above without appreciable change from the comparison study.  2. No significant spinal canal or neural foraminal narrowing at any  level of the cervical spine.  3. No evidence for abnormality of the cervical spinal cord.      EXAM: XR SHOULDER 2 VIEW LEFT  4/11/2018 10:00 AM      HISTORY:  Chronic left shoulder pain     COMPARISON:  Chest radiograph 4/24/2016     FINDINGS:  AP, Grashey, scapular Y, and axillary views of the  left  shoulder are obtained.     Normal alignment of the glenohumeral joint. Subcortical cystic changes  of the greater tuberosity. Subacromial spurring. Mild  acromioclavicular joint degenerative changes. Mild subchondral  sclerosis of the glenohumeral joint.     No substantial joint effusion. The visualized chest is unremarkable.                                                                      IMPRESSION:   1. Mild degenerative changes of the glenohumeral joint with  subcortical cystic changes of the greater tuberosity and subacromial  spurring.  2. Mild degenerative changes of the acromial clavicular joint.     Video-Visit Details    Type of service:  Video Visit    Video End Time:10:56 AM    Originating Location (pt. Location): Home    Distant Location (provider location):  [facility] [place]   Platform used for Video Visit: [employer]    BILLING TIME DOCUMENTATION:   The total TIME spent on this patient on the date of the encounter/appointment was 32 minutes.      TOTAL TIME includes:   Time spent preparing to see the patient (reviewing records and tests)   Time spent face to face (or over the phone) with the patient   Time spent ordering tests, medications, procedures and referrals   Time spent Referring and communicating with other healthcare professionals   Time spent documenting clinical information in Epic

## 2021-08-23 ENCOUNTER — VIRTUAL VISIT (OUTPATIENT)
Dept: ANESTHESIOLOGY | Facility: CLINIC | Age: 61
End: 2021-08-23
Payer: COMMERCIAL

## 2021-08-23 DIAGNOSIS — M54.12 BRACHIAL PLEXUS NEURALGIA: ICD-10-CM

## 2021-08-23 DIAGNOSIS — M25.512 CHRONIC PAIN OF BOTH SHOULDERS: ICD-10-CM

## 2021-08-23 DIAGNOSIS — M25.511 CHRONIC PAIN OF BOTH SHOULDERS: ICD-10-CM

## 2021-08-23 DIAGNOSIS — M25.512 CHRONIC LEFT SHOULDER PAIN: Primary | ICD-10-CM

## 2021-08-23 DIAGNOSIS — G89.29 CHRONIC LEFT SHOULDER PAIN: Primary | ICD-10-CM

## 2021-08-23 DIAGNOSIS — G89.29 CHRONIC PAIN OF BOTH SHOULDERS: ICD-10-CM

## 2021-08-23 PROCEDURE — 99214 OFFICE O/P EST MOD 30 MIN: CPT | Mod: 95 | Performed by: NURSE PRACTITIONER

## 2021-08-23 NOTE — PROGRESS NOTES
Aspen is a 61 year old who is being evaluated via a billable video visit.      How would you like to obtain your AVS? MyChart  If the video visit is dropped, the invitation should be resent by: Send to e-mail at: [email or web address]  Will anyone else be joining your video visit? No

## 2021-08-23 NOTE — PATIENT INSTRUCTIONS
Ortho referral placed today to further investigate persistent left shoulder pain. They will call to schedule.   Follow up with Dr. Velazquez as needed.

## 2021-08-23 NOTE — LETTER
8/23/2021       RE: Aspen Mccullough  [address]  [facility] [address]     Dear Colleague,    Thank you for referring your patient, Aspen Mccullough, to the [facility] [place] at [facility]. Please see a copy of my visit note below.    Video Start Time: 10:29 AM    [facility]     Date of visit: 8/23/2021      Assessment:   This is a 61-year-old female who is seen in follow up for chronic bilateral shoulder pain. She has undergone previous suprascapular nerve blocks bilaterally with mixed benefit. Unfortunately she did not have any benefit after most recent injections on 8/3/2021. Her pain has been most problematic since June of this year when she had a fall, was diagnosed with brachial plexus neuralgia. Referral placed for orthopedics to further investigate origin of persistent left shoulder pain.     Visit Diagnoses:  1. Chronic pain of both shoulders    2. Chronic left shoulder pain        Plan:    1. Referrals: ortho referral placed today to further investigate persistent left shoulder pain. They will call to schedule.   2. Follow up with Dr. Velazquez as needed.       Fannie AYOUB CNP  [facility]     -------------------------------------------------    Subjective:    Chief complaint:   Chief Complaint   Patient presents with     RECHECK       Interval history:  Aspen Mccullough is a 61 year old female last seen on 7/19/2021.  She is a patient of Dr. Velazquez seen in follow up.     Recommendations/plan at the last visit included:  1. Repeat bilateral suprascapular nerve blocks ordered today.    2. Advised she monitor degree of benefit with these injections. She will follow up with Fannie AYOUB CNP 2-4 weeks after procedure.      Since her last visit, Aspen Mccullough reports:  -Her pain is about the same as it was at last visit.  -She had bilateral  suprascapular nerve blocks with steroid with Dr. Velazquez on 8/3/2021. Unfortunately she denies any pain benefit with these injections. She had 50% pain benefit with previous injection in July of 2019.   -She wonders if shoulder surgery would be recommended, would like a consult to further investigate   -Of note, she had a fall in June of this year which seemed to have aggravated her shoulder pain. She was diagnosed with left brachial plexus neuropraxia but this was thought to be improving. She continues to struggle with numbness and pain in her shoulder and arm since this injury.   -Upon discussion she clarifies that her pain is 90% left sided and the rest is right sided.   -Her current most bothersome pain is in left shoulder, radiates down lateral arms. She also has pain that radiates in her left 1st and 2nd fingers, this symptom has progressed in the last year or two. She states that this is the same pain she had prior to injections in 2019.       HPI per Elizabeth AYOUB CNP on 12/9/2019  This is a 59-year-old female who is known to the clinic, new to me, with bilateral shoulder pain now for approximately 10 years she tells me.  She was last seen in the clinic by Sarahi Chung, nurse practitioner on May 7 of 2019.  Since that time she underwent bilateral supra scapular nerve blocks with Dr. Velazquez on 7/10/2019.  She states that unfortunately these nerve blocks did not help her pain at this time.  She did have suprascapular nerve blocks bilateral prior to that which she stated were extremely helpful gave her 40 to 50% relief for at least 2 months or so.  She is interested in having these blocks again because she feels that she has exhausted all other options for relieving her pain.  She states she has tried topical medications, she is currently on high-dose opioid therapy, she has tried steroid injections, she has tried trigger point injections, physical therapy, pool therapy, she has seen a psychiatrist.   Her PMH also includes SLE on Prednisone and history of DVT/PE on Xarelto.     Of note patient goes to an outside pain clinic for chronic low back pain and she is currently taking Dilaudid 8 mg 3 times a day and MS Contin at high doses.  She is also taking gabapentin.  She was previously seen by Dr. Mendoza in orthopedics.  She tells me today that she wants to avoid surgery.       Pain Information:   Pain rating: averages 8/10 on a 0-10 scale.    Current pain medications:    MS Contin 60mg TID- prescribed by her private pain provider Dr. Aldana    Dilaudid 8mg TID-  prescribed by her private pain provider Dr. Aldana     Review of [place] Prescription Monitoring Program (): No concern for abuse or misuse of controlled medications based on this report.     Annual Controlled Substance Agreement/UDS due date: NA    Past pain treatments:  Injections:  bilateral suprascapular nerve block with Dr. Velazquez on 8/3/2021- [facility]  bilateral suprascapular nerve block with Dr. Velazquez in December of 2019- 50% beneficial for 6-7 months  bilateral suprascapular nerve block with Dr. Velazquez in July of 2019- [facility]  #Medications:  #Therapy: land and pool therapy. She states that she has a current referral for pool therapy and will start this at the first of the year.  #Acupuncture: tried, not helpful  #Chiropractic Manipulation  #Psychology: was seeing psychiatry and has a current referral to re-start  #Surgery: wants to avoid if possible    Medications:  Current Outpatient Medications   Medication Sig Dispense Refill     atorvastatin (LIPITOR) 40 MG tablet Take 1 tablet (40 mg) by mouth every evening 30 tablet 0     BIOTIN FORTE PO Take 1 tablet by mouth daily        carboxymethylcellulose (CARBOXYMETHYLCELLULOSE SODIUM) 0.5 % SOLN ophthalmic solution Place 1 drop into both eyes 2 times daily as needed  1 Bottle      cyanocobalamin 1000 MCG/ML injection Inject 1 mL (1,000 mcg) Subcutaneous every 7 days 4 mL 5      cyclobenzaprine (FLEXERIL) 10 MG tablet Take 1 tablet (10 mg) by mouth 3 times daily 90 tablet 3     cycloSPORINE (RESTASIS) 0.05 % ophthalmic emulsion Place 1 drop into both eyes 2 times daily       diltiazem ER COATED BEADS (CARDIZEM CD/CARTIA XT) 180 MG 24 hr capsule Take 1 capsule (180 mg) by mouth 2 times daily 180 capsule 3     Elastic Bandages & Supports (MEDICAL COMPRESSION SOCKS) MISC 1 Package daily Please measure and distribute 2 pair of 20mmHg - 30mmHg THIGH high open or closed toe compression stockings with extra refills as indicated. Jobst ultrasheer or equivalent. 2 each 3     Elastic Bandages & Supports (MEDICAL COMPRESSION SOCKS) MISC 1 Package daily Please measure and distribute 2 pair of 20mmHg - 30mmHg knee high open or closed toe compression stockings with extra refills as indicated. Jobst ultrasheer or equivalent. 2 each 3     fexofenadine (ALLEGRA) 180 MG tablet Take 180 mg by mouth daily as needed        gabapentin (NEURONTIN) 300 MG capsule Take 300 mg by mouth 2 times daily (morning and afternoon) in addition to 800 mg tablet (total dose 1100mg)       gabapentin (NEURONTIN) 800 MG tablet Take 800 mg by mouth At Bedtime (in addition to the 2 - 1100mg doses)       gabapentin (NEURONTIN) 800 MG tablet Take 800 mg by mouth 2 times daily (morning and afternoon) in addition to 300mg capsule (total dose 1100mg)       HYDROmorphone (DILAUDID) 8 MG tablet Take 8 mg by mouth every 8 hours . Max of 3 doses per day.       levothyroxine (SYNTHROID) 200 MCG SOLR injection Inject 200 mcg into the vein once a week on Fridays       levothyroxine (SYNTHROID/LEVOTHROID) 300 MCG tablet Take 1 tablet (300 mcg) by mouth daily       lifitegrast (XIIDRA) 5 % opthalmic solution Place 1 drop into both eyes 2 times daily       liothyronine (CYTOMEL) 25 MCG tablet Take 25 mcg by mouth 2 times daily        metoprolol succinate ER (TOPROL-XL) 100 MG 24 hr tablet Take 1 tablet (100 mg) by mouth 2 times daily 60 tablet 3      morphine (MS CONTIN) 30 MG 12 hr tablet Take 30 mg by mouth 2 times daily (morning and night) in addition to 60 mg tablet for a total dose of 90mg. 270 tablet 0     morphine (MS CONTIN) 60 MG 12 hr tablet Take 60 mg by mouth daily in the afternoon (in addition to the 2 - 90mg doses)       morphine (MS CONTIN) 60 MG 12 hr tablet Take 60 mg by mouth 2 times daily (morning and night) in addition to 30mg tablet for a total dose of 90mg 30 tablet 0     multivitamin, therapeutic with minerals (MULTI-VITAMIN) TABS tablet Take 1 tablet by mouth 2 times daily 100 tablet 3     NASONEX 50 MCG/ACT spray Spray 1 spray into both nostrils daily as needed   11     nystatin (MYCOSTATIN) 114021 UNIT/GM external powder Apply topically 3 times daily as needed 60 g 1     prednisoLONE acetate (PRED FORTE) 1 % ophthalmic susp Place 1 drop into both eyes 4 times daily        predniSONE (DELTASONE) 1 MG tablet Take 3 mg by mouth every other day in addition to 5mg tablet for a total dose of 8mg.  2     predniSONE (DELTASONE) 5 MG tablet Take 10 mg by mouth every other day       predniSONE (DELTASONE) 5 MG tablet Take 5 mg by mouth every other day in addition to 1mg tablets for a total dose of 8mg       probiotic CAPS Take 1 capsule by mouth 2 times daily       rivaroxaban ANTICOAGULANT (XARELTO) 20 MG TABS tablet Take 20 mg by mouth daily (with dinner)        sodium chloride 0.9% infusion Use 6mL once weekly to reconstitute levothyroxine powder before injection       vitamin D2 (ERGOCALCIFEROL) 71776 units (1250 mcg) capsule Take 50,000 Units by mouth once a week on Monday       zolpidem (AMBIEN) 5 MG tablet Take 5 mg by mouth nightly as needed for sleep         Medical History: any changes in medical history since they were last seen? No    Review of Systems: A 10-point review of systems was negative, with the exception of chronic pain issues.      Objective:    Physical Exam:  not currently breastfeeding.  Constitutional: Well  developed, well nourished, appears stated age. Obese.   HEENT: Head atraumatic, normocephalic. Eyes without conjunctival injection or jaundice. Oropharynx clear. Neck supple. No obvious neck masses.  Skin: No rash, lesions, or petechiae of exposed skin.   Psychiatric/mental status: Alert, without lethargy or stupor. Speech fluent. Appropriate affect. Mood normal. Able to follow commands without difficulty.     Imaging:  MRI of cervical spine was completed on 6/7/2021 and shows:  C2-C3: Minimal posterior broad-based disc osteophyte complex. Minimal  facet arthropathy bilaterally. No spinal canal stenosis. No foraminal  stenosis on either side. No change from the comparison study.     C3-C4: Minimal posterior broad-based disc osteophyte complex. Normal  facets. No spinal canal stenosis. No foraminal stenosis on either  side. No change from the comparison study.     C4-C5: Minimal posterior broad-based disc osteophyte complex. Mild  facet arthropathy bilaterally. No spinal canal stenosis. No foraminal  stenosis on either side. No change from the comparison study.     C5-C6: There is facet arthropathy bilaterally, uncinate hypertrophy  bilaterally and a posterior broad-based disc-osteophyte complex. No  spinal canal stenosis. No foraminal stenosis on either side. No change  from the comparison study.     C6-C7: Minimal posterior broad-based disc osteophyte complex. Normal  facets. No spinal canal stenosis. No foraminal stenosis on either  side. No change from the comparison study.     C7-T1: Minimal posterior broad-based disc osteophyte complex. Mild  facet arthropathy bilaterally. No spinal canal stenosis. No foraminal  stenosis on either side. No change from the comparison study.                                                                      IMPRESSION:  1. Mild, diffuse degenerative change throughout the cervical spine as  detailed above without appreciable change from the comparison study.  2. No significant  spinal canal or neural foraminal narrowing at any  level of the cervical spine.  3. No evidence for abnormality of the cervical spinal cord.      EXAM: XR SHOULDER 2 VIEW LEFT  4/11/2018 10:00 AM      HISTORY:  Chronic left shoulder pain     COMPARISON:  Chest radiograph 4/24/2016     FINDINGS:  AP, Grashey, scapular Y, and axillary views of the left  shoulder are obtained.     Normal alignment of the glenohumeral joint. Subcortical cystic changes  of the greater tuberosity. Subacromial spurring. Mild  acromioclavicular joint degenerative changes. Mild subchondral  sclerosis of the glenohumeral joint.     No substantial joint effusion. The visualized chest is unremarkable.                                                                      IMPRESSION:   1. Mild degenerative changes of the glenohumeral joint with  subcortical cystic changes of the greater tuberosity and subacromial  spurring.  2. Mild degenerative changes of the acromial clavicular joint.       BILLING TIME DOCUMENTATION:   The total TIME spent on this patient on the date of the encounter/appointment was 32 minutes.      TOTAL TIME includes:   Time spent preparing to see the patient (reviewing records and tests)   Time spent face to face (or over the phone) with the patient   Time spent ordering tests, medications, procedures and referrals   Time spent Referring and communicating with other healthcare professionals   Time spent documenting clinical information in Epic         Again, thank you for allowing me to participate in the care of your patient.      Sincerely,    ANITRA Carroll CNP

## 2021-08-24 NOTE — TELEPHONE ENCOUNTER
DIAGNOSIS: (L) shoulder pain and numbness / Self   APPOINTMENT DATE: 9.7.21   NOTES STATUS DETAILS   OFFICE NOTE from referring provider N/A    OFFICE NOTE from other specialist Internal 8.23.21 YI Echols Anes  7.19.21  8.3.21 Basim Velazquez - inj  12.31.19- inj  7.10.19 - inj  10.29.18 - inj  12.9.19 Rocio Fernandez FERNANDO Anes  More in Epic   DISCHARGE SUMMARY from hospital N/A    DISCHARGE REPORT from the ER N/A    OPERATIVE REPORT N/A    EMG report N/A    MEDICATION LIST Internal    MRI PACS 6.6.21 L shoulder, cervical spine  5.6.14 cervical spine  5.25.12 L shoulder, [facility]   DEXA (osteoporosis/bone health) Internal    CT SCAN PACS 2.6.17 cervical spine, NM   XRAYS (IMAGES & REPORTS) PACS 4.11.18 L shoulder  2.6.17 L shoulder, NM     Action [id number] AM [facility]ONSO   Action Taken Requested images from [facility] and NM       Action 8.31.21 7:38 AM ILDEFONSO   Action Taken Requested NM xray [phone]

## 2021-09-07 ENCOUNTER — ANCILLARY PROCEDURE (OUTPATIENT)
Dept: GENERAL RADIOLOGY | Facility: CLINIC | Age: 61
End: 2021-09-07
Attending: PREVENTIVE MEDICINE
Payer: COMMERCIAL

## 2021-09-07 ENCOUNTER — PRE VISIT (OUTPATIENT)
Dept: ORTHOPEDICS | Facility: CLINIC | Age: 61
End: 2021-09-07

## 2021-09-07 ENCOUNTER — OFFICE VISIT (OUTPATIENT)
Dept: ORTHOPEDICS | Facility: CLINIC | Age: 61
End: 2021-09-07
Payer: COMMERCIAL

## 2021-09-07 VITALS — RESPIRATION RATE: 17 BRPM | HEIGHT: 69 IN | BODY MASS INDEX: 41.18 KG/M2 | WEIGHT: 278 LBS

## 2021-09-07 DIAGNOSIS — G89.29 CHRONIC LEFT SHOULDER PAIN: ICD-10-CM

## 2021-09-07 DIAGNOSIS — M25.511 CHRONIC RIGHT SHOULDER PAIN: Primary | ICD-10-CM

## 2021-09-07 DIAGNOSIS — M25.512 CHRONIC LEFT SHOULDER PAIN: ICD-10-CM

## 2021-09-07 DIAGNOSIS — G89.29 CHRONIC RIGHT SHOULDER PAIN: Primary | ICD-10-CM

## 2021-09-07 DIAGNOSIS — M12.811 ROTATOR CUFF ARTHROPATHY OF BOTH SHOULDERS: ICD-10-CM

## 2021-09-07 DIAGNOSIS — M12.812 ROTATOR CUFF ARTHROPATHY OF BOTH SHOULDERS: ICD-10-CM

## 2021-09-07 DIAGNOSIS — M75.52 SUBACROMIAL BURSITIS OF BOTH SHOULDERS: ICD-10-CM

## 2021-09-07 DIAGNOSIS — M25.511 RIGHT SHOULDER PAIN: ICD-10-CM

## 2021-09-07 DIAGNOSIS — M75.51 SUBACROMIAL BURSITIS OF BOTH SHOULDERS: ICD-10-CM

## 2021-09-07 PROCEDURE — 99202 OFFICE O/P NEW SF 15 MIN: CPT | Mod: 25 | Performed by: PREVENTIVE MEDICINE

## 2021-09-07 PROCEDURE — 20610 DRAIN/INJ JOINT/BURSA W/O US: CPT | Mod: 50 | Performed by: PREVENTIVE MEDICINE

## 2021-09-07 PROCEDURE — 73030 X-RAY EXAM OF SHOULDER: CPT | Mod: RT | Performed by: RADIOLOGY

## 2021-09-07 RX ADMIN — LIDOCAINE HYDROCHLORIDE 3 ML: 10 INJECTION, SOLUTION EPIDURAL; INFILTRATION; INTRACAUDAL; PERINEURAL at 16:30

## 2021-09-07 RX ADMIN — TRIAMCINOLONE ACETONIDE 40 MG: 40 INJECTION, SUSPENSION INTRA-ARTICULAR; INTRAMUSCULAR at 16:30

## 2021-09-07 ASSESSMENT — MIFFLIN-ST. JEOR: SCORE: 1885.38

## 2021-09-07 NOTE — PROGRESS NOTES
HISTORY OF PRESENT ILLNESS  Ms. Mccullough is a pleasant 61 year old year old female who presents to clinic today with neck and bilateral shoulder pain  Aspen explains that her neck and shoulders have bothered her more over the past summer  Location: neck and shoulders  Quality:  achy pain    Severity: 8/10 at worst    Duration: worse over past year  Timing: occurs intermittently    Modifying factors:  resting and non-use makes it better, movement and use makes it worse  Associated signs & symptoms: neck pain that radiates into both shoulders and arms    MEDICAL HISTORY  Patient Active Problem List   Diagnosis     Generalized osteoarthrosis, involving multiple sites     CRPS (complex regional pain syndrome), lower limb     Fibromyalgia     Somatoform disorder     Histrionic personality (H)     Encounter for long-term use of opiate analgesic     Knee joint replacement by other means     Hypothyroidism     Insomnia, unspecified type     Hyperlipidemia     ACP (advance care planning)     Hashimoto's thyroiditis     Glucocorticoid deficiency (H)     Posttraumatic stress disorder     Impingement syndrome of left shoulder     Abdominal cramping, generalized     Intermittent diarrhea     Primary osteoarthritis involving multiple joints     Attention deficit disorder     Allergic rhinitis     Cushing's syndrome (H)     Endometriosis     Essential hypertension     Systemic lupus erythematosus (H)     S/P cervical spinal fusion     Paroxysmal atrial fibrillation (H)     Nonischemic cardiomyopathy (H)     Personal history of DVT (deep vein thrombosis)     History of pulmonary embolism     Acute deep vein thrombosis (DVT) of popliteal vein of right lower extremity (H)     Acute pulmonary embolism (H)     Adrenal cortical steroids causing adverse effect in therapeutic use     Alopecia areata     Anemia, blood loss     Ankle pain, left     ARF (acute renal failure) (H)     Arthritis, septic (H)     Chronic ethmoidal sinusitis      Chronic maxillary sinusitis     Deviated nasal septum     Complications due to internal joint prosthesis (H)     Generalized pain     Iatrogenic hyperthyroidism     S/P TKR (total knee replacement)     Left shoulder pain     Lipoma of skin and subcutaneous tissue     Malabsorption     Nasal fracture     Nasal turbinate hypertrophy     Opioid type dependence (H)     Other specified abnormal findings of blood chemistry     Other acute postoperative pain     Pain in joint, lower leg     Postoperative hypotension     S/P total knee replacement     Thrombus of right atrial appendage without antecedent myocardial infarction     Chronic pain of both shoulders     GI bleed     Grade III internal hemorrhoids     Atrial fibrillation with RVR (H)     Acute deep vein thrombosis (DVT) of proximal vein of right lower extremity (H)     Pneumonia of left upper lobe due to infectious organism     Morbid obesity (H)     Cerebrovascular accident (CVA), unspecified mechanism (H)     CVA (cerebral vascular accident) (H)     Neuropathy of left suprascapular nerve     Neuropathy of right suprascapular nerve       Current Outpatient Medications   Medication Sig Dispense Refill     atorvastatin (LIPITOR) 40 MG tablet Take 1 tablet (40 mg) by mouth every evening 30 tablet 0     carboxymethylcellulose (CARBOXYMETHYLCELLULOSE SODIUM) 0.5 % SOLN ophthalmic solution Place 1 drop into both eyes 2 times daily as needed  1 Bottle      cyanocobalamin 1000 MCG/ML injection Inject 1 mL (1,000 mcg) Subcutaneous every 7 days 4 mL 5     cyclobenzaprine (FLEXERIL) 10 MG tablet Take 1 tablet (10 mg) by mouth 3 times daily 90 tablet 3     cycloSPORINE (RESTASIS) 0.05 % ophthalmic emulsion Place 1 drop into both eyes 2 times daily       diltiazem ER COATED BEADS (CARDIZEM CD/CARTIA XT) 180 MG 24 hr capsule Take 1 capsule (180 mg) by mouth 2 times daily 180 capsule 3     Elastic Bandages & Supports (MEDICAL COMPRESSION SOCKS) MISC 1 Package daily Please  measure and distribute 2 pair of 20mmHg - 30mmHg THIGH high open or closed toe compression stockings with extra refills as indicated. Jobst ultrasheer or equivalent. 2 each 3     fexofenadine (ALLEGRA) 180 MG tablet Take 180 mg by mouth daily as needed        gabapentin (NEURONTIN) 300 MG capsule Take 300 mg by mouth 2 times daily (morning and afternoon) in addition to 800 mg tablet (total dose 1100mg)       gabapentin (NEURONTIN) 800 MG tablet Take 800 mg by mouth At Bedtime (in addition to the 2 - 1100mg doses)       HYDROmorphone (DILAUDID) 8 MG tablet Take 8 mg by mouth every 8 hours . Max of 3 doses per day.       lifitegrast (XIIDRA) 5 % opthalmic solution Place 1 drop into both eyes 2 times daily       metoprolol succinate ER (TOPROL-XL) 100 MG 24 hr tablet Take 1 tablet (100 mg) by mouth 2 times daily 60 tablet 3     morphine (MS CONTIN) 30 MG 12 hr tablet Take 30 mg by mouth 2 times daily (morning and night) in addition to 60 mg tablet for a total dose of 90mg. 270 tablet 0     morphine (MS CONTIN) 60 MG 12 hr tablet Take 60 mg by mouth daily in the afternoon (in addition to the 2 - 90mg doses)       NASONEX 50 MCG/ACT spray Spray 1 spray into both nostrils daily as needed   11     nystatin (MYCOSTATIN) 703796 UNIT/GM external powder Apply topically 3 times daily as needed 60 g 1     prednisoLONE acetate (PRED FORTE) 1 % ophthalmic susp Place 1 drop into both eyes 4 times daily        predniSONE (DELTASONE) 1 MG tablet Take 3 mg by mouth every other day in addition to 5mg tablet for a total dose of 8mg.  2     predniSONE (DELTASONE) 5 MG tablet Take 10 mg by mouth every other day       probiotic CAPS Take 1 capsule by mouth 2 times daily       rivaroxaban ANTICOAGULANT (XARELTO) 20 MG TABS tablet Take 20 mg by mouth daily (with dinner)        zolpidem (AMBIEN) 5 MG tablet Take 5 mg by mouth nightly as needed for sleep       BIOTIN FORTE PO Take 1 tablet by mouth daily        Elastic Bandages & Supports  (MEDICAL COMPRESSION SOCKS) MISC 1 Package daily Please measure and distribute 2 pair of 20mmHg - 30mmHg knee high open or closed toe compression stockings with extra refills as indicated. Jobst ultrasheer or equivalent. 2 each 3     gabapentin (NEURONTIN) 800 MG tablet Take 800 mg by mouth 2 times daily (morning and afternoon) in addition to 300mg capsule (total dose 1100mg)       levothyroxine (SYNTHROID) 200 MCG SOLR injection Inject 200 mcg into the vein once a week on Fridays       levothyroxine (SYNTHROID/LEVOTHROID) 300 MCG tablet Take 1 tablet (300 mcg) by mouth daily       liothyronine (CYTOMEL) 25 MCG tablet Take 25 mcg by mouth 2 times daily        morphine (MS CONTIN) 60 MG 12 hr tablet Take 60 mg by mouth 2 times daily (morning and night) in addition to 30mg tablet for a total dose of 90mg 30 tablet 0     multivitamin, therapeutic with minerals (MULTI-VITAMIN) TABS tablet Take 1 tablet by mouth 2 times daily 100 tablet 3     predniSONE (DELTASONE) 5 MG tablet Take 5 mg by mouth every other day in addition to 1mg tablets for a total dose of 8mg       sodium chloride 0.9% infusion Use 6mL once weekly to reconstitute levothyroxine powder before injection       vitamin D2 (ERGOCALCIFEROL) 53796 units (1250 mcg) capsule Take 50,000 Units by mouth once a week on Monday         Allergies   Allergen Reactions     Blood Transfusion Related (Informational Only) Other (See Comments)     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      Chlorhexidine Hives     Thor surgical cleanser     Codeine Hives     Has tolerated Oxycodone in past      Ibuprofen [Nsaids] Hives     Penicillins Hives     Other reaction(s): Unknown     Sulfa Drugs Hives     Other reaction(s): Unknown     Doxycycline GI Disturbance     Emesis & diarrhea  Patient denies allergy to this med     Hydroxyzine      rash     Mold      Red Wine Complex [Red Wine Powder]        Family History   Problem Relation Age of Onset     Hypertension Mother       No Known Problems Father      No Known Problems Sister      No Known Problems Brother      No Known Problems Maternal Grandmother      No Known Problems Maternal Grandfather      No Known Problems Paternal Grandmother      No Known Problems Other      Social History     Socioeconomic History     Marital status:      Spouse name: None     Number of children: None     Years of education: None     Highest education level: None   Occupational History     None   Tobacco Use     Smoking status: Former Smoker     Packs/day: 1.50     Years: 20.00     Pack years: 30.00     Types: Cigarettes     Start date: 1973     Quit date: 1993     Years since quittin.7     Smokeless tobacco: Never Used     Tobacco comment: wish I never started   Substance and Sexual Activity     Alcohol use: No     Alcohol/week: 0.0 standard drinks     Drug use: No     Sexual activity: Not Currently     Partners: Male     Birth control/protection: Post-menopausal   Other Topics Concern     Parent/sibling w/ CABG, MI or angioplasty before 65F 55M? Not Asked   Social History Narrative    ** Merged History Encounter **          Social Determinants of Health     Financial Resource Strain:      Difficulty of Paying Living Expenses:    Food Insecurity:      Worried About Running Out of Food in the Last Year:      Ran Out of Food in the Last Year:    Transportation Needs:      Lack of Transportation (Medical):      Lack of Transportation (Non-Medical):    Physical Activity:      Days of Exercise per Week:      Minutes of Exercise per Session:    Stress:      Feeling of Stress :    Social Connections:      Frequency of Communication with Friends and Family:      Frequency of Social Gatherings with Friends and Family:      Attends Worship Services:      Active Member of Clubs or Organizations:      Attends Club or Organization Meetings:      Marital Status:    Intimate Partner Violence:      Fear of Current or Ex-Partner:       "Emotionally Abused:      Physically Abused:      Sexually Abused:        Additional medical/Social/Surgical histories reviewed in [facility] and updated as appropriate.     REVIEW OF SYSTEMS (9/7/2021)  10 point ROS of systems including Constitutional, Eyes, Respiratory, Cardiovascular, Gastroenterology, Genitourinary, Integumentary, Musculoskeletal, Psychiatric, Allergic/Immunologic were all negative except for pertinent positives noted in my HPI.     PHYSICAL EXAM  Vitals:    09/07/21 1528   Resp: 17   Weight: 126.1 kg (278 lb)   Height: 1.753 m (5' 9\")     Vital Signs: Resp 17   Ht 1.753 m (5' 9\")   Wt 126.1 kg (278 lb)   BMI 41.05 kg/m   Patient declined being weighed. Body mass index is 41.05 kg/m .    General  - normal appearance, in no obvious distress  HEENT  - conjunctivae not injected, moist mucous membranes, normocephalic/atraumatic head, ears normal appearance, no lesions, mouth normal appearance, no scars, normal dentition and teeth present  CV  - normal peripheral perfusion  Pulm  - normal respiratory pattern, non-labored    Musculoskeletal - CERVICAL SPINE  - stance and posture: normal gait without limp, no obvious leg length discrepancy, normal heel and toe walk, normal balance, slightly forward shoulders, head balanced normally on trunk  - inspection: normal bone and joint alignment, no obvious kyphosis  - palpation: no paravertebral or bony tenderness, except at base of neck and trapezius muscles  - ROM: normal and painless flexion, extension, left and right sidebending and rotation with some discomfort  - strength: upper extremities 5/5 in all planes  - special tests:  (+) spurlings    Neuro  - biceps, triceps, supinator DTRs 2+ bilaterally, no sensory or motor deficit, grossly normal coordination, normal muscle tone  Skin  - no ecchymosis, erythema, warmth, or induration, no obvious rash  Psych  - interactive, appropriate, normal mood and affect  Bilateral shoulders; Has pain with reaching, kendall, " internal rotation bilateral shoulders    ASSESSMENT & PLAN  60 yo female with bilateral shoulder pain and rotator cuff arthropathy, subacromial bursitis, and cervical ddd, radicular pain    I independently reviewed the following imaging studies:  Cervical MRI: shows ddd, disc herniations  Bilateral shoulder xrays: shows some GH arthritis  After a 20 minute discussion and examination, we decided to perform a same day injection for diagnostic and therapeutic purposes for bilateral shoulders  Discussed and will plan for cervical Jaziel  Cont. HEP  Take medications as written  Appropriate PPE was utilized for prevention of spread of Covid-19.  Shon Simpson MD, CAM    PROCEDURE: bilateral SHOULDER joint injection     The patient was apprised of the risks and the benefits of the procedure and consented and a written consent was signed by the patient.   The patient s shoulders were sterilely prepped with chloraprep.   40 mg of triamcinolone suspension was drawn up into a 5 mL syringe with 4 mL of 1% lidocaine   The patient was injected into right and left shoulders with a 1.5-inch 22-gauge needle at posterior gleno-humeral joint in the subacromial space  There were no complications. The patient tolerated the procedure well. There was minimal bleeding.   The patient was instructed to ice the shoulder upon leaving clinic and refrain from overuse over the next 3 days.   The patient was instructed to go to the emergency room with any usual pain, swelling, or redness occurred in the injected area.   The patient was given a followup appointment to evaluate response to the injection to their increased range of motion and reduction of pain.    followup PRN  Dr Shon Simpson

## 2021-09-07 NOTE — LETTER
9/7/2021      RE: Aspen Mccullough  [address]  [facility] [address]       HISTORY OF PRESENT ILLNESS  Ms. Mccullough is a pleasant 61 year old year old female who presents to clinic today with neck and bilateral shoulder pain  Aspen explains that her neck and shoulders have bothered her more over the past summer  Location: neck and shoulders  Quality:  achy pain    Severity: 8/10 at worst    Duration: worse over past year  Timing: occurs intermittently    Modifying factors:  resting and non-use makes it better, movement and use makes it worse  Associated signs & symptoms: neck pain that radiates into both shoulders and arms    MEDICAL HISTORY  Patient Active Problem List   Diagnosis     Generalized osteoarthrosis, involving multiple sites     CRPS (complex regional pain syndrome), lower limb     Fibromyalgia     Somatoform disorder     Histrionic personality (H)     Encounter for long-term use of opiate analgesic     Knee joint replacement by other means     Hypothyroidism     Insomnia, unspecified type     Hyperlipidemia     ACP (advance care planning)     Hashimoto's thyroiditis     Glucocorticoid deficiency (H)     Posttraumatic stress disorder     Impingement syndrome of left shoulder     Abdominal cramping, generalized     Intermittent diarrhea     Primary osteoarthritis involving multiple joints     Attention deficit disorder     Allergic rhinitis     Cushing's syndrome (H)     Endometriosis     Essential hypertension     Systemic lupus erythematosus (H)     S/P cervical spinal fusion     Paroxysmal atrial fibrillation (H)     Nonischemic cardiomyopathy (H)     Personal history of DVT (deep vein thrombosis)     History of pulmonary embolism     Acute deep vein thrombosis (DVT) of popliteal vein of right lower extremity (H)     Acute pulmonary embolism (H)     Adrenal cortical steroids causing adverse effect in therapeutic use     Alopecia areata     Anemia, blood loss     Ankle pain, left     ARF  (acute renal failure) (H)     Arthritis, septic (H)     Chronic ethmoidal sinusitis     Chronic maxillary sinusitis     Deviated nasal septum     Complications due to internal joint prosthesis (H)     Generalized pain     Iatrogenic hyperthyroidism     S/P TKR (total knee replacement)     Left shoulder pain     Lipoma of skin and subcutaneous tissue     Malabsorption     Nasal fracture     Nasal turbinate hypertrophy     Opioid type dependence (H)     Other specified abnormal findings of blood chemistry     Other acute postoperative pain     Pain in joint, lower leg     Postoperative hypotension     S/P total knee replacement     Thrombus of right atrial appendage without antecedent myocardial infarction     Chronic pain of both shoulders     GI bleed     Grade III internal hemorrhoids     Atrial fibrillation with RVR (H)     Acute deep vein thrombosis (DVT) of proximal vein of right lower extremity (H)     Pneumonia of left upper lobe due to infectious organism     Morbid obesity (H)     Cerebrovascular accident (CVA), unspecified mechanism (H)     CVA (cerebral vascular accident) (H)     Neuropathy of left suprascapular nerve     Neuropathy of right suprascapular nerve       Current Outpatient Medications   Medication Sig Dispense Refill     atorvastatin (LIPITOR) 40 MG tablet Take 1 tablet (40 mg) by mouth every evening 30 tablet 0     carboxymethylcellulose (CARBOXYMETHYLCELLULOSE SODIUM) 0.5 % SOLN ophthalmic solution Place 1 drop into both eyes 2 times daily as needed  1 Bottle      cyanocobalamin 1000 MCG/ML injection Inject 1 mL (1,000 mcg) Subcutaneous every 7 days 4 mL 5     cyclobenzaprine (FLEXERIL) 10 MG tablet Take 1 tablet (10 mg) by mouth 3 times daily 90 tablet 3     cycloSPORINE (RESTASIS) 0.05 % ophthalmic emulsion Place 1 drop into both eyes 2 times daily       diltiazem ER COATED BEADS (CARDIZEM CD/CARTIA XT) 180 MG 24 hr capsule Take 1 capsule (180 mg) by mouth 2 times daily 180 capsule 3      Elastic Bandages & Supports (MEDICAL COMPRESSION SOCKS) MISC 1 Package daily Please measure and distribute 2 pair of 20mmHg - 30mmHg THIGH high open or closed toe compression stockings with extra refills as indicated. Jobst ultrasheer or equivalent. 2 each 3     fexofenadine (ALLEGRA) 180 MG tablet Take 180 mg by mouth daily as needed        gabapentin (NEURONTIN) 300 MG capsule Take 300 mg by mouth 2 times daily (morning and afternoon) in addition to 800 mg tablet (total dose 1100mg)       gabapentin (NEURONTIN) 800 MG tablet Take 800 mg by mouth At Bedtime (in addition to the 2 - 1100mg doses)       HYDROmorphone (DILAUDID) 8 MG tablet Take 8 mg by mouth every 8 hours . Max of 3 doses per day.       lifitegrast (XIIDRA) 5 % opthalmic solution Place 1 drop into both eyes 2 times daily       metoprolol succinate ER (TOPROL-XL) 100 MG 24 hr tablet Take 1 tablet (100 mg) by mouth 2 times daily 60 tablet 3     morphine (MS CONTIN) 30 MG 12 hr tablet Take 30 mg by mouth 2 times daily (morning and night) in addition to 60 mg tablet for a total dose of 90mg. 270 tablet 0     morphine (MS CONTIN) 60 MG 12 hr tablet Take 60 mg by mouth daily in the afternoon (in addition to the 2 - 90mg doses)       NASONEX 50 MCG/ACT spray Spray 1 spray into both nostrils daily as needed   11     nystatin (MYCOSTATIN) 377105 UNIT/GM external powder Apply topically 3 times daily as needed 60 g 1     prednisoLONE acetate (PRED FORTE) 1 % ophthalmic susp Place 1 drop into both eyes 4 times daily        predniSONE (DELTASONE) 1 MG tablet Take 3 mg by mouth every other day in addition to 5mg tablet for a total dose of 8mg.  2     predniSONE (DELTASONE) 5 MG tablet Take 10 mg by mouth every other day       probiotic CAPS Take 1 capsule by mouth 2 times daily       rivaroxaban ANTICOAGULANT (XARELTO) 20 MG TABS tablet Take 20 mg by mouth daily (with dinner)        zolpidem (AMBIEN) 5 MG tablet Take 5 mg by mouth nightly as needed for sleep        BIOTIN FORTE PO Take 1 tablet by mouth daily        Elastic Bandages & Supports (MEDICAL COMPRESSION SOCKS) MISC 1 Package daily Please measure and distribute 2 pair of 20mmHg - 30mmHg knee high open or closed toe compression stockings with extra refills as indicated. Jobst ultrasheer or equivalent. 2 each 3     gabapentin (NEURONTIN) 800 MG tablet Take 800 mg by mouth 2 times daily (morning and afternoon) in addition to 300mg capsule (total dose 1100mg)       levothyroxine (SYNTHROID) 200 MCG SOLR injection Inject 200 mcg into the vein once a week on Fridays       levothyroxine (SYNTHROID/LEVOTHROID) 300 MCG tablet Take 1 tablet (300 mcg) by mouth daily       liothyronine (CYTOMEL) 25 MCG tablet Take 25 mcg by mouth 2 times daily        morphine (MS CONTIN) 60 MG 12 hr tablet Take 60 mg by mouth 2 times daily (morning and night) in addition to 30mg tablet for a total dose of 90mg 30 tablet 0     multivitamin, therapeutic with minerals (MULTI-VITAMIN) TABS tablet Take 1 tablet by mouth 2 times daily 100 tablet 3     predniSONE (DELTASONE) 5 MG tablet Take 5 mg by mouth every other day in addition to 1mg tablets for a total dose of 8mg       sodium chloride 0.9% infusion Use 6mL once weekly to reconstitute levothyroxine powder before injection       vitamin D2 (ERGOCALCIFEROL) 38277 units (1250 mcg) capsule Take 50,000 Units by mouth once a week on Monday         Allergies   Allergen Reactions     Blood Transfusion Related (Informational Only) Other (See Comments)     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      Chlorhexidine Hives     Thor surgical cleanser     Codeine Hives     Has tolerated Oxycodone in past      Ibuprofen [Nsaids] Hives     Penicillins Hives     Other reaction(s): Unknown     Sulfa Drugs Hives     Other reaction(s): Unknown     Doxycycline GI Disturbance     Emesis & diarrhea  Patient denies allergy to this med     Hydroxyzine      rash     Mold      Red Wine Complex [Red Wine  Powder]        Family History   Problem Relation Age of Onset     Hypertension Mother      No Known Problems Father      No Known Problems Sister      No Known Problems Brother      No Known Problems Maternal Grandmother      No Known Problems Maternal Grandfather      No Known Problems Paternal Grandmother      No Known Problems Other      Social History     Socioeconomic History     Marital status:      Spouse name: None     Number of children: None     Years of education: None     Highest education level: None   Occupational History     None   Tobacco Use     Smoking status: Former Smoker     Packs/day: 1.50     Years: 20.00     Pack years: 30.00     Types: Cigarettes     Start date: 1973     Quit date: 1993     Years since quittin.7     Smokeless tobacco: Never Used     Tobacco comment: wish I never started   Substance and Sexual Activity     Alcohol use: No     Alcohol/week: 0.0 standard drinks     Drug use: No     Sexual activity: Not Currently     Partners: Male     Birth control/protection: Post-menopausal   Other Topics Concern     Parent/sibling w/ CABG, MI or angioplasty before 65F 55M? Not Asked   Social History Narrative    ** Merged History Encounter **          Social Determinants of Health     Financial Resource Strain:      Difficulty of Paying Living Expenses:    Food Insecurity:      Worried About Running Out of Food in the Last Year:      Ran Out of Food in the Last Year:    Transportation Needs:      Lack of Transportation (Medical):      Lack of Transportation (Non-Medical):    Physical Activity:      Days of Exercise per Week:      Minutes of Exercise per Session:    Stress:      Feeling of Stress :    Social Connections:      Frequency of Communication with Friends and Family:      Frequency of Social Gatherings with Friends and Family:      Attends Jainism Services:      Active Member of Clubs or Organizations:      Attends Club or Organization Meetings:      Marital  "Status:    Intimate Partner Violence:      Fear of Current or Ex-Partner:      Emotionally Abused:      Physically Abused:      Sexually Abused:        Additional medical/Social/Surgical histories reviewed in [facility] and updated as appropriate.     REVIEW OF SYSTEMS (9/7/2021)  10 point ROS of systems including Constitutional, Eyes, Respiratory, Cardiovascular, Gastroenterology, Genitourinary, Integumentary, Musculoskeletal, Psychiatric, Allergic/Immunologic were all negative except for pertinent positives noted in my HPI.     PHYSICAL EXAM  Vitals:    09/07/21 1528   Resp: 17   Weight: 126.1 kg (278 lb)   Height: 1.753 m (5' 9\")     Vital Signs: Resp 17   Ht 1.753 m (5' 9\")   Wt 126.1 kg (278 lb)   BMI 41.05 kg/m   Patient declined being weighed. Body mass index is 41.05 kg/m .    General  - normal appearance, in no obvious distress  HEENT  - conjunctivae not injected, moist mucous membranes, normocephalic/atraumatic head, ears normal appearance, no lesions, mouth normal appearance, no scars, normal dentition and teeth present  CV  - normal peripheral perfusion  Pulm  - normal respiratory pattern, non-labored    Musculoskeletal - CERVICAL SPINE  - stance and posture: normal gait without limp, no obvious leg length discrepancy, normal heel and toe walk, normal balance, slightly forward shoulders, head balanced normally on trunk  - inspection: normal bone and joint alignment, no obvious kyphosis  - palpation: no paravertebral or bony tenderness, except at base of neck and trapezius muscles  - ROM: normal and painless flexion, extension, left and right sidebending and rotation with some discomfort  - strength: upper extremities 5/5 in all planes  - special tests:  (+) spurlings    Neuro  - biceps, triceps, supinator DTRs 2+ bilaterally, no sensory or motor deficit, grossly normal coordination, normal muscle tone  Skin  - no ecchymosis, erythema, warmth, or induration, no obvious rash  Psych  - interactive, " appropriate, normal mood and affect  Bilateral shoulders; Has pain with reaching, kendall, internal rotation bilateral shoulders    ASSESSMENT & PLAN  62 yo female with bilateral shoulder pain and rotator cuff arthropathy, subacromial bursitis, and cervical ddd, radicular pain    I independently reviewed the following imaging studies:  Cervical MRI: shows ddd, disc herniations  Bilateral shoulder xrays: shows some GH arthritis  After a 20 minute discussion and examination, we decided to perform a same day injection for diagnostic and therapeutic purposes for bilateral shoulders  Discussed and will plan for cervical Jaziel  Cont. HEP  Take medications as written  Appropriate PPE was utilized for prevention of spread of Covid-19.  Shon Simpson MD, CAQSM    PROCEDURE: bilateral SHOULDER joint injection     The patient was apprised of the risks and the benefits of the procedure and consented and a written consent was signed by the patient.   The patient s shoulders were sterilely prepped with chloraprep.   40 mg of triamcinolone suspension was drawn up into a 5 mL syringe with 4 mL of 1% lidocaine   The patient was injected into right and left shoulders with a 1.5-inch 22-gauge needle at posterior gleno-humeral joint in the subacromial space  There were no complications. The patient tolerated the procedure well. There was minimal bleeding.   The patient was instructed to ice the shoulder upon leaving clinic and refrain from overuse over the next 3 days.   The patient was instructed to go to the emergency room with any usual pain, swelling, or redness occurred in the injected area.   The patient was given a followup appointment to evaluate response to the injection to their increased range of motion and reduction of pain.    followup PRN  Dr Shon Simpson    Large Joint Injection/Arthocentesis: bilateral subacromial bursa    Date/Time: 9/7/2021 4:30 PM  Performed by: Shon Simpson MD  Authorized by: Calvin  Shon Madrid MD     Indications:  Pain and osteoarthritis  Needle Size:  22 G  Guidance: landmark guided    Approach:  Anterolateral  Location:  Shoulder  Laterality:  Bilateral      Site:  Bilateral subacromial bursa  Medications (Right):  40 mg triamcinolone 40 MG/ML; 3 mL lidocaine (PF) 1 %  Medications (Left):  40 mg triamcinolone 40 MG/ML; 3 mL lidocaine (PF) 1 %  Outcome:  Tolerated well, no immediate complications  Procedure discussed: discussed risks, benefits, and alternatives    Consent Given by:  Patient  Timeout: timeout called immediately prior to procedure    Prep: patient was prepped and draped in usual sterile fashion          [address]  [address] [facility] [address]  [phone]  Dept: [phone]  ______________________________________________________________________________    Patient: Aspen Mccullough   : 1960   MRN: [id number]   2021    INVASIVE PROCEDURE SAFETY CHECKLIST    Date: 2021   Procedure:Bilateral subacromial bursa injection   Patient Name: Aspen Mccullough  MRN: [id number]  YOB: 1960    Action: Complete sections as appropriate. Any discrepancy results in a HARD COPY until resolved.     PRE PROCEDURE:  Patient ID verified with 2 identifiers (name and  or MRN): Yes  Procedure and site verified with patient/designee (when able): Yes  Accurate consent documentation in medical record: Yes  H&P (or appropriate assessment) documented in medical record: Yes  H&P must be up to 20 days prior to procedure and updates within 24 hours of procedure as applicable: Yes  Relevant diagnostic and radiology test results appropriately labeled and displayed as applicable: Yes  Procedure site(s) marked with provider initials: Yes    TIMEOUT:  Time-Out performed immediately prior to starting procedure, including verbal and active participation of all team members addressing the  following:Yes  * Correct patient identify  * Confirmed that the correct side and site are marked  * An accurate procedure consent form  * Agreement on the procedure to be done  * Correct patient position  * Relevant images and results are properly labeled and appropriately displayed  * The need to administer antibiotics or fluids for irrigation purposes during the procedure as applicable   * Safety precautions based on patient history or medication use    DURING PROCEDURE: Verification of correct person, site, and procedures any time the responsibility for care of the patient is transferred to another member of the care team.       The following medications were given:         Prior to injection, verified patient identity using patient's name and date of birth.  Due to injection administration, patient instructed to remain in clinic for 15 minutes  afterwards, and to report any adverse reaction to me immediately.    Bursa injection was performed.    Medication Name: Lidocaine 1%   NDC 18806-981-57  Drug Amount Wasted:  Yes: 2 mg/ml   Vial/Syringe: Single dose vial  Expiration Date:  04/2024    Medication Name: Lidocaine 1%  NDC 17008-964-42  Drug Amount Wasted:  None.  Vial/Syringe: Single dose vial  Expiration Date:  07/2024    Medication Name: Methylprednisolone 40mg/mL  NDC 9955-6393-22  Drug Amount Wasted:  None.  Vial/Syringe: Single dose vial  Expiration Date:  03/2022    Medication Name: Methylprednisolone 40mg/mL  NDC 2409-1207-62  Drug Amount Wasted:  None.  Vial/Syringe: Single dose vial  Expiration Date:  03/2022    Scribed by Jackelyn Pal ATC  for Dr. Simpson on September 7, 2021 at 4:15pm based on the provider's statements to me.     Jackelyn Simpson MD

## 2021-09-07 NOTE — PROGRESS NOTES
Large Joint Injection/Arthocentesis: bilateral subacromial bursa    Date/Time: 2021 4:30 PM  Performed by: Shon Simpson MD  Authorized by: Shon Simpson MD     Indications:  Pain and osteoarthritis  Needle Size:  22 G  Guidance: landmark guided    Approach:  Anterolateral  Location:  Shoulder  Laterality:  Bilateral      Site:  Bilateral subacromial bursa  Medications (Right):  40 mg triamcinolone 40 MG/ML; 3 mL lidocaine (PF) 1 %  Medications (Left):  40 mg triamcinolone 40 MG/ML; 3 mL lidocaine (PF) 1 %  Outcome:  Tolerated well, no immediate complications  Procedure discussed: discussed risks, benefits, and alternatives    Consent Given by:  Patient  Timeout: timeout called immediately prior to procedure    Prep: patient was prepped and draped in usual sterile fashion          [address] [address]  [phone]  Dept: [phone]  ______________________________________________________________________________    Patient: Aspen Mccullough   : 1960   MRN: [id number]   2021    INVASIVE PROCEDURE SAFETY CHECKLIST    Date: 2021   Procedure:Bilateral subacromial bursa injection   Patient Name: Aspen Mccullough  MRN: [id number]  YOB: 1960    Action: Complete sections as appropriate. Any discrepancy results in a HARD COPY until resolved.     PRE PROCEDURE:  Patient ID verified with 2 identifiers (name and  or MRN): Yes  Procedure and site verified with patient/designee (when able): Yes  Accurate consent documentation in medical record: Yes  H&P (or appropriate assessment) documented in medical record: Yes  H&P must be up to 20 days prior to procedure and updates within 24 hours of procedure as applicable: Yes  Relevant diagnostic and radiology test results appropriately labeled and displayed as applicable: Yes  Procedure site(s) marked with provider initials:  Yes    TIMEOUT:  Time-Out performed immediately prior to starting procedure, including verbal and active participation of all team members addressing the following:Yes  * Correct patient identify  * Confirmed that the correct side and site are marked  * An accurate procedure consent form  * Agreement on the procedure to be done  * Correct patient position  * Relevant images and results are properly labeled and appropriately displayed  * The need to administer antibiotics or fluids for irrigation purposes during the procedure as applicable   * Safety precautions based on patient history or medication use    DURING PROCEDURE: Verification of correct person, site, and procedures any time the responsibility for care of the patient is transferred to another member of the care team.       The following medications were given:         Prior to injection, verified patient identity using patient's name and date of birth.  Due to injection administration, patient instructed to remain in clinic for 15 minutes  afterwards, and to report any adverse reaction to me immediately.    Bursa injection was performed.    Medication Name: Lidocaine 1%   NDC 94179-374-67  Drug Amount Wasted:  Yes: 2 mg/ml   Vial/Syringe: Single dose vial  Expiration Date:  04/2024    Medication Name: Lidocaine 1%  NDC 45802-319-47  Drug Amount Wasted:  None.  Vial/Syringe: Single dose vial  Expiration Date:  07/2024    Medication Name: Methylprednisolone 40mg/mL  NDC 0452-5751-65  Drug Amount Wasted:  None.  Vial/Syringe: Single dose vial  Expiration Date:  03/2022    Medication Name: Methylprednisolone 40mg/mL  NDC 2345-0307-82  Drug Amount Wasted:  None.  Vial/Syringe: Single dose vial  Expiration Date:  03/2022    Scribed by Jackelyn Pal ATC  for Dr. Simpson on September 7, 2021 at 4:15pm based on the provider's statements to me.     Jackelyn Pal ATC

## 2021-09-07 NOTE — NURSING NOTE
"Reason For Visit:   Chief Complaint   Patient presents with     Consult     both Shoulder, several years, see Dr Velazquez for several years but the injections havn't done anytthing.        Resp 17   Ht 1.753 m (5' 9\")   Wt 126.1 kg (278 lb)   BMI 41.05 kg/m      Pain Assessment  Patient Currently in Pain: Yes  0-10 Pain Scale: 7  Primary Pain Location: Shoulder    Jackelyn Pal ATC       "

## 2021-09-21 ENCOUNTER — VIRTUAL VISIT (OUTPATIENT)
Dept: ORTHOPEDICS | Facility: CLINIC | Age: 61
End: 2021-09-21
Payer: COMMERCIAL

## 2021-09-21 DIAGNOSIS — M50.30 DDD (DEGENERATIVE DISC DISEASE), CERVICAL: ICD-10-CM

## 2021-09-21 DIAGNOSIS — M50.20 HERNIATED CERVICAL INTERVERTEBRAL DISC: Primary | ICD-10-CM

## 2021-09-21 DIAGNOSIS — Z11.59 ENCOUNTER FOR SCREENING FOR OTHER VIRAL DISEASES: Primary | ICD-10-CM

## 2021-09-21 PROCEDURE — 99213 OFFICE O/P EST LOW 20 MIN: CPT | Mod: 95 | Performed by: PREVENTIVE MEDICINE

## 2021-09-21 NOTE — LETTER
9/21/2021         RE: Aspen Mccullough  [address]  [facility] [address]        Dear Colleague,    Thank you for referring your patient, Aspen Mccullough, to the [facility]. Please see a copy of my visit note below.    Patient is a  61   year old who is being evaluated via a billable telephone visit.      What phone number would you like to be contacted at? CELL  How would you like to obtain your AVS? MYCHART        Subjective   Patient is a   61  year old who presents by phone call visit for the following:   Neck pain chronic, radiation into arms  Wants to review MRI and discuss a plan    HPI       Review of Systems   Constitutional, HEENT, cardiovascular, pulmonary, gi and gu systems are negative, except as otherwise noted.      Objective           Vitals:  No vitals were obtained today due to virtual visit.    Physical Exam   healthy, alert and no distress  PSYCH: Alert and oriented times 3; coherent speech, normal   rate and volume, able to articulate logical thoughts, able   to abstract reason, no tangential thoughts, no hallucinations   or delusions  His affect is normal  RESP: No cough, no audible wheezing, able to talk in full sentences  Remainder of exam unable to be completed due to telephone visits    Assessment/Plan  62 yo female with cervical ddd, disc herniations, radicular pain, not resolving    I independently reviewed the following imaging studies and discussed with patient:  Cervical MRI: shows ddd, disc herniations  Discussed and ordered ANASTASIA  Cont. Medications  Cont. HEP  F/u after ANASTASIA          Phone call duration: 20 minutes  Phone call start: 9:00am  Phone call end: 9:20am  Dr Simpson      Again, thank you for allowing me to participate in the care of your patient.        Sincerely,        Shon Simpson MD

## 2021-09-21 NOTE — LETTER
9/21/2021         RE: Aspen Mccullough  [address]  [facility] [address]        Dear Colleague,    Thank you for referring your patient, Aspen Mccullough, to the [facility] [place]. Please see a copy of my visit note below.    Patient is a  61   year old who is being evaluated via a billable telephone visit.      What phone number would you like to be contacted at? CELL  How would you like to obtain your AVS? MYCHART        Subjective   Patient is a   61  year old who presents by phone call visit for the following:   Neck pain chronic, radiation into arms  Wants to review MRI and discuss a plan    HPI       Review of Systems   Constitutional, HEENT, cardiovascular, pulmonary, gi and gu systems are negative, except as otherwise noted.      Objective           Vitals:  No vitals were obtained today due to virtual visit.    Physical Exam   healthy, alert and no distress  PSYCH: Alert and oriented times 3; coherent speech, normal   rate and volume, able to articulate logical thoughts, able   to abstract reason, no tangential thoughts, no hallucinations   or delusions  His affect is normal  RESP: No cough, no audible wheezing, able to talk in full sentences  Remainder of exam unable to be completed due to telephone visits    Assessment/Plan  62 yo female with cervical ddd, disc herniations, radicular pain, not resolving    I independently reviewed the following imaging studies and discussed with patient:  Cervical MRI: shows ddd, disc herniations  Discussed and ordered ANASTASIA  Cont. Medications  Cont. HEP  F/u after ANASTASIA          Phone call duration: 20 minutes  Phone call start: 9:00am  Phone call end: 9:20am  Dr Simpson      Again, thank you for allowing me to participate in the care of your patient.        Sincerely,        Shon Simpson MD

## 2021-09-21 NOTE — PROGRESS NOTES
Patient is a  61   year old who is being evaluated via a billable telephone visit.      What phone number would you like to be contacted at? CELL  How would you like to obtain your AVS? NOLBERTO        Subjective   Patient is a   61  year old who presents by phone call visit for the following:   Neck pain chronic, radiation into arms  Wants to review MRI and discuss a plan    HPI       Review of Systems   Constitutional, HEENT, cardiovascular, pulmonary, gi and gu systems are negative, except as otherwise noted.      Objective           Vitals:  No vitals were obtained today due to virtual visit.    Physical Exam   healthy, alert and no distress  PSYCH: Alert and oriented times 3; coherent speech, normal   rate and volume, able to articulate logical thoughts, able   to abstract reason, no tangential thoughts, no hallucinations   or delusions  His affect is normal  RESP: No cough, no audible wheezing, able to talk in full sentences  Remainder of exam unable to be completed due to telephone visits    Assessment/Plan  60 yo female with cervical ddd, disc herniations, radicular pain, not resolving    I independently reviewed the following imaging studies and discussed with patient:  Cervical MRI: shows ddd, disc herniations  Discussed and ordered ANASTASIA  Cont. Medications  Cont. HEP  F/u after ANASTASIA          Phone call duration: 20 minutes  Phone call start: 9:00am  Phone call end: 9:20am  Dr Simpson

## 2021-09-22 ENCOUNTER — TELEPHONE (OUTPATIENT)
Dept: MEDSURG UNIT | Facility: CLINIC | Age: 61
End: 2021-09-22

## 2021-09-22 ENCOUNTER — TELEPHONE (OUTPATIENT)
Dept: MEDSURG UNIT | Facility: CLINIC | Age: 61
End: 2021-09-22
Payer: COMMERCIAL

## 2021-09-23 ENCOUNTER — LAB (OUTPATIENT)
Dept: LAB | Facility: CLINIC | Age: 61
End: 2021-09-23
Payer: COMMERCIAL

## 2021-09-23 DIAGNOSIS — Z11.59 ENCOUNTER FOR SCREENING FOR OTHER VIRAL DISEASES: ICD-10-CM

## 2021-09-23 PROCEDURE — U0005 INFEC AGEN DETEC AMPLI PROBE: HCPCS

## 2021-09-23 PROCEDURE — U0003 INFECTIOUS AGENT DETECTION BY NUCLEIC ACID (DNA OR RNA); SEVERE ACUTE RESPIRATORY SYNDROME CORONAVIRUS 2 (SARS-COV-2) (CORONAVIRUS DISEASE [COVID-19]), AMPLIFIED PROBE TECHNIQUE, MAKING USE OF HIGH THROUGHPUT TECHNOLOGIES AS DESCRIBED BY CMS-2020-01-R: HCPCS

## 2021-09-24 ENCOUNTER — TELEPHONE (OUTPATIENT)
Dept: MEDSURG UNIT | Facility: CLINIC | Age: 61
End: 2021-09-24

## 2021-09-24 LAB — SARS-COV-2 RNA RESP QL NAA+PROBE: NEGATIVE

## 2021-09-24 NOTE — TELEPHONE ENCOUNTER
Pre-Procedure COVID Test Results Pending    Results Reviewed  The patient has a pending COVID test result.  The patient has a COVID test scheduled on 9/23/21.     No COVID pre-call needed. RN to verify test results prior to procedure.     Alondra Cope, RN

## 2021-09-24 NOTE — TELEPHONE ENCOUNTER
Pt called regarding Xarelto. Pt stated that she was told to hold this prior to her procedure.Last dose taken on 09/23/21.

## 2021-09-27 ENCOUNTER — HOSPITAL ENCOUNTER (OUTPATIENT)
Dept: GENERAL RADIOLOGY | Facility: CLINIC | Age: 61
End: 2021-09-27
Attending: PREVENTIVE MEDICINE
Payer: COMMERCIAL

## 2021-09-27 ENCOUNTER — HOSPITAL ENCOUNTER (OUTPATIENT)
Facility: CLINIC | Age: 61
Discharge: HOME | End: 2021-09-27
Admitting: PHYSICIAN ASSISTANT
Payer: COMMERCIAL

## 2021-09-27 VITALS
DIASTOLIC BLOOD PRESSURE: 86 MMHG | HEART RATE: 49 BPM | RESPIRATION RATE: 18 BRPM | SYSTOLIC BLOOD PRESSURE: 128 MMHG | OXYGEN SATURATION: 97 %

## 2021-09-27 VITALS — OXYGEN SATURATION: 100 % | HEART RATE: 63 BPM | SYSTOLIC BLOOD PRESSURE: 129 MMHG | DIASTOLIC BLOOD PRESSURE: 78 MMHG

## 2021-09-27 DIAGNOSIS — M50.30 DDD (DEGENERATIVE DISC DISEASE), CERVICAL: ICD-10-CM

## 2021-09-27 DIAGNOSIS — M50.20 HERNIATED CERVICAL INTERVERTEBRAL DISC: ICD-10-CM

## 2021-09-27 PROCEDURE — 250N000009 HC RX 250: Performed by: PREVENTIVE MEDICINE

## 2021-09-27 PROCEDURE — 250N000011 HC RX IP 250 OP 636: Performed by: PREVENTIVE MEDICINE

## 2021-09-27 PROCEDURE — 62321 NJX INTERLAMINAR CRV/THRC: CPT

## 2021-09-27 PROCEDURE — 255N000002 HC RX 255 OP 636: Performed by: PREVENTIVE MEDICINE

## 2021-09-27 PROCEDURE — 999N000154 HC STATISTIC RADIOLOGY XRAY, US, CT, MAR, NM

## 2021-09-27 RX ORDER — IOPAMIDOL 408 MG/ML
20 INJECTION, SOLUTION INTRATHECAL ONCE
Status: COMPLETED | OUTPATIENT
Start: 2021-09-27 | End: 2021-09-27

## 2021-09-27 RX ORDER — LIDOCAINE HYDROCHLORIDE 10 MG/ML
30 INJECTION, SOLUTION EPIDURAL; INFILTRATION; INTRACAUDAL; PERINEURAL ONCE
Status: COMPLETED | OUTPATIENT
Start: 2021-09-27 | End: 2021-09-27

## 2021-09-27 RX ORDER — BETAMETHASONE SODIUM PHOSPHATE AND BETAMETHASONE ACETATE 3; 3 MG/ML; MG/ML
6 INJECTION, SUSPENSION INTRA-ARTICULAR; INTRALESIONAL; INTRAMUSCULAR; SOFT TISSUE ONCE
Status: COMPLETED | OUTPATIENT
Start: 2021-09-27 | End: 2021-09-27

## 2021-09-27 RX ADMIN — BETAMETHASONE SODIUM PHOSPHATE AND BETAMETHASONE ACETATE 3 ML: 3; 3 INJECTION, SUSPENSION INTRA-ARTICULAR; INTRALESIONAL; INTRAMUSCULAR at 11:28

## 2021-09-27 RX ADMIN — LIDOCAINE HYDROCHLORIDE 2.5 ML: 10 INJECTION, SOLUTION EPIDURAL; INFILTRATION; INTRACAUDAL; PERINEURAL at 11:29

## 2021-09-27 RX ADMIN — IOPAMIDOL 2.5 ML: 408 INJECTION, SOLUTION INTRATHECAL at 11:28

## 2021-09-27 NOTE — PROCEDURES
[facility]    Procedure: Cervical ANASTASIA    Date/Time: 9/27/2021 11:37 AM  Performed by: Niles Pineda PA-C  Authorized by: Niles Pineda PA-C     UNIVERSAL PROTOCOL   Site Marked: Yes  Prior Images Obtained and Reviewed:  Yes  Required items: Required blood products, implants, devices and special equipment available    Patient identity confirmed:  Verbally with patient  Patient was reevaluated immediately before administering moderate or deep sedation or anesthesia  Confirmation Checklist:  Patient's identity using two indicators, relevant allergies, procedure was appropriate and matched the consent or emergent situation and correct equipment/implants were available  Time out: Immediately prior to the procedure a time out was called    Universal Protocol: the Joint Commission Universal Protocol was followed    Preparation: Patient was prepped and draped in usual sterile fashion           ANESTHESIA    Local Anesthetic: Lidocaine 1% without epinephrine      SEDATION    Patient Sedated: No    See dictated procedure note for full details.  PROCEDURE   Patient Tolerance:  Patient tolerated the procedure well with no immediate complications  Describe Procedure: No EBL  Length of time physician/provider present for 1:1 monitoring during sedation: 0

## 2021-09-27 NOTE — DISCHARGE INSTRUCTIONS

## 2021-09-27 NOTE — PROGRESS NOTES
Care Suites Post Procedure Note    Patient Information  Name: Aspen Mccullough  Age: 61 year old    Post Procedure  Time patient returned to Care Suites: 1145  Concerns/abnormal assessment: no  If abnormal assessment, provider notified: N/A  Plan/Other: review discharge instructions    After 30 minutes post injection, injection site is CDI, no hematoma or swelling.  Patient states pain is relieved.  Tolerating regular diet and fluids.  Denies numbness or tingling in hands and feet.  Patient discharged to home with . Discharge instructions reviewed with patient and patient verbalizes understanding.      Julius Dasilva RN

## 2021-09-27 NOTE — PROGRESS NOTES
Care Suites Discharge Nursing Note    Patient Information  Name: Aspen Mccullough  Age: 61 year old    Discharge Education:  Discharge instructions reviewed: Yes  Additional education/resources provided: N/A  Patient/patient representative verbalizes understanding: Yes  Patient discharging on new medications: No  Medication education completed: N/A    Discharge Plans:   Discharge location: care suites to home  Discharge ride contacted: yes, at bedside  Approximate discharge time: 1215    Discharge Criteria:  Discharge criteria met and vital signs stable: Yes, seen at bedside by Niles LEWIS, patient reports slight increase in discomfort felt in neck and left arm. Reviewed and given discharge instructions.    Patient Belongs:  Patient belongings returned to patient: Yes. Discharged per w/cKim Green RN

## 2021-09-28 ENCOUNTER — HOSPITAL ENCOUNTER (OUTPATIENT)
Dept: WOUND CARE | Facility: CLINIC | Age: 61
Discharge: HOME | End: 2021-09-28
Attending: PHYSICIAN ASSISTANT | Admitting: PHYSICIAN ASSISTANT
Payer: COMMERCIAL

## 2021-09-28 VITALS
HEART RATE: 90 BPM | DIASTOLIC BLOOD PRESSURE: 83 MMHG | SYSTOLIC BLOOD PRESSURE: 159 MMHG | WEIGHT: 278 LBS | HEIGHT: 69 IN | BODY MASS INDEX: 41.18 KG/M2 | TEMPERATURE: 98 F

## 2021-09-28 DIAGNOSIS — S31.819S WOUND OF RIGHT BUTTOCK, SEQUELA: ICD-10-CM

## 2021-09-28 PROBLEM — S31.819A WOUND OF RIGHT BUTTOCK: Status: ACTIVE | Noted: 2021-09-28

## 2021-09-28 PROCEDURE — 17250 CHEM CAUT OF GRANLTJ TISSUE: CPT

## 2021-09-28 PROCEDURE — 17250 CHEM CAUT OF GRANLTJ TISSUE: CPT | Performed by: PHYSICIAN ASSISTANT

## 2021-09-28 PROCEDURE — G0463 HOSPITAL OUTPT CLINIC VISIT: HCPCS | Mod: 25

## 2021-09-28 RX ORDER — CEPHALEXIN 500 MG/1
CAPSULE ORAL
Status: ON HOLD | COMMUNITY
Start: 2021-08-20 | End: 2021-10-05

## 2021-09-28 RX ORDER — CHLORHEXIDINE GLUCONATE ORAL RINSE 1.2 MG/ML
SOLUTION DENTAL
Status: ON HOLD | COMMUNITY
Start: 2020-12-03 | End: 2022-02-17

## 2021-09-28 ASSESSMENT — MIFFLIN-ST. JEOR: SCORE: 1885.38

## 2021-09-28 NOTE — PROGRESS NOTES
Patient arrived for wound care visit. Certified Wound Care Nurse time spent evaluating patient record, completed a full evaluation and documented wound(s) & sarah-wound skin; provided recommendation based on treatment plan. Applied dressing, reviewed discharge instructions, patient education, and discussed plan of care with appropriate medical team staff members and patient and/or family members.     Education provided on wound care, pressure off loading and all concerns addressed with good teach back.

## 2021-09-28 NOTE — DISCHARGE INSTRUCTIONS
[facility]  [address] [address]    Call us at [phone] if you have any questions about your wounds, have redness or swelling around your wound, have a fever of 101 or greater or if you have any other problems or concerns. We answer the phone Monday through Friday 8 am to 4 pm, please leave a message as we check the voicemail frequently throughout the day.     Aspen Mccullough      1960    Wound Dressing Change: Right Buttocks  Cleanse wound and surrounding skin with:  Gentle soap and water, pat dry  Cover wound with Iodosorb gel and cover with Bandaid  Change dressing daily     Michelle Petit PA-C. September 28, 2021    Return to [facility] as scheduled  If you had a positive experience please indicate on your patient satisfaction survey form that [facility] will be sending you.    If you have any billing related questions please call the [facility] Business office at [phone]. The clinic staff does not handle billing related matters.

## 2021-09-28 NOTE — PROGRESS NOTES
"[facility]    ASSESSMENT:   1. Stage 3 pressure ulcer    PLAN/DISCUSSION:   1. Wound care plan: dress with Iodosorb and cover dressing of choice, change daily  2. Follow-up telemed in 3 wks  3. Repo frequently    HISTORY OF PRESENT ILLNESS:   Aspen Mccullough is a 61 year old female with complex  who presents today for a pressure sore on her R buttocks. She had a bout of diarrhea and spent a prolonged time on the toilet resulting in this wound. Her  has been dressing it and she states she started taking an antibiotic right away for fear of infection. Her diarrhea has resolved and she is ambulatory.    VITALS: BP (!) 159/83 (BP Location: Left arm)   Pulse 90   Temp 98  F (36.7  C) (Temporal)   Ht 1.753 m (5' 9\")   Wt 126.1 kg (278 lb)   BMI 41.05 kg/m       PHYSICAL EXAM:  GENERAL: Patient is alert and oriented and in no acute distress  INTEGUMENTARY:   Wound (used by OP [facility] only) 09/28/21 1010 Right pressure injury (Active)   Thickness/Stage Stage 3 09/28/21 1000   Dressing Appearance moist drainage 09/28/21 1000   Base granulating;yellow 09/28/21 1000   Periwound intact 09/28/21 1000   Periwound Temperature warm 09/28/21 1000   Periwound Skin Turgor soft 09/28/21 1000   Edges open 09/28/21 1000   Length (cm) 2.4 09/28/21 1000   Width (cm) 1.9 09/28/21 1000   Depth (cm) 0.1 09/28/21 1000   Wound (cm^2) 4.56 cm^2 09/28/21 1000   Wound Volume (cm^3) 0.46 cm^3 09/28/21 1000   Drainage Characteristics/Odor serosanguineous 09/28/21 1000   Drainage Amount moderate 09/28/21 1000   Care, Wound chemical cautery applied 09/28/21 1000           PROCEDURE: After verbal consent was obtained, hypergranulation tissue was treated with silver nitrate. Patient tolerated this well.     MDM: 30-44 minutes were spent on the date of the visit reviewing previous chart notes, evaluating patient and developing the treatment plan, this excludes any time spent on procedures.       ROSARIO CINTRON PA-C    "

## 2021-09-29 RX ORDER — TRIAMCINOLONE ACETONIDE 40 MG/ML
40 INJECTION, SUSPENSION INTRA-ARTICULAR; INTRAMUSCULAR
Status: DISCONTINUED | OUTPATIENT
Start: 2021-09-07 | End: 2022-02-17

## 2021-09-29 RX ORDER — LIDOCAINE HYDROCHLORIDE 10 MG/ML
3 INJECTION, SOLUTION EPIDURAL; INFILTRATION; INTRACAUDAL; PERINEURAL
Status: DISCONTINUED | OUTPATIENT
Start: 2021-09-07 | End: 2022-02-17

## 2021-10-01 ENCOUNTER — TELEPHONE (OUTPATIENT)
Dept: INTERVENTIONAL RADIOLOGY/VASCULAR | Facility: CLINIC | Age: 61
End: 2021-10-01

## 2021-10-01 ENCOUNTER — LAB (OUTPATIENT)
Dept: URGENT CARE | Facility: URGENT CARE | Age: 61
End: 2021-10-01
Attending: RADIOLOGY
Payer: COMMERCIAL

## 2021-10-01 DIAGNOSIS — Z11.59 ENCOUNTER FOR SCREENING FOR OTHER VIRAL DISEASES: ICD-10-CM

## 2021-10-01 PROCEDURE — U0003 INFECTIOUS AGENT DETECTION BY NUCLEIC ACID (DNA OR RNA); SEVERE ACUTE RESPIRATORY SYNDROME CORONAVIRUS 2 (SARS-COV-2) (CORONAVIRUS DISEASE [COVID-19]), AMPLIFIED PROBE TECHNIQUE, MAKING USE OF HIGH THROUGHPUT TECHNOLOGIES AS DESCRIBED BY CMS-2020-01-R: HCPCS

## 2021-10-01 PROCEDURE — U0005 INFEC AGEN DETEC AMPLI PROBE: HCPCS

## 2021-10-01 NOTE — TELEPHONE ENCOUNTER
[facility] Call Center    Phone Message    May a detailed message be left on voicemail: yes     Reason for Call: Other: `     Pt's  calling for clarification of 2 appts being scheduled for IR for Pt on same day, 10/5/21.    Pt wants to know if they should e-check in for BOTH appts, how to get Pt from one appt to the other.    Pt requests call back today, 10/1/21    Please return call to Pt's  to advise. Thanks!    Action Taken: Message routed to:  [facility] Intervent Radiology    Travel Screening: Not Applicable

## 2021-10-02 LAB — SARS-COV-2 RNA RESP QL NAA+PROBE: NEGATIVE

## 2021-10-04 NOTE — TELEPHONE ENCOUNTER
Called Ash to clarify the appt times    Explained that one is the check in 7:30am    The other is the start time 9am.    He had not further questions.     Josie TORRES RN, BSN  Interventional Radiology/Vascular  Nurse Coordinator   Phone: [phone]  Fax: [phone]

## 2021-10-05 ENCOUNTER — APPOINTMENT (OUTPATIENT)
Dept: INTERVENTIONAL RADIOLOGY/VASCULAR | Facility: CLINIC | Age: 61
End: 2021-10-05
Attending: RADIOLOGY
Payer: COMMERCIAL

## 2021-10-05 ENCOUNTER — HOSPITAL ENCOUNTER (OUTPATIENT)
Facility: CLINIC | Age: 61
Discharge: HOME | End: 2021-10-05
Attending: RADIOLOGY | Admitting: RADIOLOGY
Payer: COMMERCIAL

## 2021-10-05 ENCOUNTER — APPOINTMENT (OUTPATIENT)
Dept: MEDSURG UNIT | Facility: CLINIC | Age: 61
End: 2021-10-05
Attending: RADIOLOGY
Payer: COMMERCIAL

## 2021-10-05 VITALS
OXYGEN SATURATION: 96 % | HEART RATE: 63 BPM | WEIGHT: 278 LBS | SYSTOLIC BLOOD PRESSURE: 99 MMHG | HEIGHT: 69 IN | BODY MASS INDEX: 41.18 KG/M2 | DIASTOLIC BLOOD PRESSURE: 62 MMHG | RESPIRATION RATE: 16 BRPM

## 2021-10-05 DIAGNOSIS — I83.893 SYMPTOMATIC VARICOSE VEINS OF BOTH LOWER EXTREMITIES: ICD-10-CM

## 2021-10-05 PROCEDURE — 250N000013 HC RX MED GY IP 250 OP 250 PS 637: Performed by: NURSE PRACTITIONER

## 2021-10-05 PROCEDURE — 999N000132 HC STATISTIC PP CARE STAGE 1

## 2021-10-05 PROCEDURE — 258N000003 HC RX IP 258 OP 636: Performed by: NURSE PRACTITIONER

## 2021-10-05 PROCEDURE — 999N000142 HC STATISTIC PROCEDURE PREP ONLY

## 2021-10-05 PROCEDURE — 36478 ENDOVENOUS LASER 1ST VEIN: CPT

## 2021-10-05 PROCEDURE — 250N000009 HC RX 250: Performed by: NURSE PRACTITIONER

## 2021-10-05 PROCEDURE — 272N000154 HC KIT CR14

## 2021-10-05 PROCEDURE — 250N000009 HC RX 250: Performed by: STUDENT IN AN ORGANIZED HEALTH CARE EDUCATION/TRAINING PROGRAM

## 2021-10-05 PROCEDURE — 36478 ENDOVENOUS LASER 1ST VEIN: CPT | Mod: RT | Performed by: RADIOLOGY

## 2021-10-05 RX ORDER — LIDOCAINE HYDROCHLORIDE 10 MG/ML
1-30 INJECTION, SOLUTION EPIDURAL; INFILTRATION; INTRACAUDAL; PERINEURAL
Status: COMPLETED | OUTPATIENT
Start: 2021-10-05 | End: 2021-10-05

## 2021-10-05 RX ORDER — DIAZEPAM 5 MG
10 TABLET ORAL ONCE
Status: COMPLETED | OUTPATIENT
Start: 2021-10-05 | End: 2021-10-05

## 2021-10-05 RX ADMIN — DIAZEPAM 10 MG: 5 TABLET ORAL at 08:58

## 2021-10-05 RX ADMIN — SODIUM BICARBONATE: 84 INJECTION, SOLUTION INTRAVENOUS at 10:32

## 2021-10-05 RX ADMIN — LIDOCAINE HYDROCHLORIDE 10 ML: 10 INJECTION, SOLUTION EPIDURAL; INFILTRATION; INTRACAUDAL; PERINEURAL at 10:33

## 2021-10-05 ASSESSMENT — MIFFLIN-ST. JEOR: SCORE: 1885.63

## 2021-10-05 NOTE — PROGRESS NOTES
Patient Name: Aspen Mccullough  Medical Record Number: [id number]  Today's Date: 10/5/2021    Start Time: 1000  Puncture time: 1000  End of procedure time: 1030  Procedure: Right greater saphenous vein endovenous laser ablation  Fire Safety Score: 0    Consent Review/Timeout Performed by: Dr. Taye Salcedo  Procedure Performed By: Dr. Conor Truong    Pre-procedure Valium administered: 10 mg    Report given to: Elizabeth CORBETT  Time pt departs:  1045  : NA    Other Notes:  Alert female transported via cart from  to IR Procedure Room 5 for planned intervention.  ID band confirmed and patient acknowledges understanding of planned procedure. Patient repositioned to procedure table via hover-mat and positioned supine.  Patient prepped and draped per policy see VS flowsheet, MAR for further information.       Procedure performed at right leg. Pressure stocking applied to right leg per MD order.    Patient returned to 2A for post-procedure monitoring.    Fannie Romero RN

## 2021-10-05 NOTE — PROCEDURES
[facility]    Procedure: IR Procedure Note    Date/Time: 10/5/2021 10:33 AM  Performed by: Conor Truong DO  Authorized by: Conor Truong DO   IR Fellow Physician: Brigette    UNIVERSAL PROTOCOL   Site Marked: NA  Prior Images Obtained and Reviewed:  Yes  Required items: Required blood products, implants, devices and special equipment available    Patient identity confirmed:  Verbally with patient, arm band, provided demographic data and hospital-assigned identification number  Patient was reevaluated immediately before administering moderate or deep sedation or anesthesia  Confirmation Checklist:  Patient's identity using two indicators, relevant allergies, procedure was appropriate and matched the consent or emergent situation and correct equipment/implants were available  Time out: Immediately prior to the procedure a time out was called    Universal Protocol: the Joint Commission Universal Protocol was followed    Preparation: Patient was prepped and draped in usual sterile fashion           ANESTHESIA    Anesthesia: Local infiltration  Local Anesthetic:  Lidocaine 1% without epinephrine      SEDATION    Patient Sedated: Yes    Sedation Type:  Anxiolysis  Sedation:  Diazepam  Vital signs: Vital signs monitored during sedation    See dictated procedure note for full details.  Findings: Uncomplicated EV LA right greater saphenous vein.    Specimens: none    Complications: None    Condition: Stable    Plan: Thigh-high stockings around-the-clock for 72 hours.  Then during waking hours for the next 3 weeks.    PROCEDURE   Patient Tolerance:  Patient tolerated the procedure well with no immediate complications    Length of time physician/provider present for 1:1 monitoring during sedation: 0

## 2021-10-05 NOTE — DISCHARGE INSTRUCTIONS
[facility]  Interventional Radiology  Discharge Instructions for Endovenous Laser Ablation of Legs        AFTER YOU GO HOME:       Drink plenty of fluids.       Resume your regular diet, unless otherwise instructed by your Primary Physician.    IF YOU RECEIVED SEDATION:            DO NOT smoke for at least 24 hours       DO NOT drink alcoholic beverages the day of your procedure       DO NOT drive or operate machinery at home or at work for 24 hours.          DO NOT take a bath or shower for at least 12 hours following your procedure.       DO NOT make any important or legal decisions for 24 hours following your procedure.         No tub baths, hot tubs or swimming for one week.        No vigorous lower extremity exercise (i.e. Stair stepper, running, or biking) for one week.    Endovenous laser ablation (usually in combination with microphlebectomy or sclerotherapy)  After you go home:  Drink plenty of fluids  Resume regular diet  Resume normal activity, wear stockings and a 20 minute a day walk is encouraged.  Hot tubs, baths, use of leg weights and strenuous leg exercises should be avoided for 1 week.   Do not take a shower for at least 12 hours following your procedure  Wear compression stockings for 3 days straight, then wear during day time hours for remainder of 3 weeks.  You may remove briefly to shower and if you have your legs elevated.  For mild pain or discomfort Ibuprofen 600 mg oral every 8 hours as needed, for moderate to severe pain oxycodone 5 mg 1-2 tabs every 6 hours as needed.  IF you received sedation:  Do not smoke for 24 hours following your procedure  Do not drink alcoholic beverages day of your procedure  Do not drive or operate machinery at home or work for 24 hours following your procedure  Do not make any important or legal decisions for 24 hours following your procedure  For follow up appointments you will be called to schedule:  If you received sclerotherapy, you  will have a sclerotherapy check every 3 weeks until treatment completed.  If EVLA only you will have an ultrasound scheduled for 1 week after procedure and an ultrasound followed by clinic appointment with your provider at 1 month post procedure    Procedural Physician: Dr. Truong                              Date: October 5, 2021  Telephone Number:     [phone] Monday-Friday 8-4:30                                       [phone]    IR clinic RN                                       [phone] After 4:30 PM Monday-Friday, Weekends and Holidays, The hospital  will answer, ask for the Interventional Radiologist on call. Someone is available 24 hours /day

## 2021-10-05 NOTE — PROGRESS NOTES
Pt prepped for right leg ENVLA.Consent signed and questions answered with  at bedside.Pt stated she did a hibiclens wash.

## 2021-10-05 NOTE — PROGRESS NOTES
Patient tolerated recovery stage well. VSS, RLE puncture sites clean/dry/intact, no hematoma, and denies pain. Patient tolerated PO food and fluids. Teaching was done and discharge instructions were given. Patient ambulated and voided. Patient discharged from the hospital via wheel chair to home with  (Ash).

## 2021-10-05 NOTE — PROGRESS NOTES
Pt arrived to 2A s/p RLE EVLA. Compression stocking in place. 3 puncture sites C/D/I. VSS. Pt denies pain at procedure site. Will continue to monitor.

## 2021-10-05 NOTE — IR NOTE
EVLA of Right GSV    Access 8cm below the Right knee    46cm Total treatment length  2444 Joules  7 Jerez  349 seconds  350mL of Tumescence

## 2021-10-07 ENCOUNTER — TELEPHONE (OUTPATIENT)
Dept: VASCULAR SURGERY | Facility: CLINIC | Age: 61
End: 2021-10-07

## 2021-10-07 NOTE — TELEPHONE ENCOUNTER
Called cell to fup on pt sp varicose vein treatment.   Spoke to     Pt is doing well since being at home.    We talked about the 1 week fup and then her next appts as well.     Pt's  has all appts memorized and will call with any other issues.     Josie TORRES RN, BSN  Interventional Radiology/Vascular  Nurse Coordinator   Phone: [phone]  Fax: [phone]

## 2021-10-12 ENCOUNTER — OFFICE VISIT (OUTPATIENT)
Dept: ORTHOPEDICS | Facility: CLINIC | Age: 61
End: 2021-10-12
Payer: COMMERCIAL

## 2021-10-12 ENCOUNTER — ANCILLARY PROCEDURE (OUTPATIENT)
Dept: ULTRASOUND IMAGING | Facility: CLINIC | Age: 61
End: 2021-10-12
Attending: RADIOLOGY
Payer: COMMERCIAL

## 2021-10-12 ENCOUNTER — DOCUMENTATION ONLY (OUTPATIENT)
Dept: OTHER | Facility: CLINIC | Age: 61
End: 2021-10-12

## 2021-10-12 VITALS — HEIGHT: 69 IN | RESPIRATION RATE: 17 BRPM | WEIGHT: 280 LBS | BODY MASS INDEX: 41.47 KG/M2

## 2021-10-12 DIAGNOSIS — M75.51 SUBACROMIAL BURSITIS OF RIGHT SHOULDER JOINT: ICD-10-CM

## 2021-10-12 DIAGNOSIS — I83.893 SYMPTOMATIC VARICOSE VEINS OF BOTH LOWER EXTREMITIES: ICD-10-CM

## 2021-10-12 DIAGNOSIS — M51.16 LUMBAR DISC HERNIATION WITH RADICULOPATHY: ICD-10-CM

## 2021-10-12 DIAGNOSIS — I48.91 ATRIAL FIBRILLATION WITH RVR (H): ICD-10-CM

## 2021-10-12 DIAGNOSIS — M75.51 SUBACROMIAL BURSITIS OF BOTH SHOULDERS: Primary | ICD-10-CM

## 2021-10-12 DIAGNOSIS — M51.369 DDD (DEGENERATIVE DISC DISEASE), LUMBAR: ICD-10-CM

## 2021-10-12 DIAGNOSIS — M75.52 SUBACROMIAL BURSITIS OF BOTH SHOULDERS: Primary | ICD-10-CM

## 2021-10-12 PROCEDURE — 93971 EXTREMITY STUDY: CPT | Mod: RT | Performed by: RADIOLOGY

## 2021-10-12 PROCEDURE — 99214 OFFICE O/P EST MOD 30 MIN: CPT | Performed by: PREVENTIVE MEDICINE

## 2021-10-12 RX ORDER — METOPROLOL SUCCINATE 100 MG/1
100 TABLET, EXTENDED RELEASE ORAL 2 TIMES DAILY
Qty: 60 TABLET | Refills: 1 | Status: SHIPPED | OUTPATIENT
Start: 2021-10-12 | End: 2021-12-08

## 2021-10-12 ASSESSMENT — MIFFLIN-ST. JEOR: SCORE: 1894.7

## 2021-10-12 NOTE — LETTER
10/12/2021      RE: Aspen Mccullough  [address]  [facility] [id number]       HISTORY OF PRESENT ILLNESS  Ms. Mccullough is a pleasant 61 year old year old female who has chronic neck and low back pain and bilateral shoulder pain, improved since her injections  She continues to have some neck pain but it is improved since her cervical ANASTASIA    She is here today to discuss her chronic low back pain  She has not had imaging since 1.5 years ago  Has pain in low back that travels down her legs daily  Worse with sitting and standing      MEDICAL HISTORY  Patient Active Problem List   Diagnosis     Generalized osteoarthrosis, involving multiple sites     CRPS (complex regional pain syndrome), lower limb     Fibromyalgia     Somatoform disorder     Histrionic personality (H)     Encounter for long-term use of opiate analgesic     Knee joint replacement by other means     Hypothyroidism     Insomnia, unspecified type     Hyperlipidemia     Hashimoto's thyroiditis     Glucocorticoid deficiency (H)     Posttraumatic stress disorder     Impingement syndrome of left shoulder     Abdominal cramping, generalized     Intermittent diarrhea     Primary osteoarthritis involving multiple joints     Attention deficit disorder     Allergic rhinitis     Cushing's syndrome (H)     Endometriosis     Essential hypertension     Systemic lupus erythematosus (H)     S/P cervical spinal fusion     Paroxysmal atrial fibrillation (H)     Nonischemic cardiomyopathy (H)     Personal history of DVT (deep vein thrombosis)     History of pulmonary embolism     Acute deep vein thrombosis (DVT) of popliteal vein of right lower extremity (H)     Acute pulmonary embolism (H)     Adrenal cortical steroids causing adverse effect in therapeutic use     Alopecia areata     Anemia, blood loss     Ankle pain, left     ARF (acute renal failure) (H)     Arthritis, septic (H)     Chronic ethmoidal sinusitis     Chronic maxillary sinusitis     Deviated  nasal septum     Complications due to internal joint prosthesis (H)     Generalized pain     Iatrogenic hyperthyroidism     S/P TKR (total knee replacement)     Left shoulder pain     Lipoma of skin and subcutaneous tissue     Malabsorption     Nasal fracture     Nasal turbinate hypertrophy     Opioid type dependence (H)     Other specified abnormal findings of blood chemistry     Other acute postoperative pain     Pain in joint, lower leg     Postoperative hypotension     S/P total knee replacement     Thrombus of right atrial appendage without antecedent myocardial infarction     Chronic pain of both shoulders     GI bleed     Grade III internal hemorrhoids     Atrial fibrillation with RVR (H)     Acute deep vein thrombosis (DVT) of proximal vein of right lower extremity (H)     Pneumonia of left upper lobe due to infectious organism     Morbid obesity (H)     Cerebrovascular accident (CVA), unspecified mechanism (H)     CVA (cerebral vascular accident) (H)     Neuropathy of left suprascapular nerve     Neuropathy of right suprascapular nerve     Stage 3 right buttock       Current Outpatient Medications   Medication Sig Dispense Refill     atorvastatin (LIPITOR) 40 MG tablet Take 1 tablet (40 mg) by mouth every evening 30 tablet 0     carboxymethylcellulose (CARBOXYMETHYLCELLULOSE SODIUM) 0.5 % SOLN ophthalmic solution Place 1 drop into both eyes 2 times daily as needed  1 Bottle      chlorhexidine (PERIDEX) 0.12 % solution        cyanocobalamin 1000 MCG/ML injection Inject 1 mL (1,000 mcg) Subcutaneous every 7 days 4 mL 5     cyclobenzaprine (FLEXERIL) 10 MG tablet Take 1 tablet (10 mg) by mouth 3 times daily 90 tablet 3     cycloSPORINE (RESTASIS) 0.05 % ophthalmic emulsion Place 1 drop into both eyes 2 times daily       diltiazem ER COATED BEADS (CARDIZEM CD/CARTIA XT) 180 MG 24 hr capsule Take 1 capsule (180 mg) by mouth 2 times daily 180 capsule 3     Elastic Bandages & Supports (MEDICAL COMPRESSION SOCKS)  MISC 1 Package daily Please measure and distribute 2 pair of 20mmHg - 30mmHg THIGH high open or closed toe compression stockings with extra refills as indicated. Jobst ultrasheer or equivalent. 2 each 3     fexofenadine (ALLEGRA) 180 MG tablet Take 180 mg by mouth daily as needed        gabapentin (NEURONTIN) 300 MG capsule Take 300 mg by mouth 2 times daily (morning and afternoon) in addition to 800 mg tablet (total dose 1100mg)       gabapentin (NEURONTIN) 800 MG tablet Take 800 mg by mouth At Bedtime (in addition to the 2 - 1100mg doses)       HYDROmorphone (DILAUDID) 8 MG tablet Take 8 mg by mouth every 8 hours . Max of 3 doses per day.       levothyroxine (SYNTHROID/LEVOTHROID) 300 MCG tablet Take 1 tablet (300 mcg) by mouth daily       lifitegrast (XIIDRA) 5 % opthalmic solution Place 1 drop into both eyes 2 times daily       liothyronine (CYTOMEL) 25 MCG tablet Take 25 mcg by mouth 2 times daily        metoprolol succinate ER (TOPROL-XL) 100 MG 24 hr tablet Take 1 tablet (100 mg) by mouth 2 times daily 60 tablet 1     morphine (MS CONTIN) 30 MG 12 hr tablet Take 30 mg by mouth 2 times daily (morning and night) in addition to 60 mg tablet for a total dose of 90mg. 270 tablet 0     morphine (MS CONTIN) 60 MG 12 hr tablet Take 60 mg by mouth daily in the afternoon (in addition to the 2 - 90mg doses)       morphine (MS CONTIN) 60 MG 12 hr tablet Take 60 mg by mouth 2 times daily (morning and night) in addition to 30mg tablet for a total dose of 90mg 30 tablet 0     NASONEX 50 MCG/ACT spray Spray 1 spray into both nostrils daily as needed   11     nystatin (MYCOSTATIN) 548838 UNIT/GM external powder Apply topically 3 times daily as needed 60 g 1     prednisoLONE acetate (PRED FORTE) 1 % ophthalmic susp Place 1 drop into both eyes 4 times daily        predniSONE (DELTASONE) 1 MG tablet Take 3 mg by mouth every other day in addition to 5mg tablet for a total dose of 8mg.  2     predniSONE (DELTASONE) 5 MG tablet  Take 10 mg by mouth every other day       predniSONE (DELTASONE) 5 MG tablet Take 5 mg by mouth every other day in addition to 1mg tablets for a total dose of 8mg       probiotic CAPS Take 1 capsule by mouth 2 times daily       rivaroxaban ANTICOAGULANT (XARELTO) 20 MG TABS tablet Take 20 mg by mouth daily (with dinner)        zolpidem (AMBIEN) 5 MG tablet Take 5 mg by mouth nightly as needed for sleep       BIOTIN FORTE PO Take 1 tablet by mouth daily        Elastic Bandages & Supports (MEDICAL COMPRESSION SOCKS) MISC 1 Package daily Please measure and distribute 2 pair of 20mmHg - 30mmHg knee high open or closed toe compression stockings with extra refills as indicated. Jobst ultrasheer or equivalent. 2 each 3     gabapentin (NEURONTIN) 800 MG tablet Take 800 mg by mouth 2 times daily (morning and afternoon) in addition to 300mg capsule (total dose 1100mg)       multivitamin, therapeutic with minerals (MULTI-VITAMIN) TABS tablet Take 1 tablet by mouth 2 times daily 100 tablet 3     sodium chloride 0.9% infusion Use 6mL once weekly to reconstitute levothyroxine powder before injection         Allergies   Allergen Reactions     Blood Transfusion Related (Informational Only) Other (See Comments)     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      Chlorhexidine Hives     Thor surgical cleanser     Codeine Hives     Has tolerated Oxycodone in past      Ibuprofen [Nsaids] Hives     Penicillins Hives     Other reaction(s): Unknown     Sulfa Drugs Hives     Other reaction(s): Unknown     Doxycycline GI Disturbance     Emesis & diarrhea  Patient denies allergy to this med     Hydroxyzine      rash     Mold      Red Wine Complex [Red Wine Powder]        Family History   Problem Relation Age of Onset     Hypertension Mother      No Known Problems Father      No Known Problems Sister      No Known Problems Brother      No Known Problems Maternal Grandmother      No Known Problems Maternal Grandfather      No Known  Problems Paternal Grandmother      No Known Problems Other      Social History     Socioeconomic History     Marital status:      Spouse name: None     Number of children: None     Years of education: None     Highest education level: None   Occupational History     None   Tobacco Use     Smoking status: Former Smoker     Packs/day: 1.50     Years: 20.00     Pack years: 30.00     Types: Cigarettes     Start date: 1973     Quit date: 1993     Years since quittin.7     Smokeless tobacco: Never Used     Tobacco comment: wish I never started   Substance and Sexual Activity     Alcohol use: No     Alcohol/week: 0.0 standard drinks     Drug use: No     Sexual activity: Not Currently     Partners: Male     Birth control/protection: Post-menopausal   Other Topics Concern     Parent/sibling w/ CABG, MI or angioplasty before 65F 55M? Not Asked   Social History Narrative    ** Merged History Encounter **          Social Determinants of Health     Financial Resource Strain:      Difficulty of Paying Living Expenses:    Food Insecurity:      Worried About Running Out of Food in the Last Year:      Ran Out of Food in the Last Year:    Transportation Needs:      Lack of Transportation (Medical):      Lack of Transportation (Non-Medical):    Physical Activity:      Days of Exercise per Week:      Minutes of Exercise per Session:    Stress:      Feeling of Stress :    Social Connections:      Frequency of Communication with Friends and Family:      Frequency of Social Gatherings with Friends and Family:      Attends [employer] Services:      Active Member of Clubs or Organizations:      Attends Club or Organization Meetings:      Marital Status:    Intimate Partner Violence:      Fear of Current or Ex-Partner:      Emotionally Abused:      Physically Abused:      Sexually Abused:        Additional medical/Social/Surgical histories reviewed in EPIC and updated as appropriate.     REVIEW OF SYSTEMS (10/12/2021)  10  "point ROS of systems including Constitutional, Eyes, Respiratory, Cardiovascular, Gastroenterology, Genitourinary, Integumentary, Musculoskeletal, Psychiatric, Allergic/Immunologic were all negative except for pertinent positives noted in my HPI.     PHYSICAL EXAM  Vitals:    10/12/21 1526   Resp: 17   Weight: 127 kg (280 lb)   Height: 1.753 m (5' 9.02\")     Vital Signs: Resp 17   Ht 1.753 m (5' 9.02\")   Wt 127 kg (280 lb)   BMI 41.33 kg/m   Patient declined being weighed. Body mass index is 41.33 kg/m .    General  - normal appearance, in no obvious distress  HEENT  - conjunctivae not injected, moist mucous membranes, normocephalic/atraumatic head, ears normal appearance, no lesions, mouth normal appearance, no scars, normal dentition and teeth present  CV  - normal peripheral perfusion  Pulm  - normal respiratory pattern, non-labored  Musculoskeletal - lumbar spine  - stance: normal gait without limp, no obvious leg length discrepancy, normal heel and toe walk  - inspection: normal bone and joint alignment, no obvious scoliosis  - palpation: no paravertebral or bony tenderness  - ROM: flexion exacerbates pain, normal extension, sidebending, rotation  - strength: lower extremities 5/5 in all planes  - special tests:  (+) straight leg raise  (+) slump test  Neuro  - patellar and Achilles DTRs 2+ bilaterally, lower extremity sensory deficit throughout L5 distribution, grossly normal coordination, normal muscle tone  Skin  - no ecchymosis, erythema, warmth, or induration, no obvious rash  Psych  - interactive, appropriate, normal mood and affect    ASSESSMENT & PLAN  62 yo female with cervical ddd, radicular pain, improving, bilateral shoulder RC arthropathy, improving, and lumbar ddd, disc herniations, worse      I independently reviewed the following imaging studies:  Lumbar CT: shows ddd, disc herniations  Has been over 1.5 years since advanced imaging of her lumbar spine and will order a new lumbar MRI  Cont. " Medications as written  Cont. HEP  Consider Pool PT  F/u after MRI      Appropriate PPE was utilized for prevention of spread of Covid-19.  Shon Simpson MD, CAM        Shon Simpson MD

## 2021-10-12 NOTE — NURSING NOTE
"Reason For Visit:   Chief Complaint   Patient presents with     RECHECK     f/u bilateral shoulder        Resp 17   Ht 1.753 m (5' 9.02\")   Wt 127 kg (280 lb)   BMI 41.33 kg/m      Pain Assessment  Patient Currently in Pain: Yes  0-10 Pain Scale: 7  Primary Pain Location: Shoulder      Jackelyn Pal ATC        "

## 2021-10-12 NOTE — PROGRESS NOTES
HISTORY OF PRESENT ILLNESS  Ms. Mccullough is a pleasant 61 year old year old female who has chronic neck and low back pain and bilateral shoulder pain, improved since her injections  She continues to have some neck pain but it is improved since her cervical ANASTASIA    She is here today to discuss her chronic low back pain  She has not had imaging since 1.5 years ago  Has pain in low back that travels down her legs daily  Worse with sitting and standing      MEDICAL HISTORY  Patient Active Problem List   Diagnosis     Generalized osteoarthrosis, involving multiple sites     CRPS (complex regional pain syndrome), lower limb     Fibromyalgia     Somatoform disorder     Histrionic personality (H)     Encounter for long-term use of opiate analgesic     Knee joint replacement by other means     Hypothyroidism     Insomnia, unspecified type     Hyperlipidemia     Hashimoto's thyroiditis     Glucocorticoid deficiency (H)     Posttraumatic stress disorder     Impingement syndrome of left shoulder     Abdominal cramping, generalized     Intermittent diarrhea     Primary osteoarthritis involving multiple joints     Attention deficit disorder     Allergic rhinitis     Cushing's syndrome (H)     Endometriosis     Essential hypertension     Systemic lupus erythematosus (H)     S/P cervical spinal fusion     Paroxysmal atrial fibrillation (H)     Nonischemic cardiomyopathy (H)     Personal history of DVT (deep vein thrombosis)     History of pulmonary embolism     Acute deep vein thrombosis (DVT) of popliteal vein of right lower extremity (H)     Acute pulmonary embolism (H)     Adrenal cortical steroids causing adverse effect in therapeutic use     Alopecia areata     Anemia, blood loss     Ankle pain, left     ARF (acute renal failure) (H)     Arthritis, septic (H)     Chronic ethmoidal sinusitis     Chronic maxillary sinusitis     Deviated nasal septum     Complications due to internal joint prosthesis (H)     Generalized pain      Iatrogenic hyperthyroidism     S/P TKR (total knee replacement)     Left shoulder pain     Lipoma of skin and subcutaneous tissue     Malabsorption     Nasal fracture     Nasal turbinate hypertrophy     Opioid type dependence (H)     Other specified abnormal findings of blood chemistry     Other acute postoperative pain     Pain in joint, lower leg     Postoperative hypotension     S/P total knee replacement     Thrombus of right atrial appendage without antecedent myocardial infarction     Chronic pain of both shoulders     GI bleed     Grade III internal hemorrhoids     Atrial fibrillation with RVR (H)     Acute deep vein thrombosis (DVT) of proximal vein of right lower extremity (H)     Pneumonia of left upper lobe due to infectious organism     Morbid obesity (H)     Cerebrovascular accident (CVA), unspecified mechanism (H)     CVA (cerebral vascular accident) (H)     Neuropathy of left suprascapular nerve     Neuropathy of right suprascapular nerve     Stage 3 right buttock       Current Outpatient Medications   Medication Sig Dispense Refill     atorvastatin (LIPITOR) 40 MG tablet Take 1 tablet (40 mg) by mouth every evening 30 tablet 0     carboxymethylcellulose (CARBOXYMETHYLCELLULOSE SODIUM) 0.5 % SOLN ophthalmic solution Place 1 drop into both eyes 2 times daily as needed  1 Bottle      chlorhexidine (PERIDEX) 0.12 % solution        cyanocobalamin 1000 MCG/ML injection Inject 1 mL (1,000 mcg) Subcutaneous every 7 days 4 mL 5     cyclobenzaprine (FLEXERIL) 10 MG tablet Take 1 tablet (10 mg) by mouth 3 times daily 90 tablet 3     cycloSPORINE (RESTASIS) 0.05 % ophthalmic emulsion Place 1 drop into both eyes 2 times daily       diltiazem ER COATED BEADS (CARDIZEM CD/CARTIA XT) 180 MG 24 hr capsule Take 1 capsule (180 mg) by mouth 2 times daily 180 capsule 3     Elastic Bandages & Supports (MEDICAL COMPRESSION SOCKS) MISC 1 Package daily Please measure and distribute 2 pair of 20mmHg - 30mmHg THIGH high open  or closed toe compression stockings with extra refills as indicated. Jobst ultrasheer or equivalent. 2 each 3     fexofenadine (ALLEGRA) 180 MG tablet Take 180 mg by mouth daily as needed        gabapentin (NEURONTIN) 300 MG capsule Take 300 mg by mouth 2 times daily (morning and afternoon) in addition to 800 mg tablet (total dose 1100mg)       gabapentin (NEURONTIN) 800 MG tablet Take 800 mg by mouth At Bedtime (in addition to the 2 - 1100mg doses)       HYDROmorphone (DILAUDID) 8 MG tablet Take 8 mg by mouth every 8 hours . Max of 3 doses per day.       levothyroxine (SYNTHROID/LEVOTHROID) 300 MCG tablet Take 1 tablet (300 mcg) by mouth daily       lifitegrast (XIIDRA) 5 % opthalmic solution Place 1 drop into both eyes 2 times daily       liothyronine (CYTOMEL) 25 MCG tablet Take 25 mcg by mouth 2 times daily        metoprolol succinate ER (TOPROL-XL) 100 MG 24 hr tablet Take 1 tablet (100 mg) by mouth 2 times daily 60 tablet 1     morphine (MS CONTIN) 30 MG 12 hr tablet Take 30 mg by mouth 2 times daily (morning and night) in addition to 60 mg tablet for a total dose of 90mg. 270 tablet 0     morphine (MS CONTIN) 60 MG 12 hr tablet Take 60 mg by mouth daily in the afternoon (in addition to the 2 - 90mg doses)       morphine (MS CONTIN) 60 MG 12 hr tablet Take 60 mg by mouth 2 times daily (morning and night) in addition to 30mg tablet for a total dose of 90mg 30 tablet 0     NASONEX 50 MCG/ACT spray Spray 1 spray into both nostrils daily as needed   11     nystatin (MYCOSTATIN) 592376 UNIT/GM external powder Apply topically 3 times daily as needed 60 g 1     prednisoLONE acetate (PRED FORTE) 1 % ophthalmic susp Place 1 drop into both eyes 4 times daily        predniSONE (DELTASONE) 1 MG tablet Take 3 mg by mouth every other day in addition to 5mg tablet for a total dose of 8mg.  2     predniSONE (DELTASONE) 5 MG tablet Take 10 mg by mouth every other day       predniSONE (DELTASONE) 5 MG tablet Take 5 mg by mouth  every other day in addition to 1mg tablets for a total dose of 8mg       probiotic CAPS Take 1 capsule by mouth 2 times daily       rivaroxaban ANTICOAGULANT (XARELTO) 20 MG TABS tablet Take 20 mg by mouth daily (with dinner)        zolpidem (AMBIEN) 5 MG tablet Take 5 mg by mouth nightly as needed for sleep       BIOTIN FORTE PO Take 1 tablet by mouth daily        Elastic Bandages & Supports (MEDICAL COMPRESSION SOCKS) MISC 1 Package daily Please measure and distribute 2 pair of 20mmHg - 30mmHg knee high open or closed toe compression stockings with extra refills as indicated. Jobst ultrasheer or equivalent. 2 each 3     gabapentin (NEURONTIN) 800 MG tablet Take 800 mg by mouth 2 times daily (morning and afternoon) in addition to 300mg capsule (total dose 1100mg)       multivitamin, therapeutic with minerals (MULTI-VITAMIN) TABS tablet Take 1 tablet by mouth 2 times daily 100 tablet 3     sodium chloride 0.9% infusion Use 6mL once weekly to reconstitute levothyroxine powder before injection         Allergies   Allergen Reactions     Blood Transfusion Related (Informational Only) Other (See Comments)     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      Chlorhexidine Hives     Thor surgical cleanser     Codeine Hives     Has tolerated Oxycodone in past      Ibuprofen [Nsaids] Hives     Penicillins Hives     Other reaction(s): Unknown     Sulfa Drugs Hives     Other reaction(s): Unknown     Doxycycline GI Disturbance     Emesis & diarrhea  Patient denies allergy to this med     Hydroxyzine      rash     Mold      Red Wine Complex [Red Wine Powder]        Family History   Problem Relation Age of Onset     Hypertension Mother      No Known Problems Father      No Known Problems Sister      No Known Problems Brother      No Known Problems Maternal Grandmother      No Known Problems Maternal Grandfather      No Known Problems Paternal Grandmother      No Known Problems Other      Social History     Socioeconomic  History     Marital status:      Spouse name: None     Number of children: None     Years of education: None     Highest education level: None   Occupational History     None   Tobacco Use     Smoking status: Former Smoker     Packs/day: 1.50     Years: 20.00     Pack years: 30.00     Types: Cigarettes     Start date: 1973     Quit date: 1993     Years since quittin.7     Smokeless tobacco: Never Used     Tobacco comment: wish I never started   Substance and Sexual Activity     Alcohol use: No     Alcohol/week: 0.0 standard drinks     Drug use: No     Sexual activity: Not Currently     Partners: Male     Birth control/protection: Post-menopausal   Other Topics Concern     Parent/sibling w/ CABG, MI or angioplasty before 65F 55M? Not Asked   Social History Narrative    ** Merged History Encounter **          Social Determinants of Health     Financial Resource Strain:      Difficulty of Paying Living Expenses:    Food Insecurity:      Worried About Running Out of Food in the Last Year:      Ran Out of Food in the Last Year:    Transportation Needs:      Lack of Transportation (Medical):      Lack of Transportation (Non-Medical):    Physical Activity:      Days of Exercise per Week:      Minutes of Exercise per Session:    Stress:      Feeling of Stress :    Social Connections:      Frequency of Communication with Friends and Family:      Frequency of Social Gatherings with Friends and Family:      Attends Tenriism Services:      Active Member of Clubs or Organizations:      Attends Club or Organization Meetings:      Marital Status:    Intimate Partner Violence:      Fear of Current or Ex-Partner:      Emotionally Abused:      Physically Abused:      Sexually Abused:        Additional medical/Social/Surgical histories reviewed in [facility] and updated as appropriate.     REVIEW OF SYSTEMS (10/12/2021)  10 point ROS of systems including Constitutional, Eyes, Respiratory, Cardiovascular,  "Gastroenterology, Genitourinary, Integumentary, Musculoskeletal, Psychiatric, Allergic/Immunologic were all negative except for pertinent positives noted in my HPI.     PHYSICAL EXAM  Vitals:    10/12/21 1526   Resp: 17   Weight: 127 kg (280 lb)   Height: 1.753 m (5' 9.02\")     Vital Signs: Resp 17   Ht 1.753 m (5' 9.02\")   Wt 127 kg (280 lb)   BMI 41.33 kg/m   Patient declined being weighed. Body mass index is 41.33 kg/m .    General  - normal appearance, in no obvious distress  HEENT  - conjunctivae not injected, moist mucous membranes, normocephalic/atraumatic head, ears normal appearance, no lesions, mouth normal appearance, no scars, normal dentition and teeth present  CV  - normal peripheral perfusion  Pulm  - normal respiratory pattern, non-labored  Musculoskeletal - lumbar spine  - stance: normal gait without limp, no obvious leg length discrepancy, normal heel and toe walk  - inspection: normal bone and joint alignment, no obvious scoliosis  - palpation: no paravertebral or bony tenderness  - ROM: flexion exacerbates pain, normal extension, sidebending, rotation  - strength: lower extremities 5/5 in all planes  - special tests:  (+) straight leg raise  (+) slump test  Neuro  - patellar and Achilles DTRs 2+ bilaterally, lower extremity sensory deficit throughout L5 distribution, grossly normal coordination, normal muscle tone  Skin  - no ecchymosis, erythema, warmth, or induration, no obvious rash  Psych  - interactive, appropriate, normal mood and affect    ASSESSMENT & PLAN  62 yo female with cervical ddd, radicular pain, improving, bilateral shoulder RC arthropathy, improving, and lumbar ddd, disc herniations, worse      I independently reviewed the following imaging studies:  Lumbar CT: shows ddd, disc herniations  Has been over 1.5 years since advanced imaging of her lumbar spine and will order a new lumbar MRI  Cont. Medications as written  Cont. HEP  Consider Pool PT  F/u after MRI      Appropriate " PPE was utilized for prevention of spread of Covid-19.  Shon Simpsno MD, CAM

## 2021-10-20 ENCOUNTER — VIRTUAL VISIT (OUTPATIENT)
Dept: ORTHOPEDICS | Facility: CLINIC | Age: 61
End: 2021-10-20
Payer: COMMERCIAL

## 2021-10-20 ENCOUNTER — HOSPITAL ENCOUNTER (OUTPATIENT)
Dept: WOUND CARE | Facility: CLINIC | Age: 61
End: 2021-10-20
Attending: PHYSICIAN ASSISTANT
Payer: COMMERCIAL

## 2021-10-20 DIAGNOSIS — M54.12 CERVICAL RADICULAR PAIN: Primary | ICD-10-CM

## 2021-10-20 DIAGNOSIS — S31.819S WOUND OF RIGHT BUTTOCK, SEQUELA: ICD-10-CM

## 2021-10-20 PROBLEM — Z79.01 CHRONIC ANTICOAGULATION: Status: ACTIVE | Noted: 2020-11-08

## 2021-10-20 PROBLEM — Z66 DNR (DO NOT RESUSCITATE): Status: ACTIVE | Noted: 2020-02-26

## 2021-10-20 PROBLEM — R61 CRANIOFACIAL HYPERHIDROSIS: Status: ACTIVE | Noted: 2021-03-05

## 2021-10-20 PROBLEM — M54.17 LUMBOSACRAL RADICULOPATHY AT S1: Status: ACTIVE | Noted: 2021-10-20

## 2021-10-20 PROBLEM — Z79.899 CONTROLLED SUBSTANCE AGREEMENT SIGNED: Status: ACTIVE | Noted: 2021-02-01

## 2021-10-20 PROBLEM — E87.1 HYPO-OSMOLALITY AND HYPONATREMIA: Status: ACTIVE | Noted: 2021-06-23

## 2021-10-20 PROBLEM — Z79.52 CURRENT CHRONIC USE OF SYSTEMIC STEROIDS: Status: ACTIVE | Noted: 2019-10-21

## 2021-10-20 PROBLEM — N31.8 HYPERTONIC BLADDER: Status: ACTIVE | Noted: 2019-11-18
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
## 2021-10-20 PROBLEM — K59.00 CONSTIPATION: Status: ACTIVE | Noted: 2021-10-20

## 2021-10-20 PROBLEM — R53.1 WEAKNESS: Status: ACTIVE | Noted: 2021-06-23

## 2021-10-20 PROBLEM — R19.7 OVERFLOW DIARRHEA: Status: ACTIVE | Noted: 2021-10-20

## 2021-10-20 PROBLEM — S49.90XA UNSPECIFIED INJURY OF SHOULDER AND UPPER ARM, UNSPECIFIED ARM, INITIAL ENCOUNTER: Status: ACTIVE | Noted: 2021-06-23

## 2021-10-20 PROCEDURE — 99213 OFFICE O/P EST LOW 20 MIN: CPT | Mod: 95 | Performed by: PREVENTIVE MEDICINE

## 2021-10-20 PROCEDURE — 99212 OFFICE O/P EST SF 10 MIN: CPT | Mod: 95 | Performed by: PHYSICIAN ASSISTANT

## 2021-10-20 NOTE — LETTER
10/20/2021         RE: Aspen Mccullough  [address]  [place] [facility] [address]        Dear Colleague,    Thank you for referring your patient, Aspen Mccullough, to the [facility] [place]. Please see a copy of my visit note below.    Aspen is a 61 year old who is being evaluated via a billable telephone visit.      What phone number would you like to be contacted at?   How would you like to obtain your AVS? Riannahart      Subjective   Aspen is a 61 year old who presents for the following health issues followup for neck and shoulder pain  Both her shoulders and neck pain are improved. She continues to have tingling in wrist and hand      HPI     Review of Systems   Constitutional, HEENT, cardiovascular, pulmonary, gi and gu systems are negative, except as otherwise noted.      Objective           Vitals:  No vitals were obtained today due to virtual visit.    Physical Exam   healthy, alert and no distress  PSYCH: Alert and oriented times 3; coherent speech, normal   rate and volume, able to articulate logical thoughts, able   to abstract reason, no tangential thoughts, no hallucinations   or delusions  Her affect is normal  RESP: No cough, no audible wheezing, able to talk in full sentences  Remainder of exam unable to be completed due to telephone visits        Assesement/Plan:  62 yo female with cervical ddd, radicular pain, bilateral shoulder pain, and left wrist carpal tunnel  Discussed following up in a few days to consider carpal tunnel injection  Cont. medications    Phone call duration: 20 minutes  Phone call start: 2:00pm  Phone call end: 2:20pm  Dr Simpson      Again, thank you for allowing me to participate in the care of your patient.        Sincerely,        Shon Simpson MD

## 2021-10-20 NOTE — LETTER
10/20/2021         RE: Aspen Mccullough  [address]  [place] [address]        Dear Colleague,    Thank you for referring your patient, Aspen Mccullough, to the [facility] [place]. Please see a copy of my visit note below.    Aspen is a 61 year old who is being evaluated via a billable telephone visit.      What phone number would you like to be contacted at?   How would you like to obtain your AVS? Riannahart      Subjective   Aspen is a 61 year old who presents for the following health issues followup for neck and shoulder pain  Both her shoulders and neck pain are improved. She continues to have tingling in wrist and hand      HPI     Review of Systems   Constitutional, HEENT, cardiovascular, pulmonary, gi and gu systems are negative, except as otherwise noted.      Objective           Vitals:  No vitals were obtained today due to virtual visit.    Physical Exam   healthy, alert and no distress  PSYCH: Alert and oriented times 3; coherent speech, normal   rate and volume, able to articulate logical thoughts, able   to abstract reason, no tangential thoughts, no hallucinations   or delusions  Her affect is normal  RESP: No cough, no audible wheezing, able to talk in full sentences  Remainder of exam unable to be completed due to telephone visits        Assesement/Plan:  60 yo female with cervical ddd, radicular pain, bilateral shoulder pain, and left wrist carpal tunnel  Discussed following up in a few days to consider carpal tunnel injection  Cont. medications    Phone call duration: 20 minutes  Phone call start: 2:00pm  Phone call end: 2:20pm  Dr Simpson      Again, thank you for allowing me to participate in the care of your patient.        Sincerely,        Shon Simpson MD

## 2021-10-20 NOTE — PROGRESS NOTES
Aspen is a 61 year old who is being evaluated via a billable telephone visit.      What phone number would you like to be contacted at?   How would you like to obtain your AVS? Serjio      Erick Louise is a 61 year old who presents for the following health issues followup for neck and shoulder pain  Both her shoulders and neck pain are improved. She continues to have tingling in wrist and hand      HPI     Review of Systems   Constitutional, HEENT, cardiovascular, pulmonary, gi and gu systems are negative, except as otherwise noted.      Objective           Vitals:  No vitals were obtained today due to virtual visit.    Physical Exam   healthy, alert and no distress  PSYCH: Alert and oriented times 3; coherent speech, normal   rate and volume, able to articulate logical thoughts, able   to abstract reason, no tangential thoughts, no hallucinations   or delusions  Her affect is normal  RESP: No cough, no audible wheezing, able to talk in full sentences  Remainder of exam unable to be completed due to telephone visits        Assesement/Plan:  60 yo female with cervical ddd, radicular pain, bilateral shoulder pain, and left wrist carpal tunnel  Discussed following up in a few days to consider carpal tunnel injection  Cont. medications    Phone call duration: 20 minutes  Phone call start: 2:00pm  Phone call end: 2:20pm  Dr Simpson

## 2021-10-20 NOTE — PATIENT INSTRUCTIONS
Thanks for coming today.  Ortho[facility] Medicine Clinic  [address] [place] [address]    To schedule future appointments in Ortho Clinic, you may call [phone].    To schedule ordered imaging by your provider:   Call [facility] [phone]    To schedule an injection ordered by your provider:  Call [facility] Injection scheduling line: [phone]  [email or web address] available online at:  [email or web address]    Please call if any further questions or concerns ([phone]).  Clinic hours 8 am to 5 pm.    Return to clinic (call) if symptoms worsen or fail to improve.

## 2021-10-20 NOTE — PROGRESS NOTES
Patient called for a telephone visit. Certified Wound Care Nurse time spent evaluating patient record, completed a full evaluation and documented wound(s) & sarah-wound skin; provided recommendation based on treatment plan. Reviewed discharge instructions, patient education, and discussed plan of care with appropriate medical team staff members and patient and/or family members.     Wound appears greatly improved and pt was instructed by DARVIN for wound care and return appt to [facility] if wound deteriorates or with new issues.

## 2021-10-22 ENCOUNTER — LAB (OUTPATIENT)
Dept: LAB | Facility: CLINIC | Age: 61
End: 2021-10-22
Attending: RADIOLOGY
Payer: COMMERCIAL

## 2021-10-22 DIAGNOSIS — Z11.59 ENCOUNTER FOR SCREENING FOR OTHER VIRAL DISEASES: ICD-10-CM

## 2021-10-22 PROCEDURE — U0005 INFEC AGEN DETEC AMPLI PROBE: HCPCS

## 2021-10-22 PROCEDURE — U0003 INFECTIOUS AGENT DETECTION BY NUCLEIC ACID (DNA OR RNA); SEVERE ACUTE RESPIRATORY SYNDROME CORONAVIRUS 2 (SARS-COV-2) (CORONAVIRUS DISEASE [COVID-19]), AMPLIFIED PROBE TECHNIQUE, MAKING USE OF HIGH THROUGHPUT TECHNOLOGIES AS DESCRIBED BY CMS-2020-01-R: HCPCS

## 2021-10-23 LAB — SARS-COV-2 RNA RESP QL NAA+PROBE: NEGATIVE

## 2021-10-24 ENCOUNTER — HEALTH MAINTENANCE LETTER (OUTPATIENT)
Age: 61
End: 2021-10-24

## 2021-10-25 ENCOUNTER — OFFICE VISIT (OUTPATIENT)
Dept: ORTHOPEDICS | Facility: CLINIC | Age: 61
End: 2021-10-25
Payer: COMMERCIAL

## 2021-10-25 DIAGNOSIS — G56.02 CARPAL TUNNEL SYNDROME OF LEFT WRIST: Primary | ICD-10-CM

## 2021-10-25 PROCEDURE — 20526 THER INJECTION CARP TUNNEL: CPT | Mod: LT | Performed by: PREVENTIVE MEDICINE

## 2021-10-25 PROCEDURE — 76942 ECHO GUIDE FOR BIOPSY: CPT | Performed by: PREVENTIVE MEDICINE

## 2021-10-25 PROCEDURE — 99214 OFFICE O/P EST MOD 30 MIN: CPT | Mod: 25 | Performed by: PREVENTIVE MEDICINE

## 2021-10-25 RX ORDER — METHYLPREDNISOLONE ACETATE 40 MG/ML
40 INJECTION, SUSPENSION INTRA-ARTICULAR; INTRALESIONAL; INTRAMUSCULAR; SOFT TISSUE
Status: DISCONTINUED | OUTPATIENT
Start: 2021-10-25 | End: 2022-02-17

## 2021-10-25 RX ADMIN — METHYLPREDNISOLONE ACETATE 40 MG: 40 INJECTION, SUSPENSION INTRA-ARTICULAR; INTRALESIONAL; INTRAMUSCULAR; SOFT TISSUE at 09:12

## 2021-10-25 ASSESSMENT — PAIN SCALES - GENERAL: PAINLEVEL: EXTREME PAIN (8)

## 2021-10-25 NOTE — PATIENT INSTRUCTIONS
Thanks for coming today.  Ortho[facility] Medicine Clinic  [address] [place] [address]    To schedule future appointments in Ortho Clinic, you may call [phone].    To schedule ordered imaging by your provider:   Call [facility] [phone]    To schedule an injection ordered by your provider:  Call [facility] Injection scheduling line: [phone]  [email or web address] available online at:  [email or web address]    Please call if any further questions or concerns ([phone]).  Clinic hours 8 am to 5 pm.    Return to clinic (call) if symptoms worsen or fail to improve.

## 2021-10-25 NOTE — PROGRESS NOTES
HISTORY OF PRESENT ILLNESS  Ms. Mccullough is a pleasant 61 year old year old female who presents to clinic today with left wrist carpal tunnel syndrome  Aspen explains that her neck injection has helped with her neck pain and left arm pain  Location: left wrist/fingers  Quality:  achy pain    Severity: 6/10 at worst    Duration: past 6 months worse  Timing: occurs intermittently  Context: occurs while exercising and lifting and using hand  Modifying factors:  resting and non-use makes it better, movement and use makes it worse  Associated signs & symptoms: tingling in her hand left    MEDICAL HISTORY  Patient Active Problem List   Diagnosis     Generalized osteoarthrosis, involving multiple sites     CRPS (complex regional pain syndrome), lower limb     Fibromyalgia     Somatoform disorder     Histrionic personality (H)     Encounter for long-term use of opiate analgesic     Knee joint replacement by other means     Hypothyroidism     Insomnia, unspecified type     Hyperlipidemia     Hashimoto's thyroiditis     Glucocorticoid deficiency (H)     Posttraumatic stress disorder     Impingement syndrome of left shoulder     Abdominal cramping, generalized     Intermittent diarrhea     Primary osteoarthritis involving multiple joints     Attention deficit disorder     Allergic rhinitis     Cushing's syndrome (H)     Endometriosis     Essential hypertension     Systemic lupus erythematosus (H)     S/P cervical spinal fusion     Paroxysmal atrial fibrillation (H)     Nonischemic cardiomyopathy (H)     Personal history of DVT (deep vein thrombosis)     History of pulmonary embolism     Acute deep vein thrombosis (DVT) of popliteal vein of right lower extremity (H)     Acute pulmonary embolism (H)     Adrenal cortical steroids causing adverse effect in therapeutic use     Alopecia areata     Anemia, blood loss     Ankle pain, left     ARF (acute renal failure) (H)     Arthritis, septic (H)     Chronic ethmoidal sinusitis      Chronic maxillary sinusitis     Deviated nasal septum     Complications due to internal joint prosthesis (H)     Generalized pain     Iatrogenic hyperthyroidism     S/P TKR (total knee replacement)     Left shoulder pain     Lipoma of skin and subcutaneous tissue     Malabsorption     Nasal fracture     Nasal turbinate hypertrophy     Opioid type dependence (H)     Other specified abnormal findings of blood chemistry     Other acute postoperative pain     Pain in joint, lower leg     Postoperative hypotension     S/P total knee replacement     Thrombus of right atrial appendage without antecedent myocardial infarction     Chronic pain of both shoulders     GI bleed     Grade III internal hemorrhoids     Atrial fibrillation with RVR (H)     Acute deep vein thrombosis (DVT) of proximal vein of right lower extremity (H)     Pneumonia of left upper lobe due to infectious organism     Morbid obesity (H)     Cerebrovascular accident (CVA), unspecified mechanism (H)     CVA (cerebral vascular accident) (H)     Neuropathy of left suprascapular nerve     Neuropathy of right suprascapular nerve     Stage 3 right buttock     BMI 40.0-44.9, adult (H)     Chronic anticoagulation     Controlled substance agreement signed     Craniofacial hyperhidrosis     Current chronic use of systemic steroids     DNR (do not resuscitate)     Hypertonic bladder     Hypo-osmolality and hyponatremia     Lumbosacral radiculopathy at S1     Overflow diarrhea     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     Unspecified fall, initial encounter     Unspecified injury of shoulder and upper arm, unspecified arm, initial encounter     Weakness     Chronic pain disorder     Constipation       Current Outpatient Medications   Medication Sig Dispense Refill     atorvastatin (LIPITOR) 40 MG tablet Take 1 tablet (40 mg) by mouth every evening 30 tablet 0     carboxymethylcellulose (CARBOXYMETHYLCELLULOSE SODIUM) 0.5 % SOLN ophthalmic  solution Place 1 drop into both eyes 2 times daily as needed  1 Bottle      chlorhexidine (PERIDEX) 0.12 % solution        cyanocobalamin 1000 MCG/ML injection Inject 1 mL (1,000 mcg) Subcutaneous every 7 days 4 mL 5     cyclobenzaprine (FLEXERIL) 10 MG tablet Take 1 tablet (10 mg) by mouth 3 times daily 90 tablet 3     cycloSPORINE (RESTASIS) 0.05 % ophthalmic emulsion Place 1 drop into both eyes 2 times daily       diltiazem ER COATED BEADS (CARDIZEM CD/CARTIA XT) 180 MG 24 hr capsule Take 1 capsule (180 mg) by mouth 2 times daily 180 capsule 3     Elastic Bandages & Supports (MEDICAL COMPRESSION SOCKS) MISC 1 Package daily Please measure and distribute 2 pair of 20mmHg - 30mmHg THIGH high open or closed toe compression stockings with extra refills as indicated. Jobst ultrasheer or equivalent. 2 each 3     fexofenadine (ALLEGRA) 180 MG tablet Take 180 mg by mouth daily as needed        gabapentin (NEURONTIN) 300 MG capsule Take 300 mg by mouth 2 times daily (morning and afternoon) in addition to 800 mg tablet (total dose 1100mg)       gabapentin (NEURONTIN) 800 MG tablet Take 800 mg by mouth At Bedtime (in addition to the 2 - 1100mg doses)       HYDROmorphone (DILAUDID) 8 MG tablet Take 8 mg by mouth every 8 hours . Max of 3 doses per day.       levothyroxine (SYNTHROID/LEVOTHROID) 300 MCG tablet Take 1 tablet (300 mcg) by mouth daily       lifitegrast (XIIDRA) 5 % opthalmic solution Place 1 drop into both eyes 2 times daily       liothyronine (CYTOMEL) 25 MCG tablet Take 25 mcg by mouth 2 times daily        metoprolol succinate ER (TOPROL-XL) 100 MG 24 hr tablet Take 1 tablet (100 mg) by mouth 2 times daily 60 tablet 1     morphine (MS CONTIN) 30 MG 12 hr tablet Take 30 mg by mouth 2 times daily (morning and night) in addition to 60 mg tablet for a total dose of 90mg. 270 tablet 0     morphine (MS CONTIN) 60 MG 12 hr tablet Take 60 mg by mouth daily in the afternoon (in addition to the 2 - 90mg doses)        morphine (MS CONTIN) 60 MG 12 hr tablet Take 60 mg by mouth 2 times daily (morning and night) in addition to 30mg tablet for a total dose of 90mg 30 tablet 0     NASONEX 50 MCG/ACT spray Spray 1 spray into both nostrils daily as needed   11     nystatin (MYCOSTATIN) 427216 UNIT/GM external powder Apply topically 3 times daily as needed 60 g 1     prednisoLONE acetate (PRED FORTE) 1 % ophthalmic susp Place 1 drop into both eyes 4 times daily        predniSONE (DELTASONE) 1 MG tablet Take 3 mg by mouth every other day in addition to 5mg tablet for a total dose of 8mg.  2     predniSONE (DELTASONE) 5 MG tablet Take 10 mg by mouth every other day       predniSONE (DELTASONE) 5 MG tablet Take 5 mg by mouth every other day in addition to 1mg tablets for a total dose of 8mg       probiotic CAPS Take 1 capsule by mouth 2 times daily       rivaroxaban ANTICOAGULANT (XARELTO) 20 MG TABS tablet Take 20 mg by mouth daily (with dinner)        zolpidem (AMBIEN) 5 MG tablet Take 5 mg by mouth nightly as needed for sleep       BIOTIN FORTE PO Take 1 tablet by mouth daily        Elastic Bandages & Supports (MEDICAL COMPRESSION SOCKS) MISC 1 Package daily Please measure and distribute 2 pair of 20mmHg - 30mmHg knee high open or closed toe compression stockings with extra refills as indicated. Jobst ultrasheer or equivalent. 2 each 3     gabapentin (NEURONTIN) 800 MG tablet Take 800 mg by mouth 2 times daily (morning and afternoon) in addition to 300mg capsule (total dose 1100mg)       multivitamin, therapeutic with minerals (MULTI-VITAMIN) TABS tablet Take 1 tablet by mouth 2 times daily 100 tablet 3     sodium chloride 0.9% infusion Use 6mL once weekly to reconstitute levothyroxine powder before injection         Allergies   Allergen Reactions     Blood Transfusion Related (Informational Only) Other (See Comments)     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      Chlorhexidine Hives     Thor surgical cleanser      Codeine Hives     Has tolerated Oxycodone in past      Ibuprofen [Nsaids] Hives     Penicillins Hives     Other reaction(s): Unknown     Sulfa Drugs Hives     Other reaction(s): Unknown     Calcium Channel Blockers      Doxycycline GI Disturbance     Emesis & diarrhea  Patient denies allergy to this med     Hydroxyzine      rash     Mold      Red Wine Complex [Red Wine Powder]        Family History   Problem Relation Age of Onset     Hypertension Mother      No Known Problems Father      No Known Problems Sister      No Known Problems Brother      No Known Problems Maternal Grandmother      No Known Problems Maternal Grandfather      No Known Problems Paternal Grandmother      No Known Problems Other      Social History     Socioeconomic History     Marital status:      Spouse name: Not on file     Number of children: Not on file     Years of education: Not on file     Highest education level: Not on file   Occupational History     Not on file   Tobacco Use     Smoking status: Former Smoker     Packs/day: 1.50     Years: 20.00     Pack years: 30.00     Types: Cigarettes     Start date: 1973     Quit date: 1993     Years since quittin.8     Smokeless tobacco: Never Used     Tobacco comment: wish I never started   Substance and Sexual Activity     Alcohol use: No     Alcohol/week: 0.0 standard drinks     Drug use: No     Sexual activity: Not Currently     Partners: Male     Birth control/protection: Post-menopausal   Other Topics Concern     Parent/sibling w/ CABG, MI or angioplasty before 65F 55M? Not Asked   Social History Narrative    ** Merged History Encounter **          Social Determinants of Health     Financial Resource Strain:      Difficulty of Paying Living Expenses:    Food Insecurity:      Worried About Running Out of Food in the Last Year:      Ran Out of Food in the Last Year:    Transportation Needs:      Lack of Transportation (Medical):      Lack of Transportation (Non-Medical):     Physical Activity:      Days of Exercise per Week:      Minutes of Exercise per Session:    Stress:      Feeling of Stress :    Social Connections:      Frequency of Communication with Friends and Family:      Frequency of Social Gatherings with Friends and Family:      Attends [employer] Services:      Active Member of Clubs or Organizations:      Attends Club or Organization Meetings:      Marital Status:    Intimate Partner Violence:      Fear of Current or Ex-Partner:      Emotionally Abused:      Physically Abused:      Sexually Abused:        Additional medical/Social/Surgical histories reviewed in EPIC and updated as appropriate.     REVIEW OF SYSTEMS (10/25/2021)  10 point ROS of systems including Constitutional, Eyes, Respiratory, Cardiovascular, Gastroenterology, Genitourinary, Integumentary, Musculoskeletal, Psychiatric, Allergic/Immunologic were all negative except for pertinent positives noted in my HPI.     PHYSICAL EXAM  Vital Signs: VSS  General  - normal appearance, in no obvious distress  HEENT  - conjunctivae not injected, moist mucous membranes, normocephalic/atraumatic head, ears normal appearance, no lesions, mouth normal appearance, no scars, normal dentition and teeth present  CV  - normal radial pulse  Pulm  - normal respiratory pattern, non-labored  Musculoskeletal - left wrist  - inspection: mild thenar atrophy, normal joint alignment, no swelling  - palpation: no bony or soft tissue tenderness, no tenderness at the anatomical snuffbox  - ROM:  90 deg flexion   70 deg extension   25 deg abduction   65 deg adduction  - strength: 4/5  strength, 4/5 wrist abduction, 5/5 flexion, extension, pronation, supination, adduction  - special tests:  (+) Tinel's  (-) Finkelstein  (+) Phalen  (-) Crawford click test  (-) ulnar impaction  Neuro  - some thenar numbness, no motor deficit, grossly normal coordination, normal muscle tone  Skin  - no ecchymosis, erythema, warmth, or induration, no obvious  "rash  Psych  - interactive, appropriate, normal mood and affect  ASSESSMENT & PLAN  60 yo female with left wrist carpal tunnel syndrome, not resolved  And cervical ddd, disc herniations, improved    I independently reviewed the following imaging studies:  Cervical MRI: shows ddd, disc herniations  Consider repeat ANASTASIA  After a 20 minute discussion and examination, we decided to perform a same day injection for diagnostic and therapeutic purposes for left carpal tunnel  F/u in 1-2 weeks    Appropriate PPE was utilized for prevention of spread of Covid-19.  Shon Simpson MD, [facility]  Carpal Tunnel Injection - Ultrasound Guided  The patient was informed of the risks and the benefits of the procedure and a written consent was signed.  The patient s left wrist was prepped with chlorhexidine in sterile fashion.   40 mg of methylprednisolone suspension was drawn up into a 3 mL syringe with 1.0 mL of 1% lidocaine.  Injection was performed using sterile technique.  Under ultrasound guidance a 1.5\" 22-gauge needle was used to enter the left wrist at the distal palmar crease medial to the median nerve.  Needle placement was visualized and documented with ultrasound.  Ultrasound visualization required to ensure injection material enters the perineural sheath and not a vessel or nerve itself.  Injection performed long axis to the probe.  Injection solution visualized surrounding the perineurium.  Images were permanently stored for the patient's record.  There were no complications. The patient tolerated the procedure well. There was negligible bleeding.         Hand / Upper Extremity Injection/Arthrocentesis: L carpal tunnel    Date/Time: 10/25/2021 9:12 AM  Performed by: Shon Simpson MD  Authorized by: Shon Simpson MD     Indications:  Diagnostic and pain  Needle Size:  25 G  Guidance: ultrasound    Condition: carpal tunnel      Site:  L carpal tunnel  Medications:  40 mg methylPREDNISolone 40 MG/ML  Procedure " discussed: discussed risks, benefits, and alternatives    Consent Given by:  Patient  Timeout: timeout called immediately prior to procedure    Prep: patient was prepped and draped in usual sterile fashion     NDC: 39372-334-31

## 2021-10-25 NOTE — NURSING NOTE
[facility]   ORTHOPEDICS & SPORTS MEDICINE  [address] [place] [address]  Dept: [phone]  ______________________________________________________________________________    Patient: Aspen Mccullough   : 1960   MRN: [id number]   2021    INVASIVE PROCEDURE SAFETY CHECKLIST    Date:10/25/21  Procedure: Left wrist carpal tunnel injection   Patient Name: Aspen Mccullough  MRN: [id number]  YOB: 1960    Action: Complete sections as appropriate. Any discrepancy results in a HARD COPY until resolved.     PRE PROCEDURE:  Patient ID verified with 2 identifiers (name and  or MRN): Yes  Procedure and site verified with patient/designee (when able): Yes  Accurate consent documentation in medical record: Yes  H&P (or appropriate assessment) documented in medical record: NA  H&P must be up to 20 days prior to procedure and updates within 24 hours of procedure as applicable: NA  Relevant diagnostic and radiology test results appropriately labeled and displayed as applicable: NA  Procedure site(s) marked with provider initials: NA    TIMEOUT:  Time-Out performed immediately prior to starting procedure, including verbal and active participation of all team members addressing the following:Yes  * Correct patient identify  * Confirmed that the correct side and site are marked  * An accurate procedure consent form  * Agreement on the procedure to be done  * Correct patient position  * Relevant images and results are properly labeled and appropriately displayed  * The need to administer antibiotics or fluids for irrigation purposes during the procedure as applicable   * Safety precautions based on patient history or medication use    DURING PROCEDURE: Verification of correct person, site, and procedures any time the responsibility for care of the patient is transferred to another member of the care team.       Prior to injection, verified patient identity using patient's name  and date of birth.  Due to injection administration, patient instructed to remain in clinic for 15 minutes  afterwards, and to report any adverse reaction to me immediately.    Joint injection was performed.      Drug Amount Wasted:  None.  Vial/Syringe: Single dose vial  Expiration Date:  10/2022      Natalya Agarwal, ATC  October 25, 2021

## 2021-10-25 NOTE — LETTER
10/25/2021         RE: Aspen Mccullough  [address]  [facility] [address]        Dear Colleague,    Thank you for referring your patient, Aspen Mccullough, to the [facility] [place]. Please see a copy of my visit note below.    HISTORY OF PRESENT ILLNESS  Ms. Mccullough is a pleasant 61 year old year old female who presents to clinic today with left wrist carpal tunnel syndrome  Aspen explains that her neck injection has helped with her neck pain and left arm pain  Location: left wrist/fingers  Quality:  achy pain    Severity: 6/10 at worst    Duration: past 6 months worse  Timing: occurs intermittently  Context: occurs while exercising and lifting and using hand  Modifying factors:  resting and non-use makes it better, movement and use makes it worse  Associated signs & symptoms: tingling in her hand left    MEDICAL HISTORY  Patient Active Problem List   Diagnosis     Generalized osteoarthrosis, involving multiple sites     CRPS (complex regional pain syndrome), lower limb     Fibromyalgia     Somatoform disorder     Histrionic personality (H)     Encounter for long-term use of opiate analgesic     Knee joint replacement by other means     Hypothyroidism     Insomnia, unspecified type     Hyperlipidemia     Hashimoto's thyroiditis     Glucocorticoid deficiency (H)     Posttraumatic stress disorder     Impingement syndrome of left shoulder     Abdominal cramping, generalized     Intermittent diarrhea     Primary osteoarthritis involving multiple joints     Attention deficit disorder     Allergic rhinitis     Cushing's syndrome (H)     Endometriosis     Essential hypertension     Systemic lupus erythematosus (H)     S/P cervical spinal fusion     Paroxysmal atrial fibrillation (H)     Nonischemic cardiomyopathy (H)     Personal history of DVT (deep vein thrombosis)     History of pulmonary embolism     Acute deep vein thrombosis (DVT) of popliteal vein of right lower  extremity (H)     Acute pulmonary embolism (H)     Adrenal cortical steroids causing adverse effect in therapeutic use     Alopecia areata     Anemia, blood loss     Ankle pain, left     ARF (acute renal failure) (H)     Arthritis, septic (H)     Chronic ethmoidal sinusitis     Chronic maxillary sinusitis     Deviated nasal septum     Complications due to internal joint prosthesis (H)     Generalized pain     Iatrogenic hyperthyroidism     S/P TKR (total knee replacement)     Left shoulder pain     Lipoma of skin and subcutaneous tissue     Malabsorption     Nasal fracture     Nasal turbinate hypertrophy     Opioid type dependence (H)     Other specified abnormal findings of blood chemistry     Other acute postoperative pain     Pain in joint, lower leg     Postoperative hypotension     S/P total knee replacement     Thrombus of right atrial appendage without antecedent myocardial infarction     Chronic pain of both shoulders     GI bleed     Grade III internal hemorrhoids     Atrial fibrillation with RVR (H)     Acute deep vein thrombosis (DVT) of proximal vein of right lower extremity (H)     Pneumonia of left upper lobe due to infectious organism     Morbid obesity (H)     Cerebrovascular accident (CVA), unspecified mechanism (H)     CVA (cerebral vascular accident) (H)     Neuropathy of left suprascapular nerve     Neuropathy of right suprascapular nerve     Stage 3 right buttock     BMI 40.0-44.9, adult (H)     Chronic anticoagulation     Controlled substance agreement signed     Craniofacial hyperhidrosis     Current chronic use of systemic steroids     DNR (do not resuscitate)     Hypertonic bladder     Hypo-osmolality and hyponatremia     Lumbosacral radiculopathy at S1     Overflow diarrhea     Refusal of blood transfusions as patient is Catholic     Unspecified fall, initial encounter     Unspecified injury of shoulder and upper arm, unspecified arm, initial encounter     Weakness     Chronic pain  disorder     Constipation       Current Outpatient Medications   Medication Sig Dispense Refill     atorvastatin (LIPITOR) 40 MG tablet Take 1 tablet (40 mg) by mouth every evening 30 tablet 0     carboxymethylcellulose (CARBOXYMETHYLCELLULOSE SODIUM) 0.5 % SOLN ophthalmic solution Place 1 drop into both eyes 2 times daily as needed  1 Bottle      chlorhexidine (PERIDEX) 0.12 % solution        cyanocobalamin 1000 MCG/ML injection Inject 1 mL (1,000 mcg) Subcutaneous every 7 days 4 mL 5     cyclobenzaprine (FLEXERIL) 10 MG tablet Take 1 tablet (10 mg) by mouth 3 times daily 90 tablet 3     cycloSPORINE (RESTASIS) 0.05 % ophthalmic emulsion Place 1 drop into both eyes 2 times daily       diltiazem ER COATED BEADS (CARDIZEM CD/CARTIA XT) 180 MG 24 hr capsule Take 1 capsule (180 mg) by mouth 2 times daily 180 capsule 3     Elastic Bandages & Supports (MEDICAL COMPRESSION SOCKS) MISC 1 Package daily Please measure and distribute 2 pair of 20mmHg - 30mmHg THIGH high open or closed toe compression stockings with extra refills as indicated. Jobst ultrasheer or equivalent. 2 each 3     fexofenadine (ALLEGRA) 180 MG tablet Take 180 mg by mouth daily as needed        gabapentin (NEURONTIN) 300 MG capsule Take 300 mg by mouth 2 times daily (morning and afternoon) in addition to 800 mg tablet (total dose 1100mg)       gabapentin (NEURONTIN) 800 MG tablet Take 800 mg by mouth At Bedtime (in addition to the 2 - 1100mg doses)       HYDROmorphone (DILAUDID) 8 MG tablet Take 8 mg by mouth every 8 hours . Max of 3 doses per day.       levothyroxine (SYNTHROID/LEVOTHROID) 300 MCG tablet Take 1 tablet (300 mcg) by mouth daily       lifitegrast (XIIDRA) 5 % opthalmic solution Place 1 drop into both eyes 2 times daily       liothyronine (CYTOMEL) 25 MCG tablet Take 25 mcg by mouth 2 times daily        metoprolol succinate ER (TOPROL-XL) 100 MG 24 hr tablet Take 1 tablet (100 mg) by mouth 2 times daily 60 tablet 1     morphine (MS CONTIN)  30 MG 12 hr tablet Take 30 mg by mouth 2 times daily (morning and night) in addition to 60 mg tablet for a total dose of 90mg. 270 tablet 0     morphine (MS CONTIN) 60 MG 12 hr tablet Take 60 mg by mouth daily in the afternoon (in addition to the 2 - 90mg doses)       morphine (MS CONTIN) 60 MG 12 hr tablet Take 60 mg by mouth 2 times daily (morning and night) in addition to 30mg tablet for a total dose of 90mg 30 tablet 0     NASONEX 50 MCG/ACT spray Spray 1 spray into both nostrils daily as needed   11     nystatin (MYCOSTATIN) 167001 UNIT/GM external powder Apply topically 3 times daily as needed 60 g 1     prednisoLONE acetate (PRED FORTE) 1 % ophthalmic susp Place 1 drop into both eyes 4 times daily        predniSONE (DELTASONE) 1 MG tablet Take 3 mg by mouth every other day in addition to 5mg tablet for a total dose of 8mg.  2     predniSONE (DELTASONE) 5 MG tablet Take 10 mg by mouth every other day       predniSONE (DELTASONE) 5 MG tablet Take 5 mg by mouth every other day in addition to 1mg tablets for a total dose of 8mg       probiotic CAPS Take 1 capsule by mouth 2 times daily       rivaroxaban ANTICOAGULANT (XARELTO) 20 MG TABS tablet Take 20 mg by mouth daily (with dinner)        zolpidem (AMBIEN) 5 MG tablet Take 5 mg by mouth nightly as needed for sleep       BIOTIN FORTE PO Take 1 tablet by mouth daily        Elastic Bandages & Supports (MEDICAL COMPRESSION SOCKS) MISC 1 Package daily Please measure and distribute 2 pair of 20mmHg - 30mmHg knee high open or closed toe compression stockings with extra refills as indicated. Jobst ultrasheer or equivalent. 2 each 3     gabapentin (NEURONTIN) 800 MG tablet Take 800 mg by mouth 2 times daily (morning and afternoon) in addition to 300mg capsule (total dose 1100mg)       multivitamin, therapeutic with minerals (MULTI-VITAMIN) TABS tablet Take 1 tablet by mouth 2 times daily 100 tablet 3     sodium chloride 0.9% infusion Use 6mL once weekly to reconstitute  levothyroxine powder before injection         Allergies   Allergen Reactions     Blood Transfusion Related (Informational Only) Other (See Comments)     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      Chlorhexidine Hives     Thor surgical cleanser     Codeine Hives     Has tolerated Oxycodone in past      Ibuprofen [Nsaids] Hives     Penicillins Hives     Other reaction(s): Unknown     Sulfa Drugs Hives     Other reaction(s): Unknown     Calcium Channel Blockers      Doxycycline GI Disturbance     Emesis & diarrhea  Patient denies allergy to this med     Hydroxyzine      rash     Mold      Red Wine Complex [Red Wine Powder]        Family History   Problem Relation Age of Onset     Hypertension Mother      No Known Problems Father      No Known Problems Sister      No Known Problems Brother      No Known Problems Maternal Grandmother      No Known Problems Maternal Grandfather      No Known Problems Paternal Grandmother      No Known Problems Other      Social History     Socioeconomic History     Marital status:      Spouse name: Not on file     Number of children: Not on file     Years of education: Not on file     Highest education level: Not on file   Occupational History     Not on file   Tobacco Use     Smoking status: Former Smoker     Packs/day: 1.50     Years: 20.00     Pack years: 30.00     Types: Cigarettes     Start date: 1973     Quit date: 1993     Years since quittin.8     Smokeless tobacco: Never Used     Tobacco comment: wish I never started   Substance and Sexual Activity     Alcohol use: No     Alcohol/week: 0.0 standard drinks     Drug use: No     Sexual activity: Not Currently     Partners: Male     Birth control/protection: Post-menopausal   Other Topics Concern     Parent/sibling w/ CABG, MI or angioplasty before 65F 55M? Not Asked   Social History Narrative    ** Merged History Encounter **          Social Determinants of Health     Financial Resource Strain:       Difficulty of Paying Living Expenses:    Food Insecurity:      Worried About Running Out of Food in the Last Year:      Ran Out of Food in the Last Year:    Transportation Needs:      Lack of Transportation (Medical):      Lack of Transportation (Non-Medical):    Physical Activity:      Days of Exercise per Week:      Minutes of Exercise per Session:    Stress:      Feeling of Stress :    Social Connections:      Frequency of Communication with Friends and Family:      Frequency of Social Gatherings with Friends and Family:      Attends Anabaptism Services:      Active Member of Clubs or Organizations:      Attends Club or Organization Meetings:      Marital Status:    Intimate Partner Violence:      Fear of Current or Ex-Partner:      Emotionally Abused:      Physically Abused:      Sexually Abused:        Additional medical/Social/Surgical histories reviewed in [facility] and updated as appropriate.     REVIEW OF SYSTEMS (10/25/2021)  10 point ROS of systems including Constitutional, Eyes, Respiratory, Cardiovascular, Gastroenterology, Genitourinary, Integumentary, Musculoskeletal, Psychiatric, Allergic/Immunologic were all negative except for pertinent positives noted in my HPI.     PHYSICAL EXAM  Vital Signs: VSS  General  - normal appearance, in no obvious distress  HEENT  - conjunctivae not injected, moist mucous membranes, normocephalic/atraumatic head, ears normal appearance, no lesions, mouth normal appearance, no scars, normal dentition and teeth present  CV  - normal radial pulse  Pulm  - normal respiratory pattern, non-labored  Musculoskeletal - left wrist  - inspection: mild thenar atrophy, normal joint alignment, no swelling  - palpation: no bony or soft tissue tenderness, no tenderness at the anatomical snuffbox  - ROM:  90 deg flexion   70 deg extension   25 deg abduction   65 deg adduction  - strength: 4/5  strength, 4/5 wrist abduction, 5/5 flexion, extension, pronation, supination, adduction  -  "special tests:  (+) Tinel's  (-) Finkelstein  (+) Phalen  (-) Crawford click test  (-) ulnar impaction  Neuro  - some thenar numbness, no motor deficit, grossly normal coordination, normal muscle tone  Skin  - no ecchymosis, erythema, warmth, or induration, no obvious rash  Psych  - interactive, appropriate, normal mood and affect  ASSESSMENT & PLAN  60 yo female with left wrist carpal tunnel syndrome, not resolved  And cervical ddd, disc herniations, improved    I independently reviewed the following imaging studies:  Cervical MRI: shows ddd, disc herniations  Consider repeat ANASTASIA  After a 20 minute discussion and examination, we decided to perform a same day injection for diagnostic and therapeutic purposes for left carpal tunnel  F/u in 1-2 weeks    Appropriate PPE was utilized for prevention of spread of Covid-19.  Shon Simpson MD, [facility]  Carpal Tunnel Injection - Ultrasound Guided  The patient was informed of the risks and the benefits of the procedure and a written consent was signed.  The patient s left wrist was prepped with chlorhexidine in sterile fashion.   40 mg of methylprednisolone suspension was drawn up into a 3 mL syringe with 1.0 mL of 1% lidocaine.  Injection was performed using sterile technique.  Under ultrasound guidance a 1.5\" 22-gauge needle was used to enter the left wrist at the distal palmar crease medial to the median nerve.  Needle placement was visualized and documented with ultrasound.  Ultrasound visualization required to ensure injection material enters the perineural sheath and not a vessel or nerve itself.  Injection performed long axis to the probe.  Injection solution visualized surrounding the perineurium.  Images were permanently stored for the patient's record.  There were no complications. The patient tolerated the procedure well. There was negligible bleeding.         Hand / Upper Extremity Injection/Arthrocentesis: L carpal tunnel    Date/Time: 10/25/2021 9:12 AM  Performed " by: Shon Simpson MD  Authorized by: Shon Simpson MD     Indications:  Diagnostic and pain  Needle Size:  25 G  Guidance: ultrasound    Condition: carpal tunnel      Site:  L carpal tunnel  Medications:  40 mg methylPREDNISolone 40 MG/ML  Procedure discussed: discussed risks, benefits, and alternatives    Consent Given by:  Patient  Timeout: timeout called immediately prior to procedure    Prep: patient was prepped and draped in usual sterile fashion     NDC: 73755-388-55          Again, thank you for allowing me to participate in the care of your patient.        Sincerely,        Shon Simpson MD

## 2021-10-26 ENCOUNTER — APPOINTMENT (OUTPATIENT)
Dept: MEDSURG UNIT | Facility: CLINIC | Age: 61
End: 2021-10-26
Attending: RADIOLOGY
Payer: COMMERCIAL

## 2021-10-26 ENCOUNTER — HOSPITAL ENCOUNTER (OUTPATIENT)
Facility: CLINIC | Age: 61
Discharge: HOME | End: 2021-10-26
Attending: RADIOLOGY | Admitting: RADIOLOGY
Payer: COMMERCIAL

## 2021-10-26 ENCOUNTER — APPOINTMENT (OUTPATIENT)
Dept: INTERVENTIONAL RADIOLOGY/VASCULAR | Facility: CLINIC | Age: 61
End: 2021-10-26
Attending: RADIOLOGY
Payer: COMMERCIAL

## 2021-10-26 VITALS
RESPIRATION RATE: 16 BRPM | SYSTOLIC BLOOD PRESSURE: 101 MMHG | DIASTOLIC BLOOD PRESSURE: 50 MMHG | OXYGEN SATURATION: 93 % | HEART RATE: 55 BPM | TEMPERATURE: 98.7 F

## 2021-10-26 DIAGNOSIS — I83.893 SYMPTOMATIC VARICOSE VEINS OF BOTH LOWER EXTREMITIES: ICD-10-CM

## 2021-10-26 PROCEDURE — 258N000003 HC RX IP 258 OP 636: Performed by: NURSE PRACTITIONER

## 2021-10-26 PROCEDURE — 36478 ENDOVENOUS LASER 1ST VEIN: CPT

## 2021-10-26 PROCEDURE — 250N000009 HC RX 250: Performed by: NURSE PRACTITIONER

## 2021-10-26 PROCEDURE — 36478 ENDOVENOUS LASER 1ST VEIN: CPT | Mod: RT | Performed by: RADIOLOGY

## 2021-10-26 PROCEDURE — 999N000133 HC STATISTIC PP CARE STAGE 2

## 2021-10-26 PROCEDURE — 272N000192 HC ACCESSORY CR2

## 2021-10-26 PROCEDURE — 250N000013 HC RX MED GY IP 250 OP 250 PS 637: Performed by: NURSE PRACTITIONER

## 2021-10-26 PROCEDURE — 999N000142 HC STATISTIC PROCEDURE PREP ONLY

## 2021-10-26 PROCEDURE — 272N000154 HC KIT CR14

## 2021-10-26 PROCEDURE — 250N000009 HC RX 250: Performed by: RADIOLOGY

## 2021-10-26 RX ORDER — LIDOCAINE HYDROCHLORIDE 10 MG/ML
1-30 INJECTION, SOLUTION EPIDURAL; INFILTRATION; INTRACAUDAL; PERINEURAL
Status: COMPLETED | OUTPATIENT
Start: 2021-10-26 | End: 2021-10-26

## 2021-10-26 RX ORDER — DIAZEPAM 5 MG
10 TABLET ORAL ONCE
Status: COMPLETED | OUTPATIENT
Start: 2021-10-26 | End: 2021-10-26

## 2021-10-26 RX ADMIN — SODIUM BICARBONATE: 84 INJECTION, SOLUTION INTRAVENOUS at 09:50

## 2021-10-26 RX ADMIN — LIDOCAINE HYDROCHLORIDE 1 ML: 10 INJECTION, SOLUTION EPIDURAL; INFILTRATION; INTRACAUDAL; PERINEURAL at 10:22

## 2021-10-26 RX ADMIN — DIAZEPAM 10 MG: 5 TABLET ORAL at 08:56

## 2021-10-26 NOTE — PROGRESS NOTES
"[facility]    ASSESSMENT:   1. Healed Stage 3 pressure ulcer    PLAN/DISCUSSION:   1. Can discontinue dressings and use bland emollient daily to help keep skin healthy  2. Follow-up prn    HISTORY OF PRESENT ILLNESS:   Aspen Mccullough is a 61 year old female with complex  who returns via telemedicine today for a pressure sore on her R buttocks. She had a bout of diarrhea and spent a prolonged time on the toilet resulting in this wound.  Her diarrhea has resolved and she is ambulatory.  I started her on Iodosorb at our initial visit. The wound has now healed over.        PHOTO REVIEWED:       The patient has been notified of following:     \"This telephone visit will be conducted via a call between you and your physician/provider. We have found that certain health care needs can be provided without the need for a physical exam.  This service lets us provide the care you need with a short phone conversation.  If a prescription is necessary we can send it directly to your pharmacy.  If lab work is needed we can place an order for that and you can then stop by our lab to have the test done at a later time.    If during the course of the call the physician/provider feels a telephone visit is not appropriate, you will not be charged for this service.\"     Patient has given verbal consent for Telephone visit?  Yes    DURATION OF CALL 5 minutes      ROSARIO CINTRON PA-C    "

## 2021-10-26 NOTE — IR NOTE
Patient Name: Aspen Mccullough  Medical Record Number: [id number]  Today's Date: 10/26/2021    Procedure: Right small saphenous vein endovenous laser ablation  Proceduralist: Dr. Salcedo  Pathology present: NICOLE    Procedure Start: 0957  Procedure end: 1021  Sedation medications administered: NA    Report given to: Uziel CORBETT 2A  : NICOLE    Other Notes: Pt arrived to IR room 5 from . Consent reviewed. Pt denies any questions or concerns regarding procedure. Pt positioned supine and monitored per protocol. Pt tolerated procedure without any noted complications. Pt transferred back to .

## 2021-10-26 NOTE — DISCHARGE INSTRUCTIONS
[facility]  Interventional Radiology  Discharge Instructions for Endovenous Laser Ablation of Right Leg        AFTER YOU GO HOME:       Drink plenty of fluids.       Resume your regular diet, unless otherwise instructed by your Primary Physician.    IF YOU RECEIVED SEDATION:            DO NOT smoke for at least 24 hours       DO NOT drink alcoholic beverages the day of your procedure       DO NOT drive or operate machinery at home or at work for 24 hours.          DO NOT take a bath or shower for at least 12 hours following your procedure.       DO NOT make any important or legal decisions for 24 hours following your procedure.         No tub baths, hot tubs or swimming for one week.        No vigorous lower extremity exercise (i.e. Stair stepper, running, or biking) for one week.    ADDITIONAL INFORMATION:    Endovenous laser ablation After you go home:  Drink plenty of fluids  Resume regular diet  Resume normal activity, wear stockings and a 20 minute a day walk is encouraged.  Hot tubs, baths, use of leg weights and strenuous leg exercises should be avoided for 1 week.   Do not take a shower for at least 12 hours following your procedure  Wear compression stockings for 3 days straight, then wear during day time hours for remainder of 3 weeks.  You may remove briefly to shower and if you have your legs elevated.  For mild pain or discomfort Ibuprofen 600 mg oral every 8 hours as needed, for moderate to severe pain oxycodone 5 mg 1-2 tabs every 6 hours as needed.  Wear compression stockings for 3 days straight, then wear during day time hours for remainder of 3 weeks.  You may remove briefly to shower and if you have your legs elevated.  If EVLA you will have an ultrasound scheduled for 1 week after procedure and an ultrasound followed by clinic appointment with your provider at 1 month post procedure    Procedural Physician:  Dr. Salcedo    Date:October 26, 2021  Telephone Number:      [phone] Monday-Friday 8-4:30                                       [phone]    IR clinic RN                                       [phone] After 4:30 PM Monday-Friday, Weekends and Holidays, The hospital  will answer, ask for the Interventional Radiologist on call. Someone is available 24 hours /day

## 2021-10-26 NOTE — IR NOTE
Access - 28 cm below knee  1649 Joules   7 Watt   236 seconds  28 cm coverage  150 mL Tumescence

## 2021-10-26 NOTE — PROGRESS NOTES
1320 Pt awake and sipping coffee. Discharge instructions reviewed, copy given to pt. Follow up ultrasound scheduled prior to 2a arrival. Pt up to wheelchair, tolerated well. Pt discharged home accompanied by .

## 2021-10-26 NOTE — PROCEDURES
Interventional Radiology Brief Post Procedure Note    Procedure: IR ENDOVENOUS ABLATION VARICOSE VEINS    Proceduralist: Taye Salcedo MD    Assistant: None    Time Out: Prior to the start of the procedure and with procedural staff participation, I verbally confirmed the patient s identity using two indicators, relevant allergies, that the procedure was appropriate and matched the consent or emergent situation, and that the correct equipment/implants were available. Immediately prior to starting the procedure I conducted the Time Out with the procedural staff and re-confirmed the patient s name, procedure, and site/side. (The Joint Commission universal protocol was followed.)  Yes    Medications   Medication Event Details Admin User Admin Time       Sedation: IR Nurse Monitored Care   Post Procedure Summary:  Prior to the start of the procedure and with procedural staff participation, I verbally confirmed the patient s identity using two indicators, relevant allergies, that the procedure was appropriate and matched the consent or emergent situation, and that the correct equipment/implants were available. Immediately prior to starting the procedure I conducted the Time Out with the procedural staff and re-confirmed the patient s name, procedure, and site/side. (The Joint Commission universal protocol was followed.)  Yes       Sedatives: Fentanyl and Midazolam (Versed) and Diazepam (Valium)    Vital signs, airway and pulse oximetry were monitored and remained stable throughout the procedure and sedation was maintained until the procedure was complete.  The patient was monitored by staff until sedation discharge criteria were met.    Patient tolerance: Patient tolerated the procedure well with no immediate complications.    Time of sedation in minutes: 15 Minutes minutes from beginning to end of physician one to one monitoring.          Findings: 28cm segment of right SSV ablated.    Estimated Blood Loss:  Minimal    Fluoroscopy Time:  minute(s)    SPECIMENS: None    Complications: 1. None     Condition: Stable    Plan: Bedrest for one hour.    Comments: See dictated procedure note for full details.    Taye Salcedo MD

## 2021-10-26 NOTE — PROGRESS NOTES
Pt arrived on 2a post EVLA. VSS Ra. Dressings c/d/i. Compression stocking on. Family at bedside. Pt arousable to verbal.

## 2021-10-29 ENCOUNTER — HOSPITAL ENCOUNTER (OUTPATIENT)
Facility: CLINIC | Age: 61
End: 2021-10-29
Admitting: RADIOLOGY
Payer: COMMERCIAL

## 2021-10-29 ENCOUNTER — TELEPHONE (OUTPATIENT)
Dept: VASCULAR SURGERY | Facility: CLINIC | Age: 61
End: 2021-10-29

## 2021-10-29 DIAGNOSIS — I83.893 SYMPTOMATIC VARICOSE VEINS OF BOTH LOWER EXTREMITIES: Primary | ICD-10-CM

## 2021-10-29 DIAGNOSIS — I70.203 ATHEROSCLEROSIS OF NATIVE ARTERY OF BOTH LOWER EXTREMITIES (H): ICD-10-CM

## 2021-10-29 NOTE — TELEPHONE ENCOUNTER
Called to check in on pt s/p laser ablation o the right leg.      states that she is wearing her stockings and also that they are aware of her next [facility] appt    We talked about next treatments in which I will see what I can do as her authorization expires at the end of the year.     He verbalized understanding.     Josie TORRES RN, BSN  Interventional Radiology/Vascular  Nurse Coordinator   Phone: [phone]  Fax: [phone]

## 2021-10-30 ENCOUNTER — HOSPITAL ENCOUNTER (INPATIENT)
Facility: CLINIC | Age: 61
LOS: 2 days | Discharge: HOME | DRG: 204 | End: 2021-11-01
Attending: EMERGENCY MEDICINE | Admitting: INTERNAL MEDICINE
Payer: COMMERCIAL

## 2021-10-30 ENCOUNTER — APPOINTMENT (OUTPATIENT)
Dept: CT IMAGING | Facility: CLINIC | Age: 61
DRG: 204 | End: 2021-10-30
Attending: EMERGENCY MEDICINE
Payer: COMMERCIAL

## 2021-10-30 DIAGNOSIS — R04.2 HEMOPTYSIS: ICD-10-CM

## 2021-10-30 LAB
ANION GAP SERPL CALCULATED.3IONS-SCNC: 7 MMOL/L (ref 3–14)
ATRIAL RATE - MUSE: 94 BPM
BASOPHILS # BLD AUTO: 0 10E3/UL (ref 0–0.2)
BASOPHILS NFR BLD AUTO: 0 %
BUN SERPL-MCNC: 15 MG/DL (ref 7–30)
CALCIUM SERPL-MCNC: 8.9 MG/DL (ref 8.5–10.1)
CHLORIDE BLD-SCNC: 104 MMOL/L (ref 94–109)
CO2 SERPL-SCNC: 26 MMOL/L (ref 20–32)
CREAT BLD-MCNC: 0.8 MG/DL (ref 0.5–1)
CREAT SERPL-MCNC: 0.72 MG/DL (ref 0.52–1.04)
DIASTOLIC BLOOD PRESSURE - MUSE: NORMAL MMHG
EOSINOPHIL # BLD AUTO: 0.1 10E3/UL (ref 0–0.7)
EOSINOPHIL NFR BLD AUTO: 1 %
ERYTHROCYTE [DISTWIDTH] IN BLOOD BY AUTOMATED COUNT: 14.6 % (ref 10–15)
GFR SERPL CREATININE-BSD FRML MDRD: 90 ML/MIN/1.73M2
GFR SERPL CREATININE-BSD FRML MDRD: >60 ML/MIN/1.73M2
GLUCOSE BLD-MCNC: 116 MG/DL (ref 70–99)
HCT VFR BLD AUTO: 37.2 % (ref 35–47)
HGB BLD-MCNC: 11.9 G/DL (ref 11.7–15.7)
HOLD SPECIMEN: NORMAL
IMM GRANULOCYTES # BLD: 0.1 10E3/UL
IMM GRANULOCYTES NFR BLD: 1 %
INR PPP: 1.76 (ref 0.85–1.15)
INTERPRETATION ECG - MUSE: NORMAL
LYMPHOCYTES # BLD AUTO: 1.9 10E3/UL (ref 0.8–5.3)
LYMPHOCYTES NFR BLD AUTO: 16 %
MCH RBC QN AUTO: 29.5 PG (ref 26.5–33)
MCHC RBC AUTO-ENTMCNC: 32 G/DL (ref 31.5–36.5)
MCV RBC AUTO: 92 FL (ref 78–100)
MONOCYTES # BLD AUTO: 0.7 10E3/UL (ref 0–1.3)
MONOCYTES NFR BLD AUTO: 6 %
NEUTROPHILS # BLD AUTO: 8.9 10E3/UL (ref 1.6–8.3)
NEUTROPHILS NFR BLD AUTO: 76 %
NRBC # BLD AUTO: 0 10E3/UL
NRBC BLD AUTO-RTO: 0 /100
P AXIS - MUSE: 49 DEGREES
PLATELET # BLD AUTO: 331 10E3/UL (ref 150–450)
POTASSIUM BLD-SCNC: 3.8 MMOL/L (ref 3.4–5.3)
PR INTERVAL - MUSE: 216 MS
QRS DURATION - MUSE: 102 MS
QT - MUSE: 378 MS
QTC - MUSE: 472 MS
R AXIS - MUSE: 59 DEGREES
RBC # BLD AUTO: 4.04 10E6/UL (ref 3.8–5.2)
SARS-COV-2 RNA RESP QL NAA+PROBE: NEGATIVE
SODIUM SERPL-SCNC: 137 MMOL/L (ref 133–144)
SYSTOLIC BLOOD PRESSURE - MUSE: NORMAL MMHG
T AXIS - MUSE: 37 DEGREES
VENTRICULAR RATE- MUSE: 94 BPM
WBC # BLD AUTO: 11.7 10E3/UL (ref 4–11)

## 2021-10-30 PROCEDURE — 96365 THER/PROPH/DIAG IV INF INIT: CPT

## 2021-10-30 PROCEDURE — 85025 COMPLETE CBC W/AUTO DIFF WBC: CPT | Performed by: EMERGENCY MEDICINE

## 2021-10-30 PROCEDURE — 93005 ELECTROCARDIOGRAM TRACING: CPT

## 2021-10-30 PROCEDURE — 120N000004 HC R&B MS OVERFLOW

## 2021-10-30 PROCEDURE — C9803 HOPD COVID-19 SPEC COLLECT: HCPCS

## 2021-10-30 PROCEDURE — 250N000011 HC RX IP 250 OP 636: Performed by: INTERNAL MEDICINE

## 2021-10-30 PROCEDURE — 36415 COLL VENOUS BLD VENIPUNCTURE: CPT | Performed by: EMERGENCY MEDICINE

## 2021-10-30 PROCEDURE — 99223 1ST HOSP IP/OBS HIGH 75: CPT | Mod: AI | Performed by: INTERNAL MEDICINE

## 2021-10-30 PROCEDURE — 258N000003 HC RX IP 258 OP 636: Performed by: INTERNAL MEDICINE

## 2021-10-30 PROCEDURE — 80048 BASIC METABOLIC PNL TOTAL CA: CPT | Performed by: EMERGENCY MEDICINE

## 2021-10-30 PROCEDURE — 93005 ELECTROCARDIOGRAM TRACING: CPT | Mod: 76

## 2021-10-30 PROCEDURE — 96375 TX/PRO/DX INJ NEW DRUG ADDON: CPT

## 2021-10-30 PROCEDURE — 87635 SARS-COV-2 COVID-19 AMP PRB: CPT | Performed by: EMERGENCY MEDICINE

## 2021-10-30 PROCEDURE — 85610 PROTHROMBIN TIME: CPT | Performed by: EMERGENCY MEDICINE

## 2021-10-30 PROCEDURE — 250N000013 HC RX MED GY IP 250 OP 250 PS 637: Performed by: INTERNAL MEDICINE

## 2021-10-30 PROCEDURE — 250N000009 HC RX 250: Performed by: EMERGENCY MEDICINE

## 2021-10-30 PROCEDURE — 250N000011 HC RX IP 250 OP 636: Performed by: EMERGENCY MEDICINE

## 2021-10-30 PROCEDURE — 71275 CT ANGIOGRAPHY CHEST: CPT

## 2021-10-30 PROCEDURE — 99285 EMERGENCY DEPT VISIT HI MDM: CPT | Mod: 25

## 2021-10-30 PROCEDURE — 82565 ASSAY OF CREATININE: CPT

## 2021-10-30 RX ORDER — MORPHINE SULFATE 30 MG/1
30 TABLET, FILM COATED, EXTENDED RELEASE ORAL 2 TIMES DAILY
Status: DISCONTINUED | OUTPATIENT
Start: 2021-10-30 | End: 2021-10-30

## 2021-10-30 RX ORDER — NALOXONE HYDROCHLORIDE 0.4 MG/ML
0.2 INJECTION, SOLUTION INTRAMUSCULAR; INTRAVENOUS; SUBCUTANEOUS
Status: DISCONTINUED | OUTPATIENT
Start: 2021-10-30 | End: 2021-11-01 | Stop reason: HOSPADM

## 2021-10-30 RX ORDER — ACETAMINOPHEN 650 MG/1
650 SUPPOSITORY RECTAL EVERY 6 HOURS PRN
Status: DISCONTINUED | OUTPATIENT
Start: 2021-10-30 | End: 2021-11-01 | Stop reason: HOSPADM

## 2021-10-30 RX ORDER — PREDNISONE 5 MG/1
10 TABLET ORAL EVERY OTHER DAY
Status: DISCONTINUED | OUTPATIENT
Start: 2021-10-31 | End: 2021-11-01 | Stop reason: HOSPADM

## 2021-10-30 RX ORDER — MORPHINE SULFATE 60 MG/1
60 TABLET, FILM COATED, EXTENDED RELEASE ORAL 2 TIMES DAILY
Status: DISCONTINUED | OUTPATIENT
Start: 2021-10-30 | End: 2021-10-30

## 2021-10-30 RX ORDER — SODIUM CHLORIDE 9 MG/ML
INJECTION, SOLUTION INTRAVENOUS CONTINUOUS
Status: ACTIVE | OUTPATIENT
Start: 2021-10-30 | End: 2021-10-31

## 2021-10-30 RX ORDER — DILTIAZEM HYDROCHLORIDE 180 MG/1
180 CAPSULE, COATED, EXTENDED RELEASE ORAL 2 TIMES DAILY
Status: DISCONTINUED | OUTPATIENT
Start: 2021-10-30 | End: 2021-11-01 | Stop reason: HOSPADM

## 2021-10-30 RX ORDER — MORPHINE SULFATE 30 MG/1
60 TABLET, FILM COATED, EXTENDED RELEASE ORAL DAILY
Status: DISCONTINUED | OUTPATIENT
Start: 2021-10-31 | End: 2021-11-01 | Stop reason: HOSPADM

## 2021-10-30 RX ORDER — CYCLOBENZAPRINE HCL 10 MG
10 TABLET ORAL 3 TIMES DAILY
Status: DISCONTINUED | OUTPATIENT
Start: 2021-10-30 | End: 2021-11-01 | Stop reason: HOSPADM

## 2021-10-30 RX ORDER — MORPHINE SULFATE 30 MG/1
90 TABLET, FILM COATED, EXTENDED RELEASE ORAL EVERY 12 HOURS SCHEDULED
Status: DISCONTINUED | OUTPATIENT
Start: 2021-10-30 | End: 2021-11-01 | Stop reason: HOSPADM

## 2021-10-30 RX ORDER — MULTIPLE VITAMINS W/ MINERALS TAB 9MG-400MCG
1 TAB ORAL 2 TIMES DAILY
Status: DISCONTINUED | OUTPATIENT
Start: 2021-10-30 | End: 2021-11-01 | Stop reason: HOSPADM

## 2021-10-30 RX ORDER — HYDROMORPHONE HYDROCHLORIDE 4 MG/1
8 TABLET ORAL EVERY 8 HOURS
Status: DISCONTINUED | OUTPATIENT
Start: 2021-10-30 | End: 2021-11-01 | Stop reason: HOSPADM

## 2021-10-30 RX ORDER — GABAPENTIN 300 MG/1
300 CAPSULE ORAL 2 TIMES DAILY
COMMUNITY
Start: 2021-04-28

## 2021-10-30 RX ORDER — AZITHROMYCIN 500 MG/1
500 INJECTION, POWDER, LYOPHILIZED, FOR SOLUTION INTRAVENOUS EVERY 24 HOURS
Status: DISCONTINUED | OUTPATIENT
Start: 2021-10-30 | End: 2021-11-01 | Stop reason: HOSPADM

## 2021-10-30 RX ORDER — NALOXONE HYDROCHLORIDE 0.4 MG/ML
0.4 INJECTION, SOLUTION INTRAMUSCULAR; INTRAVENOUS; SUBCUTANEOUS
Status: DISCONTINUED | OUTPATIENT
Start: 2021-10-30 | End: 2021-11-01 | Stop reason: HOSPADM

## 2021-10-30 RX ORDER — GABAPENTIN 800 MG/1
800 TABLET ORAL AT BEDTIME
COMMUNITY
Start: 2021-04-03

## 2021-10-30 RX ORDER — LEVOTHYROXINE SODIUM 200 UG/1
300 CAPSULE ORAL DAILY
COMMUNITY
Start: 2021-10-14 | End: 2022-11-15

## 2021-10-30 RX ORDER — METOPROLOL SUCCINATE 100 MG/1
100 TABLET, EXTENDED RELEASE ORAL 2 TIMES DAILY
Status: DISCONTINUED | OUTPATIENT
Start: 2021-10-30 | End: 2021-11-01 | Stop reason: HOSPADM

## 2021-10-30 RX ORDER — PROCHLORPERAZINE 25 MG
25 SUPPOSITORY, RECTAL RECTAL EVERY 12 HOURS PRN
Status: DISCONTINUED | OUTPATIENT
Start: 2021-10-30 | End: 2021-11-01 | Stop reason: HOSPADM

## 2021-10-30 RX ORDER — LIDOCAINE 40 MG/G
CREAM TOPICAL
Status: DISCONTINUED | OUTPATIENT
Start: 2021-10-30 | End: 2021-11-01 | Stop reason: HOSPADM

## 2021-10-30 RX ORDER — GABAPENTIN 800 MG/1
800 TABLET ORAL 3 TIMES DAILY
Status: DISCONTINUED | OUTPATIENT
Start: 2021-10-30 | End: 2021-11-01 | Stop reason: HOSPADM

## 2021-10-30 RX ORDER — ATORVASTATIN CALCIUM 40 MG/1
40 TABLET, FILM COATED ORAL EVERY EVENING
Status: DISCONTINUED | OUTPATIENT
Start: 2021-10-30 | End: 2021-11-01 | Stop reason: HOSPADM

## 2021-10-30 RX ORDER — CEFTRIAXONE 2 G/1
2 INJECTION, POWDER, FOR SOLUTION INTRAMUSCULAR; INTRAVENOUS EVERY 24 HOURS
Status: DISCONTINUED | OUTPATIENT
Start: 2021-10-30 | End: 2021-11-01 | Stop reason: HOSPADM

## 2021-10-30 RX ORDER — PROCHLORPERAZINE MALEATE 10 MG
10 TABLET ORAL EVERY 6 HOURS PRN
Status: DISCONTINUED | OUTPATIENT
Start: 2021-10-30 | End: 2021-11-01 | Stop reason: HOSPADM

## 2021-10-30 RX ORDER — ONDANSETRON 2 MG/ML
4 INJECTION INTRAMUSCULAR; INTRAVENOUS EVERY 6 HOURS PRN
Status: DISCONTINUED | OUTPATIENT
Start: 2021-10-30 | End: 2021-11-01 | Stop reason: HOSPADM

## 2021-10-30 RX ORDER — LEVOTHYROXINE SODIUM 200 UG/1
200 CAPSULE ORAL 2 TIMES DAILY
Status: DISCONTINUED | OUTPATIENT
Start: 2021-10-31 | End: 2021-11-01 | Stop reason: HOSPADM

## 2021-10-30 RX ORDER — ONDANSETRON 4 MG/1
4 TABLET, ORALLY DISINTEGRATING ORAL EVERY 6 HOURS PRN
Status: DISCONTINUED | OUTPATIENT
Start: 2021-10-30 | End: 2021-11-01 | Stop reason: HOSPADM

## 2021-10-30 RX ORDER — IOPAMIDOL 755 MG/ML
83 INJECTION, SOLUTION INTRAVASCULAR ONCE
Status: COMPLETED | OUTPATIENT
Start: 2021-10-30 | End: 2021-10-30

## 2021-10-30 RX ORDER — ACETAMINOPHEN 325 MG/1
650 TABLET ORAL EVERY 6 HOURS PRN
Status: DISCONTINUED | OUTPATIENT
Start: 2021-10-30 | End: 2021-11-01 | Stop reason: HOSPADM

## 2021-10-30 RX ORDER — LEVOTHYROXINE SODIUM 100 UG/1
300 TABLET ORAL DAILY
Status: DISCONTINUED | OUTPATIENT
Start: 2021-10-30 | End: 2021-10-30

## 2021-10-30 RX ORDER — ZOLPIDEM TARTRATE 5 MG/1
5 TABLET ORAL
Status: DISCONTINUED | OUTPATIENT
Start: 2021-10-30 | End: 2021-11-01 | Stop reason: HOSPADM

## 2021-10-30 RX ADMIN — METOPROLOL SUCCINATE 100 MG: 100 TABLET, EXTENDED RELEASE ORAL at 20:43

## 2021-10-30 RX ADMIN — GABAPENTIN 800 MG: 800 TABLET, FILM COATED ORAL at 20:44

## 2021-10-30 RX ADMIN — SODIUM CHLORIDE: 9 INJECTION, SOLUTION INTRAVENOUS at 20:02

## 2021-10-30 RX ADMIN — AZITHROMYCIN MONOHYDRATE 500 MG: 500 INJECTION, POWDER, LYOPHILIZED, FOR SOLUTION INTRAVENOUS at 21:00

## 2021-10-30 RX ADMIN — ATORVASTATIN CALCIUM 40 MG: 40 TABLET, FILM COATED ORAL at 20:43

## 2021-10-30 RX ADMIN — CEFTRIAXONE SODIUM 2 G: 2 INJECTION, POWDER, FOR SOLUTION INTRAMUSCULAR; INTRAVENOUS at 18:37

## 2021-10-30 RX ADMIN — CYCLOBENZAPRINE 10 MG: 10 TABLET, FILM COATED ORAL at 20:43

## 2021-10-30 RX ADMIN — MORPHINE SULFATE 90 MG: 30 TABLET, EXTENDED RELEASE ORAL at 22:01

## 2021-10-30 RX ADMIN — MULTIPLE VITAMINS W/ MINERALS TAB 1 TABLET: TAB at 20:47

## 2021-10-30 RX ADMIN — HYDROMORPHONE HYDROCHLORIDE 8 MG: 4 TABLET ORAL at 20:43

## 2021-10-30 RX ADMIN — IOPAMIDOL 83 ML: 755 INJECTION, SOLUTION INTRAVENOUS at 16:29

## 2021-10-30 RX ADMIN — HYDROMORPHONE HYDROCHLORIDE 1 MG: 1 INJECTION, SOLUTION INTRAMUSCULAR; INTRAVENOUS; SUBCUTANEOUS at 16:58

## 2021-10-30 RX ADMIN — DILTIAZEM HYDROCHLORIDE 180 MG: 180 CAPSULE, COATED, EXTENDED RELEASE ORAL at 22:01

## 2021-10-30 RX ADMIN — SODIUM CHLORIDE 100 ML: 9 INJECTION, SOLUTION INTRAVENOUS at 16:30

## 2021-10-30 RX ADMIN — ZOLPIDEM TARTRATE 5 MG: 5 TABLET ORAL at 20:59

## 2021-10-30 ASSESSMENT — MIFFLIN-ST. JEOR
SCORE: 1996.51
SCORE: 1871.77

## 2021-10-30 ASSESSMENT — ACTIVITIES OF DAILY LIVING (ADL)
ADLS_ACUITY_SCORE: 3
ADLS_ACUITY_SCORE: 4
ADLS_ACUITY_SCORE: 3
ADLS_ACUITY_SCORE: 4
ADLS_ACUITY_SCORE: 4
ADLS_ACUITY_SCORE: 3

## 2021-10-30 ASSESSMENT — ENCOUNTER SYMPTOMS
FEVER: 0
COUGH: 1

## 2021-10-30 NOTE — ED NOTES
"I answered the patients call light . When I entered she had been coughing up blood. She told me \"we would be the reason she dies here\" and that  \"we were ignoring her and forgetting about her and that we were not doing anything for her\".   "

## 2021-10-30 NOTE — ED NOTES
Observation Brochure and Video    Patient informed of observation status based on provider's order.  Observation brochure was given. Patient/Family stated understanding. Questions answered.  Aletha Calderon RN

## 2021-10-30 NOTE — ED NOTES
[facility]  ED Nurse Handoff Report    ED Chief complaint: Coughing up Blood      ED Diagnosis:   Final diagnoses:   None       Code Status: Full Code-but recently that may have changed as she has new paperwork and she has been DNR/DNI over the past 2 years    Allergies:   Allergies   Allergen Reactions     Blood Transfusion Related (Informational Only) Other (See Comments)     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      Chlorhexidine Hives     Thor surgical cleanser     Codeine Hives     Has tolerated Oxycodone in past      Ibuprofen [Nsaids] Hives     Penicillins Hives     Other reaction(s): Unknown     Sulfa Drugs Hives     Other reaction(s): Unknown     Calcium Channel Blockers      Doxycycline GI Disturbance     Emesis & diarrhea  Patient denies allergy to this med     Hydroxyzine      rash     Mold      Red Wine Complex [Red Wine Powder]        Patient Story: Patient suddenly started to cough up bright red blood today.  She continues to cough up blood while she is here in the ER.  She stated that she is not in any pain when she breaths, but it hurts when she coughs.  She has a barky seal cough that is causing her to spit up bright read blood,  Estimated 10mls of blood.   is at the bedside.    Focused Assessment:    Labs Ordered and Resulted from Time of ED Arrival to Time of ED Departure   BASIC METABOLIC PANEL - Abnormal       Result Value    Sodium 137      Potassium 3.8      Chloride 104      Carbon Dioxide (CO2) 26      Anion Gap 7      Urea Nitrogen 15      Creatinine 0.72      Calcium 8.9      Glucose 116 (*)     GFR Estimate 90     INR - Abnormal    INR 1.76 (*)    CBC WITH PLATELETS AND DIFFERENTIAL - Abnormal    WBC Count 11.7 (*)     RBC Count 4.04      Hemoglobin 11.9      Hematocrit 37.2      MCV 92      MCH 29.5      MCHC 32.0      RDW 14.6      Platelet Count 331      % Neutrophils 76      % Lymphocytes 16      % Monocytes 6      % Eosinophils 1      %  Basophils 0      % Immature Granulocytes 1      NRBCs per 100 WBC 0      Absolute Neutrophils 8.9 (*)     Absolute Lymphocytes 1.9      Absolute Monocytes 0.7      Absolute Eosinophils 0.1      Absolute Basophils 0.0      Absolute Immature Granulocytes 0.1 (*)     Absolute NRBCs 0.0     ISTAT CREATININE POCT - Normal    Creatinine POCT 0.8      GFR, ESTIMATED POCT >60       CT Chest Pulmonary Embolism w Contrast   Final Result   IMPRESSION:   1.  No pulmonary embolus.   2.  Mild mosaic attenuation the lungs likely related to a combination of the exam being obtained in the extra phase of respiration and small airways disease/air trapping.   3.  Mild bronchial wall thickening.   4.  Findings suggestive of pulmonary hypertension.   5.  Borderline cardiomegaly.            Treatments and/or interventions provided: labs, pain meds and imaging  Patient's response to treatments and/or interventions: poor, she is frustrated and angry for an unknown reason.  She has been short, mean and yelling at staff.      To be done/followed up on inpatient unit:  Pulm/endo consult    Does this patient have any cognitive concerns?: NA    Activity level - Baseline/Home:  Independent  Activity Level - Current:   Stand with Assist and Stand with assist x2    Patient's Preferred language: English   Needed?: No    Isolation: None  Infection: Not Applicable  Patient tested for COVID 19 prior to admission: YES  Bariatric?: No    Vital Signs:   Vitals:    10/30/21 1559 10/30/21 1600 10/30/21 1610 10/30/21 1620   BP:  108/77     Pulse:  116     Resp:       Temp:       TempSrc:       SpO2: (!) 87% 98% 97% 98%   Weight:       Height:           Cardiac Rhythm:     Was the PSS-3 completed:   Yes  What interventions are required if any?               Family Comments:  a the bedside  OBS brochure/video discussed/provided to patient/family: No                For the majority of the shift this patient's behavior was Yellow.   Behavioral  "interventions performed were patient has threatened and belittled staff,  has been demanding.  There has been a level of disrespect, threaten to harm CT staff and verbally abusive.  Since CT she has been calm but condescending to RN, rolling her eyes. Stating \"what are you going to do just let me bleed to death, are you going to do anything about all this blood\".  explanation of results and POC seem to calm both of them and a better understanding of the process has been achieved.      ED NURSE PHONE NUMBER: *[id number]         "

## 2021-10-30 NOTE — H&P
[facility]    History and Physical  Hospitalist       Date of Admission:  10/30/2021    Assessment & Plan     This is a 61-year-old female with history of SLE, hyperlipidemia, Hashimoto's thyroiditis/hypothyroidism, ADHD, chronic back pain, hypertension, chronic kidney disease stage II, fibromyalgia, chronic regional pain syndrome, history of DVT, PE in the past, atrial fibrillation in the past, on chronic anticoagulation with Xarelto, who came to the ER with complaint of hemoptysis.    ASSESSMENT AND PLAN:  1.  Hemoptysis, possible bronchitis:  This is a 61-year-old female, on chronic anticoagulation, with history of SLE, who presented with hemoptysis.  She recently was holding the Xarelto before a procedure on 10/26 and now back on Xarelto.  CT scan of the chest, PE protocol, was done which was negative for pulmonary embolism, mild mosaic attenuation of the lung, likely related to combination of exam being obtained in the face of exploration with small airway disease or air trapping.  Mild bronchial wall thickening.  Also, suggestion of pulmonary hypertension as well.  Most likely may have some underlying bronchitis.  We will start her on IV ceftriaxone and azithromycin.  Hold the Xarelto.  Quantify how much she is coughing up blood.  We will consult Pulmonary to evaluate the patient.  No further nosebleed at this time.  2.  Epistaxis:  Episode of epistaxis today, prior to hemoptysis, has been resolved at this time.  3.  Possible right lower extremity cellulitis:  Although she has recently had the procedure on the right leg, the right leg looks slightly swollen, erythematous, warm, and slightly tender as well.  This may be post-procedure changes, but given mildly elevated white blood cell count I already started her on ceftriaxone for possible bronchitis, so that we will cover her for right lower extremity cellulitis as well, if there is any.   4.  SLE:  She is on prednisone 8 mg and 10  mg on alternate days.  She took 8 mg today and is due for 10 mg tomorrow.   5.  Hypothyroidism secondary to Hashimoto's thyroiditis:  She is on levothyroxine 300 mg; we will continue with that.  6.  History of hypertension:  She is on metoprolol 100 mg b.i.d. and Cardizem 180 b.i.d.  We will continue with that.  7.  History of atrial fibrillation, rate controlled:  On metoprolol and Cardizem.  We will continue with that.  8.  Fibromyalgia/complex regional pain syndrome/chronic pain syndrome:  She is on MS Contin 90 in the morning, 60 in the afternoon, and 90 in the evening; in addition to that, she take Dilaudid 8 mg 3 times a day.  She has been on this medication for 20 years or more.  We will continue with that while she is in the hospital.  9.  Chronic anticoagulation with Xarelto:  We will have to hold the Xarelto for now.  10.  Hyperlipidemia:  On Lipitor.  We will continue with that.  11.  Chronic kidney disease stage II:  Has been stable at this time.  12.  DVT prophylaxis with SCDs.    CODE STATUS:  Full code.    The case discussed with the ER physician and the nursing staff taking care of the patient.      Adis Wyatt MD        DVT Prophylaxis: Pneumatic Compression Devices  Code Status: Full Code    Disposition: Expected discharge in 2 days once stable.    Adis Wyatt MD    Primary Care Physician   Arcadio Farfan    Chief Complaint   Coughing up blood     History is obtained from the patient    History of Present Illness   Admitted: 10/30/2021    HISTORY OF PRESENT ILLNESS:  This is a 61-year-old female with history of SLE, hyperlipidemia, Hashimoto's thyroiditis/hypothyroidism, ADHD, chronic back pain, hypertension, chronic kidney disease stage II, fibromyalgia, chronic regional pain syndrome, history of DVT, PE in the past, atrial fibrillation in the past, on chronic anticoagulation with Xarelto, who came to the ER with complaint of hemoptysis.    According to the patient, she was holding her Xarelto  for a few days prior to a procedure on the 26th for a right small saphenous vein endovascular laser ablation.  She restarted her Xarelto on Wednesday.  Today, a few hours before coming to the ER, she started having a nosebleed that was difficult to stop and after that she started having hemoptysis.  She has been coughing up blood whenever she coughs, up until now about half a cup of hemoptysis.  The patient denies any fever, chills, chest pain, shortness of breath, orthopnea, PND, palpitation, headache, dizziness, lightheadedness.  No abdominal pain, back pain, dysuria, hematuria, constipation, or diarrhea.  She never had any history of hemoptysis like that.  She does have history of nosebleeds in the past.  Because of continued hemoptysis she came to the ER for evaluation.      Past Medical History    I have reviewed this patient's medical history and updated it with pertinent information if needed.   Past Medical History:   Diagnosis Date     ADHD (attention deficit hyperactivity disorder)      Allergic rhinitis      Chronic low back pain      Cushing's syndrome (H)      DDD (degenerative disc disease)      DDD (degenerative disc disease)      Deviated nasal septum      Diarrhea      Endometriosis      Fibromyalgia      Hashimoto's disease      Hyperlipidemia      Hypertension      Hypothyroid     hx hashimoto's thyroiditis     Insomnia      Lupus (H)      Malabsorption      Pernicious anemia      Renal disease     chronic renal insufficiency     Sinusitis        Past Surgical History   I have reviewed this patient's surgical history and updated it with pertinent information if needed.  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)     APPENDECTOMY       ARTHRODESIS ANKLE      right     ARTHROPLASTY KNEE BILATERAL       ARTHROPLASTY REVISION KNEE  4/19/2011    Procedure:ARTHROPLASTY REVISION KNEE; With Antibiotic Cement ; Surgeon:ELISE  CHAO PATRICK; Location:[facility] OR     BREAST SURGERY      right- tissue remove nipple area     BUNIONECTOMY  12/14/2011    Procedure:BUNIONECTOMY; Right Bunion Correction; Surgeon:GRACE ZARATE; Location:[facility]     C STOMACH SURGERY PROCEDURE UNLISTED      see list which we will bring     CHOLECYSTECTOMY       EXAM UNDER ANESTHESIA ANUS N/A 7/15/2020    Procedure: EXAM UNDER ANESTHESIA, ANUS;  Surgeon: Luis Canales MD;  Location: [facility] OR     EXCISE MASS UPPER EXTREMITY  12/14/2011    Procedure:EXCISE MASS UPPER EXTREMITY; Excision of Left Arm Mass; Surgeon:GRACE ZARATE; Location:[facility]     FOOT SURGERY      left X 4     FUSION LUMBAR ANTERIOR ONE LEVEL       HC SACROPLASTY      see list which we will bring     HEMORRHOIDECTOMY INTERNAL N/A 7/15/2020    Procedure: Exam under anesthesia, hemorrhoidectomy;  Surgeon: Luis Canales MD;  Location: [facility] OR     INJECT NERVE BLOCK SUPRASCAPULAR Left 5/18/2018    Procedure: INJECT NERVE BLOCK SUPRASCAPULAR;  Left Suprascapular Nerve Block;  Surgeon: Basim Velazquez MD;  Location: [facility]     INJECT NERVE BLOCK SUPRASCAPULAR Right 7/23/2018    Procedure: INJECT NERVE BLOCK SUPRASCAPULAR;  Right suprascapular injection;  Surgeon: Basim Velazquez MD;  Location: [facility]     INJECT NERVE BLOCK SUPRASCAPULAR Bilateral 10/29/2018    Procedure: Bilateral Suprascapular Nerve Blocks;  Surgeon: Basim Velazquez MD;  Location: [facility]     INJECT NERVE BLOCK SUPRASCAPULAR Bilateral 3/15/2019    Procedure: Bilateral Suprascapular Nerve Block;  Surgeon: Basim Velazquez MD;  [facility]: [facility]     INJECT NERVE BLOCK SUPRASCAPULAR Bilateral 12/31/2019    Procedure: bilateral suprascapular nerve block;  Surgeon: Basim Velazquez MD;  Location: [facility]     INJECT NERVE BLOCK SUPRASCAPULAR Bilateral 8/3/2021    Procedure: bilateral suprascapular nerve block;  Surgeon: Basim Velazquez MD;  [facility]     IR ENDOVENOUS ABLATION VARICOSE VEINS  10/5/2021      IR ENDOVENOUS ABLATION VARICOSE VEINS  10/26/2021     KNEE SURGERY      see list which we will bring     LAMINECTOMY LUMBAR ONE LEVEL      L4-5     LAPAROSCOPIC ABLATION ENDOMETRIOSIS       CA HAND/FINGER SURGERY UNLISTED  surgeries on both hands with Dr. Shankar     CA STOMACH SURGERY PROCEDURE UNLISTED  gall bladder removal     RADIO FREQUENCY ABLATION PULSED CERVICAL Bilateral 7/10/2019    Procedure: Bilateral Suprascapular Nerve Pulsed Radiofrequency Ablation;  Surgeon: Basim Velazquez MD;  Location:  OR     REMOVE HARDWARE FOOT  2011    Procedure:REMOVE HARDWARE FOOT; Hardware Removal Right Foot (Mini-C-Arm) ; Surgeon:GRACE ZARATE; Location:[facility]     RHINOPLASTY       SALPINGO-OOPHORECTOMY BILATERAL       TONSILLECTOMY         Prior to Admission Medications   Prior to Admission Medications   Prescriptions Last Dose Informant Patient Reported? Taking?   BIOTIN FORTE PO  Self Yes No   Sig: Take 1 tablet by mouth daily    Elastic Bandages & Supports (MEDICAL COMPRESSION SOCKS) MISC  Self No No   Si Package daily Please measure and distribute 2 pair of 20mmHg - 30mmHg knee high open or closed toe compression stockings with extra refills as indicated. Jobst ultrasheer or equivalent.   Elastic Bandages & Supports (MEDICAL COMPRESSION SOCKS) MISC  Self No No   Si Package daily Please measure and distribute 2 pair of 20mmHg - 30mmHg THIGH high open or closed toe compression stockings with extra refills as indicated. Jobst ultrasheer or equivalent.   HYDROmorphone (DILAUDID) 8 MG tablet  Self Yes No   Sig: Take 8 mg by mouth every 8 hours . Max of 3 doses per day.   NASONEX 50 MCG/ACT spray  Self Yes No   Sig: Spray 1 spray into both nostrils daily as needed    atorvastatin (LIPITOR) 40 MG tablet  Self No No   Sig: Take 1 tablet (40 mg) by mouth every evening   carboxymethylcellulose (CARBOXYMETHYLCELLULOSE SODIUM) 0.5 % SOLN ophthalmic solution  Self Yes No   Sig: Place 1 drop into  both eyes 2 times daily as needed    chlorhexidine (PERIDEX) 0.12 % solution  Self Yes No   cyanocobalamin 1000 MCG/ML injection  Self No No   Sig: Inject 1 mL (1,000 mcg) Subcutaneous every 7 days   cycloSPORINE (RESTASIS) 0.05 % ophthalmic emulsion  Self Yes No   Sig: Place 1 drop into both eyes 2 times daily   cyclobenzaprine (FLEXERIL) 10 MG tablet  Self No No   Sig: Take 1 tablet (10 mg) by mouth 3 times daily   diltiazem ER COATED BEADS (CARDIZEM CD/CARTIA XT) 180 MG 24 hr capsule  Self No No   Sig: Take 1 capsule (180 mg) by mouth 2 times daily   fexofenadine (ALLEGRA) 180 MG tablet  Self Yes No   Sig: Take 180 mg by mouth daily as needed    gabapentin (NEURONTIN) 800 MG tablet  Self Yes No   Sig: Take 800 mg by mouth 2 times daily (morning and afternoon) in addition to 300mg capsule (total dose 1100mg)   levothyroxine (SYNTHROID/LEVOTHROID) 300 MCG tablet  Self Yes No   Sig: Take 1 tablet (300 mcg) by mouth daily   lifitegrast (XIIDRA) 5 % opthalmic solution  Self Yes No   Sig: Place 1 drop into both eyes 2 times daily   liothyronine (CYTOMEL) 25 MCG tablet  Self Yes No   Sig: Take 25 mcg by mouth 2 times daily    metoprolol succinate ER (TOPROL-XL) 100 MG 24 hr tablet   No No   Sig: Take 1 tablet (100 mg) by mouth 2 times daily   morphine (MS CONTIN) 30 MG 12 hr tablet  Self Yes No   Sig: Take 30 mg by mouth 2 times daily (morning and night) in addition to 60 mg tablet for a total dose of 90mg.   morphine (MS CONTIN) 60 MG 12 hr tablet  Self Yes No   Sig: Take 60 mg by mouth 2 times daily (morning and night) in addition to 30mg tablet for a total dose of 90mg   morphine (MS CONTIN) 60 MG 12 hr tablet  Self Yes No   Sig: Take 60 mg by mouth daily in the afternoon (in addition to the 2 - 90mg doses)   multivitamin, therapeutic with minerals (MULTI-VITAMIN) TABS tablet  Self Yes No   Sig: Take 1 tablet by mouth 2 times daily   nystatin (MYCOSTATIN) 658609 UNIT/GM external powder  Self No No   Sig: Apply  topically 3 times daily as needed   predniSONE (DELTASONE) 1 MG tablet  Self Yes No   Sig: Take 3 mg by mouth every other day in addition to 5mg tablet for a total dose of 8mg.   predniSONE (DELTASONE) 5 MG tablet  Self Yes No   Sig: Take 5 mg by mouth every other day in addition to 1mg tablets for a total dose of 8mg   predniSONE (DELTASONE) 5 MG tablet  Self Yes No   Sig: Take 10 mg by mouth every other day   prednisoLONE acetate (PRED FORTE) 1 % ophthalmic susp  Self Yes No   Sig: Place 1 drop into both eyes 4 times daily    probiotic CAPS  Self Yes No   Sig: Take 1 capsule by mouth 2 times daily   rivaroxaban ANTICOAGULANT (XARELTO) 20 MG TABS tablet  Self Yes No   Sig: Take 20 mg by mouth daily (with dinner)    sodium chloride 0.9% infusion  Self Yes No   Sig: Use 6mL once weekly to reconstitute levothyroxine powder before injection   zolpidem (AMBIEN) 5 MG tablet  Self Yes No   Sig: Take 5 mg by mouth nightly as needed for sleep      Facility-Administered Medications Last Administration Doses Remaining   botulinum toxin type A (BOTOX) 100 units injection 100 Units None recorded 1   botulinum toxin type A (BOTOX) 100 units injection 100 Units None recorded 1   botulinum toxin type A (BOTOX) 100 units injection 100 Units None recorded 1   lidocaine (PF) (XYLOCAINE) 1 % injection 3 mL 9/7/2021  4:30 PM    lidocaine (PF) (XYLOCAINE) 1 % injection 3 mL 9/7/2021  4:30 PM    lidocaine 1% with EPINEPHrine 1:100,000 injection 3 mL None recorded 1   methylPREDNISolone (DEPO-MEDROL) injection 40 mg 10/25/2021  9:12 AM    triamcinolone (KENALOG-40) injection 40 mg 9/7/2021  4:30 PM    triamcinolone (KENALOG-40) injection 40 mg 9/7/2021  4:30 PM         Allergies   Allergies   Allergen Reactions     Blood Transfusion Related (Informational Only) Other (See Comments)     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      Chlorhexidine Hives     Thor surgical cleanser     Codeine Hives     Has tolerated Oxycodone in  past      Ibuprofen [Nsaids] Hives     Penicillins Hives     Other reaction(s): Unknown     Sulfa Drugs Hives     Other reaction(s): Unknown     Calcium Channel Blockers      Doxycycline GI Disturbance     Emesis & diarrhea  Patient denies allergy to this med     Hydroxyzine      rash     Mold      Red Wine Complex [Red Wine Powder]        Social History   I have reviewed this patient's social history and updated it with pertinent information if needed. Aspen Mccullough  reports that she quit smoking about 28 years ago. Her smoking use included cigarettes. She started smoking about 48 years ago. She has a 30.00 pack-year smoking history. She has never used smokeless tobacco. She reports that she does not drink alcohol and does not use drugs.    Family History   I have reviewed this patient's family history and updated it with pertinent information if needed.   Family History   Problem Relation Age of Onset     Hypertension Mother      No Known Problems Father      No Known Problems Sister      No Known Problems Brother      No Known Problems Maternal Grandmother      No Known Problems Maternal Grandfather      No Known Problems Paternal Grandmother      No Known Problems Other        Review of Systems   CONSTITUTIONAL:  negative  EYES:  negative  HEENT:  positive for  epistaxis  RESPIRATORY:  positive for  hemoptysis  CARDIOVASCULAR:  negative  GASTROINTESTINAL:  negative  GENITOURINARY:  negative  INTEGUMENT/BREAST:  negative  HEMATOLOGIC/LYMPHATIC:  negative  ALLERGIC/IMMUNOLOGIC:  negative  ENDOCRINE:  negative  MUSCULOSKELETAL:  negative  NEUROLOGICAL:  negative  BEHAVIOR/PSYCH:  negative    Physical Exam   Temp: 99.1  F (37.3  C) Temp src: Oral BP: 108/77 Pulse: 116   Resp: 16 SpO2: 98 % O2 Device: Nasal cannula Oxygen Delivery: 2 LPM  Vital Signs with Ranges  Temp:  [99.1  F (37.3  C)] 99.1  F (37.3  C)  Pulse:  [105-116] 116  Resp:  [16] 16  BP: (108-149)/(77-93) 108/77  SpO2:  [87 %-98 %] 98 %  275 lbs 0  oz    Constitutional: Awake, alert, cooperative, no apparent distress.  Eyes: Conjunctiva and pupils examined and normal.  HEENT: Moist mucous membranes, normal dentition, no obvious blood in throat   Respiratory: Clear to auscultation bilaterally, no crackles or wheezing.  Cardiovascular: Regular rate and rhythm, normal S1 and S2, and no murmur noted.  GI: Soft, non-distended, non-tender, normal bowel sounds.  Lymph/Hematologic: No anterior cervical or supraclavicular adenopathy.  Skin: No rashes, no cyanosis, no edema.  Musculoskeletal: RLE is slightly swollen, warm and erythematous including the right foot.  Neurologic: Cranial nerves 2-12 intact, normal strength and sensation.  Psychiatric: Alert, oriented to person, place and time, no obvious anxiety or depression.    Data   Data reviewed today:  I personally reviewed the chest CT image(s) and agree with the report below   Recent Labs   Lab 10/30/21  1602 10/30/21  1555   WBC  --  11.7*   HGB  --  11.9   MCV  --  92   PLT  --  331   INR  --  1.76*   NA  --  137   POTASSIUM  --  3.8   CHLORIDE  --  104   CO2  --  26   BUN  --  15   CR 0.8 0.72   ANIONGAP  --  7   KYLIE  --  8.9   GLC  --  116*       Recent Results (from the past 24 hour(s))   CT Chest Pulmonary Embolism w Contrast    Narrative    EXAM: CT CHEST PULMONARY EMBOLISM W CONTRAST  LOCATION: [facility]  DATE/TIME: 10/30/2021 4:14 PM    INDICATION: PE suspected, high prob, hemoptysis  COMPARISON: 10/31/2020  TECHNIQUE: CT chest pulmonary angiogram during arterial phase injection of IV contrast. Multiplanar reformats and MIP reconstructions were performed. Dose reduction techniques were used.   CONTRAST: 83 mL [id number]        FINDINGS: Motion artifact degrades image quality.  ANGIOGRAM CHEST: No convincing pulmonary artery filling defects. The aorta is normal in caliber. The main pulmonary artery is dilated up to 4 cm.    LUNGS AND PLEURA: Mild mosaic attenuation of the lungs  likely related to the exam being obtained in the expiratory phase of respiration and/or small areas disease/air trapping. Mild bronchial wall thickening. No effusions or pneumothorax. No convincing   pulmonary nodules on this motion degraded exam.    MEDIASTINUM/AXILLAE: Heart size is at the upper limits of normal. Small hiatal hernia. No adenopathy.    CORONARY ARTERY CALCIFICATION: Mild.    UPPER ABDOMEN: Cholecystectomy.    MUSCULOSKELETAL: Similar large left chest wall lipoma. Degenerative changes of the spine.      Impression    IMPRESSION:  1.  No pulmonary embolus.  2.  Mild mosaic attenuation the lungs likely related to a combination of the exam being obtained in the extra phase of respiration and small airways disease/air trapping.  3.  Mild bronchial wall thickening.  4.  Findings suggestive of pulmonary hypertension.  5.  Borderline cardiomegaly.

## 2021-10-30 NOTE — ED NOTES
CT staff reported to RN that the patient threatened to hurt the staff if they did not hurry the procedure up in CT.  She was swearing and yelling in the CT.  Imaging was poor due to agitation.

## 2021-10-30 NOTE — ED PROVIDER NOTES
History   Chief Complaint:  Coughing up Blood       HPI   Aspen Mccullough is a 61 year old female anticoagulated on Xarelto with history of lupus who presents with hemoptysis. She reports that a couple hours ago she began experiencing productive coughing with blood. She is now experiencing some pain on the left side of her bright red blood that saturated approximately half of a paper towel.  She has not been sick prior to today.  She denies fever. She recently had an endovenous laser ablation to her right lower extremity last week and was off her Xarelto for 4-5 days. Denies any other sickness exposure.  She reports that the cart is making her very uncomfortable in the emergency department and her  asked for a different bed for her.  She denies any new problems with the right lower extremity.  She is very thirsty.  She states that she will not consent for any sort of blood transfusion.  Medical record notes that she is a Gnosticist and has indicated she would not allow blood transfusion in the past.    Review of Systems   Constitutional: Negative for fever.   Respiratory: Positive for cough.    All other systems reviewed and are negative.    Allergies:  Chlorhexidine  Codeine  Ibuprofen [Nsaids]  Penicillins  Sulfa Drugs  Calcium Channel Blockers  Doxycycline  Hydroxyzine  Mold    Medications:  Toprol-XL  Lipitor  Levothyroxine  Diltiazem  Dilaudid  Xarelto  Neurontin  Cytomel  Morphine  Prednisone  Ambien    Past Medical History:     ADHD  Cushing's syndrome  Degenerative disc disease  Endometriosis  Fibromyalgia  Hashimoto's disease  Hyperlipidemia  Hypertension  Hypothyroid  Insomnia  Lupus  Anemia  Renal disease  Sinusitis  Osteoarthrosis  Somatoform disorder  Histrionic personality  Glucocorticoid deficiency  PTSD  Atrial fibrillation  Deep venous thrombosis   Pulmonary embolism     Past Surgical History:    Amputation  Appendectomy   Arthrodesis ankle (R)  Arthroplasty knee  "bilateral  Right-tissue removal breast  Bunionectomy  Cholecystectomy   Mass removal upper extremity  Left foot x4  Fusion lumbar anterior one level  Sacroplasty  Hemorrhoidectomy  Inject nerve block suprascapular x6  IR endovenous ablation varicose veins x2  Laminectomy  Laparoscopic ablation endometriosis  Rhinoplasty  Salpingo-oophorectomy   Tonsillectomy     Family History:    Mother - hypertension     Social History:  Presents with her       Physical Exam     Patient Vitals for the past 24 hrs:   BP Temp Temp src Pulse Resp SpO2 Height Weight   10/30/21 1620 -- -- -- -- -- 98 % -- --   10/30/21 1610 -- -- -- -- -- 97 % -- --   10/30/21 1600 108/77 -- -- 116 -- 98 % -- --   10/30/21 1559 -- -- -- -- -- (!) 87 % -- --   10/30/21 1557 108/77 -- -- 116 -- (!) 88 % -- --   10/30/21 1550 -- -- -- -- -- 97 % -- --   10/30/21 1540 -- -- -- -- -- 94 % -- --   10/30/21 1530 -- -- -- -- -- 95 % -- --   10/30/21 1520 -- -- -- -- -- 97 % -- --   10/30/21 1513 (!) 149/93 99.1  F (37.3  C) Oral 105 16 96 % 1.753 m (5' 9\") 124.7 kg (275 lb)   10/30/21 1510 (!) 149/93 -- -- 107 -- 98 % -- --       Physical Exam   General: Well-nourished, appears uncomfortable  Eyes: PERRL, conjunctivae pink no scleral icterus or conjunctival injection  ENT:  Moist mucus membranes, posterior oropharynx clear without erythema or exudates.  Posterior oropharynx is clear.  Protecting airway.  Respiratory:  Lungs clear to auscultation bilaterally, no crackles/rubs/wheezes.  Good air movement  CV: Mildly tachycardic rate and rhythm, no murmurs/rubs/gallops  GI:  Abdomen soft and non-distended.  Normoactive BS.  No tenderness, guarding or rebound  Skin: Warm, dry.  No rashes or petechiae  Musculoskeletal: No peripheral edema or calf tenderness  Neuro: Alert and oriented to person/place/time  Psychiatric: Normal affect      Emergency Department Course   ECG  ECG obtained at 1532, ECG read at 1542  Hemoptysis   No significant change as " compared to prior, dated 12/5/20.  Rate 94 bpm. AL interval 154 ms. QRS duration 74 ms. QT/QTc 378/472 ms. P-R-T axes 56 34 52. Normal sinus rhythm. Possible left atrial enlargement. Low voltage QRS. Cannot rule out anterior infarct, age undetermined.     ECG  ECG taken at 1546, ECG read at 1550  Hemoptysis   No significant change as compared to prior, today.  Rate 94 bpm. AL interval 216 ms. QRS duration 102 ms. QT/QTc 378/472 ms. P-R-T axes 49 59 37. Sinus rhythm with 1st degree AV block. Nonspecific ST abnormality.      Imaging:  CT Chest Pulmonary Embolism w Contrast   Final Result   IMPRESSION:   1.  No pulmonary embolus.   2.  Mild mosaic attenuation the lungs likely related to a combination of the exam being obtained in the extra phase of respiration and small airways disease/air trapping.   3.  Mild bronchial wall thickening.   4.  Findings suggestive of pulmonary hypertension.   5.  Borderline cardiomegaly.        Report per radiology    Laboratory:  Labs Ordered and Resulted from Time of ED Arrival to Time of ED Departure   BASIC METABOLIC PANEL - Abnormal       Result Value    Sodium 137      Potassium 3.8      Chloride 104      Carbon Dioxide (CO2) 26      Anion Gap 7      Urea Nitrogen 15      Creatinine 0.72      Calcium 8.9      Glucose 116 (*)     GFR Estimate 90     INR - Abnormal    INR 1.76 (*)    CBC WITH PLATELETS AND DIFFERENTIAL - Abnormal    WBC Count 11.7 (*)     RBC Count 4.04      Hemoglobin 11.9      Hematocrit 37.2      MCV 92      MCH 29.5      MCHC 32.0      RDW 14.6      Platelet Count 331      % Neutrophils 76      % Lymphocytes 16      % Monocytes 6      % Eosinophils 1      % Basophils 0      % Immature Granulocytes 1      NRBCs per 100 WBC 0      Absolute Neutrophils 8.9 (*)     Absolute Lymphocytes 1.9      Absolute Monocytes 0.7      Absolute Eosinophils 0.1      Absolute Basophils 0.0      Absolute Immature Granulocytes 0.1 (*)     Absolute NRBCs 0.0     ISTAT CREATININE POCT -  Normal    Creatinine POCT 0.8      GFR, ESTIMATED POCT >60     COVID-19 VIRUS (CORONAVIRUS) BY PCR        Emergency Department Course:  Reviewed:  I reviewed nursing notes, vitals, past medical history and Care Everywhere    Assessments:  1528 I obtained history and examined the patient as noted above.   1745 I rechecked the patient and explained findings.     Consults:  1748 I spoke with Dr. Wyatt of the hospitalist service.     Interventions:  168: Dilaudid 1 mg IV     Disposition:  The patient was admitted to the hospital under the care of Dr. Wyatt.     Impression & Plan     Medical Decision Making:  Aspen Mccullough is a 61 year old female who comes today with hemoptysis.  She had this at home and then a recurrence in the emergency department.  She has a history of lupus and was recently off her direct oral anticoagulant for several days for procedure.  I was is concerned about the possibility of a pulmonary embolism as a cause.  CT scan was obtained and showed no definite cause and no evidence of pulmonary embolism.  Her hemoglobin at this time is stable.  Given that she had recurrence in the ED , we will admit her to the hospital in case she needs further evaluation or bronchoscopy.  Airway is clear at this time.  She is fortunately not anemic but it is noted that she is very clear that she would not receive a blood transfusion as a Oriental orthodox.  All this was discussed with Dr. Wyatt who graciously agreed to meet the patient.    Diagnosis:    ICD-10-CM    1. Hemoptysis  R04.2        Scribe Disclosure:  I, Pat Huddleston, am serving as a scribe at 3:29 PM on 10/30/2021 to document services personally performed by Laurel Landa MD based on my observations and the provider's statements to me.             Laurel Landa MD  10/31/21 1006

## 2021-10-30 NOTE — H&P
Admitted: 10/30/2021    HISTORY OF PRESENT ILLNESS:  This is a 61-year-old female with history of SLE, hyperlipidemia, Hashimoto's thyroiditis/hypothyroidism, ADHD, chronic back pain, hypertension, chronic kidney disease stage II, fibromyalgia, chronic regional pain syndrome, history of DVT, PE in the past, atrial fibrillation in the past, on chronic anticoagulation with Xarelto, who came to the ER with complaint of hemoptysis.    According to the patient, she was holding her Xarelto for a few days prior to a procedure on the 26th for a right small saphenous vein endovascular laser ablation.  She restarted her Xarelto on Wednesday.  Today, a few hours before coming to the ER, she started having a nosebleed that was difficult to stop and after that she started having hemoptysis.  She has been coughing up blood whenever she coughs, up until now about half a cup of hemoptysis.  The patient denies any fever, chills, chest pain, shortness of breath, orthopnea, PND, palpitation, headache, dizziness, lightheadedness.  No abdominal pain, back pain, dysuria, hematuria, constipation, or diarrhea.  She never had any history of hemoptysis like that.  She does have history of nosebleeds in the past.  Because of continued hemoptysis she came to the ER for evaluation.    ASSESSMENT AND PLAN:  1.  Hemoptysis, possible bronchitis:  This is a 61-year-old female, on chronic anticoagulation, with history of SLE, who presented with hemoptysis.  She recently was holding the Xarelto before a procedure on 10/26 and now back on Xarelto.  CT scan of the chest, PE protocol, was done which was negative for pulmonary embolism, mild mosaic attenuation of the lung, likely related to combination of exam being obtained in the face of exploration with small airway disease or air trapping.  Mild bronchial wall thickening.  Also, suggestion of pulmonary hypertension as well.  Most likely may have some underlying bronchitis.  We will start her on IV  ceftriaxone and azithromycin.  Hold the Xarelto.  Quantify how much she is coughing up blood.  We will consult Pulmonary to evaluate the patient.  No further nosebleed at this time.  2.  Epistaxis:  Episode of epistaxis today, prior to hemoptysis, has been resolved at this time.  3.  Possible right lower extremity cellulitis:  Although she has recently had the procedure on the right leg, the right leg looks slightly swollen, erythematous, warm, and slightly tender as well.  This may be post-procedure changes, but given mildly elevated white blood cell count I already started her on ceftriaxone for possible bronchitis, so that we will cover her for right lower extremity cellulitis as well, if there is any.   4.  SLE:  She is on prednisone 8 mg and 10 mg on alternate days.  She took 8 mg today and is due for 10 mg tomorrow.   5.  Hypothyroidism secondary to Hashimoto's thyroiditis:  She is on levothyroxine 300 mg; we will continue with that.  6.  History of hypertension:  She is on metoprolol 100 mg b.i.d. and Cardizem 180 b.i.d.  We will continue with that.  7.  History of atrial fibrillation, rate controlled:  On metoprolol and Cardizem.  We will continue with that.  8.  Fibromyalgia/complex regional pain syndrome/chronic pain syndrome:  She is on MS Contin 90 in the morning, 60 in the afternoon, and 90 in the evening; in addition to that, she take Dilaudid 8 mg 3 times a day.  She has been on this medication for 20 years or more.  We will continue with that while she is in the hospital.  9.  Chronic anticoagulation with Xarelto:  We will have to hold the Xarelto for now.  10.  Hyperlipidemia:  On Lipitor.  We will continue with that.  11.  Chronic kidney disease stage II:  Has been stable at this time.  12.  DVT prophylaxis with SCDs.    CODE STATUS:  Full code.    The case discussed with the ER physician and the nursing staff taking care of the patient.      Adis Wyatt MD        D: 10/30/2021   T: 10/30/2021    MT: [facility]    Name:     ROXY ALVARES  MRN:      [id number]        Account:     [id number]   :      1960           Admitted:    10/30/2021       Document: [id number]    cc:  Arcadio Farfan MD

## 2021-10-31 LAB
ALBUMIN SERPL-MCNC: 3.2 G/DL (ref 3.4–5)
ALP SERPL-CCNC: 80 U/L (ref 40–150)
ALT SERPL W P-5'-P-CCNC: 17 U/L (ref 0–50)
ANION GAP SERPL CALCULATED.3IONS-SCNC: 6 MMOL/L (ref 3–14)
AST SERPL W P-5'-P-CCNC: 15 U/L (ref 0–45)
BASOPHILS # BLD AUTO: 0.1 10E3/UL (ref 0–0.2)
BASOPHILS NFR BLD AUTO: 1 %
BILIRUB SERPL-MCNC: 0.4 MG/DL (ref 0.2–1.3)
BUN SERPL-MCNC: 14 MG/DL (ref 7–30)
CALCIUM SERPL-MCNC: 8.2 MG/DL (ref 8.5–10.1)
CHLORIDE BLD-SCNC: 111 MMOL/L (ref 94–109)
CO2 SERPL-SCNC: 24 MMOL/L (ref 20–32)
CREAT SERPL-MCNC: 0.76 MG/DL (ref 0.52–1.04)
EOSINOPHIL # BLD AUTO: 0.1 10E3/UL (ref 0–0.7)
EOSINOPHIL NFR BLD AUTO: 2 %
ERYTHROCYTE [DISTWIDTH] IN BLOOD BY AUTOMATED COUNT: 15 % (ref 10–15)
GFR SERPL CREATININE-BSD FRML MDRD: 84 ML/MIN/1.73M2
GLUCOSE BLD-MCNC: 99 MG/DL (ref 70–99)
HCT VFR BLD AUTO: 34.6 % (ref 35–47)
HGB BLD-MCNC: 11.1 G/DL (ref 11.7–15.7)
IMM GRANULOCYTES # BLD: 0.1 10E3/UL
IMM GRANULOCYTES NFR BLD: 1 %
LYMPHOCYTES # BLD AUTO: 2.9 10E3/UL (ref 0.8–5.3)
LYMPHOCYTES NFR BLD AUTO: 32 %
MCH RBC QN AUTO: 29.9 PG (ref 26.5–33)
MCHC RBC AUTO-ENTMCNC: 32.1 G/DL (ref 31.5–36.5)
MCV RBC AUTO: 93 FL (ref 78–100)
MONOCYTES # BLD AUTO: 0.6 10E3/UL (ref 0–1.3)
MONOCYTES NFR BLD AUTO: 7 %
NEUTROPHILS # BLD AUTO: 5.2 10E3/UL (ref 1.6–8.3)
NEUTROPHILS NFR BLD AUTO: 57 %
NRBC # BLD AUTO: 0 10E3/UL
NRBC BLD AUTO-RTO: 0 /100
PLATELET # BLD AUTO: 284 10E3/UL (ref 150–450)
POTASSIUM BLD-SCNC: 3.8 MMOL/L (ref 3.4–5.3)
PROT SERPL-MCNC: 6.9 G/DL (ref 6.8–8.8)
RBC # BLD AUTO: 3.71 10E6/UL (ref 3.8–5.2)
SODIUM SERPL-SCNC: 141 MMOL/L (ref 133–144)
WBC # BLD AUTO: 8.9 10E3/UL (ref 4–11)

## 2021-10-31 PROCEDURE — 250N000012 HC RX MED GY IP 250 OP 636 PS 637: Performed by: INTERNAL MEDICINE

## 2021-10-31 PROCEDURE — 36415 COLL VENOUS BLD VENIPUNCTURE: CPT | Performed by: INTERNAL MEDICINE

## 2021-10-31 PROCEDURE — 250N000011 HC RX IP 250 OP 636: Performed by: INTERNAL MEDICINE

## 2021-10-31 PROCEDURE — 250N000013 HC RX MED GY IP 250 OP 250 PS 637: Performed by: STUDENT IN AN ORGANIZED HEALTH CARE EDUCATION/TRAINING PROGRAM

## 2021-10-31 PROCEDURE — 85025 COMPLETE CBC W/AUTO DIFF WBC: CPT | Performed by: INTERNAL MEDICINE

## 2021-10-31 PROCEDURE — 82040 ASSAY OF SERUM ALBUMIN: CPT | Performed by: INTERNAL MEDICINE

## 2021-10-31 PROCEDURE — 250N000013 HC RX MED GY IP 250 OP 250 PS 637: Performed by: INTERNAL MEDICINE

## 2021-10-31 PROCEDURE — 99232 SBSQ HOSP IP/OBS MODERATE 35: CPT | Performed by: STUDENT IN AN ORGANIZED HEALTH CARE EDUCATION/TRAINING PROGRAM

## 2021-10-31 PROCEDURE — 120N000004 HC R&B MS OVERFLOW

## 2021-10-31 PROCEDURE — C9113 INJ PANTOPRAZOLE SODIUM, VIA: HCPCS | Performed by: INTERNAL MEDICINE

## 2021-10-31 PROCEDURE — 999N000111 HC STATISTIC OT IP EVAL DEFER

## 2021-10-31 RX ORDER — GUAIFENESIN/DEXTROMETHORPHAN 100-10MG/5
10 SYRUP ORAL EVERY 4 HOURS PRN
Status: DISCONTINUED | OUTPATIENT
Start: 2021-10-31 | End: 2021-11-01 | Stop reason: HOSPADM

## 2021-10-31 RX ORDER — GABAPENTIN 300 MG/1
300 CAPSULE ORAL 2 TIMES DAILY
Status: DISCONTINUED | OUTPATIENT
Start: 2021-10-31 | End: 2021-11-01 | Stop reason: HOSPADM

## 2021-10-31 RX ADMIN — CEFTRIAXONE SODIUM 2 G: 2 INJECTION, POWDER, FOR SOLUTION INTRAMUSCULAR; INTRAVENOUS at 18:54

## 2021-10-31 RX ADMIN — MORPHINE SULFATE 90 MG: 30 TABLET, EXTENDED RELEASE ORAL at 07:44

## 2021-10-31 RX ADMIN — GABAPENTIN 800 MG: 800 TABLET, FILM COATED ORAL at 07:44

## 2021-10-31 RX ADMIN — CYCLOBENZAPRINE 10 MG: 10 TABLET, FILM COATED ORAL at 14:12

## 2021-10-31 RX ADMIN — MORPHINE SULFATE 60 MG: 30 TABLET, EXTENDED RELEASE ORAL at 11:56

## 2021-10-31 RX ADMIN — DILTIAZEM HYDROCHLORIDE 180 MG: 180 CAPSULE, COATED, EXTENDED RELEASE ORAL at 19:58

## 2021-10-31 RX ADMIN — GUAIFENESIN AND DEXTROMETHORPHAN 10 ML: 100; 10 SYRUP ORAL at 12:02

## 2021-10-31 RX ADMIN — METOPROLOL SUCCINATE 100 MG: 100 TABLET, EXTENDED RELEASE ORAL at 07:45

## 2021-10-31 RX ADMIN — CYCLOBENZAPRINE 10 MG: 10 TABLET, FILM COATED ORAL at 19:55

## 2021-10-31 RX ADMIN — GABAPENTIN 800 MG: 800 TABLET, FILM COATED ORAL at 14:12

## 2021-10-31 RX ADMIN — PANTOPRAZOLE SODIUM 40 MG: 40 INJECTION, POWDER, FOR SOLUTION INTRAVENOUS at 07:43

## 2021-10-31 RX ADMIN — HYDROMORPHONE HYDROCHLORIDE 8 MG: 4 TABLET ORAL at 11:56

## 2021-10-31 RX ADMIN — HYDROMORPHONE HYDROCHLORIDE 8 MG: 4 TABLET ORAL at 03:54

## 2021-10-31 RX ADMIN — CYCLOBENZAPRINE 10 MG: 10 TABLET, FILM COATED ORAL at 07:44

## 2021-10-31 RX ADMIN — PREDNISONE 10 MG: 5 TABLET ORAL at 09:35

## 2021-10-31 RX ADMIN — GABAPENTIN 300 MG: 300 CAPSULE ORAL at 15:21

## 2021-10-31 RX ADMIN — AZITHROMYCIN MONOHYDRATE 500 MG: 500 INJECTION, POWDER, LYOPHILIZED, FOR SOLUTION INTRAVENOUS at 22:01

## 2021-10-31 RX ADMIN — ATORVASTATIN CALCIUM 40 MG: 40 TABLET, FILM COATED ORAL at 19:58

## 2021-10-31 RX ADMIN — MULTIPLE VITAMINS W/ MINERALS TAB 1 TABLET: TAB at 07:44

## 2021-10-31 RX ADMIN — HYDROMORPHONE HYDROCHLORIDE 8 MG: 4 TABLET ORAL at 19:55

## 2021-10-31 RX ADMIN — ZOLPIDEM TARTRATE 5 MG: 5 TABLET ORAL at 23:08

## 2021-10-31 RX ADMIN — DILTIAZEM HYDROCHLORIDE 180 MG: 180 CAPSULE, COATED, EXTENDED RELEASE ORAL at 07:44

## 2021-10-31 RX ADMIN — GABAPENTIN 800 MG: 800 TABLET, FILM COATED ORAL at 19:57

## 2021-10-31 RX ADMIN — METOPROLOL SUCCINATE 100 MG: 100 TABLET, EXTENDED RELEASE ORAL at 19:57

## 2021-10-31 RX ADMIN — MULTIPLE VITAMINS W/ MINERALS TAB 1 TABLET: TAB at 19:57

## 2021-10-31 RX ADMIN — MORPHINE SULFATE 90 MG: 30 TABLET, EXTENDED RELEASE ORAL at 19:55

## 2021-10-31 RX ADMIN — GUAIFENESIN AND DEXTROMETHORPHAN 10 ML: 100; 10 SYRUP ORAL at 19:54

## 2021-10-31 ASSESSMENT — ACTIVITIES OF DAILY LIVING (ADL)
ADLS_ACUITY_SCORE: 4
ADLS_ACUITY_SCORE: 5
ADLS_ACUITY_SCORE: 6
ADLS_ACUITY_SCORE: 4
ADLS_ACUITY_SCORE: 6
ADLS_ACUITY_SCORE: 5
ADLS_ACUITY_SCORE: 5
ADLS_ACUITY_SCORE: 6
ADLS_ACUITY_SCORE: 4
ADLS_ACUITY_SCORE: 6
ADLS_ACUITY_SCORE: 6
ADLS_ACUITY_SCORE: 4
ADLS_ACUITY_SCORE: 6
ADLS_ACUITY_SCORE: 4
ADLS_ACUITY_SCORE: 4
ADLS_ACUITY_SCORE: 6
ADLS_ACUITY_SCORE: 4
ADLS_ACUITY_SCORE: 6
ADLS_ACUITY_SCORE: 4

## 2021-10-31 NOTE — PLAN OF CARE
PT: Eval orders received, chart reviewed. Pt seen by OT this AM. No skilled PT needs, pt mobilizing IND per discussion with OT. Will complete orders

## 2021-10-31 NOTE — PLAN OF CARE
Inpatient status. Pt A&O X4. VSS on RA. Tele was SR   Denies SOB. Regular diet. PIV SL. Up SBA. Pain managed with scheduled dilaudid. Pulmonology/ PT/OT consult pending. Continue to monitor.

## 2021-10-31 NOTE — PROGRESS NOTES
[facility]    Medicine Progress Note - Hospitalist Service       Date of Admission:  10/30/2021    Assessment & Plan         This is a 61-year-old female with history of SLE, hyperlipidemia, Hashimoto's thyroiditis/hypothyroidism, ADHD, chronic back pain, hypertension, chronic kidney disease stage II, fibromyalgia, chronic regional pain syndrome, history of DVT, PE in the past, atrial fibrillation in the past, on chronic anticoagulation with Xarelto, who came to the ER with complaint of hemoptysis.    Hemoptysis  Epistaxis  Developed a nose bleeding earlier in the morning prior to admission. Later in the day began coughing up blood, small amounts.  In the ED, vitals stable and Hb at baseline ~11  -CT scan of the chest, PE protocol, revealed mild mosaic attenuation of the lung, likely related to combination of exam being obtained in the face of exploration with small airway disease or air trapping.  Mild bronchial wall thickening.   -NOAC held on admission and admitted for close observation and pulmonary consult     > suspect the two are related, and hemoptysis is not from primary etiology but rather epistaxis.  >Will continue IV antibiotics for possible bronchitis vs early pnuemonia due to CT findings and elevated WBC on admission  > Pulm consulted and agrees with above assessment  > Plan to monitor vitals today, continue antibiotics and likely discharge tomorrow    SLE:    - continue prednisone 8 mg and 10 mg on alternate days.     Hypothyroidism secondary to Hashimoto's thyroiditi  - continue home levo    History of hypertension   - continue home metoprolol 100 mg b.i.d. and Cardizem 180 b.i.d.  We will continue with that.    History of atrial fibrillation, rate controlled  -On metoprolol and Cardizem.  We will continue with that  -Hold xarelto for her epistaxis    Fibromyalgia/complex regional pain syndrome/chronic pain syndrome  She is on MS Contin 90 in the morning, 60 in the  afternoon, and 90 in the evening; in addition to that, she take Dilaudid 8 mg 3 times a day.  She has been on this medication for 20 years or more.  We will continue with that while she is in the hospital.    Hyperlipidemia   On Lipitor.  We will continue with that.    CKD  Cr stable and at baseline          Diet: Combination Diet Regular Diet Adult    DVT Prophylaxis: ambulate  Mina Catheter: Not present  Central Lines: None  Code Status: Full Code      Disposition Plan   Expected discharge:11/01    The patient's care was discussed with the patient and bedside nurse.    Richelle Mcgee DO  Hospitalist Service  [facility]  Securely message with the Vocera Web Console (learn more here)  Text page via [employer] Paging/Directory        Clinically Significant Risk Factors Present on Admission             # Coagulation Defect: home medication list includes an anticoagulant medication       ______________________________________________________________________    Interval History   Patient reports 2 more episodes of hemoptysis over the night. Vomiting bad with blood tinged sputum in bag. No clots. She continues to have productive cough that she swallows. No issues breathing. No vomiting. No fevers. She has never had anything like this before. She is convinced this is not caused by her nosebleed and very rude and to this staff.    Data reviewed today: I reviewed all medications, new labs and imaging results over the last 24 hours. I personally reviewed her abnormal lung CT without any major cause of bleeding.    Physical Exam   Vital Signs: Temp: 97.3  F (36.3  C) Temp src: Oral BP: 111/64 Pulse: 72   Resp: 20 SpO2: 92 % O2 Device: None (Room air) Oxygen Delivery: 2 LPM  Weight: 302 lbs 8 oz     Constitutional: awake, alert, cooperative, no apparent distress  Respiratory: Clear to auscultation bilaterally, no crackles or wheezing  Cardiovascular: Regular rate and rhythm, normal S1 and S2, and no  murmur noted  GI: Normal bowel sounds, soft, non-distended, non-tender  Skin/Integumen: RLE mild edema, not tender to palpation and not red or warm  MSK no joint swelling or pain   Psych: critical, angry and rude    Data   Recent Labs   Lab 10/31/21  0659 10/30/21  1602 10/30/21  1555   WBC 8.9  --  11.7*   HGB 11.1*  --  11.9   MCV 93  --  92     --  331   INR  --   --  1.76*     --  137   POTASSIUM 3.8  --  3.8   CHLORIDE 111*  --  104   CO2 24  --  26   BUN 14  --  15   CR 0.76 0.8 0.72   ANIONGAP 6  --  7   KYLIE 8.2*  --  8.9   GLC 99  --  116*   ALBUMIN 3.2*  --   --    PROTTOTAL 6.9  --   --    BILITOTAL 0.4  --   --    ALKPHOS 80  --   --    ALT 17  --   --    AST 15  --   --      Recent Results (from the past 24 hour(s))   CT Chest Pulmonary Embolism w Contrast    Narrative    EXAM: CT CHEST PULMONARY EMBOLISM W CONTRAST  LOCATION: [facility]  DATE/TIME: 10/30/2021 4:14 PM    INDICATION: PE suspected, high prob, hemoptysis  COMPARISON: 10/31/2020  TECHNIQUE: CT chest pulmonary angiogram during arterial phase injection of IV contrast. Multiplanar reformats and MIP reconstructions were performed. Dose reduction techniques were used.   CONTRAST: 83 mL Isovue-370        FINDINGS: Motion artifact degrades image quality.  ANGIOGRAM CHEST: No convincing pulmonary artery filling defects. The aorta is normal in caliber. The main pulmonary artery is dilated up to 4 cm.    LUNGS AND PLEURA: Mild mosaic attenuation of the lungs likely related to the exam being obtained in the expiratory phase of respiration and/or small areas disease/air trapping. Mild bronchial wall thickening. No effusions or pneumothorax. No convincing   pulmonary nodules on this motion degraded exam.    MEDIASTINUM/AXILLAE: Heart size is at the upper limits of normal. Small hiatal hernia. No adenopathy.    CORONARY ARTERY CALCIFICATION: Mild.    UPPER ABDOMEN: Cholecystectomy.    MUSCULOSKELETAL: Similar large  left chest wall lipoma. Degenerative changes of the spine.      Impression    IMPRESSION:  1.  No pulmonary embolus.  2.  Mild mosaic attenuation the lungs likely related to a combination of the exam being obtained in the extra phase of respiration and small airways disease/air trapping.  3.  Mild bronchial wall thickening.  4.  Findings suggestive of pulmonary hypertension.  5.  Borderline cardiomegaly.

## 2021-10-31 NOTE — PLAN OF CARE
Assumed care today at 0730. Pt is a/o. VSS. Bleeding resolved. Tolerating orals. Pulmonology following. Possible discharge tomorrow.  Tele NSR

## 2021-10-31 NOTE — PROGRESS NOTES
Pt stated she is not allergic to any alcohol or related products. Requested to have the allergy removed from her chart.

## 2021-10-31 NOTE — PLAN OF CARE
RECEIVING UNIT ED HANDOFF REVIEW    ED Nurse Handoff Report was reviewed by: Figueroa Carvajal RN on October 30, 2021 at 7:09 PM

## 2021-10-31 NOTE — CONSULTS
PULMONOLOGY CONSULTATION  Date of service: 10/31/2021    [facility]    _____________________________________________________    Aspen EPPS Anglican  61 year old female  [id number]  [address] [facility] [id number]    Primary Care Provider:  Arcadio Farfan  Admission Date: 10/30/2021  Hospital Attending Physician:  Adis Wyatt MD  ________________________________________    CHIEF COMPLAINT : I was asked to see this patient by Dr. Wyatt for evaluation of hemoptysis   Informant: EHR and patient    HISTORY OF PRESENT ILLNESS       Aspen is a 61F with pmh notable for SLE ( unclear dx, did see rheum in 2018 and not felt to have any evidence of systemic inflammatory rheumatic condition), hyperlipidemia, Hashimoto's thyroiditis/hypothyroidism, ADHD, chronic back pain, CKD2, fibromyalgia, chronic regional pain syndrome, history of DVT, PE in the past, atrial fibrillation in the past, on chronic anticoagulation with Xarelto, who came to the ER with complaint of hemoptysis.     According to the patient, she was holding her Xarelto for a few days prior to a procedure on the 26th for a right small saphenous vein endovascular laser ablation.  She restarted her Xarelto on Wednesday. On day of admission, few hours before coming to the ER, she started having a nosebleed that was difficult to stop and after that she started having hemoptysis.  She has been coughing up blood whenever she coughs and some continuation overnight.otherwise feel well without complaint. Doesn't typically have problems with hemoptysis or epistaxis. Denies gerd or hematemesis. No trauma. Denies any fever, chills, chest pain, shortness of breath, orthopnea, PND, palpitation, headache, dizziness, lightheadedness.  No abdominal pain, back pain, dysuria, hematuria, constipation, or diarrhea.  This am, on room air and feels well. Coughing up intermittent blood continues sporadically.      HOME MEDICATIONS     Facility-Administered  Medications Prior to Admission   Medication Dose Route Frequency Provider Last Rate Last Admin     botulinum toxin type A (BOTOX) 100 units injection 100 Units  100 Units Intramuscular Once Larua Tony MD         botulinum toxin type A (BOTOX) 100 units injection 100 Units  100 Units Intramuscular Once Laura Tony MD         botulinum toxin type A (BOTOX) 100 units injection 100 Units  100 Units Other Once Laura Tony MD         lidocaine (PF) (XYLOCAINE) 1 % injection 3 mL  3 mL   Shon Simpson MD   3 mL at 09/07/21 1630     lidocaine (PF) (XYLOCAINE) 1 % injection 3 mL  3 mL   Shon Simpson MD   3 mL at 09/07/21 1630     lidocaine 1% with EPINEPHrine 1:100,000 injection 3 mL  3 mL Intradermal Once Laura Tony MD         methylPREDNISolone (DEPO-MEDROL) injection 40 mg  40 mg   Shon Simpson MD   40 mg at 10/25/21 0912     triamcinolone (KENALOG-40) injection 40 mg  40 mg   Shon Simpson MD   40 mg at 09/07/21 1630     triamcinolone (KENALOG-40) injection 40 mg  40 mg   Shon Simpson MD   40 mg at 09/07/21 1630     Medications Prior to Admission   Medication Sig Dispense Refill Last Dose     atorvastatin (LIPITOR) 40 MG tablet Take 1 tablet (40 mg) by mouth every evening 30 tablet 0 10/29/2021 at Unknown time     BIOTIN FORTE PO Take 1 tablet by mouth daily    10/30/2021 at Unknown time     cyanocobalamin 1000 MCG/ML injection Inject 1 mL (1,000 mcg) Subcutaneous every 7 days 4 mL 5 10/29/2021 at Unknown time     cyclobenzaprine (FLEXERIL) 10 MG tablet Take 1 tablet (10 mg) by mouth 3 times daily 90 tablet 3 10/30/2021 at AM     cycloSPORINE (RESTASIS) 0.05 % ophthalmic emulsion Place 1 drop into both eyes 2 times daily   10/30/2021 at AM     diltiazem ER COATED BEADS (CARDIZEM CD/CARTIA XT) 180 MG 24 hr capsule Take 1 capsule (180 mg) by mouth 2 times daily 180 capsule 3 10/30/2021 at AM     gabapentin  (NEURONTIN) 300 MG capsule Take 300 mg by mouth 2 times daily (morning and afternoon) with 800mg tablet (total dose 1100mg)   10/30/2021 at AM     gabapentin (NEURONTIN) 800 MG tablet Take 1 tablet by mouth At Bedtime   10/29/2021 at HS     gabapentin (NEURONTIN) 800 MG tablet Take 800 mg by mouth 2 times daily (morning and afternoon) in addition to 300mg capsule (total dose 1100mg)   10/30/2021 at AM     HYDROmorphone (DILAUDID) 8 MG tablet Take 8 mg by mouth every 8 hours . Max of 3 doses per day.   10/30/2021 at AM     lifitegrast (XIIDRA) 5 % opthalmic solution Place 1 drop into both eyes 2 times daily   10/30/2021 at AM     metoprolol succinate ER (TOPROL-XL) 100 MG 24 hr tablet Take 1 tablet (100 mg) by mouth 2 times daily 60 tablet 1 10/30/2021 at AM     morphine (MS CONTIN) 30 MG 12 hr tablet Take 30 mg by mouth 2 times daily (morning and night) in addition to 60 mg tablet for a total dose of 90mg. 270 tablet 0 10/30/2021 at AM     morphine (MS CONTIN) 60 MG 12 hr tablet Take 60 mg by mouth daily in the afternoon (in addition to the 2 - 90mg doses)   10/29/2021 at Unknown time     morphine (MS CONTIN) 60 MG 12 hr tablet Take 60 mg by mouth 2 times daily (morning and night) in addition to 30mg tablet for a total dose of 90mg 30 tablet 0 10/30/2021 at AM     multivitamin, therapeutic with minerals (MULTI-VITAMIN) TABS tablet Take 1 tablet by mouth 2 times daily 100 tablet 3 10/30/2021 at AM     prednisoLONE acetate (PRED FORTE) 1 % ophthalmic susp Place 1 drop into both eyes 4 times daily    10/30/2021 at AM     predniSONE (DELTASONE) 1 MG tablet Take 3 mg by mouth every other day in addition to 5mg tablet for a total dose of 8mg.  2 10/30/2021 at Unknown time     predniSONE (DELTASONE) 5 MG tablet Take 10 mg by mouth every other day   10/29/2021     predniSONE (DELTASONE) 5 MG tablet Take 5 mg by mouth every other day in addition to 1mg tablets for a total dose of 8mg   10/30/2021 at Unknown time      probiotic CAPS Take 1 capsule by mouth 2 times daily   10/30/2021 at AM     rivaroxaban ANTICOAGULANT (XARELTO) 20 MG TABS tablet Take 20 mg by mouth daily (with dinner)    10/29/2021 at PM     TIROSINT 200 MCG CAPS Take 200 mcg by mouth 2 times daily    10/30/2021 at AM     zolpidem (AMBIEN) 5 MG tablet Take 5 mg by mouth nightly as needed for sleep   Past Week at PRN     carboxymethylcellulose (CARBOXYMETHYLCELLULOSE SODIUM) 0.5 % SOLN ophthalmic solution Place 1 drop into both eyes 2 times daily as needed  1 Bottle   at PRN     chlorhexidine (PERIDEX) 0.12 % solution         Elastic Bandages & Supports (MEDICAL COMPRESSION SOCKS) MISC 1 Package daily Please measure and distribute 2 pair of 20mmHg - 30mmHg THIGH high open or closed toe compression stockings with extra refills as indicated. Jobst ultrasheer or equivalent. 2 each 3  at -     fexofenadine (ALLEGRA) 180 MG tablet Take 180 mg by mouth daily as needed     at PRN     levothyroxine (SYNTHROID/LEVOTHROID) 300 MCG tablet Take 1 tablet (300 mcg) by mouth daily (Patient not taking: Reported on 10/30/2021)   Not Taking at Unknown time     liothyronine (CYTOMEL) 25 MCG tablet Take 25 mcg by mouth 2 times daily  (Patient not taking: Reported on 10/30/2021)   Not Taking at Unknown time     NASONEX 50 MCG/ACT spray Spray 1 spray into both nostrils daily as needed   11  at PRN     nystatin (MYCOSTATIN) 928394 UNIT/GM external powder Apply topically 3 times daily as needed 60 g 1  at PRN       PAST MEDICAL HISTORY      Past Medical History:   Diagnosis Date     ADHD (attention deficit hyperactivity disorder)      Allergic rhinitis      Chronic low back pain      Cushing's syndrome (H)      DDD (degenerative disc disease)      DDD (degenerative disc disease)      Deviated nasal septum      Diarrhea      Endometriosis      Fibromyalgia      Hashimoto's disease      Hyperlipidemia      Hypertension      Hypothyroid     hx hashimoto's thyroiditis     Insomnia      Lupus  (H)      Malabsorption      Pernicious anemia      Renal disease     chronic renal insufficiency     Sinusitis      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)     APPENDECTOMY       ARTHRODESIS ANKLE      right     ARTHROPLASTY KNEE BILATERAL       ARTHROPLASTY REVISION KNEE  4/19/2011    Procedure:ARTHROPLASTY REVISION KNEE; With Antibiotic Cement ; Surgeon:CHAO OLIVARES; Location:[facility]     BREAST SURGERY      right- tissue remove nipple area     BUNIONECTOMY  12/14/2011    Procedure:BUNIONECTOMY; Right Bunion Correction; Surgeon:GRACE ZARATE; [facility]     C STOMACH SURGERY PROCEDURE UNLISTED      see list which we will bring     CHOLECYSTECTOMY       EXAM UNDER ANESTHESIA ANUS N/A 7/15/2020    Procedure: EXAM UNDER ANESTHESIA, ANUS;  Surgeon: Luis Canales MD;  [facility]     EXCISE MASS UPPER EXTREMITY  12/14/2011    Procedure:EXCISE MASS UPPER EXTREMITY; Excision of Left Arm Mass; Surgeon:GRACE ZARATE; Location:[facility]     FOOT SURGERY      left X 4     FUSION LUMBAR ANTERIOR ONE LEVEL       HC SACROPLASTY      see list which we will bring     HEMORRHOIDECTOMY INTERNAL N/A 7/15/2020    Procedure: Exam under anesthesia, hemorrhoidectomy;  Surgeon: Luis Canales MD;  [facility]     INJECT NERVE BLOCK SUPRASCAPULAR Left 5/18/2018    Procedure: INJECT NERVE BLOCK SUPRASCAPULAR;  Left Suprascapular Nerve Block;  Surgeon: Basim Velazquez MD;  [facility]     INJECT NERVE BLOCK SUPRASCAPULAR Right 7/23/2018    Procedure: INJECT NERVE BLOCK SUPRASCAPULAR;  Right suprascapular injection;  Surgeon: Basim Velazquez MD;  [facility]     INJECT NERVE BLOCK SUPRASCAPULAR Bilateral 10/29/2018    Procedure: Bilateral Suprascapular Nerve Blocks;  Surgeon: Basim Velazquez MD;  [facility]     INJECT NERVE BLOCK SUPRASCAPULAR Bilateral 3/15/2019    Procedure: Bilateral  Suprascapular Nerve Block;  Surgeon: Basim Velazquez MD;  [facility]     INJECT NERVE BLOCK SUPRASCAPULAR Bilateral 12/31/2019    Procedure: bilateral suprascapular nerve block;  Surgeon: Basim Velazquez MD;  [facility]     INJECT NERVE BLOCK SUPRASCAPULAR Bilateral 8/3/2021    Procedure: bilateral suprascapular nerve block;  Surgeon: Basim Velazquez MD;  [facility]     IR ENDOVENOUS ABLATION VARICOSE VEINS  10/5/2021     IR ENDOVENOUS ABLATION VARICOSE VEINS  10/26/2021     KNEE SURGERY      see list which we will bring     LAMINECTOMY LUMBAR ONE LEVEL      L4-5     LAPAROSCOPIC ABLATION ENDOMETRIOSIS       AL HAND/FINGER SURGERY UNLISTED  surgeries on both hands with Dr. Shankar     AL STOMACH SURGERY PROCEDURE UNLISTED  gall bladder removal     RADIO FREQUENCY ABLATION PULSED CERVICAL Bilateral 7/10/2019    Procedure: Bilateral Suprascapular Nerve Pulsed Radiofrequency Ablation;  Surgeon: Basim Velazquez MD;  [facility]     REMOVE HARDWARE FOOT  12/14/2011    Procedure:REMOVE HARDWARE FOOT; Hardware Removal Right Foot (Mini-C-Arm) ; Surgeon:GRACE ZARATE; Location: [facility]     RHINOPLASTY       SALPINGO-OOPHORECTOMY BILATERAL       TONSILLECTOMY         ALLERGIES     Allergies   Allergen Reactions     Blood Transfusion Related (Informational Only) Other (See Comments)     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      Chlorhexidine Hives     Thor surgical cleanser     Codeine Hives     Has tolerated Oxycodone in past      Ibuprofen [Nsaids] Hives     Penicillins Hives     Other reaction(s): Unknown     Sulfa Drugs Hives     Other reaction(s): Unknown     Calcium Channel Blockers      Doxycycline GI Disturbance     Emesis & diarrhea  Patient denies allergy to this med     Hydroxyzine      rash     Mold      Red Wine Complex [Red Wine Powder]        SOCIAL / SUBSTANCE HISTORY     Social History     Socioeconomic History     Marital status:      Spouse  name: Not on file     Number of children: Not on file     Years of education: Not on file     Highest education level: Not on file   Occupational History     Not on file   Tobacco Use     Smoking status: Former Smoker     Packs/day: 1.50     Years: 20.00     Pack years: 30.00     Types: Cigarettes     Start date: 1973     Quit date: 1993     Years since quittin.8     Smokeless tobacco: Never Used     Tobacco comment: wish I never started   Substance and Sexual Activity     Alcohol use: No     Alcohol/week: 0.0 standard drinks     Drug use: No     Sexual activity: Not Currently     Partners: Male     Birth control/protection: Post-menopausal   Other Topics Concern     Parent/sibling w/ CABG, MI or angioplasty before 65F 55M? Not Asked   Social History Narrative    ** Merged History Encounter **          Social Determinants of Health     Financial Resource Strain:      Difficulty of Paying Living Expenses:    Food Insecurity:      Worried About Running Out of Food in the Last Year:      Ran Out of Food in the Last Year:    Transportation Needs:      Lack of Transportation (Medical):      Lack of Transportation (Non-Medical):    Physical Activity:      Days of Exercise per Week:      Minutes of Exercise per Session:    Stress:      Feeling of Stress :    Social Connections:      Frequency of Communication with Friends and Family:      Frequency of Social Gatherings with Friends and Family:      Attends [employer] Services:      Active Member of Clubs or Organizations:      Attends Club or Organization Meetings:      Marital Status:    Intimate Partner Violence:      Fear of Current or Ex-Partner:      Emotionally Abused:      Physically Abused:      Sexually Abused:        FAMILY HISTORY     Family History   Problem Relation Age of Onset     Hypertension Mother      No Known Problems Father      No Known Problems Sister      No Known Problems Brother      No Known Problems Maternal Grandmother      No Known  "Problems Maternal Grandfather      No Known Problems Paternal Grandmother      No Known Problems Other        REVIEW OF SYSTEMS   A comprehensive review of systems was negative except for items noted in HPI/Subjective.    PHYSICAL EXAMINATION   Temp (24hrs), Av.2  F (36.8  C), Min:97.3  F (36.3  C), Max:99.1  F (37.3  C)    Vital signs:  Temp: 98.7  F (37.1  C) Temp src: Oral BP: 122/70 Pulse: 78   Resp: 20 SpO2: 95 % O2 Device: None (Room air) Oxygen Delivery: 2 LPM Height: 175.3 cm (5' 9\") Weight: 137.2 kg (302 lb 8 oz)  Estimated body mass index is 44.67 kg/m  as calculated from the following:    Height as of this encounter: 1.753 m (5' 9\").    Weight as of this encounter: 137.2 kg (302 lb 8 oz).        I/O last 3 completed shifts:  In: 240 [P.O.:240]  Out: -     CONSTITUTIONAL/GENERAL APPEARANCE: Alert female. No Apparent Distress.  PSYCHIATRIC: Pleasant and appropriate mood and affect. Oriented x 3.  EARS, NOSE,THROAT,MOUTH: erythema nares, mouth unremarkable  NECK: Neck appearance normal.   RESPIRATORY: Non-labored effort. LCAB, no wheezing  CARDIOVASCULAR: S1, S2, regular  ABDOMEN: round, soft, non-tender  LYMPHATIC: no cervical lymphadenopathy.  SKIN: Skin color was normal.    LABORATORY ASSESSMENT    Arterial Blood GasNo lab results found in last 7 days.  CBC  Recent Labs   Lab 10/31/21  0659 10/30/21  1555   WBC 8.9 11.7*   RBC 3.71* 4.04   HGB 11.1* 11.9   HCT 34.6* 37.2   MCV 93 92   MCH 29.9 29.5   MCHC 32.1 32.0   RDW 15.0 14.6    331     BMP  Recent Labs   Lab 10/31/21  0659 10/30/21  1602 10/30/21  1555     --  137   POTASSIUM 3.8  --  3.8   CHLORIDE 111*  --  104   KYLIE 8.2*  --  8.9   CO2 24  --  26   BUN 14  --  15   CR 0.76 0.8 0.72   GLC 99  --  116*     INR  Recent Labs   Lab 10/30/21  1555   INR 1.76*      BNPNo lab results found in last 7 days.  VENOUS BLOOD GASESNo lab results found in last 7 days.      Additional labs and/or comments:     IMAGING      CTPE 10/30:  1.  No " pulmonary embolus.  2.  Mild mosaic attenuation the lungs likely related to a combination of the exam being obtained in the extra phase of respiration and small airways disease/air trapping.  3.  Mild bronchial wall thickening.  4.  Findings suggestive of pulmonary hypertension.  5.  Borderline cardiomegaly.        Echo 6/2021:  Left ventricular systolic function is normal.The visual ejection fraction is estimated at 55-60%.  The right ventricle is not well visualized.The right ventricular systolic function is normal.  Technically difficult bubble study due to challenging images- overall negative bubble study.  No significant valvular stenosis or regurgitation on doppler interrogation.    PFT & OTHER TESTING       ASSESSMENT / PLAN      Pulmonary diagnoses:  Abnl CT/CXR R91.8  Hemoptysis R04.2  Walt depend history Z87.891  Obesity E66.9    ASSESSMENT : Aspen is a 61F with pmh notable for SLE ( unclear dx, did see rheum in 2018 and not felt to have any evidence of systemic inflammatory rheumatic condition), hyperlipidemia, Hashimoto's thyroiditis/hypothyroidism, ADHD, chronic back pain, CKD2, fibromyalgia, chronic regional pain syndrome, history of DVT, PE in the past, atrial fibrillation in the past, on chronic anticoagulation with Xarelto, who came to the ER with complaint of hemoptysis which was preceded by epistaxis at home. CT chest largely unremarkable (seems to have been in expiratory phase) but no evidence of mass, sig hemoptysis, pna, or etiology for coughing up blood. Suspect related to epistaxis that preceded rather than true hemoptysis.       PLAN:   Agree with holding anticoagulation today  No role for bronch at this time. In addition, given her medication list, bronch would require general anestheisa  Consider ENT consult for more throough evaluation if ongoing bleeding  On room air  Short course of abx reasonable  Will follow      Onofre Medley  [facility]  [place]  Office: [phone]  Pager: [phone]

## 2021-10-31 NOTE — PLAN OF CARE
OT: Orders rec'd. Patient admitted with hemoptysis. Patient reports no difficulty with ambulation, ADLs. No need for OT evaluation. Patient in agreement.

## 2021-10-31 NOTE — PHARMACY-ADMISSION MEDICATION HISTORY
"Pharmacy Medication History  Admission medication history interview status for the 10/30/2021  admission is complete. See EPIC admission navigator for prior to admission medications     Location of Interview: Patient room  Medication history sources: Patient, Patient's family/friend (), Surescripts and Care Everywhere    Significant changes made to the medication list:  Changed Thyroid medications: Not taking cytomel or levothyroxine - replaced by Tirosint 200mcg BID    In the past week, patient estimated taking medication this percent of the time: greater than 90%    Additional medication history information:   Per patient&, thyroid regimen has been temporarily consolidated to Tirosint to try to simplify regimen. I left Cytomel & Levothyroxine as it may revert back but marked these as \"Not taking\".     Patient confirmed she only had morning doses and she took Prednisone 8mg today, meaning she would take Prednisone 10mg for tomorrow's dose.     Dosing summary:  Gabapentin 1100mg morning and afternoon, 800mg at bedtime  Prednisone: Alternating 8mg and 10mg every other day  Morphine: 90mg morning and night, 60mg in the afternoon      Medication reconciliation completed by provider prior to medication history? Yes    Time spent in this activity: 30 minutes    Prior to Admission medications    Medication Sig Last Dose Taking? Auth Provider   atorvastatin (LIPITOR) 40 MG tablet Take 1 tablet (40 mg) by mouth every evening 10/29/2021 at Unknown time Yes Fannie Cummins PA-C   BIOTIN FORTE PO Take 1 tablet by mouth daily  10/30/2021 at Unknown time Yes Reported, Patient   cyanocobalamin 1000 MCG/ML injection Inject 1 mL (1,000 mcg) Subcutaneous every 7 days 10/29/2021 at Unknown time Yes Yeimy Cabrera MD   cyclobenzaprine (FLEXERIL) 10 MG tablet Take 1 tablet (10 mg) by mouth 3 times daily 10/30/2021 at AM Yes Yeimy Cabrera MD   cycloSPORINE (RESTASIS) 0.05 % " ophthalmic emulsion Place 1 drop into both eyes 2 times daily 10/30/2021 at AM Yes Reported, Patient   diltiazem ER COATED BEADS (CARDIZEM CD/CARTIA XT) 180 MG 24 hr capsule Take 1 capsule (180 mg) by mouth 2 times daily 10/30/2021 at AM Yes Rosa Uribe MD   gabapentin (NEURONTIN) 300 MG capsule Take 300 mg by mouth 2 times daily (morning and afternoon) with 800mg tablet (total dose 1100mg) 10/30/2021 at AM Yes Unknown, Entered By History   gabapentin (NEURONTIN) 800 MG tablet Take 1 tablet by mouth At Bedtime 10/29/2021 at HS Yes Unknown, Entered By History   gabapentin (NEURONTIN) 800 MG tablet Take 800 mg by mouth 2 times daily (morning and afternoon) in addition to 300mg capsule (total dose 1100mg) 10/30/2021 at AM Yes Unknown, Entered By History   HYDROmorphone (DILAUDID) 8 MG tablet Take 8 mg by mouth every 8 hours . Max of 3 doses per day. 10/30/2021 at AM Yes    lifitegrast (XIIDRA) 5 % opthalmic solution Place 1 drop into both eyes 2 times daily 10/30/2021 at AM Yes    metoprolol succinate ER (TOPROL-XL) 100 MG 24 hr tablet Take 1 tablet (100 mg) by mouth 2 times daily 10/30/2021 at AM Yes Rosa Uribe MD   morphine (MS CONTIN) 30 MG 12 hr tablet Take 30 mg by mouth 2 times daily (morning and night) in addition to 60 mg tablet for a total dose of 90mg. 10/30/2021 at AM Yes    morphine (MS CONTIN) 60 MG 12 hr tablet Take 60 mg by mouth daily in the afternoon (in addition to the 2 - 90mg doses) 10/29/2021 at Unknown time Yes Unknown, Entered By History   morphine (MS CONTIN) 60 MG 12 hr tablet Take 60 mg by mouth 2 times daily (morning and night) in addition to 30mg tablet for a total dose of 90mg 10/30/2021 at AM Yes Umm Ya APRN CNP   multivitamin, therapeutic with minerals (MULTI-VITAMIN) TABS tablet Take 1 tablet by mouth 2 times daily 10/30/2021 at AM Yes    prednisoLONE acetate (PRED FORTE) 1 % ophthalmic susp Place 1 drop into both eyes 4 times daily   10/30/2021 at AM Yes Reported, Patient   predniSONE (DELTASONE) 1 MG tablet Take 3 mg by mouth every other day in addition to 5mg tablet for a total dose of 8mg. 10/30/2021 at Unknown time Yes    predniSONE (DELTASONE) 5 MG tablet Take 10 mg by mouth every other day 10/29/2021 Yes Unknown, Entered By History   predniSONE (DELTASONE) 5 MG tablet Take 5 mg by mouth every other day in addition to 1mg tablets for a total dose of 8mg 10/30/2021 at Unknown time Yes    probiotic CAPS Take 1 capsule by mouth 2 times daily 10/30/2021 at AM Yes    rivaroxaban ANTICOAGULANT (XARELTO) 20 MG TABS tablet Take 20 mg by mouth daily (with dinner)  10/29/2021 at PM Yes    TIROSINT 200 MCG CAPS Take 200 mcg by mouth 2 times daily  10/30/2021 at AM Yes Unknown, Entered By History   zolpidem (AMBIEN) 5 MG tablet Take 5 mg by mouth nightly as needed for sleep Past Week at PRN Yes Unknown, Entered By History   carboxymethylcellulose (CARBOXYMETHYLCELLULOSE SODIUM) 0.5 % SOLN ophthalmic solution Place 1 drop into both eyes 2 times daily as needed   at PRN     chlorhexidine (PERIDEX) 0.12 % solution    Reported, Patient   Elastic Bandages & Supports (MEDICAL COMPRESSION SOCKS) MISC 1 Package daily Please measure and distribute 2 pair of 20mmHg - 30mmHg THIGH high open or closed toe compression stockings with extra refills as indicated. Jobst ultrasheer or equivalent.  Taye Lama MD   fexofenadine (ALLEGRA) 180 MG tablet Take 180 mg by mouth daily as needed   at PRN  Reported, Patient   levothyroxine (SYNTHROID/LEVOTHROID) 300 MCG tablet Take 1 tablet (300 mcg) by mouth daily  Patient not taking: Reported on 10/30/2021 Not Taking at Unknown time  Fannie Cummins PA-C   liothyronine (CYTOMEL) 25 MCG tablet Take 25 mcg by mouth 2 times daily   Patient not taking: Reported on 10/30/2021 Not Taking at Unknown time     NASONEX 50 MCG/ACT spray Spray 1 spray into both nostrils daily as needed   at PRN     nystatin  (MYCOSTATIN) [id number] UNIT/GM external powder Apply topically 3 times daily as needed  at PRN  Naun Patricio MD       The information provided in this note is only as accurate as the sources available at the time of update(s)

## 2021-11-01 VITALS
HEART RATE: 67 BPM | SYSTOLIC BLOOD PRESSURE: 103 MMHG | BODY MASS INDEX: 43.4 KG/M2 | DIASTOLIC BLOOD PRESSURE: 46 MMHG | HEIGHT: 69 IN | TEMPERATURE: 97.4 F | RESPIRATION RATE: 18 BRPM | OXYGEN SATURATION: 97 % | WEIGHT: 293 LBS

## 2021-11-01 LAB
BASOPHILS # BLD AUTO: 0.1 10E3/UL (ref 0–0.2)
BASOPHILS NFR BLD AUTO: 1 %
EOSINOPHIL # BLD AUTO: 0.3 10E3/UL (ref 0–0.7)
EOSINOPHIL NFR BLD AUTO: 3 %
ERYTHROCYTE [DISTWIDTH] IN BLOOD BY AUTOMATED COUNT: 15.2 % (ref 10–15)
HCT VFR BLD AUTO: 34.8 % (ref 35–47)
HGB BLD-MCNC: 10.8 G/DL (ref 11.7–15.7)
IMM GRANULOCYTES # BLD: 0.2 10E3/UL
IMM GRANULOCYTES NFR BLD: 2 %
LYMPHOCYTES # BLD AUTO: 3.1 10E3/UL (ref 0.8–5.3)
LYMPHOCYTES NFR BLD AUTO: 31 %
MCH RBC QN AUTO: 29.3 PG (ref 26.5–33)
MCHC RBC AUTO-ENTMCNC: 31 G/DL (ref 31.5–36.5)
MCV RBC AUTO: 94 FL (ref 78–100)
MONOCYTES # BLD AUTO: 0.8 10E3/UL (ref 0–1.3)
MONOCYTES NFR BLD AUTO: 8 %
NEUTROPHILS # BLD AUTO: 5.9 10E3/UL (ref 1.6–8.3)
NEUTROPHILS NFR BLD AUTO: 55 %
NRBC # BLD AUTO: 0 10E3/UL
NRBC BLD AUTO-RTO: 0 /100
PLATELET # BLD AUTO: 305 10E3/UL (ref 150–450)
RBC # BLD AUTO: 3.69 10E6/UL (ref 3.8–5.2)
WBC # BLD AUTO: 10.3 10E3/UL (ref 4–11)

## 2021-11-01 PROCEDURE — 250N000012 HC RX MED GY IP 250 OP 636 PS 637: Performed by: INTERNAL MEDICINE

## 2021-11-01 PROCEDURE — 99239 HOSP IP/OBS DSCHRG MGMT >30: CPT | Performed by: STUDENT IN AN ORGANIZED HEALTH CARE EDUCATION/TRAINING PROGRAM

## 2021-11-01 PROCEDURE — 250N000013 HC RX MED GY IP 250 OP 250 PS 637: Performed by: STUDENT IN AN ORGANIZED HEALTH CARE EDUCATION/TRAINING PROGRAM

## 2021-11-01 PROCEDURE — 36415 COLL VENOUS BLD VENIPUNCTURE: CPT | Performed by: STUDENT IN AN ORGANIZED HEALTH CARE EDUCATION/TRAINING PROGRAM

## 2021-11-01 PROCEDURE — 250N000013 HC RX MED GY IP 250 OP 250 PS 637: Performed by: INTERNAL MEDICINE

## 2021-11-01 PROCEDURE — C9113 INJ PANTOPRAZOLE SODIUM, VIA: HCPCS | Performed by: INTERNAL MEDICINE

## 2021-11-01 PROCEDURE — 85025 COMPLETE CBC W/AUTO DIFF WBC: CPT | Performed by: STUDENT IN AN ORGANIZED HEALTH CARE EDUCATION/TRAINING PROGRAM

## 2021-11-01 PROCEDURE — 250N000011 HC RX IP 250 OP 636: Performed by: INTERNAL MEDICINE

## 2021-11-01 RX ORDER — PANTOPRAZOLE SODIUM 40 MG/1
40 TABLET, DELAYED RELEASE ORAL
Status: DISCONTINUED | OUTPATIENT
Start: 2021-11-02 | End: 2021-11-01 | Stop reason: HOSPADM

## 2021-11-01 RX ORDER — CEFPODOXIME PROXETIL 200 MG/1
200 TABLET, FILM COATED ORAL 2 TIMES DAILY
Qty: 12 TABLET | Refills: 0 | Status: SHIPPED | OUTPATIENT
Start: 2021-11-01 | End: 2021-11-07

## 2021-11-01 RX ORDER — AZITHROMYCIN 250 MG/1
250 TABLET, FILM COATED ORAL DAILY
Qty: 4 TABLET | Refills: 0 | Status: SHIPPED | OUTPATIENT
Start: 2021-11-01 | End: 2021-11-05

## 2021-11-01 RX ADMIN — PREDNISONE 8 MG: 2.5 TABLET ORAL at 09:57

## 2021-11-01 RX ADMIN — HYDROMORPHONE HYDROCHLORIDE 8 MG: 4 TABLET ORAL at 12:06

## 2021-11-01 RX ADMIN — GABAPENTIN 800 MG: 800 TABLET, FILM COATED ORAL at 07:52

## 2021-11-01 RX ADMIN — GABAPENTIN 300 MG: 300 CAPSULE ORAL at 07:52

## 2021-11-01 RX ADMIN — DILTIAZEM HYDROCHLORIDE 180 MG: 180 CAPSULE, COATED, EXTENDED RELEASE ORAL at 07:52

## 2021-11-01 RX ADMIN — MORPHINE SULFATE 60 MG: 30 TABLET, EXTENDED RELEASE ORAL at 12:06

## 2021-11-01 RX ADMIN — MULTIPLE VITAMINS W/ MINERALS TAB 1 TABLET: TAB at 07:52

## 2021-11-01 RX ADMIN — PANTOPRAZOLE SODIUM 40 MG: 40 INJECTION, POWDER, FOR SOLUTION INTRAVENOUS at 07:52

## 2021-11-01 RX ADMIN — HYDROMORPHONE HYDROCHLORIDE 8 MG: 4 TABLET ORAL at 04:05

## 2021-11-01 RX ADMIN — CYCLOBENZAPRINE 10 MG: 10 TABLET, FILM COATED ORAL at 07:52

## 2021-11-01 RX ADMIN — MORPHINE SULFATE 90 MG: 30 TABLET, EXTENDED RELEASE ORAL at 07:52

## 2021-11-01 RX ADMIN — METOPROLOL SUCCINATE 100 MG: 100 TABLET, EXTENDED RELEASE ORAL at 07:52

## 2021-11-01 ASSESSMENT — ACTIVITIES OF DAILY LIVING (ADL)
ADLS_ACUITY_SCORE: 5

## 2021-11-01 NOTE — PLAN OF CARE
Inpatient status. Pt A&O X4. VSS on RA. Tele SB. Denies SOB. Regular diet. PIV SL. Up SBA. Pulmonology/ PT/OT consult. Possible discharge today. Continue to monitor.

## 2021-11-01 NOTE — PROGRESS NOTES
Pt A&Ox4. VSS on RA. Denies SOB. Pain controlled with scheduled medication. Tele:SR. Regular diet. Up SBA. Pulmonology has cleared her. Discharge@1:00PM

## 2021-11-02 ENCOUNTER — ANCILLARY PROCEDURE (OUTPATIENT)
Dept: MRI IMAGING | Facility: CLINIC | Age: 61
End: 2021-11-02
Attending: PREVENTIVE MEDICINE
Payer: COMMERCIAL

## 2021-11-02 ENCOUNTER — PATIENT OUTREACH (OUTPATIENT)
Dept: CARE COORDINATION | Facility: CLINIC | Age: 61
End: 2021-11-02

## 2021-11-02 ENCOUNTER — ANCILLARY PROCEDURE (OUTPATIENT)
Dept: ULTRASOUND IMAGING | Facility: CLINIC | Age: 61
End: 2021-11-02
Attending: RADIOLOGY
Payer: COMMERCIAL

## 2021-11-02 DIAGNOSIS — M51.369 DDD (DEGENERATIVE DISC DISEASE), LUMBAR: ICD-10-CM

## 2021-11-02 DIAGNOSIS — I83.893 SYMPTOMATIC VARICOSE VEINS OF BOTH LOWER EXTREMITIES: ICD-10-CM

## 2021-11-02 DIAGNOSIS — I70.203 ATHEROSCLEROSIS OF NATIVE ARTERY OF BOTH LOWER EXTREMITIES (H): ICD-10-CM

## 2021-11-02 DIAGNOSIS — I83.893 SYMPTOMATIC VARICOSE VEINS OF BOTH LOWER EXTREMITIES: Primary | ICD-10-CM

## 2021-11-02 DIAGNOSIS — Z71.89 OTHER SPECIFIED COUNSELING: ICD-10-CM

## 2021-11-02 DIAGNOSIS — M51.16 LUMBAR DISC HERNIATION WITH RADICULOPATHY: ICD-10-CM

## 2021-11-02 PROCEDURE — 93971 EXTREMITY STUDY: CPT | Mod: RT | Performed by: RADIOLOGY

## 2021-11-02 PROCEDURE — 72148 MRI LUMBAR SPINE W/O DYE: CPT | Performed by: STUDENT IN AN ORGANIZED HEALTH CARE EDUCATION/TRAINING PROGRAM

## 2021-11-02 NOTE — PROGRESS NOTES
Clinic Care Coordination Contact  [facility]/Voicemail       Clinical Data: Care Coordinator Outreach  Outreach attempted x 1.  Left message on patient's voicemail with call back information and requested return call.  Plan:  Care Coordinator will try to reach patient again in 1-2 business days.      Ann Harden  Care Transitions Assistant  [facility]

## 2021-11-03 NOTE — DISCHARGE SUMMARY
[facility]  Hospitalist Discharge Summary      Date of Admission:  10/30/2021  Date of Discharge:  11/1/2021  1:05 PM  Discharging Provider: Richelle Mcgee, DO      Discharge Diagnoses   See below    Follow-ups Needed After Discharge   Follow-up Appointments     Follow-up and recommended labs and tests       Follow up with primary care provider, Arcadio Farfan, within 7-14 days for   hospital follow- up.  No follow up labs or test are needed.             Discharge Disposition   Discharged to home  Condition at discharge: Stable      Hospital Course   This is a 61-year-old female with history of SLE, hyperlipidemia, Hashimoto's thyroiditis/hypothyroidism, ADHD, chronic back pain, hypertension, chronic kidney disease stage II, fibromyalgia, chronic regional pain syndrome, history of DVT, PE in the past, atrial fibrillation in the past, on chronic anticoagulation with Xarelto, who came to the ER with complaint of hemoptysis.     Hemoptysis  Epistaxis  Developed a nose bleeding earlier in the morning prior to admission. Later in the day began coughing up blood, small amounts.  In the ED, vitals stable and Hb at baseline ~11  -CT scan of the chest, PE protocol, revealed mild mosaic attenuation of the lung, likely related to combination of exam being obtained in the face of exploration with small airway disease or air trapping.  Mild bronchial wall thickening.   -NOAC held on admission and admitted for close observation and pulmonary consult      > suspect the two are related, and hemoptysis is not from primary etiology but rather epistaxis.  > Pulm consulted and agrees with above assessment  > Will discharge with instructions to prevent nose bleeds and plan to hold eliquis another 1-2 days     SLE:    - continue prednisone 8 mg and 10 mg on alternate days.      Hypothyroidism secondary to Hashimoto's thyroiditi  - continue home levo     History of hypertension   - continue home metoprolol 100 mg  b.i.d. and Cardizem 180 b.i.d.     History of atrial fibrillation, rate controlled  - On metoprolol and Cardizem.  We will continue with that  - Hold xarelto as stated above     Fibromyalgia/complex regional pain syndrome/chronic pain syndrome  She is on MS Contin 90 in the morning, 60 in the afternoon, and 90 in the evening; in addition to that, she take Dilaudid 8 mg 3 times a day.  She has been on this medication for 20 years or more.  We will continue with that while she is in the hospital.     Hyperlipidemia   On Lipitor.  We will continue with that.     CKD  Cr stable and at baseline    Consultations This Hospital Stay   PULMONARY IP CONSULT  PHYSICAL THERAPY ADULT IP CONSULT  OCCUPATIONAL THERAPY ADULT IP CONSULT    Code Status   Prior    Time Spent on this Encounter   IRichelle DO, personally saw the patient today and spent greater than 30 minutes discharging this patient.       Richelle Mcgee DO  [facility]  [address] [place] [id number]  Phone: [phone]  ______________________________________________________________________    Physical Exam   Vital Signs:                    Weight: 302 lbs 8 oz     Constitutional: awake, alert, cooperative, no apparent distress  Respiratory: Clear to auscultation bilaterally, no crackles or wheezing  Cardiovascular: Regular rate and rhythm, normal S1 and S2, and no murmur noted  GI: Normal bowel sounds, soft, non-distended, non-tender  Skin/Integumen: RLE mild edema, not tender to palpation and not red or warm  MSK no joint swelling or pain   Psych: critical, angry and rude       Primary Care Physician   Arcadio Farfan    Discharge Orders      Medication Therapy Management Referral      Reason for your hospital stay    You presented for coughing and blood in your cough. You were monitored while holding your blood thinner and showed improvement in your symptoms. You will discharge with further  close monitoring at home.     Follow-up and recommended labs and tests     Follow up with primary care provider, Arcadio Farfan, within 7-14 days for hospital follow- up.  No follow up labs or test are needed.     Activity    Your activity upon discharge: activity as tolerated     Diet    Follow this diet upon discharge: Orders Placed This Encounter      Combination Diet Regular Diet Adult       Significant Results and Procedures   Most Recent 3 CBC's:Recent Labs   Lab Test 11/01/21  0611 10/31/21  0659 10/30/21  1555   WBC 10.3 8.9 11.7*   HGB 10.8* 11.1* 11.9   MCV 94 93 92    284 331     Most Recent 3 BMP's:Recent Labs   Lab Test 10/31/21  0659 10/30/21  1602 10/30/21  1555 06/08/21  0819     --  137 138   POTASSIUM 3.8  --  3.8 3.8   CHLORIDE 111*  --  104 103   CO2 24  --  26 33*   BUN 14  --  15 14   CR 0.76 0.8 0.72 0.81   ANIONGAP 6  --  7 2*   KYLIE 8.2*  --  8.9 8.9   GLC 99  --  116* 125*   ,   Results for orders placed or performed during the hospital encounter of 10/30/21   CT Chest Pulmonary Embolism w Contrast    Narrative    EXAM: CT CHEST PULMONARY EMBOLISM W CONTRAST  LOCATION: [facility]  DATE/TIME: 10/30/2021 4:14 PM    INDICATION: PE suspected, high prob, hemoptysis  COMPARISON: 10/31/2020  TECHNIQUE: CT chest pulmonary angiogram during arterial phase injection of IV contrast. Multiplanar reformats and MIP reconstructions were performed. Dose reduction techniques were used.   CONTRAST: 83 mL Isovue-[id number]        FINDINGS: Motion artifact degrades image quality.  ANGIOGRAM CHEST: No convincing pulmonary artery filling defects. The aorta is normal in caliber. The main pulmonary artery is dilated up to 4 cm.    LUNGS AND PLEURA: Mild mosaic attenuation of the lungs likely related to the exam being obtained in the expiratory phase of respiration and/or small areas disease/air trapping. Mild bronchial wall thickening. No effusions or pneumothorax. No convincing    pulmonary nodules on this motion degraded exam.    MEDIASTINUM/AXILLAE: Heart size is at the upper limits of normal. Small hiatal hernia. No adenopathy.    CORONARY ARTERY CALCIFICATION: Mild.    UPPER ABDOMEN: Cholecystectomy.    MUSCULOSKELETAL: Similar large left chest wall lipoma. Degenerative changes of the spine.      Impression    IMPRESSION:  1.  No pulmonary embolus.  2.  Mild mosaic attenuation the lungs likely related to a combination of the exam being obtained in the extra phase of respiration and small airways disease/air trapping.  3.  Mild bronchial wall thickening.  4.  Findings suggestive of pulmonary hypertension.  5.  Borderline cardiomegaly.     *Note: Due to a large number of results and/or encounters for the requested time period, some results have not been displayed. A complete set of results can be found in Results Review.       Discharge Medications   Discharge Medication List as of 11/1/2021 12:24 PM      START taking these medications    Details   azithromycin (ZITHROMAX) 250 MG tablet Take 1 tablet (250 mg) by mouth daily for 4 days, Disp-4 tablet, R-0, E-Prescribe      cefpodoxime (VANTIN) 200 MG tablet Take 1 tablet (200 mg) by mouth 2 times daily for 6 days, Disp-12 tablet, R-0, E-Prescribe         CONTINUE these medications which have CHANGED    Details   rivaroxaban ANTICOAGULANT (XARELTO ANTICOAGULANT) 20 MG TABS tablet Take 1 tablet (20 mg) by mouth daily (with dinner) Hold today 11/01 and restart when no longer having bloody coughing, suspected in 1-2 days., Historical         CONTINUE these medications which have NOT CHANGED    Details   atorvastatin (LIPITOR) 40 MG tablet Take 1 tablet (40 mg) by mouth every evening, Disp-30 tablet, R-0, E-PrescribeFuture refills by PCP Dr. Arcadio Farfan with phone number [phone].      BIOTIN FORTE PO Take 1 tablet by mouth daily , Historical      cyanocobalamin 1000 MCG/ML injection Inject 1 mL (1,000 mcg) Subcutaneous every 7 days,  Disp-4 mL, R-5, E-Prescribe      cyclobenzaprine (FLEXERIL) 10 MG tablet Take 1 tablet (10 mg) by mouth 3 times daily, Disp-90 tablet, R-3, E-Prescribe      cycloSPORINE (RESTASIS) 0.05 % ophthalmic emulsion Place 1 drop into both eyes 2 times daily, Historical      diltiazem ER COATED BEADS (CARDIZEM CD/CARTIA XT) 180 MG 24 hr capsule Take 1 capsule (180 mg) by mouth 2 times daily, Disp-180 capsule, R-3, E-PrescribePlease update profile.  Patient to call when she needs refill      gabapentin (NEURONTIN) 300 MG capsule Take 300 mg by mouth 2 times daily (morning and afternoon) with 800mg tablet (total dose 1100mg), Historical      !! gabapentin (NEURONTIN) 800 MG tablet Take 1 tablet by mouth At Bedtime, Historical      !! gabapentin (NEURONTIN) 800 MG tablet Take 800 mg by mouth 2 times daily (morning and afternoon) in addition to 300mg capsule (total dose 1100mg), Historical      HYDROmorphone (DILAUDID) 8 MG tablet Take 8 mg by mouth every 8 hours . Max of 3 doses per day., Historical      lifitegrast (XIIDRA) 5 % opthalmic solution Place 1 drop into both eyes 2 times daily, Historical      metoprolol succinate ER (TOPROL-XL) 100 MG 24 hr tablet Take 1 tablet (100 mg) by mouth 2 times daily, Disp-60 tablet, R-1, E-PrescribeAppt is due in November. Please call [phone] to schedule an appt.      !! morphine (MS CONTIN) 30 MG 12 hr tablet Take 30 mg by mouth 2 times daily (morning and night) in addition to 60 mg tablet for a total dose of 90mg., Disp-270 tablet, R-0, Historical      !! morphine (MS CONTIN) 60 MG 12 hr tablet Take 60 mg by mouth daily in the afternoon (in addition to the 2 - 90mg doses), Historical      !! morphine (MS CONTIN) 60 MG 12 hr tablet Take 60 mg by mouth 2 times daily (morning and night) in addition to 30mg tablet for a total dose of 90mg, Disp-30 tablet, R-0, Historical      multivitamin, therapeutic with minerals (MULTI-VITAMIN) TABS tablet Take 1 tablet by mouth 2 times daily,  Disp-100 tablet, R-3, Historical      prednisoLONE acetate (PRED FORTE) 1 % ophthalmic susp Place 1 drop into both eyes 4 times daily , Historical      !! predniSONE (DELTASONE) 1 MG tablet Take 3 mg by mouth every other day in addition to 5mg tablet for a total dose of 8mg., R-2, Historical      !! predniSONE (DELTASONE) 5 MG tablet Take 10 mg by mouth every other day, Historical      !! predniSONE (DELTASONE) 5 MG tablet Take 5 mg by mouth every other day in addition to 1mg tablets for a total dose of 8mg, Historical      probiotic CAPS Take 1 capsule by mouth 2 times daily, Historical      TIROSINT 200 MCG CAPS Take 200 mcg by mouth 2 times daily , ANT, Historical      zolpidem (AMBIEN) 5 MG tablet Take 5 mg by mouth nightly as needed for sleep, Historical      carboxymethylcellulose (CARBOXYMETHYLCELLULOSE SODIUM) 0.5 % SOLN ophthalmic solution Place 1 drop into both eyes 2 times daily as needed , Disp-1 Bottle, Historical      chlorhexidine (PERIDEX) 0.12 % solution Historical      Elastic Bandages & Supports (MEDICAL COMPRESSION SOCKS) MISC 1 Package daily Please measure and distribute 2 pair of 20mmHg - 30mmHg THIGH high open or closed toe compression stockings with extra refills as indicated. Jobst ultrasheer or equivalent., Disp-2 each, R-3, Local Print      fexofenadine (ALLEGRA) 180 MG tablet Take 180 mg by mouth daily as needed , Historical      levothyroxine (SYNTHROID/LEVOTHROID) 300 MCG tablet Take 1 tablet (300 mcg) by mouth daily, Historical      liothyronine (CYTOMEL) 25 MCG tablet Take 25 mcg by mouth 2 times daily , Historical      NASONEX 50 MCG/ACT spray Spray 1 spray into both nostrils daily as needed , R-11, ANT, Historical      nystatin (MYCOSTATIN) 218201 UNIT/GM external powder Apply topically 3 times daily as neededDisp-60 g, [id number]       !! - Potential duplicate medications found. Please discuss with provider.        Allergies   Allergies   Allergen Reactions     Blood  Transfusion Related (Informational Only) Other (See Comments)     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      Chlorhexidine Hives     Thor surgical cleanser     Codeine Hives     Has tolerated Oxycodone in past      Ibuprofen [Nsaids] Hives     Penicillins Hives     Other reaction(s): Unknown     Sulfa Drugs Hives     Other reaction(s): Unknown     Calcium Channel Blockers      Doxycycline GI Disturbance     Emesis & diarrhea  Patient denies allergy to this med     Hydroxyzine      rash     Mold

## 2021-11-03 NOTE — PROGRESS NOTES
Clinic Care Coordination Contact  [facility]/Voicemail       Clinical Data: Care Coordinator Outreach  Outreach attempted x 2. Phone just kept ringing.  Plan: Care Coordinator will do no further outreaches at this time.    Desi Brock  Community Health Worker  [facility] Care Resource [facility]  [phone]

## 2021-11-04 DIAGNOSIS — Z11.59 ENCOUNTER FOR SCREENING FOR OTHER VIRAL DISEASES: ICD-10-CM

## 2021-11-15 ENCOUNTER — OFFICE VISIT (OUTPATIENT)
Dept: ORTHOPEDICS | Facility: CLINIC | Age: 61
End: 2021-11-15
Payer: COMMERCIAL

## 2021-11-15 DIAGNOSIS — M46.1 ARTHRITIS OF SACROILIAC JOINT (H): ICD-10-CM

## 2021-11-15 DIAGNOSIS — M50.30 DDD (DEGENERATIVE DISC DISEASE), CERVICAL: ICD-10-CM

## 2021-11-15 DIAGNOSIS — G89.29 CHRONIC LEFT SACROILIAC PAIN: ICD-10-CM

## 2021-11-15 DIAGNOSIS — G56.02 CARPAL TUNNEL SYNDROME OF LEFT WRIST: Primary | ICD-10-CM

## 2021-11-15 DIAGNOSIS — M53.3 CHRONIC LEFT SACROILIAC PAIN: ICD-10-CM

## 2021-11-15 DIAGNOSIS — M50.20 CERVICAL DISC HERNIATION: ICD-10-CM

## 2021-11-15 PROCEDURE — 99214 OFFICE O/P EST MOD 30 MIN: CPT | Mod: 25 | Performed by: PREVENTIVE MEDICINE

## 2021-11-15 PROCEDURE — 76942 ECHO GUIDE FOR BIOPSY: CPT | Performed by: PREVENTIVE MEDICINE

## 2021-11-15 PROCEDURE — 20526 THER INJECTION CARP TUNNEL: CPT | Mod: LT | Performed by: PREVENTIVE MEDICINE

## 2021-11-15 RX ORDER — METHYLPREDNISOLONE ACETATE 40 MG/ML
40 INJECTION, SUSPENSION INTRA-ARTICULAR; INTRALESIONAL; INTRAMUSCULAR; SOFT TISSUE
Status: DISCONTINUED | OUTPATIENT
Start: 2021-11-15 | End: 2022-02-17

## 2021-11-15 RX ADMIN — METHYLPREDNISOLONE ACETATE 40 MG: 40 INJECTION, SUSPENSION INTRA-ARTICULAR; INTRALESIONAL; INTRAMUSCULAR; SOFT TISSUE at 12:45

## 2021-11-15 ASSESSMENT — PAIN SCALES - GENERAL: PAINLEVEL: EXTREME PAIN (8)

## 2021-11-15 NOTE — LETTER
11/15/2021         RE: Aspen Mccullough  [address]  [facility] [id number]        Dear Colleague,    Thank you for referring your patient, Aspen Mccullough, to the [facility] [place]. Please see a copy of my visit note below.    cerHISTORY OF PRESENT ILLNESS  Ms. Mccullough is a pleasant 61 year old year old female who presents to clinic today with low back pain, left wrist and arm pain, and neck pain    Aspen explains that she has had increased symptoms in left arm and hand and fingers are tingling  She also had lumbar MRI and would like to discuss  Her low back pain is lower on the left side, some radiation into hip  Location: see above  Quality:  achy pain    Severity: 7/10 at worst    Duration: worse over past 6 months  Had lumbar fusion surgery over 2 years ago which has been stable  She also this past year had hand surgery with Dr Shankar for bilateral wrist/thumbs    Timing: occurs intermittently  Context: occurs while exercising and lifting  Modifying factors:  resting and non-use makes it better, movement and use makes it worse  Associated signs & symptoms: tingling in left hand    MEDICAL HISTORY  Patient Active Problem List   Diagnosis     Generalized osteoarthrosis, involving multiple sites     CRPS (complex regional pain syndrome), lower limb     Fibromyalgia     Somatoform disorder     Histrionic personality (H)     Encounter for long-term use of opiate analgesic     Knee joint replacement by other means     Hypothyroidism     Insomnia, unspecified type     Hyperlipidemia     Hashimoto's thyroiditis     Glucocorticoid deficiency (H)     Posttraumatic stress disorder     Impingement syndrome of left shoulder     Abdominal cramping, generalized     Intermittent diarrhea     Primary osteoarthritis involving multiple joints     Attention deficit disorder     Allergic rhinitis     Cushing's syndrome (H)     Endometriosis     Essential hypertension     Systemic  lupus erythematosus (H)     S/P cervical spinal fusion     Paroxysmal atrial fibrillation (H)     Nonischemic cardiomyopathy (H)     Personal history of DVT (deep vein thrombosis)     History of pulmonary embolism     Acute deep vein thrombosis (DVT) of popliteal vein of right lower extremity (H)     Acute pulmonary embolism (H)     Adrenal cortical steroids causing adverse effect in therapeutic use     Alopecia areata     Anemia, blood loss     Ankle pain, left     ARF (acute renal failure) (H)     Arthritis, septic (H)     Chronic ethmoidal sinusitis     Chronic maxillary sinusitis     Deviated nasal septum     Complications due to internal joint prosthesis (H)     Generalized pain     Iatrogenic hyperthyroidism     S/P TKR (total knee replacement)     Left shoulder pain     Lipoma of skin and subcutaneous tissue     Malabsorption     Nasal fracture     Nasal turbinate hypertrophy     Opioid type dependence (H)     Other specified abnormal findings of blood chemistry     Other acute postoperative pain     Pain in joint, lower leg     Postoperative hypotension     S/P total knee replacement     Thrombus of right atrial appendage without antecedent myocardial infarction     Chronic pain of both shoulders     GI bleed     Grade III internal hemorrhoids     Atrial fibrillation with RVR (H)     Acute deep vein thrombosis (DVT) of proximal vein of right lower extremity (H)     Pneumonia of left upper lobe due to infectious organism     Morbid obesity (H)     Cerebrovascular accident (CVA), unspecified mechanism (H)     CVA (cerebral vascular accident) (H)     Neuropathy of left suprascapular nerve     Neuropathy of right suprascapular nerve     Stage 3 right buttock     BMI 40.0-44.9, adult (H)     Chronic anticoagulation     Controlled substance agreement signed     Craniofacial hyperhidrosis     Current chronic use of systemic steroids     DNR (do not resuscitate)     Hypertonic bladder     Hypo-osmolality and  hyponatremia     Lumbosacral radiculopathy at S1     Overflow diarrhea     Refusal of blood transfusions as patient is Restorationism     Unspecified fall, initial encounter     Unspecified injury of shoulder and upper arm, unspecified arm, initial encounter     Weakness     Chronic pain disorder     Constipation     Hemoptysis       Current Outpatient Medications   Medication Sig Dispense Refill     atorvastatin (LIPITOR) 40 MG tablet Take 1 tablet (40 mg) by mouth every evening 30 tablet 0     BIOTIN FORTE PO Take 1 tablet by mouth daily        carboxymethylcellulose (CARBOXYMETHYLCELLULOSE SODIUM) 0.5 % SOLN ophthalmic solution Place 1 drop into both eyes 2 times daily as needed  1 Bottle      chlorhexidine (PERIDEX) 0.12 % solution        cyanocobalamin 1000 MCG/ML injection Inject 1 mL (1,000 mcg) Subcutaneous every 7 days 4 mL 5     cyclobenzaprine (FLEXERIL) 10 MG tablet Take 1 tablet (10 mg) by mouth 3 times daily 90 tablet 3     cycloSPORINE (RESTASIS) 0.05 % ophthalmic emulsion Place 1 drop into both eyes 2 times daily       diltiazem ER COATED BEADS (CARDIZEM CD/CARTIA XT) 180 MG 24 hr capsule Take 1 capsule (180 mg) by mouth 2 times daily 180 capsule 3     Elastic Bandages & Supports (MEDICAL COMPRESSION SOCKS) MISC 1 Package daily Please measure and distribute 2 pair of 20mmHg - 30mmHg THIGH high open or closed toe compression stockings with extra refills as indicated. Jobst ultrasheer or equivalent. 2 each 3     fexofenadine (ALLEGRA) 180 MG tablet Take 180 mg by mouth daily as needed        gabapentin (NEURONTIN) 300 MG capsule Take 300 mg by mouth 2 times daily (morning and afternoon) with 800mg tablet (total dose 1100mg)       gabapentin (NEURONTIN) 800 MG tablet Take 1 tablet by mouth At Bedtime       gabapentin (NEURONTIN) 800 MG tablet Take 800 mg by mouth 2 times daily (morning and afternoon) in addition to 300mg capsule (total dose 1100mg)       HYDROmorphone (DILAUDID) 8 MG tablet Take 8 mg  by mouth every 8 hours . Max of 3 doses per day.       levothyroxine (SYNTHROID/LEVOTHROID) 300 MCG tablet Take 300 mcg by mouth daily        lifitegrast (XIIDRA) 5 % opthalmic solution Place 1 drop into both eyes 2 times daily       liothyronine (CYTOMEL) 25 MCG tablet Take 25 mcg by mouth 2 times daily        metoprolol succinate ER (TOPROL-XL) 100 MG 24 hr tablet Take 1 tablet (100 mg) by mouth 2 times daily 60 tablet 1     morphine (MS CONTIN) 30 MG 12 hr tablet Take 30 mg by mouth 2 times daily (morning and night) in addition to 60 mg tablet for a total dose of 90mg. 270 tablet 0     morphine (MS CONTIN) 60 MG 12 hr tablet Take 60 mg by mouth daily in the afternoon (in addition to the 2 - 90mg doses)       morphine (MS CONTIN) 60 MG 12 hr tablet Take 60 mg by mouth 2 times daily (morning and night) in addition to 30mg tablet for a total dose of 90mg 30 tablet 0     multivitamin, therapeutic with minerals (MULTI-VITAMIN) TABS tablet Take 1 tablet by mouth 2 times daily 100 tablet 3     NASONEX 50 MCG/ACT spray Spray 1 spray into both nostrils daily as needed   11     nystatin (MYCOSTATIN) 623883 UNIT/GM external powder Apply topically 3 times daily as needed 60 g 1     prednisoLONE acetate (PRED FORTE) 1 % ophthalmic susp Place 1 drop into both eyes 4 times daily        predniSONE (DELTASONE) 1 MG tablet Take 3 mg by mouth every other day in addition to 5mg tablet for a total dose of 8mg.  2     predniSONE (DELTASONE) 5 MG tablet Take 10 mg by mouth every other day       predniSONE (DELTASONE) 5 MG tablet Take 5 mg by mouth every other day in addition to 1mg tablets for a total dose of 8mg       probiotic CAPS Take 1 capsule by mouth 2 times daily       rivaroxaban ANTICOAGULANT (XARELTO ANTICOAGULANT) 20 MG TABS tablet Take 1 tablet (20 mg) by mouth daily (with dinner) Hold today 11/01 and restart when no longer having bloody coughing, suspected in 1-2 days.       TIROSINT 200 MCG CAPS Take 200 mcg by mouth 2  times daily        zolpidem (AMBIEN) 5 MG tablet Take 5 mg by mouth nightly as needed for sleep         Allergies   Allergen Reactions     Blood Transfusion Related (Informational Only) Other (See Comments)     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      Chlorhexidine Hives     Thor surgical cleanser     Codeine Hives     Has tolerated Oxycodone in past      Ibuprofen [Nsaids] Hives     Penicillins Hives     Other reaction(s): Unknown     Sulfa Drugs Hives     Other reaction(s): Unknown     Calcium Channel Blockers      Doxycycline GI Disturbance     Emesis & diarrhea  Patient denies allergy to this med     Hydroxyzine      rash     Mold        Family History   Problem Relation Age of Onset     Hypertension Mother      No Known Problems Father      No Known Problems Sister      No Known Problems Brother      No Known Problems Maternal Grandmother      No Known Problems Maternal Grandfather      No Known Problems Paternal Grandmother      No Known Problems Other      Social History     Socioeconomic History     Marital status:      Spouse name: Not on file     Number of children: Not on file     Years of education: Not on file     Highest education level: Not on file   Occupational History     Not on file   Tobacco Use     Smoking status: Former Smoker     Packs/day: 1.50     Years: 20.00     Pack years: 30.00     Types: Cigarettes     Start date: 1973     Quit date: 1993     Years since quittin.8     Smokeless tobacco: Never Used     Tobacco comment: wish I never started   Substance and Sexual Activity     Alcohol use: No     Alcohol/week: 0.0 standard drinks     Drug use: No     Sexual activity: Not Currently     Partners: Male     Birth control/protection: Post-menopausal   Other Topics Concern     Parent/sibling w/ CABG, MI or angioplasty before 65F 55M? Not Asked   Social History Narrative    ** Merged History Encounter **          Social Determinants of Health     Financial  Resource Strain: Not on file   Food Insecurity: Not on file   Transportation Needs: Not on file   Physical Activity: Not on file   Stress: Not on file   Social Connections: Not on file   Intimate Partner Violence: Not on file   Housing Stability: Not on file       Additional medical/Social/Surgical histories reviewed in [facility] and updated as appropriate.     REVIEW OF SYSTEMS (11/15/2021)  10 point ROS of systems including Constitutional, Eyes, Respiratory, Cardiovascular, Gastroenterology, Genitourinary, Integumentary, Musculoskeletal, Psychiatric, Allergic/Immunologic were all negative except for pertinent positives noted in my HPI.     PHYSICAL EXAM  VSS  General  - normal appearance, in no obvious distress  HEENT  - conjunctivae not injected, moist mucous membranes, normocephalic/atraumatic head, ears normal appearance, no lesions, mouth normal appearance, no scars, normal dentition and teeth present  CV  - normal peripheral perfusion  Pulm  - normal respiratory pattern, non-labored  Musculoskeletal - lumbar spine  - stance: normal gait without limp, no obvious leg length discrepancy, normal heel and toe walk  - inspection: normal bone and joint alignment, no obvious scoliosis  - palpation: no paravertebral or bony tenderness  - ROM: flexion exacerbates pain in Left SI joint area, normal extension, sidebending, rotation, pain over SI joint with left hip external rotation  - strength: lower extremities 5/5 in all planes  - special tests:  (+) straight leg raise- left SI joint/low back pain  (+) slump test- left Si joint/low back pain  Neuro  - patellar and Achilles DTRs 2+ bilaterally, no lower extremity sensory deficit throughout L5 distribution, grossly normal coordination, normal muscle tone  Skin  - no ecchymosis, erythema, warmth, or induration, no obvious rash  Psych  - interactive, appropriate, normal mood and affect  Neck: has pain with ROM testing, positive spurlings on left  Left wrist: has positive  "tinel's at left wrist at carpal tunnel  Left thumb and pointer finger are numb/tingling    ASSESSMENT & PLAN  60 yo female with previous lumbar fusion, left SI joint arthritis and pain, and cervical ddd, radicular symptoms, worse, and left Carpal tunnel syndrome      I independently reviewed the following imaging studies:  Lumbar MRI: shows stable fusion and hardware, no significant stenosis or ddd  Discussed and ordered a diagnostic and therapeutic injection for left SI joint  After a 20 minute discussion and examination, we decided to perform a same day injection for diagnostic and therapeutic purposes for left carpal tunnel due to ongoing numbness in left hand in median nerve distribution  Also discussed her cervical MRI: shows ddd, disc herniations and ordered a repeat cervical ANASTASIA  As she had more than 50% improvement from her previous cervical injection    F/u in 1-2 months  Cont. Medications as prescribed  Discussed and ordered left SI joint injection  Appropriate PPE was utilized for prevention of spread of Covid-19.  Shon Simpson MD, CAQSM    Left Carpal Tunnel Injection - Ultrasound Guided  The patient was informed of the risks and the benefits of the procedure and a written consent was signed.  The patient s left wrist was prepped with chlorhexidine in sterile fashion.   40 mg of methylprednisolone suspension was drawn up into a 3 mL syringe with 1.0 mL of 1% lidocaine.  Injection was performed using sterile technique.  Under ultrasound guidance a 1.5\" 22-gauge needle was used to enter the left wrist at the distal palmar crease medial to the median nerve.  Needle placement was visualized and documented with ultrasound.  Ultrasound visualization required to ensure injection material enters the perineural sheath and not a vessel or nerve itself.  Injection performed long axis to the probe.  Injection solution visualized surrounding the perineurium.  Images were permanently stored for the patient's " record.  There were no complications. The patient tolerated the procedure well. There was negligible bleeding.         Hand / Upper Extremity Injection/Arthrocentesis: L carpal tunnel    Date/Time: 11/15/2021 12:45 PM  Performed by: Shon Simpson MD  Authorized by: Shon Simpson MD     Indications:  Diagnostic  Needle Size:  22 G  Guidance: ultrasound    Approach:  Volar  Condition: carpal tunnel      Site:  L carpal tunnel  Medications:  40 mg methylPREDNISolone 40 MG/ML  Procedure discussed: discussed risks, benefits, and alternatives    Consent Given by:  Patient  Timeout: timeout called immediately prior to procedure    Prep: patient was prepped and draped in usual sterile fashion     NDC: 97400/088-01            Again, thank you for allowing me to participate in the care of your patient.        Sincerely,        Shon Simpson MD

## 2021-11-15 NOTE — NURSING NOTE
[facility]   ORTHOPEDICS & SPORTS MEDICINE  [address] [place] [address]  Dept: [phone]  ______________________________________________________________________________    Patient: Aspen Mccullough   : 1960   MRN: [id number]   November 15, 2021    INVASIVE PROCEDURE SAFETY CHECKLIST    Date: 11/15/21   Procedure: Left hand carpal tunnel injection  Patient Name: Aspen Mccullough  MRN: [id number]  YOB: 1960    Action: Complete sections as appropriate. Any discrepancy results in a HARD COPY until resolved.     PRE PROCEDURE:  Patient ID verified with 2 identifiers (name and  or MRN): Yes  Procedure and site verified with patient/designee (when able): Yes  Accurate consent documentation in medical record: Yes  H&P (or appropriate assessment) documented in medical record: NA  H&P must be up to 20 days prior to procedure and updates within 24 hours of procedure as applicable: NA  Relevant diagnostic and radiology test results appropriately labeled and displayed as applicable: NA  Procedure site(s) marked with provider initials: NA    TIMEOUT:  Time-Out performed immediately prior to starting procedure, including verbal and active participation of all team members addressing the following:Yes  * Correct patient identify  * Confirmed that the correct side and site are marked  * An accurate procedure consent form  * Agreement on the procedure to be done  * Correct patient position  * Relevant images and results are properly labeled and appropriately displayed  * The need to administer antibiotics or fluids for irrigation purposes during the procedure as applicable   * Safety precautions based on patient history or medication use    DURING PROCEDURE: Verification of correct person, site, and procedures any time the responsibility for care of the patient is transferred to another member of the care team.       Prior to injection, verified patient identity using patient's name  and date of birth.  Due to injection administration, patient instructed to remain in clinic for 15 minutes  afterwards, and to report any adverse reaction to me immediately.    Carpal tunnel injections    Drug Amount Wasted:  None.  Vial/Syringe: Single dose vial  Expiration Date:  10/2022      Natalya Agarwal, ATC  November 15, 2021

## 2021-11-15 NOTE — PROGRESS NOTES
cerHISTORY OF PRESENT ILLNESS  Ms. Mccullough is a pleasant 61 year old year old female who presents to clinic today with low back pain, left wrist and arm pain, and neck pain    Aspen explains that she has had increased symptoms in left arm and hand and fingers are tingling  She also had lumbar MRI and would like to discuss  Her low back pain is lower on the left side, some radiation into hip  Location: see above  Quality:  achy pain    Severity: 7/10 at worst    Duration: worse over past 6 months  Had lumbar fusion surgery over 2 years ago which has been stable  She also this past year had hand surgery with Dr Shankar for bilateral wrist/thumbs    Timing: occurs intermittently  Context: occurs while exercising and lifting  Modifying factors:  resting and non-use makes it better, movement and use makes it worse  Associated signs & symptoms: tingling in left hand    MEDICAL HISTORY  Patient Active Problem List   Diagnosis     Generalized osteoarthrosis, involving multiple sites     CRPS (complex regional pain syndrome), lower limb     Fibromyalgia     Somatoform disorder     Histrionic personality (H)     Encounter for long-term use of opiate analgesic     Knee joint replacement by other means     Hypothyroidism     Insomnia, unspecified type     Hyperlipidemia     Hashimoto's thyroiditis     Glucocorticoid deficiency (H)     Posttraumatic stress disorder     Impingement syndrome of left shoulder     Abdominal cramping, generalized     Intermittent diarrhea     Primary osteoarthritis involving multiple joints     Attention deficit disorder     Allergic rhinitis     Cushing's syndrome (H)     Endometriosis     Essential hypertension     Systemic lupus erythematosus (H)     S/P cervical spinal fusion     Paroxysmal atrial fibrillation (H)     Nonischemic cardiomyopathy (H)     Personal history of DVT (deep vein thrombosis)     History of pulmonary embolism     Acute deep vein thrombosis (DVT) of popliteal vein of right  lower extremity (H)     Acute pulmonary embolism (H)     Adrenal cortical steroids causing adverse effect in therapeutic use     Alopecia areata     Anemia, blood loss     Ankle pain, left     ARF (acute renal failure) (H)     Arthritis, septic (H)     Chronic ethmoidal sinusitis     Chronic maxillary sinusitis     Deviated nasal septum     Complications due to internal joint prosthesis (H)     Generalized pain     Iatrogenic hyperthyroidism     S/P TKR (total knee replacement)     Left shoulder pain     Lipoma of skin and subcutaneous tissue     Malabsorption     Nasal fracture     Nasal turbinate hypertrophy     Opioid type dependence (H)     Other specified abnormal findings of blood chemistry     Other acute postoperative pain     Pain in joint, lower leg     Postoperative hypotension     S/P total knee replacement     Thrombus of right atrial appendage without antecedent myocardial infarction     Chronic pain of both shoulders     GI bleed     Grade III internal hemorrhoids     Atrial fibrillation with RVR (H)     Acute deep vein thrombosis (DVT) of proximal vein of right lower extremity (H)     Pneumonia of left upper lobe due to infectious organism     Morbid obesity (H)     Cerebrovascular accident (CVA), unspecified mechanism (H)     CVA (cerebral vascular accident) (H)     Neuropathy of left suprascapular nerve     Neuropathy of right suprascapular nerve     Stage 3 right buttock     BMI 40.0-44.9, adult (H)     Chronic anticoagulation     Controlled substance agreement signed     Craniofacial hyperhidrosis     Current chronic use of systemic steroids     DNR (do not resuscitate)     Hypertonic bladder     Hypo-osmolality and hyponatremia     Lumbosacral radiculopathy at S1     Overflow diarrhea     Refusal of blood transfusions as patient is [facility]     Unspecified fall, initial encounter     Unspecified injury of shoulder and upper arm, unspecified arm, initial encounter     Weakness      Chronic pain disorder     Constipation     Hemoptysis       Current Outpatient Medications   Medication Sig Dispense Refill     atorvastatin (LIPITOR) 40 MG tablet Take 1 tablet (40 mg) by mouth every evening 30 tablet 0     BIOTIN FORTE PO Take 1 tablet by mouth daily        carboxymethylcellulose (CARBOXYMETHYLCELLULOSE SODIUM) 0.5 % SOLN ophthalmic solution Place 1 drop into both eyes 2 times daily as needed  1 Bottle      chlorhexidine (PERIDEX) 0.12 % solution        cyanocobalamin 1000 MCG/ML injection Inject 1 mL (1,000 mcg) Subcutaneous every 7 days 4 mL 5     cyclobenzaprine (FLEXERIL) 10 MG tablet Take 1 tablet (10 mg) by mouth 3 times daily 90 tablet 3     cycloSPORINE (RESTASIS) 0.05 % ophthalmic emulsion Place 1 drop into both eyes 2 times daily       diltiazem ER COATED BEADS (CARDIZEM CD/CARTIA XT) 180 MG 24 hr capsule Take 1 capsule (180 mg) by mouth 2 times daily 180 capsule 3     Elastic Bandages & Supports (MEDICAL COMPRESSION SOCKS) MISC 1 Package daily Please measure and distribute 2 pair of 20mmHg - 30mmHg THIGH high open or closed toe compression stockings with extra refills as indicated. Jobst ultrasheer or equivalent. 2 each 3     fexofenadine (ALLEGRA) 180 MG tablet Take 180 mg by mouth daily as needed        gabapentin (NEURONTIN) 300 MG capsule Take 300 mg by mouth 2 times daily (morning and afternoon) with 800mg tablet (total dose 1100mg)       gabapentin (NEURONTIN) 800 MG tablet Take 1 tablet by mouth At Bedtime       gabapentin (NEURONTIN) 800 MG tablet Take 800 mg by mouth 2 times daily (morning and afternoon) in addition to 300mg capsule (total dose 1100mg)       HYDROmorphone (DILAUDID) 8 MG tablet Take 8 mg by mouth every 8 hours . Max of 3 doses per day.       levothyroxine (SYNTHROID/LEVOTHROID) 300 MCG tablet Take 300 mcg by mouth daily        lifitegrast (XIIDRA) 5 % opthalmic solution Place 1 drop into both eyes 2 times daily       liothyronine (CYTOMEL) 25 MCG tablet Take 25  mcg by mouth 2 times daily        metoprolol succinate ER (TOPROL-XL) 100 MG 24 hr tablet Take 1 tablet (100 mg) by mouth 2 times daily 60 tablet 1     morphine (MS CONTIN) 30 MG 12 hr tablet Take 30 mg by mouth 2 times daily (morning and night) in addition to 60 mg tablet for a total dose of 90mg. 270 tablet 0     morphine (MS CONTIN) 60 MG 12 hr tablet Take 60 mg by mouth daily in the afternoon (in addition to the 2 - 90mg doses)       morphine (MS CONTIN) 60 MG 12 hr tablet Take 60 mg by mouth 2 times daily (morning and night) in addition to 30mg tablet for a total dose of 90mg 30 tablet 0     multivitamin, therapeutic with minerals (MULTI-VITAMIN) TABS tablet Take 1 tablet by mouth 2 times daily 100 tablet 3     NASONEX 50 MCG/ACT spray Spray 1 spray into both nostrils daily as needed   11     nystatin (MYCOSTATIN) 035921 UNIT/GM external powder Apply topically 3 times daily as needed 60 g 1     prednisoLONE acetate (PRED FORTE) 1 % ophthalmic susp Place 1 drop into both eyes 4 times daily        predniSONE (DELTASONE) 1 MG tablet Take 3 mg by mouth every other day in addition to 5mg tablet for a total dose of 8mg.  2     predniSONE (DELTASONE) 5 MG tablet Take 10 mg by mouth every other day       predniSONE (DELTASONE) 5 MG tablet Take 5 mg by mouth every other day in addition to 1mg tablets for a total dose of 8mg       probiotic CAPS Take 1 capsule by mouth 2 times daily       rivaroxaban ANTICOAGULANT (XARELTO ANTICOAGULANT) 20 MG TABS tablet Take 1 tablet (20 mg) by mouth daily (with dinner) Hold today 11/01 and restart when no longer having bloody coughing, suspected in 1-2 days.       TIROSINT 200 MCG CAPS Take 200 mcg by mouth 2 times daily        zolpidem (AMBIEN) 5 MG tablet Take 5 mg by mouth nightly as needed for sleep         Allergies   Allergen Reactions     Blood Transfusion Related (Informational Only) Other (See Comments)     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       Chlorhexidine Hives     Thor surgical cleanser     Codeine Hives     Has tolerated Oxycodone in past      Ibuprofen [Nsaids] Hives     Penicillins Hives     Other reaction(s): Unknown     Sulfa Drugs Hives     Other reaction(s): Unknown     Calcium Channel Blockers      Doxycycline GI Disturbance     Emesis & diarrhea  Patient denies allergy to this med     Hydroxyzine      rash     Mold        Family History   Problem Relation Age of Onset     Hypertension Mother      No Known Problems Father      No Known Problems Sister      No Known Problems Brother      No Known Problems Maternal Grandmother      No Known Problems Maternal Grandfather      No Known Problems Paternal Grandmother      No Known Problems Other      Social History     Socioeconomic History     Marital status:      Spouse name: Not on file     Number of children: Not on file     Years of education: Not on file     Highest education level: Not on file   Occupational History     Not on file   Tobacco Use     Smoking status: Former Smoker     Packs/day: 1.50     Years: 20.00     Pack years: 30.00     Types: Cigarettes     Start date: 1973     Quit date: 1993     Years since quittin.8     Smokeless tobacco: Never Used     Tobacco comment: wish I never started   Substance and Sexual Activity     Alcohol use: No     Alcohol/week: 0.0 standard drinks     Drug use: No     Sexual activity: Not Currently     Partners: Male     Birth control/protection: Post-menopausal   Other Topics Concern     Parent/sibling w/ CABG, MI or angioplasty before 65F 55M? Not Asked   Social History Narrative    ** Merged History Encounter **          Social Determinants of Health     Financial Resource Strain: Not on file   Food Insecurity: Not on file   Transportation Needs: Not on file   Physical Activity: Not on file   Stress: Not on file   Social Connections: Not on file   Intimate Partner Violence: Not on file   Housing Stability: Not on file        Additional medical/Social/Surgical histories reviewed in [facility] and updated as appropriate.     REVIEW OF SYSTEMS (11/15/2021)  10 point ROS of systems including Constitutional, Eyes, Respiratory, Cardiovascular, Gastroenterology, Genitourinary, Integumentary, Musculoskeletal, Psychiatric, Allergic/Immunologic were all negative except for pertinent positives noted in my HPI.     PHYSICAL EXAM  VSS  General  - normal appearance, in no obvious distress  HEENT  - conjunctivae not injected, moist mucous membranes, normocephalic/atraumatic head, ears normal appearance, no lesions, mouth normal appearance, no scars, normal dentition and teeth present  CV  - normal peripheral perfusion  Pulm  - normal respiratory pattern, non-labored  Musculoskeletal - lumbar spine  - stance: normal gait without limp, no obvious leg length discrepancy, normal heel and toe walk  - inspection: normal bone and joint alignment, no obvious scoliosis  - palpation: no paravertebral or bony tenderness  - ROM: flexion exacerbates pain in Left SI joint area, normal extension, sidebending, rotation, pain over SI joint with left hip external rotation  - strength: lower extremities 5/5 in all planes  - special tests:  (+) straight leg raise- left SI joint/low back pain  (+) slump test- left Si joint/low back pain  Neuro  - patellar and Achilles DTRs 2+ bilaterally, no lower extremity sensory deficit throughout L5 distribution, grossly normal coordination, normal muscle tone  Skin  - no ecchymosis, erythema, warmth, or induration, no obvious rash  Psych  - interactive, appropriate, normal mood and affect  Neck: has pain with ROM testing, positive spurlings on left  Left wrist: has positive tinel's at left wrist at carpal tunnel  Left thumb and pointer finger are numb/tingling    ASSESSMENT & PLAN  60 yo female with previous lumbar fusion, left SI joint arthritis and pain, and cervical ddd, radicular symptoms, worse, and left Carpal tunnel  "syndrome      I independently reviewed the following imaging studies:  Lumbar MRI: shows stable fusion and hardware, no significant stenosis or ddd  Discussed and ordered a diagnostic and therapeutic injection for left SI joint  After a 20 minute discussion and examination, we decided to perform a same day injection for diagnostic and therapeutic purposes for left carpal tunnel due to ongoing numbness in left hand in median nerve distribution  Also discussed her cervical MRI: shows ddd, disc herniations and ordered a repeat cervical ANASTASIA  As she had more than 50% improvement from her previous cervical injection    F/u in 1-2 months  Cont. Medications as prescribed  Discussed and ordered left SI joint injection  Appropriate PPE was utilized for prevention of spread of Covid-19.  Shon Simpson MD, CAQSM    Left Carpal Tunnel Injection - Ultrasound Guided  The patient was informed of the risks and the benefits of the procedure and a written consent was signed.  The patient s left wrist was prepped with chlorhexidine in sterile fashion.   40 mg of methylprednisolone suspension was drawn up into a 3 mL syringe with 1.0 mL of 1% lidocaine.  Injection was performed using sterile technique.  Under ultrasound guidance a 1.5\" 22-gauge needle was used to enter the left wrist at the distal palmar crease medial to the median nerve.  Needle placement was visualized and documented with ultrasound.  Ultrasound visualization required to ensure injection material enters the perineural sheath and not a vessel or nerve itself.  Injection performed long axis to the probe.  Injection solution visualized surrounding the perineurium.  Images were permanently stored for the patient's record.  There were no complications. The patient tolerated the procedure well. There was negligible bleeding.         Hand / Upper Extremity Injection/Arthrocentesis: L carpal tunnel    Date/Time: 11/15/2021 12:45 PM  Performed by: Shon Simpson, " MD  Authorized by: Shon Simpson MD     Indications:  Diagnostic  Needle Size:  22 G  Guidance: ultrasound    Approach:  Volar  Condition: carpal tunnel      Site:  L carpal tunnel  Medications:  40 mg methylPREDNISolone 40 MG/ML  Procedure discussed: discussed risks, benefits, and alternatives    Consent Given by:  Patient  Timeout: timeout called immediately prior to procedure    Prep: patient was prepped and draped in usual sterile fashion     NDC: 76874/088-01

## 2021-11-16 ENCOUNTER — HOSPITAL ENCOUNTER (OUTPATIENT)
Facility: CLINIC | Age: 61
End: 2021-11-16
Payer: COMMERCIAL

## 2021-11-16 ENCOUNTER — TELEPHONE (OUTPATIENT)
Dept: ORTHOPEDICS | Facility: CLINIC | Age: 61
End: 2021-11-16
Payer: COMMERCIAL

## 2021-11-16 DIAGNOSIS — Z11.59 ENCOUNTER FOR SCREENING FOR OTHER VIRAL DISEASES: ICD-10-CM

## 2021-11-16 NOTE — TELEPHONE ENCOUNTER
11/16 Called and left voicemail. Provided phone number [phone] to schedule si joint injection and epidural injection.     Cecilia sharma Procedure   Orthopedics, Podiatry, Sports Medicine, ENT/Eye Specialties  [facility]   [phone]

## 2021-11-17 ENCOUNTER — TELEPHONE (OUTPATIENT)
Dept: MEDSURG UNIT | Facility: CLINIC | Age: 61
End: 2021-11-17
Payer: COMMERCIAL

## 2021-11-18 ENCOUNTER — HOSPITAL ENCOUNTER (OUTPATIENT)
Facility: CLINIC | Age: 61
End: 2021-11-18
Payer: COMMERCIAL

## 2021-11-18 ENCOUNTER — TELEPHONE (OUTPATIENT)
Dept: VASCULAR SURGERY | Facility: CLINIC | Age: 61
End: 2021-11-18
Payer: COMMERCIAL

## 2021-11-18 NOTE — TELEPHONE ENCOUNTER
NOTED    All appt canceled.     Will contact them next year for scheduling.    Josie TORRES RN, BSN  Interventional Radiology/Vascular  Nurse Coordinator   Phone: [phone]  Fax: [phone]          [facility] Call Center    Phone Message    May a detailed message be left on voicemail: yes     Reason for Call: Other: Pt's  called and would like to cancel the procedure for 12/07 and 12/20. They will wait until next year to schedule. Thanks     Action Taken: Message routed to:  [facility] VAS    Travel Screening: Not Applicable

## 2021-11-23 DIAGNOSIS — Z11.59 ENCOUNTER FOR SCREENING FOR OTHER VIRAL DISEASES: ICD-10-CM

## 2021-11-23 NOTE — TELEPHONE ENCOUNTER
11/23 2nd attempt.  Called and left voicemail. Provided phone number [phone] to schedule si joint injection and epidural injection.      Cecilia sharma Procedure   Orthopedics, Podiatry, Sports Medicine, ENT/Eye Specialties  [facility]   [phone]

## 2021-12-01 ENCOUNTER — TELEPHONE (OUTPATIENT)
Dept: MEDSURG UNIT | Facility: CLINIC | Age: 61
End: 2021-12-01

## 2021-12-01 ENCOUNTER — LAB (OUTPATIENT)
Dept: LAB | Facility: CLINIC | Age: 61
End: 2021-12-01
Payer: COMMERCIAL

## 2021-12-01 DIAGNOSIS — Z11.59 ENCOUNTER FOR SCREENING FOR OTHER VIRAL DISEASES: ICD-10-CM

## 2021-12-01 PROCEDURE — U0003 INFECTIOUS AGENT DETECTION BY NUCLEIC ACID (DNA OR RNA); SEVERE ACUTE RESPIRATORY SYNDROME CORONAVIRUS 2 (SARS-COV-2) (CORONAVIRUS DISEASE [COVID-19]), AMPLIFIED PROBE TECHNIQUE, MAKING USE OF HIGH THROUGHPUT TECHNOLOGIES AS DESCRIBED BY CMS-2020-01-R: HCPCS

## 2021-12-01 PROCEDURE — U0005 INFEC AGEN DETEC AMPLI PROBE: HCPCS

## 2021-12-01 NOTE — TELEPHONE ENCOUNTER
Called and left detailed VM for pt regarding need to call ordering MD to hold Xarelto.  -recommendation to hold x 2 doses.

## 2021-12-02 ENCOUNTER — TELEPHONE (OUTPATIENT)
Dept: MEDSURG UNIT | Facility: CLINIC | Age: 61
End: 2021-12-02
Payer: COMMERCIAL

## 2021-12-02 LAB — SARS-COV-2 RNA RESP QL NAA+PROBE: NEGATIVE

## 2021-12-02 NOTE — TELEPHONE ENCOUNTER
Pre-Procedure Negative COVID Test Results    Results Reviewed  The patient has a negative COVID test result within the required timeframe for the scheduled procedure.     No COVID pre-call needed.     Alondra Cope RN

## 2021-12-03 ENCOUNTER — HOSPITAL ENCOUNTER (OUTPATIENT)
Dept: GENERAL RADIOLOGY | Facility: CLINIC | Age: 61
End: 2021-12-03
Attending: PREVENTIVE MEDICINE
Payer: COMMERCIAL

## 2021-12-03 ENCOUNTER — HOSPITAL ENCOUNTER (OUTPATIENT)
Facility: CLINIC | Age: 61
Discharge: HOME | End: 2021-12-03
Admitting: PHYSICIAN ASSISTANT
Payer: COMMERCIAL

## 2021-12-03 ENCOUNTER — HOSPITAL ENCOUNTER (OUTPATIENT)
Dept: CARDIOLOGY | Facility: CLINIC | Age: 61
End: 2021-12-03
Attending: INTERNAL MEDICINE
Payer: COMMERCIAL

## 2021-12-03 VITALS — DIASTOLIC BLOOD PRESSURE: 83 MMHG | SYSTOLIC BLOOD PRESSURE: 133 MMHG | HEART RATE: 70 BPM | OXYGEN SATURATION: 96 %

## 2021-12-03 DIAGNOSIS — M50.20 CERVICAL DISC HERNIATION: ICD-10-CM

## 2021-12-03 DIAGNOSIS — I48.91 ATRIAL FIBRILLATION WITH RVR (H): ICD-10-CM

## 2021-12-03 DIAGNOSIS — M50.30 DDD (DEGENERATIVE DISC DISEASE), CERVICAL: ICD-10-CM

## 2021-12-03 LAB — LVEF ECHO: NORMAL

## 2021-12-03 PROCEDURE — 250N000009 HC RX 250: Performed by: PREVENTIVE MEDICINE

## 2021-12-03 PROCEDURE — 93248 EXT ECG>7D<15D REV&INTERPJ: CPT | Performed by: INTERNAL MEDICINE

## 2021-12-03 PROCEDURE — 93242 EXT ECG>48HR<7D RECORDING: CPT

## 2021-12-03 PROCEDURE — 62321 NJX INTERLAMINAR CRV/THRC: CPT

## 2021-12-03 PROCEDURE — 93306 TTE W/DOPPLER COMPLETE: CPT | Mod: 26 | Performed by: INTERNAL MEDICINE

## 2021-12-03 PROCEDURE — 250N000011 HC RX IP 250 OP 636: Performed by: PREVENTIVE MEDICINE

## 2021-12-03 PROCEDURE — 93306 TTE W/DOPPLER COMPLETE: CPT

## 2021-12-03 PROCEDURE — 255N000002 HC RX 255 OP 636: Performed by: PREVENTIVE MEDICINE

## 2021-12-03 RX ORDER — LIDOCAINE HYDROCHLORIDE 10 MG/ML
5 INJECTION, SOLUTION EPIDURAL; INFILTRATION; INTRACAUDAL; PERINEURAL ONCE
Status: COMPLETED | OUTPATIENT
Start: 2021-12-03 | End: 2021-12-03

## 2021-12-03 RX ORDER — BETAMETHASONE SODIUM PHOSPHATE AND BETAMETHASONE ACETATE 3; 3 MG/ML; MG/ML
6 INJECTION, SUSPENSION INTRA-ARTICULAR; INTRALESIONAL; INTRAMUSCULAR; SOFT TISSUE ONCE
Status: COMPLETED | OUTPATIENT
Start: 2021-12-03 | End: 2021-12-03

## 2021-12-03 RX ORDER — IOPAMIDOL 408 MG/ML
10 INJECTION, SOLUTION INTRATHECAL ONCE
Status: COMPLETED | OUTPATIENT
Start: 2021-12-03 | End: 2021-12-03

## 2021-12-03 RX ADMIN — LIDOCAINE HYDROCHLORIDE 1.5 ML: 10 INJECTION, SOLUTION EPIDURAL; INFILTRATION; INTRACAUDAL; PERINEURAL at 10:27

## 2021-12-03 RX ADMIN — IOPAMIDOL 0.5 ML: 408 INJECTION, SOLUTION INTRATHECAL at 10:28

## 2021-12-03 RX ADMIN — BETAMETHASONE SODIUM PHOSPHATE AND BETAMETHASONE ACETATE 3 ML: 3; 3 INJECTION, SUSPENSION INTRA-ARTICULAR; INTRALESIONAL; INTRAMUSCULAR at 10:32

## 2021-12-03 NOTE — DISCHARGE INSTRUCTIONS

## 2021-12-03 NOTE — DISCHARGE SUMMARY
Care Suites Discharge Nursing Note    Patient Information  Name: Aspen Mccullough  Age: 61 year old    Discharge Education:  Discharge instructions reviewed: Yes  Additional education/resources provided: no  Patient/patient representative verbalizes understanding: Yes  Patient discharging on new medications: No  Medication education completed: Yes    Discharge Plans:   Discharge location: home  Discharge ride contacted: Yes  Approximate discharge time: 1115    Discharge Criteria:  Discharge criteria met and vital signs stable: Yes    Patient Belongs:  Patient belongings returned to patient: Yes    Julius Dasilva RN       After 30 minutes post injection, injection site is CDI, no hematoma or swelling.  Patient states pain is unchanged.  Tolerating and fluids.  Denies numbness or tingling in hands and feet.  Patient discharged to home with . Discharge instructions reviewed with patient and patient verbalizes understanding.

## 2021-12-03 NOTE — PROCEDURES
[facility]    Procedure: Cervical ANASTASIA    Date/Time: 12/3/2021 10:49 AM  Performed by: Niles Pineda PA-C  Authorized by: Niles Pineda PA-C       UNIVERSAL PROTOCOL   Site Marked: Yes  Prior Images Obtained and Reviewed:  Yes  Required items: Required blood products, implants, devices and special equipment available    Patient identity confirmed:  Verbally with patient  Patient was reevaluated immediately before administering moderate or deep sedation or anesthesia  Confirmation Checklist:  Patient's identity using two indicators, relevant allergies, procedure was appropriate and matched the consent or emergent situation and correct equipment/implants were available  Time out: Immediately prior to the procedure a time out was called    Universal Protocol: the Joint Commission Universal Protocol was followed    Preparation: Patient was prepped and draped in usual sterile fashion       ANESTHESIA    Local Anesthetic: Lidocaine 1% without epinephrine      SEDATION    Patient Sedated: No    See dictated procedure note for full details.    PROCEDURE  Describe Procedure: Pt very sleeping before procedure. Suffers from severe insomnia.  Tolerated the case very well.  Patient Tolerance:  Patient tolerated the procedure well with no immediate complications  Length of time physician/provider present for 1:1 monitoring during sedation: 0

## 2021-12-07 ENCOUNTER — TELEPHONE (OUTPATIENT)
Dept: ORTHOPEDICS | Facility: CLINIC | Age: 61
End: 2021-12-07

## 2021-12-07 NOTE — TELEPHONE ENCOUNTER
[facility] Call Center    Phone Message    May a detailed message be left on voicemail: yes     Reason for Call: Other: Patient's spouse called to clarify why they were requested to sign an ABN form for Botox/Botcholism for the injections that Patient received recently. They understood these injections were steriod related, not Botox. She has other Injections coming up and patient wants to be sure the orders are correct for the type of injection she is receiving. Please call patient's spouse back to clarify.      Action Taken: Message routed to:  [facility] Ortho    Travel Screening: Not Applicable

## 2021-12-08 DIAGNOSIS — I48.91 ATRIAL FIBRILLATION WITH RVR (H): ICD-10-CM

## 2021-12-08 RX ORDER — METOPROLOL SUCCINATE 100 MG/1
100 TABLET, EXTENDED RELEASE ORAL 2 TIMES DAILY
Qty: 180 TABLET | Refills: 0 | Status: SHIPPED | OUTPATIENT
Start: 2021-12-08 | End: 2022-03-01

## 2021-12-09 NOTE — TELEPHONE ENCOUNTER
Returned call to patient and her spouse.      Patient had a C7-T1  ESII completed at [facility] on 12/3/2021.  They state that at the appointment they were given an ABN to sign that stated the patient was receiving Botox injections, which is incorrect.  They also state that there was not estimated cost for the procedure on the ABN.      They are concerned that they were asked to sign a form for an incorrect procedure stating that they agree to be liable for payment without knowing what the estimated cost would be. They also want to ensure she received the correct medication. I relayed to patient's  that the procedure note states the patient was injected with Celestone, a steroid medication.    Patient has another appointment for a left SI joint injection scheduled for 12/17/2021.  They want to ensure that they will not be asked to sign another ABN with out estimated cost.      Discussed with patient's  that I do not see a signed ABN scanned into patient's chart from her visit on 12/3/2021.  He will try to scan a copy of the documents they were asked to sign and send it via [email or web address] so we can review them.

## 2021-12-14 ENCOUNTER — APPOINTMENT (OUTPATIENT)
Dept: LAB | Facility: CLINIC | Age: 61
End: 2021-12-14
Payer: COMMERCIAL

## 2021-12-14 PROCEDURE — U0005 INFEC AGEN DETEC AMPLI PROBE: HCPCS

## 2021-12-14 PROCEDURE — U0003 INFECTIOUS AGENT DETECTION BY NUCLEIC ACID (DNA OR RNA); SEVERE ACUTE RESPIRATORY SYNDROME CORONAVIRUS 2 (SARS-COV-2) (CORONAVIRUS DISEASE [COVID-19]), AMPLIFIED PROBE TECHNIQUE, MAKING USE OF HIGH THROUGHPUT TECHNOLOGIES AS DESCRIBED BY CMS-2020-01-R: HCPCS

## 2021-12-15 LAB — SARS-COV-2 RNA RESP QL NAA+PROBE: NEGATIVE

## 2021-12-16 ENCOUNTER — TELEPHONE (OUTPATIENT)
Dept: MEDSURG UNIT | Facility: CLINIC | Age: 61
End: 2021-12-16
Payer: COMMERCIAL

## 2021-12-17 ENCOUNTER — HOSPITAL ENCOUNTER (OUTPATIENT)
Facility: CLINIC | Age: 61
Discharge: HOME | End: 2021-12-17
Admitting: PHYSICIAN ASSISTANT
Payer: COMMERCIAL

## 2021-12-17 ENCOUNTER — HOSPITAL ENCOUNTER (OUTPATIENT)
Dept: GENERAL RADIOLOGY | Facility: CLINIC | Age: 61
End: 2021-12-17
Attending: PREVENTIVE MEDICINE
Payer: COMMERCIAL

## 2021-12-17 VITALS — OXYGEN SATURATION: 95 % | HEART RATE: 69 BPM

## 2021-12-17 DIAGNOSIS — M46.1 ARTHRITIS OF SACROILIAC JOINT (H): ICD-10-CM

## 2021-12-17 DIAGNOSIS — M53.3 CHRONIC LEFT SACROILIAC PAIN: ICD-10-CM

## 2021-12-17 DIAGNOSIS — G89.29 CHRONIC LEFT SACROILIAC PAIN: ICD-10-CM

## 2021-12-17 PROCEDURE — 250N000011 HC RX IP 250 OP 636: Performed by: PREVENTIVE MEDICINE

## 2021-12-17 PROCEDURE — 255N000002 HC RX 255 OP 636: Performed by: PREVENTIVE MEDICINE

## 2021-12-17 PROCEDURE — 27096 INJECT SACROILIAC JOINT: CPT | Mod: LT

## 2021-12-17 PROCEDURE — 250N000009 HC RX 250: Performed by: PREVENTIVE MEDICINE

## 2021-12-17 RX ORDER — IOPAMIDOL 408 MG/ML
10 INJECTION, SOLUTION INTRATHECAL ONCE
Status: COMPLETED | OUTPATIENT
Start: 2021-12-17 | End: 2021-12-17

## 2021-12-17 RX ORDER — TRIAMCINOLONE ACETONIDE 40 MG/ML
40 INJECTION, SUSPENSION INTRA-ARTICULAR; INTRAMUSCULAR ONCE
Status: COMPLETED | OUTPATIENT
Start: 2021-12-17 | End: 2021-12-17

## 2021-12-17 RX ADMIN — LIDOCAINE HYDROCHLORIDE 3 ML: 10 INJECTION, SOLUTION EPIDURAL; INFILTRATION; INTRACAUDAL; PERINEURAL at 10:51

## 2021-12-17 RX ADMIN — IOPAMIDOL 1 ML: 408 INJECTION, SOLUTION INTRATHECAL at 10:52

## 2021-12-17 RX ADMIN — TRIAMCINOLONE ACETONIDE 40 MG: 40 INJECTION, SUSPENSION INTRA-ARTICULAR; INTRAMUSCULAR at 10:52

## 2021-12-17 NOTE — DISCHARGE INSTRUCTIONS
JOINT STEROID INJECTION DISCHARGE INSTRUCTIONS    What to expect    After the procedure, you may feel some temporary relief from the local anesthetic, but that will likely wear off within a few hours. Your symptoms may then return to pre-procedure level, or even be temporarily worse for a day or two.    For many people, the steroid begins to provide some relief within 2-3 days, but it can take up to 2 weeks. If you have no improvement in your symptoms after two weeks, please contact your referring provider to discuss next steps.  What to do    Minimize your activity today. You may resume your normal activity tomorrow as tolerated. Avoid vigorous or strenuous activity until your symptoms improve, or as directed by your referring provider.     You may shower tomorrow, but do not submerge in bathtub, hot tub or pool for 48 hours.      Use ice packs as needed for discomfort.    You may resume all medications, including blood thinners.    You may take over the counter acetaminophen (Tylenol) or ibuprofen (Motrin, Advil) for mild discomfort after the procedure.    What to watch for    Bruising or slight swelling at the puncture site can be normal. If you begin to develop redness or excessive swelling at the site, fever over 1010F, notable increase in pain, weakness, or numbness, please contact your primary care or referring provider.    Side effects from the steroid are often mild and go away in a few days. Common side effects may include facial flushing, restlessness, irritability, difficulty sleeping, elevated blood pressure, and increased blood sugar.     If you have diabetes, monitor your blood sugar closely. Contact the provider who manages your diabetes to help you control your blood sugar if needed.  If you have questions or concerns    You may contact the [facility] Radiology Department at [phone] between 8am-4:30pm Monday through Friday.    If you have urgent questions outside of these normal  business hours, please contact the [facility] on-call physician at [phone].    The provider who performed your procedure was Fran Fry PA-C

## 2021-12-19 ENCOUNTER — HEALTH MAINTENANCE LETTER (OUTPATIENT)
Age: 61
End: 2021-12-19

## 2021-12-27 ENCOUNTER — TELEPHONE (OUTPATIENT)
Dept: CARDIOLOGY | Facility: CLINIC | Age: 61
End: 2021-12-27
Payer: COMMERCIAL

## 2021-12-28 NOTE — TELEPHONE ENCOUNTER
Sil Muñoz MD  You 5 minutes ago (1:24 PM)     CF    No afib! Good news. That's all we were looking for.     Sil    Message text      Notified patient of results and Dr. Muñoz's recommendations via [employer].

## 2022-02-17 ENCOUNTER — HOSPITAL ENCOUNTER (INPATIENT)
Facility: CLINIC | Age: 62
LOS: 4 days | Discharge: HOME | DRG: 194 | End: 2022-02-21
Attending: INTERNAL MEDICINE | Admitting: STUDENT IN AN ORGANIZED HEALTH CARE EDUCATION/TRAINING PROGRAM
Payer: COMMERCIAL

## 2022-02-17 DIAGNOSIS — J18.9 COMMUNITY ACQUIRED PNEUMONIA, UNSPECIFIED LATERALITY: ICD-10-CM

## 2022-02-17 DIAGNOSIS — R05.9 COUGH: Primary | ICD-10-CM

## 2022-02-17 PROBLEM — U07.1 COVID-19 VIRUS INFECTION: Status: ACTIVE | Noted: 2022-02-17

## 2022-02-17 LAB
ALBUMIN SERPL-MCNC: 2.4 G/DL (ref 3.4–5)
ALP SERPL-CCNC: 84 U/L (ref 40–150)
ALT SERPL W P-5'-P-CCNC: 48 U/L (ref 0–50)
AST SERPL W P-5'-P-CCNC: 56 U/L (ref 0–45)
BILIRUB DIRECT SERPL-MCNC: 0.2 MG/DL (ref 0–0.2)
BILIRUB SERPL-MCNC: 0.6 MG/DL (ref 0.2–1.3)
CRP SERPL-MCNC: 242 MG/L (ref 0–8)
PROCALCITONIN SERPL-MCNC: 0.39 NG/ML
PROT SERPL-MCNC: 6.7 G/DL (ref 6.8–8.8)

## 2022-02-17 PROCEDURE — 36415 COLL VENOUS BLD VENIPUNCTURE: CPT | Performed by: PHYSICIAN ASSISTANT

## 2022-02-17 PROCEDURE — 120N000001 HC R&B MED SURG/OB

## 2022-02-17 PROCEDURE — 82040 ASSAY OF SERUM ALBUMIN: CPT | Performed by: PHYSICIAN ASSISTANT

## 2022-02-17 PROCEDURE — 258N000003 HC RX IP 258 OP 636: Performed by: PHYSICIAN ASSISTANT

## 2022-02-17 PROCEDURE — 84145 PROCALCITONIN (PCT): CPT | Performed by: PHYSICIAN ASSISTANT

## 2022-02-17 PROCEDURE — 250N000011 HC RX IP 250 OP 636: Performed by: PHYSICIAN ASSISTANT

## 2022-02-17 PROCEDURE — 99223 1ST HOSP IP/OBS HIGH 75: CPT | Mod: AI | Performed by: PHYSICIAN ASSISTANT

## 2022-02-17 PROCEDURE — 250N000013 HC RX MED GY IP 250 OP 250 PS 637: Performed by: PHYSICIAN ASSISTANT

## 2022-02-17 PROCEDURE — 86140 C-REACTIVE PROTEIN: CPT | Performed by: PHYSICIAN ASSISTANT

## 2022-02-17 RX ORDER — ATORVASTATIN CALCIUM 40 MG/1
40 TABLET, FILM COATED ORAL EVERY EVENING
Status: DISCONTINUED | OUTPATIENT
Start: 2022-02-17 | End: 2022-02-21 | Stop reason: HOSPADM

## 2022-02-17 RX ORDER — CYCLOBENZAPRINE HCL 10 MG
10 TABLET ORAL 3 TIMES DAILY
Status: DISCONTINUED | OUTPATIENT
Start: 2022-02-17 | End: 2022-02-21 | Stop reason: HOSPADM

## 2022-02-17 RX ORDER — HYDROMORPHONE HYDROCHLORIDE 2 MG/1
8 TABLET ORAL 3 TIMES DAILY PRN
Status: DISCONTINUED | OUTPATIENT
Start: 2022-02-17 | End: 2022-02-21 | Stop reason: HOSPADM

## 2022-02-17 RX ORDER — NALOXONE HYDROCHLORIDE 0.4 MG/ML
0.2 INJECTION, SOLUTION INTRAMUSCULAR; INTRAVENOUS; SUBCUTANEOUS
Status: DISCONTINUED | OUTPATIENT
Start: 2022-02-17 | End: 2022-02-21 | Stop reason: HOSPADM

## 2022-02-17 RX ORDER — MORPHINE SULFATE 15 MG/1
60 TABLET, FILM COATED, EXTENDED RELEASE ORAL
Status: DISCONTINUED | OUTPATIENT
Start: 2022-02-17 | End: 2022-02-21 | Stop reason: HOSPADM

## 2022-02-17 RX ORDER — GABAPENTIN 400 MG/1
800 CAPSULE ORAL AT BEDTIME
Status: DISCONTINUED | OUTPATIENT
Start: 2022-02-18 | End: 2022-02-21 | Stop reason: HOSPADM

## 2022-02-17 RX ORDER — ACETAMINOPHEN 325 MG/10.15ML
650 LIQUID ORAL EVERY 4 HOURS PRN
Status: DISCONTINUED | OUTPATIENT
Start: 2022-02-17 | End: 2022-02-21 | Stop reason: HOSPADM

## 2022-02-17 RX ORDER — NALOXONE HYDROCHLORIDE 0.4 MG/ML
0.4 INJECTION, SOLUTION INTRAMUSCULAR; INTRAVENOUS; SUBCUTANEOUS
Status: DISCONTINUED | OUTPATIENT
Start: 2022-02-17 | End: 2022-02-21 | Stop reason: HOSPADM

## 2022-02-17 RX ORDER — MORPHINE SULFATE 15 MG/1
60 TABLET, FILM COATED, EXTENDED RELEASE ORAL 2 TIMES DAILY
Status: DISCONTINUED | OUTPATIENT
Start: 2022-02-17 | End: 2022-02-21 | Stop reason: HOSPADM

## 2022-02-17 RX ORDER — ALBUTEROL SULFATE 90 UG/1
2 AEROSOL, METERED RESPIRATORY (INHALATION) EVERY 4 HOURS PRN
Status: DISCONTINUED | OUTPATIENT
Start: 2022-02-17 | End: 2022-02-21 | Stop reason: HOSPADM

## 2022-02-17 RX ORDER — DEXAMETHASONE SODIUM PHOSPHATE 10 MG/ML
6 INJECTION, SOLUTION INTRAMUSCULAR; INTRAVENOUS EVERY 24 HOURS
Status: DISCONTINUED | OUTPATIENT
Start: 2022-02-17 | End: 2022-02-17

## 2022-02-17 RX ORDER — METOPROLOL SUCCINATE 100 MG/1
100 TABLET, EXTENDED RELEASE ORAL DAILY
Status: DISCONTINUED | OUTPATIENT
Start: 2022-02-18 | End: 2022-02-21 | Stop reason: HOSPADM

## 2022-02-17 RX ORDER — MORPHINE SULFATE 15 MG/1
30 TABLET, FILM COATED, EXTENDED RELEASE ORAL 2 TIMES DAILY
Status: DISCONTINUED | OUTPATIENT
Start: 2022-02-17 | End: 2022-02-17

## 2022-02-17 RX ORDER — CEFTRIAXONE 2 G/1
2 INJECTION, POWDER, FOR SOLUTION INTRAMUSCULAR; INTRAVENOUS EVERY 24 HOURS
Status: DISCONTINUED | OUTPATIENT
Start: 2022-02-17 | End: 2022-02-21 | Stop reason: HOSPADM

## 2022-02-17 RX ORDER — BENZONATATE 100 MG/1
100 CAPSULE ORAL 3 TIMES DAILY PRN
Status: DISCONTINUED | OUTPATIENT
Start: 2022-02-17 | End: 2022-02-21 | Stop reason: HOSPADM

## 2022-02-17 RX ORDER — LIDOCAINE 40 MG/G
CREAM TOPICAL
Status: DISCONTINUED | OUTPATIENT
Start: 2022-02-17 | End: 2022-02-21 | Stop reason: HOSPADM

## 2022-02-17 RX ORDER — AZITHROMYCIN 500 MG/5ML
500 INJECTION, POWDER, LYOPHILIZED, FOR SOLUTION INTRAVENOUS EVERY 24 HOURS
Status: COMPLETED | OUTPATIENT
Start: 2022-02-17 | End: 2022-02-17

## 2022-02-17 RX ORDER — GUAIFENESIN/DEXTROMETHORPHAN 100-10MG/5
10 SYRUP ORAL EVERY 4 HOURS PRN
Status: DISCONTINUED | OUTPATIENT
Start: 2022-02-17 | End: 2022-02-21 | Stop reason: HOSPADM

## 2022-02-17 RX ORDER — MORPHINE SULFATE 15 MG/1
30 TABLET, FILM COATED, EXTENDED RELEASE ORAL 2 TIMES DAILY
Status: DISCONTINUED | OUTPATIENT
Start: 2022-02-17 | End: 2022-02-21 | Stop reason: HOSPADM

## 2022-02-17 RX ADMIN — RIVAROXABAN 20 MG: 20 TABLET, FILM COATED ORAL at 19:40

## 2022-02-17 RX ADMIN — HYDROMORPHONE HYDROCHLORIDE 8 MG: 2 TABLET ORAL at 21:20

## 2022-02-17 RX ADMIN — AZITHROMYCIN MONOHYDRATE 500 MG: 500 INJECTION, POWDER, LYOPHILIZED, FOR SOLUTION INTRAVENOUS at 19:41

## 2022-02-17 RX ADMIN — CYCLOBENZAPRINE HYDROCHLORIDE 10 MG: 10 TABLET, FILM COATED ORAL at 21:20

## 2022-02-17 RX ADMIN — CEFTRIAXONE 2 G: 2 INJECTION, POWDER, FOR SOLUTION INTRAMUSCULAR; INTRAVENOUS at 21:16

## 2022-02-17 RX ADMIN — MORPHINE SULFATE 60 MG: 15 TABLET, EXTENDED RELEASE ORAL at 19:42

## 2022-02-17 RX ADMIN — ATORVASTATIN CALCIUM 40 MG: 40 TABLET, FILM COATED ORAL at 19:40

## 2022-02-17 ASSESSMENT — ACTIVITIES OF DAILY LIVING (ADL)
ADLS_ACUITY_SCORE: 12

## 2022-02-17 NOTE — H&P
[facility] Hospitalist Admission Note  Name: Aspen Mccullough    MRN: [id number]  YOB: 1960    Age: 62 year old  Date of admission: 2/17/2022  Primary care provider: Arcadio Farfan    Assessment & Plan   Aspen Mccullough is a 62 year old female with PMhx significant for paroxysmal atrial fibrillation, SLE, prior VTE on chronic anticoagulation, thyroid disease, hyperlipidemia, chronic back pain, hypertension, CKD, who was admitted directly from urgent care on 2/17/2022 with pneumonia.   She initially presented to outside urgent care with ongoing cough, ill since 1/18/2022 after diagnosis of COVID 19 infection.   On presentation no fever, oxygen saturation 96%, respiratory rate 18.     Lab work at urgent care returned notable for a Tn I detectable at 0.052 (ref range 0.029), normocytic anemia with hemoglobin of 11, normal platelets, BMP unremarkable. No EKG able to review from urgent care. CTA chest showed moderate to severe bilateral multilobar airspace disease concerning for pneumonia, no pleural effusion, no definitive pulmonary embolism, limited exam due to respiratory motion.  Direct admission was requested from [facility] for further cares and monitoring. She was given aspirin at urgent care.     #Pneumonia, suspect post Viral PNA   Presented with worsening productive cough, dyspnea with activity.   Ill since covid 19 diagnosis 1/18/2022, again worsening with multiple evaluations and chest imaging at which point she was not hypoxic. Concern is for post viral pneumonia, secondary to recent history of viral pneumonia. Despite this no leukocytosis, fever at this time. No COPD/asthma,  history of respiratory infections and prior tobacco use.     Ms. Mccullough developed a cough proceeded by sinus congestion, sore throat, cough, body aches, headaches, and fatigue. Diagnosed 1/18/2022  with COVID-19 infection. Had received covid vaccinations x2 with no prior known covid infection.   She  discharged to home, not on oxygen.  Seen in the ED again on 1/29/2022 with ongoing cough, productive of sputum.  Chest x-ray done during that ED visit which showed bilateral lower lung opacities.  She was prescribed doxycycline (ending 2/5), prednisone 50 mg (ending 2/2) and albuterol inhaler.  Family encouraged her to seek medical attention today given worsening cough, dyspnea with activity. She was brought to the emergency room in [place] for evaluation.  There she was reported to be mildly hypoxic and placed on 2 L/min nasal cannula. Weaned off oxygen in the interim.     - request infection prevention consult -> opinion whether can considered COVID recovered. maintain precautions for now. Should be able to remove precautions given afebrile and no longer considered active covid infection from a clinical stand point would not re-test the patient as would not change current plan of management.   - monitor SpO2, no current hypoxia but mild hypoxia was concern at urgent care for which she was on 2 LPM Nasal cannula.  - would not start steroids at this time  - albuterol MDI PRN, no current wheezing  - obtain pro calcitonin   - monitor fever curve. Call for fever, recommend obtaining blood cultures if this is the case  - obtain sputum cultures   - start antibiotics for treatment of community acquired pneumonia, do not feel she needs coverage for PJP or MRSA Pna at this time.   - antitussives, APAP prn  - if improving clinically, remaining afebrile with low procal and no elevated WBC's consider discontinuing further antibiotics     Elevated Troponin  Tn I was drawn as part of urgency room work up and mildly detectable from limit of reference range. Denies angina. No ekg to review. This does not represent ACS. May be consistent with troponin leak secondary to demand ischemia in the setting of acute infection.  -if no CP or abnormalities overnight, no further work up. Remove telemetry after 24 hours.   -no indications to  "trend Tn I at this time in the absence of symptoms     SLE  Recently self tapered off of chronic prednisone, was taking 8-10 mg daily until around December, 2021.   -Has been established with new Endocrinologist as previous specialists had retired. Follow up as previously directed.     History of VTE  Submissive PE, DVT in 2017. Hx of provoked DVT prior to this.   - resume chronic Xarelto      Hypothyroidism secondary to Hashimoto's thyroiditis  - continue home levo     Hypertension   - continue home metoprolol 100 mg b.i.d. and Cardizem 180 b.i.d.      Paroxysmal Atrial fibrillation, rate controlled  Follows with Dr. Uribe and Dr. Muñoz of Cardiology. Previous Ziopatch indicated predominant sinus rhythm. Pt asymptomatic.   - Continue PTA Diltiazem and Toprol XL   - Continue Xarelto     Fibromyalgia/complex regional pain syndrome/chronic pain syndrome    Maintained on MS Contin 90 in the morning, 60 in the afternoon, and 90 in the evening; in addition to that, she take Dilaudid 8 mg 3 times a day.  Follows in [facility]. Has been on this medication for 20 years or more and will currently be resumed on this regimen while admitted.      Hyperlipidemia   -resume lipitor    CKD  -Cr stable and at baseline    Awaiting Pharmacy reconciliation for medication dose verification.     DVT Prophylaxis: DOAC  Code Status: DNR, pre- arrest intubation ok with patient.   Expected Discharge/Location: Home 2/18 or 2/19 once respiratory status stable/improved.     Spoke with patient's Spouse Mr. Mccullough over the phone, updated with POC. He would like to be updated by the rounding provider tomorrow as well.     Michelle Jimenez PA-C    Primary Care Physician   Arcadio Farfan    Chief Complaint   \"cough\"    History is obtained from the patient   Services Used: No    History of Present Illness   Aspen Mccullough is a 62 year old female who presents with cough, shortness of breath.    Ms. Mccullough developed a " "cough proceeded by sinus congestion, sore throat, cough, body aches, headaches, and fatigue. She was seen in the emergency department on 1/18/2022 at which point she was diagnosed with COVID-19 infection.  She discharged to home, not on oxygen.  Presented to the emergency department again on 1/29/2022 with ongoing cough, productive of sputum.  She had a chest x-ray done during that ED visit which showed bilateral lower lung opacities.  She was prescribed doxycycline, prednisone and albuterol inhaler and again discharged to home. Ms. Mccullough received covid vaccinations x2 with no prior known covid infection.     Family encouraged her to seek medical attention today given that she continued to \"hack up a lung\".  She was brought to the emergency room in [place] for evaluation.  There she was reported to be mildly hypoxic and placed on 2 L/min nasal cannula.    On evaluation patient has transferred to the medical floor from the urgency room.  Her oxygen saturations are 90 to 94% while resting on room air.  She reports ongoing cough which is productive of sputum.  She has had shortness of breath this week with activity.  She has not had fever.  She has ongoing chills.  Denies orthopnea, weight gain or chest pain.  She has been compliant with her anticoagulation.  She is unsure of whether she has completed her previously prescribed antibiotics or steroids.  She denies history of bleeding while on anticoagulation.       Past Medical History    I have reviewed this patient's medical history and updated it with pertinent information if needed.   Past Medical History:   Diagnosis Date     ADHD (attention deficit hyperactivity disorder)      Allergic rhinitis      Chronic low back pain      Cushing's syndrome (H)      DDD (degenerative disc disease)      DDD (degenerative disc disease)      Deviated nasal septum      Diarrhea      Endometriosis      Fibromyalgia      Hashimoto's disease      Hyperlipidemia      Hypertension  "     Hypothyroid     hx hashimoto's thyroiditis     Insomnia      Lupus (H)      Malabsorption      Pernicious anemia      Renal disease     chronic renal insufficiency     Sinusitis        Past Surgical History   I have reviewed this patient's surgical history and updated it with pertinent information if needed.  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)     APPENDECTOMY       ARTHRODESIS ANKLE      right     ARTHROPLASTY KNEE BILATERAL       ARTHROPLASTY REVISION KNEE  4/19/2011    Procedure:ARTHROPLASTY REVISION KNEE; With Antibiotic Cement ; Surgeon:CHAO OLIVARES; Location:[facility]     BREAST SURGERY      right- tissue remove nipple area     BUNIONECTOMY  12/14/2011    Procedure:BUNIONECTOMY; Right Bunion Correction; Surgeon:GRACE ZARATE; Location:[facility]     CHOLECYSTECTOMY       EXAM UNDER ANESTHESIA ANUS N/A 7/15/2020    Procedure: EXAM UNDER ANESTHESIA, ANUS;  Surgeon: Luis Canales MD;  [facility]: [facility]     EXCISE MASS UPPER EXTREMITY  12/14/2011    Procedure:EXCISE MASS UPPER EXTREMITY; Excision of Left Arm Mass; Surgeon:GRACE ZARATE; Location:[facility]     FOOT SURGERY      left X 4     FUSION LUMBAR ANTERIOR ONE LEVEL       HEMORRHOIDECTOMY INTERNAL N/A 7/15/2020    Procedure: Exam under anesthesia, hemorrhoidectomy;  Surgeon: Luis Canales MD;  [facility]     INJECT NERVE BLOCK SUPRASCAPULAR Left 5/18/2018    Procedure: INJECT NERVE BLOCK SUPRASCAPULAR;  Left Suprascapular Nerve Block;  Surgeon: Basim Velazquez MD;  [facility]     INJECT NERVE BLOCK SUPRASCAPULAR Right 7/23/2018    Procedure: INJECT NERVE BLOCK SUPRASCAPULAR;  Right suprascapular injection;  Surgeon: Basim Velazquez MD;  [facility]     INJECT NERVE BLOCK SUPRASCAPULAR Bilateral 10/29/2018    Procedure: Bilateral Suprascapular Nerve Blocks;  Surgeon: Basim Velazquez MD;  [facility]     INJECT  NERVE BLOCK SUPRASCAPULAR Bilateral 3/15/2019    Procedure: Bilateral Suprascapular Nerve Block;  Surgeon: Basim Velazquez MD;  [facility]     INJECT NERVE BLOCK SUPRASCAPULAR Bilateral 2019    Procedure: bilateral suprascapular nerve block;  Surgeon: Basim Velazquez MD;  [facility]     INJECT NERVE BLOCK SUPRASCAPULAR Bilateral 8/3/2021    Procedure: bilateral suprascapular nerve block;  Surgeon: Basim Velazquez MD;  [facility]     IR ENDOVENOUS ABLATION VARICOSE VEINS  10/5/2021     IR ENDOVENOUS ABLATION VARICOSE VEINS  10/26/2021     KNEE SURGERY      see list which we will bring     LAMINECTOMY LUMBAR ONE LEVEL      L4-5     LAPAROSCOPIC ABLATION ENDOMETRIOSIS       ND HAND/FINGER SURGERY UNLISTED  surgeries on both hands with Dr. Shankar     ND SPINE SURGERY PROCEDURE UNLISTED      see list which we will bring     ND STOMACH SURGERY PROCEDURE UNLISTED  gall bladder removal     RADIO FREQUENCY ABLATION PULSED CERVICAL Bilateral 7/10/2019    Procedure: Bilateral Suprascapular Nerve Pulsed Radiofrequency Ablation;  Surgeon: Basim Velazquez MD;  [facility]     REMOVE HARDWARE FOOT  2011    Procedure:REMOVE HARDWARE FOOT; Hardware Removal Right Foot (Mini-C-Arm) ; Surgeon:GRACE ZARATE; Location: [facility]     RHINOPLASTY       SALPINGO-OOPHORECTOMY BILATERAL       TONSILLECTOMY       ZZC STOMACH SURGERY PROCEDURE UNLISTED      see list which we will bring       Prior to Admission Medications   Prior to Admission Medications   Prescriptions Last Dose Informant Patient Reported? Taking?   BIOTIN FORTE PO  Self Yes No   Sig: Take 1 tablet by mouth daily    Elastic Bandages & Supports (MEDICAL COMPRESSION SOCKS) MISC  Self No No   Si Package daily Please measure and distribute 2 pair of 20mmHg - 30mmHg THIGH high open or closed toe compression stockings with extra refills as indicated. Jobst ultrasheer or equivalent.   HYDROmorphone (DILAUDID) 8 MG tablet  Self  Yes No   Sig: Take 8 mg by mouth every 8 hours . Max of 3 doses per day.   NASONEX 50 MCG/ACT spray  Self Yes No   Sig: Spray 1 spray into both nostrils daily as needed    TIROSINT 200 MCG CAPS  Self Yes No   Sig: Take 200 mcg by mouth 2 times daily    atorvastatin (LIPITOR) 40 MG tablet  Self No No   Sig: Take 1 tablet (40 mg) by mouth every evening   carboxymethylcellulose (CARBOXYMETHYLCELLULOSE SODIUM) 0.5 % SOLN ophthalmic solution  Self Yes No   Sig: Place 1 drop into both eyes 2 times daily as needed    chlorhexidine (PERIDEX) 0.12 % solution  Self Yes No   cyanocobalamin 1000 MCG/ML injection  Self No No   Sig: Inject 1 mL (1,000 mcg) Subcutaneous every 7 days   cycloSPORINE (RESTASIS) 0.05 % ophthalmic emulsion  Self Yes No   Sig: Place 1 drop into both eyes 2 times daily   cyclobenzaprine (FLEXERIL) 10 MG tablet  Self No No   Sig: Take 1 tablet (10 mg) by mouth 3 times daily   diltiazem ER COATED BEADS (CARDIZEM CD/CARTIA XT) 180 MG 24 hr capsule  Self No No   Sig: Take 1 capsule (180 mg) by mouth 2 times daily   fexofenadine (ALLEGRA) 180 MG tablet  Self Yes No   Sig: Take 180 mg by mouth daily as needed    gabapentin (NEURONTIN) 300 MG capsule  Self Yes No   Sig: Take 300 mg by mouth 2 times daily (morning and afternoon) with 800mg tablet (total dose 1100mg)   gabapentin (NEURONTIN) 800 MG tablet  Self Yes No   Sig: Take 800 mg by mouth 2 times daily (morning and afternoon) in addition to 300mg capsule (total dose 1100mg)   gabapentin (NEURONTIN) 800 MG tablet  Self Yes No   Sig: Take 1 tablet by mouth At Bedtime   levothyroxine (SYNTHROID/LEVOTHROID) 300 MCG tablet  Self Yes No   Sig: Take 300 mcg by mouth daily    lifitegrast (XIIDRA) 5 % opthalmic solution  Self Yes No   Sig: Place 1 drop into both eyes 2 times daily   liothyronine (CYTOMEL) 25 MCG tablet  Self Yes No   Sig: Take 25 mcg by mouth 2 times daily    metoprolol succinate ER (TOPROL-XL) 100 MG 24 hr tablet   No No   Sig: Take 1 tablet (100  mg) by mouth 2 times daily   morphine (MS CONTIN) 30 MG 12 hr tablet  Self Yes No   Sig: Take 30 mg by mouth 2 times daily (morning and night) in addition to 60 mg tablet for a total dose of 90mg.   morphine (MS CONTIN) 60 MG 12 hr tablet  Self Yes No   Sig: Take 60 mg by mouth 2 times daily (morning and night) in addition to 30mg tablet for a total dose of 90mg   morphine (MS CONTIN) 60 MG 12 hr tablet  Self Yes No   Sig: Take 60 mg by mouth daily in the afternoon (in addition to the 2 - 90mg doses)   multivitamin, therapeutic with minerals (MULTI-VITAMIN) TABS tablet  Self Yes No   Sig: Take 1 tablet by mouth 2 times daily   nystatin (MYCOSTATIN) 411078 UNIT/GM external powder  Self No No   Sig: Apply topically 3 times daily as needed   predniSONE (DELTASONE) 1 MG tablet  Self Yes No   Sig: Take 3 mg by mouth every other day in addition to 5mg tablet for a total dose of 8mg.   predniSONE (DELTASONE) 5 MG tablet  Self Yes No   Sig: Take 5 mg by mouth every other day in addition to 1mg tablets for a total dose of 8mg   predniSONE (DELTASONE) 5 MG tablet  Self Yes No   Sig: Take 10 mg by mouth every other day   prednisoLONE acetate (PRED FORTE) 1 % ophthalmic susp  Self Yes No   Sig: Place 1 drop into both eyes 4 times daily    probiotic CAPS  Self Yes No   Sig: Take 1 capsule by mouth 2 times daily   rivaroxaban ANTICOAGULANT (XARELTO ANTICOAGULANT) 20 MG TABS tablet   Yes No   Sig: Take 1 tablet (20 mg) by mouth daily (with dinner) Hold today 11/01 and restart when no longer having bloody coughing, suspected in 1-2 days.   zolpidem (AMBIEN) 5 MG tablet  Self Yes No   Sig: Take 5 mg by mouth nightly as needed for sleep      Facility-Administered Medications Last Administration Doses Remaining   botulinum toxin type A (BOTOX) 100 units injection 100 Units None recorded 1   botulinum toxin type A (BOTOX) 100 units injection 100 Units None recorded 1   botulinum toxin type A (BOTOX) 100 units injection 100 Units None  recorded 1   lidocaine (PF) (XYLOCAINE) 1 % injection 3 mL 9/7/2021  4:30 PM    lidocaine (PF) (XYLOCAINE) 1 % injection 3 mL 9/7/2021  4:30 PM    lidocaine 1% with EPINEPHrine 1:100,000 injection 3 mL None recorded 1   methylPREDNISolone (DEPO-MEDROL) injection 40 mg 10/25/2021  9:12 AM    methylPREDNISolone (DEPO-MEDROL) injection 40 mg 11/15/2021 12:45 PM    triamcinolone (KENALOG-40) injection 40 mg 9/7/2021  4:30 PM    triamcinolone (KENALOG-40) injection 40 mg 9/7/2021  4:30 PM         Allergies   Allergies   Allergen Reactions     Blood Transfusion Related (Informational Only) Other (See Comments)     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      Chlorhexidine Hives     Thor surgical cleanser     Codeine Hives     Has tolerated Oxycodone in past      Ibuprofen [Nsaids] Hives     Penicillins Hives     Other reaction(s): Unknown     Sulfa Drugs Hives     Other reaction(s): Unknown     Calcium Channel Blockers      Doxycycline GI Disturbance     Emesis & diarrhea  Patient denies allergy to this med     Hydroxyzine      rash     Mold        Social History   I have reviewed this patient's social history and updated it with pertinent information if needed. [facility]  reports that she quit smoking about 29 years ago. Her smoking use included cigarettes. She started smoking about 49 years ago. She has a 30.00 pack-year smoking history. She has never used smokeless tobacco. She reports that she does not drink alcohol and does not use drugs.    Family History   I have reviewed this patient's family history and updated it with pertinent information if needed.   Family History   Problem Relation Age of Onset     Hypertension Mother      No Known Problems Father      No Known Problems Sister      No Known Problems Brother      No Known Problems Maternal Grandmother      No Known Problems Maternal Grandfather      No Known Problems Paternal Grandmother      No Known Problems Other        Review of  Systems   The 10 point Review of Systems is negative other than noted in the HPI or here.     Physical Exam   Temp: (P) 98  F (36.7  C) Temp src: (P) Oral BP: (!) (P) 143/92 Pulse: (P) 95            Vital Signs with Ranges  Temp:  [98  F (36.7  C)] (P) 98  F (36.7  C)  Pulse:  [95] (P) 95  BP: (P) 143/92  0 lbs 0 oz    Constitutional: Awake, alert,  no apparent distress.  Eyes: Conjunctiva and pupils examined and normal.  HEENT: Non traumatic. Moist mucous membranes, normal dentition.  Respiratory: Able to speak in complete sentences.  No abnormal work of breathing.  Frequent cough noted.  Crackles at bilateral bases.  No expiratory wheezing.  Clear to auscultation bilaterally, no crackles or wheezing.  Cardiovascular: Regular rate and rhythm, normal S1 and S2, and no murmur noted.  GI: Soft, non-distended, non-tender, bowel sounds present. No rebound tenderness or guarding.  Lymph/Hematologic: No anterior cervical or supraclavicular adenopathy.  Skin: Warm, dry. No edema.  Musculoskeletal: No gross deformities noted.  No erythema or tenderness. Moving all extremities.  Neurologic: No tremor. Speech is clear. Moving all extremities with symmetrical strength. CN 2-12 grossly intact.  Coordination and sensation intact.   Psychiatric: Appropriate affect.    Data   Data reviewed today:      As noted above.

## 2022-02-18 LAB
ANION GAP SERPL CALCULATED.3IONS-SCNC: 6 MMOL/L (ref 3–14)
BUN SERPL-MCNC: 6 MG/DL (ref 7–30)
CALCIUM SERPL-MCNC: 8.6 MG/DL (ref 8.5–10.1)
CHLORIDE BLD-SCNC: 101 MMOL/L (ref 94–109)
CO2 SERPL-SCNC: 30 MMOL/L (ref 20–32)
CREAT SERPL-MCNC: 0.57 MG/DL (ref 0.52–1.04)
ERYTHROCYTE [DISTWIDTH] IN BLOOD BY AUTOMATED COUNT: 14.5 % (ref 10–15)
GFR SERPL CREATININE-BSD FRML MDRD: >90 ML/MIN/1.73M2
GLUCOSE BLD-MCNC: 108 MG/DL (ref 70–99)
HCT VFR BLD AUTO: 30.6 % (ref 35–47)
HGB BLD-MCNC: 9.4 G/DL (ref 11.7–15.7)
MCH RBC QN AUTO: 28.1 PG (ref 26.5–33)
MCHC RBC AUTO-ENTMCNC: 30.7 G/DL (ref 31.5–36.5)
MCV RBC AUTO: 92 FL (ref 78–100)
PLATELET # BLD AUTO: 282 10E3/UL (ref 150–450)
POTASSIUM BLD-SCNC: 3.4 MMOL/L (ref 3.4–5.3)
RBC # BLD AUTO: 3.34 10E6/UL (ref 3.8–5.2)
SODIUM SERPL-SCNC: 137 MMOL/L (ref 133–144)
TROPONIN I SERPL HS-MCNC: 9 NG/L
WBC # BLD AUTO: 7.9 10E3/UL (ref 4–11)

## 2022-02-18 PROCEDURE — 250N000013 HC RX MED GY IP 250 OP 250 PS 637: Performed by: PHYSICIAN ASSISTANT

## 2022-02-18 PROCEDURE — 85041 AUTOMATED RBC COUNT: CPT | Performed by: PHYSICIAN ASSISTANT

## 2022-02-18 PROCEDURE — 250N000013 HC RX MED GY IP 250 OP 250 PS 637: Performed by: INTERNAL MEDICINE

## 2022-02-18 PROCEDURE — 82310 ASSAY OF CALCIUM: CPT | Performed by: PHYSICIAN ASSISTANT

## 2022-02-18 PROCEDURE — 85018 HEMOGLOBIN: CPT | Performed by: PHYSICIAN ASSISTANT

## 2022-02-18 PROCEDURE — 99233 SBSQ HOSP IP/OBS HIGH 50: CPT | Performed by: INTERNAL MEDICINE

## 2022-02-18 PROCEDURE — 120N000001 HC R&B MED SURG/OB

## 2022-02-18 PROCEDURE — 84484 ASSAY OF TROPONIN QUANT: CPT | Performed by: INTERNAL MEDICINE

## 2022-02-18 PROCEDURE — 36415 COLL VENOUS BLD VENIPUNCTURE: CPT | Performed by: INTERNAL MEDICINE

## 2022-02-18 PROCEDURE — 250N000011 HC RX IP 250 OP 636: Performed by: PHYSICIAN ASSISTANT

## 2022-02-18 PROCEDURE — 36415 COLL VENOUS BLD VENIPUNCTURE: CPT | Performed by: PHYSICIAN ASSISTANT

## 2022-02-18 RX ORDER — BENZONATATE 100 MG/1
100 CAPSULE ORAL 3 TIMES DAILY PRN
Status: DISCONTINUED | OUTPATIENT
Start: 2022-02-18 | End: 2022-02-18

## 2022-02-18 RX ORDER — HYDROMORPHONE HCL IN WATER/PF 6 MG/30 ML
0.2 PATIENT CONTROLLED ANALGESIA SYRINGE INTRAVENOUS EVERY 6 HOURS PRN
Status: DISCONTINUED | OUTPATIENT
Start: 2022-02-18 | End: 2022-02-21 | Stop reason: HOSPADM

## 2022-02-18 RX ORDER — AZITHROMYCIN 250 MG/1
250 TABLET, FILM COATED ORAL DAILY
Status: COMPLETED | OUTPATIENT
Start: 2022-02-18 | End: 2022-02-21

## 2022-02-18 RX ADMIN — MORPHINE SULFATE 60 MG: 15 TABLET, EXTENDED RELEASE ORAL at 21:34

## 2022-02-18 RX ADMIN — CYCLOBENZAPRINE HYDROCHLORIDE 10 MG: 10 TABLET, FILM COATED ORAL at 16:52

## 2022-02-18 RX ADMIN — MORPHINE SULFATE 30 MG: 15 TABLET, EXTENDED RELEASE ORAL at 09:34

## 2022-02-18 RX ADMIN — MORPHINE SULFATE 60 MG: 15 TABLET, EXTENDED RELEASE ORAL at 16:52

## 2022-02-18 RX ADMIN — GABAPENTIN 800 MG: 400 CAPSULE ORAL at 21:33

## 2022-02-18 RX ADMIN — MORPHINE SULFATE 60 MG: 15 TABLET, EXTENDED RELEASE ORAL at 00:53

## 2022-02-18 RX ADMIN — AZITHROMYCIN MONOHYDRATE 250 MG: 250 TABLET ORAL at 18:44

## 2022-02-18 RX ADMIN — HYDROMORPHONE HYDROCHLORIDE 8 MG: 2 TABLET ORAL at 14:11

## 2022-02-18 RX ADMIN — BENZONATATE 100 MG: 100 CAPSULE ORAL at 21:34

## 2022-02-18 RX ADMIN — CYCLOBENZAPRINE HYDROCHLORIDE 10 MG: 10 TABLET, FILM COATED ORAL at 09:35

## 2022-02-18 RX ADMIN — GUAIFENESIN AND DEXTROMETHORPHAN 10 ML: 100; 10 SYRUP ORAL at 12:03

## 2022-02-18 RX ADMIN — RIVAROXABAN 20 MG: 20 TABLET, FILM COATED ORAL at 16:52

## 2022-02-18 RX ADMIN — MORPHINE SULFATE 30 MG: 15 TABLET, EXTENDED RELEASE ORAL at 21:33

## 2022-02-18 RX ADMIN — METOPROLOL SUCCINATE 100 MG: 100 TABLET, EXTENDED RELEASE ORAL at 09:35

## 2022-02-18 RX ADMIN — OMEPRAZOLE 20 MG: 20 CAPSULE, DELAYED RELEASE ORAL at 14:11

## 2022-02-18 RX ADMIN — CYCLOBENZAPRINE HYDROCHLORIDE 10 MG: 10 TABLET, FILM COATED ORAL at 21:34

## 2022-02-18 RX ADMIN — GABAPENTIN 800 MG: 400 CAPSULE ORAL at 00:55

## 2022-02-18 RX ADMIN — CEFTRIAXONE 2 G: 2 INJECTION, POWDER, FOR SOLUTION INTRAMUSCULAR; INTRAVENOUS at 21:34

## 2022-02-18 RX ADMIN — MORPHINE SULFATE 60 MG: 15 TABLET, EXTENDED RELEASE ORAL at 09:34

## 2022-02-18 RX ADMIN — HYDROMORPHONE HYDROCHLORIDE 8 MG: 2 TABLET ORAL at 18:50

## 2022-02-18 RX ADMIN — BENZONATATE 100 MG: 100 CAPSULE ORAL at 09:41

## 2022-02-18 RX ADMIN — ATORVASTATIN CALCIUM 40 MG: 40 TABLET, FILM COATED ORAL at 21:34

## 2022-02-18 RX ADMIN — MORPHINE SULFATE 30 MG: 15 TABLET, EXTENDED RELEASE ORAL at 00:52

## 2022-02-18 ASSESSMENT — ACTIVITIES OF DAILY LIVING (ADL)
ADLS_ACUITY_SCORE: 3
ADLS_ACUITY_SCORE: 12
ADLS_ACUITY_SCORE: 6
ADLS_ACUITY_SCORE: 8
ADLS_ACUITY_SCORE: 12
ADLS_ACUITY_SCORE: 3
ADLS_ACUITY_SCORE: 6
ADLS_ACUITY_SCORE: 12
ADLS_ACUITY_SCORE: 6
ADLS_ACUITY_SCORE: 3
ADLS_ACUITY_SCORE: 6
ADLS_ACUITY_SCORE: 3
ADLS_ACUITY_SCORE: 12
ADLS_ACUITY_SCORE: 6
ADLS_ACUITY_SCORE: 3
ADLS_ACUITY_SCORE: 3
ADLS_ACUITY_SCORE: 12
ADLS_ACUITY_SCORE: 3
ADLS_ACUITY_SCORE: 12
ADLS_ACUITY_SCORE: 6
ADLS_ACUITY_SCORE: 12

## 2022-02-18 NOTE — PLAN OF CARE
VSS on RA weaned from 2 LPM sating in mid to low 90's, A/Ox4, 7-8/10 pain, dilaudid given once, +1 BLE edema, LS clear, discharge possible in 1-2 days.

## 2022-02-18 NOTE — CONSULTS
Patient had COVID on 1/18/22.  On admit, no fever and patient is on room air.    If Provider is in agreement, special precautions can be removed per the below:    .MILD TO MODERATE ILLNESS WHO ARE NOT SEVERELY IMMUNOCOMPROMISED    Following criteria for patients who are not severely immunocompromised who have mild to moderate COVID-19 illness, patient meets criteria for discontinuation of Special Precautions:    FOR SYMPTOMATIC - 10 days following symptom onset, >24 hr fever free without fever-reducing meds, AND substantial improvement in COVID-19 symptoms.    FOR ASYMPTOMATIC - 10 days from positive test  AND no COVID-19 symptom development in 10-day timeframe.    Special Precautions discontinued February 18, 2022. Patient is considered recovered for 90 days from symptom onset.     .2/18/2022  .Elin Collier, Infection Prevention  [phone]

## 2022-02-18 NOTE — PROGRESS NOTES
[facility]    Hospitalist Progress Note  Name: Aspen Mccullough    MRN: [id number]  Provider: Yamini Nix MD  Date of Service: 02/18/2022    Assessment & Plan   Summary of Stay: Aspen Mccullough is a 62 year old female who was admitted on 2/17/2022 for COVID-19 pneumonia.    Patient's past medical history significant for paroxysmal atrial fibrillation, SLE, prior VTE on chronic anticoagulation, thyroid disease, hyperlipidemia, chronic back pain, hypertension, chronic kidney disease presented from urgent care with pneumonia.  She was treated in outpatient setting    Diagnosis of Covid on 1/18/2022.  She was sent from urgent care due to elevated troponin, normocytic anemia hemoglobin down to 11, there was no EKG changes, CT chest concerning for bilateral pneumonia no pleural effusion no PE patient was not hypoxic    She has quite debilitating cough.  CRP significantly elevated.  Is maintaining oxygenation on room air.      Patient would like her  be updated daily during hospitalization      COVID-19 associated viral pneumonia  Community-acquired superimposed bacterial pneumonia   Initial hypoxia in [place] emergency room.  Patient was treated with steroids for COVID-19  -Initial diagnosis of COVID-19 1/18/2022  -Reassessment in the emergency room on 1/29/2022 with ongoing cough productive sputum chest x-ray showed bilateral lung opacities she was prescribed doxycycline which she completed on 2/5/2022 and prednisone 50 mg which she completed on 2/2/2022  -She was initially evaluated in the emergency room at [place] where she was mildly hypoxic and required 2 L of supplemental O2 but was off oxygen at  -Patient has had Covid 19 for more than 21 days could be considered as Covid recovered.  Infection control was consulted on admission  -As needed nebs  -Procalcitonin is elevated 0.39 possible early systemic infection range  -Started on antibiotics on admission will treat ceftriaxone and  azithromycin    Elevated CRP  -Quite elevated CRP to 242  -Could be secondary to Covid pneumonitis versus lupus flare versus acute infection  -CT chest reported showed bilateral opacities just if of Covid pneumonitis  -When the patient is so far out of her initial diagnosis and is not hypoxic steroids were not started  -I will recheck CRP tomorrow.  If not trending down and if there is no clinical improvement consider pulmonary and possibly rheumatology  -May benefit from steroids but will hold off at this time.  Of note she was initially treated with steroids      Anemia  -Hemoglobin is 9.4  -Reported hemoglobin from emergency room was about 11  -No signs of active bleeding  -We will recheck hemoglobin  -Continue to monitor vitals    Debilitating cough  -Quite significant  -Cannot take codeine due to allergy  -Added PPI for possible GERD related cough especially given that she was treated with steroids      Elevated troponin  -Troponin drawn in emergency room was mildly elevated  -Denies any chest pain   -Repeat troponin negative here.  Repeat EKG with no acute pathology no acute ischemic changes.  Normal sinus rhythm  -Most likely demand ischemia      SLE  Recently self tapered off of chronic prednisone, was taking 8-10 mg daily until around December, 2021.   -Has been established with new Endocrinologist as previous specialists had retired.   -With elevated CRP may need to consult rheumatology sooner if not improving     History of VTE  Submissive PE, DVT in 2017. Hx of provoked DVT prior to this.   - resume chronic Xarelto      Hypothyroidism secondary to Hashimoto's thyroiditis  - continue home levo     Hypertension   - continue home metoprolol 100 mg b.i.d. and Cardizem 180 b.i.d.      Paroxysmal Atrial fibrillation, rate controlled  Follows with Dr. Uribe and Dr. Muñoz of Cardiology. Previous Ziopatch indicated predominant sinus rhythm. Pt asymptomatic.   - Continue PTA Diltiazem and Toprol XL   - Continue  Xarelto      Fibromyalgia/complex regional pain syndrome/chronic pain syndrome    Maintained on MS Contin 90 in the morning, 60 in the afternoon, and 90 in the evening; in addition to that, she take Dilaudid 8 mg 3 times a day.  Follows in [facility]. Has been on this medication for 20 years or more and will currently be resumed on this regimen while admitted.      Hyperlipidemia   -resume lipitor     CKD  -Cr stable and at baseline        DVT Prophylaxis: DOAC  Code Status: No CPR- Pre-arrest intubation OK    Disposition: Expected discharge to be decided at least 2 to 3 days      Interval History   Assumed care reviewed chart, patient with significant cough mostly dry cannot even complete a full sentence without coughing.  Complains of generalized malaise weakness.  Maintaining oxygenation on room air.  More than 10 point review of systems was carried out was otherwise negative.  Updated patient's  Ash.    -Data reviewed today: I reviewed all new labs and imaging reports over the last 24 hours. I personally reviewed the EKG tracing showing Normal sinus rhythm.  No acute ST-T changes.    Physical Exam   Temp: 98.4  F (36.9  C) Temp src: Oral BP: 138/72 Pulse: 86   Resp: 16 SpO2: 90 % O2 Device: None (Room air) Oxygen Delivery: 2 LPM  Vitals:    02/17/22 1712   Weight: 138.6 kg (305 lb 8 oz)     Vital Signs with Ranges  Temp:  [95.9  F (35.5  C)-98.4  F (36.9  C)] 98.4  F (36.9  C)  Pulse:  [86-95] 86  Resp:  [16-20] 16  BP: (118-143)/(52-92) 138/72  SpO2:  [90 %-95 %] 90 %  I/O last 3 completed shifts:  In: 3 [I.V.:3]  Out: -       GEN:  Alert, oriented x 3, appears comfortable, NAD.  HEENT:  Normocephalic/atraumatic, no scleral icterus, no nasal discharge, mouth moist.  CV:  Regular rate and rhythm, no murmur or JVD.  S1 + S2 noted, no S3 or S4.  LUNGS: Poor inspiratory effort few basilar crackles.  Symmetric chest rise on inhalation noted.  ABD:  Active bowel sounds, soft,  non-tender/non-distended.  No rebound/guarding/rigidity.  EXT:  No edema.  No cyanosis.  No joint synovitis noted.  SKIN:  Dry to touch, no exanthems noted in the visualized areas.    Medications       atorvastatin  40 mg Oral QPM     azithromycin  250 mg Intravenous Q24H     cefTRIAXone  2 g Intravenous Q24H     cyclobenzaprine  10 mg Oral TID     gabapentin  800 mg Oral At Bedtime     metoprolol succinate ER  100 mg Oral Daily     morphine  30 mg Oral BID     morphine  60 mg Oral BID     morphine  60 mg Oral Daily at 4 pm     rivaroxaban ANTICOAGULANT  20 mg Oral Daily with supper     sodium chloride (PF)  3 mL Intracatheter Q8H     Data     No results for input(s): PH, PHV, PO2, PO2V, SAT, PCO2, PCO2V, HCO3, HCO3V in the last 168 hours.  Recent Labs   Lab 02/18/22  0719   WBC 7.9   HGB 9.4*   HCT 30.6*   MCV 92        Recent Labs   Lab 02/18/22  0719      POTASSIUM 3.4   CHLORIDE 101   CO2 30   ANIONGAP 6   *   BUN 6*   CR 0.57   GFRESTIMATED >90   KYLIE 8.6     No results for input(s): CULT in the last 168 hours.  No results for input(s): NTBNPI, NTBNP in the last 168 hours.  Recent Labs   Lab 02/17/22  1831   AST 56*   ALT 48   ALKPHOS 84   BILITOTAL 0.6     No results for input(s): INR in the last 168 hours.  No results for input(s): LACT in the last 168 hours.  Recent Labs   Lab 02/18/22  0719   BUN 6*   CR 0.57     No results for input(s): TSH in the last 168 hours.  No results for input(s): TROPONIN, TROPI, TROPR, TROPONINIS in the last 168 hours.    Invalid input(s): TROP, TROPONINIES, TNIH  No results for input(s): COLOR, APPEARANCE, URINEGLC, URINEBILI, URINEKETONE, SG, UBLD, URINEPH, PROTEIN, UROBILINOGEN, NITRITE, LEUKEST, RBCU, WBCU in the last 168 hours.    No results found for this or any previous visit (from the past 24 hour(s)).

## 2022-02-18 NOTE — PHARMACY-ADMISSION MEDICATION HISTORY
Admission medication history interview status for this patient is complete. See [facility] admission navigator for allergy information, prior to admission medications and immunization status.     Medication history interview done, indicate source(s): Patient  Medication history resources (including written lists, pill bottles, clinic record):EPIC    Changes made to PTA medication list:  Added: none  Changed:    - Levothyroxine 200mcg (Tirosint) - from 1 tab bid to 3 tabs daily;    - Toprol 100mg - from bid to daily;   - Probiotic  Reported as Not Taking: - Diltiazem ER 180mg bid; Prednisone, Cytomel, Chlorhexidine solution;  Removed: Prednisone, Cytomel, Chlorhexidine solution; - Diltiazem ER 180mg bid    Additional medication history information: per pt, will address what eye drops to use with Provider on next appt.    Prior to Admission medications    Medication Sig Last Dose Taking? Auth Provider   atorvastatin (LIPITOR) 40 MG tablet Take 1 tablet (40 mg) by mouth every evening 2/16/2022 at Unknown time Yes Fannie Cummins PA-C   BIOTIN FORTE PO Take 1 tablet by mouth daily   Yes Reported, Patient   carboxymethylcellulose (CARBOXYMETHYLCELLULOSE SODIUM) 0.5 % SOLN ophthalmic solution Place 1 drop into both eyes 2 times daily as needed   Yes    cyanocobalamin 1000 MCG/ML injection Inject 1 mL (1,000 mcg) Subcutaneous every 7 days 2/11/2022 Yes Yeimy Cabrera MD   cyclobenzaprine (FLEXERIL) 10 MG tablet Take 1 tablet (10 mg) by mouth 3 times daily 2/17/2022 at Unknown time Yes Yeimy Cabrera MD   cycloSPORINE (RESTASIS) 0.05 % ophthalmic emulsion Place 1 drop into both eyes 2 times daily as needed   Yes Reported, Patient   fexofenadine (ALLEGRA) 180 MG tablet Take 180 mg by mouth daily as needed   Yes Reported, Patient   gabapentin (NEURONTIN) 300 MG capsule Take 300 mg by mouth 2 times daily (morning and afternoon) with 800mg tablet (total dose 1100mg) 2/16/2022 at Unknown  time Yes Unknown, Entered By History   gabapentin (NEURONTIN) 800 MG tablet Take 1 tablet by mouth At Bedtime 2/16/2022 at Unknown time Yes Unknown, Entered By History   gabapentin (NEURONTIN) 800 MG tablet Take 800 mg by mouth 2 times daily (morning and afternoon) in addition to 300mg capsule (total dose 1100mg) 2/16/2022 at Unknown time Yes Unknown, Entered By History   HYDROmorphone (DILAUDID) 8 MG tablet Take 8 mg by mouth 3 times daily . Max of 3 doses per day. 2/17/2022 at am Yes    metoprolol succinate ER (TOPROL-XL) 100 MG 24 hr tablet Take 1 tablet (100 mg) by mouth 2 times daily  Patient taking differently: Take 100 mg by mouth daily   Yes Sil Muñoz MD   morphine (MS CONTIN) 30 MG 12 hr tablet Take 30 mg by mouth 2 times daily (morning and night) in addition to 60 mg tablet for a total dose of 90mg. 2/17/2022 at am Yes    morphine (MS CONTIN) 60 MG 12 hr tablet Take 60 mg by mouth daily in the afternoon (in addition to the 2 - 90mg doses) 2/16/2022 at Unknown time Yes Unknown, Entered By History   morphine (MS CONTIN) 60 MG 12 hr tablet Take 60 mg by mouth 2 times daily (morning and night) in addition to 30mg tablet for a total dose of 90mg 2/17/2022 at am Yes Umm Ya APRN CNP   multivitamin, therapeutic with minerals (MULTI-VITAMIN) TABS tablet Take 1 tablet by mouth daily  2/15/2022 Yes    NASONEX 50 MCG/ACT spray Spray 1 spray into both nostrils daily as needed   Yes    nystatin (MYCOSTATIN) 533478 UNIT/GM external powder Apply topically 3 times daily as needed  Yes Naun Patricio MD   prednisoLONE acetate (PRED FORTE) 1 % ophthalmic susp Place 1 drop into both eyes 2 times daily  2/16/2022 tried to administer at Unknown time Yes Reported, Patient   rivaroxaban ANTICOAGULANT (XARELTO ANTICOAGULANT) 20 MG TABS tablet Take 1 tablet (20 mg) by mouth daily (with dinner) Hold today 11/01 and restart when no longer having bloody coughing, suspected in 1-2 days. 2/16/2022 at Unknown  time Yes Richelle Mcgee, DO   TIROSINT 200 MCG CAPS Take 600 mcg by mouth daily  2/16/2022 at Unknown time Yes Unknown, Entered By History   Elastic Bandages & Supports (MEDICAL COMPRESSION SOCKS) MISC 1 Package daily Please measure and distribute 2 pair of 20mmHg - 30mmHg THIGH high open or closed toe compression stockings with extra refills as indicated. Jobst ultrasheer or equivalent.   Taye Salcedo MD   lifitegrast (XIIDRA) 5 % opthalmic solution Place 1 drop into both eyes 2 times daily not known if uses     zolpidem (AMBIEN) 5 MG tablet Take 5 mg by mouth nightly as needed for sleep  Yes Unknown, Entered By History

## 2022-02-18 NOTE — PLAN OF CARE
A/Ox4. VSS.  Ind in room. L PIV saline locked. Room air. Chronic pain throughout body. CRP: 242. Procalcitonin: 0.39. Infrequent productive cough. Discharge to home pending resp status.

## 2022-02-18 NOTE — CONSULTS
Care Management Discharge Note    Discharge Disposition: Home    Discharge Services: None    Discharge DME: None      Private pay costs discussed: Not applicable    Patient/Family in Agreement with the Plan: yes    Handoff Referral Completed: No    Additional Information:  Pt admitted with COVID-19, noted to have unplanned readmission risk of 24%.  Per chart review, there are no discharge needs at this time.  Sw spoke with the pt's bedside nurse who identified no discharge planning/Sw needs at this time for the pt.    Sw will be available for discharge planning until the pt's discharge.  Please update Sw if discharge needs arise.     FRANCISCO Barros, [facility]  Inpatient Care Coordination  [facility]  [phone]

## 2022-02-18 NOTE — PLAN OF CARE
"/72 (BP Location: Right arm)   Pulse 86   Temp 98.4  F (36.9  C) (Oral)   Resp 16   Ht 1.753 m (5' 9\")   Wt 138.6 kg (305 lb 8 oz)   SpO2 90%   BMI 45.11 kg/m      A/Ox4. VSS.  Ind in room. L PIV saline locked. Room air. Chronic pain throughout body. Elevated CRP. Frequent productive cough. Scheduled MS contin & PRN dilaudid. Will continue to monitor.                "

## 2022-02-19 LAB
ANION GAP SERPL CALCULATED.3IONS-SCNC: 2 MMOL/L (ref 3–14)
BUN SERPL-MCNC: 6 MG/DL (ref 7–30)
CALCIUM SERPL-MCNC: 9.1 MG/DL (ref 8.5–10.1)
CHLORIDE BLD-SCNC: 105 MMOL/L (ref 94–109)
CO2 SERPL-SCNC: 32 MMOL/L (ref 20–32)
CREAT SERPL-MCNC: 0.6 MG/DL (ref 0.52–1.04)
CRP SERPL-MCNC: 154 MG/L (ref 0–8)
ERYTHROCYTE [DISTWIDTH] IN BLOOD BY AUTOMATED COUNT: 14.6 % (ref 10–15)
GFR SERPL CREATININE-BSD FRML MDRD: >90 ML/MIN/1.73M2
GLUCOSE BLD-MCNC: 112 MG/DL (ref 70–99)
HCT VFR BLD AUTO: 32.1 % (ref 35–47)
HGB BLD-MCNC: 9.8 G/DL (ref 11.7–15.7)
MCH RBC QN AUTO: 28.2 PG (ref 26.5–33)
MCHC RBC AUTO-ENTMCNC: 30.5 G/DL (ref 31.5–36.5)
MCV RBC AUTO: 93 FL (ref 78–100)
PLATELET # BLD AUTO: 347 10E3/UL (ref 150–450)
POTASSIUM BLD-SCNC: 4.3 MMOL/L (ref 3.4–5.3)
RBC # BLD AUTO: 3.47 10E6/UL (ref 3.8–5.2)
SODIUM SERPL-SCNC: 139 MMOL/L (ref 133–144)
WBC # BLD AUTO: 8.4 10E3/UL (ref 4–11)

## 2022-02-19 PROCEDURE — 250N000011 HC RX IP 250 OP 636: Performed by: PHYSICIAN ASSISTANT

## 2022-02-19 PROCEDURE — 86140 C-REACTIVE PROTEIN: CPT | Performed by: INTERNAL MEDICINE

## 2022-02-19 PROCEDURE — 250N000013 HC RX MED GY IP 250 OP 250 PS 637: Performed by: INTERNAL MEDICINE

## 2022-02-19 PROCEDURE — 82310 ASSAY OF CALCIUM: CPT | Performed by: PHYSICIAN ASSISTANT

## 2022-02-19 PROCEDURE — 250N000013 HC RX MED GY IP 250 OP 250 PS 637: Performed by: PHYSICIAN ASSISTANT

## 2022-02-19 PROCEDURE — 36415 COLL VENOUS BLD VENIPUNCTURE: CPT | Performed by: PHYSICIAN ASSISTANT

## 2022-02-19 PROCEDURE — 120N000001 HC R&B MED SURG/OB

## 2022-02-19 PROCEDURE — 99233 SBSQ HOSP IP/OBS HIGH 50: CPT | Performed by: INTERNAL MEDICINE

## 2022-02-19 PROCEDURE — 85027 COMPLETE CBC AUTOMATED: CPT | Performed by: PHYSICIAN ASSISTANT

## 2022-02-19 RX ORDER — ZOLPIDEM TARTRATE 5 MG/1
5 TABLET ORAL
Status: DISCONTINUED | OUTPATIENT
Start: 2022-02-19 | End: 2022-02-21 | Stop reason: HOSPADM

## 2022-02-19 RX ADMIN — RIVAROXABAN 20 MG: 20 TABLET, FILM COATED ORAL at 16:02

## 2022-02-19 RX ADMIN — GABAPENTIN 800 MG: 400 CAPSULE ORAL at 21:09

## 2022-02-19 RX ADMIN — GUAIFENESIN AND DEXTROMETHORPHAN 10 ML: 100; 10 SYRUP ORAL at 00:47

## 2022-02-19 RX ADMIN — CYCLOBENZAPRINE HYDROCHLORIDE 10 MG: 10 TABLET, FILM COATED ORAL at 16:02

## 2022-02-19 RX ADMIN — ATORVASTATIN CALCIUM 40 MG: 40 TABLET, FILM COATED ORAL at 21:09

## 2022-02-19 RX ADMIN — MORPHINE SULFATE 60 MG: 15 TABLET, EXTENDED RELEASE ORAL at 09:04

## 2022-02-19 RX ADMIN — GUAIFENESIN AND DEXTROMETHORPHAN 10 ML: 100; 10 SYRUP ORAL at 09:05

## 2022-02-19 RX ADMIN — CYCLOBENZAPRINE HYDROCHLORIDE 10 MG: 10 TABLET, FILM COATED ORAL at 21:08

## 2022-02-19 RX ADMIN — ACETAMINOPHEN 650 MG: 325 SOLUTION ORAL at 00:46

## 2022-02-19 RX ADMIN — MORPHINE SULFATE 60 MG: 15 TABLET, EXTENDED RELEASE ORAL at 16:02

## 2022-02-19 RX ADMIN — ACETAMINOPHEN 650 MG: 325 SOLUTION ORAL at 18:50

## 2022-02-19 RX ADMIN — ACETAMINOPHEN 650 MG: 325 SOLUTION ORAL at 11:34

## 2022-02-19 RX ADMIN — AZITHROMYCIN MONOHYDRATE 250 MG: 250 TABLET ORAL at 09:04

## 2022-02-19 RX ADMIN — GUAIFENESIN AND DEXTROMETHORPHAN 10 ML: 100; 10 SYRUP ORAL at 18:50

## 2022-02-19 RX ADMIN — MORPHINE SULFATE 60 MG: 15 TABLET, EXTENDED RELEASE ORAL at 21:08

## 2022-02-19 RX ADMIN — CEFTRIAXONE 2 G: 2 INJECTION, POWDER, FOR SOLUTION INTRAMUSCULAR; INTRAVENOUS at 21:10

## 2022-02-19 RX ADMIN — MORPHINE SULFATE 30 MG: 15 TABLET, EXTENDED RELEASE ORAL at 21:09

## 2022-02-19 RX ADMIN — HYDROMORPHONE HYDROCHLORIDE 8 MG: 2 TABLET ORAL at 04:28

## 2022-02-19 RX ADMIN — CYCLOBENZAPRINE HYDROCHLORIDE 10 MG: 10 TABLET, FILM COATED ORAL at 09:05

## 2022-02-19 RX ADMIN — HYDROMORPHONE HYDROCHLORIDE 8 MG: 2 TABLET ORAL at 12:31

## 2022-02-19 RX ADMIN — ACETAMINOPHEN 650 MG: 325 SOLUTION ORAL at 21:26

## 2022-02-19 RX ADMIN — BENZONATATE 100 MG: 100 CAPSULE ORAL at 11:31

## 2022-02-19 RX ADMIN — MORPHINE SULFATE 30 MG: 15 TABLET, EXTENDED RELEASE ORAL at 09:04

## 2022-02-19 ASSESSMENT — ACTIVITIES OF DAILY LIVING (ADL)
ADLS_ACUITY_SCORE: 7
ADLS_ACUITY_SCORE: 8
ADLS_ACUITY_SCORE: 7

## 2022-02-19 NOTE — PLAN OF CARE
A&Ox4. Up ad josey. VSS on RA. Has chronic, systemic pain, controlled with scheduled morphine/flexeril and PRN dilaudid given x1. Has a strong cough, gave tessalon pearls x1. PIV saline locked. On abx, IV rocephin and PO zithromax. Regular diet, good appetite. Will continue POC    Melvin Paris RN on 2/18/2022 at 10:21 PM

## 2022-02-19 NOTE — PROGRESS NOTES
[facility]    Hospitalist Progress Note  Name: Aspen Mccullough    MRN: [id number]  Provider: Maris Tolentino MD  Date of Service: 02/19/2022    Assessment & Plan   Summary of Stay: Aspen Mccullough is a 62 year old female who was admitted on 2/17/2022 for COVID-19 pneumonia.    Patient's past medical history significant for paroxysmal atrial fibrillation, SLE, prior VTE on chronic anticoagulation, thyroid disease, hyperlipidemia, chronic back pain, hypertension, chronic kidney disease presented from urgent care with pneumonia.  She was treated in outpatient setting    Diagnosis of Covid on 1/18/2022.  She was sent from urgent care due to elevated troponin, normocytic anemia hemoglobin down to 11, there was no EKG changes, CT chest concerning for bilateral pneumonia no pleural effusion no PE patient was not hypoxic    She has quite debilitating cough.  CRP significantly elevated.  Is maintaining oxygenation on room air.      COVID-19 associated viral pneumonia  Community-acquired superimposed bacterial pneumonia   Initial hypoxia in [place] emergency room.  Patient was treated with steroids for COVID-19  -Initial diagnosis of COVID-19 1/18/2022  -Reassessment in the emergency room on 1/29/2022 with ongoing cough productive sputum chest x-ray showed bilateral lung opacities she was prescribed doxycycline which she completed on 2/5/2022 and prednisone 50 mg which she completed on 2/2/2022  -She was initially evaluated in the emergency room at [place] where she was mildly hypoxic and required 2 L of supplemental O2 but was off oxygen at  -Patient has had Covid 19 for more than 21 days could be considered as Covid recovered.  Infection control was consulted on admission  -As needed nebs  -Procalcitonin is elevated 0.39 possible early systemic infection range. Started on antibiotics on admission will treat ceftriaxone and azithromycin  -CRP trending down    Elevated CRP  -Initial CRP quite elevated CRP to 242,  now down to 154  -Could be secondary to Covid pneumonitis versus lupus flare versus acute infection  -CT chest reported showed bilateral opacities just if of Covid pneumonitis  -When the patient is so far out of her initial diagnosis and is not hypoxic steroids were not started  -Will hold off steroids for now in the setting of normal oxygen saturation, CRP trending down      Anemia  -Hemoglobin is 9.4  -Reported hemoglobin from emergency room was about 11  -No signs of active bleeding  -We will recheck hemoglobin  -Continue to monitor vitals    Debilitating cough  -Quite significant  -Cannot take codeine due to allergy  -Added PPI for possible GERD related cough especially given that she was treated with steroids      Elevated troponin  -Troponin drawn in emergency room was mildly elevated  -Denies any chest pain   -Repeat troponin negative here.  Repeat EKG with no acute pathology no acute ischemic changes.  Normal sinus rhythm  -Most likely demand ischemia      SLE  Recently self tapered off of chronic prednisone, was taking 8-10 mg daily until around December, 2021.   -Has been established with new Endocrinologist as previous specialists had retired.   -Outpatient follow up with rheumatology     History of VTE  Submissive PE, DVT in 2017. Hx of provoked DVT prior to this.   - continue chronic Xarelto      Hypothyroidism secondary to Hashimoto's thyroiditis  - continue home levo     Hypertension   - continue home metoprolol 100 mg b.i.d. and Cardizem 180 b.i.d.      Paroxysmal Atrial fibrillation, rate controlled  Follows with Dr. Uribe and Dr. Muñoz of Cardiology. Previous Ziopatch indicated predominant sinus rhythm. Pt asymptomatic.   - Continue PTA Diltiazem and Toprol XL   - Continue Xarelto      Fibromyalgia/complex regional pain syndrome/chronic pain syndrome    Maintained on MS Contin 90 in the morning, 60 in the afternoon, and 90 in the evening; in addition to that, she take Dilaudid 8 mg 3 times a day.   Follows in [facility]. Has been on this medication for 20 years or more and will currently be resumed on this regimen while admitted.      Hyperlipidemia   -continue lipitor     CKD  -Cr stable and at baseline    DVT Prophylaxis: DOAC  Code Status: No CPR- Pre-arrest intubation OK    Disposition: Expected discharge to be decided at least 2 to 3 days      Interval History   Assumed care reviewed chart. Patient seen and examined. She stated that she is having frequent cough. Denies fever. She has no nausea or vomiting. Denies abdominal pain.     -Data reviewed today: I reviewed all new labs and imaging reports over the last 24 hours. I personally reviewed the EKG tracing showing Normal sinus rhythm.  No acute ST-T changes.    Physical Exam   Temp: 98.3  F (36.8  C) Temp src: Axillary (pt on phone) BP: 128/86 Pulse: 69   Resp: 18 SpO2: 95 % O2 Device: None (Room air)    Vitals:    02/17/22 1712   Weight: 138.6 kg (305 lb 8 oz)     Vital Signs with Ranges  Temp:  [98.3  F (36.8  C)-99.1  F (37.3  C)] 98.3  F (36.8  C)  Pulse:  [69-87] 69  Resp:  [18-20] 18  BP: (105-131)/(57-86) 128/86  SpO2:  [90 %-95 %] 95 %  I/O last 3 completed shifts:  In: 1760 [P.O.:1760]  Out: -       GEN:  Alert, oriented x 3, appears comfortable, NAD.  HEENT:  Normocephalic/atraumatic, no scleral icterus, no nasal discharge, mouth moist.  CV:  Regular rate and rhythm, no murmur or JVD.  S1 + S2 noted, no S3 or S4.  LUNGS: Poor inspiratory effort few basilar crackles.  Symmetric chest rise on inhalation noted.  ABD:  Active bowel sounds, soft, non-tender/non-distended.  No rebound/guarding/rigidity.  EXT:  No edema.  No cyanosis.  No joint synovitis noted.  SKIN:  Dry to touch, no exanthems noted in the visualized areas.    Medications       atorvastatin  40 mg Oral QPM     azithromycin  250 mg Oral Daily     cefTRIAXone  2 g Intravenous Q24H     cyclobenzaprine  10 mg Oral TID     gabapentin  800 mg Oral At Bedtime     metoprolol  succinate ER  100 mg Oral Daily     morphine  30 mg Oral BID     morphine  60 mg Oral BID     morphine  60 mg Oral Daily at 4 pm     omeprazole  20 mg Oral BID AC     rivaroxaban ANTICOAGULANT  20 mg Oral Daily with supper     sodium chloride (PF)  3 mL Intracatheter Q8H     Data     No results for input(s): PH, PHV, PO2, PO2V, SAT, PCO2, PCO2V, HCO3, HCO3V in the last 168 hours.  Recent Labs   Lab 02/19/22  0723 02/18/22  0719   WBC 8.4 7.9   HGB 9.8* 9.4*   HCT 32.1* 30.6*   MCV 93 92    282     Recent Labs   Lab 02/19/22  0723 02/18/22  0719    137   POTASSIUM 4.3 3.4   CHLORIDE 105 101   CO2 32 30   ANIONGAP 2* 6   * 108*   BUN 6* 6*   CR 0.60 0.57   GFRESTIMATED >90 >90   KYLIE 9.1 8.6     No results for input(s): CULT in the last 168 hours.  No results for input(s): NTBNPI, NTBNP in the last 168 hours.  Recent Labs   Lab 02/17/22  1831   AST 56*   ALT 48   ALKPHOS 84   BILITOTAL 0.6     No results for input(s): INR in the last 168 hours.  No results for input(s): LACT in the last 168 hours.  Recent Labs   Lab 02/19/22  0723 02/18/22  0719   BUN 6* 6*   CR 0.60 0.57     No results for input(s): TSH in the last 168 hours.  Recent Labs   Lab 02/18/22  0837   TROPONINIS 9     No results for input(s): COLOR, APPEARANCE, URINEGLC, URINEBILI, URINEKETONE, SG, UBLD, URINEPH, PROTEIN, UROBILINOGEN, NITRITE, LEUKEST, RBCU, WBCU in the last 168 hours.    No results found for this or any previous visit (from the past 24 hour(s)).

## 2022-02-19 NOTE — PLAN OF CARE
Goal Outcome Evaluation:    Plan of Care Reviewed With: patient     Overall Patient Progress: no change     A&O. Complains of upper chest pain from coughing. PRN Tylenol and dilaudid given. Independent with ambulation. LS clear, O2 94% on RA. Plan: 2-3 days

## 2022-02-19 NOTE — PLAN OF CARE
"Goal Outcome Evaluation:    Plan of Care Reviewed With: patient     Overall Patient Progress: no change    Pt Alert and oriented x 4. VSS. Up independently. Tolerating regular diet. Has chronic systemic pain. Reported headache - PRN Tylenol 650 mg given x 1. Having frequent productive cough. PRN Robitussin 10 mL given x 1. PIV SL. On Abx - Rocephin and PO Zithromax. O2 Sat 92% on RA.    Pain Management: Morphine, dilaudid, and tylenol,    /71 (BP Location: Right arm)   Pulse 81   Temp 98.8  F (37.1  C) (Oral)   Resp 18   Ht 1.753 m (5' 9\")   Wt 138.6 kg (305 lb 8 oz)   SpO2 92%   BMI 45.11 kg/m                 "

## 2022-02-19 NOTE — PROVIDER NOTIFICATION
Paged admitting hospitalist: Order instructions for IV zithromax say can switch to oral when taking PO intake and not in ICU. Pt is not in ICU and has been eating. Can we switch to oral zithromax? Pt would prefer. Thanks!     Melvin Paris RN on 2/18/2022 at 6:24 PM    ADDENDUM: Zithromax switched to PO by MD Melvin Paris RN on 2/18/2022 at 9:22 PM

## 2022-02-20 LAB
ANION GAP SERPL CALCULATED.3IONS-SCNC: 4 MMOL/L (ref 3–14)
BUN SERPL-MCNC: 6 MG/DL (ref 7–30)
CALCIUM SERPL-MCNC: 9.6 MG/DL (ref 8.5–10.1)
CHLORIDE BLD-SCNC: 102 MMOL/L (ref 94–109)
CO2 SERPL-SCNC: 31 MMOL/L (ref 20–32)
CREAT SERPL-MCNC: 0.61 MG/DL (ref 0.52–1.04)
CRP SERPL-MCNC: 111 MG/L (ref 0–8)
ERYTHROCYTE [DISTWIDTH] IN BLOOD BY AUTOMATED COUNT: 14.3 % (ref 10–15)
GFR SERPL CREATININE-BSD FRML MDRD: >90 ML/MIN/1.73M2
GLUCOSE BLD-MCNC: 109 MG/DL (ref 70–99)
HCT VFR BLD AUTO: 36.8 % (ref 35–47)
HGB BLD-MCNC: 11.3 G/DL (ref 11.7–15.7)
MCH RBC QN AUTO: 28.2 PG (ref 26.5–33)
MCHC RBC AUTO-ENTMCNC: 30.7 G/DL (ref 31.5–36.5)
MCV RBC AUTO: 92 FL (ref 78–100)
PLATELET # BLD AUTO: 427 10E3/UL (ref 150–450)
POTASSIUM BLD-SCNC: 4.2 MMOL/L (ref 3.4–5.3)
RBC # BLD AUTO: 4.01 10E6/UL (ref 3.8–5.2)
SODIUM SERPL-SCNC: 137 MMOL/L (ref 133–144)
WBC # BLD AUTO: 8.1 10E3/UL (ref 4–11)

## 2022-02-20 PROCEDURE — 82310 ASSAY OF CALCIUM: CPT | Performed by: PHYSICIAN ASSISTANT

## 2022-02-20 PROCEDURE — 250N000013 HC RX MED GY IP 250 OP 250 PS 637: Performed by: INTERNAL MEDICINE

## 2022-02-20 PROCEDURE — 36415 COLL VENOUS BLD VENIPUNCTURE: CPT | Performed by: PHYSICIAN ASSISTANT

## 2022-02-20 PROCEDURE — 250N000013 HC RX MED GY IP 250 OP 250 PS 637: Performed by: PHYSICIAN ASSISTANT

## 2022-02-20 PROCEDURE — 85027 COMPLETE CBC AUTOMATED: CPT | Performed by: PHYSICIAN ASSISTANT

## 2022-02-20 PROCEDURE — 250N000011 HC RX IP 250 OP 636: Performed by: PHYSICIAN ASSISTANT

## 2022-02-20 PROCEDURE — 120N000001 HC R&B MED SURG/OB

## 2022-02-20 PROCEDURE — 86140 C-REACTIVE PROTEIN: CPT | Performed by: INTERNAL MEDICINE

## 2022-02-20 PROCEDURE — 99233 SBSQ HOSP IP/OBS HIGH 50: CPT | Performed by: INTERNAL MEDICINE

## 2022-02-20 RX ADMIN — MORPHINE SULFATE 30 MG: 15 TABLET, EXTENDED RELEASE ORAL at 20:39

## 2022-02-20 RX ADMIN — METOPROLOL SUCCINATE 100 MG: 100 TABLET, EXTENDED RELEASE ORAL at 08:11

## 2022-02-20 RX ADMIN — AZITHROMYCIN MONOHYDRATE 250 MG: 250 TABLET ORAL at 08:11

## 2022-02-20 RX ADMIN — ACETAMINOPHEN 650 MG: 325 SOLUTION ORAL at 06:10

## 2022-02-20 RX ADMIN — GUAIFENESIN AND DEXTROMETHORPHAN 10 ML: 100; 10 SYRUP ORAL at 16:36

## 2022-02-20 RX ADMIN — GABAPENTIN 800 MG: 400 CAPSULE ORAL at 21:02

## 2022-02-20 RX ADMIN — BENZONATATE 100 MG: 100 CAPSULE ORAL at 08:18

## 2022-02-20 RX ADMIN — MORPHINE SULFATE 30 MG: 15 TABLET, EXTENDED RELEASE ORAL at 08:11

## 2022-02-20 RX ADMIN — CYCLOBENZAPRINE HYDROCHLORIDE 10 MG: 10 TABLET, FILM COATED ORAL at 15:51

## 2022-02-20 RX ADMIN — GUAIFENESIN AND DEXTROMETHORPHAN 10 ML: 100; 10 SYRUP ORAL at 11:25

## 2022-02-20 RX ADMIN — GUAIFENESIN AND DEXTROMETHORPHAN 10 ML: 100; 10 SYRUP ORAL at 21:05

## 2022-02-20 RX ADMIN — MORPHINE SULFATE 60 MG: 15 TABLET, EXTENDED RELEASE ORAL at 08:11

## 2022-02-20 RX ADMIN — HYDROMORPHONE HYDROCHLORIDE 8 MG: 2 TABLET ORAL at 13:31

## 2022-02-20 RX ADMIN — MORPHINE SULFATE 60 MG: 15 TABLET, EXTENDED RELEASE ORAL at 15:51

## 2022-02-20 RX ADMIN — RIVAROXABAN 20 MG: 20 TABLET, FILM COATED ORAL at 16:36

## 2022-02-20 RX ADMIN — CYCLOBENZAPRINE HYDROCHLORIDE 10 MG: 10 TABLET, FILM COATED ORAL at 21:02

## 2022-02-20 RX ADMIN — BENZONATATE 100 MG: 100 CAPSULE ORAL at 20:41

## 2022-02-20 RX ADMIN — CYCLOBENZAPRINE HYDROCHLORIDE 10 MG: 10 TABLET, FILM COATED ORAL at 08:12

## 2022-02-20 RX ADMIN — OMEPRAZOLE 20 MG: 20 CAPSULE, DELAYED RELEASE ORAL at 08:11

## 2022-02-20 RX ADMIN — BENZONATATE 100 MG: 100 CAPSULE ORAL at 16:36

## 2022-02-20 RX ADMIN — ZOLPIDEM TARTRATE 5 MG: 5 TABLET ORAL at 21:02

## 2022-02-20 RX ADMIN — MORPHINE SULFATE 60 MG: 15 TABLET, EXTENDED RELEASE ORAL at 20:38

## 2022-02-20 RX ADMIN — CEFTRIAXONE 2 G: 2 INJECTION, POWDER, FOR SOLUTION INTRAMUSCULAR; INTRAVENOUS at 20:30

## 2022-02-20 RX ADMIN — GUAIFENESIN AND DEXTROMETHORPHAN 10 ML: 100; 10 SYRUP ORAL at 06:10

## 2022-02-20 RX ADMIN — ATORVASTATIN CALCIUM 40 MG: 40 TABLET, FILM COATED ORAL at 20:29

## 2022-02-20 ASSESSMENT — ACTIVITIES OF DAILY LIVING (ADL)
ADLS_ACUITY_SCORE: 7
ADLS_ACUITY_SCORE: 8
ADLS_ACUITY_SCORE: 7
ADLS_ACUITY_SCORE: 8

## 2022-02-20 NOTE — PROGRESS NOTES
Care from 1900 to 2300    VSS x BP slightly elevated. AOx4. Up independently in room. RA. Pt c/o pain and headache. Pain was 9/10. Scheduled medications given. Pt has chronic pain r/t past surgeries and  SLE. States normal pain is 7-8/10. PRN Tylenol given for headache. +1 edema in LEs. Frequent productive cough. Cough syrup (Robitussin) given. Pt would like more cough medications right away in am.    PIV SL. IV abx given at night.

## 2022-02-20 NOTE — PROGRESS NOTES
[facility]    Hospitalist Progress Note  Name: Aspen Mccullough    MRN: [id number]  Provider: Maris Tolentino MD  Date of Service: 02/20/2022    Assessment & Plan   Summary of Stay: Aspen Mccullough is a 62 year old female who was admitted on 2/17/2022 for COVID-19 pneumonia.    Patient's past medical history significant for paroxysmal atrial fibrillation, SLE, prior VTE on chronic anticoagulation, thyroid disease, hyperlipidemia, chronic back pain, hypertension, chronic kidney disease presented from urgent care with pneumonia. She was treated in outpatient setting    Diagnosis of Covid on 1/18/2022.  She was sent from urgent care due to elevated troponin, normocytic anemia hemoglobin down to 11, there was no EKG changes, CT chest concerning for bilateral pneumonia no pleural effusion no PE patient was not hypoxic    She has quite debilitating cough. CRP significantly elevated.  Is maintaining oxygenation on room air.      COVID-19 associated viral pneumonia  Community-acquired superimposed bacterial pneumonia   Initial hypoxia in [facility] emergency room.  Patient was treated with steroids for COVID-19  -Initial diagnosis of COVID-19 1/18/2022  -Reassessment in the emergency room on 1/29/2022 with ongoing cough productive sputum chest x-ray showed bilateral lung opacities she was prescribed doxycycline which she completed on 2/5/2022 and prednisone 50 mg which she completed on 2/2/2022  -She was initially evaluated in the emergency room at [facility] where she was mildly hypoxic and required 2 L of supplemental O2 but was off oxygen at  -Patient has had Covid 19 for more than 21 days could be considered as Covid recovered.  Infection control was consulted on admission  -As needed nebs  -Procalcitonin is elevated 0.39 possible early systemic infection range. Started on antibiotics on admission. Will treat with ceftriaxone and azithromycin  -CRP trending down    Elevated CRP  -Initial CRP quite elevated CRP to  242-->154--> 111  -Could be secondary to Covid pneumonitis versus lupus flare versus acute infection  -CT chest reported showed bilateral opacities just if of Covid pneumonitis  -When the patient is so far out of her initial diagnosis and is not hypoxic steroids were not started  -Will hold off steroids for now in the setting of normal oxygen saturation, CRP trending down    Anemia  -Hemoglobin is 9.4  -Reported hemoglobin from emergency room was about 11  -No signs of active bleeding  -We will recheck hemoglobin  -Continue to monitor vitals    Debilitating cough  -Quite significant  -Cannot take codeine due to allergy  -Continue PPI for possible GERD related cough especially given that she was treated with steroids    Elevated troponin  -Troponin drawn in emergency room was mildly elevated  -Denies any chest pain   -Repeat troponin negative here. Repeat EKG with no acute pathology no acute ischemic changes.  Normal sinus rhythm  -Most likely demand ischemia    SLE  -Recently self tapered off of chronic prednisone, was taking 8-10 mg daily until around December, 2021.   -Has been established with new Endocrinologist as previous specialists had retired.   -Outpatient follow up with rheumatology     History of VTE  Submissive PE, DVT in 2017. Hx of provoked DVT prior to this.   -Continue chronic Xarelto      Hypothyroidism secondary to Hashimoto's thyroiditis  -Continue home levo     Hypertension   -Continue home metoprolol 100 mg b.i.d. and Cardizem 180 b.i.d.      Paroxysmal Atrial fibrillation, rate controlled  Follows with Dr. Uribe and Dr. Muñoz of Cardiology. Previous Ziopatch indicated predominant sinus rhythm. Pt asymptomatic.   - Continue PTA Diltiazem and Toprol XL   - Continue Xarelto      Fibromyalgia/complex regional pain syndrome/chronic pain syndrome    Maintained on MS Contin 90 in the morning, 60 in the afternoon, and 90 in the evening; in addition to that, she take Dilaudid 8 mg 3 times a day.   Follows in [facility]. Has been on this medication for 20 years or more and will currently be resumed on this regimen while admitted.      Hyperlipidemia   -continue lipitor     CKD  -Cr stable and at baseline    DVT Prophylaxis: DOAC  Code Status: No CPR- Pre-arrest intubation OK    Disposition: Expected discharge to be decided at least 2 to 3 days    Interval History   Assumed care reviewed chart. Patient seen and examined today. She stated that she has repetitive cough. She denies fever. She also denies significant shortness of breath. No nausea or vomiting.     -Data reviewed today: I reviewed all new labs and imaging reports over the last 24 hours. I personally reviewed the EKG tracing showing Normal sinus rhythm.  No acute ST-T changes.    Physical Exam   Temp: 97.5  F (36.4  C) Temp src: Oral BP: (!) 156/91 Pulse: 83   Resp: 18 SpO2: 96 % O2 Device: None (Room air)    Vitals:    02/17/22 1712   Weight: 138.6 kg (305 lb 8 oz)     Vital Signs with Ranges  Temp:  [97.1  F (36.2  C)-98.7  F (37.1  C)] 97.5  F (36.4  C)  Pulse:  [69-86] 83  Resp:  [16-18] 18  BP: (138-156)/(66-91) 156/91  SpO2:  [92 %-97 %] 96 %  I/O last 3 completed shifts:  In: 960 [P.O.:960]  Out: -       GEN:  Alert, oriented x 3, appears comfortable, NAD.  HEENT:  Normocephalic/atraumatic, no scleral icterus, no nasal discharge, mouth moist.  CV:  Regular rate and rhythm, no murmur or JVD.  S1 + S2 noted, no S3 or S4.  LUNGS: Poor inspiratory effort few basilar crackles.  Symmetric chest rise on inhalation noted.  ABD:  Active bowel sounds, soft, non-tender/non-distended.  No rebound/guarding/rigidity.  EXT:  No edema.  No cyanosis.  No joint synovitis noted.  SKIN:  Dry to touch, no exanthems noted in the visualized areas.    Medications       atorvastatin  40 mg Oral QPM     azithromycin  250 mg Oral Daily     cefTRIAXone  2 g Intravenous Q24H     cyclobenzaprine  10 mg Oral TID     gabapentin  800 mg Oral At Bedtime      metoprolol succinate ER  100 mg Oral Daily     morphine  30 mg Oral BID     morphine  60 mg Oral BID     morphine  60 mg Oral Daily at 4 pm     omeprazole  20 mg Oral BID AC     rivaroxaban ANTICOAGULANT  20 mg Oral Daily with supper     sodium chloride (PF)  3 mL Intracatheter Q8H     Data     No results for input(s): PH, PHV, PO2, PO2V, SAT, PCO2, PCO2V, HCO3, HCO3V in the last 168 hours.  Recent Labs   Lab 02/20/22  0605 02/19/22 0723 02/18/22  0719   WBC 8.1 8.4 7.9   HGB 11.3* 9.8* 9.4*   HCT 36.8 32.1* 30.6*   MCV 92 93 92    347 282     Recent Labs   Lab 02/20/22  0605 02/19/22 0723 02/18/22 0719    139 137   POTASSIUM 4.2 4.3 3.4   CHLORIDE 102 105 101   CO2 31 32 30   ANIONGAP 4 2* 6   * 112* 108*   BUN 6* 6* 6*   CR 0.61 0.60 0.57   GFRESTIMATED >90 >90 >90   KYLIE 9.6 9.1 8.6     No results for input(s): CULT in the last 168 hours.  No results for input(s): NTBNPI, NTBNP in the last 168 hours.  Recent Labs   Lab 02/17/22  1831   AST 56*   ALT 48   ALKPHOS 84   BILITOTAL 0.6     No results for input(s): INR in the last 168 hours.  No results for input(s): LACT in the last 168 hours.  Recent Labs   Lab 02/20/22  0605 02/19/22 0723 02/18/22 0719   BUN 6* 6* 6*   CR 0.61 0.60 0.57     No results for input(s): TSH in the last 168 hours.  Recent Labs   Lab 02/18/22  0837   TROPONINIS 9     No results for input(s): COLOR, APPEARANCE, URINEGLC, URINEBILI, URINEKETONE, SG, UBLD, URINEPH, PROTEIN, UROBILINOGEN, NITRITE, LEUKEST, RBCU, WBCU in the last 168 hours.    No results found for this or any previous visit (from the past 24 hour(s)).

## 2022-02-20 NOTE — PLAN OF CARE
"BP (!) 146/85 (BP Location: Right arm)   Pulse 86   Temp 97.9  F (36.6  C) (Oral)   Resp 18   Ht 1.753 m (5' 9\")   Wt 138.6 kg (305 lb 8 oz)   SpO2 96%   BMI 45.11 kg/m      A&O. Up ad josey. C/o chronic back pain, scheduled MS Contin given as well as PRN PO Dilaudid. PO Zithro and Rocephin for PNA. Cough, not resolving with Robitussin or tessalon.     Plan of Care Reviewed With: patient     Overall Patient Progress: no change               "

## 2022-02-20 NOTE — PLAN OF CARE
Goal Outcome Evaluation:    Plan of Care Reviewed With: patient     Overall Patient Progress: no change       VSS. A&Ox4. Reports headache, PRN tylenol given. Reports productive cough, PRN robitussin given. LS clear. Remains on RA. Ambulating independently in room. Possible discharge home 2-3 days.

## 2022-02-21 VITALS
DIASTOLIC BLOOD PRESSURE: 67 MMHG | TEMPERATURE: 97.4 F | WEIGHT: 293 LBS | HEART RATE: 74 BPM | OXYGEN SATURATION: 93 % | HEIGHT: 69 IN | SYSTOLIC BLOOD PRESSURE: 127 MMHG | RESPIRATION RATE: 18 BRPM | BODY MASS INDEX: 43.4 KG/M2

## 2022-02-21 LAB
ANION GAP SERPL CALCULATED.3IONS-SCNC: 3 MMOL/L (ref 3–14)
ATRIAL RATE - MUSE: 98 BPM
BUN SERPL-MCNC: 9 MG/DL (ref 7–30)
CALCIUM SERPL-MCNC: 9.2 MG/DL (ref 8.5–10.1)
CHLORIDE BLD-SCNC: 104 MMOL/L (ref 94–109)
CO2 SERPL-SCNC: 31 MMOL/L (ref 20–32)
CREAT SERPL-MCNC: 0.7 MG/DL (ref 0.52–1.04)
CRP SERPL-MCNC: 54.5 MG/L (ref 0–8)
DIASTOLIC BLOOD PRESSURE - MUSE: NORMAL MMHG
ERYTHROCYTE [DISTWIDTH] IN BLOOD BY AUTOMATED COUNT: 14.5 % (ref 10–15)
GFR SERPL CREATININE-BSD FRML MDRD: >90 ML/MIN/1.73M2
GLUCOSE BLD-MCNC: 105 MG/DL (ref 70–99)
HCT VFR BLD AUTO: 35.7 % (ref 35–47)
HGB BLD-MCNC: 11 G/DL (ref 11.7–15.7)
INTERPRETATION ECG - MUSE: NORMAL
MCH RBC QN AUTO: 28.2 PG (ref 26.5–33)
MCHC RBC AUTO-ENTMCNC: 30.8 G/DL (ref 31.5–36.5)
MCV RBC AUTO: 92 FL (ref 78–100)
P AXIS - MUSE: 45 DEGREES
PLATELET # BLD AUTO: 428 10E3/UL (ref 150–450)
POTASSIUM BLD-SCNC: 4.4 MMOL/L (ref 3.4–5.3)
PR INTERVAL - MUSE: 180 MS
QRS DURATION - MUSE: 96 MS
QT - MUSE: 368 MS
QTC - MUSE: 469 MS
R AXIS - MUSE: 47 DEGREES
RBC # BLD AUTO: 3.9 10E6/UL (ref 3.8–5.2)
SODIUM SERPL-SCNC: 138 MMOL/L (ref 133–144)
SYSTOLIC BLOOD PRESSURE - MUSE: NORMAL MMHG
T AXIS - MUSE: 25 DEGREES
VENTRICULAR RATE- MUSE: 98 BPM
WBC # BLD AUTO: 8.2 10E3/UL (ref 4–11)

## 2022-02-21 PROCEDURE — 250N000013 HC RX MED GY IP 250 OP 250 PS 637: Performed by: INTERNAL MEDICINE

## 2022-02-21 PROCEDURE — 85027 COMPLETE CBC AUTOMATED: CPT | Performed by: PHYSICIAN ASSISTANT

## 2022-02-21 PROCEDURE — 99238 HOSP IP/OBS DSCHRG MGMT 30/<: CPT | Performed by: INTERNAL MEDICINE

## 2022-02-21 PROCEDURE — 250N000013 HC RX MED GY IP 250 OP 250 PS 637: Performed by: PHYSICIAN ASSISTANT

## 2022-02-21 PROCEDURE — 80048 BASIC METABOLIC PNL TOTAL CA: CPT | Performed by: PHYSICIAN ASSISTANT

## 2022-02-21 PROCEDURE — 36415 COLL VENOUS BLD VENIPUNCTURE: CPT | Performed by: PHYSICIAN ASSISTANT

## 2022-02-21 PROCEDURE — 86140 C-REACTIVE PROTEIN: CPT | Performed by: INTERNAL MEDICINE

## 2022-02-21 RX ORDER — GUAIFENESIN/DEXTROMETHORPHAN 100-10MG/5
10 SYRUP ORAL EVERY 4 HOURS PRN
Qty: 118 ML | Refills: 0 | Status: SHIPPED | OUTPATIENT
Start: 2022-02-21 | End: 2022-11-15

## 2022-02-21 RX ORDER — LEVOFLOXACIN 500 MG/1
500 TABLET, FILM COATED ORAL DAILY
Qty: 2 TABLET | Refills: 0 | Status: SHIPPED | OUTPATIENT
Start: 2022-02-21 | End: 2022-02-23

## 2022-02-21 RX ORDER — BENZONATATE 100 MG/1
100 CAPSULE ORAL 3 TIMES DAILY PRN
Qty: 30 CAPSULE | Refills: 0 | Status: SHIPPED | OUTPATIENT
Start: 2022-02-21 | End: 2022-11-15

## 2022-02-21 RX ADMIN — GUAIFENESIN AND DEXTROMETHORPHAN 10 ML: 100; 10 SYRUP ORAL at 05:12

## 2022-02-21 RX ADMIN — MORPHINE SULFATE 60 MG: 15 TABLET, EXTENDED RELEASE ORAL at 08:02

## 2022-02-21 RX ADMIN — CYCLOBENZAPRINE HYDROCHLORIDE 10 MG: 10 TABLET, FILM COATED ORAL at 08:02

## 2022-02-21 RX ADMIN — AZITHROMYCIN MONOHYDRATE 250 MG: 250 TABLET ORAL at 08:02

## 2022-02-21 RX ADMIN — BENZONATATE 100 MG: 100 CAPSULE ORAL at 05:11

## 2022-02-21 RX ADMIN — OMEPRAZOLE 20 MG: 20 CAPSULE, DELAYED RELEASE ORAL at 08:02

## 2022-02-21 RX ADMIN — MORPHINE SULFATE 30 MG: 15 TABLET, EXTENDED RELEASE ORAL at 08:02

## 2022-02-21 ASSESSMENT — ACTIVITIES OF DAILY LIVING (ADL)
ADLS_ACUITY_SCORE: 7

## 2022-02-21 NOTE — PLAN OF CARE
"Goal Outcome Evaluation:    Plan of Care Reviewed With: patient     Overall Patient Progress: no change     VITALS: /63 (BP Location: Right arm)   Pulse 76   Temp 97.6  F (36.4  C) (Oral)   Resp 18   Ht 1.753 m (5' 9\")   Wt 138.6 kg (305 lb 8 oz)   SpO2 92%   BMI 45.11 kg/m      PAIN: Rated pain 8/10 when given her scheduled Pian meds at HS. They were effective. No PRN pain meds needed.     NEURO: L hand and R foot are numb but patient stated this is baseline for her. Otherwise intact.     RESPIRATORY: LS clear. Has a productive, good cough. PRN Tessalon and Robitussin last given at 5:10 am given with relief.     BOWEL SOUNDS: +X4Q    GI/: Continent urine. No bm this shift.     SKIN: 1+ edema BLEs.     LDA: R PIV SL. Receives PRN Zithromax and Rocephin.     DIET: Regular    ACTIVITY: Indep in room.     PLAN: Possible discharge home in 2-3 days.             "

## 2022-02-21 NOTE — PROGRESS NOTES
"/67 (BP Location: Right arm)   Pulse 74   Temp 97.4  F (36.3  C) (Axillary)   Resp 18   Ht 1.753 m (5' 9\")   Wt 138.6 kg (305 lb 8 oz)   SpO2 93%   BMI 45.11 kg/m      AVS reviewed with patient and . Patient is in stable condition, VSS, no co pain/cp/sob. All discharge education reviewed with pt in regards to: diet, activity, safety, s/s to report, medications and rx, follow up appointments/care.  All questions answered. Pt denies any further questions or concerns. PIV removed. No complications. All belongings returned. Pt escorted to front door by [facility] staff.       "

## 2022-02-21 NOTE — DISCHARGE SUMMARY
[facility]    Discharge Summary  Hospitalist    Date of Admission:  2/17/2022  Date of Discharge:  2/21/2022  9:40 AM  Discharging Provider: Maris Tolentino MD  Date of Service (when I saw the patient): 2/21/2022    Discharge Diagnoses      Community-acquired superimposed bacterial pneumonia   Recent COVID-19 associated viral pneumonia, recovered  Initial hypoxia in [facility] emergency room.  Patient was treated with steroids for COVID-19     Elevated CRP    Anemia     Debilitating cough     Elevated troponin    SLE    History of VTE     Hypothyroidism secondary to Hashimoto's thyroiditis     Hypertension      Paroxysmal Atrial fibrillation, rate controlled    Morbid Obesity- BMI 45     Fibromyalgia/complex regional pain syndrome/chronic pain syndrome       Hyperlipidemia      CKD    History of Present Illness   Aspen Mccullough is an 62 year old female who presented with acute respiratory failure secondary to Covid associated viral pneumonia, superimposed CAP. Please see hospital course for details.     Hospital Course   Summary of Stay: Aspen Mccullough is a 62 year old female who was admitted on 2/17/2022 for COVID-19 pneumonia.     Patient's past medical history significant for paroxysmal atrial fibrillation, SLE, prior VTE on chronic anticoagulation, thyroid disease, hyperlipidemia, chronic back pain, hypertension, chronic kidney disease presented from urgent care with pneumonia. She was treated in outpatient setting     Diagnosis of Covid on 1/18/2022.  She was sent from urgent care due to elevated troponin, normocytic anemia hemoglobin down to 11, there was no EKG changes, CT chest concerning for bilateral pneumonia no pleural effusion no PE patient was not hypoxic     She has quite debilitating cough. CRP significantly elevated.  Is maintaining oxygenation on room air.         Community-acquired superimposed bacterial pneumonia   Initial hypoxia in [facility] emergency room.  Patient  was treated with steroids for COVID-19. Recent COVID-19 associated viral pneumonia, Covid recovered  -Initial diagnosis of COVID-19 1/18/2022  -Reassessment in the emergency room on 1/29/2022 with ongoing cough productive sputum chest x-ray showed bilateral lung opacities she was prescribed doxycycline which she completed on 2/5/2022 and prednisone 50 mg which she completed on 2/2/2022  -She was initially evaluated in the emergency room at [facility] where she was mildly hypoxic and required 2 L of supplemental O2 but was off oxygen at  -Patient has had Covid 19 for more than 21 days could be considered as Covid recovered.  Infection control was consulted on admission  -As needed nebs  -Procalcitonin is elevated 0.39 possible early systemic infection range. Started on antibiotics on admission. Treated with ceftriaxone and azithromycin during hospital course  -CRP trending down  -Discharged on oral Levaquin to complete course of treatment for bacterial pneumonia.      Elevated CRP  -Initial CRP quite elevated CRP to 242-->154--> 111-->54  -Could be secondary to Covid pneumonitis versus lupus flare versus acute infection  -CT chest reported showed bilateral opacities just if of Covid pneumonitis  -When the patient is so far out of her initial diagnosis and is not hypoxic steroids were not started  -Will hold off steroids for now in the setting of normal oxygen saturation, CRP trending down     Anemia  -Hemoglobin is 9.4  -Reported hemoglobin from emergency room was about 11  -No signs of active bleeding     Debilitating cough  -Quite significant  -Cannot take codeine due to allergy  -Continued PPI for possible GERD related cough especially given that she was treated with steroids  -Treated with robitussin DM and tessalon for cough     Elevated troponin  -Troponin drawn in emergency room was mildly elevated  -Denies any chest pain   -Repeat troponin negative here. Repeat EKG with no acute pathology no acute ischemic  changes.  Normal sinus rhythm  -Most likely demand ischemia     SLE  -Recently self tapered off of chronic prednisone, was taking 8-10 mg daily until around December, 2021.   -Has been established with new Endocrinologist as previous specialists had retired.   -Outpatient follow up with rheumatology     History of VTE  Submissive PE, DVT in 2017. Hx of provoked DVT prior to this.   -Continue chronic Xarelto      Hypothyroidism secondary to Hashimoto's thyroiditis  -Continue home levo     Hypertension   -Continue home metoprolol 100 mg b.i.d. and Cardizem 180 b.i.d.      Paroxysmal Atrial fibrillation, rate controlled  Follows with Dr. Uribe and Dr. Muñoz of Cardiology. Previous Ziopatch indicated predominant sinus rhythm. Pt asymptomatic.   - Continue PTA Diltiazem and Toprol XL   - Continue Xarelto      Fibromyalgia/complex regional pain syndrome/chronic pain syndrome    Maintained on MS Contin 90 in the morning, 60 in the afternoon, and 90 in the evening; in addition to that, she take Dilaudid 8 mg 3 times a day.  Follows in [facility]. Has been on this medication for 20 years or more and will currently be resumed on this regimen while admitted.      Hyperlipidemia   -continue lipitor     CKD  -Cr stable and at baseline     DVT Prophylaxis: DOAC  Code Status: No CPR- Pre-arrest intubation OK     Disposition: Expected discharge to be decided at least 2 to 3 days     Significant Results and Procedures   Results for orders placed or performed during the hospital encounter of 12/17/21   XR Sacroiliac Therapeutic Injection Left    Narrative    XR SACROILIAC THERAPEUTIC INJECTION LEFT                   12/17/2021  11:02 AM       History:  Arthritis of sacroiliac joint; Chronic left sacroiliac pain;  Chronic left sacroiliac pain.     PROCEDURE: The procedure, indications, risks (including the risk of  infection, bleeding, and reaction to contrast material and  medications), and alternative therapies were  discussed with the  patient and informed consent was obtained before the procedure.  The  low back was prepped and draped in the usual sterile fashion.   Lidocaine 1% was used for local anesthesia.  Under fluoroscopic  guidance a #22 gauge spinal needle was advanced into posterior  inferior aspect of the left sacroiliac joint.  Subsequently a mixture  of 1mL of Kenalog (40mg/mL) and 2mL Lidocaine 1% were injected.  The  needle was removed.  The patient tolerated the procedure well and  there were no immediate complications.      Fluoroscopy time: 0.3 minutes.  Medications used: 40mg Kenalog, 2mL Lidocaine 1%, 1mL Local Lidocaine  1%, 1mL Isovue m200.   Number of Images: 1    The patient's pain levels (1-10 scale) are as follows:    PRE INJECTION     Low back                 8       POST INJECTION   Low back                 8      FINDINGS: Technically successful left SI joint injection     MARKY CANNON PA-C         SYSTEM ID:  [id number]     *Note: Due to a large number of results and/or encounters for the requested time period, some results have not been displayed. A complete set of results can be found in Results Review.         Pending Results   None  Code Status   Full Code       Primary Care Physician   Arcadio Farfan      Discharge Disposition   Discharged to home  Condition at discharge: Stable    Consultations This Hospital Stay   INFECTION PREVENTION IP CONSULT  CARE MANAGEMENT / SOCIAL WORK IP CONSULT    Time Spent on this Encounter   I, Maris Tolentino MD, MD, personally saw the patient today and spent greater than 30 minutes discharging this patient.    Discharge Orders      Reason for your hospital stay    Cough, pneumonia     Follow-up and recommended labs and tests     Follow up with primary care provider, Arcadio Farfan, within 7 days     Activity    Your activity upon discharge: activity as tolerated     Diet    Follow this diet upon discharge: Orders Placed This Encounter      Combination  Diet Regular Diet Adult     Discharge Medications   Discharge Medication List as of 2/21/2022  9:31 AM      START taking these medications    Details   benzonatate (TESSALON) 100 MG capsule Take 1 capsule (100 mg) by mouth 3 times daily as needed for cough, Disp-30 capsule, R-0, E-Prescribe      guaiFENesin-dextromethorphan (ROBITUSSIN DM) 100-10 MG/5ML syrup Take 10 mLs by mouth every 4 hours as needed for cough, Disp-118 mL, R-0, E-Prescribe      levofloxacin (LEVAQUIN) 500 MG tablet Take 1 tablet (500 mg) by mouth daily for 2 days, Disp-2 tablet, R-0, E-Prescribe         CONTINUE these medications which have NOT CHANGED    Details   atorvastatin (LIPITOR) 40 MG tablet Take 1 tablet (40 mg) by mouth every evening, Disp-30 tablet, R-0, E-PrescribeFuture refills by PCP Dr. Arcadio Farfan with phone number [phone].      BIOTIN FORTE PO Take 1 tablet by mouth daily , Historical      carboxymethylcellulose (CARBOXYMETHYLCELLULOSE SODIUM) 0.5 % SOLN ophthalmic solution Place 1 drop into both eyes 2 times daily as needed , Disp-1 Bottle, Historical      cyanocobalamin 1000 MCG/ML injection Inject 1 mL (1,000 mcg) Subcutaneous every 7 days, Disp-4 mL, R-5, E-Prescribe      cyclobenzaprine (FLEXERIL) 10 MG tablet Take 1 tablet (10 mg) by mouth 3 times daily, Disp-90 tablet, R-3, E-Prescribe      cycloSPORINE (RESTASIS) 0.05 % ophthalmic emulsion Place 1 drop into both eyes 2 times daily as needed , Historical      fexofenadine (ALLEGRA) 180 MG tablet Take 180 mg by mouth daily as needed , Historical      gabapentin (NEURONTIN) 300 MG capsule Take 300 mg by mouth 2 times daily (morning and afternoon) with 800mg tablet (total dose 1100mg), Historical      !! gabapentin (NEURONTIN) 800 MG tablet Take 800 mg by mouth At Bedtime , Historical      !! gabapentin (NEURONTIN) 800 MG tablet Take 800 mg by mouth 2 times daily (morning and afternoon) in addition to 300mg capsule (total dose 1100mg), Historical      HYDROmorphone  (DILAUDID) 8 MG tablet Take 8 mg by mouth 3 times daily . Max of 3 doses per day., Historical      metoprolol succinate ER (TOPROL-XL) 100 MG 24 hr tablet Take 1 tablet (100 mg) by mouth 2 times daily, Disp-180 tablet, R-0, E-Prescribe      !! morphine (MS CONTIN) 30 MG 12 hr tablet Take 30 mg by mouth 2 times daily (morning and night) in addition to 60 mg tablet for a total dose of 90mg., Disp-270 tablet, R-0, Historical      !! morphine (MS CONTIN) 60 MG 12 hr tablet Take 60 mg by mouth daily in the afternoon (in addition to the 2 - 90mg doses), Historical      !! morphine (MS CONTIN) 60 MG 12 hr tablet Take 60 mg by mouth 2 times daily (morning and night) in addition to 30mg tablet for a total dose of 90mg, Disp-30 tablet, R-0, Historical      multivitamin, therapeutic with minerals (MULTI-VITAMIN) TABS tablet Take 1 tablet by mouth daily , Disp-100 tablet, R-3, Historical      NASONEX 50 MCG/ACT spray Spray 1 spray into both nostrils daily as needed , R-11, ANT, Historical      nystatin (MYCOSTATIN) 944934 UNIT/GM external powder Apply topically 3 times daily as neededDisp-60 g, [id number]      prednisoLONE acetate (PRED FORTE) 1 % ophthalmic susp Place 1 drop into both eyes 2 times daily , Historical      rivaroxaban ANTICOAGULANT (XARELTO ANTICOAGULANT) 20 MG TABS tablet Take 1 tablet (20 mg) by mouth daily (with dinner) Hold today 11/01 and restart when no longer having bloody coughing, suspected in 1-2 days., Historical      TIROSINT 200 MCG CAPS Take 600 mcg by mouth daily , ANT, Historical      zolpidem (AMBIEN) 5 MG tablet Take 5 mg by mouth nightly as needed for sleep, Historical      Elastic Bandages & Supports (MEDICAL COMPRESSION SOCKS) MISC 1 Package daily Please measure and distribute 2 pair of 20mmHg - 30mmHg THIGH high open or closed toe compression stockings with extra refills as indicated. Jobst ultrasheer or equivalent., Disp-2 each, R-3, Local Print      lifitegrast (XIIDRA) 5 % opthalmic  solution Place 1 drop into both eyes 2 times daily, Historical       !! - Potential duplicate medications found. Please discuss with provider.          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      Chlorhexidine Hives     Thor surgical cleanser     Codeine Hives     Has tolerated Oxycodone in past      Ibuprofen [Nsaids] Hives     Penicillins Hives     Other reaction(s): Unknown     Sulfa Drugs Hives     Other reaction(s): Unknown     Calcium Channel Blockers      Doxycycline GI Disturbance     Emesis & diarrhea  Patient denies allergy to this med     Hydroxyzine      rash     Mold      Data   Most Recent 3 CBC's:Recent Labs   Lab Test 02/21/22  0627 02/20/22  0605 02/19/22  0723   WBC 8.2 8.1 8.4   HGB 11.0* 11.3* 9.8*   MCV 92 92 93    427 347      Most Recent 3 BMP's:  Recent Labs   Lab Test 02/21/22 0627 02/20/22  0605 02/19/22  0723    137 139   POTASSIUM 4.4 4.2 4.3   CHLORIDE 104 102 105   CO2 31 31 32   BUN 9 6* 6*   CR 0.70 0.61 0.60   ANIONGAP 3 4 2*   KYLIE 9.2 9.6 9.1   * 109* 112*     Most Recent 2 LFT's:  Recent Labs   Lab Test 02/17/22  1831 10/31/21  0659   AST 56* 15   ALT 48 17   ALKPHOS 84 80   BILITOTAL 0.6 0.4     Most Recent INR's and Anticoagulation Dosing History:  Anticoagulation Dose History     Recent Dosing and Labs Latest Ref Rng & Units 4/21/2011 4/22/2011 4/23/2011 5/6/2014 1/22/2015 1/22/2020 10/30/2021    Warfarin 2.5 mg - - - 2.5 mg - - - -    Warfarin 7.5 mg - 7.5 mg 7.5 mg - - - - -    INR 0.85 - 1.15 1.25(H) 1.43(H) 2.40(H) 1.05 1.08 1.63(H) 1.76(H)        Most Recent 3 Troponin's:  Recent Labs   Lab Test 06/06/21  1459 09/21/18  1849   TROPI <0.015 <0.015     Most Recent Cholesterol Panel:  Recent Labs   Lab Test 06/06/21  1459   CHOL 303*   *   HDL 70   TRIG 259*     Most Recent 6 Bacteria Isolates From Any Culture (See  EPIC Reports for Culture Details):  Recent Labs   Lab Test 11/21/15  2110 03/21/15  1904   CULT No Beta Streptococcus isolated No Beta Streptococcus isolated     Most Recent TSH, T4 and A1c Labs:  Recent Labs   Lab Test 06/07/21  0003 06/06/21  1459   TSH  --  120.79*   T4  --  0.35*   A1C 5.7*  --

## 2022-02-22 ENCOUNTER — PATIENT OUTREACH (OUTPATIENT)
Dept: CARE COORDINATION | Facility: CLINIC | Age: 62
End: 2022-02-22
Payer: COMMERCIAL

## 2022-02-22 DIAGNOSIS — Z71.89 OTHER SPECIFIED COUNSELING: ICD-10-CM

## 2022-02-22 NOTE — PROGRESS NOTES
Clinic Care Coordination Contact  [facility]: Post-Discharge Note  SITUATION                                                      Admission:    Admission Date: 02/17/22   Reason for Admission: COVID-19 associated viral pneumonia  Discharge:   Discharge Date: 02/21/22  Discharge Diagnosis: COVID-19 associated viral pneumonia    BACKGROUND                                                      Per hospital discharge summary and inpatient provider notes:  62 year old female who was admitted on 2/17/2022 for COVID-19 pneumonia.     Patient's past medical history significant for paroxysmal atrial fibrillation, SLE, prior VTE on chronic anticoagulation, thyroid disease, hyperlipidemia, chronic back pain, hypertension, chronic kidney disease presented from urgent care with pneumonia. She was treated in outpatient setting     Diagnosis of Covid on 1/18/2022.  She was sent from urgent care due to elevated troponin, normocytic anemia hemoglobin down to 11, there was no EKG changes, CT chest concerning for bilateral pneumonia no pleural effusion no PE patient was not hypoxic     She has quite debilitating cough. CRP significantly elevated.  Is maintaining oxygenation on room air.       COVID-19 associated viral pneumonia  Community-acquired superimposed bacterial pneumonia   Initial hypoxia in [place] emergency room.  Patient was treated with steroids for COVID-19  -Initial diagnosis of COVID-19 1/18/2022  -Reassessment in the emergency room on 1/29/2022 with ongoing cough productive sputum chest x-ray showed bilateral lung opacities she was prescribed doxycycline which she completed on 2/5/2022 and prednisone 50 mg which she completed on 2/2/2022  -She was initially evaluated in the emergency room at [place] where she was mildly hypoxic and required 2 L of supplemental O2 but was off oxygen at  -Patient has had Covid 19 for more than 21 days could be considered as Covid recovered.  Infection control was consulted on  admission  -As needed nebs  -Procalcitonin is elevated 0.39 possible early systemic infection range. Started on antibiotics on admission. Treated with ceftriaxone and azithromycin  -CRP trending down  -Discharged on oral Levaquin to complete course of treatment.      Elevated CRP  -Initial CRP quite elevated CRP to 242-->154--> 111-->54  -Could be secondary to Covid pneumonitis versus lupus flare versus acute infection  -CT chest reported showed bilateral opacities just if of Covid pneumonitis  -When the patient is so far out of her initial diagnosis and is not hypoxic steroids were not started  -Will hold off steroids for now in the setting of normal oxygen saturation, CRP trending down     Anemia  -Hemoglobin is 9.4  -Reported hemoglobin from emergency room was about 11  -No signs of active bleeding     Debilitating cough  -Quite significant  -Cannot take codeine due to allergy  -Continued PPI for possible GERD related cough especially given that she was treated with steroids  -Treated with robitussin DM and tessalon for cough     Elevated troponin  -Troponin drawn in emergency room was mildly elevated  -Denies any chest pain   -Repeat troponin negative here. Repeat EKG with no acute pathology no acute ischemic changes.  Normal sinus rhythm  -Most likely demand ischemia     SLE  -Recently self tapered off of chronic prednisone, was taking 8-10 mg daily until around December, 2021.   -Has been established with new Endocrinologist as previous specialists had retired.   -Outpatient follow up with rheumatology       ASSESSMENT      Enrollment  Primary Care Care Coordination Status: Not a Candidate    Discharge Assessment  How are you doing now that you are home?: i am home  How are your symptoms? (Red Flag symptoms escalate to triage hotline per guidelines): Unchanged  Do you feel your condition is stable enough to be safe at home until your provider visit?: Yes  Does the patient have their discharge instructions? :  Yes  Does the patient have questions regarding their discharge instructions? : No  Were you started on any new medications or were there changes to any of your previous medications? : Yes  Does the patient have all of their medications?: Yes  Do you have questions regarding any of your medications? : No  Do you have all of your needed medical supplies or equipment (DME)?  (i.e. oxygen tank, CPAP, cane, etc.): Yes  Discharge follow-up appointment scheduled within 14 calendar days? : No  Is patient agreeable to assistance with scheduling? : No (She said she is not seeing another doctor.)                PLAN                                                      Outpatient Plan:  Follow up with primary care provider, Arcadio Farfan, within 7 days    No future appointments.      For any urgent concerns, please contact our 24 hour nurse triage line: [phone] ([phone])         Ana Harden MA

## 2022-03-01 DIAGNOSIS — I48.91 ATRIAL FIBRILLATION WITH RVR (H): ICD-10-CM

## 2022-03-01 RX ORDER — METOPROLOL SUCCINATE 100 MG/1
100 TABLET, EXTENDED RELEASE ORAL 2 TIMES DAILY
Qty: 180 TABLET | Refills: 0 | Status: SHIPPED | OUTPATIENT
Start: 2022-03-01 | End: 2022-05-31

## 2022-03-17 ENCOUNTER — TELEPHONE (OUTPATIENT)
Dept: WOUND CARE | Facility: CLINIC | Age: 62
End: 2022-03-17
Payer: COMMERCIAL

## 2022-03-17 NOTE — TELEPHONE ENCOUNTER
[facility] Wound    Who is the name of the provider?:  Marisabel    What is the location you see this provider at?: [place]    Reason for call:  States patient was previously seen at [facility] for wound on buttocks which healed but has developed a new wound.    Can we leave a detailed message on this number?  YES

## 2022-03-17 NOTE — TELEPHONE ENCOUNTER
Ash called with message to call back to obtain information on new wound that has developed on [employer].

## 2022-03-18 NOTE — TELEPHONE ENCOUNTER
New wounds are on the buttocks, legs and thighs, has had them for a couple weeks.  Using an antiseptic cream at this time.

## 2022-03-18 NOTE — TELEPHONE ENCOUNTER
Spoke with Ash, patient's .  Patient has ~4 new wounds on buttock, inner thighs and legs, ~dime or nickel-sized.  Last saw Fannie Petit PA-C 10/2021.  Scheduled for 3/29 tele visit (in-person visit not available until April, but will put on cancellation list).

## 2022-03-29 ENCOUNTER — HOSPITAL ENCOUNTER (OUTPATIENT)
Dept: WOUND CARE | Facility: CLINIC | Age: 62
Discharge: HOME | End: 2022-03-29
Attending: PHYSICIAN ASSISTANT
Payer: COMMERCIAL

## 2022-03-29 DIAGNOSIS — S31.819S WOUND OF RIGHT BUTTOCK, SEQUELA: ICD-10-CM

## 2022-03-29 PROCEDURE — 99442 PR PHYSICIAN TELEPHONE EVALUATION 11-20 MIN: CPT | Performed by: PHYSICIAN ASSISTANT

## 2022-03-29 RX ORDER — PREDNISONE 20 MG/1
TABLET ORAL
COMMUNITY
Start: 2022-01-29 | End: 2022-08-08

## 2022-03-29 RX ORDER — PREDNISONE 5 MG/1
TABLET ORAL
COMMUNITY
Start: 2022-03-01 | End: 2022-08-08

## 2022-03-29 RX ORDER — ZOSTER VACCINE RECOMBINANT, ADJUVANTED 50 MCG/0.5
KIT INTRAMUSCULAR
COMMUNITY
Start: 2022-03-11 | End: 2022-11-15

## 2022-03-29 NOTE — DISCHARGE INSTRUCTIONS
"Aspen Mccullough      1960    Wound Dressing Change: Right Buttocks  Cleanse wound and surrounding skin with: Gentle soap and water, pat dry  Cover wound with Iodosorb gel and cover with Mepilex 3\"x3\"  Change dressing every three days     Michelle Petit PA-C March 29, 2022    Call us at [phone] if you have any questions about your wounds, have redness or swelling around your wound, have a fever of 101 or greater or if you have any other problems or concerns. We answer the phone Monday through Friday 8 am to 4 pm, please leave a message as we check the voicemail frequently throughout the day.     If you had a positive experience please indicate on your patient satisfaction survey form that  [employer] [place] will be sending you.    If you have any billing related questions please call the  [employer] [facility] office at [phone]. The clinic staff does not handle billing related matters.  "

## 2022-03-29 NOTE — PROGRESS NOTES
[facility]    ASSESSMENT:   1. Stage 3 pressure ulcers of right buttock     PLAN/DISCUSSION:   1. Wound care plan: cleanse with mild soap and water, Iodosorb to wound bed, cover with silicone foam dressing and change every 3 days and PRN soilage  2. Reposition frequently  3. Nutrition: vit D 5kIU/day, high protein diet  4. See bottom of note for detailed wound care and patient instructions    HISTORY OF PRESENT ILLNESS:   Aspen Mccullough is a 62 year old female with a complex medical history who is known to me for previous pressure ulcers who presents today for new ulcerations. She has been struggling with diarrhea for which she attributes these new ulcers. In the past iodosorb and foam dressings worked well to heal the wounds.      Patient Active Problem List   Diagnosis     Generalized osteoarthrosis, involving multiple sites     CRPS (complex regional pain syndrome), lower limb     Fibromyalgia     Somatoform disorder     Histrionic personality (H)     Encounter for long-term use of opiate analgesic     Knee joint replacement by other means     Hypothyroidism     Insomnia, unspecified type     Hyperlipidemia     Hashimoto's thyroiditis     Glucocorticoid deficiency (H)     Posttraumatic stress disorder     Impingement syndrome of left shoulder     Abdominal cramping, generalized     Intermittent diarrhea     Primary osteoarthritis involving multiple joints     Attention deficit disorder     Allergic rhinitis     Cushing's syndrome (H)     Endometriosis     Essential hypertension     Systemic lupus erythematosus (H)     S/P cervical spinal fusion     Paroxysmal atrial fibrillation (H)     Nonischemic cardiomyopathy (H)     Personal history of DVT (deep vein thrombosis)     History of pulmonary embolism     Acute deep vein thrombosis (DVT) of popliteal vein of right lower extremity (H)     Acute pulmonary embolism (H)     Adrenal cortical steroids causing adverse effect in therapeutic use      Alopecia areata     Anemia, blood loss     Ankle pain, left     ARF (acute renal failure) (H)     Arthritis, septic (H)     Chronic ethmoidal sinusitis     Chronic maxillary sinusitis     Deviated nasal septum     Complications due to internal joint prosthesis (H)     Generalized pain     Iatrogenic hyperthyroidism     S/P TKR (total knee replacement)     Left shoulder pain     Lipoma of skin and subcutaneous tissue     Malabsorption     Nasal fracture     Nasal turbinate hypertrophy     Opioid type dependence (H)     Other specified abnormal findings of blood chemistry     Other acute postoperative pain     Pain in joint, lower leg     Postoperative hypotension     S/P total knee replacement     Thrombus of right atrial appendage without antecedent myocardial infarction     Chronic pain of both shoulders     GI bleed     Grade III internal hemorrhoids     Atrial fibrillation with RVR (H)     Acute deep vein thrombosis (DVT) of proximal vein of right lower extremity (H)     Pneumonia of left upper lobe due to infectious organism     Morbid obesity (H)     Cerebrovascular accident (CVA), unspecified mechanism (H)     CVA (cerebral vascular accident) (H)     Neuropathy of left suprascapular nerve     Neuropathy of right suprascapular nerve     Stage 3 right buttock     BMI 40.0-44.9, adult (H)     Chronic anticoagulation     Controlled substance agreement signed     Craniofacial hyperhidrosis     Current chronic use of systemic steroids     DNR (do not resuscitate)     Hypertonic bladder     Hypo-osmolality and hyponatremia     Lumbosacral radiculopathy at S1     Overflow diarrhea     Refusal of blood transfusions as patient is Shinto     Unspecified fall, initial encounter     Unspecified injury of shoulder and upper arm, unspecified arm, initial encounter     Weakness     Chronic pain disorder     Constipation     Hemoptysis     COVID-19 virus infection     VITALS: There were no vitals taken for this  "visit.     PHYSICAL EXAM PER PHOTO  INTEGUMENTARY:   Wound (used by OP [facility] only) 09/28/21 1010 Right gluteal pressure injury (Active)   Thickness/Stage Stage 3 03/29/22 0750   Base pink;red 03/29/22 0750   Periwound intact 03/29/22 0750   Periwound Temperature warm 03/29/22 0750   Periwound Skin Turgor soft 03/29/22 0750   Edges rolled/closed 03/29/22 0750   Length (cm) 1 03/29/22 0750   Width (cm) 1 03/29/22 0750   Depth (cm) 0.2 03/29/22 0750   Wound (cm^2) 1 cm^2 03/29/22 0750   Wound Volume (cm^3) 0.2 cm^3 03/29/22 0750   Wound healing % 78.07 03/29/22 0750   Drainage Amount large 03/29/22 0750   Care, Wound non-select wound debridement performed 03/29/22 0750       Wound (used by OP [facility] only) 03/29/22 0730 Right gluteal (Active)   Thickness/Stage Stage 3 03/29/22 0750   Base pink;red 03/29/22 0750   Periwound intact 03/29/22 0750   Periwound Temperature warm 03/29/22 0750   Periwound Skin Turgor soft 03/29/22 0750   Edges rolled/closed 03/29/22 0750   Length (cm) 1 03/29/22 0750   Width (cm) 1 03/29/22 0750   Depth (cm) 0.2 03/29/22 0750   Wound (cm^2) 1 cm^2 03/29/22 0750   Wound Volume (cm^3) 0.2 cm^3 03/29/22 0750   Drainage Amount large 03/29/22 0750   Care, Wound non-select wound debridement performed 03/29/22 0750                 The patient has been notified of following:     \"This telephone visit will be conducted via a call between you and your physician/provider. We have found that certain health care needs can be provided without the need for a physical exam.  This service lets us provide the care you need with a short phone conversation.  If a prescription is necessary we can send it directly to your pharmacy.  If lab work is needed we can place an order for that and you can then stop by our lab to have the test done at a later time.    If during the course of the call the physician/provider feels a telephone visit is not appropriate, you will not be charged for this service.\"     Patient has given " verbal consent for Telephone visit?  Yes    DURATION OF CALL 15 minutes        Further instructions from your care team       Aspen Mccullough      1960    Wound Dressing Change: Right Buttocks  Cleanse wound and surrounding skin with: Gentle soap and water, pat dry  Cover wound with Iodosorb gel and cover with Bandaid  Change dressing daily     Michelle Petit PA-C March 29, 2022    Call us at [phone] if you have any questions about your wounds, have redness or swelling around your wound, have a fever of 101 or greater or if you have any other problems or concerns. We answer the phone Monday through Friday 8 am to 4 pm, please leave a message as we check the voicemail frequently throughout the day.     If you had a positive experience please indicate on your patient satisfaction survey form that [facility] will be sending you.    If you have any billing related questions please call the [facility] Business office at [phone]. The clinic staff does not handle billing related matters.      Durable Medical Equipment Wound Care Orders     Wound Care Order for DME - ONLY FOR DME   As directed      DME Provider: [facility] Comment - [phone]    Wound Supply Order Options: Complex Wound    Optional: .dmewound can be used to pull in order specific information into documentation    Wound Number:  Wound 1  Wound 2       Wound 1 Location: Right gluteal    Wound 1 Dressing Change Frequency: Other (Comments) Comment - Q3D    Wound 1 Length of Need: 30 days    Wound 1 - Dressing Supplies:  Primary  Other Dressing Supplies       Wound 1 - Primary Dressing Dispensing Instructions: Ok to Substitute    Wound 1 - Primary Dressing Types: Foam Dressing w/ Border    Wound 1 - Foam with Border Types: Mepilex Border []    Wound 1 - Mepilex Border Size: Other (Comments) Comment - 3x3    Wound 1 - Mepilex Border Quantity: 12    Wound 1 - Other Dressing Supplies Type: Misc    Wound 1 - Other Wound Supplies Misc:  Other (comments) Comment - Iodosorb    Wound 1 - Other Misc Wound Supply Size: 1 tube    Wound 1 - Other Misc Wound Supply Quantity: 1 tube    Wound 2 Location: Right gluteal    Wound 2 Dressing Change Frequency: Other (Comments) Comment - Q3D    Wound 2 Length of Need: 30 days    Wound 2 - Dressing Supplies: Primary    Wound 2 - Primary Dressing Dispensing Instructions: Ok to Substitute    Wound 2 - Primary Dressing Types: Foam Dressing w/ Border    Wound 2 - Foam with Border Types: Mepilex Border []    Wound 2 - Mepilex Border Size: Other (Comments) Comment - 3x3    Wound 2 - Mepilex Border Quantity: 12          Electronically signed by Michelle Petit PA-C on March 29, 2022

## 2022-03-29 NOTE — PROGRESS NOTES
Patient called for a telephone visit. Certified Wound Care Nurse time spent evaluating patient record, completed a full evaluation and documented wound(s) & sarah-wound skin; provided recommendation based on treatment plan. Reviewed discharge instructions, patient education, and discussed plan of care with appropriate medical team staff members and patient and/or family members.

## 2022-03-30 ENCOUNTER — OFFICE VISIT (OUTPATIENT)
Dept: ORTHOPEDICS | Facility: CLINIC | Age: 62
End: 2022-03-30
Payer: COMMERCIAL

## 2022-03-30 DIAGNOSIS — M50.30 DDD (DEGENERATIVE DISC DISEASE), CERVICAL: Primary | ICD-10-CM

## 2022-03-30 DIAGNOSIS — M50.20 CERVICAL DISC HERNIATION: ICD-10-CM

## 2022-03-30 DIAGNOSIS — M75.52 SUBACROMIAL BURSITIS OF LEFT SHOULDER JOINT: ICD-10-CM

## 2022-03-30 DIAGNOSIS — M50.122 CERVICAL DISC DISORDER AT C5-C6 LEVEL WITH RADICULOPATHY: ICD-10-CM

## 2022-03-30 PROCEDURE — 99214 OFFICE O/P EST MOD 30 MIN: CPT | Mod: 25 | Performed by: PREVENTIVE MEDICINE

## 2022-03-30 PROCEDURE — 20610 DRAIN/INJ JOINT/BURSA W/O US: CPT | Mod: LT | Performed by: PREVENTIVE MEDICINE

## 2022-03-30 RX ORDER — METHYLPREDNISOLONE 4 MG
TABLET, DOSE PACK ORAL
Qty: 21 TABLET | Refills: 0 | Status: SHIPPED | OUTPATIENT
Start: 2022-03-30 | End: 2022-08-08

## 2022-03-30 RX ADMIN — METHYLPREDNISOLONE ACETATE 40 MG: 40 INJECTION, SUSPENSION INTRA-ARTICULAR; INTRALESIONAL; INTRAMUSCULAR; SOFT TISSUE at 14:07

## 2022-03-30 NOTE — PROGRESS NOTES
HISTORY OF PRESENT ILLNESS  Ms. Mccullough is a pleasant 62 year old year old female who presents to clinic today with   Aspen explains that her neck and left shoulder hurt  She wants to talk about treatments  Location: left shoulder and neck  Quality:  achy pain    Severity: 6/10    Duration: worse over past few months  Timing: occurs intermittently  Context: occurs while exercising and lifting  Modifying factors:  resting and non-use makes it better, movement and use makes it worse  Associated signs & symptoms: neck pain that radiates into arms, left worse  MEDICAL HISTORY  Patient Active Problem List   Diagnosis     Generalized osteoarthrosis, involving multiple sites     CRPS (complex regional pain syndrome), lower limb     Fibromyalgia     Somatoform disorder     Histrionic personality (H)     Encounter for long-term use of opiate analgesic     Knee joint replacement by other means     Hypothyroidism     Insomnia, unspecified type     Hyperlipidemia     Hashimoto's thyroiditis     Glucocorticoid deficiency (H)     Posttraumatic stress disorder     Impingement syndrome of left shoulder     Abdominal cramping, generalized     Intermittent diarrhea     Primary osteoarthritis involving multiple joints     Attention deficit disorder     Allergic rhinitis     Cushing's syndrome (H)     Endometriosis     Essential hypertension     Systemic lupus erythematosus (H)     S/P cervical spinal fusion     Paroxysmal atrial fibrillation (H)     Nonischemic cardiomyopathy (H)     Personal history of DVT (deep vein thrombosis)     History of pulmonary embolism     Acute deep vein thrombosis (DVT) of popliteal vein of right lower extremity (H)     Acute pulmonary embolism (H)     Adrenal cortical steroids causing adverse effect in therapeutic use     Alopecia areata     Anemia, blood loss     Ankle pain, left     ARF (acute renal failure) (H)     Arthritis, septic (H)     Chronic ethmoidal sinusitis     Chronic maxillary sinusitis      Deviated nasal septum     Complications due to internal joint prosthesis (H)     Generalized pain     Iatrogenic hyperthyroidism     S/P TKR (total knee replacement)     Left shoulder pain     Lipoma of skin and subcutaneous tissue     Malabsorption     Nasal fracture     Nasal turbinate hypertrophy     Opioid type dependence (H)     Other specified abnormal findings of blood chemistry     Other acute postoperative pain     Pain in joint, lower leg     Postoperative hypotension     S/P total knee replacement     Thrombus of right atrial appendage without antecedent myocardial infarction     Chronic pain of both shoulders     GI bleed     Grade III internal hemorrhoids     Atrial fibrillation with RVR (H)     Acute deep vein thrombosis (DVT) of proximal vein of right lower extremity (H)     Pneumonia of left upper lobe due to infectious organism     Morbid obesity (H)     Cerebrovascular accident (CVA), unspecified mechanism (H)     CVA (cerebral vascular accident) (H)     Neuropathy of left suprascapular nerve     Neuropathy of right suprascapular nerve     Stage 3 right buttock     BMI 40.0-44.9, adult (H)     Chronic anticoagulation     Controlled substance agreement signed     Craniofacial hyperhidrosis     Current chronic use of systemic steroids     DNR (do not resuscitate)     Hypertonic bladder     Hypo-osmolality and hyponatremia     Lumbosacral radiculopathy at S1     Overflow diarrhea     Refusal of blood transfusions as patient is [employer]     Unspecified fall, initial encounter     Unspecified injury of shoulder and upper arm, unspecified arm, initial encounter     Weakness     Chronic pain disorder     Constipation     Hemoptysis     COVID-19 virus infection       Current Outpatient Medications   Medication Sig Dispense Refill     atorvastatin (LIPITOR) 40 MG tablet Take 1 tablet (40 mg) by mouth every evening 30 tablet 0     benzonatate (TESSALON) 100 MG capsule Take 1 capsule (100 mg) by  mouth 3 times daily as needed for cough 30 capsule 0     BIOTIN FORTE PO Take 1 tablet by mouth daily        carboxymethylcellulose (CARBOXYMETHYLCELLULOSE SODIUM) 0.5 % SOLN ophthalmic solution Place 1 drop into both eyes 2 times daily as needed  1 Bottle      cyanocobalamin 1000 MCG/ML injection Inject 1 mL (1,000 mcg) Subcutaneous every 7 days 4 mL 5     cyclobenzaprine (FLEXERIL) 10 MG tablet Take 1 tablet (10 mg) by mouth 3 times daily 90 tablet 3     cycloSPORINE (RESTASIS) 0.05 % ophthalmic emulsion Place 1 drop into both eyes 2 times daily as needed        Elastic Bandages & Supports (MEDICAL COMPRESSION SOCKS) MISC 1 Package daily Please measure and distribute 2 pair of 20mmHg - 30mmHg THIGH high open or closed toe compression stockings with extra refills as indicated. Jobst ultrasheer or equivalent. 2 each 3     fexofenadine (ALLEGRA) 180 MG tablet Take 180 mg by mouth daily as needed        gabapentin (NEURONTIN) 300 MG capsule Take 300 mg by mouth 2 times daily (morning and afternoon) with 800mg tablet (total dose 1100mg)       gabapentin (NEURONTIN) 800 MG tablet Take 800 mg by mouth At Bedtime        gabapentin (NEURONTIN) 800 MG tablet Take 800 mg by mouth 2 times daily (morning and afternoon) in addition to 300mg capsule (total dose 1100mg)       guaiFENesin-dextromethorphan (ROBITUSSIN DM) 100-10 MG/5ML syrup Take 10 mLs by mouth every 4 hours as needed for cough 118 mL 0     HYDROmorphone (DILAUDID) 8 MG tablet Take 8 mg by mouth 3 times daily . Max of 3 doses per day.       lifitegrast (XIIDRA) 5 % opthalmic solution Place 1 drop into both eyes 2 times daily       metoprolol succinate ER (TOPROL-XL) 100 MG 24 hr tablet Take 1 tablet (100 mg) by mouth 2 times daily Appointment needed for refills. Please call [phone]. 180 tablet 0     morphine (MS CONTIN) 30 MG 12 hr tablet Take 30 mg by mouth 2 times daily (morning and night) in addition to 60 mg tablet for a total dose of 90mg. 270 tablet 0      morphine (MS CONTIN) 60 MG 12 hr tablet Take 60 mg by mouth daily in the afternoon (in addition to the 2 - 90mg doses)       morphine (MS CONTIN) 60 MG 12 hr tablet Take 60 mg by mouth 2 times daily (morning and night) in addition to 30mg tablet for a total dose of 90mg 30 tablet 0     multivitamin, therapeutic with minerals (MULTI-VITAMIN) TABS tablet Take 1 tablet by mouth daily  100 tablet 3     NASONEX 50 MCG/ACT spray Spray 1 spray into both nostrils daily as needed   11     nystatin (MYCOSTATIN) 485864 UNIT/GM external powder Apply topically 3 times daily as needed 60 g 1     prednisoLONE acetate (PRED FORTE) 1 % ophthalmic susp Place 1 drop into both eyes 2 times daily        predniSONE (DELTASONE) 20 MG tablet        predniSONE (DELTASONE) 5 MG tablet        rivaroxaban ANTICOAGULANT (XARELTO ANTICOAGULANT) 20 MG TABS tablet Take 1 tablet (20 mg) by mouth daily (with dinner) Hold today 11/01 and restart when no longer having bloody coughing, suspected in 1-2 days.       TIROSINT 200 MCG CAPS Take 600 mcg by mouth daily        zolpidem (AMBIEN) 5 MG tablet Take 5 mg by mouth nightly as needed for sleep       zoster vaccine recombinant adjuvanted (SHINGRIX) injection GIVEN AT PHARMACY: FIRST DOSE NOW, SECOND DOSE GIVEN 2 TO 6 MONTHS AFTER         Allergies   Allergen Reactions     Blood Transfusion Related (Informational Only) Other (See Comments)     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      Chlorhexidine Hives     Thor surgical cleanser     Codeine Hives     Has tolerated Oxycodone in past      Ibuprofen [Nsaids] Hives     Penicillins Hives     Other reaction(s): Unknown     Sulfa Drugs Hives     Other reaction(s): Unknown     Calcium Channel Blockers      Doxycycline GI Disturbance     Emesis & diarrhea  Patient denies allergy to this med     Hydroxyzine      rash     Mold        Family History   Problem Relation Age of Onset     Hypertension Mother      No Known Problems Father      No  Known Problems Sister      No Known Problems Brother      No Known Problems Maternal Grandmother      No Known Problems Maternal Grandfather      No Known Problems Paternal Grandmother      No Known Problems Other      Social History     Socioeconomic History     Marital status:      Spouse name: Not on file     Number of children: Not on file     Years of education: Not on file     Highest education level: Not on file   Occupational History     Not on file   Tobacco Use     Smoking status: Former Smoker     Packs/day: 1.50     Years: 20.00     Pack years: 30.00     Types: Cigarettes     Start date: 1973     Quit date: 1993     Years since quittin.2     Smokeless tobacco: Never Used     Tobacco comment: wish I never started   Substance and Sexual Activity     Alcohol use: No     Alcohol/week: 0.0 standard drinks     Drug use: No     Sexual activity: Not Currently     Partners: Male     Birth control/protection: Post-menopausal   Other Topics Concern     Parent/sibling w/ CABG, MI or angioplasty before 65F 55M? Not Asked   Social History Narrative    ** Merged History Encounter **          Social Determinants of Health     Financial Resource Strain: Not on file   Food Insecurity: Not on file   Transportation Needs: Not on file   Physical Activity: Not on file   Stress: Not on file   Social Connections: Not on file   Intimate Partner Violence: Not on file   Housing Stability: Not on file       Additional medical/Social/Surgical histories reviewed in [facility] and updated as appropriate.     REVIEW OF SYSTEMS (3/30/2022)  10 point ROS of systems including Constitutional, Eyes, Respiratory, Cardiovascular, Gastroenterology, Genitourinary, Integumentary, Musculoskeletal, Psychiatric, Allergic/Immunologic were all negative except for pertinent positives noted in my HPI.     PHYSICAL EXAM  VSS  General  - normal appearance, in no obvious distress  HEENT  - conjunctivae not injected, moist mucous membranes,  normocephalic/atraumatic head, ears normal appearance, no lesions, mouth normal appearance, no scars, normal dentition and teeth present  CV  - normal radial pulse  Pulm  - normal respiratory pattern, non-labored  Musculoskeletal -left  shoulder  - inspection: normal bone and joint alignment, no obvious deformity, no scapular winging, no AC step-off  - palpation: mildly tender RC insertion, normal clavicle, non-tender AC  - ROM:  painful and limited flexion and ER at end range, limited IR  - strength: 5/5  strength, 5/5 in all shoulder planes  - special tests:  (-) Speed's  (+) Neer  (+) Hawkin's  (+) Jovanny's  (-) [place]'s  (-) apprehension  (-) subscap lift-off  Neuro  - no sensory or motor deficit, grossly normal coordination, normal muscle tone  Skin  - no ecchymosis, erythema, warmth, or induration, no obvious rash  Psych  - interactive, appropriate, normal mood and affect  ASSESSMENT & PLAN  61 yo female with cervical radicular pain, left shoulder subacromial bursitis    I independently reviewed the following imaging studies:  Left shoulder xray: no arthritis  Cervical MRI  : shows ddd, disc herniations  disucssed and ordered ANASTASIA  After a 20 minute discussion and examination, we decided to perform a same day injection for diagnostic and therapeutic purposes for left shoulder  Appropriate PPE was utilized for prevention of spread of Covid-19.  Shon Simpson MD, CAQSM  PROCEDURE: left SHOULDER subacromial bursa injection     The patient was apprised of the risks and the benefits of the procedure and consented and a written consent was signed by the patient.   The patient s shoulder was sterilely prepped with chloraprep.   40 mg of depo suspension was drawn up into a 5 mL syringe with 4 mL of 1% lidocaine   The patient was injected with a 1.5-inch 22-gauge needle at lateral gleno-humeral joint in the subacromial space  There were no complications. The patient tolerated the procedure well. There was minimal  bleeding.   The patient was instructed to ice the shoulder upon leaving clinic and refrain from overuse over the next 3 days.   The patient was instructed to go to the emergency room with any usual pain, swelling, or redness occurred in the injected area.   The patient was given a followup appointment to evaluate response to the injection to their injection    Large Joint Injection/Arthocentesis: L subacromial bursa    Date/Time: 3/30/2022 2:07 PM  Performed by: Shon Simpson MD  Authorized by: Shon Simpson MD     Indications:  Pain and diagnostic evaluation  Needle Size:  22 G  Guidance: landmark guided    Approach:  Anterolateral  Location:  Shoulder      Site:  L subacromial bursa  Medications:  40 mg methylPREDNISolone 40 MG/ML  Outcome:  Tolerated well, no immediate complications  Procedure discussed: discussed risks, benefits, and alternatives    Consent Given by:  Patient  Timeout: timeout called immediately prior to procedure    Prep: patient was prepped and draped in usual sterile fashion

## 2022-03-30 NOTE — LETTER
3/30/2022         RE: Aspen Mccullough  [address]  [facility] [address]        Dear Colleague,    Thank you for referring your patient, Aspen Mccullough, to the [facility] [place]. Please see a copy of my visit note below.    HISTORY OF PRESENT ILLNESS  Ms. Mccullough is a pleasant 62 year old year old female who presents to clinic today with   Aspen explains that her neck and left shoulder hurt  She wants to talk about treatments  Location: left shoulder and neck  Quality:  achy pain    Severity: 6/10    Duration: worse over past few months  Timing: occurs intermittently  Context: occurs while exercising and lifting  Modifying factors:  resting and non-use makes it better, movement and use makes it worse  Associated signs & symptoms: neck pain that radiates into arms, left worse  MEDICAL HISTORY  Patient Active Problem List   Diagnosis     Generalized osteoarthrosis, involving multiple sites     CRPS (complex regional pain syndrome), lower limb     Fibromyalgia     Somatoform disorder     Histrionic personality (H)     Encounter for long-term use of opiate analgesic     Knee joint replacement by other means     Hypothyroidism     Insomnia, unspecified type     Hyperlipidemia     Hashimoto's thyroiditis     Glucocorticoid deficiency (H)     Posttraumatic stress disorder     Impingement syndrome of left shoulder     Abdominal cramping, generalized     Intermittent diarrhea     Primary osteoarthritis involving multiple joints     Attention deficit disorder     Allergic rhinitis     Cushing's syndrome (H)     Endometriosis     Essential hypertension     Systemic lupus erythematosus (H)     S/P cervical spinal fusion     Paroxysmal atrial fibrillation (H)     Nonischemic cardiomyopathy (H)     Personal history of DVT (deep vein thrombosis)     History of pulmonary embolism     Acute deep vein thrombosis (DVT) of popliteal vein of right lower extremity (H)     Acute  pulmonary embolism (H)     Adrenal cortical steroids causing adverse effect in therapeutic use     Alopecia areata     Anemia, blood loss     Ankle pain, left     ARF (acute renal failure) (H)     Arthritis, septic (H)     Chronic ethmoidal sinusitis     Chronic maxillary sinusitis     Deviated nasal septum     Complications due to internal joint prosthesis (H)     Generalized pain     Iatrogenic hyperthyroidism     S/P TKR (total knee replacement)     Left shoulder pain     Lipoma of skin and subcutaneous tissue     Malabsorption     Nasal fracture     Nasal turbinate hypertrophy     Opioid type dependence (H)     Other specified abnormal findings of blood chemistry     Other acute postoperative pain     Pain in joint, lower leg     Postoperative hypotension     S/P total knee replacement     Thrombus of right atrial appendage without antecedent myocardial infarction     Chronic pain of both shoulders     GI bleed     Grade III internal hemorrhoids     Atrial fibrillation with RVR (H)     Acute deep vein thrombosis (DVT) of proximal vein of right lower extremity (H)     Pneumonia of left upper lobe due to infectious organism     Morbid obesity (H)     Cerebrovascular accident (CVA), unspecified mechanism (H)     CVA (cerebral vascular accident) (H)     Neuropathy of left suprascapular nerve     Neuropathy of right suprascapular nerve     Stage 3 right buttock     BMI 40.0-44.9, adult (H)     Chronic anticoagulation     Controlled substance agreement signed     Craniofacial hyperhidrosis     Current chronic use of systemic steroids     DNR (do not resuscitate)     Hypertonic bladder     Hypo-osmolality and hyponatremia     Lumbosacral radiculopathy at S1     Overflow diarrhea     Refusal of blood transfusions as patient is Roman [employer]     Unspecified fall, initial encounter     Unspecified injury of shoulder and upper arm, unspecified arm, initial encounter     Weakness     Chronic pain disorder      Constipation     Hemoptysis     COVID-19 virus infection       Current Outpatient Medications   Medication Sig Dispense Refill     atorvastatin (LIPITOR) 40 MG tablet Take 1 tablet (40 mg) by mouth every evening 30 tablet 0     benzonatate (TESSALON) 100 MG capsule Take 1 capsule (100 mg) by mouth 3 times daily as needed for cough 30 capsule 0     BIOTIN FORTE PO Take 1 tablet by mouth daily        carboxymethylcellulose (CARBOXYMETHYLCELLULOSE SODIUM) 0.5 % SOLN ophthalmic solution Place 1 drop into both eyes 2 times daily as needed  1 Bottle      cyanocobalamin 1000 MCG/ML injection Inject 1 mL (1,000 mcg) Subcutaneous every 7 days 4 mL 5     cyclobenzaprine (FLEXERIL) 10 MG tablet Take 1 tablet (10 mg) by mouth 3 times daily 90 tablet 3     cycloSPORINE (RESTASIS) 0.05 % ophthalmic emulsion Place 1 drop into both eyes 2 times daily as needed        Elastic Bandages & Supports (MEDICAL COMPRESSION SOCKS) MISC 1 Package daily Please measure and distribute 2 pair of 20mmHg - 30mmHg THIGH high open or closed toe compression stockings with extra refills as indicated. Jobst ultrasheer or equivalent. 2 each 3     fexofenadine (ALLEGRA) 180 MG tablet Take 180 mg by mouth daily as needed        gabapentin (NEURONTIN) 300 MG capsule Take 300 mg by mouth 2 times daily (morning and afternoon) with 800mg tablet (total dose 1100mg)       gabapentin (NEURONTIN) 800 MG tablet Take 800 mg by mouth At Bedtime        gabapentin (NEURONTIN) 800 MG tablet Take 800 mg by mouth 2 times daily (morning and afternoon) in addition to 300mg capsule (total dose 1100mg)       guaiFENesin-dextromethorphan (ROBITUSSIN DM) 100-10 MG/5ML syrup Take 10 mLs by mouth every 4 hours as needed for cough 118 mL 0     HYDROmorphone (DILAUDID) 8 MG tablet Take 8 mg by mouth 3 times daily . Max of 3 doses per day.       lifitegrast (XIIDRA) 5 % opthalmic solution Place 1 drop into both eyes 2 times daily       metoprolol succinate ER (TOPROL-XL) 100 MG 24  hr tablet Take 1 tablet (100 mg) by mouth 2 times daily Appointment needed for refills. Please call [phone]. 180 tablet 0     morphine (MS CONTIN) 30 MG 12 hr tablet Take 30 mg by mouth 2 times daily (morning and night) in addition to 60 mg tablet for a total dose of 90mg. 270 tablet 0     morphine (MS CONTIN) 60 MG 12 hr tablet Take 60 mg by mouth daily in the afternoon (in addition to the 2 - 90mg doses)       morphine (MS CONTIN) 60 MG 12 hr tablet Take 60 mg by mouth 2 times daily (morning and night) in addition to 30mg tablet for a total dose of 90mg 30 tablet 0     multivitamin, therapeutic with minerals (MULTI-VITAMIN) TABS tablet Take 1 tablet by mouth daily  100 tablet 3     NASONEX 50 MCG/ACT spray Spray 1 spray into both nostrils daily as needed   11     nystatin (MYCOSTATIN) 607290 UNIT/GM external powder Apply topically 3 times daily as needed 60 g 1     prednisoLONE acetate (PRED FORTE) 1 % ophthalmic susp Place 1 drop into both eyes 2 times daily        predniSONE (DELTASONE) 20 MG tablet        predniSONE (DELTASONE) 5 MG tablet        rivaroxaban ANTICOAGULANT (XARELTO ANTICOAGULANT) 20 MG TABS tablet Take 1 tablet (20 mg) by mouth daily (with dinner) Hold today 11/01 and restart when no longer having bloody coughing, suspected in 1-2 days.       TIROSINT 200 MCG CAPS Take 600 mcg by mouth daily        zolpidem (AMBIEN) 5 MG tablet Take 5 mg by mouth nightly as needed for sleep       zoster vaccine recombinant adjuvanted (SHINGRIX) injection GIVEN AT PHARMACY: FIRST DOSE NOW, SECOND DOSE GIVEN 2 TO 6 MONTHS AFTER         Allergies   Allergen Reactions     Blood Transfusion Related (Informational Only) Other (See Comments)     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      Chlorhexidine Hives     Thor surgical cleanser     Codeine Hives     Has tolerated Oxycodone in past      Ibuprofen [Nsaids] Hives     Penicillins Hives     Other reaction(s): Unknown     Sulfa Drugs Hives      Other reaction(s): Unknown     Calcium Channel Blockers      Doxycycline GI Disturbance     Emesis & diarrhea  Patient denies allergy to this med     Hydroxyzine      rash     Mold        Family History   Problem Relation Age of Onset     Hypertension Mother      No Known Problems Father      No Known Problems Sister      No Known Problems Brother      No Known Problems Maternal Grandmother      No Known Problems Maternal Grandfather      No Known Problems Paternal Grandmother      No Known Problems Other      Social History     Socioeconomic History     Marital status:      Spouse name: Not on file     Number of children: Not on file     Years of education: Not on file     Highest education level: Not on file   Occupational History     Not on file   Tobacco Use     Smoking status: Former Smoker     Packs/day: 1.50     Years: 20.00     Pack years: 30.00     Types: Cigarettes     Start date: 1973     Quit date: 1993     Years since quittin.2     Smokeless tobacco: Never Used     Tobacco comment: wish I never started   Substance and Sexual Activity     Alcohol use: No     Alcohol/week: 0.0 standard drinks     Drug use: No     Sexual activity: Not Currently     Partners: Male     Birth control/protection: Post-menopausal   Other Topics Concern     Parent/sibling w/ CABG, MI or angioplasty before 65F 55M? Not Asked   Social History Narrative    ** Merged History Encounter **          Social Determinants of Health     Financial Resource Strain: Not on file   Food Insecurity: Not on file   Transportation Needs: Not on file   Physical Activity: Not on file   Stress: Not on file   Social Connections: Not on file   Intimate Partner Violence: Not on file   Housing Stability: Not on file       Additional medical/Social/Surgical histories reviewed in [facility] and updated as appropriate.     REVIEW OF SYSTEMS (3/30/2022)  10 point ROS of systems including Constitutional, Eyes, Respiratory, Cardiovascular,  Gastroenterology, Genitourinary, Integumentary, Musculoskeletal, Psychiatric, Allergic/Immunologic were all negative except for pertinent positives noted in my HPI.     PHYSICAL EXAM  VSS  General  - normal appearance, in no obvious distress  HEENT  - conjunctivae not injected, moist mucous membranes, normocephalic/atraumatic head, ears normal appearance, no lesions, mouth normal appearance, no scars, normal dentition and teeth present  CV  - normal radial pulse  Pulm  - normal respiratory pattern, non-labored  Musculoskeletal -left  shoulder  - inspection: normal bone and joint alignment, no obvious deformity, no scapular winging, no AC step-off  - palpation: mildly tender RC insertion, normal clavicle, non-tender AC  - ROM:  painful and limited flexion and ER at end range, limited IR  - strength: 5/5  strength, 5/5 in all shoulder planes  - special tests:  (-) Speed's  (+) Neer  (+) Hawkin's  (+) Jovanny's  (-) Madison's  (-) apprehension  (-) subscap lift-off  Neuro  - no sensory or motor deficit, grossly normal coordination, normal muscle tone  Skin  - no ecchymosis, erythema, warmth, or induration, no obvious rash  Psych  - interactive, appropriate, normal mood and affect  ASSESSMENT & PLAN  61 yo female with cervical radicular pain, left shoulder subacromial bursitis    I independently reviewed the following imaging studies:  Left shoulder xray: no arthritis  Cervical MRI  : shows ddd, disc herniations  disucssed and ordered ANASTASIA  After a 20 minute discussion and examination, we decided to perform a same day injection for diagnostic and therapeutic purposes for left shoulder  Appropriate PPE was utilized for prevention of spread of Covid-19.  Shon Simpson MD, CAQSM  PROCEDURE: left SHOULDER subacromial bursa injection     The patient was apprised of the risks and the benefits of the procedure and consented and a written consent was signed by the patient.   The patient s shoulder was sterilely prepped with  chloraprep.   40 mg of depo suspension was drawn up into a 5 mL syringe with 4 mL of 1% lidocaine   The patient was injected with a 1.5-inch 22-gauge needle at lateral gleno-humeral joint in the subacromial space  There were no complications. The patient tolerated the procedure well. There was minimal bleeding.   The patient was instructed to ice the shoulder upon leaving clinic and refrain from overuse over the next 3 days.   The patient was instructed to go to the emergency room with any usual pain, swelling, or redness occurred in the injected area.   The patient was given a followup appointment to evaluate response to the injection to their injection    Large Joint Injection/Arthocentesis: L subacromial bursa    Date/Time: 3/30/2022 2:07 PM  Performed by: Shon Simpson MD  Authorized by: Shon Simpson MD     Indications:  Pain and diagnostic evaluation  Needle Size:  22 G  Guidance: landmark guided    Approach:  Anterolateral  Location:  Shoulder      Site:  L subacromial bursa  Medications:  40 mg methylPREDNISolone 40 MG/ML  Outcome:  Tolerated well, no immediate complications  Procedure discussed: discussed risks, benefits, and alternatives    Consent Given by:  Patient  Timeout: timeout called immediately prior to procedure    Prep: patient was prepped and draped in usual sterile fashion              Again, thank you for allowing me to participate in the care of your patient.        Sincerely,        Shon Simpson MD

## 2022-03-30 NOTE — PATIENT INSTRUCTIONS
Thanks for coming today.  Ortho/Sports Medicine Clinic  [address] [place] [address]    To schedule future appointments in Ortho Clinic, you may call [phone].    To schedule ordered imaging by your Provider: Call [facility] at [phone]    [employer] available online at:   [email or web address]    Please call if any further questions or concerns [phone] and ask for the Orthopedic Department. Clinic hours 8 am to 5 pm.    Return to clinic if symptoms worsen.

## 2022-04-07 ENCOUNTER — TELEPHONE (OUTPATIENT)
Dept: MEDSURG UNIT | Facility: CLINIC | Age: 62
End: 2022-04-07
Payer: COMMERCIAL

## 2022-04-08 ENCOUNTER — TELEPHONE (OUTPATIENT)
Dept: MEDSURG UNIT | Facility: CLINIC | Age: 62
End: 2022-04-08
Payer: COMMERCIAL

## 2022-04-08 RX ORDER — METHYLPREDNISOLONE ACETATE 40 MG/ML
40 INJECTION, SUSPENSION INTRA-ARTICULAR; INTRALESIONAL; INTRAMUSCULAR; SOFT TISSUE
Status: DISCONTINUED | OUTPATIENT
Start: 2022-03-30 | End: 2022-08-10

## 2022-04-08 NOTE — TELEPHONE ENCOUNTER
Called pt and pt states that she is aware of holding Xarelto and will take last dose today as directed to her per her PCP.

## 2022-04-11 ENCOUNTER — HOSPITAL ENCOUNTER (OUTPATIENT)
Facility: CLINIC | Age: 62
Discharge: HOME | End: 2022-04-11
Admitting: PHYSICIAN ASSISTANT
Payer: COMMERCIAL

## 2022-04-11 ENCOUNTER — HOSPITAL ENCOUNTER (OUTPATIENT)
Dept: GENERAL RADIOLOGY | Facility: CLINIC | Age: 62
Discharge: HOME | End: 2022-04-11
Attending: PREVENTIVE MEDICINE
Payer: COMMERCIAL

## 2022-04-11 VITALS — SYSTOLIC BLOOD PRESSURE: 139 MMHG | OXYGEN SATURATION: 95 % | HEART RATE: 77 BPM | DIASTOLIC BLOOD PRESSURE: 79 MMHG

## 2022-04-11 VITALS
RESPIRATION RATE: 16 BRPM | DIASTOLIC BLOOD PRESSURE: 68 MMHG | SYSTOLIC BLOOD PRESSURE: 124 MMHG | OXYGEN SATURATION: 98 % | HEART RATE: 67 BPM

## 2022-04-11 DIAGNOSIS — M50.20 CERVICAL DISC HERNIATION: ICD-10-CM

## 2022-04-11 DIAGNOSIS — M50.30 DDD (DEGENERATIVE DISC DISEASE), CERVICAL: ICD-10-CM

## 2022-04-11 DIAGNOSIS — M50.122 CERVICAL DISC DISORDER AT C5-C6 LEVEL WITH RADICULOPATHY: ICD-10-CM

## 2022-04-11 PROCEDURE — 255N000002 HC RX 255 OP 636: Performed by: PHYSICIAN ASSISTANT

## 2022-04-11 PROCEDURE — 250N000009 HC RX 250: Performed by: PHYSICIAN ASSISTANT

## 2022-04-11 PROCEDURE — 62321 NJX INTERLAMINAR CRV/THRC: CPT

## 2022-04-11 PROCEDURE — 999N000154 HC STATISTIC RADIOLOGY XRAY, US, CT, MAR, NM

## 2022-04-11 PROCEDURE — 250N000011 HC RX IP 250 OP 636: Performed by: PHYSICIAN ASSISTANT

## 2022-04-11 RX ORDER — IOPAMIDOL 408 MG/ML
10 INJECTION, SOLUTION INTRATHECAL ONCE
Status: COMPLETED | OUTPATIENT
Start: 2022-04-11 | End: 2022-04-11

## 2022-04-11 RX ORDER — BETAMETHASONE SODIUM PHOSPHATE AND BETAMETHASONE ACETATE 3; 3 MG/ML; MG/ML
3 INJECTION, SUSPENSION INTRA-ARTICULAR; INTRALESIONAL; INTRAMUSCULAR; SOFT TISSUE ONCE
Status: COMPLETED | OUTPATIENT
Start: 2022-04-11 | End: 2022-04-11

## 2022-04-11 RX ADMIN — LIDOCAINE HYDROCHLORIDE 3 ML: 10 INJECTION, SOLUTION EPIDURAL; INFILTRATION; INTRACAUDAL; PERINEURAL at 14:24

## 2022-04-11 RX ADMIN — BETAMETHASONE SODIUM PHOSPHATE AND BETAMETHASONE ACETATE 3 ML: 3; 3 INJECTION, SUSPENSION INTRA-ARTICULAR; INTRALESIONAL; INTRAMUSCULAR at 14:28

## 2022-04-11 RX ADMIN — IOPAMIDOL 1 ML: 408 INJECTION, SOLUTION INTRATHECAL at 14:27

## 2022-04-11 NOTE — PROGRESS NOTES
Care Suites Discharge Nursing Note    Patient Information  Name: Aspen Mccullough  Age: 62 year old    Discharge Education:  Discharge instructions reviewed: Yes  Additional education/resources provided:   Patient/patient representative verbalizes understanding: Yes  Patient discharging on new medications: No  Medication education completed: N/A    Discharge Plans:   Discharge location: home  Discharge ride contacted: Yes  Approximate discharge time: 1515    Discharge Criteria:  Discharge criteria met and vital signs stable: Yes    Patient Belongs:  Patient belongings returned to patient: Yes    Vangie Meehan RN

## 2022-04-11 NOTE — PROCEDURES
[facility]    Procedure: Cervical ANASTASIA    Date/Time: 4/11/2022 2:33 PM  Performed by: Niles Pineda PA-C  Authorized by: Niles Pineda PA-C       UNIVERSAL PROTOCOL   Site Marked: Yes  Prior Images Obtained and Reviewed:  Yes  Required items: Required blood products, implants, devices and special equipment available    Patient identity confirmed:  Verbally with patient  Patient was reevaluated immediately before administering moderate or deep sedation or anesthesia  Confirmation Checklist:  Patient's identity using two indicators, relevant allergies, procedure was appropriate and matched the consent or emergent situation and correct equipment/implants were available  Time out: Immediately prior to the procedure a time out was called    Universal Protocol: the Joint Commission Universal Protocol was followed    Preparation: Patient was prepped and draped in usual sterile fashion       ANESTHESIA    Local Anesthetic: Lidocaine 1% without epinephrine      SEDATION    Patient Sedated: No    See dictated procedure note for full details.    PROCEDURE    Patient Tolerance:  Patient tolerated the procedure well with no immediate complications  Length of time physician/provider present for 1:1 monitoring during sedation: 0

## 2022-04-11 NOTE — DISCHARGE INSTRUCTIONS
Steroid Injection Discharge Instructions     After you go home:    You may resume your normal diet.    Care of Puncture Site:    If you have a bandaid on your puncture site, you may remove it the next morning  You may shower tomorrow  No bath tubs, whirlpools or swimming pool for at least 48 hours  Use ice packs as needed for discomfort     Activity:    Minimize your activity today. You may gradually resume your normal activity as tolerated  Avoid vigorous or strenuous activity until your symptoms improve or as directed by your doctor  Do NOT drive a vehicle for a few hours after the injection - or longer if you develop numbness in your arm or leg    Medicines:    You may resume all medications, including blood thinners  Resume your Warfarin/Coumadin at your regular dose today. Follow up with your provider to have your INR rechecked  Resume your Platelet Inhibitors and Aspirin tomorrow at your regular dose  For minor discomfort, you may take Acetaminophen (Tylenol) or Ibuprofen (Advil)    Pain:     You may experience increased or different pain over the next 24-48 hours  For the next 48 hrs - you may use ice packs for discomfort     Call your primary care doctor if:    You have severe pain that does not improve with pain medication  You have chills or a fever greater than 101 F (38 C)  The site is red, swollen, hot or tender  Increase in pain, weakness or numbness  New problems with your bowel or bladder  Any questions or concerns    What to watch for:    It can be normal to have some bruising or slight swelling at the puncture site.   After the procedure, you may have some new weakness or numbness down your arm/leg from the numbing medicine. This should resolve in a few hours.   You may feel some temporary relief from the numbing medicine, but that will wear off within a few hours.  Your symptoms may return to pre procedure level, or can even be worse for the first 1-2 days.  For many people, the steroid begins to  provide some relief within 2-3 days, but it can take up to 2 weeks to obtain the full results.  Some people will get lasting relief from a single injection. Others may require up to 3 injections to get results. If you have more than one steroid injection, they should be given 2 weeks apart.  If you have no improvement in your symptoms after two weeks, please contact the doctor who ordered this procedure to discuss the next steps.  Side effects of your steroid injection are mild and will go away in 2-3 days  Insomnia  Irritability  Flushed face  Water retention  Restlessness  Difficulty sleeping  Increased appetite  Increased blood sugar  If you are diabetic, monitor your blood sugar closely. Contact the provider who manages your diabetes to help you control your blood sugar if needed.    If you have questions or concerns call:                  [facility] Radiology Dept @ [phone]                                    between 8am-4:30pm Mon-Fri    If you have urgent questions outside of these normal business hours, please contact the [facility] on call doctor @ [phone]

## 2022-04-11 NOTE — PROGRESS NOTES
Care Suites Post Procedure Note    Patient Information  Name: Aspen Mccullough  Age: 62 year old    Post Procedure  Time patient returned to Care Suites: 0485  Concerns/abnormal assessment: no  If abnormal assessment, provider notified: N/A  Plan/Other: martha Meehan RN

## 2022-04-21 ENCOUNTER — OFFICE VISIT (OUTPATIENT)
Dept: ORTHOPEDICS | Facility: CLINIC | Age: 62
End: 2022-04-21
Payer: COMMERCIAL

## 2022-04-21 DIAGNOSIS — M50.20 CERVICAL DISC HERNIATION: Primary | ICD-10-CM

## 2022-04-21 DIAGNOSIS — M75.51 SUBACROMIAL BURSITIS OF RIGHT SHOULDER JOINT: ICD-10-CM

## 2022-04-21 PROCEDURE — 99214 OFFICE O/P EST MOD 30 MIN: CPT | Mod: 25 | Performed by: PREVENTIVE MEDICINE

## 2022-04-21 PROCEDURE — 20610 DRAIN/INJ JOINT/BURSA W/O US: CPT | Mod: RT | Performed by: PREVENTIVE MEDICINE

## 2022-04-21 RX ORDER — METHYLPREDNISOLONE 4 MG
TABLET, DOSE PACK ORAL
Qty: 21 TABLET | Refills: 0 | Status: SHIPPED | OUTPATIENT
Start: 2022-04-21 | End: 2022-08-08

## 2022-04-21 RX ADMIN — METHYLPREDNISOLONE ACETATE 40 MG: 40 INJECTION, SUSPENSION INTRA-ARTICULAR; INTRALESIONAL; INTRAMUSCULAR; SOFT TISSUE at 07:46

## 2022-04-21 NOTE — LETTER
4/21/2022         RE: Aspen Mccullough  [address]  [facility] [address]        Dear Colleague,    Thank you for referring your patient, Aspen Mccullough, to the [facility]. Please see a copy of my visit note below.          [facility] FOLLOW-UP VISIT 4/21/2022    Aspen Mccullough's chief complaint for this visit includes:  Chief Complaint   Patient presents with     Consult     Right shoulder injection     PCP: Arcadio Farfan    Referring Provider:  No referring provider defined for this encounter.    There were no vitals taken for this visit.  Data Unavailable       Interval History:     Follow up reason: Right shoulder    Date last seen: 3/30/22    Following Therapeutic Plan: Yes     Pain: Unchanged    Function: Unchanged     Medical History:    Any recent changes to your medical history? No    Any new medication prescribed since last visit? No    Review of Systems:    Do you have fever, chills, weight loss? No    Do you have any vision problems? No    Do you have any chest pain or edema? No    Do you have any shortness of breath or wheezing?  No    Do you have stomach problems? No    Do you have any urinary track issues? No    Do you have any numbness or focal weakness? No    Do you have diabetes? No    Do you have problems with bleeding or clotting? No    Do you have an rashes or other skin lesions? No        Large Joint Injection/Arthocentesis: R subacromial bursa    Date/Time: 4/21/2022 7:46 AM  Performed by: Shon Simpson MD  Authorized by: Shon Simpson MD     Indications:  Pain, diagnostic evaluation and osteoarthritis  Needle Size:  22 G  Guidance: landmark guided    Approach:  Lateral  Location:  Shoulder      Site:  R subacromial bursa  Medications:  40 mg methylPREDNISolone 40 MG/ML  Outcome:  Tolerated well, no immediate complications  Procedure discussed: discussed risks, benefits, and alternatives    Consent  Given by:  Patient  Timeout: timeout called immediately prior to procedure    Prep: patient was prepped and draped in usual sterile fashion            HISTORY OF PRESENT ILLNESS  Ms. Mccullough is a pleasant 62 year old year old female who presents to clinic today with   Aspen explains that her right shoulder has started to bother her more again  Her left shoulder has had improvement since injection  Location: right shoulder  Quality:  achy pain    Severity: 7/10 at worst    Duration: worse over past month  Timing: occurs intermittently  Context: occurs while exercising and lifting arm  Modifying factors:  resting and non-use makes it better, movement and use makes it worse  Associated signs & symptoms: neck and shoulder pain  MEDICAL HISTORY  Patient Active Problem List   Diagnosis     Generalized osteoarthrosis, involving multiple sites     CRPS (complex regional pain syndrome), lower limb     Fibromyalgia     Somatoform disorder     Histrionic personality (H)     Encounter for long-term use of opiate analgesic     Knee joint replacement by other means     Hypothyroidism     Insomnia, unspecified type     Hyperlipidemia     Hashimoto's thyroiditis     Glucocorticoid deficiency (H)     Posttraumatic stress disorder     Impingement syndrome of left shoulder     Abdominal cramping, generalized     Intermittent diarrhea     Primary osteoarthritis involving multiple joints     Attention deficit disorder     Allergic rhinitis     Cushing's syndrome (H)     Endometriosis     Essential hypertension     Systemic lupus erythematosus (H)     S/P cervical spinal fusion     Paroxysmal atrial fibrillation (H)     Nonischemic cardiomyopathy (H)     Personal history of DVT (deep vein thrombosis)     History of pulmonary embolism     Acute deep vein thrombosis (DVT) of popliteal vein of right lower extremity (H)     Acute pulmonary embolism (H)     Adrenal cortical steroids causing adverse effect in therapeutic use     Alopecia  areata     Anemia, blood loss     Ankle pain, left     ARF (acute renal failure) (H)     Arthritis, septic (H)     Chronic ethmoidal sinusitis     Chronic maxillary sinusitis     Deviated nasal septum     Complications due to internal joint prosthesis (H)     Generalized pain     Iatrogenic hyperthyroidism     S/P TKR (total knee replacement)     Left shoulder pain     Lipoma of skin and subcutaneous tissue     Malabsorption     Nasal fracture     Nasal turbinate hypertrophy     Opioid type dependence (H)     Other specified abnormal findings of blood chemistry     Other acute postoperative pain     Pain in joint, lower leg     Postoperative hypotension     S/P total knee replacement     Thrombus of right atrial appendage without antecedent myocardial infarction     Chronic pain of both shoulders     GI bleed     Grade III internal hemorrhoids     Atrial fibrillation with RVR (H)     Acute deep vein thrombosis (DVT) of proximal vein of right lower extremity (H)     Pneumonia of left upper lobe due to infectious organism     Morbid obesity (H)     Cerebrovascular accident (CVA), unspecified mechanism (H)     CVA (cerebral vascular accident) (H)     Neuropathy of left suprascapular nerve     Neuropathy of right suprascapular nerve     Stage 3 right buttock     BMI 40.0-44.9, adult (H)     Chronic anticoagulation     Controlled substance agreement signed     Craniofacial hyperhidrosis     Current chronic use of systemic steroids     DNR (do not resuscitate)     Hypertonic bladder     Hypo-osmolality and hyponatremia     Lumbosacral radiculopathy at S1     Overflow diarrhea     Refusal of blood transfusions as patient is [place]     Unspecified fall, initial encounter     Unspecified injury of shoulder and upper arm, unspecified arm, initial encounter     Weakness     Chronic pain disorder     Constipation     Hemoptysis     COVID-19 virus infection       Current Outpatient Medications   Medication Sig  Dispense Refill     methylPREDNISolone (MEDROL) 4 MG tablet therapy pack Follow Package Directions 21 tablet 0     atorvastatin (LIPITOR) 40 MG tablet Take 1 tablet (40 mg) by mouth every evening 30 tablet 0     benzonatate (TESSALON) 100 MG capsule Take 1 capsule (100 mg) by mouth 3 times daily as needed for cough 30 capsule 0     BIOTIN FORTE PO Take 1 tablet by mouth daily        carboxymethylcellulose (CARBOXYMETHYLCELLULOSE SODIUM) 0.5 % SOLN ophthalmic solution Place 1 drop into both eyes 2 times daily as needed  1 Bottle      cyanocobalamin 1000 MCG/ML injection Inject 1 mL (1,000 mcg) Subcutaneous every 7 days 4 mL 5     cyclobenzaprine (FLEXERIL) 10 MG tablet Take 1 tablet (10 mg) by mouth 3 times daily 90 tablet 3     cycloSPORINE (RESTASIS) 0.05 % ophthalmic emulsion Place 1 drop into both eyes 2 times daily as needed        Elastic Bandages & Supports (MEDICAL COMPRESSION SOCKS) MISC 1 Package daily Please measure and distribute 2 pair of 20mmHg - 30mmHg THIGH high open or closed toe compression stockings with extra refills as indicated. Jobst ultrasheer or equivalent. 2 each 3     fexofenadine (ALLEGRA) 180 MG tablet Take 180 mg by mouth daily as needed        gabapentin (NEURONTIN) 300 MG capsule Take 300 mg by mouth 2 times daily (morning and afternoon) with 800mg tablet (total dose 1100mg)       gabapentin (NEURONTIN) 800 MG tablet Take 800 mg by mouth At Bedtime        gabapentin (NEURONTIN) 800 MG tablet Take 800 mg by mouth 2 times daily (morning and afternoon) in addition to 300mg capsule (total dose 1100mg)       guaiFENesin-dextromethorphan (ROBITUSSIN DM) 100-10 MG/5ML syrup Take 10 mLs by mouth every 4 hours as needed for cough 118 mL 0     HYDROmorphone (DILAUDID) 8 MG tablet Take 8 mg by mouth 3 times daily . Max of 3 doses per day.       lifitegrast (XIIDRA) 5 % opthalmic solution Place 1 drop into both eyes 2 times daily       methylPREDNISolone (MEDROL) 4 MG tablet therapy pack Follow  Package Directions 21 tablet 0     metoprolol succinate ER (TOPROL-XL) 100 MG 24 hr tablet Take 1 tablet (100 mg) by mouth 2 times daily Appointment needed for refills. Please call [phone]. 180 tablet 0     morphine (MS CONTIN) 30 MG 12 hr tablet Take 30 mg by mouth 2 times daily (morning and night) in addition to 60 mg tablet for a total dose of 90mg. 270 tablet 0     morphine (MS CONTIN) 60 MG 12 hr tablet Take 60 mg by mouth daily in the afternoon (in addition to the 2 - 90mg doses)       morphine (MS CONTIN) 60 MG 12 hr tablet Take 60 mg by mouth 2 times daily (morning and night) in addition to 30mg tablet for a total dose of 90mg 30 tablet 0     multivitamin, therapeutic with minerals (MULTI-VITAMIN) TABS tablet Take 1 tablet by mouth daily  100 tablet 3     NASONEX 50 MCG/ACT spray Spray 1 spray into both nostrils daily as needed   11     nystatin (MYCOSTATIN) 996225 UNIT/GM external powder Apply topically 3 times daily as needed 60 g 1     prednisoLONE acetate (PRED FORTE) 1 % ophthalmic susp Place 1 drop into both eyes 2 times daily        predniSONE (DELTASONE) 20 MG tablet        predniSONE (DELTASONE) 5 MG tablet        rivaroxaban ANTICOAGULANT (XARELTO ANTICOAGULANT) 20 MG TABS tablet Take 1 tablet (20 mg) by mouth daily (with dinner) Hold today 11/01 and restart when no longer having bloody coughing, suspected in 1-2 days.       TIROSINT 200 MCG CAPS Take 600 mcg by mouth daily        zolpidem (AMBIEN) 5 MG tablet Take 5 mg by mouth nightly as needed for sleep       zoster vaccine recombinant adjuvanted (SHINGRIX) injection GIVEN AT PHARMACY: FIRST DOSE NOW, SECOND DOSE GIVEN 2 TO 6 MONTHS AFTER         Allergies   Allergen Reactions     Blood Transfusion Related (Informational Only) Other (See Comments)     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      Chlorhexidine Hives     Thor surgical cleanser     Codeine Hives     Has tolerated Oxycodone in past      Ibuprofen [Nsaids] Hives      Penicillins Hives     Other reaction(s): Unknown     Sulfa Drugs Hives     Other reaction(s): Unknown     Calcium Channel Blockers      Doxycycline GI Disturbance     Emesis & diarrhea  Patient denies allergy to this med     Hydroxyzine      rash     Mold        Family History   Problem Relation Age of Onset     Hypertension Mother      No Known Problems Father      No Known Problems Sister      No Known Problems Brother      No Known Problems Maternal Grandmother      No Known Problems Maternal Grandfather      No Known Problems Paternal Grandmother      No Known Problems Other      Social History     Socioeconomic History     Marital status:    Tobacco Use     Smoking status: Former Smoker     Packs/day: 1.50     Years: 20.00     Pack years: 30.00     Types: Cigarettes     Start date: 1973     Quit date: 1993     Years since quittin.3     Smokeless tobacco: Never Used     Tobacco comment: wish I never started   Substance and Sexual Activity     Alcohol use: No     Alcohol/week: 0.0 standard drinks     Drug use: No     Sexual activity: Not Currently     Partners: Male     Birth control/protection: Post-menopausal   Social History Narrative    ** Merged History Encounter **            Additional medical/Social/Surgical histories reviewed in [facility] and updated as appropriate.     REVIEW OF SYSTEMS (2022)  10 point ROS of systems including Constitutional, Eyes, Respiratory, Cardiovascular, Gastroenterology, Genitourinary, Integumentary, Musculoskeletal, Psychiatric, Allergic/Immunologic were all negative except for pertinent positives noted in my HPI.     PHYSICAL EXAM  VSS  General  - normal appearance, in no obvious distress  HEENT  - conjunctivae not injected, moist mucous membranes, normocephalic/atraumatic head, ears normal appearance, no lesions, mouth normal appearance, no scars, normal dentition and teeth present  CV  - normal radial pulse  Pulm  - normal respiratory pattern,  non-labored  Musculoskeletal - right shoulder  - inspection: normal bone and joint alignment, no obvious deformity, no scapular winging, no AC step-off  - palpation: mildly tender RC insertion, normal clavicle, non-tender AC  - ROM:  painful and limited flexion and ER at end range, limited IR  - strength: 5/5  strength, 5/5 in all shoulder planes  - special tests:  (-) Speed's  (+) Neer  (+) Hawkin's  (+) Jovanny's  (-) [place]'s  (-) apprehension  (-) subscap lift-off  Neuro  - no sensory or motor deficit, grossly normal coordination, normal muscle tone  Skin  - no ecchymosis, erythema, warmth, or induration, no obvious rash  Psych  - interactive, appropriate, normal mood and affect  ASSESSMENT & PLAN  63 yo female with right shoulder pain due to subacromial bursitis, rotator cuff arthropathy, worse  I independently reviewed the following imaging studies:  Xray shoulder: shows arthritis  After a 20 minute discussion and examination, we decided to perform a same day injection for diagnostic and therapeutic purposes for right shoulder  Appropriate PPE was utilized for prevention of spread of Covid-19.  Shon Simpson MD, CAQSM  PROCEDURE: right SHOULDER subacromial bursa injection     The patient was apprised of the risks and the benefits of the procedure and consented and a written consent was signed by the patient.   The patient s shoulder was sterilely prepped with chloraprep.   40 mg of depo suspension was drawn up into a 5 mL syringe with 4 mL of 1% lidocaine   The patient was injected with a 1.5-inch 22-gauge needle at lateral gleno-humeral joint in the subacromial space  There were no complications. The patient tolerated the procedure well. There was minimal bleeding.   The patient was instructed to ice the shoulder upon leaving clinic and refrain from overuse over the next 3 days.   The patient was instructed to go to the emergency room with any usual pain, swelling, or redness occurred in the injected area.    The patient was given a followup appointment to evaluate response to the injection to their increased range of motion and reduction of pain.    followup PRN  Dr Shon Simpson      Again, thank you for allowing me to participate in the care of your patient.        Sincerely,        Shon Simpson MD

## 2022-04-21 NOTE — PROGRESS NOTES
[facility] FOLLOW-UP VISIT 4/21/2022    Aspen Mccullough's chief complaint for this visit includes:  Chief Complaint   Patient presents with     Consult     Right shoulder injection     PCP: Arcadio Farfan    Referring Provider:  No referring provider defined for this encounter.    There were no vitals taken for this visit.  Data Unavailable       Interval History:     Follow up reason: Right shoulder    Date last seen: 3/30/22    Following Therapeutic Plan: Yes     Pain: Unchanged    Function: Unchanged     Medical History:    Any recent changes to your medical history? No    Any new medication prescribed since last visit? No    Review of Systems:    Do you have fever, chills, weight loss? No    Do you have any vision problems? No    Do you have any chest pain or edema? No    Do you have any shortness of breath or wheezing?  No    Do you have stomach problems? No    Do you have any urinary track issues? No    Do you have any numbness or focal weakness? No    Do you have diabetes? No    Do you have problems with bleeding or clotting? No    Do you have an rashes or other skin lesions? No        Large Joint Injection/Arthocentesis: R subacromial bursa    Date/Time: 4/21/2022 7:46 AM  Performed by: Shon Simpson MD  Authorized by: Shon Simpson MD     Indications:  Pain, diagnostic evaluation and osteoarthritis  Needle Size:  22 G  Guidance: landmark guided    Approach:  Lateral  Location:  Shoulder      Site:  R subacromial bursa  Medications:  40 mg methylPREDNISolone 40 MG/ML  Outcome:  Tolerated well, no immediate complications  Procedure discussed: discussed risks, benefits, and alternatives    Consent Given by:  Patient  Timeout: timeout called immediately prior to procedure    Prep: patient was prepped and draped in usual sterile fashion

## 2022-05-01 RX ORDER — METHYLPREDNISOLONE ACETATE 40 MG/ML
40 INJECTION, SUSPENSION INTRA-ARTICULAR; INTRALESIONAL; INTRAMUSCULAR; SOFT TISSUE
Status: DISCONTINUED | OUTPATIENT
Start: 2022-04-21 | End: 2022-08-10

## 2022-05-01 NOTE — PROGRESS NOTES
HISTORY OF PRESENT ILLNESS  Ms. Mccullough is a pleasant 62 year old year old female who presents to clinic today with   Aspen explains that her right shoulder has started to bother her more again  Her left shoulder has had improvement since injection  Location: right shoulder  Quality:  achy pain    Severity: 7/10 at worst    Duration: worse over past month  Timing: occurs intermittently  Context: occurs while exercising and lifting arm  Modifying factors:  resting and non-use makes it better, movement and use makes it worse  Associated signs & symptoms: neck and shoulder pain  MEDICAL HISTORY  Patient Active Problem List   Diagnosis     Generalized osteoarthrosis, involving multiple sites     CRPS (complex regional pain syndrome), lower limb     Fibromyalgia     Somatoform disorder     Histrionic personality (H)     Encounter for long-term use of opiate analgesic     Knee joint replacement by other means     Hypothyroidism     Insomnia, unspecified type     Hyperlipidemia     Hashimoto's thyroiditis     Glucocorticoid deficiency (H)     Posttraumatic stress disorder     Impingement syndrome of left shoulder     Abdominal cramping, generalized     Intermittent diarrhea     Primary osteoarthritis involving multiple joints     Attention deficit disorder     Allergic rhinitis     Cushing's syndrome (H)     Endometriosis     Essential hypertension     Systemic lupus erythematosus (H)     S/P cervical spinal fusion     Paroxysmal atrial fibrillation (H)     Nonischemic cardiomyopathy (H)     Personal history of DVT (deep vein thrombosis)     History of pulmonary embolism     Acute deep vein thrombosis (DVT) of popliteal vein of right lower extremity (H)     Acute pulmonary embolism (H)     Adrenal cortical steroids causing adverse effect in therapeutic use     Alopecia areata     Anemia, blood loss     Ankle pain, left     ARF (acute renal failure) (H)     Arthritis, septic (H)     Chronic ethmoidal sinusitis     Chronic  maxillary sinusitis     Deviated nasal septum     Complications due to internal joint prosthesis (H)     Generalized pain     Iatrogenic hyperthyroidism     S/P TKR (total knee replacement)     Left shoulder pain     Lipoma of skin and subcutaneous tissue     Malabsorption     Nasal fracture     Nasal turbinate hypertrophy     Opioid type dependence (H)     Other specified abnormal findings of blood chemistry     Other acute postoperative pain     Pain in joint, lower leg     Postoperative hypotension     S/P total knee replacement     Thrombus of right atrial appendage without antecedent myocardial infarction     Chronic pain of both shoulders     GI bleed     Grade III internal hemorrhoids     Atrial fibrillation with RVR (H)     Acute deep vein thrombosis (DVT) of proximal vein of right lower extremity (H)     Pneumonia of left upper lobe due to infectious organism     Morbid obesity (H)     Cerebrovascular accident (CVA), unspecified mechanism (H)     CVA (cerebral vascular accident) (H)     Neuropathy of left suprascapular nerve     Neuropathy of right suprascapular nerve     Stage 3 right buttock     BMI 40.0-44.9, adult (H)     Chronic anticoagulation     Controlled substance agreement signed     Craniofacial hyperhidrosis     Current chronic use of systemic steroids     DNR (do not resuscitate)     Hypertonic bladder     Hypo-osmolality and hyponatremia     Lumbosacral radiculopathy at S1     Overflow diarrhea     Refusal of blood transfusions as patient is Hinduism     Unspecified fall, initial encounter     Unspecified injury of shoulder and upper arm, unspecified arm, initial encounter     Weakness     Chronic pain disorder     Constipation     Hemoptysis     COVID-19 virus infection       Current Outpatient Medications   Medication Sig Dispense Refill     methylPREDNISolone (MEDROL) 4 MG tablet therapy pack Follow Package Directions 21 tablet 0     atorvastatin (LIPITOR) 40 MG tablet Take 1  tablet (40 mg) by mouth every evening 30 tablet 0     benzonatate (TESSALON) 100 MG capsule Take 1 capsule (100 mg) by mouth 3 times daily as needed for cough 30 capsule 0     BIOTIN FORTE PO Take 1 tablet by mouth daily        carboxymethylcellulose (CARBOXYMETHYLCELLULOSE SODIUM) 0.5 % SOLN ophthalmic solution Place 1 drop into both eyes 2 times daily as needed  1 Bottle      cyanocobalamin 1000 MCG/ML injection Inject 1 mL (1,000 mcg) Subcutaneous every 7 days 4 mL 5     cyclobenzaprine (FLEXERIL) 10 MG tablet Take 1 tablet (10 mg) by mouth 3 times daily 90 tablet 3     cycloSPORINE (RESTASIS) 0.05 % ophthalmic emulsion Place 1 drop into both eyes 2 times daily as needed        Elastic Bandages & Supports (MEDICAL COMPRESSION SOCKS) MISC 1 Package daily Please measure and distribute 2 pair of 20mmHg - 30mmHg THIGH high open or closed toe compression stockings with extra refills as indicated. Jobst ultrasheer or equivalent. 2 each 3     fexofenadine (ALLEGRA) 180 MG tablet Take 180 mg by mouth daily as needed        gabapentin (NEURONTIN) 300 MG capsule Take 300 mg by mouth 2 times daily (morning and afternoon) with 800mg tablet (total dose 1100mg)       gabapentin (NEURONTIN) 800 MG tablet Take 800 mg by mouth At Bedtime        gabapentin (NEURONTIN) 800 MG tablet Take 800 mg by mouth 2 times daily (morning and afternoon) in addition to 300mg capsule (total dose 1100mg)       guaiFENesin-dextromethorphan (ROBITUSSIN DM) 100-10 MG/5ML syrup Take 10 mLs by mouth every 4 hours as needed for cough 118 mL 0     HYDROmorphone (DILAUDID) 8 MG tablet Take 8 mg by mouth 3 times daily . Max of 3 doses per day.       lifitegrast (XIIDRA) 5 % opthalmic solution Place 1 drop into both eyes 2 times daily       methylPREDNISolone (MEDROL) 4 MG tablet therapy pack Follow Package Directions 21 tablet 0     metoprolol succinate ER (TOPROL-XL) 100 MG 24 hr tablet Take 1 tablet (100 mg) by mouth 2 times daily Appointment needed for  refills. Please call [phone]. 180 tablet 0     morphine (MS CONTIN) 30 MG 12 hr tablet Take 30 mg by mouth 2 times daily (morning and night) in addition to 60 mg tablet for a total dose of 90mg. 270 tablet 0     morphine (MS CONTIN) 60 MG 12 hr tablet Take 60 mg by mouth daily in the afternoon (in addition to the 2 - 90mg doses)       morphine (MS CONTIN) 60 MG 12 hr tablet Take 60 mg by mouth 2 times daily (morning and night) in addition to 30mg tablet for a total dose of 90mg 30 tablet 0     multivitamin, therapeutic with minerals (MULTI-VITAMIN) TABS tablet Take 1 tablet by mouth daily  100 tablet 3     NASONEX 50 MCG/ACT spray Spray 1 spray into both nostrils daily as needed   11     nystatin (MYCOSTATIN) 706945 UNIT/GM external powder Apply topically 3 times daily as needed 60 g 1     prednisoLONE acetate (PRED FORTE) 1 % ophthalmic susp Place 1 drop into both eyes 2 times daily        predniSONE (DELTASONE) 20 MG tablet        predniSONE (DELTASONE) 5 MG tablet        rivaroxaban ANTICOAGULANT (XARELTO ANTICOAGULANT) 20 MG TABS tablet Take 1 tablet (20 mg) by mouth daily (with dinner) Hold today 11/01 and restart when no longer having bloody coughing, suspected in 1-2 days.       TIROSINT 200 MCG CAPS Take 600 mcg by mouth daily        zolpidem (AMBIEN) 5 MG tablet Take 5 mg by mouth nightly as needed for sleep       zoster vaccine recombinant adjuvanted (SHINGRIX) injection GIVEN AT PHARMACY: FIRST DOSE NOW, SECOND DOSE GIVEN 2 TO 6 MONTHS AFTER         Allergies   Allergen Reactions     Blood Transfusion Related (Informational Only) Other (See Comments)     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      Chlorhexidine Hives     Thor surgical cleanser     Codeine Hives     Has tolerated Oxycodone in past      Ibuprofen [Nsaids] Hives     Penicillins Hives     Other reaction(s): Unknown     Sulfa Drugs Hives     Other reaction(s): Unknown     Calcium Channel Blockers      Doxycycline GI  Disturbance     Emesis & diarrhea  Patient denies allergy to this med     Hydroxyzine      rash     Mold        Family History   Problem Relation Age of Onset     Hypertension Mother      No Known Problems Father      No Known Problems Sister      No Known Problems Brother      No Known Problems Maternal Grandmother      No Known Problems Maternal Grandfather      No Known Problems Paternal Grandmother      No Known Problems Other      Social History     Socioeconomic History     Marital status:    Tobacco Use     Smoking status: Former Smoker     Packs/day: 1.50     Years: 20.00     Pack years: 30.00     Types: Cigarettes     Start date: 1973     Quit date: 1993     Years since quittin.3     Smokeless tobacco: Never Used     Tobacco comment: wish I never started   Substance and Sexual Activity     Alcohol use: No     Alcohol/week: 0.0 standard drinks     Drug use: No     Sexual activity: Not Currently     Partners: Male     Birth control/protection: Post-menopausal   Social History Narrative    ** Merged History Encounter **            Additional medical/Social/Surgical histories reviewed in [facility] and updated as appropriate.     REVIEW OF SYSTEMS (2022)  10 point ROS of systems including Constitutional, Eyes, Respiratory, Cardiovascular, Gastroenterology, Genitourinary, Integumentary, Musculoskeletal, Psychiatric, Allergic/Immunologic were all negative except for pertinent positives noted in my HPI.     PHYSICAL EXAM  VSS  General  - normal appearance, in no obvious distress  HEENT  - conjunctivae not injected, moist mucous membranes, normocephalic/atraumatic head, ears normal appearance, no lesions, mouth normal appearance, no scars, normal dentition and teeth present  CV  - normal radial pulse  Pulm  - normal respiratory pattern, non-labored  Musculoskeletal - right shoulder  - inspection: normal bone and joint alignment, no obvious deformity, no scapular winging, no AC step-off  -  palpation: mildly tender RC insertion, normal clavicle, non-tender AC  - ROM:  painful and limited flexion and ER at end range, limited IR  - strength: 5/5  strength, 5/5 in all shoulder planes  - special tests:  (-) Speed's  (+) Neer  (+) Hawkin's  (+) Jovanny's  (-) [place]'s  (-) apprehension  (-) subscap lift-off  Neuro  - no sensory or motor deficit, grossly normal coordination, normal muscle tone  Skin  - no ecchymosis, erythema, warmth, or induration, no obvious rash  Psych  - interactive, appropriate, normal mood and affect  ASSESSMENT & PLAN  63 yo female with right shoulder pain due to subacromial bursitis, rotator cuff arthropathy, worse  I independently reviewed the following imaging studies:  Xray shoulder: shows arthritis  After a 20 minute discussion and examination, we decided to perform a same day injection for diagnostic and therapeutic purposes for right shoulder  Appropriate PPE was utilized for prevention of spread of Covid-19.  Shon Simpson MD, CAQSM  PROCEDURE: right SHOULDER subacromial bursa injection     The patient was apprised of the risks and the benefits of the procedure and consented and a written consent was signed by the patient.   The patient s shoulder was sterilely prepped with chloraprep.   40 mg of depo suspension was drawn up into a 5 mL syringe with 4 mL of 1% lidocaine   The patient was injected with a 1.5-inch 22-gauge needle at lateral gleno-humeral joint in the subacromial space  There were no complications. The patient tolerated the procedure well. There was minimal bleeding.   The patient was instructed to ice the shoulder upon leaving clinic and refrain from overuse over the next 3 days.   The patient was instructed to go to the emergency room with any usual pain, swelling, or redness occurred in the injected area.   The patient was given a followup appointment to evaluate response to the injection to their increased range of motion and reduction of pain.    followup  KERI Simpson

## 2022-05-13 DIAGNOSIS — B37.2 CANDIDAL SKIN INFECTION: ICD-10-CM

## 2022-05-13 RX ORDER — NYSTATIN 100000 [USP'U]/G
POWDER TOPICAL
Qty: 60 G | Refills: 1 | OUTPATIENT
Start: 2022-05-13

## 2022-05-13 NOTE — TELEPHONE ENCOUNTER
"Requested Prescriptions   Pending Prescriptions Disp Refills     NYSTOP [id number] UNIT/GM powder [Pharmacy Med Name: NYSTOP [id number]/GM POWD] 60 g 1     Sig: APPLY TOPICALLY THREE TIMES A DAY AS NEEDED       Antifungal Agents Failed - 5/13/2022 11:21 AM        Failed - Recent (12 mo) or future (30 days) visit within the authorizing provider's specialty     Patient has had an office visit with the authorizing provider or a provider within the authorizing providers department within the previous 12 mos or has a future within next 30 days. See \"Patient Info\" tab in inbasket, or \"Choose Columns\" in Meds & Orders section of the refill encounter.              Passed - Not Fluconazole or Terconazole      If oral Fluconazole or Terconazole, may refill if indicated in progress notes.           Passed - Medication is active on med list           Last Written Prescription Date:  9/28/20  Last Fill Quantity: 60,  # refills: 1   Last office visit: 9/28/2020 with prescribing provider:  Sintia   Future Office Visit:   Next 5 appointments (look out 90 days)    May 23, 2022  8:40 AM  (Arrive by 8:25 AM)  Return Visit with Shon Simpson MD  [facility] [place] ([facility] [place] [address] [address] [place] [id number] [phone]        Rx Denied.  Kana Garcia RN on 5/13/2022 at 11:58 AM            "

## 2022-05-23 ENCOUNTER — OFFICE VISIT (OUTPATIENT)
Dept: ORTHOPEDICS | Facility: CLINIC | Age: 62
End: 2022-05-23
Payer: COMMERCIAL

## 2022-05-23 DIAGNOSIS — M51.369 DDD (DEGENERATIVE DISC DISEASE), LUMBAR: ICD-10-CM

## 2022-05-23 DIAGNOSIS — M54.17 LUMBOSACRAL RADICULOPATHY AT S1: Primary | ICD-10-CM

## 2022-05-23 DIAGNOSIS — M51.16 LUMBAR DISC HERNIATION WITH RADICULOPATHY: ICD-10-CM

## 2022-05-23 PROCEDURE — 99214 OFFICE O/P EST MOD 30 MIN: CPT | Performed by: PREVENTIVE MEDICINE

## 2022-05-23 RX ORDER — METHYLPREDNISOLONE 4 MG
TABLET, DOSE PACK ORAL
Qty: 21 TABLET | Refills: 0 | Status: SHIPPED | OUTPATIENT
Start: 2022-05-23 | End: 2022-08-08

## 2022-05-23 NOTE — NURSING NOTE
[facility] FOLLOW-UP VISIT 5/23/2022    Aspen Mccullough's chief complaint for this visit includes:  Chief Complaint   Patient presents with     RECHECK     Follow-up lower back pain     PCP: Arcadio Farfan    Referring Provider:  No referring provider defined for this encounter.    There were no vitals taken for this visit.  Data Unavailable       Interval History:     Follow up reason: lower back pain    Date last seen: 4/21/22    Following Therapeutic Plan: Yes     Pain: Worsening    Function: Worsening    What have you been doing since last vist: rest     Medical History:    Any recent changes to your medical history? No    Any new medication prescribed since last visit? No

## 2022-05-23 NOTE — LETTER
5/23/2022         RE: Aspen Mccullough  [address]  [facility] [address]        Dear Colleague,    Thank you for referring your patient, Aspen Mccullough, to the [facility] [place]. Please see a copy of my visit note below.    HISTORY OF PRESENT ILLNESS  Ms. Mccullough is a pleasant 62 year old year old female who presents to clinic today with ongoing low back pain  Aspen explains that she would like to consider another lumbar injection  Location: low back  Quality:  achy pain    Severity:8/10 at worst    Duration: worse over past several months  Timing: occurs intermittently  Context: occurs while exercising and lifting  Modifying factors:  resting and non-use makes it better, movement and use makes it worse  Associated signs & symptoms: radiation into legs    MEDICAL HISTORY  Patient Active Problem List   Diagnosis     Generalized osteoarthrosis, involving multiple sites     CRPS (complex regional pain syndrome), lower limb     Fibromyalgia     Somatoform disorder     Histrionic personality (H)     Encounter for long-term use of opiate analgesic     Knee joint replacement by other means     Hypothyroidism     Insomnia, unspecified type     Hyperlipidemia     Hashimoto's thyroiditis     Glucocorticoid deficiency (H)     Posttraumatic stress disorder     Impingement syndrome of left shoulder     Abdominal cramping, generalized     Intermittent diarrhea     Primary osteoarthritis involving multiple joints     Attention deficit disorder     Allergic rhinitis     Cushing's syndrome (H)     Endometriosis     Essential hypertension     Systemic lupus erythematosus (H)     S/P cervical spinal fusion     Paroxysmal atrial fibrillation (H)     Nonischemic cardiomyopathy (H)     Personal history of DVT (deep vein thrombosis)     History of pulmonary embolism     Acute deep vein thrombosis (DVT) of popliteal vein of right lower extremity (H)     Acute pulmonary embolism (H)      Adrenal cortical steroids causing adverse effect in therapeutic use     Alopecia areata     Anemia, blood loss     Ankle pain, left     ARF (acute renal failure) (H)     Arthritis, septic (H)     Chronic ethmoidal sinusitis     Chronic maxillary sinusitis     Deviated nasal septum     Complications due to internal joint prosthesis (H)     Generalized pain     Iatrogenic hyperthyroidism     S/P TKR (total knee replacement)     Left shoulder pain     Lipoma of skin and subcutaneous tissue     Malabsorption     Nasal fracture     Nasal turbinate hypertrophy     Opioid type dependence (H)     Other specified abnormal findings of blood chemistry     Other acute postoperative pain     Pain in joint, lower leg     Postoperative hypotension     S/P total knee replacement     Thrombus of right atrial appendage without antecedent myocardial infarction     Chronic pain of both shoulders     GI bleed     Grade III internal hemorrhoids     Atrial fibrillation with RVR (H)     Acute deep vein thrombosis (DVT) of proximal vein of right lower extremity (H)     Pneumonia of left upper lobe due to infectious organism     Morbid obesity (H)     Cerebrovascular accident (CVA), unspecified mechanism (H)     CVA (cerebral vascular accident) (H)     Neuropathy of left suprascapular nerve     Neuropathy of right suprascapular nerve     Stage 3 right buttock     BMI 40.0-44.9, adult (H)     Chronic anticoagulation     Controlled substance agreement signed     Craniofacial hyperhidrosis     Current chronic use of systemic steroids     DNR (do not resuscitate)     Hypertonic bladder     Hypo-osmolality and hyponatremia     Lumbosacral radiculopathy at S1     Overflow diarrhea     Refusal of blood transfusions as patient is Anabaptist     Unspecified fall, initial encounter     Unspecified injury of shoulder and upper arm, unspecified arm, initial encounter     Weakness     Chronic pain disorder     Constipation     Hemoptysis      COVID-19 virus infection       Current Outpatient Medications   Medication Sig Dispense Refill     atorvastatin (LIPITOR) 40 MG tablet Take 1 tablet (40 mg) by mouth every evening 30 tablet 0     benzonatate (TESSALON) 100 MG capsule Take 1 capsule (100 mg) by mouth 3 times daily as needed for cough 30 capsule 0     BIOTIN FORTE PO Take 1 tablet by mouth daily        carboxymethylcellulose (CARBOXYMETHYLCELLULOSE SODIUM) 0.5 % SOLN ophthalmic solution Place 1 drop into both eyes 2 times daily as needed  1 Bottle      cyanocobalamin 1000 MCG/ML injection Inject 1 mL (1,000 mcg) Subcutaneous every 7 days 4 mL 5     cyclobenzaprine (FLEXERIL) 10 MG tablet Take 1 tablet (10 mg) by mouth 3 times daily 90 tablet 3     cycloSPORINE (RESTASIS) 0.05 % ophthalmic emulsion Place 1 drop into both eyes 2 times daily as needed        Elastic Bandages & Supports (MEDICAL COMPRESSION SOCKS) MISC 1 Package daily Please measure and distribute 2 pair of 20mmHg - 30mmHg THIGH high open or closed toe compression stockings with extra refills as indicated. Jobst ultrasheer or equivalent. 2 each 3     fexofenadine (ALLEGRA) 180 MG tablet Take 180 mg by mouth daily as needed        gabapentin (NEURONTIN) 300 MG capsule Take 300 mg by mouth 2 times daily (morning and afternoon) with 800mg tablet (total dose 1100mg)       gabapentin (NEURONTIN) 800 MG tablet Take 800 mg by mouth At Bedtime        gabapentin (NEURONTIN) 800 MG tablet Take 800 mg by mouth 2 times daily (morning and afternoon) in addition to 300mg capsule (total dose 1100mg)       guaiFENesin-dextromethorphan (ROBITUSSIN DM) 100-10 MG/5ML syrup Take 10 mLs by mouth every 4 hours as needed for cough 118 mL 0     HYDROmorphone (DILAUDID) 8 MG tablet Take 8 mg by mouth 3 times daily . Max of 3 doses per day.       lifitegrast (XIIDRA) 5 % opthalmic solution Place 1 drop into both eyes 2 times daily       methylPREDNISolone (MEDROL) 4 MG tablet therapy pack Follow Package Directions  21 tablet 0     methylPREDNISolone (MEDROL) 4 MG tablet therapy pack Follow Package Directions 21 tablet 0     metoprolol succinate ER (TOPROL-XL) 100 MG 24 hr tablet Take 1 tablet (100 mg) by mouth 2 times daily Appointment needed for refills. Please call [phone]. 180 tablet 0     morphine (MS CONTIN) 30 MG 12 hr tablet Take 30 mg by mouth 2 times daily (morning and night) in addition to 60 mg tablet for a total dose of 90mg. 270 tablet 0     morphine (MS CONTIN) 60 MG 12 hr tablet Take 60 mg by mouth daily in the afternoon (in addition to the 2 - 90mg doses)       morphine (MS CONTIN) 60 MG 12 hr tablet Take 60 mg by mouth 2 times daily (morning and night) in addition to 30mg tablet for a total dose of 90mg 30 tablet 0     multivitamin, therapeutic with minerals (MULTI-VITAMIN) TABS tablet Take 1 tablet by mouth daily  100 tablet 3     NASONEX 50 MCG/ACT spray Spray 1 spray into both nostrils daily as needed   11     nystatin (MYCOSTATIN) 063687 UNIT/GM external powder Apply topically 3 times daily as needed 60 g 1     prednisoLONE acetate (PRED FORTE) 1 % ophthalmic susp Place 1 drop into both eyes 2 times daily        predniSONE (DELTASONE) 20 MG tablet        predniSONE (DELTASONE) 5 MG tablet        rivaroxaban ANTICOAGULANT (XARELTO ANTICOAGULANT) 20 MG TABS tablet Take 1 tablet (20 mg) by mouth daily (with dinner) Hold today 11/01 and restart when no longer having bloody coughing, suspected in 1-2 days.       TIROSINT 200 MCG CAPS Take 600 mcg by mouth daily        zolpidem (AMBIEN) 5 MG tablet Take 5 mg by mouth nightly as needed for sleep       zoster vaccine recombinant adjuvanted (SHINGRIX) injection GIVEN AT PHARMACY: FIRST DOSE NOW, SECOND DOSE GIVEN 2 TO 6 MONTHS AFTER         Allergies   Allergen Reactions     Blood Transfusion Related (Informational Only) Other (See Comments)     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      Chlorhexidine Hives     Thor surgical cleanser      Codeine Hives     Has tolerated Oxycodone in past      Ibuprofen [Nsaids] Hives     Penicillins Hives     Other reaction(s): Unknown     Sulfa Drugs Hives     Other reaction(s): Unknown     Calcium Channel Blockers      Doxycycline GI Disturbance     Emesis & diarrhea  Patient denies allergy to this med     Hydroxyzine      rash     Mold        Family History   Problem Relation Age of Onset     Hypertension Mother      No Known Problems Father      No Known Problems Sister      No Known Problems Brother      No Known Problems Maternal Grandmother      No Known Problems Maternal Grandfather      No Known Problems Paternal Grandmother      No Known Problems Other      Social History     Socioeconomic History     Marital status:    Tobacco Use     Smoking status: Former Smoker     Packs/day: 1.50     Years: 20.00     Pack years: 30.00     Types: Cigarettes     Start date: 1973     Quit date: 1993     Years since quittin.4     Smokeless tobacco: Never Used     Tobacco comment: wish I never started   Substance and Sexual Activity     Alcohol use: No     Alcohol/week: 0.0 standard drinks     Drug use: No     Sexual activity: Not Currently     Partners: Male     Birth control/protection: Post-menopausal   Social History Narrative    ** Merged History Encounter **            Additional medical/Social/Surgical histories reviewed in [facility] and updated as appropriate.     REVIEW OF SYSTEMS (2022)  10 point ROS of systems including Constitutional, Eyes, Respiratory, Cardiovascular, Gastroenterology, Genitourinary, Integumentary, Musculoskeletal, Psychiatric, Allergic/Immunologic were all negative except for pertinent positives noted in my HPI.     PHYSICAL EXAM  VSS  General  - normal appearance, in no obvious distress  HEENT  - conjunctivae not injected, moist mucous membranes, normocephalic/atraumatic head, ears normal appearance, no lesions, mouth normal appearance, no scars, normal dentition and teeth  present  CV  - normal peripheral perfusion  Pulm  - normal respiratory pattern, non-labored  Musculoskeletal - lumbar spine  - stance: normal gait without limp, no obvious leg length discrepancy, normal heel and toe walk  - inspection: normal bone and joint alignment, no obvious scoliosis  - palpation: no paravertebral or bony tenderness  - ROM: flexion exacerbates pain, normal extension, sidebending, rotation  - strength: lower extremities 5/5 in all planes  - special tests:  (+) straight leg raise  (+) slump test  Neuro  - patellar and Achilles DTRs 2+ bilaterally, lower extremity sensory deficit throughout L5 distribution, grossly normal coordination, normal muscle tone  Skin  - no ecchymosis, erythema, warmth, or induration, no obvious rash  Psych  - interactive, appropriate, normal mood and affect  ASSESSMENT & PLAN  61 yo female with lumbar ddd, disc herniation, radicular pain, worse    I independently reviewed the following imaging studies:  Lumbar MRI: shows ddd, disc herniations  Discussed and ordered ANASTASIA  Given RX for medrol  Ordered PT    Appropriate PPE was utilized for prevention of spread of Covid-19.  Shon Simpson MD, [facility]        Again, thank you for allowing me to participate in the care of your patient.        Sincerely,        Shon Simpson MD

## 2022-05-23 NOTE — PROGRESS NOTES
HISTORY OF PRESENT ILLNESS  Ms. Mccullough is a pleasant 62 year old year old female who presents to clinic today with ongoing low back pain  Aspen explains that she would like to consider another lumbar injection  Location: low back  Quality:  achy pain    Severity:8/10 at worst    Duration: worse over past several months  Timing: occurs intermittently  Context: occurs while exercising and lifting  Modifying factors:  resting and non-use makes it better, movement and use makes it worse  Associated signs & symptoms: radiation into legs    MEDICAL HISTORY  Patient Active Problem List   Diagnosis     Generalized osteoarthrosis, involving multiple sites     CRPS (complex regional pain syndrome), lower limb     Fibromyalgia     Somatoform disorder     Histrionic personality (H)     Encounter for long-term use of opiate analgesic     Knee joint replacement by other means     Hypothyroidism     Insomnia, unspecified type     Hyperlipidemia     Hashimoto's thyroiditis     Glucocorticoid deficiency (H)     Posttraumatic stress disorder     Impingement syndrome of left shoulder     Abdominal cramping, generalized     Intermittent diarrhea     Primary osteoarthritis involving multiple joints     Attention deficit disorder     Allergic rhinitis     Cushing's syndrome (H)     Endometriosis     Essential hypertension     Systemic lupus erythematosus (H)     S/P cervical spinal fusion     Paroxysmal atrial fibrillation (H)     Nonischemic cardiomyopathy (H)     Personal history of DVT (deep vein thrombosis)     History of pulmonary embolism     Acute deep vein thrombosis (DVT) of popliteal vein of right lower extremity (H)     Acute pulmonary embolism (H)     Adrenal cortical steroids causing adverse effect in therapeutic use     Alopecia areata     Anemia, blood loss     Ankle pain, left     ARF (acute renal failure) (H)     Arthritis, septic (H)     Chronic ethmoidal sinusitis     Chronic maxillary sinusitis     Deviated nasal  septum     Complications due to internal joint prosthesis (H)     Generalized pain     Iatrogenic hyperthyroidism     S/P TKR (total knee replacement)     Left shoulder pain     Lipoma of skin and subcutaneous tissue     Malabsorption     Nasal fracture     Nasal turbinate hypertrophy     Opioid type dependence (H)     Other specified abnormal findings of blood chemistry     Other acute postoperative pain     Pain in joint, lower leg     Postoperative hypotension     S/P total knee replacement     Thrombus of right atrial appendage without antecedent myocardial infarction     Chronic pain of both shoulders     GI bleed     Grade III internal hemorrhoids     Atrial fibrillation with RVR (H)     Acute deep vein thrombosis (DVT) of proximal vein of right lower extremity (H)     Pneumonia of left upper lobe due to infectious organism     Morbid obesity (H)     Cerebrovascular accident (CVA), unspecified mechanism (H)     CVA (cerebral vascular accident) (H)     Neuropathy of left suprascapular nerve     Neuropathy of right suprascapular nerve     Stage 3 right buttock     BMI 40.0-44.9, adult (H)     Chronic anticoagulation     Controlled substance agreement signed     Craniofacial hyperhidrosis     Current chronic use of systemic steroids     DNR (do not resuscitate)     Hypertonic bladder     Hypo-osmolality and hyponatremia     Lumbosacral radiculopathy at S1     Overflow diarrhea     Refusal of blood transfusions as patient is Nondenominational     Unspecified fall, initial encounter     Unspecified injury of shoulder and upper arm, unspecified arm, initial encounter     Weakness     Chronic pain disorder     Constipation     Hemoptysis     COVID-19 virus infection       Current Outpatient Medications   Medication Sig Dispense Refill     atorvastatin (LIPITOR) 40 MG tablet Take 1 tablet (40 mg) by mouth every evening 30 tablet 0     benzonatate (TESSALON) 100 MG capsule Take 1 capsule (100 mg) by mouth 3 times daily  as needed for cough 30 capsule 0     BIOTIN FORTE PO Take 1 tablet by mouth daily        carboxymethylcellulose (CARBOXYMETHYLCELLULOSE SODIUM) 0.5 % SOLN ophthalmic solution Place 1 drop into both eyes 2 times daily as needed  1 Bottle      cyanocobalamin 1000 MCG/ML injection Inject 1 mL (1,000 mcg) Subcutaneous every 7 days 4 mL 5     cyclobenzaprine (FLEXERIL) 10 MG tablet Take 1 tablet (10 mg) by mouth 3 times daily 90 tablet 3     cycloSPORINE (RESTASIS) 0.05 % ophthalmic emulsion Place 1 drop into both eyes 2 times daily as needed        Elastic Bandages & Supports (MEDICAL COMPRESSION SOCKS) MISC 1 Package daily Please measure and distribute 2 pair of 20mmHg - 30mmHg THIGH high open or closed toe compression stockings with extra refills as indicated. Jobst ultrasheer or equivalent. 2 each 3     fexofenadine (ALLEGRA) 180 MG tablet Take 180 mg by mouth daily as needed        gabapentin (NEURONTIN) 300 MG capsule Take 300 mg by mouth 2 times daily (morning and afternoon) with 800mg tablet (total dose 1100mg)       gabapentin (NEURONTIN) 800 MG tablet Take 800 mg by mouth At Bedtime        gabapentin (NEURONTIN) 800 MG tablet Take 800 mg by mouth 2 times daily (morning and afternoon) in addition to 300mg capsule (total dose 1100mg)       guaiFENesin-dextromethorphan (ROBITUSSIN DM) 100-10 MG/5ML syrup Take 10 mLs by mouth every 4 hours as needed for cough 118 mL 0     HYDROmorphone (DILAUDID) 8 MG tablet Take 8 mg by mouth 3 times daily . Max of 3 doses per day.       lifitegrast (XIIDRA) 5 % opthalmic solution Place 1 drop into both eyes 2 times daily       methylPREDNISolone (MEDROL) 4 MG tablet therapy pack Follow Package Directions 21 tablet 0     methylPREDNISolone (MEDROL) 4 MG tablet therapy pack Follow Package Directions 21 tablet 0     metoprolol succinate ER (TOPROL-XL) 100 MG 24 hr tablet Take 1 tablet (100 mg) by mouth 2 times daily Appointment needed for refills. Please call [phone]. 180  tablet 0     morphine (MS CONTIN) 30 MG 12 hr tablet Take 30 mg by mouth 2 times daily (morning and night) in addition to 60 mg tablet for a total dose of 90mg. 270 tablet 0     morphine (MS CONTIN) 60 MG 12 hr tablet Take 60 mg by mouth daily in the afternoon (in addition to the 2 - 90mg doses)       morphine (MS CONTIN) 60 MG 12 hr tablet Take 60 mg by mouth 2 times daily (morning and night) in addition to 30mg tablet for a total dose of 90mg 30 tablet 0     multivitamin, therapeutic with minerals (MULTI-VITAMIN) TABS tablet Take 1 tablet by mouth daily  100 tablet 3     NASONEX 50 MCG/ACT spray Spray 1 spray into both nostrils daily as needed   11     nystatin (MYCOSTATIN) 402586 UNIT/GM external powder Apply topically 3 times daily as needed 60 g 1     prednisoLONE acetate (PRED FORTE) 1 % ophthalmic susp Place 1 drop into both eyes 2 times daily        predniSONE (DELTASONE) 20 MG tablet        predniSONE (DELTASONE) 5 MG tablet        rivaroxaban ANTICOAGULANT (XARELTO ANTICOAGULANT) 20 MG TABS tablet Take 1 tablet (20 mg) by mouth daily (with dinner) Hold today 11/01 and restart when no longer having bloody coughing, suspected in 1-2 days.       TIROSINT 200 MCG CAPS Take 600 mcg by mouth daily        zolpidem (AMBIEN) 5 MG tablet Take 5 mg by mouth nightly as needed for sleep       zoster vaccine recombinant adjuvanted (SHINGRIX) injection GIVEN AT PHARMACY: FIRST DOSE NOW, SECOND DOSE GIVEN 2 TO 6 MONTHS AFTER         Allergies   Allergen Reactions     Blood Transfusion Related (Informational Only) Other (See Comments)     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      Chlorhexidine Hives     Thor surgical cleanser     Codeine Hives     Has tolerated Oxycodone in past      Ibuprofen [Nsaids] Hives     Penicillins Hives     Other reaction(s): Unknown     Sulfa Drugs Hives     Other reaction(s): Unknown     Calcium Channel Blockers      Doxycycline GI Disturbance     Emesis & diarrhea  Patient  denies allergy to this med     Hydroxyzine      rash     Mold        Family History   Problem Relation Age of Onset     Hypertension Mother      No Known Problems Father      No Known Problems Sister      No Known Problems Brother      No Known Problems Maternal Grandmother      No Known Problems Maternal Grandfather      No Known Problems Paternal Grandmother      No Known Problems Other      Social History     Socioeconomic History     Marital status:    Tobacco Use     Smoking status: Former Smoker     Packs/day: 1.50     Years: 20.00     Pack years: 30.00     Types: Cigarettes     Start date: 1973     Quit date: 1993     Years since quittin.4     Smokeless tobacco: Never Used     Tobacco comment: wish I never started   Substance and Sexual Activity     Alcohol use: No     Alcohol/week: 0.0 standard drinks     Drug use: No     Sexual activity: Not Currently     Partners: Male     Birth control/protection: Post-menopausal   Social History Narrative    ** Merged History Encounter **            Additional medical/Social/Surgical histories reviewed in [facility] and updated as appropriate.     REVIEW OF SYSTEMS (2022)  10 point ROS of systems including Constitutional, Eyes, Respiratory, Cardiovascular, Gastroenterology, Genitourinary, Integumentary, Musculoskeletal, Psychiatric, Allergic/Immunologic were all negative except for pertinent positives noted in my HPI.     PHYSICAL EXAM  VSS  General  - normal appearance, in no obvious distress  HEENT  - conjunctivae not injected, moist mucous membranes, normocephalic/atraumatic head, ears normal appearance, no lesions, mouth normal appearance, no scars, normal dentition and teeth present  CV  - normal peripheral perfusion  Pulm  - normal respiratory pattern, non-labored  Musculoskeletal - lumbar spine  - stance: normal gait without limp, no obvious leg length discrepancy, normal heel and toe walk  - inspection: normal bone and joint alignment, no  obvious scoliosis  - palpation: no paravertebral or bony tenderness  - ROM: flexion exacerbates pain, normal extension, sidebending, rotation  - strength: lower extremities 5/5 in all planes  - special tests:  (+) straight leg raise  (+) slump test  Neuro  - patellar and Achilles DTRs 2+ bilaterally, lower extremity sensory deficit throughout L5 distribution, grossly normal coordination, normal muscle tone  Skin  - no ecchymosis, erythema, warmth, or induration, no obvious rash  Psych  - interactive, appropriate, normal mood and affect  ASSESSMENT & PLAN  63 yo female with lumbar ddd, disc herniation, radicular pain, worse    I independently reviewed the following imaging studies:  Lumbar MRI: shows ddd, disc herniations  Discussed and ordered ANASTASIA  Given RX for medrol  Ordered PT    Appropriate PPE was utilized for prevention of spread of Covid-19.  Shon Simpson MD, CAQSM

## 2022-05-26 ENCOUNTER — TELEPHONE (OUTPATIENT)
Dept: MEDSURG UNIT | Facility: CLINIC | Age: 62
End: 2022-05-26
Payer: COMMERCIAL

## 2022-05-27 ENCOUNTER — TELEPHONE (OUTPATIENT)
Dept: MEDSURG UNIT | Facility: CLINIC | Age: 62
End: 2022-05-27
Payer: COMMERCIAL

## 2022-05-31 DIAGNOSIS — I48.91 ATRIAL FIBRILLATION WITH RVR (H): ICD-10-CM

## 2022-05-31 RX ORDER — METOPROLOL SUCCINATE 100 MG/1
100 TABLET, EXTENDED RELEASE ORAL 2 TIMES DAILY
Qty: 180 TABLET | Refills: 0 | Status: ON HOLD | OUTPATIENT
Start: 2022-05-31 | End: 2022-08-10

## 2022-06-01 RX ORDER — NICOTINE POLACRILEX 4 MG
15-30 LOZENGE BUCCAL
Status: DISCONTINUED | OUTPATIENT
Start: 2022-06-01 | End: 2022-06-03 | Stop reason: HOSPADM

## 2022-06-01 RX ORDER — DEXTROSE MONOHYDRATE 25 G/50ML
25-50 INJECTION, SOLUTION INTRAVENOUS
Status: DISCONTINUED | OUTPATIENT
Start: 2022-06-01 | End: 2022-06-03 | Stop reason: HOSPADM

## 2022-06-01 RX ORDER — LIDOCAINE 40 MG/G
CREAM TOPICAL
Status: CANCELLED | OUTPATIENT
Start: 2022-06-01

## 2022-06-02 ENCOUNTER — HOSPITAL ENCOUNTER (OUTPATIENT)
Dept: GENERAL RADIOLOGY | Facility: CLINIC | Age: 62
Discharge: HOME | End: 2022-06-02
Attending: PREVENTIVE MEDICINE
Payer: COMMERCIAL

## 2022-06-02 ENCOUNTER — HOSPITAL ENCOUNTER (OUTPATIENT)
Facility: CLINIC | Age: 62
Discharge: HOME | End: 2022-06-02
Admitting: RADIOLOGY
Payer: COMMERCIAL

## 2022-06-02 VITALS — SYSTOLIC BLOOD PRESSURE: 158 MMHG | DIASTOLIC BLOOD PRESSURE: 73 MMHG | HEART RATE: 75 BPM | OXYGEN SATURATION: 93 %

## 2022-06-02 VITALS — DIASTOLIC BLOOD PRESSURE: 94 MMHG | HEART RATE: 72 BPM | SYSTOLIC BLOOD PRESSURE: 131 MMHG | OXYGEN SATURATION: 93 %

## 2022-06-02 DIAGNOSIS — M51.16 LUMBAR DISC HERNIATION WITH RADICULOPATHY: ICD-10-CM

## 2022-06-02 DIAGNOSIS — M51.369 DDD (DEGENERATIVE DISC DISEASE), LUMBAR: ICD-10-CM

## 2022-06-02 PROCEDURE — 255N000002 HC RX 255 OP 636: Performed by: PREVENTIVE MEDICINE

## 2022-06-02 PROCEDURE — 64483 NJX AA&/STRD TFRM EPI L/S 1: CPT

## 2022-06-02 PROCEDURE — 250N000009 HC RX 250: Performed by: PREVENTIVE MEDICINE

## 2022-06-02 PROCEDURE — 999N000154 HC STATISTIC RADIOLOGY XRAY, US, CT, MAR, NM

## 2022-06-02 PROCEDURE — 250N000011 HC RX IP 250 OP 636: Performed by: PREVENTIVE MEDICINE

## 2022-06-02 RX ORDER — DEXAMETHASONE SODIUM PHOSPHATE 10 MG/ML
20 INJECTION, SOLUTION INTRAMUSCULAR; INTRAVENOUS ONCE
Status: COMPLETED | OUTPATIENT
Start: 2022-06-02 | End: 2022-06-02

## 2022-06-02 RX ORDER — IOPAMIDOL 408 MG/ML
10 INJECTION, SOLUTION INTRATHECAL ONCE
Status: COMPLETED | OUTPATIENT
Start: 2022-06-02 | End: 2022-06-02

## 2022-06-02 RX ORDER — LIDOCAINE HYDROCHLORIDE 10 MG/ML
10 INJECTION, SOLUTION EPIDURAL; INFILTRATION; INTRACAUDAL; PERINEURAL ONCE
Status: COMPLETED | OUTPATIENT
Start: 2022-06-02 | End: 2022-06-02

## 2022-06-02 RX ORDER — LIDOCAINE HYDROCHLORIDE 10 MG/ML
20 INJECTION, SOLUTION EPIDURAL; INFILTRATION; INTRACAUDAL; PERINEURAL ONCE
Status: COMPLETED | OUTPATIENT
Start: 2022-06-02 | End: 2022-06-02

## 2022-06-02 RX ADMIN — LIDOCAINE HYDROCHLORIDE 3 ML: 10 INJECTION, SOLUTION EPIDURAL; INFILTRATION; INTRACAUDAL; PERINEURAL at 13:43

## 2022-06-02 RX ADMIN — LIDOCAINE HYDROCHLORIDE 3 ML: 10 INJECTION, SOLUTION EPIDURAL; INFILTRATION; INTRACAUDAL; PERINEURAL at 13:33

## 2022-06-02 RX ADMIN — IOPAMIDOL 2 ML: 408 INJECTION, SOLUTION INTRATHECAL at 13:41

## 2022-06-02 RX ADMIN — DEXAMETHASONE SODIUM PHOSPHATE 20 MG: 10 INJECTION, SOLUTION INTRAMUSCULAR; INTRAVENOUS at 13:43

## 2022-06-02 NOTE — PROGRESS NOTES
[facility] Discharge Nursing Note    Patient Information  Name: Aspen Mccullough  Age: 62 year old    Discharge Education:  Discharge instructions reviewed: Yes  Additional education/resources provided: No  Patient/patient representative verbalizes understanding: Yes  Patient discharging on new medications: No  Medication education completed: N/A    Discharge Plans:   Discharge location: home  Discharge ride contacted: Yes  Approximate discharge time: 1420    Discharge Criteria:  Discharge criteria met and vital signs stable: Yes    Patient Belongs:  Patient belongings returned to patient: Yes    Alondra Cope RN

## 2022-06-02 NOTE — DISCHARGE INSTRUCTIONS
Steroid Injection Discharge Instructions     After you go home:    You may resume your normal diet.    Care of Puncture Site:    If you have a bandaid on your puncture site, you may remove it the next morning  You may shower tomorrow  No bath tubs, whirlpools or swimming pool for at least 48 hours  Use ice packs as needed for discomfort     Activity:    Minimize your activity today. You may gradually resume your normal activity as tolerated  Avoid vigorous or strenuous activity until your symptoms improve or as directed by your doctor  Do NOT drive a vehicle for a few hours after the injection - or longer if you develop numbness in your arm or leg    Medicines:    You may resume all medications, including blood thinners  Resume your Warfarin/Coumadin at your regular dose today. Follow up with your provider to have your INR rechecked  Resume your Platelet Inhibitors and Aspirin tomorrow at your regular dose  For minor discomfort, you may take Acetaminophen (Tylenol) or Ibuprofen (Advil)    Pain:     You may experience increased or different pain over the next 24-48 hours  For the next 48 hrs - you may use ice packs for discomfort     Call your primary care doctor if:    You have severe pain that does not improve with pain medication  You have chills or a fever greater than 101 F (38 C)  The site is red, swollen, hot or tender  Increase in pain, weakness or numbness  New problems with your bowel or bladder  Any questions or concerns    What to watch for:    It can be normal to have some bruising or slight swelling at the puncture site.   After the procedure, you may have some new weakness or numbness down your arm/leg from the numbing medicine. This should resolve in a few hours.   You may feel some temporary relief from the numbing medicine, but that will wear off within a few hours.  Your symptoms may return to pre procedure level, or can even be worse for the first 1-2 days.  For many people, the steroid begins to  provide some relief within 2-3 days, but it can take up to 2 weeks to obtain the full results.  Some people will get lasting relief from a single injection. Others may require up to 3 injections to get results. If you have more than one steroid injection, they should be given 2 weeks apart.  If you have no improvement in your symptoms after two weeks, please contact the doctor who ordered this procedure to discuss the next steps.  Side effects of your steroid injection are mild and will go away in 2-3 days  Insomnia  Irritability  Flushed face  Water retention  Restlessness  Difficulty sleeping  Increased appetite  Increased blood sugar  If you are diabetic, monitor your blood sugar closely. Contact the provider who manages your diabetes to help you control your blood sugar if needed.    If you have questions or concerns call:                  [facility] Radiology Dept @ [phone]                                    between 8am-4:30pm Mon-Fri    If you have urgent questions outside of these normal business hours, please contact the [facility] on call doctor @ [phone]

## 2022-06-02 NOTE — PROCEDURES
[facility]    Procedure: Right L3-L4 TFESI    Date/Time: 6/2/2022 1:49 PM  Performed by: Niles Pineda PA-C  Authorized by: Niles Pineda PA-C       UNIVERSAL PROTOCOL   Site Marked: Yes  Prior Images Obtained and Reviewed:  Yes  Required items: Required blood products, implants, devices and special equipment available    Patient identity confirmed:  Verbally with patient  Patient was reevaluated immediately before administering moderate or deep sedation or anesthesia  Confirmation Checklist:  Patient's identity using two indicators, relevant allergies, procedure was appropriate and matched the consent or emergent situation and correct equipment/implants were available  Time out: Immediately prior to the procedure a time out was called    Universal Protocol: the Joint Commission Universal Protocol was followed    Preparation: Patient was prepped and draped in usual sterile fashion       ANESTHESIA    Local Anesthetic: Lidocaine 1% without epinephrine      SEDATION    Patient Sedated: No    See dictated procedure note for full details.    PROCEDURE    Patient Tolerance:  Patient tolerated the procedure well with no immediate complications  Length of time physician/provider present for 1:1 monitoring during sedation: 0

## 2022-07-29 ENCOUNTER — VIRTUAL VISIT (OUTPATIENT)
Dept: CARDIOLOGY | Facility: CLINIC | Age: 62
End: 2022-07-29
Payer: COMMERCIAL

## 2022-07-29 DIAGNOSIS — I63.9 CEREBROVASCULAR ACCIDENT (CVA), UNSPECIFIED MECHANISM (H): Primary | ICD-10-CM

## 2022-07-29 PROCEDURE — 99214 OFFICE O/P EST MOD 30 MIN: CPT | Mod: 95 | Performed by: INTERNAL MEDICINE

## 2022-07-29 NOTE — LETTER
7/29/2022    Arcadio Farfan MD  [facility] [address]  [facility] [address]    RE: Aspen A Congregation       Dear Colleague,     I had the pleasure of seeing Aspen EPPS [facility] in the [facility].          Aspen is a 62 year old who is being evaluated via a billable video visit.      How would you like to obtain your AVS? MyChart  If the video visit is dropped, the invitation should be resent by: Send to e-mail at: [email or web address]  Will anyone else be joining your video visit? No     Review Of Systems  Skin: NEGATIVE  Eyes:Ears/Nose/Throat: NEGATIVE  Respiratory: no issues  Cardiovascular:no issues  Gastrointestinal: NEGATIVE  Genitourinary:NEGATIVE   Musculoskeletal: NEGATIVE  Neurologic: NEGATIVE  Psychiatric: NEGATIVE  Hematologic/Lymphatic/Immunologic: NEGATIVE  Endocrine:  NEGATIVE  Vitals - Patient Reported  Systolic (Patient Reported): 128  Diastolic (Patient Reported): 87  Weight (Patient Reported): 134.7 kg (297 lb)  Pulse (Patient Reported): 84    Telephone number of patient: [phone]    Lidia Mccain LPN    PROVIDER NOTES:     This is a pleasant 62 year old with the following PMH: atrial fibrillation, right LE DVT and PE in the past on long term xarelto, HLD, HTN, lupus on chronic steroids who I see for chronic vein insufficiency as well as atrial fibrillation.      From prior visit: October she was admitted for DVT and was found to be in rapid atrial fibrillation. She was started on dilitazem and metoprolol. She reports unclear duration of afib as she is not symptomatic from it. During our first in office visit her rates were over 100 bpm however she has long standing thyroid dysfunction. No history of palpitations, dizziness, syncope, chest pain or shortness of breath from the AFIB however she feels constantly fatigued and tired.      Social history: prior -  works occupation in medicare fraud. Here with . Has a daughter in nutrition school.      Has had a  popliteal DVT of the right leg for which she has been on adequate blood thinners but her leg has been feeling tight and painful. She had been temporarily stopped on xarelto for a thumb surgery. She had some right toe discoloration and occasional blue toe syndrome. We performed arterial studies and PPGs which were normal and suggested good arterial flow to the foot. When I reviewed her CT scan from prior admission she also notably has a dilated PA of 4.5 cm as well as aortic root aneurysm of 4.1 cm. No known family history of this. She gets yearly surveillance echocardiogram.      Given rapid AFIB I referred her to EP for evaluation and consideration of rhythm control strategy but overall rate control was decided on with adequate control of thyroid function. She had repeat ziopatch on diltiazem increase and it showed no AFIB recurrence. She has no symptoms.     I repeated her US of her right leg which is negative for DVT. She had vein comp study with right deep, superficial reflux (severe) and also left sided reflux. Her legs she reports get very swollen and it extends to her toes. She has discoloration and discomfort from the swelling. She has tried compression stockings for at least three months and elevates her legs consistently with no change in symptoms. She got evaluated for Biotab mechanical compression pumps which are working well.      In review of recent studies:      Ziopatch: 45% burden AFIB, average rate ~120 bpm, as high as 204 bpm. No significant pauses.  Repeat Ziopatch: NO ATRIAL FIBRILLATION 4/13/2021 (on medical suppressive therapy and thyroid dysfunction better controlled)    Echo 11-2-2020 - A Fib  Left ventricular systolic function normal. EF 55-60%.   The study was technically difficult.     US Venous Lower Extremity Right: 10-  - Care everywhere   1.  Acute DVT right posterior tibial vein.   2.  Popliteal cyst.       Chest CT a 10- Care Everywhere.   1.  Limited exam due to  respiratory motion. No central or definite peripheral   pulmonary artery embolism. No thoracic aortic dissection.   2.  Mild left upper lobar consolidation and patchy airspace opacities likely   reflecting pneumonia. Follow-up to assess for resolution is recommended.   3.  Findings suggesting pulmonary air trapping which can be seen with small   airways disease.   4.  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 Recommend   follow-up with ultrasound or CT in 6 months.     CT Pulmonary 9-21-18   1. No evidence of pulmonary embolism. No acute pulmonary process.   2. Scattered sub-6 mm pulmonary nodules. If the patient is considered   low risk for malignancy, no further follow-up is necessary. Otherwise,   LIKELY consider 12 month follow-up chest CT as per Fleischner Society   2017 guidelines.   3. Cardiomegaly. Ectasia of the ascending aorta 4.1 cm.   4. Dilatation of the pulmonary vasculature, including the main   pulmonary artery to 4.5 cm. This can be seen with pulmonary arterial   Hypertension.      US LE 5/2021     DVT resolved of the right leg     VEIN COMP STUDY 2/2021     1. Right leg: No DVT.  Reflux of 4.8-5.1 seconds in thigh vessels  ranging from 4.6-9.1 mm.  Mid calf and ankle not seen due to edema and  depth. Thigh Ext. and SSV with 1.9 and 2.4 seconds of reflux in  vessels of 2.1 and 4.6 mm.  Tributary without reflux.  Reflux also  seen in the deep venous system.      2. Left leg: No DVT.  No reflux in the deep venous system.  Reflux in  the greater saphenous vein at SFJ and mid thigh ranging from 0.7 - 2.2  seconds with vessels ranging from 5.5-6.5 mm.    Reflux of 0.7 - 2.0  seconds from the knee to the ankle in vessels 3.9-5.3 mm.  Tributary  without reflux.    On diltiazem and metoprolol, on rivaroxaban - no bleeding. In [facility],        ASSESSMENT/PLAN:     1. Atrial Fibrillation: normal LV function by recent echocardiogram   -continue rivaroxaban for anticoagulation in  SPAF and elevated CHADSVASC as well as VTE history   -continue diltiazem and metoprolol  -one year follow up along with echocardiogram, ziopatch if recurrent symptoms    2. History of PE and recent DVT of right popliteal vein: DVT resolved by recent ultrasound but CVI of the superficial and deep system with profound toe swelling and stasis dermatitis consistent with combination venous and lymphedema  -continue rivaroxaban long term  -compression stockings to continue, elevation of leg, adequate leg hydration and mechanical compression pumps     3. Right toe discoloration:  -no arterial disease by toe PPGs, LE arterial duplex and ABIs   -believe its' all stasis dermatitis   -toe swelling c/w lymphedema      4. HTN: likely labile due to steroids and lupus   -continue diltiazem and metoprolol     5. Nutrition: discussed functional nutritionist evaluation for food sensitivity testing    6. Aortic dilation: yearly echocardiogram        Will follow up with patient in 12 months      Sil Muñoz MD MSc  [facility]     Video-Visit Details    Visit time: 25 minutes     Type of service:  Video Visit    Originating Location (pt. Location): Home    Distant Location (provider location):  [facility] [place]     Platform used for Video Visit: Ben      Thank you for allowing me to participate in the care of your patient.      Sincerely,     Sil Muñoz MD     [facility]  cc:   No referring provider defined for this encounter.

## 2022-07-29 NOTE — PROGRESS NOTES
Aspen is a 62 year old who is being evaluated via a billable video visit.      How would you like to obtain your AVS? MyChart  If the video visit is dropped, the invitation should be resent by: Send to e-mail at: [email or web address]  Will anyone else be joining your video visit? No     Review Of Systems  Skin: NEGATIVE  Eyes:Ears/Nose/Throat: NEGATIVE  Respiratory: no issues  Cardiovascular:no issues  Gastrointestinal: NEGATIVE  Genitourinary:NEGATIVE   Musculoskeletal: NEGATIVE  Neurologic: NEGATIVE  Psychiatric: NEGATIVE  Hematologic/Lymphatic/Immunologic: NEGATIVE  Endocrine:  NEGATIVE  Vitals - Patient Reported  Systolic (Patient Reported): 128  Diastolic (Patient Reported): 87  Weight (Patient Reported): 134.7 kg (297 lb)  Pulse (Patient Reported): 84    Telephone number of patient: [phone]    Lidia Mccain LPN    PROVIDER NOTES:     This is a pleasant 62 year old with the following PMH: atrial fibrillation, right LE DVT and PE in the past on long term xarelto, HLD, HTN, lupus on chronic steroids who I see for chronic vein insufficiency as well as atrial fibrillation.      From prior visit: October she was admitted for DVT and was found to be in rapid atrial fibrillation. She was started on dilitazem and metoprolol. She reports unclear duration of afib as she is not symptomatic from it. During our first in office visit her rates were over 100 bpm however she has long standing thyroid dysfunction. No history of palpitations, dizziness, syncope, chest pain or shortness of breath from the AFIB however she feels constantly fatigued and tired.      Social history: prior -  works occupation in medicare fraud. Here with . Has a daughter in nutrition school.      Has had a popliteal DVT of the right leg for which she has been on adequate blood thinners but her leg has been feeling tight and painful. She had been temporarily stopped on xarelto for a thumb surgery. She had some right toe  discoloration and occasional blue toe syndrome. We performed arterial studies and PPGs which were normal and suggested good arterial flow to the foot. When I reviewed her CT scan from prior admission she also notably has a dilated PA of 4.5 cm as well as aortic root aneurysm of 4.1 cm. No known family history of this. She gets yearly surveillance echocardiogram.      Given rapid AFIB I referred her to EP for evaluation and consideration of rhythm control strategy but overall rate control was decided on with adequate control of thyroid function. She had repeat ziopatch on diltiazem increase and it showed no AFIB recurrence. She has no symptoms.     I repeated her US of her right leg which is negative for DVT. She had vein comp study with right deep, superficial reflux (severe) and also left sided reflux. Her legs she reports get very swollen and it extends to her toes. She has discoloration and discomfort from the swelling. She has tried compression stockings for at least three months and elevates her legs consistently with no change in symptoms. She got evaluated for Biotab mechanical compression pumps which are working well.      In review of recent studies:      Ziopatch: 45% burden AFIB, average rate ~120 bpm, as high as 204 bpm. No significant pauses.  Repeat Ziopatch: NO ATRIAL FIBRILLATION 4/13/2021 (on medical suppressive therapy and thyroid dysfunction better controlled)    Echo 11-2-2020 - A Fib  Left ventricular systolic function normal. EF 55-60%.   The study was technically difficult.     US Venous Lower Extremity Right: 10-  - Care everywhere   1.  Acute DVT right posterior tibial vein.   2.  Popliteal cyst.       Chest CT a 10- Care Everywhere.   1.  Limited exam due to respiratory motion. No central or definite peripheral   pulmonary artery embolism. No thoracic aortic dissection.   2.  Mild left upper lobar consolidation and patchy airspace opacities likely   reflecting pneumonia.  Follow-up to assess for resolution is recommended.   3.  Findings suggesting pulmonary air trapping which can be seen with small   airways disease.   4.  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 Recommend   follow-up with ultrasound or CT in 6 months.     CT Pulmonary 9-21-18   1. No evidence of pulmonary embolism. No acute pulmonary process.   2. Scattered sub-6 mm pulmonary nodules. If the patient is considered   low risk for malignancy, no further follow-up is necessary. Otherwise,   LIKELY consider 12 month follow-up chest CT as per Fleischner Society   2017 guidelines.   3. Cardiomegaly. Ectasia of the ascending aorta 4.1 cm.   4. Dilatation of the pulmonary vasculature, including the main   pulmonary artery to 4.5 cm. This can be seen with pulmonary arterial   Hypertension.      US LE 5/2021     DVT resolved of the right leg     VEIN COMP STUDY 2/2021     1. Right leg: No DVT.  Reflux of 4.8-5.1 seconds in thigh vessels  ranging from 4.6-9.1 mm.  Mid calf and ankle not seen due to edema and  depth. Thigh Ext. and SSV with 1.9 and 2.4 seconds of reflux in  vessels of 2.1 and 4.6 mm.  Tributary without reflux.  Reflux also  seen in the deep venous system.      2. Left leg: No DVT.  No reflux in the deep venous system.  Reflux in  the greater saphenous vein at SFJ and mid thigh ranging from 0.7 - 2.2  seconds with vessels ranging from 5.5-6.5 mm.    Reflux of 0.7 - 2.0  seconds from the knee to the ankle in vessels 3.9-5.3 mm.  Tributary  without reflux.    On diltiazem and metoprolol, on rivaroxaban - no bleeding. In [facility] recently,        ASSESSMENT/PLAN:     1. Atrial Fibrillation: normal LV function by recent echocardiogram   -continue rivaroxaban for anticoagulation in SPAF and elevated CHADSVASC as well as VTE history   -continue diltiazem and metoprolol  -one year follow up along with echocardiogram, ziopatch if recurrent symptoms    2. History of PE and recent DVT of right  popliteal vein: DVT resolved by recent ultrasound but CVI of the superficial and deep system with profound toe swelling and stasis dermatitis consistent with combination venous and lymphedema  -continue rivaroxaban long term  -compression stockings to continue, elevation of leg, adequate leg hydration and mechanical compression pumps     3. Right toe discoloration:  -no arterial disease by toe PPGs, LE arterial duplex and ABIs   -believe its' all stasis dermatitis   -toe swelling c/w lymphedema      4. HTN: likely labile due to steroids and lupus   -continue diltiazem and metoprolol     5. Nutrition: discussed functional nutritionist evaluation for food sensitivity testing    6. Aortic dilation: yearly echocardiogram        Will follow up with patient in 12 months      Sil Muñoz MD MSc  [facility]     Video-Visit Details    Visit time: 25 minutes     Type of service:  Video Visit    Originating Location (pt. Location): Home    Distant Location (provider location):  [facility] [place]     Platform used for Video Visit: [employer]

## 2022-08-08 ENCOUNTER — HOSPITAL ENCOUNTER (INPATIENT)
Facility: CLINIC | Age: 62
LOS: 2 days | Discharge: HOME | DRG: 189 | End: 2022-08-10
Attending: EMERGENCY MEDICINE | Admitting: INTERNAL MEDICINE
Payer: COMMERCIAL

## 2022-08-08 ENCOUNTER — APPOINTMENT (OUTPATIENT)
Dept: GENERAL RADIOLOGY | Facility: CLINIC | Age: 62
DRG: 189 | End: 2022-08-08
Attending: EMERGENCY MEDICINE
Payer: COMMERCIAL

## 2022-08-08 DIAGNOSIS — J96.01 ACUTE RESPIRATORY FAILURE WITH HYPOXIA (H): ICD-10-CM

## 2022-08-08 DIAGNOSIS — I48.91 ATRIAL FIBRILLATION WITH RVR (H): ICD-10-CM

## 2022-08-08 DIAGNOSIS — I87.2 VENOUS (PERIPHERAL) INSUFFICIENCY: Primary | ICD-10-CM

## 2022-08-08 DIAGNOSIS — R60.9 EDEMA, UNSPECIFIED TYPE: ICD-10-CM

## 2022-08-08 DIAGNOSIS — I48.20 CHRONIC ATRIAL FIBRILLATION (H): ICD-10-CM

## 2022-08-08 LAB
ATRIAL RATE - MUSE: 57 BPM
CRP SERPL-MCNC: 47.4 MG/L (ref 0–8)
DIASTOLIC BLOOD PRESSURE - MUSE: NORMAL MMHG
INTERPRETATION ECG - MUSE: NORMAL
LACTATE SERPL-SCNC: 0.6 MMOL/L (ref 0.7–2)
NT-PROBNP SERPL-MCNC: 1405 PG/ML (ref 0–900)
P AXIS - MUSE: 40 DEGREES
PR INTERVAL - MUSE: 206 MS
PROCALCITONIN SERPL-MCNC: <0.05 NG/ML
QRS DURATION - MUSE: 94 MS
QT - MUSE: 434 MS
QTC - MUSE: 422 MS
R AXIS - MUSE: 43 DEGREES
SARS-COV-2 RNA RESP QL NAA+PROBE: NEGATIVE
SYSTOLIC BLOOD PRESSURE - MUSE: NORMAL MMHG
T AXIS - MUSE: 43 DEGREES
TROPONIN I SERPL HS-MCNC: 4 NG/L
VENTRICULAR RATE- MUSE: 57 BPM

## 2022-08-08 PROCEDURE — 84443 ASSAY THYROID STIM HORMONE: CPT | Performed by: INTERNAL MEDICINE

## 2022-08-08 PROCEDURE — 250N000011 HC RX IP 250 OP 636: Performed by: EMERGENCY MEDICINE

## 2022-08-08 PROCEDURE — 96374 THER/PROPH/DIAG INJ IV PUSH: CPT

## 2022-08-08 PROCEDURE — 83605 ASSAY OF LACTIC ACID: CPT | Performed by: EMERGENCY MEDICINE

## 2022-08-08 PROCEDURE — 84484 ASSAY OF TROPONIN QUANT: CPT | Performed by: EMERGENCY MEDICINE

## 2022-08-08 PROCEDURE — 36415 COLL VENOUS BLD VENIPUNCTURE: CPT | Performed by: EMERGENCY MEDICINE

## 2022-08-08 PROCEDURE — 84439 ASSAY OF FREE THYROXINE: CPT | Performed by: INTERNAL MEDICINE

## 2022-08-08 PROCEDURE — U0003 INFECTIOUS AGENT DETECTION BY NUCLEIC ACID (DNA OR RNA); SEVERE ACUTE RESPIRATORY SYNDROME CORONAVIRUS 2 (SARS-COV-2) (CORONAVIRUS DISEASE [COVID-19]), AMPLIFIED PROBE TECHNIQUE, MAKING USE OF HIGH THROUGHPUT TECHNOLOGIES AS DESCRIBED BY CMS-2020-01-R: HCPCS | Performed by: EMERGENCY MEDICINE

## 2022-08-08 PROCEDURE — 93005 ELECTROCARDIOGRAM TRACING: CPT

## 2022-08-08 PROCEDURE — C9803 HOPD COVID-19 SPEC COLLECT: HCPCS

## 2022-08-08 PROCEDURE — 86140 C-REACTIVE PROTEIN: CPT | Performed by: INTERNAL MEDICINE

## 2022-08-08 PROCEDURE — 87040 BLOOD CULTURE FOR BACTERIA: CPT | Performed by: EMERGENCY MEDICINE

## 2022-08-08 PROCEDURE — 99223 1ST HOSP IP/OBS HIGH 75: CPT | Mod: AI | Performed by: INTERNAL MEDICINE

## 2022-08-08 PROCEDURE — 84145 PROCALCITONIN (PCT): CPT | Performed by: EMERGENCY MEDICINE

## 2022-08-08 PROCEDURE — 210N000002 HC R&B HEART CARE

## 2022-08-08 PROCEDURE — 83880 ASSAY OF NATRIURETIC PEPTIDE: CPT | Performed by: EMERGENCY MEDICINE

## 2022-08-08 PROCEDURE — 99285 EMERGENCY DEPT VISIT HI MDM: CPT | Mod: 25

## 2022-08-08 PROCEDURE — 71046 X-RAY EXAM CHEST 2 VIEWS: CPT

## 2022-08-08 RX ORDER — FUROSEMIDE 10 MG/ML
40 INJECTION INTRAMUSCULAR; INTRAVENOUS ONCE
Status: COMPLETED | OUTPATIENT
Start: 2022-08-08 | End: 2022-08-08

## 2022-08-08 RX ADMIN — FUROSEMIDE 40 MG: 10 INJECTION, SOLUTION INTRAVENOUS at 21:14

## 2022-08-08 ASSESSMENT — ENCOUNTER SYMPTOMS
COUGH: 1
FEVER: 0
SHORTNESS OF BREATH: 1

## 2022-08-08 ASSESSMENT — ACTIVITIES OF DAILY LIVING (ADL): ADLS_ACUITY_SCORE: 35

## 2022-08-09 ENCOUNTER — APPOINTMENT (OUTPATIENT)
Dept: CARDIOLOGY | Facility: CLINIC | Age: 62
DRG: 189 | End: 2022-08-09
Attending: INTERNAL MEDICINE
Payer: COMMERCIAL

## 2022-08-09 ENCOUNTER — APPOINTMENT (OUTPATIENT)
Dept: PHYSICAL THERAPY | Facility: CLINIC | Age: 62
DRG: 189 | End: 2022-08-09
Attending: INTERNAL MEDICINE
Payer: COMMERCIAL

## 2022-08-09 ENCOUNTER — APPOINTMENT (OUTPATIENT)
Dept: ULTRASOUND IMAGING | Facility: CLINIC | Age: 62
DRG: 189 | End: 2022-08-09
Attending: HOSPITALIST
Payer: COMMERCIAL

## 2022-08-09 LAB
ALBUMIN SERPL-MCNC: 3.4 G/DL (ref 3.4–5)
ALBUMIN UR-MCNC: NEGATIVE MG/DL
ALP SERPL-CCNC: 97 U/L (ref 40–150)
ALT SERPL W P-5'-P-CCNC: 16 U/L (ref 0–50)
ANION GAP SERPL CALCULATED.3IONS-SCNC: 5 MMOL/L (ref 3–14)
APPEARANCE UR: CLEAR
AST SERPL W P-5'-P-CCNC: 16 U/L (ref 0–45)
BILIRUB SERPL-MCNC: 0.2 MG/DL (ref 0.2–1.3)
BILIRUB UR QL STRIP: NEGATIVE
BUN SERPL-MCNC: 16 MG/DL (ref 7–30)
CALCIUM SERPL-MCNC: 9.3 MG/DL (ref 8.5–10.1)
CHLORIDE BLD-SCNC: 102 MMOL/L (ref 94–109)
CO2 SERPL-SCNC: 31 MMOL/L (ref 20–32)
COLOR UR AUTO: NORMAL
CREAT SERPL-MCNC: 0.83 MG/DL (ref 0.52–1.04)
ERYTHROCYTE [DISTWIDTH] IN BLOOD BY AUTOMATED COUNT: 16.1 % (ref 10–15)
GFR SERPL CREATININE-BSD FRML MDRD: 79 ML/MIN/1.73M2
GLUCOSE BLD-MCNC: 105 MG/DL (ref 70–99)
GLUCOSE UR STRIP-MCNC: NEGATIVE MG/DL
HCT VFR BLD AUTO: 34.7 % (ref 35–47)
HGB BLD-MCNC: 10.9 G/DL (ref 11.7–15.7)
HGB UR QL STRIP: NEGATIVE
KETONES UR STRIP-MCNC: NEGATIVE MG/DL
LEUKOCYTE ESTERASE UR QL STRIP: NEGATIVE
LVEF ECHO: NORMAL
MAGNESIUM SERPL-MCNC: 2.3 MG/DL (ref 1.6–2.3)
MCH RBC QN AUTO: 28.1 PG (ref 26.5–33)
MCHC RBC AUTO-ENTMCNC: 31.4 G/DL (ref 31.5–36.5)
MCV RBC AUTO: 89 FL (ref 78–100)
NITRATE UR QL: NEGATIVE
PH UR STRIP: 5.5 [PH] (ref 5–7)
PLATELET # BLD AUTO: 290 10E3/UL (ref 150–450)
POTASSIUM BLD-SCNC: 3.7 MMOL/L (ref 3.4–5.3)
PROT SERPL-MCNC: 7.2 G/DL (ref 6.8–8.8)
RBC # BLD AUTO: 3.88 10E6/UL (ref 3.8–5.2)
SODIUM SERPL-SCNC: 138 MMOL/L (ref 133–144)
SP GR UR STRIP: 1.01 (ref 1–1.03)
T4 FREE SERPL-MCNC: 0.71 NG/DL (ref 0.76–1.46)
TSH SERPL DL<=0.005 MIU/L-ACNC: 25.04 MU/L (ref 0.4–4)
UROBILINOGEN UR STRIP-MCNC: NORMAL MG/DL
WBC # BLD AUTO: 8.4 10E3/UL (ref 4–11)

## 2022-08-09 PROCEDURE — 99223 1ST HOSP IP/OBS HIGH 75: CPT | Performed by: INTERNAL MEDICINE

## 2022-08-09 PROCEDURE — 93306 TTE W/DOPPLER COMPLETE: CPT | Mod: 26 | Performed by: INTERNAL MEDICINE

## 2022-08-09 PROCEDURE — 255N000002 HC RX 255 OP 636: Performed by: INTERNAL MEDICINE

## 2022-08-09 PROCEDURE — 97116 GAIT TRAINING THERAPY: CPT | Mod: GP | Performed by: PHYSICAL THERAPIST

## 2022-08-09 PROCEDURE — 83735 ASSAY OF MAGNESIUM: CPT | Performed by: INTERNAL MEDICINE

## 2022-08-09 PROCEDURE — 99233 SBSQ HOSP IP/OBS HIGH 50: CPT | Performed by: HOSPITALIST

## 2022-08-09 PROCEDURE — 250N000011 HC RX IP 250 OP 636: Performed by: HOSPITALIST

## 2022-08-09 PROCEDURE — 97161 PT EVAL LOW COMPLEX 20 MIN: CPT | Mod: GP | Performed by: PHYSICAL THERAPIST

## 2022-08-09 PROCEDURE — 85027 COMPLETE CBC AUTOMATED: CPT | Performed by: INTERNAL MEDICINE

## 2022-08-09 PROCEDURE — 250N000013 HC RX MED GY IP 250 OP 250 PS 637: Performed by: INTERNAL MEDICINE

## 2022-08-09 PROCEDURE — G0463 HOSPITAL OUTPT CLINIC VISIT: HCPCS | Mod: 25

## 2022-08-09 PROCEDURE — 80053 COMPREHEN METABOLIC PANEL: CPT | Performed by: INTERNAL MEDICINE

## 2022-08-09 PROCEDURE — 97530 THERAPEUTIC ACTIVITIES: CPT | Mod: GP | Performed by: PHYSICAL THERAPIST

## 2022-08-09 PROCEDURE — 999N000208 ECHOCARDIOGRAM COMPLETE

## 2022-08-09 PROCEDURE — 210N000002 HC R&B HEART CARE

## 2022-08-09 PROCEDURE — 81003 URINALYSIS AUTO W/O SCOPE: CPT | Performed by: INTERNAL MEDICINE

## 2022-08-09 PROCEDURE — 250N000011 HC RX IP 250 OP 636: Performed by: INTERNAL MEDICINE

## 2022-08-09 PROCEDURE — 93971 EXTREMITY STUDY: CPT | Mod: RT

## 2022-08-09 PROCEDURE — 36415 COLL VENOUS BLD VENIPUNCTURE: CPT | Performed by: INTERNAL MEDICINE

## 2022-08-09 PROCEDURE — 999N000127 HC STATISTIC PERIPHERAL IV START W US GUIDANCE

## 2022-08-09 PROCEDURE — 97602 WOUND(S) CARE NON-SELECTIVE: CPT

## 2022-08-09 RX ORDER — ONDANSETRON 2 MG/ML
4 INJECTION INTRAMUSCULAR; INTRAVENOUS EVERY 6 HOURS PRN
Status: DISCONTINUED | OUTPATIENT
Start: 2022-08-09 | End: 2022-08-10 | Stop reason: HOSPADM

## 2022-08-09 RX ORDER — NALOXONE HYDROCHLORIDE 0.4 MG/ML
0.4 INJECTION, SOLUTION INTRAMUSCULAR; INTRAVENOUS; SUBCUTANEOUS
Status: DISCONTINUED | OUTPATIENT
Start: 2022-08-09 | End: 2022-08-10 | Stop reason: HOSPADM

## 2022-08-09 RX ORDER — AMOXICILLIN 250 MG
1 CAPSULE ORAL 2 TIMES DAILY
Status: DISCONTINUED | OUTPATIENT
Start: 2022-08-09 | End: 2022-08-10 | Stop reason: HOSPADM

## 2022-08-09 RX ORDER — MORPHINE SULFATE 15 MG/1
60 TABLET, FILM COATED, EXTENDED RELEASE ORAL DAILY
Status: DISCONTINUED | OUTPATIENT
Start: 2022-08-09 | End: 2022-08-10 | Stop reason: HOSPADM

## 2022-08-09 RX ORDER — AMOXICILLIN 250 MG
2 CAPSULE ORAL 2 TIMES DAILY
Status: DISCONTINUED | OUTPATIENT
Start: 2022-08-09 | End: 2022-08-10 | Stop reason: HOSPADM

## 2022-08-09 RX ORDER — MORPHINE SULFATE 30 MG/1
30 TABLET, FILM COATED, EXTENDED RELEASE ORAL 2 TIMES DAILY
Status: DISCONTINUED | OUTPATIENT
Start: 2022-08-09 | End: 2022-08-09

## 2022-08-09 RX ORDER — PROCHLORPERAZINE MALEATE 5 MG
10 TABLET ORAL EVERY 6 HOURS PRN
Status: DISCONTINUED | OUTPATIENT
Start: 2022-08-09 | End: 2022-08-10 | Stop reason: HOSPADM

## 2022-08-09 RX ORDER — LEVOTHYROXINE SODIUM 200 UG/1
600 TABLET ORAL DAILY
Status: DISCONTINUED | OUTPATIENT
Start: 2022-08-09 | End: 2022-08-10 | Stop reason: HOSPADM

## 2022-08-09 RX ORDER — PROCHLORPERAZINE 25 MG
25 SUPPOSITORY, RECTAL RECTAL EVERY 12 HOURS PRN
Status: DISCONTINUED | OUTPATIENT
Start: 2022-08-09 | End: 2022-08-10 | Stop reason: HOSPADM

## 2022-08-09 RX ORDER — CYCLOBENZAPRINE HCL 5 MG
10 TABLET ORAL 3 TIMES DAILY
Status: DISCONTINUED | OUTPATIENT
Start: 2022-08-09 | End: 2022-08-10 | Stop reason: HOSPADM

## 2022-08-09 RX ORDER — ACETAMINOPHEN 650 MG/1
650 SUPPOSITORY RECTAL EVERY 6 HOURS PRN
Status: DISCONTINUED | OUTPATIENT
Start: 2022-08-09 | End: 2022-08-10 | Stop reason: HOSPADM

## 2022-08-09 RX ORDER — ACETAMINOPHEN 325 MG/1
650 TABLET ORAL EVERY 6 HOURS PRN
Status: DISCONTINUED | OUTPATIENT
Start: 2022-08-09 | End: 2022-08-10 | Stop reason: HOSPADM

## 2022-08-09 RX ORDER — CARBOXYMETHYLCELLULOSE SODIUM 5 MG/ML
1 SOLUTION/ DROPS OPHTHALMIC 2 TIMES DAILY PRN
Status: DISCONTINUED | OUTPATIENT
Start: 2022-08-09 | End: 2022-08-10 | Stop reason: HOSPADM

## 2022-08-09 RX ORDER — FUROSEMIDE 10 MG/ML
20 INJECTION INTRAMUSCULAR; INTRAVENOUS EVERY 12 HOURS
Status: DISCONTINUED | OUTPATIENT
Start: 2022-08-09 | End: 2022-08-10 | Stop reason: HOSPADM

## 2022-08-09 RX ORDER — LIDOCAINE 40 MG/G
CREAM TOPICAL
Status: DISCONTINUED | OUTPATIENT
Start: 2022-08-09 | End: 2022-08-10 | Stop reason: HOSPADM

## 2022-08-09 RX ORDER — MORPHINE SULFATE 30 MG/1
90 TABLET, FILM COATED, EXTENDED RELEASE ORAL 2 TIMES DAILY
Status: DISCONTINUED | OUTPATIENT
Start: 2022-08-09 | End: 2022-08-10 | Stop reason: HOSPADM

## 2022-08-09 RX ORDER — GABAPENTIN 800 MG/1
800 TABLET ORAL AT BEDTIME
Status: DISCONTINUED | OUTPATIENT
Start: 2022-08-09 | End: 2022-08-10 | Stop reason: HOSPADM

## 2022-08-09 RX ORDER — BENZONATATE 100 MG/1
100 CAPSULE ORAL 3 TIMES DAILY PRN
Status: DISCONTINUED | OUTPATIENT
Start: 2022-08-09 | End: 2022-08-10 | Stop reason: HOSPADM

## 2022-08-09 RX ORDER — ATORVASTATIN CALCIUM 40 MG/1
40 TABLET, FILM COATED ORAL EVERY EVENING
Status: DISCONTINUED | OUTPATIENT
Start: 2022-08-09 | End: 2022-08-10 | Stop reason: HOSPADM

## 2022-08-09 RX ORDER — ONDANSETRON 4 MG/1
4 TABLET, ORALLY DISINTEGRATING ORAL EVERY 6 HOURS PRN
Status: DISCONTINUED | OUTPATIENT
Start: 2022-08-09 | End: 2022-08-10 | Stop reason: HOSPADM

## 2022-08-09 RX ORDER — FUROSEMIDE 10 MG/ML
40 INJECTION INTRAMUSCULAR; INTRAVENOUS EVERY 12 HOURS
Status: DISCONTINUED | OUTPATIENT
Start: 2022-08-09 | End: 2022-08-09

## 2022-08-09 RX ORDER — HYDROMORPHONE HYDROCHLORIDE 2 MG/1
8 TABLET ORAL EVERY 4 HOURS PRN
Status: DISCONTINUED | OUTPATIENT
Start: 2022-08-09 | End: 2022-08-10 | Stop reason: HOSPADM

## 2022-08-09 RX ORDER — LEVOTHYROXINE SODIUM 100 UG/1
600 TABLET ORAL ONCE
Status: COMPLETED | OUTPATIENT
Start: 2022-08-09 | End: 2022-08-09

## 2022-08-09 RX ORDER — BIOTIN 5 MG
TABLET ORAL DAILY
Status: DISCONTINUED | OUTPATIENT
Start: 2022-08-09 | End: 2022-08-09 | Stop reason: RX

## 2022-08-09 RX ORDER — NALOXONE HYDROCHLORIDE 0.4 MG/ML
0.2 INJECTION, SOLUTION INTRAMUSCULAR; INTRAVENOUS; SUBCUTANEOUS
Status: DISCONTINUED | OUTPATIENT
Start: 2022-08-09 | End: 2022-08-10 | Stop reason: HOSPADM

## 2022-08-09 RX ORDER — MULTIPLE VITAMINS W/ MINERALS TAB 9MG-400MCG
1 TAB ORAL DAILY
Status: DISCONTINUED | OUTPATIENT
Start: 2022-08-09 | End: 2022-08-10 | Stop reason: HOSPADM

## 2022-08-09 RX ORDER — ZOLPIDEM TARTRATE 5 MG/1
5 TABLET ORAL
Status: DISCONTINUED | OUTPATIENT
Start: 2022-08-09 | End: 2022-08-10 | Stop reason: HOSPADM

## 2022-08-09 RX ORDER — POLYETHYLENE GLYCOL 3350 17 G/17G
17 POWDER, FOR SOLUTION ORAL DAILY PRN
Status: DISCONTINUED | OUTPATIENT
Start: 2022-08-09 | End: 2022-08-10 | Stop reason: HOSPADM

## 2022-08-09 RX ORDER — PREDNISOLONE ACETATE 10 MG/ML
1 SUSPENSION/ DROPS OPHTHALMIC 2 TIMES DAILY
Status: DISCONTINUED | OUTPATIENT
Start: 2022-08-09 | End: 2022-08-10 | Stop reason: HOSPADM

## 2022-08-09 RX ADMIN — ZOLPIDEM TARTRATE 5 MG: 5 TABLET ORAL at 21:17

## 2022-08-09 RX ADMIN — CYCLOBENZAPRINE 10 MG: 10 TABLET, FILM COATED ORAL at 21:17

## 2022-08-09 RX ADMIN — SENNOSIDES AND DOCUSATE SODIUM 1 TABLET: 50; 8.6 TABLET ORAL at 08:53

## 2022-08-09 RX ADMIN — MULTIPLE VITAMINS W/ MINERALS TAB 1 TABLET: TAB at 08:53

## 2022-08-09 RX ADMIN — CYCLOBENZAPRINE 10 MG: 10 TABLET, FILM COATED ORAL at 08:53

## 2022-08-09 RX ADMIN — LEVOTHYROXINE SODIUM 600 MCG: 100 TABLET ORAL at 13:54

## 2022-08-09 RX ADMIN — METOPROLOL SUCCINATE 75 MG: 25 TABLET, EXTENDED RELEASE ORAL at 21:17

## 2022-08-09 RX ADMIN — CYCLOBENZAPRINE 10 MG: 10 TABLET, FILM COATED ORAL at 13:54

## 2022-08-09 RX ADMIN — RIVAROXABAN 20 MG: 10 TABLET, FILM COATED ORAL at 16:22

## 2022-08-09 RX ADMIN — FUROSEMIDE 40 MG: 10 INJECTION, SOLUTION INTRAVENOUS at 09:00

## 2022-08-09 RX ADMIN — HUMAN ALBUMIN MICROSPHERES AND PERFLUTREN 9 ML: 10; .22 INJECTION, SOLUTION INTRAVENOUS at 11:39

## 2022-08-09 RX ADMIN — FUROSEMIDE 20 MG: 10 INJECTION, SOLUTION INTRAMUSCULAR; INTRAVENOUS at 21:18

## 2022-08-09 RX ADMIN — CYCLOBENZAPRINE 10 MG: 10 TABLET, FILM COATED ORAL at 05:38

## 2022-08-09 RX ADMIN — MORPHINE SULFATE 90 MG: 30 TABLET, EXTENDED RELEASE ORAL at 21:18

## 2022-08-09 RX ADMIN — MORPHINE SULFATE 60 MG: 15 TABLET, EXTENDED RELEASE ORAL at 12:17

## 2022-08-09 RX ADMIN — GABAPENTIN 1100 MG: 300 CAPSULE ORAL at 12:18

## 2022-08-09 RX ADMIN — BENZONATATE 100 MG: 100 CAPSULE ORAL at 16:23

## 2022-08-09 RX ADMIN — METOPROLOL SUCCINATE 75 MG: 25 TABLET, EXTENDED RELEASE ORAL at 08:53

## 2022-08-09 RX ADMIN — GABAPENTIN 1100 MG: 300 CAPSULE ORAL at 05:39

## 2022-08-09 RX ADMIN — ATORVASTATIN CALCIUM 40 MG: 40 TABLET, FILM COATED ORAL at 21:17

## 2022-08-09 RX ADMIN — GABAPENTIN 800 MG: 800 TABLET, FILM COATED ORAL at 21:18

## 2022-08-09 RX ADMIN — MORPHINE SULFATE 90 MG: 30 TABLET, EXTENDED RELEASE ORAL at 10:36

## 2022-08-09 ASSESSMENT — ACTIVITIES OF DAILY LIVING (ADL)
ADLS_ACUITY_SCORE: 35
ADLS_ACUITY_SCORE: 18
ADLS_ACUITY_SCORE: 18
ADLS_ACUITY_SCORE: 35
ADLS_ACUITY_SCORE: 35
FALL_HISTORY_WITHIN_LAST_SIX_MONTHS: NO
DRESSING/BATHING_DIFFICULTY: NO
ADLS_ACUITY_SCORE: 35
ADLS_ACUITY_SCORE: 18
CONCENTRATING,_REMEMBERING_OR_MAKING_DECISIONS_DIFFICULTY: NO
WALKING_OR_CLIMBING_STAIRS_DIFFICULTY: NO
ADLS_ACUITY_SCORE: 18
ADLS_ACUITY_SCORE: 35
TOILETING_ISSUES: NO
DOING_ERRANDS_INDEPENDENTLY_DIFFICULTY: NO
ADLS_ACUITY_SCORE: 18
WEAR_GLASSES_OR_BLIND: NO
CHANGE_IN_FUNCTIONAL_STATUS_SINCE_ONSET_OF_CURRENT_ILLNESS/INJURY: NO
ADLS_ACUITY_SCORE: 35
ADLS_ACUITY_SCORE: 35
DIFFICULTY_EATING/SWALLOWING: NO

## 2022-08-09 NOTE — CONSULTS
Consult Date: 08/08/2022    REASON FOR CONSULTATION:  Lower extremity edema.    HISTORY OF PRESENT ILLNESS:  The patient is a 62-year-old female with a history of paroxysmal atrial fibrillation, right lower extremity DVT, pulmonary embolism on long-term Xarelto, hyperlipidemia, hypertension, obesity, lupus on chronic steroids and chronic lower extremity edema due to chronic venous insufficiency.  She was evaluated by my colleague, Dr. Lanza last week in the clinic for evaluation of the aforementioned symptoms.  She had previously underwent a venous ultrasound, which showed significant reflux in her superficial lower extremity veins.    The patient tells me she is not sure why she is in the hospital.  She seems upset and asked me if she could leave this morning.  I told the patient I was consulted because there is a concern for possible heart failure.  She tells me she came to the Emergency Department because of her lower extremity swelling.  She specifically states that she did not have any shortness of breath.  There apparently was mention of possible hypoxia and respiratory failure.  She is resting in her bed comfortably this morning and does not endorse any significant respiratory symptoms.  No recent chest pain.  No palpitations, presyncope or syncope.    PAST MEDICAL HISTORY:  Lupus, paroxysmal atrial fibrillation, obesity, hypertension, hyperlipidemia, history of DVT, history of PE, and hypothyroidism.    SOCIAL HISTORY:  She is a former smoker.  She denies excessive alcohol use.    FAMILY HISTORY:  Reviewed and noncontributory to this admission.    ALLERGIES:  CODEINE, IBUPROFEN, PENICILLIN, SULFA DRUGS, DOXYCYCLINE, HYDROXYZINE, MOLD, CALCIUM CHANNEL BLOCKERS.      CURRENT MEDICATIONS:    1.  Atorvastatin 40 mg daily.  2.  Flexeril 10 mg daily.  3.  Lasix 40 mg IV every 12 hours.  4.  Neurontin 800 mg at bedtime.  5.  Synthroid 600 mcg daily.  6.  Metoprolol XL 75 mg twice daily.  7.  Xarelto 20 mg  daily.    REVIEW OF SYSTEMS:  Comprehensive review of systems was performed and essentially negative except what is mentioned in the HPI.    PHYSICAL EXAMINATION:    VITAL SIGNS:  Blood pressure is 148/87, pulse is 62.  GENERAL:  She is resting, appears to be in no acute respiratory distress.    NECK:  Jugular venous pressure is difficult to assess given the patient's body habitus.  LUNGS:  Clear to auscultation bilaterally.  HEART:  Normal S1, S2.  I do not appreciate murmurs.  ABDOMEN:  Obese, soft.  SKIN:  She has got some erythema in her shins.  EXTREMITIES: 1+ to 2+ pitting edema on both legs, right greater than left.   VESSELS:  2+ radial, 2+ femoral.  NEUROLOGIC:  No focal deficits.  PSYCHIATRIC:  Alert, oriented x3.    ASSESSMENT AND PLAN:  A 62-year-old female with the aforementioned medical history including chronic lower extremity edema due to chronic venous insufficiency and prior DVT resulting post-thrombotic syndrome, chronically on anticoagulation.  She does not report having shortness of breath, although the admission states that was the reason why she was admitted.  She did have a chest x-ray yesterday, which showed cardiomegaly with some upper lobe vasculature prominence, which is unchanged from her prior chest x-ray.  Her laboratory studies show slightly elevated NT-proBNP of 1405.  She does have significantly elevated inflammatory markers with CRP of 4.74.  Her TSH is also elevated.    Although she does not endorse any significant symptoms, she does have a cough, which she tells me is chronic.  The lower extremity edema is likely related to the patient's chronic venous insufficiency.  Clinically, there is no evidence of significant volume overload.  It is reasonable to discharge her on low-dose oral furosemide and have her follow up with primary care for closer monitoring of her electrolytes.    An echocardiogram was ordered and it is reasonable to obtain, if the patient is interested in sticking  around the hospital beyond this morning.  We will defer it to her primary team here with regards to further investigation of her persistent cough.    We will sign off.  She will continue to follow Dr. Lanza in the outpatient with regards to her venous issues.    I appreciate the opportunity to participate in her care.    Giovani Dejesus MD        D: 2022   T: 2022   MT: VICTOR HUGO    Name:     ConfucianistROXY SAMUELKim  MRN:      [id number]        Account:      [id number]   :      1960           Consult Date: 2022     Document: [id number]

## 2022-08-09 NOTE — PROGRESS NOTES
Date: August 9, 2022  Shift: [id number]  Name: Aspen Mccullough  Age: 62 year old  YOB: 1960  Admit Date: 08/09/2022    Reason for Admission: Acute respiratory failure with hypoxia (H) [J96.01]  Edema, unspecified type [R60.9]     Cognitive/Mentation: A/Ox4 - can be irritable at times  Neuros/CMS: Numbess/tingling in RLE - BLE (-) for clot upon admission; ultrasound of RLE today (-) for clot    VS: VSS on RA  Cardiac: tele - NSR w/ PAC  GI: WDL  : WDL  Pulmonary: no complaints of SOB - LS clear. Frequent/productive cough   Pain: Chronic pain - scheduled ER morphine; prn dilaudid available     Lines/Drains: PIV - SL  Skin: Wound on R buttock - WOC following, dressing change EOD  Activity: Independent    Diet: Low fat, no caffeine, low salt     Discharge: Pending tomorrow      Shift summary: Echo complete. RLE ultrasound done. WOC consult, cariology consult, pulmonology consult.

## 2022-08-09 NOTE — PROGRESS NOTES
"   08/09/22 1422   Quick Adds   Type of Visit Initial PT Evaluation       Present no   Living Environment   People in Home spouse   Current Living Arrangements house   Home Accessibility stairs to enter home   Number of Stairs, Main Entrance 4   Stair Railings, Main Entrance railing on right side (ascending)   Transportation Anticipated family or friend will provide   Living Environment Comments Pt lives in a house with her spouse. Pt reports needing to negotiate stairs to enter the home but once inside, all needs met on the main floor. Pt reports her spouse will pick her up upon discharge and provide assist as needed.   Self-Care   Usual Activity Tolerance good   Current Activity Tolerance moderate   Regular Exercise No   Equipment Currently Used at Home hospital bed   Fall history within last six months yes   Number of times patient has fallen within last six months 1   Activity/Exercise/Self-Care Comment Pt reports being IND at baseline with all ADLs. Pt reports ambulating without an AD at baseline but does have a FWW if needed. Pt reports stairs are not difficult for her. Pt reports being able to ambulate ~100' w/o an AD before needing a rest break. Pt drives.   General Information   Onset of Illness/Injury or Date of Surgery 08/09/22   Referring Physician Nguyen, Erick Carrasco MD   Patient/Family Therapy Goals Statement (PT) \"To go home\"   Pertinent History of Current Problem (include personal factors and/or comorbidities that impact the POC) Per Chart: Aspen Mccullough is a 62 year old female admitted on 8/8/2022. She presents to the emergency department from urgent care after she was found to have hypoxia and pulmonary infiltrates.  Describes increased shortness of breath with exertion since Sunday as well as increased lower extremity swelling concerning for diastolic heart failure exacerbation.   Existing Precautions/Restrictions fall   Weight-Bearing Status - LLE full weight-bearing "   Weight-Bearing Status - RLE full weight-bearing   Cognition   Orientation Status (Cognition) oriented x 4   Pain Assessment   Patient Currently in Pain No   Integumentary/Edema   Integumentary/Edema Comments 2+ RLE edema; 1+ LLE edema   Posture    Posture Protracted shoulders;Forward head position   Range of Motion (ROM)   Range of Motion ROM is WFL   Strength (Manual Muscle Testing)   Strength (Manual Muscle Testing) Able to perform R SLR;Able to perform L SLR   Bed Mobility   Comment, (Bed Mobility) Supine>sit w/ ind   Transfers   Comment, (Transfers) Sit>stand w/o AD and IND   Gait/Stairs (Locomotion)   [place] Level (Gait) supervision   Assistive Device (Gait) other (see comments)  (no AD)   Distance in Feet (Required for LE Total Joints) 100' x 2  (10' eval)   Comment, (Gait/Stairs) Pt ambulated ~10' w/o AD and SBA for eval.   Balance   Balance Comments Pt able to sit at EOB unsupported without LOB. Pt ambulates using without an AD.   Sensory Examination   Sensory Perception Comments Pt reports numbness in R foot.   Clinical Impression   Criteria for Skilled Therapeutic Intervention Yes, treatment indicated   PT Diagnosis (PT) Impaired gait   Influenced by the following impairments Decreased activity tolerance; decreased strength   Functional limitations due to impairments Impaired functional mobility   Clinical Presentation (PT Evaluation Complexity) Stable/Uncomplicated   Clinical Presentation Rationale Clinical Judgement   Clinical Decision Making (Complexity) low complexity   Planned Therapy Interventions (PT) balance training;bed mobility training;gait training;patient/family education;stair training;strengthening;transfer training   Risk & Benefits of therapy have been explained evaluation/treatment results reviewed;care plan/treatment goals reviewed;risks/benefits reviewed;current/potential barriers reviewed;participants included;participants voiced agreement with care plan;patient   PT Discharge  Planning   PT Discharge Recommendation (DC Rec) home with assist   PT Rationale for DC Rec Pt appears at/near baseline for functional mobility. Pt demonstrates safe and effective technique with all functional mobility. Pt will have assist as needed upon discharge. Recommend pt use a FWW at discharge.   PT Brief overview of current status Supine>sit w/ IND; sit>stand w/o AD and IND; gait w/o AD and SBA; stairs w/ SBA   Total Evaluation Time   Total Evaluation Time (Minutes) 10   Physical Therapy Goals   PT Frequency One time eval and treatment only   PT Predicted Duration/Target Date for Goal Attainment 08/14/22   PT Goals Bed Mobility;Transfers;Gait;Stairs   PT: Bed Mobility Supervision/stand-by assist;Supine to/from sit;Goal Met   PT: Transfers Supervision/stand-by assist;Sit to/from stand;Goal Met   PT: Gait Supervision/stand-by assist;100 feet;Goal Met   PT: Stairs Supervision/stand-by assist;4 stairs;Goal Met

## 2022-08-09 NOTE — CONSULTS
REASON FOR ASSESSMENT:  CHF Consult for 2 gm NA Diet Education    - Chart reviewed. Cardiology consulted and suspect that LE swelling is related to venous insufficiency. EF of 55% on ECHO today.   - does not meet criteria for 2g Na restriction for CHF.   - RD available for consult as needed, otherwise will check back at LOS.     Tiffany Sy RD, LD  [facility]  Pager: [phone]  Weekend Pager: [phone]

## 2022-08-09 NOTE — PHARMACY-ADMISSION MEDICATION HISTORY
Pharmacy Medication History  Admission medication history interview status for the 8/8/2022  admission is complete. See EPIC admission navigator for prior to admission medications     Location of Interview: Patient room  Medication history sources: Patient    Significant changes made to the medication list:  Removed prednisone and methylprednisolone    In the past week, patient estimated taking medication this percent of the time: greater than 90% (did not take meds since yesterday due to being in the ER, is behind 2 doses of morphine and gapabentin & rest of meds)    Additional medication history information:   none    Medication reconciliation completed by provider prior to medication history? No    Time spent in this activity: 20 mins    Prior to Admission medications    Medication Sig Last Dose Taking? Auth Provider Long Term End Date   atorvastatin (LIPITOR) 40 MG tablet Take 1 tablet (40 mg) by mouth every evening 8/7/2022 at Unknown time Yes Fannie Cummins PA-C Yes    BIOTIN FORTE PO Take 1 tablet by mouth daily  8/7/2022 at Unknown time Yes Reported, Patient     cyanocobalamin 1000 MCG/ML injection Inject 1 mL (1,000 mcg) Subcutaneous every 7 days Past Week at Unknown time Yes Yeimy Cabrera MD     cyclobenzaprine (FLEXERIL) 10 MG tablet Take 1 tablet (10 mg) by mouth 3 times daily 8/7/2022 at Unknown time Yes Yeimy Cabrera MD     gabapentin (NEURONTIN) 300 MG capsule Take 300 mg by mouth 2 times daily (morning and afternoon) with 800mg tablet (total dose 1100mg) 8/7/2022 at Unknown time Yes Unknown, Entered By History No    gabapentin (NEURONTIN) 800 MG tablet Take 800 mg by mouth At Bedtime  8/7/2022 at Unknown time Yes Unknown, Entered By History No    gabapentin (NEURONTIN) 800 MG tablet Take 800 mg by mouth 2 times daily (morning and afternoon) in addition to 300mg capsule (total dose 1100mg) 8/7/2022 at Unknown time Yes Unknown, Entered By History No     HYDROmorphone (DILAUDID) 8 MG tablet Take 8 mg by mouth 3 times daily . Max of 3 doses per day. 8/7/2022 at Unknown time Yes      lifitegrast (XIIDRA) 5 % opthalmic solution Place 1 drop into both eyes 2 times daily 8/7/2022 at Unknown time Yes      metoprolol succinate ER (TOPROL XL) 100 MG 24 hr tablet Take 1 tablet (100 mg) by mouth 2 times daily 8/7/2022 at Unknown time Yes Sil Muñoz MD Yes    morphine (MS CONTIN) 30 MG 12 hr tablet Take 30 mg by mouth 2 times daily (morning and night) in addition to 60 mg tablet for a total dose of 90mg. 8/7/2022 at Unknown time Yes      morphine (MS CONTIN) 60 MG 12 hr tablet Take 60 mg by mouth daily in the afternoon (in addition to the 2 - 90mg doses) 8/7/2022 at Unknown time Yes Unknown, Entered By History     morphine (MS CONTIN) 60 MG 12 hr tablet Take 60 mg by mouth 2 times daily (morning and night) in addition to 30mg tablet for a total dose of 90mg 8/7/2022 at Unknown time Yes Umm Ya APRN CNP     multivitamin w/minerals (THERA-VIT-M) tablet Take 1 tablet by mouth daily  8/7/2022 at Unknown time Yes      prednisoLONE acetate (PRED FORTE) 1 % ophthalmic susp Place 1 drop into both eyes 2 times daily  8/7/2022 at Unknown time Yes Reported, Patient No    rivaroxaban ANTICOAGULANT (XARELTO) 20 MG TABS tablet Take 1 tablet (20 mg) by mouth daily (with dinner) Hold today 11/01 and restart when no longer having bloody coughing, suspected in 1-2 days. 8/7/2022 at Unknown time Yes Richelle Mcgee E, DO Yes    TIROSINT 200 MCG CAPS Take 600 mcg by mouth daily  8/7/2022 at Unknown time Yes Unknown, Entered By History Yes    benzonatate (TESSALON) 100 MG capsule Take 1 capsule (100 mg) by mouth 3 times daily as needed for cough prn  Maris Tolentino MD     carboxymethylcellulose (REFRESH PLUS) 0.5 % SOLN ophthalmic solution Place 1 drop into both eyes 2 times daily as needed  prn       cycloSPORINE (RESTASIS) 0.05 % ophthalmic emulsion Place 1  drop into both eyes 2 times daily as needed  prn  Reported, Patient     Elastic Bandages & Supports (MEDICAL COMPRESSION SOCKS) MISC 1 Package daily Please measure and distribute 2 pair of 20mmHg - 30mmHg THIGH high open or closed toe compression stockings with extra refills as indicated. Jobst ultrasheer or equivalent.   Taye Salcedo MD     fexofenadine (ALLEGRA) 180 MG tablet Take 180 mg by mouth daily as needed  prn  Reported, Patient     guaiFENesin-dextromethorphan (ROBITUSSIN DM) 100-10 MG/5ML syrup Take 10 mLs by mouth every 4 hours as needed for cough prn  Maris Tolentino MD     NASONEX 50 MCG/ACT spray Spray 1 spray into both nostrils daily as needed  prn       nystatin (MYCOSTATIN) 835278 UNIT/GM external powder Apply topically 3 times daily as needed prn  Naun Patricio MD     zolpidem (AMBIEN) 5 MG tablet Take 5 mg by mouth nightly as needed for sleep prn  Unknown, Entered By History No    zoster vaccine recombinant adjuvanted (SHINGRIX) injection GIVEN AT PHARMACY: FIRST DOSE NOW, SECOND DOSE GIVEN 2 TO 6 MONTHS AFTER   Reported, Patient       Rajni Sy, PharmD     The information provided in this note is only as accurate as the sources available at the time of update(s)

## 2022-08-09 NOTE — PHARMACY
"The following home medications were NOT continued on inpatient admission per \"Discontinuation of nonessential home medications during hospitalization\" policy: biotin    If a therapeutic holiday is deemed inappropriate per the prescriber, please notify the pharmacist regarding the medication order.    The pharmacist is available to answer any questions and/or concerns the patient may have regarding discontinuation of non-essential medications.    Please ensure that these medications are restarted as needed upon discharge via the medication reconciliation discharge process and included on the discharge medication reconciliation report.    Thank you,  An Quintana [facility]    "

## 2022-08-09 NOTE — DISCHARGE INSTRUCTIONS
Right buttock wound(s): Every other day   1. Cleanse with wound cleanser. Pat Dry.   2. Apply No sting barrier film to skin around wound  3. Apply Iodosorb Gel onto silicone foam dressing in the shape of the wound bed and press into wound.  4. Reposition side to side every 2 hours. Avoid sitting on Right buttock as able. Use chair cushion when sitting.    Schedule a follow up appointment with the [facility] [place]  [phone]

## 2022-08-09 NOTE — CONSULTS
[facility] Nurse Inpatient Assessment     Consulted for: Right buttock    Patient History (according to provider note(s):    Aspen Mccullough is a 62 year old female admitted on 8/8/2022. She presents to the emergency department from urgent care after she was found to have hypoxia and pulmonary infiltrates.  Describes increased shortness of breath with exertion since Sunday as well as increased lower extremity swelling concerning for diastolic heart failure exacerbation.      Areas Assessed:      Areas visualized during today's visit: Right buttock    Pressure Injury Location: Right buttock    Last photo: 8/9/22      Wound type: Pressure Injury     Pressure Injury Stage: Unstageable, present on admission   Wound history/plan of care:   Patient reports that wound started after a hospitalization in May and that she has been to the wound clinic for treatment. Last wound clinic note is 3/29/22 and wound was significantly smaller than it is currently. Reports that  has been doing dressing changes at home.    Wound base: 90 % yellow to tan, non viable thick tissue, 10 % pink non granular tissue     Palpation of the wound bed: normal      Drainage: moderate     Description of drainage: serosanguinous and purulent     Measurements (length x width x depth, in cm) 19  x 5  x  0.1 cm       Tunneling N/A     Undermining N/A  Periwound skin: Intact      Color: pink      Temperature: normal   Odor: none  Pain: mild, tender  Pain intervention prior to dressing change: patient tolerated well  Treatment goal: Heal , Infection control/prevention and Remove necrotic tissue  STATUS: initial assessment  Supplies ordered: gathered, discussed with RN and discussed with patient     Treatment Plan:     Right buttock wound(s): Every other day   1. Cleanse with wound cleanser. Pat Dry.  2. Apply No sting barrier film to periwound skin.  3. Apply Iodosorb Gel ( [id number]) onto Mepilex Sacral and Mepliex 4x4  "in shape of wound bed and press into wound.  4. Time and date dressing  5. Follow PIP orders    Pressure Injury Prevention (PIP) Plan:  -If patient is declining pressure injury prevention interventions: Explore reason why and address patient's concerns, Educate on pressure injury risk and prevention intervention(s) and If patient is still declining, document \"informed refusal\"   -Mattress: Follow bed algorithm, reassess daily and order specialty mattress, if indicated.  -HOB: Maintain at or below 30 degrees, unless contraindicated  -Repositioning in bed: Every 1-2 hours , Left/right positioning; avoid supine and Raise foot of bed prior to raising head of bed, to reduce patient sliding down (shear)  -Heels: Keep elevated off mattress and Pillows under calves  -Protective Dressing: Sacral Mepilex for prevention (#[id number]),  especially for the agitated patient   -Positioning Equipment: None  -Chair positioning: Repositions self: patient to shift weight every 15 minutes and Chair cushion [id number]   -Moisture Management: Perineal cleansing /protection: Follow Incontinence Protocol, Avoid brief in bed, Clean and dry skin folds with bathing  and Moisturize dry skin  -Under Devices: Inspect skin under all medical devices during skin inspection , Ensure tubes are stabilized without tension and Ensure patient is not lying on medical devices or equipment when repositioned           Orders: Written    RECOMMEND PRIMARY TEAM ORDER: Patient should follow up with I outpatient  Education provided: importance of repositioning, plan of care and Off-loading pressure  Discussed plan of care with: Patient and Nurse  WOC nurse follow-up plan: weekly  Notify WOC if wound(s) deteriorate.  Nursing to notify the Provider(s) and re-consult the WOC Nurse if new skin concern.    DATA:     Current support surface: Standard  Atmos Air mattress  Containment of urine/stool: Continent of bladder and Continent of bowel  BMI: Body mass index is 45.63 " kg/m .   Active diet order: Orders Placed This Encounter      Combination Diet Low Saturated Fat Na <2400mg Diet, No Caffeine Diet     Output: I/O last 3 completed shifts:  In: -   Out: 800 [Urine:800]     Labs: Recent Labs   Lab 08/09/22  0548 08/08/22 2006   ALBUMIN 3.4  --    HGB 10.9*  --    WBC 8.4  --    CRP  --  47.4*     Pressure injury risk assessment:   Sensory Perception: 4-->no impairment  Moisture: 4-->rarely moist  Activity: 4-->walks frequently  Mobility: 4-->no limitation  Nutrition: 3-->adequate  Friction and Shear: 3-->no apparent problem  Steve Score: 22    Elsy Villar [facility]   Dept. Pager: [phone]  Dept. Office Number: [phone]

## 2022-08-09 NOTE — PROGRESS NOTES
RECEIVING UNIT ED HANDOFF REVIEW    ED Nurse Handoff Report was reviewed by: Sarahi Durand RN on August 9, 2022 at 5:41 AM

## 2022-08-09 NOTE — PROGRESS NOTES
[facility]    Hospitalist Progress Note      Assessment & Plan   Aspen Mccullough is a 62 year old female admitted on 8/8/2022. She presents to the emergency department from urgent care presenting after fall and R leg pain; in urgent care reportedly found to have hypoxia and pulmonary infiltrates.  Describes increased shortness of breath with exertion since Sunday as well as increased lower extremity swelling.     Acute hypoxic respiratory failure: Patient with increasing bilateral lower extremity edema and dyspnea on exertion since Sunday of this week.  Known history of venous stasis dermatitis and reflux associated with prior DVTs.  Has persistent pulmonary infiltrates noted also on CT in February 2022 during COVID and subsequent overlap pneumonia diagnosis, hospitalization, and treatment.  With acuity of worsening as well as new exacerbation of chronic lower extremity swelling differential includes acute diastolic heart failure exacerbation,likely some contributions from class III obesity, scarring from prior viral and possible bacterial pneumonia given distribution,  question possible lupus pneumonitis given history of hemoptysis in November 2021, and patient reporting that she had self tapered off of her prednisone and is not on any rheumatologic treatments any longer.  -Now easily weaned off O2, no pulmonary symptoms.  CXR, opacities right upper and lower lobe, history of COVID February 22, as above repeat COVID, negative.  Known lupus.  -TTE: LV borderline dilated, EF estimated 55%, RV function normal, no valve disease, no significant change from echo 12/21.  -Lasix 40 mg IV twice daily; per echo results and mildly elevated BNP 1.4K most likely component of venous insufficiency, decrease furosemide to 20 mg IV twice daily, BMP in AM.  -Intake and output, daily weights  -Compression dressings to bilateral lower extremities, lymphedema consult to mobilize peripheral fluid  -Cardiology  and Pulmonary consult noted.  -Admission CRP 47.4,  Known lupus, Pulmonary consult noted.  There is no active pulmonary symptoms, Pro-Arturo negative,follow-up with Pulmonary and Rheumatology on outpatient follow-up..  -     Predominantly right-sided pulmonary opacities: In February was hospitalized at [facility] with COVID-19 pneumonia as well as presumed infectious pneumonia overlap.  Distribution of opacities seem somewhat similar to CT obtained at that time.  -Received ceftriaxone and azithromycin at outside emergency department; has a nonproductive cough with no fevers or chills and undetectable procalcitonin.  Holding further anti-infectives given undetectable procalcitonin unless further infectious signs/symptoms  -IV Lasix  -CRP 47. 4 see 1.     Prior COVID-19 diagnosis: Diagnosed with COVID-19 on emergency department visit 1/18/2022.  Seen again in urgent care with persistent cough 1/29/2022 and discharged with doxycycline and prednisone for overlap pneumonia.  Hospitalized 2/17/2022 at [facility].  COVID on admission negative.    History recent fall,  Evaluated in urgent care, reportedly negative evaluation.  Full weightbearing, however increased edema, pain right lower leg, duplex venous ultrasound to rule out DVT, negative.     Class III obesity: BMI greater than 45.  Increased risk of all cause mortality and likely contributing to hypoxic respiratory failure with a component of restrictive lung disease to pulmonary process  -Continue with follow-up in bariatric clinic     Right buttock wound: Present on admission.  Stage II at minimum  -Wound nurse consulted  -Ambulate every shift  -Physical therapy consulted; fall precautions in place as patient had a fall at outside emergency department/urgent care which she attributes to her footwear     Hypertension:  -With bradycardia in the mid 40s to 60s range, reducing metoprolol XL dosing to 75 mg twice daily from 100 mg twice daily     History of PE,  DVT: Occurred in 2017.  Negative lupus anticoagulant work-up previously.  -Continue prior to admission Xarelto anticoagulation     Chronic pain syndrome: Follows with pain clinic for injections, on longstanding MS Contin and oral Dilaudid  -Continue oral Dilaudid 8 mg up to 3 times daily as needed  -MS Contin 90 mg every morning and evening, 60 mg in the afternoon as per home regimen  -Continue gabapentin 1100 mg every morning, 1100 mg in the afternoon, 800 mg at bedtime  -Physical therapy consulted as above; agree with prior recommendations for pool therapy at [facility] as patient describes having received in the outpatient setting  -Continue Flexeril 3 times daily     Lupus: Currently untreated.  Historically followed with Dr. Blackman at [facility], though tells me that she has not seen a rheumatologist in a few years.  Patient also tells me that she weaned herself off of prednisone and has not taken this medication for several months.  -Patient will need to reestablish with her rheumatology team  -Note pulmonary consult as above  -UA negative  -CRP 47.4, see above, outpatient Rheumatology follow-up     History of atrial fibrillation:  -Cardiac telemetry  -Continue prior to admission Xarelto  -Dose reduction of metoprolol XL as above given heart rates in the mid 40s range here in the emergency department     Hypothyroidism:  -Resume Tirosint 600 mcg daily.  TSH 25.4, free T4 0.71  -Follow-up PCP.     History of CVA:  -Continue prior to admission Xarelto anticoagulation  -Continue prior to admission atorvastatin       DVT Prophylaxis:  On Xarleto  Code Status: No CPR- Pre-arrest intubation OK     Expected Discharge Date: 08/11/2022               Alen Kebede MD  Text Page (7am - 6pm, M-F)    Total unit/floor time 35 minutes:  time consisted of the following assuming Patient care, examination of patient, review of records including labs, imaging results, medications, interdisciplinary  notes and completing documentation; > 50% Counseling/discussion with Patient regarding current condition including dyspnea, R leg swelling and pain; elevated CRP and Coordination of Care time with Nursing  and Specialists, Cardiology and Pulmonary regarding dyspnea care plan, management and surveillance.     Interval History   Assumed care.  Patient states she initially presented to urgent care after fall and right leg pain.  She states urgent care evaluation for fall was negative however was noted to be hypoxic and therefore referred to ED.  Patient notes increasing LE edema especially on right over the last few weeks, dyspnea, history of COVID in February, reportedly uncomplicated, see H&P.  She states history of known lupus previously on steroids however her rheumatologist retired and since has not followed up.      -Data reviewed today: I reviewed all new labs and imaging results over the last 24 hours    Physical Exam   Temp: 98.1  F (36.7  C) Temp src: Oral BP: 125/82 Pulse: 74   Resp: 18 SpO2: 92 % O2 Device: None (Room air)    Vitals:    08/08/22 1924 08/09/22 0623   Weight: 140.2 kg (309 lb) 140.2 kg (309 lb)     Vital Signs with Ranges  Temp:  [98  F (36.7  C)-98.1  F (36.7  C)] 98.1  F (36.7  C)  Pulse:  [52-74] 74  Resp:  [9-78] 18  BP: ()/(52-87) 125/82  SpO2:  [84 %-100 %] 92 %  I/O last 3 completed shifts:  In: 210 [P.O.:210]  Out: 800 [Urine:800]    General/Constitutional:   NAD, alert, calm, cooperative    HEENT/Head Exam:  atraumatic  Eyes:  PERRL, no conjunctivits  Mouth/Oral Pharynx:  Buccal mucosa WNL  Chest/Respiratory:  Air exchange bilateral lung fields; no rales or wheeze. Respiration nonlabored.  Cardiovascular:  No murmur appreciated.  LE edema 2/4; right greater than left, negative Homans  Gastrointestinal/Abdomen:  soft, nontender,no rebound, guarding or other peritoneal signs.  Musculoskeletal:  extremities warm, dry, noncyanotic; no acitve synovitis.  Neuro.  Gross motor tested,  nonfocal, sensory intact  Psych oriented, affect calm     Medications     - MEDICATION INSTRUCTIONS -       - MEDICATION INSTRUCTIONS -         atorvastatin  40 mg Oral QPM     cyclobenzaprine  10 mg Oral TID     furosemide  20 mg Intravenous Q12H     gabapentin  1,100 mg Oral BID     gabapentin  800 mg Oral At Bedtime     levothyroxine  600 mcg Oral Daily     metoprolol succinate ER  75 mg Oral BID     morphine  60 mg Oral Daily     morphine  90 mg Oral BID     multivitamin w/minerals  1 tablet Oral Daily     prednisoLONE acetate  1 drop Both Eyes BID     rivaroxaban ANTICOAGULANT  20 mg Oral Daily with supper     senna-docusate  1 tablet Oral BID    Or     senna-docusate  2 tablet Oral BID     sodium chloride (PF)  3 mL Intracatheter Q8H       Data   Recent Labs   Lab 22  0548   WBC 8.4   HGB 10.9*   MCV 89         POTASSIUM 3.7   CHLORIDE 102   CO2 31   BUN 16   CR 0.83   ANIONGAP 5   KYLIE 9.3   *   ALBUMIN 3.4   PROTTOTAL 7.2   BILITOTAL 0.2   ALKPHOS 97   ALT 16   AST 16       Recent Results (from the past 24 hour(s))   Chest XR,  PA & LAT    Narrative    EXAM: XR CHEST 2 VIEWS  LOCATION: [facility]  DATE/TIME: 2022 7:43 PM    INDICATION: shortness of breath  COMPARISON: 2022 at 1306 hours      Impression    IMPRESSION: Cardiomegaly with prominent upper lobe vasculature, unchanged. Airspace opacities throughout the right upper and lower lobes with minimal change since the earlier study. No effusions.   Echocardiogram Complete   Result Value    LVEF  55%    Narrative    [id number]  [id number]  [id number]  [id number]^HELLEN^DWIGHT^NIRMALA     [facility]  Echocardiography Laboratory  [address] [place] [address]     Name: ROXY ALVARES  MRN: [id number]  : 1960  Study Date: 2022 11:20 AM  Age: 62 yrs  Gender: Female  Patient Location: [facility]  Reason For Study: CHF  Ordering Physician: DWIGHT MACEDO  Referring  Physician: Arcadio Farfan  Performed By: Alondra Tracey     BSA: 2.5 m2  Height: 69 in  Weight: 309 lb  HR: 86  BP: 125/82 mmHg  ______________________________________________________________________________  Procedure  Complete Portable Echo Adult. Optison (NDC #[id number]) given intravenously.  ______________________________________________________________________________  Interpretation Summary     1. The left ventricle is borderline dilated. The visual ejection fraction is  estimated at 55%.  2. The right ventricle is normal in structure, function and size.  3. No valve disease.  4. The ascending aorta is Borderline dilated. 3.8cm.     No changes from echo 12/2021.     ______________________________________________________________________________  Left Ventricle  The left ventricle is borderline dilated. There is normal left ventricular  wall thickness. The visual ejection fraction is estimated at 55%. Left  ventricular diastolic function is indeterminate. Normal left ventricular wall  motion.     Right Ventricle  The right ventricle is normal in structure, function and size.     Atria  The left atrium is mildly dilated. Right atrial size is normal. There is no  atrial shunt seen.     Mitral Valve  There is mild (1+) mitral regurgitation.     Tricuspid Valve  No tricuspid regurgitation. Right ventricular systolic pressure could not be  approximated due to inadequate tricuspid regurgitation.     Aortic Valve  The aortic valve is normal in structure and function.     Pulmonic Valve  The pulmonic valve is normal in structure and function.     Vessels  The ascending aorta is Borderline dilated. The inferior vena cava was normal  in size with preserved respiratory variability.     Pericardium  There is no pericardial effusion.     Rhythm  Sinus rhythm was noted.  ______________________________________________________________________________  MMode/2D Measurements & Calculations  IVSd: 0.99 cm     LVIDd: 6.5 cm  LVIDs:  4.6 cm  LVPWd: 0.71 cm  FS: 29.4 %  LV mass(C)d: 230.4 grams  LV mass(C)dI: 92.7 grams/m2  Ao root diam: 3.5 cm  LA dimension: 4.6 cm  asc Aorta Diam: 3.8 cm  LA/Ao: 1.3  LVOT diam: 2.5 cm  LVOT area: 4.7 cm2  LA Volume (BP): 85.8 ml  LA Volume Index (BP): 34.5 ml/m2  RWT: 0.22     Doppler Measurements & Calculations  MV E max mariaelena: 69.8 cm/sec  MV A max mariaelena: 92.2 cm/sec  MV E/A: 0.76  MV dec slope: 317.7 cm/sec2  PA acc time: 0.13 sec  E/E' avg: 10.7  Lateral E/e': 8.9  Medial E/e': 12.6     ______________________________________________________________________________  Report approved by: Lopez Hercules 08/09/2022 12:44 PM         US Lower Extremity Venous Duplex Right    Narrative    VENOUS ULTRASOUND RIGHT LOWER EXTREMITY  8/9/2022 1:10 PM     HISTORY: Pain, swelling, history of SLE; rule out DVT.    COMPARISON: May 10, 2021    TECHNIQUE: Color Doppler and spectral waveform analysis obtained  throughout the deep veins of the right lower extremity.    FINDINGS: The right common femoral, proximal greater saphenous,  femoral, and popliteal veins demonstrate normal blood flow,  compression, and augmentation. Posterior tibial and peroneal veins are  compressible. Negative for DVT in the left common femoral vein.      Impression    IMPRESSION: Negative for DVT in the right lower extremity.    ALESSANDRO KENNEY MD         SYSTEM ID:  [id number]

## 2022-08-09 NOTE — ED TRIAGE NOTES
Pt arrives via ems after pt was at urgency center and was dx with pneumonia and pulmonary edema, was given rocephin and zithromax and placed on oxygen for initial o2 sats of mid 70's.  Pt states having bilateral leg swelling and pain with increased shortness of breath.  No oxygen needs at home.       Triage Assessment     Row Name 08/08/22 [id number]       Triage Assessment (Adult)    Airway WDL WDL       Respiratory WDL    Respiratory WDL X;cough;rhythm/pattern    Rhythm/Pattern, Respiratory dyspnea on exertion    Cough Frequency no cough       Skin Circulation/Temperature WDL    Skin Circulation/Temperature WDL WDL       Cardiac WDL    Cardiac WDL WDL       Peripheral/Neurovascular WDL    Peripheral Neurovascular WDL WDL       Cognitive/Neuro/Behavioral WDL    Cognitive/Neuro/Behavioral WDL WDL

## 2022-08-09 NOTE — CONSULTS
[facility] / [facility]  Pulmonary Consultation      Primary care provider: Arcadio Farfan  Date of admission: 8/8/2022  Hospital attending:  Erick Nguyen MD  Reason for consultation: Pulmonary fibrosis  Date of service: 8/9/2022      HISTORY     HPI:    Ms. Mccullough is a 63 yo F former 75 pkyr smoker (3 ppd x25 yrs, quit 1990s) with SLE (dx 1990s), prior COVID (Jan 2022) CKD, fibromyalgia, chronic regional pain syndrome, Afib, who presented with RLE swelling and progressive AVALOS.     Hypoxic to the 70s in the ER. CXR showed R>L alveolar infiltrates. CRP 47, BNP 1400, procal undetectable, WBC 8K. UA negative, trop normal. BLE and RUE US negative for clot so CT PE deferred. Of note, TTE from Dec 2021 showed G1DD. Receiving diuresis. Pulmonary consulted for possible lupus pneumonitis and need for eventual bronchoscopy.     Per CareEverywhere, CT chest from Feb 2022 after recent COVID infection noted Right sided patchy opacities. Currently satting normally on room air. Reports she came to the ER because of RLE swelling and history of VTE. Has also had progressive AVALOS over the past 2 months at home. No fever or weight loss at home. Had PFTs in the 1990s but doesn't follow with a Pulmonologist. Her Rheumatologist also retired.       REVIEW OF SYSTEMS:  A comprehensive review of systems was negative except for items noted in the HPI.    MEDICAL HISTORY:  Past Medical History:   Diagnosis Date     ADHD (attention deficit hyperactivity disorder)      Allergic rhinitis      Chronic low back pain      Cushing's syndrome (H)      DDD (degenerative disc disease)      DDD (degenerative disc disease)      Deviated nasal septum      Diarrhea      Endometriosis      Fibromyalgia      Hashimoto's disease      Hyperlipidemia      Hypertension      Hypothyroid     hx hashimoto's thyroiditis     Insomnia      Lupus (H)      Malabsorption      Pernicious anemia      Renal disease     chronic renal  insufficiency     Sinusitis      SURGICAL HISTORY:  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)     APPENDECTOMY       ARTHRODESIS ANKLE      right     ARTHROPLASTY KNEE BILATERAL       ARTHROPLASTY REVISION KNEE  4/19/2011    Procedure:ARTHROPLASTY REVISION KNEE; With Antibiotic Cement ; Surgeon:CHAO OLIVARES; Location:[facility] OR     BREAST SURGERY      right- tissue remove nipple area     BUNIONECTOMY  12/14/2011    Procedure:BUNIONECTOMY; Right Bunion Correction; Surgeon:GRACE ZARATE; Location:[facility]     CHOLECYSTECTOMY       EXAM UNDER ANESTHESIA ANUS N/A 7/15/2020    Procedure: EXAM UNDER ANESTHESIA, ANUS;  Surgeon: Luis Canales MD;  [facility]: [facility]     EXCISE MASS UPPER EXTREMITY  12/14/2011    Procedure:EXCISE MASS UPPER EXTREMITY; Excision of Left Arm Mass; Surgeon:GRACE ZARATE; Location:[facility]     FOOT SURGERY      left X 4     FUSION LUMBAR ANTERIOR ONE LEVEL       HEMORRHOIDECTOMY INTERNAL N/A 7/15/2020    Procedure: Exam under anesthesia, hemorrhoidectomy;  Surgeon: Luis Canales MD;  [facility]     INJECT NERVE BLOCK SUPRASCAPULAR Left 5/18/2018    Procedure: INJECT NERVE BLOCK SUPRASCAPULAR;  Left Suprascapular Nerve Block;  Surgeon: Basim Velazquez MD;  [facility]     INJECT NERVE BLOCK SUPRASCAPULAR Right 7/23/2018    Procedure: INJECT NERVE BLOCK SUPRASCAPULAR;  Right suprascapular injection;  Surgeon: Basim Velazquez MD;  [facility]     INJECT NERVE BLOCK SUPRASCAPULAR Bilateral 10/29/2018    Procedure: Bilateral Suprascapular Nerve Blocks;  Surgeon: Basim Velazquez MD;  [facility]     INJECT NERVE BLOCK SUPRASCAPULAR Bilateral 3/15/2019    Procedure: Bilateral Suprascapular Nerve Block;  Surgeon: Basim Velazquez MD;  [facility]     INJECT NERVE BLOCK SUPRASCAPULAR Bilateral 12/31/2019    Procedure: bilateral suprascapular nerve block;  Surgeon:  Basim Velazquez MD;  [facility]     INJECT NERVE BLOCK SUPRASCAPULAR Bilateral 8/3/2021    Procedure: bilateral suprascapular nerve block;  Surgeon: Basim Velazquez MD;  Location: [facility]     IR ENDOVENOUS ABLATION VARICOSE VEINS  10/5/2021     IR ENDOVENOUS ABLATION VARICOSE VEINS  10/26/2021     KNEE SURGERY      see list which we will bring     LAMINECTOMY LUMBAR ONE LEVEL      L4-5     LAPAROSCOPIC ABLATION ENDOMETRIOSIS       KY HAND/FINGER SURGERY UNLISTED  surgeries on both hands with Dr. Shankar     KY SPINE SURGERY PROCEDURE UNLISTED      see list which we will bring     KY STOMACH SURGERY PROCEDURE UNLISTED  gall bladder removal     RADIO FREQUENCY ABLATION PULSED CERVICAL Bilateral 7/10/2019    Procedure: Bilateral Suprascapular Nerve Pulsed Radiofrequency Ablation;  Surgeon: Basim Velazquez MD;  [facility]     REMOVE HARDWARE FOOT  12/14/2011    Procedure:REMOVE HARDWARE FOOT; Hardware Removal Right Foot (Mini-C-Arm) ; Surgeon:GRACE ZARATE; Location: [facility]     RHINOPLASTY       SALPINGO-OOPHORECTOMY BILATERAL       TONSILLECTOMY       ZZC STOMACH SURGERY PROCEDURE UNLISTED      see list which we will bring     FAMILY HISTORY:  Family History   Problem Relation Age of Onset     Hypertension Mother      No Known Problems Father      No Known Problems Sister      No Known Problems Brother      No Known Problems Maternal Grandmother      No Known Problems Maternal Grandfather      No Known Problems Paternal Grandmother      No Known Problems Other      SOCIAL HISTORY:  Social History     Socioeconomic History     Marital status:      Spouse name: Not on file     Number of children: Not on file     Years of education: Not on file     Highest education level: Not on file   Occupational History     Not on file   Tobacco Use     Smoking status: Former Smoker     Packs/day: 1.50     Years: 20.00     Pack years: 30.00     Types: Cigarettes     Start date: 1/1/1973     Quit  date: 1993     Years since quittin.6     Smokeless tobacco: Never Used     Tobacco comment: wish I never started   Substance and Sexual Activity     Alcohol use: No     Alcohol/week: 0.0 standard drinks     Drug use: No     Sexual activity: Not Currently     Partners: Male     Birth control/protection: Post-menopausal   Other Topics Concern     Parent/sibling w/ CABG, MI or angioplasty before 65F 55M? Not Asked   Social History Narrative    ** Merged History Encounter **          Social Determinants of Health     Financial Resource Strain: Not on file   Food Insecurity: Not on file   Transportation Needs: Not on file   Physical Activity: Not on file   Stress: Not on file   Social Connections: Not on file   Intimate Partner Violence: Not on file   Housing Stability: Not on file     ALLERGIES:  Allergies   Allergen Reactions     Blood Transfusion Related (Informational Only) Other (See Comments)     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      Chlorhexidine Hives     Thor surgical cleanser     Codeine Hives     Has tolerated Oxycodone in past      Ibuprofen [Nsaids] Hives     Penicillins Hives     Other reaction(s): Unknown     Sulfa Drugs Hives     Other reaction(s): Unknown     Calcium Channel Blockers      Doxycycline GI Disturbance     Emesis & diarrhea  Patient denies allergy to this med     Hydroxyzine      rash     Mold      OUTPATIENT MEDICATIONS:  No current outpatient medications on file.     Current Facility-Administered Medications Ordered in Epic   Medication Dose Route Frequency Last Rate Last Admin     acetaminophen (TYLENOL) tablet 650 mg  650 mg Oral Q6H PRN        Or     acetaminophen (TYLENOL) Suppository 650 mg  650 mg Rectal Q6H PRN         atorvastatin (LIPITOR) tablet 40 mg  40 mg Oral QPM         benzonatate (TESSALON) capsule 100 mg  100 mg Oral TID PRN         carboxymethylcellulose PF (REFRESH PLUS) 0.5 % ophthalmic solution 1 drop  1 drop Both Eyes BID PRN          Continuing beta blocker from home medication list OR beta blocker order already placed during this visit   Does not apply DOES NOT GO TO MAR         cyclobenzaprine (FLEXERIL) tablet 10 mg  10 mg Oral TID   10 mg at 08/09/22 0853     furosemide (LASIX) injection 40 mg  40 mg Intravenous Q12H   40 mg at 08/09/22 0900     gabapentin (NEURONTIN) capsule 1,100 mg  1,100 mg Oral BID   1,100 mg at 08/09/22 0539     gabapentin (NEURONTIN) tablet 800 mg  800 mg Oral At Bedtime         HOLD: nitroGLYcerin IF   Does not apply HOLD         HYDROmorphone (DILAUDID) tablet 8 mg  8 mg Oral Q4H PRN         Levothyroxine Sodium CAPS 600 mcg  600 mcg Oral Daily         lidocaine (LMX4) cream   Topical Q1H PRN         lidocaine 1 % 0.1-1 mL  0.1-1 mL Other Q1H PRN         melatonin tablet 1 mg  1 mg Oral At Bedtime PRN         metoprolol succinate ER (TOPROL XL) 24 hr tablet 75 mg  75 mg Oral BID   75 mg at 08/09/22 0853     morphine (MS CONTIN) 12 hr tablet 60 mg  60 mg Oral Daily         morphine (MS CONTIN) 12 hr tablet 90 mg  90 mg Oral BID         multivitamin w/minerals (THERA-VIT-M) tablet 1 tablet  1 tablet Oral Daily   1 tablet at 08/09/22 0853     ondansetron (ZOFRAN ODT) ODT tab 4 mg  4 mg Oral Q6H PRN        Or     ondansetron (ZOFRAN) injection 4 mg  4 mg Intravenous Q6H PRN         Patient is already receiving anticoagulation with heparin, enoxaparin (LOVENOX), warfarin (COUMADIN)  or other anticoagulant medication   Does not apply Continuous PRN         polyethylene glycol (MIRALAX) Packet 17 g  17 g Oral Daily PRN         prednisoLONE acetate (PRED FORTE) 1 % ophthalmic susp 1 drop  1 drop Both Eyes BID         prochlorperazine (COMPAZINE) injection 10 mg  10 mg Intravenous Q6H PRN        Or     prochlorperazine (COMPAZINE) tablet 10 mg  10 mg Oral Q6H PRN        Or     prochlorperazine (COMPAZINE) suppository 25 mg  25 mg Rectal Q12H PRN         rivaroxaban ANTICOAGULANT (XARELTO) tablet 20 mg  20 mg Oral Daily with  "supper         senna-docusate (SENOKOT-S/PERICOLACE) 8.6-50 MG per tablet 1 tablet  1 tablet Oral BID   1 tablet at 22 0853    Or     senna-docusate (SENOKOT-S/PERICOLACE) 8.6-50 MG per tablet 2 tablet  2 tablet Oral BID         sodium chloride (PF) 0.9% PF flush 3 mL  3 mL Intracatheter Q8H         sodium chloride (PF) 0.9% PF flush 3 mL  3 mL Intracatheter q1 min prn         zolpidem (AMBIEN) tablet 5 mg  5 mg Oral At Bedtime PRN         No current [facility]-ordered outpatient medications on file.       OBJECTIVE     VITALS:  Temp (24hrs), Av  F (36.7  C), Min:98  F (36.7  C), Max:98  F (36.7  C)    Vital signs:  Temp: 98.1  F (36.7  C) Temp src: Oral BP: 125/82 Pulse: 74   Resp: 18 SpO2: 92 % O2 Device: None (Room air)   Height: 175.3 cm (5' 9\") Weight: 140.2 kg (309 lb)  Estimated body mass index is 45.63 kg/m  as calculated from the following:    Height as of this encounter: 1.753 m (5' 9\").    Weight as of this encounter: 140.2 kg (309 lb).    INTAKE & OUTPUT:  I/O last 3 completed shifts:  In: -   Out: 800 [Urine:800]    PHYSICAL EXAM:  GENERAL - NAD  HEENT - External ears and nose overall normal   CARDIOVASCULAR - RRR S1 S2  PULMONARY - CTAB no w/r/r  EXTREMITIES - RLE>LLE 1+ edema  NEURO - Alert and conversational  SKIN - Warm    LABS:  No lab results found in last 7 days.  No lab results found in last 7 days.  Recent Labs   Lab 22  0548   WBC 8.4   RBC 3.88   HGB 10.9*   HCT 34.7*   MCV 89   MCH 28.1   MCHC 31.4*   RDW 16.1*        Recent Labs   Lab 22  0548      POTASSIUM 3.7   CHLORIDE 102   KYLIE 9.3   CO2 31   BUN 16   CR 0.83   *       IMAGING:    CXR           ASSESSMENT & PLAN     Abnl CT/CXR R91.8  CHF I50.9  Fluid overload E87.79  Hypoxemia R09.02  Morbid obesity E66.01  Pulmonary edema J81.0  History of COVID-19 infection      Ms. Mccullough is a 63 yo F former 75 pkyr smoker (3 ppd x25 yrs, quit ) with SLE (dx ), prior COVID (2022) CKD, " fibromyalgia, chronic regional pain syndrome, Afib, who presented with RLE swelling and progressive AVALOS. R>L alveolar infiltrates on CXR. Differential includes pulmonary edema in the setting of CHF and volume overload, infection, inflammatory pneumonitis, other.     - Agree with diuresis and holding antibiotics given undetectable procal and lack of leukocytosis or fever.   - If WBC increases or she becomes febrile would have low threshold to treat for CAP  - Agree with no need for bronchoscopy at this time  - Ms. Mccullough can follow up as outpatient in 4-6 wks for repeat CXR when recovered from her acute illness.       Bradford Cedeno MD    [facility]  Office: [phone]  Pager: [phone]

## 2022-08-09 NOTE — ED NOTES
Pt states tripping over sandal at urgent center and falling earlier today with no injuries.  Pt up to bedside commode independently.      Admitting physician to see pt.

## 2022-08-09 NOTE — PLAN OF CARE
PT: Attempted lymphedema eval, pt refused and is uninterested in wraps or compression stockings at this time. Will complete orders.

## 2022-08-09 NOTE — ED NOTES
[facility]  ED Nurse Handoff Report    ED Chief complaint: Shortness of Breath      ED Diagnosis:   Final diagnoses:   Edema, unspecified type   Acute respiratory failure with hypoxia (H)       Code Status: admitting to discuss with pt.    Allergies:   Allergies   Allergen Reactions     Blood Transfusion Related (Informational Only) Other (See Comments)     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      Chlorhexidine Hives     Thor surgical cleanser     Codeine Hives     Has tolerated Oxycodone in past      Ibuprofen [Nsaids] Hives     Penicillins Hives     Other reaction(s): Unknown     Sulfa Drugs Hives     Other reaction(s): Unknown     Calcium Channel Blockers      Doxycycline GI Disturbance     Emesis & diarrhea  Patient denies allergy to this med     Hydroxyzine      rash     Mold        Patient Story: Details:   Pt arrives via ems after pt was at urgency center and was dx with pneumonia and pulmonary edema, was given rocephin and zithromax and placed on oxygen for initial o2 sats of mid 70's.  Pt states having bilateral leg swelling and pain with increased shortness of breath.  No oxygen needs at home.               Triage Assessment            Row Name 08/08/22 1927             Triage Assessment (Adult)      Airway WDL WDL             Respiratory WDL      Respiratory WDL X;cough;rhythm/pattern      Rhythm/Pattern, Respiratory dyspnea on exertion      Cough Frequency no cough             Skin Circulation/Temperature WDL      Skin Circulation/Temperature WDL WDL             Cardiac WDL      Cardiac WDL WDL             Peripheral/Neurovascular WDL      Peripheral Neurovascular WDL WDL             Cognitive/Neuro/Behavioral WDL      Cognitive/Neuro/Behavioral WDL            Focused Assessment:     Respiratory AH       Details:   Respiratory (Adult) - Airway WDL: airway symptoms   Respiratory WDL - Respiratory WDL: .WDL except; rhythm/pattern  Rhythm/Pattern, Respiratory: dyspnea on  exertion  Cough Frequency: no cough             Treatments and/or interventions provided: oxygen at 4 liters/nc, iv lasix 40 mg, ate dinner, monitor  Patient's response to treatments and/or interventions: improvement    To be done/followed up on inpatient unit:  continue oxygen as needed, medications as ordered, monitor    Does this patient have any cognitive concerns?: n/a    Activity level - Baseline/Home:  Independent  Activity Level - Current:   Independent    Patient's Preferred language: English   Needed?: No    Isolation: None  Infection: Not Applicable  Patient tested for COVID 19 prior to admission: YES  Bariatric?: No    Vital Signs:   Vitals:    08/08/22 2045 08/08/22 2052 08/08/22 2100 08/08/22 2120   BP: 120/72   104/66   Pulse: 65      Resp:  22  10   Temp:       TempSrc:       SpO2:  90% 94%    Weight:       Height:           Cardiac Rhythm: nsr    Was the PSS-3 completed:   Yes  What interventions are required if any?               Family Comments:  with pt  OBS brochure/video discussed/provided to patient/family: No              Name of person given brochure if not patient:               Relationship to patient:     For the majority of the shift this patient's behavior was Green.   Behavioral interventions performed were n/a.    ED NURSE PHONE NUMBER: [phone]

## 2022-08-09 NOTE — PLAN OF CARE
Occupational Therapy: Orders received. Chart reviewed and discussed with care team.?pt admitted due to hypoxia and pulmonary infiltrates.  Describes increased shortness of breath with exertion since Sunday as well as increased lower extremity swelling concerning for diastolic heart failure exacerbation. Spoke with PT, pt SBA/S with mobility and no concerns with ADL's, pt has a supportive  who can A as needed. Per PT, pt not interested in CHF education and wanting to discharge home. Pt declined lymphedema as well. No OT needs warranted. Defer discharge recommendations to PT and medical team.? Will complete OT orders.

## 2022-08-09 NOTE — H&P
[facility]    History and Physical - Hospitalist Service       Date of Admission:  8/8/2022    Assessment & Plan      Aspen Mccullough is a 62 year old female admitted on 8/8/2022. She presents to the emergency department from urgent care after she was found to have hypoxia and pulmonary infiltrates.  Describes increased shortness of breath with exertion since Sunday as well as increased lower extremity swelling concerning for diastolic heart failure exacerbation.    Acute hypoxic respiratory failure: Patient with increasing bilateral lower extremity edema and dyspnea on exertion since Sunday of this week.  Known history of venous stasis dermatitis and reflux associated with prior DVTs.  Has persistent pulmonary infiltrates noted also on CT in February 2022 during COVID and subsequent overlap pneumonia diagnosis, hospitalization, and treatment.  With acuity of worsening as well as new exacerbation of chronic lower extremity swelling, appears most consistent with acute diastolic heart failure exacerbation, though also likely some contributions from class III obesity, scarring from prior viral and possible bacterial pneumonia given distribution, I would also question possible lupus pneumonitis given history of hemoptysis in November 2021, and patient reporting that she had self tapered off of her prednisone and is not on any rheumatologic treatments any longer.  -TTE.  Normal ejection fraction as of December 2021 echocardiogram  -Lasix 40 mg IV twice daily  -Intake and output, daily weights  -Compression dressings to bilateral lower extremities, lymphedema consult to mobilize peripheral fluid  -Cardiology consulted    Predominantly right-sided pulmonary opacities: In February was hospitalized at [facility] with COVID-19 pneumonia as well as presumed infectious pneumonia overlap.  Distribution of opacities seem somewhat similar to CT obtained at that time.  -Received ceftriaxone and  azithromycin at outside emergency department; has a nonproductive cough with no fevers or chills and undetectable procalcitonin.  Holding further anti-infectives given undetectable procalcitonin unless further infectious signs/symptoms  -IV Lasix  -Add on CRP; patient with a history of lupus and has been off of her prednisone for several months.  -Pulmonary consulted; might benefit from bronchoscopy in the future with her persistent infiltrates, though currently will attempt to improve hypoxia with diuresis.    Prior COVID-19 diagnosis: Diagnosed with COVID-19 on emergency department visit 1/18/2022.  Seen again in urgent care with persistent cough 1/29/2022 and discharged with doxycycline and prednisone for overlap pneumonia.  Hospitalized 2/17/2022 at [facility]    Class III obesity: BMI greater than 45.  Increased risk of all cause mortality and likely contributing to hypoxic respiratory failure with a component of restrictive lung disease to pulmonary process  -Continue with follow-up in bariatric clinic    Right buttock wound: Present on admission.  Stage II at minimum  -Wound nurse consulted  -Ambulate every shift  -Physical therapy consulted; fall precautions in place as patient had a fall at outside emergency department/urgent care which she attributes to her footwear    Hypertension:  -With bradycardia in the mid 40s to 60s range, reducing metoprolol XL dosing to 75 mg twice daily from 100 mg twice daily    History of PE, DVT: Occurred in 2017.  Negative lupus anticoagulant work-up previously.  -Continue prior to admission Xarelto anticoagulation    Chronic pain syndrome: Follows with pain clinic for injections, on longstanding MS Contin and oral Dilaudid  -Continue oral Dilaudid 8 mg up to 3 times daily as needed  -MS Contin 90 mg every morning and evening, 60 mg in the afternoon as per home regimen  -Continue gabapentin 1100 mg every morning, 1100 mg in the afternoon, 800 mg at bedtime  -Physical  "therapy consulted as above; agree with prior recommendations for pool therapy at [facility] as patient describes having received in the outpatient setting  -Continue Flexeril 3 times daily    Lupus: Currently untreated.  Historically followed with Dr. Blackman at [facility], though tells me that she has not seen a rheumatologist in a few years.  Patient also tells me that she weaned herself off of prednisone and has not taken this medication for several months.  -Patient will need to reestablish with her rheumatology team  -Note pulmonary consult as above  -Add on urinalysis to evaluate for active sediment  -Add on CRP    History of atrial fibrillation:  -Cardiac telemetry  -Continue prior to admission Xarelto  -Dose reduction of metoprolol XL as above given heart rates in the mid 40s range here in the emergency department    Hypothyroidism:  -Resume Tirosint 600 mcg daily  -TSH pending given history of prescription medication nonadherence    History of CVA:  -Continue prior to admission Xarelto anticoagulation  -Continue prior to admission atorvastatin        Diet:  Cardiac diet, no caffeine  DVT Prophylaxis: DOAC  Mina Catheter: Not present  Central Lines: None  Cardiac Monitoring: None  Code Status:  DNR, okay for time-limited trial of intubation for reversible causes.  Confirmed with patient on admission    Clinically Significant Risk Factors Present on Admission               # Coagulation Defect: home medication list includes an anticoagulant medication      # Severe Obesity: Estimated body mass index is 45.63 kg/m  as calculated from the following:    Height as of this encounter: 1.753 m (5' 9\").    Weight as of this encounter: 140.2 kg (309 lb).        Disposition Plan        Anticipated discharge 2 to 3 days when hypoxia has resolved    The patient's care was discussed with the Patient and Dr Bejarano in the emergency department.    Erick Nguyen MD  Hospitalist Service  [facility]  Securely message with the [employer] Web Console (learn more here)  Text page via [employer] Paging/Directory         ______________________________________________________________________    Chief Complaint   Dyspnea on exertion, lower extremity swelling    History is obtained from patient, chart review, discussion with Dr. Bejarano in the emergency department.  Also reviewed outside records including prior urgent care visits for her initial COVID diagnosis in January followed by persistent cough resulting in 2 additional encounters prior to hospitalization at the beginning of February at [facility] where significant pulmonary infiltrates were noted on CT imaging.    History of Present Illness   Aspen Mccullough is a 62 year old female who presents to the emergency department for evaluation of increased dyspnea on exertion as well as complaints of increasing right and left lower extremity swelling for the past 3 days.  Patient describes walking up the stairs from her laundry room and having significant dyspnea on exertion which was new for her from baseline this past weekend, and then around the same time, noted increased pedal edema of her left foot as well as increased right lower extremity swelling.    Patient with chronic right lower extremity swelling associated with a history of DVT.  Actually recently followed through cardiology clinic and seen by Dr. Muñoz 7/29/2022; noted to have severe right greater than left venous reflux on ultrasound imaging contributing to venous stasis dermatitis and chronic swelling.  Patient with a history of atrial fibrillation including RVR, though is unaware of episodes of atrial fibrillation at baseline.  Most recent Holter monitor without A. fib RVR recurrences.  She is on metoprolol and rate controlled with heart rates in the mid 40s to 60s range here in the emergency department.  She is not symptomatic with bradycardia as observed, and she is  uncertain of her resting heart rate.    Patient with a history of variable blood pressures and hypertension.  She tells me that occasionally her blood pressures into the 200 range.  She tells me that her  typically takes her vitals and checks her oxygen saturations, though she is unaware of what her baseline blood pressure is.  Here in the emergency department with blood pressures in the 100 systolic range.  Again, she is asymptomatic.  She is unaware if she had elevated blood pressures or atrial fibrillation precipitating her events of dyspnea this past weekend, though her shortness of breath with exertion has persisted.  Patient describes a longstanding history of hypertension for which she has been on multiple medications, up to 13 prescriptions at one point per her provided history while in her 20s before she self tapered off of these medications.  Currently only on metoprolol for blood pressure/rate control    Patient has a history of lupus.  Primarily had issues with joint pain in her hands, knees, and ankles as well as a malar rash.  Previously followed with [facility] rheumatology, though tells me that she has not seen her rheumatologist, Dr. Field, in years.  She was also on prednisone 10 mg alternating with 8 mg daily, though discontinued prednisone therapy after self tapering off more than 6 months ago.  She tells me that she has not had any subsequent flares since discontinuation of her prednisone.    In relatively early January, patient was diagnosed with COVID-19.  She was experiencing cough, fevers, myalgias, and dyspnea at that time.  Multiple encounters through urgent care for ongoing symptoms prior to hospitalization in early February for COVID and presumed overlap bacterial pneumonia.  Was treated with anti-infective therapies both as an outpatient and at [facility] prior to discharge.  CT at that time with extensive pulmonary infiltrates, right greater than left,  and largely central rather than peripheral.    There is a brief mention of hemoptysis in November 2021.  This may be of importance given patient's lupus history and self taper off of prednisone therapies.    Patient without fevers or chills.  She has an occasional bronchitis-like raspy cough which is nonproductive currently, though tells me that this is new since she received a dose of ceftriaxone and azithromycin at urgent care.  Breath sounds are asymmetric, transmitted on right side.  Chest x-ray with infiltrates, right greater than left which was concerning for infection versus CHF.  Again, generally without infectious complaints.  Procalcitonin added on in emergency department and negative.  She did receive anti-infectives at urgent care prior to being transferred to emergency department for further evaluation.  On review of imaging, chest x-ray findings are seemingly consistent with that of CT from February suggesting a more long-term process, though patient's description of dyspnea and hypoxia is more acute.    Most recent echocardiogram in December 2021 with an LVEF of 55 to 60%.    Patient with a right buttock ulcer.  She is unable to tell me how long she has had this sore, though believes it occurred sometime over the winter.  She is uncertain if it occurred before or after her hospitalization for COVID-19.  Her  performs dressing changes, and she tells me that she has been seen by [facility] wound clinic across the street.      Review of Systems    The 10 point Review of Systems is negative other than noted in the HPI or here.  Reports it is common for her to have some loose stools every 3 days or so  No nausea or vomiting  No abdominal pain  No chest discomfort  No lightheadedness or orthostatic symptoms    Past Medical History    I have reviewed this patient's medical history and updated it with pertinent information if needed.   Past Medical History:   Diagnosis Date     ADHD (attention deficit  hyperactivity disorder)      Allergic rhinitis      Chronic low back pain      Cushing's syndrome (H)      DDD (degenerative disc disease)      DDD (degenerative disc disease)      Deviated nasal septum      Diarrhea      Endometriosis      Fibromyalgia      Hashimoto's disease      Hyperlipidemia      Hypertension      Hypothyroid     hx hashimoto's thyroiditis     Insomnia      Lupus (H)      Malabsorption      Pernicious anemia      Renal disease     chronic renal insufficiency     Sinusitis        Past Surgical History   I have reviewed this patient's surgical history and updated it with pertinent information if needed.  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)     APPENDECTOMY       ARTHRODESIS ANKLE      right     ARTHROPLASTY KNEE BILATERAL       ARTHROPLASTY REVISION KNEE  4/19/2011    Procedure:ARTHROPLASTY REVISION KNEE; With Antibiotic Cement ; Surgeon:CHAO OLIVARES; Location:[facility] OR     BREAST SURGERY      right- tissue remove nipple area     BUNIONECTOMY  12/14/2011    Procedure:BUNIONECTOMY; Right Bunion Correction; Surgeon:GRACE ZARATE; Location:[facility]     CHOLECYSTECTOMY       EXAM UNDER ANESTHESIA ANUS N/A 7/15/2020    Procedure: EXAM UNDER ANESTHESIA, ANUS;  Surgeon: Luis Canales MD;  [facility]     EXCISE MASS UPPER EXTREMITY  12/14/2011    Procedure:EXCISE MASS UPPER EXTREMITY; Excision of Left Arm Mass; Surgeon:GRACE ZARATE; Location:[facility]     FOOT SURGERY      left X 4     FUSION LUMBAR ANTERIOR ONE LEVEL       HEMORRHOIDECTOMY INTERNAL N/A 7/15/2020    Procedure: Exam under anesthesia, hemorrhoidectomy;  Surgeon: Luis Canales MD;  [facility]     INJECT NERVE BLOCK SUPRASCAPULAR Left 5/18/2018    Procedure: INJECT NERVE BLOCK SUPRASCAPULAR;  Left Suprascapular Nerve Block;  Surgeon: Basim Velazquez MD;  [facility]     INJECT NERVE BLOCK SUPRASCAPULAR  Right 7/23/2018    Procedure: INJECT NERVE BLOCK SUPRASCAPULAR;  Right suprascapular injection;  Surgeon: Basim Velazquez MD;  [facility]     INJECT NERVE BLOCK SUPRASCAPULAR Bilateral 10/29/2018    Procedure: Bilateral Suprascapular Nerve Blocks;  Surgeon: Basim Velazquez MD;  [facility]     INJECT NERVE BLOCK SUPRASCAPULAR Bilateral 3/15/2019    Procedure: Bilateral Suprascapular Nerve Block;  Surgeon: Basim Velazquez MD;  [facility]     INJECT NERVE BLOCK SUPRASCAPULAR Bilateral 12/31/2019    Procedure: bilateral suprascapular nerve block;  Surgeon: Basim Velazquez MD;  [facility]     INJECT NERVE BLOCK SUPRASCAPULAR Bilateral 8/3/2021    Procedure: bilateral suprascapular nerve block;  Surgeon: Basim Velazquez MD;  [facility]     IR ENDOVENOUS ABLATION VARICOSE VEINS  10/5/2021     IR ENDOVENOUS ABLATION VARICOSE VEINS  10/26/2021     KNEE SURGERY      see list which we will bring     LAMINECTOMY LUMBAR ONE LEVEL      L4-5     LAPAROSCOPIC ABLATION ENDOMETRIOSIS       MD HAND/FINGER SURGERY UNLISTED  surgeries on both hands with Dr. Shankar     MD SPINE SURGERY PROCEDURE UNLISTED      see list which we will bring     MD STOMACH SURGERY PROCEDURE UNLISTED  gall bladder removal     RADIO FREQUENCY ABLATION PULSED CERVICAL Bilateral 7/10/2019    Procedure: Bilateral Suprascapular Nerve Pulsed Radiofrequency Ablation;  Surgeon: Basim Velazquez MD;  Location:  OR     REMOVE HARDWARE FOOT  12/14/2011    Procedure:REMOVE HARDWARE FOOT; Hardware Removal Right Foot (Mini-C-Arm) ; Surgeon:GRACE ZARATE; Location: [facility]     RHINOPLASTY       SALPINGO-OOPHORECTOMY BILATERAL       TONSILLECTOMY       Z STOMACH SURGERY PROCEDURE UNLISTED      see list which we will bring       Social History   I have reviewed this patient's social history and updated it with pertinent information if needed.  Social History     Tobacco Use     Smoking status: Former Smoker      Packs/day: 1.50     Years: 20.00     Pack years: 30.00     Types: Cigarettes     Start date: 1973     Quit date: 1993     Years since quittin.6     Smokeless tobacco: Never Used     Tobacco comment: wish I never started   Substance Use Topics     Alcohol use: No     Alcohol/week: 0.0 standard drinks     Drug use: No       Family History   I have reviewed this patient's family history and updated it with pertinent information if needed.  Family History   Problem Relation Age of Onset     Hypertension Mother      No Known Problems Father      No Known Problems Sister      No Known Problems Brother      No Known Problems Maternal Grandmother      No Known Problems Maternal Grandfather      No Known Problems Paternal Grandmother      No Known Problems Other        Prior to Admission Medications   Prior to Admission Medications   Prescriptions Last Dose Informant Patient Reported? Taking?   BIOTIN FORTE PO 2022 at Unknown time Self Yes Yes   Sig: Take 1 tablet by mouth daily    Elastic Bandages & Supports (MEDICAL COMPRESSION SOCKS) MISC  Self No No   Si Package daily Please measure and distribute 2 pair of 20mmHg - 30mmHg THIGH high open or closed toe compression stockings with extra refills as indicated. Jobst ultrasheer or equivalent.   HYDROmorphone (DILAUDID) 8 MG tablet 2022 at Unknown time Self Yes Yes   Sig: Take 8 mg by mouth 3 times daily . Max of 3 doses per day.   NASONEX 50 MCG/ACT spray prn Self Yes No   Sig: Spray 1 spray into both nostrils daily as needed    TIROSINT 200 MCG CAPS 2022 at Unknown time Self Yes Yes   Sig: Take 600 mcg by mouth daily    atorvastatin (LIPITOR) 40 MG tablet 2022 at Unknown time Self No Yes   Sig: Take 1 tablet (40 mg) by mouth every evening   benzonatate (TESSALON) 100 MG capsule prn Self No No   Sig: Take 1 capsule (100 mg) by mouth 3 times daily as needed for cough   carboxymethylcellulose (REFRESH PLUS) 0.5 % SOLN ophthalmic solution prn  Self Yes No   Sig: Place 1 drop into both eyes 2 times daily as needed    cyanocobalamin 1000 MCG/ML injection Past Week at Unknown time Self No Yes   Sig: Inject 1 mL (1,000 mcg) Subcutaneous every 7 days   cycloSPORINE (RESTASIS) 0.05 % ophthalmic emulsion prn Self Yes No   Sig: Place 1 drop into both eyes 2 times daily as needed    cyclobenzaprine (FLEXERIL) 10 MG tablet 8/7/2022 at Unknown time Self No Yes   Sig: Take 1 tablet (10 mg) by mouth 3 times daily   fexofenadine (ALLEGRA) 180 MG tablet prn Self Yes No   Sig: Take 180 mg by mouth daily as needed    gabapentin (NEURONTIN) 300 MG capsule 8/7/2022 at Unknown time Self Yes Yes   Sig: Take 300 mg by mouth 2 times daily (morning and afternoon) with 800mg tablet (total dose 1100mg)   gabapentin (NEURONTIN) 800 MG tablet 8/7/2022 at Unknown time Self Yes Yes   Sig: Take 800 mg by mouth 2 times daily (morning and afternoon) in addition to 300mg capsule (total dose 1100mg)   gabapentin (NEURONTIN) 800 MG tablet 8/7/2022 at Unknown time Self Yes Yes   Sig: Take 800 mg by mouth At Bedtime    guaiFENesin-dextromethorphan (ROBITUSSIN DM) 100-10 MG/5ML syrup prn Self No No   Sig: Take 10 mLs by mouth every 4 hours as needed for cough   lifitegrast (XIIDRA) 5 % opthalmic solution 8/7/2022 at Unknown time Self Yes Yes   Sig: Place 1 drop into both eyes 2 times daily   metoprolol succinate ER (TOPROL XL) 100 MG 24 hr tablet 8/7/2022 at Unknown time Self No Yes   Sig: Take 1 tablet (100 mg) by mouth 2 times daily   morphine (MS CONTIN) 30 MG 12 hr tablet 8/7/2022 at Unknown time Self Yes Yes   Sig: Take 30 mg by mouth 2 times daily (morning and night) in addition to 60 mg tablet for a total dose of 90mg.   morphine (MS CONTIN) 60 MG 12 hr tablet 8/7/2022 at Unknown time Self Yes Yes   Sig: Take 60 mg by mouth 2 times daily (morning and night) in addition to 30mg tablet for a total dose of 90mg   morphine (MS CONTIN) 60 MG 12 hr tablet 8/7/2022 at Unknown time Self Yes Yes    Sig: Take 60 mg by mouth daily in the afternoon (in addition to the 2 - 90mg doses)   multivitamin w/minerals (THERA-VIT-M) tablet 8/7/2022 at Unknown time Self Yes Yes   Sig: Take 1 tablet by mouth daily    nystatin (MYCOSTATIN) 136166 UNIT/GM external powder prn Self No No   Sig: Apply topically 3 times daily as needed   prednisoLONE acetate (PRED FORTE) 1 % ophthalmic susp 8/7/2022 at Unknown time Self Yes Yes   Sig: Place 1 drop into both eyes 2 times daily    rivaroxaban ANTICOAGULANT (XARELTO) 20 MG TABS tablet 8/7/2022 at Unknown time Self Yes Yes   Sig: Take 1 tablet (20 mg) by mouth daily (with dinner) Hold today 11/01 and restart when no longer having bloody coughing, suspected in 1-2 days.   zolpidem (AMBIEN) 5 MG tablet prn Self Yes No   Sig: Take 5 mg by mouth nightly as needed for sleep   zoster vaccine recombinant adjuvanted (SHINGRIX) injection  Self Yes No   Sig: GIVEN AT PHARMACY: FIRST DOSE NOW, SECOND DOSE GIVEN 2 TO 6 MONTHS AFTER      Facility-Administered Medications Last Administration Doses Remaining   methylPREDNISolone (DEPO-MEDROL) injection 40 mg 3/30/2022  2:07 PM    methylPREDNISolone (DEPO-MEDROL) injection 40 mg 4/21/2022  7:46 AM         Allergies   Allergies   Allergen Reactions     Blood Transfusion Related (Informational Only) Other (See Comments)     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      Chlorhexidine Hives     Thor surgical cleanser     Codeine Hives     Has tolerated Oxycodone in past      Ibuprofen [Nsaids] Hives     Penicillins Hives     Other reaction(s): Unknown     Sulfa Drugs Hives     Other reaction(s): Unknown     Calcium Channel Blockers      Doxycycline GI Disturbance     Emesis & diarrhea  Patient denies allergy to this med     Hydroxyzine      rash     Mold        Physical Exam   Vital Signs: Temp: 98  F (36.7  C) Temp src: Oral BP: 104/66 Pulse: 65   Resp: 10 SpO2: 94 % O2 Device: None (Room air)    Weight: 309 lbs 0 oz    General  Appearance: Generally comfortable appearing 62-year-old female in no acute distress.  Obese  Eyes: No scleral icterus or injection  HEENT: Normocephalic and atraumatic  Respiratory: No tachypnea or increased work of breathing.  Occasional barking cough which is nonproductive and seems most consistent with bronchitis.  Right-sided breath sounds more consolidated with upper airway transmission as compared to left, though no crackles, no wheezes  Cardiovascular: Regular rate and rhythm with heart rate currently in the low 60 range, no murmur  GI: Abdomen soft, nontender to palpation.  No palpable mass.  Obese.  Lymph/Hematologic: Patient with 2+ edema of right lower extremity, 1+, largely pedal edema on the left.  Skin: Erythema noted of right lower extremity with some duskiness of her third and fourth toes on the right which has been documented in the past and was thought to represent venous stasis dermatitis.  Left lower extremity without similar erythema.  Patient with a sloughing left buttock wound measuring approximately 10 inches, tender to palpation which patient reports has been present for months.  Musculoskeletal: Muscular tone and bulk in extremities intact and appropriate for age  Neurologic: Alert, conversant, appropriate conversation.  Mental status is grossly intact.  Psychiatric: Very pleasant, normal affect    Data   Data reviewed today: I reviewed all medications, new labs and imaging results over the last 24 hours. I personally reviewed the chest x-ray image(s) showing Bilateral pulmonary infiltrates, right greater than left.  Note appearance might be persistent since February CT imaging, also reviewed.  Cardiac silhouette within normal limits    Outside labs through care everywhere from urgent care with hemoglobin of 10.7, WBC 11.1, sodium of 136, bicarb of 33.  Albumin 3.7.

## 2022-08-09 NOTE — ED PROVIDER NOTES
History   Chief Complaint:  Extremity swelling, shortness of breath     The history is provided by the patient.      Aspen Mccullough is an anticoagulated (Xarelto) 62 year old female with history of DVT, hypertension, pulmonary embolism and atrial fibrillation who presents with extremity swelling and shortness of breath. Patient reports she experienced the onset of swelling in her hands and feet starting last Friday, August 5th. Endorses shortness of breath upon exertion as well as a cough. Denies chest pain, fever. Remarks she has seen swelling intermittently since 2017, when she was diagnosed with blood clots in her lungs and legs.     She was seen at the [facility] this afternoon, 8/8/22. Imaging was obtained. Details below.     US VENOUS UPPER EXTREMITY RIGHT - 8/8/22  IMPRESSION:   1.  No deep venous thrombosis in the right upper extremity.    US VENOUS LOWER EXTREMITY BILATERAL - 8/8/22  IMPRESSION:   1.  No deep venous thrombosis in the bilateral lower extremities.    XR CHEST 2 VIEWS PA AND LATERAL - 8/8/22  IMPRESSION:   Moderate to severe patchy and confluent airspace disease in the right lung with peripheral sparing. This may represent asymmetric alveolar pulmonary edema or severe infection. Consider acute mitral regurgitation in the correct clinical setting. There is generalized pulmonary venous congestion and cardiomegaly. No pleural effusions or pneumothorax.      Review of Systems   Constitutional: Negative for fever.   Respiratory: Positive for cough and shortness of breath.    Cardiovascular: Positive for leg swelling. Negative for chest pain.   Musculoskeletal:        Hand swelling   All other systems reviewed and are negative.    Allergies:  Chlorhexidine  Codeine  Ibuprofen [Nsaids]  Penicillins  Sulfa Drugs  Calcium Channel Blockers  Doxycycline  Hydroxyzine  Mold    Medications:  atorvastatin (LIPITOR) 40 MG tablet  benzonatate (TESSALON) 100 MG capsule  BIOTIN FORTE PO  cyanocobalamin  1000 MCG/ML injection  cyclobenzaprine (FLEXERIL) 10 MG tablet  cycloSPORINE (RESTASIS) 0.05 % ophthalmic emulsion  fexofenadine (ALLEGRA) 180 MG tablet  gabapentin (NEURONTIN) 300 MG capsule  gabapentin (NEURONTIN) 800 MG tablet  HYDROmorphone (DILAUDID) 8 MG tablet  methylPREDNISolone (MEDROL) 4 MG tablet therapy pack  metoprolol succinate ER (TOPROL XL) 100 MG 24 hr tablet  morphine (MS CONTIN) 30 MG 12 hr tablet  multivitamin w/minerals (THERA-VIT-M) tablet  NASONEX 50 MCG/ACT spray  nystatin (MYCOSTATIN) 622789 UNIT/GM external powder  prednisoLONE acetate (PRED FORTE) 1 % ophthalmic susp  predniSONE (DELTASONE) 20 MG tablet  predniSONE (DELTASONE) 5 MG tablet  rivaroxaban ANTICOAGULANT (XARELTO) 20 MG TABS tablet  TIROSINT 200 MCG CAPS  zolpidem (AMBIEN) 5 MG tablet  zoster vaccine recombinant adjuvanted (SHINGRIX) injection    Past Medical History:     Osteoarthrosis  CRPs  Fibromyalgia  Hypothyroidism   Hyperlipidemia   Hashimoto's thyroiditis   PTSD  ADD  Cushing's syndrome   Hypertension  Endometriosis  DVT  Acute pulmonary embolism   Adrenal cortical steroids   Anemia   Atrial fibrillation with PVR  Neuropathy   Hyperhidrosis   Hemoptysis   COVID-19  Depression  Anxiety  Tobacco abuse     Past Surgical History:    Amputation left foot 5th toe   Appendectomy   Arthrodesis ankle   Arthroplasty knee bilateral  Arthroplasty revision knee  Breast surgery  Bunionectomy   Cholecystectomy   Excise mass upper extremity   Foot surgery  Fusion lumbar anterior one level  Hemorrhoidectomy   Inject nerve block (x6)  IR endovenous ablation varicose veins (x2)  Knee surgery  Laminectomy lumbar   Laparoscopic ablation endometriosis   HI hand/finger surgery  HI spine surgery  HI stomach surgery  Radio frequency ablation   Remove hardware foot   Rhinoplasty   Salpingo-oophorectomy bilateral  Tonsillectomy     Family History:    Mother - Hypertension, Thyroid disease    Social History:  The patient presents to the ED alone  "via EMS.     Physical Exam     Patient Vitals for the past 24 hrs:   BP Temp Temp src Pulse Resp SpO2 Height Weight   08/08/22 2052 -- -- -- -- 22 90 % -- --   08/08/22 2045 120/72 -- -- 65 -- -- -- --   08/08/22 2040 -- -- -- 59 20 100 % -- --   08/08/22 2030 116/74 -- -- 58 (!) 38 90 % -- --   08/08/22 2015 -- -- -- 63 10 90 % -- --   08/08/22 2000 -- -- -- -- -- (!) 84 % -- --   08/08/22 1930 -- -- -- 57 13 93 % -- --   08/08/22 1925 -- -- -- 59 14 94 % -- --   08/08/22 1924 -- 98  F (36.7  C) Oral -- 26 -- 1.753 m (5' 9\") 140.2 kg (309 lb)   08/08/22 1920 124/87 -- -- 65 (!) 78 94 % -- --       Physical Exam  Eyes:  The pupils are equal and round    Conjunctivae and sclerae are normal  ENT:    The nose is normal    Pinnae are normal  CV:  Regular rate and rhythm     Edema in B/L LE with R>L  Resp:  Lungs are clear    Non-labored    No rales    No wheezing   GI:  Abdomen is soft and non-tender, there is no rigidity    No distension    No rebound tenderness   MS:  Normal muscular tone    Edema in bilateral lower extremities with R>L  Skin:  No rash or acute skin lesions noted  Neuro:   Awake, alert.      Speech is normal and fluent.    Face is symmetric.     Moves all extremities    Emergency Department Course   ECG  ECG taken at 1929, ECG read at 1942  Sinus bradycardia. Low voltage QRS. Septal infarct, age undetermined.    No significant change as compared to prior, dated 2/18/22.  Rate 57 bpm. WY interval 206 ms. QRS duration 94 ms. QT/QTc 434/422 ms. P-R-T axes 40 43 43.     Imaging:  Chest XR,  PA & LAT   Final Result   IMPRESSION: Cardiomegaly with prominent upper lobe vasculature, unchanged. Airspace opacities throughout the right upper and lower lobes with minimal change since the earlier study. No effusions.        Report per radiology    Laboratory:  Labs Ordered and Resulted from Time of ED Arrival to Time of ED Departure   NT PROBNP INPATIENT - Abnormal       Result Value    N terminal Pro BNP " Inpatient 1,405 (*)    LACTIC ACID WHOLE BLOOD - Abnormal    Lactic Acid 0.6 (*)    CRP INFLAMMATION - Abnormal    CRP Inflammation 47.4 (*)    TROPONIN I - Normal    Troponin I High Sensitivity 4     PROCALCITONIN - Normal    Procalcitonin <0.05     COVID-19 VIRUS (CORONAVIRUS) BY PCR - Normal    SARS CoV2 PCR Negative     UA MACROSCOPIC WITH REFLEX TO MICRO AND CULTURE   BLOOD CULTURE   BLOOD CULTURE        Emergency Department Course:      Reviewed:  I reviewed nursing notes, vitals, past medical history and Care Everywhere    Assessments/Consults:  ED Course as of 08/08/22 2121   Mon Aug 08, 2022   1916 I obtained history and examined the patient.    2109 I rechecked the patient and explained findings.    2120 I spoke to Dr. Nguyen, hospitalist, who agrees to admit the patient.      Interventions:  Medications   furosemide (LASIX) injection 40 mg (40 mg Intravenous Given 8/8/22 2114)       Disposition:  The patient was admitted to the hospital under the care of Dr. Nguyen.     Impression & Plan     CMS Diagnoses: None    Medical Decision Making:  Aspen Mccullough is a 62 year old female who presents from the emergency room via transfer via EMS.  She was hypoxic there after arriving in the 70[place].  She has been noticing increasing shortness of breath with exertion since last Friday.  X-ray was suspicious for possible pneumonia.  She was given broad-spectrum antibiotics with Rocephin and azithromycin.  She had a negative COVID test.  Additionally her troponin level was negative.  She is also having increasing lower extremity swelling.  BNP is elevated so suspect there may be some underlying volume overload.  We will do a trial of 40 mg of IV Lasix to see if that will help with her dyspnea symptoms.  Given the hypoxia noted at the urgency center with exertion, will have patient admitted for further evaluation and treatment.  She did have extensive ultrasound performed including DVT of the bilateral lower extremity and  ultrasound of the right upper extremity which were all negative.  She is on anticoagulation.  Given this an alternative diagnosis, we will not perform CT PE protocol of the lungs for pulmonary embolism in the Emergency Department.  She is discussed with hospitalist who agreed to accept her as an admission.    Diagnosis:    ICD-10-CM    1. Edema, unspecified type  R60.9    2. Acute respiratory failure with hypoxia (H)  J96.01        Scribe Disclosure:  IRUIB, am serving as a scribe at 7:15 PM on 8/8/2022 to document services personally performed by Bradford Bejarano MD based on my observations and the provider's statements to me.            Bradford Bejarano MD  08/09/22 0011

## 2022-08-09 NOTE — ED NOTES
Pt sleeping at this time, oxygen turned off to trial o2 sats.  o2 sats currently 98% with equal chest rise and fall.

## 2022-08-09 NOTE — ED NOTES
Pt  in room, pt ate dinner, oxygen placed on at 2 liters/nc.  Lasix given per order.  Will continue to monitor.  Unable to obtain a readable pleth with pt having pulse oximeter on fingers, ear pulse oximeter placed with pleth readable.

## 2022-08-10 VITALS
BODY MASS INDEX: 43.4 KG/M2 | DIASTOLIC BLOOD PRESSURE: 61 MMHG | HEART RATE: 65 BPM | HEIGHT: 69 IN | TEMPERATURE: 97.8 F | WEIGHT: 293 LBS | RESPIRATION RATE: 18 BRPM | SYSTOLIC BLOOD PRESSURE: 116 MMHG | OXYGEN SATURATION: 93 %

## 2022-08-10 DIAGNOSIS — J96.01 ACUTE RESPIRATORY FAILURE WITH HYPOXIA (H): Primary | ICD-10-CM

## 2022-08-10 LAB
ANION GAP SERPL CALCULATED.3IONS-SCNC: 6 MMOL/L (ref 3–14)
BUN SERPL-MCNC: 11 MG/DL (ref 7–30)
CALCIUM SERPL-MCNC: 9.3 MG/DL (ref 8.5–10.1)
CHLORIDE BLD-SCNC: 101 MMOL/L (ref 94–109)
CO2 SERPL-SCNC: 29 MMOL/L (ref 20–32)
CREAT SERPL-MCNC: 0.78 MG/DL (ref 0.52–1.04)
CRP SERPL-MCNC: 65 MG/L (ref 0–8)
ERYTHROCYTE [DISTWIDTH] IN BLOOD BY AUTOMATED COUNT: 16 % (ref 10–15)
GFR SERPL CREATININE-BSD FRML MDRD: 85 ML/MIN/1.73M2
GLUCOSE BLD-MCNC: 104 MG/DL (ref 70–99)
HCT VFR BLD AUTO: 37.6 % (ref 35–47)
HGB BLD-MCNC: 11.7 G/DL (ref 11.7–15.7)
MAGNESIUM SERPL-MCNC: 2.6 MG/DL (ref 1.6–2.3)
MCH RBC QN AUTO: 27.9 PG (ref 26.5–33)
MCHC RBC AUTO-ENTMCNC: 31.1 G/DL (ref 31.5–36.5)
MCV RBC AUTO: 90 FL (ref 78–100)
PLATELET # BLD AUTO: 289 10E3/UL (ref 150–450)
POTASSIUM BLD-SCNC: 3.8 MMOL/L (ref 3.4–5.3)
RBC # BLD AUTO: 4.19 10E6/UL (ref 3.8–5.2)
SODIUM SERPL-SCNC: 136 MMOL/L (ref 133–144)
WBC # BLD AUTO: 7.4 10E3/UL (ref 4–11)

## 2022-08-10 PROCEDURE — 99239 HOSP IP/OBS DSCHRG MGMT >30: CPT | Performed by: HOSPITALIST

## 2022-08-10 PROCEDURE — 85014 HEMATOCRIT: CPT | Performed by: HOSPITALIST

## 2022-08-10 PROCEDURE — 250N000013 HC RX MED GY IP 250 OP 250 PS 637: Performed by: INTERNAL MEDICINE

## 2022-08-10 PROCEDURE — 80048 BASIC METABOLIC PNL TOTAL CA: CPT | Performed by: HOSPITALIST

## 2022-08-10 PROCEDURE — 250N000011 HC RX IP 250 OP 636: Performed by: HOSPITALIST

## 2022-08-10 PROCEDURE — 83735 ASSAY OF MAGNESIUM: CPT | Performed by: HOSPITALIST

## 2022-08-10 PROCEDURE — 86140 C-REACTIVE PROTEIN: CPT | Performed by: HOSPITALIST

## 2022-08-10 PROCEDURE — 36415 COLL VENOUS BLD VENIPUNCTURE: CPT | Performed by: HOSPITALIST

## 2022-08-10 PROCEDURE — 250N000009 HC RX 250: Performed by: INTERNAL MEDICINE

## 2022-08-10 RX ORDER — METOPROLOL SUCCINATE 25 MG/1
75 TABLET, EXTENDED RELEASE ORAL 2 TIMES DAILY
Qty: 180 TABLET | Refills: 0 | Status: SHIPPED | OUTPATIENT
Start: 2022-08-10 | End: 2022-09-01

## 2022-08-10 RX ORDER — FUROSEMIDE 20 MG
20 TABLET ORAL DAILY
Qty: 30 TABLET | Refills: 0 | Status: SHIPPED | OUTPATIENT
Start: 2022-08-10 | End: 2022-09-01

## 2022-08-10 RX ADMIN — MULTIPLE VITAMINS W/ MINERALS TAB 1 TABLET: TAB at 09:50

## 2022-08-10 RX ADMIN — CYCLOBENZAPRINE 10 MG: 10 TABLET, FILM COATED ORAL at 09:50

## 2022-08-10 RX ADMIN — MORPHINE SULFATE 60 MG: 15 TABLET, EXTENDED RELEASE ORAL at 13:09

## 2022-08-10 RX ADMIN — FUROSEMIDE 20 MG: 10 INJECTION, SOLUTION INTRAMUSCULAR; INTRAVENOUS at 09:50

## 2022-08-10 RX ADMIN — LEVOTHYROXINE SODIUM 600 MCG: 200 TABLET ORAL at 09:50

## 2022-08-10 RX ADMIN — CYCLOBENZAPRINE 10 MG: 10 TABLET, FILM COATED ORAL at 13:09

## 2022-08-10 RX ADMIN — METOPROLOL SUCCINATE 75 MG: 25 TABLET, EXTENDED RELEASE ORAL at 09:50

## 2022-08-10 RX ADMIN — PREDNISOLONE ACETATE 1 DROP: 10 SUSPENSION/ DROPS OPHTHALMIC at 09:54

## 2022-08-10 RX ADMIN — GABAPENTIN 1100 MG: 300 CAPSULE ORAL at 05:41

## 2022-08-10 RX ADMIN — SENNOSIDES AND DOCUSATE SODIUM 1 TABLET: 50; 8.6 TABLET ORAL at 09:50

## 2022-08-10 RX ADMIN — GABAPENTIN 1100 MG: 300 CAPSULE ORAL at 13:09

## 2022-08-10 RX ADMIN — MORPHINE SULFATE 90 MG: 30 TABLET, EXTENDED RELEASE ORAL at 09:50

## 2022-08-10 ASSESSMENT — ACTIVITIES OF DAILY LIVING (ADL)
ADLS_ACUITY_SCORE: 18

## 2022-08-10 NOTE — DISCHARGE SUMMARY
[facility]    Discharge Summary  Hospitalist    Date of Admission:  8/8/2022  Date of Discharge:  8/10/2022  Discharging Provider: Alen Kebede MD  Date of Service (when I saw the patient): 08/10/22      History of Present Illness   Aspen Mccullough is a 62 year old female admitted on 8/8/2022. She presents to the emergency department from urgent care presenting after fall and R leg pain; in urgent care reportedly found to have hypoxia and pulmonary infiltrates.  Describes increased shortness of breath with exertion since Sunday as well as increased lower extremity swelling.    Hospital Course   Aspen Mccullough was admitted on 8/8/2022.  The following problems were addressed during her hospitalization:    Acute hypoxic respiratory failure; resolved: Patient with increasing bilateral lower extremity edema and dyspnea on exertion since Sunday of this week.  Known history of venous stasis dermatitis and reflux associated with prior DVTs.  Has persistent pulmonary infiltrates noted also on CT in February 2022 during COVID and subsequent overlap pneumonia diagnosis, hospitalization, and treatment.  With acuity of worsening as well as new exacerbation of chronic lower extremity swelling differential includes acute diastolic heart failure exacerbation,likely some contributions from class III obesity, scarring from prior viral and possible bacterial pneumonia given distribution,  question possible lupus pneumonitis given history of hemoptysis in November 2021, and patient reporting that she had self tapered off of her prednisone and is not on any rheumatologic treatments any longer.  -Now easily weaned off O2, no pulmonary symptoms.  CXR, opacities right upper and lower lobe, history of COVID February 22, as above repeat COVID, negative.  Known lupus.  -TTE: LV borderline dilated, EF estimated 55%, RV function normal, no valve disease, no significant change from echo 12/21.  -Lasix 40 mg IV  twice daily; per echo results and only mildly elevated BNP 1.4K most likely component of venous insufficiency, decrease furosemide to 20 mg IV twice daily, BMP in AM WNL.  -Compression dressings to bilateral lower extremities, lymphedema consult to mobilize peripheral fluid  -Cardiology and Pulmonary consult noted.  -Admission CRP 47.4 increased to 65 ,  Known lupus, Pulmonary consult noted.  There is no active pulmonary symptoms, Pro-Arturo negative, suspect related to lupus.  Per pulmonary advised outpatient follow-up, [facility] to arrange, recommending chest CT prior to appointment.  Reviewed results with patient regarding need for pulmonary follow-up however advised reconnection with Rheumatology first available appointment. [Her previous rheumatologist Dr. Field has retired.]  Discharge today with follow-up with PCP within 1 week, she is discharged on furosemide 20 mg which is new for her BMP at that time.  Also note abnormal TFTs, primary to follow-up thyroid testing and replacement dosing.     Predominantly right-sided pulmonary opacities: In February was hospitalized at [facility] with COVID-19 pneumonia as well as presumed infectious pneumonia overlap.  Distribution of opacities seem somewhat similar to CT obtained at that time.  -Received ceftriaxone and azithromycin at outside emergency department; has a nonproductive cough with no fevers or chills and undetectable procalcitonin.  Holding further anti-infectives given undetectable procalcitonin unless further infectious signs/symptoms  -IV diuresis and subsequent discharge on p.o. furosemide, see above.  -Admission CRP 47. 4  increased to 65, see above regarding concerns of diagnosis of lupus with Pulmonary and Rheumatology follow-up.     Prior COVID-19 diagnosis: Diagnosed with COVID-19 on emergency department visit 1/18/2022.  Seen again in urgent care with persistent cough 1/29/2022 and discharged with doxycycline and prednisone for overlap  pneumonia.  Hospitalized 2/17/2022 at [facility].  COVID on admission negative.     History recent fall,  Evaluated in urgent care, reportedly negative evaluation.  Full weightbearing, however increased edema, pain right lower leg, duplex venous ultrasound to rule out DVT, negative.     Class III obesity: BMI greater than 45.  Increased risk of all cause mortality and likely contributing to hypoxic respiratory failure with a component of restrictive lung disease to pulmonary process  -Continue with follow-up in bariatric clinic     Right buttock wound: Present on admission.  Stage II at minimum  -Wound nurse consulted  -Ambulate every shift  -Physical therapy consulted; fall precautions in place as patient had a fall at outside emergency department/urgent care which she attributes to her footwear     Hypertension:  -With bradycardia in the mid 40s to 60s range, reducing metoprolol XL dosing to 75 mg twice daily from 100 mg twice daily.  Follow-up with PCP.     History of PE, DVT: Occurred in 2017.  Negative lupus anticoagulant work-up previously.  -Continue prior to admission Xarelto anticoagulation     Chronic pain syndrome: Follows with pain clinic for injections, on longstanding MS Contin and oral Dilaudid  -Continue PTA oral Dilaudid 8 mg up to 3 times daily as needed  -PTA MS Contin 90 mg every morning and evening, 60 mg in the afternoon as per home regimen  -Continue PTA gabapentin 1100 mg every morning, 1100 mg in the afternoon, 800 mg at bedtime  -Physical therapy consulted as above; agree with prior recommendations for pool therapy at [facility] as patient describes having received in the outpatient setting  -Continue PTA Flexeril 3 times daily     Lupus: Currently untreated.  Historically followed with Dr. Blackman at [facility], though tells me that she has not seen a rheumatologist in a few years.  Patient also tells me that she weaned herself off of prednisone and has not  taken this medication for several months.  -Patient will need to reestablish with her rheumatology team.  Currently no dermatologic, synovitic S/S, pulmonary infiltrates as above.  -Pulmonary outpatient follow-up as above  -UA negative  -CRP 47.4 on admission, see above, outpatient Rheumatology follow-up     History of atrial fibrillation:  -Cardiac telemetry  -Continue prior to admission Xarelto  -Dose reduction of metoprolol XL as above given heart rates in the mid 40s range here in the emergency department; rate controlled.     Hypothyroidism:  -Resume Tirosint 600 mcg daily.  TSH 25.4, free T4 0.71  -Follow-up PCP, see above.     History of CVA:  -Continue prior to admission Xarelto anticoagulation  -Continue prior to admission atorvastatin          Pending Results   These results will be followed up by Hospitalist Service [Ordering Provider]   Unresulted Labs Ordered in the Past 30 Days of this Admission     Date and Time Order Name Status Description    8/8/2022  7:32 PM Blood Culture Peripheral Blood Preliminary     8/8/2022  7:32 PM Blood Culture Peripheral Blood Preliminary           Code Status   DNR; pre arrest intubation OK       Primary Care Physician   Arcadio Farfan    Physical Exam   Temp: 97.8  F (36.6  C) Temp src: Oral BP: 116/61 Pulse: 65   Resp: 18 SpO2: 93 % O2 Device: None (Room air)    Vitals:    08/08/22 1924 08/09/22 0623 08/10/22 0542   Weight: 140.2 kg (309 lb) 140.2 kg (309 lb) 140.8 kg (310 lb 4.8 oz)     General/Constitutional:    NAD, alert, calm, cooperative    HEENT/Head Exam:  atraumatic  Eyes:  PERRL, no conjunctivits  Mouth/Oral Pharynx:  Buccal mucosa WNL  Chest/Respiratory:  Air exchange bilateral lung fields; no rales or wheeze. Respiration nonlabored on RA.  Cardiovascular:  No murmur appreciated.  LE edema 1/4; right greater than left, negative Homans  Gastrointestinal/Abdomen:  soft, nontender,no rebound, guarding or other peritoneal signs.  Musculoskeletal:  extremities  warm, dry, noncyanotic; no acitve synovitis.  Neuro.  Gross motor tested, nonfocal, sensory intact  Psych oriented, affect calm    Discharge Disposition   Discharged to home  Condition at discharge: Fair    Consultations This Hospital Stay   CORE CLINIC EVALUATION IP CONSULT  OCCUPATIONAL THERAPY ADULT IP CONSULT  NUTRITION SERVICES ADULT IP CONSULT  CARE MANAGEMENT / SOCIAL WORK IP CONSULT  CARDIOLOGY IP CONSULT  WOUND OSTOMY CONTINENCE NURSE  IP CONSULT  LYMPHEDEMA THERAPY IP CONSULT  PHYSICAL THERAPY ADULT IP CONSULT  PULMONARY IP CONSULT  VASCULAR ACCESS ADULT IP CONSULT    Time Spent on this Encounter   I, Alen Kebede MD, personally saw the patient today and spent greater than 30 minutes discharging this patient discussing discharge plans with Patient, Father, Nursing and Care Coordinator, coordinating with Specialties, Cardiology and Pulmonary regarding discharge recommendations; completing discharge orders, medications and follow-up.    Discharge Orders      CT Chest w/o Contrast    Report results to Dr. Bradford Morris Pulmonary     Medication Therapy Management Referral      Reason for your hospital stay    Presented with hypoxia and lower leg swelling     Follow-up and recommended labs and tests     Follow up with primary care provider, Arcadio Farfan, within 7 days for hospital follow- up.  The following labs/tests are recommended: BMP and follow-up thyroid testing and dosing.    FOLLOW UP WITH DR. NELSON (Dr. Farfan is not available) on Wednesday, Aug. 17th with the above labs to be drawn at:    [address]  .    Follow up with [facility] at 4-6 weeks per Pulmonary; [facility] to arrange; Chest CT prior to appointment (scheduled for Sept 6th-see below)    FOLLOW UP WITH DR. BRADFORD MORRIS ON Thursday, SEPT. 22ND AT 1:30PM AT:  [Note: two dates for PUlmonary appointment, clarify with [facility] at phone # below.     [facility]  [address] [address]   [phone]    Follow-up with Rheumatology as arranged.   CRP at that time.     Activity    Your activity upon discharge: activity as tolerated     Diet    Follow this diet upon discharge: Orders Placed This Encounter      Combination Diet Low Saturated Fat Na <2400mg Diet, No Caffeine Diet [Cardiac Diet  Recommend high potassium food, fruits and vegetables     Discharge Medications   Current Discharge Medication List      START taking these medications    Details   furosemide (LASIX) 20 MG tablet Take 1 tablet (20 mg) by mouth daily Future refills by PCP Dr. Arcadio Farfan with phone number [phone].  Qty: 30 tablet, Refills: 0    Associated Diagnoses: Venous (peripheral) insufficiency         CONTINUE these medications which have CHANGED    Details   metoprolol succinate ER (TOPROL XL) 25 MG 24 hr tablet Take 3 tablets (75 mg) by mouth 2 times daily NOTE this is a DECREASE in dose than prior to hospital [STOP prior metoprolol]  Qty: 180 tablet, Refills: 0    Associated Diagnoses: Chronic atrial fibrillation (H)      rivaroxaban ANTICOAGULANT (XARELTO) 20 MG TABS tablet Take 1 tablet (20 mg) by mouth daily (with dinner)         CONTINUE these medications which have NOT CHANGED    Details   atorvastatin (LIPITOR) 40 MG tablet Take 1 tablet (40 mg) by mouth every evening  Qty: 30 tablet, Refills: 0    Comments: Future refills by PCP Dr. Arcadio Farfan with phone number [phone].  Associated Diagnoses: Hyperlipidemia LDL goal <100      BIOTIN FORTE PO Take 1 tablet by mouth daily       cyanocobalamin 1000 MCG/ML injection Inject 1 mL (1,000 mcg) Subcutaneous every 7 days  Qty: 4 mL, Refills: 5    Associated Diagnoses: Other vitamin B12 deficiency anemia      cyclobenzaprine (FLEXERIL) 10 MG tablet Take 1 tablet (10 mg) by mouth 3 times daily  Qty: 90 tablet, Refills: 3    Associated Diagnoses: Generalized osteoarthrosis, involving multiple sites      gabapentin (NEURONTIN) 300 MG  capsule Take 300 mg by mouth 2 times daily (morning and afternoon) with 800mg tablet (total dose 1100mg)      !! gabapentin (NEURONTIN) 800 MG tablet Take 800 mg by mouth At Bedtime       !! gabapentin (NEURONTIN) 800 MG tablet Take 800 mg by mouth 2 times daily (morning and afternoon) in addition to 300mg capsule (total dose 1100mg)      HYDROmorphone (DILAUDID) 8 MG tablet Take 8 mg by mouth 3 times daily . Max of 3 doses per day.    Associated Diagnoses: Chronic pain syndrome      lifitegrast (XIIDRA) 5 % opthalmic solution Place 1 drop into both eyes 2 times daily      !! morphine (MS CONTIN) 30 MG 12 hr tablet Take 30 mg by mouth 2 times daily (morning and night) in addition to 60 mg tablet for a total dose of 90mg.  Qty: 270 tablet, Refills: 0    Associated Diagnoses: Chronic pain syndrome      !! morphine (MS CONTIN) 60 MG 12 hr tablet Take 60 mg by mouth daily in the afternoon (in addition to the 2 - 90mg doses)      !! morphine (MS CONTIN) 60 MG 12 hr tablet Take 60 mg by mouth 2 times daily (morning and night) in addition to 30mg tablet for a total dose of 90mg  Qty: 30 tablet, Refills: 0    Associated Diagnoses: Chronic pain syndrome      multivitamin w/minerals (THERA-VIT-M) tablet Take 1 tablet by mouth daily   Qty: 100 tablet, Refills: 3      prednisoLONE acetate (PRED FORTE) 1 % ophthalmic susp Place 1 drop into both eyes 2 times daily       TIROSINT 200 MCG CAPS Take 600 mcg by mouth daily       benzonatate (TESSALON) 100 MG capsule Take 1 capsule (100 mg) by mouth 3 times daily as needed for cough  Qty: 30 capsule, Refills: 0    Associated Diagnoses: Cough      carboxymethylcellulose (REFRESH PLUS) 0.5 % SOLN ophthalmic solution Place 1 drop into both eyes 2 times daily as needed   Qty: 1 Bottle      cycloSPORINE (RESTASIS) 0.05 % ophthalmic emulsion Place 1 drop into both eyes 2 times daily as needed       Elastic Bandages & Supports (MEDICAL COMPRESSION SOCKS) MISC 1 Package daily Please measure  and distribute 2 pair of 20mmHg - 30mmHg THIGH high open or closed toe compression stockings with extra refills as indicated. Jobst ultrasheer or equivalent.  Qty: 2 each, Refills: 3    Associated Diagnoses: Symptomatic varicose veins of both lower extremities      fexofenadine (ALLEGRA) 180 MG tablet Take 180 mg by mouth daily as needed       guaiFENesin-dextromethorphan (ROBITUSSIN DM) 100-10 MG/5ML syrup Take 10 mLs by mouth every 4 hours as needed for cough  Qty: 118 mL, Refills: 0    Associated Diagnoses: Cough      NASONEX 50 MCG/ACT spray Spray 1 spray into both nostrils daily as needed   Refills: 11      nystatin (MYCOSTATIN) 313799 UNIT/GM external powder Apply topically 3 times daily as needed  Qty: 60 g, Refills: 1    Associated Diagnoses: Candidal skin infection      zolpidem (AMBIEN) 5 MG tablet Take 5 mg by mouth nightly as needed for sleep      zoster vaccine recombinant adjuvanted (SHINGRIX) injection GIVEN AT PHARMACY: FIRST DOSE NOW, SECOND DOSE GIVEN 2 TO 6 MONTHS AFTER       !! - Potential duplicate medications found. Please discuss with provider.        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      Chlorhexidine Hives     Thor surgical cleanser     Codeine Hives     Has tolerated Oxycodone in past      Ibuprofen [Nsaids] Hives     Penicillins Hives     Other reaction(s): Unknown     Sulfa Drugs Hives     Other reaction(s): Unknown     Calcium Channel Blockers      Doxycycline GI Disturbance     Emesis & diarrhea  Patient denies allergy to this med     Hydroxyzine      rash     Mold      Data   Most Recent 3 CBC's:Recent Labs   Lab Test 08/10/22  0618 08/09/22  0548 02/21/22  0627   WBC 7.4 8.4 8.2   HGB 11.7 10.9* 11.0*   MCV 90 89 92    290 428      Most Recent 3 BMP's:  Recent Labs   Lab Test 08/10/22  0618 08/09/22  0548 02/21/22  0627    138 138    POTASSIUM 3.8 3.7 4.4   CHLORIDE 101 102 104   CO2 29 31 31   BUN 11 16 9   CR 0.78 0.83 0.70   ANIONGAP 6 5 3   KYLIE 9.3 9.3 9.2   * 105* 105*     Most Recent 2 LFT's:  Recent Labs   Lab Test 08/09/22  0548 02/17/22  1831   AST 16 56*   ALT 16 48   ALKPHOS 97 84   BILITOTAL 0.2 0.6       Most Recent TSH, T4 and A1c Labs:  Recent Labs   Lab Test 08/08/22 2006 06/07/21  0003   TSH 25.04*  --    T4 0.71*  --    A1C  --  5.7*

## 2022-08-10 NOTE — CONSULTS
[facility]    Received CORE Clinic Consult from Erick Nguyen.    Per record review patient admitted w/AVALOS, decreased exercise tolerance, LE swelling and abdominal bloating. In Urgent Care she was found to be hypoxic w/pulmonary infiltrates.  ECHO done on 8/8 showed EF of 55%. As a result she does not meet current criteria for CORE enrollment.    Patient's primary insurance:     [facility]    Pt reports the following HF symptoms prior to admission:     LE swelling - she has chronic RLE swelling from previous DVT, she noticed however that her L leg became swollen   AVALOS - she became noticeably more SOB w/less exertion   Abdominal bloating    Living situation:     Private home w/spouse    Med set up:     Independent    Scale available at home:     yes    CM/HF education topics we reviewed:  1. Low sodium  2. Daily weights  3. Symptoms of HF to be reported    Education materials provided:    1. Low sodium food and drink handout  2. Low sodium food product examples  3. Already prepared low salt meal delivery services  4. HF stoplight tool  5. Guide to HF booklet      I have contacted our schedulers to have them reach out to patient and arrange a general cardiology post hospital appointment    Future Appointments   Date Time Provider Department Center   [facility]        Please call with any further questions.      Adrianna Vasquez RN, BSN  CORE Clinic RN Care Coordinator  [facility]  [phone]  CORE Clinic: Cardiomyopathy, Optimization, Rehabilitation, Education

## 2022-08-10 NOTE — PROGRESS NOTES
"    [facility]  Pulmonary Progress Note      SUBJECTIVE & INTERVAL EVENTS     Was able to climb a few flights of stairs yesterday but was quite winded. Reports having been on Prednisone for nearly 30 years and tapered herself off earlier this year.     OBJECTIVE     VITALS:  Temp (24hrs), Av  F (36.7  C), Min:97.8  F (36.6  C), Max:98  F (36.7  C)    Vital signs:  Temp: 97.8  F (36.6  C) Temp src: Oral BP: 116/61 Pulse: 65   Resp: 18 SpO2: 93 % O2 Device: None (Room air)   Height: 175.3 cm (5' 9\") Weight: 140.8 kg (310 lb 4.8 oz) (pt refused bed weight)  Estimated body mass index is 45.82 kg/m  as calculated from the following:    Height as of this encounter: 1.753 m (5' 9\").    Weight as of this encounter: 140.8 kg (310 lb 4.8 oz).    INTAKE & OUTPUT:  I/O last 3 completed shifts:  In: 610 [P.O.:610]  Out: -     PHYSICAL EXAM:  GENERAL - NAD.  HEENT - External ears and nose overall normal  CARDIOVASCULAR - RRR S1 S2  PULMONARY - CTAB no w/r/r  ABDOMEN - Obese  EXTREMITIES - RLE>LLE swelling    LABS:  No lab results found in last 7 days.  No lab results found in last 7 days.  Recent Labs   Lab 08/10/22  0618 22  0548   WBC 7.4 8.4   RBC 4.19 3.88   HGB 11.7 10.9*   HCT 37.6 34.7*   MCV 90 89   MCH 27.9 28.1   MCHC 31.1* 31.4*   RDW 16.0* 16.1*    290     Recent Labs   Lab 08/10/22  0618 22  0548    138   POTASSIUM 3.8 3.7   CHLORIDE 101 102   KYLIE 9.3 9.3   CO2 29 31   BUN 11 16   CR 0.78 0.83   * 105*       IMAGING:    CT chest Oct 2021            ASSESSMENT & PLAN     Abnl CT/CXR R91.8  Fluid overload E87.79  Hypoxemia R09.02  Morbid obesity E66.01  Pulmonary edema J81.0      Ms. Mccullough is a 63 yo F former 74 pkyr smoker (3 ppd x25 yrs, quit ) with SLE (dx ), prior COVID (2022) CKD, fibromyalgia, chronic regional pain syndrome, Afib, who presented with RLE swelling and progressive AVALOS. R>L alveolar infiltrates on CXR. " Differential includes pulmonary edema, infection, inflammatory pneumonitis, PH, other. CT chest Oct 2021 had mosaic attenuation consistent with air trapping, no overt pneumonitis. Prior TTEs unable to measure ePASP as a gauge for PH. Mildly improved but still significantly symptomatic with exertion despite diuresis over the past 24 hours.       - Check CT chest non-con to further evaluate infiltrates  - If consistent with pneumonitis will consider Prednisone taper for possible SLE-related pneumonitis  - If additional workup unrevealing may need PH evaluation  - Will arrange for follow up with [facility] at [place] office        Bradford Cedeno MD    [facility]  Office: [phone]  Pager: [phone]

## 2022-08-10 NOTE — CONSULTS
Orders received for Congestive Heart Failure to assist with disposition and discharge planning.  CHF education to be completed by bedside nurse.  Will continue to follow and assist with discharge planning.    Writer arranged follow up with PCP and pulmonary, as well as Chest CT.  Patient independent at baseline.  Writer will complete hand off to PCP clinic.    Karen Alegria RN  Care Coordinator  [facility]  [phone] (text or call)

## 2022-08-10 NOTE — PROGRESS NOTES
Orientation/Cognitive: A&Ox4  Mobility Level/Assist Equipment: Indep  Fall Risk (Y/N): N  Behavior Concerns: refusing education   Pain Management: scheduled pain medication morphine  Tele/VS/O2: VSS  ABNL Lab/BG: CRP 65  Diet: Low NA  Bowel/Bladder: Continent   Skin Concerns: N/A  Drains/Devices:No iv access   Tests/Procedures for next shift: N/A  Anticipated DC date & active delays: Discharging

## 2022-08-10 NOTE — PLAN OF CARE
[id number]  Patient alert and oriented times four.  Assist of independent.   K and mag protocol.  Tele: NSR with PACs occasionally.  VSS, on RA.   Wound on right butt (see WOC note)   Patient refused morning standing weight   Plan: Possible discharge? Continue with diuresis - IV lasix

## 2022-08-10 NOTE — PROGRESS NOTES
Patient discharged at 3:03 PM to Discharged to home.  IV was discontinued. Pain at time of discharge was 0/10. Belongings returned to patient.  Discharge instructions and medications reviewed with patient.  Patient verbalized understanding and all questions were answered.  Prescriptions given to patient.  At time of discharge, patient condition was stable and left the unit escorted by CNA.

## 2022-08-11 ENCOUNTER — TELEPHONE (OUTPATIENT)
Dept: WOUND CARE | Facility: CLINIC | Age: 62
End: 2022-08-11

## 2022-08-11 ENCOUNTER — PATIENT OUTREACH (OUTPATIENT)
Dept: CARE COORDINATION | Facility: CLINIC | Age: 62
End: 2022-08-11

## 2022-08-11 ENCOUNTER — TELEPHONE (OUTPATIENT)
Dept: PHARMACY | Facility: OTHER | Age: 62
End: 2022-08-11

## 2022-08-11 DIAGNOSIS — Z71.89 OTHER SPECIFIED COUNSELING: ICD-10-CM

## 2022-08-11 NOTE — TELEPHONE ENCOUNTER
Discussed with patients  Ash. Had many questions about supplies and wound care. Concerns addressed and instructed  to follow wound care treatment orders from the hospital [facility] nurse. Detailed as follows:    Right buttock wound(s): Every other day   1. Cleanse with wound cleanser. Pat Dry.  2. Apply No sting barrier film to periwound skin.  3. Apply Iodosorb Gel ( [id number]) onto Mepilex Sacral and Mepliex 4x4 in shape of wound bed and press into wound.  4. Time and date dressing  5. Follow PIP orders    Disused that there may be an opening tomorrow to come in for an appointment at 10:50 and will follow up with Ash this afternoon to confirm if the spot it open. Otherwise will discuss other options to be seen sooner.   Ash would like to be contacted at this number: [phone]    Scheduled a tentative appointment on 8/31/22 with Fannie Petit PA-C but will cancel if able come into clinic tomorrow.

## 2022-08-11 NOTE — PROGRESS NOTES
[facility]    Background: Care Coordination referral placed from \[facility]\"" discharge report for reason of patient meeting criteria for a TCM outreach call by [facility] team.    Assessment: Upon chart review, CCR Team member will cancel/close the referral for TCM outreach due to reason below:    Patient has a follow up appointment with an appropriate provider today for hospital discharge     Patient talked to a Registered Nurse today for hospital discharge and follow-up. RN reviewed supplies and wound care as well as concerns the patients  had. RN also reviewed wound care treatment and follow-up. Appointment is scheduled with Fannie Petit PA-C for tomorrow, 8/12/2022 at 10:50 am. No W outreach call needed at this time.     Plan: Care Coordination referral for TCM outreach canceled.      MOUNA Aponte  [phone]  [facility]    *Connected Care Resource Team does NOT follow patient ongoing. Referrals are identified based on internal discharge reports and the outreach is to ensure patient has an understanding of their discharge instructions.

## 2022-08-11 NOTE — TELEPHONE ENCOUNTER
[facility] Wound    Who is the name of the provider?:  Marisabel      What is the location you see this provider at?: [place]    Reason for call:  Discharged from [facility] 8/10. Patient was seen by wound nurse while inpatientt.  Has questions re dressing, wound drains through gauze.   Also needs to schedule an appointment.     Can we leave a detailed message on this number?  YES

## 2022-08-11 NOTE — TELEPHONE ENCOUNTER
Discussed with Ash that the 10:50 appointment for tomorrow with Fannie Petit PA-C opened up and will plan on seeing them tomorrow.

## 2022-08-11 NOTE — TELEPHONE ENCOUNTER
MTM referral from: Transitions of Care (recent hospital discharge or ED visit)    MTM referral outreach attempt #1 on August 11, 2022 at 9:25 AM      Outcome: Patient declined, will route to MTM Pharmacist/Provider as an FYI. Thank you for the referral.     Arcenio Herr, MTM coordinator

## 2022-08-12 ENCOUNTER — HOSPITAL ENCOUNTER (OUTPATIENT)
Dept: WOUND CARE | Facility: CLINIC | Age: 62
Discharge: HOME | End: 2022-08-12
Attending: PHYSICIAN ASSISTANT | Admitting: PHYSICIAN ASSISTANT
Payer: COMMERCIAL

## 2022-08-12 VITALS — HEART RATE: 68 BPM | TEMPERATURE: 97.7 F | DIASTOLIC BLOOD PRESSURE: 77 MMHG | SYSTOLIC BLOOD PRESSURE: 139 MMHG

## 2022-08-12 DIAGNOSIS — L89.150 PRESSURE INJURY OF SACRAL REGION, UNSTAGEABLE (H): ICD-10-CM

## 2022-08-12 PROBLEM — Z86.73 HISTORY OF ISCHEMIC STROKE: Status: ACTIVE | Noted: 2021-06-06

## 2022-08-12 PROBLEM — Z86.16 PERSONAL HISTORY OF COVID-19: Status: ACTIVE | Noted: 2022-02-17

## 2022-08-12 PROCEDURE — 11042 DBRDMT SUBQ TIS 1ST 20SQCM/<: CPT | Performed by: PHYSICIAN ASSISTANT

## 2022-08-12 PROCEDURE — 99214 OFFICE O/P EST MOD 30 MIN: CPT | Mod: 25 | Performed by: PHYSICIAN ASSISTANT

## 2022-08-12 NOTE — DISCHARGE INSTRUCTIONS
Aspen Mccullough      1960    Wound Dressing Change: Right gluteus and Right proximal gluteus  Cleanse with Microklenz  Apply Iodosorb gel to wound then cover with 1 6x6 Mepilex border to gluteus and 1 4x4 Mepilex border to proximal gluteus  Change daily     Michelle Petit PA-C August 12, 2022    Call us at [phone] if you have any questions about your wounds, have redness or swelling around your wound, have a fever of 101 or greater or if you have any other problems or concerns. We answer the phone Monday through Friday 8 am to 4 pm, please leave a message as we check the voicemail frequently throughout the day.     If you had a positive experience please indicate that on your patient satisfaction survey form that [facility] will be sending you.    It was a pleasure meeting with you today.  Thank you for allowing me and my team the privilege of caring for you today.  YOU are the reason we are here, and I truly hope we provided you with the excellent service you deserve.  Please let us know if there is anything else we can do for you so that we can be sure you are leaving completely satisfied with your care experience.      If you have any billing related questions please call the [facility] Business office at [phone]. The clinic staff does not handle billing related matters.    If you are scheduled have a follow up appointment, you will receive a reminder call the day before your visit. If you are unable to keep that appointment, please call the clinic to cancel or reschedule.

## 2022-08-12 NOTE — PROGRESS NOTES
[facility]    ASSESSMENT:   1. Unstageable ulcer of left buttock     PLAN/DISCUSSION:   1. Wound care plan: cleanse with mild soap and water, Iodosorb to wound bed, cover with silicone foam dressing and change daily  2. Reposition frequently  3. See bottom of note for detailed wound care and patient instructions    HISTORY OF PRESENT ILLNESS:   Aspen Mccullough is a 62 year old female with a complex medical history who is known to me for previous pressure ulcers who presents today for new ulcerations. She attributes these ulcers to her time spent in the hospital from 8/8-8/10, however upon chart review the ulcer was documented upon admission. Her  has been appl;mary Iodosorb and silicone foam dresings to the area. Denies diarrhea - this has been problematic in the past.      VITALS: /77 (BP Location: Right arm)   Pulse 68   Temp 97.7  F (36.5  C)      PHYSICAL EXAM PER PHOTO  INTEGUMENTARY:   Wound (used by OP I only) 09/28/21 1010 Right gluteal pressure injury (Active)   Thickness/Stage unstageable 08/12/22 1054   Base necrotic 08/12/22 1054   Periwound intact 08/12/22 1054   Periwound Temperature warm 08/12/22 1054   Periwound Skin Turgor soft 08/12/22 1054   Edges open 08/12/22 1054   Length (cm) 18.5 08/12/22 1054   Width (cm) 5.5 08/12/22 1054   Depth (cm) 0.3 08/12/22 1054   Wound (cm^2) 101.75 cm^2 08/12/22 1054   Wound Volume (cm^3) 30.53 cm^3 08/12/22 1054   Wound healing % -2131.36 08/12/22 1054   Drainage Characteristics/Odor serosanguineous;creamy;tan 08/12/22 1054   Drainage Amount copious 08/12/22 1054   Care, Wound debrided 08/12/22 1054       Wound (used by OP I only) 08/12/22 1124 Right proximal gluteal pressure injury (Active)   Thickness/Stage unstageable 08/12/22 1100   Base slough 08/12/22 1100   Periwound intact 08/12/22 1100   Periwound Temperature warm 08/12/22 1100   Periwound Skin Turgor soft 08/12/22 1100   Edges open 08/12/22 1100   Length (cm) 6  08/12/22 1100   Width (cm) 2 08/12/22 1100   Depth (cm) 0.2 08/12/22 1100   Wound (cm^2) 12 cm^2 08/12/22 1100   Wound Volume (cm^3) 2.4 cm^3 08/12/22 1100   Drainage Characteristics/Odor serosanguineous;creamy 08/12/22 1100   Drainage Amount copious 08/12/22 1100   Care, Wound debrided 08/12/22 1100         MDM: 30-39 minutes were spent on the date of the visit reviewing previous chart notes, evaluating patient and developing the treatment plan, this excludes any time spent on procedures.       PROCEDURE (r buttocks): 4% topical lidocaine was applied to the wound by the nursing staff. Patient was determined to be capable of making their own medical decisions and informed consent was obtained. Using a 15 blade a surgical debridement was performed down to and including subcutaneous tissue of <20 cm. Hemostasis was achieved with pressure. The patient tolerated the procedure well.          Further instructions from your care team       Aspen Mccullough      1960    Wound Dressing Change: Right gluteus and Right proximal gluteus  Cleanse with Microklenz  Apply Iodosorb gel to wound then cover with 1 6x6 Mepilex border to gluteus and 1 4x4 Mepilex border to proximal gluteus  Change daily     Michelle Petit PA-C August 12, 2022    Call us at [phone] if you have any questions about your wounds, have redness or swelling around your wound, have a fever of 101 or greater or if you have any other problems or concerns. We answer the phone Monday through Friday 8 am to 4 pm, please leave a message as we check the voicemail frequently throughout the day.     If you had a positive experience please indicate that on your patient satisfaction survey form that [facility] will be sending you.    It was a pleasure meeting with you today.  Thank you for allowing me and my team the privilege of caring for you today.  YOU are the reason we are here, and I truly hope we provided you with the excellent service you  deserve.  Please let us know if there is anything else we can do for you so that we can be sure you are leaving completely satisfied with your care experience.      If you have any billing related questions please call the [facility] Business office at [phone]. The clinic staff does not handle billing related matters.    If you are scheduled have a follow up appointment, you will receive a reminder call the day before your visit. If you are unable to keep that appointment, please call the clinic to cancel or reschedule.

## 2022-08-12 NOTE — ADDENDUM NOTE
Encounter addended by: Michelle Petit PA-C on: 8/12/2022 4:46 PM   Actions taken: Clinical Note Signed

## 2022-08-13 LAB
BACTERIA BLD CULT: NO GROWTH
BACTERIA BLD CULT: NO GROWTH

## 2022-09-01 ENCOUNTER — OFFICE VISIT (OUTPATIENT)
Dept: CARDIOLOGY | Facility: CLINIC | Age: 62
End: 2022-09-01
Attending: INTERNAL MEDICINE
Payer: COMMERCIAL

## 2022-09-01 VITALS
SYSTOLIC BLOOD PRESSURE: 130 MMHG | BODY MASS INDEX: 43.4 KG/M2 | WEIGHT: 293 LBS | HEART RATE: 78 BPM | OXYGEN SATURATION: 95 % | HEIGHT: 69 IN | DIASTOLIC BLOOD PRESSURE: 86 MMHG

## 2022-09-01 DIAGNOSIS — I48.91 ATRIAL FIBRILLATION WITH RVR (H): Primary | ICD-10-CM

## 2022-09-01 DIAGNOSIS — J96.01 ACUTE RESPIRATORY FAILURE WITH HYPOXIA (H): ICD-10-CM

## 2022-09-01 DIAGNOSIS — I48.20 CHRONIC ATRIAL FIBRILLATION (H): ICD-10-CM

## 2022-09-01 DIAGNOSIS — E78.5 HYPERLIPIDEMIA LDL GOAL <100: ICD-10-CM

## 2022-09-01 DIAGNOSIS — I87.2 VENOUS (PERIPHERAL) INSUFFICIENCY: ICD-10-CM

## 2022-09-01 PROCEDURE — 99214 OFFICE O/P EST MOD 30 MIN: CPT | Performed by: NURSE PRACTITIONER

## 2022-09-01 RX ORDER — FUROSEMIDE 20 MG
20 TABLET ORAL DAILY
Qty: 30 TABLET | Refills: 3 | Status: SHIPPED | OUTPATIENT
Start: 2022-09-01 | End: 2022-10-26

## 2022-09-01 RX ORDER — ATORVASTATIN CALCIUM 40 MG/1
40 TABLET, FILM COATED ORAL EVERY EVENING
Qty: 30 TABLET | Refills: 3 | Status: SHIPPED | OUTPATIENT
Start: 2022-09-01 | End: 2023-01-23

## 2022-09-01 RX ORDER — METOPROLOL SUCCINATE 25 MG/1
75 TABLET, EXTENDED RELEASE ORAL 2 TIMES DAILY
Qty: 180 TABLET | Refills: 3 | Status: SHIPPED | OUTPATIENT
Start: 2022-09-01 | End: 2023-01-03

## 2022-09-01 NOTE — PATIENT INSTRUCTIONS
Today's Recommendations    Keep your follow ups with rheumatology, pulmonology, and endocrine  Continue all medications without changes.  Please follow up with Dr. Muñoz in 2-3 months.    Please send a [employer] message or call [phone] to the RN team with questions or concerns.     Scheduling number [phone]  ANITRA Morejon, CNP

## 2022-09-01 NOTE — PROGRESS NOTES
Cardiology Clinic Progress Note  Aspen Mccullough MRN# [id number]   YOB: 1960 Age: 62 year old   Primary Cardiologist: Dr. Muñoz Reason for visit: Post hospital follow up            Assessment and Plan:     1.  Atrial fibrillation  Her heart rate is well controlled with metoprolol and she is tolerating her Xarelto without bleeding or bruising concerns.    2. Hypertension  Metoprolol reduced while hospitalized to 75mg BID due to bradycardia.  Her heart rate and blood pressure stable today. Will continue her current anti-hypertensive regimen.     3.  Borderline dilation of the ascending aorta, 3.8 cm  Annual echocardiogram surveillance.  Continue current antihypertensive control with a goal of systolic less than 130    4.  Right lower extremity popliteal DVT with post thrombotic syndrome, history of PE  Patient is not wearing her compression stockings as she reports they are too difficult to put on. She does intermittently use her pneumatic compression devices. Continue xarelto and encouraged patient to continue with compression at home.     5. Lupus  During August hospitalization CRP was 47.4 which increased to 65.  She weaned herself off prednisone in November 2021 and has a Rheumatolgist appointment next month     6. Right sacral pressure ulcer  Wound nurse following and she an upcoming appointment tomorrow.    9. Acute hypoxic respiratory failure, chronic pulmonary infiltrates  Her respiratory status improved during her hospitalization following diuresis with IV Lasix and she was discharged on a new prescription of 20 mg Lasix daily.  BNP was 45.  Her weight continues to decline with continuing daily lasix at discharge. Follow up Bmp done 8/17/22 showed normal sodium at 139, potassium 4.9, BUN 21, creatinine 0.84 and GFR 78.    CT chest next week and pulmonology follow up to reassess pulmonary infiltrates.    10..  Abnormal TFTs  While hospitalized her T4 was 0.71, and TSH was 25.  She has a  follow-up with endocrinology in November.      Changes today: No medication changes were made today    Follow up plan: She is to follow-up with Dr. Muñoz in 2 months time, at which point they can review findings of multiple specialists and follow-up on shortness of breath and ongoing venous insufficiency.        History of Presenting Illness:    Aspen Mccullough is a very pleasant 62 year old female with a history of HLD, atrial fibrillation, right lower extremity DVT and PE (on long-term Xarelto), hypertension, lupus on chronic steroids, chronic venous insufficiency, CVA.    In October 2021 she was admitted for DVT and found to be in rapid atrial fibrillation.  She was started on diltiazem and metoprolol.  She was asymptomatic.    She had a right popliteal DVT with post thrombotic syndrome and has been on Xarelto.  She had some discoloration in her right toe and occasional blue toe syndrome arterial studies were performed after that showing good arterial flow to the foot.    She was hospitalized 8/8/22-8/10/22. She presented to urgent care after a fall with right leg pain and was hypoxic with a chest x-ray that showed pulmonary infiltrates.  She had also been having worsening shortness of breath and lower extremity edema x3 days.  A CT chest that was done during her hospitalization showed chronic pulmonary infiltrates which were first found in February 2022 she had COVID with pneumonia diagnosis.  She was initially diuresed with Lasix 40 mg IV twice daily.  She is evaluated cardiology while hospitalized    Patient is here today for follow up from her hospitalization. She continues to be short of breath when walking half a block or up the stairs carrying a laundry basket.     She is not wearing her compression stockings.  Continues to have trace edema in her right lower extremity as well as numbness and eveline discoloration.  Neither of which are new or worsening symptoms.  Her edema has improved per her report  since starting the Lasix following hospitalization.    Patient denies chest pain or chest tightness. Denies dizziness, lightheadedness or other presyncopal symptoms. Denies tachycardia or palpitations.     Significant discrepancy in weights. Post hospital weight was 309 lbs at home and today was 289lbs. She feels her edema in unchanged since hospitalization.     No bleeding issues with xarelto.     Blood pressure 130/86 and HR 78  in clinic today.        Recent Hospitalizations   See above          Social History      Social History     Socioeconomic History     Marital status:      Spouse name: Not on file     Number of children: Not on file     Years of education: Not on file     Highest education level: Not on file   Occupational History     Not on file   Tobacco Use     Smoking status: Former Smoker     Packs/day: 1.50     Years: 20.00     Pack years: 30.00     Types: Cigarettes     Start date: 1973     Quit date: 1993     Years since quittin.6     Smokeless tobacco: Never Used     Tobacco comment: wish I never started   Substance and Sexual Activity     Alcohol use: No     Alcohol/week: 0.0 standard drinks     Drug use: No     Sexual activity: Not Currently     Partners: Male     Birth control/protection: Post-menopausal   Other Topics Concern     Parent/sibling w/ CABG, MI or angioplasty before 65F 55M? Not Asked   Social History Narrative    ** Merged History Encounter **          Social Determinants of Health     Financial Resource Strain: Not on file   Food Insecurity: Not on file   Transportation Needs: Not on file   Physical Activity: Not on file   Stress: Not on file   Social Connections: Not on file   Intimate Partner Violence: Not on file   Housing Stability: Not on file            Review of Systems:   Skin:  not assessed     Eyes:  not assessed    ENT:  not assessed    Respiratory:  Positive for dyspnea on exertion  Cardiovascular:    Positive for;chest  "pain;palpitations;fatigue;edema  Gastroenterology: not assessed    Genitourinary:  not assessed    Musculoskeletal:  not assessed    Neurologic:  not assessed    Psychiatric:  not assessed    Heme/Lymph/Imm:  not assessed    Endocrine:  Positive for thyroid disorder         Physical Exam:   Vitals: /86   Pulse 78   Ht 1.753 m (5' 9\")   Wt 133.9 kg (295 lb 3.2 oz)   SpO2 95%   BMI 43.59 kg/m     Wt Readings from Last 4 Encounters:   09/01/22 133.9 kg (295 lb 3.2 oz)   08/10/22 140.8 kg (310 lb 4.8 oz)   02/17/22 138.6 kg (305 lb 8 oz)   10/30/21 137.2 kg (302 lb 8 oz)     GEN: well nourished, in no acute distress.  HEENT:  Pupils equal, round. Sclerae nonicteric.   NECK: Supple, no masses appreciated.   C/V:  Regular rate and rhythm, no murmur, rub or gallop.    RESP: Respirations are unlabored. Clear to auscultation bilaterally without wheezing, rales, or rhonchi.  EXTREM: 1+ RLE edema and LLE trace, 2+ DP on RLE, toes eveline, leg and foot warm to touch  NEURO: Alert and oriented, cooperative.  SKIN: Warm and dry.        Data:     LIPID RESULTS:  Lab Results   Component Value Date    CHOL 303 (H) 06/06/2021    HDL 70 06/06/2021     (H) 06/06/2021    TRIG 259 (H) 06/06/2021    CHOLHDLRATIO 3.1 01/06/2014     LIVER ENZYME RESULTS:  Lab Results   Component Value Date    AST 16 08/09/2022    AST 52 (H) 06/07/2021    ALT 16 08/09/2022    ALT 22 06/07/2021     CBC RESULTS:  Lab Results   Component Value Date    WBC 7.4 08/10/2022    WBC 6.9 06/08/2021    RBC 4.19 08/10/2022    RBC 4.33 06/08/2021    HGB 11.7 08/10/2022    HGB 12.1 06/08/2021    HCT 37.6 08/10/2022    HCT 38.6 06/08/2021    MCV 90 08/10/2022    MCV 89 06/08/2021    MCH 27.9 08/10/2022    MCH 27.9 06/08/2021    MCHC 31.1 (L) 08/10/2022    MCHC 31.3 (L) 06/08/2021    RDW 16.0 (H) 08/10/2022    RDW 18.5 (H) 06/08/2021     08/10/2022     06/08/2021     BMP RESULTS:  Lab Results   Component Value Date     08/10/2022    NA " 138 06/08/2021    POTASSIUM 3.8 08/10/2022    POTASSIUM 3.8 06/08/2021    CHLORIDE 101 08/10/2022    CHLORIDE 103 06/08/2021    CO2 29 08/10/2022    CO2 33 (H) 06/08/2021    ANIONGAP 6 08/10/2022    ANIONGAP 2 (L) 06/08/2021     (H) 08/10/2022     (H) 06/08/2021    BUN 11 08/10/2022    BUN 14 06/08/2021    CR 0.78 08/10/2022    CR 0.81 06/08/2021    GFRESTIMATED 85 08/10/2022    GFRESTIMATED >60 10/30/2021    GFRESTIMATED 78 06/08/2021    GFRESTBLACK 90 06/08/2021    KYLIE 9.3 08/10/2022    KYLIE 8.9 06/08/2021      A1C RESULTS:  Lab Results   Component Value Date    A1C 5.7 (H) 06/07/2021     INR RESULTS:  Lab Results   Component Value Date    INR 1.76 (H) 10/30/2021    INR 1.63 (H) 01/22/2020    INR 1.08 01/22/2015            Medications     Current Outpatient Medications   Medication Sig Dispense Refill     atorvastatin (LIPITOR) 40 MG tablet Take 1 tablet (40 mg) by mouth every evening 30 tablet 3     benzonatate (TESSALON) 100 MG capsule Take 1 capsule (100 mg) by mouth 3 times daily as needed for cough 30 capsule 0     BIOTIN FORTE PO Take 1 tablet by mouth daily        carboxymethylcellulose (REFRESH PLUS) 0.5 % SOLN ophthalmic solution Place 1 drop into both eyes 2 times daily as needed  1 Bottle      cyanocobalamin 1000 MCG/ML injection Inject 1 mL (1,000 mcg) Subcutaneous every 7 days 4 mL 5     cyclobenzaprine (FLEXERIL) 10 MG tablet Take 1 tablet (10 mg) by mouth 3 times daily 90 tablet 3     cycloSPORINE (RESTASIS) 0.05 % ophthalmic emulsion Place 1 drop into both eyes 2 times daily as needed        Elastic Bandages & Supports (MEDICAL COMPRESSION SOCKS) MISC 1 Package daily Please measure and distribute 2 pair of 20mmHg - 30mmHg THIGH high open or closed toe compression stockings with extra refills as indicated. Jobst ultrasheer or equivalent. 2 each 3     fexofenadine (ALLEGRA) 180 MG tablet Take 180 mg by mouth daily as needed        furosemide (LASIX) 20 MG tablet Take 1 tablet (20 mg) by  mouth daily Future refills by PCP Dr. Arcadio Farfan with phone number [phone]. 30 tablet 3     gabapentin (NEURONTIN) 300 MG capsule Take 300 mg by mouth 2 times daily (morning and afternoon) with 800mg tablet (total dose 1100mg)       gabapentin (NEURONTIN) 800 MG tablet Take 800 mg by mouth At Bedtime        gabapentin (NEURONTIN) 800 MG tablet Take 800 mg by mouth 2 times daily (morning and afternoon) in addition to 300mg capsule (total dose 1100mg)       guaiFENesin-dextromethorphan (ROBITUSSIN DM) 100-10 MG/5ML syrup Take 10 mLs by mouth every 4 hours as needed for cough 118 mL 0     HYDROmorphone (DILAUDID) 8 MG tablet Take 8 mg by mouth 3 times daily . Max of 3 doses per day.       lifitegrast (XIIDRA) 5 % opthalmic solution Place 1 drop into both eyes 2 times daily       metoprolol succinate ER (TOPROL XL) 25 MG 24 hr tablet Take 3 tablets (75 mg) by mouth 2 times daily NOTE this is a DECREASE in dose than prior to hospital [STOP prior metoprolol] 180 tablet 3     morphine (MS CONTIN) 30 MG 12 hr tablet Take 30 mg by mouth 2 times daily (morning and night) in addition to 60 mg tablet for a total dose of 90mg. 270 tablet 0     morphine (MS CONTIN) 60 MG 12 hr tablet Take 60 mg by mouth 3 times daily in the afternoon (in addition to the 2 - 90mg doses)       morphine (MS CONTIN) 60 MG 12 hr tablet Take 60 mg by mouth 3 times daily (morning and night) in addition to 30mg tablet for a total dose of 90mg 30 tablet 0     multivitamin w/minerals (THERA-VIT-M) tablet Take 1 tablet by mouth daily  100 tablet 3     NASONEX 50 MCG/ACT spray Spray 1 spray into both nostrils daily as needed   11     prednisoLONE acetate (PRED FORTE) 1 % ophthalmic susp Place 1 drop into both eyes 2 times daily        rivaroxaban ANTICOAGULANT (XARELTO) 20 MG TABS tablet Take 1 tablet (20 mg) by mouth daily (with dinner)       TIROSINT 200 MCG CAPS Take 600 mcg by mouth daily        zolpidem (AMBIEN) 5 MG tablet Take 5 mg by mouth  nightly as needed for sleep       zoster vaccine recombinant adjuvanted (SHINGRIX) injection GIVEN AT PHARMACY: FIRST DOSE NOW, SECOND DOSE GIVEN 2 TO 6 MONTHS AFTER            Past Medical History     Past Medical History:   Diagnosis Date     ADHD (attention deficit hyperactivity disorder)      Allergic rhinitis      Chronic low back pain      Cushing's syndrome (H)      DDD (degenerative disc disease)      DDD (degenerative disc disease)      Deviated nasal septum      Diarrhea      Endometriosis      Fibromyalgia      Hashimoto's disease      Hyperlipidemia      Hypertension      Hypothyroid     hx hashimoto's thyroiditis     Insomnia      Lupus (H)      Malabsorption      Pernicious anemia      Renal disease     chronic renal insufficiency     Sinusitis      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)     APPENDECTOMY       ARTHRODESIS ANKLE      right     ARTHROPLASTY KNEE BILATERAL       ARTHROPLASTY REVISION KNEE  4/19/2011    Procedure:ARTHROPLASTY REVISION KNEE; With Antibiotic Cement ; Surgeon:CHAO OLIVARES; Location:[facility]     BREAST SURGERY      right- tissue remove nipple area     BUNIONECTOMY  12/14/2011    Procedure:BUNIONECTOMY; Right Bunion Correction; Surgeon:GRACE ZARATE; Location:[facility]     CHOLECYSTECTOMY       EXAM UNDER ANESTHESIA ANUS N/A 7/15/2020    Procedure: EXAM UNDER ANESTHESIA, ANUS;  Surgeon: Luis Canales MD;  Location: [facility]     EXCISE MASS UPPER EXTREMITY  12/14/2011    Procedure:EXCISE MASS UPPER EXTREMITY; Excision of Left Arm Mass; Surgeon:GRACE ZARATE; Location:[facility]     FOOT SURGERY      left X 4     FUSION LUMBAR ANTERIOR ONE LEVEL       HEMORRHOIDECTOMY INTERNAL N/A 7/15/2020    Procedure: Exam under anesthesia, hemorrhoidectomy;  Surgeon: Luis Canales MD;  Location: [facility]     INJECT NERVE BLOCK SUPRASCAPULAR Left 5/18/2018    Procedure: INJECT NERVE  BLOCK SUPRASCAPULAR;  Left Suprascapular Nerve Block;  Surgeon: Basim Velazquez MD;  Location: [facility]     INJECT NERVE BLOCK SUPRASCAPULAR Right 7/23/2018    Procedure: INJECT NERVE BLOCK SUPRASCAPULAR;  Right suprascapular injection;  Surgeon: Basim Velazquez MD;  Location: [facility]     INJECT NERVE BLOCK SUPRASCAPULAR Bilateral 10/29/2018    Procedure: Bilateral Suprascapular Nerve Blocks;  Surgeon: Basim Velazquez MD;  Location: [facility]     INJECT NERVE BLOCK SUPRASCAPULAR Bilateral 3/15/2019    Procedure: Bilateral Suprascapular Nerve Block;  Surgeon: Basim Velazquez MD;  Location: [facility]     INJECT NERVE BLOCK SUPRASCAPULAR Bilateral 12/31/2019    Procedure: bilateral suprascapular nerve block;  Surgeon: Basim Velazquez MD;  [facility]     INJECT NERVE BLOCK SUPRASCAPULAR Bilateral 8/3/2021    Procedure: bilateral suprascapular nerve block;  Surgeon: Basim Velazquez MD;  Location: [facility]     IR ENDOVENOUS ABLATION VARICOSE VEINS  10/5/2021     IR ENDOVENOUS ABLATION VARICOSE VEINS  10/26/2021     KNEE SURGERY      see list which we will bring     LAMINECTOMY LUMBAR ONE LEVEL      L4-5     LAPAROSCOPIC ABLATION ENDOMETRIOSIS       HI HAND/FINGER SURGERY UNLISTED  surgeries on both hands with Dr. Shankar     HI SPINE SURGERY PROCEDURE UNLISTED      see list which we will bring     HI STOMACH SURGERY PROCEDURE UNLISTED  gall bladder removal     RADIO FREQUENCY ABLATION PULSED CERVICAL Bilateral 7/10/2019    Procedure: Bilateral Suprascapular Nerve Pulsed Radiofrequency Ablation;  Surgeon: Basim Velazquez MD;  Location: [facility]     REMOVE HARDWARE FOOT  12/14/2011    Procedure:REMOVE HARDWARE FOOT; Hardware Removal Right Foot (Mini-C-Arm) ; Surgeon:GRACE ZARATE; Location: [facility]     RHINOPLASTY       SALPINGO-OOPHORECTOMY BILATERAL       TONSILLECTOMY       Z STOMACH SURGERY PROCEDURE UNLISTED      see list which we will bring     Family History   Problem Relation Age of Onset      Hypertension Mother      No Known Problems Father      No Known Problems Sister      No Known Problems Brother      No Known Problems Maternal Grandmother      No Known Problems Maternal Grandfather      No Known Problems Paternal Grandmother      No Known Problems Other             Allergies   Blood transfusion related (informational only), Chlorhexidine, Codeine, Ibuprofen [nsaids], Penicillins, Sulfa drugs, Calcium channel blockers, Doxycycline, Hydroxyzine, and Mold        Pearl Tarango NP  [facility]  Pager: [phone]

## 2022-09-01 NOTE — LETTER
9/1/2022    Arcadio Farfan MD  [facility] [address]  [facility] [address]    RE: Aspen EPPS Jamel       Dear Colleague,     I had the pleasure of seeing Aspen Mccullough in the [facility].  Cardiology Clinic Progress Note  Aspen Mccullough MRN# [id number]   YOB: 1960 Age: 62 year old   Primary Cardiologist: Dr. Muñoz Reason for visit: Post hospital follow up            Assessment and Plan:     1.  Atrial fibrillation  Her heart rate is well controlled with metoprolol and she is tolerating her Xarelto without bleeding or bruising concerns.    2. Hypertension  Metoprolol reduced while hospitalized to 75mg BID due to bradycardia.  Her heart rate and blood pressure stable today. Will continue her current anti-hypertensive regimen.     3.  Borderline dilation of the ascending aorta, 3.8 cm  Annual echocardiogram surveillance.  Continue current antihypertensive control with a goal of systolic less than 130    4.  Right lower extremity popliteal DVT with post thrombotic syndrome, history of PE  Patient is not wearing her compression stockings as she reports they are too difficult to put on. She does intermittently use her pneumatic compression devices. Continue xarelto and encouraged patient to continue with compression at home.     5. Lupus  During August hospitalization CRP was 47.4 which increased to 65.  She weaned herself off prednisone in November 2021 and has a Rheumatolgist appointment next month     6. Right sacral pressure ulcer  Wound nurse following and she an upcoming appointment tomorrow.    9. Acute hypoxic respiratory failure, chronic pulmonary infiltrates  Her respiratory status improved during her hospitalization following diuresis with IV Lasix and she was discharged on a new prescription of 20 mg Lasix daily.  BNP was 45.  Her weight continues to decline with continuing daily lasix at discharge. Follow up Bmp done 8/17/22 showed normal sodium at 139,  potassium 4.9, BUN 21, creatinine 0.84 and GFR 78.    CT chest next week and pulmonology follow up to reassess pulmonary infiltrates.    10..  Abnormal TFTs  While hospitalized her T4 was 0.71, and TSH was 25.  She has a follow-up with endocrinology in November.      Changes today: No medication changes were made today    Follow up plan: She is to follow-up with Dr. Muñoz in 2 months time, at which point they can review findings of multiple specialists and follow-up on shortness of breath and ongoing venous insufficiency.        History of Presenting Illness:    Aspen Mccullough is a very pleasant 62 year old female with a history of HLD, atrial fibrillation, right lower extremity DVT and PE (on long-term Xarelto), hypertension, lupus on chronic steroids, chronic venous insufficiency, CVA.    In October 2021 she was admitted for DVT and found to be in rapid atrial fibrillation.  She was started on diltiazem and metoprolol.  She was asymptomatic.    She had a right popliteal DVT with post thrombotic syndrome and has been on Xarelto.  She had some discoloration in her right toe and occasional blue toe syndrome arterial studies were performed after that showing good arterial flow to the foot.    She was hospitalized 8/8/22-8/10/22. She presented to urgent care after a fall with right leg pain and was hypoxic with a chest x-ray that showed pulmonary infiltrates.  She had also been having worsening shortness of breath and lower extremity edema x3 days.  A CT chest that was done during her hospitalization showed chronic pulmonary infiltrates which were first found in February 2022 she had COVID with pneumonia diagnosis.  She was initially diuresed with Lasix 40 mg IV twice daily.  She is evaluated cardiology while hospitalized    Patient is here today for follow up from her hospitalization. She continues to be short of breath when walking half a block or up the stairs carrying a laundry basket.     She is not wearing her  compression stockings.  Continues to have trace edema in her right lower extremity as well as numbness and eveline discoloration.  Neither of which are new or worsening symptoms.  Her edema has improved per her report since starting the Lasix following hospitalization.    Patient denies chest pain or chest tightness. Denies dizziness, lightheadedness or other presyncopal symptoms. Denies tachycardia or palpitations.     Significant discrepancy in weights. Post hospital weight was 309 lbs at home and today was 289lbs. She feels her edema in unchanged since hospitalization.     No bleeding issues with xarelto.     Blood pressure 130/86 and HR 78  in clinic today.        Recent Hospitalizations   See above          Social History      Social History     Socioeconomic History     Marital status:      Spouse name: Not on file     Number of children: Not on file     Years of education: Not on file     Highest education level: Not on file   Occupational History     Not on file   Tobacco Use     Smoking status: Former Smoker     Packs/day: 1.50     Years: 20.00     Pack years: 30.00     Types: Cigarettes     Start date: 1973     Quit date: 1993     Years since quittin.6     Smokeless tobacco: Never Used     Tobacco comment: wish I never started   Substance and Sexual Activity     Alcohol use: No     Alcohol/week: 0.0 standard drinks     Drug use: No     Sexual activity: Not Currently     Partners: Male     Birth control/protection: Post-menopausal   Other Topics Concern     Parent/sibling w/ CABG, MI or angioplasty before 65F 55M? Not Asked   Social History Narrative    ** Merged History Encounter **          Social Determinants of Health     Financial Resource Strain: Not on file   Food Insecurity: Not on file   Transportation Needs: Not on file   Physical Activity: Not on file   Stress: Not on file   Social Connections: Not on file   Intimate Partner Violence: Not on file   Housing Stability: Not on file  "           Review of Systems:   Skin:  not assessed     Eyes:  not assessed    ENT:  not assessed    Respiratory:  Positive for dyspnea on exertion  Cardiovascular:    Positive for;chest pain;palpitations;fatigue;edema  Gastroenterology: not assessed    Genitourinary:  not assessed    Musculoskeletal:  not assessed    Neurologic:  not assessed    Psychiatric:  not assessed    Heme/Lymph/Imm:  not assessed    Endocrine:  Positive for thyroid disorder         Physical Exam:   Vitals: /86   Pulse 78   Ht 1.753 m (5' 9\")   Wt 133.9 kg (295 lb 3.2 oz)   SpO2 95%   BMI 43.59 kg/m     Wt Readings from Last 4 Encounters:   09/01/22 133.9 kg (295 lb 3.2 oz)   08/10/22 140.8 kg (310 lb 4.8 oz)   02/17/22 138.6 kg (305 lb 8 oz)   10/30/21 137.2 kg (302 lb 8 oz)     GEN: well nourished, in no acute distress.  HEENT:  Pupils equal, round. Sclerae nonicteric.   NECK: Supple, no masses appreciated.   C/V:  Regular rate and rhythm, no murmur, rub or gallop.    RESP: Respirations are unlabored. Clear to auscultation bilaterally without wheezing, rales, or rhonchi.  EXTREM: 1+ RLE edema and LLE trace, 2+ DP on RLE, toes eveline, leg and foot warm to touch  NEURO: Alert and oriented, cooperative.  SKIN: Warm and dry.        Data:     LIPID RESULTS:  Lab Results   Component Value Date    CHOL 303 (H) 06/06/2021    HDL 70 06/06/2021     (H) 06/06/2021    TRIG 259 (H) 06/06/2021    CHOLHDLRATIO 3.1 01/06/2014     LIVER ENZYME RESULTS:  Lab Results   Component Value Date    AST 16 08/09/2022    AST 52 (H) 06/07/2021    ALT 16 08/09/2022    ALT 22 06/07/2021     CBC RESULTS:  Lab Results   Component Value Date    WBC 7.4 08/10/2022    WBC 6.9 06/08/2021    RBC 4.19 08/10/2022    RBC 4.33 06/08/2021    HGB 11.7 08/10/2022    HGB 12.1 06/08/2021    HCT 37.6 08/10/2022    HCT 38.6 06/08/2021    MCV 90 08/10/2022    MCV 89 06/08/2021    MCH 27.9 08/10/2022    MCH 27.9 06/08/2021    MCHC 31.1 (L) 08/10/2022    MCHC 31.3 (L) " 06/08/2021    RDW 16.0 (H) 08/10/2022    RDW 18.5 (H) 06/08/2021     08/10/2022     06/08/2021     BMP RESULTS:  Lab Results   Component Value Date     08/10/2022     06/08/2021    POTASSIUM 3.8 08/10/2022    POTASSIUM 3.8 06/08/2021    CHLORIDE 101 08/10/2022    CHLORIDE 103 06/08/2021    CO2 29 08/10/2022    CO2 33 (H) 06/08/2021    ANIONGAP 6 08/10/2022    ANIONGAP 2 (L) 06/08/2021     (H) 08/10/2022     (H) 06/08/2021    BUN 11 08/10/2022    BUN 14 06/08/2021    CR 0.78 08/10/2022    CR 0.81 06/08/2021    GFRESTIMATED 85 08/10/2022    GFRESTIMATED >60 10/30/2021    GFRESTIMATED 78 06/08/2021    GFRESTBLACK 90 06/08/2021    KYLIE 9.3 08/10/2022    KYLIE 8.9 06/08/2021      A1C RESULTS:  Lab Results   Component Value Date    A1C 5.7 (H) 06/07/2021     INR RESULTS:  Lab Results   Component Value Date    INR 1.76 (H) 10/30/2021    INR 1.63 (H) 01/22/2020    INR 1.08 01/22/2015            Medications     Current Outpatient Medications   Medication Sig Dispense Refill     atorvastatin (LIPITOR) 40 MG tablet Take 1 tablet (40 mg) by mouth every evening 30 tablet 3     benzonatate (TESSALON) 100 MG capsule Take 1 capsule (100 mg) by mouth 3 times daily as needed for cough 30 capsule 0     BIOTIN FORTE PO Take 1 tablet by mouth daily        carboxymethylcellulose (REFRESH PLUS) 0.5 % SOLN ophthalmic solution Place 1 drop into both eyes 2 times daily as needed  1 Bottle      cyanocobalamin 1000 MCG/ML injection Inject 1 mL (1,000 mcg) Subcutaneous every 7 days 4 mL 5     cyclobenzaprine (FLEXERIL) 10 MG tablet Take 1 tablet (10 mg) by mouth 3 times daily 90 tablet 3     cycloSPORINE (RESTASIS) 0.05 % ophthalmic emulsion Place 1 drop into both eyes 2 times daily as needed        Elastic Bandages & Supports (MEDICAL COMPRESSION SOCKS) MISC 1 Package daily Please measure and distribute 2 pair of 20mmHg - 30mmHg THIGH high open or closed toe compression stockings with extra refills as  indicated. Jobst ultrasheer or equivalent. 2 each 3     fexofenadine (ALLEGRA) 180 MG tablet Take 180 mg by mouth daily as needed        furosemide (LASIX) 20 MG tablet Take 1 tablet (20 mg) by mouth daily Future refills by PCP Dr. Arcadio Farfan with phone number [phone]. 30 tablet 3     gabapentin (NEURONTIN) 300 MG capsule Take 300 mg by mouth 2 times daily (morning and afternoon) with 800mg tablet (total dose 1100mg)       gabapentin (NEURONTIN) 800 MG tablet Take 800 mg by mouth At Bedtime        gabapentin (NEURONTIN) 800 MG tablet Take 800 mg by mouth 2 times daily (morning and afternoon) in addition to 300mg capsule (total dose 1100mg)       guaiFENesin-dextromethorphan (ROBITUSSIN DM) 100-10 MG/5ML syrup Take 10 mLs by mouth every 4 hours as needed for cough 118 mL 0     HYDROmorphone (DILAUDID) 8 MG tablet Take 8 mg by mouth 3 times daily . Max of 3 doses per day.       lifitegrast (XIIDRA) 5 % opthalmic solution Place 1 drop into both eyes 2 times daily       metoprolol succinate ER (TOPROL XL) 25 MG 24 hr tablet Take 3 tablets (75 mg) by mouth 2 times daily NOTE this is a DECREASE in dose than prior to hospital [STOP prior metoprolol] 180 tablet 3     morphine (MS CONTIN) 30 MG 12 hr tablet Take 30 mg by mouth 2 times daily (morning and night) in addition to 60 mg tablet for a total dose of 90mg. 270 tablet 0     morphine (MS CONTIN) 60 MG 12 hr tablet Take 60 mg by mouth 3 times daily in the afternoon (in addition to the 2 - 90mg doses)       morphine (MS CONTIN) 60 MG 12 hr tablet Take 60 mg by mouth 3 times daily (morning and night) in addition to 30mg tablet for a total dose of 90mg 30 tablet 0     multivitamin w/minerals (THERA-VIT-M) tablet Take 1 tablet by mouth daily  100 tablet 3     NASONEX 50 MCG/ACT spray Spray 1 spray into both nostrils daily as needed   11     prednisoLONE acetate (PRED FORTE) 1 % ophthalmic susp Place 1 drop into both eyes 2 times daily        rivaroxaban ANTICOAGULANT  (XARELTO) 20 MG TABS tablet Take 1 tablet (20 mg) by mouth daily (with dinner)       TIROSINT 200 MCG CAPS Take 600 mcg by mouth daily        zolpidem (AMBIEN) 5 MG tablet Take 5 mg by mouth nightly as needed for sleep       zoster vaccine recombinant adjuvanted (SHINGRIX) injection GIVEN AT PHARMACY: FIRST DOSE NOW, SECOND DOSE GIVEN 2 TO 6 MONTHS AFTER            Past Medical History     Past Medical History:   Diagnosis Date     ADHD (attention deficit hyperactivity disorder)      Allergic rhinitis      Chronic low back pain      Cushing's syndrome (H)      DDD (degenerative disc disease)      DDD (degenerative disc disease)      Deviated nasal septum      Diarrhea      Endometriosis      Fibromyalgia      Hashimoto's disease      Hyperlipidemia      Hypertension      Hypothyroid     hx hashimoto's thyroiditis     Insomnia      Lupus (H)      Malabsorption      Pernicious anemia      Renal disease     chronic renal insufficiency     Sinusitis      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)     APPENDECTOMY       ARTHRODESIS ANKLE      right     ARTHROPLASTY KNEE BILATERAL       ARTHROPLASTY REVISION KNEE  4/19/2011    Procedure:ARTHROPLASTY REVISION KNEE; With Antibiotic Cement ; Surgeon:CHAO OLIVARES; Location:[facility] OR     BREAST SURGERY      right- tissue remove nipple area     BUNIONECTOMY  12/14/2011    Procedure:BUNIONECTOMY; Right Bunion Correction; Surgeon:GRACE ZARATE; Location:[facility]     CHOLECYSTECTOMY       EXAM UNDER ANESTHESIA ANUS N/A 7/15/2020    Procedure: EXAM UNDER ANESTHESIA, ANUS;  Surgeon: Luis Canales MD;  Location: [facility]     EXCISE MASS UPPER EXTREMITY  12/14/2011    Procedure:EXCISE MASS UPPER EXTREMITY; Excision of Left Arm Mass; Surgeon:GRACE ZARATE; Location:[facility]     FOOT SURGERY      left X 4     FUSION LUMBAR ANTERIOR ONE LEVEL       HEMORRHOIDECTOMY INTERNAL N/A 7/15/2020     Procedure: Exam under anesthesia, hemorrhoidectomy;  Surgeon: Luis Canales MD;  Location: [facility]     INJECT NERVE BLOCK SUPRASCAPULAR Left 5/18/2018    Procedure: INJECT NERVE BLOCK SUPRASCAPULAR;  Left Suprascapular Nerve Block;  Surgeon: Basim Velazquez MD;  Location: [facility]     INJECT NERVE BLOCK SUPRASCAPULAR Right 7/23/2018    Procedure: INJECT NERVE BLOCK SUPRASCAPULAR;  Right suprascapular injection;  Surgeon: Basim Velazquez MD;  Location: [facility]     INJECT NERVE BLOCK SUPRASCAPULAR Bilateral 10/29/2018    Procedure: Bilateral Suprascapular Nerve Blocks;  Surgeon: Basim Velazquez MD;  [facility]: [facility]     INJECT NERVE BLOCK SUPRASCAPULAR Bilateral 3/15/2019    Procedure: Bilateral Suprascapular Nerve Block;  Surgeon: Basim Velazquez MD;  [facility]     INJECT NERVE BLOCK SUPRASCAPULAR Bilateral 12/31/2019    Procedure: bilateral suprascapular nerve block;  Surgeon: Basim Velazquez MD;  [facility]     INJECT NERVE BLOCK SUPRASCAPULAR Bilateral 8/3/2021    Procedure: bilateral suprascapular nerve block;  Surgeon: Basim Velazquez MD;  Location[facility]     IR ENDOVENOUS ABLATION VARICOSE VEINS  10/5/2021     IR ENDOVENOUS ABLATION VARICOSE VEINS  10/26/2021     KNEE SURGERY      see list which we will bring     LAMINECTOMY LUMBAR ONE LEVEL      L4-5     LAPAROSCOPIC ABLATION ENDOMETRIOSIS       AR HAND/FINGER SURGERY UNLISTED  surgeries on both hands with Dr. Shankar     AR SPINE SURGERY PROCEDURE UNLISTED      see list which we will bring     AR STOMACH SURGERY PROCEDURE UNLISTED  gall bladder removal     RADIO FREQUENCY ABLATION PULSED CERVICAL Bilateral 7/10/2019    Procedure: Bilateral Suprascapular Nerve Pulsed Radiofrequency Ablation;  Surgeon: Basim Velazquez MD;  Location: [facility]     REMOVE HARDWARE FOOT  12/14/2011    Procedure:REMOVE HARDWARE FOOT; Hardware Removal Right Foot (Mini-C-Arm) ; Surgeon:GRACE ZARATE; [facility]     RHINOPLASTY        SALPINGO-OOPHORECTOMY BILATERAL       TONSILLECTOMY       ZZC STOMACH SURGERY PROCEDURE UNLISTED      see list which we will bring     Family History   Problem Relation Age of Onset     Hypertension Mother      No Known Problems Father      No Known Problems Sister      No Known Problems Brother      No Known Problems Maternal Grandmother      No Known Problems Maternal Grandfather      No Known Problems Paternal Grandmother      No Known Problems Other             Allergies   Blood transfusion related (informational only), Chlorhexidine, Codeine, Ibuprofen [nsaids], Penicillins, Sulfa drugs, Calcium channel blockers, Doxycycline, Hydroxyzine, and Mold    Pearl Tarango NP  [facility]  Pager: [phone]    Thank you for allowing me to participate in the care of your patient.      Sincerely,     Pearl Tarango NP     [facility]  cc:   Sil Muñoz MD  [address] [place] [id number]

## 2022-09-02 ENCOUNTER — HOSPITAL ENCOUNTER (OUTPATIENT)
Dept: WOUND CARE | Facility: CLINIC | Age: 62
Discharge: HOME | End: 2022-09-02
Attending: PHYSICIAN ASSISTANT | Admitting: PHYSICIAN ASSISTANT
Payer: COMMERCIAL

## 2022-09-02 VITALS — TEMPERATURE: 97.4 F | SYSTOLIC BLOOD PRESSURE: 181 MMHG | DIASTOLIC BLOOD PRESSURE: 93 MMHG | HEART RATE: 74 BPM

## 2022-09-02 DIAGNOSIS — L89.150 PRESSURE INJURY OF SACRAL REGION, UNSTAGEABLE (H): Primary | ICD-10-CM

## 2022-09-02 PROCEDURE — 11042 DBRDMT SUBQ TIS 1ST 20SQCM/<: CPT | Performed by: PHYSICIAN ASSISTANT

## 2022-09-02 NOTE — DISCHARGE INSTRUCTIONS
Aspen Mccullough      1960    Wound Dressing Change: Right gluteus and Right proximal gluteus  Cleanse with Microklenz aith 4x4 gauze, pat dry  Cut a piece of AquaCel Ag to fit the wound  Use Cavilon swab to periwound to protect skin breakdown as needed  Cover top half of wound with 4x4 Mepilex- cut the tape edge off and then  Apply 6x6 Mepilex border to proximal gluteus  Change daily or every other day according to the amount of drainage.    Repositioning:  Bed: Reposition MINIMALLY every 1-2 hours in bed to relieve pressure and promote perfusion to tissue.  Chair: When up to the chair, do not sit for longer than one hour total before returning to bed for at least 60 minutes to relieve pressure and promote perfusion to the tissue.  Completely recline/tilt for 15 minutes each hour.  Sit on a chair cushion when up to the chair.      Michelle Petit PA-C September 2, 2022    Call us at [phone] if you have any questions about your wounds, have redness or swelling around your wound, have a fever of 101 or greater or if you have any other problems or concerns. We answer the phone Monday through Friday 8 am to 4 pm, please leave a message as we check the voicemail frequently throughout the day.     If you had a positive experience please indicate that on your patient satisfaction survey form that [facility] will be sending you.    It was a pleasure meeting with you today.  Thank you for allowing me and my team the privilege of caring for you today.  YOU are the reason we are here, and I truly hope we provided you with the excellent service you deserve.  Please let us know if there is anything else we can do for you so that we can be sure you are leaving completely satisfied with your care experience.      If you have any billing related questions please call the [facility] Business office at [phone]. The clinic staff does not handle billing related matters.    If you are scheduled to have a follow  up appointment, you will receive a reminder call the day before your visit. On the appointment day please arrive 15 minutes prior to your appointment time. If you are unable to keep that appointment, please call the clinic to cancel or reschedule. If you are more than 10 minutes late or greater for your appointment, the clinic policy is that you may be asked to reschedule.

## 2022-09-02 NOTE — PROGRESS NOTES
[facility]    ASSESSMENT:   1. Stage 3 pressure ulcer right buttocks    PLAN/DISCUSSION:   1. Wound debrided today  2. Wound care plan: cleanse with mild soap and water, AquaCel Ag to wound bed, cover with silicone foam dressing and change daily  3. Reposition frequently  4. See bottom of note for detailed wound care and patient instructions    HISTORY OF PRESENT ILLNESS:   Aspen Mccullough is a 62 year old female with a complex medical history who is known to me for previous pressure ulcers who returns today for right buttocks ulcerations. She attributes these ulcers to her time spent in the hospital from 8/8-8/10, however upon chart review the ulcer was documented upon admission.    8/12: Presents for new R buttocks ulceration. Her  has been applying Iodosorb and silicone foam dresings to the area. Denies diarrhea - this has been problematic in the past.     09/02/22: Returns for follow-up. Eschar starting to separate from underlying healthy granulation tissue. Having a lot of pain.     VITALS: BP (!) 181/93 (BP Location: Left arm)   Pulse 74   Temp 97.4  F (36.3  C) (Temporal)      PHYSICAL EXAM   INTEGUMENTARY: No surrounding erythema, warmth or induration   Wound (used by OP I only) 09/28/21 1010 Right gluteal pressure injury (Active)   Thickness/Stage unstageable 09/02/22 0903   Base slough;necrotic;granulating 09/02/22 0903   Periwound intact 09/02/22 0903   Periwound Temperature warm 09/02/22 0903   Periwound Skin Turgor soft 09/02/22 0903   Edges open 09/02/22 0903   Length (cm) 18.5 09/02/22 0903   Width (cm) 5 09/02/22 0903   Depth (cm) 0.7 09/02/22 0903   Wound (cm^2) 92.5 cm^2 09/02/22 0903   Wound Volume (cm^3) 64.75 cm^3 09/02/22 0903   Wound healing % -1928.51 09/02/22 0903   Drainage Characteristics/Odor serosanguineous;creamy;tan;yellow 09/02/22 0903   Drainage Amount moderate 09/02/22 0903   Care, Wound debrided 09/02/22 0903       Wound (used by OP [facility] only)  08/12/22 1124 Right proximal gluteal pressure injury (Active)   Thickness/Stage other (see comments) 09/02/22 0903   Base slough 09/02/22 0903   Periwound intact 09/02/22 0903   Periwound Temperature warm 09/02/22 0903   Periwound Skin Turgor soft 09/02/22 0903   Edges open 09/02/22 0903   Length (cm) 5 09/02/22 0903   Width (cm) 2 09/02/22 0903   Depth (cm) 0.4 09/02/22 0903   Wound (cm^2) 10 cm^2 09/02/22 0903   Wound Volume (cm^3) 4 cm^3 09/02/22 0903   Wound healing % 16.67 09/02/22 0903   Drainage Characteristics/Odor serosanguineous;creamy 09/02/22 0903   Drainage Amount copious 09/02/22 0903   Care, Wound debrided 09/02/22 0903         PROCEDURE (r buttocks): 4% topical lidocaine was applied to the wound by the nursing staff. Patient was determined to be capable of making their own medical decisions and informed consent was obtained. Using a 15 blade a surgical debridement was performed down to and including subcutaneous tissue of <20 cm. Hemostasis was achieved with pressure. The patient tolerated the procedure well.          Further instructions from your care team       Aspen Mccullough      1960    Wound Dressing Change: Right gluteus and Right proximal gluteus  Cleanse with Microklenz aith 4x4 gauze, pat dry  Cut a piece of AquaCel Ag to fit the wound  Use Cavilon swab to periwound to protect skin breakdown as needed  Cover top half of wound with 4x4 Mepilex- cut the tape edge off and then  Apply 6x6 Mepilex border to proximal gluteus  Change daily or every other day according to the amount of drainage.    Repositioning:    Bed: Reposition MINIMALLY every 1-2 hours in bed to relieve pressure and promote perfusion to tissue.    Chair: When up to the chair, do not sit for longer than one hour total before returning to bed for at least 60 minutes to relieve pressure and promote perfusion to the tissue.  Completely recline/tilt for 15 minutes each hour.  o Sit on a chair cushion when up to the chair.       Michelle Petit PA-C September 2, 2022    Call us at [phone] if you have any questions about your wounds, have redness or swelling around your wound, have a fever of 101 or greater or if you have any other problems or concerns. We answer the phone Monday through Friday 8 am to 4 pm, please leave a message as we check the voicemail frequently throughout the day.     If you had a positive experience please indicate that on your patient satisfaction survey form that [facility] will be sending you.    It was a pleasure meeting with you today.  Thank you for allowing me and my team the privilege of caring for you today.  YOU are the reason we are here, and I truly hope we provided you with the excellent service you deserve.  Please let us know if there is anything else we can do for you so that we can be sure you are leaving completely satisfied with your care experience.      If you have any billing related questions please call the [facility] Business office at [phone]. The clinic staff does not handle billing related matters.    If you are scheduled to have a follow up appointment, you will receive a reminder call the day before your visit. On the appointment day please arrive 15 minutes prior to your appointment time. If you are unable to keep that appointment, please call the clinic to cancel or reschedule. If you are more than 10 minutes late or greater for your appointment, the clinic policy is that you may be asked to reschedule.        Durable Medical Equipment Wound Care Orders     Wound Care Order for DME - ONLY FOR DME   As directed      DME Provider: Other (comments) Comment - JUS    Wound Supply Order Options: Complex Wound    Optional: .dmewound can be used to pull in order specific information into documentation    Wound Number:  Wound 1  Wound 2       Wound 1 Location: right proximal gluteal    Wound 1 Dressing Change Frequency: QOD    Wound 1 Length of Need: 30 days    Wound 1 -  Dressing Supplies:  Primary  Secondary       Wound 1 - Primary Dressing Dispensing Instructions: Ok to Substitute    Wound 1 - Primary Dressing Types: Gelling Fiber w/ Silver    Wound 1 - Gelling Fiber w/ Silver Types: Aquacel AG Advantage []    Wound 1 - Aquacel AG Advantage Size: 4 x 5    Wound 1 - Aquacel AG Advantage Quantity: 30    Wound 1 - Secondary Dressing Dispensing Instructions: Ok to Substitute    Wound 1 - Secondary Dressing Types: Foam Dressing w/Border    Wound 1 - Foam with Border Types: Mepilex Border []    Wound 1 - Mepilex Border Size: 4 x 4    Wound 1 - Mepilex Border Quantity: 12    Wound 2 Location: right distal gluteal    Wound 2 Dressing Change Frequency: QOD    Wound 2 Length of Need: 30 days    Wound 2 - Dressing Supplies:  Primary  Secondary       Wound 2 - Primary Dressing Dispensing Instructions: Ok to Substitute    Wound 2 - Primary Dressing Types: Gelling Fiber w/ Silver    Wound 2 - Gelling Fiber w/ Silver Types: Aquacel AG Advantage []    Wound 2 - Aquacel AG Advantage Size: 4 x 5    Wound 2 - Aquacel AG Advantage Quantity: 30    Wound 2 - Secondary Dressing Dispensing Instructions: Ok to Substitute    Wound 2 - Secondary Dressing Types: Foam Dressing w/Border    Wound 2 - Foam with Border Types: Mepilex Border []    Wound 2 - Mepilex Border Size: 6 x 6    Wound 2 - Mepilex Border Quantity: 12    Pressure injury of sacral region, unstageable (H)

## 2022-09-06 ENCOUNTER — HOSPITAL ENCOUNTER (OUTPATIENT)
Dept: CT IMAGING | Facility: CLINIC | Age: 62
Discharge: HOME | End: 2022-09-06
Attending: INTERNAL MEDICINE | Admitting: INTERNAL MEDICINE
Payer: COMMERCIAL

## 2022-09-06 DIAGNOSIS — I87.2 VENOUS (PERIPHERAL) INSUFFICIENCY: ICD-10-CM

## 2022-09-06 DIAGNOSIS — J96.01 ACUTE RESPIRATORY FAILURE WITH HYPOXIA (H): ICD-10-CM

## 2022-09-06 PROCEDURE — 71250 CT THORAX DX C-: CPT

## 2022-09-13 NOTE — PROGRESS NOTES
"Colon and Rectal Surgery Postoperative Clinic Note    RE: Aspen Mccullough  : 1960  MAGNUS: 2020    Aspen Mccullough is a very pleasant 60 year old female with bleeding prolapsed internal hemorrhoids on Xarelto who is now status post EUA with excisional hemorrhoidectomy x2 on 7/15/2020 with Dr. Canales.    Interval history: Aspen reports that her pain is not well controlled after surgery.  She has been on chronic morphine and Dilaudid and does not feel that the additional pain medication we gave her was enough.  She has followed up with her pain specialist since then.  She continues to have pain with bowel movements but this is getting much more tolerable.  Bowel movements are very soft with the use of daily MiraLAX.  She is using a showerhead to clean the site several times a day.  She denies any fecal incontinence.  She did have quite a bit of bleeding the other day which required her to wear a pad and she was very concerned about this.    Physical Examination: Exam was chaperoned by Roberto Negro LPN  BP (!) 135/100 (BP Location: Left arm, Patient Position: Sitting, Cuff Size: Adult Large)   Pulse 89   Temp 98.7  F (37.1  C) (Oral)   Ht 5' 9\"   SpO2 96%   BMI 33.52 kg/m    General: Alert, oriented, in no acute distress, sitting comfortably  HEENT: Mucous membranes moist  Perianal external examination:  Surgical sites are open but appear to be healing well.  No erythema or drainage.  One long suture present and this was trimmed.  No active bleeding.  Digital rectal examination: Was deferred.    Anoscopy: Was deferred.    Assessment/Plan:  60 year old female status post EUA with excisional hemorrhoidectomy x2 on 7/15/2020 with Dr. Canales.  She is recovering well from surgery.  Pain control has improved and I did discuss with her that this is typically very painful recovery that her symptoms do not sound abnormal.  Reassured her that pain should continue to improve over the next few weeks.  Some " bleeding, which I also reassured her is very normal.  No concerning signs on exam today.  No signs of infection.  I trimmed the suture.  No fecal incontinence.  She can follow-up with our clinic as needed but encouraged her to contact us with any worsening symptoms or with any questions or concerns. Patient's questions were answered to her stated satisfaction and she is in agreement with this plan.      Medical history:  Past Medical History:   Diagnosis Date     ADHD (attention deficit hyperactivity disorder)      Allergic rhinitis      Chronic low back pain      Cushing's syndrome (H)      DDD (degenerative disc disease)      DDD (degenerative disc disease)      Deviated nasal septum      Diarrhea      Endometriosis      Fibromyalgia      Hashimoto's disease      Hyperlipidemia      Hypothyroid     hx hashimoto's thyroiditis     Insomnia      Lupus (H)      Malabsorption      Pernicious anemia      Renal disease     chronic renal insufficiency     Renal disease     hx renal failure     Sinusitis        Surgical history:  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)     APPENDECTOMY       ARTHRODESIS ANKLE      right     ARTHROPLASTY KNEE BILATERAL       ARTHROPLASTY REVISION KNEE  4/19/2011    Procedure:ARTHROPLASTY REVISION KNEE; With Antibiotic Cement ; Surgeon:CHAO OLIVARES; Location:[facility] OR     BREAST SURGERY      right- tissue remove nipple area     BUNIONECTOMY  12/14/2011    Procedure:BUNIONECTOMY; Right Bunion Correction; Surgeon:GRACE ZARATE; Location:[facility] STOMACH SURGERY PROCEDURE UNLISTED      see list which we will bring     CHOLECYSTECTOMY       EXAM UNDER ANESTHESIA ANUS N/A 7/15/2020    Procedure: EXAM UNDER ANESTHESIA, ANUS;  Surgeon: Luis Canales MD;  Location:  OR     EXCISE MASS UPPER EXTREMITY  12/14/2011    Procedure:EXCISE MASS UPPER EXTREMITY; Excision of Left Arm Mass;  Surgeon:GRACE ZARATE; Location:[facility]     FOOT SURGERY      left X 4     FUSION LUMBAR ANTERIOR ONE LEVEL       HC SACROPLASTY      see list which we will bring     HEMORRHOIDECTOMY INTERNAL N/A 7/15/2020    Procedure: Exam under anesthesia, hemorrhoidectomy;  Surgeon: Luis Canales MD;  Location: [facility]     INJECT NERVE BLOCK SUPRASCAPULAR Left 5/18/2018    Procedure: INJECT NERVE BLOCK SUPRASCAPULAR;  Left Suprascapular Nerve Block;  Surgeon: Basim Velazquez MD;  Location: [facility] OR     INJECT NERVE BLOCK SUPRASCAPULAR Right 7/23/2018    Procedure: INJECT NERVE BLOCK SUPRASCAPULAR;  Right suprascapular injection;  Surgeon: Basim Velazquez MD;  Location: [facility]     INJECT NERVE BLOCK SUPRASCAPULAR Bilateral 10/29/2018    Procedure: Bilateral Suprascapular Nerve Blocks;  Surgeon: Basim Velazquez MD;  [facility]     INJECT NERVE BLOCK SUPRASCAPULAR Bilateral 3/15/2019    Procedure: Bilateral Suprascapular Nerve Block;  Surgeon: Basim Velazquez MD;  [facility]: [facility]     INJECT NERVE BLOCK SUPRASCAPULAR Bilateral 12/31/2019    Procedure: bilateral suprascapular nerve block;  Surgeon: Basim Velazquez MD;  Location: [facility]     KNEE SURGERY      see list which we will bring     LAMINECTOMY LUMBAR ONE LEVEL      L4-5     LAPAROSCOPIC ABLATION ENDOMETRIOSIS       RADIO FREQUENCY ABLATION PULSED CERVICAL Bilateral 7/10/2019    Procedure: Bilateral Suprascapular Nerve Pulsed Radiofrequency Ablation;  Surgeon: Basim Velazquez MD;  Location:  OR     REMOVE HARDWARE FOOT  12/14/2011    Procedure:REMOVE HARDWARE FOOT; Hardware Removal Right Foot (Mini-C-Arm) ; Surgeon:GRACE ZARATE; Location:[facility]     RHINOPLASTY       SALPINGO-OOPHORECTOMY BILATERAL       TONSILLECTOMY         Problem list:  Patient Active Problem List    Diagnosis Date Noted     Grade III internal hemorrhoids 06/19/2020     Priority: Medium     Added automatically from request for surgery [id number]       GI bleed  01/22/2020     Priority: Medium     Chronic pain of both shoulders 12/09/2019     Priority: Medium     Added automatically from request for surgery [id number]       Personal history of DVT (deep vein thrombosis) 02/15/2018     Priority: Medium     History of pulmonary embolism 02/15/2018     Priority: Medium     Paroxysmal atrial fibrillation (H) 01/05/2018     Priority: Medium     Overview:   Likely multifactorial related to iatrogenic hyperthyroidism and extensive bilateral PE and DVT.  PAF with RVR. On warfarin, Sotalol 80 mg BID for antiarrhythmic drug therapy. CHADsVASc 4 (LV dysfunction, DVT/PE, gender). On Sotalol 80 mg BID.   Pt would prefer NOAC. Consider transition to Eliquis        Nonischemic cardiomyopathy (H) 01/05/2018     Priority: Medium     Overview:   EF 40%. Likely tachy-mediated in setting of iatrogenic hyperthyroidism and multiple submassive PE.   Recommended repeat echocardiography in 2-3 months. If unchanged the, needs ischemic work up.        Malabsorption 12/07/2017     Priority: Medium     Overview:   Reportedly has Chron's disease though is not on any current treatments.        Acute deep vein thrombosis (DVT) of popliteal vein of right lower extremity (H) 11/27/2017     Priority: Medium     Acute pulmonary embolism (H) 11/27/2017     Priority: Medium     Iatrogenic hyperthyroidism 11/26/2017     Priority: Medium     Overview:   11/26/2017: On admission, reported home thyroid replacement regimen was 300 mcg BID PO + 200 mcg IM twice weekly, and liothyronine 50 mcg BID. Inital TSH was 0.02. All thyroid replacement was held on discharge with instructions to follow up with her endocrinologist.        Thrombus of right atrial appendage without antecedent myocardial infarction 11/26/2017     Priority: Medium     Overview:   Echocardiogram 11/26/17:  Right Atrium: The right atrium is mildly dilated. There is a large serpiginous and lobulated appearing mass visualized in the right atrium,  prolapsing across the tricuspid valve; possible etiologies include thrombus, infectious, vs. Malignancy.  Likely related to newly discovered a fib, estrogen replacement therapy and lupus. Apparently, she also has a h/o DVT and was not on anticoagulation.  Started on llife long warfarin with goal INR 2.5-3.5 per cardiology. She could possibly be switched to a DOAC once the thrombus has resolved.        S/P cervical spinal fusion 10/03/2017     Priority: Medium     Impingement syndrome of left shoulder 01/25/2016     Priority: Medium     Abdominal cramping, generalized 01/25/2016     Priority: Medium     Intermittent diarrhea 01/25/2016     Priority: Medium     Overview:   Suspect related to iatrogenic hyperthyroidism.        Primary osteoarthritis involving multiple joints 01/25/2016     Priority: Medium     Posttraumatic stress disorder 06/23/2015     Priority: Medium     Hashimoto's thyroiditis 08/25/2014     Priority: Medium     Glucocorticoid deficiency (H) 08/25/2014     Priority: Medium     ACP (advance care planning) 08/22/2014     Priority: Medium     Advance Care Planning: Receipt of ACP document:  Received: Health Care Directive which was witnessed or notarized on 10-10-05.  Document previously scanned on 12-20-11.  Validation form completed and scanned.  Code Status reflects choices in most recent ACP document NOTE: Pt is Rastafarian-please review HCD for details on blood products.  Confirmed/documented designated decision maker(s). See permanent comments section of demographics in clinical tab. View document(s) and details by clicking on code status.   Added by Ora Shaw RN, System ACP Coordinator [employer] on 8/22/2014.             Hyperlipidemia 07/31/2014     Priority: Medium     Hypothyroidism 07/24/2014     Priority: Medium     Problem list name updated by automated process. Provider to review       Insomnia, unspecified type 07/24/2014     Priority: Medium     Knee joint replacement  by other means 09/16/2013     Priority: Medium     Encounter for long-term use of opiate analgesic 04/01/2013     Priority: Medium     Histrionic personality (H) 10/04/2012     Priority: Medium     Ankle pain, left 12/09/2011     Priority: Medium     S/P total knee replacement 08/30/2011     Priority: Medium     Alopecia areata 07/29/2011     Priority: Medium     Lipoma of skin and subcutaneous tissue 07/29/2011     Priority: Medium     Somatoform disorder 03/02/2011     Priority: Medium     Chronic ethmoidal sinusitis 10/27/2010     Priority: Medium     Overview:   ANTERIOR & POSTERIOR       Chronic maxillary sinusitis 10/27/2010     Priority: Medium     Nasal fracture 10/27/2010     Priority: Medium     Overview:   WITH DISLOCATION, CHRONIC       Nasal turbinate hypertrophy 10/27/2010     Priority: Medium     Deviated nasal septum 10/25/2010     Priority: Medium     Attention deficit disorder 10/11/2010     Priority: Medium     Fibromyalgia 05/03/2010     Priority: Medium     Overview:   Multifactorial, on enormous doses of medications, pain contract signed Aug 2016.  Has been stable on same doses for years. Not elligible for increases.   --MS contin 90mg TID  --diazepam 10mg TID  --dilaudid 8mg TID  --flexeril  --TOX ASSURE Q3 months for now.        Left shoulder pain 01/18/2010     Priority: Medium     Complications due to internal joint prosthesis (H) 08/24/2009     Priority: Medium     CRPS (complex regional pain syndrome), lower limb 07/06/2009     Priority: Medium     S/P TKR (total knee replacement) 12/04/2008     Priority: Medium     Allergic rhinitis 10/09/2008     Priority: Medium     Adrenal cortical steroids causing adverse effect in therapeutic use 08/20/2008     Priority: Medium     Anemia, blood loss 08/20/2008     Priority: Medium     ARF (acute renal failure) (H) 08/20/2008     Priority: Medium     Arthritis, septic (H) 08/20/2008     Priority: Medium     Other specified abnormal findings of blood  chemistry 08/20/2008     Priority: Medium     Postoperative hypotension 08/20/2008     Priority: Medium     Generalized osteoarthrosis, involving multiple sites 01/03/2008     Priority: Medium     Generalized pain 12/17/2007     Priority: Medium     Opioid type dependence (H) 12/17/2007     Priority: Medium     Overview:   Followed by Dr. Lester Osman at [facility] center for opiate maintenance  [phone]       Other acute postoperative pain 12/17/2007     Priority: Medium     Pain in joint, lower leg 12/17/2007     Priority: Medium     Endometriosis 03/25/2002     Priority: Medium     Cushing's syndrome (H) 01/01/1996     Priority: Medium     Essential hypertension 01/01/1996     Priority: Medium     Systemic lupus erythematosus (H) 01/01/1996     Priority: Medium       Medications:  Current Outpatient Medications   Medication Sig Dispense Refill     BIOTIN FORTE PO Take 1 tablet by mouth daily        carboxymethylcellulose (CARBOXYMETHYLCELLULOSE SODIUM) 0.5 % SOLN ophthalmic solution Place 1 drop into both eyes 2 times daily 1 Bottle      CEPHALEXIN PO Take 500 mg by mouth as needed (Dental Procedure) Take 4 caps 1 hour prior to Dental Appointment       cholestyramine (QUESTRAN) 4 GM/DOSE powder Take 4 g by mouth 2 times daily (with meals) 378 g 4     cyanocobalamin 1000 MCG/ML injection Inject 1 mL (1,000 mcg) Subcutaneous every 7 days 4 mL 5     cyclobenzaprine (FLEXERIL) 10 MG tablet Take 1 tablet (10 mg) by mouth 3 times daily 90 tablet 3     cycloSPORINE (RESTASIS) 0.05 % ophthalmic emulsion Place 1 drop into both eyes 2 times daily       fexofenadine (ALLEGRA) 180 MG tablet Take 180 mg by mouth daily.       gabapentin (NEURONTIN) 300 MG capsule Take 300 mg by mouth 2 times daily        gabapentin (NEURONTIN) 800 MG tablet Take 800 mg by mouth 3 times daily       HYDROmorphone (DILAUDID) 8 MG tablet Take 8 mg by mouth every 6 hours as needed for severe pain . Max of 3 doses per day.        levothyroxine (SYNTHROID/LEVOTHROID) 100 MCG SOLR injection Inject 200 mcg into the vein twice a week        levothyroxine (SYNTHROID/LEVOTHROID) 300 MCG tablet Take 300 mcg by mouth 2 times daily        lifitegrast (XIIDRA) 5 % opthalmic solution Place 1 drop into both eyes 2 times daily       liothyronine (CYTOMEL) 25 MCG tablet Take 50 mcg by mouth 2 times daily        methylcellulose (CITRUCEL) powder Start with 1 heaping tablespoon. Increase as needed, 1 heaping tablespoon at a time, up to 3 times per day. 479 g 3     metoprolol succinate ER (TOPROL-XL) 50 MG 24 hr tablet Take 50 mg by mouth daily       morphine (MS CONTIN) 30 MG 12 hr tablet Take 30 mg by mouth 2 times daily along with one morphine 60 mg 12 hr tablet for a total dose of 90 mg. 270 tablet 0     morphine (MS CONTIN) 60 MG 12 hr tablet Take 60 mg by mouth 3 times daily  30 tablet 0     multivitamin, therapeutic with minerals (MULTI-VITAMIN) TABS tablet Take 1 tablet by mouth 2 times daily 100 tablet 3     NASONEX 50 MCG/ACT spray Spray 1 spray into both nostrils daily as needed   11     nystatin (MYCOSTATIN) 632852 UNIT/GM POWD Apply topically 3 times daily as needed 60 g 1     prednisoLONE acetate (PRED FORTE) 1 % ophthalmic susp 1 drop 4 times daily       predniSONE (DELTASONE) 1 MG tablet Use along with one prednisone 5 mg tablets to alternate taking 8mg and 10mg every other day.  2     predniSONE (DELTASONE) 5 MG tablet Alternate taking 8mg and 10mg every other day       probiotic CAPS Take 1 capsule by mouth 2 times daily       rivaroxaban ANTICOAGULANT (XARELTO) 20 MG TABS tablet Take 20 mg by mouth every morning        vitamin D2 (ERGOCALCIFEROL) 34876 units (1250 mcg) capsule Take 50,000 Units by mouth once a week       zolpidem (AMBIEN) 5 MG tablet Take 5 mg by mouth nightly as needed for sleep         Allergies:  Allergies   Allergen Reactions     Chlorhexidine Hives     Thor surgical cleanser     Codeine Hives     Has tolerated  "Oxycodone in past      Ibuprofen [Nsaids] Hives     Penicillins Hives     Other reaction(s): Unknown     Sulfa Drugs Hives     Other reaction(s): Unknown     Doxycycline GI Disturbance     Emesis & diarrhea  Patient denies allergy to this med     Mold      Mushroom      Red Wine Complex [Red Wine Powder]        Family history:  Family History   Problem Relation Age of Onset     Hypertension Mother        Social history:  Social History     Tobacco Use     Smoking status: Former Smoker     Packs/day: 1.50     Years: 20.00     Pack years: 30.00     Types: Cigarettes     Start date: 1973     Last attempt to quit: 1993     Years since quittin.6     Smokeless tobacco: Never Used   Substance Use Topics     Alcohol use: No     Alcohol/week: 0.0 standard drinks     Marital status: .    Nursing Notes:   Roberto Negro LPN  2020 11:17 AM  Signed  Chief Complaint   Patient presents with     Surgical Followup     Post-op DOS: 7/15/2020       Vitals:    20 1113   BP: (!) 135/100   BP Location: Left arm   Patient Position: Sitting   Cuff Size: Adult Large   Pulse: 89   Temp: 98.7  F (37.1  C)   TempSrc: Oral   SpO2: 96%   Height: 5' 9\"       Body mass index is 33.52 kg/m .      Roberto Negro LPN                         Total face to face time was 20 minutes, >50% counseling.   This is a postop visit    ANITRA Isaacs, NP-C  Colon and Rectal Surgery  [facility]    This note was created using speech recognition software and may contain unintended word substitutions.    " not applicable

## 2022-09-16 NOTE — PROGRESS NOTES
Detail Level: Zone [facility]  July 10, 2017      Behavioral Health Clinician Progress Note    Patient Name: Aspen Mccullough           Service Type: Individual      Service Location:  Face to Face in Clinic      Session Start Time: 10:43am  Session End Time: 11:04pm      Session Length: 16 - 37      Attendees: Patient    Visit Activities (Refresh list every visit): [facility] Only    Diagnostic Assessment Date: June 22, 2015  Treatment Plan Review Date: 4-18-16  See Flowsheets for today's PHQ-9 and BENI-7 results  Previous PHQ-9:   PHQ-9 SCORE 11/2/2015 11/10/2015 5/3/2016   Total Score - - -   Total Score - - -   Total Score 6 5 12     Previous BENI-7:   BENI-7 SCORE 10/19/2015 11/2/2015 11/10/2015   Total Score - - -   Total Score 3 3 3   Total Score - - -       FERCHO LEVEL:  FERCHO Score (Last Two) 7/24/2014   FERCHO Raw Score 51   Activation Score 91.6   FERCHO Level 4       DATA  Extended Session (60+ minutes): PROLONGED SERVICE IN THE OUTPATIENT SETTING REQUIRING DIRECT (FACE-TO-FACE) PATIENT CONTACT BEYOND THE USUAL SERVICE:    - Patient's presenting concerns require more intensive intervention than could be completed within the usual service  Interactive Complexity: No  Crisis: No    Treatment Objective(s) Addressed in This Session:  Target Behavior(s): disease management/lifestyle changes mental health    Mood Instability: will develop better understanding of triggers and coping strategies to stabilize mood  PTSD Symptom Management  Psychological distress related to Pain    Current Stressors / Issues:    The patient reported that she has been doing well lately and that she took this writer's recommendation that she talk with her -she stated that she told him what she wanted/expected from him and that she will never leave him and he responded favorably-regarding sleep the patient reported that most of the time she does not have good sleep however one night she was able to sleep very good-regarding her  mood she stated having no symptoms of depression and that she has been able to be appropriate with her anger expression.              Progress on Treatment Objective(s) / Homework:  Minimal progress - ACTION (Actively working towards change); Intervened by reinforcing change plan / affirming steps taken    Motivational Interviewing    MI Intervention: Expressed Empathy/Understanding, Supported Autonomy, Collaboration, Evocation, Permission to raise concern or advise, Open-ended questions, Reflections: simple and complex and Change talk (evoked)     Change Talk Expressed by the Patient: Desire to change Ability to change Reasons to change Need to change Committment to change Activation Taking steps    Provider Response to Change Talk: E - Evoked more info from patient about behavior change, A - Affirmed patient's thoughts, decisions, or attempts at behavior change, R - Reflected patient's change talk and S - Summarized patient's change talk statements      Care Plan review completed: No    Medication Review:  No changes to current psychiatric medication(s)    Medication Compliance:  NA    Changes in Health Issues:   Yes: Pain, Associated Psychological Distress  Sleep disturbance, Associated Psychological Distress    Chemical Use Review:   Substance Use: Chemical use reviewed, no active concerns identified      Tobacco Use: No current tobacco use.      Assessment: Current Emotional / Mental Status (status of significant symptoms):  Risk status (Self / Other harm or suicidal ideation)  Patient denies a history of suicidal ideation, suicide attempts, self-injurious behavior, homicidal ideation, homicidal behavior and and other safety concerns  Patient denies current fears or concerns for personal safety.  Patient denies current or recent suicidal ideation or behaviors.  Patient denies current or recent homicidal ideation or behaviors.  Patient denies current or recent self injurious behavior or ideation.  Patient denies  other safety concerns.  A safety and risk management plan has not been developed at this time, however patient was encouraged to call [place] [phone] should there be a change in any of these risk factors.    Appearance:   Appropriate   Eye Contact:   Good   Psychomotor Behavior: Normal   Attitude:   Cooperative   Orientation:   All  Speech   Rate / Production: Hyperverbal    Volume:  Normal   Mood:    Normal  Affect:    Appropriate   Thought Content:  Clear   Thought Form:  Coherent  Goal Directed  Logical   Insight:    Good     Diagnoses:  1. Posttraumatic stress disorder        Collateral Reports Completed:  Not Applicable    Plan: (Homework, other):  Patient was given information about behavioral services and encouraged to schedule a follow up appointment with the clinic [facility] as needed.  She was also given information about mental health symptoms and treatment options .  CD Recommendations: No indications of CD issues.  BARNEY Blackwell, [facility]      ______________________________________________________________________    Integrated Primary Care Behavioral Health Treatment Plan    Patient's Name: Aspen Mccullough  YOB: 1960    Date: 4-18-16    DSM-V Diagnoses: PTSD  Psychosocial / Contextual Factors: medical condition, history of traumatic experiences  WHODAS:  Will complete at next visit    Referral / Collaboration:  Referral to another professional/service is not indicated at this time..    Anticipated number of session or this episode of care: 10      MeasurableTreatment Goal(s) related to diagnosis / functional impairment(s)  Goal 1: Patient will be able to remember the past with 50% less emotional reaction of anger and hurt.     I will know I've met my goal when I can let go of the past     Objective #A (Patient Action)    Patient will talk to at least two others about losses and coping.  Status: New - Date: 4-18-16     Intervention(s)  [facility] will provide avenue for the past to process her  losses and disappointments.    Objective #B  Patient will reduce her triggers from past trauma.  Status: New - Date: 4-18-16     Intervention(s)  [facility] will teach distraction skills. mindfulness.      Patient has reviewed and agreed to the above plan.      Antwan Boston, [facility]  April 18, 2016

## 2022-09-22 ENCOUNTER — TRANSFERRED RECORDS (OUTPATIENT)
Dept: HEALTH INFORMATION MANAGEMENT | Facility: CLINIC | Age: 62
End: 2022-09-22

## 2022-10-04 ENCOUNTER — HOSPITAL ENCOUNTER (OUTPATIENT)
Dept: WOUND CARE | Facility: CLINIC | Age: 62
Discharge: HOME | End: 2022-10-04
Attending: PHYSICIAN ASSISTANT | Admitting: PHYSICIAN ASSISTANT
Payer: COMMERCIAL

## 2022-10-04 VITALS
TEMPERATURE: 97.5 F | RESPIRATION RATE: 22 BRPM | HEART RATE: 96 BPM | DIASTOLIC BLOOD PRESSURE: 107 MMHG | SYSTOLIC BLOOD PRESSURE: 183 MMHG

## 2022-10-04 DIAGNOSIS — S31.819D WOUND OF RIGHT BUTTOCK, SUBSEQUENT ENCOUNTER: Primary | ICD-10-CM

## 2022-10-04 DIAGNOSIS — L89.150 PRESSURE INJURY OF SACRAL REGION, UNSTAGEABLE (H): ICD-10-CM

## 2022-10-04 PROCEDURE — 17250 CHEM CAUT OF GRANLTJ TISSUE: CPT | Performed by: PHYSICIAN ASSISTANT

## 2022-10-04 NOTE — PROGRESS NOTES
[facility]    ASSESSMENT:   1. Stage 3 pressure ulcer right buttocks    PLAN/DISCUSSION:   1. Wound treated with silver nitrate today  2. Wound care plan: cleanse with mild soap and water, AquaCel Ag to wound bed, cover with silicone foam dressing and change daily  3. Reposition frequently  4. See bottom of note for detailed wound care and patient instructions    HISTORY OF PRESENT ILLNESS:   Aspen Mccullough is a 62 year old female with a complex medical history who is known to me for previous pressure ulcers who returns today for right buttocks ulcerations. She attributes these ulcers to her time spent in the hospital from 8/8-8/10, however upon chart review the ulcer was documented upon admission.    8/12: Presents for new R buttocks ulceration. Her  has been applying Iodosorb and silicone foam dresings to the area. Denies diarrhea - this has been problematic in the past.     09/02/22: Returns for follow-up. Eschar starting to separate from underlying healthy granulation tissue. Having a lot of pain. Eschar was debrided off. Started on AquaCel Ag.     10/04/22: Returns for follow-up. Wound much improved. Some hypergranulation tissue. Minimal slough.      VITALS: BP (!) 183/107 (BP Location: Other (Comment), Patient Position: Sitting)   Pulse 96   Temp 97.5  F (36.4  C) (Temporal)   Resp 22      PHYSICAL EXAM   INTEGUMENTARY: No surrounding erythema, warmth or induration   Wound (used by OP [facility] only) 09/28/21 1010 Right gluteal pressure injury (Active)   Thickness/Stage Stage 3 10/04/22 1000   Base hypergranulation 10/04/22 1000   Periwound intact 10/04/22 1000   Periwound Temperature warm 10/04/22 1000   Periwound Skin Turgor soft 10/04/22 1000   Edges open 10/04/22 1000   Length (cm) 16.7 10/04/22 1000   Width (cm) 2.9 10/04/22 1000   Depth (cm) 0.2 10/04/22 1000   Wound (cm^2) 48.43 cm^2 10/04/22 1000   Wound Volume (cm^3) 9.69 cm^3 10/04/22 1000   Wound healing % -962.06  10/04/22 1000   Drainage Characteristics/Odor serosanguineous 10/04/22 1000   Drainage Amount moderate 10/04/22 1000   Care, Wound chemical cautery applied 10/04/22 1000         PROCEDURE (R buttocks): After verbal consent was obtained, hypergranulation tissue was treated with silver nitrate. Patient tolerated this well.         Further instructions from your care team       Aspen Mccullough      1960    A DME order was not completed because the patient declined the need for supplies    Wound Dressing Change: Right gluteus and Right proximal gluteus  Cleanse with Microklenz aith 4x4 gauze, pat dry  Cut a piece of AquaCel Ag to fit the wound  Use Cavilon swab to periwound to protect skin breakdown as needed  Cover with 6x6 Mepilex border   Change every other day according to the amount of drainage.    Repositioning:  Bed: Reposition MINIMALLY every 1-2 hours in bed to relieve pressure and promote  perfusion to tissue.  Chair: When up to the chair, do not sit for longer than one hour total before  returning to bed for at least 60 minutes to relieve pressure and promote perfusion to  the tissue. Completely recline/tilt for 15 minutes each hour.  Sit on a chair cushion when up to the chair.     Electronically signed by Michelle Petit PA-C on October 4, 2022  Michelle Petit PA-C

## 2022-10-04 NOTE — DISCHARGE INSTRUCTIONS
Aspen Mccullough      1960    A DME order was not completed because the patient declined the need for supplies    Wound Dressing Change: Right gluteus and Right proximal gluteus  Cleanse with Microklenz aith 4x4 gauze, pat dry  Cut a piece of AquaCel Ag to fit the wound  Use Cavilon swab to periwound to protect skin breakdown as needed  Cover with 6x6 Mepilex border   Change every other day according to the amount of drainage.    Repositioning:  Bed: Reposition MINIMALLY every 1-2 hours in bed to relieve pressure and promote  perfusion to tissue.  Chair: When up to the chair, do not sit for longer than one hour total before  returning to bed for at least 60 minutes to relieve pressure and promote perfusion to  the tissue. Completely recline/tilt for 15 minutes each hour.  Sit on a chair cushion when up to the chair.     Michelle Petit PA-C October 4, 2022    Call us at [phone] if you have any questions about your wounds, have redness or swelling around your wound, have a fever of 101 or greater or if you have any other problems or concerns. We answer the phone Monday through Friday 8 am to 4 pm, please leave a message as we check the voicemail frequently throughout the day.     If you had a positive experience please indicate that on your patient satisfaction survey form that [facility] will be sending you.    It was a pleasure meeting with you today.  Thank you for allowing me and my team the privilege of caring for you today.  YOU are the reason we are here, and I truly hope we provided you with the excellent service you deserve.  Please let us know if there is anything else we can do for you so that we can be sure you are leaving completely satisfied with your care experience.      If you have any billing related questions please call the [facility] Business office at [phone]. The clinic staff does not handle billing related matters.    If you are scheduled to have a follow up appointment,  you will receive a reminder call the day before your visit. On the appointment day please arrive 15 minutes prior to your appointment time. If you are unable to keep that appointment, please call the clinic to cancel or reschedule. If you are more than 10 minutes late or greater for your appointment, the clinic policy is that you may be asked to reschedule.

## 2022-10-16 ENCOUNTER — HEALTH MAINTENANCE LETTER (OUTPATIENT)
Age: 62
End: 2022-10-16

## 2022-10-26 ENCOUNTER — OFFICE VISIT (OUTPATIENT)
Dept: CARDIOLOGY | Facility: CLINIC | Age: 62
End: 2022-10-26
Attending: NURSE PRACTITIONER
Payer: COMMERCIAL

## 2022-10-26 VITALS
SYSTOLIC BLOOD PRESSURE: 164 MMHG | DIASTOLIC BLOOD PRESSURE: 123 MMHG | OXYGEN SATURATION: 96 % | HEART RATE: 88 BPM | BODY MASS INDEX: 42.97 KG/M2 | WEIGHT: 291 LBS

## 2022-10-26 DIAGNOSIS — I87.2 VENOUS (PERIPHERAL) INSUFFICIENCY: ICD-10-CM

## 2022-10-26 DIAGNOSIS — I50.30 HEART FAILURE WITH PRESERVED EJECTION FRACTION, NYHA CLASS I (H): Primary | ICD-10-CM

## 2022-10-26 DIAGNOSIS — I48.20 CHRONIC ATRIAL FIBRILLATION (H): ICD-10-CM

## 2022-10-26 PROCEDURE — 99214 OFFICE O/P EST MOD 30 MIN: CPT | Performed by: INTERNAL MEDICINE

## 2022-10-26 RX ORDER — FUROSEMIDE 40 MG
40 TABLET ORAL EVERY MORNING
Qty: 60 TABLET | Refills: 3 | Status: ON HOLD | OUTPATIENT
Start: 2022-10-26 | End: 2022-11-20

## 2022-10-26 RX ORDER — FUROSEMIDE 20 MG
20 TABLET ORAL EVERY EVENING
Qty: 60 TABLET | Refills: 3 | Status: ON HOLD | OUTPATIENT
Start: 2022-10-26 | End: 2022-11-20

## 2022-10-26 NOTE — PATIENT INSTRUCTIONS
Take lasix 40 mg in the morning and 20 mg in the evening   Chemistry panel (to check labs) in 2 weeks  Follow up with Dr. Muñoz in 6 months (or earlier if needed)

## 2022-10-26 NOTE — LETTER
10/26/2022    Arcadio Farfan MD  [facility], [place], operated by [facility] [address]  [facility] [address]    RE: Aspen Mccullough       Dear Colleague,     I had the pleasure of seeing Aspen Mccullough in the [facility].        HPI:     This is a pleasant 62 year old with the following PMH: atrial fibrillation, right LE DVT and PE in the past on long term xarelto, HLD, HTN, lupus on chronic steroids who I see for chronic vein insufficiency as well as atrial fibrillation and possible HFpEF.     From prior visit: October she was admitted for DVT and was found to be in rapid atrial fibrillation. She was started on dilitazem and metoprolol. She reports unclear duration of afib as she is not symptomatic from it. During our first in office visit her rates were over 100 bpm however she has long standing thyroid dysfunction. No history of palpitations, dizziness, syncope, chest pain from the AFIB however she feels constantly fatigued and tired.      Social history: prior -  works occupation in medicare fraud. Here with . Has a daughter in nutrition school.      Has had a popliteal DVT of the right leg for which she has been on adequate blood thinners but her leg has been feeling tight and painful. She had been temporarily stopped on xarelto for a thumb surgery. She had some right toe discoloration and occasional blue toe syndrome. We performed arterial studies and PPGs which were normal and suggested good arterial flow to the foot. When I reviewed her CT scan from prior admission she also notably has a dilated PA of 4.5 cm as well as aortic root aneurysm of 4.1 cm. No known family history of this. She gets yearly surveillance echocardiogram.      Given rapid AFIB I referred her to EP for evaluation and consideration of rhythm control strategy but overall rate control was decided on with adequate control of thyroid function. She had repeat ziopatch on diltiazem increase and it showed no AFIB recurrence.      I  repeated her US of her right leg which is negative for DVT. She had vein comp study with right deep, superficial reflux (severe) and also left sided reflux. Her legs she reports get very swollen and it extends to her toes. She has discoloration and discomfort from the swelling. She got evaluated for Biotab mechanical compression pumps which are working well.     She saw Aliza Tarango in September for SOB and was referred to pulmonary which did not think she had any significant pulmonary process. She therefore was sent to see me in follow up. Her leg swelling is progressive and she gets winded easily. She is only taking 20 lasix po daily. She is using her pumps as rx'd. Reviewing last year's vitals her weight is up 15 lbs which we discussed is possible fluid weight and can explain her sob.     In review of recent studies:      Ziopatch: 45% burden AFIB, average rate ~120 bpm, as high as 204 bpm. No significant pauses.  Repeat Ziopatch: NO ATRIAL FIBRILLATION 4/13/2021 (on medical suppressive therapy and thyroid dysfunction better controlled)    Echo 11-2-2020 - A Fib  Left ventricular systolic function normal. EF 55-60%.   The study was technically difficult.     US Venous Lower Extremity Right: 10-  - Care everywhere   1.  Acute DVT right posterior tibial vein.   2.  Popliteal cyst.       Chest CT a 10- Care Everywhere.   1.  Limited exam due to respiratory motion. No central or definite peripheral   pulmonary artery embolism. No thoracic aortic dissection.   2.  Mild left upper lobar consolidation and patchy airspace opacities likely   reflecting pneumonia. Follow-up to assess for resolution is recommended.   3.  Findings suggesting pulmonary air trapping which can be seen with small   airways disease.   4.  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 Recommend   follow-up with ultrasound or CT in 6 months.     CT Pulmonary 9-21-18   1. No evidence of pulmonary embolism. No acute  pulmonary process.   2. Scattered sub-6 mm pulmonary nodules. If the patient is considered   low risk for malignancy, no further follow-up is necessary. Otherwise,   LIKELY consider 12 month follow-up chest CT as per Fleischner Society   2017 guidelines.   3. Cardiomegaly. Ectasia of the ascending aorta 4.1 cm.   4. Dilatation of the pulmonary vasculature, including the main   pulmonary artery to 4.5 cm. This can be seen with pulmonary arterial   Hypertension.      US LE 5/2021     DVT resolved of the right leg     VEIN COMP STUDY 2/2021     1. Right leg: No DVT.  Reflux of 4.8-5.1 seconds in thigh vessels  ranging from 4.6-9.1 mm.  Mid calf and ankle not seen due to edema and  depth. Thigh Ext. and SSV with 1.9 and 2.4 seconds of reflux in  vessels of 2.1 and 4.6 mm.  Tributary without reflux.  Reflux also  seen in the deep venous system.      2. Left leg: No DVT.  No reflux in the deep venous system.  Reflux in  the greater saphenous vein at SFJ and mid thigh ranging from 0.7 - 2.2  seconds with vessels ranging from 5.5-6.5 mm.    Reflux of 0.7 - 2.0  seconds from the knee to the ankle in vessels 3.9-5.3 mm.  Tributary  without reflux.     On diltiazem and metoprolol, on rivaroxaban - no bleeding. In [facility] ER recently,         ASSESSMENT/PLAN:     1. Atrial Fibrillation: normal LV function by recent echocardiogram however risk for HFpEF  -continue rivaroxaban for anticoagulation in SPAF and elevated CHADSVASC as well as VTE history   -continue diltiazem and metoprolol  -one year follow up along with echocardiogram, ziopatch if recurrent symptoms    2. History of PE and recent DVT of right popliteal vein: DVT resolved by recent ultrasound but CVI of the superficial and deep system with profound toe swelling and stasis dermatitis consistent with combination venous and lymphedema  -continue rivaroxaban long term  -compression stockings to continue, elevation of leg, adequate leg hydration and mechanical compression  pumps     3. Right toe discoloration:  -no arterial disease by toe PPGs, LE arterial duplex and ABIs   -believe its' all stasis dermatitis   -toe swelling c/w lymphedema      4. HTN: likely labile due to steroids and lupus   -continue diltiazem and metoprolol    5. Possible HFpEF: given SOB, leg swelling, risk factors like AF I am considering her on a trial of lasix 40 mg in the morning and 20 mg in the evening. She will have labs in one week. I suspect she has a lot of fluid weight on board that we can attempt removal. The pumps along with added diuresis may help.      6. Nutrition: discussed functional nutritionist evaluation for food sensitivity testing     7. Aortic dilation: yearly echocardiogram      Follow up 6 months, sooner if needed    Sil Muñoz MD MSC  [facility]       PAST MEDICAL HISTORY  Past Medical History:   Diagnosis Date     ADHD (attention deficit hyperactivity disorder)      Allergic rhinitis      Chronic low back pain      Cushing's syndrome (H)      DDD (degenerative disc disease)      DDD (degenerative disc disease)      Deviated nasal septum      Diarrhea      Endometriosis      Fibromyalgia      Hashimoto's disease      Hyperlipidemia      Hypertension      Hypothyroid     hx hashimoto's thyroiditis     Insomnia      Lupus (H)      Malabsorption      Pernicious anemia      Renal disease     chronic renal insufficiency     Sinusitis        CURRENT MEDICATIONS  Current Outpatient Medications   Medication Sig Dispense Refill     atorvastatin (LIPITOR) 40 MG tablet Take 1 tablet (40 mg) by mouth every evening 30 tablet 3     benzonatate (TESSALON) 100 MG capsule Take 1 capsule (100 mg) by mouth 3 times daily as needed for cough 30 capsule 0     BIOTIN FORTE PO Take 1 tablet by mouth daily        carboxymethylcellulose (REFRESH PLUS) 0.5 % SOLN ophthalmic solution Place 1 drop into both eyes 2 times daily as needed  1 Bottle      cyanocobalamin 1000 MCG/ML injection Inject 1 mL  (1,000 mcg) Subcutaneous every 7 days 4 mL 5     cyclobenzaprine (FLEXERIL) 10 MG tablet Take 1 tablet (10 mg) by mouth 3 times daily 90 tablet 3     cycloSPORINE (RESTASIS) 0.05 % ophthalmic emulsion Place 1 drop into both eyes 2 times daily as needed        Elastic Bandages & Supports (MEDICAL COMPRESSION SOCKS) MISC 1 Package daily Please measure and distribute 2 pair of 20mmHg - 30mmHg THIGH high open or closed toe compression stockings with extra refills as indicated. Jobst ultrasheer or equivalent. 2 each 3     fexofenadine (ALLEGRA) 180 MG tablet Take 180 mg by mouth daily as needed        furosemide (LASIX) 20 MG tablet Take 1 tablet (20 mg) by mouth daily Future refills by PCP Dr. Arcadio Farfan with phone number [phone]. 30 tablet 3     gabapentin (NEURONTIN) 300 MG capsule Take 300 mg by mouth 2 times daily (morning and afternoon) with 800mg tablet (total dose 1100mg)       gabapentin (NEURONTIN) 800 MG tablet Take 800 mg by mouth At Bedtime        gabapentin (NEURONTIN) 800 MG tablet Take 800 mg by mouth 2 times daily (morning and afternoon) in addition to 300mg capsule (total dose 1100mg)       guaiFENesin-dextromethorphan (ROBITUSSIN DM) 100-10 MG/5ML syrup Take 10 mLs by mouth every 4 hours as needed for cough 118 mL 0     HYDROmorphone (DILAUDID) 8 MG tablet Take 8 mg by mouth 3 times daily . Max of 3 doses per day.       lifitegrast (XIIDRA) 5 % opthalmic solution Place 1 drop into both eyes 2 times daily       metoprolol succinate ER (TOPROL XL) 25 MG 24 hr tablet Take 3 tablets (75 mg) by mouth 2 times daily NOTE this is a DECREASE in dose than prior to hospital [STOP prior metoprolol] 180 tablet 3     morphine (MS CONTIN) 30 MG 12 hr tablet Take 30 mg by mouth 2 times daily (morning and night) in addition to 60 mg tablet for a total dose of 90mg. 270 tablet 0     morphine (MS CONTIN) 60 MG 12 hr tablet Take 60 mg by mouth 3 times daily in the afternoon (in addition to the 2 - 90mg doses)        morphine (MS CONTIN) 60 MG 12 hr tablet Take 60 mg by mouth 3 times daily (morning and night) in addition to 30mg tablet for a total dose of 90mg 30 tablet 0     multivitamin w/minerals (THERA-VIT-M) tablet Take 1 tablet by mouth daily  100 tablet 3     NASONEX 50 MCG/ACT spray Spray 1 spray into both nostrils daily as needed   11     prednisoLONE acetate (PRED FORTE) 1 % ophthalmic susp Place 1 drop into both eyes 2 times daily        rivaroxaban ANTICOAGULANT (XARELTO) 20 MG TABS tablet Take 1 tablet (20 mg) by mouth daily (with dinner)       TIROSINT 200 MCG CAPS Take 300 mcg by mouth daily       zolpidem (AMBIEN) 5 MG tablet Take 5 mg by mouth nightly as needed for sleep       zoster vaccine recombinant adjuvanted (SHINGRIX) injection GIVEN AT PHARMACY: FIRST DOSE NOW, SECOND DOSE GIVEN 2 TO 6 MONTHS AFTER         PAST SURGICAL HISTORY:  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)     APPENDECTOMY       ARTHRODESIS ANKLE      right     ARTHROPLASTY KNEE BILATERAL       ARTHROPLASTY REVISION KNEE  4/19/2011    Procedure:ARTHROPLASTY REVISION KNEE; With Antibiotic Cement ; Surgeon:CHAO OLIVARES; Location: OR     BREAST SURGERY      right- tissue remove nipple area     BUNIONECTOMY  12/14/2011    Procedure:BUNIONECTOMY; Right Bunion Correction; Surgeon:GRACE ZARATE; Location:[facility]     CHOLECYSTECTOMY       EXAM UNDER ANESTHESIA ANUS N/A 7/15/2020    Procedure: EXAM UNDER ANESTHESIA, ANUS;  Surgeon: Luis Canales MD;  Location:  OR     EXCISE MASS UPPER EXTREMITY  12/14/2011    Procedure:EXCISE MASS UPPER EXTREMITY; Excision of Left Arm Mass; Surgeon:GRACE ZARATE; Location:[facility]     FOOT SURGERY      left X 4     FUSION LUMBAR ANTERIOR ONE LEVEL       HEMORRHOIDECTOMY INTERNAL N/A 7/15/2020    Procedure: Exam under anesthesia, hemorrhoidectomy;  Surgeon: Luis Canales MD;  Location:   OR     INJECT NERVE BLOCK SUPRASCAPULAR Left 5/18/2018    Procedure: INJECT NERVE BLOCK SUPRASCAPULAR;  Left Suprascapular Nerve Block;  Surgeon: Basim Velazquez MD;  Location: [facility] OR     INJECT NERVE BLOCK SUPRASCAPULAR Right 7/23/2018    Procedure: INJECT NERVE BLOCK SUPRASCAPULAR;  Right suprascapular injection;  Surgeon: Basim Velazquez MD;  Location: [facility] OR     INJECT NERVE BLOCK SUPRASCAPULAR Bilateral 10/29/2018    Procedure: Bilateral Suprascapular Nerve Blocks;  Surgeon: Basim Velazquez MD;  Location: [facility] OR     INJECT NERVE BLOCK SUPRASCAPULAR Bilateral 3/15/2019    Procedure: Bilateral Suprascapular Nerve Block;  Surgeon: Basim Velazquez MD;  Location: [facility] OR     INJECT NERVE BLOCK SUPRASCAPULAR Bilateral 12/31/2019    Procedure: bilateral suprascapular nerve block;  Surgeon: Basim Velazquez MD;  [facility]: [facility]     INJECT NERVE BLOCK SUPRASCAPULAR Bilateral 8/3/2021    Procedure: bilateral suprascapular nerve block;  Surgeon: Basim Velazquez MD;  [place] [facility] [place]     IR ENDOVENOUS ABLATION VARICOSE VEINS  10/5/2021     IR ENDOVENOUS ABLATION VARICOSE VEINS  10/26/2021     KNEE SURGERY      see list which we will bring     LAMINECTOMY LUMBAR ONE LEVEL      L4-5     LAPAROSCOPIC ABLATION ENDOMETRIOSIS       DE HAND/FINGER SURGERY UNLISTED  surgeries on both hands with Dr. Shankar     DE SPINE SURGERY PROCEDURE UNLISTED      see list which we will bring     DE STOMACH SURGERY PROCEDURE UNLISTED  gall bladder removal     RADIO FREQUENCY ABLATION PULSED CERVICAL Bilateral 7/10/2019    Procedure: Bilateral Suprascapular Nerve Pulsed Radiofrequency Ablation;  Surgeon: Basim Velazquez MD;  Location: [facility] OR     REMOVE HARDWARE FOOT  12/14/2011    Procedure:REMOVE HARDWARE FOOT; Hardware Removal Right Foot (Mini-C-Arm) ; Surgeon:GRACE ZARATE; Location: [facility]     RHINOPLASTY       SALPINGO-OOPHORECTOMY BILATERAL       TONSILLECTOMY       ZZC STOMACH SURGERY PROCEDURE UNLISTED      see  list which we will bring       ALLERGIES     Allergies   Allergen Reactions     Blood Transfusion Related (Informational Only) Other (See Comments)     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      Chlorhexidine Hives     Thor surgical cleanser     Codeine Hives     Has tolerated Oxycodone in past      Ibuprofen [Nsaids] Hives     Penicillins Hives     Other reaction(s): Unknown     Sulfa Drugs Hives     Other reaction(s): Unknown     Calcium Channel Blockers      Doxycycline GI Disturbance     Emesis & diarrhea  Patient denies allergy to this med     Hydroxyzine      rash     Mold        FAMILY HISTORY  Family History   Problem Relation Age of Onset     Hypertension Mother      No Known Problems Father      No Known Problems Sister      No Known Problems Brother      No Known Problems Maternal Grandmother      No Known Problems Maternal Grandfather      No Known Problems Paternal Grandmother      No Known Problems Other        SOCIAL HISTORY  Social History     Socioeconomic History     Marital status:      Spouse name: Not on file     Number of children: Not on file     Years of education: Not on file     Highest education level: Not on file   Occupational History     Not on file   Tobacco Use     Smoking status: Former     Packs/day: 1.50     Years: 20.00     Pack years: 30.00     Types: Cigarettes     Start date: 1973     Quit date: 1993     Years since quittin.8     Smokeless tobacco: Never     Tobacco comments:     wish I never started   Substance and Sexual Activity     Alcohol use: No     Alcohol/week: 0.0 standard drinks     Drug use: No     Sexual activity: Not Currently     Partners: Male     Birth control/protection: Post-menopausal   Other Topics Concern     Parent/sibling w/ CABG, MI or angioplasty before 65F 55M? Not Asked   Social History Narrative    ** Merged History Encounter **          Social Determinants of Health     Financial Resource Strain: Not on file    Food Insecurity: Not on file   Transportation Needs: Not on file   Physical Activity: Not on file   Stress: Not on file   Social Connections: Not on file   Intimate Partner Violence: Not on file   Housing Stability: Not on file       ROS:   Constitutional: No fever, chills, or sweats. No weight gain/loss   ENT: No visual disturbance, ear ache, epistaxis, sore throat  Allergies/Immunologic: Negative  Respiratory: No cough, hemoptysia  Cardiovascular: As per HPI  GI: No nausea, vomiting, hematemesis, melena, or hematochezia  : No urinary frequency, dysuria, or hematuria  Integument: Negative  Psychiatric: Negative  Neuro: Negative  Endocrinology: Negative   Musculoskeletal: Negative  Vascular: No walking impairment, claudication, ischemic rest pain or nonhealing wounds    EXAM:  BP (!) 164/123   Pulse 88   Wt 132 kg (291 lb)   SpO2 96%   BMI 42.97 kg/m    In general, the patient is a pleasant female in no apparent distress.    HEENT: NC/AT.  PERRLA.  EOMI.  Sclerae white, not injected.  Nares clear.  Pharynx without erythema or exudate.  Dentition intact.    Neck: No adenopathy.  No thyromegaly. Carotids +2/2 bilaterally without bruits.  No jugular venous distension.   Heart: RRR. Normal S1, S2 splits physiologically. No murmur, rub, click, or gallop. The PMI is in the 5th ICS in the midclavicular line. There is no heave.    Lungs: CTA.  No ronchi, wheezes, rales.  No dullness to percussion.   Abdomen: Soft, nontender, nondistended. No organomegaly. No AAA.  No bruits.   Extremities: No clubbing, cyanosis, 2+ edema.  +CVI.   Vascular: No bruits are noted.    Labs:  LIPID RESULTS:  Lab Results   Component Value Date    CHOL 303 (H) 06/06/2021    HDL 70 06/06/2021     (H) 06/06/2021    TRIG 259 (H) 06/06/2021    CHOLHDLRATIO 3.1 01/06/2014    NHDL 233 (H) 06/06/2021       LIVER ENZYME RESULTS:  Lab Results   Component Value Date    AST 16 08/09/2022    AST 52 (H) 06/07/2021    ALT 16 08/09/2022    ALT 22  06/07/2021       CBC RESULTS:  Lab Results   Component Value Date    WBC 7.4 08/10/2022    WBC 6.9 06/08/2021    RBC 4.19 08/10/2022    RBC 4.33 06/08/2021    HGB 11.7 08/10/2022    HGB 12.1 06/08/2021    HCT 37.6 08/10/2022    HCT 38.6 06/08/2021    MCV 90 08/10/2022    MCV 89 06/08/2021    MCH 27.9 08/10/2022    MCH 27.9 06/08/2021    MCHC 31.1 (L) 08/10/2022    MCHC 31.3 (L) 06/08/2021    RDW 16.0 (H) 08/10/2022    RDW 18.5 (H) 06/08/2021     08/10/2022     06/08/2021       BMP RESULTS:  Lab Results   Component Value Date     08/10/2022     06/08/2021    POTASSIUM 3.8 08/10/2022    POTASSIUM 3.8 06/08/2021    CHLORIDE 101 08/10/2022    CHLORIDE 103 06/08/2021    CO2 29 08/10/2022    CO2 33 (H) 06/08/2021    ANIONGAP 6 08/10/2022    ANIONGAP 2 (L) 06/08/2021     (H) 08/10/2022     (H) 06/08/2021    BUN 11 08/10/2022    BUN 14 06/08/2021    CR 0.78 08/10/2022    CR 0.81 06/08/2021    GFRESTIMATED 85 08/10/2022    GFRESTIMATED >60 10/30/2021    GFRESTIMATED 78 06/08/2021    GFRESTBLACK 90 06/08/2021    KYLIE 9.3 08/10/2022    KYLIE 8.9 06/08/2021        A1C RESULTS:  Lab Results   Component Value Date    A1C 5.7 (H) 06/07/2021       Thank you for allowing me to participate in the care of your patient.      Sincerely,     Sil Muñoz MD     [facility]  cc:   Pearl Tarango NP  [id number] [id number]

## 2022-10-26 NOTE — PROGRESS NOTES
HPI:     This is a pleasant 62 year old with the following PMH: atrial fibrillation, right LE DVT and PE in the past on long term xarelto, HLD, HTN, lupus on chronic steroids who I see for chronic vein insufficiency as well as atrial fibrillation and possible HFpEF.     From prior visit: October she was admitted for DVT and was found to be in rapid atrial fibrillation. She was started on dilitazem and metoprolol. She reports unclear duration of afib as she is not symptomatic from it. During our first in office visit her rates were over 100 bpm however she has long standing thyroid dysfunction. No history of palpitations, dizziness, syncope, chest pain from the AFIB however she feels constantly fatigued and tired.      Social history: prior -  works occupation in medicare fraud. Here with . Has a daughter in nutrition school.      Has had a popliteal DVT of the right leg for which she has been on adequate blood thinners but her leg has been feeling tight and painful. She had been temporarily stopped on xarelto for a thumb surgery. She had some right toe discoloration and occasional blue toe syndrome. We performed arterial studies and PPGs which were normal and suggested good arterial flow to the foot. When I reviewed her CT scan from prior admission she also notably has a dilated PA of 4.5 cm as well as aortic root aneurysm of 4.1 cm. No known family history of this. She gets yearly surveillance echocardiogram.      Given rapid AFIB I referred her to EP for evaluation and consideration of rhythm control strategy but overall rate control was decided on with adequate control of thyroid function. She had repeat ziopatch on diltiazem increase and it showed no AFIB recurrence.      I repeated her US of her right leg which is negative for DVT. She had vein comp study with right deep, superficial reflux (severe) and also left sided reflux. Her legs she reports get very swollen and it extends to her toes. She  has discoloration and discomfort from the swelling. She got evaluated for Biot[employer] mechanical compression pumps which are working well.     She saw Aliza Sukhdeep in September for SOB and was referred to pulmonary which did not think she had any significant pulmonary process. She therefore was sent to see me in follow up. Her leg swelling is progressive and she gets winded easily. She is only taking 20 lasix po daily. She is using her pumps as rx'd. Reviewing last year's vitals her weight is up 15 lbs which we discussed is possible fluid weight and can explain her sob.     In review of recent studies:      Ziopatch: 45% burden AFIB, average rate ~120 bpm, as high as 204 bpm. No significant pauses.  Repeat Ziopatch: NO ATRIAL FIBRILLATION 4/13/2021 (on medical suppressive therapy and thyroid dysfunction better controlled)    Echo 11-2-2020 - A Fib  Left ventricular systolic function normal. EF 55-60%.   The study was technically difficult.     US Venous Lower Extremity Right: 10-  - Care everywhere   1.  Acute DVT right posterior tibial vein.   2.  Popliteal cyst.       Chest CT a 10- Care Everywhere.   1.  Limited exam due to respiratory motion. No central or definite peripheral   pulmonary artery embolism. No thoracic aortic dissection.   2.  Mild left upper lobar consolidation and patchy airspace opacities likely   reflecting pneumonia. Follow-up to assess for resolution is recommended.   3.  Findings suggesting pulmonary air trapping which can be seen with small   airways disease.   4.  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 Recommend   follow-up with ultrasound or CT in 6 months.     CT Pulmonary 9-21-18   1. No evidence of pulmonary embolism. No acute pulmonary process.   2. Scattered sub-6 mm pulmonary nodules. If the patient is considered   low risk for malignancy, no further follow-up is necessary. Otherwise,   LIKELY consider 12 month follow-up chest CT as per [employer]   2017 guidelines.   3. Cardiomegaly. Ectasia of the ascending aorta 4.1 cm.   4. Dilatation of the pulmonary vasculature, including the main   pulmonary artery to 4.5 cm. This can be seen with pulmonary arterial   Hypertension.      US LE 5/2021     DVT resolved of the right leg     VEIN COMP STUDY 2/2021     1. Right leg: No DVT.  Reflux of 4.8-5.1 seconds in thigh vessels  ranging from 4.6-9.1 mm.  Mid calf and ankle not seen due to edema and  depth. Thigh Ext. and SSV with 1.9 and 2.4 seconds of reflux in  vessels of 2.1 and 4.6 mm.  Tributary without reflux.  Reflux also  seen in the deep venous system.      2. Left leg: No DVT.  No reflux in the deep venous system.  Reflux in  the greater saphenous vein at SFJ and mid thigh ranging from 0.7 - 2.2  seconds with vessels ranging from 5.5-6.5 mm.    Reflux of 0.7 - 2.0  seconds from the knee to the ankle in vessels 3.9-5.3 mm.  Tributary  without reflux.     On diltiazem and metoprolol, on rivaroxaban - no bleeding. In [facility] ER recently,         ASSESSMENT/PLAN:     1. Atrial Fibrillation: normal LV function by recent echocardiogram however risk for HFpEF  -continue rivaroxaban for anticoagulation in SPAF and elevated CHADSVASC as well as VTE history   -continue diltiazem and metoprolol  -one year follow up along with echocardiogram, ziopatch if recurrent symptoms    2. History of PE and recent DVT of right popliteal vein: DVT resolved by recent ultrasound but CVI of the superficial and deep system with profound toe swelling and stasis dermatitis consistent with combination venous and lymphedema  -continue rivaroxaban long term  -compression stockings to continue, elevation of leg, adequate leg hydration and mechanical compression pumps     3. Right toe discoloration:  -no arterial disease by toe PPGs, LE arterial duplex and ABIs   -believe its' all stasis dermatitis   -toe swelling c/w lymphedema      4. HTN: likely labile due to steroids and lupus    -continue diltiazem and metoprolol    5. Possible HFpEF: given SOB, leg swelling, risk factors like AF I am considering her on a trial of lasix 40 mg in the morning and 20 mg in the evening. She will have labs in one week. I suspect she has a lot of fluid weight on board that we can attempt removal. The pumps along with added diuresis may help.      6. Nutrition: discussed functional nutritionist evaluation for food sensitivity testing     7. Aortic dilation: yearly echocardiogram      Follow up 6 months, sooner if needed    Sil Muñoz MD MSC  M [facility]       PAST MEDICAL HISTORY  Past Medical History:   Diagnosis Date     ADHD (attention deficit hyperactivity disorder)      Allergic rhinitis      Chronic low back pain      Cushing's syndrome (H)      DDD (degenerative disc disease)      DDD (degenerative disc disease)      Deviated nasal septum      Diarrhea      Endometriosis      Fibromyalgia      Hashimoto's disease      Hyperlipidemia      Hypertension      Hypothyroid     hx hashimoto's thyroiditis     Insomnia      Lupus (H)      Malabsorption      Pernicious anemia      Renal disease     chronic renal insufficiency     Sinusitis        CURRENT MEDICATIONS  Current Outpatient Medications   Medication Sig Dispense Refill     atorvastatin (LIPITOR) 40 MG tablet Take 1 tablet (40 mg) by mouth every evening 30 tablet 3     benzonatate (TESSALON) 100 MG capsule Take 1 capsule (100 mg) by mouth 3 times daily as needed for cough 30 capsule 0     BIOTIN FORTE PO Take 1 tablet by mouth daily        carboxymethylcellulose (REFRESH PLUS) 0.5 % SOLN ophthalmic solution Place 1 drop into both eyes 2 times daily as needed  1 Bottle      cyanocobalamin 1000 MCG/ML injection Inject 1 mL (1,000 mcg) Subcutaneous every 7 days 4 mL 5     cyclobenzaprine (FLEXERIL) 10 MG tablet Take 1 tablet (10 mg) by mouth 3 times daily 90 tablet 3     cycloSPORINE (RESTASIS) 0.05 % ophthalmic emulsion Place 1 drop into both  eyes 2 times daily as needed        Elastic Bandages & Supports (MEDICAL COMPRESSION SOCKS) MISC 1 Package daily Please measure and distribute 2 pair of 20mmHg - 30mmHg THIGH high open or closed toe compression stockings with extra refills as indicated. Jobst ultrasheer or equivalent. 2 each 3     fexofenadine (ALLEGRA) 180 MG tablet Take 180 mg by mouth daily as needed        furosemide (LASIX) 20 MG tablet Take 1 tablet (20 mg) by mouth daily Future refills by PCP Dr. Arcadio Farfan with phone number [phone]. 30 tablet 3     gabapentin (NEURONTIN) 300 MG capsule Take 300 mg by mouth 2 times daily (morning and afternoon) with 800mg tablet (total dose 1100mg)       gabapentin (NEURONTIN) 800 MG tablet Take 800 mg by mouth At Bedtime        gabapentin (NEURONTIN) 800 MG tablet Take 800 mg by mouth 2 times daily (morning and afternoon) in addition to 300mg capsule (total dose 1100mg)       guaiFENesin-dextromethorphan (ROBITUSSIN DM) 100-10 MG/5ML syrup Take 10 mLs by mouth every 4 hours as needed for cough 118 mL 0     HYDROmorphone (DILAUDID) 8 MG tablet Take 8 mg by mouth 3 times daily . Max of 3 doses per day.       lifitegrast (XIIDRA) 5 % opthalmic solution Place 1 drop into both eyes 2 times daily       metoprolol succinate ER (TOPROL XL) 25 MG 24 hr tablet Take 3 tablets (75 mg) by mouth 2 times daily NOTE this is a DECREASE in dose than prior to hospital [STOP prior metoprolol] 180 tablet 3     morphine (MS CONTIN) 30 MG 12 hr tablet Take 30 mg by mouth 2 times daily (morning and night) in addition to 60 mg tablet for a total dose of 90mg. 270 tablet 0     morphine (MS CONTIN) 60 MG 12 hr tablet Take 60 mg by mouth 3 times daily in the afternoon (in addition to the 2 - 90mg doses)       morphine (MS CONTIN) 60 MG 12 hr tablet Take 60 mg by mouth 3 times daily (morning and night) in addition to 30mg tablet for a total dose of 90mg 30 tablet 0     multivitamin w/minerals (THERA-VIT-M) tablet Take 1 tablet  by mouth daily  100 tablet 3     NASONEX 50 MCG/ACT spray Spray 1 spray into both nostrils daily as needed   11     prednisoLONE acetate (PRED FORTE) 1 % ophthalmic susp Place 1 drop into both eyes 2 times daily        rivaroxaban ANTICOAGULANT (XARELTO) 20 MG TABS tablet Take 1 tablet (20 mg) by mouth daily (with dinner)       TIROSINT 200 MCG CAPS Take 300 mcg by mouth daily       zolpidem (AMBIEN) 5 MG tablet Take 5 mg by mouth nightly as needed for sleep       zoster vaccine recombinant adjuvanted (SHINGRIX) injection GIVEN AT PHARMACY: FIRST DOSE NOW, SECOND DOSE GIVEN 2 TO 6 MONTHS AFTER         PAST SURGICAL HISTORY:  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)     APPENDECTOMY       ARTHRODESIS ANKLE      right     ARTHROPLASTY KNEE BILATERAL       ARTHROPLASTY REVISION KNEE  4/19/2011    Procedure:ARTHROPLASTY REVISION KNEE; With Antibiotic Cement ; Surgeon:CHAO OLIVARES; Location:[facility]     BREAST SURGERY      right- tissue remove nipple area     BUNIONECTOMY  12/14/2011    Procedure:BUNIONECTOMY; Right Bunion Correction; Surgeon:GRACE ZARATE; Location:[facility]     CHOLECYSTECTOMY       EXAM UNDER ANESTHESIA ANUS N/A 7/15/2020    Procedure: EXAM UNDER ANESTHESIA, ANUS;  Surgeon: Luis Canales MD;  Location: [facility]     EXCISE MASS UPPER EXTREMITY  12/14/2011    Procedure:EXCISE MASS UPPER EXTREMITY; Excision of Left Arm Mass; Surgeon:GRACE ZARATE; Location:[facility]     FOOT SURGERY      left X 4     FUSION LUMBAR ANTERIOR ONE LEVEL       HEMORRHOIDECTOMY INTERNAL N/A 7/15/2020    Procedure: Exam under anesthesia, hemorrhoidectomy;  Surgeon: Luis Canales MD;  Location: [facility]     INJECT NERVE BLOCK SUPRASCAPULAR Left 5/18/2018    Procedure: INJECT NERVE BLOCK SUPRASCAPULAR;  Left Suprascapular Nerve Block;  Surgeon: Basim Velazquez MD;  Location: [facility]     INJECT NERVE BLOCK SUPRASCAPULAR  Right 7/23/2018    Procedure: INJECT NERVE BLOCK SUPRASCAPULAR;  Right suprascapular injection;  Surgeon: Basim Velazquez MD;  [facility]     INJECT NERVE BLOCK SUPRASCAPULAR Bilateral 10/29/2018    Procedure: Bilateral Suprascapular Nerve Blocks;  Surgeon: Basim Velazquez MD;  [facility]     INJECT NERVE BLOCK SUPRASCAPULAR Bilateral 3/15/2019    Procedure: Bilateral Suprascapular Nerve Block;  Surgeon: Basim Velazquez MD;  [facility]     INJECT NERVE BLOCK SUPRASCAPULAR Bilateral 12/31/2019    Procedure: bilateral suprascapular nerve block;  Surgeon: Basim Velazquez MD;  [facility]     INJECT NERVE BLOCK SUPRASCAPULAR Bilateral 8/3/2021    Procedure: bilateral suprascapular nerve block;  Surgeon: Basim Velazquez MD;  Location: [facility]     IR ENDOVENOUS ABLATION VARICOSE VEINS  10/5/2021     IR ENDOVENOUS ABLATION VARICOSE VEINS  10/26/2021     KNEE SURGERY      see list which we will bring     LAMINECTOMY LUMBAR ONE LEVEL      L4-5     LAPAROSCOPIC ABLATION ENDOMETRIOSIS       AZ HAND/FINGER SURGERY UNLISTED  surgeries on both hands with Dr. Shankar     AZ SPINE SURGERY PROCEDURE UNLISTED      see list which we will bring     AZ STOMACH SURGERY PROCEDURE UNLISTED  gall bladder removal     RADIO FREQUENCY ABLATION PULSED CERVICAL Bilateral 7/10/2019    Procedure: Bilateral Suprascapular Nerve Pulsed Radiofrequency Ablation;  Surgeon: Basim Velazquez MD;  Location[facility]     REMOVE HARDWARE FOOT  12/14/2011    Procedure:REMOVE HARDWARE FOOT; Hardware Removal Right Foot (Mini-C-Arm) ; Surgeon:GRACE ZARATE; Location: [facility]     RHINOPLASTY       SALPINGO-OOPHORECTOMY BILATERAL       TONSILLECTOMY       Z STOMACH SURGERY PROCEDURE UNLISTED      see list which we will bring       ALLERGIES     Allergies   Allergen Reactions     Blood Transfusion Related (Informational Only) Other (See Comments)     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      Chlorhexidine  Hives     Thor surgical cleanser     Codeine Hives     Has tolerated Oxycodone in past      Ibuprofen [Nsaids] Hives     Penicillins Hives     Other reaction(s): Unknown     Sulfa Drugs Hives     Other reaction(s): Unknown     Calcium Channel Blockers      Doxycycline GI Disturbance     Emesis & diarrhea  Patient denies allergy to this med     Hydroxyzine      rash     Mold        FAMILY HISTORY  Family History   Problem Relation Age of Onset     Hypertension Mother      No Known Problems Father      No Known Problems Sister      No Known Problems Brother      No Known Problems Maternal Grandmother      No Known Problems Maternal Grandfather      No Known Problems Paternal Grandmother      No Known Problems Other        SOCIAL HISTORY  Social History     Socioeconomic History     Marital status:      Spouse name: Not on file     Number of children: Not on file     Years of education: Not on file     Highest education level: Not on file   Occupational History     Not on file   Tobacco Use     Smoking status: Former     Packs/day: 1.50     Years: 20.00     Pack years: 30.00     Types: Cigarettes     Start date: 1973     Quit date: 1993     Years since quittin.8     Smokeless tobacco: Never     Tobacco comments:     wish I never started   Substance and Sexual Activity     Alcohol use: No     Alcohol/week: 0.0 standard drinks     Drug use: No     Sexual activity: Not Currently     Partners: Male     Birth control/protection: Post-menopausal   Other Topics Concern     Parent/sibling w/ CABG, MI or angioplasty before 65F 55M? Not Asked   Social History Narrative    ** Merged History Encounter **          Social Determinants of Health     Financial Resource Strain: Not on file   Food Insecurity: Not on file   Transportation Needs: Not on file   Physical Activity: Not on file   Stress: Not on file   Social Connections: Not on file   Intimate Partner Violence: Not on file   Housing Stability: Not on file        ROS:   Constitutional: No fever, chills, or sweats. No weight gain/loss   ENT: No visual disturbance, ear ache, epistaxis, sore throat  Allergies/Immunologic: Negative  Respiratory: No cough, hemoptysia  Cardiovascular: As per HPI  GI: No nausea, vomiting, hematemesis, melena, or hematochezia  : No urinary frequency, dysuria, or hematuria  Integument: Negative  Psychiatric: Negative  Neuro: Negative  Endocrinology: Negative   Musculoskeletal: Negative  Vascular: No walking impairment, claudication, ischemic rest pain or nonhealing wounds    EXAM:  BP (!) 164/123   Pulse 88   Wt 132 kg (291 lb)   SpO2 96%   BMI 42.97 kg/m    In general, the patient is a pleasant female in no apparent distress.    HEENT: NC/AT.  PERRLA.  EOMI.  Sclerae white, not injected.  Nares clear.  Pharynx without erythema or exudate.  Dentition intact.    Neck: No adenopathy.  No thyromegaly. Carotids +2/2 bilaterally without bruits.  No jugular venous distension.   Heart: RRR. Normal S1, S2 splits physiologically. No murmur, rub, click, or gallop. The PMI is in the 5th ICS in the midclavicular line. There is no heave.    Lungs: CTA.  No ronchi, wheezes, rales.  No dullness to percussion.   Abdomen: Soft, nontender, nondistended. No organomegaly. No AAA.  No bruits.   Extremities: No clubbing, cyanosis, 2+ edema.  +CVI.   Vascular: No bruits are noted.    Labs:  LIPID RESULTS:  Lab Results   Component Value Date    CHOL 303 (H) 06/06/2021    HDL 70 06/06/2021     (H) 06/06/2021    TRIG 259 (H) 06/06/2021    CHOLHDLRATIO 3.1 01/06/2014    NHDL 233 (H) 06/06/2021       LIVER ENZYME RESULTS:  Lab Results   Component Value Date    AST 16 08/09/2022    AST 52 (H) 06/07/2021    ALT 16 08/09/2022    ALT 22 06/07/2021       CBC RESULTS:  Lab Results   Component Value Date    WBC 7.4 08/10/2022    WBC 6.9 06/08/2021    RBC 4.19 08/10/2022    RBC 4.33 06/08/2021    HGB 11.7 08/10/2022    HGB 12.1 06/08/2021    HCT 37.6 08/10/2022     HCT 38.6 06/08/2021    MCV 90 08/10/2022    MCV 89 06/08/2021    MCH 27.9 08/10/2022    MCH 27.9 06/08/2021    MCHC 31.1 (L) 08/10/2022    MCHC 31.3 (L) 06/08/2021    RDW 16.0 (H) 08/10/2022    RDW 18.5 (H) 06/08/2021     08/10/2022     06/08/2021       BMP RESULTS:  Lab Results   Component Value Date     08/10/2022     06/08/2021    POTASSIUM 3.8 08/10/2022    POTASSIUM 3.8 06/08/2021    CHLORIDE 101 08/10/2022    CHLORIDE 103 06/08/2021    CO2 29 08/10/2022    CO2 33 (H) 06/08/2021    ANIONGAP 6 08/10/2022    ANIONGAP 2 (L) 06/08/2021     (H) 08/10/2022     (H) 06/08/2021    BUN 11 08/10/2022    BUN 14 06/08/2021    CR 0.78 08/10/2022    CR 0.81 06/08/2021    GFRESTIMATED 85 08/10/2022    GFRESTIMATED >60 10/30/2021    GFRESTIMATED 78 06/08/2021    GFRESTBLACK 90 06/08/2021    KYLIE 9.3 08/10/2022    KYLIE 8.9 06/08/2021        A1C RESULTS:  Lab Results   Component Value Date    A1C 5.7 (H) 06/07/2021

## 2022-11-15 ENCOUNTER — HOSPITAL ENCOUNTER (INPATIENT)
Facility: CLINIC | Age: 62
LOS: 6 days | Discharge: HOME | DRG: 871 | End: 2022-11-21
Attending: EMERGENCY MEDICINE | Admitting: HOSPITALIST
Payer: COMMERCIAL

## 2022-11-15 ENCOUNTER — APPOINTMENT (OUTPATIENT)
Dept: GENERAL RADIOLOGY | Facility: CLINIC | Age: 62
DRG: 871 | End: 2022-11-15
Attending: EMERGENCY MEDICINE
Payer: COMMERCIAL

## 2022-11-15 ENCOUNTER — APPOINTMENT (OUTPATIENT)
Dept: CT IMAGING | Facility: CLINIC | Age: 62
DRG: 871 | End: 2022-11-15
Attending: EMERGENCY MEDICINE
Payer: COMMERCIAL

## 2022-11-15 DIAGNOSIS — G89.4 CHRONIC PAIN DISORDER: ICD-10-CM

## 2022-11-15 DIAGNOSIS — Z79.01 CHRONIC ANTICOAGULATION: Primary | ICD-10-CM

## 2022-11-15 DIAGNOSIS — J18.9 PNEUMONIA DUE TO INFECTIOUS ORGANISM, UNSPECIFIED LATERALITY, UNSPECIFIED PART OF LUNG: ICD-10-CM

## 2022-11-15 DIAGNOSIS — B33.8 RSV INFECTION: ICD-10-CM

## 2022-11-15 DIAGNOSIS — I50.30 HEART FAILURE WITH PRESERVED EJECTION FRACTION, NYHA CLASS I (H): ICD-10-CM

## 2022-11-15 DIAGNOSIS — I87.2 VENOUS (PERIPHERAL) INSUFFICIENCY: ICD-10-CM

## 2022-11-15 LAB
ALBUMIN SERPL-MCNC: 2.8 G/DL (ref 3.4–5)
ALP SERPL-CCNC: 90 U/L (ref 40–150)
ALT SERPL W P-5'-P-CCNC: 16 U/L (ref 0–50)
ANION GAP SERPL CALCULATED.3IONS-SCNC: 8 MMOL/L (ref 3–14)
AST SERPL W P-5'-P-CCNC: 15 U/L (ref 0–45)
BASOPHILS # BLD AUTO: 0.1 10E3/UL (ref 0–0.2)
BASOPHILS NFR BLD AUTO: 1 %
BILIRUB SERPL-MCNC: 0.4 MG/DL (ref 0.2–1.3)
BUN SERPL-MCNC: 11 MG/DL (ref 7–30)
CALCIUM SERPL-MCNC: 8.4 MG/DL (ref 8.5–10.1)
CHLORIDE BLD-SCNC: 99 MMOL/L (ref 94–109)
CO2 SERPL-SCNC: 26 MMOL/L (ref 20–32)
CREAT SERPL-MCNC: 0.62 MG/DL (ref 0.52–1.04)
CRP SERPL-MCNC: 64 MG/L (ref 0–8)
EOSINOPHIL # BLD AUTO: 0.1 10E3/UL (ref 0–0.7)
EOSINOPHIL NFR BLD AUTO: 0 %
ERYTHROCYTE [DISTWIDTH] IN BLOOD BY AUTOMATED COUNT: 14.6 % (ref 10–15)
FLUAV RNA SPEC QL NAA+PROBE: NEGATIVE
FLUBV RNA RESP QL NAA+PROBE: NEGATIVE
GFR SERPL CREATININE-BSD FRML MDRD: >90 ML/MIN/1.73M2
GLUCOSE BLD-MCNC: 101 MG/DL (ref 70–99)
HCO3 BLDV-SCNC: 24 MMOL/L (ref 21–28)
HCT VFR BLD AUTO: 40.1 % (ref 35–47)
HGB BLD-MCNC: 13.5 G/DL (ref 11.7–15.7)
IMM GRANULOCYTES # BLD: 0.4 10E3/UL
IMM GRANULOCYTES NFR BLD: 3 %
LACTATE BLD-SCNC: 0.7 MMOL/L
LIPASE SERPL-CCNC: 172 U/L (ref 73–393)
LYMPHOCYTES # BLD AUTO: 2.4 10E3/UL (ref 0.8–5.3)
LYMPHOCYTES NFR BLD AUTO: 15 %
MCH RBC QN AUTO: 28.2 PG (ref 26.5–33)
MCHC RBC AUTO-ENTMCNC: 33.7 G/DL (ref 31.5–36.5)
MCV RBC AUTO: 84 FL (ref 78–100)
MONOCYTES # BLD AUTO: 0.9 10E3/UL (ref 0–1.3)
MONOCYTES NFR BLD AUTO: 6 %
NEUTROPHILS # BLD AUTO: 12.4 10E3/UL (ref 1.6–8.3)
NEUTROPHILS NFR BLD AUTO: 75 %
NRBC # BLD AUTO: 0 10E3/UL
NRBC BLD AUTO-RTO: 0 /100
PCO2 BLDV: 35 MM HG (ref 40–50)
PH BLDV: 7.45 [PH] (ref 7.32–7.43)
PLATELET # BLD AUTO: 337 10E3/UL (ref 150–450)
PO2 BLDV: 47 MM HG (ref 25–47)
POTASSIUM BLD-SCNC: 2.7 MMOL/L (ref 3.4–5.3)
PROT SERPL-MCNC: 7.1 G/DL (ref 6.8–8.8)
RBC # BLD AUTO: 4.79 10E6/UL (ref 3.8–5.2)
RSV RNA SPEC NAA+PROBE: POSITIVE
SAO2 % BLDV: 85 % (ref 94–100)
SARS-COV-2 RNA RESP QL NAA+PROBE: NEGATIVE
SODIUM SERPL-SCNC: 133 MMOL/L (ref 133–144)
WBC # BLD AUTO: 16.3 10E3/UL (ref 4–11)

## 2022-11-15 PROCEDURE — 71275 CT ANGIOGRAPHY CHEST: CPT

## 2022-11-15 PROCEDURE — 71046 X-RAY EXAM CHEST 2 VIEWS: CPT

## 2022-11-15 PROCEDURE — 83690 ASSAY OF LIPASE: CPT | Performed by: EMERGENCY MEDICINE

## 2022-11-15 PROCEDURE — 250N000009 HC RX 250: Performed by: EMERGENCY MEDICINE

## 2022-11-15 PROCEDURE — 80053 COMPREHEN METABOLIC PANEL: CPT | Performed by: EMERGENCY MEDICINE

## 2022-11-15 PROCEDURE — 120N000001 HC R&B MED SURG/OB

## 2022-11-15 PROCEDURE — 36415 COLL VENOUS BLD VENIPUNCTURE: CPT | Performed by: EMERGENCY MEDICINE

## 2022-11-15 PROCEDURE — 82803 BLOOD GASES ANY COMBINATION: CPT

## 2022-11-15 PROCEDURE — C9803 HOPD COVID-19 SPEC COLLECT: HCPCS

## 2022-11-15 PROCEDURE — 84145 PROCALCITONIN (PCT): CPT | Performed by: HOSPITALIST

## 2022-11-15 PROCEDURE — 87637 SARSCOV2&INF A&B&RSV AMP PRB: CPT | Performed by: HOSPITALIST

## 2022-11-15 PROCEDURE — 87040 BLOOD CULTURE FOR BACTERIA: CPT | Performed by: EMERGENCY MEDICINE

## 2022-11-15 PROCEDURE — 85025 COMPLETE CBC W/AUTO DIFF WBC: CPT | Performed by: EMERGENCY MEDICINE

## 2022-11-15 PROCEDURE — 86140 C-REACTIVE PROTEIN: CPT | Performed by: HOSPITALIST

## 2022-11-15 PROCEDURE — 250N000011 HC RX IP 250 OP 636: Performed by: EMERGENCY MEDICINE

## 2022-11-15 PROCEDURE — 83605 ASSAY OF LACTIC ACID: CPT

## 2022-11-15 PROCEDURE — 258N000003 HC RX IP 258 OP 636: Performed by: EMERGENCY MEDICINE

## 2022-11-15 PROCEDURE — 99223 1ST HOSP IP/OBS HIGH 75: CPT | Mod: AI | Performed by: HOSPITALIST

## 2022-11-15 PROCEDURE — 96365 THER/PROPH/DIAG IV INF INIT: CPT | Mod: 59

## 2022-11-15 PROCEDURE — 96375 TX/PRO/DX INJ NEW DRUG ADDON: CPT

## 2022-11-15 PROCEDURE — 87641 MR-STAPH DNA AMP PROBE: CPT | Performed by: HOSPITALIST

## 2022-11-15 PROCEDURE — 96361 HYDRATE IV INFUSION ADD-ON: CPT

## 2022-11-15 PROCEDURE — 82040 ASSAY OF SERUM ALBUMIN: CPT | Performed by: EMERGENCY MEDICINE

## 2022-11-15 PROCEDURE — 99285 EMERGENCY DEPT VISIT HI MDM: CPT | Mod: CS,25

## 2022-11-15 RX ORDER — DILTIAZEM HYDROCHLORIDE 180 MG/1
180 CAPSULE, COATED, EXTENDED RELEASE ORAL 2 TIMES DAILY
Status: ON HOLD | COMMUNITY
End: 2023-01-01

## 2022-11-15 RX ORDER — ACETAMINOPHEN 325 MG/1
650 TABLET ORAL EVERY 6 HOURS PRN
Status: DISCONTINUED | OUTPATIENT
Start: 2022-11-15 | End: 2022-11-21 | Stop reason: HOSPADM

## 2022-11-15 RX ORDER — LEVOTHYROXINE SODIUM 100 UG/1
300 TABLET ORAL DAILY
Status: ON HOLD | COMMUNITY
End: 2023-01-01

## 2022-11-15 RX ORDER — AZITHROMYCIN 500 MG/1
500 INJECTION, POWDER, LYOPHILIZED, FOR SOLUTION INTRAVENOUS ONCE
Status: COMPLETED | OUTPATIENT
Start: 2022-11-15 | End: 2022-11-16

## 2022-11-15 RX ORDER — CEFTRIAXONE 1 G/1
1 INJECTION, POWDER, FOR SOLUTION INTRAMUSCULAR; INTRAVENOUS ONCE
Status: COMPLETED | OUTPATIENT
Start: 2022-11-15 | End: 2022-11-15

## 2022-11-15 RX ORDER — POTASSIUM CHLORIDE 7.45 MG/ML
10 INJECTION INTRAVENOUS ONCE
Status: COMPLETED | OUTPATIENT
Start: 2022-11-15 | End: 2022-11-16

## 2022-11-15 RX ORDER — ONDANSETRON 2 MG/ML
4 INJECTION INTRAMUSCULAR; INTRAVENOUS ONCE
Status: COMPLETED | OUTPATIENT
Start: 2022-11-15 | End: 2022-11-15

## 2022-11-15 RX ORDER — IOPAMIDOL 755 MG/ML
83 INJECTION, SOLUTION INTRAVASCULAR ONCE
Status: COMPLETED | OUTPATIENT
Start: 2022-11-15 | End: 2022-11-15

## 2022-11-15 RX ORDER — SODIUM CHLORIDE 9 MG/ML
INJECTION, SOLUTION INTRAVENOUS CONTINUOUS
Status: DISCONTINUED | OUTPATIENT
Start: 2022-11-15 | End: 2022-11-16

## 2022-11-15 RX ADMIN — AZITHROMYCIN MONOHYDRATE 500 MG: 500 INJECTION, POWDER, LYOPHILIZED, FOR SOLUTION INTRAVENOUS at 22:31

## 2022-11-15 RX ADMIN — CEFTRIAXONE 1 G: 1 INJECTION, POWDER, FOR SOLUTION INTRAMUSCULAR; INTRAVENOUS at 22:07

## 2022-11-15 RX ADMIN — POTASSIUM CHLORIDE 10 MEQ: 7.46 INJECTION, SOLUTION INTRAVENOUS at 22:03

## 2022-11-15 RX ADMIN — SODIUM CHLORIDE 100 ML: 9 INJECTION, SOLUTION INTRAVENOUS at 23:00

## 2022-11-15 RX ADMIN — SODIUM CHLORIDE 1000 ML: 9 INJECTION, SOLUTION INTRAVENOUS at 17:01

## 2022-11-15 RX ADMIN — ONDANSETRON 4 MG: 2 INJECTION INTRAMUSCULAR; INTRAVENOUS at 17:02

## 2022-11-15 RX ADMIN — IOPAMIDOL 83 ML: 755 INJECTION, SOLUTION INTRAVENOUS at 22:59

## 2022-11-15 ASSESSMENT — ENCOUNTER SYMPTOMS
FATIGUE: 1
SHORTNESS OF BREATH: 1
FEVER: 1
NECK PAIN: 0
NAUSEA: 1
HEADACHES: 1
DYSURIA: 0
DIARRHEA: 1
ABDOMINAL PAIN: 1
VOMITING: 1
COUGH: 1
BACK PAIN: 0

## 2022-11-15 ASSESSMENT — ACTIVITIES OF DAILY LIVING (ADL): ADLS_ACUITY_SCORE: 35

## 2022-11-16 PROBLEM — B33.8 RSV (RESPIRATORY SYNCYTIAL VIRUS INFECTION): Status: ACTIVE | Noted: 2022-11-16

## 2022-11-16 LAB
ALBUMIN UR-MCNC: NEGATIVE MG/DL
APPEARANCE UR: CLEAR
BILIRUB UR QL STRIP: NEGATIVE
COLOR UR AUTO: ABNORMAL
GLUCOSE UR STRIP-MCNC: NEGATIVE MG/DL
HGB UR QL STRIP: NEGATIVE
KETONES UR STRIP-MCNC: NEGATIVE MG/DL
LEUKOCYTE ESTERASE UR QL STRIP: NEGATIVE
MRSA DNA SPEC QL NAA+PROBE: NEGATIVE
MUCOUS THREADS #/AREA URNS LPF: PRESENT /LPF
NITRATE UR QL: NEGATIVE
PH UR STRIP: 6.5 [PH] (ref 5–7)
POTASSIUM BLD-SCNC: 3.3 MMOL/L (ref 3.4–5.3)
POTASSIUM BLD-SCNC: 3.6 MMOL/L (ref 3.4–5.3)
PROCALCITONIN SERPL-MCNC: 0.17 NG/ML
RBC URINE: 0 /HPF
S PNEUM AG SPEC QL: NEGATIVE
SA TARGET DNA: POSITIVE
SP GR UR STRIP: 1 (ref 1–1.03)
SQUAMOUS EPITHELIAL: 1 /HPF
UROBILINOGEN UR STRIP-MCNC: NORMAL MG/DL
WBC URINE: 1 /HPF

## 2022-11-16 PROCEDURE — 250N000009 HC RX 250: Performed by: HOSPITALIST

## 2022-11-16 PROCEDURE — 250N000011 HC RX IP 250 OP 636: Performed by: EMERGENCY MEDICINE

## 2022-11-16 PROCEDURE — 258N000003 HC RX IP 258 OP 636: Performed by: HOSPITALIST

## 2022-11-16 PROCEDURE — 250N000013 HC RX MED GY IP 250 OP 250 PS 637: Performed by: EMERGENCY MEDICINE

## 2022-11-16 PROCEDURE — 250N000011 HC RX IP 250 OP 636: Performed by: HOSPITALIST

## 2022-11-16 PROCEDURE — 250N000013 HC RX MED GY IP 250 OP 250 PS 637: Performed by: HOSPITALIST

## 2022-11-16 PROCEDURE — 99232 SBSQ HOSP IP/OBS MODERATE 35: CPT | Performed by: HOSPITALIST

## 2022-11-16 PROCEDURE — 84132 ASSAY OF SERUM POTASSIUM: CPT | Performed by: EMERGENCY MEDICINE

## 2022-11-16 PROCEDURE — 87899 AGENT NOS ASSAY W/OPTIC: CPT | Performed by: HOSPITALIST

## 2022-11-16 PROCEDURE — 36415 COLL VENOUS BLD VENIPUNCTURE: CPT | Performed by: HOSPITALIST

## 2022-11-16 PROCEDURE — 84132 ASSAY OF SERUM POTASSIUM: CPT | Performed by: HOSPITALIST

## 2022-11-16 PROCEDURE — 120N000001 HC R&B MED SURG/OB

## 2022-11-16 PROCEDURE — 36415 COLL VENOUS BLD VENIPUNCTURE: CPT | Performed by: EMERGENCY MEDICINE

## 2022-11-16 PROCEDURE — 81001 URINALYSIS AUTO W/SCOPE: CPT | Performed by: HOSPITALIST

## 2022-11-16 PROCEDURE — 250N000012 HC RX MED GY IP 250 OP 636 PS 637: Performed by: HOSPITALIST

## 2022-11-16 PROCEDURE — 250N000009 HC RX 250: Performed by: EMERGENCY MEDICINE

## 2022-11-16 RX ORDER — ONDANSETRON 4 MG/1
4 TABLET, ORALLY DISINTEGRATING ORAL EVERY 6 HOURS PRN
Status: DISCONTINUED | OUTPATIENT
Start: 2022-11-16 | End: 2022-11-21 | Stop reason: HOSPADM

## 2022-11-16 RX ORDER — LEVALBUTEROL INHALATION SOLUTION 0.31 MG/3ML
0.63 SOLUTION RESPIRATORY (INHALATION) 4 TIMES DAILY
Status: DISCONTINUED | OUTPATIENT
Start: 2022-11-16 | End: 2022-11-16

## 2022-11-16 RX ORDER — MORPHINE SULFATE 30 MG/1
90 TABLET, FILM COATED, EXTENDED RELEASE ORAL EVERY 12 HOURS SCHEDULED
Status: DISCONTINUED | OUTPATIENT
Start: 2022-11-16 | End: 2022-11-21 | Stop reason: HOSPADM

## 2022-11-16 RX ORDER — FLUTICASONE PROPIONATE 50 MCG
2 SPRAY, SUSPENSION (ML) NASAL DAILY
Status: DISCONTINUED | OUTPATIENT
Start: 2022-11-16 | End: 2022-11-21 | Stop reason: HOSPADM

## 2022-11-16 RX ORDER — GABAPENTIN 800 MG/1
800 TABLET ORAL AT BEDTIME
Status: DISCONTINUED | OUTPATIENT
Start: 2022-11-16 | End: 2022-11-21 | Stop reason: HOSPADM

## 2022-11-16 RX ORDER — PREDNISOLONE ACETATE 10 MG/ML
1 SUSPENSION/ DROPS OPHTHALMIC 2 TIMES DAILY
Status: DISCONTINUED | OUTPATIENT
Start: 2022-11-16 | End: 2022-11-21 | Stop reason: HOSPADM

## 2022-11-16 RX ORDER — MULTIPLE VITAMINS W/ MINERALS TAB 9MG-400MCG
1 TAB ORAL DAILY
Status: DISCONTINUED | OUTPATIENT
Start: 2022-11-16 | End: 2022-11-21 | Stop reason: HOSPADM

## 2022-11-16 RX ORDER — GABAPENTIN 300 MG/1
300 CAPSULE ORAL 2 TIMES DAILY
Status: DISCONTINUED | OUTPATIENT
Start: 2022-11-16 | End: 2022-11-16

## 2022-11-16 RX ORDER — ATORVASTATIN CALCIUM 40 MG/1
40 TABLET, FILM COATED ORAL EVERY EVENING
Status: DISCONTINUED | OUTPATIENT
Start: 2022-11-16 | End: 2022-11-21 | Stop reason: HOSPADM

## 2022-11-16 RX ORDER — HYDROMORPHONE HYDROCHLORIDE 2 MG/1
4 TABLET ORAL EVERY 4 HOURS PRN
Status: DISCONTINUED | OUTPATIENT
Start: 2022-11-16 | End: 2022-11-17

## 2022-11-16 RX ORDER — MORPHINE SULFATE 30 MG/1
30 TABLET, FILM COATED, EXTENDED RELEASE ORAL 2 TIMES DAILY
Status: DISCONTINUED | OUTPATIENT
Start: 2022-11-16 | End: 2022-11-16

## 2022-11-16 RX ORDER — GUAIFENESIN 200 MG/10ML
10 LIQUID ORAL EVERY 6 HOURS PRN
Status: DISCONTINUED | OUTPATIENT
Start: 2022-11-16 | End: 2022-11-21 | Stop reason: HOSPADM

## 2022-11-16 RX ORDER — DILTIAZEM HYDROCHLORIDE 180 MG/1
180 CAPSULE, COATED, EXTENDED RELEASE ORAL 2 TIMES DAILY
Status: DISCONTINUED | OUTPATIENT
Start: 2022-11-16 | End: 2022-11-21 | Stop reason: HOSPADM

## 2022-11-16 RX ORDER — CYCLOBENZAPRINE HCL 5 MG
10 TABLET ORAL 3 TIMES DAILY
Status: DISCONTINUED | OUTPATIENT
Start: 2022-11-16 | End: 2022-11-21 | Stop reason: HOSPADM

## 2022-11-16 RX ORDER — POTASSIUM CHLORIDE 1.5 G/1.58G
40 POWDER, FOR SOLUTION ORAL ONCE
Status: COMPLETED | OUTPATIENT
Start: 2022-11-16 | End: 2022-11-16

## 2022-11-16 RX ORDER — CEFTRIAXONE 1 G/1
1 INJECTION, POWDER, FOR SOLUTION INTRAMUSCULAR; INTRAVENOUS EVERY 24 HOURS
Status: DISCONTINUED | OUTPATIENT
Start: 2022-11-16 | End: 2022-11-16

## 2022-11-16 RX ORDER — CEFTRIAXONE 2 G/1
2 INJECTION, POWDER, FOR SOLUTION INTRAMUSCULAR; INTRAVENOUS EVERY 24 HOURS
Status: DISCONTINUED | OUTPATIENT
Start: 2022-11-16 | End: 2022-11-19

## 2022-11-16 RX ORDER — LIDOCAINE 40 MG/G
CREAM TOPICAL
Status: DISCONTINUED | OUTPATIENT
Start: 2022-11-16 | End: 2022-11-21 | Stop reason: HOSPADM

## 2022-11-16 RX ORDER — MORPHINE SULFATE 60 MG/1
60 TABLET, FILM COATED, EXTENDED RELEASE ORAL 3 TIMES DAILY
Status: DISCONTINUED | OUTPATIENT
Start: 2022-11-16 | End: 2022-11-16

## 2022-11-16 RX ORDER — GABAPENTIN 300 MG/1
300 CAPSULE ORAL 2 TIMES DAILY
Status: DISCONTINUED | OUTPATIENT
Start: 2022-11-16 | End: 2022-11-21 | Stop reason: HOSPADM

## 2022-11-16 RX ORDER — LEVOTHYROXINE SODIUM 100 UG/1
300 TABLET ORAL DAILY
Status: DISCONTINUED | OUTPATIENT
Start: 2022-11-16 | End: 2022-11-21 | Stop reason: HOSPADM

## 2022-11-16 RX ORDER — GABAPENTIN 800 MG/1
800 TABLET ORAL 2 TIMES DAILY
Status: DISCONTINUED | OUTPATIENT
Start: 2022-11-16 | End: 2022-11-16

## 2022-11-16 RX ORDER — CARBOXYMETHYLCELLULOSE SODIUM 5 MG/ML
1 SOLUTION/ DROPS OPHTHALMIC 2 TIMES DAILY PRN
Status: DISCONTINUED | OUTPATIENT
Start: 2022-11-16 | End: 2022-11-21 | Stop reason: HOSPADM

## 2022-11-16 RX ORDER — GLIPIZIDE 10 MG/1
1 TABLET ORAL 2 TIMES DAILY
Status: DISCONTINUED | OUTPATIENT
Start: 2022-11-16 | End: 2022-11-21 | Stop reason: HOSPADM

## 2022-11-16 RX ORDER — ONDANSETRON 2 MG/ML
4 INJECTION INTRAMUSCULAR; INTRAVENOUS EVERY 6 HOURS PRN
Status: DISCONTINUED | OUTPATIENT
Start: 2022-11-16 | End: 2022-11-21 | Stop reason: HOSPADM

## 2022-11-16 RX ORDER — LEVALBUTEROL INHALATION SOLUTION 0.31 MG/3ML
0.63 SOLUTION RESPIRATORY (INHALATION) 4 TIMES DAILY
Status: DISCONTINUED | OUTPATIENT
Start: 2022-11-16 | End: 2022-11-16 | Stop reason: RX

## 2022-11-16 RX ORDER — LEVALBUTEROL 1.25 MG/.5ML
1.25 SOLUTION, CONCENTRATE RESPIRATORY (INHALATION) 4 TIMES DAILY
Status: DISCONTINUED | OUTPATIENT
Start: 2022-11-16 | End: 2022-11-21 | Stop reason: HOSPADM

## 2022-11-16 RX ORDER — FEXOFENADINE HCL 180 MG/1
180 TABLET ORAL DAILY PRN
Status: DISCONTINUED | OUTPATIENT
Start: 2022-11-16 | End: 2022-11-21 | Stop reason: HOSPADM

## 2022-11-16 RX ORDER — GABAPENTIN 800 MG/1
800 TABLET ORAL 2 TIMES DAILY
Status: DISCONTINUED | OUTPATIENT
Start: 2022-11-16 | End: 2022-11-21 | Stop reason: HOSPADM

## 2022-11-16 RX ORDER — PREDNISONE 20 MG/1
40 TABLET ORAL DAILY
Status: DISCONTINUED | OUTPATIENT
Start: 2022-11-16 | End: 2022-11-17

## 2022-11-16 RX ORDER — ENOXAPARIN SODIUM 150 MG/ML
1 INJECTION SUBCUTANEOUS EVERY 12 HOURS
Status: DISCONTINUED | OUTPATIENT
Start: 2022-11-16 | End: 2022-11-17

## 2022-11-16 RX ORDER — AZITHROMYCIN 500 MG/1
500 INJECTION, POWDER, LYOPHILIZED, FOR SOLUTION INTRAVENOUS EVERY 24 HOURS
Status: DISCONTINUED | OUTPATIENT
Start: 2022-11-16 | End: 2022-11-19

## 2022-11-16 RX ADMIN — GABAPENTIN 300 MG: 300 CAPSULE ORAL at 08:42

## 2022-11-16 RX ADMIN — GUAIFENESIN 10 ML: 200 SOLUTION ORAL at 19:41

## 2022-11-16 RX ADMIN — SODIUM CHLORIDE: 9 INJECTION, SOLUTION INTRAVENOUS at 01:02

## 2022-11-16 RX ADMIN — MULTIPLE VITAMINS W/ MINERALS TAB 1 TABLET: TAB at 08:27

## 2022-11-16 RX ADMIN — GABAPENTIN 300 MG: 300 CAPSULE ORAL at 16:54

## 2022-11-16 RX ADMIN — CEFTRIAXONE SODIUM 2 G: 2 INJECTION, POWDER, FOR SOLUTION INTRAMUSCULAR; INTRAVENOUS at 21:58

## 2022-11-16 RX ADMIN — LEVALBUTEROL HYDROCHLORIDE 1.25 MG: 1.25 SOLUTION, CONCENTRATE RESPIRATORY (INHALATION) at 22:00

## 2022-11-16 RX ADMIN — POTASSIUM CHLORIDE 40 MEQ: 1.5 POWDER, FOR SOLUTION ORAL at 16:58

## 2022-11-16 RX ADMIN — LEVALBUTEROL HYDROCHLORIDE 1.25 MG: 1.25 SOLUTION, CONCENTRATE RESPIRATORY (INHALATION) at 13:21

## 2022-11-16 RX ADMIN — MORPHINE SULFATE 90 MG: 30 TABLET, EXTENDED RELEASE ORAL at 08:24

## 2022-11-16 RX ADMIN — MORPHINE SULFATE 90 MG: 30 TABLET, EXTENDED RELEASE ORAL at 19:50

## 2022-11-16 RX ADMIN — GABAPENTIN 800 MG: 800 TABLET, FILM COATED ORAL at 08:26

## 2022-11-16 RX ADMIN — LEVOTHYROXINE SODIUM 300 MCG: 100 TABLET ORAL at 08:27

## 2022-11-16 RX ADMIN — CYCLOBENZAPRINE 10 MG: 10 TABLET, FILM COATED ORAL at 08:26

## 2022-11-16 RX ADMIN — DILTIAZEM HYDROCHLORIDE 180 MG: 180 CAPSULE, COATED, EXTENDED RELEASE ORAL at 19:50

## 2022-11-16 RX ADMIN — DILTIAZEM HYDROCHLORIDE 180 MG: 180 CAPSULE, COATED, EXTENDED RELEASE ORAL at 08:26

## 2022-11-16 RX ADMIN — GABAPENTIN 800 MG: 800 TABLET, FILM COATED ORAL at 01:01

## 2022-11-16 RX ADMIN — ATORVASTATIN CALCIUM 40 MG: 40 TABLET, FILM COATED ORAL at 19:50

## 2022-11-16 RX ADMIN — GABAPENTIN 800 MG: 800 TABLET, FILM COATED ORAL at 21:59

## 2022-11-16 RX ADMIN — GABAPENTIN 800 MG: 800 TABLET, FILM COATED ORAL at 16:52

## 2022-11-16 RX ADMIN — CYCLOBENZAPRINE 10 MG: 10 TABLET, FILM COATED ORAL at 01:02

## 2022-11-16 RX ADMIN — ENOXAPARIN SODIUM 135 MG: 150 INJECTION SUBCUTANEOUS at 13:48

## 2022-11-16 RX ADMIN — CYCLOBENZAPRINE 10 MG: 10 TABLET, FILM COATED ORAL at 19:49

## 2022-11-16 RX ADMIN — PREDNISONE 40 MG: 20 TABLET ORAL at 08:25

## 2022-11-16 RX ADMIN — ENOXAPARIN SODIUM 135 MG: 150 INJECTION SUBCUTANEOUS at 01:03

## 2022-11-16 RX ADMIN — CYCLOBENZAPRINE 10 MG: 10 TABLET, FILM COATED ORAL at 13:21

## 2022-11-16 RX ADMIN — AZITHROMYCIN MONOHYDRATE 500 MG: 500 INJECTION, POWDER, LYOPHILIZED, FOR SOLUTION INTRAVENOUS at 22:58

## 2022-11-16 RX ADMIN — LEVALBUTEROL HYDROCHLORIDE 1.25 MG: 1.25 SOLUTION, CONCENTRATE RESPIRATORY (INHALATION) at 16:52

## 2022-11-16 ASSESSMENT — ACTIVITIES OF DAILY LIVING (ADL)
ADLS_ACUITY_SCORE: 35

## 2022-11-16 ASSESSMENT — ENCOUNTER SYMPTOMS
AGITATION: 0
COLOR CHANGE: 0
RHINORRHEA: 0
HEMATURIA: 0
EYE PAIN: 0

## 2022-11-16 NOTE — PROGRESS NOTES
[facility]    Hospitalist Progress Note      Assessment & Plan   Aspen Mccullough is a 62 year old female who we have lupus currently not on any steroids, chronic pain syndrome and follows with pain clinic, hypertension, hyperlipidemia, hypothyroidism, chronic pain syndrome, morbid obesity, history of COVID-19 in 2022, chronic lower extremity edema with chronic venous congestion who presented to the ED with a chief complaint of shortness of breath and vomiting.     Sepsis with Acute hypoxic respiratory failure due to community-acquired pneumonia with RSV infection  History of COVID-19 in January 2022  Presented with nausea, vomiting, SOB, fever, cough with yellow-colored sputum production and was diagnosed with bronchitis at urgent care center on November 11. WBC 16.3, tachycardia and was requiring 2 to 3 L of oxygen. COVID-19 negative, influenza negative, POSITIVE FOR RSV. procal 0.17. strep pneumo negative.  * CXR: no evidence of pleural effusion or pneumonia, but consistent with changes of early pneumonia.  * CTA chest: no pulmonary emboli. Patchy focus groundglass infiltrate scattered throughout both lungs, suggestive atypical pneumonia with mild inflammatory bronchial wall thickening.  - follow up blood cx  - follow up sputum culture  - ceftriaxone, azithro continued  - prn robitussin  - xopenex nebs QID  - wean O2  - prednisone 40mg daily x5 days     Nausea , vomiting and abdominal pain (resolved)  Lipase is normal   - monitor     Prior COVID-19 diagnosis  - Diagnosed with COVID-19 on emergency department visit 1/18/2022.  Seen again in urgent care with persistent cough 1/29/2022 and discharged with doxycycline and prednisone for overlap pneumonia.  Hospitalized 2/17/2022 at [facility]  - she at that time and in august 2022 had predominantly right sided opacities      Hypokalemia  - Potassium is 2.7  - replacement protocol     History of right lower extremity DVT and pulmonary  "embolism in 2017  -CTA chest was done and does not show any evidence of  pulmonary embolism and she has not taken xarelto in many days given her nausea and vomiting and will prefer injections and will order lovenox 1 mg/kg bid   - when tolerating diet better can start xarelto again     Hypertension  - continue with PTA Metoprolol and cardizem     Hyperlipidemia  -continue with PTA atorvastatin 40 mg daily      History of lupus with chronic steroids use  -She used to be on prednisone for 30 years and tapered herself off earlier this year  - She also needs to follow with rheumatology (Her previous rheumatologist Dr. Field has retired)     P  Atrial fibrillation  Continue the patient with the metoprolol, diltiazem and anticoagulation with lovenox for now, resume xarelto when taking  Better PO     Chronic lower extremity edema due to chronic venous congestion  - Noted     Hypothyroidism  -Continue the patient with PTA Synthroid      Chronic pain syndrome  Fibromyalgia  -Follows with pain clinic for injections, on longstanding MS Contin and oral Dilaudid and gabapentin  - Continue with MS Contin 90 mg every morning and evening, 60 mg in the afternoon as per home regimen  -Continue gabapentin 1100 mg every morning, 1100 mg in the afternoon, 800 mg at bedtime  - PTA oral dilaudid 8mg on hold, resumed 4mg q4h PRN instead for pain, uptitrate as she is feeling better     History of CVA  -PTA Xarelto on hold due to N/V, continue subcutaneous lovenox for now   -Continue prior to admission atorvastatin     Severe Obesity: Estimated body mass index is 42.09 kg/m  as calculated from the following:    Height as of this encounter: 1.753 m (5' 9\").    Weight as of this encounter: 129.3 kg (285 lb).     ? History of Possible HFpEF  Echo done during last admission on 08/09/2022 and at that time her EF was 55%, no valve disease, ascending aorta is borderline dilated at 3.8 cm  -On lasix 40 mg in the morning and 20 in the evening  -we " "will hold diuretics given her recent diarrhea and vomiting and sepsis     Clinically Significant Risk Factors Present on Admission        # Hypokalemia: Lowest K = 2.7 mmol/L (Ref range: 3.4-5.3) in last 2 days, will replace as needed  # Hyponatremia: Lowest Na = 133 mmol/L (Ref range: 136-145) in last 2 days, will monitor as appropriate      # Hypoalbuminemia: Lowest albumin = 2.8 g/dL (Ref range: 3.5-5.2) at 11/15/2022  6:27 PM, will monitor as appropriate  # Drug Induced Coagulation Defect: home medication list includes an anticoagulant medication        # Severe Obesity: Estimated body mass index is 42.09 kg/m  as calculated from the following:    Height as of this encounter: 1.753 m (5' 9\").    Weight as of this encounter: 129.3 kg (285 lb).             DVT Prophylaxis: Enoxaparin (Lovenox) SQ  Code Status: Special Code     Expected Discharge Date: 11/20/2022        Discharge Comments: clinical improvement       Hailey Reyes DO  Hospitalist Service  [facility]  Securely message with the Vocera Web Console (learn more here)  Text Page (7am - 6pm) via [employer] Paging/Directory      Interval History   Patient seen and examined. She is not feeling well. Ongoing cough, trouble breathing, still needing oxygen. Discussed steroids and nebs, she is agreeable to both.    -Data reviewed today: I reviewed all new labs and imaging results over the last 24 hours. I personally reviewed no images or EKG's today.    Physical Exam   Temp: 98  F (36.7  C) Temp src: Temporal BP: 134/67 Pulse: 80   Resp: 17 SpO2: 94 % O2 Device: Nasal cannula Oxygen Delivery: 3 LPM  Vitals:    11/15/22 1818   Weight: 129.3 kg (285 lb)     Vital Signs with Ranges  Temp:  [98  F (36.7  C)] 98  F (36.7  C)  Pulse:  [] 80  Resp:  [17] 17  BP: (125-169)/(66-78) 134/67  SpO2:  [91 %-100 %] 94 %  I/O last 3 completed shifts:  In: 350 [IV Piggyback:350]  Out: -     Constitutional: Awake, alert, cooperative, ill " appearing  Respiratory: frequent coughing, no crackles, occasional rhonchi/wheeze  Cardiovascular: Regular rate and rhythm, normal S1 and S2, and no murmur noted  GI: Normal bowel sounds, soft, non-distended, non-tender  Skin/Integumen: No rashes, no cyanosis, no edema  Other:     Medications       artificial tears  1 drop Both Eyes BID     atorvastatin  40 mg Oral QPM     azithromycin  500 mg Intravenous Q24H     cefTRIAXone  2 g Intravenous Q24H     cyclobenzaprine  10 mg Oral TID     diltiazem ER COATED BEADS  180 mg Oral BID     enoxaparin ANTICOAGULANT  1 mg/kg Subcutaneous Q12H     fluticasone  2 spray Both Nostrils Daily     gabapentin  800 mg Oral BID    And     gabapentin  300 mg Oral BID     gabapentin  800 mg Oral At Bedtime     levalbuterol  1.25 mg Nebulization 4x Daily     levothyroxine  300 mcg Oral Daily     [START ON 11/17/2022] metoprolol succinate ER  75 mg Oral BID     morphine  90 mg Oral Q12H ANNETTE (08/20)     multivitamin w/minerals  1 tablet Oral Daily     potassium chloride  40 mEq Oral or Feeding Tube Once     prednisoLONE acetate  1 drop Both Eyes BID     predniSONE  40 mg Oral Daily       Data   Recent Labs   Lab 11/16/22  1120 11/15/22  1827   WBC  --  16.3*   HGB  --  13.5   MCV  --  84   PLT  --  337   NA  --  133   POTASSIUM 3.3* 2.7*   CHLORIDE  --  99   CO2  --  26   BUN  --  11   CR  --  0.62   ANIONGAP  --  8   KYLIE  --  8.4*   GLC  --  101*   ALBUMIN  --  2.8*   PROTTOTAL  --  7.1   BILITOTAL  --  0.4   ALKPHOS  --  90   ALT  --  16   AST  --  15   LIPASE  --  172       Recent Results (from the past 24 hour(s))   Chest XR,  PA & LAT    Narrative    EXAM: XR CHEST 2 VIEWS  LOCATION: [facility]  DATE/TIME: 11/15/2022 7:03 PM    INDICATION: cough.  COMPARISON: CT 09/06/2022, radiograph 08/08/2022.      Impression    IMPRESSION: Mildly enlarged cardiac silhouette, stable from prior examination. New patchy bibasilar opacities, left greater than right, could be  seen with aspiration and/or developing pneumonia. No pleural effusion or pneumothorax. No acute bony   abnormality.   CT Chest Pulmonary Embolism w Contrast    Narrative    EXAM: CT CHEST PULMONARY EMBOLISM W CONTRAST  LOCATION: [facility]  DATE/TIME: 11/15/2022 11:07 PM    INDICATION: tachycardic dyspnea history of clots  COMPARISON: 9/6/2022 and 2/17/2022  TECHNIQUE: CT chest pulmonary angiogram during arterial phase injection of IV contrast. Multiplanar reformats and MIP reconstructions were performed. Dose reduction techniques were used.   CONTRAST: 83mL Isovue 370    FINDINGS:  ANGIOGRAM CHEST: Pulmonary arteries are normal caliber and negative for pulmonary emboli. Thoracic aorta is negative for dissection. No CT evidence of right heart strain.    LUNGS AND PLEURA: New patchy foci of groundglass opacity present bilaterally most consistent with atypical pneumonia, viral pneumonia should be considered. There is also inflammatory bronchial wall thickening diffusely. Focal atelectasis seen medially at   left lung base.    MEDIASTINUM/AXILLAE: Normal.    CORONARY ARTERY CALCIFICATION: None.    UPPER ABDOMEN: Cholecystectomy.    MUSCULOSKELETAL: Unremarkable.      Impression    IMPRESSION:  1.  No pulmonary emboli identified.  2.  Patchy foci of groundglass infiltrate scattered throughout both lungs most suggestive of atypical pneumonia. There is also mild inflammatory bronchial wall thickening.

## 2022-11-16 NOTE — ED NOTES
POCT negative. Test result discussed with patient at time of eval. See note. Patient assisted to commode with ERT.

## 2022-11-16 NOTE — PHARMACY-ADMISSION MEDICATION HISTORY
Pharmacy Medication History  Admission medication history interview status for the 11/15/2022  admission is complete. See EPIC admission navigator for prior to admission medications     Location of Interview: Patient room  Medication history sources: Patient    Significant changes made to the medication list:  Added Diltiazem    In the past week, patient estimated taking medication this percent of the time: less than 50% due to illness    Additional medication history information:   Patient has been unable to take any medications since Friday.    Medication reconciliation completed by provider prior to medication history? No    Time spent in this activity: 30 minutes    Prior to Admission medications    Medication Sig Last Dose Taking? Auth Provider Long Term End Date   atorvastatin (LIPITOR) 40 MG tablet Take 1 tablet (40 mg) by mouth every evening Past Week Yes Pearl Tarango NP Yes    BIOTIN FORTE PO Take 1 tablet by mouth daily  Past Week Yes Reported, Patient     carboxymethylcellulose (REFRESH PLUS) 0.5 % SOLN ophthalmic solution Place 1 drop into both eyes 2 times daily as needed  as needed Yes      cyanocobalamin 1000 MCG/ML injection Inject 1 mL (1,000 mcg) Subcutaneous every 7 days Past Week Yes Yeimy Cabrera MD     cyclobenzaprine (FLEXERIL) 10 MG tablet Take 1 tablet (10 mg) by mouth 3 times daily Past Week Yes Yeimy Cabrera MD     cycloSPORINE (RESTASIS) 0.05 % ophthalmic emulsion Place 1 drop into both eyes 2 times daily as needed  as needed Yes Reported, Patient     diltiazem ER COATED BEADS (CARDIZEM CD/CARTIA XT) 180 MG 24 hr capsule Take 180 mg by mouth 2 times daily Past Week Yes Unknown, Entered By History No    fexofenadine (ALLEGRA) 180 MG tablet Take 180 mg by mouth daily as needed  as needed Yes Reported, Patient     furosemide (LASIX) 20 MG tablet Take 1 tablet (20 mg) by mouth every evening Past Week Yes iSl Muñoz MD Yes    furosemide (LASIX)  40 MG tablet Take 1 tablet (40 mg) by mouth every morning Future refills by PCP Dr. Arcadio Farfan with phone number [phone]. Past Week Yes Sil Muñoz MD Yes    gabapentin (NEURONTIN) 300 MG capsule Take 300 mg by mouth 2 times daily (morning and afternoon) with 800mg tablet (total dose 1100mg) Past Week Yes Unknown, Entered By History No    gabapentin (NEURONTIN) 800 MG tablet Take 800 mg by mouth At Bedtime  Past Week Yes Unknown, Entered By History No    gabapentin (NEURONTIN) 800 MG tablet Take 800 mg by mouth 2 times daily (morning and afternoon) in addition to 300mg capsule (total dose 1100mg) Past Week Yes Unknown, Entered By History No    HYDROmorphone (DILAUDID) 8 MG tablet Take 8 mg by mouth 3 times daily . Max of 3 doses per day. Past Week Yes      levothyroxine (TIROSINT) 100 MCG capsule Take 300 mcg by mouth daily Past Week Yes Unknown, Entered By History No    lifitegrast (XIIDRA) 5 % opthalmic solution Place 1 drop into both eyes 2 times daily Past Week Yes      metoprolol succinate ER (TOPROL XL) 25 MG 24 hr tablet Take 3 tablets (75 mg) by mouth 2 times daily NOTE this is a DECREASE in dose than prior to hospital [STOP prior metoprolol] Past Week Yes Pearl Tarango NP Yes    morphine (MS CONTIN) 30 MG 12 hr tablet Take 30 mg by mouth 2 times daily (morning and night) in addition to 60 mg tablet for a total dose of 90mg. Past Week Yes      morphine (MS CONTIN) 60 MG 12 hr tablet Take 60 mg by mouth 3 times daily (morning and night) in addition to 30mg tablet for a total dose of 90mg Past Week Yes Umm Ya APRN CNP     multivitamin w/minerals (THERA-VIT-M) tablet Take 1 tablet by mouth daily  Past Week Yes      NASONEX 50 MCG/ACT spray Spray 1 spray into both nostrils daily as needed   Yes      prednisoLONE acetate (PRED FORTE) 1 % ophthalmic susp Place 1 drop into both eyes 2 times daily  Past Week Yes Reported, Patient No    rivaroxaban ANTICOAGULANT (XARELTO) 20 MG  TABS tablet Take 1 tablet (20 mg) by mouth daily (with dinner) Past Week Yes Alen Kebede MD Yes    zolpidem (AMBIEN) 5 MG tablet Take 5 mg by mouth nightly as needed for sleep as needed Yes Unknown, Entered By History No    Elastic Bandages & Supports (MEDICAL COMPRESSION SOCKS) MISC 1 Package daily Please measure and distribute 2 pair of 20mmHg - 30mmHg THIGH high open or closed toe compression stockings with extra refills as indicated. Jobst ultrasheer or equivalent.   Taye Salcedo MD         The information provided in this note is only as accurate as the sources available at the time of update(s)

## 2022-11-16 NOTE — H&P
[facility]    History and Physical - Hospitalist Service       Date of Admission:  11/15/2022    Assessment & Plan      Aspen Mccullough is a 62 year old female who we have lupus currently not on any steroids, chronic pain syndrome and follows with pain clinic, hypertension, hyperlipidemia, hypothyroidism, chronic pain syndrome, morbid obesity, history of COVID-19 in 2022, chronic lower extremity edema with chronic venous congestion who presented to the ED with a chief complaint of shortness of breath and vomiting.      Sepsis with Acute hypoxic Respiratory failure due to community-acquired pneumonia with RSV infection  History of COVID-19 in January 2022  -On presentation to the ED presented to the ED with nausea, vomiting, shortness of breath, fever, cough with yellow-colored sputum production and was diagnosed with  bronchitis at urgent care center on November 11  -She has elevated WBC count of 16.3 with neutrophilic predominance, tachycardia and was requiring 2 to 3 L of oxygen  -Blood cultures were done in the ED and we are not able to obtain the records of her prior influenza and we will test her for COVID-19, influenza and RSV  -She had the chest x-ray done in the ED and was reviewed by myself and does not show any evidence of pleural effusion or pneumonia but changes are consistent with development of early pneumonia  -CTA chest has been ordered and showsNo pulmonary emboli identified.  Patchy foci of groundglass infiltrate scattered throughout both lungs most suggestive of atypical pneumonia. There is also mild inflammatory bronchial wall thickening  - COVID 19 was negative , influenza was negative and she has positive for RSV  -We will check for sputum culture, strep pneumococcal antigen  -Patient was started on ceftriaxone and azithromycin in the ED and will continue the same and monitor her closely, prn cough syrup and wean from oxygen     Nausea , vomiting and abdominal pain  (resolved)  - Lipase is normal   - On exam has no guarding or rigidity and can be due to viral and will treat with n/v medications and monitor    Prior COVID-19 diagnosis  - Diagnosed with COVID-19 on emergency department visit 1/18/2022.  Seen again in urgent care with persistent cough 1/29/2022 and discharged with doxycycline and prednisone for overlap pneumonia.  Hospitalized 2/17/2022 at [facility]  - she at that time and in august 2022 had predominantly right sided opacities        Hypokalemia  - Potassium is 2.7 and we will start her on replacement protocol     History of right lower extremity DVT and pulmonary embolism in 2017  -CTA chest was done and does not show any evidence of  pulmonary embolism and she has not taken xeralto in many days given her nausea and vomiting and will prefer injections and will order lovenox 1 mg/kg bid and when she is better then we can start her on xeralto    Hypertension  - we will continue with PTA Metoprolol and cardizem    Hyperlipidemia  -We will continue with PTA atorvastatin 40 mg daily     History of lupus with chronic steroids use  -She used to be on prednisone for 30 years and tapered herself off earlier this year  -of Note I did review her prior notes from August 2022 and she at that time was admitted to the hospital for hypoxia and pulmonary was consulted as there was concern about lupus pneumonitis and she was seen by pulmonary in august 2022 and was supposed to follow with [facility] as outpatient   - She also needs to follow with rheumatology (Her previous rheumatologist Dr. Field has retired)    P  Atrial fibrillation  -Follows as outpatient with that cardiology and I reviewed the note from Dr. Muñoz dated 10/26/2022 and continue the patient with the metoprolol, diltiazem and anticoagulation with  lovenox 1 mg bid for now and can be transitioned to oral PTA xeralto    Chronic lower extremity edema due to chronic venous congestion  -  "Noted    Hypothyroidism  -We will continue the patient with PTA Synthroid     Chronic pain syndrome  Fibromyalgia  -Follows with pain clinic for injections, on longstanding MS Contin and oral Dilaudid and gabapentin  - we will continue with MS Contin 90 mg every morning and evening, 60 mg in the afternoon as per home regimen  -Continue gabapentin 1100 mg every morning, 1100 mg in the afternoon, 800 mg at bedtime  - will hold  oral Dilaudid 8 mg up to 3 times daily as needed for now     History of CVA  -PTA Xarelto for  Anticoagulation and we will continue with subcutaneous lovenox for now   -Continue prior to admission atorvastatin    Severe Obesity: Estimated body mass index is 42.09 kg/m  as calculated from the following:    Height as of this encounter: 1.753 m (5' 9\").    Weight as of this encounter: 129.3 kg (285 lb).    ? History of Possible HFpEF  -She had the echo done during last admission on 08/09/2022 and at that time her EF was 55%, no valve disease, ascending aorta is borderline dilated at 3.8 cm  -She has been having lower extremity swelling and as per cardiology note dated 10/26/2022 continue the patient with 40 mg in the morning and 20 in the evening  -we will hold diuretics given her recent diarrhea and vomiting and sepsis        Diet:  Regular  DVT Prophylaxis: DOAC  Mina Catheter: Not present  Central Lines: None  Cardiac Monitoring: None  Code Status:  Full code    Clinically Significant Risk Factors Present on Admission        # Hypokalemia: Lowest K = 2.7 mmol/L (Ref range: 3.4-5.3) in last 2 days, will replace as needed  # Hyponatremia: Lowest Na = 133 mmol/L (Ref range: 136-145) in last 2 days, will monitor as appropriate      # Hypoalbuminemia: Lowest albumin = 2.8 g/dL (Ref range: 3.5-5.2) at 11/15/2022  6:27 PM, will monitor as appropriate  # Drug Induced Coagulation Defect: home medication list includes an anticoagulant medication        # Severe Obesity: Estimated body mass index is " "42.09 kg/m  as calculated from the following:    Height as of this encounter: 1.753 m (5' 9\").    Weight as of this encounter: 129.3 kg (285 lb).           Disposition Plan      Expected Discharge Date: 11/20/2022        Discharge Comments: clinical improvementThe patient's care was discussed with the Bedside Nurse and Patient.    Ruby Massey MD  Hospitalist Service  [facility]  Securely message with the Vocera Web Console (learn more here)  Text page via [employer] Paging/Directory     I am on admitting shift and her care in the morning will be taken over by hospital medicine team    ______________________________________________________________________    Chief Complaint     Fever, cough with yellow-colored sputum production, nausea, vomiting, resolved abdominal pain, headache    History is obtained from the patient    History of Present Illness      Aspen Mccullough is a 62 year old female who we have lupus currently not on any steroids, chronic pain syndrome and follows with pain clinic, hypertension, hyperlipidemia, hypothyroidism, chronic pain syndrome, morbid obesity, history of COVID-19 in 2022, chronic lower extremity edema with chronic venous congestion who presented to the ED with a chief complaint of shortness of breath and vomiting.  Patient was seen at urgent care in [place] on 11/10 with chief complaint of cough and she was discharged with diagnosis of bronchitis and was prescribed prednisone, albuterol and Tessalon andd presented to the ED with a chief complaint that she has been having constant vomiting ( non billous ), diarrhea  which is resolved along with subjective fever, cough with yellow-colored sputum production, fatigue and headache.  She denies any photophobia, she denies any blood in the stool or any dysuria. is sick with similar symptoms    Had lab work done in the ED which showed sodium of 133, potassium of 2.7, chloride of 99, bicarb of 26, BUN of 11 with " creatinine of 0.62 with GFR more than 90 and calcium of 8.4 and anion gap of 8 with albumin of 2.8.  Total bilirubin is 0.4 with lipase of 172.  Blood glucose of 101    WBC count of 16.3 with neutrophilic predominance, hemoglobin of 13.5 with platelet count of 337    Blood cultures were done in the ED and of note she tested positive for influenza at urgent care    She had chest x-ray done in the ED which showed Mildly enlarged cardiac silhouette, stable from prior examination. New patchy bibasilar opacities, left greater than right, could be seen with aspiration and/or developing pneumonia. No pleural effusion or pneumothorax. No acute bony  abnormality.      Patient was started on ceftriaxone and azithromycin and CTA chest was ordered which showed       Review of Systems    The 10 point Review of Systems is negative other than noted in the HPI or here.     Past Medical History    I have reviewed this patient's medical history and updated it with pertinent information if needed.   Past Medical History:   Diagnosis Date     ADHD (attention deficit hyperactivity disorder)      Allergic rhinitis      Chronic low back pain      Cushing's syndrome (H)      DDD (degenerative disc disease)      DDD (degenerative disc disease)      Deviated nasal septum      Diarrhea      Endometriosis      Fibromyalgia      Hashimoto's disease      Hyperlipidemia      Hypertension      Hypothyroid     hx hashimoto's thyroiditis     Insomnia      Lupus (H)      Malabsorption      Pernicious anemia      Renal disease     chronic renal insufficiency     Sinusitis        Past Surgical History   I have reviewed this patient's surgical history and updated it with pertinent information if needed.  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)     APPENDECTOMY       ARTHRODESIS ANKLE      right     ARTHROPLASTY KNEE BILATERAL       ARTHROPLASTY REVISION KNEE  4/19/2011     Procedure:ARTHROPLASTY REVISION KNEE; With Antibiotic Cement ; Surgeon:CHAO OLIVARES; Location:[facility]     BREAST SURGERY      right- tissue remove nipple area     BUNIONECTOMY  12/14/2011    Procedure:BUNIONECTOMY; Right Bunion Correction; Surgeon:GRACE ZARATE; Location:[facility]     CHOLECYSTECTOMY       EXAM UNDER ANESTHESIA ANUS N/A 7/15/2020    Procedure: EXAM UNDER ANESTHESIA, ANUS;  Surgeon: Luis Canales MD;  Location: [facility]     EXCISE MASS UPPER EXTREMITY  12/14/2011    Procedure:EXCISE MASS UPPER EXTREMITY; Excision of Left Arm Mass; Surgeon:GRACE ZARATE; Location:[facility]     FOOT SURGERY      left X 4     FUSION LUMBAR ANTERIOR ONE LEVEL       HEMORRHOIDECTOMY INTERNAL N/A 7/15/2020    Procedure: Exam under anesthesia, hemorrhoidectomy;  Surgeon: Luis Canales MD;  [facility]: [facility]     INJECT NERVE BLOCK SUPRASCAPULAR Left 5/18/2018    Procedure: INJECT NERVE BLOCK SUPRASCAPULAR;  Left Suprascapular Nerve Block;  Surgeon: Basim Velazquez MD;  [facility]     INJECT NERVE BLOCK SUPRASCAPULAR Right 7/23/2018    Procedure: INJECT NERVE BLOCK SUPRASCAPULAR;  Right suprascapular injection;  Surgeon: Basim Velazquez MD;  [facility]     INJECT NERVE BLOCK SUPRASCAPULAR Bilateral 10/29/2018    Procedure: Bilateral Suprascapular Nerve Blocks;  Surgeon: Basim Velazquez MD;  [facility]     INJECT NERVE BLOCK SUPRASCAPULAR Bilateral 3/15/2019    Procedure: Bilateral Suprascapular Nerve Block;  Surgeon: Basim Velazquez MD;  [facility]     INJECT NERVE BLOCK SUPRASCAPULAR Bilateral 12/31/2019    Procedure: bilateral suprascapular nerve block;  Surgeon: Basim Velazquez MD;  [facility]     INJECT NERVE BLOCK SUPRASCAPULAR Bilateral 8/3/2021    Procedure: bilateral suprascapular nerve block;  Surgeon: Basim Velazquez MD;  [facility]     IR ENDOVENOUS ABLATION VARICOSE VEINS  10/5/2021     IR ENDOVENOUS ABLATION VARICOSE VEINS   10/26/2021     KNEE SURGERY      see list which we will bring     LAMINECTOMY LUMBAR ONE LEVEL      L4-5     LAPAROSCOPIC ABLATION ENDOMETRIOSIS       WA HAND/FINGER SURGERY UNLISTED  surgeries on both hands with Dr. Shankar     WA SPINE SURGERY PROCEDURE UNLISTED      see list which we will bring     WA STOMACH SURGERY PROCEDURE UNLISTED  gall bladder removal     RADIO FREQUENCY ABLATION PULSED CERVICAL Bilateral 7/10/2019    Procedure: Bilateral Suprascapular Nerve Pulsed Radiofrequency Ablation;  Surgeon: Basim Velazquez MD;  [facility]     REMOVE HARDWARE FOOT  2011    Procedure:REMOVE HARDWARE FOOT; Hardware Removal Right Foot (Mini-C-Arm) ; Surgeon:GRACE ZARATE; Location:[facility]     RHINOPLASTY       SALPINGO-OOPHORECTOMY BILATERAL       TONSILLECTOMY       ZZC STOMACH SURGERY PROCEDURE UNLISTED      see list which we will bring       Social History   I have reviewed this patient's social history and updated it with pertinent information if needed.  Social History     Tobacco Use     Smoking status: Former     Packs/day: 1.50     Years: 20.00     Pack years: 30.00     Types: Cigarettes     Start date: 1973     Quit date: 1993     Years since quittin.8     Smokeless tobacco: Never     Tobacco comments:     wish I never started   Substance Use Topics     Alcohol use: No     Alcohol/week: 0.0 standard drinks     Drug use: No       Family History   I have reviewed this patient's family history and updated it with pertinent information if needed.  Family History   Problem Relation Age of Onset     Hypertension Mother      No Known Problems Father      No Known Problems Sister      No Known Problems Brother      No Known Problems Maternal Grandmother      No Known Problems Maternal Grandfather      No Known Problems Paternal Grandmother      No Known Problems Other        Prior to Admission Medications   Prior to Admission Medications   Prescriptions Last Dose Informant Patient  Reported? Taking?   BIOTIN FORTE PO Past Week Self Yes Yes   Sig: Take 1 tablet by mouth daily    Elastic Bandages & Supports (MEDICAL COMPRESSION SOCKS) MISC  Self No No   Si Package daily Please measure and distribute 2 pair of 20mmHg - 30mmHg THIGH high open or closed toe compression stockings with extra refills as indicated. Jobst ultrasheer or equivalent.   HYDROmorphone (DILAUDID) 8 MG tablet Past Week Self Yes Yes   Sig: Take 8 mg by mouth 3 times daily . Max of 3 doses per day.   NASONEX 50 MCG/ACT spray  Self Yes Yes   Sig: Spray 1 spray into both nostrils daily as needed    atorvastatin (LIPITOR) 40 MG tablet Past Week Self No Yes   Sig: Take 1 tablet (40 mg) by mouth every evening   carboxymethylcellulose (REFRESH PLUS) 0.5 % SOLN ophthalmic solution as needed Self Yes Yes   Sig: Place 1 drop into both eyes 2 times daily as needed    cyanocobalamin 1000 MCG/ML injection Past Week Self No Yes   Sig: Inject 1 mL (1,000 mcg) Subcutaneous every 7 days   cycloSPORINE (RESTASIS) 0.05 % ophthalmic emulsion as needed Self Yes Yes   Sig: Place 1 drop into both eyes 2 times daily as needed    cyclobenzaprine (FLEXERIL) 10 MG tablet Past Week Self No Yes   Sig: Take 1 tablet (10 mg) by mouth 3 times daily   diltiazem ER COATED BEADS (CARDIZEM CD/CARTIA XT) 180 MG 24 hr capsule Past Week Self Yes Yes   Sig: Take 180 mg by mouth 2 times daily   fexofenadine (ALLEGRA) 180 MG tablet as needed Self Yes Yes   Sig: Take 180 mg by mouth daily as needed    furosemide (LASIX) 20 MG tablet Past Week Self No Yes   Sig: Take 1 tablet (20 mg) by mouth every evening   furosemide (LASIX) 40 MG tablet Past Week Self No Yes   Sig: Take 1 tablet (40 mg) by mouth every morning Future refills by PCP Dr. Arcadio Farfan with phone number [phone].   gabapentin (NEURONTIN) 300 MG capsule Past Week Self Yes Yes   Sig: Take 300 mg by mouth 2 times daily (morning and afternoon) with 800mg tablet (total dose 1100mg)   gabapentin  (NEURONTIN) 800 MG tablet Past Week Self Yes Yes   Sig: Take 800 mg by mouth 2 times daily (morning and afternoon) in addition to 300mg capsule (total dose 1100mg)   gabapentin (NEURONTIN) 800 MG tablet Past Week Self Yes Yes   Sig: Take 800 mg by mouth At Bedtime    levothyroxine (TIROSINT) 100 MCG capsule Past Week Self Yes Yes   Sig: Take 300 mcg by mouth daily   lifitegrast (XIIDRA) 5 % opthalmic solution Past Week Self Yes Yes   Sig: Place 1 drop into both eyes 2 times daily   metoprolol succinate ER (TOPROL XL) 25 MG 24 hr tablet Past Week Self No Yes   Sig: Take 3 tablets (75 mg) by mouth 2 times daily NOTE this is a DECREASE in dose than prior to hospital [STOP prior metoprolol]   morphine (MS CONTIN) 30 MG 12 hr tablet Past Week Self Yes Yes   Sig: Take 30 mg by mouth 2 times daily (morning and night) in addition to 60 mg tablet for a total dose of 90mg.   morphine (MS CONTIN) 60 MG 12 hr tablet Past Week Self Yes Yes   Sig: Take 60 mg by mouth 3 times daily (morning and night) in addition to 30mg tablet for a total dose of 90mg   multivitamin w/minerals (THERA-VIT-M) tablet Past Week Self Yes Yes   Sig: Take 1 tablet by mouth daily    prednisoLONE acetate (PRED FORTE) 1 % ophthalmic susp Past Week Self Yes Yes   Sig: Place 1 drop into both eyes 2 times daily    rivaroxaban ANTICOAGULANT (XARELTO) 20 MG TABS tablet Past Week Self Yes Yes   Sig: Take 1 tablet (20 mg) by mouth daily (with dinner)   zolpidem (AMBIEN) 5 MG tablet as needed Self Yes Yes   Sig: Take 5 mg by mouth nightly as needed for sleep      Facility-Administered Medications: None     Allergies   Allergies   Allergen Reactions     Blood Transfusion Related (Informational Only) Other (See Comments)     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      Chlorhexidine Hives     Thor surgical cleanser     Codeine Hives     Has tolerated Oxycodone in past      Ibuprofen [Nsaids] Hives     Penicillins Hives     Other reaction(s): Unknown      Sulfa Drugs Hives     Other reaction(s): Unknown     Calcium Channel Blockers      Doxycycline GI Disturbance     Emesis & diarrhea  Patient denies allergy to this med     Hydroxyzine      rash     Mold        Physical Exam   Vital Signs: Temp: 98  F (36.7  C) Temp src: Temporal BP: (!) 169/78 Pulse: 101   Resp: 17 SpO2: 93 % O2 Device: Nasal cannula Oxygen Delivery: 3 LPM  Weight: 285 lbs 0 oz        General: Patient appears comfortable and in no acute distress.  HEENT: Head is atraumatic, normocephalic.  Pupils are equal, round and reactive to light.  No scleral icterus. Oral mucosa is moist   Neck: Neck is supple and No Lymphadenopathy   Respiratory: Lungs are clear to auscultation bilaterally with no wheeze or crackles   Cardiovascular: Regular rate , S1 and S2 normal with no murmer or rubs or gallops  Abdomen:   soft , non tender , non distended and bowel sound present   Skin: No skin rashes or lesions to inspection or palpation.  Neurologic: Higher functions are within normal limits. No obvious defects in speech, language and memory. No facial droop  Musculoskeletal: Normal Range of motion over upper and lower extremities bilaterally   Psychiatric: cooperative     Data   Data reviewed today: I reviewed all medications, new labs and imaging results over the last 24 hours. I personally reviewed the chest CT image(s) showing No pe.Patchy foci of groundglass infiltrate scattered throughout both lungs most suggestive of atypical pneumonia. There is also mild inflammatory bronchial wall thickening       chest x-ray done in the ED which showed Mildly enlarged cardiac silhouette, stable from prior examination. New patchy bibasilar opacities, left greater than right, could be seen with aspiration and/or developing pneumonia. No pleural effusion or pneumothorax. No acute bony  abnormality    Recent Labs   Lab 11/15/22  1827   WBC 16.3*   HGB 13.5   MCV 84         POTASSIUM 2.7*   CHLORIDE 99   CO2 26    BUN 11   CR 0.62   ANIONGAP 8   KYLIE 8.4*   *   ALBUMIN 2.8*   PROTTOTAL 7.1   BILITOTAL 0.4   ALKPHOS 90   ALT 16   AST 15   LIPASE 172     Most Recent 3 CBC's:Recent Labs   Lab Test 11/15/22  1827 08/10/22  0618 08/09/22  0548   WBC 16.3* 7.4 8.4   HGB 13.5 11.7 10.9*   MCV 84 90 89    289 290     Most Recent 3 BMP's:Recent Labs   Lab Test 11/15/22  1827 08/10/22  0618 08/09/22  0548    136 138   POTASSIUM 2.7* 3.8 3.7   CHLORIDE 99 101 102   CO2 26 29 31   BUN 11 11 16   CR 0.62 0.78 0.83   ANIONGAP 8 6 5   KYLIE 8.4* 9.3 9.3   * 104* 105*     Most Recent 2 LFT's:Recent Labs   Lab Test 11/15/22  1827 08/09/22  0548   AST 15 16   ALT 16 16   ALKPHOS 90 97   BILITOTAL 0.4 0.2     16.3 (H)    \    13.5    /    337   N 75    L N/A    133    99    11 /   ------------------------------------ 101 (H)   ALT 16   AST 15   AP 90   ALB 2.8 (L)   Ca 8.4 (L)  2.7 (L)    26    0.62 \    % RETIC N/A    LDH N/A  Troponin N/A    BNP N/A    CK N/A  INR N/A   PTT N/A    D-dimer N/A    Fibrinogen N/A    Antithrombin N/A  Ferritin N/A  CRP 64.0 (H)    IL-6 N/A  Recent Results (from the past 24 hour(s))   Chest XR,  PA & LAT    Narrative    EXAM: XR CHEST 2 VIEWS  LOCATION: [facility]  DATE/TIME: 11/15/2022 7:03 PM    INDICATION: cough.  COMPARISON: CT 09/06/2022, radiograph 08/08/2022.      Impression    IMPRESSION: Mildly enlarged cardiac silhouette, stable from prior examination. New patchy bibasilar opacities, left greater than right, could be seen with aspiration and/or developing pneumonia. No pleural effusion or pneumothorax. No acute bony   abnormality.   CT Chest Pulmonary Embolism w Contrast    Narrative    EXAM: CT CHEST PULMONARY EMBOLISM W CONTRAST  LOCATION: [facility]  DATE/TIME: 11/15/2022 11:07 PM    INDICATION: tachycardic dyspnea history of clots  COMPARISON: 9/6/2022 and 2/17/2022  TECHNIQUE: CT chest pulmonary angiogram during  arterial phase injection of IV contrast. Multiplanar reformats and MIP reconstructions were performed. Dose reduction techniques were used.   CONTRAST: 83mL Isovue 370    FINDINGS:  ANGIOGRAM CHEST: Pulmonary arteries are normal caliber and negative for pulmonary emboli. Thoracic aorta is negative for dissection. No CT evidence of right heart strain.    LUNGS AND PLEURA: New patchy foci of groundglass opacity present bilaterally most consistent with atypical pneumonia, viral pneumonia should be considered. There is also inflammatory bronchial wall thickening diffusely. Focal atelectasis seen medially at   left lung base.    MEDIASTINUM/AXILLAE: Normal.    CORONARY ARTERY CALCIFICATION: None.    UPPER ABDOMEN: Cholecystectomy.    MUSCULOSKELETAL: Unremarkable.      Impression    IMPRESSION:  1.  No pulmonary emboli identified.  2.  Patchy foci of groundglass infiltrate scattered throughout both lungs most suggestive of atypical pneumonia. There is also mild inflammatory bronchial wall thickening.

## 2022-11-16 NOTE — ED PROVIDER NOTES
History   Chief Complaint:  Shortness of Breath and Vomiting     The history is provided by the patient.      Aspen Mccullough is a 62 year old female who presents with her  for shortness of breath and vomiting. Per Chart Review, patient was seen at [facility] on 11/10 for a 4 day long viral URI cough. She was discharged with bronchitis and prescribed benzonatate, predniSONE, and albuterol HFA. Today, patient reports she was unable to keep any food down with constant vomiting, diarrhea (now resolved), cough with yellow phlegm, fever, chest pain, shortness of breath, fatigue, nausea, abdominal pain, and a headache. She has not taken any medications for her symptoms. She is not on oxygen as well. Nonetheless, patient denies having any neck pain, back pain, or dysuria. She does not normally use a walker or cane to walk. In addition, denies history COPD or lung disease. Patient states she tested positive for influenza at outside facility recently.     Of note, in the past, she has had blood clots in both her lungs and also on the right side of her heart.     Review of Systems   Constitutional: Positive for fatigue and fever.   HENT: Negative for congestion and rhinorrhea.    Eyes: Negative for pain and visual disturbance.   Respiratory: Positive for cough and shortness of breath.    Cardiovascular: Positive for chest pain.   Gastrointestinal: Positive for abdominal pain, diarrhea (resolved), nausea and vomiting.   Genitourinary: Negative for dysuria and hematuria.   Musculoskeletal: Negative for back pain and neck pain.   Skin: Negative for color change and pallor.   Neurological: Positive for headaches.   Psychiatric/Behavioral: Negative for agitation and behavioral problems.   All other systems reviewed and are negative.    Allergies:  Blood Transfusion Related  Chlorhexidine  Codeine  Ibuprofen [Nsaids]  Penicillins  Sulfa Drugs  Calcium Channel  Blockers  Doxycycline  Hydroxyzine  Mold    Medications:  atorvastatin   benzonatate   Biotin forte  carboxymethylcellulose   cyclobenzaprine  fexofenadine  furosemide   gabapentin   guaiFENesin-dextromethorphan  HYDROmorphone  lifitegrast  metoprolol succinate   morphine   rivaroxaban   triosint  zolpidem   zoster vaccine recombinant adjuvanted    Past Medical History:     Generalized osteoarthrosis, involving multiple sites  CRPS, lower limb  Fibromyalgia  Somatoform disorder  Histrionic personality  Encounter for long-term use of opiate analgesic  Knee joint replacement by other means  Hypothyroidism  Insomnia, unspecified type  Hyperlipidemia  Hashimoto's thyroiditis  Glucocorticoid deficiency  Posttraumatic stress disorder  Impingement syndrome of left shoulder  Primary osteoarthritis involving multiple joints  Attention deficit disorder  Allergic rhinitis  Cushing's syndrome   Endometriosis  Essential hypertension  Systemic lupus erythematosus   cervical spinal fusion  Paroxysmal atrial fibrillation   Nonischemic cardiomyopathy   DVT  pulmonary embolism  Adrenal cortical steroids   Alopecia areata  Anemia, blood loss  ARF  Arthritis, septic   Chronic ethmoidal sinusitis  Chronic maxillary sinusitis  Deviated nasal septum  Complications due to internal joint prosthesis   Iatrogenic hyperthyroidism  Lipoma of skin and subcutaneous tissue  Malabsorption  Nasal fracture  Nasal turbinate hypertrophy  Opioid type dependence   Other specified abnormal findings of blood chemistry  Other acute postoperative pain  Pain in joint, lower leg  Postoperative hypotension  S/P total knee replacement  Thrombus of right atrial appendage without antecedent myocardial infarction  Chronic pain of both shoulders  GI bleed  Grade III internal hemorrhoids  Atrial fibrillation with RVR   Acute deep vein thrombosis (DVT) of proximal vein of right lower extremity   Pneumonia of left upper lobe due to infectious organism  Morbid obesity  "  CVA  Neuropathy of left suprascapular nerve  Neuropathy of right suprascapular nerve    BMI   Chronic anticoagulation  Controlled substance agreement signed  Craniofacial hyperhidrosis  Current chronic use of systemic steroids  DNR  Hypertonic bladder  Hypo-osmolality and hyponatremia  Lumbosacral radiculopathy at S1  Refusal of blood transfusions as patient is Hinduism  Unspecified fall, initial encounter  Unspecified injury of shoulder and upper arm, unspecified arm, initial encounter  Chronic pain disorder  Hemoptysis  COVID-19 virus infection  Acute respiratory failure with hypoxia   Edema  ischemic stroke  Personal history of COVID-19  Pressure injury of sacral region, unstageable     Past Surgical History:    Amputation   Appendectomy  Arthrodesis ankle, right  Arthroplasty knee bilateral  Arthroplasty revision knee  Breast surgery  Bunionectomy  Cholecystectomy  Exam under anesthesia anus  Excise mass upper extremity  Foot surgery (x4)  Fusion lumbar anterior one level  Hemorrhoidectomy internal  Inject nerve block suprascapular (x6)  IR endovenous ablation varicose veins (x2)  Knee surgery  Laminectomy lumbar one level  Laparoscopic ablation endometriosis  NC hang/finger surgery  NC spine surgery procedure  NC stomach surgery procedure  Radio frequency ablation pulsed cervical  Remove hardware foot  Rhinoplasty  Salpingo-oophorectomy bilateral  Tonsillectomy  Stomach surgery    Family History:    Mother: Hypertension    Social History:  The patient presents to the ED with her .  PCP: Arcadio Farfan     Physical Exam     Patient Vitals for the past 24 hrs:   BP Temp Temp src Pulse Resp SpO2 Height Weight   11/16/22 0012 -- -- -- -- -- 97 % -- --   11/15/22 2214 -- -- -- -- -- 96 % -- --   11/15/22 1823 (!) 169/78 -- -- 101 -- 93 % -- --   11/15/22 1818 -- 98  F (36.7  C) Temporal -- 17 -- 1.753 m (5' 9\") 129.3 kg (285 lb)     Physical Exam  Constitutional:       Appearance: Normal " appearance.   HENT:      Head: Normocephalic and atraumatic.   Eyes:      Extraocular Movements: Extraocular movements intact.      Conjunctiva/sclera: Conjunctivae normal.   Cardiovascular:      Rate and Rhythm: Tachycardic rate and regular rhythm.   Pulmonary:      Effort: Pulmonary effort is labored with frequent coughing. Not in respiratory distress.      Breath sounds: Coarse breath sounds bilaterally with crackles.  No wheezes.  Abdominal:      General: Abdomen is flat. There is no distension.      Palpations: Abdomen is soft.      Tenderness: There is no abdominal tenderness.   Musculoskeletal:      Cervical back: Normal range of motion. No rigidity.       Right lower leg: No edema.      Left lower leg: No edema.   Skin:     General: Skin is warm and dry.   Neurological:      General: No focal deficit present.      Mental Status: Alert and oriented to person, place, and time.   Psychiatric:         Mood and Affect: Mood normal.         Behavior: Behavior normal.     Emergency Department Course     Imaging:  CT Chest Pulmonary Embolism w Contrast   Final Result   IMPRESSION:   1.  No pulmonary emboli identified.   2.  Patchy foci of groundglass infiltrate scattered throughout both lungs most suggestive of atypical pneumonia. There is also mild inflammatory bronchial wall thickening.      Chest XR,  PA & LAT   Final Result   IMPRESSION: Mildly enlarged cardiac silhouette, stable from prior examination. New patchy bibasilar opacities, left greater than right, could be seen with aspiration and/or developing pneumonia. No pleural effusion or pneumothorax. No acute bony    abnormality.        Report per radiology    Laboratory:  Labs Ordered and Resulted from Time of ED Arrival to Time of ED Departure   COMPREHENSIVE METABOLIC PANEL - Abnormal       Result Value    Sodium 133      Potassium 2.7 (*)     Chloride 99      Carbon Dioxide (CO2) 26      Anion Gap 8      Urea Nitrogen 11      Creatinine 0.62      Calcium  8.4 (*)     Glucose 101 (*)     Alkaline Phosphatase 90      AST 15      ALT 16      Protein Total 7.1      Albumin 2.8 (*)     Bilirubin Total 0.4      GFR Estimate >90     CBC WITH PLATELETS AND DIFFERENTIAL - Abnormal    WBC Count 16.3 (*)     RBC Count 4.79      Hemoglobin 13.5      Hematocrit 40.1      MCV 84      MCH 28.2      MCHC 33.7      RDW 14.6      Platelet Count 337      % Neutrophils 75      % Lymphocytes 15      % Monocytes 6      % Eosinophils 0      % Basophils 1      % Immature Granulocytes 3      NRBCs per 100 WBC 0      Absolute Neutrophils 12.4 (*)     Absolute Lymphocytes 2.4      Absolute Monocytes 0.9      Absolute Eosinophils 0.1      Absolute Basophils 0.1      Absolute Immature Granulocytes 0.4      Absolute NRBCs 0.0     INFLUENZA A/B & SARS-COV2 PCR MULTIPLEX - Abnormal    Influenza A PCR Negative      Influenza B PCR Negative      RSV PCR Positive (*)     SARS CoV2 PCR Negative     CRP INFLAMMATION - Abnormal    CRP Inflammation 64.0 (*)    ISTAT GASES LACTATE VENOUS POCT - Abnormal    Lactic Acid POCT 0.7      Bicarbonate Venous POCT 24      O2 Sat, Venous POCT 85 (*)     pCO2V Venous POCT 35 (*)     pH Venous POCT 7.45 (*)     pO2 Venous POCT 47     LIPASE - Normal    Lipase 172     UA MACROSCOPIC WITH REFLEX TO MICRO AND CULTURE   PROCALCITONIN   BLOOD CULTURE   BLOOD CULTURE   MRSA MSSA PCR, NASAL SWAB   RESPIRATORY AEROBIC BACTERIAL CULTURE   STREPTOCOCCUS PNEUMONIAE ANTIGEN     Emergency Department Course:     Reviewed:  I reviewed nursing notes, vitals, past medical history, Care Everywhere and [facility]    Assessments/Consults:  2118 I obtained history and examined the patient as noted above.   2145 I rechecked the patient and explained findings.    ED Course as of 11/16/22 0256   Tue Nov 15, 2022   2114 Chest XR,  PA & LAT  Mildly enlarged cardiac silhouette, stable from prior examination. New patchy bibasilar opacities, left greater than right, could be seen with aspiration and/or  developing pneumonia. No pleural effusion or pneumothorax. No acute bony   abnormality.   2231 I consulted with Dr. Massey who accepts admit.   2233 Discussed patient with Dr. Massey (hospitalist) who will admit patient under MedSurg with telemetry   2327 Resp Syncytial Virus(!): Positive   2348 CT Chest Pulmonary Embolism w Contrast  Patchy ground glass opacities     Interventions:  Medications   acetaminophen (TYLENOL) tablet 650 mg (has no administration in time range)   sodium chloride 0.9% infusion ( Intravenous New Bag 11/16/22 0102)   enoxaparin ANTICOAGULANT (LOVENOX) injection 135 mg (135 mg Subcutaneous Given 11/16/22 0103)   atorvastatin (LIPITOR) tablet 40 mg (has no administration in time range)   carboxymethylcellulose PF (REFRESH PLUS) 0.5 % ophthalmic solution 1 drop (has no administration in time range)   cyclobenzaprine (FLEXERIL) tablet 10 mg (10 mg Oral Given 11/16/22 0102)   fexofenadine (ALLEGRA) tablet 180 mg (has no administration in time range)   multivitamin w/minerals (THERA-VIT-M) tablet 1 tablet (has no administration in time range)   artificial tears (GENTEAL) 0.1-0.2-0.3 % ophthalmic solution 1 drop (has no administration in time range)   levothyroxine (SYNTHROID/LEVOTHROID) tablet 300 mcg (has no administration in time range)   prednisoLONE acetate (PRED FORTE) 1 % ophthalmic susp 1 drop (has no administration in time range)   metoprolol succinate ER (TOPROL XL) 24 hr tablet 75 mg (has no administration in time range)   diltiazem ER COATED BEADS (CARDIZEM CD/CARTIA XT) 24 hr capsule 180 mg (has no administration in time range)   gabapentin (NEURONTIN) tablet 800 mg (800 mg Oral Given 11/16/22 0101)   fluticasone (FLONASE) 50 MCG/ACT spray 2 spray (has no administration in time range)   morphine (MS CONTIN) 12 hr tablet 90 mg (has no administration in time range)   gabapentin (NEURONTIN) tablet 800 mg (has no administration in time range)     And   gabapentin (NEURONTIN) capsule 300 mg  (has no administration in time range)   0.9% sodium chloride BOLUS (0 mLs Intravenous Stopped 11/15/22 182)   ondansetron (ZOFRAN) injection 4 mg (4 mg Intravenous Given 11/15/22 170)   cefTRIAXone (ROCEPHIN) 1 g vial to attach to  mL bag for ADULTS or NS 50 mL bag for PEDS (0 g Intravenous Stopped 11/15/22 2227)   azithromycin (ZITHROMAX) 500 mg vial to attach to  mL bag (0 mg Intravenous Stopped 22)   potassium chloride 10 mEq in 100 mL sterile water infusion (0 mEq Intravenous Stopped 22)   iopamidol (ISOVUE-370) solution 83 mL (83 mLs Intravenous Given 11/15/22 2259)   Saline Flush (100 mLs Intravenous Given 11/15/22 2300)     Disposition:  The patient was admitted to the hospital under the care of Dr. Bryson MD from Hospitalist.    Impression & Plan     CMS Diagnoses: None.    Medical Decision Makin-year-old female as described above presents to the emergency department with dehydration, nausea, vomiting, and worsening viral syndrome after recently diagnosed with influenza on  at urgent care.  Patient hemodynamically stable at time evaluation.  Afebrile.  Patient is tachycardic with mild increased work of breathing.  Coarse breath sounds bilaterally.  Triage orders demonstrates likely secondary pneumonia.  Patient was placed on 3 L nasal cannula by EMS, however, unclear what patient's oxygen saturation was at on room air at home.  Patient was brought in by ambulance.  Patient poorly tolerating ambulating and standing in room, but saturation lowest at 91% with sitting and standing.  Will initiate IV antibiotics for treatment for secondary bacterial pneumonia after influenza a given worsening symptoms and chest x-ray.  Replete potassium for hypokalemia.  Blood cultures.  Patient had hives with penicillin, but willing to trial Rocephin with drug monitoring.  Will administer Rocephin and azithromycin for community acquired pneumonia.  CT PE given history of DVT  and patient is tachycardic with increased work of breathing.  We will also have better visualization of patient's pneumonia.  Patient will require inpatient admission.  Discussed care plan with patient who voiced understanding and agreement with plan.  Answered all questions.  Additional work-up and orders as listed in chart.     Please refer to ED course above for details on the patient's treatment course and any changes or updates in care plan beyond my initial evaluation and MDM.    Diagnosis:    ICD-10-CM    1. Pneumonia due to infectious organism, unspecified laterality, unspecified part of lung  J18.9       2. RSV infection  B33.8         Scribe Disclosure:  I, Anabella Negro, am serving as a scribe at 9:28 PM on 11/15/2022 to document services personally performed by Dimitri Saravia DO based on my observations and the provider's statements to me.        Dimitri Saravia DO  11/16/22 0256

## 2022-11-16 NOTE — ED NOTES
[facility]  ED Nurse Handoff Report    ED Chief complaint: Shortness of Breath and Vomiting      ED Diagnosis:   Final diagnoses:   Pneumonia due to infectious organism, unspecified laterality, unspecified part of lung       Code Status: Full Code    Allergies:   Allergies   Allergen Reactions     Blood Transfusion Related (Informational Only) Other (See Comments)     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      Chlorhexidine Hives     Thor surgical cleanser     Codeine Hives     Has tolerated Oxycodone in past      Ibuprofen [Nsaids] Hives     Penicillins Hives     Other reaction(s): Unknown     Sulfa Drugs Hives     Other reaction(s): Unknown     Calcium Channel Blockers      Doxycycline GI Disturbance     Emesis & diarrhea  Patient denies allergy to this med     Hydroxyzine      rash     Mold        Patient Story: 62-year-old female as described above presents to the emergency department with dehydration, nausea, vomiting, and worsening viral syndrome after recently diagnosed with influenza on November 11 at urgent care.  Patient hemodynamically stable at time evaluation.  Afebrile.  Patient is tachycardic with mild increased work of breathing.  Coarse breath sounds bilaterally.  Triage orders demonstrates likely secondary pneumonia.  Patient was placed on 3 L nasal cannula by EMS, however, unclear what patient's oxygen saturation was at on room air at home.  Patient was brought in by ambulance.  Patient poorly tolerating ambulating and standing in room, but saturation lowest at 91% with sitting and standing.  Will initiate IV antibiotics for treatment for secondary bacterial pneumonia after influenza a given worsening symptoms and chest x-ray.  Replete potassium for hypokalemia.  Blood cultures.  Patient had hives with penicillin, but willing to trial Rocephin with drug monitoring.  Will administer Rocephin and azithromycin for community acquired pneumonia.  CT PE given history of  "DVT and patient is tachycardic with increased work of breathing.  We will also have better visualization of patient's pneumonia.  Patient will require inpatient admission.  Discussed care plan with patient who voiced understanding and agreement with plan.  Answered all questions  Focused Assessment:  Pt has sob    Treatments and/or interventions provided: iv, meds ,labs ,ct   Patient's response to treatments and/or interventions: tolerated     To be done/followed up on inpatient unit:      Does this patient have any cognitive concerns?:     Activity level - Baseline/Home:  Stand with Assist  Activity Level - Current:   Stand with Assist    Patient's Preferred language: English   Needed?: No    Isolation: None  Infection: Not Applicable  Patient tested for COVID 19 prior to admission: YES  Bariatric?: No    Vital Signs:   Vitals:    11/15/22 1818 11/15/22 1823   BP:  (!) 169/78   Pulse:  101   Resp: 17    Temp: 98  F (36.7  C)    TempSrc: Temporal    SpO2:  93%   Weight: 129.3 kg (285 lb)    Height: 1.753 m (5' 9\")        Cardiac Rhythm:     Was the PSS-3 completed:   Yes  What interventions are required if any?               Family Comments:   OBS brochure/video discussed/provided to patient/family: Yes              Name of person given brochure if not patient:               Relationship to patient:     For the majority of the shift this patient's behavior was Green.   Behavioral interventions performed were .    ED NURSE PHONE NUMBER: [phone]         "

## 2022-11-17 LAB
ANION GAP SERPL CALCULATED.3IONS-SCNC: 9 MMOL/L (ref 3–14)
BUN SERPL-MCNC: 14 MG/DL (ref 7–30)
CALCIUM SERPL-MCNC: 8.8 MG/DL (ref 8.5–10.1)
CHLORIDE BLD-SCNC: 102 MMOL/L (ref 94–109)
CO2 SERPL-SCNC: 25 MMOL/L (ref 20–32)
CREAT SERPL-MCNC: 0.65 MG/DL (ref 0.52–1.04)
ERYTHROCYTE [DISTWIDTH] IN BLOOD BY AUTOMATED COUNT: 15.2 % (ref 10–15)
GFR SERPL CREATININE-BSD FRML MDRD: >90 ML/MIN/1.73M2
GLUCOSE BLD-MCNC: 93 MG/DL (ref 70–99)
HCT VFR BLD AUTO: 33.6 % (ref 35–47)
HGB BLD-MCNC: 11 G/DL (ref 11.7–15.7)
MAGNESIUM SERPL-MCNC: 2.2 MG/DL (ref 1.6–2.3)
MCH RBC QN AUTO: 28.4 PG (ref 26.5–33)
MCHC RBC AUTO-ENTMCNC: 32.7 G/DL (ref 31.5–36.5)
MCV RBC AUTO: 87 FL (ref 78–100)
PHOSPHATE SERPL-MCNC: 2.8 MG/DL (ref 2.5–4.5)
PLATELET # BLD AUTO: 327 10E3/UL (ref 150–450)
POTASSIUM BLD-SCNC: 3.4 MMOL/L (ref 3.4–5.3)
RBC # BLD AUTO: 3.87 10E6/UL (ref 3.8–5.2)
SODIUM SERPL-SCNC: 136 MMOL/L (ref 133–144)
WBC # BLD AUTO: 17.1 10E3/UL (ref 4–11)

## 2022-11-17 PROCEDURE — 80048 BASIC METABOLIC PNL TOTAL CA: CPT | Performed by: HOSPITALIST

## 2022-11-17 PROCEDURE — 120N000001 HC R&B MED SURG/OB

## 2022-11-17 PROCEDURE — 85027 COMPLETE CBC AUTOMATED: CPT | Performed by: HOSPITALIST

## 2022-11-17 PROCEDURE — 84100 ASSAY OF PHOSPHORUS: CPT | Performed by: HOSPITALIST

## 2022-11-17 PROCEDURE — 36415 COLL VENOUS BLD VENIPUNCTURE: CPT | Performed by: HOSPITALIST

## 2022-11-17 PROCEDURE — 99232 SBSQ HOSP IP/OBS MODERATE 35: CPT | Performed by: HOSPITALIST

## 2022-11-17 PROCEDURE — 83735 ASSAY OF MAGNESIUM: CPT | Performed by: HOSPITALIST

## 2022-11-17 PROCEDURE — 250N000013 HC RX MED GY IP 250 OP 250 PS 637: Performed by: HOSPITALIST

## 2022-11-17 PROCEDURE — 250N000011 HC RX IP 250 OP 636: Performed by: HOSPITALIST

## 2022-11-17 PROCEDURE — 250N000009 HC RX 250: Performed by: HOSPITALIST

## 2022-11-17 PROCEDURE — 250N000009 HC RX 250: Performed by: EMERGENCY MEDICINE

## 2022-11-17 PROCEDURE — 250N000012 HC RX MED GY IP 250 OP 636 PS 637: Performed by: HOSPITALIST

## 2022-11-17 PROCEDURE — 250N000011 HC RX IP 250 OP 636: Performed by: EMERGENCY MEDICINE

## 2022-11-17 PROCEDURE — 250N000013 HC RX MED GY IP 250 OP 250 PS 637: Performed by: EMERGENCY MEDICINE

## 2022-11-17 RX ORDER — NALOXONE HYDROCHLORIDE 0.4 MG/ML
0.4 INJECTION, SOLUTION INTRAMUSCULAR; INTRAVENOUS; SUBCUTANEOUS
Status: DISCONTINUED | OUTPATIENT
Start: 2022-11-17 | End: 2022-11-21 | Stop reason: HOSPADM

## 2022-11-17 RX ORDER — HYDROMORPHONE HYDROCHLORIDE 2 MG/1
4 TABLET ORAL EVERY 4 HOURS PRN
Status: DISCONTINUED | OUTPATIENT
Start: 2022-11-17 | End: 2022-11-21 | Stop reason: HOSPADM

## 2022-11-17 RX ORDER — ZOLPIDEM TARTRATE 5 MG/1
5 TABLET ORAL
Status: DISCONTINUED | OUTPATIENT
Start: 2022-11-17 | End: 2022-11-21 | Stop reason: HOSPADM

## 2022-11-17 RX ORDER — ENOXAPARIN SODIUM 150 MG/ML
1 INJECTION SUBCUTANEOUS EVERY 12 HOURS
Status: COMPLETED | OUTPATIENT
Start: 2022-11-18 | End: 2022-11-18

## 2022-11-17 RX ORDER — HYDROMORPHONE HYDROCHLORIDE 2 MG/1
2 TABLET ORAL EVERY 4 HOURS PRN
Status: DISCONTINUED | OUTPATIENT
Start: 2022-11-17 | End: 2022-11-21 | Stop reason: HOSPADM

## 2022-11-17 RX ORDER — NALOXONE HYDROCHLORIDE 0.4 MG/ML
0.2 INJECTION, SOLUTION INTRAMUSCULAR; INTRAVENOUS; SUBCUTANEOUS
Status: DISCONTINUED | OUTPATIENT
Start: 2022-11-17 | End: 2022-11-21 | Stop reason: HOSPADM

## 2022-11-17 RX ORDER — GUAIFENESIN AND DEXTROMETHORPHAN HYDROBROMIDE 600; 30 MG/1; MG/1
1 TABLET, EXTENDED RELEASE ORAL 2 TIMES DAILY
Status: DISCONTINUED | OUTPATIENT
Start: 2022-11-17 | End: 2022-11-21 | Stop reason: HOSPADM

## 2022-11-17 RX ORDER — METHYLPREDNISOLONE SODIUM SUCCINATE 40 MG/ML
40 INJECTION, POWDER, LYOPHILIZED, FOR SOLUTION INTRAMUSCULAR; INTRAVENOUS EVERY 8 HOURS
Status: DISCONTINUED | OUTPATIENT
Start: 2022-11-17 | End: 2022-11-18

## 2022-11-17 RX ADMIN — DILTIAZEM HYDROCHLORIDE 180 MG: 180 CAPSULE, COATED, EXTENDED RELEASE ORAL at 08:51

## 2022-11-17 RX ADMIN — METOPROLOL SUCCINATE 75 MG: 50 TABLET, EXTENDED RELEASE ORAL at 19:22

## 2022-11-17 RX ADMIN — DILTIAZEM HYDROCHLORIDE 180 MG: 180 CAPSULE, COATED, EXTENDED RELEASE ORAL at 19:22

## 2022-11-17 RX ADMIN — AZITHROMYCIN MONOHYDRATE 500 MG: 500 INJECTION, POWDER, LYOPHILIZED, FOR SOLUTION INTRAVENOUS at 22:41

## 2022-11-17 RX ADMIN — GUAIFENESIN 10 ML: 200 SOLUTION ORAL at 19:22

## 2022-11-17 RX ADMIN — GUAIFENESIN 10 ML: 200 SOLUTION ORAL at 02:27

## 2022-11-17 RX ADMIN — DEXTRAN 70, GLYCERIN, HYPROMELLOSE 1 DROP: 1; 2; 3 SOLUTION/ DROPS OPHTHALMIC at 19:25

## 2022-11-17 RX ADMIN — HYDROMORPHONE HYDROCHLORIDE 4 MG: 2 TABLET ORAL at 00:37

## 2022-11-17 RX ADMIN — GABAPENTIN 800 MG: 800 TABLET, FILM COATED ORAL at 22:41

## 2022-11-17 RX ADMIN — GUAIFENESIN 10 ML: 200 SOLUTION ORAL at 09:13

## 2022-11-17 RX ADMIN — METHYLPREDNISOLONE SODIUM SUCCINATE 40 MG: 40 INJECTION, POWDER, FOR SOLUTION INTRAMUSCULAR; INTRAVENOUS at 14:17

## 2022-11-17 RX ADMIN — LEVALBUTEROL HYDROCHLORIDE 1.25 MG: 1.25 SOLUTION, CONCENTRATE RESPIRATORY (INHALATION) at 09:45

## 2022-11-17 RX ADMIN — CEFTRIAXONE SODIUM 2 G: 2 INJECTION, POWDER, FOR SOLUTION INTRAMUSCULAR; INTRAVENOUS at 22:21

## 2022-11-17 RX ADMIN — HYDROMORPHONE HYDROCHLORIDE 4 MG: 2 TABLET ORAL at 05:09

## 2022-11-17 RX ADMIN — MORPHINE SULFATE 90 MG: 30 TABLET, EXTENDED RELEASE ORAL at 19:21

## 2022-11-17 RX ADMIN — ZOLPIDEM TARTRATE 5 MG: 5 TABLET ORAL at 19:22

## 2022-11-17 RX ADMIN — MORPHINE SULFATE 90 MG: 30 TABLET, EXTENDED RELEASE ORAL at 08:47

## 2022-11-17 RX ADMIN — METHYLPREDNISOLONE SODIUM SUCCINATE 40 MG: 40 INJECTION, POWDER, FOR SOLUTION INTRAMUSCULAR; INTRAVENOUS at 22:19

## 2022-11-17 RX ADMIN — GUAIFENESIN 10 ML: 200 SOLUTION ORAL at 14:02

## 2022-11-17 RX ADMIN — GABAPENTIN 800 MG: 800 TABLET, FILM COATED ORAL at 08:50

## 2022-11-17 RX ADMIN — METOPROLOL SUCCINATE 75 MG: 50 TABLET, EXTENDED RELEASE ORAL at 08:53

## 2022-11-17 RX ADMIN — MULTIPLE VITAMINS W/ MINERALS TAB 1 TABLET: TAB at 08:51

## 2022-11-17 RX ADMIN — PREDNISOLONE ACETATE 1 DROP: 10 SUSPENSION/ DROPS OPHTHALMIC at 19:27

## 2022-11-17 RX ADMIN — CYCLOBENZAPRINE 10 MG: 10 TABLET, FILM COATED ORAL at 14:02

## 2022-11-17 RX ADMIN — LEVOTHYROXINE SODIUM 300 MCG: 100 TABLET ORAL at 08:49

## 2022-11-17 RX ADMIN — ENOXAPARIN SODIUM 135 MG: 150 INJECTION SUBCUTANEOUS at 00:40

## 2022-11-17 RX ADMIN — ATORVASTATIN CALCIUM 40 MG: 40 TABLET, FILM COATED ORAL at 19:21

## 2022-11-17 RX ADMIN — CYCLOBENZAPRINE 10 MG: 10 TABLET, FILM COATED ORAL at 08:54

## 2022-11-17 RX ADMIN — PREDNISONE 40 MG: 20 TABLET ORAL at 08:48

## 2022-11-17 RX ADMIN — GABAPENTIN 300 MG: 300 CAPSULE ORAL at 08:53

## 2022-11-17 RX ADMIN — GUAIFENESIN AND DEXTROMETHORPHAN HYDROBROMIDE 1 TABLET: 600; 30 TABLET, EXTENDED RELEASE ORAL at 22:41

## 2022-11-17 RX ADMIN — GABAPENTIN 800 MG: 800 TABLET, FILM COATED ORAL at 17:16

## 2022-11-17 RX ADMIN — CYCLOBENZAPRINE 10 MG: 10 TABLET, FILM COATED ORAL at 19:22

## 2022-11-17 RX ADMIN — ENOXAPARIN SODIUM 135 MG: 150 INJECTION SUBCUTANEOUS at 14:02

## 2022-11-17 RX ADMIN — HYDROMORPHONE HYDROCHLORIDE 4 MG: 2 TABLET ORAL at 18:01

## 2022-11-17 RX ADMIN — GABAPENTIN 300 MG: 300 CAPSULE ORAL at 17:16

## 2022-11-17 ASSESSMENT — ACTIVITIES OF DAILY LIVING (ADL)
ADLS_ACUITY_SCORE: 35
ADLS_ACUITY_SCORE: 20
ADLS_ACUITY_SCORE: 35
ADLS_ACUITY_SCORE: 20
ADLS_ACUITY_SCORE: 35
ADLS_ACUITY_SCORE: 20
ADLS_ACUITY_SCORE: 35
ADLS_ACUITY_SCORE: 20
ADLS_ACUITY_SCORE: 20

## 2022-11-17 NOTE — ED NOTES
A/Ox4. VSS ex 3L NC. Frequent congested cough, Robitussin given x1. PO Dilaudid given x2 for chronic generalized pain. Ambulating IND to BSC. Denies nausea. Regular diet. IV SL, IV Abx. Plan for inpatient bed.

## 2022-11-17 NOTE — PROGRESS NOTES
SPIRITUAL HEALTH SERVICES  SPIRITUAL ASSESSMENT Progress Note  [facility]. [address] medical     REFERRAL SOURCE: Pt request for spiritual care on admission.    Check-in visit with Aspen who did not recall making a request for spiritual care.    Aspen stated her sylvester is Latter-day and was familiar with [employer] who sometimes offer support for member's in the hospital.  Pt declined support at this time.    Plan: Spiritual Health remains available at patient's request for the duration of admission.    Kami Paniagua MDiv  Staff   [facility] routine referrals *[phone]  [facility] available 24/7 for emergent requests/referrals, either by having the on-call  paged or by entering an ASAP/STAT consult in Epic (this will also page the on-call ).

## 2022-11-17 NOTE — PROGRESS NOTES
Awaiting inpatient bed. VSS on 3L O2. Frequent cough, PRN Robitussin given. IV abx infusing. Scheduled nebs. Tolerating regular diet.

## 2022-11-17 NOTE — PLAN OF CARE
Summary:  Admitted due to SOB, cough- positive for RSV  DATE & TIME: 11/17/22 Day shift    Cognitive Concerns/ Orientation : Alert and oriented x4, pleasant   BEHAVIOR & AGGRESSION TOOL COLOR: GREEN  CIWA SCORE: NA   ABNL VS/O2: WDL on RA, able to wean off  MOBILITY: SBA- FR  PAIN MANAGMENT: On chronic pain medication, no PRNs needed at this time  DIET: Regular diet- great appetite  BOWEL/BLADDER: Continent- no BM since admitted (had diarrhea before admission)  ABNL LAB/BG: WBC 17.1, RSV +  DRAIN/DEVICES: 2 IV SL on R arm  TELEMETRY RHYTHM: NA  SKIN: Intact- many old scars from previous surgeries  TESTS/PROCEDURES: Need Sputum sample  D/C DAY/GOALS/PLACE: A few days until off IV solu medrol; cough under control  OTHER IMPORTANT INFO:

## 2022-11-17 NOTE — PROGRESS NOTES
RECEIVING UNIT ED HANDOFF REVIEW    ED Nurse Handoff Report was reviewed by: Alena Dill RN on November 17, 2022 at 11:07 AM

## 2022-11-17 NOTE — PROGRESS NOTES
[facility]    Hospitalist Progress Note      Assessment & Plan   Aspen Mccullough is a 62 year old female who we have lupus currently not on any steroids, chronic pain syndrome and follows with pain clinic, hypertension, hyperlipidemia, hypothyroidism, chronic pain syndrome, morbid obesity, history of COVID-19 in 2022, chronic lower extremity edema with chronic venous congestion who presented to the ED with a chief complaint of shortness of breath and vomiting.     Sepsis with Acute hypoxic respiratory failure due to community-acquired pneumonia with RSV infection  History of COVID-19 in January 2022  Presented with nausea, vomiting, SOB, fever, cough with yellow-colored sputum production and was diagnosed with bronchitis at urgent care center on November 11. WBC 16.3, tachycardia and was requiring 2 to 3 L of oxygen. COVID-19 negative, influenza negative, POSITIVE FOR RSV. procal 0.17. strep pneumo negative.  * CXR: no evidence of pleural effusion or pneumonia, but consistent with changes of early pneumonia.  * CTA chest: no pulmonary emboli. Patchy focus groundglass infiltrate scattered throughout both lungs, suggestive atypical pneumonia with mild inflammatory bronchial wall thickening.  - follow up blood cx  - follow up sputum culture  - ceftriaxone, azithro continued  - prn robitussin, schedule mucinex DM BID  - xopenex nebs QID  - wean O2 ( trialed on room air at rest afternoon of 11/17)  - prednisone 40mg daily initially but ongoing wheezing, change to solumedrol 40mg IV q8h     Nausea, vomiting and abdominal pain (resolved)  Lipase is normal   - monitor     Prior COVID-19 diagnosis  - Diagnosed with COVID-19 on emergency department visit 1/18/2022.  Seen again in urgent care with persistent cough 1/29/2022 and discharged with doxycycline and prednisone for overlap pneumonia.  Hospitalized 2/17/2022 at [facility]  - she at that time and in august 2022 had predominantly right  "sided opacities      Hypokalemia  - Potassium is 2.7  - replacement protocol     History of right lower extremity DVT and pulmonary embolism in 2017  -CTA chest was done and does not show any evidence of  pulmonary embolism and she has not taken xarelto in many days given her nausea and vomiting and will prefer injections and will order lovenox 1 mg/kg bid   - Plan to resume xarelto on 11/18     Hypertension  - continue with PTA Metoprolol and cardizem     Hyperlipidemia  -continue with PTA atorvastatin 40 mg daily      History of lupus with chronic steroids use  -She used to be on prednisone for 30 years and tapered herself off earlier this year  - She also needs to follow with rheumatology (Her previous rheumatologist Dr. Field has retired)     P  Atrial fibrillation  - continue metoprolol, dilitiazem  - restart xarelto on 11/18     Chronic lower extremity edema due to chronic venous congestion  - Noted     Hypothyroidism  -Continue the patient with PTA Synthroid      Chronic pain syndrome  Fibromyalgia  -Follows with pain clinic for injections, on longstanding MS Contin and oral Dilaudid and gabapentin  - Continue with MS Contin 90 mg every morning and evening, 60 mg in the afternoon as per home regimen  -Continue gabapentin 1100 mg every morning, 1100 mg in the afternoon, 800 mg at bedtime  - PTA oral dilaudid 8mg on hold, resumed 4mg q4h PRN instead for pain, uptitrate as she is feeling better     History of CVA  -Continue prior to admission atorvastatin     Severe Obesity: Estimated body mass index is 42.09 kg/m  as calculated from the following:    Height as of this encounter: 1.753 m (5' 9\").    Weight as of this encounter: 129.3 kg (285 lb).     ? History of Possible HFpEF  Echo done during last admission on 08/09/2022 and at that time her EF was 55%, no valve disease, ascending aorta is borderline dilated at 3.8 cm  -On lasix 40 mg in the morning and 20 in the evening  -we will hold diuretics for now " "until intake improves    Clinically Significant Risk Factors        # Hypokalemia: Lowest K = 2.7 mmol/L (Ref range: 3.4-5.3) in last 2 days, will replace as needed  # Hyponatremia: Lowest Na = 133 mmol/L (Ref range: 136-145) in last 2 days, will monitor as appropriate      # Hypoalbuminemia: Lowest albumin = 2.8 g/dL (Ref range: 3.5-5.2) at 11/15/2022  6:27 PM, will monitor as appropriate           # Severe Obesity: Estimated body mass index is 42.09 kg/m  as calculated from the following:    Height as of this encounter: 1.753 m (5' 9\").    Weight as of this encounter: 129.3 kg (285 lb)., PRESENT ON ADMISSION           DVT Prophylaxis: Enoxaparin (Lovenox) SQ  Code Status: Full Code     Expected Discharge Date: 11/20/2022        Discharge Comments: clinical improvement       Hailey Reyes DO  Hospitalist Service  [facility]  Securely message with the Vocera Web Console (learn more here)  Text Page (7am - 6pm) via [facility] Paging/Directory      Interval History   Patient seen and examined. She is looking a little better. Weaned off oxygen this afternoon while at rest, will see if she can remain off. Cough still quite coarse. Trial mucinex BID and increase steroids--she is agreeable. Hurts to cough/take deep breaths. Appetite improving. No nausea, vomiting, diarrhea.    -Data reviewed today: I reviewed all new labs and imaging results over the last 24 hours. I personally reviewed no images or EKG's today.    Physical Exam   Temp: 97.8  F (36.6  C) Temp src: Oral BP: 119/69 Pulse: 61   Resp: 18 SpO2: 94 % O2 Device: None (Room air) Oxygen Delivery: 3 LPM  Vitals:    11/15/22 1818   Weight: 129.3 kg (285 lb)     Vital Signs with Ranges  Temp:  [97.8  F (36.6  C)-97.9  F (36.6  C)] 97.8  F (36.6  C)  Pulse:  [57-77] 61  Resp:  [12-23] 18  BP: (113-119)/(69-80) 119/69  SpO2:  [92 %-98 %] 94 %  I/O last 3 completed shifts:  In: 1340 [P.O.:1340]  Out: -     Constitutional: Awake, alert, " cooperative, ill appearing  Respiratory: frequent coughing, no crackles, more frequent rhonchi/wheeze  Cardiovascular: Regular rate and rhythm, normal S1 and S2, and no murmur noted  GI: Normal bowel sounds, soft, non-distended, non-tender  Skin/Integumen: No rashes, no cyanosis, trace edema  Other:     Medications     - MEDICATION INSTRUCTIONS -         artificial tears  1 drop Both Eyes BID     atorvastatin  40 mg Oral QPM     azithromycin  500 mg Intravenous Q24H     cefTRIAXone  2 g Intravenous Q24H     cyclobenzaprine  10 mg Oral TID     dextromethorphan-guaiFENesin  1 tablet Oral BID     diltiazem ER COATED BEADS  180 mg Oral BID     [START ON 11/18/2022] enoxaparin ANTICOAGULANT  1 mg/kg Subcutaneous Q12H     fluticasone  2 spray Both Nostrils Daily     gabapentin  800 mg Oral BID    And     gabapentin  300 mg Oral BID     gabapentin  800 mg Oral At Bedtime     levalbuterol  1.25 mg Nebulization 4x Daily     levothyroxine  300 mcg Oral Daily     methylPREDNISolone  40 mg Intravenous Q8H     metoprolol succinate ER  75 mg Oral BID     morphine  90 mg Oral Q12H [facility] (08/20)     multivitamin w/minerals  1 tablet Oral Daily     prednisoLONE acetate  1 drop Both Eyes BID     [START ON 11/18/2022] rivaroxaban ANTICOAGULANT  20 mg Oral Daily with supper     sodium chloride (PF)  3 mL Intracatheter Q8H       Data   Recent Labs   Lab 11/17/22  0641 11/16/22  2258 11/16/22  1120 11/15/22  1827   WBC 17.1*  --   --  16.3*   HGB 11.0*  --   --  13.5   MCV 87  --   --  84     --   --  337     --   --  133   POTASSIUM 3.4 3.6 3.3* 2.7*   CHLORIDE 102  --   --  99   CO2 25  --   --  26   BUN 14  --   --  11   CR 0.65  --   --  0.62   ANIONGAP 9  --   --  8   KYLIE 8.8  --   --  8.4*   GLC 93  --   --  101*   ALBUMIN  --   --   --  2.8*   PROTTOTAL  --   --   --  7.1   BILITOTAL  --   --   --  0.4   ALKPHOS  --   --   --  90   ALT  --   --   --  16   AST  --   --   --  15   LIPASE  --   --   --  172       No  results found for this or any previous visit (from the past 24 hour(s)).

## 2022-11-18 LAB
ANION GAP SERPL CALCULATED.3IONS-SCNC: 11 MMOL/L (ref 3–14)
BUN SERPL-MCNC: 14 MG/DL (ref 7–30)
CALCIUM SERPL-MCNC: 9.1 MG/DL (ref 8.5–10.1)
CHLORIDE BLD-SCNC: 103 MMOL/L (ref 94–109)
CO2 SERPL-SCNC: 22 MMOL/L (ref 20–32)
CREAT SERPL-MCNC: 0.57 MG/DL (ref 0.52–1.04)
ERYTHROCYTE [DISTWIDTH] IN BLOOD BY AUTOMATED COUNT: 15 % (ref 10–15)
GFR SERPL CREATININE-BSD FRML MDRD: >90 ML/MIN/1.73M2
GLUCOSE BLD-MCNC: 201 MG/DL (ref 70–99)
HCT VFR BLD AUTO: 34.7 % (ref 35–47)
HGB BLD-MCNC: 11.3 G/DL (ref 11.7–15.7)
MAGNESIUM SERPL-MCNC: 2.3 MG/DL (ref 1.6–2.3)
MCH RBC QN AUTO: 28 PG (ref 26.5–33)
MCHC RBC AUTO-ENTMCNC: 32.6 G/DL (ref 31.5–36.5)
MCV RBC AUTO: 86 FL (ref 78–100)
PHOSPHATE SERPL-MCNC: 2.8 MG/DL (ref 2.5–4.5)
PLATELET # BLD AUTO: 375 10E3/UL (ref 150–450)
POTASSIUM BLD-SCNC: 3.8 MMOL/L (ref 3.4–5.3)
RBC # BLD AUTO: 4.04 10E6/UL (ref 3.8–5.2)
SODIUM SERPL-SCNC: 136 MMOL/L (ref 133–144)
WBC # BLD AUTO: 16.9 10E3/UL (ref 4–11)

## 2022-11-18 PROCEDURE — 250N000013 HC RX MED GY IP 250 OP 250 PS 637: Performed by: HOSPITALIST

## 2022-11-18 PROCEDURE — 83735 ASSAY OF MAGNESIUM: CPT | Performed by: HOSPITALIST

## 2022-11-18 PROCEDURE — 85027 COMPLETE CBC AUTOMATED: CPT | Performed by: HOSPITALIST

## 2022-11-18 PROCEDURE — 36415 COLL VENOUS BLD VENIPUNCTURE: CPT | Performed by: HOSPITALIST

## 2022-11-18 PROCEDURE — 120N000001 HC R&B MED SURG/OB

## 2022-11-18 PROCEDURE — 94640 AIRWAY INHALATION TREATMENT: CPT

## 2022-11-18 PROCEDURE — 84100 ASSAY OF PHOSPHORUS: CPT | Performed by: HOSPITALIST

## 2022-11-18 PROCEDURE — 250N000011 HC RX IP 250 OP 636: Performed by: HOSPITALIST

## 2022-11-18 PROCEDURE — 250N000009 HC RX 250: Performed by: EMERGENCY MEDICINE

## 2022-11-18 PROCEDURE — 99232 SBSQ HOSP IP/OBS MODERATE 35: CPT | Performed by: HOSPITALIST

## 2022-11-18 PROCEDURE — 80048 BASIC METABOLIC PNL TOTAL CA: CPT | Performed by: HOSPITALIST

## 2022-11-18 PROCEDURE — 250N000013 HC RX MED GY IP 250 OP 250 PS 637: Performed by: EMERGENCY MEDICINE

## 2022-11-18 RX ORDER — BENZONATATE 100 MG/1
100 CAPSULE ORAL 3 TIMES DAILY PRN
Status: DISCONTINUED | OUTPATIENT
Start: 2022-11-18 | End: 2022-11-21 | Stop reason: HOSPADM

## 2022-11-18 RX ORDER — METHYLPREDNISOLONE SODIUM SUCCINATE 40 MG/ML
40 INJECTION, POWDER, LYOPHILIZED, FOR SOLUTION INTRAMUSCULAR; INTRAVENOUS EVERY 8 HOURS
Status: COMPLETED | OUTPATIENT
Start: 2022-11-18 | End: 2022-11-19

## 2022-11-18 RX ADMIN — MULTIPLE VITAMINS W/ MINERALS TAB 1 TABLET: TAB at 09:58

## 2022-11-18 RX ADMIN — LEVOTHYROXINE SODIUM 300 MCG: 100 TABLET ORAL at 09:57

## 2022-11-18 RX ADMIN — DILTIAZEM HYDROCHLORIDE 180 MG: 180 CAPSULE, COATED, EXTENDED RELEASE ORAL at 09:57

## 2022-11-18 RX ADMIN — PREDNISOLONE ACETATE 1 DROP: 10 SUSPENSION/ DROPS OPHTHALMIC at 21:25

## 2022-11-18 RX ADMIN — CYCLOBENZAPRINE 10 MG: 10 TABLET, FILM COATED ORAL at 13:46

## 2022-11-18 RX ADMIN — MORPHINE SULFATE 90 MG: 30 TABLET, EXTENDED RELEASE ORAL at 09:55

## 2022-11-18 RX ADMIN — DEXTRAN 70, GLYCERIN, HYPROMELLOSE 1 DROP: 1; 2; 3 SOLUTION/ DROPS OPHTHALMIC at 10:03

## 2022-11-18 RX ADMIN — GABAPENTIN 300 MG: 300 CAPSULE ORAL at 17:04

## 2022-11-18 RX ADMIN — DEXTRAN 70, GLYCERIN, HYPROMELLOSE 1 DROP: 1; 2; 3 SOLUTION/ DROPS OPHTHALMIC at 21:24

## 2022-11-18 RX ADMIN — METHYLPREDNISOLONE SODIUM SUCCINATE 40 MG: 40 INJECTION, POWDER, FOR SOLUTION INTRAMUSCULAR; INTRAVENOUS at 13:46

## 2022-11-18 RX ADMIN — CYCLOBENZAPRINE 10 MG: 10 TABLET, FILM COATED ORAL at 21:21

## 2022-11-18 RX ADMIN — BENZONATATE 100 MG: 100 CAPSULE ORAL at 13:46

## 2022-11-18 RX ADMIN — METOPROLOL SUCCINATE 75 MG: 50 TABLET, EXTENDED RELEASE ORAL at 21:22

## 2022-11-18 RX ADMIN — ENOXAPARIN SODIUM 135 MG: 150 INJECTION SUBCUTANEOUS at 00:13

## 2022-11-18 RX ADMIN — GABAPENTIN 800 MG: 800 TABLET, FILM COATED ORAL at 09:58

## 2022-11-18 RX ADMIN — GABAPENTIN 800 MG: 800 TABLET, FILM COATED ORAL at 21:36

## 2022-11-18 RX ADMIN — GUAIFENESIN 10 ML: 200 SOLUTION ORAL at 11:18

## 2022-11-18 RX ADMIN — ATORVASTATIN CALCIUM 40 MG: 40 TABLET, FILM COATED ORAL at 21:22

## 2022-11-18 RX ADMIN — RIVAROXABAN 20 MG: 20 TABLET, FILM COATED ORAL at 17:04

## 2022-11-18 RX ADMIN — GABAPENTIN 300 MG: 300 CAPSULE ORAL at 09:56

## 2022-11-18 RX ADMIN — DILTIAZEM HYDROCHLORIDE 180 MG: 180 CAPSULE, COATED, EXTENDED RELEASE ORAL at 21:23

## 2022-11-18 RX ADMIN — METHYLPREDNISOLONE SODIUM SUCCINATE 40 MG: 40 INJECTION, POWDER, FOR SOLUTION INTRAMUSCULAR; INTRAVENOUS at 06:17

## 2022-11-18 RX ADMIN — MORPHINE SULFATE 90 MG: 30 TABLET, EXTENDED RELEASE ORAL at 21:22

## 2022-11-18 RX ADMIN — GABAPENTIN 800 MG: 800 TABLET, FILM COATED ORAL at 17:04

## 2022-11-18 RX ADMIN — GUAIFENESIN AND DEXTROMETHORPHAN HYDROBROMIDE 1 TABLET: 600; 30 TABLET, EXTENDED RELEASE ORAL at 21:37

## 2022-11-18 RX ADMIN — GUAIFENESIN 10 ML: 200 SOLUTION ORAL at 17:04

## 2022-11-18 RX ADMIN — CYCLOBENZAPRINE 10 MG: 10 TABLET, FILM COATED ORAL at 09:56

## 2022-11-18 RX ADMIN — GUAIFENESIN 10 ML: 200 SOLUTION ORAL at 23:03

## 2022-11-18 RX ADMIN — ZOLPIDEM TARTRATE 5 MG: 5 TABLET ORAL at 21:36

## 2022-11-18 RX ADMIN — GUAIFENESIN AND DEXTROMETHORPHAN HYDROBROMIDE 1 TABLET: 600; 30 TABLET, EXTENDED RELEASE ORAL at 11:18

## 2022-11-18 RX ADMIN — LEVALBUTEROL HYDROCHLORIDE 1.25 MG: 1.25 SOLUTION, CONCENTRATE RESPIRATORY (INHALATION) at 15:40

## 2022-11-18 RX ADMIN — METOPROLOL SUCCINATE 75 MG: 50 TABLET, EXTENDED RELEASE ORAL at 09:56

## 2022-11-18 RX ADMIN — PREDNISOLONE ACETATE 1 DROP: 10 SUSPENSION/ DROPS OPHTHALMIC at 10:04

## 2022-11-18 RX ADMIN — BENZONATATE 100 MG: 100 CAPSULE ORAL at 21:37

## 2022-11-18 RX ADMIN — BENZONATATE 100 MG: 100 CAPSULE ORAL at 17:04

## 2022-11-18 ASSESSMENT — ACTIVITIES OF DAILY LIVING (ADL)
ADLS_ACUITY_SCORE: 20

## 2022-11-18 NOTE — PLAN OF CARE
Goal Outcome Evaluation:      Plan of Care Reviewed With: patient      Summary:  Admitted due to SOB, cough- positive for RSV  DATE & TIME: 11/18/22 2159-0885  Cognitive Concerns/ Orientation : Alert and oriented x4  BEHAVIOR & AGGRESSION TOOL COLOR: GREEN  CIWA SCORE: NA   ABNL VS/O2: VSS on 1LO2, able to wean off O2 yesterday when awake  MOBILITY: Ind in the room   PAIN MANAGMENT: Scheduled MS Contin, Gabapentin, and Flexeril for chronic generalized pain; No PRNs requested this shift.   DIET: Regular diet  BOWEL/BLADDER: Continent  ABNL LAB/BG: None new  DRAIN/DEVICES: PIV saline locked, intermittent abx  TELEMETRY RHYTHM: NA  SKIN: Intact- many old scars from previous surgeries, Abrasion on right buttocks.   TESTS/PROCEDURES: Need Sputum sample, container in the room.   D/C DAY/GOALS/PLACE: Discharge pending improvement in cough and off of IV solu-medrol.  OTHER IMPORTANT INFO: Droplet/contact precautions maintained.

## 2022-11-18 NOTE — PLAN OF CARE
Goal Outcome Evaluation:      Plan of Care Reviewed With: patient  Summary:  Admitted due to SOB, cough- positive for RSV  DATE & TIME: 11/17/22 2327-7589  Cognitive Concerns/ Orientation : Alert and oriented x4, pleasant   BEHAVIOR & AGGRESSION TOOL COLOR: GREEN  CIWA SCORE: NA   ABNL VS/O2: VSS on 1LO2, able to wean off  MOBILITY: Ind in the room   PAIN MANAGMENT: On chronic pain medication, PRN Ambien, Dilaudid X1  DIET: Regular diet- great appetite  BOWEL/BLADDER: Continent-   ABNL LAB/BG: WBC 17.1, RSV +  DRAIN/DEVICES: 2 IV SL on R arm  TELEMETRY RHYTHM: NA  SKIN: Intact- many old scars from previous surgeries, Abrasion on right buttocks.   TESTS/PROCEDURES: Need Sputum sample, Container in the room.   D/C DAY/GOALS/PLACE: A few days until off IV solu medrol; cough under control  OTHER IMPORTANT INFO: Isolation precaution for RSV.

## 2022-11-18 NOTE — PROGRESS NOTES
[facility]    Hospitalist Progress Note      Assessment & Plan   Aspen Mccullough is a 62 year old female who we have lupus currently not on any steroids, chronic pain syndrome and follows with pain clinic, hypertension, hyperlipidemia, hypothyroidism, chronic pain syndrome, morbid obesity, history of COVID-19 in 2022, chronic lower extremity edema with chronic venous congestion who presented to the ED with a chief complaint of shortness of breath and vomiting.     Sepsis with Acute hypoxic respiratory failure due to community-acquired pneumonia with RSV infection  History of COVID-19 in January 2022  Presented with nausea, vomiting, SOB, fever, cough with yellow-colored sputum production and was diagnosed with bronchitis at urgent care center on November 11. WBC 16.3, tachycardia and was requiring 2 to 3 L of oxygen. COVID-19 negative, influenza negative, POSITIVE FOR RSV. procal 0.17. strep pneumo negative.  * CXR: no evidence of pleural effusion or pneumonia, but consistent with changes of early pneumonia.  * CTA chest: no pulmonary emboli. Patchy focus groundglass infiltrate scattered throughout both lungs, suggestive atypical pneumonia with mild inflammatory bronchial wall thickening.  - blood cx NGTD, sputum culture not supplied  - ceftriaxone, azithro continued for total 5 days  - prn robitussin, schedule mucinex DM BID  - xopenex nebs QID  - wean O2, requiring about 1LPM but desats with talking  - prednisone 40mg daily initially but ongoing wheezing, changed to solumedrol 40mg IV q8h on 11/17, continue for now, last IV dose AM 11/19     Nausea, vomiting and abdominal pain (resolved)  Lipase is normal   - monitor     Prior COVID-19 diagnosis  - Diagnosed with COVID-19 on emergency department visit 1/18/2022.  Seen again in urgent care with persistent cough 1/29/2022 and discharged with doxycycline and prednisone for overlap pneumonia.  Hospitalized 2/17/2022 at [facility]  - she  "at that time and in august 2022 had predominantly right sided opacities     Hyperglycemia secondary to steroids  Noted with AM BMP  - plan short course of high dose steroids     Hypokalemia  - Potassium is 2.7  - replacement protocol     History of right lower extremity DVT and pulmonary embolism in 2017  -CTA chest was done and does not show any evidence of pulmonary embolism  - Resume xarelto on 11/18     Hypertension  - continue with PTA Metoprolol and cardizem     Hyperlipidemia  -continue with PTA atorvastatin 40 mg daily      History of lupus with chronic steroids use  -She used to be on prednisone for 30 years and tapered herself off earlier this year  - She also needs to follow with rheumatology (Her previous rheumatologist Dr. Field has retired)     P  Atrial fibrillation  - continue metoprolol, dilitiazem  - restart xarelto 11/18     Chronic lower extremity edema due to chronic venous congestion  - Noted     Hypothyroidism  -Continue the patient with PTA Synthroid      Chronic pain syndrome  Fibromyalgia  -Follows with pain clinic for injections, on longstanding MS Contin and oral Dilaudid and gabapentin  - Continue with MS Contin 90 mg every morning and evening, 60 mg in the afternoon as per home regimen  -Continue gabapentin 1100 mg every morning, 1100 mg in the afternoon, 800 mg at bedtime  - PTA oral dilaudid 8mg on hold, resumed 4mg q4h PRN instead for pain, uptitrate as she is feeling better     History of CVA  -Continue prior to admission atorvastatin     Severe Obesity: Estimated body mass index is 42.09 kg/m  as calculated from the following:    Height as of this encounter: 1.753 m (5' 9\").    Weight as of this encounter: 129.3 kg (285 lb).     ? History of Possible HFpEF  Echo done during last admission on 08/09/2022 and at that time her EF was 55%, no valve disease, ascending aorta is borderline dilated at 3.8 cm  -On lasix 40 mg in the morning and 20 in the evening  -we will hold diuretics " "for now until intake improves--no signs fluid overload    Clinically Significant Risk Factors        # Hypokalemia: Lowest K = 3.3 mmol/L (Ref range: 3.4-5.3) in last 2 days, will replace as needed       # Hypoalbuminemia: Lowest albumin = 2.8 g/dL (Ref range: 3.5-5.2) at 11/15/2022  6:27 PM, will monitor as appropriate           # Severe Obesity: Estimated body mass index is 42.09 kg/m  as calculated from the following:    Height as of this encounter: 1.753 m (5' 9\").    Weight as of this encounter: 129.3 kg (285 lb)., PRESENT ON ADMISSION           DVT Prophylaxis: Enoxaparin (Lovenox) SQ  Code Status: Full Code    Expected Discharge Date: 11/20/2022        Discharge Comments: clinical improvement       Hailey Reyes DO  Hospitalist Service  [facility]  Securely message with the Vocera Web Console (learn more here)  Text Page (7am - 6pm) via [facility] Paging/Directory      Interval History   Patient seen and examined. She is looking a little better still, voice is more hoarse today. Oxygen overall improving, but still needs increased O2, desats to 80s with conversation. Appetite is good. No BM yet. Plan to stay in hospital until weaned off O2    -Data reviewed today: I reviewed all new labs and imaging results over the last 24 hours. I personally reviewed no images or EKG's today.    Physical Exam   Temp: 98.4  F (36.9  C) Temp src: Oral BP: 115/65 Pulse: 60   Resp: 18 SpO2: 94 % O2 Device: Nasal cannula Oxygen Delivery: 1 LPM  Vitals:    11/15/22 1818   Weight: 129.3 kg (285 lb)     Vital Signs with Ranges  Temp:  [97.8  F (36.6  C)-98.6  F (37  C)] 98.4  F (36.9  C)  Pulse:  [60-70] 60  Resp:  [18-20] 18  BP: (115-146)/(65-84) 115/65  SpO2:  [92 %-97 %] 94 %  I/O last 3 completed shifts:  In: 360 [P.O.:360]  Out: -     Constitutional: Awake, alert, cooperative, mild distress with coughing  Respiratory: frequent coughing, no crackles, frequent rhonchi/wheeze  Cardiovascular: Regular rate " and rhythm, normal S1 and S2, and no murmur noted  GI: Normal bowel sounds, soft, non-distended, non-tender  Skin/Integumen: No rashes, no cyanosis, trace edema  Other:     Medications     - MEDICATION INSTRUCTIONS -         artificial tears  1 drop Both Eyes BID     atorvastatin  40 mg Oral QPM     azithromycin  500 mg Intravenous Q24H     cefTRIAXone  2 g Intravenous Q24H     cyclobenzaprine  10 mg Oral TID     dextromethorphan-guaiFENesin  1 tablet Oral BID     diltiazem ER COATED BEADS  180 mg Oral BID     fluticasone  2 spray Both Nostrils Daily     gabapentin  800 mg Oral BID    And     gabapentin  300 mg Oral BID     gabapentin  800 mg Oral At Bedtime     levalbuterol  1.25 mg Nebulization 4x Daily     levothyroxine  300 mcg Oral Daily     methylPREDNISolone  40 mg Intravenous Q8H     metoprolol succinate ER  75 mg Oral BID     morphine  90 mg Oral Q12H [facility] (08/20)     multivitamin w/minerals  1 tablet Oral Daily     prednisoLONE acetate  1 drop Both Eyes BID     rivaroxaban ANTICOAGULANT  20 mg Oral Daily with supper     sodium chloride (PF)  3 mL Intracatheter Q8H       Data   Recent Labs   Lab 11/17/22  0641 11/16/22  2258 11/16/22  1120 11/15/22  1827   WBC 17.1*  --   --  16.3*   HGB 11.0*  --   --  13.5   MCV 87  --   --  84     --   --  337     --   --  133   POTASSIUM 3.4 3.6 3.3* 2.7*   CHLORIDE 102  --   --  99   CO2 25  --   --  26   BUN 14  --   --  11   CR 0.65  --   --  0.62   ANIONGAP 9  --   --  8   KYLIE 8.8  --   --  8.4*   GLC 93  --   --  101*   ALBUMIN  --   --   --  2.8*   PROTTOTAL  --   --   --  7.1   BILITOTAL  --   --   --  0.4   ALKPHOS  --   --   --  90   ALT  --   --   --  16   AST  --   --   --  15   LIPASE  --   --   --  172       No results found for this or any previous visit (from the past 24 hour(s)).

## 2022-11-19 LAB
ANION GAP SERPL CALCULATED.3IONS-SCNC: 9 MMOL/L (ref 3–14)
BUN SERPL-MCNC: 32 MG/DL (ref 7–30)
CALCIUM SERPL-MCNC: 9.1 MG/DL (ref 8.5–10.1)
CHLORIDE BLD-SCNC: 103 MMOL/L (ref 94–109)
CO2 SERPL-SCNC: 24 MMOL/L (ref 20–32)
CREAT SERPL-MCNC: 0.97 MG/DL (ref 0.52–1.04)
ERYTHROCYTE [DISTWIDTH] IN BLOOD BY AUTOMATED COUNT: 15.5 % (ref 10–15)
GFR SERPL CREATININE-BSD FRML MDRD: 65 ML/MIN/1.73M2
GLUCOSE BLD-MCNC: 159 MG/DL (ref 70–99)
HCT VFR BLD AUTO: 33 % (ref 35–47)
HGB BLD-MCNC: 10.4 G/DL (ref 11.7–15.7)
MAGNESIUM SERPL-MCNC: 2.4 MG/DL (ref 1.6–2.3)
MCH RBC QN AUTO: 28.5 PG (ref 26.5–33)
MCHC RBC AUTO-ENTMCNC: 31.5 G/DL (ref 31.5–36.5)
MCV RBC AUTO: 90 FL (ref 78–100)
PHOSPHATE SERPL-MCNC: 4.4 MG/DL (ref 2.5–4.5)
PLATELET # BLD AUTO: 421 10E3/UL (ref 150–450)
POTASSIUM BLD-SCNC: 4.7 MMOL/L (ref 3.4–5.3)
RBC # BLD AUTO: 3.65 10E6/UL (ref 3.8–5.2)
SODIUM SERPL-SCNC: 136 MMOL/L (ref 133–144)
WBC # BLD AUTO: 16.5 10E3/UL (ref 4–11)

## 2022-11-19 PROCEDURE — 250N000011 HC RX IP 250 OP 636: Performed by: HOSPITALIST

## 2022-11-19 PROCEDURE — 83735 ASSAY OF MAGNESIUM: CPT | Performed by: HOSPITALIST

## 2022-11-19 PROCEDURE — 36415 COLL VENOUS BLD VENIPUNCTURE: CPT | Performed by: HOSPITALIST

## 2022-11-19 PROCEDURE — 120N000001 HC R&B MED SURG/OB

## 2022-11-19 PROCEDURE — 250N000013 HC RX MED GY IP 250 OP 250 PS 637: Performed by: EMERGENCY MEDICINE

## 2022-11-19 PROCEDURE — 250N000009 HC RX 250: Performed by: EMERGENCY MEDICINE

## 2022-11-19 PROCEDURE — 250N000013 HC RX MED GY IP 250 OP 250 PS 637: Performed by: HOSPITALIST

## 2022-11-19 PROCEDURE — 250N000012 HC RX MED GY IP 250 OP 636 PS 637: Performed by: HOSPITALIST

## 2022-11-19 PROCEDURE — 84100 ASSAY OF PHOSPHORUS: CPT | Performed by: HOSPITALIST

## 2022-11-19 PROCEDURE — 80048 BASIC METABOLIC PNL TOTAL CA: CPT | Performed by: HOSPITALIST

## 2022-11-19 PROCEDURE — 99232 SBSQ HOSP IP/OBS MODERATE 35: CPT | Performed by: HOSPITALIST

## 2022-11-19 PROCEDURE — 94640 AIRWAY INHALATION TREATMENT: CPT | Mod: 76

## 2022-11-19 PROCEDURE — 999N000157 HC STATISTIC RCP TIME EA 10 MIN

## 2022-11-19 PROCEDURE — 94640 AIRWAY INHALATION TREATMENT: CPT

## 2022-11-19 PROCEDURE — 85027 COMPLETE CBC AUTOMATED: CPT | Performed by: HOSPITALIST

## 2022-11-19 RX ORDER — CEFDINIR 300 MG/1
300 CAPSULE ORAL EVERY 12 HOURS SCHEDULED
Status: COMPLETED | OUTPATIENT
Start: 2022-11-19 | End: 2022-11-20

## 2022-11-19 RX ORDER — PREDNISONE 20 MG/1
40 TABLET ORAL 2 TIMES DAILY WITH MEALS
Status: DISCONTINUED | OUTPATIENT
Start: 2022-11-19 | End: 2022-11-20

## 2022-11-19 RX ORDER — PANTOPRAZOLE SODIUM 40 MG/1
40 TABLET, DELAYED RELEASE ORAL
Status: DISCONTINUED | OUTPATIENT
Start: 2022-11-19 | End: 2022-11-21 | Stop reason: HOSPADM

## 2022-11-19 RX ORDER — AZITHROMYCIN 250 MG/1
500 TABLET, FILM COATED ORAL DAILY
Status: COMPLETED | OUTPATIENT
Start: 2022-11-19 | End: 2022-11-19

## 2022-11-19 RX ADMIN — CYCLOBENZAPRINE 10 MG: 10 TABLET, FILM COATED ORAL at 14:46

## 2022-11-19 RX ADMIN — PREDNISOLONE ACETATE 1 DROP: 10 SUSPENSION/ DROPS OPHTHALMIC at 20:10

## 2022-11-19 RX ADMIN — BENZONATATE 100 MG: 100 CAPSULE ORAL at 14:46

## 2022-11-19 RX ADMIN — CEFDINIR 300 MG: 300 CAPSULE ORAL at 21:55

## 2022-11-19 RX ADMIN — ZOLPIDEM TARTRATE 5 MG: 5 TABLET ORAL at 21:55

## 2022-11-19 RX ADMIN — GABAPENTIN 800 MG: 800 TABLET, FILM COATED ORAL at 21:55

## 2022-11-19 RX ADMIN — LEVALBUTEROL HYDROCHLORIDE 1.25 MG: 1.25 SOLUTION, CONCENTRATE RESPIRATORY (INHALATION) at 19:38

## 2022-11-19 RX ADMIN — PANTOPRAZOLE SODIUM 40 MG: 40 TABLET, DELAYED RELEASE ORAL at 14:46

## 2022-11-19 RX ADMIN — GABAPENTIN 300 MG: 300 CAPSULE ORAL at 07:58

## 2022-11-19 RX ADMIN — LEVALBUTEROL HYDROCHLORIDE 1.25 MG: 1.25 SOLUTION, CONCENTRATE RESPIRATORY (INHALATION) at 08:40

## 2022-11-19 RX ADMIN — CYCLOBENZAPRINE 10 MG: 10 TABLET, FILM COATED ORAL at 07:56

## 2022-11-19 RX ADMIN — AZITHROMYCIN MONOHYDRATE 500 MG: 250 TABLET ORAL at 21:55

## 2022-11-19 RX ADMIN — LEVOTHYROXINE SODIUM 300 MCG: 100 TABLET ORAL at 07:55

## 2022-11-19 RX ADMIN — DEXTRAN 70, GLYCERIN, HYPROMELLOSE 1 DROP: 1; 2; 3 SOLUTION/ DROPS OPHTHALMIC at 20:10

## 2022-11-19 RX ADMIN — METOPROLOL SUCCINATE 75 MG: 50 TABLET, EXTENDED RELEASE ORAL at 20:07

## 2022-11-19 RX ADMIN — DEXTRAN 70, GLYCERIN, HYPROMELLOSE 1 DROP: 1; 2; 3 SOLUTION/ DROPS OPHTHALMIC at 08:00

## 2022-11-19 RX ADMIN — GABAPENTIN 300 MG: 300 CAPSULE ORAL at 16:45

## 2022-11-19 RX ADMIN — BENZOCAINE 6 MG-MENTHOL 10 MG LOZENGES 1 LOZENGE: at 16:46

## 2022-11-19 RX ADMIN — GABAPENTIN 800 MG: 800 TABLET, FILM COATED ORAL at 16:45

## 2022-11-19 RX ADMIN — PREDNISOLONE ACETATE 1 DROP: 10 SUSPENSION/ DROPS OPHTHALMIC at 07:58

## 2022-11-19 RX ADMIN — ATORVASTATIN CALCIUM 40 MG: 40 TABLET, FILM COATED ORAL at 20:08

## 2022-11-19 RX ADMIN — CEFTRIAXONE SODIUM 2 G: 2 INJECTION, POWDER, FOR SOLUTION INTRAMUSCULAR; INTRAVENOUS at 00:44

## 2022-11-19 RX ADMIN — BENZOCAINE 6 MG-MENTHOL 10 MG LOZENGES 1 LOZENGE: at 14:46

## 2022-11-19 RX ADMIN — CYCLOBENZAPRINE 10 MG: 10 TABLET, FILM COATED ORAL at 20:08

## 2022-11-19 RX ADMIN — GUAIFENESIN 10 ML: 200 SOLUTION ORAL at 14:46

## 2022-11-19 RX ADMIN — GUAIFENESIN AND DEXTROMETHORPHAN HYDROBROMIDE 1 TABLET: 600; 30 TABLET, EXTENDED RELEASE ORAL at 09:50

## 2022-11-19 RX ADMIN — DILTIAZEM HYDROCHLORIDE 180 MG: 180 CAPSULE, COATED, EXTENDED RELEASE ORAL at 20:07

## 2022-11-19 RX ADMIN — BENZOCAINE 6 MG-MENTHOL 10 MG LOZENGES 1 LOZENGE: at 18:18

## 2022-11-19 RX ADMIN — BENZOCAINE 6 MG-MENTHOL 10 MG LOZENGES 1 LOZENGE: at 21:55

## 2022-11-19 RX ADMIN — PREDNISONE 40 MG: 20 TABLET ORAL at 18:18

## 2022-11-19 RX ADMIN — BENZOCAINE 6 MG-MENTHOL 10 MG LOZENGES 1 LOZENGE: at 20:06

## 2022-11-19 RX ADMIN — AZITHROMYCIN MONOHYDRATE 500 MG: 500 INJECTION, POWDER, LYOPHILIZED, FOR SOLUTION INTRAVENOUS at 01:15

## 2022-11-19 RX ADMIN — GUAIFENESIN 10 ML: 200 SOLUTION ORAL at 07:59

## 2022-11-19 RX ADMIN — METHYLPREDNISOLONE SODIUM SUCCINATE 40 MG: 40 INJECTION, POWDER, FOR SOLUTION INTRAMUSCULAR; INTRAVENOUS at 00:43

## 2022-11-19 RX ADMIN — GUAIFENESIN 10 ML: 200 SOLUTION ORAL at 20:05

## 2022-11-19 RX ADMIN — GUAIFENESIN 10 ML: 200 SOLUTION ORAL at 07:58

## 2022-11-19 RX ADMIN — METHYLPREDNISOLONE SODIUM SUCCINATE 40 MG: 40 INJECTION, POWDER, FOR SOLUTION INTRAMUSCULAR; INTRAVENOUS at 07:54

## 2022-11-19 RX ADMIN — MORPHINE SULFATE 90 MG: 30 TABLET, EXTENDED RELEASE ORAL at 20:06

## 2022-11-19 RX ADMIN — RIVAROXABAN 20 MG: 20 TABLET, FILM COATED ORAL at 16:45

## 2022-11-19 RX ADMIN — DILTIAZEM HYDROCHLORIDE 180 MG: 180 CAPSULE, COATED, EXTENDED RELEASE ORAL at 07:55

## 2022-11-19 RX ADMIN — MORPHINE SULFATE 90 MG: 30 TABLET, EXTENDED RELEASE ORAL at 07:55

## 2022-11-19 RX ADMIN — GABAPENTIN 800 MG: 800 TABLET, FILM COATED ORAL at 07:58

## 2022-11-19 RX ADMIN — LEVALBUTEROL HYDROCHLORIDE 1.25 MG: 1.25 SOLUTION, CONCENTRATE RESPIRATORY (INHALATION) at 15:19

## 2022-11-19 RX ADMIN — MULTIPLE VITAMINS W/ MINERALS TAB 1 TABLET: TAB at 07:58

## 2022-11-19 RX ADMIN — GUAIFENESIN AND DEXTROMETHORPHAN HYDROBROMIDE 1 TABLET: 600; 30 TABLET, EXTENDED RELEASE ORAL at 20:08

## 2022-11-19 RX ADMIN — BENZONATATE 100 MG: 100 CAPSULE ORAL at 21:55

## 2022-11-19 RX ADMIN — BENZONATATE 100 MG: 100 CAPSULE ORAL at 03:26

## 2022-11-19 ASSESSMENT — ACTIVITIES OF DAILY LIVING (ADL)
ADLS_ACUITY_SCORE: 20
ADLS_ACUITY_SCORE: 18
ADLS_ACUITY_SCORE: 20
ADLS_ACUITY_SCORE: 18
ADLS_ACUITY_SCORE: 20

## 2022-11-19 NOTE — PLAN OF CARE
Goal Outcome Evaluation:             Cognitive Concerns/ Orientation : Alert and oriented x4  BEHAVIOR & AGGRESSION TOOL COLOR: GREEN  CIWA SCORE: NA   ABNL VS/O2: VSS on 1 lpm NC   MOBILITY: Independent  PAIN MANAGMENT: Scheduled MS Contin, Gabapentin, and Flexeril for chronic generalized pain  DIET: Regular diet, good appetite  BOWEL/BLADDER: Continent, had BM yesterday  ABNL LAB/BG: None new  DRAIN/DEVICES: Peripheral IV right hand.  Intermittent antibiotics and solu medrol   TELEMETRY RHYTHM: NA  SKIN: Many old scars from previous surgeries, pressure injury on right buttocks, mepilex in place   TESTS/PROCEDURES: Need Sputum sample, container in the room.   D/C DAY/GOALS/PLACE: Discharge pending improvement in cough and off of IV solu-medrol.  OTHER IMPORTANT INFO: Droplet/contact precautions maintained. Frequent harsh cough, non-productive. Given prn tessalon and robitussin

## 2022-11-19 NOTE — PROGRESS NOTES
[facility]    Hospitalist Progress Note      Assessment & Plan   Aspen Mccullough is a 62 year old female who we have lupus currently not on any steroids, chronic pain syndrome and follows with pain clinic, hypertension, hyperlipidemia, hypothyroidism, chronic pain syndrome, morbid obesity, history of COVID-19 in 2022, chronic lower extremity edema with chronic venous congestion who presented to the ED with a chief complaint of shortness of breath and vomiting.     Sepsis with Acute hypoxic respiratory failure due to community-acquired pneumonia with RSV infection  History of COVID-19 in January 2022  Presented with nausea, vomiting, SOB, fever, cough with yellow-colored sputum production and was diagnosed with bronchitis at urgent care center on November 11. WBC 16.3, tachycardia and was requiring 2 to 3 L of oxygen. COVID-19 negative, influenza negative, POSITIVE FOR RSV. procal 0.17. strep pneumo negative.  * CXR: no evidence of pleural effusion or pneumonia, but consistent with changes of early pneumonia.  * CTA chest: no pulmonary emboli. Patchy focus groundglass infiltrate scattered throughout both lungs, suggestive atypical pneumonia with mild inflammatory bronchial wall thickening.  - blood cx NGTD, sputum culture not supplied  - ceftriaxone switched to cefdinir BID, azithro switched to oral, last doses today/tomorrow.  - prn robitussin, mucinex DM BID  - xopenex nebs QID  - prn cepacol lozenges  - wean O2, requiring about 1LPM but desats with talking  - prednisone 40mg daily initially but ongoing wheezing, changed to solumedrol 40mg IV q8h on 11/17, switched to 40mg prednisone BID on 11/19 due to lost/difficult IV access     Nausea, vomiting and abdominal pain (resolved)  Lipase is normal   - monitor  - start protonix daily     Prior COVID-19 diagnosis  - Diagnosed with COVID-19 on emergency department visit 1/18/2022.  Seen again in urgent care with persistent cough 1/29/2022 and  "discharged with doxycycline and prednisone for overlap pneumonia.  Hospitalized 2/17/2022 at [facility]  - she at that time and in august 2022 had predominantly right sided opacities     Hyperglycemia secondary to steroids  Noted with AM BMP  - plan short course of high dose steroids     Hypokalemia  - Potassium is 2.7  - replacement protocol     History of right lower extremity DVT and pulmonary embolism in 2017  -CTA chest was done and does not show any evidence of pulmonary embolism  - Resume xarelto on 11/18     Hypertension  - continue with PTA Metoprolol and cardizem     Hyperlipidemia  -continue with PTA atorvastatin 40 mg daily      History of lupus with chronic steroids use  -She used to be on prednisone for 30 years and tapered herself off earlier this year  - She also needs to follow with rheumatology (Her previous rheumatologist Dr. Field has retired)     P Atrial fibrillation  - continue metoprolol, dilitiazem  - restart xarelto 11/18     Chronic lower extremity edema due to chronic venous congestion  - Noted     Hypothyroidism  -Continue the patient with PTA Synthroid      Chronic pain syndrome  Fibromyalgia  -Follows with pain clinic for injections, on longstanding MS Contin and oral Dilaudid and gabapentin  - Continue with MS Contin 90 mg every morning and evening, 60 mg in the afternoon as per home regimen  -Continue gabapentin 1100 mg every morning, 1100 mg in the afternoon, 800 mg at bedtime  - PTA oral dilaudid 8mg on hold, resumed 4mg q4h PRN instead for pain, uptitrate as she is feeling better     History of CVA  -Continue prior to admission atorvastatin     Severe Obesity: Estimated body mass index is 42.09 kg/m  as calculated from the following:    Height as of this encounter: 1.753 m (5' 9\").    Weight as of this encounter: 129.3 kg (285 lb).     ? History of Possible HFpEF  Echo done during last admission on 08/09/2022 and at that time her EF was 55%, no valve disease, ascending " "aorta is borderline dilated at 3.8 cm  -On lasix 40 mg in the morning and 20 in the evening  -we will hold diuretics for now until intake improves--no signs fluid overload    Clinically Significant Risk Factors              # Hypoalbuminemia: Lowest albumin = 2.8 g/dL (Ref range: 3.5-5.2) at 11/15/2022  6:27 PM, will monitor as appropriate           # Severe Obesity: Estimated body mass index is 42.09 kg/m  as calculated from the following:    Height as of this encounter: 1.753 m (5' 9\").    Weight as of this encounter: 129.3 kg (285 lb)., PRESENT ON ADMISSION           DVT Prophylaxis: Enoxaparin (Lovenox) SQ  Code Status: Full Code     Expected Discharge Date: 11/20/2022        Discharge Comments: clinical improvement       Hailey Reyes DO  Hospitalist Service  [facility]  Securely message with the Vocera Web Console (learn more here)  Text Page (7am - 6pm) via [employer] Paging/Directory      Interval History   Patient seen and examined. Had a BM yesterday that was darker in color. She took pepto prior to admission, but states she vomited all/most of it. Denies blood in stool. Denies abdominal pain. Cough is more productive now.  desats with conversation. BPs little lower this morning, held AM BP meds.     -Data reviewed today: I reviewed all new labs and imaging results over the last 24 hours. I personally reviewed no images or EKG's today.    Physical Exam   Temp: (!) 96.1  F (35.6  C) Temp src: Oral BP: 108/72 Pulse: 54   Resp: 20 SpO2: 92 % O2 Device: None (Room air) Oxygen Delivery: 1 LPM  Vitals:    11/15/22 1818   Weight: 129.3 kg (285 lb)     Vital Signs with Ranges  Temp:  [96.1  F (35.6  C)-98.3  F (36.8  C)] 96.1  F (35.6  C)  Pulse:  [52-82] 54  Resp:  [20] 20  BP: ()/(35-76) 108/72  SpO2:  [91 %-94 %] 92 %  I/O last 3 completed shifts:  In: 970 [P.O.:620; I.V.:350]  Out: -     Constitutional: Awake, alert, cooperative, short of breath after walking around changing her " bedding  Respiratory: occasional cough, no crackles, frequent rhonchi/wheeze, on room air  Cardiovascular: Regular rate and rhythm, normal S1 and S2, and no murmur noted  GI: Normal bowel sounds, soft, non-distended, non-tender  Skin/Integumen: No rashes, no cyanosis, trace edema  Other:     Medications     - MEDICATION INSTRUCTIONS -         artificial tears  1 drop Both Eyes BID     atorvastatin  40 mg Oral QPM     azithromycin  500 mg Oral Daily     cefdinir  300 mg Oral Q12H [facility] (08/20)     cyclobenzaprine  10 mg Oral TID     dextromethorphan-guaiFENesin  1 tablet Oral BID     diltiazem ER COATED BEADS  180 mg Oral BID     fluticasone  2 spray Both Nostrils Daily     gabapentin  800 mg Oral BID    And     gabapentin  300 mg Oral BID     gabapentin  800 mg Oral At Bedtime     levalbuterol  1.25 mg Nebulization 4x Daily     levothyroxine  300 mcg Oral Daily     metoprolol succinate ER  75 mg Oral BID     morphine  90 mg Oral Q12H [facility] (08/20)     multivitamin w/minerals  1 tablet Oral Daily     pantoprazole  40 mg Oral QAM AC     prednisoLONE acetate  1 drop Both Eyes BID     predniSONE  40 mg Oral BID w/meals     rivaroxaban ANTICOAGULANT  20 mg Oral Daily with supper     sodium chloride (PF)  3 mL Intracatheter Q8H       Data   Recent Labs   Lab 11/19/22  1424 11/18/22  1055 11/17/22  0641 11/16/22  1120 11/15/22  1827   WBC 16.5* 16.9* 17.1*  --  16.3*   HGB 10.4* 11.3* 11.0*  --  13.5   MCV 90 86 87  --  84    375 327  --  337    136 136  --  133   POTASSIUM 4.7 3.8 3.4   < > 2.7*   CHLORIDE 103 103 102  --  99   CO2 24 22 25  --  26   BUN 32* 14 14  --  11   CR 0.97 0.57 0.65  --  0.62   ANIONGAP 9 11 9  --  8   KYLIE 9.1 9.1 8.8  --  8.4*   * 201* 93  --  101*   ALBUMIN  --   --   --   --  2.8*   PROTTOTAL  --   --   --   --  7.1   BILITOTAL  --   --   --   --  0.4   ALKPHOS  --   --   --   --  90   ALT  --   --   --   --  16   AST  --   --   --   --  15   LIPASE  --   --   --   --  172    <  > = values in this interval not displayed.       No results found for this or any previous visit (from the past 24 hour(s)).

## 2022-11-19 NOTE — PLAN OF CARE
Goal Outcome Evaluation:                      Summary:  Admitted due to SOB, cough- positive for RSV  DATE & TIME: 11/18/22 07-19  Cognitive Concerns/ Orientation : Alert and oriented x4  BEHAVIOR & AGGRESSION TOOL COLOR: GREEN  CIWA SCORE: NA   ABNL VS/O2: VSS on 1LO2, able to wean off O2 yesterday when awake  MOBILITY: Ind in the room   PAIN MANAGMENT: Scheduled MS Contin, Gabapentin, and Flexeril for chronic generalized pain; No PRNs requested this shift. Follows at wound clinic  DIET: Regular diet, good appetite  BOWEL/BLADDER: Continent, had BM today  ABNL LAB/BG: None new  DRAIN/DEVICES: PIV saline locked, intermittent abx  TELEMETRY RHYTHM: NA  SKIN: Intact- many old scars from previous surgeries, pressure injury on right buttocks.   TESTS/PROCEDURES: Need Sputum sample, container in the room.   D/C DAY/GOALS/PLACE: Discharge pending improvement in cough and off of IV solu-medrol.  OTHER IMPORTANT INFO: Droplet/contact precautions maintained. Frequent harsh cough,non-productive. Given prn tessalon and robitussin x2.

## 2022-11-20 LAB
ERYTHROCYTE [DISTWIDTH] IN BLOOD BY AUTOMATED COUNT: 15.8 % (ref 10–15)
HCT VFR BLD AUTO: 31.5 % (ref 35–47)
HGB BLD-MCNC: 10.3 G/DL (ref 11.7–15.7)
MCH RBC QN AUTO: 28.8 PG (ref 26.5–33)
MCHC RBC AUTO-ENTMCNC: 32.7 G/DL (ref 31.5–36.5)
MCV RBC AUTO: 88 FL (ref 78–100)
PLATELET # BLD AUTO: 408 10E3/UL (ref 150–450)
RBC # BLD AUTO: 3.58 10E6/UL (ref 3.8–5.2)
WBC # BLD AUTO: 15.2 10E3/UL (ref 4–11)

## 2022-11-20 PROCEDURE — 250N000013 HC RX MED GY IP 250 OP 250 PS 637: Performed by: EMERGENCY MEDICINE

## 2022-11-20 PROCEDURE — 250N000012 HC RX MED GY IP 250 OP 636 PS 637: Performed by: HOSPITALIST

## 2022-11-20 PROCEDURE — 250N000009 HC RX 250: Performed by: EMERGENCY MEDICINE

## 2022-11-20 PROCEDURE — 120N000001 HC R&B MED SURG/OB

## 2022-11-20 PROCEDURE — 36415 COLL VENOUS BLD VENIPUNCTURE: CPT | Performed by: HOSPITALIST

## 2022-11-20 PROCEDURE — 250N000013 HC RX MED GY IP 250 OP 250 PS 637: Performed by: HOSPITALIST

## 2022-11-20 PROCEDURE — 999N000157 HC STATISTIC RCP TIME EA 10 MIN

## 2022-11-20 PROCEDURE — 94640 AIRWAY INHALATION TREATMENT: CPT

## 2022-11-20 PROCEDURE — 85027 COMPLETE CBC AUTOMATED: CPT | Performed by: HOSPITALIST

## 2022-11-20 PROCEDURE — 99232 SBSQ HOSP IP/OBS MODERATE 35: CPT | Performed by: HOSPITALIST

## 2022-11-20 RX ORDER — PREDNISONE 10 MG/1
TABLET ORAL
Qty: 20 TABLET | Refills: 0 | Status: ON HOLD | OUTPATIENT
Start: 2022-11-21 | End: 2023-01-17

## 2022-11-20 RX ORDER — GUAIFENESIN 200 MG/10ML
10 LIQUID ORAL EVERY 6 HOURS PRN
Qty: 180 ML | Refills: 0 | Status: ON HOLD | OUTPATIENT
Start: 2022-11-20 | End: 2023-01-17

## 2022-11-20 RX ORDER — FUROSEMIDE 40 MG
40 TABLET ORAL EVERY MORNING
Qty: 60 TABLET | Refills: 3 | Status: ON HOLD
Start: 2022-11-22 | End: 2023-01-01

## 2022-11-20 RX ORDER — GUAIFENESIN AND DEXTROMETHORPHAN HYDROBROMIDE 600; 30 MG/1; MG/1
1 TABLET, EXTENDED RELEASE ORAL 2 TIMES DAILY
Qty: 12 TABLET | Refills: 0 | Status: ON HOLD | OUTPATIENT
Start: 2022-11-20 | End: 2023-01-17

## 2022-11-20 RX ORDER — BENZONATATE 100 MG/1
100 CAPSULE ORAL 3 TIMES DAILY PRN
Qty: 30 CAPSULE | Refills: 0 | Status: ON HOLD | OUTPATIENT
Start: 2022-11-20 | End: 2023-01-17

## 2022-11-20 RX ORDER — FUROSEMIDE 20 MG
20 TABLET ORAL EVERY EVENING
Qty: 60 TABLET | Refills: 3
Start: 2022-11-22 | End: 2023-01-01

## 2022-11-20 RX ORDER — PREDNISONE 20 MG/1
40 TABLET ORAL 2 TIMES DAILY WITH MEALS
Status: COMPLETED | OUTPATIENT
Start: 2022-11-20 | End: 2022-11-21

## 2022-11-20 RX ORDER — PREDNISONE 20 MG/1
40 TABLET ORAL 2 TIMES DAILY WITH MEALS
Status: DISCONTINUED | OUTPATIENT
Start: 2022-11-20 | End: 2022-11-20

## 2022-11-20 RX ORDER — PANTOPRAZOLE SODIUM 40 MG/1
40 TABLET, DELAYED RELEASE ORAL
Qty: 6 TABLET | Refills: 0 | Status: ON HOLD | OUTPATIENT
Start: 2022-11-21 | End: 2023-01-17

## 2022-11-20 RX ADMIN — LEVALBUTEROL HYDROCHLORIDE 1.25 MG: 1.25 SOLUTION, CONCENTRATE RESPIRATORY (INHALATION) at 12:07

## 2022-11-20 RX ADMIN — RIVAROXABAN 20 MG: 20 TABLET, FILM COATED ORAL at 17:25

## 2022-11-20 RX ADMIN — PREDNISOLONE ACETATE 1 DROP: 10 SUSPENSION/ DROPS OPHTHALMIC at 07:57

## 2022-11-20 RX ADMIN — LEVALBUTEROL HYDROCHLORIDE 1.25 MG: 1.25 SOLUTION, CONCENTRATE RESPIRATORY (INHALATION) at 19:38

## 2022-11-20 RX ADMIN — MORPHINE SULFATE 90 MG: 30 TABLET, EXTENDED RELEASE ORAL at 20:40

## 2022-11-20 RX ADMIN — PREDNISONE 40 MG: 20 TABLET ORAL at 17:24

## 2022-11-20 RX ADMIN — GABAPENTIN 800 MG: 800 TABLET, FILM COATED ORAL at 17:24

## 2022-11-20 RX ADMIN — DILTIAZEM HYDROCHLORIDE 180 MG: 180 CAPSULE, COATED, EXTENDED RELEASE ORAL at 20:41

## 2022-11-20 RX ADMIN — BENZOCAINE 6 MG-MENTHOL 10 MG LOZENGES 1 LOZENGE: at 17:24

## 2022-11-20 RX ADMIN — BENZOCAINE 6 MG-MENTHOL 10 MG LOZENGES 1 LOZENGE: at 01:12

## 2022-11-20 RX ADMIN — DEXTRAN 70, GLYCERIN, HYPROMELLOSE 1 DROP: 1; 2; 3 SOLUTION/ DROPS OPHTHALMIC at 07:57

## 2022-11-20 RX ADMIN — LEVOTHYROXINE SODIUM 300 MCG: 100 TABLET ORAL at 07:54

## 2022-11-20 RX ADMIN — CYCLOBENZAPRINE 10 MG: 10 TABLET, FILM COATED ORAL at 14:50

## 2022-11-20 RX ADMIN — DILTIAZEM HYDROCHLORIDE 180 MG: 180 CAPSULE, COATED, EXTENDED RELEASE ORAL at 07:55

## 2022-11-20 RX ADMIN — BENZONATATE 100 MG: 100 CAPSULE ORAL at 22:14

## 2022-11-20 RX ADMIN — BENZOCAINE 6 MG-MENTHOL 10 MG LOZENGES 1 LOZENGE: at 20:41

## 2022-11-20 RX ADMIN — ATORVASTATIN CALCIUM 40 MG: 40 TABLET, FILM COATED ORAL at 20:40

## 2022-11-20 RX ADMIN — BENZOCAINE 6 MG-MENTHOL 10 MG LOZENGES 1 LOZENGE: at 14:50

## 2022-11-20 RX ADMIN — DEXTRAN 70, GLYCERIN, HYPROMELLOSE 1 DROP: 1; 2; 3 SOLUTION/ DROPS OPHTHALMIC at 20:43

## 2022-11-20 RX ADMIN — MORPHINE SULFATE 90 MG: 30 TABLET, EXTENDED RELEASE ORAL at 07:55

## 2022-11-20 RX ADMIN — METOPROLOL SUCCINATE 75 MG: 50 TABLET, EXTENDED RELEASE ORAL at 20:41

## 2022-11-20 RX ADMIN — MULTIPLE VITAMINS W/ MINERALS TAB 1 TABLET: TAB at 07:56

## 2022-11-20 RX ADMIN — BENZOCAINE 6 MG-MENTHOL 10 MG LOZENGES 1 LOZENGE: at 07:56

## 2022-11-20 RX ADMIN — CEFDINIR 300 MG: 300 CAPSULE ORAL at 07:56

## 2022-11-20 RX ADMIN — PREDNISOLONE ACETATE 1 DROP: 10 SUSPENSION/ DROPS OPHTHALMIC at 20:43

## 2022-11-20 RX ADMIN — ZOLPIDEM TARTRATE 5 MG: 5 TABLET ORAL at 20:41

## 2022-11-20 RX ADMIN — GABAPENTIN 300 MG: 300 CAPSULE ORAL at 07:55

## 2022-11-20 RX ADMIN — GABAPENTIN 800 MG: 800 TABLET, FILM COATED ORAL at 22:14

## 2022-11-20 RX ADMIN — PANTOPRAZOLE SODIUM 40 MG: 40 TABLET, DELAYED RELEASE ORAL at 06:35

## 2022-11-20 RX ADMIN — BENZOCAINE 6 MG-MENTHOL 10 MG LOZENGES 1 LOZENGE: at 22:15

## 2022-11-20 RX ADMIN — GUAIFENESIN 10 ML: 200 SOLUTION ORAL at 14:50

## 2022-11-20 RX ADMIN — CYCLOBENZAPRINE 10 MG: 10 TABLET, FILM COATED ORAL at 07:56

## 2022-11-20 RX ADMIN — LEVALBUTEROL HYDROCHLORIDE 1.25 MG: 1.25 SOLUTION, CONCENTRATE RESPIRATORY (INHALATION) at 15:47

## 2022-11-20 RX ADMIN — CYCLOBENZAPRINE 10 MG: 10 TABLET, FILM COATED ORAL at 20:41

## 2022-11-20 RX ADMIN — GUAIFENESIN 10 ML: 200 SOLUTION ORAL at 06:11

## 2022-11-20 RX ADMIN — GUAIFENESIN AND DEXTROMETHORPHAN HYDROBROMIDE 1 TABLET: 600; 30 TABLET, EXTENDED RELEASE ORAL at 20:41

## 2022-11-20 RX ADMIN — BENZONATATE 100 MG: 100 CAPSULE ORAL at 07:55

## 2022-11-20 RX ADMIN — GABAPENTIN 800 MG: 800 TABLET, FILM COATED ORAL at 07:56

## 2022-11-20 RX ADMIN — GABAPENTIN 300 MG: 300 CAPSULE ORAL at 17:24

## 2022-11-20 RX ADMIN — GUAIFENESIN AND DEXTROMETHORPHAN HYDROBROMIDE 1 TABLET: 600; 30 TABLET, EXTENDED RELEASE ORAL at 07:56

## 2022-11-20 RX ADMIN — BENZONATATE 100 MG: 100 CAPSULE ORAL at 14:50

## 2022-11-20 RX ADMIN — PREDNISONE 40 MG: 20 TABLET ORAL at 10:13

## 2022-11-20 RX ADMIN — GUAIFENESIN 10 ML: 200 SOLUTION ORAL at 20:39

## 2022-11-20 ASSESSMENT — ACTIVITIES OF DAILY LIVING (ADL)
ADLS_ACUITY_SCORE: 18

## 2022-11-20 NOTE — PLAN OF CARE
Goal Outcome Evaluation:                      hetal:  Admitted due to SOB, cough- positive for RSV  DATE & TIME: 11/20/2022 07-19  Cognitive Concerns/ Orientation : Alert and oriented x 4  BEHAVIOR & AGGRESSION TOOL COLOR: Green   CIWA SCORE: NA   ABNL VS/O2: VSS on room air, desats with activity and talking  MOBILITY: Independent  PAIN MANAGMENT: Scheduled MS Contin, Gabapentin, and Flexeril for chronic generalized pain  DIET: Regular diet, good appetite  BOWEL/BLADDER: Continent, had BM today, dark green per pt.  ABNL LAB/BG: WBC 15.2, hgb 10.3  DRAIN/DEVICES: na  TELEMETRY RHYTHM: NA  SKIN: Many old scars from previous surgeries, pressure injury on right buttocks, cleansed with wound cleanser and sacral mepilex applied, scant serous drg.  TESTS/PROCEDURES: Need Sputum sample, container in the room.   D/C DAY/GOALS/PLACE: Discharge planned for early  am 11/21  OTHER IMPORTANT INFO: Droplet/contact precautions maintained. Frequent harsh cough,  Given prn tessalon and robitussin, lozenge for sore throat. States she feels like her lungs are on fire. Diaphoretic at times.

## 2022-11-20 NOTE — PROGRESS NOTES
[facility]    Hospitalist Progress Note      Assessment & Plan   Aspen Mccullough is a 62 year old female who we have lupus currently not on any steroids, chronic pain syndrome and follows with pain clinic, hypertension, hyperlipidemia, hypothyroidism, chronic pain syndrome, morbid obesity, history of COVID-19 in 2022, chronic lower extremity edema with chronic venous congestion who presented to the ED with a chief complaint of shortness of breath and vomiting.     Sepsis with Acute hypoxic respiratory failure due to community-acquired pneumonia with RSV infection  History of COVID-19 in January 2022  Presented with nausea, vomiting, SOB, fever, cough with yellow-colored sputum production and was diagnosed with bronchitis at urgent care center on November 11. WBC 16.3, tachycardia and was requiring 2 to 3 L of oxygen. COVID-19 negative, influenza negative, POSITIVE FOR RSV. procal 0.17. strep pneumo negative.  * CXR: no evidence of pleural effusion or pneumonia, but consistent with changes of early pneumonia.  * CTA chest: no pulmonary emboli. Patchy focus groundglass infiltrate scattered throughout both lungs, suggestive atypical pneumonia with mild inflammatory bronchial wall thickening.  - blood cx NGTD, sputum culture not supplied  - completed 5 day course ceftriaxone/cefdinir and azithromycin  - prn robitussin, mucinex DM BID  - xopenex nebs QID, continue home inhaler PRN on discharge  - prn cepacol lozenges  - weaned off O2  - high dose steroids improved symptoms, now starting to taper, 40mg prednisone on 11/21, then 8 day taper on discharge  - follow up with PCP within one week if possible     Nausea, vomiting and abdominal pain (resolved)  Lipase is normal   - protonix daily while on high dose steroids     Prior COVID-19 diagnosis  - Diagnosed with COVID-19 on emergency department visit 1/18/2022.  Seen again in urgent care with persistent cough 1/29/2022 and discharged with  "doxycycline and prednisone for overlap pneumonia.  Hospitalized 2/17/2022 at [facility]  - she at that time and in august 2022 had predominantly right sided opacities     Hyperglycemia secondary to steroids--improving  Noted with AM BMP  - plan short course of high dose steroids     Hypokalemia  - Potassium is 2.7  - replacement protocol    Leukocytosis  Secondary to steroids     History of right lower extremity DVT and pulmonary embolism in 2017  -CTA chest was done and does not show any evidence of pulmonary embolism  - Resume xarelto     Hypertension  - continue with PTA Metoprolol and cardizem     Hyperlipidemia  -continue with PTA atorvastatin 40 mg daily      History of lupus with chronic steroids use  -She used to be on prednisone for 30 years and tapered herself off earlier this year  - Establish care with rheumatology (Her previous rheumatologist Dr. Field has retired)     P Atrial fibrillation  - continue metoprolol, diltiazem, xarelto     Hypothyroidism  -Continue the patient with PTA Synthroid      Chronic pain syndrome  Fibromyalgia  -Follows with pain clinic for injections, on longstanding MS Contin and oral Dilaudid and gabapentin  - Continue with MS Contin 90 mg every morning and evening, 60 mg in the afternoon as per home regimen  -Continue gabapentin 1100 mg every morning, 1100 mg in the afternoon, 800 mg at bedtime  - PRN oral dilaudid     History of CVA  -Continue PTA atorvastatin     Severe Obesity: Estimated body mass index is 42.09 kg/m  as calculated from the following:    Height as of this encounter: 1.753 m (5' 9\").    Weight as of this encounter: 129.3 kg (285 lb).     ? History of Possible HFpEF  Chronic lower extremity edema due to chronic venous congestion  Echo done during last admission on 08/09/2022 and at that time her EF was 55%, no valve disease, ascending aorta is borderline dilated at 3.8 cm  -On lasix 40 mg in the morning and 20 in the evening  -we will hold diuretics " "for now until intake improves, BP stable    Clinically Significant Risk Factors              # Hypoalbuminemia: Lowest albumin = 2.8 g/dL (Ref range: 3.5-5.2) at 11/15/2022  6:27 PM, will monitor as appropriate           # Severe Obesity: Estimated body mass index is 42.09 kg/m  as calculated from the following:    Height as of this encounter: 1.753 m (5' 9\").    Weight as of this encounter: 129.3 kg (285 lb).            DVT Prophylaxis: Enoxaparin (Lovenox) SQ  Code Status: Full Code     Expected Discharge Date: 11/21/2022,  9:00 AM      Discharge Comments: clinical improvement       Hailey Reyes,   Hospitalist Service    Securely message with the Vocera Web Console (learn more here)  Text Page (7am - 6pm) via [facility] Paging/Directory      Interval History   Patient seen and examined. Had a BM this morning that was dark green, no blood. Breathing is overall better. Stable on room air. Cough lessened, a little more productive. Sounds less coarse, voice still weaker from coughing. Moving around a bit better.  is very sick at home and she is currently a little too sick to be able to take care of him and herself. Advise discharge home on 11/21 instead.    -Data reviewed today: I reviewed all new labs and imaging results over the last 24 hours. I personally reviewed no images or EKG's today.    Physical Exam   Temp: 98.8  F (37.1  C) Temp src: Oral BP: 114/60 Pulse: 62   Resp: 20 SpO2: 92 % O2 Device: None (Room air)    Vitals:    11/15/22 1818   Weight: 129.3 kg (285 lb)     Vital Signs with Ranges  Temp:  [97.5  F (36.4  C)-98.8  F (37.1  C)] 98.8  F (37.1  C)  Pulse:  [55-79] 62  Resp:  [20] 20  BP: (114-130)/(60-73) 114/60  SpO2:  [91 %-95 %] 92 %  I/O last 3 completed shifts:  In: 680 [P.O.:680]  Out: -     Constitutional: Awake, alert, cooperative, no acute distress  Respiratory: occasional cough, no crackles or rhonchi, only expiratory wheeze on room " air  Cardiovascular: Regular rate and rhythm, normal S1 and S2, and no murmur noted  GI: Normal bowel sounds, soft, non-distended, non-tender  Skin/Integumen: No rashes, no cyanosis, trace edema with sock lines R>L, starting to see some of her typical venous stasis changes on right leg today.  Other: talkative    Medications     - MEDICATION INSTRUCTIONS -         artificial tears  1 drop Both Eyes BID     atorvastatin  40 mg Oral QPM     cyclobenzaprine  10 mg Oral TID     dextromethorphan-guaiFENesin  1 tablet Oral BID     diltiazem ER COATED BEADS  180 mg Oral BID     fluticasone  2 spray Both Nostrils Daily     gabapentin  800 mg Oral BID    And     gabapentin  300 mg Oral BID     gabapentin  800 mg Oral At Bedtime     levalbuterol  1.25 mg Nebulization 4x Daily     levothyroxine  300 mcg Oral Daily     metoprolol succinate ER  75 mg Oral BID     morphine  90 mg Oral Q12H [facility] (08/20)     multivitamin w/minerals  1 tablet Oral Daily     pantoprazole  40 mg Oral QAM AC     prednisoLONE acetate  1 drop Both Eyes BID     predniSONE  40 mg Oral BID w/meals     rivaroxaban ANTICOAGULANT  20 mg Oral Daily with supper     sodium chloride (PF)  3 mL Intracatheter Q8H       Data   Recent Labs   Lab 11/20/22  0728 11/19/22  1424 11/18/22  1055 11/17/22  0641 11/16/22  1120 11/15/22  1827   WBC 15.2* 16.5* 16.9* 17.1*  --  16.3*   HGB 10.3* 10.4* 11.3* 11.0*  --  13.5   MCV 88 90 86 87  --  84    421 375 327  --  337   NA  --  136 136 136  --  133   POTASSIUM  --  4.7 3.8 3.4   < > 2.7*   CHLORIDE  --  103 103 102  --  99   CO2  --  24 22 25  --  26   BUN  --  32* 14 14  --  11   CR  --  0.97 0.57 0.65  --  0.62   ANIONGAP  --  9 11 9  --  8   KYLIE  --  9.1 9.1 8.8  --  8.4*   GLC  --  159* 201* 93  --  101*   ALBUMIN  --   --   --   --   --  2.8*   PROTTOTAL  --   --   --   --   --  7.1   BILITOTAL  --   --   --   --   --  0.4   ALKPHOS  --   --   --   --   --  90   ALT  --   --   --   --   --  16   AST  --   --   --    --   --  15   LIPASE  --   --   --   --   --  172    < > = values in this interval not displayed.       No results found for this or any previous visit (from the past 24 hour(s)).

## 2022-11-20 NOTE — PLAN OF CARE
Goal Outcome Evaluation:                      yessi:  Admitted due to SOB, cough- positive for RSV  DATE & TIME: 11/19/2022 07-19  Cognitive Concerns/ Orientation : Alert and oriented x4  BEHAVIOR & AGGRESSION TOOL COLOR: GREEN  CIWA SCORE: NA   ABNL VS/O2: VSS on room air, desats with activity,talking  MOBILITY: Independent  PAIN MANAGMENT: Scheduled MS Contin, Gabapentin, and Flexeril for chronic generalized pain  DIET: Regular diet, good appetite  BOWEL/BLADDER: Continent, had BM yesterday  ABNL LAB/BG: wbc16.5.hgb 10.4  DRAIN/DEVICES: na  TELEMETRY RHYTHM: NA  SKIN: Many old scars from previous surgeries, pressure injury on right buttocks, cleansed with wound cleanser and sacral mepilex applied.scant serous drg.  TESTS/PROCEDURES: Need Sputum sample, container in the room.   D/C DAY/GOALS/PLACE: Discharge pending improvement in cough  OTHER IMPORTANT INFO: Droplet/contact precautions maintained. Frequent harsh cough, states coughed up large amt dark green sputum x1 this am. Given prn tessalon and robitussin, lozenge for sore throat.

## 2022-11-20 NOTE — PLAN OF CARE
Orientation/Cognitive: AO X4, Droplet/Contact precaution  Observation Goals (Met/ Not Met): Inpatient  Mobility Level/Assist Equipment: Independent  Fall Risk (Y/N): No  Behavior Concerns: None  Pain Management: Denies, Cepacol lozenge given X1, robitussin given X1   Tele/VS/O2: VSS on RA  ABNL Lab/BG: WBC 16.5, HGB 10.4  Diet: Regular  Bowel/Bladder: Continent  Skin Concerns: Old scars, R buttocks  Drains/Devices: No IV access  Tests/Procedures for next shift: Need Sputum sample, container in the room  Anticipated DC date & active delays: TBD, pending improvement  Patient Stated Goal for Today: Sleep

## 2022-11-21 VITALS
HEIGHT: 69 IN | SYSTOLIC BLOOD PRESSURE: 135 MMHG | BODY MASS INDEX: 42.21 KG/M2 | DIASTOLIC BLOOD PRESSURE: 99 MMHG | TEMPERATURE: 98.2 F | HEART RATE: 61 BPM | OXYGEN SATURATION: 95 % | WEIGHT: 285 LBS | RESPIRATION RATE: 20 BRPM

## 2022-11-21 LAB
BACTERIA BLD CULT: NO GROWTH
BACTERIA BLD CULT: NO GROWTH

## 2022-11-21 PROCEDURE — 250N000012 HC RX MED GY IP 250 OP 636 PS 637: Performed by: HOSPITALIST

## 2022-11-21 PROCEDURE — 250N000013 HC RX MED GY IP 250 OP 250 PS 637: Performed by: EMERGENCY MEDICINE

## 2022-11-21 PROCEDURE — 250N000009 HC RX 250: Performed by: EMERGENCY MEDICINE

## 2022-11-21 PROCEDURE — 99239 HOSP IP/OBS DSCHRG MGMT >30: CPT | Performed by: INTERNAL MEDICINE

## 2022-11-21 PROCEDURE — 250N000013 HC RX MED GY IP 250 OP 250 PS 637: Performed by: HOSPITALIST

## 2022-11-21 RX ADMIN — GUAIFENESIN AND DEXTROMETHORPHAN HYDROBROMIDE 1 TABLET: 600; 30 TABLET, EXTENDED RELEASE ORAL at 08:39

## 2022-11-21 RX ADMIN — LEVALBUTEROL HYDROCHLORIDE 1.25 MG: 1.25 SOLUTION, CONCENTRATE RESPIRATORY (INHALATION) at 08:15

## 2022-11-21 RX ADMIN — LEVOTHYROXINE SODIUM 300 MCG: 100 TABLET ORAL at 08:39

## 2022-11-21 RX ADMIN — MORPHINE SULFATE 90 MG: 30 TABLET, EXTENDED RELEASE ORAL at 08:37

## 2022-11-21 RX ADMIN — BENZOCAINE 6 MG-MENTHOL 10 MG LOZENGES 1 LOZENGE: at 06:35

## 2022-11-21 RX ADMIN — GUAIFENESIN 10 ML: 200 SOLUTION ORAL at 06:34

## 2022-11-21 RX ADMIN — DEXTRAN 70, GLYCERIN, HYPROMELLOSE 1 DROP: 1; 2; 3 SOLUTION/ DROPS OPHTHALMIC at 08:44

## 2022-11-21 RX ADMIN — BENZONATATE 100 MG: 100 CAPSULE ORAL at 08:41

## 2022-11-21 RX ADMIN — MULTIPLE VITAMINS W/ MINERALS TAB 1 TABLET: TAB at 08:38

## 2022-11-21 RX ADMIN — METOPROLOL SUCCINATE 75 MG: 50 TABLET, EXTENDED RELEASE ORAL at 08:38

## 2022-11-21 RX ADMIN — GABAPENTIN 800 MG: 800 TABLET, FILM COATED ORAL at 08:38

## 2022-11-21 RX ADMIN — PREDNISONE 40 MG: 20 TABLET ORAL at 08:39

## 2022-11-21 RX ADMIN — GABAPENTIN 300 MG: 300 CAPSULE ORAL at 08:38

## 2022-11-21 RX ADMIN — BENZOCAINE 6 MG-MENTHOL 10 MG LOZENGES 1 LOZENGE: at 08:38

## 2022-11-21 RX ADMIN — CYCLOBENZAPRINE 10 MG: 10 TABLET, FILM COATED ORAL at 08:37

## 2022-11-21 RX ADMIN — PANTOPRAZOLE SODIUM 40 MG: 40 TABLET, DELAYED RELEASE ORAL at 06:35

## 2022-11-21 RX ADMIN — PREDNISOLONE ACETATE 1 DROP: 10 SUSPENSION/ DROPS OPHTHALMIC at 08:44

## 2022-11-21 RX ADMIN — DILTIAZEM HYDROCHLORIDE 180 MG: 180 CAPSULE, COATED, EXTENDED RELEASE ORAL at 08:38

## 2022-11-21 ASSESSMENT — ACTIVITIES OF DAILY LIVING (ADL)
ADLS_ACUITY_SCORE: 18

## 2022-11-21 NOTE — PLAN OF CARE
Goal Outcome Evaluation:       Cognitive Concerns/ Orientation : Alert and oriented x 4  BEHAVIOR & AGGRESSION TOOL COLOR: Green   CIWA SCORE: NA   ABNL VS/O2: VSS on room air, desats with activity and talking  MOBILITY: Independent  PAIN MANAGMENT: Scheduled MS Contin, Gabapentin, and Flexeril for chronic generalized pain  DIET: Regular diet, good appetite  BOWEL/BLADDER: Continent, had BM today, dark green per pt.  ABNL LAB/BG: WBC 15.2, hgb 10.3  DRAIN/DEVICES: na  TELEMETRY RHYTHM: NA  SKIN: Many old scars from previous surgeries, pressure injury on right buttocks, cleansed with wound cleanser and sacral mepilex applied, scant serous drg.  TESTS/PROCEDURES: Need Sputum sample, container in the room.   D/C DAY/GOALS/PLACE: Discharge planned for early  am 11/21  OTHER IMPORTANT INFO: Droplet/contact precautions maintained. Frequent harsh cough,  Given prn tessalon and robitussin, lozenge for sore throat.

## 2022-11-21 NOTE — PLAN OF CARE
Goal Outcome Evaluation:  Discharge    Patient discharged to home with   Care plan note:  VSS.  No c/o chest pain or SOB.  UP ind in room.  LS coarse with exp wheezes. Dry congested cough noted    Listed belongings gathered and given to patient (including from security/pharmacy). Yes  Care Plan and Patient education resolved: Yes  Prescriptions if needed, hard copies sent with patient  Yes  Medication Bin checked and emptied on discharge Yes  SW/care coordinator/charge RN aware of discharge:Yes      Plan of Care Reviewed With: patient, spouse

## 2022-11-21 NOTE — DISCHARGE SUMMARY
[facility]  Hospitalist Discharge Summary      Date of Admission:  11/15/2022  Date of Discharge:  11/21/2022  Discharging Provider: Celeste Restrepo MD, FACP  Discharge Service: Hospitalist Service    Discharge Diagnoses   Sepsis with Acute hypoxic respiratory failure due to community-acquired pneumonia with RSV infection  History of COVID-19 in January 2022  Nausea, vomiting and abdominal pain (resolved)  Prior COVID-19 diagnosis  Hyperglycemia secondary to steroids--improving  Hypokalemia  Leukocytosis  History of right lower extremity DVT and pulmonary embolism in 2017  Essential HTN  Hyperlipidemia  History of lupus with chronic steroids use  Persistent Atrial fibrillation  Hypothyroidism  Chronic pain syndrome  Fibromyalgia  History of CVA   Severe Obesity  History of Possible HFpEF  Chronic lower extremity edema due to chronic venous congestion    Follow-ups Needed After Discharge   Follow-up Appointments     Follow-up and recommended labs and tests       Follow up with primary care provider, Arcadio Farfan, within 7 days for   hospital follow- up.  No follow up labs or test are needed.           Unresulted Labs Ordered in the Past 30 Days of this Admission     No orders found from 10/16/2022 to 11/16/2022.          Discharge Disposition   Discharged to home  Condition at discharge: Stable    Hospital Course     Aspen Mccullough is a 62 year old female who we have lupus currently not on any steroids, chronic pain syndrome and follows with pain clinic, hypertension, hyperlipidemia, hypothyroidism, chronic pain syndrome, morbid obesity, history of COVID-19 in 2022, chronic lower extremity edema with chronic venous congestion who presented to the ED with a chief complaint of shortness of breath and vomiting.  Here are further details regarding her current hospitalization      Sepsis with Acute hypoxic respiratory failure due to community-acquired pneumonia with RSV infection  History of  COVID-19 in January 2022  Presented with nausea, vomiting, SOB, fever, cough with yellow-colored sputum production and was diagnosed with bronchitis at urgent care center on November 11. WBC 16.3, tachycardia and was requiring 2 to 3 L of oxygen. COVID-19 negative, influenza negative, POSITIVE FOR RSV. procal 0.17. strep pneumo negative.  * CXR: no evidence of pleural effusion or pneumonia, but consistent with changes of early pneumonia.  * CTA chest: no pulmonary emboli. Patchy focus groundglass infiltrate scattered throughout both lungs, suggestive atypical pneumonia with mild inflammatory bronchial wall thickening.    - Patient care was assumed this morning , seen and examined , feeling better , still has coughing although improving , looking froward to going home   - blood cx NGTD, sputum culture not supplied  - completed 5 day course ceftriaxone/cefdinir and azithromycin  - prn robitussin, mucinex DM BID  - xopenex nebs QID, continue home inhaler PRN on discharge  - prn cepacol lozenges  - weaned off O2  - high dose steroids improved symptoms, now starting to taper, 40mg prednisone on 11/21, then 8 day taper on discharge  - follow up with PCP within one week if possible     Nausea, vomiting and abdominal pain (resolved)  Lipase is normal   - protonix daily while on high dose steroids     Prior COVID-19 diagnosis  - Diagnosed with COVID-19 on emergency department visit 1/18/2022.  Seen again in urgent care with persistent cough 1/29/2022 and discharged with doxycycline and prednisone for overlap pneumonia.  Hospitalized 2/17/2022 at [facility]  - she at that time and in august 2022 had predominantly right sided opacities      Hyperglycemia secondary to steroids--improving  Noted with AM BMP  - plan short course of high dose steroids     Hypokalemia  - Potassium is 2.7  - replacement protocol     Leukocytosis  - Secondary to steroids, will recommend repeat CBC in one week      History of right lower  "extremity DVT and pulmonary embolism in 2017  - CTA chest was done and does not show any evidence of pulmonary embolism  - Resume xarelto     Hypertension  - continue with PTA Metoprolol and cardizem     Hyperlipidemia  -continue with PTA atorvastatin 40 mg daily      History of lupus with chronic steroids use  - She used to be on prednisone for 30 years and tapered herself off earlier this year  - Establish care with rheumatology (Her previous rheumatologist Dr. Field has retired)     P Atrial fibrillation  - continue metoprolol, diltiazem, xarelto     Hypothyroidism  - Continue the patient with PTA Synthroid      Chronic pain syndrome  Fibromyalgia  - Follows with pain clinic for injections, on longstanding MS Contin and oral Dilaudid and gabapentin  - Continue with MS Contin 90 mg every morning and evening, 60 mg in the afternoon as per home regimen  - Continue gabapentin 1100 mg every morning, 1100 mg in the afternoon, 800 mg at bedtime  - PRN oral dilaudid     History of CVA  -Continue PTA atorvastatin     Severe Obesity: Estimated body mass index is 42.09 kg/m  as calculated from the following:    Height as of this encounter: 1.753 m (5' 9\").    Weight as of this encounter: 129.3 kg (285 lb).     ? History of Possible HFpEF  Chronic lower extremity edema due to chronic venous congestion  Echo done during last admission on 08/09/2022 and at that time her EF was 55%, no valve disease, ascending aorta is borderline dilated at 3.8 cm  - On lasix 40 mg in the morning and 20 in the evening  - restart diuretics on discharge     Patient was seen and examined on the day of discharge ,she is feeling well, does not have any complaints , I did review the discharge medications and instructions with the patient and plan for her to follow up with the PCP after the hospitalization .patient was in agreement , she is discharged in stable condition to her home.     Consultations This Hospital Stay   VASCULAR ACCESS ADULT IP " CONSULT    Code Status   Full Code    Time Spent on this Encounter   I, Celeste Restrepo MD, personally saw the patient today and spent greater than 30 minutes discharging this patient.     Celeste Restrepo MD, [facility]  [place] [address] [facility]  [address] [address]  Phone: [phone]  ______________________________________________________________________    Physical Exam   Vital Signs: Temp: 98.1  F (36.7  C) Temp src: Oral BP: (!) 135/99 Pulse: 62   Resp: 20 SpO2: 94 % O2 Device: None (Room air)    Weight: 285 lbs 0 oz    Physical Exam  Vitals and nursing note reviewed.   Constitutional:       Appearance: She is well-developed and well-nourished.   HENT:      Head: Normocephalic and atraumatic.   Eyes:      Extraocular Movements: EOM normal.      Conjunctiva/sclera: Conjunctivae normal.      Pupils: Pupils are equal, round, and reactive to light.   Neck:      Thyroid: No thyromegaly.   Cardiovascular:      Rate and Rhythm: Normal rate and regular rhythm.      Heart sounds: Normal heart sounds. No murmur heard.  Pulmonary:      Effort: Pulmonary effort is normal. No respiratory distress.      Breath sounds: Rhonchi present. No wheezing.      Comments: Minimal end exp wheezing in B/L lower bases   Abdominal:      General: Bowel sounds are normal.      Palpations: Abdomen is soft.      Tenderness: There is no abdominal tenderness. There is no guarding or rebound.   Musculoskeletal:         General: No deformity or edema. Normal range of motion.      Cervical back: Normal range of motion and neck supple.   Skin:     General: Skin is warm and dry.   Neurological:      Mental Status: She is alert and oriented to person, place, and time.   Psychiatric:         Mood and Affect: Mood and affect normal.         Behavior: Behavior normal.          Primary Care Physician   Arcadio Farfan    Discharge Orders      Medication Therapy Management Referral      Reason for your hospital stay     RSV pneumonia     Follow-up and recommended labs and tests     Follow up with primary care provider, Arcadio Farfan, within 7 days for hospital follow- up.  No follow up labs or test are needed.     Activity    Your activity upon discharge: activity as tolerated     Discharge Instructions    1. You completed 5 days of antibiotics. You are discharged on a prednisone taper. You will take 40mg daily x2 days starting 11/22. Then decrease by 10mg every 2 days until you complete the taper.    2. You will take protonix 40mg once daily for the next 6 days to help protect your stomach while you are on high dose steroids    3. You were given prescriptions for lozenges, benzonatate, liquid robitussin and mucinex tabs for ongoing cough.    4. DO NOT take your lasix again until 11/22 or 11/23. Want to give your body a few days to adjust and your blood pressures to come back up to normal before taking again. If you are dizzy, wait until the next day to start.     Diet    Follow this diet upon discharge: Regular Diet Adult       Significant Results and Procedures   Results for orders placed or performed during the hospital encounter of 11/15/22   Chest XR,  PA & LAT    Narrative    EXAM: XR CHEST 2 VIEWS  LOCATION: [facility]  DATE/TIME: 11/15/2022 7:03 PM    INDICATION: cough.  COMPARISON: CT 09/06/2022, radiograph 08/08/2022.      Impression    IMPRESSION: Mildly enlarged cardiac silhouette, stable from prior examination. New patchy bibasilar opacities, left greater than right, could be seen with aspiration and/or developing pneumonia. No pleural effusion or pneumothorax. No acute bony   abnormality.   CT Chest Pulmonary Embolism w Contrast    Narrative    EXAM: CT CHEST PULMONARY EMBOLISM W CONTRAST  LOCATION: [facility]  DATE/TIME: 11/15/2022 11:07 PM    INDICATION: tachycardic dyspnea history of clots  COMPARISON: 9/6/2022 and 2/17/2022  TECHNIQUE: CT chest pulmonary  angiogram during arterial phase injection of IV contrast. Multiplanar reformats and MIP reconstructions were performed. Dose reduction techniques were used.   CONTRAST: 83mL Isovue 370    FINDINGS:  ANGIOGRAM CHEST: Pulmonary arteries are normal caliber and negative for pulmonary emboli. Thoracic aorta is negative for dissection. No CT evidence of right heart strain.    LUNGS AND PLEURA: New patchy foci of groundglass opacity present bilaterally most consistent with atypical pneumonia, viral pneumonia should be considered. There is also inflammatory bronchial wall thickening diffusely. Focal atelectasis seen medially at   left lung base.    MEDIASTINUM/AXILLAE: Normal.    CORONARY ARTERY CALCIFICATION: None.    UPPER ABDOMEN: Cholecystectomy.    MUSCULOSKELETAL: Unremarkable.      Impression    IMPRESSION:  1.  No pulmonary emboli identified.  2.  Patchy foci of groundglass infiltrate scattered throughout both lungs most suggestive of atypical pneumonia. There is also mild inflammatory bronchial wall thickening.     *Note: Due to a large number of results and/or encounters for the requested time period, some results have not been displayed. A complete set of results can be found in Results Review.       Discharge Medications   Current Discharge Medication List      START taking these medications    Details   benzocaine-menthol (CHLORASEPTIC) 6-10 MG lozenge Place 1 lozenge inside cheek every hour as needed for sore throat  Qty: 18 lozenge, Refills: 0    Comments: Future refills by PCP Dr. Arcadio Farfan with phone number [phone].  Associated Diagnoses: RSV infection      benzonatate (TESSALON) 100 MG capsule Take 1 capsule (100 mg) by mouth 3 times daily as needed for cough  Qty: 30 capsule, Refills: 0    Comments: Future refills by PCP Dr. Arcadio Farfan with phone number [phone].  Associated Diagnoses: RSV infection      dextromethorphan-guaiFENesin (MUCINEX DM)  MG 12 hr tablet Take 1 tablet by  mouth 2 times daily  Qty: 12 tablet, Refills: 0    Associated Diagnoses: RSV infection      guaiFENesin (ROBITUSSIN) 20 mg/mL liquid Take 10 mLs by mouth every 6 hours as needed for cough  Qty: 180 mL, Refills: 0    Comments: Future refills by PCP Dr. Arcadio Farfan with phone number [phone].  Associated Diagnoses: RSV infection      pantoprazole (PROTONIX) 40 MG EC tablet Take 1 tablet (40 mg) by mouth every morning (before breakfast)  Qty: 6 tablet, Refills: 0    Comments: Future refills by PCP Dr. Arcadio Farfan with phone number [phone].  Associated Diagnoses: RSV infection      predniSONE (DELTASONE) 10 MG tablet 4 tabs daily for 2 days, then 3 tabs daily for 2 days, then 2 tabs daily for 2 days, then 1 tab daily for 2 days, then stop  Qty: 20 tablet, Refills: 0    Associated Diagnoses: RSV infection         CONTINUE these medications which have CHANGED    Details   !! furosemide (LASIX) 20 MG tablet Take 1 tablet (20 mg) by mouth every evening  Qty: 60 tablet, Refills: 3    Associated Diagnoses: Heart failure with preserved ejection fraction, NYHA class I (H)      !! furosemide (LASIX) 40 MG tablet Take 1 tablet (40 mg) by mouth every morning Future refills by PCP Dr. Arcadio Farfan with phone number [phone].  Qty: 60 tablet, Refills: 3    Associated Diagnoses: Venous (peripheral) insufficiency       !! - Potential duplicate medications found. Please discuss with provider.      CONTINUE these medications which have NOT CHANGED    Details   atorvastatin (LIPITOR) 40 MG tablet Take 1 tablet (40 mg) by mouth every evening  Qty: 30 tablet, Refills: 3    Associated Diagnoses: Hyperlipidemia LDL goal <100      BIOTIN FORTE PO Take 1 tablet by mouth daily       carboxymethylcellulose (REFRESH PLUS) 0.5 % SOLN ophthalmic solution Place 1 drop into both eyes 2 times daily as needed   Qty: 1 Bottle      cyanocobalamin 1000 MCG/ML injection Inject 1 mL (1,000 mcg) Subcutaneous every 7 days  Qty: 4 mL,  Refills: 5    Associated Diagnoses: Other vitamin B12 deficiency anemia      cyclobenzaprine (FLEXERIL) 10 MG tablet Take 1 tablet (10 mg) by mouth 3 times daily  Qty: 90 tablet, Refills: 3    Associated Diagnoses: Generalized osteoarthrosis, involving multiple sites      cycloSPORINE (RESTASIS) 0.05 % ophthalmic emulsion Place 1 drop into both eyes 2 times daily as needed       diltiazem ER COATED BEADS (CARDIZEM CD/CARTIA XT) 180 MG 24 hr capsule Take 180 mg by mouth 2 times daily      fexofenadine (ALLEGRA) 180 MG tablet Take 180 mg by mouth daily as needed       gabapentin (NEURONTIN) 300 MG capsule Take 300 mg by mouth 2 times daily (morning and afternoon) with 800mg tablet (total dose 1100mg)      !! gabapentin (NEURONTIN) 800 MG tablet Take 800 mg by mouth At Bedtime       !! gabapentin (NEURONTIN) 800 MG tablet Take 800 mg by mouth 2 times daily (morning and afternoon) in addition to 300mg capsule (total dose 1100mg)      HYDROmorphone (DILAUDID) 8 MG tablet Take 8 mg by mouth 3 times daily . Max of 3 doses per day.    Associated Diagnoses: Chronic pain syndrome      levothyroxine (TIROSINT) 100 MCG capsule Take 300 mcg by mouth daily      lifitegrast (XIIDRA) 5 % opthalmic solution Place 1 drop into both eyes 2 times daily      metoprolol succinate ER (TOPROL XL) 25 MG 24 hr tablet Take 3 tablets (75 mg) by mouth 2 times daily NOTE this is a DECREASE in dose than prior to hospital [STOP prior metoprolol]  Qty: 180 tablet, Refills: 3    Associated Diagnoses: Chronic atrial fibrillation (H)      !! morphine (MS CONTIN) 30 MG 12 hr tablet Take 30 mg by mouth 2 times daily (morning and night) in addition to 60 mg tablet for a total dose of 90mg.  Qty: 270 tablet, Refills: 0    Associated Diagnoses: Chronic pain syndrome      !! morphine (MS CONTIN) 60 MG 12 hr tablet Take 60 mg by mouth 3 times daily (morning and night) in addition to 30mg tablet for a total dose of 90mg  Qty: 30 tablet, Refills: 0     Associated Diagnoses: Chronic pain syndrome      multivitamin w/minerals (THERA-VIT-M) tablet Take 1 tablet by mouth daily   Qty: 100 tablet, Refills: 3      NASONEX 50 MCG/ACT spray Spray 1 spray into both nostrils daily as needed   Refills: 11      prednisoLONE acetate (PRED FORTE) 1 % ophthalmic susp Place 1 drop into both eyes 2 times daily       rivaroxaban ANTICOAGULANT (XARELTO) 20 MG TABS tablet Take 1 tablet (20 mg) by mouth daily (with dinner)      zolpidem (AMBIEN) 5 MG tablet Take 5 mg by mouth nightly as needed for sleep      Elastic Bandages & Supports (MEDICAL COMPRESSION SOCKS) MISC 1 Package daily Please measure and distribute 2 pair of 20mmHg - 30mmHg THIGH high open or closed toe compression stockings with extra refills as indicated. Jobst ultrasheer or equivalent.  Qty: 2 each, Refills: 3    Associated Diagnoses: Symptomatic varicose veins of both lower extremities       !! - Potential duplicate medications found. Please discuss with provider.        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      Chlorhexidine Hives     Thor surgical cleanser     Codeine Hives     Has tolerated Oxycodone in past      Ibuprofen [Nsaids] Hives     Penicillins Hives     Other reaction(s): Unknown     Sulfa Drugs Hives     Other reaction(s): Unknown     Calcium Channel Blockers      Doxycycline GI Disturbance     Emesis & diarrhea  Patient denies allergy to this med     Hydroxyzine      rash     Mold

## 2022-11-22 ENCOUNTER — TELEPHONE (OUTPATIENT)
Dept: PHARMACY | Facility: OTHER | Age: 62
End: 2022-11-22

## 2022-11-22 NOTE — TELEPHONE ENCOUNTER
[facility]M referral from: Transitions of Care (recent hospital discharge or ED visit)    MTM referral outreach attempt on November 22, 2022 at 9:25 AM      Outcome: Patient is not interested at this time, will route to [facility]TM Pharmacist/Provider as an FYI. Thank you for the referral.     Con [facility]

## 2022-11-23 ENCOUNTER — PATIENT OUTREACH (OUTPATIENT)
Dept: CARE COORDINATION | Facility: CLINIC | Age: 62
End: 2022-11-23

## 2022-11-23 NOTE — PROGRESS NOTES
Clinic Care Coordination Contact  [facility]: Post-Discharge Note  SITUATION                                                      Admission:    Admission Date: 11/15/22   Reason for Admission: Sepsis with Acute hypoxic respiratory failure due to community-acquired pneumonia with RSV infection  Discharge:   Discharge Date: 11/21/22  Discharge Diagnosis: Sepsis with Acute hypoxic respiratory failure due to community-acquired pneumonia with RSV infection  History of COVID-19 in January 2022  Nausea, vomiting and abdominal pain (resolved)  Prior COVID-19 diagnosis  Hyperglycemia secondary to steroids--improving  Hypokalemia  Leukocytosis  History of right lower extremity DVT and pulmonary embolism in 2017  Essential HTN  Hyperlipidemia  History of lupus with chronic steroids use  Persistent Atrial fibrillation  Hypothyroidism  Chronic pain syndrome  Fibromyalgia  History of CVA   Severe Obesity  History of Possible HFpEF  Chronic lower extremity edema due to chronic venous congestion    BACKGROUND                                                      Per hospital discharge summary and inpatient provider notes:    Hospital Course        Aspen Mccullough is a 62 year old female who we have lupus currently not on any steroids, chronic pain syndrome and follows with pain clinic, hypertension, hyperlipidemia, hypothyroidism, chronic pain syndrome, morbid obesity, history of COVID-19 in 2022, chronic lower extremity edema with chronic venous congestion who presented to the ED with a chief complaint of shortness of breath and vomiting.  Here are further details regarding her current hospitalization      Sepsis with Acute hypoxic respiratory failure due to community-acquired pneumonia with RSV infection  History of COVID-19 in January 2022  Presented with nausea, vomiting, SOB, fever, cough with yellow-colored sputum production and was diagnosed with bronchitis at urgent care center on November 11. WBC 16.3, tachycardia  and was requiring 2 to 3 L of oxygen. COVID-19 negative, influenza negative, POSITIVE FOR RSV. procal 0.17. strep pneumo negative.  * CXR: no evidence of pleural effusion or pneumonia, but consistent with changes of early pneumonia.  * CTA chest: no pulmonary emboli. Patchy focus groundglass infiltrate scattered throughout both lungs, suggestive atypical pneumonia with mild inflammatory bronchial wall thickening.     - Patient care was assumed this morning , seen and examined , feeling better , still has coughing although improving , looking froward to going home   - blood cx NGTD, sputum culture not supplied  - completed 5 day course ceftriaxone/cefdinir and azithromycin  - prn robitussin, mucinex DM BID  - xopenex nebs QID, continue home inhaler PRN on discharge  - prn cepacol lozenges  - weaned off O2  - high dose steroids improved symptoms, now starting to taper, 40mg prednisone on 11/21, then 8 day taper on discharge  - follow up with PCP within one week if possible     Nausea, vomiting and abdominal pain (resolved)  Lipase is normal   - protonix daily while on high dose steroids     Prior COVID-19 diagnosis  - Diagnosed with COVID-19 on emergency department visit 1/18/2022.  Seen again in urgent care with persistent cough 1/29/2022 and discharged with doxycycline and prednisone for overlap pneumonia.  Hospitalized 2/17/2022 at [facility]  - she at that time and in august 2022 had predominantly right sided opacities      Hyperglycemia secondary to steroids--improving  Noted with AM BMP  - plan short course of high dose steroids     Hypokalemia  - Potassium is 2.7  - replacement protocol     Leukocytosis  - Secondary to steroids, will recommend repeat CBC in one week      History of right lower extremity DVT and pulmonary embolism in 2017  - CTA chest was done and does not show any evidence of pulmonary embolism  - Resume xarelto     Hypertension  - continue with PTA Metoprolol and  "cardizem     Hyperlipidemia  -continue with PTA atorvastatin 40 mg daily      History of lupus with chronic steroids use  - She used to be on prednisone for 30 years and tapered herself off earlier this year  - Establish care with rheumatology (Her previous rheumatologist Dr. Field has retired)     P Atrial fibrillation  - continue metoprolol, diltiazem, xarelto     Hypothyroidism  - Continue the patient with PTA Synthroid      Chronic pain syndrome  Fibromyalgia  - Follows with pain clinic for injections, on longstanding MS Contin and oral Dilaudid and gabapentin  - Continue with MS Contin 90 mg every morning and evening, 60 mg in the afternoon as per home regimen  - Continue gabapentin 1100 mg every morning, 1100 mg in the afternoon, 800 mg at bedtime  - PRN oral dilaudid     History of CVA  -Continue PTA atorvastatin     Severe Obesity: Estimated body mass index is 42.09 kg/m  as calculated from the following:    Height as of this encounter: 1.753 m (5' 9\").    Weight as of this encounter: 129.3 kg (285 lb).     ? History of Possible HFpEF  Chronic lower extremity edema due to chronic venous congestion  Echo done during last admission on 08/09/2022 and at that time her EF was 55%, no valve disease, ascending aorta is borderline dilated at 3.8 cm  - On lasix 40 mg in the morning and 20 in the evening  - restart diuretics on discharge      Patient was seen and examined on the day of discharge ,she is feeling well, does not have any complaints , I did review the discharge medications and instructions with the patient and plan for her to follow up with the PCP after the hospitalization .patient was in agreement , she is discharged in stable condition to her home.     ASSESSMENT           Discharge Assessment  How are you doing now that you are home?: Spoke with , Ash (consent to communicate in chart).  Ash reports Aspne is \"doing fine, taking her medications as prescribed, good appetite, still has a " little trouble with her breathing, just a touch, but I'm not surprised as she had major issues with her lungs while in the hospital; overall doing good though.'  How are your symptoms? (Red Flag symptoms escalate to triage hotline per guidelines): Improved  Do you feel your condition is stable enough to be safe at home until your provider visit?: Yes  Does the patient have their discharge instructions? : Yes  Does the patient have questions regarding their discharge instructions? : No  Were you started on any new medications or were there changes to any of your previous medications? : Yes (Reviewed medications from [facility].   reports he is making sure Aspen is getting all her medications as prescribed, and also resumed Lasix this morning as instructed.)  Does the patient have all of their medications?: Yes  Do you have questions regarding any of your medications? : No  Discharge follow-up appointment scheduled within 14 calendar days? : No  Is patient agreeable to assistance with scheduling? : No ( states they will schedule PCP follow up next week, after the holidays)         Post-op (Clinicians Only)  Did the patient have surgery or a procedure: No  Fever: No  Eating & Drinking: eating and drinking without complaints/concerns  PO Intake: regular diet  Additional Symptoms:  (Denies)      PLAN                                                      Outpatient Plan:      Follow up with primary care provider, Arcadio Farfan, within 7 days for hospital follow- up.  No follow up labs or test are needed.  Medication Therapy Management Referral    No future appointments.      For any urgent concerns, please contact our 24 hour nurse triage line: [phone] ([phone])         Neha Harding RN

## 2022-12-28 ENCOUNTER — TELEPHONE (OUTPATIENT)
Dept: CARDIOLOGY | Facility: CLINIC | Age: 62
End: 2022-12-28

## 2022-12-28 NOTE — TELEPHONE ENCOUNTER
[place] Community Measures Blood Pressure guideline reviewed.  Patients recent blood pressure is outside of guideline parameters.  Called pt to review, no answer.  Left voicemail message asking patient to check their blood pressure using a home blood pressure cuff or by going to a [facility].  Patient instructed to then call [phone] ([place]) and leave a message with their name, date of birth, and blood pressure reading that was completed within the last 24 hours and where it was completed.  Will await call back for further review.IVONNE Harvey

## 2023-01-01 ENCOUNTER — PATIENT OUTREACH (OUTPATIENT)
Dept: CARE COORDINATION | Facility: CLINIC | Age: 63
End: 2023-01-01
Payer: COMMERCIAL

## 2023-01-01 ENCOUNTER — HOSPITAL ENCOUNTER (OUTPATIENT)
Facility: CLINIC | Age: 63
Setting detail: OBSERVATION
Discharge: HOME | End: 2023-07-23
Attending: EMERGENCY MEDICINE | Admitting: HOSPITALIST
Payer: COMMERCIAL

## 2023-01-01 ENCOUNTER — TELEPHONE (OUTPATIENT)
Dept: CARDIOLOGY | Facility: CLINIC | Age: 63
End: 2023-01-01
Payer: COMMERCIAL

## 2023-01-01 ENCOUNTER — HOSPITAL ENCOUNTER (OUTPATIENT)
Facility: CLINIC | Age: 63
Setting detail: OBSERVATION
Discharge: HOME | End: 2023-08-09
Attending: EMERGENCY MEDICINE | Admitting: HOSPITALIST
Payer: COMMERCIAL

## 2023-01-01 ENCOUNTER — ANCILLARY PROCEDURE (OUTPATIENT)
Dept: ULTRASOUND IMAGING | Facility: CLINIC | Age: 63
End: 2023-01-01
Attending: INTERNAL MEDICINE
Payer: COMMERCIAL

## 2023-01-01 ENCOUNTER — APPOINTMENT (OUTPATIENT)
Dept: CT IMAGING | Facility: CLINIC | Age: 63
End: 2023-01-01
Attending: EMERGENCY MEDICINE
Payer: COMMERCIAL

## 2023-01-01 ENCOUNTER — TELEPHONE (OUTPATIENT)
Dept: CARDIOLOGY | Facility: CLINIC | Age: 63
End: 2023-01-01

## 2023-01-01 ENCOUNTER — OFFICE VISIT (OUTPATIENT)
Dept: ORTHOPEDICS | Facility: CLINIC | Age: 63
End: 2023-01-01
Payer: COMMERCIAL

## 2023-01-01 ENCOUNTER — NURSE TRIAGE (OUTPATIENT)
Dept: NURSING | Facility: CLINIC | Age: 63
End: 2023-01-01
Payer: COMMERCIAL

## 2023-01-01 ENCOUNTER — ANCILLARY PROCEDURE (OUTPATIENT)
Dept: GENERAL RADIOLOGY | Facility: CLINIC | Age: 63
End: 2023-01-01
Attending: PREVENTIVE MEDICINE
Payer: COMMERCIAL

## 2023-01-01 ENCOUNTER — HEALTH MAINTENANCE LETTER (OUTPATIENT)
Age: 63
End: 2023-01-01

## 2023-01-01 ENCOUNTER — ANCILLARY PROCEDURE (OUTPATIENT)
Dept: MAMMOGRAPHY | Facility: CLINIC | Age: 63
End: 2023-01-01
Payer: COMMERCIAL

## 2023-01-01 ENCOUNTER — OFFICE VISIT (OUTPATIENT)
Dept: OBGYN | Facility: CLINIC | Age: 63
End: 2023-01-01
Payer: COMMERCIAL

## 2023-01-01 ENCOUNTER — OFFICE VISIT (OUTPATIENT)
Dept: VASCULAR SURGERY | Facility: CLINIC | Age: 63
End: 2023-01-01
Payer: COMMERCIAL

## 2023-01-01 ENCOUNTER — TELEPHONE (OUTPATIENT)
Dept: PHARMACY | Facility: OTHER | Age: 63
End: 2023-01-01
Payer: COMMERCIAL

## 2023-01-01 ENCOUNTER — APPOINTMENT (OUTPATIENT)
Dept: ULTRASOUND IMAGING | Facility: CLINIC | Age: 63
End: 2023-01-01
Attending: HOSPITALIST
Payer: COMMERCIAL

## 2023-01-01 ENCOUNTER — VIRTUAL VISIT (OUTPATIENT)
Dept: ORTHOPEDICS | Facility: CLINIC | Age: 63
End: 2023-01-01
Payer: COMMERCIAL

## 2023-01-01 ENCOUNTER — HOSPITAL ENCOUNTER (EMERGENCY)
Facility: CLINIC | Age: 63
Discharge: HOME | End: 2023-08-07
Attending: EMERGENCY MEDICINE | Admitting: EMERGENCY MEDICINE
Payer: COMMERCIAL

## 2023-01-01 ENCOUNTER — OFFICE VISIT (OUTPATIENT)
Dept: VASCULAR SURGERY | Facility: CLINIC | Age: 63
End: 2023-01-01
Attending: INTERNAL MEDICINE
Payer: COMMERCIAL

## 2023-01-01 ENCOUNTER — TELEPHONE (OUTPATIENT)
Dept: ANESTHESIOLOGY | Facility: CLINIC | Age: 63
End: 2023-01-01
Payer: COMMERCIAL

## 2023-01-01 ENCOUNTER — HOSPITAL ENCOUNTER (OUTPATIENT)
Facility: CLINIC | Age: 63
Setting detail: OBSERVATION
Discharge: HOME | End: 2023-06-25
Attending: EMERGENCY MEDICINE | Admitting: HOSPITALIST
Payer: COMMERCIAL

## 2023-01-01 ENCOUNTER — ANCILLARY PROCEDURE (OUTPATIENT)
Dept: MRI IMAGING | Facility: CLINIC | Age: 63
End: 2023-01-01
Attending: PREVENTIVE MEDICINE
Payer: COMMERCIAL

## 2023-01-01 VITALS
RESPIRATION RATE: 18 BRPM | BODY MASS INDEX: 43.56 KG/M2 | OXYGEN SATURATION: 98 % | HEIGHT: 69 IN | SYSTOLIC BLOOD PRESSURE: 155 MMHG | TEMPERATURE: 98 F | HEART RATE: 86 BPM | DIASTOLIC BLOOD PRESSURE: 98 MMHG

## 2023-01-01 VITALS
HEIGHT: 68 IN | BODY MASS INDEX: 43.8 KG/M2 | SYSTOLIC BLOOD PRESSURE: 128 MMHG | WEIGHT: 289 LBS | DIASTOLIC BLOOD PRESSURE: 72 MMHG

## 2023-01-01 VITALS
DIASTOLIC BLOOD PRESSURE: 70 MMHG | SYSTOLIC BLOOD PRESSURE: 109 MMHG | WEIGHT: 286.6 LBS | RESPIRATION RATE: 16 BRPM | BODY MASS INDEX: 42.45 KG/M2 | TEMPERATURE: 97.6 F | HEIGHT: 69 IN | HEART RATE: 57 BPM | OXYGEN SATURATION: 94 %

## 2023-01-01 VITALS
TEMPERATURE: 97.8 F | WEIGHT: 293 LBS | SYSTOLIC BLOOD PRESSURE: 126 MMHG | RESPIRATION RATE: 16 BRPM | OXYGEN SATURATION: 96 % | BODY MASS INDEX: 43.4 KG/M2 | DIASTOLIC BLOOD PRESSURE: 89 MMHG | HEART RATE: 79 BPM

## 2023-01-01 VITALS
TEMPERATURE: 97.9 F | WEIGHT: 293 LBS | OXYGEN SATURATION: 95 % | DIASTOLIC BLOOD PRESSURE: 63 MMHG | HEIGHT: 69 IN | BODY MASS INDEX: 43.4 KG/M2 | SYSTOLIC BLOOD PRESSURE: 113 MMHG | RESPIRATION RATE: 18 BRPM | HEART RATE: 67 BPM

## 2023-01-01 DIAGNOSIS — Z98.1 HISTORY OF LUMBAR FUSION: ICD-10-CM

## 2023-01-01 DIAGNOSIS — Z12.4 ENCOUNTER FOR SCREENING FOR CERVICAL CANCER: Primary | ICD-10-CM

## 2023-01-01 DIAGNOSIS — E87.6 HYPOKALEMIA: Primary | ICD-10-CM

## 2023-01-01 DIAGNOSIS — M12.812 ROTATOR CUFF ARTHROPATHY OF BOTH SHOULDERS: ICD-10-CM

## 2023-01-01 DIAGNOSIS — M51.16 LUMBAR DISC HERNIATION WITH RADICULOPATHY: Primary | ICD-10-CM

## 2023-01-01 DIAGNOSIS — I50.30 HEART FAILURE WITH PRESERVED EJECTION FRACTION, NYHA CLASS I (H): ICD-10-CM

## 2023-01-01 DIAGNOSIS — R19.7 NAUSEA, VOMITING, AND DIARRHEA: ICD-10-CM

## 2023-01-01 DIAGNOSIS — M12.811 ROTATOR CUFF ARTHROPATHY OF BOTH SHOULDERS: ICD-10-CM

## 2023-01-01 DIAGNOSIS — M50.20 CERVICAL DISC HERNIATION: ICD-10-CM

## 2023-01-01 DIAGNOSIS — M50.30 DDD (DEGENERATIVE DISC DISEASE), CERVICAL: ICD-10-CM

## 2023-01-01 DIAGNOSIS — F11.23 OPIOID DEPENDENCE WITH WITHDRAWAL (H): ICD-10-CM

## 2023-01-01 DIAGNOSIS — M51.16 LUMBAR DISC HERNIATION WITH RADICULOPATHY: ICD-10-CM

## 2023-01-01 DIAGNOSIS — I48.20 CHRONIC ATRIAL FIBRILLATION (H): ICD-10-CM

## 2023-01-01 DIAGNOSIS — I83.893 SYMPTOMATIC VARICOSE VEINS OF BOTH LOWER EXTREMITIES: ICD-10-CM

## 2023-01-01 DIAGNOSIS — M51.369 DDD (DEGENERATIVE DISC DISEASE), LUMBAR: ICD-10-CM

## 2023-01-01 DIAGNOSIS — M75.52 SUBACROMIAL BURSITIS OF BOTH SHOULDERS: ICD-10-CM

## 2023-01-01 DIAGNOSIS — R11.2 NAUSEA AND VOMITING, UNSPECIFIED VOMITING TYPE: ICD-10-CM

## 2023-01-01 DIAGNOSIS — R10.9 ABDOMINAL CRAMPING: ICD-10-CM

## 2023-01-01 DIAGNOSIS — R11.2 NAUSEA VOMITING AND DIARRHEA: ICD-10-CM

## 2023-01-01 DIAGNOSIS — Z01.419 ENCOUNTER FOR GYNECOLOGICAL EXAMINATION (GENERAL) (ROUTINE) WITHOUT ABNORMAL FINDINGS: ICD-10-CM

## 2023-01-01 DIAGNOSIS — R11.2 NAUSEA, VOMITING, AND DIARRHEA: ICD-10-CM

## 2023-01-01 DIAGNOSIS — I87.8 VENOUS CONGESTION: Primary | ICD-10-CM

## 2023-01-01 DIAGNOSIS — R19.7 DIARRHEA, UNSPECIFIED TYPE: ICD-10-CM

## 2023-01-01 DIAGNOSIS — R19.7 NAUSEA VOMITING AND DIARRHEA: ICD-10-CM

## 2023-01-01 DIAGNOSIS — G89.4 CHRONIC PAIN SYNDROME: ICD-10-CM

## 2023-01-01 DIAGNOSIS — I83.893 SYMPTOMATIC VARICOSE VEINS OF BOTH LOWER EXTREMITIES: Primary | ICD-10-CM

## 2023-01-01 DIAGNOSIS — Z12.31 VISIT FOR SCREENING MAMMOGRAM: ICD-10-CM

## 2023-01-01 DIAGNOSIS — M50.122 CERVICAL DISC DISORDER AT C5-C6 LEVEL WITH RADICULOPATHY: ICD-10-CM

## 2023-01-01 DIAGNOSIS — T88.7XXA MEDICATION SIDE EFFECTS: ICD-10-CM

## 2023-01-01 DIAGNOSIS — T40.601A OPIATE OVERDOSE, ACCIDENTAL OR UNINTENTIONAL, INITIAL ENCOUNTER (H): ICD-10-CM

## 2023-01-01 DIAGNOSIS — E86.0 DEHYDRATION: ICD-10-CM

## 2023-01-01 DIAGNOSIS — M75.51 SUBACROMIAL BURSITIS OF BOTH SHOULDERS: ICD-10-CM

## 2023-01-01 LAB
ALBUMIN SERPL BCG-MCNC: 3.8 G/DL (ref 3.5–5.2)
ALBUMIN SERPL BCG-MCNC: 4 G/DL (ref 3.5–5.2)
ALBUMIN SERPL BCG-MCNC: 4.2 G/DL (ref 3.5–5.2)
ALBUMIN SERPL BCG-MCNC: 4.2 G/DL (ref 3.5–5.2)
ALBUMIN SERPL BCG-MCNC: 4.6 G/DL (ref 3.5–5.2)
ALBUMIN SERPL BCG-MCNC: 4.8 G/DL (ref 3.5–5.2)
ALBUMIN UR-MCNC: 20 MG/DL
ALBUMIN UR-MCNC: 30 MG/DL
ALBUMIN UR-MCNC: 30 MG/DL
ALP SERPL-CCNC: 101 U/L (ref 35–104)
ALP SERPL-CCNC: 106 U/L (ref 35–104)
ALP SERPL-CCNC: 108 U/L (ref 35–104)
ALP SERPL-CCNC: 114 U/L (ref 35–104)
ALP SERPL-CCNC: 88 U/L (ref 35–104)
ALP SERPL-CCNC: 89 U/L (ref 35–104)
ALT SERPL W P-5'-P-CCNC: 11 U/L (ref 0–50)
ALT SERPL W P-5'-P-CCNC: 12 U/L (ref 0–50)
ALT SERPL W P-5'-P-CCNC: 13 U/L (ref 0–50)
ALT SERPL W P-5'-P-CCNC: 13 U/L (ref 0–50)
ALT SERPL W P-5'-P-CCNC: 8 U/L (ref 0–50)
ALT SERPL W P-5'-P-CCNC: 9 U/L (ref 0–50)
ANION GAP SERPL CALCULATED.3IONS-SCNC: 10 MMOL/L (ref 7–15)
ANION GAP SERPL CALCULATED.3IONS-SCNC: 11 MMOL/L (ref 7–15)
ANION GAP SERPL CALCULATED.3IONS-SCNC: 13 MMOL/L (ref 7–15)
ANION GAP SERPL CALCULATED.3IONS-SCNC: 13 MMOL/L (ref 7–15)
ANION GAP SERPL CALCULATED.3IONS-SCNC: 14 MMOL/L (ref 7–15)
ANION GAP SERPL CALCULATED.3IONS-SCNC: 15 MMOL/L (ref 7–15)
ANION GAP SERPL CALCULATED.3IONS-SCNC: 15 MMOL/L (ref 7–15)
ANION GAP SERPL CALCULATED.3IONS-SCNC: 16 MMOL/L (ref 7–15)
ANION GAP SERPL CALCULATED.3IONS-SCNC: 9 MMOL/L (ref 7–15)
APAP SERPL-MCNC: <5 UG/ML (ref 10–30)
APPEARANCE UR: CLEAR
AST SERPL W P-5'-P-CCNC: 14 U/L (ref 0–45)
AST SERPL W P-5'-P-CCNC: 15 U/L (ref 0–45)
AST SERPL W P-5'-P-CCNC: 17 U/L (ref 0–45)
AST SERPL W P-5'-P-CCNC: 19 U/L (ref 0–45)
AST SERPL W P-5'-P-CCNC: 26 U/L (ref 0–45)
AST SERPL W P-5'-P-CCNC: 29 U/L (ref 0–45)
ATRIAL RATE - MUSE: 90 BPM
BACTERIA #/AREA URNS HPF: ABNORMAL /HPF
BACTERIA #/AREA URNS HPF: ABNORMAL /HPF
BACTERIA BLD CULT: NO GROWTH
BACTERIA BLD CULT: NO GROWTH
BASOPHILS # BLD AUTO: 0 10E3/UL (ref 0–0.2)
BASOPHILS # BLD AUTO: 0 10E3/UL (ref 0–0.2)
BASOPHILS # BLD AUTO: 0.1 10E3/UL (ref 0–0.2)
BASOPHILS NFR BLD AUTO: 0 %
BILIRUB DIRECT SERPL-MCNC: <0.2 MG/DL (ref 0–0.3)
BILIRUB SERPL-MCNC: 0.2 MG/DL
BILIRUB SERPL-MCNC: 0.4 MG/DL
BILIRUB SERPL-MCNC: 0.4 MG/DL
BILIRUB SERPL-MCNC: 0.6 MG/DL
BILIRUB SERPL-MCNC: 0.6 MG/DL
BILIRUB SERPL-MCNC: 0.7 MG/DL
BILIRUB UR QL STRIP: NEGATIVE
BKR LAB AP GYN ADEQUACY: NORMAL
BKR LAB AP GYN INTERPRETATION: NORMAL
BKR LAB AP HPV REFLEX: NORMAL
BKR LAB AP PREVIOUS ABNORMAL: NORMAL
BUN SERPL-MCNC: 11.9 MG/DL (ref 8–23)
BUN SERPL-MCNC: 12.5 MG/DL (ref 8–23)
BUN SERPL-MCNC: 13 MG/DL (ref 8–23)
BUN SERPL-MCNC: 14.2 MG/DL (ref 8–23)
BUN SERPL-MCNC: 14.4 MG/DL (ref 8–23)
BUN SERPL-MCNC: 14.7 MG/DL (ref 8–23)
BUN SERPL-MCNC: 14.9 MG/DL (ref 8–23)
BUN SERPL-MCNC: 17.6 MG/DL (ref 8–23)
BUN SERPL-MCNC: 8.5 MG/DL (ref 8–23)
CALCIUM SERPL-MCNC: 8.4 MG/DL (ref 8.8–10.2)
CALCIUM SERPL-MCNC: 8.5 MG/DL (ref 8.8–10.2)
CALCIUM SERPL-MCNC: 8.5 MG/DL (ref 8.8–10.2)
CALCIUM SERPL-MCNC: 8.8 MG/DL (ref 8.8–10.2)
CALCIUM SERPL-MCNC: 9 MG/DL (ref 8.8–10.2)
CALCIUM SERPL-MCNC: 9.2 MG/DL (ref 8.8–10.2)
CALCIUM SERPL-MCNC: 9.5 MG/DL (ref 8.8–10.2)
CALCIUM SERPL-MCNC: 9.7 MG/DL (ref 8.8–10.2)
CALCIUM SERPL-MCNC: 9.7 MG/DL (ref 8.8–10.2)
CHLORIDE SERPL-SCNC: 101 MMOL/L (ref 98–107)
CHLORIDE SERPL-SCNC: 101 MMOL/L (ref 98–107)
CHLORIDE SERPL-SCNC: 104 MMOL/L (ref 98–107)
CHLORIDE SERPL-SCNC: 105 MMOL/L (ref 98–107)
CHLORIDE SERPL-SCNC: 107 MMOL/L (ref 98–107)
CHLORIDE SERPL-SCNC: 94 MMOL/L (ref 98–107)
CHLORIDE SERPL-SCNC: 94 MMOL/L (ref 98–107)
CHLORIDE SERPL-SCNC: 98 MMOL/L (ref 98–107)
CHLORIDE SERPL-SCNC: 99 MMOL/L (ref 98–107)
COLOR UR AUTO: YELLOW
CREAT SERPL-MCNC: 0.7 MG/DL (ref 0.51–0.95)
CREAT SERPL-MCNC: 0.82 MG/DL (ref 0.51–0.95)
CREAT SERPL-MCNC: 0.86 MG/DL (ref 0.51–0.95)
CREAT SERPL-MCNC: 0.87 MG/DL (ref 0.51–0.95)
CREAT SERPL-MCNC: 0.89 MG/DL (ref 0.51–0.95)
CREAT SERPL-MCNC: 0.9 MG/DL (ref 0.51–0.95)
CREAT SERPL-MCNC: 0.91 MG/DL (ref 0.51–0.95)
CREAT SERPL-MCNC: 1.31 MG/DL (ref 0.51–0.95)
CREAT SERPL-MCNC: 1.46 MG/DL (ref 0.51–0.95)
DEPRECATED HCO3 PLAS-SCNC: 20 MMOL/L (ref 22–29)
DEPRECATED HCO3 PLAS-SCNC: 21 MMOL/L (ref 22–29)
DEPRECATED HCO3 PLAS-SCNC: 22 MMOL/L (ref 22–29)
DEPRECATED HCO3 PLAS-SCNC: 25 MMOL/L (ref 22–29)
DEPRECATED HCO3 PLAS-SCNC: 26 MMOL/L (ref 22–29)
DEPRECATED HCO3 PLAS-SCNC: 27 MMOL/L (ref 22–29)
DEPRECATED HCO3 PLAS-SCNC: 27 MMOL/L (ref 22–29)
DIASTOLIC BLOOD PRESSURE - MUSE: NORMAL MMHG
EOSINOPHIL # BLD AUTO: 0 10E3/UL (ref 0–0.7)
EOSINOPHIL # BLD AUTO: 0 10E3/UL (ref 0–0.7)
EOSINOPHIL # BLD AUTO: 0.3 10E3/UL (ref 0–0.7)
EOSINOPHIL NFR BLD AUTO: 0 %
EOSINOPHIL NFR BLD AUTO: 0 %
EOSINOPHIL NFR BLD AUTO: 2 %
ERYTHROCYTE [DISTWIDTH] IN BLOOD BY AUTOMATED COUNT: 14.6 % (ref 10–15)
ERYTHROCYTE [DISTWIDTH] IN BLOOD BY AUTOMATED COUNT: 14.7 % (ref 10–15)
ERYTHROCYTE [DISTWIDTH] IN BLOOD BY AUTOMATED COUNT: 14.8 % (ref 10–15)
ERYTHROCYTE [DISTWIDTH] IN BLOOD BY AUTOMATED COUNT: 14.9 % (ref 10–15)
ERYTHROCYTE [DISTWIDTH] IN BLOOD BY AUTOMATED COUNT: 15 % (ref 10–15)
ERYTHROCYTE [DISTWIDTH] IN BLOOD BY AUTOMATED COUNT: 15.1 % (ref 10–15)
ERYTHROCYTE [DISTWIDTH] IN BLOOD BY AUTOMATED COUNT: 15.1 % (ref 10–15)
ETHANOL SERPL-MCNC: <0.01 G/DL
GFR SERPL CREATININE-BSD FRML MDRD: 40 ML/MIN/1.73M2
GFR SERPL CREATININE-BSD FRML MDRD: 45 ML/MIN/1.73M2
GFR SERPL CREATININE-BSD FRML MDRD: 70 ML/MIN/1.73M2
GFR SERPL CREATININE-BSD FRML MDRD: 71 ML/MIN/1.73M2
GFR SERPL CREATININE-BSD FRML MDRD: 72 ML/MIN/1.73M2
GFR SERPL CREATININE-BSD FRML MDRD: 74 ML/MIN/1.73M2
GFR SERPL CREATININE-BSD FRML MDRD: 75 ML/MIN/1.73M2
GFR SERPL CREATININE-BSD FRML MDRD: 79 ML/MIN/1.73M2
GFR SERPL CREATININE-BSD FRML MDRD: >90 ML/MIN/1.73M2
GLUCOSE SERPL-MCNC: 101 MG/DL (ref 70–99)
GLUCOSE SERPL-MCNC: 107 MG/DL (ref 70–99)
GLUCOSE SERPL-MCNC: 109 MG/DL (ref 70–99)
GLUCOSE SERPL-MCNC: 114 MG/DL (ref 70–99)
GLUCOSE SERPL-MCNC: 115 MG/DL (ref 70–99)
GLUCOSE SERPL-MCNC: 116 MG/DL (ref 70–99)
GLUCOSE SERPL-MCNC: 122 MG/DL (ref 70–99)
GLUCOSE SERPL-MCNC: 126 MG/DL (ref 70–99)
GLUCOSE SERPL-MCNC: 84 MG/DL (ref 70–99)
GLUCOSE UR STRIP-MCNC: NEGATIVE MG/DL
HCO3 BLDV-SCNC: 28 MMOL/L (ref 21–28)
HCT VFR BLD AUTO: 35.5 % (ref 35–47)
HCT VFR BLD AUTO: 36.9 % (ref 35–47)
HCT VFR BLD AUTO: 37.7 % (ref 35–47)
HCT VFR BLD AUTO: 39.3 % (ref 35–47)
HCT VFR BLD AUTO: 39.4 % (ref 35–47)
HCT VFR BLD AUTO: 41.8 % (ref 35–47)
HCT VFR BLD AUTO: 42.7 % (ref 35–47)
HGB BLD-MCNC: 10.9 G/DL (ref 11.7–15.7)
HGB BLD-MCNC: 11.8 G/DL (ref 11.7–15.7)
HGB BLD-MCNC: 12.2 G/DL (ref 11.7–15.7)
HGB BLD-MCNC: 12.3 G/DL (ref 11.7–15.7)
HGB BLD-MCNC: 12.9 G/DL (ref 11.7–15.7)
HGB BLD-MCNC: 13.9 G/DL (ref 11.7–15.7)
HGB BLD-MCNC: 14 G/DL (ref 11.7–15.7)
HGB UR QL STRIP: NEGATIVE
HOLD SPECIMEN: NORMAL
HUMAN PAPILLOMA VIRUS 16 DNA: NEGATIVE
HUMAN PAPILLOMA VIRUS 18 DNA: NEGATIVE
HUMAN PAPILLOMA VIRUS FINAL DIAGNOSIS: NORMAL
HUMAN PAPILLOMA VIRUS OTHER HR: NEGATIVE
IMM GRANULOCYTES # BLD: 0.1 10E3/UL
IMM GRANULOCYTES NFR BLD: 1 %
INTERPRETATION ECG - MUSE: NORMAL
KETONES UR STRIP-MCNC: NEGATIVE MG/DL
LACTATE BLD-SCNC: 2.5 MMOL/L
LACTATE SERPL-SCNC: 1 MMOL/L (ref 0.7–2)
LACTATE SERPL-SCNC: 1.2 MMOL/L (ref 0.7–2)
LACTATE SERPL-SCNC: 2.7 MMOL/L (ref 0.7–2)
LEUKOCYTE ESTERASE UR QL STRIP: NEGATIVE
LIPASE SERPL-CCNC: 109 U/L (ref 13–60)
LIPASE SERPL-CCNC: 53 U/L (ref 13–60)
LIPASE SERPL-CCNC: 67 U/L (ref 13–60)
LYMPHOCYTES # BLD AUTO: 1.5 10E3/UL (ref 0.8–5.3)
LYMPHOCYTES # BLD AUTO: 1.6 10E3/UL (ref 0.8–5.3)
LYMPHOCYTES # BLD AUTO: 2 10E3/UL (ref 0.8–5.3)
LYMPHOCYTES NFR BLD AUTO: 11 %
LYMPHOCYTES NFR BLD AUTO: 13 %
LYMPHOCYTES NFR BLD AUTO: 17 %
MAGNESIUM SERPL-MCNC: 1.9 MG/DL (ref 1.7–2.3)
MAGNESIUM SERPL-MCNC: 2.1 MG/DL (ref 1.7–2.3)
MCH RBC QN AUTO: 28.2 PG (ref 26.5–33)
MCH RBC QN AUTO: 28.3 PG (ref 26.5–33)
MCH RBC QN AUTO: 28.4 PG (ref 26.5–33)
MCH RBC QN AUTO: 28.4 PG (ref 26.5–33)
MCH RBC QN AUTO: 28.9 PG (ref 26.5–33)
MCH RBC QN AUTO: 29 PG (ref 26.5–33)
MCH RBC QN AUTO: 29.1 PG (ref 26.5–33)
MCHC RBC AUTO-ENTMCNC: 30.7 G/DL (ref 31.5–36.5)
MCHC RBC AUTO-ENTMCNC: 31 G/DL (ref 31.5–36.5)
MCHC RBC AUTO-ENTMCNC: 32 G/DL (ref 31.5–36.5)
MCHC RBC AUTO-ENTMCNC: 32.6 G/DL (ref 31.5–36.5)
MCHC RBC AUTO-ENTMCNC: 32.6 G/DL (ref 31.5–36.5)
MCHC RBC AUTO-ENTMCNC: 32.8 G/DL (ref 31.5–36.5)
MCHC RBC AUTO-ENTMCNC: 33.5 G/DL (ref 31.5–36.5)
MCV RBC AUTO: 87 FL (ref 78–100)
MCV RBC AUTO: 89 FL (ref 78–100)
MCV RBC AUTO: 91 FL (ref 78–100)
MCV RBC AUTO: 91 FL (ref 78–100)
MCV RBC AUTO: 92 FL (ref 78–100)
MONOCYTES # BLD AUTO: 0.7 10E3/UL (ref 0–1.3)
MONOCYTES # BLD AUTO: 0.8 10E3/UL (ref 0–1.3)
MONOCYTES # BLD AUTO: 1.1 10E3/UL (ref 0–1.3)
MONOCYTES NFR BLD AUTO: 7 %
MUCOUS THREADS #/AREA URNS LPF: PRESENT /LPF
NEUTROPHILS # BLD AUTO: 11.8 10E3/UL (ref 1.6–8.3)
NEUTROPHILS # BLD AUTO: 8.3 10E3/UL (ref 1.6–8.3)
NEUTROPHILS # BLD AUTO: 8.6 10E3/UL (ref 1.6–8.3)
NEUTROPHILS NFR BLD AUTO: 73 %
NEUTROPHILS NFR BLD AUTO: 79 %
NEUTROPHILS NFR BLD AUTO: 81 %
NITRATE UR QL: NEGATIVE
NRBC # BLD AUTO: 0 10E3/UL
NRBC BLD AUTO-RTO: 0 /100
NT-PROBNP SERPL-MCNC: 319 PG/ML (ref 0–900)
P AXIS - MUSE: 55 DEGREES
PATH REPORT.COMMENTS IMP SPEC: NORMAL
PATH REPORT.COMMENTS IMP SPEC: NORMAL
PATH REPORT.RELEVANT HX SPEC: NORMAL
PCO2 BLDV: 58 MM HG (ref 40–50)
PH BLDV: 7.29 [PH] (ref 7.32–7.43)
PH UR STRIP: 6 [PH] (ref 5–7)
PH UR STRIP: 6 [PH] (ref 5–7)
PH UR STRIP: 6.5 [PH] (ref 5–7)
PLAT MORPH BLD: ABNORMAL
PLATELET # BLD AUTO: 240 10E3/UL (ref 150–450)
PLATELET # BLD AUTO: 272 10E3/UL (ref 150–450)
PLATELET # BLD AUTO: 287 10E3/UL (ref 150–450)
PLATELET # BLD AUTO: 325 10E3/UL (ref 150–450)
PLATELET # BLD AUTO: 329 10E3/UL (ref 150–450)
PLATELET # BLD AUTO: 333 10E3/UL (ref 150–450)
PLATELET # BLD AUTO: ABNORMAL 10*3/UL
PO2 BLDV: 26 MM HG (ref 25–47)
POTASSIUM SERPL-SCNC: 3.1 MMOL/L (ref 3.4–5.3)
POTASSIUM SERPL-SCNC: 3.3 MMOL/L (ref 3.4–5.3)
POTASSIUM SERPL-SCNC: 3.5 MMOL/L (ref 3.4–5.3)
POTASSIUM SERPL-SCNC: 3.5 MMOL/L (ref 3.4–5.3)
POTASSIUM SERPL-SCNC: 3.7 MMOL/L (ref 3.4–5.3)
POTASSIUM SERPL-SCNC: 3.7 MMOL/L (ref 3.4–5.3)
POTASSIUM SERPL-SCNC: 3.8 MMOL/L (ref 3.4–5.3)
POTASSIUM SERPL-SCNC: 3.9 MMOL/L (ref 3.4–5.3)
POTASSIUM SERPL-SCNC: 4 MMOL/L (ref 3.4–5.3)
POTASSIUM SERPL-SCNC: 4.2 MMOL/L (ref 3.4–5.3)
PR INTERVAL - MUSE: 208 MS
PROT SERPL-MCNC: 6.4 G/DL (ref 6.4–8.3)
PROT SERPL-MCNC: 6.7 G/DL (ref 6.4–8.3)
PROT SERPL-MCNC: 7 G/DL (ref 6.4–8.3)
PROT SERPL-MCNC: 7.5 G/DL (ref 6.4–8.3)
PROT SERPL-MCNC: 8.1 G/DL (ref 6.4–8.3)
PROT SERPL-MCNC: 8.1 G/DL (ref 6.4–8.3)
QRS DURATION - MUSE: 98 MS
QT - MUSE: 386 MS
QTC - MUSE: 472 MS
R AXIS - MUSE: 46 DEGREES
RBC # BLD AUTO: 3.84 10E6/UL (ref 3.8–5.2)
RBC # BLD AUTO: 4.06 10E6/UL (ref 3.8–5.2)
RBC # BLD AUTO: 4.26 10E6/UL (ref 3.8–5.2)
RBC # BLD AUTO: 4.33 10E6/UL (ref 3.8–5.2)
RBC # BLD AUTO: 4.54 10E6/UL (ref 3.8–5.2)
RBC # BLD AUTO: 4.82 10E6/UL (ref 3.8–5.2)
RBC # BLD AUTO: 4.91 10E6/UL (ref 3.8–5.2)
RBC MORPH BLD: ABNORMAL
RBC URINE: 3 /HPF
RBC URINE: 82 /HPF
RBC URINE: 82 /HPF
SALICYLATES SERPL-MCNC: <0.3 MG/DL
SAO2 % BLDV: 40 % (ref 94–100)
SODIUM SERPL-SCNC: 135 MMOL/L (ref 136–145)
SODIUM SERPL-SCNC: 136 MMOL/L (ref 136–145)
SODIUM SERPL-SCNC: 136 MMOL/L (ref 136–145)
SODIUM SERPL-SCNC: 137 MMOL/L (ref 136–145)
SODIUM SERPL-SCNC: 137 MMOL/L (ref 136–145)
SODIUM SERPL-SCNC: 138 MMOL/L (ref 136–145)
SODIUM SERPL-SCNC: 139 MMOL/L (ref 136–145)
SODIUM SERPL-SCNC: 139 MMOL/L (ref 136–145)
SODIUM SERPL-SCNC: 140 MMOL/L (ref 136–145)
SP GR UR STRIP: 1.01 (ref 1–1.03)
SP GR UR STRIP: 1.01 (ref 1–1.03)
SP GR UR STRIP: 1.03 (ref 1–1.03)
SQUAMOUS EPITHELIAL: 2 /HPF
SQUAMOUS EPITHELIAL: 3 /HPF
SQUAMOUS EPITHELIAL: 3 /HPF
SYSTOLIC BLOOD PRESSURE - MUSE: NORMAL MMHG
T AXIS - MUSE: 39 DEGREES
TROPONIN T SERPL HS-MCNC: 41 NG/L
TROPONIN T SERPL HS-MCNC: 51 NG/L
TSH SERPL DL<=0.005 MIU/L-ACNC: 58.73 UIU/ML (ref 0.3–4.2)
UROBILINOGEN UR STRIP-MCNC: NORMAL MG/DL
VENTRICULAR RATE- MUSE: 90 BPM
WBC # BLD AUTO: 10.9 10E3/UL (ref 4–11)
WBC # BLD AUTO: 10.9 10E3/UL (ref 4–11)
WBC # BLD AUTO: 11.5 10E3/UL (ref 4–11)
WBC # BLD AUTO: 11.6 10E3/UL (ref 4–11)
WBC # BLD AUTO: 14.7 10E3/UL (ref 4–11)
WBC # BLD AUTO: 8.7 10E3/UL (ref 4–11)
WBC # BLD AUTO: 9.2 10E3/UL (ref 4–11)
WBC URINE: 1 /HPF
WBC URINE: 1 /HPF
WBC URINE: 2 /HPF

## 2023-01-01 PROCEDURE — 96375 TX/PRO/DX INJ NEW DRUG ADDON: CPT

## 2023-01-01 PROCEDURE — 99285 EMERGENCY DEPT VISIT HI MDM: CPT | Mod: 25

## 2023-01-01 PROCEDURE — 72148 MRI LUMBAR SPINE W/O DYE: CPT | Mod: GC | Performed by: RADIOLOGY

## 2023-01-01 PROCEDURE — 80179 DRUG ASSAY SALICYLATE: CPT | Performed by: EMERGENCY MEDICINE

## 2023-01-01 PROCEDURE — 77067 SCR MAMMO BI INCL CAD: CPT | Mod: TC | Performed by: RADIOLOGY

## 2023-01-01 PROCEDURE — 84443 ASSAY THYROID STIM HORMONE: CPT | Performed by: EMERGENCY MEDICINE

## 2023-01-01 PROCEDURE — 36415 COLL VENOUS BLD VENIPUNCTURE: CPT | Performed by: HOSPITALIST

## 2023-01-01 PROCEDURE — 96376 TX/PRO/DX INJ SAME DRUG ADON: CPT

## 2023-01-01 PROCEDURE — 80053 COMPREHEN METABOLIC PANEL: CPT | Performed by: HOSPITALIST

## 2023-01-01 PROCEDURE — 99214 OFFICE O/P EST MOD 30 MIN: CPT | Mod: TEL | Performed by: PREVENTIVE MEDICINE

## 2023-01-01 PROCEDURE — 250N000011 HC RX IP 250 OP 636: Performed by: HOSPITALIST

## 2023-01-01 PROCEDURE — 250N000013 HC RX MED GY IP 250 OP 250 PS 637: Performed by: HOSPITALIST

## 2023-01-01 PROCEDURE — 80048 BASIC METABOLIC PNL TOTAL CA: CPT | Performed by: HOSPITALIST

## 2023-01-01 PROCEDURE — 93005 ELECTROCARDIOGRAM TRACING: CPT

## 2023-01-01 PROCEDURE — 36415 COLL VENOUS BLD VENIPUNCTURE: CPT | Performed by: NURSE PRACTITIONER

## 2023-01-01 PROCEDURE — 62321 NJX INTERLAMINAR CRV/THRC: CPT | Performed by: RADIOLOGY

## 2023-01-01 PROCEDURE — 250N000011 HC RX IP 250 OP 636: Mod: JZ | Performed by: EMERGENCY MEDICINE

## 2023-01-01 PROCEDURE — G0378 HOSPITAL OBSERVATION PER HR: HCPCS

## 2023-01-01 PROCEDURE — 83735 ASSAY OF MAGNESIUM: CPT | Performed by: HOSPITALIST

## 2023-01-01 PROCEDURE — 96374 THER/PROPH/DIAG INJ IV PUSH: CPT

## 2023-01-01 PROCEDURE — 85025 COMPLETE CBC W/AUTO DIFF WBC: CPT | Performed by: EMERGENCY MEDICINE

## 2023-01-01 PROCEDURE — 258N000003 HC RX IP 258 OP 636: Performed by: EMERGENCY MEDICINE

## 2023-01-01 PROCEDURE — 83605 ASSAY OF LACTIC ACID: CPT | Mod: 91

## 2023-01-01 PROCEDURE — 99214 OFFICE O/P EST MOD 30 MIN: CPT | Mod: 25 | Performed by: PREVENTIVE MEDICINE

## 2023-01-01 PROCEDURE — 250N000013 HC RX MED GY IP 250 OP 250 PS 637: Performed by: NURSE PRACTITIONER

## 2023-01-01 PROCEDURE — 96361 HYDRATE IV INFUSION ADD-ON: CPT

## 2023-01-01 PROCEDURE — 87040 BLOOD CULTURE FOR BACTERIA: CPT | Performed by: EMERGENCY MEDICINE

## 2023-01-01 PROCEDURE — 80053 COMPREHEN METABOLIC PANEL: CPT | Performed by: EMERGENCY MEDICINE

## 2023-01-01 PROCEDURE — 250N000011 HC RX IP 250 OP 636: Performed by: EMERGENCY MEDICINE

## 2023-01-01 PROCEDURE — 99213 OFFICE O/P EST LOW 20 MIN: CPT | Performed by: INTERNAL MEDICINE

## 2023-01-01 PROCEDURE — 250N000013 HC RX MED GY IP 250 OP 250 PS 637: Performed by: INTERNAL MEDICINE

## 2023-01-01 PROCEDURE — 85027 COMPLETE CBC AUTOMATED: CPT | Performed by: HOSPITALIST

## 2023-01-01 PROCEDURE — 87624 HPV HI-RISK TYP POOLED RSLT: CPT | Performed by: NURSE PRACTITIONER

## 2023-01-01 PROCEDURE — 36415 COLL VENOUS BLD VENIPUNCTURE: CPT | Performed by: INTERNAL MEDICINE

## 2023-01-01 PROCEDURE — 36415 COLL VENOUS BLD VENIPUNCTURE: CPT | Performed by: EMERGENCY MEDICINE

## 2023-01-01 PROCEDURE — 71275 CT ANGIOGRAPHY CHEST: CPT

## 2023-01-01 PROCEDURE — 96366 THER/PROPH/DIAG IV INF ADDON: CPT

## 2023-01-01 PROCEDURE — 64483 NJX AA&/STRD TFRM EPI L/S 1: CPT | Mod: LT | Performed by: RADIOLOGY

## 2023-01-01 PROCEDURE — 250N000009 HC RX 250: Performed by: EMERGENCY MEDICINE

## 2023-01-01 PROCEDURE — 20610 DRAIN/INJ JOINT/BURSA W/O US: CPT | Mod: 50 | Performed by: PREVENTIVE MEDICINE

## 2023-01-01 PROCEDURE — 96374 THER/PROPH/DIAG INJ IV PUSH: CPT | Mod: 59

## 2023-01-01 PROCEDURE — 82310 ASSAY OF CALCIUM: CPT | Performed by: EMERGENCY MEDICINE

## 2023-01-01 PROCEDURE — 84484 ASSAY OF TROPONIN QUANT: CPT | Performed by: EMERGENCY MEDICINE

## 2023-01-01 PROCEDURE — 99233 SBSQ HOSP IP/OBS HIGH 50: CPT | Performed by: INTERNAL MEDICINE

## 2023-01-01 PROCEDURE — 82310 ASSAY OF CALCIUM: CPT | Performed by: INTERNAL MEDICINE

## 2023-01-01 PROCEDURE — 250N000013 HC RX MED GY IP 250 OP 250 PS 637: Performed by: EMERGENCY MEDICINE

## 2023-01-01 PROCEDURE — 83605 ASSAY OF LACTIC ACID: CPT | Performed by: HOSPITALIST

## 2023-01-01 PROCEDURE — 81001 URINALYSIS AUTO W/SCOPE: CPT | Performed by: EMERGENCY MEDICINE

## 2023-01-01 PROCEDURE — 99215 OFFICE O/P EST HI 40 MIN: CPT | Performed by: INTERNAL MEDICINE

## 2023-01-01 PROCEDURE — 93971 EXTREMITY STUDY: CPT | Mod: RT | Performed by: INTERNAL MEDICINE

## 2023-01-01 PROCEDURE — 250N000011 HC RX IP 250 OP 636: Mod: JZ | Performed by: HOSPITALIST

## 2023-01-01 PROCEDURE — 85041 AUTOMATED RBC COUNT: CPT | Performed by: EMERGENCY MEDICINE

## 2023-01-01 PROCEDURE — 99214 OFFICE O/P EST MOD 30 MIN: CPT | Performed by: PREVENTIVE MEDICINE

## 2023-01-01 PROCEDURE — 99231 SBSQ HOSP IP/OBS SF/LOW 25: CPT | Performed by: NURSE PRACTITIONER

## 2023-01-01 PROCEDURE — 85027 COMPLETE CBC AUTOMATED: CPT | Performed by: EMERGENCY MEDICINE

## 2023-01-01 PROCEDURE — 258N000003 HC RX IP 258 OP 636: Performed by: HOSPITALIST

## 2023-01-01 PROCEDURE — 96374 THER/PROPH/DIAG INJ IV PUSH: CPT | Mod: XS

## 2023-01-01 PROCEDURE — 83690 ASSAY OF LIPASE: CPT | Performed by: EMERGENCY MEDICINE

## 2023-01-01 PROCEDURE — 258N000003 HC RX IP 258 OP 636: Performed by: NURSE PRACTITIONER

## 2023-01-01 PROCEDURE — 99238 HOSP IP/OBS DSCHRG MGMT 30/<: CPT | Performed by: INTERNAL MEDICINE

## 2023-01-01 PROCEDURE — 93970 EXTREMITY STUDY: CPT

## 2023-01-01 PROCEDURE — 99221 1ST HOSP IP/OBS SF/LOW 40: CPT | Performed by: HOSPITALIST

## 2023-01-01 PROCEDURE — 80143 DRUG ASSAY ACETAMINOPHEN: CPT | Performed by: EMERGENCY MEDICINE

## 2023-01-01 PROCEDURE — 81001 URINALYSIS AUTO W/SCOPE: CPT | Performed by: HOSPITALIST

## 2023-01-01 PROCEDURE — 85014 HEMATOCRIT: CPT | Performed by: INTERNAL MEDICINE

## 2023-01-01 PROCEDURE — G0463 HOSPITAL OUTPT CLINIC VISIT: HCPCS | Mod: 25

## 2023-01-01 PROCEDURE — 84132 ASSAY OF SERUM POTASSIUM: CPT | Mod: 91 | Performed by: INTERNAL MEDICINE

## 2023-01-01 PROCEDURE — 84484 ASSAY OF TROPONIN QUANT: CPT | Mod: 91 | Performed by: HOSPITALIST

## 2023-01-01 PROCEDURE — 83690 ASSAY OF LIPASE: CPT | Performed by: HOSPITALIST

## 2023-01-01 PROCEDURE — 82077 ASSAY SPEC XCP UR&BREATH IA: CPT | Performed by: EMERGENCY MEDICINE

## 2023-01-01 PROCEDURE — 80048 BASIC METABOLIC PNL TOTAL CA: CPT | Performed by: NURSE PRACTITIONER

## 2023-01-01 PROCEDURE — 62323 NJX INTERLAMINAR LMBR/SAC: CPT | Performed by: RADIOLOGY

## 2023-01-01 PROCEDURE — 82248 BILIRUBIN DIRECT: CPT | Performed by: INTERNAL MEDICINE

## 2023-01-01 PROCEDURE — 70450 CT HEAD/BRAIN W/O DYE: CPT

## 2023-01-01 PROCEDURE — 82310 ASSAY OF CALCIUM: CPT | Performed by: HOSPITALIST

## 2023-01-01 PROCEDURE — 96365 THER/PROPH/DIAG IV INF INIT: CPT

## 2023-01-01 PROCEDURE — 99239 HOSP IP/OBS DSCHRG MGMT >30: CPT | Performed by: NURSE PRACTITIONER

## 2023-01-01 PROCEDURE — 85014 HEMATOCRIT: CPT | Performed by: EMERGENCY MEDICINE

## 2023-01-01 PROCEDURE — 83735 ASSAY OF MAGNESIUM: CPT | Performed by: INTERNAL MEDICINE

## 2023-01-01 PROCEDURE — G0145 SCR C/V CYTO,THINLAYER,RESCR: HCPCS | Performed by: NURSE PRACTITIONER

## 2023-01-01 PROCEDURE — 83880 ASSAY OF NATRIURETIC PEPTIDE: CPT | Performed by: EMERGENCY MEDICINE

## 2023-01-01 PROCEDURE — 99284 EMERGENCY DEPT VISIT MOD MDM: CPT | Mod: 25

## 2023-01-01 PROCEDURE — 99239 HOSP IP/OBS DSCHRG MGMT >30: CPT | Performed by: HOSPITALIST

## 2023-01-01 PROCEDURE — 99396 PREV VISIT EST AGE 40-64: CPT | Performed by: NURSE PRACTITIONER

## 2023-01-01 PROCEDURE — 99223 1ST HOSP IP/OBS HIGH 75: CPT | Performed by: HOSPITALIST

## 2023-01-01 PROCEDURE — 250N000011 HC RX IP 250 OP 636: Mod: JZ | Performed by: INTERNAL MEDICINE

## 2023-01-01 PROCEDURE — 99223 1ST HOSP IP/OBS HIGH 75: CPT | Mod: AI | Performed by: HOSPITALIST

## 2023-01-01 PROCEDURE — 82374 ASSAY BLOOD CARBON DIOXIDE: CPT | Performed by: EMERGENCY MEDICINE

## 2023-01-01 PROCEDURE — 74177 CT ABD & PELVIS W/CONTRAST: CPT

## 2023-01-01 PROCEDURE — 77063 BREAST TOMOSYNTHESIS BI: CPT | Mod: TC | Performed by: RADIOLOGY

## 2023-01-01 PROCEDURE — 81001 URINALYSIS AUTO W/SCOPE: CPT | Performed by: INTERNAL MEDICINE

## 2023-01-01 RX ORDER — LEVOTHYROXINE SODIUM 100 UG/1
300 CAPSULE ORAL DAILY
COMMUNITY

## 2023-01-01 RX ORDER — HYDROMORPHONE HYDROCHLORIDE 2 MG/1
8 TABLET ORAL 3 TIMES DAILY
Status: DISCONTINUED | OUTPATIENT
Start: 2023-01-01 | End: 2023-01-01 | Stop reason: HOSPADM

## 2023-01-01 RX ORDER — METOPROLOL SUCCINATE 25 MG/1
75 TABLET, EXTENDED RELEASE ORAL DAILY
Qty: 90 TABLET | Refills: 0 | Status: SHIPPED | OUTPATIENT
Start: 2023-01-01 | End: 2023-01-01

## 2023-01-01 RX ORDER — PROCHLORPERAZINE MALEATE 10 MG
10 TABLET ORAL EVERY 6 HOURS PRN
Status: DISCONTINUED | OUTPATIENT
Start: 2023-01-01 | End: 2023-01-01 | Stop reason: HOSPADM

## 2023-01-01 RX ORDER — IOPAMIDOL 408 MG/ML
10 INJECTION, SOLUTION INTRATHECAL ONCE
Status: COMPLETED | OUTPATIENT
Start: 2023-01-01 | End: 2023-01-01

## 2023-01-01 RX ORDER — DIPHENOXYLATE HCL/ATROPINE 2.5-.025MG
2 TABLET ORAL
Status: COMPLETED | OUTPATIENT
Start: 2023-01-01 | End: 2023-01-01

## 2023-01-01 RX ORDER — ZOLPIDEM TARTRATE 5 MG/1
5 TABLET ORAL
Status: DISCONTINUED | OUTPATIENT
Start: 2023-01-01 | End: 2023-01-01 | Stop reason: HOSPADM

## 2023-01-01 RX ORDER — ONDANSETRON 4 MG/1
4 TABLET, ORALLY DISINTEGRATING ORAL EVERY 4 HOURS PRN
Qty: 10 TABLET | Refills: 0 | Status: SHIPPED | OUTPATIENT
Start: 2023-01-01 | End: 2023-01-01

## 2023-01-01 RX ORDER — NALOXONE HYDROCHLORIDE 0.4 MG/ML
0.4 INJECTION, SOLUTION INTRAMUSCULAR; INTRAVENOUS; SUBCUTANEOUS
Status: DISCONTINUED | OUTPATIENT
Start: 2023-01-01 | End: 2023-01-01 | Stop reason: HOSPADM

## 2023-01-01 RX ORDER — LIOTHYRONINE SODIUM 5 UG/1
5 TABLET ORAL DAILY
Status: DISCONTINUED | OUTPATIENT
Start: 2023-01-01 | End: 2023-01-01 | Stop reason: HOSPADM

## 2023-01-01 RX ORDER — FUROSEMIDE 20 MG
TABLET ORAL
Qty: 60 TABLET | Refills: 3 | Status: ON HOLD | OUTPATIENT
Start: 2023-01-01 | End: 2023-01-01

## 2023-01-01 RX ORDER — ACETAMINOPHEN 325 MG/1
650 TABLET ORAL EVERY 6 HOURS PRN
Status: DISCONTINUED | OUTPATIENT
Start: 2023-01-01 | End: 2023-01-01 | Stop reason: HOSPADM

## 2023-01-01 RX ORDER — METHYLPREDNISOLONE ACETATE 40 MG/ML
40 INJECTION, SUSPENSION INTRA-ARTICULAR; INTRALESIONAL; INTRAMUSCULAR; SOFT TISSUE
Status: SHIPPED | OUTPATIENT
Start: 2023-01-01

## 2023-01-01 RX ORDER — BETAMETHASONE SODIUM PHOSPHATE AND BETAMETHASONE ACETATE 3; 3 MG/ML; MG/ML
18 INJECTION, SUSPENSION INTRA-ARTICULAR; INTRALESIONAL; INTRAMUSCULAR; SOFT TISSUE ONCE
Status: COMPLETED | OUTPATIENT
Start: 2023-01-01 | End: 2023-01-01

## 2023-01-01 RX ORDER — SEMAGLUTIDE 0.68 MG/ML
0.5 INJECTION, SOLUTION SUBCUTANEOUS
Status: ON HOLD | COMMUNITY
End: 2023-01-01

## 2023-01-01 RX ORDER — POLYETHYLENE GLYCOL 3350 17 G/17G
17 POWDER, FOR SOLUTION ORAL DAILY PRN
Status: DISCONTINUED | OUTPATIENT
Start: 2023-01-01 | End: 2023-01-01 | Stop reason: HOSPADM

## 2023-01-01 RX ORDER — BUPIVACAINE HYDROCHLORIDE 5 MG/ML
2 INJECTION, SOLUTION PERINEURAL ONCE
Status: COMPLETED | OUTPATIENT
Start: 2023-01-01 | End: 2023-01-01

## 2023-01-01 RX ORDER — ONDANSETRON 4 MG/1
4 TABLET, ORALLY DISINTEGRATING ORAL EVERY 6 HOURS PRN
Status: DISCONTINUED | OUTPATIENT
Start: 2023-01-01 | End: 2023-01-01 | Stop reason: HOSPADM

## 2023-01-01 RX ORDER — NALOXONE HYDROCHLORIDE 0.4 MG/ML
0.2 INJECTION, SOLUTION INTRAMUSCULAR; INTRAVENOUS; SUBCUTANEOUS
Status: DISCONTINUED | OUTPATIENT
Start: 2023-01-01 | End: 2023-01-01 | Stop reason: HOSPADM

## 2023-01-01 RX ORDER — ONDANSETRON 2 MG/ML
4 INJECTION INTRAMUSCULAR; INTRAVENOUS EVERY 6 HOURS PRN
Status: DISCONTINUED | OUTPATIENT
Start: 2023-01-01 | End: 2023-01-01 | Stop reason: HOSPADM

## 2023-01-01 RX ORDER — HYDROMORPHONE HCL IN WATER/PF 6 MG/30 ML
0.2 PATIENT CONTROLLED ANALGESIA SYRINGE INTRAVENOUS
Status: DISCONTINUED | OUTPATIENT
Start: 2023-01-01 | End: 2023-01-01 | Stop reason: HOSPADM

## 2023-01-01 RX ORDER — IOPAMIDOL 408 MG/ML
2 INJECTION, SOLUTION INTRATHECAL ONCE
Status: COMPLETED | OUTPATIENT
Start: 2023-01-01 | End: 2023-01-01

## 2023-01-01 RX ORDER — CLONIDINE HYDROCHLORIDE 0.2 MG/1
0.2 TABLET ORAL 3 TIMES DAILY
Status: ON HOLD | COMMUNITY
End: 2023-01-01

## 2023-01-01 RX ORDER — HYDROMORPHONE HYDROCHLORIDE 1 MG/ML
1 INJECTION, SOLUTION INTRAMUSCULAR; INTRAVENOUS; SUBCUTANEOUS
Status: DISCONTINUED | OUTPATIENT
Start: 2023-01-01 | End: 2023-01-01

## 2023-01-01 RX ORDER — IOPAMIDOL 755 MG/ML
83 INJECTION, SOLUTION INTRAVASCULAR ONCE
Status: COMPLETED | OUTPATIENT
Start: 2023-01-01 | End: 2023-01-01

## 2023-01-01 RX ORDER — ONDANSETRON 2 MG/ML
4 INJECTION INTRAMUSCULAR; INTRAVENOUS ONCE
Status: COMPLETED | OUTPATIENT
Start: 2023-01-01 | End: 2023-01-01

## 2023-01-01 RX ORDER — LANOLIN ALCOHOL/MO/W.PET/CERES
3 CREAM (GRAM) TOPICAL
Status: DISCONTINUED | OUTPATIENT
Start: 2023-01-01 | End: 2023-01-01 | Stop reason: HOSPADM

## 2023-01-01 RX ORDER — NALOXONE HYDROCHLORIDE 1 MG/ML
1 INJECTION INTRAMUSCULAR; INTRAVENOUS; SUBCUTANEOUS ONCE
Status: COMPLETED | OUTPATIENT
Start: 2023-01-01 | End: 2023-01-01

## 2023-01-01 RX ORDER — LIDOCAINE HYDROCHLORIDE 10 MG/ML
5 INJECTION, SOLUTION EPIDURAL; INFILTRATION; INTRACAUDAL; PERINEURAL ONCE
Status: COMPLETED | OUTPATIENT
Start: 2023-01-01 | End: 2023-01-01

## 2023-01-01 RX ORDER — IOPAMIDOL 755 MG/ML
135 INJECTION, SOLUTION INTRAVASCULAR ONCE
Status: COMPLETED | OUTPATIENT
Start: 2023-01-01 | End: 2023-01-01

## 2023-01-01 RX ORDER — ACETAMINOPHEN 650 MG/1
650 SUPPOSITORY RECTAL EVERY 6 HOURS PRN
Status: DISCONTINUED | OUTPATIENT
Start: 2023-01-01 | End: 2023-01-01 | Stop reason: HOSPADM

## 2023-01-01 RX ORDER — CLONIDINE HYDROCHLORIDE 0.1 MG/1
0.2 TABLET ORAL 3 TIMES DAILY
Status: DISCONTINUED | OUTPATIENT
Start: 2023-01-01 | End: 2023-01-01 | Stop reason: HOSPADM

## 2023-01-01 RX ORDER — METHYLPREDNISOLONE ACETATE 80 MG/ML
80 INJECTION, SUSPENSION INTRA-ARTICULAR; INTRALESIONAL; INTRAMUSCULAR; SOFT TISSUE ONCE
Status: COMPLETED | OUTPATIENT
Start: 2023-01-01 | End: 2023-01-01

## 2023-01-01 RX ORDER — LEVOTHYROXINE SODIUM 100 UG/1
300 CAPSULE ORAL DAILY
Status: DISCONTINUED | OUTPATIENT
Start: 2023-01-01 | End: 2023-01-01 | Stop reason: HOSPADM

## 2023-01-01 RX ORDER — MORPHINE SULFATE 15 MG/1
60 TABLET, FILM COATED, EXTENDED RELEASE ORAL DAILY
Status: DISCONTINUED | OUTPATIENT
Start: 2023-01-01 | End: 2023-01-01 | Stop reason: HOSPADM

## 2023-01-01 RX ORDER — SODIUM CHLORIDE AND POTASSIUM CHLORIDE 150; 900 MG/100ML; MG/100ML
INJECTION, SOLUTION INTRAVENOUS CONTINUOUS
Status: DISCONTINUED | OUTPATIENT
Start: 2023-01-01 | End: 2023-01-01 | Stop reason: HOSPADM

## 2023-01-01 RX ORDER — GABAPENTIN 300 MG/1
300 CAPSULE ORAL 2 TIMES DAILY
Status: DISCONTINUED | OUTPATIENT
Start: 2023-01-01 | End: 2023-01-01

## 2023-01-01 RX ORDER — PROCHLORPERAZINE 25 MG
25 SUPPOSITORY, RECTAL RECTAL EVERY 12 HOURS PRN
Status: DISCONTINUED | OUTPATIENT
Start: 2023-01-01 | End: 2023-01-01 | Stop reason: HOSPADM

## 2023-01-01 RX ORDER — GABAPENTIN 800 MG/1
800 TABLET ORAL AT BEDTIME
Status: DISCONTINUED | OUTPATIENT
Start: 2023-01-01 | End: 2023-01-01 | Stop reason: HOSPADM

## 2023-01-01 RX ORDER — LEVOTHYROXINE SODIUM 100 UG/1
300 TABLET ORAL DAILY
Status: DISCONTINUED | OUTPATIENT
Start: 2023-01-01 | End: 2023-01-01 | Stop reason: HOSPADM

## 2023-01-01 RX ORDER — HYDROMORPHONE HYDROCHLORIDE 1 MG/ML
0.5 INJECTION, SOLUTION INTRAMUSCULAR; INTRAVENOUS; SUBCUTANEOUS
Status: DISCONTINUED | OUTPATIENT
Start: 2023-01-01 | End: 2023-01-01

## 2023-01-01 RX ORDER — MORPHINE SULFATE 15 MG/1
90 TABLET, FILM COATED, EXTENDED RELEASE ORAL 2 TIMES DAILY
Status: DISCONTINUED | OUTPATIENT
Start: 2023-01-01 | End: 2023-01-01 | Stop reason: HOSPADM

## 2023-01-01 RX ORDER — METOPROLOL SUCCINATE 25 MG/1
75 TABLET, EXTENDED RELEASE ORAL 2 TIMES DAILY
Qty: 540 TABLET | Refills: 2 | OUTPATIENT
Start: 2023-01-01

## 2023-01-01 RX ORDER — BUPIVACAINE HYDROCHLORIDE 5 MG/ML
5 INJECTION, SOLUTION EPIDURAL; INTRACAUDAL ONCE
Status: COMPLETED | OUTPATIENT
Start: 2023-01-01 | End: 2023-01-01

## 2023-01-01 RX ORDER — MORPHINE SULFATE 30 MG/1
60 TABLET, FILM COATED, EXTENDED RELEASE ORAL 3 TIMES DAILY
Status: DISCONTINUED | OUTPATIENT
Start: 2023-01-01 | End: 2023-01-01 | Stop reason: HOSPADM

## 2023-01-01 RX ORDER — CYCLOBENZAPRINE HCL 10 MG
10 TABLET ORAL 3 TIMES DAILY
Status: DISCONTINUED | OUTPATIENT
Start: 2023-01-01 | End: 2023-01-01 | Stop reason: HOSPADM

## 2023-01-01 RX ORDER — MORPHINE SULFATE 30 MG/1
30 TABLET, FILM COATED, EXTENDED RELEASE ORAL 2 TIMES DAILY
Status: DISCONTINUED | OUTPATIENT
Start: 2023-01-01 | End: 2023-01-01 | Stop reason: HOSPADM

## 2023-01-01 RX ORDER — ALBUTEROL SULFATE 90 UG/1
1-2 AEROSOL, METERED RESPIRATORY (INHALATION) EVERY 6 HOURS PRN
Status: DISCONTINUED | OUTPATIENT
Start: 2023-01-01 | End: 2023-01-01 | Stop reason: HOSPADM

## 2023-01-01 RX ORDER — ONDANSETRON 2 MG/ML
4 INJECTION INTRAMUSCULAR; INTRAVENOUS EVERY 30 MIN PRN
Status: DISCONTINUED | OUTPATIENT
Start: 2023-01-01 | End: 2023-01-01

## 2023-01-01 RX ORDER — POTASSIUM CHLORIDE 7.45 MG/ML
10 INJECTION INTRAVENOUS
Status: COMPLETED | OUTPATIENT
Start: 2023-01-01 | End: 2023-01-01

## 2023-01-01 RX ORDER — LOPERAMIDE HCL 2 MG
2 CAPSULE ORAL 4 TIMES DAILY PRN
Status: DISCONTINUED | OUTPATIENT
Start: 2023-01-01 | End: 2023-01-01

## 2023-01-01 RX ORDER — ACETAMINOPHEN 500 MG
1000 TABLET ORAL ONCE
Status: COMPLETED | OUTPATIENT
Start: 2023-01-01 | End: 2023-01-01

## 2023-01-01 RX ORDER — METHYLPREDNISOLONE 4 MG
TABLET, DOSE PACK ORAL
Qty: 21 TABLET | Refills: 0 | Status: ON HOLD | OUTPATIENT
Start: 2023-01-01 | End: 2023-01-01

## 2023-01-01 RX ORDER — HYDRALAZINE HYDROCHLORIDE 20 MG/ML
10 INJECTION INTRAMUSCULAR; INTRAVENOUS EVERY 4 HOURS PRN
Status: DISCONTINUED | OUTPATIENT
Start: 2023-01-01 | End: 2023-01-01 | Stop reason: HOSPADM

## 2023-01-01 RX ORDER — SODIUM CHLORIDE AND POTASSIUM CHLORIDE 150; 900 MG/100ML; MG/100ML
INJECTION, SOLUTION INTRAVENOUS CONTINUOUS
Status: DISCONTINUED | OUTPATIENT
Start: 2023-01-01 | End: 2023-01-01

## 2023-01-01 RX ORDER — POTASSIUM CHLORIDE 1500 MG/1
20 TABLET, EXTENDED RELEASE ORAL ONCE
Status: COMPLETED | OUTPATIENT
Start: 2023-01-01 | End: 2023-01-01

## 2023-01-01 RX ORDER — HYDROMORPHONE HYDROCHLORIDE 1 MG/ML
0.5 INJECTION, SOLUTION INTRAMUSCULAR; INTRAVENOUS; SUBCUTANEOUS ONCE
Status: COMPLETED | OUTPATIENT
Start: 2023-01-01 | End: 2023-01-01

## 2023-01-01 RX ORDER — SODIUM CHLORIDE, SODIUM LACTATE, POTASSIUM CHLORIDE, CALCIUM CHLORIDE 600; 310; 30; 20 MG/100ML; MG/100ML; MG/100ML; MG/100ML
INJECTION, SOLUTION INTRAVENOUS CONTINUOUS
Status: DISCONTINUED | OUTPATIENT
Start: 2023-01-01 | End: 2023-01-01

## 2023-01-01 RX ORDER — METOPROLOL SUCCINATE 25 MG/1
75 TABLET, EXTENDED RELEASE ORAL 2 TIMES DAILY
Status: DISCONTINUED | OUTPATIENT
Start: 2023-01-01 | End: 2023-01-01 | Stop reason: HOSPADM

## 2023-01-01 RX ORDER — POTASSIUM CHLORIDE 1500 MG/1
20 TABLET, EXTENDED RELEASE ORAL DAILY
Qty: 7 TABLET | Refills: 0 | Status: SHIPPED | OUTPATIENT
Start: 2023-01-01 | End: 2023-01-01

## 2023-01-01 RX ORDER — GABAPENTIN 800 MG/1
800 TABLET ORAL 2 TIMES DAILY
Status: DISCONTINUED | OUTPATIENT
Start: 2023-01-01 | End: 2023-01-01

## 2023-01-01 RX ADMIN — ACETAMINOPHEN 1000 MG: 500 TABLET, FILM COATED ORAL at 16:40

## 2023-01-01 RX ADMIN — MORPHINE SULFATE 90 MG: 15 TABLET, EXTENDED RELEASE ORAL at 21:25

## 2023-01-01 RX ADMIN — LIOTHYRONINE SODIUM 5 MCG: 5 TABLET ORAL at 08:59

## 2023-01-01 RX ADMIN — MORPHINE SULFATE 60 MG: 15 TABLET, EXTENDED RELEASE ORAL at 14:21

## 2023-01-01 RX ADMIN — CLONIDINE HYDROCHLORIDE 0.2 MG: 0.1 TABLET ORAL at 13:44

## 2023-01-01 RX ADMIN — SODIUM CHLORIDE 100 ML: 9 INJECTION, SOLUTION INTRAVENOUS at 12:51

## 2023-01-01 RX ADMIN — GABAPENTIN 1100 MG: 400 CAPSULE ORAL at 08:04

## 2023-01-01 RX ADMIN — CLONIDINE HYDROCHLORIDE 0.2 MG: 0.1 TABLET ORAL at 13:17

## 2023-01-01 RX ADMIN — BUPIVACAINE HYDROCHLORIDE 5 MG: 5 INJECTION, SOLUTION EPIDURAL; INTRACAUDAL at 10:47

## 2023-01-01 RX ADMIN — POTASSIUM CHLORIDE AND SODIUM CHLORIDE: 900; 150 INJECTION, SOLUTION INTRAVENOUS at 23:30

## 2023-01-01 RX ADMIN — SODIUM CHLORIDE, POTASSIUM CHLORIDE, SODIUM LACTATE AND CALCIUM CHLORIDE: 600; 310; 30; 20 INJECTION, SOLUTION INTRAVENOUS at 00:36

## 2023-01-01 RX ADMIN — HYDROMORPHONE HYDROCHLORIDE 1 MG: 1 INJECTION, SOLUTION INTRAMUSCULAR; INTRAVENOUS; SUBCUTANEOUS at 21:31

## 2023-01-01 RX ADMIN — HYDROMORPHONE HYDROCHLORIDE 8 MG: 2 TABLET ORAL at 13:45

## 2023-01-01 RX ADMIN — POTASSIUM CHLORIDE 20 MEQ: 1500 TABLET, EXTENDED RELEASE ORAL at 17:00

## 2023-01-01 RX ADMIN — POTASSIUM CHLORIDE AND SODIUM CHLORIDE: 900; 150 INJECTION, SOLUTION INTRAVENOUS at 20:41

## 2023-01-01 RX ADMIN — MELATONIN TAB 3 MG 3 MG: 3 TAB at 23:42

## 2023-01-01 RX ADMIN — SODIUM CHLORIDE, POTASSIUM CHLORIDE, SODIUM LACTATE AND CALCIUM CHLORIDE: 600; 310; 30; 20 INJECTION, SOLUTION INTRAVENOUS at 21:19

## 2023-01-01 RX ADMIN — METOPROLOL SUCCINATE 75 MG: 25 TABLET, EXTENDED RELEASE ORAL at 08:39

## 2023-01-01 RX ADMIN — SODIUM CHLORIDE 1000 ML: 9 INJECTION, SOLUTION INTRAVENOUS at 20:35

## 2023-01-01 RX ADMIN — HYDROMORPHONE HYDROCHLORIDE 1 MG: 1 INJECTION, SOLUTION INTRAMUSCULAR; INTRAVENOUS; SUBCUTANEOUS at 08:09

## 2023-01-01 RX ADMIN — SODIUM CHLORIDE, POTASSIUM CHLORIDE, SODIUM LACTATE AND CALCIUM CHLORIDE 1000 ML: 600; 310; 30; 20 INJECTION, SOLUTION INTRAVENOUS at 06:59

## 2023-01-01 RX ADMIN — MORPHINE SULFATE 90 MG: 15 TABLET, EXTENDED RELEASE ORAL at 08:38

## 2023-01-01 RX ADMIN — HYDROMORPHONE HYDROCHLORIDE 8 MG: 2 TABLET ORAL at 21:24

## 2023-01-01 RX ADMIN — LEVOTHYROXINE SODIUM 300 MCG: 100 TABLET ORAL at 12:49

## 2023-01-01 RX ADMIN — ONDANSETRON 4 MG: 2 INJECTION INTRAMUSCULAR; INTRAVENOUS at 21:49

## 2023-01-01 RX ADMIN — ZOLPIDEM TARTRATE 5 MG: 5 TABLET ORAL at 21:10

## 2023-01-01 RX ADMIN — RIVAROXABAN 20 MG: 20 TABLET, FILM COATED ORAL at 16:42

## 2023-01-01 RX ADMIN — SODIUM CHLORIDE 1000 ML: 9 INJECTION, SOLUTION INTRAVENOUS at 21:14

## 2023-01-01 RX ADMIN — HYDROMORPHONE HYDROCHLORIDE 0.2 MG: 0.2 INJECTION, SOLUTION INTRAMUSCULAR; INTRAVENOUS; SUBCUTANEOUS at 06:51

## 2023-01-01 RX ADMIN — POTASSIUM CHLORIDE 10 MEQ: 10 INJECTION, SOLUTION INTRAVENOUS at 09:57

## 2023-01-01 RX ADMIN — POTASSIUM CHLORIDE AND SODIUM CHLORIDE: 900; 150 INJECTION, SOLUTION INTRAVENOUS at 09:35

## 2023-01-01 RX ADMIN — LEVOTHYROXINE SODIUM 300 MCG: 100 TABLET ORAL at 08:46

## 2023-01-01 RX ADMIN — CLONIDINE HYDROCHLORIDE 0.2 MG: 0.1 TABLET ORAL at 16:26

## 2023-01-01 RX ADMIN — HYDROMORPHONE HYDROCHLORIDE 8 MG: 2 TABLET ORAL at 21:10

## 2023-01-01 RX ADMIN — LIOTHYRONINE SODIUM 5 MCG: 5 TABLET ORAL at 13:02

## 2023-01-01 RX ADMIN — ONDANSETRON 4 MG: 2 INJECTION INTRAMUSCULAR; INTRAVENOUS at 04:49

## 2023-01-01 RX ADMIN — SODIUM CHLORIDE 1000 ML: 9 INJECTION, SOLUTION INTRAVENOUS at 15:45

## 2023-01-01 RX ADMIN — CYCLOBENZAPRINE 10 MG: 10 TABLET, FILM COATED ORAL at 16:26

## 2023-01-01 RX ADMIN — ONDANSETRON 4 MG: 2 INJECTION INTRAMUSCULAR; INTRAVENOUS at 03:07

## 2023-01-01 RX ADMIN — PROCHLORPERAZINE MALEATE 10 MG: 10 TABLET ORAL at 13:56

## 2023-01-01 RX ADMIN — SODIUM CHLORIDE 250 ML: 9 INJECTION, SOLUTION INTRAVENOUS at 12:05

## 2023-01-01 RX ADMIN — MORPHINE SULFATE 90 MG: 15 TABLET, EXTENDED RELEASE ORAL at 12:49

## 2023-01-01 RX ADMIN — ACETAMINOPHEN 650 MG: 325 TABLET ORAL at 11:04

## 2023-01-01 RX ADMIN — HYDROMORPHONE HYDROCHLORIDE 8 MG: 2 TABLET ORAL at 16:26

## 2023-01-01 RX ADMIN — CYCLOBENZAPRINE 10 MG: 10 TABLET, FILM COATED ORAL at 08:39

## 2023-01-01 RX ADMIN — RIVAROXABAN 20 MG: 10 TABLET, FILM COATED ORAL at 20:54

## 2023-01-01 RX ADMIN — POTASSIUM CHLORIDE 10 MEQ: 10 INJECTION, SOLUTION INTRAVENOUS at 08:28

## 2023-01-01 RX ADMIN — IOPAMIDOL 2 ML: 408 INJECTION, SOLUTION INTRATHECAL at 10:47

## 2023-01-01 RX ADMIN — HYDROMORPHONE HYDROCHLORIDE 8 MG: 2 TABLET ORAL at 21:12

## 2023-01-01 RX ADMIN — ONDANSETRON 4 MG: 4 TABLET, ORALLY DISINTEGRATING ORAL at 10:57

## 2023-01-01 RX ADMIN — LIDOCAINE HYDROCHLORIDE 5 ML: 10 INJECTION, SOLUTION EPIDURAL; INFILTRATION; INTRACAUDAL; PERINEURAL at 09:44

## 2023-01-01 RX ADMIN — SODIUM CHLORIDE 1000 ML: 9 INJECTION, SOLUTION INTRAVENOUS at 23:37

## 2023-01-01 RX ADMIN — MORPHINE SULFATE 30 MG: 30 TABLET, EXTENDED RELEASE ORAL at 21:15

## 2023-01-01 RX ADMIN — MORPHINE SULFATE 30 MG: 30 TABLET, EXTENDED RELEASE ORAL at 08:46

## 2023-01-01 RX ADMIN — SODIUM CHLORIDE 250 ML: 9 INJECTION, SOLUTION INTRAVENOUS at 10:23

## 2023-01-01 RX ADMIN — LEVOTHYROXINE SODIUM 300 MCG: 100 CAPSULE ORAL at 15:19

## 2023-01-01 RX ADMIN — ONDANSETRON 4 MG: 2 INJECTION INTRAMUSCULAR; INTRAVENOUS at 08:09

## 2023-01-01 RX ADMIN — METOPROLOL SUCCINATE 75 MG: 50 TABLET, EXTENDED RELEASE ORAL at 08:59

## 2023-01-01 RX ADMIN — ZOLPIDEM TARTRATE 5 MG: 5 TABLET ORAL at 21:55

## 2023-01-01 RX ADMIN — GABAPENTIN 800 MG: 800 TABLET, COATED ORAL at 21:54

## 2023-01-01 RX ADMIN — HYDROMORPHONE HYDROCHLORIDE 8 MG: 2 TABLET ORAL at 08:59

## 2023-01-01 RX ADMIN — SODIUM CHLORIDE, POTASSIUM CHLORIDE, SODIUM LACTATE AND CALCIUM CHLORIDE: 600; 310; 30; 20 INJECTION, SOLUTION INTRAVENOUS at 08:21

## 2023-01-01 RX ADMIN — METOPROLOL SUCCINATE 75 MG: 25 TABLET, EXTENDED RELEASE ORAL at 21:25

## 2023-01-01 RX ADMIN — LEVOTHYROXINE SODIUM 300 MCG: 100 TABLET ORAL at 08:39

## 2023-01-01 RX ADMIN — LIOTHYRONINE SODIUM 5 MCG: 5 TABLET ORAL at 08:46

## 2023-01-01 RX ADMIN — LEVOTHYROXINE SODIUM 300 MCG: 100 TABLET ORAL at 08:04

## 2023-01-01 RX ADMIN — MORPHINE SULFATE 60 MG: 30 TABLET, EXTENDED RELEASE ORAL at 21:55

## 2023-01-01 RX ADMIN — ONDANSETRON 4 MG: 4 TABLET, ORALLY DISINTEGRATING ORAL at 15:26

## 2023-01-01 RX ADMIN — IOPAMIDOL 83 ML: 755 INJECTION, SOLUTION INTRAVENOUS at 12:51

## 2023-01-01 RX ADMIN — BUPIVACAINE HYDROCHLORIDE 10 MG: 5 INJECTION, SOLUTION PERINEURAL at 09:44

## 2023-01-01 RX ADMIN — LEVOTHYROXINE SODIUM 300 MCG: 100 CAPSULE ORAL at 09:09

## 2023-01-01 RX ADMIN — CLONIDINE HYDROCHLORIDE 0.2 MG: 0.1 TABLET ORAL at 08:59

## 2023-01-01 RX ADMIN — SODIUM CHLORIDE 79 ML: 9 INJECTION, SOLUTION INTRAVENOUS at 00:38

## 2023-01-01 RX ADMIN — MORPHINE SULFATE 60 MG: 30 TABLET, EXTENDED RELEASE ORAL at 13:16

## 2023-01-01 RX ADMIN — METHYLPREDNISOLONE ACETATE 80 MG: 80 INJECTION, SUSPENSION INTRA-ARTICULAR; INTRALESIONAL; INTRAMUSCULAR; SOFT TISSUE at 10:47

## 2023-01-01 RX ADMIN — METOPROLOL SUCCINATE 75 MG: 50 TABLET, EXTENDED RELEASE ORAL at 21:11

## 2023-01-01 RX ADMIN — CYCLOBENZAPRINE 10 MG: 10 TABLET, FILM COATED ORAL at 08:04

## 2023-01-01 RX ADMIN — POTASSIUM CHLORIDE 10 MEQ: 10 INJECTION, SOLUTION INTRAVENOUS at 11:02

## 2023-01-01 RX ADMIN — ONDANSETRON 4 MG: 4 TABLET, ORALLY DISINTEGRATING ORAL at 19:18

## 2023-01-01 RX ADMIN — POTASSIUM CHLORIDE AND SODIUM CHLORIDE: 900; 150 INJECTION, SOLUTION INTRAVENOUS at 08:55

## 2023-01-01 RX ADMIN — POTASSIUM CHLORIDE AND SODIUM CHLORIDE: 900; 150 INJECTION, SOLUTION INTRAVENOUS at 22:55

## 2023-01-01 RX ADMIN — IOPAMIDOL 2 ML: 408 INJECTION, SOLUTION INTRATHECAL at 09:44

## 2023-01-01 RX ADMIN — METOPROLOL SUCCINATE 75 MG: 25 TABLET, EXTENDED RELEASE ORAL at 21:09

## 2023-01-01 RX ADMIN — GABAPENTIN 1100 MG: 400 CAPSULE ORAL at 08:39

## 2023-01-01 RX ADMIN — SODIUM CHLORIDE, POTASSIUM CHLORIDE, SODIUM LACTATE AND CALCIUM CHLORIDE: 600; 310; 30; 20 INJECTION, SOLUTION INTRAVENOUS at 06:56

## 2023-01-01 RX ADMIN — METOPROLOL SUCCINATE 75 MG: 25 TABLET, EXTENDED RELEASE ORAL at 12:49

## 2023-01-01 RX ADMIN — RIVAROXABAN 20 MG: 10 TABLET, FILM COATED ORAL at 17:00

## 2023-01-01 RX ADMIN — POTASSIUM CHLORIDE AND SODIUM CHLORIDE: 900; 150 INJECTION, SOLUTION INTRAVENOUS at 05:56

## 2023-01-01 RX ADMIN — HYDROMORPHONE HYDROCHLORIDE 0.5 MG: 1 INJECTION, SOLUTION INTRAMUSCULAR; INTRAVENOUS; SUBCUTANEOUS at 15:50

## 2023-01-01 RX ADMIN — ONDANSETRON 4 MG: 2 INJECTION INTRAMUSCULAR; INTRAVENOUS at 15:48

## 2023-01-01 RX ADMIN — ONDANSETRON 4 MG: 2 INJECTION INTRAMUSCULAR; INTRAVENOUS at 21:32

## 2023-01-01 RX ADMIN — HYDROMORPHONE HYDROCHLORIDE 0.2 MG: 0.2 INJECTION, SOLUTION INTRAMUSCULAR; INTRAVENOUS; SUBCUTANEOUS at 06:28

## 2023-01-01 RX ADMIN — BETAMETHASONE SODIUM PHOSPHATE AND BETAMETHASONE ACETATE 18 MG: 3; 3 INJECTION, SUSPENSION INTRA-ARTICULAR; INTRALESIONAL; INTRAMUSCULAR; SOFT TISSUE at 10:20

## 2023-01-01 RX ADMIN — LIOTHYRONINE SODIUM 5 MCG: 5 TABLET ORAL at 13:17

## 2023-01-01 RX ADMIN — HYDROMORPHONE HYDROCHLORIDE 8 MG: 2 TABLET ORAL at 08:44

## 2023-01-01 RX ADMIN — CYCLOBENZAPRINE 10 MG: 10 TABLET, FILM COATED ORAL at 16:42

## 2023-01-01 RX ADMIN — ONDANSETRON 4 MG: 2 INJECTION INTRAMUSCULAR; INTRAVENOUS at 23:23

## 2023-01-01 RX ADMIN — HYDROMORPHONE HYDROCHLORIDE 0.5 MG: 1 INJECTION, SOLUTION INTRAMUSCULAR; INTRAVENOUS; SUBCUTANEOUS at 03:04

## 2023-01-01 RX ADMIN — NALOXONE HYDROCHLORIDE 1 MG: 1 INJECTION PARENTERAL at 13:14

## 2023-01-01 RX ADMIN — HYDROMORPHONE HYDROCHLORIDE 8 MG: 2 TABLET ORAL at 16:42

## 2023-01-01 RX ADMIN — HYDROMORPHONE HYDROCHLORIDE 8 MG: 2 TABLET ORAL at 20:54

## 2023-01-01 RX ADMIN — CLONIDINE HYDROCHLORIDE 0.2 MG: 0.1 TABLET ORAL at 21:09

## 2023-01-01 RX ADMIN — HYDROMORPHONE HYDROCHLORIDE 8 MG: 2 TABLET ORAL at 08:38

## 2023-01-01 RX ADMIN — POTASSIUM CHLORIDE 20 MEQ: 1500 TABLET, EXTENDED RELEASE ORAL at 13:17

## 2023-01-01 RX ADMIN — MORPHINE SULFATE 60 MG: 15 TABLET, EXTENDED RELEASE ORAL at 14:51

## 2023-01-01 RX ADMIN — FAMOTIDINE 20 MG: 10 INJECTION, SOLUTION INTRAVENOUS at 01:46

## 2023-01-01 RX ADMIN — GABAPENTIN 1100 MG: 400 CAPSULE ORAL at 14:21

## 2023-01-01 RX ADMIN — RIVAROXABAN 20 MG: 20 TABLET, FILM COATED ORAL at 16:26

## 2023-01-01 RX ADMIN — LIDOCAINE HYDROCHLORIDE 5 ML: 10 INJECTION, SOLUTION EPIDURAL; INFILTRATION; INTRACAUDAL; PERINEURAL at 10:20

## 2023-01-01 RX ADMIN — METOPROLOL SUCCINATE 75 MG: 50 TABLET, EXTENDED RELEASE ORAL at 08:45

## 2023-01-01 RX ADMIN — METHYLPREDNISOLONE ACETATE 40 MG: 40 INJECTION, SUSPENSION INTRA-ARTICULAR; INTRALESIONAL; INTRAMUSCULAR; SOFT TISSUE at 10:22

## 2023-01-01 RX ADMIN — MORPHINE SULFATE 60 MG: 30 TABLET, EXTENDED RELEASE ORAL at 13:47

## 2023-01-01 RX ADMIN — HYDROMORPHONE HYDROCHLORIDE 8 MG: 2 TABLET ORAL at 13:17

## 2023-01-01 RX ADMIN — HYDROMORPHONE HYDROCHLORIDE 1 MG: 1 INJECTION, SOLUTION INTRAMUSCULAR; INTRAVENOUS; SUBCUTANEOUS at 21:43

## 2023-01-01 RX ADMIN — CYCLOBENZAPRINE 10 MG: 10 TABLET, FILM COATED ORAL at 21:26

## 2023-01-01 RX ADMIN — LIOTHYRONINE SODIUM 5 MCG: 5 TABLET ORAL at 08:39

## 2023-01-01 RX ADMIN — HYDROMORPHONE HYDROCHLORIDE 0.2 MG: 0.2 INJECTION, SOLUTION INTRAMUSCULAR; INTRAVENOUS; SUBCUTANEOUS at 10:57

## 2023-01-01 RX ADMIN — MORPHINE SULFATE 30 MG: 30 TABLET, EXTENDED RELEASE ORAL at 08:58

## 2023-01-01 RX ADMIN — MORPHINE SULFATE 60 MG: 30 TABLET, EXTENDED RELEASE ORAL at 08:58

## 2023-01-01 RX ADMIN — IOPAMIDOL 135 ML: 755 INJECTION, SOLUTION INTRAVENOUS at 00:38

## 2023-01-01 RX ADMIN — LIOTHYRONINE SODIUM 5 MCG: 5 TABLET ORAL at 08:04

## 2023-01-01 RX ADMIN — CLONIDINE HYDROCHLORIDE 0.2 MG: 0.1 TABLET ORAL at 21:11

## 2023-01-01 RX ADMIN — POTASSIUM CHLORIDE AND SODIUM CHLORIDE: 900; 150 INJECTION, SOLUTION INTRAVENOUS at 10:57

## 2023-01-01 RX ADMIN — MORPHINE SULFATE 60 MG: 30 TABLET, EXTENDED RELEASE ORAL at 21:11

## 2023-01-01 RX ADMIN — GABAPENTIN 800 MG: 800 TABLET, COATED ORAL at 21:09

## 2023-01-01 RX ADMIN — MORPHINE SULFATE 90 MG: 15 TABLET, EXTENDED RELEASE ORAL at 08:05

## 2023-01-01 RX ADMIN — ONDANSETRON 4 MG: 4 TABLET, ORALLY DISINTEGRATING ORAL at 23:42

## 2023-01-01 RX ADMIN — GABAPENTIN 1100 MG: 400 CAPSULE ORAL at 14:52

## 2023-01-01 RX ADMIN — CYCLOBENZAPRINE 10 MG: 10 TABLET, FILM COATED ORAL at 21:09

## 2023-01-01 RX ADMIN — MORPHINE SULFATE 60 MG: 30 TABLET, EXTENDED RELEASE ORAL at 08:46

## 2023-01-01 RX ADMIN — MORPHINE SULFATE 90 MG: 15 TABLET, EXTENDED RELEASE ORAL at 21:08

## 2023-01-01 RX ADMIN — HYDROMORPHONE HYDROCHLORIDE 1 MG: 1 INJECTION, SOLUTION INTRAMUSCULAR; INTRAVENOUS; SUBCUTANEOUS at 04:26

## 2023-01-01 RX ADMIN — METOPROLOL SUCCINATE 75 MG: 25 TABLET, EXTENDED RELEASE ORAL at 08:04

## 2023-01-01 RX ADMIN — IOPAMIDOL 2 ML: 408 INJECTION, SOLUTION INTRATHECAL at 10:20

## 2023-01-01 RX ADMIN — HYDROMORPHONE HYDROCHLORIDE 0.5 MG: 1 INJECTION, SOLUTION INTRAMUSCULAR; INTRAVENOUS; SUBCUTANEOUS at 23:43

## 2023-01-01 RX ADMIN — METHYLPREDNISOLONE ACETATE 80 MG: 80 INJECTION, SUSPENSION INTRA-ARTICULAR; INTRALESIONAL; INTRAMUSCULAR; SOFT TISSUE at 09:44

## 2023-01-01 RX ADMIN — DIPHENOXYLATE HYDROCHLORIDE AND ATROPINE SULFATE 2 TABLET: 2.5; .025 TABLET ORAL at 22:01

## 2023-01-01 RX ADMIN — METOPROLOL SUCCINATE 75 MG: 50 TABLET, EXTENDED RELEASE ORAL at 12:40

## 2023-01-01 ASSESSMENT — ACTIVITIES OF DAILY LIVING (ADL)
ADLS_ACUITY_SCORE: 35
ADLS_ACUITY_SCORE: 20
ADLS_ACUITY_SCORE: 35
ADLS_ACUITY_SCORE: 22
ADLS_ACUITY_SCORE: 22
ADLS_ACUITY_SCORE: 30
ADLS_ACUITY_SCORE: 21
ADLS_ACUITY_SCORE: 22
ADLS_ACUITY_SCORE: 21
ADLS_ACUITY_SCORE: 21
ADLS_ACUITY_SCORE: 35
ADLS_ACUITY_SCORE: 22
ADLS_ACUITY_SCORE: 22
ADLS_ACUITY_SCORE: 21
ADLS_ACUITY_SCORE: 33
DEPENDENT_IADLS:: INDEPENDENT
ADLS_ACUITY_SCORE: 35
ADLS_ACUITY_SCORE: 22
ADLS_ACUITY_SCORE: 35
ADLS_ACUITY_SCORE: 22
ADLS_ACUITY_SCORE: 35
ADLS_ACUITY_SCORE: 21
ADLS_ACUITY_SCORE: 22
ADLS_ACUITY_SCORE: 35
ADLS_ACUITY_SCORE: 30
ADLS_ACUITY_SCORE: 22
ADLS_ACUITY_SCORE: 21
ADLS_ACUITY_SCORE: 35
ADLS_ACUITY_SCORE: 21
ADLS_ACUITY_SCORE: 22
ADLS_ACUITY_SCORE: 22
ADLS_ACUITY_SCORE: 30
ADLS_ACUITY_SCORE: 22
CURRENT_FUNCTION: TRANSPORTATION REQUIRES ASSISTANCE
ADLS_ACUITY_SCORE: 35
ADLS_ACUITY_SCORE: 35
ADLS_ACUITY_SCORE: 22
ADLS_ACUITY_SCORE: 22
ADLS_ACUITY_SCORE: 35
ADLS_ACUITY_SCORE: 20
ADLS_ACUITY_SCORE: 35
ADLS_ACUITY_SCORE: 18
ADLS_ACUITY_SCORE: 22
ADLS_ACUITY_SCORE: 35
ADLS_ACUITY_SCORE: 35
ADLS_ACUITY_SCORE: 21
ADLS_ACUITY_SCORE: 20
ADLS_ACUITY_SCORE: 35
ADLS_ACUITY_SCORE: 35
ADLS_ACUITY_SCORE: 22
ADLS_ACUITY_SCORE: 20
ADLS_ACUITY_SCORE: 35
ADLS_ACUITY_SCORE: 22
ADLS_ACUITY_SCORE: 30
ADLS_ACUITY_SCORE: 22
ADLS_ACUITY_SCORE: 21
ADLS_ACUITY_SCORE: 22
ADLS_ACUITY_SCORE: 35
ADLS_ACUITY_SCORE: 35
ADLS_ACUITY_SCORE: 22
ADLS_ACUITY_SCORE: 37
ADLS_ACUITY_SCORE: 20
ADLS_ACUITY_SCORE: 20

## 2023-01-01 ASSESSMENT — ENCOUNTER SYMPTOMS
SORE THROAT: 0
DYSURIA: 0
DIZZINESS: 0
WEAKNESS: 1
FREQUENCY: 0
HEADACHES: 0
EYE PAIN: 0
MYALGIAS: 1
DIARRHEA: 0
ABDOMINAL PAIN: 0
CONSTIPATION: 1
SHORTNESS OF BREATH: 0
CHILLS: 1
ARTHRALGIAS: 1
HEARTBURN: 0
NERVOUS/ANXIOUS: 0
NAUSEA: 0
COUGH: 0
BREAST MASS: 0
PARESTHESIAS: 1
PALPITATIONS: 0
JOINT SWELLING: 1
FEVER: 0
HEMATOCHEZIA: 0
HEMATURIA: 0

## 2023-01-03 DIAGNOSIS — I48.20 CHRONIC ATRIAL FIBRILLATION (H): ICD-10-CM

## 2023-01-03 RX ORDER — METOPROLOL SUCCINATE 25 MG/1
75 TABLET, EXTENDED RELEASE ORAL 2 TIMES DAILY
Qty: 540 TABLET | Refills: 2 | Status: ON HOLD | OUTPATIENT
Start: 2023-01-03 | End: 2023-01-01

## 2023-01-16 ENCOUNTER — HOSPITAL ENCOUNTER (OUTPATIENT)
Facility: CLINIC | Age: 63
Setting detail: OBSERVATION
Discharge: HOME | End: 2023-01-17
Attending: EMERGENCY MEDICINE | Admitting: HOSPITALIST
Payer: COMMERCIAL

## 2023-01-16 ENCOUNTER — APPOINTMENT (OUTPATIENT)
Dept: GENERAL RADIOLOGY | Facility: CLINIC | Age: 63
End: 2023-01-16
Attending: EMERGENCY MEDICINE
Payer: COMMERCIAL

## 2023-01-16 DIAGNOSIS — J96.01 ACUTE RESPIRATORY FAILURE WITH HYPOXIA (H): ICD-10-CM

## 2023-01-16 DIAGNOSIS — B33.8 RSV INFECTION: ICD-10-CM

## 2023-01-16 DIAGNOSIS — T40.604A NARCOTIC OVERDOSE, UNDETERMINED INTENT, INITIAL ENCOUNTER (H): ICD-10-CM

## 2023-01-16 DIAGNOSIS — J18.9 PNEUMONIA DUE TO INFECTIOUS ORGANISM, UNSPECIFIED LATERALITY, UNSPECIFIED PART OF LUNG: Primary | ICD-10-CM

## 2023-01-16 DIAGNOSIS — J18.9 COMMUNITY ACQUIRED PNEUMONIA OF LEFT LOWER LOBE OF LUNG: ICD-10-CM

## 2023-01-16 LAB
ANION GAP SERPL CALCULATED.3IONS-SCNC: 10 MMOL/L (ref 3–14)
BASE EXCESS BLDV CALC-SCNC: 2.8 MMOL/L (ref -7.7–1.9)
BASOPHILS # BLD AUTO: 0.1 10E3/UL (ref 0–0.2)
BASOPHILS NFR BLD AUTO: 0 %
BUN SERPL-MCNC: 31 MG/DL (ref 7–30)
CALCIUM SERPL-MCNC: 9.2 MG/DL (ref 8.5–10.1)
CHLORIDE BLD-SCNC: 101 MMOL/L (ref 94–109)
CO2 SERPL-SCNC: 28 MMOL/L (ref 20–32)
CREAT SERPL-MCNC: 0.89 MG/DL (ref 0.52–1.04)
EOSINOPHIL # BLD AUTO: 0.4 10E3/UL (ref 0–0.7)
EOSINOPHIL NFR BLD AUTO: 3 %
ERYTHROCYTE [DISTWIDTH] IN BLOOD BY AUTOMATED COUNT: 16 % (ref 10–15)
ETHANOL SERPL-MCNC: <0.01 G/DL
GFR SERPL CREATININE-BSD FRML MDRD: 72 ML/MIN/1.73M2
GLUCOSE BLD-MCNC: 116 MG/DL (ref 70–99)
HCO3 BLDV-SCNC: 30 MMOL/L (ref 21–28)
HCT VFR BLD AUTO: 34.5 % (ref 35–47)
HGB BLD-MCNC: 10.6 G/DL (ref 11.7–15.7)
HOLD SPECIMEN: NORMAL
IMM GRANULOCYTES # BLD: 0.1 10E3/UL
IMM GRANULOCYTES NFR BLD: 1 %
LYMPHOCYTES # BLD AUTO: 2 10E3/UL (ref 0.8–5.3)
LYMPHOCYTES NFR BLD AUTO: 15 %
MCH RBC QN AUTO: 28.2 PG (ref 26.5–33)
MCHC RBC AUTO-ENTMCNC: 30.7 G/DL (ref 31.5–36.5)
MCV RBC AUTO: 92 FL (ref 78–100)
MONOCYTES # BLD AUTO: 1 10E3/UL (ref 0–1.3)
MONOCYTES NFR BLD AUTO: 7 %
NEUTROPHILS # BLD AUTO: 9.6 10E3/UL (ref 1.6–8.3)
NEUTROPHILS NFR BLD AUTO: 74 %
NRBC # BLD AUTO: 0 10E3/UL
NRBC BLD AUTO-RTO: 0 /100
O2/TOTAL GAS SETTING VFR VENT: 5 %
OXYHGB MFR BLDV: 28 % (ref 70–75)
PCO2 BLDV: 58 MM HG (ref 40–50)
PH BLDV: 7.32 [PH] (ref 7.32–7.43)
PLATELET # BLD AUTO: 255 10E3/UL (ref 150–450)
PO2 BLDV: 21 MM HG (ref 25–47)
POTASSIUM BLD-SCNC: 3.2 MMOL/L (ref 3.4–5.3)
RBC # BLD AUTO: 3.76 10E6/UL (ref 3.8–5.2)
SODIUM SERPL-SCNC: 139 MMOL/L (ref 133–144)
WBC # BLD AUTO: 13.1 10E3/UL (ref 4–11)

## 2023-01-16 PROCEDURE — 85025 COMPLETE CBC W/AUTO DIFF WBC: CPT | Performed by: EMERGENCY MEDICINE

## 2023-01-16 PROCEDURE — 80048 BASIC METABOLIC PNL TOTAL CA: CPT | Performed by: EMERGENCY MEDICINE

## 2023-01-16 PROCEDURE — 96365 THER/PROPH/DIAG IV INF INIT: CPT

## 2023-01-16 PROCEDURE — 96366 THER/PROPH/DIAG IV INF ADDON: CPT

## 2023-01-16 PROCEDURE — 250N000011 HC RX IP 250 OP 636: Performed by: EMERGENCY MEDICINE

## 2023-01-16 PROCEDURE — 96376 TX/PRO/DX INJ SAME DRUG ADON: CPT

## 2023-01-16 PROCEDURE — 36415 COLL VENOUS BLD VENIPUNCTURE: CPT | Performed by: EMERGENCY MEDICINE

## 2023-01-16 PROCEDURE — 82805 BLOOD GASES W/O2 SATURATION: CPT | Performed by: EMERGENCY MEDICINE

## 2023-01-16 PROCEDURE — 96367 TX/PROPH/DG ADDL SEQ IV INF: CPT

## 2023-01-16 PROCEDURE — 96375 TX/PRO/DX INJ NEW DRUG ADDON: CPT

## 2023-01-16 PROCEDURE — 258N000003 HC RX IP 258 OP 636: Performed by: EMERGENCY MEDICINE

## 2023-01-16 PROCEDURE — 71045 X-RAY EXAM CHEST 1 VIEW: CPT

## 2023-01-16 PROCEDURE — 82077 ASSAY SPEC XCP UR&BREATH IA: CPT | Performed by: EMERGENCY MEDICINE

## 2023-01-16 PROCEDURE — 93005 ELECTROCARDIOGRAM TRACING: CPT

## 2023-01-16 PROCEDURE — 99223 1ST HOSP IP/OBS HIGH 75: CPT | Performed by: INTERNAL MEDICINE

## 2023-01-16 PROCEDURE — 99285 EMERGENCY DEPT VISIT HI MDM: CPT | Mod: 25

## 2023-01-16 PROCEDURE — 200N000001 HC R&B ICU

## 2023-01-16 PROCEDURE — 250N000011 HC RX IP 250 OP 636

## 2023-01-16 RX ORDER — NALOXONE HYDROCHLORIDE 1 MG/ML
INJECTION INTRAMUSCULAR; INTRAVENOUS; SUBCUTANEOUS
Status: COMPLETED
Start: 2023-01-16 | End: 2023-01-16

## 2023-01-16 RX ORDER — NALOXONE HYDROCHLORIDE 1 MG/ML
2 INJECTION INTRAMUSCULAR; INTRAVENOUS; SUBCUTANEOUS
Status: COMPLETED | OUTPATIENT
Start: 2023-01-16 | End: 2023-01-16

## 2023-01-16 RX ORDER — NALOXONE HYDROCHLORIDE 1 MG/ML
1 INJECTION INTRAMUSCULAR; INTRAVENOUS; SUBCUTANEOUS ONCE
Status: COMPLETED | OUTPATIENT
Start: 2023-01-16 | End: 2023-01-16

## 2023-01-16 RX ORDER — CEFTRIAXONE 2 G/1
2 INJECTION, POWDER, FOR SOLUTION INTRAMUSCULAR; INTRAVENOUS ONCE
Status: COMPLETED | OUTPATIENT
Start: 2023-01-16 | End: 2023-01-16

## 2023-01-16 RX ORDER — AZITHROMYCIN 500 MG/1
500 INJECTION, POWDER, LYOPHILIZED, FOR SOLUTION INTRAVENOUS ONCE
Status: COMPLETED | OUTPATIENT
Start: 2023-01-16 | End: 2023-01-17

## 2023-01-16 RX ADMIN — SODIUM CHLORIDE 1000 ML: 9 INJECTION, SOLUTION INTRAVENOUS at 22:53

## 2023-01-16 RX ADMIN — NALOXONE HYDROCHLORIDE 0.4 MG/HR: 1 INJECTION PARENTERAL at 21:58

## 2023-01-16 RX ADMIN — NALOXONE HYDROCHLORIDE 2 MG: 1 INJECTION INTRAMUSCULAR; INTRAVENOUS; SUBCUTANEOUS at 21:27

## 2023-01-16 RX ADMIN — CEFTRIAXONE SODIUM 2 G: 2 INJECTION, POWDER, FOR SOLUTION INTRAMUSCULAR; INTRAVENOUS at 23:10

## 2023-01-16 RX ADMIN — NALOXONE HYDROCHLORIDE 2 MG: 1 INJECTION PARENTERAL at 21:27

## 2023-01-16 RX ADMIN — NALOXONE HYDROCHLORIDE 1 MG: 1 INJECTION PARENTERAL at 22:54

## 2023-01-16 ASSESSMENT — ENCOUNTER SYMPTOMS
SLEEP DISTURBANCE: 1
COUGH: 1
FEVER: 0

## 2023-01-16 ASSESSMENT — ACTIVITIES OF DAILY LIVING (ADL)
ADLS_ACUITY_SCORE: 35
ADLS_ACUITY_SCORE: 35

## 2023-01-17 VITALS
TEMPERATURE: 98 F | SYSTOLIC BLOOD PRESSURE: 105 MMHG | OXYGEN SATURATION: 93 % | BODY MASS INDEX: 46.26 KG/M2 | DIASTOLIC BLOOD PRESSURE: 62 MMHG | WEIGHT: 293 LBS | RESPIRATION RATE: 24 BRPM | HEART RATE: 66 BPM

## 2023-01-17 LAB
ANION GAP SERPL CALCULATED.3IONS-SCNC: 5 MMOL/L (ref 3–14)
ATRIAL RATE - MUSE: NORMAL BPM
BUN SERPL-MCNC: 24 MG/DL (ref 7–30)
CALCIUM SERPL-MCNC: 8.3 MG/DL (ref 8.5–10.1)
CHLORIDE BLD-SCNC: 107 MMOL/L (ref 94–109)
CO2 SERPL-SCNC: 25 MMOL/L (ref 20–32)
CREAT SERPL-MCNC: 0.78 MG/DL (ref 0.52–1.04)
DIASTOLIC BLOOD PRESSURE - MUSE: NORMAL MMHG
ERYTHROCYTE [DISTWIDTH] IN BLOOD BY AUTOMATED COUNT: 16.1 % (ref 10–15)
GFR SERPL CREATININE-BSD FRML MDRD: 85 ML/MIN/1.73M2
GLUCOSE BLD-MCNC: 112 MG/DL (ref 70–99)
GLUCOSE BLDC GLUCOMTR-MCNC: 120 MG/DL (ref 70–99)
HCT VFR BLD AUTO: 33.4 % (ref 35–47)
HGB BLD-MCNC: 10.2 G/DL (ref 11.7–15.7)
INTERPRETATION ECG - MUSE: NORMAL
MCH RBC QN AUTO: 28 PG (ref 26.5–33)
MCHC RBC AUTO-ENTMCNC: 30.5 G/DL (ref 31.5–36.5)
MCV RBC AUTO: 92 FL (ref 78–100)
P AXIS - MUSE: NORMAL DEGREES
PLATELET # BLD AUTO: 234 10E3/UL (ref 150–450)
POTASSIUM BLD-SCNC: 3.5 MMOL/L (ref 3.4–5.3)
PR INTERVAL - MUSE: NORMAL MS
QRS DURATION - MUSE: 104 MS
QT - MUSE: 380 MS
QTC - MUSE: 435 MS
R AXIS - MUSE: -69 DEGREES
RBC # BLD AUTO: 3.64 10E6/UL (ref 3.8–5.2)
SODIUM SERPL-SCNC: 137 MMOL/L (ref 133–144)
SYSTOLIC BLOOD PRESSURE - MUSE: NORMAL MMHG
T AXIS - MUSE: -86 DEGREES
VENTRICULAR RATE- MUSE: 79 BPM
WBC # BLD AUTO: 10.8 10E3/UL (ref 4–11)

## 2023-01-17 PROCEDURE — 80048 BASIC METABOLIC PNL TOTAL CA: CPT | Performed by: INTERNAL MEDICINE

## 2023-01-17 PROCEDURE — 82962 GLUCOSE BLOOD TEST: CPT

## 2023-01-17 PROCEDURE — 258N000003 HC RX IP 258 OP 636: Performed by: INTERNAL MEDICINE

## 2023-01-17 PROCEDURE — 250N000011 HC RX IP 250 OP 636: Performed by: INTERNAL MEDICINE

## 2023-01-17 PROCEDURE — 99239 HOSP IP/OBS DSCHRG MGMT >30: CPT | Performed by: HOSPITALIST

## 2023-01-17 PROCEDURE — 96366 THER/PROPH/DIAG IV INF ADDON: CPT

## 2023-01-17 PROCEDURE — G0378 HOSPITAL OBSERVATION PER HR: HCPCS

## 2023-01-17 PROCEDURE — 85027 COMPLETE CBC AUTOMATED: CPT | Performed by: INTERNAL MEDICINE

## 2023-01-17 PROCEDURE — G0463 HOSPITAL OUTPT CLINIC VISIT: HCPCS

## 2023-01-17 PROCEDURE — 96375 TX/PRO/DX INJ NEW DRUG ADDON: CPT

## 2023-01-17 PROCEDURE — 36415 COLL VENOUS BLD VENIPUNCTURE: CPT | Performed by: INTERNAL MEDICINE

## 2023-01-17 RX ORDER — DEXTROSE MONOHYDRATE 25 G/50ML
25-50 INJECTION, SOLUTION INTRAVENOUS
Status: DISCONTINUED | OUTPATIENT
Start: 2023-01-17 | End: 2023-01-17 | Stop reason: HOSPADM

## 2023-01-17 RX ORDER — AMOXICILLIN 250 MG
1 CAPSULE ORAL 2 TIMES DAILY PRN
Status: DISCONTINUED | OUTPATIENT
Start: 2023-01-17 | End: 2023-01-17 | Stop reason: HOSPADM

## 2023-01-17 RX ORDER — AZITHROMYCIN 250 MG/1
250 TABLET, FILM COATED ORAL DAILY
Status: DISCONTINUED | OUTPATIENT
Start: 2023-01-17 | End: 2023-01-17 | Stop reason: HOSPADM

## 2023-01-17 RX ORDER — SODIUM FLUORIDE 5 MG/ML
PASTE, DENTIFRICE DENTAL DAILY
COMMUNITY

## 2023-01-17 RX ORDER — ONDANSETRON 4 MG/1
4 TABLET, ORALLY DISINTEGRATING ORAL EVERY 6 HOURS PRN
Status: DISCONTINUED | OUTPATIENT
Start: 2023-01-17 | End: 2023-01-17 | Stop reason: HOSPADM

## 2023-01-17 RX ORDER — CEFUROXIME AXETIL 500 MG/1
500 TABLET ORAL 2 TIMES DAILY
Qty: 10 TABLET | Refills: 0 | Status: ON HOLD | OUTPATIENT
Start: 2023-01-17 | End: 2023-01-01

## 2023-01-17 RX ORDER — GUAIFENESIN/DEXTROMETHORPHAN 100-10MG/5
10 SYRUP ORAL 4 TIMES DAILY PRN
Qty: 236 ML | Refills: 0 | Status: ON HOLD | OUTPATIENT
Start: 2023-01-17 | End: 2023-01-01

## 2023-01-17 RX ORDER — AZITHROMYCIN 250 MG/1
250 TABLET, FILM COATED ORAL DAILY
Qty: 4 TABLET | Refills: 0 | Status: SHIPPED | OUTPATIENT
Start: 2023-01-17 | End: 2023-01-01

## 2023-01-17 RX ORDER — POLYETHYLENE GLYCOL 3350 17 G/17G
17 POWDER, FOR SOLUTION ORAL DAILY PRN
Status: DISCONTINUED | OUTPATIENT
Start: 2023-01-17 | End: 2023-01-17 | Stop reason: HOSPADM

## 2023-01-17 RX ORDER — BISACODYL 10 MG
10 SUPPOSITORY, RECTAL RECTAL DAILY PRN
Status: DISCONTINUED | OUTPATIENT
Start: 2023-01-17 | End: 2023-01-17 | Stop reason: HOSPADM

## 2023-01-17 RX ORDER — AMOXICILLIN 250 MG
2 CAPSULE ORAL 2 TIMES DAILY PRN
Status: DISCONTINUED | OUTPATIENT
Start: 2023-01-17 | End: 2023-01-17 | Stop reason: HOSPADM

## 2023-01-17 RX ORDER — BENZONATATE 200 MG/1
200 CAPSULE ORAL 3 TIMES DAILY PRN
Qty: 20 CAPSULE | Refills: 0 | Status: ON HOLD | OUTPATIENT
Start: 2023-01-17 | End: 2023-01-01

## 2023-01-17 RX ORDER — POTASSIUM CHLORIDE 1500 MG/1
40 TABLET, EXTENDED RELEASE ORAL ONCE
Status: DISCONTINUED | OUTPATIENT
Start: 2023-01-17 | End: 2023-01-17

## 2023-01-17 RX ORDER — CEFTRIAXONE 2 G/1
2 INJECTION, POWDER, FOR SOLUTION INTRAMUSCULAR; INTRAVENOUS EVERY 24 HOURS
Status: DISCONTINUED | OUTPATIENT
Start: 2023-01-17 | End: 2023-01-17 | Stop reason: HOSPADM

## 2023-01-17 RX ORDER — ALBUTEROL SULFATE 90 UG/1
1-2 AEROSOL, METERED RESPIRATORY (INHALATION) EVERY 6 HOURS PRN
COMMUNITY

## 2023-01-17 RX ORDER — ONDANSETRON 2 MG/ML
4 INJECTION INTRAMUSCULAR; INTRAVENOUS EVERY 6 HOURS PRN
Status: DISCONTINUED | OUTPATIENT
Start: 2023-01-17 | End: 2023-01-17 | Stop reason: HOSPADM

## 2023-01-17 RX ORDER — SODIUM CHLORIDE 9 MG/ML
INJECTION, SOLUTION INTRAVENOUS CONTINUOUS
Status: DISCONTINUED | OUTPATIENT
Start: 2023-01-17 | End: 2023-01-17 | Stop reason: HOSPADM

## 2023-01-17 RX ORDER — NICOTINE POLACRILEX 4 MG
15-30 LOZENGE BUCCAL
Status: DISCONTINUED | OUTPATIENT
Start: 2023-01-17 | End: 2023-01-17 | Stop reason: HOSPADM

## 2023-01-17 RX ORDER — PROCHLORPERAZINE 25 MG
25 SUPPOSITORY, RECTAL RECTAL EVERY 12 HOURS PRN
Status: DISCONTINUED | OUTPATIENT
Start: 2023-01-17 | End: 2023-01-17 | Stop reason: HOSPADM

## 2023-01-17 RX ORDER — PROCHLORPERAZINE MALEATE 10 MG
10 TABLET ORAL EVERY 6 HOURS PRN
Status: DISCONTINUED | OUTPATIENT
Start: 2023-01-17 | End: 2023-01-17 | Stop reason: HOSPADM

## 2023-01-17 RX ORDER — LIOTHYRONINE SODIUM 5 UG/1
5 TABLET ORAL DAILY
COMMUNITY

## 2023-01-17 RX ADMIN — NALOXONE HYDROCHLORIDE 0.7 MG/HR: 1 INJECTION PARENTERAL at 02:03

## 2023-01-17 RX ADMIN — AZITHROMYCIN MONOHYDRATE 500 MG: 500 INJECTION, POWDER, LYOPHILIZED, FOR SOLUTION INTRAVENOUS at 00:30

## 2023-01-17 RX ADMIN — SODIUM CHLORIDE: 9 INJECTION, SOLUTION INTRAVENOUS at 00:30

## 2023-01-17 RX ADMIN — NALOXONE HYDROCHLORIDE 0.4 MG/HR: 1 INJECTION PARENTERAL at 06:21

## 2023-01-17 ASSESSMENT — ACTIVITIES OF DAILY LIVING (ADL)
WEAR_GLASSES_OR_BLIND: NO
CHANGE_IN_FUNCTIONAL_STATUS_SINCE_ONSET_OF_CURRENT_ILLNESS/INJURY: NO
DIFFICULTY_EATING/SWALLOWING: NO
ADLS_ACUITY_SCORE: 33
DRESSING/BATHING_DIFFICULTY: NO
ADLS_ACUITY_SCORE: 33
ADLS_ACUITY_SCORE: 31
WALKING_OR_CLIMBING_STAIRS_DIFFICULTY: NO
ADLS_ACUITY_SCORE: 19
ADLS_ACUITY_SCORE: 31
DOING_ERRANDS_INDEPENDENTLY_DIFFICULTY: NO
ADLS_ACUITY_SCORE: 33
TOILETING_ISSUES: NO
ADLS_ACUITY_SCORE: 33
CONCENTRATING,_REMEMBERING_OR_MAKING_DECISIONS_DIFFICULTY: YES
FALL_HISTORY_WITHIN_LAST_SIX_MONTHS: NO

## 2023-01-17 ASSESSMENT — ENCOUNTER SYMPTOMS: HEADACHES: 1

## 2023-01-17 NOTE — ED NOTES
SOC notified of need for higher level of care.    ...............................................  ED Charge Nurse

## 2023-01-17 NOTE — ED NOTES
Patient EtCO2 level 48 and patient was unresponsive to sternal rub. Patient then given 2 mg IV push narcan. Patient became immediately responsive to sternal rub. Woke up and started talking to staff. Very restless and agitated after arousal.

## 2023-01-17 NOTE — DISCHARGE SUMMARY
"Discharge Summary  Hospitalist    Date of Admission:  1/16/2023  Date of Discharge:  1/17/2023  Discharging Provider: Radha Munson MD, MD  Date of Service (when I saw the patient): 01/17/23    Discharge Diagnoses   Acute toxic metabolic encephalopathy, resolved  Community-acquired pneumonia    History of Present Illness   Please refer H & P for details.      Hospital Course   Ms. Mccullough is a 62-year-old female with a past medical history significant for chronic pain, on opioids, hypertension, dyslipidemia, lupus, hypothyroidism, who presents to the emergency department from urgent care with obtundation likely due to narcotic overdose.  1.  Acute toxic metabolic encephalopathy: Patient was admitted to ICU on Narcan with concern for narcotic overdose.  She is on high doses of MS Contin at baseline managed by the pain clinic.  She notes that she has been on MS Contin for over 30 years and has been on current dose without change for several years.  She notes that she has been sleep deprived over the last few days as she has been coughing most of the night and feeling ill.  Apparently she went to urgency room for evaluation of the cough and was \"dozing off\" which got  the medical staff at the clinic concerned.   who was at her side noted that he was not concerned with her sleepiness as sometimes she is like that and she  was just slumped over taking a nap.  Both  and patient expressed a lot of frustration with landing up in the hospital and felt that it was unnecessary panic created by the medical staff and the patient was otherwise okay.  Patient of note was taken off the Narcan drip earlier this morning and she is wide-awake, alert and conversant.  Feels at her baseline other than persistent cough.  No fevers.  Denies any chest pain or shortness of breath.  She adamantly denies taking any extra pain meds.  Also reiterates that she does not have any pain seeking behavior and she is very responsible " with her pain medications.  She and her  request discharge ASAP.  We did discuss concern for community-acquired pneumonia and she will complete a course of oral cefuroxime and azithromycin.  She is also prescribed as needed cough medication.  No changes made to her chronic pain regimen or other chronic PTA meds which will all continue as previously prescribed.  She will follow-up with pain clinic and PCP.  Patient is discharged home in stable condition.  She was maintaining normal O2 sats on room air at time of discharge.  2.  Chronic pain:  Patient is noted to have fibromyalgia, endometriosis, complex regional pain syndrome: Pain regimen at discharge.  3.  DVT/PE:  Appears to be on Xarelto.  Resumed at discharge.  4.  Hypothyroidism due to Hashimoto's thyroiditis:  Continue with levothyroxine.  5.  Gastroesophageal reflux disease:  Continue with proton pump inhibitor.  6.  Hypertension:  Resume diltiazem and metoprolol   7.  Lupus and Cushing by history:  Does not appear to be on any specific medication for this.  8.  Insomnia:   PTA meds resumed at discharge  9.  Left basilar pneumonia: Management as described above.  10.  Deep venous thrombosis prophylaxis:  PTA Xarelto.      Radha Munson MD, MD      Pending Results   These results will be followed up by Hospitalist team.  Unresulted Labs Ordered in the Past 30 Days of this Admission     No orders found for last 31 day(s).          Code Status   Full Code       Primary Care Physician   Arcadio Farfan    Follow-ups Needed After Discharge   Follow-up Appointments     Follow-up and recommended labs and tests       Follow up with primary care provider, Arcadio Farfan, within 7 days for   hospital follow- up.  No follow up labs or test are needed.  Follow up with Pain clinic as previously scheduled.             Physical Exam   Temp: 98  F (36.7  C) Temp src: Oral BP: 105/62 Pulse: 66   Resp: 24 SpO2: 93 % O2 Device: Oxymask Oxygen Delivery: 2 LPM  Vitals:     01/17/23 0000   Weight: 142.1 kg (313 lb 4.4 oz)     Vital Signs with Ranges  Temp:  [97.3  F (36.3  C)-98  F (36.7  C)] 98  F (36.7  C)  Pulse:  [55-76] 66  Resp:  [11-25] 24  BP: ()/() 105/62  SpO2:  [91 %-100 %] 93 %  I/O last 3 completed shifts:  In: 1148.83 [I.V.:1148.83]  Out: 700 [Urine:700]    Constitutional: Alert, cooperative, no apparent distress, morbidly obese lady laying in bed  Respiratory: Non labored breathing, clear to auscultation bilaterally  Cardiovascular: Regular rate and rhythm, no murmurs, no edema  GI: Normal bowel sounds, soft, non-distended, non-tender  Neuro: Alert, engages in appropriate conversation, fluent speech, moving all extremities, no facial asymmetry  Psych: Calm and pleasant, no obvious anxiety/ depression      Discharge Disposition   Discharged to home  Condition at discharge: Stable    Consultations This Hospital Stay   WOUND OSTOMY CONTINENCE NURSE  IP CONSULT    Time Spent on this Encounter   IRadha MD, personally saw the patient today and spent greater than 30 minutes discharging this patient.    Discharge Orders      Reason for your hospital stay    Pneumonia, altered mental status     Follow-up and recommended labs and tests     Follow up with primary care provider, Arcadio Farfan, within 7 days for hospital follow- up.  No follow up labs or test are needed.  Follow up with Pain clinic as previously scheduled.     Activity    Your activity upon discharge: activity as tolerated     When to contact your care team    Call your primary doctor if you have any of the following: worsening fever, chest pain, short of breath, cough, confusion, lethargy     Diet    Follow this diet upon discharge: Orders Placed This Encounter      Regular Diet Adult     Discharge Medications   Discharge Medication List as of 1/17/2023 12:58 PM      START taking these medications    Details   azithromycin (ZITHROMAX) 250 MG tablet Take 1 tablet (250 mg) by mouth daily,  Disp-4 tablet, R-0, E-Prescribe      cefuroxime (CEFTIN) 500 MG tablet Take 1 tablet (500 mg) by mouth 2 times daily, Disp-10 tablet, R-0, E-Prescribe      guaiFENesin-dextromethorphan (ROBITUSSIN DM) 100-10 MG/5ML syrup Take 10 mLs by mouth 4 times daily as needed for cough, Disp-236 mL, R-0, E-Prescribe         CONTINUE these medications which have CHANGED    Details   benzonatate (TESSALON) 200 MG capsule Take 1 capsule (200 mg) by mouth 3 times daily as needed for cough, Disp-20 capsule, R-0, E-Prescribe         CONTINUE these medications which have NOT CHANGED    Details   albuterol (PROAIR HFA/PROVENTIL HFA/VENTOLIN HFA) 108 (90 Base) MCG/ACT inhaler Inhale 1-2 puffs into the lungs every 6 hours as needed for shortness of breath, wheezing or cough, Historical      atorvastatin (LIPITOR) 40 MG tablet Take 1 tablet (40 mg) by mouth every evening, Disp-30 tablet, R-3, E-Prescribe      cyanocobalamin 1000 MCG/ML injection Inject 1 mL (1,000 mcg) Subcutaneous every 7 days, Disp-4 mL, R-5, E-Prescribe      cyclobenzaprine (FLEXERIL) 10 MG tablet Take 1 tablet (10 mg) by mouth 3 times daily, Disp-90 tablet, R-3, E-Prescribe      diltiazem ER COATED BEADS (CARDIZEM CD/CARTIA XT) 180 MG 24 hr capsule Take 180 mg by mouth 2 times daily, Historical      !! furosemide (LASIX) 20 MG tablet Take 1 tablet (20 mg) by mouth every evening, Disp-60 tablet, R-3, No Print Out      !! gabapentin (NEURONTIN) 800 MG tablet Take 800 mg by mouth At Bedtime , Historical      !! gabapentin (NEURONTIN) 800 MG tablet Take 800 mg by mouth 2 times daily (morning and afternoon) in addition to 300mg capsule (total dose 1100mg), Historical      HYDROmorphone (DILAUDID) 8 MG tablet Take 8 mg by mouth 3 times daily . Max of 3 doses per day., Historical      levothyroxine (TIROSINT) 100 MCG capsule Take 300 mcg by mouth daily, Historical      lifitegrast (XIIDRA) 5 % opthalmic solution Place 1 drop into both eyes 2 times daily, Historical       liothyronine (CYTOMEL) 5 MCG tablet Take 5 mcg by mouth daily, Historical      metoprolol succinate ER (TOPROL XL) 25 MG 24 hr tablet Take 3 tablets (75 mg) by mouth 2 times daily, Disp-540 tablet, R-2, E-Prescribe      !! morphine (MS CONTIN) 30 MG 12 hr tablet Take 30 mg by mouth 2 times daily (morning and night) in addition to 60 mg tablet for a total dose of 90mg., Disp-270 tablet, R-0, Historical      !! morphine (MS CONTIN) 60 MG 12 hr tablet Take 60 mg by mouth 3 times daily (morning and night) in addition to 30mg tablet for a total dose of 90mg, Disp-30 tablet, R-0, Historical      naloxone (NARCAN) 4 MG/0.1ML nasal spray Spray 4 mg into one nostril alternating nostrils as needed for opioid reversal every 2-3 minutes until assistance arrives, Historical      prednisoLONE acetate (PRED FORTE) 1 % ophthalmic susp Place 1 drop into both eyes 2 times daily , Historical      rivaroxaban ANTICOAGULANT (XARELTO) 20 MG TABS tablet Take 1 tablet (20 mg) by mouth daily (with dinner), Historical      sodium fluoride (SF 5000 PLUS) 1.1 % CREA Historical      zolpidem (AMBIEN) 5 MG tablet Take 5 mg by mouth nightly as needed for sleep, Historical      carboxymethylcellulose (REFRESH PLUS) 0.5 % SOLN ophthalmic solution Place 1 drop into both eyes 2 times daily as needed , Disp-1 Bottle, Historical      cycloSPORINE (RESTASIS) 0.05 % ophthalmic emulsion Place 1 drop into both eyes 2 times daily as needed , Historical      Elastic Bandages & Supports (MEDICAL COMPRESSION SOCKS) MISC 1 Package daily Please measure and distribute 2 pair of 20mmHg - 30mmHg THIGH high open or closed toe compression stockings with extra refills as indicated. Jobst ultrasheer or equivalent., Disp-2 each, R-3, Local Print      fexofenadine (ALLEGRA) 180 MG tablet Take 180 mg by mouth daily as needed , Historical      !! furosemide (LASIX) 40 MG tablet Take 1 tablet (40 mg) by mouth every morning Future refills by PCP Dr. Arcadio Farfan with  phone number [phone]., Disp-60 tablet, R-3, No Print Out      gabapentin (NEURONTIN) 300 MG capsule Take 300 mg by mouth 2 times daily (morning and afternoon) with 800mg tablet (total dose 1100mg), Historical       !! - Potential duplicate medications found. Please discuss with provider.      STOP taking these medications       dextromethorphan-guaiFENesin (MUCINEX DM)  MG 12 hr tablet Comments:   Reason for Stopping:         guaiFENesin (ROBITUSSIN) 20 mg/mL liquid Comments:   Reason for Stopping:         predniSONE (DELTASONE) 10 MG tablet Comments:   Reason for Stopping:             Allergies   Allergies   Allergen Reactions     Blood Transfusion Related (Informational Only) Other (See Comments)     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      Chlorhexidine Hives     Thor surgical cleanser     Codeine Hives     Has tolerated Oxycodone in past      Ibuprofen [Nsaids] Hives     Penicillins Hives     Other reaction(s): Unknown     Sulfa Drugs Hives     Other reaction(s): Unknown     Calcium Channel Blockers      Doxycycline GI Disturbance     Emesis & diarrhea  Patient denies allergy to this med     Hydroxyzine      rash     Mold      Data   Most Recent 3 CBC's:  Recent Labs   Lab Test 01/17/23  0432 01/16/23 2117 11/20/22  0728   WBC 10.8 13.1* 15.2*   HGB 10.2* 10.6* 10.3*   MCV 92 92 88    255 408      Most Recent 3 BMP's:  Recent Labs   Lab Test 01/17/23  0432 01/16/23  2117 11/19/22  1424    139 136   POTASSIUM 3.5 3.2* 4.7   CHLORIDE 107 101 103   CO2 25 28 24   BUN 24 31* 32*   CR 0.78 0.89 0.97   ANIONGAP 5 10 9   KYLIE 8.3* 9.2 9.1   * 116* 159*     Most Recent 2 LFT's:  Recent Labs   Lab Test 11/15/22  1827 08/09/22  0548   AST 15 16   ALT 16 16   ALKPHOS 90 97   BILITOTAL 0.4 0.2     Most Recent INR's and Anticoagulation Dosing History:  Anticoagulation Dose History     Recent Dosing and Labs Latest Ref Rng & Units 4/21/2011 4/22/2011 4/23/2011 5/6/2014  1/22/2015 1/22/2020 10/30/2021    Warfarin 2.5 mg - - - 2.5 mg - - - -    Warfarin 7.5 mg - 7.5 mg 7.5 mg - - - - -    INR 0.85 - 1.15 1.25(H) 1.43(H) 2.40(H) 1.05 1.08 1.63(H) 1.76(H)        Most Recent 3 Troponin's:  Recent Labs   Lab Test 06/06/21  1459 09/21/18  1849   TROPI <0.015 <0.015     Most Recent Cholesterol Panel:  Recent Labs   Lab Test 06/06/21  1459   CHOL 303*   *   HDL 70   TRIG 259*     Most Recent 6 Bacteria Isolates From Any Culture (See EPIC Reports for Culture Details):  Recent Labs   Lab Test 11/21/15  2110 03/21/15  1904   CULT No Beta Streptococcus isolated No Beta Streptococcus isolated     Most Recent TSH, T4 and A1c Labs:  Recent Labs   Lab Test 08/08/22 2006 06/07/21  0003   TSH 25.04*  --    T4 0.71*  --    A1C  --  5.7*       Results for orders placed or performed during the hospital encounter of 01/16/23   XR Chest 1 View    Narrative    EXAM: XR CHEST 1 VIEW  LOCATION: [facility]  DATE/TIME: 1/16/2023 10:02 PM    INDICATION: Hypoxia, cough for 3 days, suspected narcotic overdose  COMPARISON: 8/8/2022.    FINDINGS: The heart size is normal. There is infiltrate in the left lower lung. Mild right basilar probable atelectasis. No pneumothorax. Degenerative disease in the spine.      Impression    IMPRESSION: Left basilar pneumonia.     *Note: Due to a large number of results and/or encounters for the requested time period, some results have not been displayed. A complete set of results can be found in Results Review.

## 2023-01-17 NOTE — H&P
Admitted: 01/16/2023    PRIMARY CARE PHYSICIAN:  Dr. Arcadio Farfan MD    CODE STATUS:  FULL CODE as the patient is currently a bit somnolent.    CHIEF COMPLAINT:  Overdose.    HISTORY OF PRESENT ILLNESS:  Ms. Mccullough is a 62-year-old female with a past medical history significant for chronic pain, on opioids, hypothyroidism, hypertension, lupus, Cushing disease, who presents to the emergency department with the above concerns.  History is obtained through discussion with the ED physician as well as patient to the degree able.  Per report, the patient was at urgent care as she had been coughing for about 3 days and wanted to get this checked out.  She presented responsive and apparently fine.  While she was there, she took what we believe maybe her evening dose of OxyContin as there was something blue in her mouth.  When they came back in at urgent care, she was noted to be obtunded, nonresponsive.  They were about to intubate her when they gave her Narcan and she all of a sudden was awake and alert.  She got a total of 2.5 mg of Narcan at urgent care, another 2.5 mg of Narcan via EMS and upon arrival to the ED got another 2 mg over time.  She was then started on a Narcan drip at 0.4 mg per hour.  During this time, she continued to have periods of time where she would drift off and become a bit more obtunded, so was given another mg and completely sat up in bed, was very anxious and over the next 15-20 minutes, started becoming a bit more sleepy.  Narcan drip was turned up to 0.7 mg.    During the course of her ED stay, the patient was able to wake up a few times and able to answer some questions.  She stated that she did not take any substances illicitly nor did she take any additional medications that she can recall.  She did not have any intent to harm herself.  At this point, not entirely clear exactly how much of the MS Contin she took, whether it was her typical dose or more than her typical dose.  She has  had a few episodes of coughing, which sounded fairly dry in the emergency department.  She is being admitted to the ICU on a Narcan drip.  She was also given ceftriaxone and azithromycin given a chest x-ray showing a likely left lower lobe infiltrate.    PAST MEDICAL HISTORY:    1.  Chronic pain, on chronic MS Contin.  2.  Fibromyalgia.  3.  Complex regional pain syndrome.  4.  Hypertension.  5.  Hypothyroidism due to Hashimoto's thyroiditis.  6.  Dyslipidemia.  7.  Lupus.  8.  Cushing's.  9.  Endometriosis.  10.  Insomnia.  11.  Degenerative disk disease.  12.  Allergic rhinitis.  13.  Pernicious anemia.  14.  Histrionic personality disorder.  15.  Osteoarthritis.  16.  DVT/PE.  17.  Stroke.    MEDICATIONS:    Prior to Admission medications    Medication Sig Last Dose Taking? Auth Provider Long Term End Date   albuterol (PROAIR HFA/PROVENTIL HFA/VENTOLIN HFA) 108 (90 Base) MCG/ACT inhaler Inhale 1-2 puffs into the lungs every 6 hours as needed for shortness of breath, wheezing or cough   Yes Unknown, Entered By History No     atorvastatin (LIPITOR) 40 MG tablet Take 1 tablet (40 mg) by mouth every evening 1/16/2023 Yes Pearl Tarango, NII Yes     azithromycin (ZITHROMAX) 250 MG tablet Take 1 tablet (250 mg) by mouth daily   Yes Radha Munson MD       benzonatate (TESSALON) 200 MG capsule Take 1 capsule (200 mg) by mouth 3 times daily as needed for cough   Yes Radha Munson MD       cefuroxime (CEFTIN) 500 MG tablet Take 1 tablet (500 mg) by mouth 2 times daily   Yes Radha Munson MD       cyanocobalamin 1000 MCG/ML injection Inject 1 mL (1,000 mcg) Subcutaneous every 7 days Past Week Yes Yeimy Cabrera MD       cyclobenzaprine (FLEXERIL) 10 MG tablet Take 1 tablet (10 mg) by mouth 3 times daily 1/16/2023 Yes Yeimy Cabrera MD       diltiazem ER COATED BEADS (CARDIZEM CD/CARTIA XT) 180 MG 24 hr capsule Take 180 mg by mouth 2 times daily Past Week at unclear  if still taking Yes Unknown, Entered By History No     furosemide (LASIX) 20 MG tablet Take 1 tablet (20 mg) by mouth every evening  Patient taking differently: Take 20 mg by mouth daily 1/16/2023 Yes Hailey Reyes Yes     gabapentin (NEURONTIN) 800 MG tablet Take 800 mg by mouth At Bedtime  1/16/2023 Yes Unknown, Entered By History No     gabapentin (NEURONTIN) 800 MG tablet Take 800 mg by mouth 2 times daily (morning and afternoon) in addition to 300mg capsule (total dose 1100mg) 1/16/2023 Yes Unknown, Entered By History No     guaiFENesin-dextromethorphan (ROBITUSSIN DM) 100-10 MG/5ML syrup Take 10 mLs by mouth 4 times daily as needed for cough   Yes Radha Munson MD No     HYDROmorphone (DILAUDID) 8 MG tablet Take 8 mg by mouth 3 times daily . Max of 3 doses per day. 1/16/2023 Yes         levothyroxine (TIROSINT) 100 MCG capsule Take 300 mcg by mouth daily 1/16/2023 Yes Unknown, Entered By History No     lifitegrast (XIIDRA) 5 % opthalmic solution Place 1 drop into both eyes 2 times daily Past Week Yes         liothyronine (CYTOMEL) 5 MCG tablet Take 5 mcg by mouth daily hasn't started Yes Unknown, Entered By History No     metoprolol succinate ER (TOPROL XL) 25 MG 24 hr tablet Take 3 tablets (75 mg) by mouth 2 times daily 1/16/2023 Yes Sil Muñoz MD Yes     morphine (MS CONTIN) 30 MG 12 hr tablet Take 30 mg by mouth 2 times daily (morning and night) in addition to 60 mg tablet for a total dose of 90mg. 1/16/2023 Yes         morphine (MS CONTIN) 60 MG 12 hr tablet Take 60 mg by mouth 3 times daily (morning and night) in addition to 30mg tablet for a total dose of 90mg 1/16/2023 Yes Umm Ya APRN CNP       naloxone (NARCAN) 4 MG/0.1ML nasal spray Spray 4 mg into one nostril alternating nostrils as needed for opioid reversal every 2-3 minutes until assistance arrives prn Yes Unknown, Entered By History       prednisoLONE acetate (PRED FORTE) 1 % ophthalmic susp Place 1  drop into both eyes 2 times daily  1/16/2023 Yes Reported, Patient No     rivaroxaban ANTICOAGULANT (XARELTO) 20 MG TABS tablet Take 1 tablet (20 mg) by mouth daily (with dinner) 1/16/2023 Yes Alen Kebede MD Yes     sodium fluoride (SF 5000 PLUS) 1.1 % CREA     Yes Unknown, Entered By History       zolpidem (AMBIEN) 5 MG tablet Take 5 mg by mouth nightly as needed for sleep Past Week Yes Unknown, Entered By History No     carboxymethylcellulose (REFRESH PLUS) 0.5 % SOLN ophthalmic solution Place 1 drop into both eyes 2 times daily as needed  prn           cycloSPORINE (RESTASIS) 0.05 % ophthalmic emulsion Place 1 drop into both eyes 2 times daily as needed  PRN   Reported, Patient       Elastic Bandages & Supports (MEDICAL COMPRESSION SOCKS) MISC 1 Package daily Please measure and distribute 2 pair of 20mmHg - 30mmHg THIGH high open or closed toe compression stockings with extra refills as indicated. Jobst ultrasheer or equivalent.     Taye Salcedo MD       fexofenadine (ALLEGRA) 180 MG tablet Take 180 mg by mouth daily as needed  HELD 2/2 bloody secretions on intubation.  Xarelto PTA (then lovenox while admitted)   Reported, Patient       furosemide (LASIX) 40 MG tablet Take 1 tablet (40 mg) by mouth every morning Future refills by PCP Dr. Arcadio Farfan with phone number [phone]. states not taking   Hailey Reyes Yes     gabapentin (NEURONTIN) 300 MG capsule Take 300 mg by mouth 2 times daily (morning and afternoon) with 800mg tablet (total dose 1100mg)     Unknown, Entered By History No            ALLERGIES:       Allergies   Allergen Reactions     Blood Transfusion Related (Informational Only) Other (See Comments)     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      Chlorhexidine Hives     Thor surgical cleanser     Codeine Hives     Has tolerated Oxycodone in past      Ibuprofen [Nsaids] Hives     Penicillins Hives     Other reaction(s): Unknown     Sulfa Drugs Hives      Other reaction(s): Unknown     Calcium Channel Blockers      Doxycycline GI Disturbance     Emesis & diarrhea  Patient denies allergy to this med     Hydroxyzine      rash     Mold          REVIEW OF SYSTEMS:  Unable to obtain currently given the patient's altered mental status and falling asleep.    PHYSICAL EXAMINATION:    VITAL SIGNS:  Show blood pressure of 137/78, heart rate 68, respirations 20, satting 100% on nasal cannula oxygen, temperature 97.3 degrees Fahrenheit.  GENERAL:  The patient is lying in bed and opens eyes to voice, but then quickly closes them.  HEENT:  Pupils equal, round, reactive to light.  Extraocular muscle function intact.  No scleral icterus.  Oropharynx shows a little bit of a blue substance in the corner of her left mouth and dry mucous membranes.  CARDIOVASCULAR:  Heart is regular rate and rhythm without any murmur, rub or gallop.  PULMONARY:  Lungs are clear to auscultation bilaterally without wheeze or rhonchi.  GASTROINTESTINAL:  Positive bowel sounds.  Soft with some mild tenderness in her lower abdomen along her healed incision site.  LYMPHATICS:  No peripheral edema.  SKIN:  Her right foot and distal third of her leg has some area of erythema, but is not significantly warm to touch.  The patient stated to the ED physician, this was chronic.  PSYCHIATRIC:  The patient is currently altered, but is able to state that she is at [facility].  NEUROLOGIC:  Unable to obtain a full neuro, but the patient does squeeze my fingers and is able to move all 4 extremities on command.    LABORATORY DATA:  WBC count 13.1, hemoglobin 10.6, platelets of 255.  BMP unremarkable save for potassium of 3.2.  Chest x-ray shows a left basilar pneumonia.    IMPRESSION AND PLAN:  Ms. Mccullough is a 62-year-old female with a past medical history significant for chronic pain, on opioids, hypertension, dyslipidemia, lupus, hypothyroidism, who presents to the emergency department from urgent care with  obtundation likely due to narcotic overdose.  1.  Acute toxic metabolic encephalopathy:  Suspect this is due to narcotic overdose given that she is on high doses of MS Contin at baseline.  Unclear at this point if she accidentally took extra, but she was able to tell this was not intentional or with suicidal ideation.  She is currently on a Narcan drip, which I will continue for now, but will need to monitor closely so to hopefully not precipitate a pain crisis and opioid withdrawal given her chronic use.  We will try to obtain some better history when she is more alert and interactive regularly.  Will be going to the ICU, has on Narcan drip.  She is currently protecting her airway and opens eyes to voice.  2.  Chronic pain:  Patient is noted to have fibromyalgia, endometriosis, complex regional pain syndrome:  Holding PTA narcotics for now as there is evidence of overdose and she has a Narcan drip.  We will need to reassess chronic dosing in the morning.  3.  DVT/PE:  Appears to be on Xarelto.  4.  Hypothyroidism due to Hashimoto's thyroiditis:  Continue with levothyroxine.  5.  Gastroesophageal reflux disease:  Continue with proton pump inhibitor.  6.  Hypertension:  Resume diltiazem and metoprolol when able to take p.o.  7.  Lupus and Cushing by history:  Does not appear to be on any specific medication for this, but we will await med rec.  8.  Insomnia:  Holding Ambien.  9.  Left basilar pneumonia:  Given her symptoms over the past 3 days, we will treat this as community-acquired with ceftriaxone and azithromycin.  I do not suspect aspiration pneumonia at this point.  10.  Deep venous thrombosis prophylaxis:  PTA Xarelto.  11.  Disposition:  Anticipate 2 nights in the hospital for monitoring for overdose.    Alireza Forte DO        D: 2023   T: 2023   MT: MARICRUZ    Name:     ROXY ALVARES  MRN:      [id number]        Account:     [id number]   :      1960           Admitted:     01/16/2023       Document: [id number]

## 2023-01-17 NOTE — PROGRESS NOTES
Pt and  present for discharge teaching. Reviewed AVS and new medications with pt and  who verbalized understanding without additional questions and were anxious to leave. Pt verbalized that she will follow up with her regular primary team that she has pre-existing appts scheduled with. Pt was able to dress self and ambulate to wheelchair Escorted to door 6 in wheelchair, pt ambulated to car with .

## 2023-01-17 NOTE — ED TRIAGE NOTES
Pt presents from EMS. Per report, patient was at Urgency room and being worked up for respiratory distress since 1700. Preparing to intubate patient at urgency room when they gave 2.5 of narcan and she returned to baseline per report. Patient was given another dose of 2.5 in route by EMS.      Triage Assessment     Row Name 01/16/23 2057       Triage Assessment (Adult)    Airway WDL WDL       Respiratory WDL    Respiratory WDL X;rhythm/pattern    Rhythm/Pattern, Respiratory shallow;shortness of breath       Skin Circulation/Temperature WDL    Skin Circulation/Temperature WDL WDL       Cardiac WDL    Cardiac WDL WDL       Peripheral/Neurovascular WDL    Peripheral Neurovascular WDL WDL       Cognitive/Neuro/Behavioral WDL    Cognitive/Neuro/Behavioral WDL X    Level of Consciousness lethargic

## 2023-01-17 NOTE — H&P
[facility]    History and Physical - Hospitalist Service       Date of Admission:  1/16/2023  Dictation #: [id number]  Brief Summary (see dictation for more details): 62F hx chronic pain on high dose opiates presents to urgent care with 3 days of cough and become obtunded after taking a blue pill/substance which is thought to likely be her chronic MS Contin.  Responded to narcan but has required repeated dosing (2.5mg at urgent care, 2.5 mg with EMS and another 3 mg in ED) so started on narcan drip.  Also diagnosed with Left basilar pneumonia so started on ceftriaxone and azithromycin as likely community acquired rather than aspiration given cough was the presenting complaint to urgent care.     Clinically Significant Risk Factors Present on Admission        # Hypokalemia: Lowest K = 3.2 mmol/L in last 2 days, will replace as needed        # Drug Induced Coagulation Defect: home medication list includes an anticoagulant medication                 Medical Decision Making             Alireza Forte, DO  Hospitalist Service  [facility]  Securely message with [employer] (more info)  Text page via [employer] Paging/Directory

## 2023-01-17 NOTE — PHARMACY-ADMISSION MEDICATION HISTORY
"Pharmacy Medication History  Admission medication history interview status for the 1/16/2023  admission is complete. See EPIC admission navigator for prior to admission medications     Location of Interview: Patient room  Medication history sources: Patient    Significant changes made to the medication list:  Was difficult to obtain due to her not having her list with her but she states \"nothing has changed\" since she was in here in November, except \"T3\" was added last week at her doctor for which she hasn't started (liothyronine.)      Also added albuterol MDI, likely for which she was prescribed during her last hospitalization for RSV    Removed vitamins, prednisone taper (\"took myself off\"), pantoprazole (was for prednisone taper), cough medicines    States her lasix is 20mg daily, not 40mg am/20mgPM    In the past week, patient estimated taking medication this percent of the time: greater than 90%    Additional medication history information:   I am not convinced she is on diltiazem. It was prescribed in 2021 at [facility] and I do not see fills for it at her pharmacy.  She does not seem to know one way or the other right now.  Same with Xiidra eye drops      Medication reconciliation completed by provider prior to medication history? No    Time spent in this activity: 15 mins    Prior to Admission medications    Medication Sig Last Dose Taking? Auth Provider Long Term End Date   albuterol (PROAIR HFA/PROVENTIL HFA/VENTOLIN HFA) 108 (90 Base) MCG/ACT inhaler Inhale 1-2 puffs into the lungs every 6 hours as needed for shortness of breath, wheezing or cough  Yes Unknown, Entered By History No    atorvastatin (LIPITOR) 40 MG tablet Take 1 tablet (40 mg) by mouth every evening 1/16/2023 Yes Pearl Tarango NP Yes    azithromycin (ZITHROMAX) 250 MG tablet Take 1 tablet (250 mg) by mouth daily  Yes Radha Munson MD     benzonatate (TESSALON) 200 MG capsule Take 1 capsule (200 mg) by mouth 3 times daily " as needed for cough  Yes Radha Munson MD     cefuroxime (CEFTIN) 500 MG tablet Take 1 tablet (500 mg) by mouth 2 times daily  Yes Radha Munson MD     cyanocobalamin 1000 MCG/ML injection Inject 1 mL (1,000 mcg) Subcutaneous every 7 days Past Week Yes Yeimy Cabrera MD     cyclobenzaprine (FLEXERIL) 10 MG tablet Take 1 tablet (10 mg) by mouth 3 times daily 1/16/2023 Yes Yeimy Cabrera MD     diltiazem ER COATED BEADS (CARDIZEM CD/CARTIA XT) 180 MG 24 hr capsule Take 180 mg by mouth 2 times daily Past Week at unclear if still taking Yes Unknown, Entered By History No    furosemide (LASIX) 20 MG tablet Take 1 tablet (20 mg) by mouth every evening  Patient taking differently: Take 20 mg by mouth daily 1/16/2023 Yes Hailey Reyes Yes    gabapentin (NEURONTIN) 800 MG tablet Take 800 mg by mouth At Bedtime  1/16/2023 Yes Unknown, Entered By History No    gabapentin (NEURONTIN) 800 MG tablet Take 800 mg by mouth 2 times daily (morning and afternoon) in addition to 300mg capsule (total dose 1100mg) 1/16/2023 Yes Unknown, Entered By History No    guaiFENesin-dextromethorphan (ROBITUSSIN DM) 100-10 MG/5ML syrup Take 10 mLs by mouth 4 times daily as needed for cough  Yes Radha Munson MD No    HYDROmorphone (DILAUDID) 8 MG tablet Take 8 mg by mouth 3 times daily . Max of 3 doses per day. 1/16/2023 Yes      levothyroxine (TIROSINT) 100 MCG capsule Take 300 mcg by mouth daily 1/16/2023 Yes Unknown, Entered By History No    lifitegrast (XIIDRA) 5 % opthalmic solution Place 1 drop into both eyes 2 times daily Past Week Yes      liothyronine (CYTOMEL) 5 MCG tablet Take 5 mcg by mouth daily hasn't started Yes Unknown, Entered By History No    metoprolol succinate ER (TOPROL XL) 25 MG 24 hr tablet Take 3 tablets (75 mg) by mouth 2 times daily 1/16/2023 Yes Sil Muñoz MD Yes    morphine (MS CONTIN) 30 MG 12 hr tablet Take 30 mg by mouth 2 times daily  (morning and night) in addition to 60 mg tablet for a total dose of 90mg. 1/16/2023 Yes      morphine (MS CONTIN) 60 MG 12 hr tablet Take 60 mg by mouth 3 times daily (morning and night) in addition to 30mg tablet for a total dose of 90mg 1/16/2023 Yes Umm Ya APRN CNP     naloxone (NARCAN) 4 MG/0.1ML nasal spray Spray 4 mg into one nostril alternating nostrils as needed for opioid reversal every 2-3 minutes until assistance arrives prn Yes Unknown, Entered By History     prednisoLONE acetate (PRED FORTE) 1 % ophthalmic susp Place 1 drop into both eyes 2 times daily  1/16/2023 Yes Reported, Patient No    rivaroxaban ANTICOAGULANT (XARELTO) 20 MG TABS tablet Take 1 tablet (20 mg) by mouth daily (with dinner) 1/16/2023 Yes Alen Kebede MD Yes    sodium fluoride (SF 5000 PLUS) 1.1 % CREA   Yes Unknown, Entered By History     zolpidem (AMBIEN) 5 MG tablet Take 5 mg by mouth nightly as needed for sleep Past Week Yes Unknown, Entered By History No    carboxymethylcellulose (REFRESH PLUS) 0.5 % SOLN ophthalmic solution Place 1 drop into both eyes 2 times daily as needed  prn       cycloSPORINE (RESTASIS) 0.05 % ophthalmic emulsion Place 1 drop into both eyes 2 times daily as needed  PRN  Reported, Patient     Elastic Bandages & Supports (MEDICAL COMPRESSION SOCKS) MISC 1 Package daily Please measure and distribute 2 pair of 20mmHg - 30mmHg THIGH high open or closed toe compression stockings with extra refills as indicated. Jobst ultrasheer or equivalent.   Taye Salcedo MD     fexofenadine (ALLEGRA) 180 MG tablet Take 180 mg by mouth daily as needed  HELD 2/2 bloody secretions on intubation.  Xarelto PTA (then lovenox while admitted)  Reported, Patient     furosemide (LASIX) 40 MG tablet Take 1 tablet (40 mg) by mouth every morning Future refills by PCP Dr. Arcadio Farfan with phone number [phone]. states not taking  Hailey Reyes Yes    gabapentin (NEURONTIN) 300 MG capsule  Take 300 mg by mouth 2 times daily (morning and afternoon) with 800mg tablet (total dose 1100mg)   Unknown, Entered By History No      Rajni Sy, Nya     The information provided in this note is only as accurate as the sources available at the time of update(s)

## 2023-01-17 NOTE — ED NOTES
Bed: ED15  Expected date:   Expected time:   Means of arrival:   Comments:  M [facility] 62f urgent care, unresponsive, Narcan given, now responsive

## 2023-01-17 NOTE — PROVIDER NOTIFICATION
at bedside, both pt and  expressing frustration and confusion about hospitalization and treatment. Emotional support provided and plan made with Dr Munson (updated by phone) to see pt next.   Off Narcan gtt at 0815, alert and oriented, reports not sleeping well and feeling unrested.   Will liberate to regular diet after tolerating water without difficulty.

## 2023-01-17 NOTE — CONSULTS
[facility] Nurse Inpatient Assessment     Consulted for: R buttocks    Patient History (according to provider note(s):      Ms. Mccullough is a 62-year-old female with a past medical history significant for chronic pain, on opioids, hypothyroidism, hypertension, lupus, Cushing disease, who presents to the emergency department with the above concerns.  History is obtained through discussion with the ED physician as well as patient to the degree able.  Per report, the patient was at urgent care as she had been coughing for about 3 days and wanted to get this checked out.  She presented responsive and apparently fine.  While she was there, she took what we believe maybe her evening dose of OxyContin as there was something blue in her mouth.  When they came back in at urgent care, she was noted to be obtunded, nonresponsive.  They were about to intubate her when they gave her Narcan and she all of a sudden was awake and alert.  She got a total of 2.5 mg of Narcan at urgent care, another 2.5 mg of Narcan via EMS and upon arrival to the ED got another 2 mg over time.  She was then started on a Narcan drip at 0.4 mg per hour.  During this time, she continued to have periods of time where she would drift off and become a bit more obtunded, so was given another mg and completely sat up in bed, was very anxious and over the next 15-20 minutes, started becoming a bit more sleepy.  Narcan drip was turned up to 0.7 mg.    Areas Assessed:      Areas visualized during today's visit: BL buttock and coccyx    Pressure Injury Location: Right lateral buttock    Last photo: 1/17/23        Wound type: Pressure Injury     Pressure Injury Stage: Deep Tissue Pressure Injury (DTPI) that is evolving in center, present on admission. This area was prior stage 3 and  reports it closed just a few days ago, however she was sitting on commode for hours and it reopened    Wound history/plan of care:   Patient  "reports she had a prior pressure injury to Right buttocks that was followed by [facility]. Spouse has been doing dressing changes at home. Pt started having signifcant diarrhea and reports she sat on her commode at home for hours and now has reopened wound with additional DTPI.    Wound base: 80 % red moist tissue with 20% deep purple. Linear non blanchable erythema extending outward at 12 and 6 O'clock deep purple and maroon     Palpation of the wound bed: firm      Drainage: small     Description of drainage: serosanguinous     Measurements (length x width x depth, in cm)Entire linear erythema area approx 16.5cm x 2.5cm with open at center 4  x 2.8  x  0.1 cm      Tunneling N/A     Undermining N/A  Periwound skin: Intact      Color: normal and consistent with surrounding tissue      Temperature: normal   Odor: none  Pain: moderate, aching  Pain intervention prior to dressing change: slow and gentle cares   Treatment goal: Protection and offload pressure  STATUS: initial assessment  Supplies ordered: at bedside and supplies stored on unit    My PI Risk Assessment     Sensory Perception: 3 - Slightly Limited     Moisture: 3 - Occasionally moist      Activity: 2 - Chairfast     Mobility: 2 - Very limited     Nutrition: 3 - Adequate     Friction/Shear: 2 - Potential problem      TOTAL: 15       Treatment Plan:     Right Lateral buttock wound(s): Every other day and PRN  1. Clean wound with saline or MicroKlenz Spray, pat dry  2. Wipe / \"clean\" the surrounding periwound tissue with skin prep (Cavilon No Sting Skin Prep #[id number]) and allow to dry. This will help protect periwound and help dressing adherence  3. Press a Mepilex  Sacral Dressing (PS#[id number]) and Mepilex 4x 4 to the area, making sure to conform nicely to skin curvatures.   4. Time and date dressing change  NOTE  -Reposition pt side to side cem when in bed, every 2 hours-get the pt way over on side to completely offload pressure. This will benefit skin and " "respiratory function   -Keep heels elevated and floating on pillows at all times. Try using at least 2 pillows under each calf.  -When up to the chair pt needs to fully offload every 2 hours and use a chair cushion if needed          Pressure Injury Prevention (PIP) Plan:  If patient is declining pressure injury prevention interventions: Explore reason why and address patient's concerns, Educate on pressure injury risk and prevention intervention(s), If patient is still declining, document \"informed refusal\"  and Ensure Care team is aware ( provider, charge nurse, etc)  Mattress: Follow bed algorithm, reassess daily and order specialty mattress, if indicated.  HOB: Maintain at or below 30 degrees, unless contraindicated  Repositioning in bed: Every 1-2 hours , Left/right positioning; avoid supine and Raise foot of bed prior to raising head of bed, to reduce patient sliding down (shear)  Heels: Keep elevated off mattress  Protective Dressing: Sacral protective dressing  Positioning Equipment: None  Chair positioning: Chair cushion (#[id number]) , Assist patient to reposition hourly and Do NOT use a donut for sitting (this increases pressure to smaller area and creates a higher potential for injury)    If patient has a buttock pressure injury, or high risk for PI use chair cushion or SPS.  Moisture Management: Perineal cleansing /protection: Follow Incontinence Protocol, Avoid brief in bed, Clean and dry skin folds with bathing , Consider InterDry (#[id number]) between folds and Moisturize dry skin  Under Devices: Inspect skin under all medical devices during skin inspection , Ensure tubes are stabilized without tension, Ensure patient is not lying on medical devices or equipment when repositioned and Mepilex border under coccyx  Ask provider to discontinue device when no longer needed.        Orders: Written    RECOMMEND PRIMARY TEAM ORDER: None, at this time  Education provided: importance of repositioning, plan of care, " wound progress and Off-loading pressure  Discussed plan of care with: Patient, Family and Nurse  WO nurse follow-up plan: 1-2x weekly as available  Notify WOC if wound(s) deteriorate.  Nursing to notify the Provider(s) and re-consult the WO Nurse if new skin concern.    DATA:     Current support surface: Standard  Low air loss (SANDRA pump, Isolibrium, Pulsate, skin guard, etc)  Containment of urine/stool: Incontinence Protocol  BMI: Body mass index is 46.26 kg/m .   Active diet order: Orders Placed This Encounter      Regular Diet Adult     Output: I/O last 3 completed shifts:  In: 1148.83 [I.V.:1148.83]  Out: 700 [Urine:700]     Labs: Recent Labs   Lab 01/17/23  0432   HGB 10.2*   WBC 10.8     Pressure injury risk assessment:   Sensory Perception: 3-->slightly limited  Moisture: 3-->occasionally moist  Activity: 1-->bedfast  Mobility: 3-->slightly limited  Nutrition: 2-->probably inadequate  Friction and Shear: 2-->potential problem  Steve Score: 14    Danilo Birdseye,RN CWOCN  Vocera: [facility] Nurse [Tim]  Dept. Office Number: [phone]

## 2023-01-17 NOTE — ED PROVIDER NOTES
History     Chief Complaint:  Potential drug overdose     The history is provided by a relative. The history is limited by the condition of the patient.      Aspen Mccullough is a 62 year old female on Xarelto with history of lupus, A-fib, DVT, and chronic pain who presents via EMS with a potential drug overdose. The patient was brought to the ED after being hypoxic at the urgency room today. Her family reports that the patient has had a bad cough for 3 days.   They report that the patient has not slept for the past two days and that the patient has had a bad headache. They state that the patient has taken a few Tylenol tablets. The patient reports that she has not felt sick for the past two days and that she did not try to intentionally hurt herself with her medication. The patient's family reports that the patient has been hospitalized three times in 2022 and that in November, she had influenza A and pneumonia. They add that the patient has been on MS contin for the past 25 years for chronic pain. The RN also reports that 2.5 mg of narcan was administered to the patient twice, with the last dose given ten minutes prior to ER arrival. The family denies that the patient has had any fever and adds that she had a negative COVID/influenza test at the [facility] urgency room today.     Independent Historian: Supplemented by family members    Review of External Notes: Per review of notes from emergency room earlier today patient was noted to be found obtunded in the room.  While preparing to intubate patient for airway protection she was provided Narcan with immediate effect and noted to have blue substance in her mouth and stated that she may have been taking more pain medication than prescribed.    ROS:  Review of Systems   Constitutional: Negative for fever.   Respiratory: Positive for cough.    Neurological: Positive for headaches.   Psychiatric/Behavioral: Positive for sleep disturbance. Negative for self-injury.   All  other systems reviewed and are negative.    Allergies:  Blood Transfusion Related (Informational Only)  Chlorhexidine  Codeine  Ibuprofen [Nsaids]  Penicillins  Sulfa Drugs  Calcium Channel Blockers  Doxycycline  Hydroxyzine    Medications:    Cyanocobalamin injection  Diltiazem CD  Levothyroxine  Metoprolol succinate  Cyclosporine  Xarelto  Fexofenadine  Lactobacillus acidophilus  Gabapentin  Liothyronine  Hydromorphone  MS Contin  Morphine controlled release  Zolpidem  Mometasone  Cyclobenzaprine  Ondansetron  Atorvastatin  Furosemide  Albuterol HFA    Past Medical History:    ADHD  Allergic rhinitis  Chronic low back pain  Cushing's syndrome  DDD  Deviated nasal septum  Endometriosis  Fibromyalgia  Hashimoto's disease  Hyperlipidemia  Hypertension  Hypothyroidism  Insomnia  Lupus  Malabsorption  Pernicious anemia  Renal disease  Sinusitis   Chronic pain syndrome  Arthritis, septic  Adrenal cortical steroids causing adverse effect in therapeutic use  Postoperative hypotension  Anemia, blood loss  Opioid type dependence  Myalgia and myositis  Atrial fibrillation   DVT prophylaxis  Benign neoplasm of connective and other soft tissue  Chronic lymphocytic thyroiditis  Morbid obesity  Lung disease  CKD  Anxiety  Depression  Nasal turbinate hypertrophy  Sphenoid sinusitis  Cardiomyopathy, nonischemic  Pulmonary embolism  Rheumatoid arthritis   Current chronic use of systemic steroids  Chronic sphenoidal sinusitis   Adrenal failure  Osteoporosis  Mental disorder  Craniofacial hyperhidrosis  Lumbosacral radiculopathy  Hypoxia  Chronic anticoagulation  History of ischemic stroke  Lipoma of torso  Grade III internal hemorrhoids  Hypertonic bladder  Paroxysmal atrial fibrillation  Chronic maxillary and ethmoidal sinusitis  COVID  Hemoptysis   PE  DNR (10/19/2020)    Past Surgical History:   Amputation of left foot little toe  Appendectomy  Arthrodesis ankle  Arthroplasty knee, left  Breast  surgery  Bunionectomy  Cholecystectomy  Exam under anesthesia anus  Excise mass upper extremity  Foot surgery  Lumbar fusion  Hemorrhoidectomy internal  Inject nerve block suprascapular x 6  IR endovenous ablation varicose veins x 2  Lumbar laminectomy  Laparoscopic ablation endometriosis   MN hand/finger surgery  MN spine surgery  Radio frequency ablation pulsed cervical  Remove hardware foot, left  Rhinoplasty  Tonsillectomy & Adenoidectomy   Salpingo-oophorectomy removal  ZZC stomach procedure  Ankle surgery x 2  Septoplasty  Remove ethmoid sinus  Ostectomy, complete excision  Hemorrhoidectomy  Nipple tissue extraction  Subtalar arthroereisis   Ganglion cyst removal  Pelvic laparoscopy   Left foot surgery x4  Nasal sinus endoscopy with maxillary antrostomy, ethmoidectomy   Left bunion, 2 distal interphalangeal joint arthrodesis, and hammer toe corrections     Family History:    Mother- hypertension    Social History:  Presents with female and male companions  PCP: Arcadio Farfan     Physical Exam     Patient Vitals for the past 24 hrs:   BP Temp Temp src Pulse Resp SpO2 Weight   01/17/23 0000 -- -- -- -- -- -- 142.1 kg (313 lb 4.4 oz)   01/16/23 2315 137/78 -- -- 68 20 100 % --   01/16/23 2215 105/60 -- -- 63 14 91 % --   01/16/23 2213 -- 97.3  F (36.3  C) Temporal -- -- -- --   01/16/23 2130 -- -- -- 76 25 99 % --   01/16/23 2100 115/79 -- -- 66 16 94 % --   01/16/23 2053 (!) 127/91 -- -- 67 14 99 % --      Physical Exam   General: Somnolent, snoring respirations.  Patient in mild distress.  Awakens with sternal rub. Oxygen mask in place.   Head:  Scalp is NC/AT  Eyes:  No scleral icterus, PERRL  ENT:  The external nose and ears are normal. The oropharynx is normal and without erythema; mucus membranes are moist. Uvula midline, no evidence of deep space infection.  Neck:  Normal range of motion without rigidity.  CV:  Regular rate and rhythm  Resp:  Breath sounds are clear bilaterally    Somnolent, snoring  respirations    Non-labored, no retractions or accessory muscle use  GI:  Abdomen is soft, no distension, no tenderness. No peritoneal signs  MS:  No lower extremity edema   Skin:  Warm and dry, mild erythema to right foot/ankle, no significant tenderness or warmth  Neuro: Oriented x 3. No gross motor deficits.    Emergency Department Course   ECG:  ECG results from 01/16/23   EKG 12 lead     Value    Systolic Blood Pressure     Diastolic Blood Pressure     Ventricular Rate 79    Atrial Rate     MS Interval     QRS Duration 104        QTc 435    P Axis     R AXIS -69    T Axis -86    Interpretation ECG      Sinus rhythm  Septal infarct (cited on or before 08-AUG-2022)  Inferior infarct , age undetermined  Abnormal ECG         *Note: Due to a large number of results and/or encounters for the requested time period, some results have not been displayed. A complete set of results can be found in Results Review.     Imaging:  XR Chest 1 View   Final Result   IMPRESSION: Left basilar pneumonia.         Report per radiology    Laboratory:  Labs Ordered and Resulted from Time of ED Arrival to Time of ED Departure   BASIC METABOLIC PANEL - Abnormal       Result Value    Sodium 139      Potassium 3.2 (*)     Chloride 101      Carbon Dioxide (CO2) 28      Anion Gap 10      Urea Nitrogen 31 (*)     Creatinine 0.89      Calcium 9.2      Glucose 116 (*)     GFR Estimate 72     CBC WITH PLATELETS AND DIFFERENTIAL - Abnormal    WBC Count 13.1 (*)     RBC Count 3.76 (*)     Hemoglobin 10.6 (*)     Hematocrit 34.5 (*)     MCV 92      MCH 28.2      MCHC 30.7 (*)     RDW 16.0 (*)     Platelet Count 255      % Neutrophils 74      % Lymphocytes 15      % Monocytes 7      % Eosinophils 3      % Basophils 0      % Immature Granulocytes 1      NRBCs per 100 WBC 0      Absolute Neutrophils 9.6 (*)     Absolute Lymphocytes 2.0      Absolute Monocytes 1.0      Absolute Eosinophils 0.4      Absolute Basophils 0.1      Absolute Immature  Granulocytes 0.1      Absolute NRBCs 0.0     BLOOD GAS VENOUS WITH OXYHEMOGLOBIN - Abnormal    pH Venous 7.32      pCO2 Venous 58 (*)     pO2 Venous 21 (*)     Bicarbonate Venous 30 (*)     FIO2 5      Oxyhemoglobin Venous 28 (*)     Base Excess/Deficit (+/-) 2.8 (*)    ETHYL ALCOHOL LEVEL - Normal    Alcohol ethyl <0.01        Emergency Department Course & Assessments:       Interventions:  Medications   azithromycin (ZITHROMAX) tablet 250 mg (has no administration in time range)   cefTRIAXone (ROCEPHIN) 2 g vial to attach to  ml bag for ADULTS or NS 50 ml bag for PEDS (has no administration in time range)   naloxone (NARCAN) injection 2 mg (2 mg Intravenous Given 1/16/23 2127)   cefTRIAXone (ROCEPHIN) 2 g vial to attach to  ml bag for ADULTS or NS 50 ml bag for PEDS (2 g Intravenous New Bag 1/16/23 2310)   0.9% sodium chloride BOLUS (1,000 mLs Intravenous New Bag 1/16/23 2253)   naloxone (NARCAN) injection 1 mg (1 mg Intravenous Given 1/16/23 2254)   Narcan infusion      ED Course as of 01/17/23 0020   Mon Jan 16, 2023 2104 I obtained history and examined the patient   2235 I spoke with the hospitalist Dr. Forte who agreed to admit the patient.     Disposition:  The patient was admitted to the hospital under the care of Dr. Forte.     Impression & Plan      Medical Decision Making:  Patient is a 62-year-old female who presents to the ED via EMS from the urgency room after noted to be hypoxic and obtunded with significant improvement with Narcan; patient on MS Contin for chronic pain.  Patient's medical history and records were reviewed.  On evaluation patient was somnolent in the room though breathing spontaneously and would awaken to sternal rub.  Patient's mental status declined and she was more difficult to wake up and was provided additional Narcan with significant clinical improvement.  She did note cough for the last 3 days and chest x-ray demonstrates evidence of pneumonia and patient's  white count is mildly elevated.  She was initiated on ceftriaxone and azithromycin.  Additionally provided IV fluids.  EKG demonstrates sinus rhythm without evidence of acute ischemia, infarction, or other significant arrhythmia.  Patient again required additional dose of Narcan due to decreased respiratory drive and inability to arouse; again noted to have significant clinical improvement.  Patient was placed on Narcan infusion.  She denied self-harm though per chart review had endorsed to outside facility that she may have been overusing prescribed narcotic medication and had been noted to have bluish substance in her mouth.  Patient's oxygen saturations remained stable on 5 L oxygen.  Given hypoxic respiratory failure in the setting of pneumonia and narcotic overdose, patient will be admitted to ICU with the hospitalist service for further evaluation and care.    Critical Care time:  was 40 minutes for this patient excluding procedures.    Diagnosis:    ICD-10-CM    1. Acute respiratory failure with hypoxia (H)  J96.01       2. Community acquired pneumonia of left lower lobe of lung  J18.9       3. Narcotic overdose, undetermined intent, initial encounter (H)  T40.604A            Scribe Disclosure:  IMALI, am serving as a scribe at 9:03 PM on 1/16/2023 to document services personally performed by Gautam Brunson DO based on my observations and the provider's statements to me.          Gautam Brunson DO  01/17/23 0048

## 2023-01-18 ENCOUNTER — PATIENT OUTREACH (OUTPATIENT)
Dept: CARE COORDINATION | Facility: CLINIC | Age: 63
End: 2023-01-18
Payer: COMMERCIAL

## 2023-01-18 NOTE — PROGRESS NOTES
Clinic Care Coordination Contact  [facility]: Post-Discharge Note  SITUATION                                                      Admission:    Admission Date: 01/16/23   Reason for Admission: coughing for about 3 days and wanted to get this checked out.  Discharge:   Discharge Date: 01/17/23  Discharge Diagnosis: Acute toxic metabolic encephalopathy, resolved  Community-acquired pneumonia    BACKGROUND                                                      Per hospital discharge summary and inpatient provider notes:   Ms. Mccullough is a 62-year-old female with a past medical history significant for chronic pain, on opioids, hypothyroidism, hypertension, lupus, Cushing disease, who presents to the emergency department with the above concerns.  History is obtained through discussion with the ED physician as well as patient to the degree able.  Per report, the patient was at urgent care as she had been coughing for about 3 days and wanted to get this checked out.  She presented responsive and apparently fine.  While she was there, she took what we believe maybe her evening dose of OxyContin as there was something blue in her mouth.  When they came back in at urgent care, she was noted to be obtunded, nonresponsive.  They were about to intubate her when they gave her Narcan and she all of a sudden was awake and alert.  She got a total of 2.5 mg of Narcan at urgent care, another 2.5 mg of Narcan via EMS and upon arrival to the ED got another 2 mg over time.  She was then started on a Narcan drip at 0.4 mg per hour.  During this time, she continued to have periods of time where she would drift off and become a bit more obtunded, so was given another mg and completely sat up in bed, was very anxious and over the next 15-20 minutes, started becoming a bit more sleepy.  Narcan drip was turned up to 0.7 mg.     During the course of her ED stay, the patient was able to wake up a few times and able to answer some  questions.  She stated that she did not take any substances illicitly nor did she take any additional medications that she can recall.  She did not have any intent to harm herself.  At this point, not entirely clear exactly how much of the MS Contin she took, whether it was her typical dose or more than her typical dose.  She has had a few episodes of coughing, which sounded fairly dry in the emergency department.  She is being admitted to the ICU on a Narcan drip.  She was also given ceftriaxone and azithromycin given a chest x-ray showing a likely left lower lobe infiltrate.       ASSESSMENT           Discharge Assessment  How are you doing now that you are home?: fantastic  How are your symptoms? (Red Flag symptoms escalate to triage hotline per guidelines): Improved  Do you feel your condition is stable enough to be safe at home until your provider visit?: Yes  Does the patient have their discharge instructions? : Yes  Does the patient have questions regarding their discharge instructions? : No  Were you started on any new medications or were there changes to any of your previous medications? : Yes  Does the patient have all of their medications?: Yes  Do you have questions regarding any of your medications? : No  Do you have all of your needed medical supplies or equipment (DME)?  (i.e. oxygen tank, CPAP, cane, etc.): Yes  Discharge follow-up appointment scheduled within 14 calendar days? : Yes                  PLAN                                                      Outpatient Plan:  Follow-up Appointments     Follow-up and recommended labs and tests       Follow up with primary care provider, Arcadio Farfan, within 7 days for   hospital follow- up.  No follow up labs or test are needed.  Follow up with Pain clinic as previously scheduled.     No future appointments.      For any urgent concerns, please contact our 24 hour nurse triage line: [phone] ([phone])         Yeni Kramer  MA

## 2023-01-19 DIAGNOSIS — E78.5 HYPERLIPIDEMIA LDL GOAL <100: ICD-10-CM

## 2023-01-23 DIAGNOSIS — M50.30 DDD (DEGENERATIVE DISC DISEASE), CERVICAL: Primary | ICD-10-CM

## 2023-01-23 DIAGNOSIS — E78.5 HYPERLIPIDEMIA LDL GOAL <100: ICD-10-CM

## 2023-01-23 DIAGNOSIS — M50.122 CERVICAL DISC DISORDER AT C5-C6 LEVEL WITH RADICULOPATHY: ICD-10-CM

## 2023-01-23 DIAGNOSIS — M50.20 CERVICAL DISC HERNIATION: ICD-10-CM

## 2023-01-23 DIAGNOSIS — M50.20 CERVICAL DISC HERNIATION: Primary | ICD-10-CM

## 2023-01-23 RX ORDER — ATORVASTATIN CALCIUM 40 MG/1
40 TABLET, FILM COATED ORAL EVERY EVENING
Qty: 90 TABLET | Refills: 2 | Status: SHIPPED | OUTPATIENT
Start: 2023-01-23 | End: 2024-01-01

## 2023-01-23 RX ORDER — ALBUTEROL SULFATE 90 UG/1
AEROSOL, METERED RESPIRATORY (INHALATION)
Qty: 20.1 G | Refills: 1 | OUTPATIENT
Start: 2023-01-23

## 2023-01-23 RX ORDER — ATORVASTATIN CALCIUM 40 MG/1
TABLET, FILM COATED ORAL
Qty: 30 TABLET | Refills: 3 | OUTPATIENT
Start: 2023-01-23

## 2023-02-01 ENCOUNTER — ANCILLARY PROCEDURE (OUTPATIENT)
Dept: GENERAL RADIOLOGY | Facility: CLINIC | Age: 63
End: 2023-02-01
Attending: PREVENTIVE MEDICINE
Payer: COMMERCIAL

## 2023-02-01 ENCOUNTER — ANCILLARY PROCEDURE (OUTPATIENT)
Dept: MRI IMAGING | Facility: CLINIC | Age: 63
End: 2023-02-01
Attending: PREVENTIVE MEDICINE
Payer: COMMERCIAL

## 2023-02-01 ENCOUNTER — OFFICE VISIT (OUTPATIENT)
Dept: ORTHOPEDICS | Facility: CLINIC | Age: 63
End: 2023-02-01
Payer: COMMERCIAL

## 2023-02-01 ENCOUNTER — TELEPHONE (OUTPATIENT)
Dept: ORTHOPEDICS | Facility: CLINIC | Age: 63
End: 2023-02-01

## 2023-02-01 DIAGNOSIS — G89.29 CHRONIC PAIN OF BOTH SHOULDERS: Primary | ICD-10-CM

## 2023-02-01 DIAGNOSIS — M25.512 BILATERAL SHOULDER PAIN: ICD-10-CM

## 2023-02-01 DIAGNOSIS — M12.812 ROTATOR CUFF ARTHROPATHY OF BOTH SHOULDERS: ICD-10-CM

## 2023-02-01 DIAGNOSIS — M50.30 DDD (DEGENERATIVE DISC DISEASE), CERVICAL: ICD-10-CM

## 2023-02-01 DIAGNOSIS — M25.512 CHRONIC PAIN OF BOTH SHOULDERS: Primary | ICD-10-CM

## 2023-02-01 DIAGNOSIS — M25.511 CHRONIC PAIN OF BOTH SHOULDERS: Primary | ICD-10-CM

## 2023-02-01 DIAGNOSIS — M75.51 SUBACROMIAL BURSITIS OF BOTH SHOULDERS: ICD-10-CM

## 2023-02-01 DIAGNOSIS — M50.20 CERVICAL DISC HERNIATION: ICD-10-CM

## 2023-02-01 DIAGNOSIS — M75.52 SUBACROMIAL BURSITIS OF BOTH SHOULDERS: ICD-10-CM

## 2023-02-01 DIAGNOSIS — M50.122 CERVICAL DISC DISORDER AT C5-C6 LEVEL WITH RADICULOPATHY: ICD-10-CM

## 2023-02-01 DIAGNOSIS — M25.511 BILATERAL SHOULDER PAIN: ICD-10-CM

## 2023-02-01 DIAGNOSIS — M12.811 ROTATOR CUFF ARTHROPATHY OF BOTH SHOULDERS: ICD-10-CM

## 2023-02-01 PROCEDURE — 2894A VOIDCORRECT: CPT | Performed by: PREVENTIVE MEDICINE

## 2023-02-01 PROCEDURE — 72141 MRI NECK SPINE W/O DYE: CPT | Mod: GC | Performed by: RADIOLOGY

## 2023-02-01 PROCEDURE — 20610 DRAIN/INJ JOINT/BURSA W/O US: CPT | Mod: 50 | Performed by: PREVENTIVE MEDICINE

## 2023-02-01 PROCEDURE — 99214 OFFICE O/P EST MOD 30 MIN: CPT | Mod: 25 | Performed by: PREVENTIVE MEDICINE

## 2023-02-01 PROCEDURE — 73030 X-RAY EXAM OF SHOULDER: CPT | Mod: LT | Performed by: RADIOLOGY

## 2023-02-01 RX ADMIN — METHYLPREDNISOLONE ACETATE 40 MG: 40 INJECTION, SUSPENSION INTRA-ARTICULAR; INTRALESIONAL; INTRAMUSCULAR; SOFT TISSUE at 11:14

## 2023-02-01 NOTE — TELEPHONE ENCOUNTER
Left Voicemail (1st Attempt) for the patient to call back and schedule the following:    Appointment type: return  Provider: dr. eddy  Return date: 2 weeks after epidural injection  Specialty phone number: [phone]   Additional appointment(s) needed: epidural injection     Cecilia sharma Procedure   Orthopedics, Podiatry, Sports Medicine, Ent ,Eye , Audiology, Adult Endocrine & Diabetes, Nutrition & Medication Therapy Management Specialties   [facility]

## 2023-02-01 NOTE — LETTER
2/1/2023         RE: Aspen Mccullough  [address]  [facility] [address]        Dear Colleague,    Thank you for referring your patient, Aspen Mccullough, to the [facility] [place]. Please see a copy of my visit note below.    HISTORY OF PRESENT ILLNESS  Ms. Mccullough is a pleasant 62 year old year old female who presents to clinic today with neck and bilateral shoulder pain    Aspen explains that she is here today to discuss her bilateral shoulder pain as well as go over her c spine mri   And would like to get injections for her shoulders which have improved her shoulder pain for over 7 months in the past as they are hurting more again      Location: bilateral shoulder, c spine     Quality:  achy pain    Severity: 8/10 at worst    Duration: see above  Timing: occurs intermittently  Context: occurs while exercising and lifting and using the joint  Modifying factors:  resting and non-use makes it better, movement and use makes it worse  Associated signs & symptoms: neck pain, bilateral shoulders    MEDICAL HISTORY  Patient Active Problem List   Diagnosis     Generalized osteoarthrosis, involving multiple sites     CRPS (complex regional pain syndrome), lower limb     Fibromyalgia     Somatoform disorder     Histrionic personality (H)     Encounter for long-term use of opiate analgesic     Knee joint replacement by other means     Hypothyroidism     Insomnia, unspecified type     Hyperlipidemia     Hashimoto's thyroiditis     Glucocorticoid deficiency (H)     Posttraumatic stress disorder     Impingement syndrome of left shoulder     Abdominal cramping, generalized     Intermittent diarrhea     Primary osteoarthritis involving multiple joints     Attention deficit disorder     Allergic rhinitis     Cushing's syndrome (H)     Endometriosis     Essential hypertension     Systemic lupus erythematosus (H)     S/P cervical spinal fusion     Paroxysmal atrial fibrillation (H)      Nonischemic cardiomyopathy (H)     Personal history of DVT (deep vein thrombosis)     History of pulmonary embolism     Acute deep vein thrombosis (DVT) of popliteal vein of right lower extremity (H)     Acute pulmonary embolism (H)     Adrenal cortical steroids causing adverse effect in therapeutic use     Alopecia areata     Anemia, blood loss     Ankle pain, left     ARF (acute renal failure) (H)     Arthritis, septic (H)     Chronic ethmoidal sinusitis     Chronic maxillary sinusitis     Deviated nasal septum     Complications due to internal joint prosthesis (H)     Generalized pain     Iatrogenic hyperthyroidism     S/P TKR (total knee replacement)     Left shoulder pain     Lipoma of skin and subcutaneous tissue     Malabsorption     Nasal fracture     Nasal turbinate hypertrophy     Opioid type dependence (H)     Other specified abnormal findings of blood chemistry     Other acute postoperative pain     Pain in joint, lower leg     Postoperative hypotension     S/P total knee replacement     Thrombus of right atrial appendage without antecedent myocardial infarction     Chronic pain of both shoulders     GI bleed     Grade III internal hemorrhoids     Atrial fibrillation with RVR (H)     Acute deep vein thrombosis (DVT) of proximal vein of right lower extremity (H)     Pneumonia of left upper lobe due to infectious organism     Morbid obesity (H)     Cerebrovascular accident (CVA), unspecified mechanism (H)     CVA (cerebral vascular accident) (H)     Neuropathy of left suprascapular nerve     Neuropathy of right suprascapular nerve     Stage 3 right buttock     BMI 40.0-44.9, adult (H)     Chronic anticoagulation     Controlled substance agreement signed     Craniofacial hyperhidrosis     Current chronic use of systemic steroids     DNR (do not resuscitate)     Hypertonic bladder     Hypo-osmolality and hyponatremia     Lumbosacral radiculopathy at S1     Overflow diarrhea     Refusal of blood  transfusions as patient is Mandaen     Unspecified fall, initial encounter     Unspecified injury of shoulder and upper arm, unspecified arm, initial encounter     Weakness     Chronic pain disorder     Constipation     Hemoptysis     COVID-19 virus infection     Acute respiratory failure with hypoxia (H)     Edema, unspecified type     History of ischemic stroke     Personal history of COVID-19     Pressure injury of sacral region, unstageable (H)     Pneumonia due to infectious organism, unspecified laterality, unspecified part of lung     RSV (respiratory syncytial virus infection)       Current Outpatient Medications   Medication Sig Dispense Refill     albuterol (PROAIR HFA/PROVENTIL HFA/VENTOLIN HFA) 108 (90 Base) MCG/ACT inhaler Inhale 1-2 puffs into the lungs every 6 hours as needed for shortness of breath, wheezing or cough       atorvastatin (LIPITOR) 40 MG tablet Take 1 tablet (40 mg) by mouth every evening 90 tablet 2     azithromycin (ZITHROMAX) 250 MG tablet Take 1 tablet (250 mg) by mouth daily 4 tablet 0     benzonatate (TESSALON) 200 MG capsule Take 1 capsule (200 mg) by mouth 3 times daily as needed for cough 20 capsule 0     carboxymethylcellulose (REFRESH PLUS) 0.5 % SOLN ophthalmic solution Place 1 drop into both eyes 2 times daily as needed  1 Bottle      cefuroxime (CEFTIN) 500 MG tablet Take 1 tablet (500 mg) by mouth 2 times daily 10 tablet 0     cyanocobalamin 1000 MCG/ML injection Inject 1 mL (1,000 mcg) Subcutaneous every 7 days 4 mL 5     cyclobenzaprine (FLEXERIL) 10 MG tablet Take 1 tablet (10 mg) by mouth 3 times daily 90 tablet 3     cycloSPORINE (RESTASIS) 0.05 % ophthalmic emulsion Place 1 drop into both eyes 2 times daily as needed        diltiazem ER COATED BEADS (CARDIZEM CD/CARTIA XT) 180 MG 24 hr capsule Take 180 mg by mouth 2 times daily       Elastic Bandages & Supports (MEDICAL COMPRESSION SOCKS) MISC 1 Package daily Please measure and distribute 2 pair of 20mmHg  - 30mmHg THIGH high open or closed toe compression stockings with extra refills as indicated. Jobst ultrasheer or equivalent. 2 each 3     fexofenadine (ALLEGRA) 180 MG tablet Take 180 mg by mouth daily as needed        furosemide (LASIX) 20 MG tablet Take 1 tablet (20 mg) by mouth every evening (Patient taking differently: Take 20 mg by mouth daily) 60 tablet 3     furosemide (LASIX) 40 MG tablet Take 1 tablet (40 mg) by mouth every morning Future refills by PCP Dr. Arcadio Farfan with phone number [phone]. 60 tablet 3     gabapentin (NEURONTIN) 300 MG capsule Take 300 mg by mouth 2 times daily (morning and afternoon) with 800mg tablet (total dose 1100mg)       gabapentin (NEURONTIN) 800 MG tablet Take 800 mg by mouth At Bedtime        gabapentin (NEURONTIN) 800 MG tablet Take 800 mg by mouth 2 times daily (morning and afternoon) in addition to 300mg capsule (total dose 1100mg)       guaiFENesin-dextromethorphan (ROBITUSSIN DM) 100-10 MG/5ML syrup Take 10 mLs by mouth 4 times daily as needed for cough 236 mL 0     HYDROmorphone (DILAUDID) 8 MG tablet Take 8 mg by mouth 3 times daily . Max of 3 doses per day.       levothyroxine (TIROSINT) 100 MCG capsule Take 300 mcg by mouth daily       lifitegrast (XIIDRA) 5 % opthalmic solution Place 1 drop into both eyes 2 times daily       liothyronine (CYTOMEL) 5 MCG tablet Take 5 mcg by mouth daily       metoprolol succinate ER (TOPROL XL) 25 MG 24 hr tablet Take 3 tablets (75 mg) by mouth 2 times daily 540 tablet 2     morphine (MS CONTIN) 30 MG 12 hr tablet Take 30 mg by mouth 2 times daily (morning and night) in addition to 60 mg tablet for a total dose of 90mg. 270 tablet 0     morphine (MS CONTIN) 60 MG 12 hr tablet Take 60 mg by mouth 3 times daily (morning and night) in addition to 30mg tablet for a total dose of 90mg 30 tablet 0     naloxone (NARCAN) 4 MG/0.1ML nasal spray Spray 4 mg into one nostril alternating nostrils as needed for opioid reversal every 2-3  minutes until assistance arrives       prednisoLONE acetate (PRED FORTE) 1 % ophthalmic susp Place 1 drop into both eyes 2 times daily        rivaroxaban ANTICOAGULANT (XARELTO) 20 MG TABS tablet Take 1 tablet (20 mg) by mouth daily (with dinner)       sodium fluoride (SF 5000 PLUS) 1.1 % CREA        zolpidem (AMBIEN) 5 MG tablet Take 5 mg by mouth nightly as needed for sleep         Allergies   Allergen Reactions     Blood Transfusion Related (Informational Only) Other (See Comments)     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      Chlorhexidine Hives     Thor surgical cleanser     Codeine Hives     Has tolerated Oxycodone in past      Ibuprofen [Nsaids] Hives     Penicillins Hives     Other reaction(s): Unknown     Sulfa Drugs Hives     Other reaction(s): Unknown     Calcium Channel Blockers      Doxycycline GI Disturbance     Emesis & diarrhea  Patient denies allergy to this med     Hydroxyzine      rash     Mold        Family History   Problem Relation Age of Onset     Hypertension Mother      No Known Problems Father      No Known Problems Sister      No Known Problems Brother      No Known Problems Maternal Grandmother      No Known Problems Maternal Grandfather      No Known Problems Paternal Grandmother      No Known Problems Other      Social History     Socioeconomic History     Marital status:    Tobacco Use     Smoking status: Former     Packs/day: 1.50     Years: 20.00     Pack years: 30.00     Types: Cigarettes     Start date: 1973     Quit date: 1993     Years since quittin.1     Smokeless tobacco: Never     Tobacco comments:     wish I never started   Substance and Sexual Activity     Alcohol use: No     Alcohol/week: 0.0 standard drinks     Drug use: No     Sexual activity: Not Currently     Partners: Male     Birth control/protection: Post-menopausal   Social History Narrative    ** Merged History Encounter **            Additional medical/Social/Surgical histories  reviewed in [facility] and updated as appropriate.     REVIEW OF SYSTEMS (2/1/2023)  10 point ROS of systems including Constitutional, Eyes, Respiratory, Cardiovascular, Gastroenterology, Genitourinary, Integumentary, Musculoskeletal, Psychiatric, Allergic/Immunologic were all negative except for pertinent positives noted in my HPI.     PHYSICAL EXAM  VSS  General  - normal appearance, in no obvious distress  HEENT  - conjunctivae not injected, moist mucous membranes, normocephalic/atraumatic head, ears normal appearance, no lesions, mouth normal appearance, no scars, normal dentition and teeth present  CV  - normal radial pulse  Pulm  - normal respiratory pattern, non-labored  Musculoskeletal - bilateral  shoulder  - inspection: normal bone and joint alignment, no obvious deformity, no scapular winging, no AC step-off  - palpation: mildly tender RC insertion, normal clavicle, non-tender AC  - ROM:  painful and limited flexion and ER at end range, limited IR  - strength: 5/5  strength, 5/5 in all shoulder planes  - special tests:  (-) Speed's  (+) Neer  (+) Hawkin's  (+) Jovanny's  (-) [place]'s  (-) apprehension  (-) subscap lift-off  Neuro  - no sensory or motor deficit, grossly normal coordination, normal muscle tone  Skin  - no ecchymosis, erythema, warmth, or induration, no obvious rash  Psych  - interactive, appropriate, normal mood and affect  Neck: has pain with flexion/extension, positive spurlings  ASSESSMENT & PLAN  61 yo female with cervical ddd, disc herniations, radicular pain, worse, and bilateral rotator cuff arthropathy, subacromial bursitis, worse    I independently reviewed the following imaging studies:  xrays shoulders: shows some arthritis GH joints  Cervical MRI: shows ddd, disc herniations  Discussed and ordered cervical ANASTASIA  After a 20 minute discussion and examination, we decided to perform a same day injection for diagnostic and therapeutic purposes for bilateral shoulders  Given HEP  F/u after  ANASTASIA  Patient has been doing home exercise physical therapy program for this problem      Appropriate PPE was utilized for prevention of spread of Covid-19.  Shon Simpson MD, CAQSM    PROCEDURE: bilateral SHOULDER subacromial bursa injection     The patient was apprised of the risks and the benefits of the procedure and consented and a written consent was signed by the patient.   The patient s shoulders were sterilely prepped with chloraprep.   40 mg of depo suspension was drawn up into a 5 mL syringe with 4 mL of 1% lidocaine   The patient was injected into right and left shoulder with a 1.5-inch 22-gauge needle at lateral gleno-humeral joint in the subacromial space  There were no complications. The patient tolerated the procedure well. There was minimal bleeding.   The patient was instructed to ice the shoulder upon leaving clinic and refrain from overuse over the next 3 days.   The patient was instructed to go to the emergency room with any usual pain, swelling, or redness occurred in the injected area.   The patient was given a followup appointment to evaluate response to the injection to their increased range of motion and reduction of pain    Large Joint Injection/Arthocentesis: bilateral subacromial bursa    Date/Time: 2/1/2023 11:14 AM  Performed by: Shon Simpson MD  Authorized by: Shon Simpson MD     Indications:  Osteoarthritis, pain and diagnostic evaluation  Needle Size:  22 G  Guidance: landmark guided    Approach:  Anterolateral  Location:  Shoulder  Laterality:  Bilateral      Site:  Bilateral subacromial bursa  Medications (Right):  40 mg methylPREDNISolone 40 MG/ML  Medications (Left):  40 mg methylPREDNISolone 40 MG/ML  Outcome:  Tolerated well, no immediate complications  Procedure discussed: discussed risks, benefits, and alternatives    Consent Given by:  Patient  Timeout: timeout called immediately prior to procedure    Prep: patient was prepped and draped in usual sterile  fashion              Again, thank you for allowing me to participate in the care of your patient.        Sincerely,        Shon Simpson MD

## 2023-02-01 NOTE — PROGRESS NOTES
HISTORY OF PRESENT ILLNESS  Ms. Mccullough is a pleasant 62 year old year old female who presents to clinic today with neck and bilateral shoulder pain    Aspen explains that she is here today to discuss her bilateral shoulder pain as well as go over her c spine mri   And would like to get injections for her shoulders which have improved her shoulder pain for over 7 months in the past as they are hurting more again      Location: bilateral shoulder, c spine     Quality:  achy pain    Severity: 8/10 at worst    Duration: see above  Timing: occurs intermittently  Context: occurs while exercising and lifting and using the joint  Modifying factors:  resting and non-use makes it better, movement and use makes it worse  Associated signs & symptoms: neck pain, bilateral shoulders    MEDICAL HISTORY  Patient Active Problem List   Diagnosis     Generalized osteoarthrosis, involving multiple sites     CRPS (complex regional pain syndrome), lower limb     Fibromyalgia     Somatoform disorder     Histrionic personality (H)     Encounter for long-term use of opiate analgesic     Knee joint replacement by other means     Hypothyroidism     Insomnia, unspecified type     Hyperlipidemia     Hashimoto's thyroiditis     Glucocorticoid deficiency (H)     Posttraumatic stress disorder     Impingement syndrome of left shoulder     Abdominal cramping, generalized     Intermittent diarrhea     Primary osteoarthritis involving multiple joints     Attention deficit disorder     Allergic rhinitis     Cushing's syndrome (H)     Endometriosis     Essential hypertension     Systemic lupus erythematosus (H)     S/P cervical spinal fusion     Paroxysmal atrial fibrillation (H)     Nonischemic cardiomyopathy (H)     Personal history of DVT (deep vein thrombosis)     History of pulmonary embolism     Acute deep vein thrombosis (DVT) of popliteal vein of right lower extremity (H)     Acute pulmonary embolism (H)     Adrenal cortical steroids causing  adverse effect in therapeutic use     Alopecia areata     Anemia, blood loss     Ankle pain, left     ARF (acute renal failure) (H)     Arthritis, septic (H)     Chronic ethmoidal sinusitis     Chronic maxillary sinusitis     Deviated nasal septum     Complications due to internal joint prosthesis (H)     Generalized pain     Iatrogenic hyperthyroidism     S/P TKR (total knee replacement)     Left shoulder pain     Lipoma of skin and subcutaneous tissue     Malabsorption     Nasal fracture     Nasal turbinate hypertrophy     Opioid type dependence (H)     Other specified abnormal findings of blood chemistry     Other acute postoperative pain     Pain in joint, lower leg     Postoperative hypotension     S/P total knee replacement     Thrombus of right atrial appendage without antecedent myocardial infarction     Chronic pain of both shoulders     GI bleed     Grade III internal hemorrhoids     Atrial fibrillation with RVR (H)     Acute deep vein thrombosis (DVT) of proximal vein of right lower extremity (H)     Pneumonia of left upper lobe due to infectious organism     Morbid obesity (H)     Cerebrovascular accident (CVA), unspecified mechanism (H)     CVA (cerebral vascular accident) (H)     Neuropathy of left suprascapular nerve     Neuropathy of right suprascapular nerve     Stage 3 right buttock     BMI 40.0-44.9, adult (H)     Chronic anticoagulation     Controlled substance agreement signed     Craniofacial hyperhidrosis     Current chronic use of systemic steroids     DNR (do not resuscitate)     Hypertonic bladder     Hypo-osmolality and hyponatremia     Lumbosacral radiculopathy at S1     Overflow diarrhea     Refusal of blood transfusions as patient is Dru[place]     Unspecified fall, initial encounter     Unspecified injury of shoulder and upper arm, unspecified arm, initial encounter     Weakness     Chronic pain disorder     Constipation     Hemoptysis     COVID-19 virus infection     Acute  respiratory failure with hypoxia (H)     Edema, unspecified type     History of ischemic stroke     Personal history of COVID-19     Pressure injury of sacral region, unstageable (H)     Pneumonia due to infectious organism, unspecified laterality, unspecified part of lung     RSV (respiratory syncytial virus infection)       Current Outpatient Medications   Medication Sig Dispense Refill     albuterol (PROAIR HFA/PROVENTIL HFA/VENTOLIN HFA) 108 (90 Base) MCG/ACT inhaler Inhale 1-2 puffs into the lungs every 6 hours as needed for shortness of breath, wheezing or cough       atorvastatin (LIPITOR) 40 MG tablet Take 1 tablet (40 mg) by mouth every evening 90 tablet 2     azithromycin (ZITHROMAX) 250 MG tablet Take 1 tablet (250 mg) by mouth daily 4 tablet 0     benzonatate (TESSALON) 200 MG capsule Take 1 capsule (200 mg) by mouth 3 times daily as needed for cough 20 capsule 0     carboxymethylcellulose (REFRESH PLUS) 0.5 % SOLN ophthalmic solution Place 1 drop into both eyes 2 times daily as needed  1 Bottle      cefuroxime (CEFTIN) 500 MG tablet Take 1 tablet (500 mg) by mouth 2 times daily 10 tablet 0     cyanocobalamin 1000 MCG/ML injection Inject 1 mL (1,000 mcg) Subcutaneous every 7 days 4 mL 5     cyclobenzaprine (FLEXERIL) 10 MG tablet Take 1 tablet (10 mg) by mouth 3 times daily 90 tablet 3     cycloSPORINE (RESTASIS) 0.05 % ophthalmic emulsion Place 1 drop into both eyes 2 times daily as needed        diltiazem ER COATED BEADS (CARDIZEM CD/CARTIA XT) 180 MG 24 hr capsule Take 180 mg by mouth 2 times daily       Elastic Bandages & Supports (MEDICAL COMPRESSION SOCKS) MISC 1 Package daily Please measure and distribute 2 pair of 20mmHg - 30mmHg THIGH high open or closed toe compression stockings with extra refills as indicated. Jobst ultrasheer or equivalent. 2 each 3     fexofenadine (ALLEGRA) 180 MG tablet Take 180 mg by mouth daily as needed        furosemide (LASIX) 20 MG tablet Take 1 tablet (20 mg) by  mouth every evening (Patient taking differently: Take 20 mg by mouth daily) 60 tablet 3     furosemide (LASIX) 40 MG tablet Take 1 tablet (40 mg) by mouth every morning Future refills by PCP Dr. Arcadio Farfan with phone number [phone]. 60 tablet 3     gabapentin (NEURONTIN) 300 MG capsule Take 300 mg by mouth 2 times daily (morning and afternoon) with 800mg tablet (total dose 1100mg)       gabapentin (NEURONTIN) 800 MG tablet Take 800 mg by mouth At Bedtime        gabapentin (NEURONTIN) 800 MG tablet Take 800 mg by mouth 2 times daily (morning and afternoon) in addition to 300mg capsule (total dose 1100mg)       guaiFENesin-dextromethorphan (ROBITUSSIN DM) 100-10 MG/5ML syrup Take 10 mLs by mouth 4 times daily as needed for cough 236 mL 0     HYDROmorphone (DILAUDID) 8 MG tablet Take 8 mg by mouth 3 times daily . Max of 3 doses per day.       levothyroxine (TIROSINT) 100 MCG capsule Take 300 mcg by mouth daily       lifitegrast (XIIDRA) 5 % opthalmic solution Place 1 drop into both eyes 2 times daily       liothyronine (CYTOMEL) 5 MCG tablet Take 5 mcg by mouth daily       metoprolol succinate ER (TOPROL XL) 25 MG 24 hr tablet Take 3 tablets (75 mg) by mouth 2 times daily 540 tablet 2     morphine (MS CONTIN) 30 MG 12 hr tablet Take 30 mg by mouth 2 times daily (morning and night) in addition to 60 mg tablet for a total dose of 90mg. 270 tablet 0     morphine (MS CONTIN) 60 MG 12 hr tablet Take 60 mg by mouth 3 times daily (morning and night) in addition to 30mg tablet for a total dose of 90mg 30 tablet 0     naloxone (NARCAN) 4 MG/0.1ML nasal spray Spray 4 mg into one nostril alternating nostrils as needed for opioid reversal every 2-3 minutes until assistance arrives       prednisoLONE acetate (PRED FORTE) 1 % ophthalmic susp Place 1 drop into both eyes 2 times daily        rivaroxaban ANTICOAGULANT (XARELTO) 20 MG TABS tablet Take 1 tablet (20 mg) by mouth daily (with dinner)       sodium fluoride (SF 5000  PLUS) 1.1 % CREA        zolpidem (AMBIEN) 5 MG tablet Take 5 mg by mouth nightly as needed for sleep         Allergies   Allergen Reactions     Blood Transfusion Related (Informational Only) Other (See Comments)     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      Chlorhexidine Hives     Thor surgical cleanser     Codeine Hives     Has tolerated Oxycodone in past      Ibuprofen [Nsaids] Hives     Penicillins Hives     Other reaction(s): Unknown     Sulfa Drugs Hives     Other reaction(s): Unknown     Calcium Channel Blockers      Doxycycline GI Disturbance     Emesis & diarrhea  Patient denies allergy to this med     Hydroxyzine      rash     Mold        Family History   Problem Relation Age of Onset     Hypertension Mother      No Known Problems Father      No Known Problems Sister      No Known Problems Brother      No Known Problems Maternal Grandmother      No Known Problems Maternal Grandfather      No Known Problems Paternal Grandmother      No Known Problems Other      Social History     Socioeconomic History     Marital status:    Tobacco Use     Smoking status: Former     Packs/day: 1.50     Years: 20.00     Pack years: 30.00     Types: Cigarettes     Start date: 1973     Quit date: 1993     Years since quittin.1     Smokeless tobacco: Never     Tobacco comments:     wish I never started   Substance and Sexual Activity     Alcohol use: No     Alcohol/week: 0.0 standard drinks     Drug use: No     Sexual activity: Not Currently     Partners: Male     Birth control/protection: Post-menopausal   Social History Narrative    ** Merged History Encounter **            Additional medical/Social/Surgical histories reviewed in [facility] and updated as appropriate.     REVIEW OF SYSTEMS (2023)  10 point ROS of systems including Constitutional, Eyes, Respiratory, Cardiovascular, Gastroenterology, Genitourinary, Integumentary, Musculoskeletal, Psychiatric, Allergic/Immunologic were all  negative except for pertinent positives noted in my HPI.     PHYSICAL EXAM  VSS  General  - normal appearance, in no obvious distress  HEENT  - conjunctivae not injected, moist mucous membranes, normocephalic/atraumatic head, ears normal appearance, no lesions, mouth normal appearance, no scars, normal dentition and teeth present  CV  - normal radial pulse  Pulm  - normal respiratory pattern, non-labored  Musculoskeletal - bilateral  shoulder  - inspection: normal bone and joint alignment, no obvious deformity, no scapular winging, no AC step-off  - palpation: mildly tender RC insertion, normal clavicle, non-tender AC  - ROM:  painful and limited flexion and ER at end range, limited IR  - strength: 5/5  strength, 5/5 in all shoulder planes  - special tests:  (-) Speed's  (+) Neer  (+) Hawkin's  (+) Jovanny's  (-) [place]'s  (-) apprehension  (-) subscap lift-off  Neuro  - no sensory or motor deficit, grossly normal coordination, normal muscle tone  Skin  - no ecchymosis, erythema, warmth, or induration, no obvious rash  Psych  - interactive, appropriate, normal mood and affect  Neck: has pain with flexion/extension, positive spurlings  ASSESSMENT & PLAN  63 yo female with cervical ddd, disc herniations, radicular pain, worse, and bilateral rotator cuff arthropathy, subacromial bursitis, worse    I independently reviewed the following imaging studies:  xrays shoulders: shows some arthritis GH joints  Cervical MRI: shows ddd, disc herniations  Discussed and ordered cervical ANASTASIA  After a 20 minute discussion and examination, we decided to perform a same day injection for diagnostic and therapeutic purposes for bilateral shoulders  Given HEP  F/u after ANASTASIA  Patient has been doing home exercise physical therapy program for this problem      Appropriate PPE was utilized for prevention of spread of Covid-19.  Shon Simpson MD, CAQSM    PROCEDURE: bilateral SHOULDER subacromial bursa injection     The patient was apprised  of the risks and the benefits of the procedure and consented and a written consent was signed by the patient.   The patient s shoulders were sterilely prepped with chloraprep.   40 mg of depo suspension was drawn up into a 5 mL syringe with 4 mL of 1% lidocaine   The patient was injected into right and left shoulder with a 1.5-inch 22-gauge needle at lateral gleno-humeral joint in the subacromial space  There were no complications. The patient tolerated the procedure well. There was minimal bleeding.   The patient was instructed to ice the shoulder upon leaving clinic and refrain from overuse over the next 3 days.   The patient was instructed to go to the emergency room with any usual pain, swelling, or redness occurred in the injected area.   The patient was given a followup appointment to evaluate response to the injection to their increased range of motion and reduction of pain    Large Joint Injection/Arthocentesis: bilateral subacromial bursa    Date/Time: 2/1/2023 11:14 AM  Performed by: Shon Simpson MD  Authorized by: Shon Simpson MD     Indications:  Osteoarthritis, pain and diagnostic evaluation  Needle Size:  22 G  Guidance: landmark guided    Approach:  Anterolateral  Location:  Shoulder  Laterality:  Bilateral      Site:  Bilateral subacromial bursa  Medications (Right):  40 mg methylPREDNISolone 40 MG/ML  Medications (Left):  40 mg methylPREDNISolone 40 MG/ML  Outcome:  Tolerated well, no immediate complications  Procedure discussed: discussed risks, benefits, and alternatives    Consent Given by:  Patient  Timeout: timeout called immediately prior to procedure    Prep: patient was prepped and draped in usual sterile fashion

## 2023-02-07 NOTE — TELEPHONE ENCOUNTER
Left Voicemail (2nd Attempt) for the patient to call back and schedule the following:    Appointment type: return  Provider: dr. eddy  Return date: 2 weeks after epidural injection  Specialty phone number: [phone]   Additional appointment(s) needed: epidural injection     Cecilia sharma Procedure   Orthopedics, Podiatry, Sports Medicine, Ent ,Eye , Audiology, Adult Endocrine & Diabetes, Nutrition & Medication Therapy Management Specialties   [facility]

## 2023-02-09 RX ORDER — METHYLPREDNISOLONE ACETATE 40 MG/ML
40 INJECTION, SUSPENSION INTRA-ARTICULAR; INTRALESIONAL; INTRAMUSCULAR; SOFT TISSUE
Status: DISCONTINUED | OUTPATIENT
Start: 2023-02-01 | End: 2023-01-01

## 2023-03-14 NOTE — TELEPHONE ENCOUNTER
Detailed VM to hold Xarelto for 24 hours prior to 3/17 injection with request for a confirming vm back.

## 2023-03-17 NOTE — DISCHARGE SUMMARY
AFTER YOU GO HOME    ? DO relax; minimize your activity for 24 hours  ? You may resume normal activity tomorrow  ? You may remove the bandage in the evening or next morning  ? You may resume bathing the next day  ? Drink at least 4 extra glasses of fluid today if not on fluid restrictions  ? DO NOT drive or operate machinery at home or at work for at least 24 hours      VISIT THE EMERGENCY ROOM OR URGENT CARE IF:    ? There is redness or swelling at the injection site  ? There is discharge from the injection site  ? You develop a temperature of 101  F or greater      ADDITIONAL INSTRUCTIONS:     ? You may resume your Coumadin or other blood thinner at your regular dose today.  Follow up with your physician to have your INR rechecked if indicated.  ? If you gain no relief from the injection after two (2) weeks, follow-up with your provider for your options.        Contacts:    During business hours from 8 to 5 pm, you may call [phone] to reach a nurse advisor at [facility].  After hours, call [facility]  [phone].  Ask for the Radiologist on-call.  Someone is on-call 24 hrs/day.  [facility] Toll Free Number   [phone]

## 2023-03-17 NOTE — PROGRESS NOTES
Aspen was seen in X-ray today for a Cervical epidural injection. Patient rated pain before procedure 7/10. After procedure patient rated pain 7/10.   This pain level is acceptable to patient. Patient discharged home with .

## 2023-03-30 NOTE — LETTER
3/30/2023         RE: Aspen Mccullough  [address]  [facility] [address]        Dear Colleague,    Thank you for referring your patient, Aspen Mccullough, to the [facility] [place]. Please see a copy of my visit note below.    HISTORY OF PRESENT ILLNESS  Ms. Mccullough is a pleasant 63 year old year old female who presents to clinic today with the following:  What problem are you here for? Cervical radiculopathy  F/U FOR CERVICAL INJECTION DOING BETTER  ALSO C/O MORE LOW BACK PAIN WITH RADICULAR SYMPTOMS INTO BOTH LEGS  HAD ANASTASIA IN LUMBAR SPINE 9 MONTHS AGO, THIS IMPROVED HER PAIN AND SYMPTOMS OVER 80% UNTIL LAST MONTH  WANTS TO DISCUSS ANOTHER TREATMENT  HAS NOT HAD LUMBAR MRI SINCE 11/2021  How long have you had this problem? yes    Have you had any recent imaging of this problem? Xrays/MRI/CT scans? yes    Have you had treatments for this problem in the past?  -Medications? yes  -Physical therapy? yes  -Injections? yes  -Surgery? no    How severe is this problem today? 0-10 scale? 1    How severe has this problem been at WORST in the past? 0-10 scale? 7    What do you think caused this problem? aging    Does this problem or its symptoms cause difficulty for you falling asleep or staying asleep? Not currently    Anything else you want us to know about this problem? no          MEDICAL HISTORY  Patient Active Problem List   Diagnosis     Generalized osteoarthrosis, involving multiple sites     CRPS (complex regional pain syndrome), lower limb     Fibromyalgia     Somatoform disorder     Histrionic personality (H)     Encounter for long-term use of opiate analgesic     Knee joint replacement by other means     Hypothyroidism     Insomnia, unspecified type     Hyperlipidemia     Hashimoto's thyroiditis     Glucocorticoid deficiency (H)     Posttraumatic stress disorder     Impingement syndrome of left shoulder     Abdominal cramping, generalized     Intermittent diarrhea      Primary osteoarthritis involving multiple joints     Attention deficit disorder     Allergic rhinitis     Cushing's syndrome (H)     Endometriosis     Essential hypertension     Systemic lupus erythematosus (H)     S/P cervical spinal fusion     Paroxysmal atrial fibrillation (H)     Nonischemic cardiomyopathy (H)     Personal history of DVT (deep vein thrombosis)     History of pulmonary embolism     Acute deep vein thrombosis (DVT) of popliteal vein of right lower extremity (H)     Acute pulmonary embolism (H)     Adrenal cortical steroids causing adverse effect in therapeutic use     Alopecia areata     Anemia, blood loss     Ankle pain, left     ARF (acute renal failure) (H)     Arthritis, septic (H)     Chronic ethmoidal sinusitis     Chronic maxillary sinusitis     Deviated nasal septum     Complications due to internal joint prosthesis (H)     Generalized pain     Iatrogenic hyperthyroidism     S/P TKR (total knee replacement)     Left shoulder pain     Lipoma of skin and subcutaneous tissue     Malabsorption     Nasal fracture     Nasal turbinate hypertrophy     Opioid type dependence (H)     Other specified abnormal findings of blood chemistry     Other acute postoperative pain     Pain in joint, lower leg     Postoperative hypotension     S/P total knee replacement     Thrombus of right atrial appendage without antecedent myocardial infarction     Chronic pain of both shoulders     GI bleed     Grade III internal hemorrhoids     Atrial fibrillation with RVR (H)     Acute deep vein thrombosis (DVT) of proximal vein of right lower extremity (H)     Pneumonia of left upper lobe due to infectious organism     Morbid obesity (H)     Cerebrovascular accident (CVA), unspecified mechanism (H)     CVA (cerebral vascular accident) (H)     Neuropathy of left suprascapular nerve     Neuropathy of right suprascapular nerve     Stage 3 right buttock     BMI 40.0-44.9, adult (H)     Chronic anticoagulation      Controlled substance agreement signed     Craniofacial hyperhidrosis     Current chronic use of systemic steroids     DNR (do not resuscitate)     Hypertonic bladder     Hypo-osmolality and hyponatremia     Lumbosacral radiculopathy at S1     Overflow diarrhea     Refusal of blood transfusions as patient is Restorationism     Unspecified fall, initial encounter     Unspecified injury of shoulder and upper arm, unspecified arm, initial encounter     Weakness     Chronic pain disorder     Constipation     Hemoptysis     COVID-19 virus infection     Acute respiratory failure with hypoxia (H)     Edema, unspecified type     History of ischemic stroke     Personal history of COVID-19     Pressure injury of sacral region, unstageable (H)     Pneumonia due to infectious organism, unspecified laterality, unspecified part of lung     RSV (respiratory syncytial virus infection)       Current Outpatient Medications   Medication Sig Dispense Refill     albuterol (PROAIR HFA/PROVENTIL HFA/VENTOLIN HFA) 108 (90 Base) MCG/ACT inhaler Inhale 1-2 puffs into the lungs every 6 hours as needed for shortness of breath, wheezing or cough       atorvastatin (LIPITOR) 40 MG tablet Take 1 tablet (40 mg) by mouth every evening 90 tablet 2     azithromycin (ZITHROMAX) 250 MG tablet Take 1 tablet (250 mg) by mouth daily 4 tablet 0     benzonatate (TESSALON) 200 MG capsule Take 1 capsule (200 mg) by mouth 3 times daily as needed for cough 20 capsule 0     carboxymethylcellulose (REFRESH PLUS) 0.5 % SOLN ophthalmic solution Place 1 drop into both eyes 2 times daily as needed  1 Bottle      cefuroxime (CEFTIN) 500 MG tablet Take 1 tablet (500 mg) by mouth 2 times daily 10 tablet 0     cyanocobalamin 1000 MCG/ML injection Inject 1 mL (1,000 mcg) Subcutaneous every 7 days 4 mL 5     cyclobenzaprine (FLEXERIL) 10 MG tablet Take 1 tablet (10 mg) by mouth 3 times daily 90 tablet 3     cycloSPORINE (RESTASIS) 0.05 % ophthalmic emulsion Place 1 drop  into both eyes 2 times daily as needed        diltiazem ER COATED BEADS (CARDIZEM CD/CARTIA XT) 180 MG 24 hr capsule Take 180 mg by mouth 2 times daily       Elastic Bandages & Supports (MEDICAL COMPRESSION SOCKS) MISC 1 Package daily Please measure and distribute 2 pair of 20mmHg - 30mmHg THIGH high open or closed toe compression stockings with extra refills as indicated. Jobst ultrasheer or equivalent. 2 each 3     fexofenadine (ALLEGRA) 180 MG tablet Take 180 mg by mouth daily as needed        furosemide (LASIX) 20 MG tablet Take 1 tablet (20 mg) by mouth every evening (Patient taking differently: Take 20 mg by mouth daily) 60 tablet 3     furosemide (LASIX) 40 MG tablet Take 1 tablet (40 mg) by mouth every morning Future refills by PCP Dr. Arcadio Farfan with phone number [phone]. 60 tablet 3     gabapentin (NEURONTIN) 300 MG capsule Take 300 mg by mouth 2 times daily (morning and afternoon) with 800mg tablet (total dose 1100mg)       gabapentin (NEURONTIN) 800 MG tablet Take 800 mg by mouth At Bedtime        gabapentin (NEURONTIN) 800 MG tablet Take 800 mg by mouth 2 times daily (morning and afternoon) in addition to 300mg capsule (total dose 1100mg)       guaiFENesin-dextromethorphan (ROBITUSSIN DM) 100-10 MG/5ML syrup Take 10 mLs by mouth 4 times daily as needed for cough 236 mL 0     HYDROmorphone (DILAUDID) 8 MG tablet Take 8 mg by mouth 3 times daily . Max of 3 doses per day.       levothyroxine (TIROSINT) 100 MCG capsule Take 300 mcg by mouth daily       lifitegrast (XIIDRA) 5 % opthalmic solution Place 1 drop into both eyes 2 times daily       liothyronine (CYTOMEL) 5 MCG tablet Take 5 mcg by mouth daily       metoprolol succinate ER (TOPROL XL) 25 MG 24 hr tablet Take 3 tablets (75 mg) by mouth 2 times daily 540 tablet 2     morphine (MS CONTIN) 30 MG 12 hr tablet Take 30 mg by mouth 2 times daily (morning and night) in addition to 60 mg tablet for a total dose of 90mg. 270 tablet 0     morphine (MS  CONTIN) 60 MG 12 hr tablet Take 60 mg by mouth 3 times daily (morning and night) in addition to 30mg tablet for a total dose of 90mg 30 tablet 0     naloxone (NARCAN) 4 MG/0.1ML nasal spray Spray 4 mg into one nostril alternating nostrils as needed for opioid reversal every 2-3 minutes until assistance arrives       prednisoLONE acetate (PRED FORTE) 1 % ophthalmic susp Place 1 drop into both eyes 2 times daily        rivaroxaban ANTICOAGULANT (XARELTO) 20 MG TABS tablet Take 1 tablet (20 mg) by mouth daily (with dinner)       sodium fluoride (SF 5000 PLUS) 1.1 % CREA        zolpidem (AMBIEN) 5 MG tablet Take 5 mg by mouth nightly as needed for sleep         Allergies   Allergen Reactions     Blood Transfusion Related (Informational Only) Other (See Comments)     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      Chlorhexidine Hives     Thor surgical cleanser     Codeine Hives     Has tolerated Oxycodone in past      Ibuprofen [Nsaids] Hives     Penicillins Hives     Other reaction(s): Unknown     Sulfa Drugs Hives     Other reaction(s): Unknown     Calcium Channel Blockers      Doxycycline GI Disturbance     Emesis & diarrhea  Patient denies allergy to this med     Hydroxyzine      rash     Mold        Family History   Problem Relation Age of Onset     Hypertension Mother      No Known Problems Father      No Known Problems Sister      No Known Problems Brother      No Known Problems Maternal Grandmother      No Known Problems Maternal Grandfather      No Known Problems Paternal Grandmother      No Known Problems Other      Social History     Socioeconomic History     Marital status:    Tobacco Use     Smoking status: Former     Packs/day: 1.50     Years: 20.00     Pack years: 30.00     Types: Cigarettes     Start date: 1973     Quit date: 1993     Years since quittin.2     Smokeless tobacco: Never     Tobacco comments:     wish I never started   Substance and Sexual Activity     Alcohol  use: No     Alcohol/week: 0.0 standard drinks     Drug use: No     Sexual activity: Not Currently     Partners: Male     Birth control/protection: Post-menopausal   Social History Narrative    ** Merged History Encounter **            Additional medical/Social/Surgical histories reviewed in [facility] and updated as appropriate.     REVIEW OF SYSTEMS (3/30/2023)  10 point ROS of systems including Constitutional, Eyes, Respiratory, Cardiovascular, Gastroenterology, Genitourinary, Integumentary, Musculoskeletal, Psychiatric, Allergic/Immunologic were all negative except for pertinent positives noted in my HPI.     PHYSICAL EXAM  VSS      General  - normal appearance, in no obvious distress  HEENT  - conjunctivae not injected, moist mucous membranes, normocephalic/atraumatic head, ears normal appearance, no lesions, mouth normal appearance, no scars, normal dentition and teeth present  CV  - normal peripheral perfusion  Pulm  - normal respiratory pattern, non-labored  Musculoskeletal - lumbar spine  - stance: normal gait without limp, no obvious leg length discrepancy, normal heel and toe walk  - inspection: normal bone and joint alignment, no obvious scoliosis  - palpation: no paravertebral or bony tenderness  - ROM: flexion exacerbates pain, normal extension, sidebending, rotation  - strength: lower extremities 5/5 in all planes  - special tests:  (+) straight leg raise  (+) slump test  Neuro  - patellar and Achilles DTRs 2+ bilaterally, some bilateral lower extremity sensory deficit throughout L5 distribution, grossly normal coordination, normal muscle tone  Skin  - no ecchymosis, erythema, warmth, or induration, no obvious rash  Psych  - interactive, appropriate, normal mood and affect  NecK: improved ROM, no pain today, negative spurlings  ASSESSMENT & PLAN  62 yo female with cervical ddd, disc herniations, radicular pain, improved, lumbar ddd, disc herniations, previous lumbar fusion, worsened symptoms    I  independently reviewed the following imaging studies:  Previous lumbar MRI: from 2021: shows ddd, disc herniations  Discussed and ordered lumbar MRI with plan to order ANASTASIA   F/u after MRI by phone or charting    Patient has been doing home exercise physical therapy program for this problem      Appropriate PPE was utilized for prevention of spread of Covid-19.  Shon Simpson MD, CAQSM          Again, thank you for allowing me to participate in the care of your patient.        Sincerely,        Shon Simpson MD

## 2023-04-10 NOTE — TELEPHONE ENCOUNTER
[facility] Call Center    Phone Message    May a detailed message be left on voicemail: yes     Reason for Call: Other: Patient's spouse called wanting to speak with someone on the care team. Patient is put on the waiting list to see Dr. Muñoz, but has not received a call back. Wanting to see if they can get a referral to see someone else for Venous (peripheral) insufficiency. Please call back to further discuss.      Action Taken: Other: Cardiology    Travel Screening: Not Applicable     Thank you!  [facility]

## 2023-04-12 NOTE — LETTER
4/12/2023         RE: Aspen Mccullough  [address]  [facility] [address]        Dear Colleague,    Thank you for referring your patient, Aspen Mccullough, to the [facility] [place]. Please see a copy of my visit note below.    Patient is a  63 year old who is being evaluated via a billable telephone visit.      What phone number would you like to be contacted at? CELL  How would you like to obtain your AVS? MYCHART        Subjective   Patient is a  63   year old who presents by phone call visit for the following:     HPI   Follow up for lumbar MRI  Continues to have some low back pain on/off and has developed more left hip and leg radicular symptoms      Review of Systems   Constitutional, HEENT, cardiovascular, pulmonary, gi and gu systems are negative, except as otherwise noted.      Objective           Vitals:  No vitals were obtained today due to virtual visit.    Physical Exam   healthy, alert and no distress  PSYCH: Alert and oriented times 3; coherent speech, normal   rate and volume, able to articulate logical thoughts, able   to abstract reason, no tangential thoughts, no hallucinations   or delusions  His affect is normal  RESP: No cough, no audible wheezing, able to talk in full sentences  Remainder of exam unable to be completed due to telephone visits    Assessment/Plan  64 yo female with lumbar ddd, history of lumbar fusion, disc herniations, radicular pain, not resolved    I independently reviewed the following imaging studies and discussed with patient:  Lumbar MRI: shows ddd, disc herniations, lumbar fusion intact  Discussed and ordered ANASTASIA  Rx given for medrol  F/u after ANASTASIA          Phone call duration: 20 minutes  Phone call start: 110pm  Phone call end: 130pm  Dr Simpson      Again, thank you for allowing me to participate in the care of your patient.        Sincerely,        Shon Simpson MD

## 2023-04-12 NOTE — LETTER
4/12/2023         RE: Aspen Mccullough  [address]  [facility] [address]        Dear Colleague,    Thank you for referring your patient, Aspen Mccullough, to the [facility] [place]. Please see a copy of my visit note below.    Patient is a  63 year old who is being evaluated via a billable telephone visit.      What phone number would you like to be contacted at? CELL  How would you like to obtain your AVS? MYCHART        Subjective   Patient is a  63   year old who presents by phone call visit for the following:     HPI   Follow up for lumbar MRI  Continues to have some low back pain on/off and has developed more left hip and leg radicular symptoms      Review of Systems   Constitutional, HEENT, cardiovascular, pulmonary, gi and gu systems are negative, except as otherwise noted.      Objective           Vitals:  No vitals were obtained today due to virtual visit.    Physical Exam   healthy, alert and no distress  PSYCH: Alert and oriented times 3; coherent speech, normal   rate and volume, able to articulate logical thoughts, able   to abstract reason, no tangential thoughts, no hallucinations   or delusions  His affect is normal  RESP: No cough, no audible wheezing, able to talk in full sentences  Remainder of exam unable to be completed due to telephone visits    Assessment/Plan  64 yo female with lumbar ddd, history of lumbar fusion, disc herniations, radicular pain, not resolved    I independently reviewed the following imaging studies and discussed with patient:  Lumbar MRI: shows ddd, disc herniations, lumbar fusion intact  Discussed and ordered ANASTASIA  Rx given for medrol  F/u after ANASTASIA          Phone call duration: 20 minutes  Phone call start: 110pm  Phone call end: 130pm  Dr Simpson      Again, thank you for allowing me to participate in the care of your patient.        Sincerely,        Shon Simpson MD

## 2023-04-12 NOTE — PROGRESS NOTES
Patient is a  63 year old who is being evaluated via a billable telephone visit.      What phone number would you like to be contacted at? CELL  How would you like to obtain your AVS? NOLBERTO        Subjective   Patient is a  63   year old who presents by phone call visit for the following:     HPI   Follow up for lumbar MRI  Continues to have some low back pain on/off and has developed more left hip and leg radicular symptoms      Review of Systems   Constitutional, HEENT, cardiovascular, pulmonary, gi and gu systems are negative, except as otherwise noted.      Objective           Vitals:  No vitals were obtained today due to virtual visit.    Physical Exam   healthy, alert and no distress  PSYCH: Alert and oriented times 3; coherent speech, normal   rate and volume, able to articulate logical thoughts, able   to abstract reason, no tangential thoughts, no hallucinations   or delusions  His affect is normal  RESP: No cough, no audible wheezing, able to talk in full sentences  Remainder of exam unable to be completed due to telephone visits    Assessment/Plan  62 yo female with lumbar ddd, history of lumbar fusion, disc herniations, radicular pain, not resolved    I independently reviewed the following imaging studies and discussed with patient:  Lumbar MRI: shows ddd, disc herniations, lumbar fusion intact  Discussed and ordered ANASTASIA  Rx given for medrol  F/u after ANASTASIA          Phone call duration: 20 minutes  Phone call start: 110pm  Phone call end: 130pm  Dr Simpson

## 2023-04-13 NOTE — TELEPHONE ENCOUNTER
Upon chart review, writer determined that the procedure was scheduled and patient's  was calling the incorrect line. Nothing further needed at this time.    Hadley Mack on 4/13/2023 at 3:17 PM

## 2023-04-13 NOTE — TELEPHONE ENCOUNTER
Writer received voicemail from patient's  regarding scheduling a pain procedure. No case request, but referral from Dr. Simpson. Message sent for follow up.    Hadley Mack on 4/13/2023 at 3:15 PM

## 2023-05-01 NOTE — TELEPHONE ENCOUNTER
[facility] Call Center    Phone Message    May a detailed message be left on voicemail: yes     Reason for Call: Order(s): Other:   Reason for requested: Venous (peripheral) insufficiency  Chronic atrial fibrillation.    Provider name: Dr. Muñoz      Thank you!

## 2023-06-23 PROBLEM — F11.23 OPIOID DEPENDENCE WITH WITHDRAWAL (H): Status: ACTIVE | Noted: 2023-01-01

## 2023-06-23 PROBLEM — R11.2 NAUSEA VOMITING AND DIARRHEA: Status: ACTIVE | Noted: 2023-01-01

## 2023-06-23 PROBLEM — R19.7 NAUSEA VOMITING AND DIARRHEA: Status: ACTIVE | Noted: 2023-01-01

## 2023-06-24 NOTE — PROGRESS NOTES
Observation goals  PRIOR TO DISCHARGE       Comments: -diagnostic tests and consults completed and resulted -Not Met  -vital signs normal or at patient baseline -Not Met  Nurse to notify provider when observation goals have been met and patient is ready for discharge.

## 2023-06-24 NOTE — PLAN OF CARE
Summary:    Care Plan Summary Note:  Orientation: A&OX4  Observation Goals (met & not met): Not Met  Activity Level: Not Observed  Fall Risk: Y  Behavior & Aggression Tool Color: Green  Pain Management: PRN Diladaed  X1  ABNL VS/O2: VSS/RA  ABNL Lab/BG: See Chart  Diet: Regular Diet  Bowel/Bladder: Continent  Drains/Devices: PIV/SL  Tests/Procedures for next shift: None  Anticipated DC date: TBD      Observation goals  PRIOR TO DISCHARGE       Comments: -diagnostic tests and consults completed and resulted -Not Met  -vital signs normal or at patient baseline -Not Met  Nurse to notify provider when observation goals have been met and patient is ready for discharge.

## 2023-06-24 NOTE — H&P
[facility]    History and Physical  Hospitalist     Date of Admission:  6/23/2023    Assessment & Plan   Aspen Mccullough is a 63 year old female with a past medical history of hypertension, dyslipidemia, hypothyroidism, lupus, GERD, VTE, chronic pain syndrome on opiates presents to hospital with nausea vomiting and diarrhea.    Nausea vomiting and diarrhea  The patient developed nausea, vomiting, diarrhea starting on Tuesday.  She has been unable to keep down her medications and has had minimal p.o. intake to solids and liquids.  She is unaware of any sick contacts or recent travel.  She has no recent antibiotic use.  Due to ongoing symptoms she presented to the hospital.  Work-up was relatively benign in the emergency room except for a mild leukocytosis which could be reactive.  At this point I suspect she may have had a viral gastroenteritis which has likely caused opiate withdrawal due to her inability to take her medications.  She normally takes 8 mg of Dilaudid 3 times daily as well as 90 mg of morphine twice daily and 60 mg of morphine at noon.  -Follow-up stool studies if patient is able to sample  -IV hydration  -zoranf, immodium as needed  -Dilaudid IV as needed until she is able to tolerate p.o.    Chronic pain on opiates  She will follows with a pain clinic.  Has not been able to take opiates due to her nausea vomiting over the last few days.  She normally takes 8 mg of Dilaudid 3 times daily as well as 90 mg of morphine twice daily and 60 mg of morphine at noon.  -Resume PTA meds once med rec is complete and once patient is able to tolerate p.o. medications.  -We will treat with IV Dilaudid as mentioned above to prevent her from going into withdrawal and to control her pain symptoms.      Chronic medical conditions:-Resume PTA meds once med rec is complete  Hypertension  Dyslipidemia  Hypothyroidism  Lupus  History of VTE -Xarelto  GERD.     Code Status   Full Code  DVT ppx:  "Xarelto  Expected length of stay less than 2 days    Clinically Significant Risk Factors Present on Admission               # Drug Induced Coagulation Defect: home medication list includes an anticoagulant medication    # Hypertension: Noted on problem list      # Obesity: Estimated body mass index is 39.13 kg/m  as calculated from the following:    Height as of this encounter: 1.753 m (5' 9\").    Weight as of this encounter: 120.2 kg (265 lb).              Primary Care Physician   Arcadio Farfan    Chief Complaint   Nausea, Vomiting, and Diarrhea    History obtained from the patient    History of Present Illness   Aspen Mccullough is a 63 year old female with a past medical history of hypertension, dyslipidemia, hypothyroidism, lupus, GERD, VTE, chronic pain syndrome on opiates presents to hospital with nausea vomiting and diarrhea.  Patient reports that symptoms started on Tuesday with nausea vomiting diarrhea which continued until her presentation on Friday night.  The patient reports that she has been having multiple episodes of emesis and diarrhea without any blood.  She reports her emesis is yellow in color.  Due to her nausea and vomiting she has been unable to tolerate any significant p.o. intake to solids or liquids.  Additionally the patient has not been able to take her medications including her opiates.  Due to ongoing symptoms the patient decided to come into the hospital.  Patient has been chronically on opiates for multiple decades and follows at a pain clinic.  The patient provides no other complaints.    Past Medical History    I have reviewed this patient's medical history and updated it with pertinent information if needed.   Past Medical History:   Diagnosis Date     ADHD (attention deficit hyperactivity disorder)      Allergic rhinitis      Chronic low back pain      Cushing's syndrome (H)      DDD (degenerative disc disease)      DDD (degenerative disc disease)      Deviated nasal septum      " Diarrhea      Endometriosis      Fibromyalgia      Hashimoto's disease      Hyperlipidemia      Hypertension      Hypothyroid     hx hashimoto's thyroiditis     Insomnia      Lupus (H)      Malabsorption      Pernicious anemia      Renal disease     chronic renal insufficiency     Sinusitis        Past Surgical History   I have reviewed this patient's surgical history and updated it with pertinent information if needed.  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)     APPENDECTOMY       ARTHRODESIS ANKLE      right     ARTHROPLASTY KNEE BILATERAL       ARTHROPLASTY REVISION KNEE  4/19/2011    Procedure:ARTHROPLASTY REVISION KNEE; With Antibiotic Cement ; Surgeon:CHAO OLIVARES; Location:[facility]     BREAST SURGERY      right- tissue remove nipple area     BUNIONECTOMY  12/14/2011    Procedure:BUNIONECTOMY; Right Bunion Correction; Surgeon:GRACE ZARATE; Location:[facility]     CHOLECYSTECTOMY       EXAM UNDER ANESTHESIA ANUS N/A 7/15/2020    Procedure: EXAM UNDER ANESTHESIA, ANUS;  Surgeon: Luis Canales MD;  Location: [facility]     EXCISE MASS UPPER EXTREMITY  12/14/2011    Procedure:EXCISE MASS UPPER EXTREMITY; Excision of Left Arm Mass; Surgeon:GRACE ZARATE; Location:[facility]     FOOT SURGERY      left X 4     FUSION LUMBAR ANTERIOR ONE LEVEL       HEMORRHOIDECTOMY INTERNAL N/A 7/15/2020    Procedure: Exam under anesthesia, hemorrhoidectomy;  Surgeon: Luis Canales MD;  [facility]: [facility]     INJECT NERVE BLOCK SUPRASCAPULAR Left 5/18/2018    Procedure: INJECT NERVE BLOCK SUPRASCAPULAR;  Left Suprascapular Nerve Block;  Surgeon: Basim Velazquez MD;  [facility]: [facility]     INJECT NERVE BLOCK SUPRASCAPULAR Right 7/23/2018    Procedure: INJECT NERVE BLOCK SUPRASCAPULAR;  Right suprascapular injection;  Surgeon: Basim Velazquez MD;  [facility]     INJECT NERVE BLOCK SUPRASCAPULAR Bilateral 10/29/2018     Procedure: Bilateral Suprascapular Nerve Blocks;  Surgeon: Basim Velazquez MD;  [facility]     INJECT NERVE BLOCK SUPRASCAPULAR Bilateral 3/15/2019    Procedure: Bilateral Suprascapular Nerve Block;  Surgeon: Basim Velazquez MD;  [facility]     INJECT NERVE BLOCK SUPRASCAPULAR Bilateral 12/31/2019    Procedure: bilateral suprascapular nerve block;  Surgeon: Basim Velazquez MD;  [facility]     INJECT NERVE BLOCK SUPRASCAPULAR Bilateral 8/3/2021    Procedure: bilateral suprascapular nerve block;  Surgeon: Basim Velazquez MD;  Location: [facility]     IR ENDOVENOUS ABLATION VARICOSE VEINS  10/5/2021     IR ENDOVENOUS ABLATION VARICOSE VEINS  10/26/2021     KNEE SURGERY      see list which we will bring     LAMINECTOMY LUMBAR ONE LEVEL      L4-5     LAPAROSCOPIC ABLATION ENDOMETRIOSIS       [place] HAND/FINGER SURGERY UNLISTED  surgeries on both hands with Dr. Shankar     [place] SPINE SURGERY PROCEDURE UNLISTED      see list which we will bring     [place] STOMACH SURGERY PROCEDURE UNLISTED  gall bladder removal     RADIO FREQUENCY ABLATION PULSED CERVICAL Bilateral 7/10/2019    Procedure: Bilateral Suprascapular Nerve Pulsed Radiofrequency Ablation;  Surgeon: Basim Velazquez MD;  [facility]     REMOVE HARDWARE FOOT  12/14/2011    Procedure:REMOVE HARDWARE FOOT; Hardware Removal Right Foot (Mini-C-Arm) ; Surgeon:GRACE ZARATE; Location: [facility]     RHINOPLASTY       SALPINGO-OOPHORECTOMY BILATERAL       TONSILLECTOMY       ZZC STOMACH SURGERY PROCEDURE UNLISTED      see list which we will bring       Allergies   Allergies   Allergen Reactions     Blood Transfusion Related (Informational Only) Other (See Comments)     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      Chlorhexidine Hives     Thor surgical cleanser     Ibuprofen [Nsaids] Hives     Morphine And Related Hives     Has tolerated Oxycodone in past      Penicillins Hives     Other reaction(s): Unknown     Sulfa Antibiotics  Hives     Other reaction(s): Unknown     Calcium Channel Blockers      Doxycycline GI Disturbance     Emesis & diarrhea  Patient denies allergy to this med     Hydroxyzine      rash     Mold        Social History   I have reviewed this patient's social history and updated it with pertinent information if needed. Aspen Mccullough  reports that she quit smoking about 30 years ago. Her smoking use included cigarettes. She started smoking about 50 years ago. She has a 30.00 pack-year smoking history. She has never used smokeless tobacco. She reports that she does not drink alcohol and does not use drugs.    Family History   I have reviewed this patient's family history and updated it with pertinent information if needed.   Family History   Problem Relation Age of Onset     Hypertension Mother      No Known Problems Father      No Known Problems Sister      No Known Problems Brother      No Known Problems Maternal Grandmother      No Known Problems Maternal Grandfather      No Known Problems Paternal Grandmother      No Known Problems Other        Review of Systems   The 10 point Review of Systems is negative other than noted in the HPI or here.     Physical Exam   Temp: 99.9  F (37.7  C) Temp src: Temporal BP: (!) 157/132 Pulse: 93   Resp: 20 SpO2: 95 % O2 Device: None (Room air)    Vital Signs with Ranges  Temp:  [99.9  F (37.7  C)] 99.9  F (37.7  C)  Pulse:  [93] 93  Resp:  [20] 20  BP: (157)/(132) 157/132  SpO2:  [95 %] 95 %  265 lbs 0 oz  Physical Exam  Vitals reviewed.   Constitutional:       Appearance: Normal appearance.      Comments: Pleasant lady seen resting bed comfortably no apparent distress in emergency room.  She is well-developed and well-nourished.   HENT:      Head: Normocephalic and atraumatic.      Mouth/Throat:      Mouth: Mucous membranes are moist.      Pharynx: Oropharynx is clear.   Eyes:      Extraocular Movements: Extraocular movements intact.      Conjunctiva/sclera: Conjunctivae normal.       Pupils: Pupils are equal, round, and reactive to light.   Cardiovascular:      Rate and Rhythm: Regular rhythm. Tachycardia present.      Pulses: Normal pulses.      Heart sounds: Normal heart sounds. No murmur heard.  Pulmonary:      Effort: Pulmonary effort is normal.      Breath sounds: Normal breath sounds. No wheezing, rhonchi or rales.   Abdominal:      General: Abdomen is flat. Bowel sounds are normal. There is no distension.      Palpations: Abdomen is soft. There is no mass.      Tenderness: There is no abdominal tenderness.   Musculoskeletal:         General: No swelling. Normal range of motion.      Cervical back: Normal range of motion and neck supple.      Comments: Multiple surgical scars over right foot, bilateral knees.   Skin:     General: Skin is warm and dry.   Neurological:      General: No focal deficit present.      Mental Status: She is alert and oriented to person, place, and time. Mental status is at baseline.      Cranial Nerves: No cranial nerve deficit.           Medical Decision Making       45 MINUTES SPENT BY ME on the date of service doing chart review, history, exam, documentation & further activities per the note.

## 2023-06-24 NOTE — PHARMACY-ADMISSION MEDICATION HISTORY
Pharmacist Admission Medication History    Admission medication history is complete. The information provided in this note is only as accurate as the sources available at the time of the update.    Medication reconciliation/reorder completed by provider prior to medication history? No    Information Source(s): Patient, Family member and CareEverywhere/SureScripts via in-person    Pertinent Information: Patient states that she has unable to keep medication down since Monday 6/1923. Patient also states that she is not taking Atorvastatin.    Changes made to PTA medication list:    Added: Ozempic injection, Clonidine tablets.    Deleted: Benzonatate, Refresh eye drops, Ceftin, Restasis, Diltiazem XR, Allegra, Furosemide 40mg, Robitussin DM, Xiidra, Medrol dose josé miguel, Pred Forte eye drops.     Changed: none    Medication Affordability:  Not including over the counter (OTC) medications, was there a time in the past 3 months when you did not take your medications as prescribed because of cost?: No    Allergies reviewed with patient and updates made in EHR: yes, Patient states that she is NOT allergic to Morphine.     Medication History Completed By: Jen Paredes RPH 6/24/2023 9:38 AM    Prior to Admission medications    Medication Sig Last Dose Taking? Auth Provider Long Term End Date   albuterol (PROAIR HFA/PROVENTIL HFA/VENTOLIN HFA) 108 (90 Base) MCG/ACT inhaler Inhale 1-2 puffs into the lungs every 6 hours as needed for shortness of breath, wheezing or cough More than a month Yes Unknown, Entered By History No    azithromycin (ZITHROMAX) 250 MG tablet Take 1 tablet (250 mg) by mouth daily 6/19/2023 Yes Radha Munson MD     cloNIDine (CATAPRES) 0.2 MG tablet Take 0.2 mg by mouth 3 times daily 6/19/2023 Yes Unknown, Entered By History     cyanocobalamin 1000 MCG/ML injection Inject 1 mL (1,000 mcg) Subcutaneous every 7 days 6/16/2023 Yes Yeimy Cabrera MD     cyclobenzaprine (FLEXERIL) 10 MG  tablet Take 1 tablet (10 mg) by mouth 3 times daily 6/19/2023 Yes Yeimy Cabrera MD     furosemide (LASIX) 20 MG tablet Take 1 tablet (20 mg) by mouth every evening  Patient taking differently: Take 20 mg by mouth daily 6/19/2023 Yes Hailey Reyes DO Yes    gabapentin (NEURONTIN) 300 MG capsule Take 300 mg by mouth 2 times daily (morning and afternoon) with 800mg tablet (total dose 1100mg) 6/19/2023 Yes Unknown, Entered By History No    gabapentin (NEURONTIN) 800 MG tablet Take 800 mg by mouth At Bedtime  6/19/2023 Yes Unknown, Entered By History No    gabapentin (NEURONTIN) 800 MG tablet Take 800 mg by mouth 2 times daily (morning and afternoon) in addition to 300mg capsule (total dose 1100mg) 6/19/2023 Yes Unknown, Entered By History No    HYDROmorphone (DILAUDID) 8 MG tablet Take 8 mg by mouth 3 times daily . Max of 3 doses per day. 6/19/2023 Yes      levothyroxine (TIROSINT) 100 MCG capsule Take 300 mcg by mouth daily 6/19/2023 Yes Unknown, Entered By History No    liothyronine (CYTOMEL) 5 MCG tablet Take 5 mcg by mouth daily 6/19/2023 Yes Unknown, Entered By History No    metoprolol succinate ER (TOPROL XL) 25 MG 24 hr tablet Take 3 tablets (75 mg) by mouth 2 times daily 6/19/2023 Yes Sil Muñoz MD Yes    morphine (MS CONTIN) 30 MG 12 hr tablet Take 30 mg by mouth 2 times daily (morning and night) in addition to 60 mg tablet for a total dose of 90mg. 6/19/2023 Yes      morphine (MS CONTIN) 60 MG 12 hr tablet Take 60 mg by mouth 3 times daily (morning and night) in addition to 30mg tablet for a total dose of 90mg 6/19/2023 Yes Umm Ya APRN CNP     naloxone (NARCAN) 4 MG/0.1ML nasal spray Spray 4 mg into one nostril alternating nostrils as needed for opioid reversal every 2-3 minutes until assistance arrives Unknown Yes Unknown, Entered By History     rivaroxaban ANTICOAGULANT (XARELTO) 20 MG TABS tablet Take 1 tablet (20 mg) by mouth daily (with dinner)  6/12/2023 Yes Alen Kebede MD Yes    semaglutide (OZEMPIC, 0.25 OR 0.5 MG/DOSE,) 2 MG/3ML pen Inject 0.5 mg Subcutaneous every 7 days Wednesdays. 6/21/2023 Yes Unknown, Entered By History     sodium fluoride (SF 5000 PLUS) 1.1 % CREA Daily.  6/19/2023 Yes Unknown, Entered By History     zolpidem (AMBIEN) 5 MG tablet Take 5 mg by mouth nightly as needed for sleep Past Month Yes Unknown, Entered By History No    atorvastatin (LIPITOR) 40 MG tablet Take 1 tablet (40 mg) by mouth every evening Not taking  Sil Muñoz MD Yes    Elastic Bandages & Supports (MEDICAL COMPRESSION SOCKS) MISC 1 Package daily Please measure and distribute 2 pair of 20mmHg - 30mmHg THIGH high open or closed toe compression stockings with extra refills as indicated. Jobst ultrasheer or equivalent.   Taye Salcedo MD

## 2023-06-24 NOTE — PLAN OF CARE
Summary: N/V/D  Care Plan Summary Note:  Orientation: A&OX4  Observation Goals (met & not met): Not Met  Activity Level: Ind w/ IV pole   Fall Risk: N  Behavior & Aggression Tool Color: Green  Pain Management: Chronic generalized pain, has [facility] pain meds  ABNL VS/O2: VSS on RA  ABNL Lab/BG: K+ 3.1 & 3.7: replacement done, recheck [facility] 0600. Stool sample still needed  Diet: Clear liquids; ADAT  Bowel/Bladder: Continent  Drains/Devices: IV  mL/hr  Tests/Procedures for next shift: Stool sample  Anticipated DC date: TBD

## 2023-06-24 NOTE — ED TRIAGE NOTES
"Pt called EMS. Brought in from home. C/O nausea and vomiting and diarrhea  for 3 days. Unable to take her home meds including HTN,  chronic pain and sleep meds. Also c/o abdo cramping. Now has generalized pain. Rt foot flush red, pt report \"it is not new it turns blue at times\".        Triage Assessment     Row Name 06/23/23 2033       Triage Assessment (Adult)    Airway WDL WDL       Respiratory WDL    Respiratory WDL WDL       Skin Circulation/Temperature WDL    Skin Circulation/Temperature WDL X       Cardiac WDL    Cardiac WDL WDL       Peripheral/Neurovascular WDL    Peripheral Neurovascular WDL X       Cognitive/Neuro/Behavioral WDL    Cognitive/Neuro/Behavioral WDL WDL              "

## 2023-06-24 NOTE — PROGRESS NOTES
[facility]    Hospitalist Progress Note    Assessment & Plan   Date of Admission:  6/23/2023        Assessment & Plan  Aspen Mccullough is a 63 year old female with a past medical history of hypertension, dyslipidemia, hypothyroidism, lupus, GERD, VTE, chronic pain syndrome on opiates presents to hospital with nausea vomiting and diarrhea.     Nausea vomiting and diarrhea  The patient developed nausea, vomiting, diarrhea starting on Tuesday.  She has been unable to keep down her medications and has had minimal p.o. intake to solids and liquids.  She is unaware of any sick contacts or recent travel.  She has no recent antibiotic use.  Due to ongoing symptoms she presented to the hospital.  Work-up was relatively benign in the emergency room except for a mild leukocytosis which could be reactive.   -started ozempic 1-2 months ago and has tolerated before her acute illness  -stable meds otherwise  - At this point suspect she may have had a viral gastroenteritis which has likely caused opiate withdrawal due to her inability to take her medications.  She normally takes 8 mg of Dilaudid 3 times daily as well as MS contine 30mg bid and 60mg tid.   -had lomitil last night.   -has had several doses of iv dilaudid  - no clear w/d sxs today.   - no abd pain. No bleeding by hx  -wbc 14--> 11. Hb 14--> 12    Plan-  Reviewed med rec with pharmD  - restart PTA morphone and dilaudid as above. Narcan on mar   - hold neurontin today but consider restart this pm of doing ok  -adat, clear  - mobilize  -hold on prn lomotil for now and follow for need as starting narcotics  -Follow-up stool studies if patient is able to sample  -IV hydration  -zoranf, immodium as needed  -Dilaudid IV as needed until she is able to tolerate p.o.     Chronic pain on opiates  She will follows with a pain clinic.  Has not been able to take opiates due to her nausea vomiting over the last few days.  She normally takes 8 mg of  "Dilaudid 3 times daily as well as 90 mg of morphine twice daily and 60 mg of morphine at noon.  -med rec completed. Reviewed with pharmd  - able to take pills this am  - restart PTA pain meds as above.    - hold on restarting neurontin until tonight or tomorrow  -pt agrees with plan  - stop prn iv dilaudid        Chronic medical conditions:-Resume PTA meds once med rec is complete  Hypertension-restart bber and clonidine  Dyslipidemia  Hypothyroidism-restart meds  Lupus  History of VTE -Xarelto- restart  GERD.   Anemia; baseline Hb seems to be about 10   Am cbc           Code Status []Expand by Default  Full Code  DVT ppx: Xarelto  Expected length of stay less than 2 days likely           Clinically Significant Risk Factors Present on Admission []Expand by Default              # Drug Induced Coagulation Defect: home medication list includes an anticoagulant medication    # Hypertension: Noted on problem list      # Obesity: Estimated body mass index is 39.13 kg/m  as calculated from the following:    Height as of this encounter: 1.753 m (5' 9\").    Weight as of this encounter: 120.2 kg (265 lb).                    Rafy Morrow MD, MD  Text Page  (7am to 6pm)  Interval History   No further diarrrhea  Still with some nausea but no vomiting  Able to take pills today  Moderate frontal HA this am  No paresthesias, weakness  No diaphoresis, tremor, lacrimation or diarrhea today    -Data reviewed today: I reviewed all new labs and imaging results over the last 24 hours. I personally reviewed labs since admission    Physical Exam   Temp: 98.1  F (36.7  C) Temp src: Oral BP: (!) 150/98 Pulse: 77   Resp: 19 SpO2: 97 % O2 Device: None (Room air)    Vitals:    06/23/23 2029 06/23/23 2304   Weight: 120.2 kg (265 lb) 130 kg (286 lb 9.6 oz)     Vital Signs with Ranges  Temp:  [98.1  F (36.7  C)-99.9  F (37.7  C)] 98.1  F (36.7  C)  Pulse:  [] 77  Resp:  [18-20] 19  BP: (127-157)/() 150/98  SpO2:  [92 %-97 %] 97 " %  No intake/output data recorded.    Constitutional: Nad, appears mildly uncomfortable. nad  Respiratory: cTAB  Cardiovascular: RRR no r/g/m  GI: soft, nl BS. NT, ND  Skin/Integumen: no rash or edema. No cyanosis  Neruo: alert, nl speech and mentation.   Nl tone.no abnormal movements. No tremor  Strength 5/5 extrem X4.  Psych: nl affect.       Medications     lactated ringers 100 mL/hr at 06/24/23 0821       cloNIDine  0.2 mg Oral TID     HYDROmorphone  8 mg Oral TID     levothyroxine  300 mcg Oral Daily     liothyronine  5 mcg Oral Daily     metoprolol succinate ER  75 mg Oral BID     morphine  30 mg Oral BID     morphine  60 mg Oral TID     potassium chloride  10 mEq Intravenous Q1H     rivaroxaban ANTICOAGULANT  20 mg Oral Daily with supper       Data   Recent Labs   Lab 06/24/23  0638 06/23/23  2031   WBC 11.6* 14.7*   HGB 12.3 14.0   MCV 89 87     --     136   POTASSIUM 3.1* 3.7   CHLORIDE 101 94*   CO2 22 27   BUN 14.2 14.9   CR 0.87 0.91   ANIONGAP 13 15   KYLIE 8.4* 9.7   * 122*   ALBUMIN 4.0 4.6   PROTTOTAL 6.7 8.1   BILITOTAL 0.7 0.6   ALKPHOS 89 106*   ALT 8 11   AST 17 26       Imaging:   No results found for this or any previous visit (from the past 24 hour(s)).

## 2023-06-24 NOTE — ED NOTES
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     Chlorhexidine Hives     Thor surgical cleanser    Ibuprofen [Nsaids] Hives    Morphine And Related Hives     Has tolerated Oxycodone in past     Penicillins Hives     Other reaction(s): Unknown    Sulfa Antibiotics Hives     Other reaction(s): Unknown    Calcium Channel Blockers     Doxycycline GI Disturbance     Emesis & diarrhea  Patient denies allergy to this med    Hydroxyzine      rash    Mold        Patient Story: Aspen Mccullough is a 63 year old female who presents with  vomiting x 3 days. She has not been able to retain any of her medications including her pain medications. she reports her symptoms began after she had a spinal steriod injection on Tuesday. She has over 5 episodes of vomiting everyday, and she has not had any food or fluids; her vomiting has associated abdominal cramping. She also has had severe diarrhea. She reports feeling weakness as well.  She denies any suspicous foods. . No recent antibiotics.  She has tried taking imodium for her diarrhea which she was not able to retain. She was able to walk with EMS. She has had many spinal injections without any complications.      Focused Assessment:  N X V,generalized pain, abdo cramping    Treatments and/or interventions provided: 1L NS bolus x 2...8 mg Zofran, 2 Mg IV dilaudid.   Patient's response to treatments and/or interventions: resting comfortably, expressed pain relief. No emesis or diarrhea since admit    To be done/followed up on inpatient unit:  Cont  inpatient plan of care    Does this patient  "have any cognitive concerns?:  n/a    Activity level - Baseline/Home:  Walker  Activity Level - Current:   Stand with Assist    Patient's Preferred language: English   Needed?: No    Isolation: None  Infection: Not Applicable  Patient tested for COVID 19 prior to admission: NO  Bariatric?: No    Vital Signs:   Vitals:    06/23/23 2029 06/23/23 2030   BP:  (!) 157/132   Pulse: 93    Resp:  20   Temp: 99.9  F (37.7  C)    TempSrc: Temporal    SpO2: 95%    Weight: 120.2 kg (265 lb)    Height: 1.753 m (5' 9\")        Cardiac Rhythm:     Was the PSS-3 completed:   Yes  What interventions are required if any?               Family Comments:  at bedside for a while then went home   OBS brochure/video discussed/provided to patient/family: Yes                For the majority of the shift this patient's behavior was Green.   Behavioral interventions performed were n/a.    ED NURSE PHONE NUMBER: [phone]         "

## 2023-06-24 NOTE — ED PROVIDER NOTES
History     Chief Complaint:  Vomiting      HPI   Aspen Mccullough is a 63 year old female who presents with three days of vomiting. She has not been able to retain any of her medications inluding her pain medications. Upon evaluation she reports her symptoms began after she had a spinal steriod injection on Tuesday. She has over 5 episodes of vomiting everyday, and she has not had any food or fluids; her vomiting has associated abdominal cramping. She also has had severe diarrhea. She reports feeling weakness as well. En route with EMS her blood pressure was 186/110, her BG was 147, and she was given Zofran and 250 ccs of NS. She denies any suspicous foods. She does not know anyone in her vacinity with similar symptoms. No recent antibiotics. No recent travel. She has tried taking imodium for her diarrhea which she was not able to retain. She was able to walk with EMS. She has had many spinal injections without any complications.       Independent Historian:   EMS - They report as noted above.     Review of External Notes:   I reviewed the office visit from 3/30/2023      Medications:    Albuterol   Atorvastatin   Cefuroxime   Cyanocobalamin  Cyclobenzaprine   Diltiazem   Fexofenadine   Furosemide   Gabapentin   Hydromorphone   Levothyroxine   Liothyronine   Methylprednisolone   Metoprolol succinate   Morphine   Naloxone   Prednisone   Xarelto   Zolpidem     Past Medical History:    ADHD  Allergic rhinitis   Chronic low back pain   Cushing's syndrome    DDD   Deviated nasal septum    Endometriosis   Fibromyalgia   Hashimoto's disease   Hyperlipidemia   Hypertension    Insomnia   Lupus    Malabsorption   Pernicious anemia   Renal disease Sinusitis    Generalized osteoarthrosis  Complex regional pain syndrome  Somatoform disorder  Histrionic personality   Hypothyroidism  Insomnia  Hyperlipidemia  Hashimoto's thyroiditis  Glucocorticoid deficiency   Posttraumatic stress disorder  Impingement syndrome of left  "shoulder  Cushing's syndrome   Endometriosis  Essential hypertension  Systemic lupus erythematosus   Paroxysmal atrial fibrillation   Nonischemic cardiomyopathy   DVT   PE  Alopecia areata  Anemia, blood loss  ARF   Arthritis  Chronic ethmoidal sinusitis  Chronic maxillary sinusitis  Deviated nasal septum  Complications due to internal joint prosthesis   Iatrogenic hyperthyroidism  Lipoma of skin and subcutaneous tissue  Malabsorption  Nasal turbinate hypertrophy  Opioid type dependence   Postoperative hypotension  Thrombus of right atrial appendage  Chronic pain of both shoulders  GI bleed  Grade III internal hemorrhoids  Atrial fibrillation with RVR   Pneumonia  Morbid obesity  Cerebrovascular accident   Neuropathy  Stage 3 wound right buttock  Craniofacial hyperhidrosis  Current chronic use of systemic steroids  Hypertonic bladder  Lumbosacral radiculopathy at S1  Overflow diarrhea  Refusal of blood transfusions as patient is Sikh  Acute respiratory failure with hypoxia   History of ischemic stroke  Pressure injury of sacral region  Pneumonia  RSV     Past Surgical History:    Cholecystectomy   Arthrodesis ankle   Appendectomy   Breast surgery   Ablation endometriosis   Tonsillectomy   Knee arthoplasty bilateral   Amputation  Fusion lumbar anterior one level   Arthoplasty revision knee   Bunionectomy   Excise mass upper extremity   Sarcoplasty   Stomach surgery   IR endovenous ablation varicose veins   Inject nerve block   Hemorrhoidectomy       Physical Exam     Patient Vitals for the past 24 hrs:   BP Temp Temp src Pulse Resp SpO2 Height Weight   06/23/23 2030 (!) 157/132 -- -- -- 20 -- -- --   06/23/23 2029 -- 99.9  F (37.7  C) Temporal 93 -- 95 % 1.753 m (5' 9\") 120.2 kg (265 lb)        Physical Exam  Nursing note and vitals reviewed.    Constitutional:  Appears uncomfortable.    HENT:                Nose normal.  No discharge.      Oral mucosa is dry  Eyes:    Conjunctivae are normal without " "injection.  Pupils are equal.  Cardiovascular:  Normal rate, regular rhythm with normal S1 and S2.      Normal heart sounds and peripheral pulses 2+ and equal.       No murmur or jeannie.  Pulmonary:  Effort normal and breath sounds clear to auscultation bilaterally.    No respiratory distress.     GI:    Soft. No distension and no mass.  Very mild diffuse tenderness, no focal pain or rebound or guarding.     No flank pain.    Musculoskeletal:  Normal range of motion. No extremity deformity.     No edema and no tenderness.  Patient has multiple scars on the right foot and ankle.  The right foot is hyperemic in color compared to the left foot but she said this is chronic.  Strong pulse and cap refill is normal  Neurological:   Alert and oriented. No focal weakness.  Skin:    Skin is warm and dry. No rash noted. Multiple scars on right ankle and foot. Right foot and ankle is hyperemic compared to left she reports that this is \"normal and chronic for her\"   Psychiatric:   Behavior is normal. Appropriate mood and affect.     Judgment and thought content normal.       Emergency Department Course      Laboratory:  Labs Ordered and Resulted from Time of ED Arrival to Time of ED Departure   COMPREHENSIVE METABOLIC PANEL - Abnormal       Result Value    Sodium 136      Potassium 3.7      Chloride 94 (*)     Carbon Dioxide (CO2) 27      Anion Gap 15      Urea Nitrogen 14.9      Creatinine 0.91      Calcium 9.7      Glucose 122 (*)     Alkaline Phosphatase 106 (*)     AST 26      ALT 11      Protein Total 8.1      Albumin 4.6      Bilirubin Total 0.6      GFR Estimate 70     CBC WITH PLATELETS AND DIFFERENTIAL - Abnormal    WBC Count 14.7 (*)     RBC Count 4.82      Hemoglobin 14.0      Hematocrit 41.8      MCV 87      MCH 29.0      MCHC 33.5      RDW 14.8      Platelet Count        % Neutrophils 81      % Lymphocytes 11      % Monocytes 7      % Eosinophils 0      % Basophils 0      % Immature Granulocytes 1      NRBCs per 100 " WBC 0      Absolute Neutrophils 11.8 (*)     Absolute Lymphocytes 1.6      Absolute Monocytes 1.1      Absolute Eosinophils 0.0      Absolute Basophils 0.1      Absolute Immature Granulocytes 0.1      Absolute NRBCs 0.0     RBC AND PLATELET MORPHOLOGY - Abnormal    Platelet Assessment Platelets Clumped (*)     RBC Morphology Confirmed RBC Indices     ROUTINE UA WITH MICROSCOPIC REFLEX TO CULTURE        Emergency Department Course & Assessments:  Interventions:  Medications   ondansetron (ZOFRAN) injection 4 mg (4 mg Intravenous $Given 6/23/23 2132)   0.9% sodium chloride BOLUS (1,000 mLs Intravenous $New Bag 6/23/23 2035)   diphenoxylate-atropine (LOMOTIL) 2.5-0.025 MG per tablet 2 tablet (2 tablets Oral $Given 6/23/23 2201)   HYDROmorphone (DILAUDID) injection 2 mg (1 mg Intravenous $Given 6/23/23 2131)   HYDROmorphone (DILAUDID) injection 1 mg (1 mg Intravenous $Given 6/23/23 2143)   ondansetron (ZOFRAN) injection 4 mg (4 mg Intravenous $Given 6/23/23 2149)      Assessments:  2025 I obtained the history and examined the patient as noted above.   2132 I rechecked the patient. She is still feeling nauseous.     Independent Interpretation (X-rays, CTs, rhythm strip):  None    Consultations/Discussion of Management or Tests:  2153 I spoke with the hospitalist Dr. Farrar regarding admission.     Social Determinants of Health affecting care:   None    Disposition:  The patient was admitted to the hospital under the care of Dr. Farrar.     Impression & Plan      Medical Decision Making:  Patient comes in with nausea vomiting and diarrhea for the past 3 days.  She has significant opioid dependence for chronic pain and is on 8 mg of Dilaudid 3 times a day, MS Contin 90 mg twice daily and 60 mg orally in the middle of the day.  She has not been able to keep any of these down the last 3 days.  She feels very weak and looked dehydrated when she came in.  EMS had given her dose of Zofran and she was given  2 further doses  of Zofran here in the emergency room.  She is on her second liter of fluids at this time, labs were obtained and her comprehensive metabolic panel is essentially normal, glucose of 122, CBC shows a white count of 14.7 which is elevated and hemoglobin that is normal.  I believe the white count is elevated due to her dehydration and vomiting.  She does not have any focal evidence of a significant process going on in her abdomen.  This erythematous right lower leg is chronic and the patient said its been that way for a long time in many years.  The patient was given 2 mg of IV Dilaudid.  I think that initially this may have started out as either a viral enteritis or food poisoning but she is also having some opiate withdrawal.  I am not comfortable sending this patient home.  I think she needs fluids overnight, will start replenishing her opioid needs with IV doses and then hopefully tomorrow's can start tolerating oral.  I talked with Dr. Farrar who will be admitting the patient.        Diagnosis:    ICD-10-CM    1. Nausea vomiting and diarrhea  R11.2     R19.7       2. Opioid dependence with withdrawal (H)  F11.23       3. Chronic pain syndrome  G89.4              Scribe Disclosure:  I, Alcon Kjer, am serving as a scribe at 8:38 PM on 6/23/2023 to document services personally performed by Luma Calles MD based on my observations and the provider's statements to me.   6/23/2023   Luma Calles MD Powell, Tracy Alan, MD  06/23/23 3311

## 2023-06-24 NOTE — PROGRESS NOTES
Observation goals  PRIOR TO DISCHARGE       Comments:   -diagnostic tests and consults completed and resulted -Not Met  -vital signs normal or at patient baseline -Met  Nurse to notify provider when observation goals have been met and patient is ready for discharge.

## 2023-06-24 NOTE — ED NOTES
Bed: ED03  Expected date:   Expected time:   Means of arrival:   Comments:  451 when clean 63 HTN N/V

## 2023-06-25 NOTE — PROGRESS NOTES
diagnostic tests and consults completed and resulted No  -vital signs normal or at patient baseline  Yes

## 2023-06-25 NOTE — PLAN OF CARE
Goal Outcome Evaluation:  A&OX4, VSS on RA. Chronic pain managed with scheduled pain meds, denies any other pain. All discharge meds and instructions reviewed with pt, pt verbalized understanding. Tolerating regular diet. Pt to be picked up by spouse around 1600 pm. All belongings returned pt. Continue to monitor.

## 2023-06-25 NOTE — PLAN OF CARE
Observation goals  PRIOR TO DISCHARGE       Comments: -diagnostic tests and consults completed and resulted Met  -vital signs normal or at patient baseline Met   Nurse to notify provider when observation goals have been met and patient is ready for discharge.    Pt awaiting ride to discharge

## 2023-06-25 NOTE — DISCHARGE SUMMARY
[facility]    Discharge Summary  Hospitalist    Date of Admission:  6/23/2023  Date of Discharge:  6/25/2023  Discharging Provider: Rafy Morrow MD, MD    Discharge Diagnoses    Nausea, vomiting, diarrhea, acute, suspect 2/2 viral gastroenteritis, resolved. Likely component of narcotic withdrawal as well.     History of Present Illness   Aspen Mccullough is a 63 year old female with a past medical history of hypertension, dyslipidemia, hypothyroidism, lupus, GERD, VTE, chronic pain syndrome on opiates presents to hospital with nausea vomiting and diarrhea.  Patient reports that symptoms started on Tuesday with nausea vomiting diarrhea which continued until her presentation on Friday night.  The patient reports that she has been having multiple episodes of emesis and diarrhea without any blood.  She reports her emesis is yellow in color.  Due to her nausea and vomiting she has been unable to tolerate any significant p.o. intake to solids or liquids.  Additionally the patient has not been able to take her medications including her opiates.  Due to ongoing symptoms the patient decided to come into the hospital.  Patient has been chronically on opiates for multiple decades and follows at a pain clinic.  The patient provides no other complaints.          Hospital Course   Aspen Mccullough was admitted on 6/23/2023.  The following problems were addressed during her hospitalization:    Principal Problem:    Opioid dependence with withdrawal (H)  Active Problems:    Nausea vomiting and diarrhea    Date of Admission:  6/23/2023        Assessment & Plan  Aspen Mccullough is a 63 year old female with a past medical history of hypertension, dyslipidemia, hypothyroidism, lupus, GERD, VTE, chronic pain syndrome on opiates presents to hospital with nausea vomiting and diarrhea.     Nausea vomiting and diarrhea  The patient developed nausea, vomiting, diarrhea starting on Tuesday.  She has been  unable to keep down her medications and has had minimal p.o. intake to solids and liquids.  She is unaware of any sick contacts or recent travel.  She has no recent antibiotic use.  Due to ongoing symptoms she presented to the hospital.  Work-up was relatively benign in the emergency room except for a mild leukocytosis which could be reactive.   -started ozempic 1-2 months ago and has tolerated before her acute illness  -stable meds otherwise  - She normally takes 8 mg of Dilaudid 3 times daily as well as MS contine 30mg bid and 60mg tid.   -had lomitil last night.   -has had several doses of iv dilaudid  - no clear w/d sxs today.   - no abd pain. No bleeding by hx  -wbc 14--> 11--> 8.7.  Hb 11 range post fluids.   - pt not require further lomotil  Tolerated resumption of diet and advanced to regular diet  - no n/v/d in hospital.  No stool sample collected  - up ambulating, feeling fine and ready for discharge  - suspect she may have had a viral gastroenteritis which likely caused mild opiate withdrawal due to her inability to take her medications.    -back on PTA medications  - has scheduled pcp follow up next week already arranged.      Chronic pain on opiates  She will follows with a pain clinic.  Has not been able to take opiates due to her nausea vomiting over the last few days.  She normally takes 8 mg of Dilaudid 3 times daily as well as 90 mg of morphine twice daily and 60 mg of morphine at noon.  -med rec completed. Reviewed with pharmd  - pt transitioned to PTA meds on 6/24, tolerated and doing well.   pcp follow up       Chronic medical conditions:-Resume PTA meds once med rec is complete  Hypertension-restarted bber and clonidine, normotensive, doing well   Dyslipidemia  Hypothyroidism-restart meds  Lupus  History of VTE -Xarelto- restart  GERD.   Anemia; baseline Hb seems to be about 10   Am cbc           Code Status []?Expand by Default  DNR/DNI  Discussed in detail with patient. Reaffirmed dnr/dni  "status even in setting of needing only short term intubation such as in case of accidental narcotic overdose. Pt adament about being dnr/dni       DVT ppx: Xarelto  Expected length of stay less than 2 days likely           Clinically Significant Risk Factors Present on Admission []?Expand by Default               # Drug Induced Coagulation Defect: home medication list includes an anticoagulant medication    # Hypertension: Noted on problem list      # Obesity: Estimated body mass index is 39.13 kg/m  as calculated from the following:    Height as of this encounter: 1.753 m (5' 9\").    Weight as of this encounter: 120.2 kg (265 lb).                  Rafy Morrow MD, MD    Significant Results and Procedures   See hospital course    Pending Results   none  Unresulted Labs Ordered in the Past 30 Days of this Admission     No orders found from 5/24/2023 to 6/24/2023.          Code Status   DNR / DNI    Discussed in detail with patient. Reaffirmed dnr/dni status even in setting of needing only short term intubation such as in case of accidental narcotic overdose. Pt adament about being dnr/dni        Primary Care Physician   Arcadio Farfan    Physical Exam   Temp: 97.5  F (36.4  C) Temp src: Oral BP: 134/78 Pulse: 62   Resp: 16 SpO2: 96 % O2 Device: None (Room air)    Vitals:    06/23/23 2029 06/23/23 2304   Weight: 120.2 kg (265 lb) 130 kg (286 lb 9.6 oz)     Vital Signs with Ranges  Temp:  [97.5  F (36.4  C)-98.4  F (36.9  C)] 97.5  F (36.4  C)  Pulse:  [61-79] 62  Resp:  [16-19] 16  BP: (117-154)/(78-97) 134/78  SpO2:  [93 %-98 %] 96 %  I/O last 3 completed shifts:  In: -   Out: 1550 [Urine:1550]    Constitutional: nad, appears comfortable   Respiratory: CTAB  Cardiovascular: RRR no r/g/m  GI: soft, nt nd  Skin: no rash or edema  Musculoskeletal: no focal jt swelling or rednss  Neurologic: nl speech and mentation. Alert, coherent   Neuropsychiatric: nl affect    Discharge Disposition   Discharged to " home  Condition at discharge: Good    Consultations This Hospital Stay   None    Time Spent on this Encounter   IRafy MD, personally saw the patient today and spent less than or equal to 30 minutes discharging this patient.    Discharge Orders      Reason for your hospital stay    Nausea, vomiting diarrhea, suspect from viral gastroenteritis with possible component of narcotic withdrawal, resolved.     Follow-up and recommended labs and tests     Follow up with primary care doctor next week     Activity    Your activity upon discharge: activity as tolerated     Discharge Instructions    Restart your lasix in 1-2 days if drinking and eating fine     Diet    Follow this diet upon discharge: Orders Placed This Encounter      Regular Diet Adult     Discharge Medications   Current Discharge Medication List      CONTINUE these medications which have NOT CHANGED    Details   albuterol (PROAIR HFA/PROVENTIL HFA/VENTOLIN HFA) 108 (90 Base) MCG/ACT inhaler Inhale 1-2 puffs into the lungs every 6 hours as needed for shortness of breath, wheezing or cough      azithromycin (ZITHROMAX) 250 MG tablet Take 1 tablet (250 mg) by mouth daily  Qty: 4 tablet, Refills: 0    Associated Diagnoses: Pneumonia due to infectious organism, unspecified laterality, unspecified part of lung      cloNIDine (CATAPRES) 0.2 MG tablet Take 0.2 mg by mouth 3 times daily      cyanocobalamin 1000 MCG/ML injection Inject 1 mL (1,000 mcg) Subcutaneous every 7 days  Qty: 4 mL, Refills: 5    Associated Diagnoses: Other vitamin B12 deficiency anemia      cyclobenzaprine (FLEXERIL) 10 MG tablet Take 1 tablet (10 mg) by mouth 3 times daily  Qty: 90 tablet, Refills: 3    Associated Diagnoses: Generalized osteoarthrosis, involving multiple sites      furosemide (LASIX) 20 MG tablet Take 1 tablet (20 mg) by mouth every evening  Qty: 60 tablet, Refills: 3    Associated Diagnoses: Heart failure with preserved ejection fraction, NYHA class I (H)       gabapentin (NEURONTIN) 300 MG capsule Take 300 mg by mouth 2 times daily (morning and afternoon) with 800mg tablet (total dose 1100mg)      !! gabapentin (NEURONTIN) 800 MG tablet Take 800 mg by mouth At Bedtime       !! gabapentin (NEURONTIN) 800 MG tablet Take 800 mg by mouth 2 times daily (morning and afternoon) in addition to 300mg capsule (total dose 1100mg)      HYDROmorphone (DILAUDID) 8 MG tablet Take 8 mg by mouth 3 times daily . Max of 3 doses per day.    Associated Diagnoses: Chronic pain syndrome      levothyroxine (TIROSINT) 100 MCG capsule Take 300 mcg by mouth daily      liothyronine (CYTOMEL) 5 MCG tablet Take 5 mcg by mouth daily      metoprolol succinate ER (TOPROL XL) 25 MG 24 hr tablet Take 3 tablets (75 mg) by mouth 2 times daily  Qty: 540 tablet, Refills: 2    Associated Diagnoses: Chronic atrial fibrillation (H)      !! morphine (MS CONTIN) 30 MG 12 hr tablet Take 30 mg by mouth 2 times daily (morning and night) in addition to 60 mg tablet for a total dose of 90mg.  Qty: 270 tablet, Refills: 0    Associated Diagnoses: Chronic pain syndrome      !! morphine (MS CONTIN) 60 MG 12 hr tablet Take 60 mg by mouth 3 times daily (morning and night) in addition to 30mg tablet for a total dose of 90mg  Qty: 30 tablet, Refills: 0    Associated Diagnoses: Chronic pain syndrome      naloxone (NARCAN) 4 MG/0.1ML nasal spray Spray 4 mg into one nostril alternating nostrils as needed for opioid reversal every 2-3 minutes until assistance arrives      rivaroxaban ANTICOAGULANT (XARELTO) 20 MG TABS tablet Take 1 tablet (20 mg) by mouth daily (with dinner)      semaglutide (OZEMPIC, 0.25 OR 0.5 MG/DOSE,) 2 MG/3ML pen Inject 0.5 mg Subcutaneous every 7 days Wednesdays.      sodium fluoride (SF 5000 PLUS) 1.1 % CREA Apply to affected area daily      zolpidem (AMBIEN) 5 MG tablet Take 5 mg by mouth nightly as needed for sleep Quantity #15 for 30 days supply per prescribing MD.      atorvastatin (LIPITOR) 40 MG  tablet Take 1 tablet (40 mg) by mouth every evening  Qty: 90 tablet, Refills: 2    Associated Diagnoses: Hyperlipidemia LDL goal <100      Elastic Bandages & Supports (MEDICAL COMPRESSION SOCKS) MISC 1 Package daily Please measure and distribute 2 pair of 20mmHg - 30mmHg THIGH high open or closed toe compression stockings with extra refills as indicated. Jobst ultrasheer or equivalent.  Qty: 2 each, Refills: 3    Associated Diagnoses: Symptomatic varicose veins of both lower extremities       !! - Potential duplicate medications found. Please discuss with provider.        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      Chlorhexidine Hives     Thor surgical cleanser     Ibuprofen [Nsaids] Hives     Morphine And Related Hives     Has tolerated Oxycodone in past      Penicillins Hives     Other reaction(s): Unknown     Sulfa Antibiotics Hives     Other reaction(s): Unknown     Calcium Channel Blockers      Doxycycline GI Disturbance     Emesis & diarrhea  Patient denies allergy to this med     Hydroxyzine      rash     Mold      Data   Most Recent 3 CBC's:Recent Labs   Lab Test 06/25/23  0723 06/24/23  0638 06/23/23 2031 01/17/23  0432   WBC 8.7 11.6* 14.7* 10.8   HGB 11.8 12.3 14.0 10.2*   MCV 91 89 87 92    329  --  234      Most Recent 3 BMP's:  Recent Labs   Lab Test 06/25/23  0723 06/24/23  1541 06/24/23  0638 06/23/23 2031     --  136 136   POTASSIUM 4.2 3.7 3.1* 3.7   CHLORIDE 101  --  101 94*   CO2 27  --  22 27   BUN 8.5  --  14.2 14.9   CR 0.86  --  0.87 0.91   ANIONGAP 9  --  13 15   KYLIE 9.0  --  8.4* 9.7   *  --  109* 122*     Most Recent 2 LFT's:  Recent Labs   Lab Test 06/24/23  0638 06/23/23 2031   AST 17 26   ALT 8 11   ALKPHOS 89 106*   BILITOTAL 0.7 0.6     Most Recent INR's and Anticoagulation Dosing History:  Anticoagulation Dose History  More data  exists       Latest Ref Rng & Units 4/21/2011 4/22/2011 4/23/2011 5/6/2014   Recent Dosing and Labs   warfarin (COUMADIN) tablet 2.5 mg - - - 2.5 mg, $Given -   warfarin (COUMADIN) tablet 7.5 mg - 7.5 mg, $Given 7.5 mg, $Given - -   INR 0.85 - 1.15 1.25  1.43  2.40  1.05            1/22/2015 1/22/2020 10/30/2021   Recent Dosing and Labs   warfarin (COUMADIN) tablet 2.5 mg - - -   warfarin (COUMADIN) tablet 7.5 mg - - -   INR 1.08  1.63  1.76      Most Recent 3 Troponin's:  Recent Labs   Lab Test 06/06/21  1459 09/21/18  1849   TROPI <0.015 <0.015     Most Recent Cholesterol Panel:  Recent Labs   Lab Test 06/06/21  1459   CHOL 303*   *   HDL 70   TRIG 259*     Most Recent 6 Bacteria Isolates From Any Culture (See EPIC Reports for Culture Details):  Recent Labs   Lab Test 11/21/15  2110   CULT No Beta Streptococcus isolated     Most Recent TSH, T4 and A1c Labs:  Recent Labs   Lab Test 08/08/22 2006 06/07/21  0003   TSH 25.04*  --    T4 0.71*  --    A1C  --  5.7*     Results for orders placed or performed in visit on 06/20/23   X-ray Lumbar epidural injection    Narrative    Left L3-4 transforaminal epidural steroid injection 6/20/2023    Provided History: Lumbar disc herniation with radiculopathy; left  sided L3/4 transForaminal injection OR radiologist's choice based on  MRI and patient symptoms  ICD-10: Lumbar spondylosis without myelopathy.    Comparison: MRI lumbar spine 3/30/2023. Images from previous epidural  injection on 6/2/2022.    Procedure: The patient's prior imaging was carefully reviewed. Fusion  instrumentation from L4-S1 noted. After discussing symptomatology with  the patient and visualizing the appropriate imaging studies, it was  determined to utilize a transforaminal approach on the left at the  level of L3-4.    Consent was obtained from the patient. Benefits and risk of the  procedure were explained to the patient, including, but not limited  to, new or worsening pain, nerve injury, the  possibility of spinal  anesthesia, lower extremity weakness, infection, and/or bleeding, or  the possibility that this procedure may not relieve or find the exact  etiology of the patient's symptoms.    After obtaining verbal and written consent, the patient was placed  prone on the fluoroscopy table and the skin of the back was prepped  and draped in the normal sterile fashion. 1% lidocaine was used to  anesthetize the skin and subcutaneous tissues. Using fluoroscopic  guidance, a 5 inch, 22-gauge spinal needle was advanced into the  epidural space. 1.5 cc of [id number]-M-[id number] was injected to verify an  epidural location of the needle tip. After verifying an epidural  location, a mixture of 1 cc 0.5% bupivacaine, 1 cc of Depo-Medrol (80  mg/mL), and 1 cc of preservative-free saline was then injected into  the epidural space. The needle was removed with the stylet in place.  The patient tolerated the procedure well and there were no immediate  complications. The patient was observed for 15 minutes after the  procedure and left the department in good condition.    Fluoroscopy time: 33.7 seconds.    The patient reported a 8/10 pain scale rating prior to the procedure.  After the procedure, the patient rated the pain level as a 1/10.      Impression    Impression:  Successful left L3-4 transforaminal epidural steroid  injection.    MEE ABBOTT MD         SYSTEM ID:  [id number]     *Note: Due to a large number of results and/or encounters for the requested time period, some results have not been displayed. A complete set of results can be found in Results Review.

## 2023-06-26 NOTE — PROGRESS NOTES
Clinic Care Coordination Contact  [facility]: Post-Discharge Note  SITUATION                                                      Admission:    Admission Date: 06/23/23   Reason for Admission: Opioid dependence with withdrawal (H)    Nausea vomiting and diarrhea  Discharge:   Discharge Date: 06/25/23  Discharge Diagnosis: Opioid dependence with withdrawal (H)    Nausea vomiting and diarrhea    BACKGROUND                                                      Per hospital discharge summary and inpatient provider notes:  Aspen Mccullough was admitted on 6/23/2023.  The following problems were addressed during her hospitalization:     Principal Problem:    Opioid dependence with withdrawal (H)  Active Problems:    Nausea vomiting and diarrhea     Date of Admission:  6/23/2023        Assessment & Plan  Aspen Mccullough is a 63 year old female with a past medical history of hypertension, dyslipidemia, hypothyroidism, lupus, GERD, VTE, chronic pain syndrome on opiates presents to hospital with nausea vomiting and diarrhea.     Nausea vomiting and diarrhea  The patient developed nausea, vomiting, diarrhea starting on Tuesday.  She has been unable to keep down her medications and has had minimal p.o. intake to solids and liquids.  She is unaware of any sick contacts or recent travel.  She has no recent antibiotic use.  Due to ongoing symptoms she presented to the hospital.  Work-up was relatively benign in the emergency room except for a mild leukocytosis which could be reactive.   -started ozempic 1-2 months ago and has tolerated before her acute illness  -stable meds otherwise  - She normally takes 8 mg of Dilaudid 3 times daily as well as MS contine 30mg bid and 60mg tid.   -had lomitil last night.   -has had several doses of iv dilaudid  - no clear w/d sxs today.   - no abd pain. No bleeding by hx  -wbc 14--> 11--> 8.7.  Hb 11 range post fluids.   - pt not require further lomotil  Tolerated resumption of diet and  advanced to regular diet  - no n/v/d in hospital.  No stool sample collected  - up ambulating, feeling fine and ready for discharge  - suspect she may have had a viral gastroenteritis which likely caused mild opiate withdrawal due to her inability to take her medications.    -back on PTA medications  - has scheduled pcp follow up next week already arranged.      Chronic pain on opiates  She will follows with a pain clinic.  Has not been able to take opiates due to her nausea vomiting over the last few days.  She normally takes 8 mg of Dilaudid 3 times daily as well as 90 mg of morphine twice daily and 60 mg of morphine at noon.  -med rec completed. Reviewed with pharmd  - pt transitioned to PTA meds on 6/24, tolerated and doing well.   pcp follow up       Chronic medical conditions:-Resume PTA meds once med rec is complete  Hypertension-restarted bber and clonidine, normotensive, doing well   Dyslipidemia  Hypothyroidism-restart meds  Lupus  History of VTE -Xarelto- restart  GERD.   Anemia; baseline Hb seems to be about 10   Am Brennen Mccullough was admitted on 6/23/2023.  The following problems were addressed during her hospitalization:     Principal Problem:    Opioid dependence with withdrawal (H)  Active Problems:    Nausea vomiting and diarrhea     Date of Admission:  6/23/2023        Assessment & Plan  Aspen Mccullough is a 63 year old female with a past medical history of hypertension, dyslipidemia, hypothyroidism, lupus, GERD, VTE, chronic pain syndrome on opiates presents to hospital with nausea vomiting and diarrhea.     Nausea vomiting and diarrhea  The patient developed nausea, vomiting, diarrhea starting on Tuesday.  She has been unable to keep down her medications and has had minimal p.o. intake to solids and liquids.  She is unaware of any sick contacts or recent travel.  She has no recent antibiotic use.  Due to ongoing symptoms she presented to the hospital.  Work-up was relatively benign in the  emergency room except for a mild leukocytosis which could be reactive.   -started ozempic 1-2 months ago and has tolerated before her acute illness  -stable meds otherwise  - She normally takes 8 mg of Dilaudid 3 times daily as well as MS contine 30mg bid and 60mg tid.   -had lomitil last night.   -has had several doses of iv dilaudid  - no clear w/d sxs today.   - no abd pain. No bleeding by hx  -wbc 14--> 11--> 8.7.  Hb 11 range post fluids.   - pt not require further lomotil  Tolerated resumption of diet and advanced to regular diet  - no n/v/d in hospital.  No stool sample collected  - up ambulating, feeling fine and ready for discharge  - suspect she may have had a viral gastroenteritis which likely caused mild opiate withdrawal due to her inability to take her medications.    -back on PTA medications  - has scheduled pcp follow up next week already arranged.      Chronic pain on opiates  She will follows with a pain clinic.  Has not been able to take opiates due to her nausea vomiting over the last few days.  She normally takes 8 mg of Dilaudid 3 times daily as well as 90 mg of morphine twice daily and 60 mg of morphine at noon.  -med rec completed. Reviewed with pharmd  - pt transitioned to PTA meds on 6/24, tolerated and doing well.   pcp follow up       Chronic medical conditions:-Resume PTA meds once med rec is complete  Hypertension-restarted bber and clonidine, normotensive, doing well   Dyslipidemia  Hypothyroidism-restart meds  Lupus  History of VTE -Xarelto- restart  GERD.   Anemia; baseline Hb seems to be about 10   Am cbc    ASSESSMENT           Discharge Assessment  How are you doing now that you are home?: Caller spoke with Pt's spouse who reports Pt is feeling better.  How are your symptoms? (Red Flag symptoms escalate to triage hotline per guidelines): Improved  Do you feel your condition is stable enough to be safe at home until your provider visit?: Yes  Does the patient have their discharge  instructions? : Yes  Does the patient have questions regarding their discharge instructions? : No  Were you started on any new medications or were there changes to any of your previous medications? : Yes  Does the patient have all of their medications?: Yes  Do you have questions regarding any of your medications? : No  Do you have all of your needed medical supplies or equipment (DME)?  (i.e. oxygen tank, CPAP, cane, etc.): Yes  Discharge follow-up appointment scheduled within 14 calendar days? : No (Family will schedule as needed)              PLAN                                                      Outpatient Plan:     Follow up with primary care doctor next week         No future appointments.      For any urgent concerns, please contact our 24 hour nurse triage line: [phone] ([phone])         NBA Wise  [facility]  [facility]     *Connected Care Resource Team does NOT follow patient ongoing. Referrals are identified based on internal discharge reports and the outreach is to ensure patient has an understanding of their discharge instructions.

## 2023-07-21 PROBLEM — R11.2 NAUSEA AND VOMITING, UNSPECIFIED VOMITING TYPE: Status: ACTIVE | Noted: 2023-01-01

## 2023-07-21 PROBLEM — T88.7XXA MEDICATION SIDE EFFECTS: Status: ACTIVE | Noted: 2023-01-01

## 2023-07-21 PROBLEM — R10.9 ABDOMINAL CRAMPING: Status: ACTIVE | Noted: 2023-01-01

## 2023-07-21 NOTE — H&P
[facility]  History and Physical   Hospitalist  Tien Jiménez MD       Aspen Mccullough MRN# [id number]   YOB: 1960 Age: 63 year old      Date of Admission:  7/20/2023         Assessment and Plan:   Aspen Mccullough is a 63 year old morbidly obese female with extensive PMH significant for hypertension, dyslipidemia, hypothyroidism, lupus, GERD, VTE, paroxysmal a fib (on Xarelto), chronic pain syndrome on opiates, h/o CVA, ADD, PTSD who presented to ED with nausea vomiting and cramping abdominal pain, admitted observation 7/21/23.    Nausea, vomiting, cramping abdominal pain  likely intolerance/adverse effect to Ozempic injection  - was recently admitted from 6/23 to 6/25/23 for similar presentation at which time workup was unremarkable and she was thought to have likely viral gastroenteritis. She has also recently started on Ozempic and there is some concern if her symptoms are related to intolerance to Ozempic; last injection was 2 days PTA  -initial workup mostly unremarkable including normal LFTs, lipase slightly elevated 109, WBC 10.9  -CT abdomen with nothing acute, noted colonic diverticulosis without evidence of diverticulitis  -will admit for observation  -will start clear liquid diet and advance as tolerated  -will resume her PTA chronic PO narcotics and also have IV Dilaudid PRN if unable to tolerate PO  -IVF with NS plus KCl at 100 ml/hr while holding PTA PO Lasix      Other chronic medical conditions: (will resume appropriate PTO PO medications)    Hypertension-- resume PTA clonidine, Toprol-XL  Dyslipidemia- hold Lipitor while under observation status  morbid obesity, On-Q weekly Ozempic injection  chronic pain disorder/fibromyalgia/neuropathy: h/o multiple surgeries including spine and knee surgery ; has a controlled substance agreement and follows pain clinic; PTA on Neurontin, Dilaudid, MS Contin-- resume when verified  H/o DVT/PE, paroxysmal atrial  "fibrillation: will resume PTA Xarelto  Hypothyroidism, h/o Hashimoto thyroiditis: resume PTA Synthroid/ Cytomel    Clinically Significant Risk Factors Present on Admission               # Drug Induced Coagulation Defect: home medication list includes an anticoagulant medication    # Hypertension: Noted on problem list      # Severe Obesity: Estimated body mass index is 43.56 kg/m  as calculated from the following:    Height as of this encounter: 1.753 m (5' 9\").    Weight as of this encounter: 133.8 kg (295 lb).               DVT prophylaxis: resume PTA Xarelto  Code status: DNR/DNI, discussed with patient    Care plan discussed with ED provider, patient and nursing           Primary Care Physician:   Arcadio Farfan [phone]         Chief Complaint:     Nausea, vomiting and cramping abdominal pain    History is obtained from the patient         History of Present Illness:     Aspen Mccullough is a 63 year old morbidly obese female with extensive PMH significant for hypertension, dyslipidemia, hypothyroidism, lupus, GERD, VTE, paroxysmal a fib (on Xarelto), chronic pain syndrome on opiates, h/o CVA, ADD, PTSD who presented to ED with nausea vomiting and cramping abdominal pain, admitted observation 7/21/23.    She was recently admitted from 6/23 to 6/25/23 for similar presentation at which time workup was unremarkable and she was thought to have likely viral gastroenteritis. She has also recently started on Ozempic and there is some concern if her symptoms are related to intolerance to Ozempic; last injection was 2 days PTA.    She states on the day of presentation she has been having several episodes of nausea and vomiting with cramping abdominal pain and is unable to tolerate PO. Denies any fever, denies constipation or diarrhea. No hematemesis or melena, denies any hematuria.    In the ED patient seen by Dr. Hooks  -initial workup mostly unremarkable including normal LFTs, lipase slightly elevated 109, WBC " 10.9  -CT abdomen with nothing acute, noted colonic diverticulosis without evidence of diverticulitis    She was given IV Pepcid, Dilaudid, Zofran and IV fluid bolus in ED and hospitalist was requested admission for further evaluation.           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)     APPENDECTOMY       ARTHRODESIS ANKLE      right     ARTHROPLASTY KNEE BILATERAL       ARTHROPLASTY REVISION KNEE  4/19/2011    Procedure:ARTHROPLASTY REVISION KNEE; With Antibiotic Cement ; Surgeon:CHAO OLIVARES; Location:[facility]     BREAST SURGERY      right- tissue remove nipple area     BUNIONECTOMY  12/14/2011    Procedure:BUNIONECTOMY; Right Bunion Correction; Surgeon:GRACE ZARATE; Location:[facility]     CHOLECYSTECTOMY       EXAM UNDER ANESTHESIA ANUS N/A 7/15/2020    Procedure: EXAM UNDER ANESTHESIA, ANUS;  Surgeon: Luis Canales MD;  Location: [facility]     EXCISE MASS UPPER EXTREMITY  12/14/2011    Procedure:EXCISE MASS UPPER EXTREMITY; Excision of Left Arm Mass; Surgeon:GRACE ZARATE; Location:[facility]     FOOT SURGERY      left X 4     FUSION LUMBAR ANTERIOR ONE LEVEL       HEMORRHOIDECTOMY INTERNAL N/A 7/15/2020    Procedure: Exam under anesthesia, hemorrhoidectomy;  Surgeon: Luis Canales MD;  Location: [facility]     INJECT NERVE BLOCK SUPRASCAPULAR Left 5/18/2018    Procedure: INJECT NERVE BLOCK SUPRASCAPULAR;  Left Suprascapular Nerve Block;  Surgeon: Basim Velazquez MD;  Location: [facility]     INJECT NERVE BLOCK SUPRASCAPULAR Right 7/23/2018    Procedure: INJECT NERVE BLOCK SUPRASCAPULAR;  Right suprascapular injection;  Surgeon: Basim Velazquez MD;  Location: [facility]     INJECT NERVE BLOCK  SUPRASCAPULAR Bilateral 10/29/2018    Procedure: Bilateral Suprascapular Nerve Blocks;  Surgeon: Basim Velazquez MD;  [facility]: [facility]     INJECT NERVE BLOCK SUPRASCAPULAR Bilateral 3/15/2019    Procedure: Bilateral Suprascapular Nerve Block;  Surgeon: Basim Velazquez MD;  [facility]: [facility]     INJECT NERVE BLOCK SUPRASCAPULAR Bilateral 2019    Procedure: bilateral suprascapular nerve block;  Surgeon: Basim Velazquez MD;  [facility]     INJECT NERVE BLOCK SUPRASCAPULAR Bilateral 8/3/2021    Procedure: bilateral suprascapular nerve block;  Surgeon: Basim Velazquez MD;  Location: [facility]     IR ENDOVENOUS ABLATION VARICOSE VEINS  10/5/2021     IR ENDOVENOUS ABLATION VARICOSE VEINS  10/26/2021     KNEE SURGERY      see list which we will bring     LAMINECTOMY LUMBAR ONE LEVEL      L4-5     LAPAROSCOPIC ABLATION ENDOMETRIOSIS       KS HAND/FINGER SURGERY UNLISTED  surgeries on both hands with Dr. Shankar     KS SPINE SURGERY PROCEDURE UNLISTED      see list which we will bring     KS STOMACH SURGERY PROCEDURE UNLISTED  gall bladder removal     RADIO FREQUENCY ABLATION PULSED CERVICAL Bilateral 7/10/2019    Procedure: Bilateral Suprascapular Nerve Pulsed Radiofrequency Ablation;  Surgeon: Basim Velazquez MD;  Location: [facility]     REMOVE HARDWARE FOOT  2011    Procedure:REMOVE HARDWARE FOOT; Hardware Removal Right Foot (Mini-C-Arm) ; Surgeon:GRACE ZARATE; Location: [facility]     RHINOPLASTY       SALPINGO-OOPHORECTOMY BILATERAL       TONSILLECTOMY       Z STOMACH SURGERY PROCEDURE UNLISTED      see list which we will bring              Home Medications:     Prior to Admission Medications   Prescriptions Last Dose Informant Patient Reported? Taking?   Elastic Bandages & Supports (MEDICAL COMPRESSION SOCKS) MISC  Self, Spouse/Significant Other No No   Si Package daily Please measure and distribute 2 pair of 20mmHg - 30mmHg THIGH high open or closed toe compression stockings with  extra refills as indicated. Jobst ultrasheer or equivalent.   HYDROmorphone (DILAUDID) 8 MG tablet  Self, Spouse/Significant Other Yes No   Sig: Take 8 mg by mouth 3 times daily . Max of 3 doses per day.   albuterol (PROAIR HFA/PROVENTIL HFA/VENTOLIN HFA) 108 (90 Base) MCG/ACT inhaler  Self, Spouse/Significant Other Yes No   Sig: Inhale 1-2 puffs into the lungs every 6 hours as needed for shortness of breath, wheezing or cough   atorvastatin (LIPITOR) 40 MG tablet  Self, Spouse/Significant Other No No   Sig: Take 1 tablet (40 mg) by mouth every evening   azithromycin (ZITHROMAX) 250 MG tablet  Self, Spouse/Significant Other No No   Sig: Take 1 tablet (250 mg) by mouth daily   cloNIDine (CATAPRES) 0.2 MG tablet  Self, Spouse/Significant Other Yes No   Sig: Take 0.2 mg by mouth 3 times daily   cyanocobalamin 1000 MCG/ML injection  Self, Spouse/Significant Other No No   Sig: Inject 1 mL (1,000 mcg) Subcutaneous every 7 days   cyclobenzaprine (FLEXERIL) 10 MG tablet  Self, Spouse/Significant Other No No   Sig: Take 1 tablet (10 mg) by mouth 3 times daily   furosemide (LASIX) 20 MG tablet   No No   Sig: TAKE ONE TABLET BY MOUTH EVERY EVENING   gabapentin (NEURONTIN) 300 MG capsule  Self, Spouse/Significant Other Yes No   Sig: Take 300 mg by mouth 2 times daily (morning and afternoon) with 800mg tablet (total dose 1100mg)   gabapentin (NEURONTIN) 800 MG tablet  Self, Spouse/Significant Other Yes No   Sig: Take 800 mg by mouth 2 times daily (morning and afternoon) in addition to 300mg capsule (total dose 1100mg)   gabapentin (NEURONTIN) 800 MG tablet  Self, Spouse/Significant Other Yes No   Sig: Take 800 mg by mouth At Bedtime    levothyroxine (TIROSINT) 100 MCG capsule  Self, Spouse/Significant Other Yes No   Sig: Take 300 mcg by mouth daily   liothyronine (CYTOMEL) 5 MCG tablet  Self, Spouse/Significant Other Yes No   Sig: Take 5 mcg by mouth daily   metoprolol succinate ER (TOPROL XL) 25 MG 24 hr tablet  Self,  Spouse/Significant Other No No   Sig: Take 3 tablets (75 mg) by mouth 2 times daily   morphine (MS CONTIN) 30 MG 12 hr tablet  Self, Spouse/Significant Other Yes No   Sig: Take 30 mg by mouth 2 times daily (morning and night) in addition to 60 mg tablet for a total dose of 90mg.   morphine (MS CONTIN) 60 MG 12 hr tablet  Self, Spouse/Significant Other Yes No   Sig: Take 60 mg by mouth 3 times daily (morning and night) in addition to 30mg tablet for a total dose of 90mg   naloxone (NARCAN) 4 MG/0.1ML nasal spray  Self, Spouse/Significant Other Yes No   Sig: Spray 4 mg into one nostril alternating nostrils as needed for opioid reversal every 2-3 minutes until assistance arrives   rivaroxaban ANTICOAGULANT (XARELTO) 20 MG TABS tablet  Self, Spouse/Significant Other Yes No   Sig: Take 1 tablet (20 mg) by mouth daily (with dinner)   semaglutide (OZEMPIC, 0.25 OR 0.5 MG/DOSE,) 2 MG/3ML pen  Spouse/Significant Other, Self Yes No   Sig: Inject 0.5 mg Subcutaneous every 7 days Wednesdays.   sodium fluoride (SF 5000 PLUS) 1.1 % CREA  Self, Spouse/Significant Other Yes No   Sig: Apply to affected area daily   zolpidem (AMBIEN) 5 MG tablet  Self, Spouse/Significant Other Yes No   Sig: Take 5 mg by mouth nightly as needed for sleep Quantity #15 for 30 days supply per prescribing MD.      Facility-Administered Medications: None            Allergies:     Allergies   Allergen Reactions     Blood Transfusion Related (Informational Only) Other (See Comments)     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      Chlorhexidine Hives     Thor surgical cleanser     Ibuprofen [Nsaids] Hives     Morphine And Related Hives     Has tolerated Oxycodone in past      Penicillins Hives     Other reaction(s): Unknown     Sulfa Antibiotics Hives     Other reaction(s): Unknown     Calcium Channel Blockers      Doxycycline GI Disturbance     Emesis & diarrhea  Patient denies allergy to this med     Hydroxyzine      rash     Mold              "Social History:   Aspen Mccullough  reports that she quit smoking about 30 years ago. Her smoking use included cigarettes. She started smoking about 50 years ago. She has a 30.00 pack-year smoking history. She has never used smokeless tobacco. She reports that she does not drink alcohol and does not use drugs.              Family History:   Aspen Mccullough family history includes Hypertension in her mother; No Known Problems in her brother, father, maternal grandfather, maternal grandmother, paternal grandmother, sister, and another family member.    Family history was reviewed by myself and not pertinent to current presentation.           Review of Systems:   A10 point Review of Systems was done and were negative other than noted in the HPI.             Physical Exam:   Blood pressure (!) 178/95, pulse 93, temperature 99.6  F (37.6  C), temperature source Temporal, resp. rate 16, height 1.753 m (5' 9\"), weight 133.8 kg (295 lb), SpO2 96 %, not currently breastfeeding.  295 lbs 0 oz        Constitutional: Alert, awake and oriented; morbidly obese , seems uncomfortable with cramping abdominal pain    HEENT: Pupils equal and reactive to light and accomodation, EOMI intact; neck supple no raised JVD or rigidity    Oral cavity:  dry oral mucosa   Cardiovascular: Normal s1 s2, regular rate and rhythm, no murmur   Lungs: B/l clear to auscultation, no wheezes or crepitations   Abdomen: Soft, nt, nd, no guarding, rigidity or rebound; BS +   LE : No edema   Musculoskeletal: Power 5/5 in all extremities   Neuro: No focal neurological deficits noted, CN II to XII grossly intact   Psychiatry: normal mood and affect  Skin: No obvious skin rashes or ulcers             Data:   All new lab and imaging data was reviewed in Epic.   Significant labs and imagings include:    CMP mostly unremarkable except slight elevated lipase 106; normal LFTs except alkaline phosphatase 114  CBC unremarkable  UA pending  CT abdomen:  IMPRESSION: "   1.  No acute abnormality identified in the abdomen or pelvis. No evidence of bowel obstruction.  2.  Colonic diverticulosis, without evidence of diverticulitis.              Tien Jiménez MD  Hospitalist

## 2023-07-21 NOTE — PROVIDER NOTIFICATION
MD Notification    Notified Person: MD    Notified Person Name: Dr. Larios    Notification Date/Time:  7/21/23 1030    Notification Interaction: [employer] messaging    Purpose of Notification:  Pt has a healing wound that is questionable for DTI. WOC was on the unit and took a look at it for me. They requested a consult be placed.       Orders Received:  Place WOC consult    Comments:

## 2023-07-21 NOTE — PHARMACY-ADMISSION MEDICATION HISTORY
Pharmacist Admission Medication History    Admission medication history is complete. The information provided in this note is only as accurate as the sources available at the time of the update.    Medication reconciliation/reorder completed by provider prior to medication history? No    Information Source(s): Patient and CareEverywhere/SureScripts via in-person    Pertinent Information: Patient states she has not had any medications in the past couple of days due to not being able to keep anything down.     Changes made to PTA medication list:    Added: None    Deleted: None    Changed: None      Allergies reviewed with patient and updates made in EHR: yes    Medication History Completed By: Shira Powers RPH 7/21/2023 8:37 AM    Prior to Admission medications    Medication Sig Last Dose Taking? Auth Provider Long Term End Date   albuterol (PROAIR HFA/PROVENTIL HFA/VENTOLIN HFA) 108 (90 Base) MCG/ACT inhaler Inhale 1-2 puffs into the lungs every 6 hours as needed for shortness of breath, wheezing or cough  Yes Unknown, Entered By History No    atorvastatin (LIPITOR) 40 MG tablet Take 1 tablet (40 mg) by mouth every evening 7/19/2023 Yes Sil Muñoz MD Yes    azithromycin (ZITHROMAX) 250 MG tablet Take 1 tablet (250 mg) by mouth daily 7/19/2023 Yes Radha Munson MD     cloNIDine (CATAPRES) 0.2 MG tablet Take 0.2 mg by mouth 3 times daily 7/19/2023 Yes Unknown, Entered By History     cyanocobalamin 1000 MCG/ML injection Inject 1 mL (1,000 mcg) Subcutaneous every 7 days 7/14/2023 Yes Yeimy Cabrera MD     cyclobenzaprine (FLEXERIL) 10 MG tablet Take 1 tablet (10 mg) by mouth 3 times daily 7/19/2023 Yes Yeimy Cabrera MD     furosemide (LASIX) 20 MG tablet TAKE ONE TABLET BY MOUTH EVERY EVENING 7/19/2023 Yes Sil Muñoz MD Yes    gabapentin (NEURONTIN) 300 MG capsule Take 300 mg by mouth 2 times daily (morning and afternoon) with 800mg tablet (total dose 1100mg)  7/19/2023 Yes Unknown, Entered By History No    gabapentin (NEURONTIN) 800 MG tablet Take 800 mg by mouth At Bedtime  7/19/2023 Yes Unknown, Entered By History No    gabapentin (NEURONTIN) 800 MG tablet Take 800 mg by mouth 2 times daily (morning and afternoon) in addition to 300mg capsule (total dose 1100mg) 7/19/2023 Yes Unknown, Entered By History No    HYDROmorphone (DILAUDID) 8 MG tablet Take 8 mg by mouth 3 times daily . Max of 3 doses per day. 7/19/2023 Yes      levothyroxine (SYNTHROID/LEVOTHROID) 100 MCG tablet Take 300 mcg by mouth daily 7/19/2023 Yes Unknown, Entered By History No    liothyronine (CYTOMEL) 5 MCG tablet Take 5 mcg by mouth daily 7/19/2023 Yes Unknown, Entered By History No    metoprolol succinate ER (TOPROL XL) 25 MG 24 hr tablet Take 3 tablets (75 mg) by mouth 2 times daily 7/19/2023 Yes Sil Muñoz MD Yes    morphine (MS CONTIN) 30 MG 12 hr tablet Take 30 mg by mouth 2 times daily (morning and night) in addition to 60 mg tablet for a total dose of 90mg. 7/19/2023 Yes      morphine (MS CONTIN) 60 MG 12 hr tablet Take 60 mg by mouth 3 times daily (morning and night) in addition to 30mg tablet for a total dose of 90mg 7/19/2023 Yes Umm Ya APRN CNP     naloxone (NARCAN) 4 MG/0.1ML nasal spray Spray 4 mg into one nostril alternating nostrils as needed for opioid reversal every 2-3 minutes until assistance arrives  Yes Unknown, Entered By History     rivaroxaban ANTICOAGULANT (XARELTO) 20 MG TABS tablet Take 1 tablet (20 mg) by mouth daily (with dinner) 7/19/2023 Yes Alen Kebede MD Yes    semaglutide (OZEMPIC, 0.25 OR 0.5 MG/DOSE,) 2 MG/3ML pen Inject 0.5 mg Subcutaneous every 7 days Wednesdays. 7/19/2023 Yes Unknown, Entered By History     sodium fluoride (SF 5000 PLUS) 1.1 % CREA Apply to affected area daily 7/19/2023 Yes Unknown, Entered By History     zolpidem (AMBIEN) 5 MG tablet Take 5 mg by mouth nightly as needed for sleep Quantity #15 for 30 days  supply per prescribing MD.  Yes Unknown, Entered By History No    Elastic Bandages & Supports (MEDICAL COMPRESSION SOCKS) MISC 1 Package daily Please measure and distribute 2 pair of 20mmHg - 30mmHg THIGH high open or closed toe compression stockings with extra refills as indicated. Jobst ultrasheer or equivalent.   Taye Salcedo MD

## 2023-07-21 NOTE — PLAN OF CARE
PRIMARY DIAGNOSIS: GASTROENTERITIS    OUTPATIENT/OBSERVATION GOALS TO BE MET BEFORE DISCHARGE  1. Orthostatic performed: N/A    2. Tolerating PO fluid and/or antibiotics (if applicable): Yes    3. Nausea/Vomiting/Diarrhea symptoms improved: No--pt nauseated; requiring PRN medication x 2 for shift.    4. Pain status: Improved-controlled with oral pain medications.    5. Return to near baseline physical activity: No    Discharge Planner Nurse   Safe discharge environment identified: Yes  Barriers to discharge: No       Entered by: santino Martienz RN 07/21/2023 5:29 PM     Please review provider order for any additional goals.   Nurse to notify provider when observation goals have been met and patient is ready for discharge.

## 2023-07-21 NOTE — PLAN OF CARE
A/O x 4. Hypertensive otherwise VSS on RA.  C/O generalized chronic back pain; pain goal 8/10 per pt; controlled with scheduled regimen.  C/O nausea; relief with PRN zofran and compazine; no emesis this shift.  Clear liquid diet moderately tolerated; still nauseated, diet not advanced this shift. L AC IV infiltrated this shift; new IV R arm.  Healed PI R buttock now with suspected DTI.  WOC consulted; wound care completed today.  Up with SBA and gait belt.  Continent of bowel and bladder. Discharge pending observation goals met.

## 2023-07-21 NOTE — ED NOTES
[facility]  ED Nurse Handoff Report    ED Chief complaint: Abdominal Pain      ED Diagnosis:   Final diagnoses:   Abdominal cramping   Nausea and vomiting, unspecified vomiting type   Medication side effects       Code Status: As per hospitalist    Allergies:   Allergies   Allergen Reactions    Blood Transfusion Related (Informational Only) Other (See Comments)     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     Chlorhexidine Hives     Thor surgical cleanser    Ibuprofen [Nsaids] Hives    Morphine And Related Hives     Has tolerated Oxycodone in past     Penicillins Hives     Other reaction(s): Unknown    Sulfa Antibiotics Hives     Other reaction(s): Unknown    Calcium Channel Blockers     Doxycycline GI Disturbance     Emesis & diarrhea  Patient denies allergy to this med    Hydroxyzine      rash    Mold        Patient Story: presents to ED with abdominal pain. The patient reports that her abdominal pain started this morning and has persistent all day. She also endorses vomiting and diarrhea. The patient notes that she has been on Ozempic injections that she feels is related to her symptoms; her last admission to the hospital was after an injection with similar associated symptoms in a similar timeframe. On Xarelto with history of Cushing's syndrome, HLD, paroxysmal AFIB, DVT, and PE, gallbladder surgery.     Focused Assessment:  ambulatory, A&Ox4, respirations even and unlabored. Skin dry, warm, color wnl. Guarding abdomen, hypertensive.   Results for orders placed or performed during the hospital encounter of 07/20/23   CT Abdomen Pelvis w Contrast     Status: None    Narrative    EXAM: CT ABDOMEN AND PELVIS WITH CONTRAST  LOCATION: [facility]  DATE/TIME: 7/21/2023 12:49 AM CDT    INDICATION: Severe epigastric pain and vomiting.  COMPARISON: 11/15/2022 - CT chest.    TECHNIQUE: CT scan of the abdomen and pelvis was performed following injection of IV contrast.  Multiplanar reformats were obtained. Dose reduction techniques were used.  CONTRAST: 135 mL Isovue 370.    FINDINGS:    LOWER CHEST: Partial visualization of a 9 x 3 cm lipoma within the left pectoralis major muscle.    HEPATOBILIARY: Prior cholecystectomy.    SPLEEN: Unremarkable.    PANCREAS: Unremarkable.    ADRENAL GLANDS: Unremarkable.    KIDNEYS/BLADDER: No suspicious renal lesions.    BOWEL: Possible small hiatal hernia. No dilatation of the small or large bowel. A few colonic diverticula are present, without evidence of diverticulitis. The appendix is not visualized.    LYMPH NODES: Unremarkable.    PELVIC ORGANS: No acute findings.    MUSCULOSKELETAL: Fusion hardware in the lumbosacral spine.    OTHER: Atherosclerotic calcification in the abdominal aorta.      Impression    IMPRESSION:   1.  No acute abnormality identified in the abdomen or pelvis. No evidence of bowel obstruction.  2.  Colonic diverticulosis, without evidence of diverticulitis.    Comprehensive metabolic panel     Status: Abnormal   Result Value Ref Range    Sodium 137 136 - 145 mmol/L    Potassium 3.5 3.4 - 5.3 mmol/L    Chloride 98 98 - 107 mmol/L    Carbon Dioxide (CO2) 25 22 - 29 mmol/L    Anion Gap 14 7 - 15 mmol/L    Urea Nitrogen 13.0 8.0 - 23.0 mg/dL    Creatinine 0.82 0.51 - 0.95 mg/dL    Calcium 9.7 8.8 - 10.2 mg/dL    Glucose 116 (H) 70 - 99 mg/dL    Alkaline Phosphatase 114 (H) 35 - 104 U/L    AST 19 0 - 45 U/L    ALT 13 0 - 50 U/L    Protein Total 8.1 6.4 - 8.3 g/dL    Albumin 4.8 3.5 - 5.2 g/dL    Bilirubin Total 0.6 <=1.2 mg/dL    GFR Estimate 79 >60 mL/min/1.73m2   Ribera Draw     Status: None    Narrative    The following orders were created for panel order Ribera Draw.  Procedure                               Abnormality         Status                     ---------                               -----------         ------                     Extra Red Top Tube[[id number]]                               Final result                  Please view results for these tests on the individual orders.   CBC with platelets and differential     Status: Abnormal   Result Value Ref Range    WBC Count 10.9 4.0 - 11.0 10e3/uL    RBC Count 4.91 3.80 - 5.20 10e6/uL    Hemoglobin 13.9 11.7 - 15.7 g/dL    Hematocrit 42.7 35.0 - 47.0 %    MCV 87 78 - 100 fL    MCH 28.3 26.5 - 33.0 pg    MCHC 32.6 31.5 - 36.5 g/dL    RDW 14.7 10.0 - 15.0 %    Platelet Count 325 150 - 450 10e3/uL    % Neutrophils 79 %    % Lymphocytes 13 %    % Monocytes 7 %    % Eosinophils 0 %    % Basophils 0 %    % Immature Granulocytes 1 %    NRBCs per 100 WBC 0 <1 /100    Absolute Neutrophils 8.6 (H) 1.6 - 8.3 10e3/uL    Absolute Lymphocytes 1.5 0.8 - 5.3 10e3/uL    Absolute Monocytes 0.7 0.0 - 1.3 10e3/uL    Absolute Eosinophils 0.0 0.0 - 0.7 10e3/uL    Absolute Basophils 0.0 0.0 - 0.2 10e3/uL    Absolute Immature Granulocytes 0.1 <=0.4 10e3/uL    Absolute NRBCs 0.0 10e3/uL   Extra Red Top Tube     Status: None   Result Value Ref Range    Hold Specimen JI    Lipase     Status: Abnormal   Result Value Ref Range    Lipase 109 (H) 13 - 60 U/L   CBC with platelets + differential     Status: Abnormal    Narrative    The following orders were created for panel order CBC with platelets + differential.  Procedure                               Abnormality         Status                     ---------                               -----------         ------                     CBC with platelets and d...[[id number]]  Abnormal            Final result                 Please view results for these tests on the individual orders.       Treatments and/or interventions provided:   Medications   ondansetron (ZOFRAN) injection 4 mg (4 mg Intravenous $Given 7/20/23 8560)   HYDROmorphone (PF) (DILAUDID) injection 0.5 mg (0.5 mg Intravenous $Given 7/20/23 0744)   famotidine (PEPCID) injection 20 mg (has no administration in time range)   0.9% sodium chloride BOLUS (1,000 mLs Intravenous $New Bag 7/20/23  "2114)   iopamidol (ISOVUE-370) solution 135 mL (135 mLs Intravenous $Given 7/21/23 0038)   Saline Flush (79 mLs Intravenous $Given 7/21/23 0038)     Patient's response to treatments and/or interventions: VSS, still in pain.     To be done/followed up on inpatient unit:  continue with POC    Does this patient have any cognitive concerns?:  n/a    Activity level - Baseline/Home:  Independent  Activity Level - Current:   Stand with Assist    Patient's Preferred language: English   Needed?: No    Isolation: None  Infection: Not Applicable  Patient tested for COVID 19 prior to admission: NO  Bariatric?: No    Vital Signs:   Vitals:    07/20/23 2108 07/20/23 2300 07/21/23 0049   BP: (!) 155/98 (!) 177/83 (!) 178/95   Pulse: 111 97 93   Resp: 18 20 16   Temp: 99.6  F (37.6  C)     TempSrc: Temporal     SpO2: 97% 97% 96%   Weight: 133.8 kg (295 lb)     Height: 1.753 m (5' 9\")         Cardiac Rhythm:     Was the PSS-3 completed:   Yes  What interventions are required if any?    Family Comments:  at bedside  OBS brochure/video discussed/provided to patient/family: N/A  For the majority of the shift this patient's behavior was Green.   Behavioral interventions performed were n/a.    ED NURSE PHONE NUMBER: [phone]       "

## 2023-07-21 NOTE — CONSULTS
[facility] Nurse Inpatient Assessment     Consulted for: R buttocks    Patient History (according to provider note(s):      Aspen Mccullough is a 63 year old morbidly obese female with extensive PMH significant for hypertension, dyslipidemia, hypothyroidism, lupus, GERD, VTE, paroxysmal a fib (on Xarelto), chronic pain syndrome on opiates, h/o CVA, ADD, PTSD who presented to ED with nausea vomiting and cramping abdominal pain, admitted observation 7/21/23.     Nausea, vomiting, cramping abdominal pain  likely intolerance/adverse effect to Ozempic injection  - was recently admitted from 6/23 to 6/25/23 for similar presentation at which time workup was unremarkable and she was thought to have likely viral gastroenteritis. She has also recently started on Ozempic and there is some concern if her symptoms are related to intolerance to Ozempic; last injection was 2 days PTA  -initial workup mostly unremarkable including normal LFTs, lipase slightly elevated 109, WBC 10.9  -CT abdomen with nothing acute, noted colonic diverticulosis without evidence of diverticulitis  -will admit for observation  -will start clear liquid diet and advance as tolerated  -will resume her PTA chronic PO narcotics and also have IV Dilaudid PRN if unable to tolerate PO  -IVF with NS plus KCl at 100 ml/hr while holding PTA PO Lasix        Other chronic medical conditions: (will resume appropriate PTO PO medications)     Hypertension-- resume PTA clonidine, Toprol-XL  Dyslipidemia- hold Lipitor while under observation status  morbid obesity, On-Q weekly Ozempic injection  chronic pain disorder/fibromyalgia/neuropathy: h/o multiple surgeries including spine and knee surgery ; has a controlled substance agreement and follows pain clinic; PTA on Neurontin, Dilaudid, MS Contin-- resume when verified  H/o DVT/PE, paroxysmal atrial fibrillation: will resume PTA Xarelto  Hypothyroidism, h/o Hashimoto thyroiditis: resume  "PTA Synthroid/ Cytomel     Clinically Significant Risk Factors Present on Admission                # Drug Induced Coagulation Defect: home medication list includes an anticoagulant medication    # Hypertension: Noted on problem list      # Severe Obesity: Estimated body mass index is 43.56 kg/m  as calculated from the following:    Height as of this encounter: 1.753 m (5' 9\").    Weight as of this encounter: 133.8 kg (295 lb).          Areas Assessed:      Areas visualized during today's visit: BL buttock and coccyx    Pressure Injury Location: Right lateral buttock    Last photo: 7/21/23            Wound type: Pressure Injury      Pressure Injury Stage: Deep Tissue Pressure Injury (DTPI) that is resolving, present on admission. This area was prior stage 3 closed in January, however has remained fragile due to pt frequently sitting on the commmode    Wound history/plan of care:   Patient reports she had a prior pressure injury to Right buttocks that was followed by [facility]. Area appears friable with loose epidermal tissue and non blanchable erythema  Wound base: Large linear wound over Right buttock with 100% deep purple and maroon and surrounding red erythema and scar tissue     Palpation of the wound bed: firm      Drainage: none     Description of drainage: none     Measurements (length x width x depth, in cm) 16 x 2 x 0 cm      Tunneling N/A     Undermining N/A  Periwound skin: Intact      Color: red      Temperature: normal   Odor: none  Pain: moderate, aching has chronic pain  Pain intervention prior to dressing change: slow and gentle cares   Treatment goal: Protection and offload pressure  STATUS: initial assessment  Supplies ordered: at bedside and supplies stored on unit    My PI Risk Assessment     Sensory Perception: 3 - Slightly Limited     Moisture: 3 - Occasionally moist      Activity: 3 - Walks occasionally      Mobility: 2 - Very limited     Nutrition: 3 - Adequate     Friction/Shear: 2 - Potential " "problem      TOTAL: 16       Treatment Plan:     Right Lateral buttock wound(s): Every other day and PRN  1. Clean wound with saline or MicroKlenz Spray, pat dry  2. Wipe / \"clean\" the surrounding periwound tissue with skin prep (Cavilon No Sting Skin Prep #062271) and allow to dry. This will help protect periwound and help dressing adherence  3. Apply Adaptic contact layer so mepilex doesn't stick to the fragile tissue.  4. Press a Mepilex  Sacral Dressing (PS#[id number]) and Mepilex 4x 4 to the area, making sure to conform nicely to skin curvatures.   5. Time and date dressing change  NOTE  -Reposition pt side to side cem when in bed, every 2 hours-get the pt way over on side to completely offload pressure. This will benefit skin and respiratory function   -Keep heels elevated and floating on pillows at all times. Try using at least 2 pillows under each calf.  -When up to the chair pt needs to fully offload every 2 hours and use a chair cushion if needed          Pressure Injury Prevention (PIP) Plan:  If patient is declining pressure injury prevention interventions: Explore reason why and address patient's concerns, Educate on pressure injury risk and prevention intervention(s), If patient is still declining, document \"informed refusal\"  and Ensure Care team is aware ( provider, charge nurse, etc)  Mattress: Follow bed algorithm, reassess daily and order specialty mattress, if indicated.  HOB: Maintain at or below 30 degrees, unless contraindicated  Repositioning in bed: Every 1-2 hours , Left/right positioning; avoid supine and Raise foot of bed prior to raising head of bed, to reduce patient sliding down (shear)  Heels: Keep elevated off mattress  Protective Dressing: Sacral protective dressing  Positioning Equipment: None  Chair positioning: Chair cushion (#[id number]) , Assist patient to reposition hourly and Do NOT use a donut for sitting (this increases pressure to smaller area and creates a higher potential for " injury)    If patient has a buttock pressure injury, or high risk for PI use chair cushion or SPS.  Moisture Management: Perineal cleansing /protection: Follow Incontinence Protocol, Avoid brief in bed, Clean and dry skin folds with bathing , Consider InterDry (#[id number]) between folds and Moisturize dry skin  Under Devices: Inspect skin under all medical devices during skin inspection , Ensure tubes are stabilized without tension, Ensure patient is not lying on medical devices or equipment when repositioned and Mepilex border under coccyx  Ask provider to discontinue device when no longer needed.        Orders: Written    RECOMMEND PRIMARY TEAM ORDER: None, at this time  Education provided: importance of repositioning, plan of care, wound progress and Off-loading pressure  Discussed plan of care with: Patient and Nurse  WO nurse follow-up plan: weekly  Notify WOC if wound(s) deteriorate.  Nursing to notify the Provider(s) and re-consult the WOC Nurse if new skin concern.    DATA:     Current support surface: Standard  Low air loss (SANDRA pump, Isolibrium, Pulsate, skin guard, etc)  Containment of urine/stool: Incontinence Protocol  BMI: Body mass index is 43.56 kg/m .   Active diet order: Orders Placed This Encounter      Advance Diet as Tolerated: Clear Liquid Diet     Output: No intake/output data recorded.     Labs:   Recent Labs   Lab 07/20/23 2112   ALBUMIN 4.8   HGB 13.9   WBC 10.9       Danilo [place],RN CWOCN  Vocera: [facility] Nurse [Tim]  Dept. Office Number: [phone]

## 2023-07-21 NOTE — ED PROVIDER NOTES
History     Chief Complaint:  Abdominal Pain     HPI   Aspen Mccullough is a 63 year old female on Xarelto with history of Cushing's syndrome, HLD, paroxysmal AFIB, DVT, and PE who presents with abdominal pain. The patient reports that her abdominal pain started this morning and has persistent all day. She also endorses vomiting and diarrhea. The patient denies chest pain, shortness of breath, dysuria, rash, vaginal bleeding, and vaginal discharge. The patient notes that she has been on Ozempic injections that she feels is related to her symptoms; her last admission to the hospital was after an injection with similar associated symptoms in a similar timeframe. Aspen has a history of gallbladder surgery. She was given Zofran en route which helped alleviate her symptoms.    Independent Historian:   None - Patient Only    Review of External Notes:   I reviewed the patient's hospital admission note from 6/23/23       Medications:    Lipitor  Zithromax  Flexeril  Lasix  Toprol  Proair  Catapres  Neurontin  Dilaudid  Cytomel  Xarelto  Ozempic  Ambien    Past Medical History:    ADHD  Osteoarthrosis  CRPS  Fibromyalgia  Somatoform disorder  Histrionic personality  Hypothyroidism  Insomnia  HLD  Hashimoto's thyroiditis  Glucocorticoid deficiency  PTSD  Endometriosis  Allergic rhinitis  Cushing's syndrome  Systemic lupus erythematosus  Paroxysmal AFIB  DVT  PE  Alopecia areata  Anemia  DDD  Acute renal failure  Complications due to internal joint prosthesis  Chronic maxillary sinusitis  Lipoma of skin and subcutaneous tissue  Malabsorption  Opioid dependence  Thrombus of right atrial appendage  GI bleed  Grade III internal hemorrhoids  CVD  Obesity  Hypertonic bladder  COVID infection    Past Surgical History:    Left toe and side of foot amputation  Appendectomy  Ankle arthrodesis (R)  Arthroplasty knee (B)  Right tissue removed from breast  Bunionectomy  Cholecystectomy  Anal exam under anesthesia  Mass excision upper  "extremity  Foot surgery  Lumbar fusion anterior one level  Inject nerve block suprascapular (multiple)  Tonsillectomy  Salpingo-oophorectomy  Rhinoplasty    Physical Exam     Patient Vitals for the past 24 hrs:   BP Temp Temp src Pulse Resp SpO2 Height Weight   07/21/23 0049 (!) 178/95 -- -- 93 16 96 % -- --   07/20/23 2300 (!) 177/83 -- -- 97 20 97 % -- --   07/20/23 2108 (!) 155/98 99.6  F (37.6  C) Temporal 111 18 97 % 1.753 m (5' 9\") 133.8 kg (295 lb)      Physical Exam  General: Alert, appears well-developed and well-nourished. Cooperative.     In moderate distress  HEENT:  Head:  Atraumatic  Ears:  External ears are normal  Mouth/Throat:  Oropharynx is without erythema or exudate and mucous membranes are dry.   Eyes:   Conjunctivae normal and EOM are normal. No scleral icterus.  CV:  Normal rate, regular rhythm, normal heart sounds and radial pulses are 2+ and symmetric.  No murmur.  Resp:  Breath sounds are clear bilaterally    Non-labored, no retractions or accessory muscle use  GI:  Obese.  Abdomen is soft, no distension, epigastric tenderness. No rebound or guarding.  No CVA tenderness bilaterally  MS:  Normal range of motion. No edema.    Normal strength in all 4 extremities.     Back atraumatic.    No midline cervical, thoracic, or lumbar tenderness  Skin:  Warm and dry.  No rash or lesions noted.  Neuro: Alert. Normal strength.  GCS: 15  Psych:  Normal mood and affect.    Emergency Department Course     Imaging:  CT Abdomen Pelvis w Contrast   Final Result   IMPRESSION:    1.  No acute abnormality identified in the abdomen or pelvis. No evidence of bowel obstruction.   2.  Colonic diverticulosis, without evidence of diverticulitis.          Report per radiology    Laboratory:  Labs Ordered and Resulted from Time of ED Arrival to Time of ED Departure   COMPREHENSIVE METABOLIC PANEL - Abnormal       Result Value    Sodium 137      Potassium 3.5      Chloride 98      Carbon Dioxide (CO2) 25      Anion Gap " 14      Urea Nitrogen 13.0      Creatinine 0.82      Calcium 9.7      Glucose 116 (*)     Alkaline Phosphatase 114 (*)     AST 19      ALT 13      Protein Total 8.1      Albumin 4.8      Bilirubin Total 0.6      GFR Estimate 79     CBC WITH PLATELETS AND DIFFERENTIAL - Abnormal    WBC Count 10.9      RBC Count 4.91      Hemoglobin 13.9      Hematocrit 42.7      MCV 87      MCH 28.3      MCHC 32.6      RDW 14.7      Platelet Count 325      % Neutrophils 79      % Lymphocytes 13      % Monocytes 7      % Eosinophils 0      % Basophils 0      % Immature Granulocytes 1      NRBCs per 100 WBC 0      Absolute Neutrophils 8.6 (*)     Absolute Lymphocytes 1.5      Absolute Monocytes 0.7      Absolute Eosinophils 0.0      Absolute Basophils 0.0      Absolute Immature Granulocytes 0.1      Absolute NRBCs 0.0     LIPASE - Abnormal    Lipase 109 (*)    ROUTINE UA WITH MICROSCOPIC REFLEX TO CULTURE        Emergency Department Course & Assessments:      Interventions:  Medications   ondansetron (ZOFRAN) injection 4 mg (4 mg Intravenous $Given 7/20/23 2323)   HYDROmorphone (PF) (DILAUDID) injection 0.5 mg (0.5 mg Intravenous $Given 7/20/23 2343)   famotidine (PEPCID) injection 20 mg (has no administration in time range)   0.9% sodium chloride BOLUS (1,000 mLs Intravenous $New Bag 7/20/23 2114)   iopamidol (ISOVUE-370) solution 135 mL (135 mLs Intravenous $Given 7/21/23 0038)   Saline Flush (79 mLs Intravenous $Given 7/21/23 0038)        Assessments:  2258 I obtained history and examined the patient as noted above    Independent Interpretation (X-rays, CTs, rhythm strip):  None    Consultations/Discussion of Management or Tests:  0141 I spoke with Dr. Jiménez, hospitalist, who accepts the patient       Social Determinants of Health affecting care:   None    Disposition:  The patient was admitted to the hospital under the care of Dr. Jiménez.     Impression & Plan      Medical Decision Making:  Patient is a 63-year-old female with  a extremely complex past medical history most pertinent for fibromyalgia, prior appendectomy and cholecystectomy, and recent initiation of Ozempic for weight loss who presents with nausea vomiting and abdominal cramping.  Patient had a broad work-up performed including CT imaging of the abdomen pelvis this evening.  CT imaging shows no acute abnormalities identified within the abdomen or pelvis and no evidence of bowel obstruction.  There was colonic diverticulosis without evidence of diverticulitis.  Urinalysis is still pending at time of disposition.  Laboratory work thankfully showed no significant electrolyte derangement or renal dysfunction.  CBC unremarkable with no leukocytosis.  Lipase high normal at 109.  I suppose this could represent a very early pancreatitis picture although the patient had no inflammatory changes on CT imaging and lipase high normal.  No history of alcohol abuse.  Patient did have similar symptoms in a hospitalization lasting 2 days last month several days after receiving her Ozempic injection.  I do have high suspicion for potential medication side effect given ongoing abdominal cramping nausea and vomiting symptoms will admit for observation for continued symptom management.  I spoke with Dr. Jiménez of the hospitalist service who agreed to observation    Diagnosis:    ICD-10-CM    1. Abdominal cramping  R10.9       2. Nausea and vomiting, unspecified vomiting type  R11.2       3. Medication side effects  T88.7XXA            Discharge Medications:  New Prescriptions    No medications on file          Scribe Disclosure:  I, Valdez Oglesby, am serving as a scribe at 10:13 PM on 7/20/2023 to document services personally performed by Danilo Hooks MD based on my observations and the provider's statements to me.   7/20/2023   Danilo Hooks MD White, Scott, MD  07/21/23 0144

## 2023-07-21 NOTE — PLAN OF CARE
PRIMARY DIAGNOSIS: GASTROENTERITIS    OUTPATIENT/OBSERVATION GOALS TO BE MET BEFORE DISCHARGE  1. Orthostatic performed: N/A    2. Tolerating PO fluid and/or antibiotics (if applicable): No    3. Nausea/Vomiting/Diarrhea symptoms improved: No    4. Pain status: Improved-controlled with oral pain medications.    5. Return to near baseline physical activity: No    Discharge Planner Nurse   Safe discharge environment identified: Yes  Barriers to discharge: No       Entered by: santino Martinez RN 07/21/2023 5:26 PM     Please review provider order for any additional goals.   Nurse to notify provider when observation goals have been met and patient is ready for discharge.

## 2023-07-21 NOTE — PROGRESS NOTES
Visited with the patient in the emergency department, she was mildly nauseated at the time, she mentions that recently her Ozempic dose was increased and since then she has ongoing nausea and vomiting and this has been the same story since it was started 2 months ago.  She has decided not to continue this medication, she also mentioned that she specifically did not request any medication for weight loss.  She did not think she can sustain herself having the side effects from Ozempic.  We discussed about continuing fluids and symptom management, so far no sign of infection noted, later nursing did contact me about healing wound, wound care consult placed.  UA ordered to rule out any underlying UTI.

## 2023-07-21 NOTE — ED TRIAGE NOTES
Pt brought in via ambulance for 10/10 abd pain and vomiting. On Ozempic for a few months and had an issue similar to this a month ago. 100mcg fentanyl, 4mg zofran and 250 mL NS given without relief of pain. VSS.

## 2023-07-22 NOTE — PLAN OF CARE
PRIMARY DIAGNOSIS: GASTROENTERITIS    OUTPATIENT/OBSERVATION GOALS TO BE MET BEFORE DISCHARGE  1. Orthostatic performed: N/A    2. Tolerating PO fluid and/or antibiotics (if applicable): Yes    3. Nausea/Vomiting/Diarrhea symptoms improved: Yes    4. Pain status: Improved-controlled with oral pain medications.    5. Return to near baseline physical activity: No    Discharge Planner Nurse   Safe discharge environment identified: Yes  Barriers to discharge: No       Entered by: santino Martinez RN 07/22/2023 3:29 PM     Please review provider order for any additional goals.   Nurse to notify provider when observation goals have been met and patient is ready for discharge.

## 2023-07-22 NOTE — PLAN OF CARE
[id number]    A/Ox4, VSS on RA. SBA. Clear liquid diet, int nausea, declined medication. Cont B/B, no BM this shift. C/O pain in legs and back, scheduled MS Contin given w/ relief. C/O breakthrough pain x1, PRN dilaudid given x1. Hot packs applied. Old pressure injury on R buttock, possibly underlying, T/R q2hr, pt does ind w/ reminders. Floating heels. R PVI inf 0.9 NS + KCL 20 @ 100 ml/hr. Possible discharge home today.

## 2023-07-22 NOTE — PROVIDER NOTIFICATION
MD Notification    Notified Person: MD    Notified Person Name: Joycelyn AYOUB CNP    Notification Date/Time:  0927 7/22/23    Notification Interaction:  Paged on [employer]  Purpose of Notification: BP 81/56. I checked for scheduled catapres and Dilaudid. Pt is asymptomatic, Hx of chronic pain with scheduled opioids. Also Cr 1.46 GFR 40 increased from 0.84 on 7/20. She currently has NS with 20 k+ infusing at 100 mL.I did not give the scheduled medications. Do you want them to be given or held?      Orders Received: Provider placed orders into chart and added parameters to medication orders.  MD asked to be paged on [employer] only.  MD asked to be repaged with new BP after bolus infusion.  Noted pt is probably dehydrated resulting in change in Cr.   Comments: Repaged MD at 1000; MD called back immediately 1003--reviewed information conveyed above.      Pt requesting Miralax for constipation.  Paged MD above at 0903 on [employer].

## 2023-07-22 NOTE — PROGRESS NOTES
A/O x 4. Hypotensive otherwise VSS on RA--required to 250 mL boluses this afternoon; Catapres held by provider--parameters on BP medications; checked BP prior to opioid/neurontin administration.  C/O generalized chronic back pain radiating to RLE; pain goal 7-8/10 per pt; controlled with scheduled regimen.  Denies nausea or vomiting today. Diet advanced to Full liquid for breakfast--tolerated cream of wheat; advanced to low fiber diet for lunch--tolerating well.  IV dislodged in shower this afternoon; new one placed.  Healed PI R buttock now with suspected DTI.   WOC following wound care completed today--next due on 7/25/23.  Up with SBA and gait belt.  Continent of bowel and bladder. CM visited with patient; can discharge home once able.   Discharge pending observation goals met.

## 2023-07-22 NOTE — PLAN OF CARE
PRIMARY DIAGNOSIS: GASTROENTERITIS    OUTPATIENT/OBSERVATION GOALS TO BE MET BEFORE DISCHARGE  1. Orthostatic performed: N/A    2. Tolerating PO fluid and/or antibiotics (if applicable): Yes    3. Nausea/Vomiting/Diarrhea symptoms improved: Yes    4. Pain status: Improved-controlled with oral pain medications.    5. Return to near baseline physical activity: Yes    Discharge Planner Nurse   Safe discharge environment identified: Yes  Barriers to discharge: No       Entered by: santino Martinez RN 07/22/2023 3:35 PM     Please review provider order for any additional goals.   Nurse to notify provider when observation goals have been met and patient is ready for discharge.

## 2023-07-22 NOTE — PLAN OF CARE
PRIMARY DIAGNOSIS: GASTROENTERITIS    OUTPATIENT/OBSERVATION GOALS TO BE MET BEFORE DISCHARGE  1. Orthostatic performed: N/A    2. Tolerating PO fluid and/or antibiotics (if applicable): Yes    3. Nausea/Vomiting/Diarrhea symptoms improved: Yes    4. Pain status: Improved-controlled with oral pain medications.    5. Return to near baseline physical activity: Yes    Discharge Planner Nurse   Safe discharge environment identified: Yes  Barriers to discharge: No       Entered by: santino Martinez RN 07/22/2023 3:34 PM     Please review provider order for any additional goals.   Nurse to notify provider when observation goals have been met and patient is ready for discharge.

## 2023-07-22 NOTE — PROGRESS NOTES
[facility]    Medicine Progress Note - Hospitalist Service    Date of Admission:  7/20/2023    Assessment & Plan    Aspen Mccullough is a 63 year old morbidly obese female with extensive PMH significant for hypertension, dyslipidemia, hypothyroidism, lupus, GERD, VTE, paroxysmal a fib (on Xarelto), chronic pain syndrome on opiates, h/o CVA, ADD, PTSD who presented to ED with nausea vomiting and cramping abdominal pain, admitted observation 7/21/23.  Patient seen today for a follow-up medical management.    Hypotension -multi factorial including dehydration versus multiple medications including gabapentin, morphine/MS Contin 60 mg oral daily, MS Contin 90 mg twice a day, Flexeril 10 mg 3 times a day  -Systolic blood pressure dropped to 88/60 with a heart rate of 71 earlier  -Acute kidney injury with creatinine of 1.46 compared to baseline 0.82.  Presumed dehydration.  -IV bolus x2 250 ml  -Blood pressure came up slowly and currently at 131/79  -Hold clonidine  -Check BMP in AM to follow-up elevated creatinine.  -Continue to monitor.    Nausea, vomiting and cramping of abdominal pain  Presumed secondary to Ozempic injection started May 2023  -Recent hospitalization with similar symptoms  -CT scan of the abdomen no acute result except colonic diverticulosis  -Negative urinalysis  -Elevated lipase 109  -Patient still feeling nauseous with solid food  -Continue IV fluid  -Diet as tolerated.    Hypertension  Dyslipidemia  Hypothyroidism  Obesity morbid with BMI greater than 40  Chronic pain disorder/fibromyalgia/neuropathy  History of DVT/PE, paroxysmal atrial fibrillation  -Resume home medication  -Hold clonidine  -Pat admitted for Metroprolol to hold for systolic blood pressure below 100.    Chronic pressure ulcer -right lateral buttock  -Wound nurse consulted  -Reviewed progress note.       Diet: Advance Diet as Tolerated: Low Fiber    DVT Prophylaxis: On  Xarelto resume home medication  Mina  "Catheter: Not present  Lines: None   PIV  Cardiac Monitoring: None  Code Status: No CPR- Do NOT Intubate      Clinically Significant Risk Factors Present on Admission               # Drug Induced Coagulation Defect: home medication list includes an anticoagulant medication   # Acute Kidney Injury, unspecified: based on a >150% or 0.3 mg/dL increase in last creatinine compared to past 90 day average, will monitor renal function  # Hypertension: Noted on problem list      # Severe Obesity: Estimated body mass index is 43.56 kg/m  as calculated from the following:    Height as of this encounter: 1.753 m (5' 9\").    Weight as of this encounter: 133.8 kg (295 lb).            Disposition Plan  Home once stabilized     Expected Discharge Date: 07/23/2023      Destination: home          The patient's care was discussed with the Attending Physician, Dr. Wyatt, Bedside Nurse and Patient.    ANITRA [facility]  Hospitalist Service  [facility]  Securely message with [employer] (more info)  Text page via [facility] Paging/Directory   ______________________________________________________________________    Interval History   Aspen EPPS Religion is a 63 year old morbidly obese female with extensive PMH significant for hypertension, dyslipidemia, hypothyroidism, lupus, GERD, VTE, paroxysmal a fib (on Xarelto), chronic pain syndrome on opiates, h/o CVA, ADD, PTSD who presented to ED with nausea vomiting and cramping abdominal pain, admitted observation 7/21/23.  Patient seen earlier today and she complain of nausea with eating solid food.  She tried yogurt and she reports immediately being nauseated.  She reports creamy wheat was better.  She denies any chest pain or lightheadedness.  She is attributing to her symptoms including the nausea, vomiting and abdominal cramping to Ozempic injection that was started back in May 2023.  Patient is contemplating to not to restart Ozempic.  She reports she takes it every " Wednesday and has an appointment with her primary doctor on Thursday.  Patient will continue on IV fluid for hypotension and nausea.  We will follow-up with patient tomorrow.    Physical Exam   Vital Signs: Temp: 98.8  F (37.1  C) Temp src: Oral BP: 131/79 Pulse: 73   Resp: 16 SpO2: 92 % O2 Device: None (Room air)    Weight: 295 lbs 0 oz    Constitutional: awake, alert, cooperative, no apparent distress, and appears stated age  Eyes: extra-ocular muscles intact  ENT: normocepalic, without obvious abnormality  Respiratory: No increased work of breathing, good air exchange, clear to auscultation bilaterally, no crackles or wheezing  Cardiovascular: S1-S2 intact.  RRR.  Circulation is intact.  GI: Obese, bowel sound positive nontender nondistended.  Skin: No acute rash noted on exposed skin.  Warm and dry.  Musculoskeletal: There is no redness, warmth, or swelling of the joints.  Full range of motion noted.  Motor strength is 5 out of 5 all extremities bilaterally.  Tone is normal.  Neurologic: Alert and oriented x4.  No focal deficit present.    Medical Decision Making     35 MINUTES SPENT BY ME on the date of service doing chart review, history, exam, documentation & further activities per the note.      Data     I have personally reviewed the following data over the past 24 hrs:    N/A  \   N/A   / N/A     138 105 17.6 /  114 (H)   3.9 20 (L) 1.46 (H) \       Imaging results reviewed over the past 24 hrs:   No results found for this or any previous visit (from the past 24 hour(s)).

## 2023-07-22 NOTE — PROGRESS NOTES
Care Management Discharge Note    Discharge Date: 07/23/2023       Discharge Disposition: Home    Discharge Services:      Discharge DME:      Discharge Transportation: family or friend will provide    Private pay costs discussed: yes with [employer] document     Does the patient's insurance plan have a 3 day qualifying hospital stay waiver?  No    PAS Confirmation Code:    Patient/family educated on Medicare website which has current facility and service quality ratings:      Education Provided on the Discharge Plan: Yes  Persons Notified of Discharge Plans: patient  Patient/Family in Agreement with the Plan: yes    Handoff Referral Completed: No    Additional Information:  Home at discharge.        Verna Huff RN   [facility]   Phone [phone]

## 2023-07-22 NOTE — PROVIDER NOTIFICATION
MD Notification    Notified Person: MD    Notified Person Name:  Joycelyn AYOUB CNP     Notification Date/Time:  1137 7/22/23    Notification Interaction:  Paged on [employer]    Purpose of Notification:  /62 HR 67 post bolus    Orders Received:  MD asked to be paged with BP after bolus completed.  MD called back at 1157--placing order for another 250 mL bolus.    Comments:

## 2023-07-22 NOTE — CONSULTS
Care Management Initial Consult    General Information  Assessment completed with: Patient, VM-chart review,    Type of CM/SW Visit: Initial Assessment    Primary Care Provider verified and updated as needed: Yes   Readmission within the last 30 days:     Return Category: Exacerbation of disease  Reason for Consult: discharge planning  Advance Care Planning:            Communication Assessment  Patient's communication style: spoken language (English or Bilingual)    Hearing Difficulty or Deaf: no   Wear Glasses or Blind: no    Cognitive  Cognitive/Neuro/Behavioral: WDL  Level of Consciousness: alert  Arousal Level: opens eyes spontaneously  Orientation: oriented x 4  Mood/Behavior: calm, cooperative     Speech: clear, spontaneous, logical    Living Environment:   People in home: spouse     Current living Arrangements: house      Able to return to prior arrangements: yes       Family/Social Support:  Care provided by:    Provides care for: no one  Marital Status:             Description of Support System: Supportive, Involved         Current Resources:   Patient receiving home care services: No     Community Resources: None  Equipment currently used at home: none  Supplies currently used at home: None    Employment/Financial:  Employment Status: retired        Financial Concerns: No concerns identified           Does the patient's insurance plan have a 3 day qualifying hospital stay waiver?  No    Lifestyle & Psychosocial Needs:  Social Determinants of Health     Tobacco Use: Medium Risk (10/26/2022)    Patient History      Smoking Tobacco Use: Former      Smokeless Tobacco Use: Never      Passive Exposure: Not on file   Alcohol Use: Not on file   Financial Resource Strain: Not on file   Food Insecurity: Not on file   Transportation Needs: Not on file   Physical Activity: Not on file   Stress: Not on file   Social Connections: Not on file   Intimate Partner Violence: Not on file   Depression: Not at  risk (2/1/2023)    PHQ-2      PHQ-2 Score: 0   Housing Stability: Not on file       Functional Status:  Prior to admission patient needed assistance:   Dependent ADLs:: Independent  Dependent IADLs:: Independent       Mental Health Status:  Mental Health Status: No Current Concerns       Chemical Dependency Status:  Chemical Dependency Status: No Current Concerns             Values/Beliefs:  Spiritual, Cultural Beliefs, Anglican Practices, Values that affect care: no  Description of Beliefs that Will Affect Care: Jehovah Witness            Additional Information:  Initial consult done. Lives independently with spouse in own home. No needs identified.     Verna Huff RN   [facility]   Phone [phone]

## 2023-07-23 NOTE — PROGRESS NOTES
Discharge Note    Patient discharged to home via private vehicle  accompanied by significant other .  IV: Discontinued  Prescriptions N/A.   Belongings reviewed and sent with patient.   Home medications returned to patient: NA  Equipment sent with: patient, N/A.   patient verbalizes understanding of discharge instructions. AVS given to patient.

## 2023-07-23 NOTE — PLAN OF CARE
[id number]    A/Ox4, VSS on RA. SBA. Regular diet, denies nausea. Cont B/B, no BM this shift. C/O pain in legs and back, scheduled MS Contin and dilaudid given w/ relief. PRN dilaudid IV given for breakthrough pain x1. Old pressure injury on R buttock, underlying, T/R q2hr, pt does ind w/ reminders. Floating heels. R PVI inf 0.9 NS + KCL 20 @ 100 ml/hr. Possible discharge home today.

## 2023-07-23 NOTE — DISCHARGE SUMMARY
[facility]  Hospitalist Discharge Summary      Date of Admission:  7/20/2023  Date of Discharge:  7/23/2023  Discharging Provider: ANITRA Dang CNP  Discharge Service: Hospitalist Service    Discharge Diagnoses   Nausea, vomiting and cramping of abdominal pain  Presumed secondary to Ozempic injection started May 2023  -Recent hospitalization with similar symptoms  -CT scan of the abdomen no acute result except colonic diverticulosis  -Negative urinalysis  -Elevated lipase 109 remained asymptomatic  -Symptom presumed secondary to Ozempic  -Recommend for patient to hold medication and discuss with her primary doctor.  -Patient understood and agreed with the plan.    Hypotension -multi factorial including dehydration versus multiple medications including gabapentin, morphine/MS Contin 60 mg oral daily, MS Contin 90 mg twice a day, Flexeril 10 mg 3 times a day  -Systolic blood pressure dropped to 88/60 with a heart rate of 71 earlier  -Acute kidney injury with creatinine of 1.46 compared to baseline 0.82.  Presumed dehydration.  -IV bolus x2 250 ml on July 22, 2023  -Blood pressure came up slowly and currently at 131/79  -Held clonidine for the hospitalization.  -Discontinue clonidine for discharge.  -Reduce beta-blocker to once a day 75 mg XR  -BMP reviewed this morning within normal range with creatinine at 0.90 baseline  -Discontinue IV fluid  -For discharge resume Lasix  -Patient to follow-up with her primary doctor, next week.    Hypertension  Dyslipidemia  Hypothyroidism  Obesity morbid with BMI greater than 40  Chronic pain disorder/fibromyalgia/neuropathy  History of DVT/PE, paroxysmal atrial fibrillation  -Change in medication as above  Patient to follow-up with her primary doctor, next week.    Chronic pressure ulcer right lateral buttock  -Seen by hospital wound care nurse  -Noted this morning by nursing staff abrasion 3 spots reportedly superficial noted  -Appropriate  "dressing done per nursing.  -Patient has home wound care and to follow-up at the next wound care visit.  -Recommend also for patient to see her primary doctor next week, if wound care home health not seeing her prior to visiting her primary doctor.      Clinically Significant Risk Factors     # Severe Obesity: Estimated body mass index is 43.56 kg/m  as calculated from the following:    Height as of this encounter: 1.753 m (5' 9\").    Weight as of this encounter: 133.8 kg (295 lb).       Follow-ups Needed After Discharge   Follow-up Appointments     Follow-up and recommended labs and tests       Follow up with primary care provider, Arcadio Farfan, within 7 days to   evaluate medication change and for hospital follow- up.  No follow up labs   or test are needed.  Clonidine 0.2 mg and furosemide 20 mg on hold, due to   low blood pressure.  Primary doctor to reevaluate next week, if needs to   resume medication.            Unresulted Labs Ordered in the Past 30 Days of this Admission     No orders found from 6/20/2023 to 7/21/2023.        Discharge Disposition   Discharged to home  Condition at discharge: Stable    Hospital Course   Aspen Mccullough is a 63 year old morbidly obese female with extensive PMH significant for hypertension, dyslipidemia, hypothyroidism, lupus, GERD, VTE, paroxysmal a fib (on Xarelto), chronic pain syndrome on opiates, h/o CVA, ADD, PTSD who presented to ED with nausea vomiting and cramping abdominal pain, admitted observation 7/21/23.  Patient was found to have acute kidney injury with elevated creatinine at 1.46 and hypotension.  Hypotension resolved with holding clonidine, furosemide and IV bolus including continuous IV infusion.  Currently creatinine is back to normal baseline and blood pressure is optimized.  Abdominal and pelvic CT scan as above mention found no abnormality.  The source of her nausea, vomiting and cramping of abdominal pain is presumed secondary to side effect from " Ozempic.  Seen this morning for medical management follow-up and discharge planning.  Patient is able to communicate and express her wishes.  Patient states that her symptoms of nausea, vomiting and cramping of abdominal pain has resolved.  She denies shortness of breath, dizziness, cough or generalized weakness.  She states she would like to go home and she is adamant that she would not take Ozempic again.  Recommend for patient follow-up with her primary doctor which is scheduled next Thursday per her report.  Patient discharge summary discussed with Dr. Wyatt the primary hospitalist.     Consultations This Hospital Stay   WOUND OSTOMY CONTINENCE NURSE  IP CONSULT  CARE MANAGEMENT / SOCIAL WORK IP CONSULT    Code Status   No CPR- Do NOT Intubate    Time Spent on this Encounter   I, ANITRA Dang CNP, personally saw the patient today and spent greater than 30 minutes discharging this patient.  Patient care and discharge planning discussed with Dr. Wyatt, nursing staff and patient.       ANITRA Dang CNP  [place] [address]  [facility] [address]  Phone: [phone]  ______________________________________________________________________    Physical Exam   Vital Signs: Temp: 97.9  F (36.6  C) Temp src: Oral BP: 113/63 Pulse: 67   Resp: 18 SpO2: 95 % O2 Device: None (Room air)    Weight: 295 lbs 0 oz  Constitutional: awake, alert, cooperative, no apparent distress, and appears stated age  Eyes: sclera clear and conjunctiva normal  ENT: normocepalic, without obvious abnormality  Respiratory: Nonlabored  GI: Reports bowel movement.   Skin: Per nursing staff 3 spot of abrasion in the area of right lateral buttock ulcer, none tunneling superficial reported by nursing staff.  Musculoskeletal: no lower extremity pitting edema present  Neurologic: Alert and oriented x4.  No focal deficit present.  Reportedly ambulated independently.       Primary Care Physician    Arcadio Farfan    Discharge Orders      Reason for your hospital stay    Nausea, vomiting and abdominal pain     Follow-up and recommended labs and tests     Follow up with primary care provider, Arcadio Farfan, within 7 days to evaluate medication change and for hospital follow- up.  No follow up labs or test are needed.  Clonidine 0.2 mg and furosemide 20 mg on hold, due to low blood pressure.  Primary doctor to reevaluate next week, if needs to resume medication.     Activity    Your activity upon discharge: activity as tolerated       Significant Results and Procedures   Most Recent 3 CBC's:Recent Labs   Lab Test 07/20/23 2112 06/25/23  0723 06/24/23  0638   WBC 10.9 8.7 11.6*   HGB 13.9 11.8 12.3   MCV 87 91 89    272 329     Most Recent 3 BMP's:Recent Labs   Lab Test 07/23/23  0709 07/22/23  0812 07/20/23 2112    138 137   POTASSIUM 3.8 3.9 3.5   CHLORIDE 107 105 98   CO2 21* 20* 25   BUN 14.7 17.6 13.0   CR 0.90 1.46* 0.82   ANIONGAP 11 13 14   KYLIE 8.5* 8.8 9.7   * 114* 116*     Most Recent 3 Creatinines:Recent Labs   Lab Test 07/23/23  0709 07/22/23  0812 07/20/23 2112   CR 0.90 1.46* 0.82     Most Recent 3 Troponin's:Recent Labs   Lab Test 06/06/21  1459 09/21/18  1849   TROPI <0.015 <0.015   ,   Results for orders placed or performed during the hospital encounter of 07/20/23   CT Abdomen Pelvis w Contrast    Narrative    EXAM: CT ABDOMEN AND PELVIS WITH CONTRAST  LOCATION: [facility]  DATE/TIME: 7/21/2023 12:49 AM CDT    INDICATION: Severe epigastric pain and vomiting.  COMPARISON: 11/15/2022 - CT chest.    TECHNIQUE: CT scan of the abdomen and pelvis was performed following injection of IV contrast. Multiplanar reformats were obtained. Dose reduction techniques were used.  CONTRAST: 135 mL Isovue 370.    FINDINGS:    LOWER CHEST: Partial visualization of a 9 x 3 cm lipoma within the left pectoralis major muscle.    HEPATOBILIARY: Prior  cholecystectomy.    SPLEEN: Unremarkable.    PANCREAS: Unremarkable.    ADRENAL GLANDS: Unremarkable.    KIDNEYS/BLADDER: No suspicious renal lesions.    BOWEL: Possible small hiatal hernia. No dilatation of the small or large bowel. A few colonic diverticula are present, without evidence of diverticulitis. The appendix is not visualized.    LYMPH NODES: Unremarkable.    PELVIC ORGANS: No acute findings.    MUSCULOSKELETAL: Fusion hardware in the lumbosacral spine.    OTHER: Atherosclerotic calcification in the abdominal aorta.      Impression    IMPRESSION:   1.  No acute abnormality identified in the abdomen or pelvis. No evidence of bowel obstruction.  2.  Colonic diverticulosis, without evidence of diverticulitis.      *Note: Due to a large number of results and/or encounters for the requested time period, some results have not been displayed. A complete set of results can be found in Results Review.       Discharge Medications   Current Discharge Medication List      CONTINUE these medications which have CHANGED    Details   metoprolol succinate ER (TOPROL XL) 25 MG 24 hr tablet Take 3 tablets (75 mg) by mouth daily for 30 days  Qty: 90 tablet, Refills: 0    Comments: Follow-up with your primary doctor next week to reevaluate dose change.  Associated Diagnoses: Chronic atrial fibrillation (H)         CONTINUE these medications which have NOT CHANGED    Details   albuterol (PROAIR HFA/PROVENTIL HFA/VENTOLIN HFA) 108 (90 Base) MCG/ACT inhaler Inhale 1-2 puffs into the lungs every 6 hours as needed for shortness of breath, wheezing or cough      atorvastatin (LIPITOR) 40 MG tablet Take 1 tablet (40 mg) by mouth every evening  Qty: 90 tablet, Refills: 2    Associated Diagnoses: Hyperlipidemia LDL goal <100      azithromycin (ZITHROMAX) 250 MG tablet Take 1 tablet (250 mg) by mouth daily  Qty: 4 tablet, Refills: 0    Associated Diagnoses: Pneumonia due to infectious organism, unspecified laterality, unspecified part  of lung      cyanocobalamin 1000 MCG/ML injection Inject 1 mL (1,000 mcg) Subcutaneous every 7 days  Qty: 4 mL, Refills: 5    Associated Diagnoses: Other vitamin B12 deficiency anemia      cyclobenzaprine (FLEXERIL) 10 MG tablet Take 1 tablet (10 mg) by mouth 3 times daily  Qty: 90 tablet, Refills: 3    Associated Diagnoses: Generalized osteoarthrosis, involving multiple sites      furosemide (LASIX) 20 MG tablet TAKE ONE TABLET BY MOUTH EVERY EVENING  Qty: 60 tablet, Refills: 3    Associated Diagnoses: Heart failure with preserved ejection fraction, NYHA class I (H)      gabapentin (NEURONTIN) 300 MG capsule Take 300 mg by mouth 2 times daily (morning and afternoon) with 800mg tablet (total dose 1100mg)      !! gabapentin (NEURONTIN) 800 MG tablet Take 800 mg by mouth At Bedtime       !! gabapentin (NEURONTIN) 800 MG tablet Take 800 mg by mouth 2 times daily (morning and afternoon) in addition to 300mg capsule (total dose 1100mg)      HYDROmorphone (DILAUDID) 8 MG tablet Take 8 mg by mouth 3 times daily . Max of 3 doses per day.    Associated Diagnoses: Chronic pain syndrome      levothyroxine (SYNTHROID/LEVOTHROID) 100 MCG tablet Take 300 mcg by mouth daily      liothyronine (CYTOMEL) 5 MCG tablet Take 5 mcg by mouth daily      !! morphine (MS CONTIN) 30 MG 12 hr tablet Take 30 mg by mouth 2 times daily (morning and night) in addition to 60 mg tablet for a total dose of 90mg.  Qty: 270 tablet, Refills: 0    Associated Diagnoses: Chronic pain syndrome      !! morphine (MS CONTIN) 60 MG 12 hr tablet Take 60 mg by mouth 3 times daily (morning and night) in addition to 30mg tablet for a total dose of 90mg  Qty: 30 tablet, Refills: 0    Associated Diagnoses: Chronic pain syndrome      naloxone (NARCAN) 4 MG/0.1ML nasal spray Spray 4 mg into one nostril alternating nostrils as needed for opioid reversal every 2-3 minutes until assistance arrives      rivaroxaban ANTICOAGULANT (XARELTO) 20 MG TABS tablet Take 1 tablet  (20 mg) by mouth daily (with dinner)      sodium fluoride (SF 5000 PLUS) 1.1 % CREA Apply to affected area daily      zolpidem (AMBIEN) 5 MG tablet Take 5 mg by mouth nightly as needed for sleep Quantity #15 for 30 days supply per prescribing MD.      Elastic Bandages & Supports (MEDICAL COMPRESSION SOCKS) MISC 1 Package daily Please measure and distribute 2 pair of 20mmHg - 30mmHg THIGH high open or closed toe compression stockings with extra refills as indicated. Jobst ultrasheer or equivalent.  Qty: 2 each, Refills: 3    Associated Diagnoses: Symptomatic varicose veins of both lower extremities       !! - Potential duplicate medications found. Please discuss with provider.      STOP taking these medications       cloNIDine (CATAPRES) 0.2 MG tablet Comments:   Reason for Stopping:         semaglutide (OZEMPIC, 0.25 OR 0.5 MG/DOSE,) 2 MG/3ML pen Comments:   Reason for Stopping:             Allergies   Allergies   Allergen Reactions     Blood Transfusion Related (Informational Only) Other (See Comments)     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      Chlorhexidine Hives     Thor surgical cleanser     Ibuprofen [Nsaids] Hives     Morphine And Related Hives     Has tolerated Oxycodone in past      Penicillins Hives     Other reaction(s): Unknown     Sulfa Antibiotics Hives     Other reaction(s): Unknown     Calcium Channel Blockers      Doxycycline GI Disturbance     Emesis & diarrhea  Patient denies allergy to this med     Hydroxyzine      rash     Mold

## 2023-07-23 NOTE — DISCHARGE INSTRUCTIONS
Follow up with your wound care staff Monday am regarding the small superficial abrasions on your chronic wound. Mepilex and gauze dressing applied, wash with micorclenz and reapply as needed. Some supplies sent. MD aware and suggesting outpatient follow-up.

## 2023-07-23 NOTE — PROGRESS NOTES
PRIMARY DIAGNOSIS: GASTROENTERITIS     OUTPATIENT/OBSERVATION GOALS TO BE MET BEFORE DISCHARGE  1. Orthostatic performed: N/A     2. Tolerating PO fluid and/or antibiotics (if applicable): Yes     3. Nausea/Vomiting/Diarrhea symptoms improved: Yes     4. Pain status: Improved-controlled with oral pain medications.     5. Return to near baseline physical activity: Yes     Discharge Planner Nurse   Safe discharge environment identified: Yes  Barriers to discharge: No

## 2023-07-23 NOTE — PROVIDER NOTIFICATION
MD Notification    Notified Person: MD ashley      Notified Person Name:    Notification Date/Time:    Notification Interaction:    Purpose of Notification:unfortunately, noted that L buttock OLD wound that woc ASSESSED AS CLOSED AND NOT OOZING, has 3 small abrasions on it that are oozing. pt states this is new. wound cares done per WOC and pt still wants to d/c. what are our next steps   Orders Received:    Comments:

## 2023-08-07 NOTE — ED TRIAGE NOTES
Nausea vomiting and diarrhea started again yesterday and cannot keep anything down. Having abdominal pain and headache. Had similar episode one month ago lead to hospitalization.      Triage Assessment       Row Name 08/07/23 3003       Triage Assessment (Adult)    Airway WDL WDL       Respiratory WDL    Respiratory WDL WDL       Skin Circulation/Temperature WDL    Skin Circulation/Temperature WDL WDL       Cardiac WDL    Cardiac WDL X       Peripheral/Neurovascular WDL    Peripheral Neurovascular WDL WDL       Cognitive/Neuro/Behavioral WDL    Cognitive/Neuro/Behavioral WDL WDL

## 2023-08-07 NOTE — ED PROVIDER NOTES
History     Chief Complaint:  Nausea, Vomiting, & Diarrhea       The history is provided by the patient.      Aspen Mccullough is a 63 year old female with a history of a-fib on Xarelto who presents with nausea, vomiting, and diarrhea over the past day. Patient reports she has been unable to keep anything down since yesterday, 8/6. Today, she had 2 episodes of diarrhea prior to her arrival in the ED, which is watery and brown without blood. She endorses chills, epigastric pain. She denies fevers, leg swelling/soreness. No recent travel, new foods, medications, or sickness exposures. She took ammonium and Morphine this morning, but states she was unable to keep either of them down.  She was seen for the same symptoms on 7/20, which lead to admission and subsequent discharge. At the time, she was on Ozempic, but has since stopped taking this medication. Aspen does note that she did go out to eat on Friday night, but was with her  who ate the same meal as her, and is feeling fine at home. Non smoker. No alcohol use. Does have a history of cholecystectomy, appendectomy, total hysterectomy, and anterior/posterior spinal fusions. No bowel surgeries.     Independent Historian:   None - Patient Only    Review of External Notes:   None    Medications:    Lipitor  Zithromax  Flexeril  Lasix  Toprol  Proair  Catapres  Neurontin  Dilaudid  Cytomel  Xarelto  Ozempic  Ambien     Past Medical History:    Generalized osteoarthrosis  CRPS (complex regional pain syndrome)  Fibromyalgia  Somatoform disorder  Histrionic personality  Long-term use of opiate analgesic  Hypothyroidism  Insomnia  Hyperlipidemia  Hashimoto's thyroiditis  Glucocorticoid deficiency   Posttraumatic stress disorder  Impingement syndrome of left shoulder  Attention deficit disorder  Cushing's syndrome   Endometriosis  Essential hypertension  Systemic lupus erythematosus   Paroxysmal atrial fibrillation   Nonischemic cardiomyopathy   DVT   Pulmonary  "embolism  Alopecia areata  Anemia  ARF (acute renal failure)  Complications due to internal joint prosthesis  Iatrogenic hyperthyroidism  Lipoma of skin and subcutaneous tissue  Nasal fracture  Opioid type dependence   Thrombus of right atrial appendage without antecedent myocardial infarction  GI bleed  Grade III internal hemorrhoids  Pneumonia   Morbid obesity   Neuropathy   Chronic anticoagulation  Craniofacial hyperhidrosis  Current chronic use of systemic steroids)  Hypertonic bladder  Hypo-osmolality and hyponatremia  Lumbosacral radiculopathy at S1  Refusal of blood transfusions as patient is Voodoo  Chronic pain disorder  Hemoptysis  Acute respiratory failure with hypoxia   Edema  History of ischemic stroke  Pressure injury of sacral region, unstageable   Pneumonia due to infectious organism, unspecified laterality, unspecified part of lung  Opioid dependence with withdrawal     Past Surgical History:    Left toe and side of foot amputation  Appendectomy  Ankle arthrodesis   Arthroplasty knee   Right tissue removed from breast  Bunionectomy  Cholecystectomy  Anal exam under anesthesia  Mass excision upper extremity  Foot surgery  Lumbar fusion anterior one level  Inject nerve block suprascapular  Tonsillectomy  Salpingo-oophorectomy  Rhinoplasty    Physical Exam   Patient Vitals for the past 24 hrs:   BP Temp Temp src Pulse Resp SpO2 Height   08/07/23 1634 (!) 155/98 -- -- 86 -- 98 % --   08/07/23 1445 (!) 147/117 98  F (36.7  C) Oral 95 18 96 % 1.753 m (5' 9\")        Physical Exam  Constitutional: Heavy-set white female, laying on her left side. No respiratory distress.   HENT: No signs of trauma.   Eyes: EOM are normal. Pupils are equal, round, and reactive to light.   Neck: Normal range of motion. No JVD present. No cervical adenopathy.  Cardiovascular: Regular rhythm.  Exam reveals no gallop and no friction rub.    No murmur heard.  Pulmonary/Chest: Bilateral breath sounds normal. No wheezes, " rhonchi or rales.  Abdominal: Soft. Mild epigastric tenderness. No rebound or guarding. Incisions present to the right and left lower quadrants.   Musculoskeletal: No edema. No tenderness. Bilateral knee and right ankle surgeries.   Lymphadenopathy: No lymphadenopathy.   Neurological: Alert and oriented to person, place, and time. Normal strength. Coordination normal.   Skin: Skin is warm and dry. No rash noted. No erythema.     Emergency Department Course     Laboratory:  Labs Ordered and Resulted from Time of ED Arrival to Time of ED Departure   COMPREHENSIVE METABOLIC PANEL - Abnormal       Result Value    Sodium 135 (*)     Potassium 4.0      Chloride 94 (*)     Carbon Dioxide (CO2) 25      Anion Gap 16 (*)     Urea Nitrogen 11.9      Creatinine 0.70      Calcium 9.5      Glucose 115 (*)     Alkaline Phosphatase 108 (*)     AST 29      ALT 13      Protein Total 7.5      Albumin 4.2      Bilirubin Total 0.4      GFR Estimate >90     LIPASE - Abnormal    Lipase 67 (*)    CBC WITH PLATELETS - Normal    WBC Count 10.9      RBC Count 4.54      Hemoglobin 12.9      Hematocrit 39.3      MCV 87      MCH 28.4      MCHC 32.8      RDW 14.6      Platelet Count 333     ENTERIC BACTERIA AND VIRUS PANEL BY PCR      Emergency Department Course & Assessments:    Interventions:  Medications   0.9% sodium chloride BOLUS (0 mLs Intravenous Stopped 8/7/23 1642)   HYDROmorphone (PF) (DILAUDID) injection 0.5 mg (0.5 mg Intravenous $Given 8/7/23 1550)   ondansetron (ZOFRAN) injection 4 mg (4 mg Intravenous $Given 8/7/23 1548)   acetaminophen (TYLENOL) tablet 1,000 mg (1,000 mg Oral $Given 8/7/23 1640)      Independent Interpretation (X-rays, CTs, rhythm strip):  None    Assessments/Consultations/Discussion of Management or Tests:  ED Course as of 08/07/23 1939   Mon Aug 07, 2023   1508 Dr. Dunlap' evaluation.      Social Determinants of Health affecting care:   None    Disposition:  The patient was discharged to home.     Impression  & Plan      Medical Decision Making:  This is a 63-year-old  who comes to the ED for some nausea, vomiting and diarrhea for less than 24 hours. She took some Ammonium today and is no longer having diarrhea.  She did try a pain pill, but thinks she threw that up. She was admitted 2 weeks ago for similar symptoms thought to be secondary to Ozempic, which she has stopped taking. She denies any fevers or chills, but has minimal abdominal discomfort. On exam, she has a soft, essentially non-tender abdomen, non-surgical. Labs were obtained, which were unremarkable. She received IV fluids, nausea and pain medications, and is doing much better. She is able to eat, drink fluids and ambulate. She was unable to give us a stool sample. Her symptoms are consistent with possibly a GI source, but it is unlikely to be related to Ozempic. No signs of bowel obstruction, perforation or abscess. Her blood pressure was rechecked, which has come down. She will be discharged with Zofran. She should be on a bread diet and advance gently. If she has increased pain, fevers, or vomiting, return to the ED, otherwise follow-up with PCP in the next 2-3 days.     Diagnosis:    ICD-10-CM    1. Nausea, vomiting, and diarrhea  R11.2     R19.7          Discharge Medications:  Discharge Medication List as of 8/7/2023  5:36 PM        START taking these medications    Details   ondansetron (ZOFRAN ODT) 4 MG ODT tab Take 1 tablet (4 mg) by mouth every 4 hours as needed for nausea, Disp-10 tablet, R-0, Local Print            Scribe Disclosure:  I, RUBI BRIDGES, am serving as a scribe at 2:49 PM on 8/7/2023 to document services personally performed by Willy Duran MD based on my observations and the provider's statements to me.     8/7/2023   Willy Duran MD Steinman, Randall Ira, MD  08/07/23 2265

## 2023-08-07 NOTE — ED NOTES
Bed: ED23  Expected date: 8/7/23  Expected time: 2:26 PM  Means of arrival: Ambulance  Comments:  433 63f n/v/d ETA 8116

## 2023-08-08 PROBLEM — T40.601A OPIATE OVERDOSE, ACCIDENTAL OR UNINTENTIONAL, INITIAL ENCOUNTER (H): Status: ACTIVE | Noted: 2023-01-01

## 2023-08-08 PROBLEM — E86.0 DEHYDRATION: Status: ACTIVE | Noted: 2023-01-01

## 2023-08-08 PROBLEM — R19.7 DIARRHEA, UNSPECIFIED TYPE: Status: ACTIVE | Noted: 2023-01-01

## 2023-08-08 NOTE — ED PROVIDER NOTES
History     Chief Complaint:  Altered Mental Status       HPI   Aspen Mccullough is a 63 year old female who presents by EMS.  The patient seemed fine today per the patient's .  Later on she was noted to be altered and slumped over.  No known fall or trauma.  EMS was activated given the patient's condition.  They measured a blood sugar which was  133.  They noted hypotension to the 80s systolic and hypoxia down to 86% in transit to the emergency department.  Given the hypoxia they administered supplemental oxygen by facemask.  This resolved the hypoxia.  Family reported the patient does use opiates chronically for pain and has been compliant with that and they had little to no suspicion the patient is abusing her pain medications.  To extract the patient from her home EMS administered a dose of Narcan to which she responded slightly, enough so to exit the home and get into the ambulance.  At one point prearrival the patient had complained of headache.  She is anticoagulated with history of pulmonary embolism and DVT.  It sounds she is compliant with this medication as well.  There is no report of chest pain.  There has been recent episodic vomiting and diarrhea.  No reports of black or bloody stool.  Unknown if the patient has suffered fever lately.  Unknown quality of urine output.  EMS reports the patient was evaluated here yesterday given her vomiting diarrhea issues.  Prearrival there was no seizure activity.      Independent Historian:   EMS - They report details of the HPI    Review of External Notes:   None       Medications:    albuterol (PROAIR HFA/PROVENTIL HFA/VENTOLIN HFA) 108 (90 Base) MCG/ACT inhaler  atorvastatin (LIPITOR) 40 MG tablet  cyanocobalamin 1000 MCG/ML injection  cyclobenzaprine (FLEXERIL) 10 MG tablet  gabapentin (NEURONTIN) 300 MG capsule  gabapentin (NEURONTIN) 800 MG tablet  gabapentin (NEURONTIN) 800 MG tablet  HYDROmorphone (DILAUDID) 8 MG tablet  levothyroxine  (SYNTHROID/LEVOTHROID) 100 MCG tablet  liothyronine (CYTOMEL) 5 MCG tablet  metoprolol succinate ER (TOPROL XL) 25 MG 24 hr tablet  morphine (MS CONTIN) 30 MG 12 hr tablet  morphine (MS CONTIN) 60 MG 12 hr tablet  naloxone (NARCAN) 4 MG/0.1ML nasal spray  ondansetron (ZOFRAN ODT) 4 MG ODT tab  rivaroxaban ANTICOAGULANT (XARELTO) 20 MG TABS tablet  sodium fluoride (SF 5000 PLUS) 1.1 % CREA  zolpidem (AMBIEN) 5 MG tablet  azithromycin (ZITHROMAX) 250 MG tablet  Elastic Bandages & Supports (MEDICAL COMPRESSION SOCKS) MISC  furosemide (LASIX) 20 MG tablet        Past Medical History:    Past Medical History:   Diagnosis Date    ADHD (attention deficit hyperactivity disorder)     Allergic rhinitis     Chronic low back pain     Cushing's syndrome (H)     DDD (degenerative disc disease)     DDD (degenerative disc disease)     Deviated nasal septum     Diarrhea     Endometriosis     Fibromyalgia     Hashimoto's disease     Hyperlipidemia     Hypertension     Hypothyroid     Insomnia     Lupus (H)     Malabsorption     Pernicious anemia     Renal disease     Sinusitis        Past Surgical History:    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)    APPENDECTOMY      ARTHRODESIS ANKLE      right    ARTHROPLASTY KNEE BILATERAL      ARTHROPLASTY REVISION KNEE  4/19/2011    Procedure:ARTHROPLASTY REVISION KNEE; With Antibiotic Cement ; Surgeon:CHAO OLIVARES; Location:[facility]    BREAST SURGERY      right- tissue remove nipple area    BUNIONECTOMY  12/14/2011    Procedure:BUNIONECTOMY; Right Bunion Correction; Surgeon:GRACE ZARATE; Location:[facility]    CHOLECYSTECTOMY      EXAM UNDER ANESTHESIA ANUS N/A 7/15/2020    Procedure: EXAM UNDER ANESTHESIA, ANUS;  Surgeon: Luis Canales MD;  Location:  OR    EXCISE MASS UPPER EXTREMITY  12/14/2011    Procedure:EXCISE MASS UPPER EXTREMITY; Excision of Left Arm Mass; Surgeon:GRACE ZARATE  JOSE A; Location: [facility]    FOOT SURGERY      left X 4    FUSION LUMBAR ANTERIOR ONE LEVEL      HEMORRHOIDECTOMY INTERNAL N/A 7/15/2020    Procedure: Exam under anesthesia, hemorrhoidectomy;  Surgeon: Luis Canales MD;  Location: [facility]    INJECT NERVE BLOCK SUPRASCAPULAR Left 5/18/2018    Procedure: INJECT NERVE BLOCK SUPRASCAPULAR;  Left Suprascapular Nerve Block;  Surgeon: Basim Velazquez MD;  Location: [facility]    INJECT NERVE BLOCK SUPRASCAPULAR Right 7/23/2018    Procedure: INJECT NERVE BLOCK SUPRASCAPULAR;  Right suprascapular injection;  Surgeon: Basim Velazquez MD;  Location: [facility]    INJECT NERVE BLOCK SUPRASCAPULAR Bilateral 10/29/2018    Procedure: Bilateral Suprascapular Nerve Blocks;  Surgeon: Basim Velazquez MD;  Location: [facility]    INJECT NERVE BLOCK SUPRASCAPULAR Bilateral 3/15/2019    Procedure: Bilateral Suprascapular Nerve Block;  Surgeon: Basim Velazquez MD;  Location: [facility]    INJECT NERVE BLOCK SUPRASCAPULAR Bilateral 12/31/2019    Procedure: bilateral suprascapular nerve block;  Surgeon: Basim Velazquez MD;  [facility]    INJECT NERVE BLOCK SUPRASCAPULAR Bilateral 8/3/2021    Procedure: bilateral suprascapular nerve block;  Surgeon: Basim Velazquez MD;  Location: [facility] OR    IR ENDOVENOUS ABLATION VARICOSE VEINS  10/5/2021    IR ENDOVENOUS ABLATION VARICOSE VEINS  10/26/2021    KNEE SURGERY      see list which we will bring    LAMINECTOMY LUMBAR ONE LEVEL      L4-5    LAPAROSCOPIC ABLATION ENDOMETRIOSIS      FL HAND/FINGER SURGERY UNLISTED  surgeries on both hands with Dr. Shankar    FL SPINE SURGERY PROCEDURE UNLISTED      see list which we will bring    FL STOMACH SURGERY PROCEDURE UNLISTED  gall bladder removal    RADIO FREQUENCY ABLATION PULSED CERVICAL Bilateral 7/10/2019    Procedure: Bilateral Suprascapular Nerve Pulsed Radiofrequency Ablation;  Surgeon: Basim Velazquez MD;  Location: [facility]    REMOVE HARDWARE FOOT  12/14/2011    Procedure:REMOVE HARDWARE  FOOT; Hardware Removal Right Foot (Mini-C-Arm) ; Surgeon:GRACE ZARATE; Location:[facility]    RHINOPLASTY      SALPINGO-OOPHORECTOMY BILATERAL      TONSILLECTOMY      Z STOMACH SURGERY PROCEDURE UNLISTED      see list which we will bring        Physical Exam   Patient Vitals for the past 24 hrs:   BP Temp Temp src Pulse Resp SpO2 Weight   08/08/23 1521 116/77 98.4  F (36.9  C) Temporal 96 16 95 % --   08/08/23 1520 139/74 -- -- 93 15 (!) 89 % --   08/08/23 1500 (!) 152/94 -- -- 96 19 93 % --   08/08/23 1415 -- -- -- 92 12 97 % --   08/08/23 1400 118/83 -- -- 93 16 97 % --   08/08/23 1345 (!) 147/83 -- -- 92 20 98 % --   08/08/23 1330 (!) 150/80 -- -- 96 14 99 % --   08/08/23 1315 128/78 -- -- 93 10 99 % --   08/08/23 1300 (!) 140/77 -- -- 91 -- 98 % --   08/08/23 1236 138/81 97.8  F (36.6  C) Oral 96 10 98 % 133.3 kg (293 lb 14 oz)      Physical Exam  SKIN:  Warm, dry.  Atraumatic.  Faint erythema of the distal right lower limb.  HEMATOLOGIC/IMMUNOLOGIC/LYMPHATIC:  No pallor.  No pitting edema of the lower extremities.  HENT: No facial trauma.  EYES:  Conjunctivae normal.  Pupils equal round and reactive to light.  CARDIOVASCULAR:  Regular rate and rhythm.  No murmur.  No rub.  RESPIRATORY:  No respiratory distress, breath sounds equal and normal.  GASTROINTESTINAL:  Soft, nontender abdomen with active bowel sounds.  No distention.  No palpable mass.  MUSCULOSKELETAL: Overweight body habitus.  NEUROLOGIC: Obtunded, arrives nonverbal, moves limbs to painful stimul.  PSYCHIATRIC: Unable to assess an obtunded condition.    Emergency Department Course   ECG  ECG taken at 1312, ECG read at 1344  Normal sinus rhythm  Possible left atrial enlargement   Nonspecific T wave abnormality   Prolonged QT  Abnormal ECG   No change as compared to prior, dated 01/16/23.  Rate 90 bpm. NH interval 208 ms. QRS duration 98 ms. QT/QTc 386/472 ms. P-R-T axes 55 46 39.     Imaging:  CT Chest Pulmonary Embolism w Contrast   Final  Result   IMPRESSION:   1.  No evidence for pulmonary embolism or acute thoracic aortic   abnormality.   2.  Patchy bilateral ground-glass pulmonary opacities could represent   an atypical infectious or inflammatory etiology versus mild edema.      RA MARQUEZ MD            SYSTEM ID:  [id number]      CT Head w/o Contrast   Final Result   IMPRESSION: No acute intracranial pathology.      LETTY MIN DO            SYSTEM ID:  [id number]         Report per radiology    Laboratory:  Labs Ordered and Resulted from Time of ED Arrival to Time of ED Departure   COMPREHENSIVE METABOLIC PANEL - Abnormal       Result Value    Sodium 139      Potassium 3.3 (*)     Chloride 99      Carbon Dioxide (CO2) 25      Anion Gap 15      Urea Nitrogen 14.4      Creatinine 1.31 (*)     Calcium 9.2      Glucose 126 (*)     Alkaline Phosphatase 101      AST 15      ALT 12      Protein Total 7.0      Albumin 4.2      Bilirubin Total 0.4      GFR Estimate 45 (*)    TROPONIN T, HIGH SENSITIVITY - Abnormal    Troponin T, High Sensitivity 51 (*)    TSH - Abnormal    TSH 58.73 (*)    ACETAMINOPHEN LEVEL - Abnormal    Acetaminophen <5.0 (*)    CBC WITH PLATELETS AND DIFFERENTIAL - Abnormal    WBC Count 11.5 (*)     RBC Count 4.33      Hemoglobin 12.2      Hematocrit 39.4      MCV 91      MCH 28.2      MCHC 31.0 (*)     RDW 14.9      Platelet Count 287      % Neutrophils 73      % Lymphocytes 17      % Monocytes 7      % Eosinophils 2      % Basophils 0      % Immature Granulocytes 1      NRBCs per 100 WBC 0      Absolute Neutrophils 8.3      Absolute Lymphocytes 2.0      Absolute Monocytes 0.8      Absolute Eosinophils 0.3      Absolute Basophils 0.0      Absolute Immature Granulocytes 0.1      Absolute NRBCs 0.0     NT PROBNP INPATIENT - Normal    N terminal Pro BNP Inpatient 319     ETHYL ALCOHOL LEVEL - Normal    Alcohol ethyl <0.01     SALICYLATE LEVEL - Normal    Salicylate <0.3     BLOOD CULTURE   BLOOD CULTURE        Procedures    None    Emergency Department Course & Assessments:       Interventions:  Medications   sodium chloride (PF) 0.9% PF flush 3 mL (has no administration in time range)   sodium chloride (PF) 0.9% PF flush 3 mL (has no administration in time range)   iopamidol (ISOVUE-370) solution 83 mL (83 mLs Intravenous $Given 8/8/23 1251)   100mL Saline Flush (100 mLs Intravenous $Given 8/8/23 1251)   naloxone (NARCAN) injection 1 mg (1 mg Intravenous $Given 8/8/23 1314)        Assessments:  History obtained, exam performed    Independent Interpretation (X-rays, CTs, rhythm strip):  None    Consultations/Discussion of Management or Tests:  Spoke to Dr. Munson, hospitalist, regarding admission     Social Determinants of Health affecting care:   None    Disposition:  The patient was admitted to the hospital under the care of Dr. Munson.     Impression & Plan      Medical Decision Making:  This patient presented with altered mentation.  Considered a host of etiologies.  EMS administered Narcan prearrival which gave some improvement in the mental status.  During her emergency department evaluation the patient was administered a 1 mg dose of Narcan which resolved the patient's altered mentation.  So seems to have suffered an unintentional opiate overdose.  Otherwise given her medical history considered other etiologies altered mentation which included intracranial hemorrhage as she is anticoagulated.  She was briefly hypotensive and hypoxic in transit to the emergency department.  Likely a function of her overdose.  Considered pulmonary embolism breakthrough despite her anticoagulation and her hypotension and hypoxia.  CT chest was reassuring in that regard.  Cardiac testing was relatively reassuring with slight elevation of the troponin.  However she lacks chest pain.  Patient was really only complaining of low back pain after opiate reversal with Narcan.  Lactic acid slightly elevated which I think is a function of the overdose and  transient hypotension and hypoxia.  No fever.  Patient is not immunocompromise nor taking distinct medications regarding lupus.  Family is quite concerned she may be dehydrated given the ongoing diarrhea.  Her creatinine is slightly elevated which may correlate with dehydration.  So the patient will be admitted for monitoring given this unintentional overdose in case she requires additional Narcan.  She did require Narcan drip in the past for overdose.  She was hydrated intravenously.    Diagnosis:    ICD-10-CM    1. Opiate overdose, accidental or unintentional, initial encounter (H)  T40.601A       2. Dehydration  E86.0       3. Diarrhea, unspecified type  R19.7            Discharge Medications:  New Prescriptions    No medications on file        8/8/2023   Luis Angeles MD Moe, James Thomas, MD  08/08/23 [id number]

## 2023-08-08 NOTE — PROGRESS NOTES
RECEIVING UNIT ED HANDOFF REVIEW    ED Nurse Handoff Report was reviewed by: Maria Isabel Pedraza RN on August 8, 2023 at 6:51 PM

## 2023-08-08 NOTE — H&P
[facility]    History and Physical  Hospitalist       Date of Admission:  8/8/2023    Assessment & Plan   Aspen Mccullough is a 63 year old morbidly obese female with extensive PMH significant for hypertension, dyslipidemia, hypothyroidism, lupus, GERD, VTE, paroxysmal a fib (on Xarelto), chronic pain syndrome on opiates, h/o CVA, ADD, PTSD who presented to ED via EMS with altered mental status.    Acute toxic metabolic encephalopathy likely due to dehydration and unintentional narcotic overdose, improved  Nausea, vomiting, diarrhea, abdominal pain  Acute kidney injury  Hypokalemia  Chronic pain syndrome on chronic narcotics/fibromyalgia/neuropathy  Acute hypoxic and hypercapnic respiratory failure  Patient has been having nausea, vomiting and diarrhea over the last 2 days and has been feeling dehydrated.  She was seen in the ED the day prior, received IVF hydration, had work-up that was unrevealing and discharged home.  Morning of presentation, she had a long hot shower and after this felt lightheaded and was noted to be altered and slumped over by her .  No known fall or trauma.  EMS was activated.  Patient was apparently hypotensive in the 80s systolic, hypoxic down to 86% for EMS.  Patient is on high-dose narcotics chronically and is reportedly compliant with dosing.  She received a dose of Narcan via EMS to which she responded enough to exit the home and get into the ambulance.  Upon arrival to the ED, she was noted to have stable vitals, but noted to be quite somnolent/obtunded.  She received 1 mg Narcan after which she became much more alert and back to baseline and conversant.  EKG with no acute ischemic changes.  Head CT with no acute findings.  CT chest negative for PE, did show patchy bilateral groundglass pulmonary opacities that could represent atypical infectious or inflammatory etiology versus mild edema.  Labs significant for K3.3, creatinine 1.3, GFR 45, troponin T  "51, TSH 58.7, WBC 11.5, alcohol level undetectable.  Given concern for unintentional opiate overdose, patient is admitted under observation status.  Patient was admitted in June and in July this year with nausea, vomiting and diarrhea.  First admission, symptoms were felt to be due to gastroenteritis.  Of note, patient had been on Ozempic since May this year and during the second admission in July, her symptoms were felt to be side effects from Ozempic and this medication was subsequently discontinued.  Last dose of Ozempic would have been in mid July.  Patient notes that her symptoms had subsided following this hospitalization but over the last 2 days she started having nausea, vomiting and diarrhea that was quite severe.  She notes eating out at a restaurant few days ago, however nobody else around her got sick.  No blood in stool or vomit.  She also mentions having episodes of diarrhea on a monthly basis that has been going on for years.  Does not appear that she has had any prior GI evaluation.  She notes having a colonoscopy done just few months ago without any acute findings, unable to find record of this in epic.  She denies any fevers, chills, sweats.  Did admit to having some chest tightness that started in the ED.  Associated mild shortness of breath and she is on couple liters of oxygen.  Denies any cough.  Admits to having abdominal pain and back pain in the left side.  She also notes having some issues with chronic intermittent right lower extremity swelling ever since she had right foot surgery few years ago.  She has had multiple knee replacement surgeries, back surgery and has had chronic pain for decades for which she has been on high-dose narcotics since the 1990s.  She is very clear that she has not abused narcotics and expresses a lot of frustration with healthcare system often treating her like an \"addict\".     -Montalba to observation status  -For today, will decrease dose of narcotics: Ordered " 60 Mg MS Contin tonight.  PTA doses include MS Contin 90 Mg twice daily along with 60 Mg in the afternoon.  She did not receive the afternoon dose today.  Also takes scheduled 8 mg oral Dilaudid 3 times daily.  Will get dose tonight.  Did not get afternoon dose today.  Hold PTA Flexeril, as needed Ambien.  Continue gabapentin.  She follows with pain clinic.  Continue outpatient follow-up.  -IV hydration with IV NS with 20 K at 100 cc/h.  Replace potassium as needed.  Monitor BMP.  -Check enteric panel, C. difficile PCR.  -Plan on outpatient GI referral versus inpatient GI consultation if persistent nausea, vomiting, diarrhea.  Current episode could be gastroenteritis.  Should not be side effects from Ozempic as last dose would have been in mid July.  -Wean O2 as able.  Hypoxia/hypercapnia should improve as she is more alert.    Elevated troponin  Chest pain at rest  Hypertension  Hyperlipidemia  Paroxysmal atrial fibrillation  H/o DVT/PE  No prior history of CAD.    -Check serial troponins.  -Telemetry monitoring  -Continue PTA Xarelto  -Given issues with intermittent right lower extremity swelling, will obtain bilateral leg venous Dopplers to rule out DVT.  Family very worried that patient has recurrent DVT as she has apparently had DVT in the past while on anticoagulation.  -Continue PTA metoprolol    Hypothyroidism  TSH at 58.  Last TSH on file was 25 a year ago.  Patient notes that she closely follows with endocrinology.  For several years she apparently was using injectable thyroid hormone due to malabsorption issues.  -Continue PTA levothyroxine and Cytomel.  Continue outpatient follow-up with endocrinologist.    Morbid obesity  -Encourage lifestyle modification measures for weight loss.  Increases all cause mortality and morbidity.    Chronic pressure ulcer right lateral buttock   -Appears healed on exam.  Consult to wound RN    DVT Prophylaxis: DOAC  Code Status: DNR / DNI     Expected Discharge Date:  "08/09/2023               Radha Munson MD    Medical Decision Making       Please see A&P for additional details of medical decision making.         Clinically Significant Risk Factors Present on Admission        # Hypokalemia: Lowest K = 3.3 mmol/L in last 2 days, will replace as needed        # Drug Induced Coagulation Defect: home medication list includes an anticoagulant medication   # Acute Kidney Injury, unspecified: based on a >150% or 0.3 mg/dL increase in last creatinine compared to past 90 day average, will monitor renal function  # Hypertension: Noted on problem list   # Acute Respiratory Failure: based on blood gas results.  Continue supplemental oxygen as needed     # Severe Obesity: Estimated body mass index is 43.4 kg/m  as calculated from the following:    Height as of 8/7/23: 1.753 m (5' 9\").    Weight as of this encounter: 133.3 kg (293 lb 14 oz).              Primary Care Physician   Arcadio Farfan    Chief Complaint   Altered mental status    History is obtained from the patient, patient's family, ED provider and chart review    History of Present Illness   Aspen Mccullough is a 63 year old morbidly obese female with extensive PMH significant for hypertension, dyslipidemia, hypothyroidism, lupus, GERD, VTE, paroxysmal a fib (on Xarelto), chronic pain syndrome on opiates, h/o CVA, ADD, PTSD who presented to ED via EMS with altered mental status.    Patient has been having nausea, vomiting and diarrhea over the last 2 days and has been feeling dehydrated.  She was seen in the ED the day prior, received IVF hydration, had work-up that was unrevealing and discharged home.  Morning of presentation, she had a long hot shower and after this felt lightheaded and was noted to be altered and slumped over by her .  No known fall or trauma.  EMS was activated.  Patient was apparently hypotensive in the 80s systolic, hypoxic down to 86% for EMS.  Patient is on high-dose narcotics chronically and is " reportedly compliant with dosing.  She received a dose of Narcan via EMS to which she responded enough to exit the home and get into the ambulance.  Upon arrival to the ED, she was noted to have stable vitals, but noted to be quite somnolent/obtunded.  She received 1 mg Narcan after which she became much more alert and back to baseline and conversant.  EKG with no acute ischemic changes.  Head CT with no acute findings.  CT chest negative for PE, did show patchy bilateral groundglass pulmonary opacities that could represent atypical infectious or inflammatory etiology versus mild edema.  Labs significant for K3.3, creatinine 1.3, GFR 45, troponin T 51, TSH 58.7, WBC 11.5, alcohol level undetectable.  Given concern for unintentional opiate overdose, patient is admitted under observation status.    Patient was admitted in June and in July this year with nausea, vomiting and diarrhea.  First admission, symptoms were felt to be due to gastroenteritis.  Of note, patient had been on Ozempic since May this year and during the second admission in July, her symptoms were felt to be side effects from Ozempic and this medication was subsequently discontinued.  Last dose of Ozempic would have been in mid July.  Patient notes that her symptoms had subsided following this hospitalization but over the last 2 days she started having nausea, vomiting and diarrhea that was quite severe.  She notes eating out at a restaurant few days ago, however nobody else around her got sick.  No blood in stool or vomit.  She also mentions having episodes of diarrhea on a monthly basis that has been going on for years.  Does not appear that she has had any prior GI evaluation.  She notes having a colonoscopy done just few months ago without any acute findings, unable to find record of this in epic.    She denies any fevers, chills, sweats.  Did admit to having some chest tightness that started in the ED.  Associated mild shortness of breath and she  "is on couple liters of oxygen.  Denies any cough.  Admits to having abdominal pain and back pain in the left side.  She also notes having some issues with chronic intermittent right lower extremity swelling ever since she had right foot surgery few years ago.  She has had multiple knee replacement surgeries, back surgery and has had chronic pain for decades for which she has been on high-dose narcotics since the 1990s.  She is very clear that she has not abused narcotics and expresses a lot of frustration with healthcare system often treating her like an \"addict\".  She lives with her , daughter lives nearby and is also present in the ED and offers much of the history.    During encounter, she was alert, appropriate.  Denies any alcohol or tobacco use.    Past Medical History    I have reviewed this patient's medical history and updated it with pertinent information if needed.   Past Medical History:   Diagnosis Date    ADHD (attention deficit hyperactivity disorder)     Allergic rhinitis     Chronic low back pain     Cushing's syndrome (H)     DDD (degenerative disc disease)     DDD (degenerative disc disease)     Deviated nasal septum     Diarrhea     Endometriosis     Fibromyalgia     Hashimoto's disease     Hyperlipidemia     Hypertension     Hypothyroid     hx hashimoto's thyroiditis    Insomnia     Lupus (H)     Malabsorption     Pernicious anemia     Renal disease     chronic renal insufficiency    Sinusitis        Past Surgical History   I have reviewed this patient's surgical history and updated it with pertinent information if needed.  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)    APPENDECTOMY      ARTHRODESIS ANKLE      right    ARTHROPLASTY KNEE BILATERAL      ARTHROPLASTY REVISION KNEE  4/19/2011    Procedure:ARTHROPLASTY REVISION KNEE; With Antibiotic Cement ; Surgeon:CHAO OLIVARES; Location:[facility]    BREAST SURGERY      " right- tissue remove nipple area    BUNIONECTOMY  12/14/2011    Procedure:BUNIONECTOMY; Right Bunion Correction; Surgeon:GRACE ZARATE; Location:[facility]    CHOLECYSTECTOMY      EXAM UNDER ANESTHESIA ANUS N/A 7/15/2020    Procedure: EXAM UNDER ANESTHESIA, ANUS;  Surgeon: Luis Canales MD;  Location: [facility]    EXCISE MASS UPPER EXTREMITY  12/14/2011    Procedure:EXCISE MASS UPPER EXTREMITY; Excision of Left Arm Mass; Surgeon:GRACE ZARATE; Location:[facility]    FOOT SURGERY      left X 4    FUSION LUMBAR ANTERIOR ONE LEVEL      HEMORRHOIDECTOMY INTERNAL N/A 7/15/2020    Procedure: Exam under anesthesia, hemorrhoidectomy;  Surgeon: Luis Canales MD;  Location: [facility]    INJECT NERVE BLOCK SUPRASCAPULAR Left 5/18/2018    Procedure: INJECT NERVE BLOCK SUPRASCAPULAR;  Left Suprascapular Nerve Block;  Surgeon: Basim Velazquez MD;  Location: [facility]    INJECT NERVE BLOCK SUPRASCAPULAR Right 7/23/2018    Procedure: INJECT NERVE BLOCK SUPRASCAPULAR;  Right suprascapular injection;  Surgeon: Basim Velazquez MD;  [facility]: [facility]    INJECT NERVE BLOCK SUPRASCAPULAR Bilateral 10/29/2018    Procedure: Bilateral Suprascapular Nerve Blocks;  Surgeon: Basim Velazquez MD;  [facility]: [facility]    INJECT NERVE BLOCK SUPRASCAPULAR Bilateral 3/15/2019    Procedure: Bilateral Suprascapular Nerve Block;  Surgeon: Basim Velazquez MD;  [facility]    INJECT NERVE BLOCK SUPRASCAPULAR Bilateral 12/31/2019    Procedure: bilateral suprascapular nerve block;  Surgeon: Basim Velazquez MD;  [facility]    INJECT NERVE BLOCK SUPRASCAPULAR Bilateral 8/3/2021    Procedure: bilateral suprascapular nerve block;  Surgeon: Basim Velazquez MD;  Location: [facility] OR    IR ENDOVENOUS ABLATION VARICOSE VEINS  10/5/2021    IR ENDOVENOUS ABLATION VARICOSE VEINS  10/26/2021    KNEE SURGERY      see list which we will bring    LAMINECTOMY LUMBAR ONE LEVEL      L4-5    LAPAROSCOPIC ABLATION ENDOMETRIOSIS      AK  HAND/FINGER SURGERY UNLISTED  surgeries on both hands with Dr. Shankar    TX SPINE SURGERY PROCEDURE UNLISTED      see list which we will bring    TX STOMACH SURGERY PROCEDURE UNLISTED  gall bladder removal    RADIO FREQUENCY ABLATION PULSED CERVICAL Bilateral 7/10/2019    Procedure: Bilateral Suprascapular Nerve Pulsed Radiofrequency Ablation;  Surgeon: Basim Velazquez MD;  Location:  OR    REMOVE HARDWARE FOOT  2011    Procedure:REMOVE HARDWARE FOOT; Hardware Removal Right Foot (Mini-C-Arm) ; Surgeon:GRACE ZARATE; Location: [facility]    RHINOPLASTY      SALPINGO-OOPHORECTOMY BILATERAL      TONSILLECTOMY      ZZC STOMACH SURGERY PROCEDURE UNLISTED      see list which we will bring       Prior to Admission Medications   Prior to Admission Medications   Prescriptions Last Dose Informant Patient Reported? Taking?   Elastic Bandages & Supports (MEDICAL COMPRESSION SOCKS) MISC  Self No No   Si Package daily Please measure and distribute 2 pair of 20mmHg - 30mmHg THIGH high open or closed toe compression stockings with extra refills as indicated. Jobst ultrasheer or equivalent.   HYDROmorphone (DILAUDID) 8 MG tablet 2023 at x1 Self Yes Yes   Sig: Take 8 mg by mouth 3 times daily . Max of 3 doses per day.   albuterol (PROAIR HFA/PROVENTIL HFA/VENTOLIN HFA) 108 (90 Base) MCG/ACT inhaler Past Week Self Yes Yes   Sig: Inhale 1-2 puffs into the lungs every 6 hours as needed for shortness of breath, wheezing or cough   atorvastatin (LIPITOR) 40 MG tablet 2023 Self No Yes   Sig: Take 1 tablet (40 mg) by mouth every evening   azithromycin (ZITHROMAX) 250 MG tablet  Self No No   Sig: Take 1 tablet (250 mg) by mouth daily   cyanocobalamin 1000 MCG/ML injection 2023 Self No Yes   Sig: Inject 1 mL (1,000 mcg) Subcutaneous every 7 days   cyclobenzaprine (FLEXERIL) 10 MG tablet 2023 at x1 Self No Yes   Sig: Take 1 tablet (10 mg) by mouth 3 times daily   furosemide (LASIX) 20 MG tablet Not Taking  Self No No   Sig: TAKE ONE TABLET BY MOUTH EVERY EVENING   Patient not taking: Reported on 8/8/2023   gabapentin (NEURONTIN) 300 MG capsule 8/8/2023 at x1 Self Yes Yes   Sig: Take 300 mg by mouth 2 times daily (morning and afternoon) with 800mg tablet (total dose 1100mg)   gabapentin (NEURONTIN) 800 MG tablet 8/8/2023 at x1 Self Yes Yes   Sig: Take 800 mg by mouth 2 times daily (morning and afternoon) in addition to 300mg capsule (total dose 1100mg)   gabapentin (NEURONTIN) 800 MG tablet 8/7/2023 Self Yes Yes   Sig: Take 800 mg by mouth At Bedtime    levothyroxine (SYNTHROID/LEVOTHROID) 100 MCG tablet 8/8/2023 Self Yes Yes   Sig: Take 300 mcg by mouth daily   liothyronine (CYTOMEL) 5 MCG tablet 8/8/2023 Self Yes Yes   Sig: Take 5 mcg by mouth daily   metoprolol succinate ER (TOPROL XL) 25 MG 24 hr tablet 8/8/2023  No Yes   Sig: Take 3 tablets (75 mg) by mouth daily for 30 days   morphine (MS CONTIN) 30 MG 12 hr tablet 8/8/2023 at x1 Self Yes Yes   Sig: Take 30 mg by mouth 2 times daily (morning and night) in addition to 60 mg tablet for a total dose of 90mg.   morphine (MS CONTIN) 60 MG 12 hr tablet 8/8/2023 at x1 Self Yes Yes   Sig: Take 60 mg by mouth 3 times daily (morning and night) in addition to 30mg tablet for a total dose of 90mg   naloxone (NARCAN) 4 MG/0.1ML nasal spray Unknown Self Yes Yes   Sig: Spray 4 mg into one nostril alternating nostrils as needed for opioid reversal every 2-3 minutes until assistance arrives   ondansetron (ZOFRAN ODT) 4 MG ODT tab 8/7/2023  No Yes   Sig: Take 1 tablet (4 mg) by mouth every 4 hours as needed for nausea   rivaroxaban ANTICOAGULANT (XARELTO) 20 MG TABS tablet 8/7/2023 Self Yes Yes   Sig: Take 1 tablet (20 mg) by mouth daily (with dinner)   sodium fluoride (SF 5000 PLUS) 1.1 % CREA Unknown Self Yes Yes   Sig: Apply to affected area daily   zolpidem (AMBIEN) 5 MG tablet Unknown Self Yes Yes   Sig: Take 5 mg by mouth nightly as needed for sleep Quantity #15 for 30 days  supply per prescribing MD.      Facility-Administered Medications: None     Allergies   Allergies   Allergen Reactions    Blood Transfusion Related (Informational Only) Other (See Comments)     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     Chlorhexidine Hives     Thor surgical cleanser    Ibuprofen [Nsaids] Hives    Morphine And Related Hives     Has tolerated Oxycodone in past     Penicillins Hives     Other reaction(s): Unknown    Sulfa Antibiotics Hives     Other reaction(s): Unknown    Calcium Channel Blockers     Doxycycline GI Disturbance     Emesis & diarrhea  Patient denies allergy to this med    Hydroxyzine      rash    Mold        Social History   I have reviewed this patient's social history and updated it with pertinent information if needed. Aspen EPPS Taoist  reports that she quit smoking about 30 years ago. Her smoking use included cigarettes. She started smoking about 50 years ago. She has a 30.00 pack-year smoking history. She has never used smokeless tobacco. She reports that she does not drink alcohol and does not use drugs.    Family History   I have reviewed this patient's family history and updated it with pertinent information if needed.   Family History   Problem Relation Age of Onset    Hypertension Mother     No Known Problems Father     No Known Problems Sister     No Known Problems Brother     No Known Problems Maternal Grandmother     No Known Problems Maternal Grandfather     No Known Problems Paternal Grandmother     No Known Problems Other        Review of Systems   The 10 point Review of Systems is negative other than noted in the HPI or here.     Physical Exam   Temp: 98.4  F (36.9  C) Temp src: Temporal BP: 116/77 Pulse: 96   Resp: 16 SpO2: 95 % O2 Device: Nasal cannula Oxygen Delivery: 3 LPM  Vital Signs with Ranges  Temp:  [97.8  F (36.6  C)-98.4  F (36.9  C)] 98.4  F (36.9  C)  Pulse:  [91-96] 96  Resp:  [10-20] 16  BP: (116-152)/(74-94) 116/77  SpO2:  [89 %-99 %] 95  %  293 lbs 13.97 oz    Constitutional: Awake, alert, cooperative, no apparent distress.  Morbidly obese lady laying in bed.  Eyes: no icterus, EOMs intact  HEENT: Moist mucous membranes  Respiratory: Clear to auscultation bilaterally, no crackles or wheezing.  Nonlabored breathing.  Cardiovascular: Regular rate and rhythm, normal S1 and S2, and no murmur noted.  GI: Soft, non-distended, mildly tender in epigastric and right upper quadrant, normal bowel sounds.  Skin: No rashes, no cyanosis, mild lower extremity edema, right more than left  Musculoskeletal: No joint swelling, erythema or tenderness.  Neurologic: Alert, oriented and engages in appropriate conversation, no facial asymmetry, moving all extremities, fluent speech  Psychiatric: Calm and pleasant, no obvious anxiety or depression.     Data   Data reviewed today:  I personally reviewed EKG with result as noted above and CT scan with result as noted above  Recent Labs   Lab 08/08/23  1230 08/07/23  1455   WBC 11.5* 10.9   HGB 12.2 12.9   MCV 91 87    333    135*   POTASSIUM 3.3* 4.0   CHLORIDE 99 94*   CO2 25 25   BUN 14.4 11.9   CR 1.31* 0.70   ANIONGAP 15 16*   KYLIE 9.2 9.5   * 115*   ALBUMIN 4.2 4.2   PROTTOTAL 7.0 7.5   BILITOTAL 0.4 0.4   ALKPHOS 101 108*   ALT 12 13   AST 15 29   LIPASE  --  67*       Recent Results (from the past 24 hour(s))   CT Head w/o Contrast    Narrative    EXAM: CT HEAD W/O CONTRAST  8/8/2023 1:02 PM     HISTORY: Altered mental status, anticoagulated, no known trauma       COMPARISON: MRI 6/7/2021, CT 6/6/2021    TECHNIQUE: Using multidetector thin collimation helical acquisition  technique, axial, coronal and sagittal CT images from the skull base  to the vertex were obtained without intravenous contrast.   (topogram) image(s) also obtained and reviewed.    FINDINGS:  No acute intracranial hemorrhage, mass effect, or midline shift. No CT  findings of acute infarct or hydrocephalus. Preserved  subarachnoid  spaces.    Atraumatic calvarium. Scattered paranasal sinus mucosal thickening.  Partially visualized changes of functional endoscopic sinus surgery.  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 Clear mastoid air cells.  Nonfocal orbits.       Impression    IMPRESSION: No acute intracranial pathology.    LETTY MIN DO         SYSTEM ID:  [id number]   CT Chest Pulmonary Embolism w Contrast    Narrative    CT CHEST PULMONARY EMBOLISM WITH CONTRAST August 8, 2023 1:02 PM    CLINICAL HISTORY: Hypoxia, hypotension, history of pulmonary embolus  and deep vein thrombosis, anticoagulated.    TECHNIQUE: CT angiogram chest during arterial phase injection IV  contrast. 2D and 3D MIP reconstructions were performed by the CT  technologist. Dose reduction techniques were used.  CONTRAST: 83ml Isovue 370.    COMPARISON: CT chest 11/15/2022.    FINDINGS:  ANGIOGRAM CHEST: Pulmonary arteries are normal caliber and negative  for pulmonary emboli. Thoracic aorta is negative for dissection. No CT  evidence of right heart strain.    LUNGS AND PLEURA: No effusions. Motion artifact limits detail  assessment of the lungs. Mild bilateral patchy ground-glass opacities  suggested. Mild bibasilar atelectasis.    MEDIASTINUM/AXILLAE: No enlarged lymph nodes. No acute abnormality  otherwise seen.    CORONARY ARTERY CALCIFICATION: None.    UPPER ABDOMEN: Cholecystectomy. No acute abnormality.    MUSCULOSKELETAL: Spine degenerative changes.      Impression    IMPRESSION:  1.  No evidence for pulmonary embolism or acute thoracic aortic  abnormality.  2.  Patchy bilateral ground-glass pulmonary opacities could represent  an atypical infectious or inflammatory etiology versus mild edema.    RA MARQUEZ MD         SYSTEM ID:  [id number]

## 2023-08-08 NOTE — ED NOTES
Bed: ST02  Expected date:   Expected time:   Means of arrival:   Comments:  [facility] [place] - [phone] F AMS eta [phone]

## 2023-08-08 NOTE — ED TRIAGE NOTES
Patient BIBA after found down in her shower. Patient was feeling well this morning and  found her in the shower passed out. When EMS arrived patient would only respond to vigorous stimulation. BG was 133, 0.5mg of narcan given along 4mg of zofran. Patient has a hx of chronic pain with lupus and does take multiple pain medications.

## 2023-08-08 NOTE — ED NOTES
[facility]  ED Nurse Handoff Report    ED Chief complaint: Altered Mental Status      ED Diagnosis:   Final diagnoses:   None       Code Status: not addressed at this time    Allergies:   Allergies   Allergen Reactions    Blood Transfusion Related (Informational Only) Other (See Comments)     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     Chlorhexidine Hives     Thor surgical cleanser    Ibuprofen [Nsaids] Hives    Morphine And Related Hives     Has tolerated Oxycodone in past     Penicillins Hives     Other reaction(s): Unknown    Sulfa Antibiotics Hives     Other reaction(s): Unknown    Calcium Channel Blockers     Doxycycline GI Disturbance     Emesis & diarrhea  Patient denies allergy to this med    Hydroxyzine      rash    Mold        Patient Story:     Patient BIBA after found down in her shower. Patient was feeling well this morning and  found her in the shower passed out. When EMS arrived patient would only respond to vigorous stimulation. BG was 133, 0.5mg of narcan given along 4mg of zofran. Patient has a hx of chronic pain with lupus and does take multiple pain medications.      Focused Assessment: Upon arrival patient only responsive to vigorous and verbal stimulation. Patient RLE is pink colored.     Treatments and/or interventions provided: US IV, labs, head CT, chest CT  narcan, NS bolus  Patient's response to treatments and/or interventions: Patient alert and responding to staff    To be done/followed up on inpatient unit:  continue to monitor    Does this patient have any cognitive concerns?:  A/Ox4    Activity level - Baseline/Home:  Independent  Activity Level - Current:   Unknown    Patient's Preferred language: English   Needed?: No    Isolation: None  Infection: Not Applicable  Patient tested for COVID 19 prior to admission: NO  Bariatric?: No    Vital Signs:   Vitals:    08/08/23 1330 08/08/23 1345 08/08/23 1400 08/08/23 1415   BP: (!) 150/80 (!)  147/83 118/83    BP Location:       Pulse: 96 92 93 92   Resp: 14 20 16 12   Temp:       TempSrc:       SpO2: 99% 98% 97% 97%   Weight:           Cardiac Rhythm:     Was the PSS-3 completed:   Yes  What interventions are required if any?               Family Comments: Daughter at bedside, concerned that patient is dehydrated, patient was seen yesterday at ER for N/V/D  OBS brochure/video discussed/provided to patient/family: N/A              Name of person given brochure if not patient: NA              Relationship to patient: NA    For the majority of the shift this patient's behavior was Green.   Behavioral interventions performed were None.    ED NURSE PHONE NUMBER: [phone]

## 2023-08-08 NOTE — PHARMACY-ADMISSION MEDICATION HISTORY
Pharmacist Admission Medication History    Admission medication history is complete. The information provided in this note is only as accurate as the sources available at the time of the update.    Medication reconciliation/reorder completed by provider prior to medication history? No    Information Source(s): Patient via in-person    Pertinent Information: none    Changes made to PTA medication list:    Added: None    Deleted:patient states not taking furosemide, flagged for removal by MD    Changed: None    Medication Affordability:  Not including over the counter (OTC) medications, was there a time in the past 3 months when you did not take your medications as prescribed because of cost?: No    Allergies reviewed with patient and updates made in EHR: yes    Medication History Completed By: Orin Flynn RPH 8/8/2023 3:33 PM    Prior to Admission medications    Medication Sig Last Dose Taking? Auth Provider Long Term End Date   albuterol (PROAIR HFA/PROVENTIL HFA/VENTOLIN HFA) 108 (90 Base) MCG/ACT inhaler Inhale 1-2 puffs into the lungs every 6 hours as needed for shortness of breath, wheezing or cough Past Week Yes Unknown, Entered By History No    atorvastatin (LIPITOR) 40 MG tablet Take 1 tablet (40 mg) by mouth every evening 8/7/2023 Yes Sil Muñoz MD Yes    cyanocobalamin 1000 MCG/ML injection Inject 1 mL (1,000 mcg) Subcutaneous every 7 days 8/8/2023 Yes Yeimy Cabrera MD     cyclobenzaprine (FLEXERIL) 10 MG tablet Take 1 tablet (10 mg) by mouth 3 times daily 8/8/2023 at x1 Yes Yeimy Cabrera MD     gabapentin (NEURONTIN) 300 MG capsule Take 300 mg by mouth 2 times daily (morning and afternoon) with 800mg tablet (total dose 1100mg) 8/8/2023 at x1 Yes Unknown, Entered By History No    gabapentin (NEURONTIN) 800 MG tablet Take 800 mg by mouth At Bedtime  8/7/2023 Yes Unknown, Entered By History No    gabapentin (NEURONTIN) 800 MG tablet Take 800 mg by mouth 2 times  daily (morning and afternoon) in addition to 300mg capsule (total dose 1100mg) 8/8/2023 at x1 Yes Unknown, Entered By History No    HYDROmorphone (DILAUDID) 8 MG tablet Take 8 mg by mouth 3 times daily . Max of 3 doses per day. 8/8/2023 at x1 Yes      levothyroxine (SYNTHROID/LEVOTHROID) 100 MCG tablet Take 300 mcg by mouth daily 8/8/2023 Yes Unknown, Entered By History No    liothyronine (CYTOMEL) 5 MCG tablet Take 5 mcg by mouth daily 8/8/2023 Yes Unknown, Entered By History No    metoprolol succinate ER (TOPROL XL) 25 MG 24 hr tablet Take 3 tablets (75 mg) by mouth daily for 30 days 8/8/2023 Yes Joycelyn Perales, APRN CNP Yes 8/22/23   morphine (MS CONTIN) 30 MG 12 hr tablet Take 30 mg by mouth 2 times daily (morning and night) in addition to 60 mg tablet for a total dose of 90mg. 8/8/2023 at x1 Yes      morphine (MS CONTIN) 60 MG 12 hr tablet Take 60 mg by mouth 3 times daily (morning and night) in addition to 30mg tablet for a total dose of 90mg 8/8/2023 at x1 Yes Umm Ya APRN CNP     naloxone (NARCAN) 4 MG/0.1ML nasal spray Spray 4 mg into one nostril alternating nostrils as needed for opioid reversal every 2-3 minutes until assistance arrives Unknown Yes Unknown, Entered By History     ondansetron (ZOFRAN ODT) 4 MG ODT tab Take 1 tablet (4 mg) by mouth every 4 hours as needed for nausea 8/7/2023 Yes Willy Duran MD  8/10/23   rivaroxaban ANTICOAGULANT (XARELTO) 20 MG TABS tablet Take 1 tablet (20 mg) by mouth daily (with dinner) 8/7/2023 Yes Alen Kebede MD Yes    sodium fluoride (SF 5000 PLUS) 1.1 % CREA Apply to affected area daily Unknown Yes Unknown, Entered By History     zolpidem (AMBIEN) 5 MG tablet Take 5 mg by mouth nightly as needed for sleep Quantity #15 for 30 days supply per prescribing MD. Unknown Yes Unknown, Entered By History No    azithromycin (ZITHROMAX) 250 MG tablet Take 1 tablet (250 mg) by mouth daily   Radha Munson MD     Elastic Bandages &  Supports (MEDICAL COMPRESSION SOCKS) MISC 1 Package daily Please measure and distribute 2 pair of 20mmHg - 30mmHg THIGH high open or closed toe compression stockings with extra refills as indicated. Jobst ultrasheer or equivalent.   Taye Salcedo MD     furosemide (LASIX) 20 MG tablet TAKE ONE TABLET BY MOUTH EVERY EVENING  Patient not taking: Reported on 8/8/2023 Not Taking  Sil Muñoz MD Yes

## 2023-08-09 NOTE — PLAN OF CARE
-diagnostic tests and consults completed and resulted : not met   -vital signs normal or at patient baseline : not met    Nurse to notify provider when observation goals have been met and patient is ready for discharge.

## 2023-08-09 NOTE — PROVIDER NOTIFICATION
MD Notification    Notified Person: MD    Notified Person Name:Dr. Forte    Notification Date/Time: 8/9/23  0131     Notification Interaction: amcomb    Purpose of Notification: per Hopitalist note, pt need to be on telemetry monitoring but I don't see any orders. can u plz put an order?  Orders Received: received     Comments:

## 2023-08-09 NOTE — PLAN OF CARE
Date/time: 1900-0730 night shift 8/8/2023  Summary: ad 8/8 for altered mental status.   Hx of  hypertension, dyslipidemia, hypothyroidism, lupus, GERD, VTE, paroxysmal a fib (on Xarelto), chronic pain syndrome on opiates, h/o CVA, ADD, PTSD  Pt had multiples surgery and is on high dosages of narcotics  Chronic pain syndrome on chronic narcotics/fibromyalgia/neuropathy    Orientation: AOX4  Observation Goals (met & not met): not met  Activity Level: AX1 GBW  Fall Risk: yes  Behavior & Aggression Tool Color: green  Pain Management: reported having back L hip  pain and managed with scheduled with gabapentin, dilaudid, morphine  ABNL VS/O2: VSS on NC 2lbm, Sat 02 up 98%.   ABNL Lab/BG: K 3.3. creatinine 1.31, lactic acid: 1.2 troponin 51, 41, WBC 11.5, TSH 58.73  Tele:   Diet: regular diet  Bowel/Bladder: purewick in place at night. However, pt is continent to BB. Pt reported having loose stool prior to the hospitalization , but has not have any yet. Still waiting for the stool sample to rule out C-diff  Drains/Devices: PIV running at 100ml/hr with NS+KCL.  Tests/Procedures for next shift: WOC consult,   Anticipated DC date: TBD  Other Important Info: pt reported staring having intermittent numbness, tingling on the L hand.   Skin: preexisting pressure injury to the L buttock. No dressing or drainage. Skin intact and dry.   Peeling/cracking skin to bilateral foot and toes- WOC consullt    No N/V. LS clear. BS hyperactive.    Pt complaint about intermittent chest tightness that happened twice throughout the night , only lasted 10 seconds. MD put pt on tele for 24hrs to monitoring.     Plan: outpatient GI referral, wean off 02 as able,              -diagnostic tests and consults completed and resulted : not met   -vital signs normal or at patient baseline : not met  Nurse to notify provider when observation goals have been met and patient is ready for discharge.

## 2023-08-09 NOTE — PLAN OF CARE
-diagnostic tests and consults completed and resulted : met   -vital signs normal or at patient baseline : met    Nurse to notify provider when observation goals have been met and patient is ready for discharge.

## 2023-08-09 NOTE — PLAN OF CARE
Goal Outcome Evaluation:      Plan of Care Reviewed With: patient, spouse    Discharge instructions discussed and questions answered. Discharge medications sent with patient.

## 2023-08-09 NOTE — PROVIDER NOTIFICATION
MD Notification    Notified Person: MD    Notified Person Name: dr. Rodriguez     Notification Date/Time: 8/8 - 2049    Notification Interaction: amcomb     Purpose of Notification: pt would like to have ambien 5mg at bedtime tonight ? PRN?     Orders Received: received     Comments:

## 2023-08-10 NOTE — DISCHARGE SUMMARY
Discharge Summary  Hospitalist    Date of Admission:  8/8/2023  Date of Discharge:  8/9/2023  Discharging Provider: Radha Munson MD, MD  Date of Service (when I saw the patient): 08/09/23    Discharge Diagnoses   Acute toxic metabolic encephalopathy likely due to dehydration and unintentional narcotic overdose, improved  Nausea, vomiting, diarrhea, abdominal pain  Acute kidney injury  Hypokalemia  Chronic pain syndrome on chronic narcotics/fibromyalgia/neuropathy  Acute hypoxic and hypercapnic respiratory failure    History of Present Illness   Please refer H & P for details.      Hospital Course   Aspen Mccullough is a 63 year old morbidly obese female with extensive PMH significant for hypertension, dyslipidemia, hypothyroidism, lupus, GERD, VTE, paroxysmal a fib (on Xarelto), chronic pain syndrome on opiates, h/o CVA, ADD, PTSD who presented to ED via EMS with altered mental status.     Acute toxic metabolic encephalopathy likely due to dehydration and unintentional narcotic overdose, improved  Nausea, vomiting, diarrhea, abdominal pain  Acute kidney injury  Hypokalemia  Chronic pain syndrome on chronic narcotics/fibromyalgia/neuropathy  Acute hypoxic and hypercapnic respiratory failure  Patient has been having nausea, vomiting and diarrhea over the last 2 days and has been feeling dehydrated.  She was seen in the ED the day prior, received IVF hydration, had work-up that was unrevealing and discharged home.  Morning of presentation, she had a long hot shower and after this felt lightheaded and was noted to be altered and slumped over by her .  No known fall or trauma.  EMS was activated.  Patient was apparently hypotensive in the 80s systolic, hypoxic down to 86% for EMS.  Patient is on high-dose narcotics chronically and is reportedly compliant with dosing.  She received a dose of Narcan via EMS to which she responded enough to exit the home and get into the ambulance.  Upon arrival to the ED, she was  noted to have stable vitals, but noted to be quite somnolent/obtunded.  She received 1 mg Narcan after which she became much more alert and back to baseline and conversant.  EKG with no acute ischemic changes.  Head CT with no acute findings.  CT chest negative for PE, did show patchy bilateral groundglass pulmonary opacities that could represent atypical infectious or inflammatory etiology versus mild edema.  Labs significant for K3.3, creatinine 1.3, GFR 45, troponin T 51, TSH 58.7, WBC 11.5, alcohol level undetectable.  Given concern for unintentional opiate overdose, patient is admitted under observation status.  Patient was admitted in June and in July this year with nausea, vomiting and diarrhea.  First admission, symptoms were felt to be due to gastroenteritis.  Of note, patient had been on Ozempic since May this year and during the second admission in July, her symptoms were felt to be side effects from Ozempic and this medication was subsequently discontinued.  Last dose of Ozempic would have been in mid July.  Patient notes that her symptoms had subsided following this hospitalization but over the last 2 days she started having nausea, vomiting and diarrhea that was quite severe.  She notes eating out at a restaurant few days ago, however nobody else around her got sick.  No blood in stool or vomit.  She also mentions having episodes of diarrhea on a monthly basis that has been going on for years.  Does not appear that she has had any prior GI evaluation.  She notes having a colonoscopy done just few months ago without any acute findings, unable to find record of this in epic.  She denies any fevers, chills, sweats.  Did admit to having some chest tightness that started in the ED.  Associated mild shortness of breath and she is on couple liters of oxygen.  Denies any cough.  Admits to having abdominal pain and back pain in the left side.  She also notes having some issues with chronic intermittent right  "lower extremity swelling ever since she had right foot surgery few years ago.  She has had multiple knee replacement surgeries, back surgery and has had chronic pain for decades for which she has been on high-dose narcotics since the 1990s.  She is very clear that she has not abused narcotics and expresses a lot of frustration with healthcare system often treating her like an \"addict\".      -[place] to observation status  -Decreased dose of narcotics on the day of admission. Held PTA Flexeril, as needed Ambien.  Continue gabapentin.  Patient's mental status remained stable following admission.  She follows with pain clinic.  Continue outpatient follow-up.  At discharge, no changes were made to her home regimen of pain medications.  -IV hydration with IV NS with 20 K at 100 cc/h.  BMP within normal range the next day.  Advised her regarding high potassium foods and will also discharge her on oral potassium for a week till her appetite is improved.  -Nausea vomiting diarrhea all subsided and she did not have any episodes in the hospital.  Discussed with her to pursue outpatient GI referral if recurrent issues with diarrhea.  Defer to PCP.  Should not be side effects from Ozempic as last dose would have been in mid July.  -Weaned off oxygen by discharge.     Elevated troponin  Chest pain at rest  Hypertension  Hyperlipidemia  Paroxysmal atrial fibrillation  H/o DVT/PE  No prior history of CAD.    -Check serial troponins-remained flat.  -Telemetry monitoring  -Continue PTA Xarelto  -Given issues with intermittent right lower extremity swelling, will obtain bilateral leg venous Dopplers to rule out DVT.  Family very worried that patient has recurrent DVT as she has apparently had DVT in the past while on anticoagulation.  Negative DVT on Doppler ultrasound.  -Continue PTA metoprolol     Hypothyroidism  TSH at 58.  Last TSH on file was 25 a year ago.  Patient notes that she closely follows with endocrinology.  For " several years she apparently was using injectable thyroid hormone due to malabsorption issues.  -Continue PTA Tirosint and Cytomel.  Continue outpatient follow-up with endocrinologist.     Morbid obesity  -Encourage lifestyle modification measures for weight loss.  Increases all cause mortality and morbidity.     Chronic pressure ulcer right lateral buttock   -Appears healed on exam.      Radha Munson MD, MD      Pending Results   These results will be followed up by Hospitalist team.  Unresulted Labs Ordered in the Past 30 Days of this Admission       Date and Time Order Name Status Description    8/8/2023 12:28 PM Blood Culture Peripheral Blood Preliminary     8/8/2023 12:28 PM Blood Culture Peripheral Blood Preliminary             Code Status   DNR / DNI       Primary Care Physician   Arcadio Farfan    Follow-ups Needed After Discharge   Follow-up Appointments     Follow-up and recommended labs and tests       Follow up with primary care provider, Arcadio Farfan, within 7 days for   hospital follow- up.  The following labs/tests are recommended: BMP in 1   week prior to PCP follow-up.    Follow-up with primary endocrinologist as already scheduled.            Physical Exam   Temp: 97.8  F (36.6  C) Temp src: Oral BP: 126/89 Pulse: 79   Resp: 16 SpO2: 96 % O2 Device: None (Room air) Oxygen Delivery: 2 LPM  Vitals:    08/08/23 1236   Weight: 133.3 kg (293 lb 14 oz)     Vital Signs with Ranges  Temp:  [97  F (36.1  C)-97.8  F (36.6  C)] 97.8  F (36.6  C)  Pulse:  [68-79] 79  Resp:  [16] 16  BP: (126-134)/(51-89) 126/89  SpO2:  [96 %-99 %] 96 %  No intake/output data recorded.    Constitutional: Awake, alert, cooperative, no apparent distress.  Morbidly obese lady laying in bed.  Eyes: no icterus, EOMs intact  HEENT: Moist mucous membranes  Respiratory: Clear to auscultation bilaterally, no crackles or wheezing.  Nonlabored breathing.  Cardiovascular: Regular rate and rhythm, normal S1 and S2, and no murmur  noted.  GI: Soft, non-distended, mildly tender in epigastric and right upper quadrant, normal bowel sounds.  Skin: No rashes, no cyanosis, mild lower extremity edema, right more than left  Musculoskeletal: No joint swelling, erythema or tenderness.  Neurologic: Alert, oriented and engages in appropriate conversation, no facial asymmetry, moving all extremities, fluent speech  Psychiatric: Calm and pleasant, no obvious anxiety or depression.             Discharge Disposition   Discharged to home  Condition at discharge: Stable    Consultations This Hospital Stay   WOUND OSTOMY CONTINENCE NURSE  IP CONSULT    Time Spent on this Encounter   Radha SCOTT MD, personally saw the patient today and spent greater than 30 minutes discharging this patient.    Discharge Orders      Medication Therapy Management Referral      Reason for your hospital stay    Nausea, vomiting, diarrhea, dehydration, altered mental status     Follow-up and recommended labs and tests     Follow up with primary care provider, Arcadio Farfan, within 7 days for hospital follow- up.  The following labs/tests are recommended: BMP in 1 week prior to PCP follow-up.    Follow-up with primary endocrinologist as already scheduled.     Activity    Your activity upon discharge: activity as tolerated     When to contact your care team    Call your primary doctor if you have any of the following: Worsening chest pain, shortness of breath, numbness/weakness, confusion, lethargy, nausea, vomiting, diarrhea, abdominal pain.     Diet    Follow this diet upon discharge: Orders Placed This Encounter      Regular Diet Adult     Discharge Medications   Discharge Medication List as of 8/9/2023 12:46 PM        START taking these medications    Details   potassium chloride ER (KLOR-CON M) 20 MEQ CR tablet Take 1 tablet (20 mEq) by mouth daily, Disp-7 tablet, R-0, E-Prescribe           CONTINUE these medications which have NOT CHANGED    Details   albuterol (PROAIR  HFA/PROVENTIL HFA/VENTOLIN HFA) 108 (90 Base) MCG/ACT inhaler Inhale 1-2 puffs into the lungs every 6 hours as needed for shortness of breath, wheezing or cough, Historical      atorvastatin (LIPITOR) 40 MG tablet Take 1 tablet (40 mg) by mouth every evening, Disp-90 tablet, R-2, E-Prescribe      azithromycin (ZITHROMAX) 250 MG tablet Take 1 tablet (250 mg) by mouth daily, Disp-4 tablet, R-0, E-Prescribe      cyanocobalamin 1000 MCG/ML injection Inject 1 mL (1,000 mcg) Subcutaneous every 7 days, Disp-4 mL, R-5, E-Prescribe      cyclobenzaprine (FLEXERIL) 10 MG tablet Take 1 tablet (10 mg) by mouth 3 times daily, Disp-90 tablet, R-3, E-Prescribe      Elastic Bandages & Supports (MEDICAL COMPRESSION SOCKS) MISC 1 Package daily Please measure and distribute 2 pair of 20mmHg - 30mmHg THIGH high open or closed toe compression stockings with extra refills as indicated. Jobst ultrasheer or equivalent., Disp-2 each, R-3, Local Print      gabapentin (NEURONTIN) 300 MG capsule Take 300 mg by mouth 2 times daily (morning and afternoon) with 800mg tablet (total dose 1100mg), Historical      !! gabapentin (NEURONTIN) 800 MG tablet Take 800 mg by mouth At Bedtime , Historical      !! gabapentin (NEURONTIN) 800 MG tablet Take 800 mg by mouth 2 times daily (morning and afternoon) in addition to 300mg capsule (total dose 1100mg), Historical      HYDROmorphone (DILAUDID) 8 MG tablet Take 8 mg by mouth 3 times daily . Max of 3 doses per day., Historical      levothyroxine (TIROSINT) 100 MCG capsule Take 300 mcg by mouth daily, Historical      liothyronine (CYTOMEL) 5 MCG tablet Take 5 mcg by mouth daily, Historical      metoprolol succinate ER (TOPROL XL) 25 MG 24 hr tablet Take 3 tablets (75 mg) by mouth daily for 30 days, Disp-90 tablet, R-0, I-[id number]-me with your primary doctor next week to reevaluate dose change.      !! morphine (MS CONTIN) 30 MG 12 hr tablet Take 30 mg by mouth 2 times daily (morning and night) in  addition to 60 mg tablet for a total dose of 90mg., Disp-270 tablet, R-0, Historical      !! morphine (MS CONTIN) 60 MG 12 hr tablet Take 60 mg by mouth 3 times daily For morning and night doses, takes in addition to 30mg tablet for a total dose of 90mg., Disp-30 tablet, R-0, Historical      naloxone (NARCAN) 4 MG/0.1ML nasal spray Spray 4 mg into one nostril alternating nostrils as needed for opioid reversal every 2-3 minutes until assistance arrives, Historical      ondansetron (ZOFRAN ODT) 4 MG ODT tab Take 1 tablet (4 mg) by mouth every 4 hours as needed for nausea, Disp-10 tablet, R-0, Local Print      rivaroxaban ANTICOAGULANT (XARELTO) 20 MG TABS tablet Take 1 tablet (20 mg) by mouth daily (with dinner), Historical      sodium fluoride (SF 5000 PLUS) 1.1 % CREA Apply to affected area daily, Historical      zolpidem (AMBIEN) 5 MG tablet Take 5 mg by mouth nightly as needed for sleep Quantity #15 for 30 days supply per prescribing MD., Historical       !! - Potential duplicate medications found. Please discuss with provider.        STOP taking these medications       furosemide (LASIX) 20 MG tablet Comments:   Reason for Stopping:         levothyroxine (SYNTHROID/LEVOTHROID) 100 MCG tablet Comments:   Reason for Stopping:             Allergies   Allergies   Allergen Reactions    Blood Transfusion Related (Informational Only) Other (See Comments)     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     Chlorhexidine Hives     Thor surgical cleanser    Ibuprofen [Nsaids] Hives    Penicillins Hives     Other reaction(s): Unknown    Sulfa Antibiotics Hives     Other reaction(s): Unknown    Calcium Channel Blockers     Doxycycline GI Disturbance     Emesis & diarrhea  Patient denies allergy to this med    Hydroxyzine      rash    Mold      Data   Most Recent 3 CBC's:  Recent Labs   Lab Test 08/09/23  0643 08/08/23  1230 08/07/23  1455   WBC 9.2 11.5* 10.9   HGB 10.9* 12.2 12.9   MCV 92 91 87    287 333       Most Recent 3 BMP's:  Recent Labs   Lab Test 08/09/23  0643 08/08/23  1230 08/07/23  1455    139 135*   POTASSIUM 3.5 3.3* 4.0   CHLORIDE 104 99 94*   CO2 26 25 25   BUN 12.5 14.4 11.9   CR 0.89 1.31* 0.70   ANIONGAP 10 15 16*   KYLIE 8.5* 9.2 9.5   GLC 84 126* 115*     Most Recent 2 LFT's:  Recent Labs   Lab Test 08/09/23  0643 08/08/23  1230   AST 14 15   ALT 9 12   ALKPHOS 88 101   BILITOTAL 0.2 0.4     Most Recent INR's and Anticoagulation Dosing History:  Anticoagulation Dose History  More data exists         Latest Ref Rng & Units 4/21/2011 4/22/2011 4/23/2011 5/6/2014   Recent Dosing and Labs   warfarin (COUMADIN) tablet 2.5 mg - - - 2.5 mg, $Given -   warfarin (COUMADIN) tablet 7.5 mg - 7.5 mg, $Given 7.5 mg, $Given - -   INR 0.85 - 1.15 1.25  1.43  2.40  1.05          1/22/2015 1/22/2020 10/30/2021   Recent Dosing and Labs   warfarin (COUMADIN) tablet 2.5 mg - - -   warfarin (COUMADIN) tablet 7.5 mg - - -   INR 1.08  1.63  1.76      Most Recent 3 Troponin's:  Recent Labs   Lab Test 06/06/21  1459 09/21/18  1849   TROPI <0.015 <0.015     Most Recent Cholesterol Panel:  Recent Labs   Lab Test 06/06/21  1459   CHOL 303*   *   HDL 70   TRIG 259*     Most Recent 6 Bacteria Isolates From Any Culture (See EPIC Reports for Culture Details):  Recent Labs   Lab Test 11/21/15  2110   CULT No Beta Streptococcus isolated     Most Recent TSH, T4 and A1c Labs:  Recent Labs   Lab Test 08/08/23  1230 08/08/22 2006 06/07/21  0003   TSH 58.73* 25.04*  --    T4  --  0.71*  --    A1C  --   --  5.7*       Results for orders placed or performed during the hospital encounter of 08/08/23   CT Head w/o Contrast    Narrative    EXAM: CT HEAD W/O CONTRAST  8/8/2023 1:02 PM     HISTORY: Altered mental status, anticoagulated, no known trauma       COMPARISON: MRI 6/7/2021, CT 6/6/2021    TECHNIQUE: Using multidetector thin collimation helical acquisition  technique, axial, coronal and sagittal CT images from the skull  base  to the vertex were obtained without intravenous contrast.   (topogram) image(s) also obtained and reviewed.    FINDINGS:  No acute intracranial hemorrhage, mass effect, or midline shift. No CT  findings of acute infarct or hydrocephalus. Preserved subarachnoid  spaces.    Atraumatic calvarium. Scattered paranasal sinus mucosal thickening.  Partially visualized changes of functional endoscopic sinus surgery.  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 Clear mastoid air cells.  Nonfocal orbits.       Impression    IMPRESSION: No acute intracranial pathology.    LETTY MIN DO         SYSTEM ID:  [id number]   CT Chest Pulmonary Embolism w Contrast    Narrative    CT CHEST PULMONARY EMBOLISM WITH CONTRAST August 8, 2023 1:02 PM    CLINICAL HISTORY: Hypoxia, hypotension, history of pulmonary embolus  and deep vein thrombosis, anticoagulated.    TECHNIQUE: CT angiogram chest during arterial phase injection IV  contrast. 2D and 3D MIP reconstructions were performed by the CT  technologist. Dose reduction techniques were used.  CONTRAST: 83ml Isovue 370.    COMPARISON: CT chest 11/15/2022.    FINDINGS:  ANGIOGRAM CHEST: Pulmonary arteries are normal caliber and negative  for pulmonary emboli. Thoracic aorta is negative for dissection. No CT  evidence of right heart strain.    LUNGS AND PLEURA: No effusions. Motion artifact limits detail  assessment of the lungs. Mild bilateral patchy ground-glass opacities  suggested. Mild bibasilar atelectasis.    MEDIASTINUM/AXILLAE: No enlarged lymph nodes. No acute abnormality  otherwise seen.    CORONARY ARTERY CALCIFICATION: None.    UPPER ABDOMEN: Cholecystectomy. No acute abnormality.    MUSCULOSKELETAL: Spine degenerative changes.      Impression    IMPRESSION:  1.  No evidence for pulmonary embolism or acute thoracic aortic  abnormality.  2.  Patchy bilateral ground-glass pulmonary opacities could represent  an atypical infectious or inflammatory etiology versus mild  edema.    RA MARQUEZ MD         SYSTEM ID:  [id number]   US Lower Extremity Venous Duplex Bilateral    Narrative    EXAM: US LOWER EXTREMITY VENOUS DUPLEX BILATERAL  LOCATION: [facility]  DATE: 8/8/2023    INDICATION: Bilateral lower extremity pain, swelling and edema.  COMPARISON: The right lower extremity study from 05/10/2021.  TECHNIQUE: Venous Duplex ultrasound of bilateral lower extremities with and without compression, augmentation and duplex. Color flow and spectral Doppler with waveform analysis performed.    FINDINGS: Exam includes the common femoral, femoral, popliteal veins as well as segmentally visualized deep calf veins and greater saphenous vein.     RIGHT: No deep vein thrombosis. No superficial thrombophlebitis. No popliteal cyst.    LEFT: No deep vein thrombosis. No superficial thrombophlebitis. No popliteal cyst.      Impression    IMPRESSION:  1.  No deep venous thrombosis in the bilateral lower extremities.     *Note: Due to a large number of results and/or encounters for the requested time period, some results have not been displayed. A complete set of results can be found in Results Review.

## 2023-08-11 NOTE — TELEPHONE ENCOUNTER
MTM referral from: Transitions of Care (recent hospital discharge or ED visit)    MTM referral outreach attempt #1 on August 11, 2023 at 9:03 AM      Outcome: Patient is not interested at this time. Patient stated they have been on the same medications for a long time and understand how to take them. They do not have any concerns or questions. Will route to MTM Pharmacist/Provider as an FYI. Thank you for the referral.       Dasia Gonzalez CPhT  MT

## 2023-08-20 NOTE — TELEPHONE ENCOUNTER
"Situation: Diarrhea    Assessment:   Diarrhea started yesterday and also nausea and vomiting. Pt has not vomited today, but diarrhea and nausea continue. Pt is also having a intermittent cramping pain that she rates \"9\". Pt has had this pain since yesterday. Daughter is suspecting that she may be dehydrated. Pt was able to keep down a small amount of Imodium and Powerade down.     Protocol Recommended Disposition:   Go To ED Now    Recommendation:   Per protocol, pt should be evaluated in the ED. Daughter will be able to bring her there now.    Pt was advised to call back if symptoms worsen.      Daughter verbalized understanding.    Gurpreet Landers RN on 8/20/2023 at 2:12 PM    Reason for Disposition   [1] SEVERE abdominal pain (e.g., excruciating) AND [2] present > 1 hour   [1] SEVERE abdominal pain AND [2] age > 60 years    Additional Information   Negative: Shock suspected (e.g., cold/pale/clammy skin, too weak to stand, low BP, rapid pulse)   Negative: Difficult to awaken or acting confused (e.g., disoriented, slurred speech)   Negative: Sounds like a life-threatening emergency to the triager   Negative: Vomiting also present and worse than the diarrhea   Negative: [1] Blood in stool AND [2] without diarrhea   Negative: Diarrhea in a cancer patient who is currently (or recently) receiving chemotherapy or radiation therapy, or cancer patient who has metastatic or end-stage cancer and is receiving palliative care    Protocols used: Diarrhea-A-AH    "

## 2023-09-18 NOTE — LETTER
9/18/2023         RE: Aspen Mccullough  [address]  [facility] [address]        Dear Colleague,    Thank you for referring your patient, Aspen Mcculolugh, to the [facility] [place]. Please see a copy of my visit note below.        Vein Clinic Consultation Note    HPI:  Aspen Mccullough is a 63 year old female who was seen today in consultation for right lower extremity venous congestion with history of DVT, deep vein reflux and greater saphenous and small saphenous vein laser treatment in October 2021.    She was also recommended to have a left lower extremity greater saphenous vein laser ablation despite lack of symptoms and mild reflux numbers.  She did not follow through with that.  She does not have any left lower extremity symptoms.    She is compliant with compression boots and stockings.  She still has left lower extremity congestion.  She has left lower extremity nerve numbness that does not change with elevation or dependency and remains the same first thing in the morning after sleep.          ROS    PHH:    Past Medical History:   Diagnosis Date     ADHD (attention deficit hyperactivity disorder)      Allergic rhinitis      Chronic low back pain      Cushing's syndrome (H)      DDD (degenerative disc disease)      DDD (degenerative disc disease)      Deviated nasal septum      Diarrhea      Endometriosis      Fibromyalgia      Hashimoto's disease      Hyperlipidemia      Hypertension      Hypothyroid     hx hashimoto's thyroiditis     Insomnia      Lupus (H)      Malabsorption      Pernicious anemia      Renal disease     chronic renal insufficiency     Sinusitis         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)     APPENDECTOMY       ARTHRODESIS ANKLE      right     ARTHROPLASTY KNEE BILATERAL       ARTHROPLASTY REVISION KNEE  4/19/2011    Procedure:ARTHROPLASTY REVISION KNEE; With Antibiotic  Cement ; Surgeon:CHAO OLIVARES; Location:[facility] OR     BREAST SURGERY      right- tissue remove nipple area     BUNIONECTOMY  12/14/2011    Procedure:BUNIONECTOMY; Right Bunion Correction; Surgeon:GRACE ZARATE; Location:[facility]     CHOLECYSTECTOMY       EXAM UNDER ANESTHESIA ANUS N/A 7/15/2020    Procedure: EXAM UNDER ANESTHESIA, ANUS;  Surgeon: Luis Canales MD;  Location: [facility] OR     EXCISE MASS UPPER EXTREMITY  12/14/2011    Procedure:EXCISE MASS UPPER EXTREMITY; Excision of Left Arm Mass; Surgeon:GRACE ZARATE; Location:[facility]     FOOT SURGERY      left X 4     FUSION LUMBAR ANTERIOR ONE LEVEL       HEMORRHOIDECTOMY INTERNAL N/A 7/15/2020    Procedure: Exam under anesthesia, hemorrhoidectomy;  Surgeon: Luis Canales MD;  Location: [facility]     INJECT NERVE BLOCK SUPRASCAPULAR Left 5/18/2018    Procedure: INJECT NERVE BLOCK SUPRASCAPULAR;  Left Suprascapular Nerve Block;  Surgeon: Basim Velazquez MD;  [facility]     INJECT NERVE BLOCK SUPRASCAPULAR Right 7/23/2018    Procedure: INJECT NERVE BLOCK SUPRASCAPULAR;  Right suprascapular injection;  Surgeon: Basim Velazquez MD;  [facility]: [facility]     INJECT NERVE BLOCK SUPRASCAPULAR Bilateral 10/29/2018    Procedure: Bilateral Suprascapular Nerve Blocks;  Surgeon: Basim Velazquez MD;  [facility]     INJECT NERVE BLOCK SUPRASCAPULAR Bilateral 3/15/2019    Procedure: Bilateral Suprascapular Nerve Block;  Surgeon: Basim Velazquez MD;  [facility]     INJECT NERVE BLOCK SUPRASCAPULAR Bilateral 12/31/2019    Procedure: bilateral suprascapular nerve block;  Surgeon: Basim Velazuqez MD;  [facility]     INJECT NERVE BLOCK SUPRASCAPULAR Bilateral 8/3/2021    Procedure: bilateral suprascapular nerve block;  Surgeon: Basim Velazquez MD;  Location: [facility] OR     IR ENDOVENOUS ABLATION VARICOSE VEINS  10/5/2021     IR ENDOVENOUS ABLATION VARICOSE VEINS  10/26/2021     KNEE SURGERY      see list which we will bring      LAMINECTOMY LUMBAR ONE LEVEL      L4-5     LAPAROSCOPIC ABLATION ENDOMETRIOSIS       MA HAND/FINGER SURGERY UNLISTED  surgeries on both hands with Dr. Shankar     MA SPINE SURGERY PROCEDURE UNLISTED      see list which we will bring     MA STOMACH SURGERY PROCEDURE UNLISTED  gall bladder removal     RADIO FREQUENCY ABLATION PULSED CERVICAL Bilateral 7/10/2019    Procedure: Bilateral Suprascapular Nerve Pulsed Radiofrequency Ablation;  Surgeon: Basim Velazquez MD;  Location:  OR     REMOVE HARDWARE FOOT  12/14/2011    Procedure:REMOVE HARDWARE FOOT; Hardware Removal Right Foot (Mini-C-Arm) ; Surgeon:GRACE ZARATE; Location: [facility]     RHINOPLASTY       SALPINGO-OOPHORECTOMY BILATERAL       TONSILLECTOMY       ZZC STOMACH SURGERY PROCEDURE UNLISTED      see list which we will bring       ALLERGIES:  Blood transfusion related (informational only), Chlorhexidine, Ibuprofen [nsaids], Penicillins, Sulfa antibiotics, Calcium channel blockers, Doxycycline, Hydroxyzine, and Mold    Allergies   Allergen Reactions     Blood Transfusion Related (Informational Only) Other (See Comments)     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      Chlorhexidine Hives     Thor surgical cleanser     Ibuprofen [Nsaids] Hives     Penicillins Hives     Other reaction(s): Unknown     Sulfa Antibiotics Hives     Other reaction(s): Unknown     Calcium Channel Blockers      Doxycycline GI Disturbance     Emesis & diarrhea  Patient denies allergy to this med     Hydroxyzine      rash     Mold        MEDS:    Current Outpatient Medications:      albuterol (PROAIR HFA/PROVENTIL HFA/VENTOLIN HFA) 108 (90 Base) MCG/ACT inhaler, Inhale 1-2 puffs into the lungs every 6 hours as needed for shortness of breath, wheezing or cough, Disp: , Rfl:      atorvastatin (LIPITOR) 40 MG tablet, Take 1 tablet (40 mg) by mouth every evening, Disp: 90 tablet, Rfl: 2     azithromycin (ZITHROMAX) 250 MG tablet, Take 1 tablet (250 mg) by mouth  daily, Disp: 4 tablet, Rfl: 0     cyanocobalamin 1000 MCG/ML injection, Inject 1 mL (1,000 mcg) Subcutaneous every 7 days, Disp: 4 mL, Rfl: 5     cyclobenzaprine (FLEXERIL) 10 MG tablet, Take 1 tablet (10 mg) by mouth 3 times daily, Disp: 90 tablet, Rfl: 3     gabapentin (NEURONTIN) 300 MG capsule, Take 300 mg by mouth 2 times daily (morning and afternoon) with 800mg tablet (total dose 1100mg), Disp: , Rfl:      gabapentin (NEURONTIN) 800 MG tablet, Take 800 mg by mouth At Bedtime , Disp: , Rfl:      gabapentin (NEURONTIN) 800 MG tablet, Take 800 mg by mouth 2 times daily (morning and afternoon) in addition to 300mg capsule (total dose 1100mg), Disp: , Rfl:      HYDROmorphone (DILAUDID) 8 MG tablet, Take 8 mg by mouth 3 times daily . Max of 3 doses per day., Disp: , Rfl:      levothyroxine (TIROSINT) 100 MCG capsule, Take 300 mcg by mouth daily, Disp: , Rfl:      liothyronine (CYTOMEL) 5 MCG tablet, Take 5 mcg by mouth daily, Disp: , Rfl:      morphine (MS CONTIN) 30 MG 12 hr tablet, Take 30 mg by mouth 2 times daily (morning and night) in addition to 60 mg tablet for a total dose of 90mg., Disp: 270 tablet, Rfl: 0     morphine (MS CONTIN) 60 MG 12 hr tablet, Take 60 mg by mouth 3 times daily For morning and night doses, takes in addition to 30mg tablet for a total dose of 90mg., Disp: 30 tablet, Rfl: 0     rivaroxaban ANTICOAGULANT (XARELTO) 20 MG TABS tablet, Take 1 tablet (20 mg) by mouth daily (with dinner), Disp: , Rfl:      sodium fluoride (SF 5000 PLUS) 1.1 % CREA, Apply to affected area daily, Disp: , Rfl:      zolpidem (AMBIEN) 5 MG tablet, Take 5 mg by mouth nightly as needed for sleep Quantity #15 for 30 days supply per prescribing MD., Disp: , Rfl:      Elastic Bandages & Supports (MEDICAL COMPRESSION SOCKS) MISC, 1 Package daily Please measure and distribute 2 pair of 20mmHg - 30mmHg THIGH high open or closed toe compression stockings with extra refills as indicated. Jobst ultrasheer or equivalent.,  Disp: 2 each, Rfl: 3     metoprolol succinate ER (TOPROL XL) 25 MG 24 hr tablet, Take 3 tablets (75 mg) by mouth daily for 30 days, Disp: 90 tablet, Rfl: 0     naloxone (NARCAN) 4 MG/0.1ML nasal spray, Spray 4 mg into one nostril alternating nostrils as needed for opioid reversal every 2-3 minutes until assistance arrives, Disp: , Rfl:      potassium chloride ER (KLOR-CON M) 20 MEQ CR tablet, Take 1 tablet (20 mEq) by mouth daily, Disp: 7 tablet, Rfl: 0    SOCIAL HABITS:    History   Smoking Status     Former     Packs/day: 1.50     Years: 20.00     Types: Cigarettes     Start date: 1/1/1973     Quit date: 1/1/1993   Smokeless Tobacco     Never     Comment: wish I never started     Social History    Substance and Sexual Activity      Alcohol use: No        Alcohol/week: 0.0 standard drinks of alcohol      History   Drug Use No       FAMILY HISTORY:    Family History   Problem Relation Age of Onset     Hypertension Mother      No Known Problems Father      No Known Problems Sister      No Known Problems Brother      No Known Problems Maternal Grandmother      No Known Problems Maternal Grandfather      No Known Problems Paternal Grandmother      No Known Problems Other        Results for orders placed or performed during the hospital encounter of 08/08/23   CT Head w/o Contrast    Narrative    EXAM: CT HEAD W/O CONTRAST  8/8/2023 1:02 PM     HISTORY: Altered mental status, anticoagulated, no known trauma       COMPARISON: MRI 6/7/2021, CT 6/6/2021    TECHNIQUE: Using multidetector thin collimation helical acquisition  technique, axial, coronal and sagittal CT images from the skull base  to the vertex were obtained without intravenous contrast.   (topogram) image(s) also obtained and reviewed.    FINDINGS:  No acute intracranial hemorrhage, mass effect, or midline shift. No CT  findings of acute infarct or hydrocephalus. Preserved subarachnoid  spaces.    Atraumatic calvarium. Scattered paranasal sinus mucosal  thickening.  Partially visualized changes of functional endoscopic sinus surgery.  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 Clear mastoid air cells.  Nonfocal orbits.       Impression    IMPRESSION: No acute intracranial pathology.    LETTY MIN DO         SYSTEM ID:  [id number]   CT Chest Pulmonary Embolism w Contrast    Narrative    CT CHEST PULMONARY EMBOLISM WITH CONTRAST August 8, 2023 1:02 PM    CLINICAL HISTORY: Hypoxia, hypotension, history of pulmonary embolus  and deep vein thrombosis, anticoagulated.    TECHNIQUE: CT angiogram chest during arterial phase injection IV  contrast. 2D and 3D MIP reconstructions were performed by the CT  technologist. Dose reduction techniques were used.  CONTRAST: 83ml Isovue 370.    COMPARISON: CT chest 11/15/2022.    FINDINGS:  ANGIOGRAM CHEST: Pulmonary arteries are normal caliber and negative  for pulmonary emboli. Thoracic aorta is negative for dissection. No CT  evidence of right heart strain.    LUNGS AND PLEURA: No effusions. Motion artifact limits detail  assessment of the lungs. Mild bilateral patchy ground-glass opacities  suggested. Mild bibasilar atelectasis.    MEDIASTINUM/AXILLAE: No enlarged lymph nodes. No acute abnormality  otherwise seen.    CORONARY ARTERY CALCIFICATION: None.    UPPER ABDOMEN: Cholecystectomy. No acute abnormality.    MUSCULOSKELETAL: Spine degenerative changes.      Impression    IMPRESSION:  1.  No evidence for pulmonary embolism or acute thoracic aortic  abnormality.  2.  Patchy bilateral ground-glass pulmonary opacities could represent  an atypical infectious or inflammatory etiology versus mild edema.    RA MARQUEZ MD         SYSTEM ID:  [id number]   US Lower Extremity Venous Duplex Bilateral    Narrative    EXAM: US LOWER EXTREMITY VENOUS DUPLEX BILATERAL  LOCATION: [facility]  DATE: 8/8/2023    INDICATION: Bilateral lower extremity pain, swelling and edema.  COMPARISON: The right lower extremity study from  "05/10/2021.  TECHNIQUE: Venous Duplex ultrasound of bilateral lower extremities with and without compression, augmentation and duplex. Color flow and spectral Doppler with waveform analysis performed.    FINDINGS: Exam includes the common femoral, femoral, popliteal veins as well as segmentally visualized deep calf veins and greater saphenous vein.     RIGHT: No deep vein thrombosis. No superficial thrombophlebitis. No popliteal cyst.    LEFT: No deep vein thrombosis. No superficial thrombophlebitis. No popliteal cyst.      Impression    IMPRESSION:  1.  No deep venous thrombosis in the bilateral lower extremities.     *Note: Due to a large number of results and/or encounters for the requested time period, some results have not been displayed. A complete set of results can be found in Results Review.       VEIN CLINIC LEG DRAWING    Patient History  If you have varicose or spider veins/legs, when did they occur?: Other:  Other:: For many years has had VV with past treatments, presents with bilateral discoloration and pain.  Please describe your expectations of therapy:: Discuss treatment opions for symptoms.    Past Treatment:  Have you ever been treated for the above problem(s)? : Yes  When and who did the treatment?: RIght leg EVLA GSV and SSV w/Dr. Martinez  What method?: Surgery  Have you ever worn prescription stockings?: Yes  Do you currently wear compression stockings?: Yes  Length of time compression stockings worn:: 3-4 years  Do you use medications to relieve your leg pain?: Yes (morphine and dilaudid)  How do you take your medication?: On a daily basis  List medication:: Morphine and dilaudid  Do your symptoms, caused by VV or Spider Veins, interfere with work or activities of daily living?: Yes  Please, explain:: \"Always have pain it does not go away, it is always there\" Patient reports no difference standing vs sitting. Needing to take breaks when up for prolonged periods needing elevate, compression, " pain medications to relieve symptoms.    For Varicose and Spider Veins of the Legs:  Side:: Bilateral  Do you have now:: Pain in calf; Pain in foot  The pain is made worse with...: -- (unable to report which is worse, patient states that BLE are always painful)  The pain is made better with...: Elevation of the leg; Compression Hose; Medication (patient reports minimal to no relief with these interventions)  Your pain feels like:: a numbness; a cramp; an ache/tiredness/heaviness    Do you have a history of any of the following?: Blood Clots; Use of anticoagulatns (Heparin/Coumadin); Pulmonary embolus (History of right leg dvt, clot right side of heart and pulmonary embolus. Takes xarelto. Unknown family history d/t adoption.)  Side:: Bilateral    VCSS  PAIN:: 3  Varicose Veins:: 1  Venous Edema:: 1  Skin Pigmentation:: 2  Inflamation:: 0  Induration:: 0  Number of active ulcers:: 0  Active ulcer duration:: 0  Active ulcer diameter:: 0  Compression Therapy:: 3  VCSS Score:: 10    CEAP:  CEAP:: C4         ASSESSMENT: Right lower extremity venous congestion, likely multifactorial with a large contribution from her history of deep vein reflux and DVT    PLAN: Right lower extremity venous competency testing to assess whether there is a treatment-amenable contribution of residual superficial or  disease.      Gutierrez Cantu MD      Again, thank you for allowing me to participate in the care of your patient.        Sincerely,        Gutierrez Cantu MD

## 2023-09-18 NOTE — NURSING NOTE
Patient Reported symptoms:    Bilateral leg   Heaviness None of the time   Achiness All of the time  Swelling Most of the time   Throbbing Most of the time   Itching None of the time   Appearance Very noticeable   Impact on work/activities Severely reduced

## 2023-09-18 NOTE — PROGRESS NOTES
Vein Clinic Consultation Note    HPI:  Aspen Mccullough is a 63 year old female who was seen today in consultation for right lower extremity venous congestion with history of DVT, deep vein reflux and greater saphenous and small saphenous vein laser treatment in October 2021.    She was also recommended to have a left lower extremity greater saphenous vein laser ablation despite lack of symptoms and mild reflux numbers.  She did not follow through with that.  She does not have any left lower extremity symptoms.    She is compliant with compression boots and stockings.  She still has left lower extremity congestion.  She has left lower extremity nerve numbness that does not change with elevation or dependency and remains the same first thing in the morning after sleep.          ROS    PHH:    Past Medical History:   Diagnosis Date    ADHD (attention deficit hyperactivity disorder)     Allergic rhinitis     Chronic low back pain     Cushing's syndrome (H)     DDD (degenerative disc disease)     DDD (degenerative disc disease)     Deviated nasal septum     Diarrhea     Endometriosis     Fibromyalgia     Hashimoto's disease     Hyperlipidemia     Hypertension     Hypothyroid     hx hashimoto's thyroiditis    Insomnia     Lupus (H)     Malabsorption     Pernicious anemia     Renal disease     chronic renal insufficiency    Sinusitis         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)    APPENDECTOMY      ARTHRODESIS ANKLE      right    ARTHROPLASTY KNEE BILATERAL      ARTHROPLASTY REVISION KNEE  4/19/2011    Procedure:ARTHROPLASTY REVISION KNEE; With Antibiotic Cement ; Surgeon:CHAO OLIVARES; Location:[facility]    BREAST SURGERY      right- tissue remove nipple area    BUNIONECTOMY  12/14/2011    Procedure:BUNIONECTOMY; Right Bunion Correction; Surgeon:GRACE ZARATE; Location:[facility]    CHOLECYSTECTOMY      EXAM UNDER ANESTHESIA ANUS  N/A 7/15/2020    Procedure: EXAM UNDER ANESTHESIA, ANUS;  Surgeon: Luis Canales MD;  Location: [facility]    EXCISE MASS UPPER EXTREMITY  12/14/2011    Procedure:EXCISE MASS UPPER EXTREMITY; Excision of Left Arm Mass; Surgeon:GRACE ZARATE; Location: [facility]    FOOT SURGERY      left X 4    FUSION LUMBAR ANTERIOR ONE LEVEL      HEMORRHOIDECTOMY INTERNAL N/A 7/15/2020    Procedure: Exam under anesthesia, hemorrhoidectomy;  Surgeon: Luis Canales MD;  Location: [facility]    INJECT NERVE BLOCK SUPRASCAPULAR Left 5/18/2018    Procedure: INJECT NERVE BLOCK SUPRASCAPULAR;  Left Suprascapular Nerve Block;  Surgeon: Basim Velazquez MD;  Location: [facility]    INJECT NERVE BLOCK SUPRASCAPULAR Right 7/23/2018    Procedure: INJECT NERVE BLOCK SUPRASCAPULAR;  Right suprascapular injection;  Surgeon: Basim Velazquez MD;  Location: [facility]    INJECT NERVE BLOCK SUPRASCAPULAR Bilateral 10/29/2018    Procedure: Bilateral Suprascapular Nerve Blocks;  Surgeon: Basim Velazquez MD;  Location: [facility]    INJECT NERVE BLOCK SUPRASCAPULAR Bilateral 3/15/2019    Procedure: Bilateral Suprascapular Nerve Block;  Surgeon: Basim Velazquez MD;  [facility]    INJECT NERVE BLOCK SUPRASCAPULAR Bilateral 12/31/2019    Procedure: bilateral suprascapular nerve block;  Surgeon: Basim Velazquez MD;  [facility]    INJECT NERVE BLOCK SUPRASCAPULAR Bilateral 8/3/2021    Procedure: bilateral suprascapular nerve block;  Surgeon: Basim Velazquez MD;  Location: [facility] OR    IR ENDOVENOUS ABLATION VARICOSE VEINS  10/5/2021    IR ENDOVENOUS ABLATION VARICOSE VEINS  10/26/2021    KNEE SURGERY      see list which we will bring    LAMINECTOMY LUMBAR ONE LEVEL      L4-5    LAPAROSCOPIC ABLATION ENDOMETRIOSIS      ID HAND/FINGER SURGERY UNLISTED  surgeries on both hands with Dr. Shankar    ID SPINE SURGERY PROCEDURE UNLISTED      see list which we will bring    ID STOMACH SURGERY PROCEDURE UNLISTED  gall bladder removal     RADIO FREQUENCY ABLATION PULSED CERVICAL Bilateral 7/10/2019    Procedure: Bilateral Suprascapular Nerve Pulsed Radiofrequency Ablation;  Surgeon: Basim Velazquez MD;  Location:  OR    REMOVE HARDWARE FOOT  12/14/2011    Procedure:REMOVE HARDWARE FOOT; Hardware Removal Right Foot (Mini-C-Arm) ; Surgeon:GRACE ZARATE; Location:[facility]    RHINOPLASTY      SALPINGO-OOPHORECTOMY BILATERAL      TONSILLECTOMY      ZZC STOMACH SURGERY PROCEDURE UNLISTED      see list which we will bring       ALLERGIES:  Blood transfusion related (informational only), Chlorhexidine, Ibuprofen [nsaids], Penicillins, Sulfa antibiotics, Calcium channel blockers, Doxycycline, Hydroxyzine, and Mold    Allergies   Allergen Reactions    Blood Transfusion Related (Informational Only) Other (See Comments)     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     Chlorhexidine Hives     Thor surgical cleanser    Ibuprofen [Nsaids] Hives    Penicillins Hives     Other reaction(s): Unknown    Sulfa Antibiotics Hives     Other reaction(s): Unknown    Calcium Channel Blockers     Doxycycline GI Disturbance     Emesis & diarrhea  Patient denies allergy to this med    Hydroxyzine      rash    Mold        MEDS:    Current Outpatient Medications:     albuterol (PROAIR HFA/PROVENTIL HFA/VENTOLIN HFA) 108 (90 Base) MCG/ACT inhaler, Inhale 1-2 puffs into the lungs every 6 hours as needed for shortness of breath, wheezing or cough, Disp: , Rfl:     atorvastatin (LIPITOR) 40 MG tablet, Take 1 tablet (40 mg) by mouth every evening, Disp: 90 tablet, Rfl: 2    azithromycin (ZITHROMAX) 250 MG tablet, Take 1 tablet (250 mg) by mouth daily, Disp: 4 tablet, Rfl: 0    cyanocobalamin 1000 MCG/ML injection, Inject 1 mL (1,000 mcg) Subcutaneous every 7 days, Disp: 4 mL, Rfl: 5    cyclobenzaprine (FLEXERIL) 10 MG tablet, Take 1 tablet (10 mg) by mouth 3 times daily, Disp: 90 tablet, Rfl: 3    gabapentin (NEURONTIN) 300 MG capsule, Take 300 mg by mouth 2  times daily (morning and afternoon) with 800mg tablet (total dose 1100mg), Disp: , Rfl:     gabapentin (NEURONTIN) 800 MG tablet, Take 800 mg by mouth At Bedtime , Disp: , Rfl:     gabapentin (NEURONTIN) 800 MG tablet, Take 800 mg by mouth 2 times daily (morning and afternoon) in addition to 300mg capsule (total dose 1100mg), Disp: , Rfl:     HYDROmorphone (DILAUDID) 8 MG tablet, Take 8 mg by mouth 3 times daily . Max of 3 doses per day., Disp: , Rfl:     levothyroxine (TIROSINT) 100 MCG capsule, Take 300 mcg by mouth daily, Disp: , Rfl:     liothyronine (CYTOMEL) 5 MCG tablet, Take 5 mcg by mouth daily, Disp: , Rfl:     morphine (MS CONTIN) 30 MG 12 hr tablet, Take 30 mg by mouth 2 times daily (morning and night) in addition to 60 mg tablet for a total dose of 90mg., Disp: 270 tablet, Rfl: 0    morphine (MS CONTIN) 60 MG 12 hr tablet, Take 60 mg by mouth 3 times daily For morning and night doses, takes in addition to 30mg tablet for a total dose of 90mg., Disp: 30 tablet, Rfl: 0    rivaroxaban ANTICOAGULANT (XARELTO) 20 MG TABS tablet, Take 1 tablet (20 mg) by mouth daily (with dinner), Disp: , Rfl:     sodium fluoride (SF 5000 PLUS) 1.1 % CREA, Apply to affected area daily, Disp: , Rfl:     zolpidem (AMBIEN) 5 MG tablet, Take 5 mg by mouth nightly as needed for sleep Quantity #15 for 30 days supply per prescribing MD., Disp: , Rfl:     Elastic Bandages & Supports (MEDICAL COMPRESSION SOCKS) MISC, 1 Package daily Please measure and distribute 2 pair of 20mmHg - 30mmHg THIGH high open or closed toe compression stockings with extra refills as indicated. Jobst ultrasheer or equivalent., Disp: 2 each, Rfl: 3    metoprolol succinate ER (TOPROL XL) 25 MG 24 hr tablet, Take 3 tablets (75 mg) by mouth daily for 30 days, Disp: 90 tablet, Rfl: 0    naloxone (NARCAN) 4 MG/0.1ML nasal spray, Spray 4 mg into one nostril alternating nostrils as needed for opioid reversal every 2-3 minutes until assistance arrives, Disp: , Rfl:      potassium chloride ER (KLOR-CON M) 20 MEQ CR tablet, Take 1 tablet (20 mEq) by mouth daily, Disp: 7 tablet, Rfl: 0    SOCIAL HABITS:    History   Smoking Status    Former    Packs/day: 1.50    Years: 20.00    Types: Cigarettes    Start date: 1/1/1973    Quit date: 1/1/1993   Smokeless Tobacco    Never     Comment: wish I never started     Social History    Substance and Sexual Activity      Alcohol use: No        Alcohol/week: 0.0 standard drinks of alcohol      History   Drug Use No       FAMILY HISTORY:    Family History   Problem Relation Age of Onset    Hypertension Mother     No Known Problems Father     No Known Problems Sister     No Known Problems Brother     No Known Problems Maternal Grandmother     No Known Problems Maternal Grandfather     No Known Problems Paternal Grandmother     No Known Problems Other        Results for orders placed or performed during the hospital encounter of 08/08/23   CT Head w/o Contrast    Narrative    EXAM: CT HEAD W/O CONTRAST  8/8/2023 1:02 PM     HISTORY: Altered mental status, anticoagulated, no known trauma       COMPARISON: MRI 6/7/2021, CT 6/6/2021    TECHNIQUE: Using multidetector thin collimation helical acquisition  technique, axial, coronal and sagittal CT images from the skull base  to the vertex were obtained without intravenous contrast.   (topogram) image(s) also obtained and reviewed.    FINDINGS:  No acute intracranial hemorrhage, mass effect, or midline shift. No CT  findings of acute infarct or hydrocephalus. Preserved subarachnoid  spaces.    Atraumatic calvarium. Scattered paranasal sinus mucosal thickening.  Partially visualized changes of functional endoscopic sinus surgery.  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 Clear mastoid air cells.  Nonfocal orbits.       Impression    IMPRESSION: No acute intracranial pathology.    LETTY MIN DO         SYSTEM ID:  [id number]   CT Chest Pulmonary Embolism w Contrast    Narrative    CT CHEST PULMONARY  EMBOLISM WITH CONTRAST August 8, 2023 1:02 PM    CLINICAL HISTORY: Hypoxia, hypotension, history of pulmonary embolus  and deep vein thrombosis, anticoagulated.    TECHNIQUE: CT angiogram chest during arterial phase injection IV  contrast. 2D and 3D MIP reconstructions were performed by the CT  technologist. Dose reduction techniques were used.  CONTRAST: 83ml Isovue 370.    COMPARISON: CT chest 11/15/2022.    FINDINGS:  ANGIOGRAM CHEST: Pulmonary arteries are normal caliber and negative  for pulmonary emboli. Thoracic aorta is negative for dissection. No CT  evidence of right heart strain.    LUNGS AND PLEURA: No effusions. Motion artifact limits detail  assessment of the lungs. Mild bilateral patchy ground-glass opacities  suggested. Mild bibasilar atelectasis.    MEDIASTINUM/AXILLAE: No enlarged lymph nodes. No acute abnormality  otherwise seen.    CORONARY ARTERY CALCIFICATION: None.    UPPER ABDOMEN: Cholecystectomy. No acute abnormality.    MUSCULOSKELETAL: Spine degenerative changes.      Impression    IMPRESSION:  1.  No evidence for pulmonary embolism or acute thoracic aortic  abnormality.  2.  Patchy bilateral ground-glass pulmonary opacities could represent  an atypical infectious or inflammatory etiology versus mild edema.    RA MARQUEZ MD         SYSTEM ID:  [id number]   US Lower Extremity Venous Duplex Bilateral    Narrative    EXAM: US LOWER EXTREMITY VENOUS DUPLEX BILATERAL  LOCATION: [facility]  DATE: 8/8/2023    INDICATION: Bilateral lower extremity pain, swelling and edema.  COMPARISON: The right lower extremity study from 05/10/2021.  TECHNIQUE: Venous Duplex ultrasound of bilateral lower extremities with and without compression, augmentation and duplex. Color flow and spectral Doppler with waveform analysis performed.    FINDINGS: Exam includes the common femoral, femoral, popliteal veins as well as segmentally visualized deep calf veins and greater saphenous vein.  "    RIGHT: No deep vein thrombosis. No superficial thrombophlebitis. No popliteal cyst.    LEFT: No deep vein thrombosis. No superficial thrombophlebitis. No popliteal cyst.      Impression    IMPRESSION:  1.  No deep venous thrombosis in the bilateral lower extremities.     *Note: Due to a large number of results and/or encounters for the requested time period, some results have not been displayed. A complete set of results can be found in Results Review.       VEIN CLINIC LEG DRAWING    Patient History  If you have varicose or spider veins/legs, when did they occur?: Other:  Other:: For many years has had VV with past treatments, presents with bilateral discoloration and pain.  Please describe your expectations of therapy:: Discuss treatment opions for symptoms.    Past Treatment:  Have you ever been treated for the above problem(s)? : Yes  When and who did the treatment?: RIght leg EVLA GSV and SSV w/Dr. Martinez  What method?: Surgery  Have you ever worn prescription stockings?: Yes  Do you currently wear compression stockings?: Yes  Length of time compression stockings worn:: 3-4 years  Do you use medications to relieve your leg pain?: Yes (morphine and dilaudid)  How do you take your medication?: On a daily basis  List medication:: Morphine and dilaudid  Do your symptoms, caused by VV or Spider Veins, interfere with work or activities of daily living?: Yes  Please, explain:: \"Always have pain it does not go away, it is always there\" Patient reports no difference standing vs sitting. Needing to take breaks when up for prolonged periods needing elevate, compression, pain medications to relieve symptoms.    For Varicose and Spider Veins of the Legs:  Side:: Bilateral  Do you have now:: Pain in calf; Pain in foot  The pain is made worse with...: -- (unable to report which is worse, patient states that BLE are always painful)  The pain is made better with...: Elevation of the leg; Compression Hose; Medication " (patient reports minimal to no relief with these interventions)  Your pain feels like:: a numbness; a cramp; an ache/tiredness/heaviness    Do you have a history of any of the following?: Blood Clots; Use of anticoagulatns (Heparin/Coumadin); Pulmonary embolus (History of right leg dvt, clot right side of heart and pulmonary embolus. Takes xarelto. Unknown family history d/t adoption.)  Side:: Bilateral    VCSS  PAIN:: 3  Varicose Veins:: 1  Venous Edema:: 1  Skin Pigmentation:: 2  Inflamation:: 0  Induration:: 0  Number of active ulcers:: 0  Active ulcer duration:: 0  Active ulcer diameter:: 0  Compression Therapy:: 3  VCSS Score:: 10    CEAP:  CEAP:: C4         ASSESSMENT: Right lower extremity venous congestion, likely multifactorial with a large contribution from her history of deep vein reflux and DVT    PLAN: Right lower extremity venous competency testing to assess whether there is a treatment-amenable contribution of residual superficial or  disease.      Gutierrez Cantu MD

## 2023-09-27 NOTE — PROGRESS NOTES
Aspen is a 63 year old  female who presents for annual exam.     Besides routine health maintenance, she has no other health concerns today .    HPI:  The patient's PCP is  Arcadio Farfan MD. patient here today for her annual GYN exam and mammogram.  She is due for Pap smear.  She is a very complex medical history.    She is postmenopausal and has had no vaginal bleeding.  She has no vasomotor spasms.      GYNECOLOGIC HISTORY:    No LMP recorded. Patient is postmenopausal.    Her current contraception method is: menopause.  She  reports that she quit smoking about 30 years ago. Her smoking use included cigarettes. She started smoking about 50 years ago. She has a 30.00 pack-year smoking history. She has never used smokeless tobacco.    Patient is not sexually active.  STD testing offered?  Declined  Last PHQ-9 score on record =       2020     9:40 AM   PHQ-9 SCORE   PHQ-9 Total Score 6     Last GAD7 score on record =       2020     9:40 AM   BENI-7 SCORE   Total Score 3     Alcohol Score =     HEALTH MAINTENANCE:  Cholesterol: (  Cholesterol   Date Value Ref Range Status   2021 303 (H) <200 mg/dL Final     Comment:     Desirable:       <200 mg/dl   2018 250 (H) <200 mg/dL Final     Comment:     Desirable:       <200 mg/dl      Pap:   Lab Results   Component Value Date    PAP NIL-neg hpv 2020    PAP NIL-neg hpv 2018    PAP NIL-neg hpv 10/31/2016       Health maintenance updated:  no    Care Gaps    Overdue     Never  Done MICROALBUMIN (Yearly)   Never  Done DIABETIC FOOT EXAM (Yearly)   Never  Done EYE EXAM (Yearly)   Never  Done HEPATITIS A IMMUNIZATION (1 of 2 - Risk 2-dose series)   Never  Done ZOSTER IMMUNIZATION (1 of 2)   OCT 24  2019 URINE DRUG SCREEN (Yearly)  Last completed: Oct 24, 2018   SEP 7  2021 A1C (Every 3 Months)  Last completed: 2021   OCT 24  2021 HF ACTION PLAN (Every 3 Years)  Last completed: Oct 24, 2018   KAY 6  2022 LIPID (Yearly)  Last completed:   MAMMO SCREENING (Every 2 Years)   Scheduled for: Sep 27, 2023   KAY 10  2023 MEDICARE ANNUAL WELLNESS VISIT (Yearly)   Last completed: Kay 10, 2022   SEP 1  2023 INFLUENZA VACCINE (1)  Last completed: Dec 23, 2022         Due Soon     SEP 28  2023 HPV TEST (Once)  Last completed: Sep 28, 2020   SEP 28  2023 PAP (Every 3 Years)  Last completed: Sep 28, 2020         Upcoming     2024 BMP (Every 6 Months)  Last completed: Aug 9, 2023   AUG 9  2024 ALT (Yearly)  Last completed: Aug 9, 2023   AUG 9  2024 CBC (Yearly)  Last completed: Aug 9, 2023   DEC 30  2024 COLORECTAL CANCER SCREENING (COLONOSCOPY - Preferred) (Every 10 Years)  Last completed: Dec 30, 2014   OCT 12  2026 ADVANCE CARE PLANNING (Every 5 Years)  Last completed: Oct 12, 2021   OCT 19  2030 DTAP/TDAP/TD IMMUNIZATION (5 - Td or Tdap)   Last completed: Oct 19, 2020       HISTORY:  OB History    Para Term  AB Living   2 2 2 0 0 2   SAB IAB Ectopic Multiple Live Births   0 0 0 0 2      # Outcome Date GA Lbr Hugh/2nd Weight Sex Delivery Anes PTL Lv   2 Term     F    TAVARES   1 Term     F    TAVARES       Patient Active Problem List   Diagnosis    Generalized osteoarthrosis, involving multiple sites    CRPS (complex regional pain syndrome), lower limb    Fibromyalgia    Somatoform disorder    Histrionic personality (H)    Encounter for long-term use of opiate analgesic    Knee joint replacement by other means    Hypothyroidism    Insomnia, unspecified type    Hyperlipidemia    Hashimoto's thyroiditis    Glucocorticoid deficiency (H)    Posttraumatic stress disorder    Impingement syndrome of left shoulder    Abdominal cramping, generalized    Intermittent diarrhea    Primary osteoarthritis involving multiple joints    Attention deficit disorder    Allergic rhinitis    Cushing's syndrome (H)    Endometriosis    Essential hypertension    Systemic lupus erythematosus (H)    S/P cervical spinal fusion    Paroxysmal  atrial fibrillation (H)    Nonischemic cardiomyopathy (H)    Personal history of DVT (deep vein thrombosis)    History of pulmonary embolism    Acute deep vein thrombosis (DVT) of popliteal vein of right lower extremity (H)    Acute pulmonary embolism (H)    Adrenal cortical steroids causing adverse effect in therapeutic use    Alopecia areata    Anemia, blood loss    Ankle pain, left    ARF (acute renal failure) (H)    Arthritis, septic (H)    Chronic ethmoidal sinusitis    Chronic maxillary sinusitis    Deviated nasal septum    Complications due to internal joint prosthesis (H)    Generalized pain    Iatrogenic hyperthyroidism    S/P TKR (total knee replacement)    Left shoulder pain    Lipoma of skin and subcutaneous tissue    Malabsorption    Nasal fracture    Nasal turbinate hypertrophy    Opioid type dependence (H)    Other specified abnormal findings of blood chemistry    Other acute postoperative pain    Pain in joint, lower leg    Postoperative hypotension    S/P total knee replacement    Thrombus of right atrial appendage without antecedent myocardial infarction    Chronic pain of both shoulders    GI bleed    Grade III internal hemorrhoids    Atrial fibrillation with RVR (H)    Acute deep vein thrombosis (DVT) of proximal vein of right lower extremity (H)    Pneumonia of left upper lobe due to infectious organism    Morbid obesity (H)    Cerebrovascular accident (CVA), unspecified mechanism (H)    CVA (cerebral vascular accident) (H)    Neuropathy of left suprascapular nerve    Neuropathy of right suprascapular nerve    Stage 3 right buttock    BMI 40.0-44.9, adult (H)    Chronic anticoagulation    Controlled substance agreement signed    Craniofacial hyperhidrosis    Current chronic use of systemic steroids    DNR (do not resuscitate)    Hypertonic bladder    Hypo-osmolality and hyponatremia    Lumbosacral radiculopathy at S1    Overflow diarrhea    Refusal of blood transfusions as patient is Jehovah's  Witness    Unspecified fall, initial encounter    Unspecified injury of shoulder and upper arm, unspecified arm, initial encounter    Weakness    Chronic pain disorder    Constipation    Hemoptysis    COVID-19 virus infection    Acute respiratory failure with hypoxia (H)    Edema, unspecified type    History of ischemic stroke    Personal history of COVID-19    Pressure injury of sacral region, unstageable (H)    Pneumonia due to infectious organism, unspecified laterality, unspecified part of lung    RSV (respiratory syncytial virus infection)    Opioid dependence with withdrawal (H)    Nausea vomiting and diarrhea    Abdominal cramping    Medication side effects    Nausea and vomiting, unspecified vomiting type    Dehydration    Opiate overdose, accidental or unintentional, initial encounter (H)    Diarrhea, unspecified type     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)    APPENDECTOMY      ARTHRODESIS ANKLE      right    ARTHROPLASTY KNEE BILATERAL      ARTHROPLASTY REVISION KNEE  4/19/2011    Procedure:ARTHROPLASTY REVISION KNEE; With Antibiotic Cement ; Surgeon:CHAO OLIVARES; Location:[facility]    BREAST SURGERY      right- tissue remove nipple area    BUNIONECTOMY  12/14/2011    Procedure:BUNIONECTOMY; Right Bunion Correction; Surgeon:GRACE ZARATE; Location:[facility]    CHOLECYSTECTOMY      EXAM UNDER ANESTHESIA ANUS N/A 7/15/2020    Procedure: EXAM UNDER ANESTHESIA, ANUS;  Surgeon: Luis Canales MD;  Location:  OR    EXCISE MASS UPPER EXTREMITY  12/14/2011    Procedure:EXCISE MASS UPPER EXTREMITY; Excision of Left Arm Mass; Surgeon:GRACE ZARATE; Location:[facility]    FOOT SURGERY      left X 4    FUSION LUMBAR ANTERIOR ONE LEVEL      HEMORRHOIDECTOMY INTERNAL N/A 7/15/2020    Procedure: Exam under anesthesia, hemorrhoidectomy;  Surgeon: Luis Canales MD;  Location:  OR    INJECT NERVE BLOCK  SUPRASCAPULAR Left 5/18/2018    Procedure: INJECT NERVE BLOCK SUPRASCAPULAR;  Left Suprascapular Nerve Block;  Surgeon: Basim Velazquez MD;  Location: [facility]    INJECT NERVE BLOCK SUPRASCAPULAR Right 7/23/2018    Procedure: INJECT NERVE BLOCK SUPRASCAPULAR;  Right suprascapular injection;  Surgeon: Basim Velazquez MD;  Location: [facility]    INJECT NERVE BLOCK SUPRASCAPULAR Bilateral 10/29/2018    Procedure: Bilateral Suprascapular Nerve Blocks;  Surgeon: Basim Velazquez MD;  Location: [facility]    INJECT NERVE BLOCK SUPRASCAPULAR Bilateral 3/15/2019    Procedure: Bilateral Suprascapular Nerve Block;  Surgeon: Basim Velazquez MD;  Location: [facility]    INJECT NERVE BLOCK SUPRASCAPULAR Bilateral 12/31/2019    Procedure: bilateral suprascapular nerve block;  Surgeon: Basim Velazquez MD;  [facility]: [facility]    INJECT NERVE BLOCK SUPRASCAPULAR Bilateral 8/3/2021    Procedure: bilateral suprascapular nerve block;  Surgeon: Basim Velazquez MD;  Location: [facility] OR    IR ENDOVENOUS ABLATION VARICOSE VEINS  10/5/2021    IR ENDOVENOUS ABLATION VARICOSE VEINS  10/26/2021    KNEE SURGERY      see list which we will bring    LAMINECTOMY LUMBAR ONE LEVEL      L4-5    LAPAROSCOPIC ABLATION ENDOMETRIOSIS      SD HAND/FINGER SURGERY UNLISTED  surgeries on both hands with Dr. Shankar    SD SPINE SURGERY PROCEDURE UNLISTED      see list which we will bring    SD STOMACH SURGERY PROCEDURE UNLISTED  gall bladder removal    RADIO FREQUENCY ABLATION PULSED CERVICAL Bilateral 7/10/2019    Procedure: Bilateral Suprascapular Nerve Pulsed Radiofrequency Ablation;  Surgeon: Basim Velazquez MD;  Location: [facility]    REMOVE HARDWARE FOOT  12/14/2011    Procedure:REMOVE HARDWARE FOOT; Hardware Removal Right Foot (Mini-C-Arm) ; Surgeon:GRACE ZARATE; Location:[facility]    RHINOPLASTY      SALPINGO-OOPHORECTOMY BILATERAL      TONSILLECTOMY      ZZC STOMACH SURGERY PROCEDURE UNLISTED      see list which we will bring      Social History      Tobacco Use    Smoking status: Former     Packs/day: 1.50     Years: 20.00     Pack years: 30.00     Types: Cigarettes     Start date: 1973     Quit date: 1993     Years since quittin.7    Smokeless tobacco: Never    Tobacco comments:     wish I never started   Substance Use Topics    Alcohol use: No     Alcohol/week: 0.0 standard drinks of alcohol      Problem (# of Occurrences) Relation (Name,Age of Onset)    Hypertension (1) Mother    No Known Problems (7) Father, Sister, Brother, Maternal Grandmother, Maternal Grandfather, Paternal Grandmother, Other              Current Outpatient Medications   Medication Sig    albuterol (PROAIR HFA/PROVENTIL HFA/VENTOLIN HFA) 108 (90 Base) MCG/ACT inhaler Inhale 1-2 puffs into the lungs every 6 hours as needed for shortness of breath, wheezing or cough    atorvastatin (LIPITOR) 40 MG tablet Take 1 tablet (40 mg) by mouth every evening    cyanocobalamin 1000 MCG/ML injection Inject 1 mL (1,000 mcg) Subcutaneous every 7 days    cyclobenzaprine (FLEXERIL) 10 MG tablet Take 1 tablet (10 mg) by mouth 3 times daily    gabapentin (NEURONTIN) 300 MG capsule Take 300 mg by mouth 2 times daily (morning and afternoon) with 800mg tablet (total dose 1100mg)    gabapentin (NEURONTIN) 800 MG tablet Take 800 mg by mouth At Bedtime     gabapentin (NEURONTIN) 800 MG tablet Take 800 mg by mouth 2 times daily (morning and afternoon) in addition to 300mg capsule (total dose 1100mg)    HYDROmorphone (DILAUDID) 8 MG tablet Take 8 mg by mouth 3 times daily . Max of 3 doses per day.    levothyroxine (TIROSINT) 100 MCG capsule Take 300 mcg by mouth daily    liothyronine (CYTOMEL) 5 MCG tablet Take 5 mcg by mouth daily    morphine (MS CONTIN) 30 MG 12 hr tablet Take 30 mg by mouth 2 times daily (morning and night) in addition to 60 mg tablet for a total dose of 90mg.    morphine (MS CONTIN) 60 MG 12 hr tablet Take 60 mg by mouth 3 times daily For morning and night doses, takes in  addition to 30mg tablet for a total dose of 90mg.    rivaroxaban ANTICOAGULANT (XARELTO) 20 MG TABS tablet Take 1 tablet (20 mg) by mouth daily (with dinner)    sodium fluoride (SF 5000 PLUS) 1.1 % CREA Apply to affected area daily    zolpidem (AMBIEN) 5 MG tablet Take 5 mg by mouth nightly as needed for sleep Quantity #15 for 30 days supply per prescribing MD.    azithromycin (ZITHROMAX) 250 MG tablet Take 1 tablet (250 mg) by mouth daily (Patient not taking: Reported on 9/27/2023)    Elastic Bandages & Supports (MEDICAL COMPRESSION SOCKS) MISC 1 Package daily Please measure and distribute 2 pair of 20mmHg - 30mmHg THIGH high open or closed toe compression stockings with extra refills as indicated. Jobst ultrasheer or equivalent.    metoprolol succinate ER (TOPROL XL) 25 MG 24 hr tablet Take 3 tablets (75 mg) by mouth daily for 30 days    naloxone (NARCAN) 4 MG/0.1ML nasal spray Spray 4 mg into one nostril alternating nostrils as needed for opioid reversal every 2-3 minutes until assistance arrives    potassium chloride ER (KLOR-CON M) 20 MEQ CR tablet Take 1 tablet (20 mEq) by mouth daily     No current facility-administered medications for this visit.     Allergies   Allergen Reactions    Blood Transfusion Related (Informational Only) Other (See Comments)     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     Chlorhexidine Hives     Thor surgical cleanser    Ibuprofen [Nsaids] Hives    Penicillins Hives     Other reaction(s): Unknown    Sulfa Antibiotics Hives     Other reaction(s): Unknown    Calcium Channel Blockers     Doxycycline GI Disturbance     Emesis & diarrhea  Patient denies allergy to this med    Hydroxyzine      rash    Mold        Past medical, surgical, social and family histories were reviewed and updated in EPIC.    ROS:   12 point review of systems negative other than symptoms noted below or in the HPI.  No urinary frequency or dysuria, bladder or kidney problems    EXAM:  /72   Ht  "1.727 m (5' 8\")   Wt 131.1 kg (289 lb)   BMI 43.94 kg/m     BMI: Body mass index is 43.94 kg/m .    PHYSICAL EXAM:  Constitutional:   Appearance: Well nourished, well developed, alert, in no acute distress  Chest:  Respiratory Effort:  Breathing unlabored  Cardiovascular:    Heart: Auscultation:  Regular rate, normal rhythm, no murmurs present  Breasts: Inspection of Breasts:  No lymphadenopathy present., Palpation of Breasts and Axillae:  No masses present on palpation, no breast tenderness., Axillary Lymph Nodes:  No lymphadenopathy present., and No nodularity, asymmetry or nipple discharge bilaterally.  Skin:  General Inspection:  No rashes present, no lesions present, no areas of  discoloration  Neurologic:    Mental Status:  Oriented X3.  Normal strength and tone, sensory exam                grossly normal, mentation intact and speech normal.    Psychiatric:   Mentation appears normal and affect normal/bright.         Pelvic Exam:  External Genitalia:     Normal appearance for age, no discharge present, no tenderness present, no inflammatory lesions present, color normal  Vagina:     Normal vaginal vault without central or paravaginal defects, no discharge present, no inflammatory lesions present, no masses present  Bladder:     Nontender to palpation  Urethra:   Urethral Body:  Urethra palpation normal, urethra structural support normal   Urethral Meatus:  No erythema or lesions present  Cervix:     Appearance healthy, no lesions present, nontender to palpation, no bleeding present  Uterus:     Uterus: firm, normal sized and nontender, anteverted in position.   Adnexa:     Surgically absent  Perineum:     Perineum within normal limits, no evidence of trauma, no rashes or skin lesions present  Anus:     Anus within normal limits, no hemorrhoids present  Inguinal Lymph Nodes:     No lymphadenopathy present  Pubic Hair:     Normal pubic hair distribution for age  Genitalia and Groin:     No rashes present, no " lesions present, no areas of discoloration, no masses present    COUNSELING:   Reviewed preventive health counseling, as reflected in patient instructions    BMI: Body mass index is 43.94 kg/m .  Weight management plan: Discussed healthy diet and exercise guidelines    ASSESSMENT:  63 year old female with satisfactory annual exam.    ICD-10-CM    1. Encounter for screening for cervical cancer  Z12.4 Pap thin layer screen with HPV - recommended age 30 - 65 years      2. Encounter for gynecological examination (general) (routine) without abnormal findings  Z01.419           PLAN:  Morbidly obese 63-year-old postmenopausal female with a normal GYN exam.  Pap smear was collected and if it is normal she needs no further Pap screening.  She is to continue with annual mammograms.    ANITRA Armijo CNP

## 2023-10-09 NOTE — PROGRESS NOTES
Vein Clinic follow-up note    HPI:  Aspen Mccullough is a 63 year old female who was seen today in follow-up for right lower extremity edema.  We discussed that her greater and small saphenous veins were previously closed and remained closed on the ultrasound today.  She does have deep vein reflux from her previous DVTs and this is likely driving her edema and congestion.    We also discussed that Xarelto generally should be taken with food in order to improve the bioavailability.  She was not aware of this and had been taking it on an empty stomach.      ROS    PHH:    Past Medical History:   Diagnosis Date    ADHD (attention deficit hyperactivity disorder)     Allergic rhinitis     Chronic low back pain     Cushing's syndrome (H24)     DDD (degenerative disc disease)     DDD (degenerative disc disease)     Deviated nasal septum     Diarrhea     Endometriosis     Fibromyalgia     Hashimoto's disease     Hyperlipidemia     Hypertension     Hypothyroid     hx hashimoto's thyroiditis    Insomnia     Lupus (H)     Malabsorption     Pernicious anemia     Renal disease     chronic renal insufficiency    Sinusitis         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)    APPENDECTOMY      ARTHRODESIS ANKLE      right    ARTHROPLASTY KNEE BILATERAL      ARTHROPLASTY REVISION KNEE  4/19/2011    Procedure:ARTHROPLASTY REVISION KNEE; With Antibiotic Cement ; Surgeon:CHAO OLIVARES; [facility]    BREAST SURGERY      right- tissue remove nipple area    BUNIONECTOMY  12/14/2011    Procedure:BUNIONECTOMY; Right Bunion Correction; Surgeon:GRACE ZARATE; Location:[facility]    CHOLECYSTECTOMY      EXAM UNDER ANESTHESIA ANUS N/A 7/15/2020    Procedure: EXAM UNDER ANESTHESIA, ANUS;  Surgeon: Luis Canales MD;  Location:  OR    EXCISE MASS UPPER EXTREMITY  12/14/2011    Procedure:EXCISE MASS UPPER EXTREMITY; Excision of Left Arm Mass;  Surgeon:GRACE ZARATE; Location: [facility]    FOOT SURGERY      left X 4    FUSION LUMBAR ANTERIOR ONE LEVEL      HEMORRHOIDECTOMY INTERNAL N/A 7/15/2020    Procedure: Exam under anesthesia, hemorrhoidectomy;  Surgeon: Luis Canales MD;  [facility]    INJECT NERVE BLOCK SUPRASCAPULAR Left 5/18/2018    Procedure: INJECT NERVE BLOCK SUPRASCAPULAR;  Left Suprascapular Nerve Block;  Surgeon: Basim Velazquez MD;  [facility]    INJECT NERVE BLOCK SUPRASCAPULAR Right 7/23/2018    Procedure: INJECT NERVE BLOCK SUPRASCAPULAR;  Right suprascapular injection;  Surgeon: Basim Velazquez MD;  [facility]    INJECT NERVE BLOCK SUPRASCAPULAR Bilateral 10/29/2018    Procedure: Bilateral Suprascapular Nerve Blocks;  Surgeon: Basim Velazquez MD;  [facility]    INJECT NERVE BLOCK SUPRASCAPULAR Bilateral 3/15/2019    Procedure: Bilateral Suprascapular Nerve Block;  Surgeon: Basim Velazquez MD;  [facility]    INJECT NERVE BLOCK SUPRASCAPULAR Bilateral 12/31/2019    Procedure: bilateral suprascapular nerve block;  Surgeon: Basim Velazquez MD;  [facility]    INJECT NERVE BLOCK SUPRASCAPULAR Bilateral 8/3/2021    Procedure: bilateral suprascapular nerve block;  Surgeon: Basim Velazquez MD;  Location: [facility]    IR ENDOVENOUS ABLATION VARICOSE VEINS  10/5/2021    IR ENDOVENOUS ABLATION VARICOSE VEINS  10/26/2021    KNEE SURGERY      see list which we will bring    LAMINECTOMY LUMBAR ONE LEVEL      L4-5    LAPAROSCOPIC ABLATION ENDOMETRIOSIS      RI HAND/FINGER SURGERY UNLISTED  surgeries on both hands with Dr. Shankar    RI SPINE SURGERY PROCEDURE UNLISTED      see list which we will bring    RI STOMACH SURGERY PROCEDURE UNLISTED  gall bladder removal    RADIO FREQUENCY ABLATION PULSED CERVICAL Bilateral 7/10/2019    Procedure: Bilateral Suprascapular Nerve Pulsed Radiofrequency Ablation;  Surgeon: Basim Velazquez MD;  Location:  OR    REMOVE HARDWARE FOOT  12/14/2011     Procedure:REMOVE HARDWARE FOOT; Hardware Removal Right Foot (Mini-C-Arm) ; Surgeon:GARCE ZARATE; Location:[facility]    RHINOPLASTY      SALPINGO-OOPHORECTOMY BILATERAL      TONSILLECTOMY      ZZC STOMACH SURGERY PROCEDURE UNLISTED      see list which we will bring       ALLERGIES:  Blood transfusion related (informational only), Chlorhexidine, Ibuprofen [nsaids], Penicillins, Sulfa antibiotics, Calcium channel blockers, Doxycycline, Hydroxyzine, and Mold    Allergies   Allergen Reactions    Blood Transfusion Related (Informational Only) Other (See Comments)     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     Chlorhexidine Hives     Thor surgical cleanser    Ibuprofen [Nsaids] Hives    Penicillins Hives     Other reaction(s): Unknown    Sulfa Antibiotics Hives     Other reaction(s): Unknown    Calcium Channel Blockers     Doxycycline GI Disturbance     Emesis & diarrhea  Patient denies allergy to this med    Hydroxyzine      rash    Mold        MEDS:    Current Outpatient Medications:     albuterol (PROAIR HFA/PROVENTIL HFA/VENTOLIN HFA) 108 (90 Base) MCG/ACT inhaler, Inhale 1-2 puffs into the lungs every 6 hours as needed for shortness of breath, wheezing or cough, Disp: , Rfl:     atorvastatin (LIPITOR) 40 MG tablet, Take 1 tablet (40 mg) by mouth every evening, Disp: 90 tablet, Rfl: 2    cyanocobalamin 1000 MCG/ML injection, Inject 1 mL (1,000 mcg) Subcutaneous every 7 days, Disp: 4 mL, Rfl: 5    cyclobenzaprine (FLEXERIL) 10 MG tablet, Take 1 tablet (10 mg) by mouth 3 times daily, Disp: 90 tablet, Rfl: 3    Elastic Bandages & Supports (MEDICAL COMPRESSION SOCKS) MISC, 1 Package daily Please measure and distribute 2 pair of 20mmHg - 30mmHg THIGH high open or closed toe compression stockings with extra refills as indicated. Jobst ultrasheer or equivalent., Disp: 2 each, Rfl: 3    gabapentin (NEURONTIN) 300 MG capsule, Take 300 mg by mouth 2 times daily (morning and afternoon) with 800mg tablet  (total dose 1100mg), Disp: , Rfl:     gabapentin (NEURONTIN) 800 MG tablet, Take 800 mg by mouth At Bedtime , Disp: , Rfl:     gabapentin (NEURONTIN) 800 MG tablet, Take 800 mg by mouth 2 times daily (morning and afternoon) in addition to 300mg capsule (total dose 1100mg), Disp: , Rfl:     HYDROmorphone (DILAUDID) 8 MG tablet, Take 8 mg by mouth 3 times daily . Max of 3 doses per day., Disp: , Rfl:     levothyroxine (TIROSINT) 100 MCG capsule, Take 300 mcg by mouth daily, Disp: , Rfl:     liothyronine (CYTOMEL) 5 MCG tablet, Take 5 mcg by mouth daily, Disp: , Rfl:     morphine (MS CONTIN) 30 MG 12 hr tablet, Take 30 mg by mouth 2 times daily (morning and night) in addition to 60 mg tablet for a total dose of 90mg., Disp: 270 tablet, Rfl: 0    morphine (MS CONTIN) 60 MG 12 hr tablet, Take 60 mg by mouth 3 times daily For morning and night doses, takes in addition to 30mg tablet for a total dose of 90mg., Disp: 30 tablet, Rfl: 0    rivaroxaban ANTICOAGULANT (XARELTO) 20 MG TABS tablet, Take 1 tablet (20 mg) by mouth daily (with dinner), Disp: , Rfl:     sodium fluoride (SF 5000 PLUS) 1.1 % CREA, Apply to affected area daily, Disp: , Rfl:     zolpidem (AMBIEN) 5 MG tablet, Take 5 mg by mouth nightly as needed for sleep Quantity #15 for 30 days supply per prescribing MD., Disp: , Rfl:     metoprolol succinate ER (TOPROL XL) 25 MG 24 hr tablet, Take 3 tablets (75 mg) by mouth daily for 30 days, Disp: 90 tablet, Rfl: 0    SOCIAL HABITS:    History   Smoking Status    Former    Packs/day: 1.50    Years: 20.00    Types: Cigarettes    Start date: 1/1/1973    Quit date: 1/1/1993   Smokeless Tobacco    Never     Comment: wish I never started     Social History    Substance and Sexual Activity      Alcohol use: No        Alcohol/week: 0.0 standard drinks of alcohol      History   Drug Use No       FAMILY HISTORY:    Family History   Problem Relation Age of Onset    Hypertension Mother     No Known Problems Father     No Known  Problems Sister     No Known Problems Brother     No Known Problems Maternal Grandmother     No Known Problems Maternal Grandfather     No Known Problems Paternal Grandmother     No Known Problems Other        Results for orders placed or performed in visit on 09/27/23   MA Screen Bilateral w/William    Narrative    BILATERAL FULL FIELD DIGITAL SCREENING MAMMOGRAM WITH TOMOSYNTHESIS    Performed on: 9/27/23    Compared to: 04/01/2021 and 07/13/2015    Technique:  This study was evaluated with the assistance of Computer-Aided   Detection.  Breast Tomosynthesis was used in interpretation.    Findings: The breasts have scattered areas of fibroglandular density.    There is no radiographic evidence of malignancy.     Impression    IMPRESSION: ACR BI-RADS Category 1: Negative    RECOMMENDED FOLLOW-UP: Annual routine screening mammogram    The results and recommendations of this examination will be communicated   to the patient.        Mitesh Mccabe MD       *Note: Due to a large number of results and/or encounters for the requested time period, some results have not been displayed. A complete set of results can be found in Results Review.       VEIN CLINIC LEG DRAWING    No data recorded     ASSESSMENT: Right lower extremity venous congestion from deep vein reflux, no remaining superficial venous reflux on venous competency testing today.    PLAN: Conservative management.  Compression stockings and elevation.  Continue with anticoagulation due to history of DVT.  Patient will start taking her Xarelto with a meal as directed.  If she has failure of Xarelto in the future, consider changing to Eliquis for more overlapping of half-life.      Gutierrez Cantu MD    20 minutes spent today reviewing chart, discussing with patient and postvisit charting.

## 2023-10-09 NOTE — LETTER
10/9/2023         RE: Aspen Mccullough  [address]  [facility] [id number]        Dear Colleague,    Thank you for referring your patient, Aspen Mccullough, to the [facility] [place]. Please see a copy of my visit note below.        Vein Clinic follow-up note    HPI:  Aspen Mccullough is a 63 year old female who was seen today in follow-up for right lower extremity edema.  We discussed that her greater and small saphenous veins were previously closed and remained closed on the ultrasound today.  She does have deep vein reflux from her previous DVTs and this is likely driving her edema and congestion.    We also discussed that Xarelto generally should be taken with food in order to improve the bioavailability.  She was not aware of this and had been taking it on an empty stomach.      ROS    PHH:    Past Medical History:   Diagnosis Date     ADHD (attention deficit hyperactivity disorder)      Allergic rhinitis      Chronic low back pain      Cushing's syndrome (H24)      DDD (degenerative disc disease)      DDD (degenerative disc disease)      Deviated nasal septum      Diarrhea      Endometriosis      Fibromyalgia      Hashimoto's disease      Hyperlipidemia      Hypertension      Hypothyroid     hx hashimoto's thyroiditis     Insomnia      Lupus (H)      Malabsorption      Pernicious anemia      Renal disease     chronic renal insufficiency     Sinusitis         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)     APPENDECTOMY       ARTHRODESIS ANKLE      right     ARTHROPLASTY KNEE BILATERAL       ARTHROPLASTY REVISION KNEE  4/19/2011    Procedure:ARTHROPLASTY REVISION KNEE; With Antibiotic Cement ; Surgeon:CHAO OLIVARES; [facility]     BREAST SURGERY      right- tissue remove nipple area     BUNIONECTOMY  12/14/2011    Procedure:BUNIONECTOMY; Right Bunion Correction; Surgeon:GRACE ZARATE;  Location: [facility]     CHOLECYSTECTOMY       EXAM UNDER ANESTHESIA ANUS N/A 7/15/2020    Procedure: EXAM UNDER ANESTHESIA, ANUS;  Surgeon: Luis Canales MD;  [facility]     EXCISE MASS UPPER EXTREMITY  12/14/2011    Procedure:EXCISE MASS UPPER EXTREMITY; Excision of Left Arm Mass; Surgeon:GRACE ZARATE; Location: [facility]     FOOT SURGERY      left X 4     FUSION LUMBAR ANTERIOR ONE LEVEL       HEMORRHOIDECTOMY INTERNAL N/A 7/15/2020    Procedure: Exam under anesthesia, hemorrhoidectomy;  Surgeon: Luis Canales MD;  [facility]     INJECT NERVE BLOCK SUPRASCAPULAR Left 5/18/2018    Procedure: INJECT NERVE BLOCK SUPRASCAPULAR;  Left Suprascapular Nerve Block;  Surgeon: Basim Velazquez MD;  [facility]     INJECT NERVE BLOCK SUPRASCAPULAR Right 7/23/2018    Procedure: INJECT NERVE BLOCK SUPRASCAPULAR;  Right suprascapular injection;  Surgeon: Basim Velazquez MD;  [facility]     INJECT NERVE BLOCK SUPRASCAPULAR Bilateral 10/29/2018    Procedure: Bilateral Suprascapular Nerve Blocks;  Surgeon: Basim Velazquez MD;  [facility]     INJECT NERVE BLOCK SUPRASCAPULAR Bilateral 3/15/2019    Procedure: Bilateral Suprascapular Nerve Block;  Surgeon: Basim Velazquez MD;  [facility]     INJECT NERVE BLOCK SUPRASCAPULAR Bilateral 12/31/2019    Procedure: bilateral suprascapular nerve block;  Surgeon: Basim Velazquez MD;  [facility]     INJECT NERVE BLOCK SUPRASCAPULAR Bilateral 8/3/2021    Procedure: bilateral suprascapular nerve block;  Surgeon: Basim Velazquez MD;  Location: [facility]     IR ENDOVENOUS ABLATION VARICOSE VEINS  10/5/2021     IR ENDOVENOUS ABLATION VARICOSE VEINS  10/26/2021     KNEE SURGERY      see list which we will bring     LAMINECTOMY LUMBAR ONE LEVEL      L4-5     LAPAROSCOPIC ABLATION ENDOMETRIOSIS       SC HAND/FINGER SURGERY UNLISTED  surgeries on both hands with Dr. Shankar     SC SPINE SURGERY PROCEDURE UNLISTED      see list  which we will bring     LA STOMACH SURGERY PROCEDURE UNLISTED  gall bladder removal     RADIO FREQUENCY ABLATION PULSED CERVICAL Bilateral 7/10/2019    Procedure: Bilateral Suprascapular Nerve Pulsed Radiofrequency Ablation;  Surgeon: Basim Velazquez MD;  Location:  OR     REMOVE HARDWARE FOOT  12/14/2011    Procedure:REMOVE HARDWARE FOOT; Hardware Removal Right Foot (Mini-C-Arm) ; Surgeon:GRACE ZARATE; Location:[facility]     RHINOPLASTY       SALPINGO-OOPHORECTOMY BILATERAL       TONSILLECTOMY       ZZC STOMACH SURGERY PROCEDURE UNLISTED      see list which we will bring       ALLERGIES:  Blood transfusion related (informational only), Chlorhexidine, Ibuprofen [nsaids], Penicillins, Sulfa antibiotics, Calcium channel blockers, Doxycycline, Hydroxyzine, and Mold    Allergies   Allergen Reactions     Blood Transfusion Related (Informational Only) Other (See Comments)     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      Chlorhexidine Hives     Thor surgical cleanser     Ibuprofen [Nsaids] Hives     Penicillins Hives     Other reaction(s): Unknown     Sulfa Antibiotics Hives     Other reaction(s): Unknown     Calcium Channel Blockers      Doxycycline GI Disturbance     Emesis & diarrhea  Patient denies allergy to this med     Hydroxyzine      rash     Mold        MEDS:    Current Outpatient Medications:      albuterol (PROAIR HFA/PROVENTIL HFA/VENTOLIN HFA) 108 (90 Base) MCG/ACT inhaler, Inhale 1-2 puffs into the lungs every 6 hours as needed for shortness of breath, wheezing or cough, Disp: , Rfl:      atorvastatin (LIPITOR) 40 MG tablet, Take 1 tablet (40 mg) by mouth every evening, Disp: 90 tablet, Rfl: 2     cyanocobalamin 1000 MCG/ML injection, Inject 1 mL (1,000 mcg) Subcutaneous every 7 days, Disp: 4 mL, Rfl: 5     cyclobenzaprine (FLEXERIL) 10 MG tablet, Take 1 tablet (10 mg) by mouth 3 times daily, Disp: 90 tablet, Rfl: 3     Elastic Bandages & Supports (MEDICAL COMPRESSION SOCKS) MISC,  1 Package daily Please measure and distribute 2 pair of 20mmHg - 30mmHg THIGH high open or closed toe compression stockings with extra refills as indicated. Jobst ultrasheer or equivalent., Disp: 2 each, Rfl: 3     gabapentin (NEURONTIN) 300 MG capsule, Take 300 mg by mouth 2 times daily (morning and afternoon) with 800mg tablet (total dose 1100mg), Disp: , Rfl:      gabapentin (NEURONTIN) 800 MG tablet, Take 800 mg by mouth At Bedtime , Disp: , Rfl:      gabapentin (NEURONTIN) 800 MG tablet, Take 800 mg by mouth 2 times daily (morning and afternoon) in addition to 300mg capsule (total dose 1100mg), Disp: , Rfl:      HYDROmorphone (DILAUDID) 8 MG tablet, Take 8 mg by mouth 3 times daily . Max of 3 doses per day., Disp: , Rfl:      levothyroxine (TIROSINT) 100 MCG capsule, Take 300 mcg by mouth daily, Disp: , Rfl:      liothyronine (CYTOMEL) 5 MCG tablet, Take 5 mcg by mouth daily, Disp: , Rfl:      morphine (MS CONTIN) 30 MG 12 hr tablet, Take 30 mg by mouth 2 times daily (morning and night) in addition to 60 mg tablet for a total dose of 90mg., Disp: 270 tablet, Rfl: 0     morphine (MS CONTIN) 60 MG 12 hr tablet, Take 60 mg by mouth 3 times daily For morning and night doses, takes in addition to 30mg tablet for a total dose of 90mg., Disp: 30 tablet, Rfl: 0     rivaroxaban ANTICOAGULANT (XARELTO) 20 MG TABS tablet, Take 1 tablet (20 mg) by mouth daily (with dinner), Disp: , Rfl:      sodium fluoride (SF 5000 PLUS) 1.1 % CREA, Apply to affected area daily, Disp: , Rfl:      zolpidem (AMBIEN) 5 MG tablet, Take 5 mg by mouth nightly as needed for sleep Quantity #15 for 30 days supply per prescribing MD., Disp: , Rfl:      metoprolol succinate ER (TOPROL XL) 25 MG 24 hr tablet, Take 3 tablets (75 mg) by mouth daily for 30 days, Disp: 90 tablet, Rfl: 0    SOCIAL HABITS:    History   Smoking Status     Former     Packs/day: 1.50     Years: 20.00     Types: Cigarettes     Start date: 1/1/1973     Quit date: 1/1/1993    Smokeless Tobacco     Never     Comment: wish I never started     Social History    Substance and Sexual Activity      Alcohol use: No        Alcohol/week: 0.0 standard drinks of alcohol      History   Drug Use No       FAMILY HISTORY:    Family History   Problem Relation Age of Onset     Hypertension Mother      No Known Problems Father      No Known Problems Sister      No Known Problems Brother      No Known Problems Maternal Grandmother      No Known Problems Maternal Grandfather      No Known Problems Paternal Grandmother      No Known Problems Other        Results for orders placed or performed in visit on 09/27/23   MA Screen Bilateral w/William    Narrative    BILATERAL FULL FIELD DIGITAL SCREENING MAMMOGRAM WITH TOMOSYNTHESIS    Performed on: 9/27/23    Compared to: 04/01/2021 and 07/13/2015    Technique:  This study was evaluated with the assistance of Computer-Aided   Detection.  Breast Tomosynthesis was used in interpretation.    Findings: The breasts have scattered areas of fibroglandular density.    There is no radiographic evidence of malignancy.     Impression    IMPRESSION: ACR BI-RADS Category 1: Negative    RECOMMENDED FOLLOW-UP: Annual routine screening mammogram    The results and recommendations of this examination will be communicated   to the patient.        Mitesh Mccabe MD       *Note: Due to a large number of results and/or encounters for the requested time period, some results have not been displayed. A complete set of results can be found in Results Review.       VEIN CLINIC LEG DRAWING    No data recorded     ASSESSMENT: Right lower extremity venous congestion from deep vein reflux, no remaining superficial venous reflux on venous competency testing today.    PLAN: Conservative management.  Compression stockings and elevation.  Continue with anticoagulation due to history of DVT.  Patient will start taking her Xarelto with a meal as directed.  If she has failure of Xarelto in  the future, consider changing to Eliquis for more overlapping of half-life.      Gutierrez Cantu MD    20 minutes spent today reviewing chart, discussing with patient and postvisit charting.      Again, thank you for allowing me to participate in the care of your patient.        Sincerely,        Gutierrez Cantu MD

## 2023-10-25 NOTE — PROGRESS NOTES
HISTORY OF PRESENT ILLNESS  Ms. Mccullough is a pleasant 63 year old year old female who presents to clinic today with the following:  What problem are you here for? Bilateral shoulder injection  And worsened low back pain  With more sciatica  Wants to discuss injections    How long have you had this problem? Chronic     Have you had any recent imaging of this problem? Xrays/MRI/CT scans? Yes     Have you had treatments for this problem in the past?  -Medications? Yes   -Physical therapy? yes  -Injections? yes  -Surgery? no    How severe is this problem today? 0-10 scale? 8.5    How severe has this problem been at WORST in the past? 0-10 scale? 10    What do you think caused this problem? Rotator issues     Does this problem or its symptoms cause difficulty for you falling asleep or staying asleep? yes    Anything else you want us to know about this problem? no          MEDICAL HISTORY  Patient Active Problem List   Diagnosis    Generalized osteoarthrosis, involving multiple sites    CRPS (complex regional pain syndrome), lower limb    Fibromyalgia    Somatoform disorder    Histrionic personality (H)    Encounter for long-term use of opiate analgesic    Knee joint replacement by other means    Hypothyroidism    Insomnia, unspecified type    Hyperlipidemia    Hashimoto's thyroiditis    Glucocorticoid deficiency (H24)    Posttraumatic stress disorder    Impingement syndrome of left shoulder    Abdominal cramping, generalized    Intermittent diarrhea    Primary osteoarthritis involving multiple joints    Attention deficit disorder    Allergic rhinitis    Cushing's syndrome (H24)    Endometriosis    Essential hypertension    Systemic lupus erythematosus (H)    S/P cervical spinal fusion    Paroxysmal atrial fibrillation (H)    Nonischemic cardiomyopathy (H)    Personal history of DVT (deep vein thrombosis)    History of pulmonary embolism    Acute deep vein thrombosis (DVT) of popliteal vein of right lower extremity (H)     Acute pulmonary embolism (H)    Adrenal cortical steroids causing adverse effect in therapeutic use    Alopecia areata    Anemia, blood loss    Ankle pain, left    ARF (acute renal failure) (H24)    Arthritis, septic (H)    Chronic ethmoidal sinusitis    Chronic maxillary sinusitis    Deviated nasal septum    Complications due to internal joint prosthesis (H24)    Generalized pain    Iatrogenic hyperthyroidism    S/P TKR (total knee replacement)    Left shoulder pain    Lipoma of skin and subcutaneous tissue    Malabsorption    Nasal fracture    Nasal turbinate hypertrophy    Opioid type dependence (H)    Other specified abnormal findings of blood chemistry    Other acute postoperative pain    Pain in joint, lower leg    Postoperative hypotension    S/P total knee replacement    Thrombus of right atrial appendage without antecedent myocardial infarction    Chronic pain of both shoulders    GI bleed    Grade III internal hemorrhoids    Atrial fibrillation with RVR (H)    Acute deep vein thrombosis (DVT) of proximal vein of right lower extremity (H)    Pneumonia of left upper lobe due to infectious organism    Morbid obesity (H)    Cerebrovascular accident (CVA), unspecified mechanism (H)    CVA (cerebral vascular accident) (H)    Neuropathy of left suprascapular nerve    Neuropathy of right suprascapular nerve    Stage 3 right buttock    BMI 40.0-44.9, adult (H)    Chronic anticoagulation    Controlled substance agreement signed    Craniofacial hyperhidrosis    Current chronic use of systemic steroids    DNR (do not resuscitate)    Hypertonic bladder    Hypo-osmolality and hyponatremia    Lumbosacral radiculopathy at S1    Overflow diarrhea    Refusal of blood transfusions as patient is Protestant    Unspecified fall, initial encounter    Unspecified injury of shoulder and upper arm, unspecified arm, initial encounter    Weakness    Chronic pain disorder    Constipation    Hemoptysis    COVID-19 virus  infection    Acute respiratory failure with hypoxia (H)    Edema, unspecified type    History of ischemic stroke    Personal history of COVID-19    Pressure injury of sacral region, unstageable (H)    Pneumonia due to infectious organism, unspecified laterality, unspecified part of lung    RSV (respiratory syncytial virus infection)    Opioid dependence with withdrawal (H)    Nausea vomiting and diarrhea    Abdominal cramping    Medication side effects    Nausea and vomiting, unspecified vomiting type    Dehydration    Opiate overdose, accidental or unintentional, initial encounter (H)    Diarrhea, unspecified type    Screening for cervical cancer       Current Outpatient Medications   Medication Sig Dispense Refill    albuterol (PROAIR HFA/PROVENTIL HFA/VENTOLIN HFA) 108 (90 Base) MCG/ACT inhaler Inhale 1-2 puffs into the lungs every 6 hours as needed for shortness of breath, wheezing or cough      atorvastatin (LIPITOR) 40 MG tablet Take 1 tablet (40 mg) by mouth every evening 90 tablet 2    cyanocobalamin 1000 MCG/ML injection Inject 1 mL (1,000 mcg) Subcutaneous every 7 days 4 mL 5    cyclobenzaprine (FLEXERIL) 10 MG tablet Take 1 tablet (10 mg) by mouth 3 times daily 90 tablet 3    Elastic Bandages & Supports (MEDICAL COMPRESSION SOCKS) MISC 1 Package daily Please measure and distribute 2 pair of 20mmHg - 30mmHg THIGH high open or closed toe compression stockings with extra refills as indicated. Jobst ultrasheer or equivalent. 2 each 3    gabapentin (NEURONTIN) 300 MG capsule Take 300 mg by mouth 2 times daily (morning and afternoon) with 800mg tablet (total dose 1100mg)      gabapentin (NEURONTIN) 800 MG tablet Take 800 mg by mouth At Bedtime       gabapentin (NEURONTIN) 800 MG tablet Take 800 mg by mouth 2 times daily (morning and afternoon) in addition to 300mg capsule (total dose 1100mg)      HYDROmorphone (DILAUDID) 8 MG tablet Take 8 mg by mouth 3 times daily . Max of 3 doses per day.      levothyroxine  (TIROSINT) 100 MCG capsule Take 300 mcg by mouth daily      liothyronine (CYTOMEL) 5 MCG tablet Take 5 mcg by mouth daily      metoprolol succinate ER (TOPROL XL) 25 MG 24 hr tablet Take 3 tablets (75 mg) by mouth daily for 30 days 90 tablet 0    morphine (MS CONTIN) 30 MG 12 hr tablet Take 30 mg by mouth 2 times daily (morning and night) in addition to 60 mg tablet for a total dose of 90mg. 270 tablet 0    morphine (MS CONTIN) 60 MG 12 hr tablet Take 60 mg by mouth 3 times daily For morning and night doses, takes in addition to 30mg tablet for a total dose of 90mg. 30 tablet 0    rivaroxaban ANTICOAGULANT (XARELTO) 20 MG TABS tablet Take 1 tablet (20 mg) by mouth daily (with dinner)      sodium fluoride (SF 5000 PLUS) 1.1 % CREA Apply to affected area daily      zolpidem (AMBIEN) 5 MG tablet Take 5 mg by mouth nightly as needed for sleep Quantity #15 for 30 days supply per prescribing MD.         Allergies   Allergen Reactions    Blood Transfusion Related (Informational Only) Other (See Comments)     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     Chlorhexidine Hives     Thor surgical cleanser    Ibuprofen [Nsaids] Hives    Penicillins Hives     Other reaction(s): Unknown    Sulfa Antibiotics Hives     Other reaction(s): Unknown    Calcium Channel Blockers     Doxycycline GI Disturbance     Emesis & diarrhea  Patient denies allergy to this med    Hydroxyzine      rash    Mold        Family History   Problem Relation Age of Onset    Hypertension Mother     No Known Problems Father     No Known Problems Sister     No Known Problems Brother     No Known Problems Maternal Grandmother     No Known Problems Maternal Grandfather     No Known Problems Paternal Grandmother     No Known Problems Other      Social History     Socioeconomic History    Marital status:    Tobacco Use    Smoking status: Former     Packs/day: 1.50     Years: 20.00     Additional pack years: 0.00     Total pack years: 30.00      Types: Cigarettes     Start date: 1973     Quit date: 1993     Years since quittin.8    Smokeless tobacco: Never    Tobacco comments:     wish I never started   Substance and Sexual Activity    Alcohol use: No     Alcohol/week: 0.0 standard drinks of alcohol    Drug use: No    Sexual activity: Not Currently     Partners: Male     Birth control/protection: Post-menopausal   Social History Narrative    ** Merged History Encounter **            Additional medical/Social/Surgical histories reviewed in [facility] and updated as appropriate.     REVIEW OF SYSTEMS (10/25/2023)  10 point ROS of systems including Constitutional, Eyes, Respiratory, Cardiovascular, Gastroenterology, Genitourinary, Integumentary, Musculoskeletal, Psychiatric, Allergic/Immunologic were all negative except for pertinent positives noted in my HPI.     PHYSICAL EXAM  VSS    General  - normal appearance, in no obvious distress  HEENT  - conjunctivae not injected, moist mucous membranes, normocephalic/atraumatic head, ears normal appearance, no lesions, mouth normal appearance, no scars, normal dentition and teeth present  CV  - normal radial pulse  Pulm  - normal respiratory pattern, non-labored  Musculoskeletal - bilateral shoulder  - inspection: normal bone and joint alignment, no obvious deformity, no scapular winging, no AC step-off  - palpation: mildly tender RC insertion, normal clavicle, non-tender AC  - ROM:  painful and limited flexion and ER at end range, limited IR  - strength: 5/5  strength, 5/5 in all shoulder planes  - special tests:  (-) Speed's  (+) Neer  (+) Hawkin's  (+) Jovanny's  (-) Van Buren's  (-) apprehension  (-) subscap lift-off  Neuro  - no sensory or motor deficit, grossly normal coordination, normal muscle tone  Skin  - no ecchymosis, erythema, warmth, or induration, no obvious rash  Psych  - interactive, appropriate, normal mood and affect  Lumbar: has pain with flexion/extension, positive slr  ASSESSMENT &  PLAN  62 yo female with bilateral shoulder rotator cuff arthropathy, subacromial bursitis, worse, and lumbar ddd, disc herniations, radicular pain, worse    I independently reviewed the following imaging studies:  Lumbar MRI: shows ddd,disc herniations  Discussed and ordered lumbar ANASTASIA  Had over 50% improvement from lumbar ANASTASIA earlier this year, for over 4 months  Worse again  After a 20 minute discussion and examination, we decided to perform a same day injection for diagnostic and therapeutic purposes for  Bilateral shoulders    Patient has been doing home exercise physical therapy program for this problem      Appropriate PPE was utilized for prevention of spread of Covid-19.  Shon Simpson MD, CAM    PROCEDURE: bilateral SHOULDER subacromial bursa injection     The patient was apprised of the risks and the benefits of the procedure and consented and a written consent was signed by the patient.   The patient s shoulders were sterilely prepped with chloraprep.   40 mg of depo suspension was drawn up into a 5 mL syringe with 4 mL of 1% lidocaine   The patient was injected with a 1.5-inch 22-gauge needle at right and left  lateral gleno-humeral joint in the subacromial space  There were no complications. The patient tolerated the procedure well. There was minimal bleeding.   The patient was instructed to ice the shoulders upon leaving clinic and refrain from overuse over the next 3 days.   The patient was instructed to go to the emergency room with any usual pain, swelling, or redness occurred in the injected area.   The patient was given a followup appointment to evaluate response to the injection to their increased range of motion and reduction of pain.    followup PRN  Dr Shon Simpson     Large Joint Injection/Arthocentesis: bilateral subacromial bursa    Date/Time: 10/25/2023 10:22 AM    Performed by: Shon Simpson MD  Authorized by: Shon Simpson MD    Indications:  Pain and  osteoarthritis  Needle Size:  22 G  Guidance: landmark guided    Location:  Shoulder  Laterality:  Bilateral      Site:  Bilateral subacromial bursa  Medications (Right):  40 mg methylPREDNISolone 40 MG/ML  Medications (Left):  40 mg methylPREDNISolone 40 MG/ML  Outcome:  Tolerated well, no immediate complications  Procedure discussed: discussed risks, benefits, and alternatives    Consent Given by:  Patient  Timeout: timeout called immediately prior to procedure    Prep: patient was prepped and draped in usual sterile fashion

## 2023-10-25 NOTE — LETTER
10/25/2023         RE: Aspen Mccullough  [address]  [place] [address]        Dear Colleague,    Thank you for referring your patient, Aspen Mccullough, to the [facility]. Please see a copy of my visit note below.    HISTORY OF PRESENT ILLNESS  Ms. Mccullough is a pleasant 63 year old year old female who presents to clinic today with the following:  What problem are you here for? Bilateral shoulder injection  And worsened low back pain  With more sciatica  Wants to discuss injections    How long have you had this problem? Chronic     Have you had any recent imaging of this problem? Xrays/MRI/CT scans? Yes     Have you had treatments for this problem in the past?  -Medications? Yes   -Physical therapy? yes  -Injections? yes  -Surgery? no    How severe is this problem today? 0-10 scale? 8.5    How severe has this problem been at WORST in the past? 0-10 scale? 10    What do you think caused this problem? Rotator issues     Does this problem or its symptoms cause difficulty for you falling asleep or staying asleep? yes    Anything else you want us to know about this problem? no          MEDICAL HISTORY  Patient Active Problem List   Diagnosis     Generalized osteoarthrosis, involving multiple sites     CRPS (complex regional pain syndrome), lower limb     Fibromyalgia     Somatoform disorder     Histrionic personality (H)     Encounter for long-term use of opiate analgesic     Knee joint replacement by other means     Hypothyroidism     Insomnia, unspecified type     Hyperlipidemia     Hashimoto's thyroiditis     Glucocorticoid deficiency (H24)     Posttraumatic stress disorder     Impingement syndrome of left shoulder     Abdominal cramping, generalized     Intermittent diarrhea     Primary osteoarthritis involving multiple joints     Attention deficit disorder     Allergic rhinitis     Cushing's syndrome (H24)     Endometriosis     Essential hypertension      Systemic lupus erythematosus (H)     S/P cervical spinal fusion     Paroxysmal atrial fibrillation (H)     Nonischemic cardiomyopathy (H)     Personal history of DVT (deep vein thrombosis)     History of pulmonary embolism     Acute deep vein thrombosis (DVT) of popliteal vein of right lower extremity (H)     Acute pulmonary embolism (H)     Adrenal cortical steroids causing adverse effect in therapeutic use     Alopecia areata     Anemia, blood loss     Ankle pain, left     ARF (acute renal failure) (H24)     Arthritis, septic (H)     Chronic ethmoidal sinusitis     Chronic maxillary sinusitis     Deviated nasal septum     Complications due to internal joint prosthesis (H24)     Generalized pain     Iatrogenic hyperthyroidism     S/P TKR (total knee replacement)     Left shoulder pain     Lipoma of skin and subcutaneous tissue     Malabsorption     Nasal fracture     Nasal turbinate hypertrophy     Opioid type dependence (H)     Other specified abnormal findings of blood chemistry     Other acute postoperative pain     Pain in joint, lower leg     Postoperative hypotension     S/P total knee replacement     Thrombus of right atrial appendage without antecedent myocardial infarction     Chronic pain of both shoulders     GI bleed     Grade III internal hemorrhoids     Atrial fibrillation with RVR (H)     Acute deep vein thrombosis (DVT) of proximal vein of right lower extremity (H)     Pneumonia of left upper lobe due to infectious organism     Morbid obesity (H)     Cerebrovascular accident (CVA), unspecified mechanism (H)     CVA (cerebral vascular accident) (H)     Neuropathy of left suprascapular nerve     Neuropathy of right suprascapular nerve     Stage 3 right buttock     BMI 40.0-44.9, adult (H)     Chronic anticoagulation     Controlled substance agreement signed     Craniofacial hyperhidrosis     Current chronic use of systemic steroids     DNR (do not resuscitate)     Hypertonic bladder      Hypo-osmolality and hyponatremia     Lumbosacral radiculopathy at S1     Overflow diarrhea     Refusal of blood transfusions as patient is Judaism     Unspecified fall, initial encounter     Unspecified injury of shoulder and upper arm, unspecified arm, initial encounter     Weakness     Chronic pain disorder     Constipation     Hemoptysis     COVID-19 virus infection     Acute respiratory failure with hypoxia (H)     Edema, unspecified type     History of ischemic stroke     Personal history of COVID-19     Pressure injury of sacral region, unstageable (H)     Pneumonia due to infectious organism, unspecified laterality, unspecified part of lung     RSV (respiratory syncytial virus infection)     Opioid dependence with withdrawal (H)     Nausea vomiting and diarrhea     Abdominal cramping     Medication side effects     Nausea and vomiting, unspecified vomiting type     Dehydration     Opiate overdose, accidental or unintentional, initial encounter (H)     Diarrhea, unspecified type     Screening for cervical cancer       Current Outpatient Medications   Medication Sig Dispense Refill     albuterol (PROAIR HFA/PROVENTIL HFA/VENTOLIN HFA) 108 (90 Base) MCG/ACT inhaler Inhale 1-2 puffs into the lungs every 6 hours as needed for shortness of breath, wheezing or cough       atorvastatin (LIPITOR) 40 MG tablet Take 1 tablet (40 mg) by mouth every evening 90 tablet 2     cyanocobalamin 1000 MCG/ML injection Inject 1 mL (1,000 mcg) Subcutaneous every 7 days 4 mL 5     cyclobenzaprine (FLEXERIL) 10 MG tablet Take 1 tablet (10 mg) by mouth 3 times daily 90 tablet 3     Elastic Bandages & Supports (MEDICAL COMPRESSION SOCKS) MISC 1 Package daily Please measure and distribute 2 pair of 20mmHg - 30mmHg THIGH high open or closed toe compression stockings with extra refills as indicated. Jobst ultrasheer or equivalent. 2 each 3     gabapentin (NEURONTIN) 300 MG capsule Take 300 mg by mouth 2 times daily (morning and  afternoon) with 800mg tablet (total dose 1100mg)       gabapentin (NEURONTIN) 800 MG tablet Take 800 mg by mouth At Bedtime        gabapentin (NEURONTIN) 800 MG tablet Take 800 mg by mouth 2 times daily (morning and afternoon) in addition to 300mg capsule (total dose 1100mg)       HYDROmorphone (DILAUDID) 8 MG tablet Take 8 mg by mouth 3 times daily . Max of 3 doses per day.       levothyroxine (TIROSINT) 100 MCG capsule Take 300 mcg by mouth daily       liothyronine (CYTOMEL) 5 MCG tablet Take 5 mcg by mouth daily       metoprolol succinate ER (TOPROL XL) 25 MG 24 hr tablet Take 3 tablets (75 mg) by mouth daily for 30 days 90 tablet 0     morphine (MS CONTIN) 30 MG 12 hr tablet Take 30 mg by mouth 2 times daily (morning and night) in addition to 60 mg tablet for a total dose of 90mg. 270 tablet 0     morphine (MS CONTIN) 60 MG 12 hr tablet Take 60 mg by mouth 3 times daily For morning and night doses, takes in addition to 30mg tablet for a total dose of 90mg. 30 tablet 0     rivaroxaban ANTICOAGULANT (XARELTO) 20 MG TABS tablet Take 1 tablet (20 mg) by mouth daily (with dinner)       sodium fluoride (SF 5000 PLUS) 1.1 % CREA Apply to affected area daily       zolpidem (AMBIEN) 5 MG tablet Take 5 mg by mouth nightly as needed for sleep Quantity #15 for 30 days supply per prescribing MD.         Allergies   Allergen Reactions     Blood Transfusion Related (Informational Only) Other (See Comments)     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      Chlorhexidine Hives     Thor surgical cleanser     Ibuprofen [Nsaids] Hives     Penicillins Hives     Other reaction(s): Unknown     Sulfa Antibiotics Hives     Other reaction(s): Unknown     Calcium Channel Blockers      Doxycycline GI Disturbance     Emesis & diarrhea  Patient denies allergy to this med     Hydroxyzine      rash     Mold        Family History   Problem Relation Age of Onset     Hypertension Mother      No Known Problems Father      No Known  Problems Sister      No Known Problems Brother      No Known Problems Maternal Grandmother      No Known Problems Maternal Grandfather      No Known Problems Paternal Grandmother      No Known Problems Other      Social History     Socioeconomic History     Marital status:    Tobacco Use     Smoking status: Former     Packs/day: 1.50     Years: 20.00     Additional pack years: 0.00     Total pack years: 30.00     Types: Cigarettes     Start date: 1973     Quit date: 1993     Years since quittin.8     Smokeless tobacco: Never     Tobacco comments:     wish I never started   Substance and Sexual Activity     Alcohol use: No     Alcohol/week: 0.0 standard drinks of alcohol     Drug use: No     Sexual activity: Not Currently     Partners: Male     Birth control/protection: Post-menopausal   Social History Narrative    ** Merged History Encounter **            Additional medical/Social/Surgical histories reviewed in [facility] and updated as appropriate.     REVIEW OF SYSTEMS (10/25/2023)  10 point ROS of systems including Constitutional, Eyes, Respiratory, Cardiovascular, Gastroenterology, Genitourinary, Integumentary, Musculoskeletal, Psychiatric, Allergic/Immunologic were all negative except for pertinent positives noted in my HPI.     PHYSICAL EXAM  VSS    General  - normal appearance, in no obvious distress  HEENT  - conjunctivae not injected, moist mucous membranes, normocephalic/atraumatic head, ears normal appearance, no lesions, mouth normal appearance, no scars, normal dentition and teeth present  CV  - normal radial pulse  Pulm  - normal respiratory pattern, non-labored  Musculoskeletal - bilateral shoulder  - inspection: normal bone and joint alignment, no obvious deformity, no scapular winging, no AC step-off  - palpation: mildly tender RC insertion, normal clavicle, non-tender AC  - ROM:  painful and limited flexion and ER at end range, limited IR  - strength: 5/5  strength, 5/5 in all  shoulder planes  - special tests:  (-) Speed's  (+) Neer  (+) Hawkin's  (+) Jovanny's  (-) Kitsap's  (-) apprehension  (-) subscap lift-off  Neuro  - no sensory or motor deficit, grossly normal coordination, normal muscle tone  Skin  - no ecchymosis, erythema, warmth, or induration, no obvious rash  Psych  - interactive, appropriate, normal mood and affect  Lumbar: has pain with flexion/extension, positive slr  ASSESSMENT & PLAN  64 yo female with bilateral shoulder rotator cuff arthropathy, subacromial bursitis, worse, and lumbar ddd, disc herniations, radicular pain, worse    I independently reviewed the following imaging studies:  Lumbar MRI: shows ddd,disc herniations  Discussed and ordered lumbar ANASTASIA  Had over 50% improvement from lumbar ANASTASIA earlier this year, for over 4 months  Worse again  After a 20 minute discussion and examination, we decided to perform a same day injection for diagnostic and therapeutic purposes for  Bilateral shoulders    Patient has been doing home exercise physical therapy program for this problem      Appropriate PPE was utilized for prevention of spread of Covid-19.  Shon Simpson MD, [facility]    PROCEDURE: bilateral SHOULDER subacromial bursa injection     The patient was apprised of the risks and the benefits of the procedure and consented and a written consent was signed by the patient.   The patient s shoulders were sterilely prepped with chloraprep.   40 mg of depo suspension was drawn up into a 5 mL syringe with 4 mL of 1% lidocaine   The patient was injected with a 1.5-inch 22-gauge needle at right and left  lateral gleno-humeral joint in the subacromial space  There were no complications. The patient tolerated the procedure well. There was minimal bleeding.   The patient was instructed to ice the shoulders upon leaving clinic and refrain from overuse over the next 3 days.   The patient was instructed to go to the emergency room with any usual pain, swelling, or redness occurred  in the injected area.   The patient was given a followup appointment to evaluate response to the injection to their increased range of motion and reduction of pain.    followup PRN  Dr Shon Simpson     Large Joint Injection/Arthocentesis: bilateral subacromial bursa    Date/Time: 10/25/2023 10:22 AM    Performed by: Shon Simpson MD  Authorized by: Shon Simpson MD    Indications:  Pain and osteoarthritis  Needle Size:  22 G  Guidance: landmark guided    Location:  Shoulder  Laterality:  Bilateral      Site:  Bilateral subacromial bursa  Medications (Right):  40 mg methylPREDNISolone 40 MG/ML  Medications (Left):  40 mg methylPREDNISolone 40 MG/ML  Outcome:  Tolerated well, no immediate complications  Procedure discussed: discussed risks, benefits, and alternatives    Consent Given by:  Patient  Timeout: timeout called immediately prior to procedure    Prep: patient was prepped and draped in usual sterile fashion          Again, thank you for allowing me to participate in the care of your patient.        Sincerely,        Shon Simpson MD

## 2023-12-05 NOTE — LETTER
November 27, 2023      Aspen Mccullough  [address] [place] [id number]      Dear Aspen,    Thank you for choosing [facility] for your upcoming health care appointment.       You are currently scheduled for an injection on December 5th at 9:00 AM.  This letter is to remind you of special instructions regarding your appointment.      SPECIAL INSTRUCTIONS FOR PATIENTS THAT TAKE BLOOD THINNING MEDICATIONS:     Please hold your Xarelto for 24 hours prior to your injection date of December 5th.  So please start holding on December 4th. Do not take on December 4th; you may restart after your injection.      Please confirm with your primary care physician that holding your medication is appropriate. If your primary care physician has questions, please have them call the number listed below.     If you are not able to comply with these instructions, we will be unable to proceed with your appointment, and you will have to be rescheduled.     Thank you,     [facility]   Imaging Services  [phone]

## 2023-12-05 NOTE — DISCHARGE SUMMARY
AFTER YOU GO HOME    DO relax; minimize your activity for 24 hours  You may resume normal activity tomorrow  You may remove the bandage in the evening or next morning  You may resume bathing the next day  Drink at least 4 extra glasses of fluid today if not on fluid restrictions  DO NOT drive or operate machinery at home or at work for at least 24 hours      VISIT THE EMERGENCY ROOM OR URGENT CARE IF:    There is redness or swelling at the injection site  There is discharge from the injection site  You develop a temperature of 101  F or greater      ADDITIONAL INSTRUCTIONS:     You may resume your Coumadin or other blood thinner at your regular dose today.  Follow up with your physician to have your INR rechecked if indicated.  If you gain no relief from the injection after two (2) weeks, follow-up with your provider for your options.        Contacts:    During business hours from 8 to 5 pm, you may call [phone] to reach a nurse advisor at [facility].  After hours, call [facility]  [phone].  Ask for the Radiologist on-call.  Someone is on-call 24 hrs/day.  [facility] Toll Free Number   [phone]

## 2023-12-05 NOTE — PROGRESS NOTES
Aspen was seen in X-ray today for a  lumbar epidural injection. Patient rated pain before procedure 9/10. After procedure patient rated pain 9/10.   This pain level is acceptable to patient. Patient discharged home with .

## 2024-01-01 ENCOUNTER — MYC MEDICAL ADVICE (OUTPATIENT)
Dept: ORTHOPEDICS | Facility: CLINIC | Age: 64
End: 2024-01-01
Payer: COMMERCIAL

## 2024-01-01 ENCOUNTER — VIRTUAL VISIT (OUTPATIENT)
Dept: ORTHOPEDICS | Facility: CLINIC | Age: 64
End: 2024-01-01
Payer: COMMERCIAL

## 2024-01-01 ENCOUNTER — OFFICE VISIT (OUTPATIENT)
Dept: ORTHOPEDICS | Facility: CLINIC | Age: 64
End: 2024-01-01
Payer: COMMERCIAL

## 2024-01-01 ENCOUNTER — NURSE TRIAGE (OUTPATIENT)
Dept: NURSING | Facility: CLINIC | Age: 64
End: 2024-01-01
Payer: COMMERCIAL

## 2024-01-01 DIAGNOSIS — M12.811 ROTATOR CUFF ARTHROPATHY OF BOTH SHOULDERS: ICD-10-CM

## 2024-01-01 DIAGNOSIS — M51.16 LUMBAR DISC HERNIATION WITH RADICULOPATHY: Primary | ICD-10-CM

## 2024-01-01 DIAGNOSIS — M12.812 ROTATOR CUFF ARTHROPATHY OF BOTH SHOULDERS: ICD-10-CM

## 2024-01-01 DIAGNOSIS — E78.5 HYPERLIPIDEMIA LDL GOAL <100: ICD-10-CM

## 2024-01-01 DIAGNOSIS — M75.52 SUBACROMIAL BURSITIS OF BOTH SHOULDERS: Primary | ICD-10-CM

## 2024-01-01 DIAGNOSIS — M51.369 DDD (DEGENERATIVE DISC DISEASE), LUMBAR: ICD-10-CM

## 2024-01-01 DIAGNOSIS — M75.51 SUBACROMIAL BURSITIS OF BOTH SHOULDERS: Primary | ICD-10-CM

## 2024-01-01 PROCEDURE — 99442 PR PHYSICIAN TELEPHONE EVALUATION 11-20 MIN: CPT | Mod: 93 | Performed by: PREVENTIVE MEDICINE

## 2024-01-01 PROCEDURE — 20610 DRAIN/INJ JOINT/BURSA W/O US: CPT | Mod: 50 | Performed by: PREVENTIVE MEDICINE

## 2024-01-01 PROCEDURE — 99214 OFFICE O/P EST MOD 30 MIN: CPT | Mod: 25 | Performed by: PREVENTIVE MEDICINE

## 2024-01-01 RX ORDER — ATORVASTATIN CALCIUM 40 MG/1
40 TABLET, FILM COATED ORAL EVERY EVENING
Qty: 90 TABLET | Refills: 3 | Status: SHIPPED | OUTPATIENT
Start: 2024-01-01

## 2024-01-01 RX ORDER — METHYLPREDNISOLONE ACETATE 40 MG/ML
40 INJECTION, SUSPENSION INTRA-ARTICULAR; INTRALESIONAL; INTRAMUSCULAR; SOFT TISSUE
Status: SHIPPED | OUTPATIENT
Start: 2024-01-01

## 2024-01-01 RX ORDER — METHYLPREDNISOLONE 4 MG
TABLET, DOSE PACK ORAL
Qty: 21 TABLET | Refills: 0 | Status: SHIPPED | OUTPATIENT
Start: 2024-01-01

## 2024-01-01 RX ADMIN — METHYLPREDNISOLONE ACETATE 40 MG: 40 INJECTION, SUSPENSION INTRA-ARTICULAR; INTRALESIONAL; INTRAMUSCULAR; SOFT TISSUE at 09:31

## 2024-02-07 NOTE — LETTER
2/7/2024         RE: Aspen Mccullough  [address]  [facility] [id number]        Dear Colleague,    Thank you for referring your patient, Aspen Mccullough, to the [facility] SP[facility]TS MEDICINE [facility]IN[facility] [place]. Please see a copy of my visit note below.    Patient is a  63   year old who is being evaluated via a billable telephone visit.      What phone number would you like to be contacted at? CELL  How would you like to obtain your AVS? MYCHART        Subjective   Patient is a   63  year old who presents by phone call visit for the following:     HPI   Followup for lumbar pain and radicular pain  Had ANASTASIA a few months ago which improved her pain and symptoms over 50% but now has started to have more symptoms traveling down her legs from her low back    Review of Systems   Constitutional, HEENT, cardiovascular, pulmonary, gi and gu systems are negative, except as otherwise noted.      Objective           Vitals:  No vitals were obtained today due to virtual visit.    Physical Exam   healthy, alert, and no distress  PSYCH: Alert and oriented times 3; coherent speech, normal   rate and volume, able to articulate logical thoughts, able   to abstract reason, no tangential thoughts, no hallucinations   or delusions  His affect is normal  RESP: No cough, no audible wheezing, able to talk in full sentences  Remainder of exam unable to be completed due to telephone visits    Assessment/Plan  64 yo female with lumbar ddd, disc herniations, radicular pain, worse    I independently reviewed the following imaging studies and discussed with patient:  Lumbar MRI from over a year ago shows ddd, disc herniations  Discussed and ordered lumbar MRI  Followup after            Phone call duration: 20 minutes  Phone call start: 110pm  Phone call end: 130pm  Dr Simpson      Again, thank you for allowing me to participate in the care of your patient.        Sincerely,        Shon Simpson MD

## 2024-02-07 NOTE — PROGRESS NOTES
Patient is a  63   year old who is being evaluated via a billable telephone visit.      What phone number would you like to be contacted at? CELL  How would you like to obtain your AVS? NOLBERTO        Subjective   Patient is a   63  year old who presents by phone call visit for the following:     HPI   Followup for lumbar pain and radicular pain  Had ANASTASIA a few months ago which improved her pain and symptoms over 50% but now has started to have more symptoms traveling down her legs from her low back    Review of Systems   Constitutional, HEENT, cardiovascular, pulmonary, gi and gu systems are negative, except as otherwise noted.      Objective           Vitals:  No vitals were obtained today due to virtual visit.    Physical Exam   healthy, alert, and no distress  PSYCH: Alert and oriented times 3; coherent speech, normal   rate and volume, able to articulate logical thoughts, able   to abstract reason, no tangential thoughts, no hallucinations   or delusions  His affect is normal  RESP: No cough, no audible wheezing, able to talk in full sentences  Remainder of exam unable to be completed due to telephone visits    Assessment/Plan  64 yo female with lumbar ddd, disc herniations, radicular pain, worse    I independently reviewed the following imaging studies and discussed with patient:  Lumbar MRI from over a year ago shows ddd, disc herniations  Discussed and ordered lumbar MRI  Followup after            Phone call duration: 20 minutes  Phone call start: 110pm  Phone call end: 130pm  Dr Simpson

## 2024-02-07 NOTE — LETTER
2/7/2024         RE: Aspen Mccullough  [address]  [facility] [address]        Dear Colleague,    Thank you for referring your patient, Aspen Mccullough, to the [facility]IC[facility] [place]. Please see a copy of my visit note below.    Patient is a  63   year old who is being evaluated via a billable telephone visit.      What phone number would you like to be contacted at? CELL  How would you like to obtain your AVS? MYCHART        Subjective   Patient is a   63  year old who presents by phone call visit for the following:     HPI   Followup for lumbar pain and radicular pain  Had ANASTASIA a few months ago which improved her pain and symptoms over 50% but now has started to have more symptoms traveling down her legs from her low back    Review of Systems   Constitutional, HEENT, cardiovascular, pulmonary, gi and gu systems are negative, except as otherwise noted.      Objective           Vitals:  No vitals were obtained today due to virtual visit.    Physical Exam   healthy, alert, and no distress  PSYCH: Alert and oriented times 3; coherent speech, normal   rate and volume, able to articulate logical thoughts, able   to abstract reason, no tangential thoughts, no hallucinations   or delusions  His affect is normal  RESP: No cough, no audible wheezing, able to talk in full sentences  Remainder of exam unable to be completed due to telephone visits    Assessment/Plan  62 yo female with lumbar ddd, disc herniations, radicular pain, worse    I independently reviewed the following imaging studies and discussed with patient:  Lumbar MRI from over a year ago shows ddd, disc herniations  Discussed and ordered lumbar MRI  Followup after            Phone call duration: 20 minutes  Phone call start: 110pm  Phone call end: 130pm  Dr Simpson      Again, thank you for allowing me to participate in the care of your patient.        Sincerely,        Shon Simpson MD

## 2024-02-19 NOTE — NURSING NOTE
[facility]   ORTHOPEDICS & SPORTS MEDICINE  [address] [place] [address]  Dept: [phone]  ______________________________________________________________________________    Patient: Aspen Mccullough   : 1960   MRN: [id number]   2024    INVASIVE PROCEDURE SAFETY CHECKLIST    Date: 24   Procedure:Bilateral SubAcromial Bursa Injections  Patient Name: Aspen Mccullough  MRN: [id number]  YOB: 1960    Action: Complete sections as appropriate. Any discrepancy results in a HARD COPY until resolved.     PRE PROCEDURE:  Patient ID verified with 2 identifiers (name and  or MRN): Yes  Procedure and site verified with patient/designee (when able): Yes  Accurate consent documentation in medical record: Yes  H&P (or appropriate assessment) documented in medical record: Yes  H&P must be up to 20 days prior to procedure and updates within 24 hours of procedure as applicable: NA  Relevant diagnostic and radiology test results appropriately labeled and displayed as applicable: NA  Procedure site(s) marked with provider initials: NA    TIMEOUT:  Time-Out performed immediately prior to starting procedure, including verbal and active participation of all team members addressing the following:Yes  * Correct patient identify  * Confirmed that the correct side and site are marked  * An accurate procedure consent form  * Agreement on the procedure to be done  * Correct patient position  * Relevant images and results are properly labeled and appropriately displayed  * The need to administer antibiotics or fluids for irrigation purposes during the procedure as applicable   * Safety precautions based on patient history or medication use    DURING PROCEDURE: Verification of correct person, site, and procedures any time the responsibility for care of the patient is transferred to another member of the care team.       Prior to injection, verified patient identity using patient's  name and date of birth.  Due to injection administration, patient instructed to remain in clinic for 15 minutes  afterwards, and to report any adverse reaction to me immediately.    Bursa injection was performed.   x2    Drug Amount Wasted:  None.   Vial/Syringe: Single dose vial x2  Expiration Date:  06/2025 x2      Sherwin Khan ATC  February 19, 2024

## 2024-02-19 NOTE — PROGRESS NOTES
HISTORY OF PRESENT ILLNESS  Ms. Mccullough is a pleasant 64 year old year old female who presents to clinic today with the following:  What problem are you here for? Bilateral Shoulder Injections  Had injections for both shoulders last fall and had imrpovement for several months  Both are starting to bother her again  How long have you had this problem? Chronic    Have you had any recent imaging of this problem? Xrays/MRI/CT scans? Yes     Have you had treatments for this problem in the past?  -Medications? Yes  -Physical therapy? No  -Injections? Yes  -Surgery? No     How severe is this problem today? 0-10 scale? 8    How severe has this problem been at WORST in the past? 0-10 scale? 8    What do you think caused this problem?     Does this problem or its symptoms cause difficulty for you falling asleep or staying asleep? Yes    Anything else you want us to know about this problem? Having MRI on the 25th.           MEDICAL HISTORY  Patient Active Problem List   Diagnosis    Generalized osteoarthrosis, involving multiple sites    CRPS (complex regional pain syndrome), lower limb    Fibromyalgia    Somatoform disorder    Histrionic personality (H)    Encounter for long-term use of opiate analgesic    Knee joint replacement by other means    Hypothyroidism    Insomnia, unspecified type    Hyperlipidemia    Hashimoto's thyroiditis    Glucocorticoid deficiency (H24)    Posttraumatic stress disorder    Impingement syndrome of left shoulder    Abdominal cramping, generalized    Intermittent diarrhea    Primary osteoarthritis involving multiple joints    Attention deficit disorder    Allergic rhinitis    Cushing's syndrome (H24)    Endometriosis    Essential hypertension    Systemic lupus erythematosus (H)    S/P cervical spinal fusion    Paroxysmal atrial fibrillation (H)    Nonischemic cardiomyopathy (H)    Personal history of DVT (deep vein thrombosis)    History of pulmonary embolism    Acute deep vein thrombosis (DVT)  of popliteal vein of right lower extremity (H)    Acute pulmonary embolism (H)    Adrenal cortical steroids causing adverse effect in therapeutic use    Alopecia areata    Anemia, blood loss    Ankle pain, left    ARF (acute renal failure) (H24)    Arthritis, septic (H)    Chronic ethmoidal sinusitis    Chronic maxillary sinusitis    Deviated nasal septum    Complications due to internal joint prosthesis (H24)    Generalized pain    Iatrogenic hyperthyroidism    S/P TKR (total knee replacement)    Left shoulder pain    Lipoma of skin and subcutaneous tissue    Malabsorption    Nasal fracture    Nasal turbinate hypertrophy    Opioid type dependence (H)    Other specified abnormal findings of blood chemistry    Other acute postoperative pain    Pain in joint, lower leg    Postoperative hypotension    S/P total knee replacement    Thrombus of right atrial appendage without antecedent myocardial infarction    Chronic pain of both shoulders    GI bleed    Grade III internal hemorrhoids    Atrial fibrillation with RVR (H)    Acute deep vein thrombosis (DVT) of proximal vein of right lower extremity (H)    Pneumonia of left upper lobe due to infectious organism    Morbid obesity (H)    Cerebrovascular accident (CVA), unspecified mechanism (H)    CVA (cerebral vascular accident) (H)    Neuropathy of left suprascapular nerve    Neuropathy of right suprascapular nerve    Stage 3 right buttock    BMI 40.0-44.9, adult (H)    Chronic anticoagulation    Controlled substance agreement signed    Craniofacial hyperhidrosis    Current chronic use of systemic steroids    DNR (do not resuscitate)    Hypertonic bladder    Hypo-osmolality and hyponatremia    Lumbosacral radiculopathy at S1    Overflow diarrhea    Refusal of blood transfusions as patient is Rastafarian    Unspecified fall, initial encounter    Unspecified injury of shoulder and upper arm, unspecified arm, initial encounter    Weakness    Chronic pain disorder     Constipation    Hemoptysis    COVID-19 virus infection    Acute respiratory failure with hypoxia (H)    Edema, unspecified type    History of ischemic stroke    Personal history of COVID-19    Pressure injury of sacral region, unstageable (H)    Pneumonia due to infectious organism, unspecified laterality, unspecified part of lung    RSV (respiratory syncytial virus infection)    Opioid dependence with withdrawal (H)    Nausea vomiting and diarrhea    Abdominal cramping    Medication side effects    Nausea and vomiting, unspecified vomiting type    Dehydration    Opiate overdose, accidental or unintentional, initial encounter (H)    Diarrhea, unspecified type    Screening for cervical cancer       Current Outpatient Medications   Medication Sig Dispense Refill    albuterol (PROAIR HFA/PROVENTIL HFA/VENTOLIN HFA) 108 (90 Base) MCG/ACT inhaler Inhale 1-2 puffs into the lungs every 6 hours as needed for shortness of breath, wheezing or cough      atorvastatin (LIPITOR) 40 MG tablet Take 1 tablet (40 mg) by mouth every evening 90 tablet 3    cyanocobalamin 1000 MCG/ML injection Inject 1 mL (1,000 mcg) Subcutaneous every 7 days 4 mL 5    cyclobenzaprine (FLEXERIL) 10 MG tablet Take 1 tablet (10 mg) by mouth 3 times daily 90 tablet 3    Elastic Bandages & Supports (MEDICAL COMPRESSION SOCKS) MISC 1 Package daily Please measure and distribute 2 pair of 20mmHg - 30mmHg THIGH high open or closed toe compression stockings with extra refills as indicated. Jobst ultrasheer or equivalent. 2 each 3    gabapentin (NEURONTIN) 300 MG capsule Take 300 mg by mouth 2 times daily (morning and afternoon) with 800mg tablet (total dose 1100mg)      gabapentin (NEURONTIN) 800 MG tablet Take 800 mg by mouth At Bedtime       gabapentin (NEURONTIN) 800 MG tablet Take 800 mg by mouth 2 times daily (morning and afternoon) in addition to 300mg capsule (total dose 1100mg)      HYDROmorphone (DILAUDID) 8 MG tablet Take 8 mg by mouth 3 times daily .  Max of 3 doses per day.      levothyroxine (TIROSINT) 100 MCG capsule Take 300 mcg by mouth daily      liothyronine (CYTOMEL) 5 MCG tablet Take 5 mcg by mouth daily      metoprolol succinate ER (TOPROL XL) 25 MG 24 hr tablet Take 3 tablets (75 mg) by mouth daily for 30 days 90 tablet 0    morphine (MS CONTIN) 30 MG 12 hr tablet Take 30 mg by mouth 2 times daily (morning and night) in addition to 60 mg tablet for a total dose of 90mg. 270 tablet 0    morphine (MS CONTIN) 60 MG 12 hr tablet Take 60 mg by mouth 3 times daily For morning and night doses, takes in addition to 30mg tablet for a total dose of 90mg. 30 tablet 0    rivaroxaban ANTICOAGULANT (XARELTO) 20 MG TABS tablet Take 1 tablet (20 mg) by mouth daily (with dinner)      sodium fluoride (SF 5000 PLUS) 1.1 % CREA Apply to affected area daily      zolpidem (AMBIEN) 5 MG tablet Take 5 mg by mouth nightly as needed for sleep Quantity #15 for 30 days supply per prescribing MD.         Allergies   Allergen Reactions    Blood Transfusion Related (Informational Only) Other (See Comments)     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     Chlorhexidine Hives     Thor surgical cleanser    Ibuprofen [Nsaids] Hives    Penicillins Hives     Other reaction(s): Unknown    Sulfa Antibiotics Hives     Other reaction(s): Unknown    Calcium Channel Blockers     Doxycycline GI Disturbance     Emesis & diarrhea  Patient denies allergy to this med    Hydroxyzine      rash    Mold        Family History   Problem Relation Age of Onset    Hypertension Mother     No Known Problems Father     No Known Problems Sister     No Known Problems Brother     No Known Problems Maternal Grandmother     No Known Problems Maternal Grandfather     No Known Problems Paternal Grandmother     No Known Problems Other      Social History     Socioeconomic History    Marital status:    Tobacco Use    Smoking status: Former     Packs/day: 1.50     Years: 20.00     Additional pack  years: 0.00     Total pack years: 30.00     Types: Cigarettes     Start date: 1973     Quit date: 1993     Years since quittin.1    Smokeless tobacco: Never    Tobacco comments:     wish I never started   Substance and Sexual Activity    Alcohol use: No     Alcohol/week: 0.0 standard drinks of alcohol    Drug use: No    Sexual activity: Not Currently     Partners: Male     Birth control/protection: Post-menopausal   Social History Narrative    ** Merged History Encounter **            Additional medical/Social/Surgical histories reviewed in [facility] and updated as appropriate.     REVIEW OF SYSTEMS (2024)  10 point ROS of systems including Constitutional, Eyes, Respiratory, Cardiovascular, Gastroenterology, Genitourinary, Integumentary, Musculoskeletal, Psychiatric, Allergic/Immunologic were all negative except for pertinent positives noted in my HPI.     PHYSICAL EXAM  VSS    General  - normal appearance, in no obvious distress  HEENT  - conjunctivae not injected, moist mucous membranes, normocephalic/atraumatic head, ears normal appearance, no lesions, mouth normal appearance, no scars, normal dentition and teeth present  CV  - normal radial pulse  Pulm  - normal respiratory pattern, non-labored  Musculoskeletal - bilateral shoulder  - inspection: normal bone and joint alignment, no obvious deformity, no scapular winging, no AC step-off  - palpation: mildly tender RC insertion, normal clavicle, non-tender AC  - ROM:  painful and limited flexion and ER at end range, limited IR  - strength: 5/5  strength, 5/5 in all shoulder planes  - special tests:  (-) Speed's  (+) Neer  (+) Hawkin's  (+) Jovanny's  (-) Bim's  (-) apprehension  (-) subscap lift-off  Neuro  - no sensory or motor deficit, grossly normal coordination, normal muscle tone  Skin  - no ecchymosis, erythema, warmth, or induration, no obvious rash  Psych  - interactive, appropriate, normal mood and affect    ASSESSMENT & PLAN  63 yo  female with bilateral shoulder pain due to rotator cuff arthropathy and subacromial bursitis, chronic, recurrent    I independently reviewed the following imaging studies:  Bilateral shoulder xrays: shows some arthritis  After a 20 minute discussion and examination, we decided to perform a same day injection for diagnostic and therapeutic purposes for  Bilateral shoulders today  Followup after lumbar MRI that has already been ordered and planned per our last visit  Cont. Medications as written  Rx given for medrol pack    Patient has been doing home exercise physical therapy program for this problem      Appropriate PPE was utilized for prevention of spread of Covid-19.  Shon Simpson MD, CAM    PROCEDURE: bilateral SHOULDER subacromial bursa injection     The patient was apprised of the risks and the benefits of the procedure and consented and a written consent was signed by the patient.   The patient s bilateral shoulder was sterilely prepped with chloraprep.   40 mg of depo suspension was drawn up into a 5 mL syringe with 4 mL of 1% lidocaine   The patient was injected with a 1.5-inch 22-gauge needle at right and left lateral gleno-humeral joint in the subacromial space  There were no complications. The patient tolerated the procedure well. There was minimal bleeding.   The patient was instructed to ice the shoulders upon leaving clinic and refrain from overuse over the next 3 days.   The patient was instructed to go to the emergency room with any usual pain, swelling, or redness occurred in the injected area.   The patient was given a followup appointment to evaluate response to the injection to their increased range of motion and reduction of pain.    followup PRN  Dr Shon Simpson     Large Joint Injection/Arthocentesis: bilateral subacromial bursa    Date/Time: 2/19/2024 9:31 AM    Performed by: Shon Simpson MD  Authorized by: Shon Simpson MD    Indications:  Pain, diagnostic evaluation  and osteoarthritis  Needle Size:  22 G  Guidance: landmark guided    Approach:  Anterolateral  Location:  Shoulder  Laterality:  Bilateral      Site:  Bilateral subacromial bursa  Medications (Right):  40 mg methylPREDNISolone 40 MG/ML  Medications (Left):  40 mg methylPREDNISolone 40 MG/ML  Outcome:  Tolerated well, no immediate complications  Procedure discussed: discussed risks, benefits, and alternatives    Consent Given by:  Patient  Timeout: timeout called immediately prior to procedure    Prep: patient was prepped and draped in usual sterile fashion

## 2024-02-20 NOTE — LETTER
2/19/2024         RE: Aspen Mccullough  [address]  [facility] [address]        Dear Colleague,    Thank you for referring your patient, Aspen Mccullough, to the [facility] [place]. Please see a copy of my visit note below.    HISTORY OF PRESENT ILLNESS  Ms. Mccullough is a pleasant 64 year old year old female who presents to clinic today with the following:  What problem are you here for? Bilateral Shoulder Injections  Had injections for both shoulders last fall and had imrpovement for several months  Both are starting to bother her again  How long have you had this problem? Chronic    Have you had any recent imaging of this problem? Xrays/MRI/CT scans? Yes     Have you had treatments for this problem in the past?  -Medications? Yes  -Physical therapy? No  -Injections? Yes  -Surgery? No     How severe is this problem today? 0-10 scale? 8    How severe has this problem been at WORST in the past? 0-10 scale? 8    What do you think caused this problem?     Does this problem or its symptoms cause difficulty for you falling asleep or staying asleep? Yes    Anything else you want us to know about this problem? Having MRI on the 25th.           MEDICAL HISTORY  Patient Active Problem List   Diagnosis     Generalized osteoarthrosis, involving multiple sites     CRPS (complex regional pain syndrome), lower limb     Fibromyalgia     Somatoform disorder     Histrionic personality (H)     Encounter for long-term use of opiate analgesic     Knee joint replacement by other means     Hypothyroidism     Insomnia, unspecified type     Hyperlipidemia     Hashimoto's thyroiditis     Glucocorticoid deficiency (H24)     Posttraumatic stress disorder     Impingement syndrome of left shoulder     Abdominal cramping, generalized     Intermittent diarrhea     Primary osteoarthritis involving multiple joints     Attention deficit disorder     Allergic rhinitis     Cushing's syndrome (H24)      Virtual Regular Visit    Verification of patient location:    Patient is located in the following state in which I hold an active license [place]    Problem List Items Addressed This Visit     Bipolar II disorder (HCC)    Relevant Medications    mirtazapine (REMERON) 7.5 MG tablet              Encounter provider ANGELA Vaughn    Provider located at    [facility]  [address] [facility] [place] [address] [phone]    Recent Visits  No visits were found meeting these conditions.  Showing recent visits within past 7 days and meeting all other requirements  Today's Visits  Date Type Provider Dept   02/20/24 Telemedicine ANGELA Vaughn  [facility]   Showing today's visits and meeting all other requirements  Future Appointments  No visits were found meeting these conditions.  Showing future appointments within next 150 days and meeting all other requirements           The patient was identified by name and date of birth. Patient was informed that this is a telemedicine visit and that the visit is being conducted throughthe Epic Embedded platform. She agrees to proceed..  My office door was closed. No one else was in the room.  She acknowledged consent and understanding of privacy and security of the video platform. The patient has agreed to participate and understands they can discontinue the visit at any time.    Patient is aware this is a billable service.     HPI     Current Outpatient Medications   Medication Sig Dispense Refill   • fluconazole (DIFLUCAN) 150 mg tablet Take 150 mg by mouth daily as needed     • mirtazapine (REMERON) 7.5 MG tablet Take 1 tablet (7.5 mg total) by mouth daily at bedtime 30 tablet 2   • atoMOXetine (STRATTERA) 18 mg capsule Take 1 capsule (18 mg total) by mouth daily 30 capsule 2   • doxycycline hyclate (VIBRAMYCIN) 100 mg capsule Take 1 capsule (100 mg total) by mouth 2 (two) times a day for 7 days  14 capsule 0   • Drysol 20 % external solution Apply topically to affected area at bedtime. 60 mL 0   • ergocalciferol (VITAMIN D2) 50,000 units TAKE ONE CAPSULE BY MOUTH WEEKLY 16 capsule 0   • fluticasone (FLONASE) 50 mcg/act nasal spray USE ONE SPRAY IN EACH NOSTRIL EVERY DAY 16 g 0   • ibuprofen (MOTRIN) 600 mg tablet TAKE ONE TABLET BY MOUTH EVERY EIGHT HOURS AS NEEDED for mild to moderate pain 30 tablet 0   • lamoTRIgine (LaMICtal) 200 MG tablet Take 1 tablet (200 mg total) by mouth daily 30 tablet 2   • naproxen (NAPROSYN) 500 mg tablet Take 1 tablet (500 mg total) by mouth 2 (two) times a day with meals 30 tablet 0   • risperiDONE (RisperDAL) 1 mg tablet Take 1 tablet (1 mg total) by mouth daily at bedtime 30 tablet 2   • Sprintec 28 0.25-35 MG-MCG per tablet TAKE ONE TABLET BY MOUTH EVERY DAY 84 tablet 0     No current facility-administered medications for this visit.       Review of Systems  Video Exam    There were no vitals filed for this visit.    Physical Exam   As a result of this visit, I have referred the patient for further respiratory evaluation. No    I spent 20 minutes directly with the patient during this visit  VIRTUAL VISIT DISCLAIMER    Janell Solorzano acknowledges that she has consented to an online visit or consultation. She understands that the online visit is based solely on information provided by her, and that, in the absence of a face-to-face physical evaluation by the physician, the diagnosis she receives is both limited and provisional in terms of accuracy and completeness. This is not intended to replace a full medical face-to-face evaluation by the physician. Janell Solorzano understands and accepts these terms.    MEDICATION MANAGEMENT NOTE        [facility]    Name and Date of Birth:  Janell Solorzano 22 y.o. 2001 MRN: [id number]    Date of Visit: February 20, 2024    No Known Allergies    Visit Time    Visit Start Time:  Endometriosis     Essential hypertension     Systemic lupus erythematosus (H)     S/P cervical spinal fusion     Paroxysmal atrial fibrillation (H)     Nonischemic cardiomyopathy (H)     Personal history of DVT (deep vein thrombosis)     History of pulmonary embolism     Acute deep vein thrombosis (DVT) of popliteal vein of right lower extremity (H)     Acute pulmonary embolism (H)     Adrenal cortical steroids causing adverse effect in therapeutic use     Alopecia areata     Anemia, blood loss     Ankle pain, left     ARF (acute renal failure) (H24)     Arthritis, septic (H)     Chronic ethmoidal sinusitis     Chronic maxillary sinusitis     Deviated nasal septum     Complications due to internal joint prosthesis (H24)     Generalized pain     Iatrogenic hyperthyroidism     S/P TKR (total knee replacement)     Left shoulder pain     Lipoma of skin and subcutaneous tissue     Malabsorption     Nasal fracture     Nasal turbinate hypertrophy     Opioid type dependence (H)     Other specified abnormal findings of blood chemistry     Other acute postoperative pain     Pain in joint, lower leg     Postoperative hypotension     S/P total knee replacement     Thrombus of right atrial appendage without antecedent myocardial infarction     Chronic pain of both shoulders     GI bleed     Grade III internal hemorrhoids     Atrial fibrillation with RVR (H)     Acute deep vein thrombosis (DVT) of proximal vein of right lower extremity (H)     Pneumonia of left upper lobe due to infectious organism     Morbid obesity (H)     Cerebrovascular accident (CVA), unspecified mechanism (H)     CVA (cerebral vascular accident) (H)     Neuropathy of left suprascapular nerve     Neuropathy of right suprascapular nerve     Stage 3 right buttock     BMI 40.0-44.9, adult (H)     Chronic anticoagulation     Controlled substance agreement signed     Craniofacial hyperhidrosis     Current chronic use of systemic steroids     DNR (do not  "1030  Visit Stop Time: 1050  Total Visit Duration:  20 minutes    SUBJECTIVE:    Janell is seen today for a follow up for Bipolar Disorder type II. She continues to experience ongoing symptoms since the last visit.  Janell seen today virtually for medication management follow-up.  She was last seen by this provider on 2/9/24.  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  She has been very tired, not getting enough sleep. However, she states that the sleep she is getting is better quality now with the mirtazapine. She is stating that the mirtazapine has been effective and has been improving her sleep. She reports continued depression, continued anxiety.  Rates both at 8/10.  Denies SI/HI. Denies auditory and visual hallucinations. She does state that on Friday night she was experiencing an \"out of body experience\" where she was \"seeing herself from outside of her body\" and \" was not connected to anything\". She states that she did call crisis and talk to them when she felt this way. She states she is feeling better now but continues to have a bit of that disconnected feeling. We discussed dissociation.  She denied any specific triggers.  She was encouraged to continue journaling and to speak to her therapist about the symptoms and how to cope when they occur.  She was in agreement at this time.  We will follow up in 4 weeks.  No med changes made at this time.      Continue medications as ordered  Risperdal 2 tabs (1mg) at HS.   Continue Lamictal to 200p.o. daily  Continue Strattera to 18mg PO daily  Continue Remeron 7.5mg PO HS  She will continue therapy  She will follow up with this provider in 4 weeks  She will call sooner with concerns or issues if they arise prior to scheduled appointment    She was provided with the education on risperdal.  PARQ completed including sedation, agitation, akathisia, TD, dystonic reactions, NMS, EPS, GI distress, dizziness, risk of " resuscitate)     Hypertonic bladder     Hypo-osmolality and hyponatremia     Lumbosacral radiculopathy at S1     Overflow diarrhea     Refusal of blood transfusions as patient is Amish     Unspecified fall, initial encounter     Unspecified injury of shoulder and upper arm, unspecified arm, initial encounter     Weakness     Chronic pain disorder     Constipation     Hemoptysis     COVID-19 virus infection     Acute respiratory failure with hypoxia (H)     Edema, unspecified type     History of ischemic stroke     Personal history of COVID-19     Pressure injury of sacral region, unstageable (H)     Pneumonia due to infectious organism, unspecified laterality, unspecified part of lung     RSV (respiratory syncytial virus infection)     Opioid dependence with withdrawal (H)     Nausea vomiting and diarrhea     Abdominal cramping     Medication side effects     Nausea and vomiting, unspecified vomiting type     Dehydration     Opiate overdose, accidental or unintentional, initial encounter (H)     Diarrhea, unspecified type     Screening for cervical cancer       Current Outpatient Medications   Medication Sig Dispense Refill     albuterol (PROAIR HFA/PROVENTIL HFA/VENTOLIN HFA) 108 (90 Base) MCG/ACT inhaler Inhale 1-2 puffs into the lungs every 6 hours as needed for shortness of breath, wheezing or cough       atorvastatin (LIPITOR) 40 MG tablet Take 1 tablet (40 mg) by mouth every evening 90 tablet 3     cyanocobalamin 1000 MCG/ML injection Inject 1 mL (1,000 mcg) Subcutaneous every 7 days 4 mL 5     cyclobenzaprine (FLEXERIL) 10 MG tablet Take 1 tablet (10 mg) by mouth 3 times daily 90 tablet 3     Elastic Bandages & Supports (MEDICAL COMPRESSION SOCKS) MISC 1 Package daily Please measure and distribute 2 pair of 20mmHg - 30mmHg THIGH high open or closed toe compression stockings with extra refills as indicated. Jobst ultrasheer or equivalent. 2 each 3     gabapentin (NEURONTIN) 300 MG capsule Take 300  metabolic syndrome, orthostatic hypotension and cardiovascular risks such as QT prolongation, increased prolactin    Lamotrigine PARQ completed including dizziness, headaches, ataxia, vision problems, somnolence, sleep changes, cognitive difficulties, rash (including Becerril-Chato rash), and others, risk of teratogenicity for females.     Education provided on Neuroleptic malignant syndrome.  Patient told that NMS is a life-threatening, neurological disorder most often caused by an adverse reaction to neuroleptic or antipsychotic drugs. Symptoms include high fever, sweating, unstable blood pressure, stupor, muscular rigidity, and autonomic dysfunction. In most cases, the disorder develops within the first 2 weeks of treatment with the drug; however, the disorder may develop any time during the therapy period. Patient advised to call the office or go to the ED immediately if these symptoms develop.    She denies any side effects from current psychiatric medications.    Aware of 24 hour and weekend coverage for urgent situations accessed by calling [facility] main practice number  Continue psychotherapy with therapist  Medication management every 4 weeks  Aware of need to follow up with family physician for medical issues    Diagnoses and all orders for this visit:    Bipolar II disorder (HCC)  -     mirtazapine (REMERON) 7.5 MG tablet; Take 1 tablet (7.5 mg total) by mouth daily at bedtime    Other orders  -     fluconazole (DIFLUCAN) 150 mg tablet; Take 150 mg by mouth daily as needed     Other atypical antipsychotics trialed and found to be ineffective:  Zyprexa, Abilify, Vraylar, and Latuda    Sleep aids that have been ineffective:  Trazodone, Gabapentin, Hydroxyzine    Current Outpatient Medications on File Prior to Visit   Medication Sig Dispense Refill   • fluconazole (DIFLUCAN) 150 mg tablet Take 150 mg by mouth daily as needed     • atoMOXetine (STRATTERA) 18 mg capsule Take 1 capsule  mg by mouth 2 times daily (morning and afternoon) with 800mg tablet (total dose 1100mg)       gabapentin (NEURONTIN) 800 MG tablet Take 800 mg by mouth At Bedtime        gabapentin (NEURONTIN) 800 MG tablet Take 800 mg by mouth 2 times daily (morning and afternoon) in addition to 300mg capsule (total dose 1100mg)       HYDROmorphone (DILAUDID) 8 MG tablet Take 8 mg by mouth 3 times daily . Max of 3 doses per day.       levothyroxine (TIROSINT) 100 MCG capsule Take 300 mcg by mouth daily       liothyronine (CYTOMEL) 5 MCG tablet Take 5 mcg by mouth daily       metoprolol succinate ER (TOPROL XL) 25 MG 24 hr tablet Take 3 tablets (75 mg) by mouth daily for 30 days 90 tablet 0     morphine (MS CONTIN) 30 MG 12 hr tablet Take 30 mg by mouth 2 times daily (morning and night) in addition to 60 mg tablet for a total dose of 90mg. 270 tablet 0     morphine (MS CONTIN) 60 MG 12 hr tablet Take 60 mg by mouth 3 times daily For morning and night doses, takes in addition to 30mg tablet for a total dose of 90mg. 30 tablet 0     rivaroxaban ANTICOAGULANT (XARELTO) 20 MG TABS tablet Take 1 tablet (20 mg) by mouth daily (with dinner)       sodium fluoride (SF 5000 PLUS) 1.1 % CREA Apply to affected area daily       zolpidem (AMBIEN) 5 MG tablet Take 5 mg by mouth nightly as needed for sleep Quantity #15 for 30 days supply per prescribing MD.         Allergies   Allergen Reactions     Blood Transfusion Related (Informational Only) Other (See Comments)     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      Chlorhexidine Hives     Thor surgical cleanser     Ibuprofen [Nsaids] Hives     Penicillins Hives     Other reaction(s): Unknown     Sulfa Antibiotics Hives     Other reaction(s): Unknown     Calcium Channel Blockers      Doxycycline GI Disturbance     Emesis & diarrhea  Patient denies allergy to this med     Hydroxyzine      rash     Mold        Family History   Problem Relation Age of Onset     Hypertension Mother      No  (18 mg total) by mouth daily 30 capsule 2   • doxycycline hyclate (VIBRAMYCIN) 100 mg capsule Take 1 capsule (100 mg total) by mouth 2 (two) times a day for 7 days 14 capsule 0   • Drysol 20 % external solution Apply topically to affected area at bedtime. 60 mL 0   • ergocalciferol (VITAMIN D2) 50,000 units TAKE ONE CAPSULE BY MOUTH WEEKLY 16 capsule 0   • fluticasone (FLONASE) 50 mcg/act nasal spray USE ONE SPRAY IN EACH NOSTRIL EVERY DAY 16 g 0   • ibuprofen (MOTRIN) 600 mg tablet TAKE ONE TABLET BY MOUTH EVERY EIGHT HOURS AS NEEDED for mild to moderate pain 30 tablet 0   • lamoTRIgine (LaMICtal) 200 MG tablet Take 1 tablet (200 mg total) by mouth daily 30 tablet 2   • naproxen (NAPROSYN) 500 mg tablet Take 1 tablet (500 mg total) by mouth 2 (two) times a day with meals 30 tablet 0   • risperiDONE (RisperDAL) 1 mg tablet Take 1 tablet (1 mg total) by mouth daily at bedtime 30 tablet 2   • Sprintec 28 0.25-35 MG-MCG per tablet TAKE ONE TABLET BY MOUTH EVERY DAY 84 tablet 0   • [DISCONTINUED] mirtazapine (REMERON) 7.5 MG tablet Take 1 tablet (7.5 mg total) by mouth daily at bedtime 14 tablet 0     Current Facility-Administered Medications on File Prior to Visit   Medication Dose Route Frequency Provider Last Rate Last Admin   • [COMPLETED] cefTRIAXone (ROCEPHIN) 500 mg in lidocaine (PF) (XYLOCAINE-MPF) 1 % IM only syringe  500 mg Intramuscular Once Corina Landaverde MD   500 mg at 02/20/24 0002   • [COMPLETED] doxycycline hyclate (VIBRAMYCIN) capsule 100 mg  100 mg Oral Once Corina Landaverde MD   100 mg at 02/20/24 0002       Psychotherapy Provided:     Individual psychotherapy provided: Yes  Supportive counseling provided.  Medication changes discussed with Janell.  Medication education provided to Janell.  Discussed with Janell coping with job stress, social difficulties, and everyday stressors.   Coping strategies reviewed with Janell.   Importance of medication and treatment compliance reviewed with  Known Problems Father      No Known Problems Sister      No Known Problems Brother      No Known Problems Maternal Grandmother      No Known Problems Maternal Grandfather      No Known Problems Paternal Grandmother      No Known Problems Other      Social History     Socioeconomic History     Marital status:    Tobacco Use     Smoking status: Former     Packs/day: 1.50     Years: 20.00     Additional pack years: 0.00     Total pack years: 30.00     Types: Cigarettes     Start date: 1973     Quit date: 1993     Years since quittin.1     Smokeless tobacco: Never     Tobacco comments:     wish I never started   Substance and Sexual Activity     Alcohol use: No     Alcohol/week: 0.0 standard drinks of alcohol     Drug use: No     Sexual activity: Not Currently     Partners: Male     Birth control/protection: Post-menopausal   Social History Narrative    ** Merged History Encounter **            Additional medical/Social/Surgical histories reviewed in [facility] and updated as appropriate.     REVIEW OF SYSTEMS (2024)  10 point ROS of systems including Constitutional, Eyes, Respiratory, Cardiovascular, Gastroenterology, Genitourinary, Integumentary, Musculoskeletal, Psychiatric, Allergic/Immunologic were all negative except for pertinent positives noted in my HPI.     PHYSICAL EXAM  VSS    General  - normal appearance, in no obvious distress  HEENT  - conjunctivae not injected, moist mucous membranes, normocephalic/atraumatic head, ears normal appearance, no lesions, mouth normal appearance, no scars, normal dentition and teeth present  CV  - normal radial pulse  Pulm  - normal respiratory pattern, non-labored  Musculoskeletal - bilateral shoulder  - inspection: normal bone and joint alignment, no obvious deformity, no scapular winging, no AC step-off  - palpation: mildly tender RC insertion, normal clavicle, non-tender AC  - ROM:  painful and limited flexion and ER at end range, limited IR  -  Janell.  Importance of follow up with family physician for medical issues reviewed with Janell.  Reassurance and supportive therapy provided.   Crisis/safety plan discussed with Janell. Patient will call prior to scheduled appointment if they have any issues or concerns.  Patient understands they can access the office by calling the main number at any time if they are in crisis.  They also understand they can call their [facility]'s crisis number or go to their nearest ED if suicidal ideation increases or if they develop a plan or intent.       HPI ROS Appetite Changes and Sleep:     She reports normal sleep, adequate appetite, low energy. Denies homicidal ideation, denies suicidal ideation    Review Of Systems:      HPI ROS:               Medication Side Effects: denies    Depression (10 worst): 8/10 8/10   Anxiety (10 worst): 8/10 7/10   Safety concerns (SI, HI, etc): denies denies   Sleep: improved Poor, difficulty falling asleep   Energy: adequate low   Appetite: adequate adequate     General normal    Personality no change in personality   Constitutional as noted in HPI   ENT negative   Cardiovascular negative   Respiratory negative   Gastrointestinal negative   Genitourinary negative   Musculoskeletal negative   Integumentary negative   Neurological negative   Endocrine negative   Other Symptoms none, all other systems are negative     Mental Status Evaluation:    Appearance Appropriately dressed and Good eye contact   Behavior calm and cooperative   Mood anxious and depressed  Depression Scale - 8 of 10 (0 = No depression)  Anxiety Scale - 8 of 10 (0 = No anxiety)   Speech Normal rate and volume   Affect mood-congruent and Flat   Thought Processes Goal directed and coherent   Thought Content Does not verbalize delusional material   Associations Tightly connected   Perceptual Disturbances Denies hallucinations and does not appear to be responding to internal stimuli   Risk Potential Suicidal/Homicidal Ideation -  strength: 5/5  strength, 5/5 in all shoulder planes  - special tests:  (-) Speed's  (+) Neer  (+) Hawkin's  (+) Jovanny's  (-) Vienna's  (-) apprehension  (-) subscap lift-off  Neuro  - no sensory or motor deficit, grossly normal coordination, normal muscle tone  Skin  - no ecchymosis, erythema, warmth, or induration, no obvious rash  Psych  - interactive, appropriate, normal mood and affect    ASSESSMENT & PLAN  63 yo female with bilateral shoulder pain due to rotator cuff arthropathy and subacromial bursitis, chronic, recurrent    I independently reviewed the following imaging studies:  Bilateral shoulder xrays: shows some arthritis  After a 20 minute discussion and examination, we decided to perform a same day injection for diagnostic and therapeutic purposes for  Bilateral shoulders today  Followup after lumbar MRI that has already been ordered and planned per our last visit  Cont. Medications as written  Rx given for medrol pack    Patient has been doing home exercise physical therapy program for this problem      Appropriate PPE was utilized for prevention of spread of Covid-19.  Shon Simpson MD, CAQSM    PROCEDURE: bilateral SHOULDER subacromial bursa injection     The patient was apprised of the risks and the benefits of the procedure and consented and a written consent was signed by the patient.   The patient s bilateral shoulder was sterilely prepped with chloraprep.   40 mg of depo suspension was drawn up into a 5 mL syringe with 4 mL of 1% lidocaine   The patient was injected with a 1.5-inch 22-gauge needle at right and left lateral gleno-humeral joint in the subacromial space  There were no complications. The patient tolerated the procedure well. There was minimal bleeding.   The patient was instructed to ice the shoulders upon leaving clinic and refrain from overuse over the next 3 days.   The patient was instructed to go to the emergency room with any usual pain, swelling, or redness occurred in  No evidence of suicidal or homicidal ideation and patient does not verbalize suicidal or homicidal ideation  Risk of Violence - No evidence of risk for violence found on assessment  Risk of Self Mutilation - No evidence of risk for self mutilation found on assessment   Orientation oriented to person, place, time/date, and situation   Memory recent and remote memory grossly intact   Consciousness alert and awake   Attention/Concentration attention span and concentration appear shorter than expected for age   Insight fair   Judgement fair   Muscle Strength and Gait normal muscle strength and normal muscle tone, normal gait/station and normal balance   Motor Activity no abnormal movements   Language no difficulty naming common objects, no difficulty repeating a phrase, no difficulty writing a sentence   Fund of Knowledge adequate knowledge of current events  adequate fund of knowledge regarding past history  adequate fund of knowledge regarding vocabulary      Past Psychiatric History Update:     Inpatient Psychiatric Admission Since Last Encounter:   no  Changes to Outpatient Psychiatric Treatment Team:    no  Suicide Attempt Or Self Mutilation Since Last Encounter:   no  Incidence of Violent Behavior Since Last Encounter:   no    Traumatic History Update:     New Onset of Abuse Since Last Encounter:   no  Traumatic Events Since Last Encounter:   no    Past Medical History:    Past Medical History:   Diagnosis Date   • Anxiety    • Concussion    • Depression    • Head injury     concussions in high school and in college   • Self-injurious behavior         Past Surgical History:   Procedure Laterality Date   • WISDOM TOOTH EXTRACTION       No Known Allergies  Substance Abuse History:    Social History     Substance and Sexual Activity   Alcohol Use Yes   • Alcohol/week: 3.0 - 4.0 standard drinks of alcohol   • Types: 3 - 4 Shots of liquor per week    Comment: 3-4 drinks, 2 times per week     Social History  the injected area.   The patient was given a followup appointment to evaluate response to the injection to their increased range of motion and reduction of pain.    followup PRN  Dr Shon Simpson     Large Joint Injection/Arthocentesis: bilateral subacromial bursa    Date/Time: 2/19/2024 9:31 AM    Performed by: Shon Simpson MD  Authorized by: Shon Simpson MD    Indications:  Pain, diagnostic evaluation and osteoarthritis  Needle Size:  22 G  Guidance: landmark guided    Approach:  Anterolateral  Location:  Shoulder  Laterality:  Bilateral      Site:  Bilateral subacromial bursa  Medications (Right):  40 mg methylPREDNISolone 40 MG/ML  Medications (Left):  40 mg methylPREDNISolone 40 MG/ML  Outcome:  Tolerated well, no immediate complications  Procedure discussed: discussed risks, benefits, and alternatives    Consent Given by:  Patient  Timeout: timeout called immediately prior to procedure    Prep: patient was prepped and draped in usual sterile fashion          Again, thank you for allowing me to participate in the care of your patient.        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   Tobacco Use   • Smoking status: Never   • Smokeless tobacco: Never   Vaping Use   • Vaping status: Never Used   Substance and Sexual Activity   • Alcohol use: Yes     Alcohol/week: 3.0 - 4.0 standard drinks of alcohol     Types: 3 - 4 Shots of liquor per week     Comment: 3-4 drinks, 2 times per week   • Drug use: No   • Sexual activity: Yes     Partners: Male     Birth control/protection: Condom Male   Other Topics Concern   • Not on file   Social History Narrative   • Not on file     Social Determinants of Health     Financial Resource Strain: Not on file   Food Insecurity: Not on file   Transportation Needs: Not on file   Physical Activity: Not on file   Stress: Not on file   Social Connections: Not on file   Intimate Partner Violence: Not on file   Housing Stability: Not on file     Family Psychiatric History:     Family History   Problem Relation Age of Onset   • Hypertension Mother    • Mental illness Mother    • Depression Mother    • Mental illness Sister    • Coronary artery disease Maternal Grandmother    • Throat cancer Paternal Grandfather      History Review:The following portions of the patient's history were reviewed and updated as appropriate: allergies, current medications, past family history, past medical history, past social history, past surgical history, and problem list     OBJECTIVE:     Vital signs in last 24 hours:    There were no vitals filed for this visit.  Laboratory Results: Recent Labs (last 2 months):   Admission on 02/19/2024, Discharged on 02/20/2024   Component Date Value   • N gonorrhoeae, DNA Probe 02/20/2024  Negative    • Chlamydia trachomatis, D* 02/20/2024 Negative    • Color, UA 02/20/2024 Yellow    • Clarity, UA 02/20/2024 Clear    • Specific Gravity, UA 02/20/2024 1.031 (H)    • pH, UA 02/20/2024 6.5    • Leukocytes, UA 02/20/2024 Small (A)    • Nitrite, UA 02/20/2024 Positive (A)    • Protein, UA 02/20/2024 Trace (A)    • Glucose, UA 02/20/2024 Negative    • Ketones, UA 02/20/2024 Negative    • Urobilinogen, UA 02/20/2024 <2.0    • Bilirubin, UA 02/20/2024 Negative    • Occult Blood, UA 02/20/2024 Negative    • RBC, UA 02/20/2024 4-10 (A)    • WBC, UA 02/20/2024 20-30 (A)    • Epithelial Cells 02/20/2024 Occasional    • Bacteria, UA 02/20/2024 Innumerable (A)    Office Visit on 01/25/2024   Component Date Value   • N gonorrhoeae, DNA Probe 01/25/2024 Negative    • Chlamydia trachomatis, D* 01/25/2024 Negative    Office Visit on 01/04/2024   Component Date Value   • URINE HCG 01/04/2024 negative    • LEUKOCYTE ESTERASE,UA 01/04/2024 -    • NITRITE,UA 01/04/2024 -    • SL AMB POCT UROBILINOGEN 01/04/2024 -    • POCT URINE PROTEIN 01/04/2024 15    •  PH,UA 01/04/2024 6.0    • BLOOD,UA 01/04/2024 -    • SPECIFIC GRAVITY,UA 01/04/2024 1.025    • KETONES,UA 01/04/2024 -    • BILIRUBIN,UA 01/04/2024 -    • GLUCOSE, UA 01/04/2024 -    •  COLOR,UA 01/04/2024 yellow    • CLARITY,UA 01/04/2024 clear    • N gonorrhoeae, DNA Probe 01/04/2024 Positive (A)    • Chlamydia trachomatis, D* 01/04/2024 Negative    • Urine Culture 01/04/2024 No Growth <1000 cfu/mL    Appointment on 12/21/2023   Component Date Value   • WBC 12/21/2023 6.92    • RBC 12/21/2023 5.13 (H)    • Hemoglobin 12/21/2023 15.7 (H)    • Hematocrit 12/21/2023 45.0    • MCV 12/21/2023 88    • MCH 12/21/2023 30.6    • MCHC 12/21/2023 34.9    • RDW 12/21/2023 11.9    • MPV 12/21/2023 10.0    • Platelets 12/21/2023 287    • nRBC 12/21/2023 0    • Neutrophils Relative 12/21/2023 48    • Immat GRANS % 12/21/2023 0    • Lymphocytes Relative 12/21/2023 42    • Monocytes  Relative 12/21/2023 9    • Eosinophils Relative 12/21/2023 1    • Basophils Relative 12/21/2023 0    • Neutrophils Absolute 12/21/2023 3.32    • Immature Grans Absolute 12/21/2023 0.02    • Lymphocytes Absolute 12/21/2023 2.87    • Monocytes Absolute 12/21/2023 0.60    • Eosinophils Absolute 12/21/2023 0.08    • Basophils Absolute 12/21/2023 0.03    • Sodium 12/21/2023 139    • Potassium 12/21/2023 3.9    • Chloride 12/21/2023 104    • CO2 12/21/2023 28    • ANION GAP 12/21/2023 7    • BUN 12/21/2023 10    • Creatinine 12/21/2023 0.89    • Glucose, Fasting 12/21/2023 87    • Calcium 12/21/2023 9.5    • AST 12/21/2023 18    • ALT 12/21/2023 14    • Alkaline Phosphatase 12/21/2023 50    • Total Protein 12/21/2023 7.6    • Albumin 12/21/2023 4.5    • Total Bilirubin 12/21/2023 0.52    • eGFR 12/21/2023 91    • Cholesterol 12/21/2023 153    • Triglycerides 12/21/2023 103    • HDL, Direct 12/21/2023 75    • LDL Calculated 12/21/2023 57    • Non-HDL-Chol (CHOL-HDL) 12/21/2023 78    • TSH 3RD GENERATON 12/21/2023 1.975    • Hemoglobin A1C 12/21/2023 5.0    • EAG 12/21/2023 97    • Vitamin B-12 12/21/2023 429    • Vit D, 25-Hydroxy 12/21/2023 104.2 (H)    • Lyme Total Antibodies 12/21/2023 Negative      I have personally reviewed all pertinent laboratory/tests results.    Suicide/Homicide Risk Assessment:    Risk of Harm to Self:  The following ratings are based on assessment at the time of the interview  Recent Specific Risk Factors include: current depressive symptoms, current anxiety symptoms, unstable mood, worries about finances or work  Demographic risk factors include: , age: young adult (15-24)  Historical Risk Factors include: chronic depression, chronic anxiety symptoms, chronic mood disorder, history of suicide attempts, history of impulsive behaviors, history of traumatic experiences  Protective Factors: no current suicidal ideation, access to mental health treatment, compliant with medications,  compliant with mental health treatment, having a desire to be alive, stable living environment, stable job, sense of determination, supportive family  Based on today's assessment, Janell presents the following risk of harm to self: low    The following interventions are recommended: no intervention changes needed. Although patient's acute lethality risk is LOW, long-term/chronic lethality risk is mildly elevated given chronic mental health symptoms.  Janell Solorzano is future-oriented, forward-thinking, and demonstrates ability to act in a self-preserving manner as evidenced by volitionally presenting to the clinic today, seeking treatment.  At this time, inpatient hospitalization is not currently warranted. To mitigate future risk, patient should adhere to treatment recommendations, avoid alcohol/illicit substance use, utilize community-based resources and familiar support, and prioritize mental health treatment.      Based on today's assessment and clinical criteria, Janell Solorzano contracts for safety and is not an imminent risk of harm to self or others. Outpatient level of care is deemed appropriate at this present time.  Janell Solorzano understands that if they are no longer able to contract for safety, they need to call/contact the outpatient office including this writer, call/contact crisis and/or attend to the nearest Emergency Department for immediate evaluation.      Risk of Harm to Others:  The following ratings are based on assessment at the time of the interview  Protective Factors: no current homicidal ideation  Based on today's assessment, Janell presents the following risk of harm to others: none    The following interventions are recommended: contracts for safety at present - agrees to go to ED if feeling unsafe, return in 4 weeks for reassessment, therapy appointment in 3 days, contracts for safety at present - agrees to call Crisis Intervention Service if feeling unsafe    Medications Risks/Benefits:       Risks, Benefits And Possible Side Effects Of Medications:    Discussed risks and benefits of treatment with patient including risk of suicidality, serotonin syndrome, increased QTc interval and SIADH related to treatment with antidepressants; Risk of induction of manic symptoms in certain patient populations, risk of parkinsonian symptoms, metabolic syndrome, tardive dyskinesia and neuroleptic malignant syndrome related to treatment with antipsychotic medications, and risk of rash related to treatment with Lamictal     Controlled Medication Discussion:     Not applicable    Treatment Plan:    Due for update/Updated:   no  Last treatment plan done 1/11/24 by Michela Bates LCSW.  Treatment Plan due on 7/11/24.    ANGELA Vaughn 02/20/24    This note was not shared with the patient due to reasonable likelihood of causing patient harm

## 2024-02-28 NOTE — TELEPHONE ENCOUNTER
"Triage Call:    Caller: Daughter calling for patient and consent is on file.    Patient gets dehydrated frequently.    She has been keeping down some fluids.   Patient is having blood in her vomit.      Protocol Recommended Disposition: ED.  \"It probably won't work, she sits there for 12 hours before they take her back.  I appreciate the information\".  Did not say if they will be going in or not.       Caller verbalized understanding of instructions and questions answered.      Verna Ordonez RN on 2/27/2024 at 6:20 PM    Reason for Disposition   [1] Vomiting AND [2] contains red blood or black (\"coffee ground\") material  (Exception: Few red streaks in vomit that only happened once.)    Additional Information   Negative: Shock suspected (e.g., cold/pale/clammy skin, too weak to stand, low BP, rapid pulse)   Negative: Difficult to awaken or acting confused (e.g., disoriented, slurred speech)   Negative: Sounds like a life-threatening emergency to the triager    Protocols used: Vomiting-A-AH    "

## 2024-03-23 ENCOUNTER — HEALTH MAINTENANCE LETTER (OUTPATIENT)
Age: 64
End: 2024-03-23

## 2024-10-01 PROBLEM — Z53.1 PROCEDURE AND TREATMENT NOT CARRIED OUT BECAUSE OF PATIENT'S DECISION FOR REASONS OF BELIEF AND GROUP PRESSURE: Status: ACTIVE | Noted: 2021-10-20

## 2025-03-22 NOTE — PROGRESS NOTES
RECEIVING UNIT ED HANDOFF REVIEW    ED Nurse Handoff Report was reviewed by: santino Martinez RN on July 21, 2023 at 8:13 AM        Never

## (undated) DEVICE — TRAY PAIN INJECTION 97A 640

## (undated) DEVICE — PREP CHLORAPREP W/ORANGE TINT 10.5ML 260715

## (undated) DEVICE — GLOVE PROTEXIS POWDER FREE SMT 8.0  2D72PT80X

## (undated) DEVICE — SU VICRYL 2-0 CT-2 27" UND J269H

## (undated) DEVICE — GLOVE PROTEXIS BLUE W/NEU-THERA 8.0  2D73EB80

## (undated) DEVICE — DRSG GAUZE 4X4" TRAY 6939

## (undated) DEVICE — LABEL MEDICATION SYSTEM 3303-P

## (undated) DEVICE — DRSG PRIMAPORE 03 1/8X6" 66000318

## (undated) DEVICE — GLOVE ESTEEM POWDER FREE SMT 7.0  2D72PT70

## (undated) DEVICE — COVER ULTRASOUND PROBE W/GEL FLEXI-FEEL 6"X58" LF  25-FF658

## (undated) DEVICE — NDL COUNTER 20CT 31142493

## (undated) DEVICE — NDL CANNULA SOLOPLEX STIM 21GX100MM 001187-77

## (undated) DEVICE — LINEN TOWEL PACK X5 5464

## (undated) DEVICE — DRSG GAUZE 4X4" 2187

## (undated) DEVICE — PANTIES MESH LG/XLG 2PK 706M2

## (undated) DEVICE — NDL 18GAX1.5" 305185

## (undated) DEVICE — SYR 20ML LL W/O NDL 302830

## (undated) DEVICE — TAPE DURAPORE 3" SILK 1538-3

## (undated) DEVICE — GLOVE PROTEXIS MICRO 8.0  2D73PM80

## (undated) DEVICE — GOWN IMPERVIOUS BREATHABLE SMART XLG 89045

## (undated) DEVICE — SYR 03ML LL W/O NDL 309657

## (undated) DEVICE — Device

## (undated) DEVICE — GLOVE ESTEEM POWDER FREE SMT 7.5  2D72PT75

## (undated) DEVICE — SOL ADH LIQUID BENZOIN SWAB 0.6ML C1544

## (undated) DEVICE — PAIN PACK

## (undated) DEVICE — ESU GROUND PAD ADULT W/CORD E7507

## (undated) DEVICE — SOL NACL 0.9% IRRIG 500ML BOTTLE 2F7123

## (undated) DEVICE — SWAB PROCTO 16" 2/PK 32-046

## (undated) DEVICE — SU VICRYL 3-0 SH 27" UND J416H

## (undated) DEVICE — NDL 25GA 1.5" 305127

## (undated) DEVICE — SOL WATER IRRIG 1000ML BOTTLE 2F7114

## (undated) DEVICE — DRAPE BACK TABLE  44X90" 8377

## (undated) DEVICE — TAPE DURAPORE 2"X1.5YD SILK 1538S-2

## (undated) DEVICE — SYR 05ML LL W/O NDL

## (undated) DEVICE — PACK RECTAL KIT CUSTOM ASC

## (undated) DEVICE — SUCTION MANIFOLD NEPTUNE 2 SYS 1 PORT 702-025-000

## (undated) DEVICE — PREP DURAPREP 26ML APL 8630

## (undated) DEVICE — SYR 10ML LL W/O NDL

## (undated) RX ORDER — ONDANSETRON 2 MG/ML
INJECTION INTRAMUSCULAR; INTRAVENOUS
Status: DISPENSED
Start: 2020-07-15

## (undated) RX ORDER — LIDOCAINE HYDROCHLORIDE 20 MG/ML
INJECTION, SOLUTION EPIDURAL; INFILTRATION; INTRACAUDAL; PERINEURAL
Status: DISPENSED
Start: 2020-07-15

## (undated) RX ORDER — LIDOCAINE HYDROCHLORIDE 10 MG/ML
INJECTION, SOLUTION EPIDURAL; INFILTRATION; INTRACAUDAL; PERINEURAL
Status: DISPENSED
Start: 2022-06-02

## (undated) RX ORDER — METHYLPREDNISOLONE ACETATE 40 MG/ML
INJECTION, SUSPENSION INTRA-ARTICULAR; INTRALESIONAL; INTRAMUSCULAR; SOFT TISSUE
Status: DISPENSED
Start: 2021-09-07

## (undated) RX ORDER — BETAMETHASONE SODIUM PHOSPHATE AND BETAMETHASONE ACETATE 3; 3 MG/ML; MG/ML
INJECTION, SUSPENSION INTRA-ARTICULAR; INTRALESIONAL; INTRAMUSCULAR; SOFT TISSUE
Status: DISPENSED
Start: 2022-04-11

## (undated) RX ORDER — DIAZEPAM 5 MG
TABLET ORAL
Status: DISPENSED
Start: 2019-07-10

## (undated) RX ORDER — HEPARIN SODIUM 200 [USP'U]/100ML
INJECTION, SOLUTION INTRAVENOUS
Status: DISPENSED
Start: 2021-10-26

## (undated) RX ORDER — PROPOFOL 10 MG/ML
INJECTION, EMULSION INTRAVENOUS
Status: DISPENSED
Start: 2020-07-15

## (undated) RX ORDER — DIAZEPAM 5 MG
TABLET ORAL
Status: DISPENSED
Start: 2021-10-05

## (undated) RX ORDER — LIDOCAINE HYDROCHLORIDE 10 MG/ML
INJECTION, SOLUTION EPIDURAL; INFILTRATION; INTRACAUDAL; PERINEURAL
Status: DISPENSED
Start: 2021-12-17

## (undated) RX ORDER — LIDOCAINE HYDROCHLORIDE 10 MG/ML
INJECTION, SOLUTION EPIDURAL; INFILTRATION; INTRACAUDAL; PERINEURAL
Status: DISPENSED
Start: 2021-10-05

## (undated) RX ORDER — BETAMETHASONE SODIUM PHOSPHATE AND BETAMETHASONE ACETATE 3; 3 MG/ML; MG/ML
INJECTION, SUSPENSION INTRA-ARTICULAR; INTRALESIONAL; INTRAMUSCULAR; SOFT TISSUE
Status: DISPENSED
Start: 2021-09-27

## (undated) RX ORDER — LIDOCAINE HYDROCHLORIDE 10 MG/ML
INJECTION, SOLUTION EPIDURAL; INFILTRATION; INTRACAUDAL; PERINEURAL
Status: DISPENSED
Start: 2022-04-11

## (undated) RX ORDER — LIDOCAINE HYDROCHLORIDE 10 MG/ML
INJECTION, SOLUTION EPIDURAL; INFILTRATION; INTRACAUDAL; PERINEURAL
Status: DISPENSED
Start: 2021-12-03

## (undated) RX ORDER — DEXAMETHASONE SODIUM PHOSPHATE 10 MG/ML
INJECTION, SOLUTION INTRAMUSCULAR; INTRAVENOUS
Status: DISPENSED
Start: 2022-06-02

## (undated) RX ORDER — LIDOCAINE HYDROCHLORIDE 10 MG/ML
INJECTION, SOLUTION EPIDURAL; INFILTRATION; INTRACAUDAL; PERINEURAL
Status: DISPENSED
Start: 2021-09-27

## (undated) RX ORDER — DIAZEPAM 5 MG
TABLET ORAL
Status: DISPENSED
Start: 2021-10-26

## (undated) RX ORDER — GABAPENTIN 300 MG/1
CAPSULE ORAL
Status: DISPENSED
Start: 2020-07-15

## (undated) RX ORDER — LIDOCAINE HYDROCHLORIDE 10 MG/ML
INJECTION, SOLUTION EPIDURAL; INFILTRATION; INTRACAUDAL; PERINEURAL
Status: DISPENSED
Start: 2021-09-07

## (undated) RX ORDER — ACETAMINOPHEN 325 MG/1
TABLET ORAL
Status: DISPENSED
Start: 2020-07-15

## (undated) RX ORDER — BETAMETHASONE SODIUM PHOSPHATE AND BETAMETHASONE ACETATE 3; 3 MG/ML; MG/ML
INJECTION, SUSPENSION INTRA-ARTICULAR; INTRALESIONAL; INTRAMUSCULAR; SOFT TISSUE
Status: DISPENSED
Start: 2021-12-03

## (undated) RX ORDER — TRIAMCINOLONE ACETONIDE 40 MG/ML
INJECTION, SUSPENSION INTRA-ARTICULAR; INTRAMUSCULAR
Status: DISPENSED
Start: 2021-12-17

## (undated) RX ORDER — LIDOCAINE HYDROCHLORIDE 10 MG/ML
INJECTION, SOLUTION EPIDURAL; INFILTRATION; INTRACAUDAL; PERINEURAL
Status: DISPENSED
Start: 2021-10-26